# Patient Record
Sex: MALE
[De-identification: names, ages, dates, MRNs, and addresses within clinical notes are randomized per-mention and may not be internally consistent; named-entity substitution may affect disease eponyms.]

---

## 2018-06-24 ENCOUNTER — HOSPITAL ENCOUNTER (OUTPATIENT)
Dept: HOSPITAL 97 - ER | Age: 48
Setting detail: OBSERVATION
LOS: 1 days | Discharge: HOME | End: 2018-06-25
Attending: ORTHOPAEDIC SURGERY | Admitting: ORTHOPAEDIC SURGERY
Payer: SELF-PAY

## 2018-06-24 VITALS — BODY MASS INDEX: 32.3 KG/M2

## 2018-06-24 DIAGNOSIS — Z23: ICD-10-CM
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
## 2018-06-24 DIAGNOSIS — S52.202B: Primary | ICD-10-CM

## 2018-06-24 DIAGNOSIS — Z88.0: ICD-10-CM

## 2018-06-24 LAB
BUN BLD-MCNC: 9 MG/DL (ref 7–18)
GLUCOSE SERPLBLD-MCNC: 136 MG/DL (ref 74–106)
HCT VFR BLD CALC: 42.7 % (ref 39.6–49)
LYMPHOCYTES # SPEC AUTO: 1.1 K/UL (ref 0.7–4.9)
MCH RBC QN AUTO: 33.5 PG (ref 27–35)
MCV RBC: 99.2 FL (ref 80–100)
METHAMPHET UR QL SCN: NEGATIVE
PMV BLD: 9.8 FL (ref 7.6–11.3)
POTASSIUM SERPL-SCNC: 3.5 MMOL/L (ref 3.5–5.1)
RBC # BLD: 4.31 M/UL (ref 4.33–5.43)
THC SERPL-MCNC: NEGATIVE NG/ML

## 2018-06-24 PROCEDURE — 80307 DRUG TEST PRSMV CHEM ANLYZR: CPT

## 2018-06-24 PROCEDURE — 86901 BLOOD TYPING SEROLOGIC RH(D): CPT

## 2018-06-24 PROCEDURE — 72125 CT NECK SPINE W/O DYE: CPT

## 2018-06-24 PROCEDURE — 0PDL0ZZ EXTRACTION OF LEFT ULNA, OPEN APPROACH: ICD-10-PCS

## 2018-06-24 PROCEDURE — 86900 BLOOD TYPING SEROLOGIC ABO: CPT

## 2018-06-24 PROCEDURE — 86850 RBC ANTIBODY SCREEN: CPT

## 2018-06-24 PROCEDURE — 85025 COMPLETE CBC W/AUTO DIFF WBC: CPT

## 2018-06-24 PROCEDURE — 99285 EMERGENCY DEPT VISIT HI MDM: CPT

## 2018-06-24 PROCEDURE — 90714 TD VACC NO PRESV 7 YRS+ IM: CPT

## 2018-06-24 PROCEDURE — 0HQEXZZ REPAIR LEFT LOWER ARM SKIN, EXTERNAL APPROACH: ICD-10-PCS

## 2018-06-24 PROCEDURE — 74177 CT ABD & PELVIS W/CONTRAST: CPT

## 2018-06-24 PROCEDURE — 70450 CT HEAD/BRAIN W/O DYE: CPT

## 2018-06-24 PROCEDURE — 80048 BASIC METABOLIC PNL TOTAL CA: CPT

## 2018-06-24 PROCEDURE — 81003 URINALYSIS AUTO W/O SCOPE: CPT

## 2018-06-24 PROCEDURE — 71260 CT THORAX DX C+: CPT

## 2018-06-24 PROCEDURE — 36415 COLL VENOUS BLD VENIPUNCTURE: CPT

## 2018-06-24 PROCEDURE — 83605 ASSAY OF LACTIC ACID: CPT

## 2018-06-24 PROCEDURE — 0JQH0ZZ REPAIR LEFT LOWER ARM SUBCUTANEOUS TISSUE AND FASCIA, OPEN APPROACH: ICD-10-PCS

## 2018-06-24 PROCEDURE — 96365 THER/PROPH/DIAG IV INF INIT: CPT

## 2018-06-24 RX ADMIN — HYDROCODONE BITARTRATE AND ACETAMINOPHEN PRN TAB: 5; 325 TABLET ORAL at 11:45

## 2018-06-24 RX ADMIN — HYDROCODONE BITARTRATE AND ACETAMINOPHEN PRN TAB: 5; 325 TABLET ORAL at 19:24

## 2018-06-24 RX ADMIN — SODIUM CHLORIDE SCH MLS: 9 INJECTION, SOLUTION INTRAVENOUS at 14:58

## 2018-06-24 RX ADMIN — SODIUM CHLORIDE SCH MLS: 9 INJECTION, SOLUTION INTRAVENOUS at 23:49
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
## 2018-06-25 VITALS — TEMPERATURE: 97.6 F | DIASTOLIC BLOOD PRESSURE: 65 MMHG | SYSTOLIC BLOOD PRESSURE: 135 MMHG

## 2018-06-25 VITALS — OXYGEN SATURATION: 99 %

## 2018-06-25 RX ADMIN — HYDROCODONE BITARTRATE AND ACETAMINOPHEN PRN TAB: 5; 325 TABLET ORAL at 09:53

## 2018-06-25 RX ADMIN — HYDROCODONE BITARTRATE AND ACETAMINOPHEN PRN TAB: 5; 325 TABLET ORAL at 17:57

## 2019-01-07 ENCOUNTER — HOSPITAL ENCOUNTER (EMERGENCY)
Dept: HOSPITAL 97 - ER | Age: 49
Discharge: HOME | End: 2019-01-07
Payer: SELF-PAY

## 2019-01-07 VITALS — TEMPERATURE: 99 F | OXYGEN SATURATION: 97 % | DIASTOLIC BLOOD PRESSURE: 53 MMHG | SYSTOLIC BLOOD PRESSURE: 129 MMHG

## 2019-01-07 DIAGNOSIS — S51.812A: Primary | ICD-10-CM
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
## 2019-01-07 DIAGNOSIS — Z23: ICD-10-CM
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
## 2019-01-07 DIAGNOSIS — F17.210: ICD-10-CM
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
## 2019-01-07 PROCEDURE — 90714 TD VACC NO PRESV 7 YRS+ IM: CPT

## 2019-01-07 PROCEDURE — 99283 EMERGENCY DEPT VISIT LOW MDM: CPT

## 2019-01-07 PROCEDURE — 0JQH0ZZ REPAIR LEFT LOWER ARM SUBCUTANEOUS TISSUE AND FASCIA, OPEN APPROACH: ICD-10-PCS

## 2019-01-07 NOTE — EDPHYS
Physician Documentation                                                                           

 [facility]                                                                

Name: Galdino Tabares                                                                             

Age: 48 yrs                                                                                       

Sex: Male                                                                                         

: 1970                                                                                   

MRN: [id number]                                                                                   

Arrival Date: 2019                                                                          

Time: 11:48                                                                                       

Account#: [id number]                                                                            

Bed 12                                                                                            

Private MD: None, None                                                                            

ED Physician Clay Frost                                                                         

HPI:                                                                                              

                                                                                             

14:37 This 48 yrs old  Male presents to ER via Ambulatory with complaints of          snw 

      Laceration To Arm.                                                                          

14:37 The patient has a laceration related to: breaking up tile at work and a piece of        snw 

      ceramic tile flew up and struck pt in left forearm occurred at work, and there are no       

      complicating factors. The injury was accidental. The laceration(s) is(are) located on       

      the dorsal aspect of left forearm. Onset: The symptoms/episode began/occurred suddenly,     

      just prior to arrival. It is unknown whether or not the patient has had similar             

      symptoms in the past. The patient has not recently seen a physician.                        

                                                                                                  

Historical:                                                                                       

- Allergies:                                                                                      

12:27 PENICILLINS;                                                                            aa5 

- PMHx:                                                                                           

12:27 None;                                                                                   aa5 

- PSHx:                                                                                           

12:27 Left forearm;                                                                           aa5 

                                                                                                  

- Immunization history:: Last tetanus immunization: unknown.                                      

- Social history:: Smoking status: Patient uses tobacco products, 2-3 cigarettes a day .          

- Ebola Screening: : No symptoms or risks identified at this time.                                

                                                                                                  

                                                                                                  

ROS:                                                                                              

14:35 Constitutional: Negative for fever, chills, and weight loss, Eyes: Negative for injury, snw 

      pain, redness, and discharge, ENT: Negative for injury, pain, and discharge, Neck:          

      Negative for injury, pain, and swelling, Cardiovascular: Negative for chest pain,           

      palpitations, and edema, Respiratory: Negative for shortness of breath, cough,              

      wheezing, and pleuritic chest pain, Abdomen/GI: Negative for abdominal pain, nausea,        

      vomiting, diarrhea, and constipation, Back: Negative for injury and pain, : Negative      

      for injury, bleeding, discharge, and swelling, MS/Extremity: Negative for injury and        

      deformity, Neuro: Negative for headache, weakness, numbness, tingling, and seizure,         

      Psych: Negative for depression, anxiety, suicide ideation, homicidal ideation, and          

      hallucinations.                                                                             

14:35 Skin: Positive for laceration(s), of the dorsal aspect of left forearm.                     

                                                                                                  

Exam:                                                                                             

14:35 Constitutional:  This is a well developed, well nourished patient who is awake, alert,  snw 

      and in no acute distress. Head/Face:  Normocephalic, atraumatic. Eyes:  Pupils equal        

      round and reactive to light, extra-ocular motions intact.  Lids and lashes normal.          

      Conjunctiva and sclera are non-icteric and not injected.  Cornea within normal limits.      

      Periorbital areas with no swelling, redness, or edema. ENT:  Nares patent. No nasal         

      discharge, no septal abnormalities noted.  Tympanic membranes are normal and external       

      auditory canals are clear.  Oropharynx with no redness, swelling, or masses, exudates,      

      or evidence of obstruction, uvula midline.  Mucous membranes moist. Neck:  Trachea          

      midline, no thyromegaly or masses palpated, and no cervical lymphadenopathy.  Supple,       

      full range of motion without nuchal rigidity, or vertebral point tenderness.  No            

      Meningismus. Chest/axilla:  Normal chest wall appearance and motion.  Nontender with no     

      deformity.  No lesions are appreciated. Cardiovascular:  Regular rate and rhythm with a     

      normal S1 and S2.  No gallops, murmurs, or rubs.  Normal PMI, no JVD.  No pulse             

      deficits. Respiratory:  Lungs have equal breath sounds bilaterally, clear to                

      auscultation and percussion.  No rales, rhonchi or wheezes noted.  No increased work of     

      breathing, no retractions or nasal flaring. Abdomen/GI:  Soft, non-tender, with normal      

      bowel sounds.  No distension or tympany.  No guarding or rebound.  No evidence of           

      tenderness throughout. Back:  No spinal tenderness.  No costovertebral tenderness.          

      Full range of motion. MS/ Extremity:  Pulses equal, no cyanosis.  Neurovascular intact.     

       Full, normal range of motion. Neuro:  Awake and alert, GCS 15, oriented to person,         

      place, time, and situation.  Cranial nerves II-XII grossly intact.  Motor strength 5/5      

      in all extremities.  Sensory grossly intact.  Cerebellar exam normal.  Normal gait.         

      Psych:  Awake, alert, with orientation to person, place and time.  Behavior, mood, and      

      affect are within normal limits.                                                            

14:35 Skin: Appearance: normal except for affected area, injury, laceration(s), the wound is      

      approximately  5 cm(s), with a depth of  3 cm(s), of the dorsal aspect of left forearm.     

                                                                                                  

Vital Signs:                                                                                      

12:28  / 53; Pulse 84; Resp 16 S; Temp 99.0(TE); Pulse Ox 97% on R/A; Weight 90.72 kg   aa5 

      (R); Height 5 ft. 6 in. (167.64 cm) (R); Pain 4/10;                                         

12:28 Body Mass Index 32.28 (90.72 kg, 167.64 cm)                                             aa5 

                                                                                                  

Laceration:                                                                                       

14:45 Wound Repair of 5cm ( 2.0in ) subcutaneous laceration to dorsal aspect of left forearm. snw 

      Irregularly shaped.. Distal neuro/vascular/tendon intact. Anesthesia: Local anesthetic      

      administered with 6 mls of 1% lidocaine. Wound prep: Moderate cleansing with hibiclenz,     

      Wound explored moderately. Skin closed with 6 1-0 Staples using staple gun. Dressed         

      with pressure dressing, non-adherent dressing. Patient tolerated well.                      

                                                                                                  

MDM:                                                                                              

14:03 Patient medically screened.                                                             snw 

14:36 Data reviewed: vital signs, nurses notes. Data interpreted: Pulse oximetry: on room air snw 

      is 97 %. Interpretation: normal. Counseling: I had a detailed discussion with the           

      patient and/or guardian regarding: the historical points, exam findings, and any            

      diagnostic results supporting the discharge/admit diagnosis, the need for outpatient        

      follow up, for definitive care, to return to the emergency department if symptoms           

      worsen or persist or if there are any questions or concerns that arise at home. Special     

      discussion: Based on the history and exam findings, there is no indication for further      

      emergent testing or inpatient evaluation. I discussed with the patient/guardian the         

      need to see the primary care provider for further evaluation of the symptoms.               

                                                                                                  

                                                                                             

14:05 Order name: Suture Tray Setup; Complete Time: 14:10                                     snw 

                                                                                             

14:05 Order name: Dressing - Wound; Complete Time: 14:10                                      snw 

                                                                                             

14:05 Order name: Gloves, Sterile; Complete Time: 14:10                                       snw 

                                                                                             

14:05 Order name: Setup Suture Tray; Complete Time: 14:10                                     snw 

                                                                                                  

Administered Medications:                                                                         

14:16 Drug: Lidocaine (1 %) 1 vials Volume: 20 ml; Route: Infiltration;                       la1 

14:16 Drug: Hibiclens 4 % 1 application Route: Topical; Site: affected area;                  la1 

14:20 Drug: Tetanus-Diphtheria Toxoid Adult 0.5 ml {: [employer]. Exp:         la1 

      2021. Lot #: [id number]. } Route: IM; Site: right deltoid;                                 

14:37 Follow up: Response: No adverse reaction                                                la1 

                                                                                                  

                                                                                                  

Disposition:                                                                                      

17:55 Co-signature as Attending Physician, Clay Frost MD.                                    rn  

                                                                                                  

Disposition:                                                                                      

19 14:27 Discharged to Home. Impression: Laceration without foreign body of left forearm.   

- Condition is Stable.                                                                            

- Discharge Instructions: Laceration Care, Adult, Stitches, Staples, or Adhesive Wound            

  Closure, Sutured Wound Care, VIS, Tetanus, Diphtheria (Td) - CDC.                               

- Prescriptions for Bactroban 2 % Topical Ointment - Apply to affected area 1                     

  application by TOPICAL route every 12 hours; 15 gram.                                           

- Work release form, Medication Reconciliation Form, Thank You Letter, Antibiotic                 

  Education, Prescription Opioid Use form.                                                        

- Follow up: Emergency Department; When: 10 - 14 days; Reason: Staple/Suture removal.             

                                                                                                  

                                                                                                  

                                                                                                  

Signatures:                                                                                       

Daniela Amezcua, FNP-C                 FNP-Csnw                                                  

Clay Frost MD MD rn Calderon, Audri RN                     RN   aa5                                                  

Kermit Cordova RN                         RN   la1                                                  

                                                                                                  

Corrections: (The following items were deleted from the chart)                                    

14:37 14:27 2019 14:27 Discharged to Home. Impression: Laceration without foreign body  la1 

      of left forearm. Condition is Stable. Forms are Medication Reconciliation Form, Thank       

      You Letter, Antibiotic Education, Prescription Opioid Use. Follow up: Emergency             

      Department; When: 10 - 14 days; Reason: Staple/Suture removal. snw                          

                                                                                                  

**************************************************************************************************

## 2019-01-07 NOTE — ER
Nurse's Notes                                                                                     

 [facility]                                                                

Name: Galdino Tabares                                                                             

Age: 48 yrs                                                                                       

Sex: Male                                                                                         

: 1970                                                                                   

MRN: [id number]                                                                                   

Arrival Date: 2019                                                                          

Time: 11:48                                                                                       

Account#: [id number]                                                                            

Bed 12                                                                                            

Private MD: None, None                                                                            

Diagnosis: Laceration without foreign body of left forearm                                        

                                                                                                  

Presentation:                                                                                     

                                                                                             

12:26 Presenting complaint: Patient states: "I cut my arm with a piece of broken tile about   aa5 

      an hour ago". Laceration noted to left wrist, with pressure dressing applied, no active     

      bleeding noted, measures approximately 1 in long. Transition of care: patient was not       

      received from another setting of care. Complicating Factors: There are no complicating      

      factors for this patient. Onset of symptoms was 2019. Risk Assessment: Do       

      you want to hurt yourself or someone else? Patient reports no desire to harm self or        

      others. Initial Sepsis Screen: Does the patient meet any 2 criteria? No. Patient's          

      initial sepsis screen is negative. Does the patient have a suspected source of              

      infection? No. Patient's initial sepsis screen is negative. Care prior to arrival: None.    

12:26 Method Of Arrival: Ambulatory                                                           aa5 

12:26 Acuity: TERRY 4                                                                           aa5 

                                                                                                  

Historical:                                                                                       

- Allergies:                                                                                      

12:27 PENICILLINS;                                                                            aa5 

- PMHx:                                                                                           

12:27 None;                                                                                   aa5 

- PSHx:                                                                                           

12:27 Left forearm;                                                                           aa5 

                                                                                                  

- Immunization history:: Last tetanus immunization: unknown.                                      

- Social history:: Smoking status: Patient uses tobacco products, 2-3 cigarettes a day .          

- Ebola Screening: : No symptoms or risks identified at this time.                                

                                                                                                  

                                                                                                  

Screenin:16 Abuse screen: Denies threats or abuse. Nutritional screening: No deficits noted.        la1 

      Tuberculosis screening: No symptoms or risk factors identified. Fall Risk None              

      identified.                                                                                 

                                                                                                  

Assessment:                                                                                       

14:15 General: Appears in no apparent distress. Behavior is calm, cooperative. Pain:          la1 

      Complains of pain in dorsal aspect of left forearm. Neuro: Level of Consciousness is        

      awake, alert, obeys commands, Oriented to person, place, time, situation.                   

      Cardiovascular: Capillary refill < 3 seconds Patient's skin is warm and dry.                

      Respiratory: Airway is patent Respiratory effort is even, unlabored, Respiratory            

      pattern is regular, symmetrical. GI: No signs and/or symptoms were reported involving       

      the gastrointestinal system. : No signs and/or symptoms were reported regarding the       

      genitourinary system. Musculoskeletal: Circulation, motion, and sensation intact.           

      Capillary refill < 3 seconds, Range of motion: intact in all extremities. Injury            

      Description: Laceration is jagged, 2.6 to 7.5 cm long, not bleeding.                        

                                                                                                  

Vital Signs:                                                                                      

12:28  / 53; Pulse 84; Resp 16 S; Temp 99.0(TE); Pulse Ox 97% on R/A; Weight 90.72 kg   aa5 

      (R); Height 5 ft. 6 in. (167.64 cm) (R); Pain 4/10;                                         

12:28 Body Mass Index 32.28 (90.72 kg, 167.64 cm)                                             aa5 

                                                                                                  

ED Course:                                                                                        

11:48 Patient arrived in ED.                                                                  sb2 

11:49 None, None is Private Physician.                                                        sb2 

12:26 Arm band placed on.                                                                     aa5 

12:27 Triage completed.                                                                       aa5 

13:59 Kermit Cordova RN is Primary Nurse.                                                       la1 

14:03 [facility]                                                        snw 

14:03 Clay Frost MD is Attending Physician.                                                snw 

14:16 Call light in reach.                                                                    la1 

14:16 Patient did not have IV access during this emergency room visit.                        la1 

14:37 Assist provider with laceration repair on dorsal aspect of left forearm that was        la1 

      between 2.6 to 7.5 cm using staples. Set up tray. Performed by Daniela NOGUEIRA        

      Dressed with Kerlix, Patient tolerated well.                                                

                                                                                                  

Administered Medications:                                                                         

14:16 Drug: Lidocaine (1 %) 1 vials Volume: 20 ml; Route: Infiltration;                       la1 

14:16 Drug: Hibiclens 4 % 1 application Route: Topical; Site: affected area;                  la1 

14:20 Drug: Tetanus-Diphtheria Toxoid Adult 0.5 ml {: [employer]. Exp:         la1 

      2021. Lot #: [id number]. } Route: IM; Site: right deltoid;                                 

14:37 Follow up: Response: No adverse reaction                                                la1 

                                                                                                  

                                                                                                  

Outcome:                                                                                          

14:27 Discharge ordered by MD.                                                                snw 

14:37 Patient left the ED.                                                                    la1 

                                                                                                  

Signatures:                                                                                       

Daniela Amezcua FNP-C FNP-Emily Mckeon RN RN   aa5                                                  

Kermit Cordova RN                         RN   la1                                                  

Elicia Reddy                               sb2                                                  

                                                                                                  

Corrections: (The following items were deleted from the chart)                                    

12:30 12:28 Pulse 84bpm; Resp 16bpm; Spontaneous; Pulse Ox 97% RA; Temp 99.0F Temporal; 90.72 aa5 

      kg Reported; Height 5 ft. 6 in. Reported; BMI: 32.2; Pain 4/10; aa5                         

                                                                                                  

**************************************************************************************************

## 2019-01-21 ENCOUNTER — HOSPITAL ENCOUNTER (EMERGENCY)
Dept: HOSPITAL 97 - ER | Age: 49
Discharge: HOME | End: 2019-01-21
Payer: SELF-PAY

## 2019-01-21 VITALS — OXYGEN SATURATION: 100 % | SYSTOLIC BLOOD PRESSURE: 141 MMHG | DIASTOLIC BLOOD PRESSURE: 68 MMHG | TEMPERATURE: 98.9 F

## 2019-01-21 DIAGNOSIS — Z88.0: ICD-10-CM

## 2019-01-21 DIAGNOSIS — Z48.02: Primary | ICD-10-CM

## 2019-01-21 NOTE — EDPHYS
Physician Documentation                                                                           

 [facility]                                                                

Name: Galdino Tabares                                                                             

Age: 48 yrs                                                                                       

Sex: Male                                                                                         

: 1970                                                                                   

MRN: [id number]                                                                                   

Arrival Date: 2019                                                                          

Time: 13:23                                                                                       

Account#: [id number]                                                                            

Bed Waiting                                                                                       

Private MD: None, None                                                                            

ED Physician Oswaldo Fernandez                                                                      

HPI:                                                                                              

                                                                                             

14:01 This 48 yrs old  Male presents to ER via Ambulatory with complaints of Suture   snw 

      Removal.                                                                                    

14:01 The patient has staples on the dorsal aspect of left forearm. Previous treatment: the   snw 

      care was rendered at [facility], Treatment type: The patient's       

      original treatment included staples, Outpatient prescription(s): The patient was given      

      prescription(s) for Previous recheck:. Sutures/staples progress: The patient has no         

      c/o's. The wound is well-healing with no redness, swelling, discharge, or dehiscence        

      reported. The patient has not experienced similar symptoms in the past. It is unknown       

      whether or not the patient has recently seen a physician.                                   

                                                                                                  

Historical:                                                                                       

- Allergies:                                                                                      

13:53 PENICILLINS;                                                                            sv  

                                                                                                  

- Immunization history:: Adult Immunizations up to date.                                          

- Social history:: Smoking status: Patient/guardian denies using tobacco.                         

- Ebola Screening: : No symptoms or risks identified at this time.                                

                                                                                                  

                                                                                                  

ROS:                                                                                              

14:01 Constitutional: Negative for fever, chills, and weight loss, Eyes: Negative for injury, snw 

      pain, redness, and discharge, ENT: Negative for injury, pain, and discharge, Neck:          

      Negative for injury, pain, and swelling, Cardiovascular: Negative for chest pain,           

      palpitations, and edema, Respiratory: Negative for shortness of breath, cough,              

      wheezing, and pleuritic chest pain, Abdomen/GI: Negative for abdominal pain, nausea,        

      vomiting, diarrhea, and constipation, Back: Negative for injury and pain, : Negative      

      for injury, bleeding, discharge, and swelling, MS/Extremity: Negative for injury and        

      deformity, Skin: Negative for injury, rash, and discoloration, Neuro: Negative for          

      headache, weakness, numbness, tingling, and seizure.                                        

                                                                                                  

Exam:                                                                                             

13:59 Constitutional:  This is a well developed, well nourished patient who is awake, alert,  snw 

      and in no acute distress. Head/Face:  Normocephalic, atraumatic. Eyes:  Pupils equal        

      round and reactive to light, extra-ocular motions intact.  Lids and lashes normal.          

      Conjunctiva and sclera are non-icteric and not injected.  Cornea within normal limits.      

      Periorbital areas with no swelling, redness, or edema. ENT:  Nares patent. No nasal         

      discharge, no septal abnormalities noted.  Tympanic membranes are normal and external       

      auditory canals are clear.  Oropharynx with no redness, swelling, or masses, exudates,      

      or evidence of obstruction, uvula midline.  Mucous membranes moist. Neck:  Trachea          

      midline, no thyromegaly or masses palpated, and no cervical lymphadenopathy.  Supple,       

      full range of motion without nuchal rigidity, or vertebral point tenderness.  No            

      Meningismus. Chest/axilla:  Normal chest wall appearance and motion.  Nontender with no     

      deformity.  No lesions are appreciated. Cardiovascular:  Regular rate and rhythm with a     

      normal S1 and S2.  No gallops, murmurs, or rubs.  Normal PMI, no JVD.  No pulse             

      deficits. Respiratory:  Lungs have equal breath sounds bilaterally, clear to                

      auscultation and percussion.  No rales, rhonchi or wheezes noted.  No increased work of     

      breathing, no retractions or nasal flaring. Abdomen/GI:  Soft, non-tender, with normal      

      bowel sounds.  No distension or tympany.  No guarding or rebound.  No evidence of           

      tenderness throughout. Back:  No spinal tenderness.  No costovertebral tenderness.          

      Full range of motion. MS/ Extremity:  Pulses equal, no cyanosis.  Neurovascular intact.     

       Full, normal range of motion. Neuro:  Awake and alert, GCS 15, oriented to person,         

      place, time, and situation.  Cranial nerves II-XII grossly intact.  Motor strength 5/5      

      in all extremities.  Sensory grossly intact.  Cerebellar exam normal.  Normal gait.         

      Psych:  Awake, alert, with orientation to person, place and time.  Behavior, mood, and      

      affect are within normal limits.                                                            

13:59 Skin: Appearance: normal except for affected area, injury, laceration(s), that can be       

      described as clean, linear, without bleeding, repaired previously with 5 staples,           

      removed with staple remover in triage, wound without s/s infection.                         

                                                                                                  

Vital Signs:                                                                                      

13:54  / 68; Pulse 85; Resp 18; Temp 98.9; Pulse Ox 100% ;                              sv  

                                                                                                  

MDM:                                                                                              

14:04 Patient medically screened.                                                             snw 

14:05 Data reviewed: vital signs, nurses notes. Data interpreted: Pulse oximetry: on room air snw 

      is 100 %. Counseling: I had a detailed discussion with the patient and/or guardian          

      regarding: the historical points, exam findings, and any diagnostic results supporting      

      the discharge/admit diagnosis, the need for outpatient follow up, to return to the          

      emergency department if symptoms worsen or persist or if there are any questions or         

      concerns that arise at home. Response to treatment: the patient's symptoms have             

      markedly improved after treatment. Special discussion: I discussed in detail with the       

      patient the higher chance of wound infection based on his presenting history. Based on      

      the history and exam findings, there is no indication for further emergent testing or       

      inpatient evaluation. I discussed with the patient/guardian the need to see the primary     

      care provider for further evaluation of the symptoms.                                       

                                                                                                  

Administered Medications:                                                                         

No medications were administered                                                                  

                                                                                                  

                                                                                                  

Disposition:                                                                                      

15:50 Co-signature as Attending Physician, Oswaldo Fernandez MD I agree with the assessment and  wilbert 

      plan of care.                                                                               

                                                                                                  

Disposition:                                                                                      

19 14:04 Discharged to Home. Impression: Encounter for removal of sutures - /staples.       

- Condition is Stable.                                                                            

- Discharge Instructions: Suture Removal, Care After, Incision Care, Adult.                       

                                                                                                  

- Medication Reconciliation Form, Thank You Letter, Antibiotic Education, Prescription            

  Opioid Use form.                                                                                

- Follow up: Private Physician; When: 5 - 6 days; Reason: Recheck today's complaints,             

  Continuance of care, Re-evaluation by your physician. Follow up: Emergency                      

  Department; When: As needed; Reason: Worsening of condition.                                    

- Problem is an acute exacerbation.                                                               

- Symptoms have improved.                                                                         

                                                                                                  

                                                                                                  

                                                                                                  

Signatures:                                                                                       

Damaris Durán RN RN sv Anderson, Corey, MD MD cha Therrien, Shelly, FNP-C                 FNP-Csnw                                                  

                                                                                                  

Corrections: (The following items were deleted from the chart)                                    

14:14 14:04 2019 14:04 Discharged to Home. Impression: Encounter for removal of sutures sv  

      - /staples. Condition is Stable. Forms are Medication Reconciliation Form, Thank You        

      Letter, Antibiotic Education, Prescription Opioid Use. Follow up: Private Physician;        

      When: 5 - 6 days; Reason: Recheck today's complaints, Continuance of care,                  

      Re-evaluation by your physician. Follow up: Emergency Department; When: As needed;          

      Reason: Worsening of condition. Problem is an acute exacerbation. Symptoms have             

      improved. snw                                                                               

                                                                                                  

**************************************************************************************************

## 2019-01-21 NOTE — ER
Nurse's Notes                                                                                     

 [facility]                                                                

Name: Galdino Tabares                                                                             

Age: 48 yrs                                                                                       

Sex: Male                                                                                         

: 1970                                                                                   

MRN: [id number]                                                                                   

Arrival Date: 2019                                                                          

Time: 13:23                                                                                       

Account#: [id number]                                                                            

Bed Waiting                                                                                       

Private MD: None, None                                                                            

Diagnosis: Encounter for removal of sutures-/staples                                              

                                                                                                  

Presentation:                                                                                     

                                                                                             

13:53 Presenting complaint: Patient states: needs staples removed from left forearm.          sv  

      Transition of care: patient was not received from another setting of care. Onset of         

      symptoms. Care prior to arrival: None.                                                      

13:53 Method Of Arrival: Ambulatory                                                           sv  

13:53 Acuity: TERRY 5                                                                           sv  

13:56 Risk Assessment: Do you want to hurt yourself or someone else? Patient reports no       sv  

      desire to harm self or others. Initial Sepsis Screen: Does the patient meet any 2           

      criteria? No. Patient's initial sepsis screen is negative. Does the patient have a          

      suspected source of infection? No. Patient's initial sepsis screen is negative.             

                                                                                                  

Triage Assessment:                                                                                

13:54 General: Appears in no apparent distress. comfortable, Behavior is calm, cooperative,   sv  

      appropriate for age. Pain: Denies pain. Neuro: Level of Consciousness is awake, alert,      

      obeys commands, Oriented to person, place, time, situation, Moves all extremities. Full     

      function Gait is steady. Respiratory: Respiratory effort is even, unlabored,                

      Respiratory pattern is regular, symmetrical. Derm: pt has 6 sutures to the left forearm.    

                                                                                                  

Historical:                                                                                       

- Allergies:                                                                                      

13:53 PENICILLINS;                                                                            sv  

                                                                                                  

- Immunization history:: Adult Immunizations up to date.                                          

- Social history:: Smoking status: Patient/guardian denies using tobacco.                         

- Ebola Screening: : No symptoms or risks identified at this time.                                

                                                                                                  

                                                                                                  

Screenin:55 Abuse screen: Denies threats or abuse. Denies injuries from another. Nutritional        sv  

      screening: No deficits noted. Tuberculosis screening: No symptoms or risk factors           

      identified. Fall Risk None identified.                                                      

                                                                                                  

Assessment:                                                                                       

13:55 Reassessment: Patient appears in no apparent distress at this time. No changes from     sv  

      previously documented assessment.                                                           

13:55 Reassessment: Daniela NP removed sunday at this time.                                   sv  

                                                                                                  

Vital Signs:                                                                                      

13:54  / 68; Pulse 85; Resp 18; Temp 98.9; Pulse Ox 100% ;                              sv  

                                                                                                  

ED Course:                                                                                        

13:23 Patient arrived in ED.                                                                  dl4 

13:24 None, None is Private Physician.                                                        dl4 

13:53 Triage completed.                                                                       sv  

13:54 Arm band placed on.                                                                     sv  

13:55 Patient has correct armband on for positive identification.                             sv  

13:55 No provider procedures requiring assistance completed. Patient did not have IV access   sv  

      during this emergency room visit.                                                           

13:59 [facility]                                                        snw 

13:59 Oswaldo Fernandez MD is Attending Physician.                                             snw 

                                                                                                  

Administered Medications:                                                                         

No medications were administered                                                                  

                                                                                                  

                                                                                                  

Outcome:                                                                                          

13:56 Discharged to home ambulatory.                                                          sv  

13:56 Condition: stable                                                                           

13:56 Discharge instructions given to patient, Instructed on discharge instructions, follow       

      up and referral plans. Demonstrated understanding of instructions, follow-up care.          

13:56 No charge visit due to suture removal.                                                      

13:56 Patient left the ED.                                                                    sv  

14:04 Discharge ordered by MD.                                                                snw 

                                                                                                  

Signatures:                                                                                       

Damaris Durán RN                    RN   sv                                                   

Daniela Amezcua FNP-C                 FNP-Csnw                                                  

Roberto Villegas                                  dl4                                                  

                                                                                                  

Corrections: (The following items were deleted from the chart)                                    

14:14 14:14 Patient left the ED. sv                                                           sv  

                                                                                                  

**************************************************************************************************

## 2022-11-08 ENCOUNTER — HOSPITAL ENCOUNTER (EMERGENCY)
Dept: HOSPITAL 97 - ER | Age: 52
Discharge: HOME | End: 2022-11-08
Payer: SELF-PAY

## 2022-11-08 VITALS — DIASTOLIC BLOOD PRESSURE: 66 MMHG | OXYGEN SATURATION: 99 % | TEMPERATURE: 97.7 F | SYSTOLIC BLOOD PRESSURE: 130 MMHG

## 2022-11-08 DIAGNOSIS — I10: ICD-10-CM

## 2022-11-08 DIAGNOSIS — Z48.02: Primary | ICD-10-CM

## 2022-11-08 DIAGNOSIS — Z88.0: ICD-10-CM

## 2022-11-08 PROCEDURE — 99281 EMR DPT VST MAYX REQ PHY/QHP: CPT

## 2022-11-08 NOTE — XMS REPORT
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           



                                                  Routine           

 

     KCL       2021- No             40meq      40 mEq,           [facility]



     (KLOR-CON                                Oral,           ity of



     M20) tablet      21:15: 21:15                          ONCE, 1           [place]

[place]



     40 mEq      00   :00                           dose, Wed           Medical



                                                  21 at           [facility]



                                                  1615,           



                                                  Routine           

 

     furosemide            Yes       [id number] 40mg      Take 1         

  [facility]



     40 mg                      [id number]           tablet by           ity of



     tablet      00:00:                               mouth           [place]



               00                                 daily.           [facility]

 

     spironolact            Yes       [id number] 50mg      Take 2        

   [facility]



     one 25 mg                      [id number]           tablets by           ity

 of



     tablet      00:00:                               mouth           [place]



               00                                 daily.           [facility]

 

     furosemide            Yes       [id number] 40mg      Take 1         

  [facility]



     40 mg                      [id number]           tablet by           ity of



     tablet      00:00:                               mouth           [place]



               00                                 daily.           [facility]

 

     spironolact            Yes       [id number] 50mg      Take 2        

   [facility]



     one 25 mg                      [id number]           tablets by           ity

 of



     tablet      00:00:                               mouth           [place]



               00                                 daily.           [facility]

 

     furosemide            Yes       [id number] 40mg      Take 1         

  [facility]



     40 mg                      [id number]           tablet by           ity of



     tablet      00:00:                               mouth           [place]



               00                                 daily.           [facility]

 

     spironolact            Yes       [id number] 50mg      Take 2        

   [facility]



     one 25 mg                      [id number]           tablets by           ity

 of



     tablet      00:00:                               mouth           [place]



               00                                 daily.           [facility]

 

     furosemide      2021- No        [id number] 40mg      Take 1        

   [facility]



     40 mg                 [id number]           tablet by           ity of



     tablet      00:00: 00:00                          mouth           [place]



               00   :00                           daily.           [facility]

 

     spironolact      2021- No        [id number] 50mg      Take 2       

    [facility]



     one 25 mg                 [id number]           tablets by           it

y of



     tablet      00:00: 00:00                          mouth           [place]



               00   :00                           daily.           [facility]

 

     lidocaine-e      2021- No                       PRN,           Unive

rs



     pinephrine                                Starting           ity 

of



     (XYLOCAINE      17:56: 17:56                          Tue 21           

[place]



     W/EPINEPHRI      00   :00                           at 1256,           Medi

tyler



     NE) 2                                         Until [facility]



     %-1:200,000                                         21 at           



     injection                                         1256,           



                                                  Routine           

 

     furosemide      2021- No             20mg      20 mg,           Univ

ers



     (LASIX)                                Slow IV           ity of



     injection      11:00: 11:02                          Push,           [place]



     20 mg      00   :00                           ONCE, 1           Medical



                                                  dose, [facility]



                                                  21 at           



                                                  0600,           



                                                  Routine           

 

     magnesium      -2021- No             1g        1 g, IV           Univ

ers



     sulfate in                                Piggyback,           it

y of



     D5W 1      08:30: 09:42                          ONCE, 1           [place]



     gram/100 mL      00   :00                           dose, Tue           Med

ical



     RTU IV                                         21 at           [facility]



     Piggyback 1                                         0330, 100           



     g                                            mL             

 

     calcium      2021- No             1g        1 g, IV           Univer

s



     gluconate 1                                Infusion,           it

y of



     g in NaCl      07:15: 07:44                          ONCE, 1           [place]

[place]



     50 mL      00   :00                           dose, [facility]



     (ISO-OSM)                                         21 at           [facility]



     RTU IV                                         0230,           



     infusion 1                                         Routine           



     g                                                           

 

     lactulose            Yes            30mL      30 mL,           Univer

s



     (CEPHULAC)      08                               Oral, BID,           ity

 of



     solution 30      01:00:                               First dose           

[place]



     mL        00                                 on [facility]



                                                  21 at           [place]



                                                  ,           



                                                  Until           



                                                  Discontinu           



                                                  ed,            



                                                  Routine           

 

     thiamine            Yes            100mg      100 mg,           Unive

rs



     (VITAMIN                                     Oral, BID,           ity o

f



     B1) tablet      01:00:                               First dose           [place]

[place]



     100 mg      00                                 on [facility]



                                                  21 at           [place]



                                                  ,           



                                                  Until           



                                                  Discontinu           



                                                  ed,            



                                                  Routine           

 

     phytonadion      2021- No             10mg      10 mg,           Uni

vers



     e (VITAMIN      -09                          Subcutaneo           it

y of



     K)        00:15: 13:25                          us, DAILY,           [place]



     injection      00   :00                           3 doses,           Medica

l



     10 mg                                         First dose           [facility]



                                                  (after           



                                                  last           



                                                  modificati           



                                                  on) on 21 at           



                                                  1915, Last           



                                                  dose on           



                                                  21           



                                                  at 0900,           



                                                  Routine           

 

     spironolact            Yes            25mg      25 mg,           Univ

ers



     one       6-08                               Oral,           ity of



     (ALDACTONE)      00:00:                               DAILY,           [place]

[place]



     tablet 25      00                                 First dose           Medi

[place]



     mg                                           on Mon           [facility]



                                                  21 at           



                                                  1900,           



                                                  Until           



                                                  Discontinu           



                                                  ed,            



                                                  Routine           

 

     furosemide            Yes            20mg      20 mg,           Unive

rs



     (LASIX)                                     Slow IV           ity of



     injection      00:00:                               Push,           [place]



     20 mg      00                                 DAILY,           Medical



                                                  First dose           [facility]



                                                  on 21 at           



                                                  1900,           



                                                  Until           



                                                  Discontinu           



                                                  ed,            



                                                  Routine           

 

     ondansetron            Yes            4mg       4 mg, Slow           

[facility]



     (ZOFRAN                                     IV Push,           ity of



     (PF))      23:43:                               Q6HPRN,           [place]



     injection 4      59                                 Starting           Medi

[facility]



     mg                                           Mon 21           [facility]



                                                  at 1843,           



                                                  Until           



                                                  Discontinu           



                                                  ed,            



                                                  Routine,           



                                                  Nausea and           



                                                  Vomiting           



                                                  (N/V)           

 

     octreotide      2021- No             50ug/h      50 mcg/hr          

 [facility]



     (SANDOSTATI                                (10            ity of



     N) 1,250      20:00: 15:40                          mL/hr), IV           [place]

[place]



     mcg in NaCl      00   :20                           Infusion,           Med

ical



     0.9% (NS)                                         CONTINUOUS           [facility]

ch



     250 mL                                         , Starting           



     infusion                                         Mon 21           



                                                  at 1500           

 

     octreotide      2021- No             50ug      50 mcg,           Uni

vers



     (SANDOSTATI                                Slow IV           ity 

of



     N)        20:00: 19:11                          Push,           [place]



     injection      00   :00                           ONCE, 1           Medical



     50 mcg                                         dose, Mon           [facility]



                                                  21 at           



                                                  1500,           



                                                  STAT<br>In           



                                                  dication:           



                                                  Acute           



                                                  Variceal           



                                                  Hemorrhage           



                                                  in a           



                                                  Cirrhotic           



                                                  Patient           

 

     KCL       2021- No             40meq      40 mEq,           [facility]



     (KLOR-CON                                Oral,           ity of



     M20) tablet      18:00: 17:38                          ONCE, 1           [place]

[place]



     40 mEq      00   :00                           dose, Mon           Medical



                                                  21 at           [facility]



                                                  1300,           



                                                  Routine           

 

     iopamidol      2021- No        [id number] 120mL      120 mL,         

  [facility]



     (ISOVUE                                Intravenou           ity o

f



     370-500 mL)      17:15: 16:07                          s, ONCE, 1          

 [place]



     injection      00   :00                           dose, Mon           Medic

al



     120 mL                                         21 at           [facility]



                                                  1215,           



                                                  Routine           







Immunizations







           Ordered    Filled Immunization Date       Status     Comments   Sourc

e



           Immunization Name Name                                        

 

           Pneumococcal            2022 Completed             [facility]

h



           20-valent Vaccine            00:00:00                         

 

           Pneumococcal            2022 Completed             [facility]

h



           20-valent Vaccine            00:00:00                         

 

           SARS-COV-2 COVID-19            2021 Completed             Unive

rsity of



           PFIZER VACCINE            00:00:00                         [place] [facility]

[facility]



                                                                  [facility]







Vital Signs







             Vital Name   Observation Time Observation Value Comments     Source

 

             Systolic blood 2021 17:32:00 117 mm[Hg]                Univer

sity of



             pressure                                            [facility]



                                                                 [facility]

 

             Diastolic blood 2021 17:32:00 75 mm[Hg]                 Unive

rsity of



             pressure                                            [facility]

 

             Heart rate   2021 17:32:00 82 /min                   [facility]

ty of



                                                                 [facility]

 

             Body temperature 2021 17:32:00 36.5 Nickie                  Univ

ersity of



                                                                 [facility]

 

             Oxygen saturation in 2021 17:32:00 98 /min                   

[facility]



             Arterial blood by                                        [place] [facility]

[facility]



             Pulse oximetry                                        [facility]

 

             Respiratory rate 2021 13:18:00 20 /min                   Univ

ersity of



                                                                 [place] [facility]



                                                                 [facility]

 

             Body height  2021 01:49:00 167.6 cm                  [facility]

ty of



                                                                 [place] [facility]



                                                                 [place]

 

             Body weight  2021 01:49:00 77.111 kg                 Universi

ty of



                                                                 [facility]



                                                                 [facility]

 

             BMI          2021 01:49:00 27.44 kg/m2               Universi

ty [facility]

 

             Systolic blood 2021 20:28:00 118 mm[Hg]                Univer

sity of



             pressure                                            [facility]



                                                                 [facility]

 

             Diastolic blood 2021 20:28:00 69 mm[Hg]                 Unive

rsity of



             pressure                                            [facility]



                                                                 [facility]

 

             Heart rate   2021 20:28:00 79 /min                   Univers[facility]

ty of



                                                                 [place] [facility]



                                                                 [place]

 

             Body temperature 2021 20:28:00 36.83 Nickie                 Univ

ersity of



                                                                 [facility]



                                                                 [facility]

 

             Respiratory rate 2021 20:28:00 18 /min                   Univ

ersity of



                                                                 [facility]



                                                                 [facility]

 

             Oxygen saturation in 2021 20:28:00 95 /min                   

[facility]



             Arterial blood by                                        [place] [facility]

[facility]



             Pulse oximetry                                        [facility]

 

             Body height  2021 21:12:00 167.6 cm                  Universi

ty of



                                                                 [facility]

 

             Body weight  2021 14:47:00 90.719 kg                 [facility]

 

             BMI          2021 14:47:00 32.28 kg/m2               [facility]

 

             Systolic blood 2022 12:48:00 113 mm[Hg]                [facility]



             pressure                                            

 

             Diastolic blood 2022 12:48:00 64 mm[Hg]                 [facility]



             pressure                                            

 

             Heart rate   2022 12:48:00 64 /min                   [facility]

 

             Body temperature 2022 12:48:00 37 Nickie                    [facility]

 

             Respiratory rate 2022 12:48:00 18 /min                   [facility]

 

             Body height  2022 12:48:00 167.6 cm                  [facility]

 

             Body weight  2022 12:48:00 73.256 kg                 [facility]

 

             BMI          2022 12:48:00 26.07 kg/m2               [facility]

 

             Oxygen saturation in 2022 12:48:00 100 /min                  

[facility]



             Arterial blood by                                        



             Pulse oximetry                                        

 

             Systolic blood 2021 20:28:00 118 mm[Hg]                Univer

sity of



             [facility]

 

             Diastolic blood 2021 20:28:00 69 mm[Hg]                 Unive

rsity [facility]

 

             Heart rate   2021 20:28:00 79 /min                   [facility]

 

             Body temperature 2021 20:28:00 36.83 Nickie                 [facility]

 

             Respiratory rate 2021 20:28:00 18 /min                   [facility]

 

             Oxygen saturation in 2021 20:28:00 95 /min                   

[facility]



             Arterial blood by                                        [place] [facility]

[facility]



             Pulse oximetry                                        [facility]

 

             Body height  2021 21:12:00 167.6 cm                  [facility]

 

             Body weight  2021 14:47:00 90.719 kg                 [facility]

 

             BMI          2021 14:47:00 32.28 kg/m2               [facility]







Procedures







                Procedure       Date / Time     Performing Clinician Source



                                Performed                       

 

                COMPREHENSIVE METABOLIC 2022 10:33:00 Santiago Vick

[facility]



                [facility]                                           

 

                HEMOGLOBIN A1C  2022 10:33:00 VickSantiago



 

                LIPID PROFILE   2022 10:33:00 Vick, Santiago Polk

h

 

                CBC/DIFF        2022 10:33:00 Vick, Santiago Polkt

h

 

                [facility]             [facility]33[facility]

 

                DIFFERENTIAL, MANUAL-[facility] 2022 10:33:00 Santiago Vick

[facility]

 

                FECAL OCCULT BLOOD 2022 08:00:00 VickSantiago

alth

 

                HEMOCCULT KIT FOR 2022 13:11:09 Santiago Vick

[facility]



                SPECIMEN COLLECTION AT                                 



                HOME                                            

 

                MAGNESIUM       2022-07-15 05:50:00 Kevin Orona

h

 

                COMPREHENSIVE METABOLIC 2022-07-15 05:50:00 Kevin Orona

[facility]



                PANEL                                           

 

                PT/INR/PTT      2022-07-15 05:50:00 Kevin Orona



 

                CBC/DIFF        2022-07-15 05:50:00 Grecia Lagunas

lt

 

                CBC             2022-07-15 05:50:00 Grecia Lagunas amy

[facility]

 

                DIFFERENTIAL, MANUAL (NO 2022-07-15 05:50:00 Grecia Lagunas

[facility]



                MORPHOLOGY)-[facility] GI PROC EGD 2022 15:59:00 Derrek Phoenix 

[facility]



                DIAGNOSTIC BRUSH WASH                                 

 

                IP CONSULT TO PHYSICAL 2022 08:59:09 Grecia Lagunas

[facility]



                THERAPY                                         

 

                HGB/HCT         2022 06:06:00 Shawna Cervantes He

alth

 

                MAGNESIUM       2022 03:08:00 Kevin Orona

h

 

                COMPREHENSIVE METABOLIC 2022 03:08:00 Kevin Orona

[facility]



                PANEL                                           

 

                PT/INR/PTT      2022 03:08:00 Kevin Orona [facility]

 

                CBC/DIFF        2022 03:08:00 Grecia Lagunas

lt

 

                CBC             2022 03:08:00 Grecia Lagunas [facility]

lt

 

                DIFFERENTIAL, MANUAL-[facility]AM 2022 03:08:00 Grecia Lagunas [facility]

 

                PT/INR/PTT      2022 17:47:00 Marguerite Thapa [facility] [facility]

[facility]

 

                GAMMA GLUTAMYL  2022 17:47:00 Marguerite Thapa [facility]

lth



                TRANSFERASE (GGT)                                 

 

                CELIAC DISEASE  2022 17:47:00 Marguerite Thapa a

lth

 

                CERULOPLASMIN   2022 17:47:00 Marguerite Thapa a

lth

 

                ALPHA-1-ANTITRYPSIN 2022 17:47:00 Marguerite Thapa [facility]

 

                MAI             2022 17:47:00 Marguerite Thapa [facility]

lth

 

                MITOCHONDRIAL (M2) 2022 17:47:00 Marguerite Thapa [facility]



                ANTIBODY                                        

 

                LIVER KIDNEY MICR 2022 17:47:00 Marguerite Thapa 

ealth

 

                IGM             2022 17:47:00 Marguerite Thapa [facility]

lth

 

                ABD PARACENTESIS 2022 14:13:43 [facility]

th



                W/IMAGING                       Mohammad        

 

                ALBUMIN, [facility]    2022 13:47:00 Grecia Lagunas [facility]

 

                T PROTEIN, [facility]  2022 13:47:00 Grecia Lagunas [facility]

 

                CELL COUNT, FLUID 2022 13:47:00 Grecia Lagunas [facility]

ealth

 

                GLUCOSE, FLD    2022 13:47:00 Grecia Lagunas [facility]

lt

 

                CELL COUNT, BODY FLUID 2022 13:47:00 Grecia Lagunas [facility]

 

                DIFFERENTIAL, CELL COUNT 2022 13:47:00 Grecia Lagunas [facility]

 

                FLUID CULTURE AND GRAM 2022 13:47:00 Grecia Lagunas [facility]



                STAIN                                           

 

                TRANSFUSE PLATELETS 2022 10:20:00 Grecia Lagunas [facility]



                (NURSING)                                       

 

                SARS-COV-2, FLU A/B, RSV 2022 08:51:00 Derrek Phoenix

 [facility]

 

                CORONAVIRUS, COVID-19, 2022 08:51:00 Derrek Phoenix [facility]



                WILLIAM                                             

 

                MAGNESIUM       2022 04:30:00 AdwoaKevin   [facility]

 

                COMPREHENSIVE METABOLIC 2022 04:30:00 AdwoaKevin chang

is Health



                PANEL                                           

 

                PT/INR/P[facility]      [facility]:30[facility]

 

                CBC/DIFF        2022 04:30:00 Grecia Lagunas [facility]

lt

 

                TYPE AND SCREEN 2022 04:30:00 Vandana Phoenixel ADELA [facility]

ealth

 

                CBC             2022 04:30:00 Grecia Lagunas [facility]

 

                T&S - COLLECTION 2022 04:30:00 Derrek Phoenix [facility]

 

                PHOSPHORUS      2022 04:30:00 Grecia Lagunas [facility]

 

                RBC MORPHOLOGY-WAM 2022 04:30:00 Grecia Lagunas [facility]

 

                PREPARE PLATELETS (LAB) 2022 04:30:00 Grecia Lagunas [facility]

 

                PREPARE CROSSMATCH RBC 2022 04:30:00 Grecia Lagunas [facility]



                UNITS                                           

 

                HEPATITIS A VIRUS AB IGG 2022 13:40:00 Grecia Lagunas

[facility]

 

                MAGNESIUM       2022 03:44:00 AdwoaKevin   [facility]

h

 

                COMPREHENSIVE METABOLIC 2022 03:44:00 Kevin Orona

is Health



                PANEL                                           

 

                PT/INR/PTT      2022 03:44:00 AdwoaKevin   [facility]

 

                CBC/DIFF        2022 03:44:00 [facility]

[facility]

 

                CBC             2022 03:44:00 Grecia Lagunas [facility]

 

                DIFFERENTIAL, MANUAL-WAM 2022 03:44:00 Grecia Lagunas [facility]

 

                XRAY ABDOMEN 1 VIEW 2022 19:34:18 Shayna Callejas [facility]

 

                BLOOD CULTURE   2022 14:02:00 [facility]

th

 

                CT HEAD W/O CONTRAST 2022 11:29:00 nAna Vásquez [facility]

 

                XRAY CHEST 1 VIEW 2022 10:12:00 Grecia Lagunas [facility]

 

                URINALYSIS W/REFLEX TO 2022 10:05:00 Grecia Lagunas [facility]



                URINE CULTURE                                   

 

                URINALYSIS      2022 10:05:00 Grecia Lagunas [facility]

 

                URINE CULTURE COLLECTION 2022 10:05:00 Grecia Lagunas [facility]



                KIT                                             

 

                HGB/HCT         2022 09:39:00 Grecia Lagunas [facility]

 

                LACTIC ACID     2022 09:37:00 Grecia Lagunas [facility]

 

                12 LEAD EKG     2022 09:29:59 Grecia Lagunas [facility]

 

                BLOOD GAS, ARTERIAL 2022 09:29:00 Grecia Lagunas [facility]

 

                BLOOD GAS LACTIC ACID, 2022 09:29:00 Grecia Lagunas [facility]



                VENOUS                                          

 

                GLUCOSE POC     [facility]22 [facility]

 

                MAGNESIUM       2022 04:47:00 Kevin Orona   [facility]

 

                COMPREHENSIVE METABOLIC 2022 04:47:00 Kevin Orona   [facility]



                PANEL                                           

 

                PT/INR/PTT      2022 04:47:00 Kevin Orona   [facility]

 

                FIBRINOGEN      2022 04:47:00 Grecia Lagunas [facility]

 

                CBC/DIFF        2022 04:47:00 Grecia Lagunas [facility]

 

                CBC             2022 04:47:00 BeboGrecia parra

lt

 

                INFUSION PUMP   2022-07-10 20:48:21 May, Marcy Diaz [facility]

 

                CONSULT CLINICAL CASE 2022-07-10 18:36:47 [facility]



                MANAGEMENT (RN/SW)                                 

 

                SEQUENTIAL COMPRESSION 2022-07-10 18:22:34 May, Marcy Abel

is Health



                PUMP                                            

 

                SEQUENTIAL COMPRESSION 2022-07-10 18:22:34 May, Marcy Abel

is Health



                PUMP                                            

 

                HGB/HCT         2022-07-10 15:32:00 Grecia Lagunas

lt

 

                TRANSFUSE (RED CELL UNITS 2022-07-10 11:35:00 Grecia Lagunas [facility]



                - NURSING)                                      

 

                MAGNESIUM       2022-07-10 04:02:00 Kevin Oroan



 

                COMPREHENSIVE METABOLIC 2022-07-10 04:02:00 Kevin Orona

is [facility]



                PANEL                                           

 

                PT/INR/PTT      2022-07-10 04:02:00 Kevin Orona



 

                AFP, TUMOR MARKER 2022-07-10 04:02:00 Grecia Lagunas 

ealt

 

                COPPER, SERUM OR PLASMA 2022-07-10 04:02:00 Grecia Lagunas [facility]

[facility]

 

                RETIC COUNT     2022-07-10 04:02:00 Grecia Lagunas

lt

 

                HAPTOGLOBIN     2022-07-10 04:02:00 Grecia Lagunas

lt

 

                LACTATE DEHYDROGENASE 2022-07-10 04:02:00 Grecia Lagunas

is [facility]



                (LDH)                                           

 

                AMMONIA         2022-07-10 04:02:00 Grecia Lagunas

lt

 

                CEA             2022-07-10 04:02:00 Grecia Lagunas

lt

 

                HGB/HCT         2022 22:57:00 Sujata Mueller

 

                DUPLEX DOPPLER ABD/PEL 2022 21:00:00 Grecia Lagunas [facility]



                VASCULAR STUDY, COMPLETE                                 

 

                U/S ABDOMEN     2022 20:35:00 Grecia Lagunas

lt

 

                CT CHEST W CONTRAST 2022 20:05:18 Grecia Lagunas

 [facility]

 

                CT ABDOMEN AND PELVIS 2022 20:05:18 Grecia Lagunas

is Health



                CONTRAST                                        

 

                HGB/HCT         2022 15:26:00 Grecia Lagunas He[facility]

lth

 

                HGB/HCT         2022 08:39:00 AdwoaKevin chang

 

                CBC/DIFF        2022 05:37:00 AdwoaKevin

h

 

                COMPREHENSIVE METABOLIC 2022 05:37:00 AdwoaKevin

is Health



                PANEL                                           

 

                CBC             2022 05:37:00 [facility]

[facility]

 

                SAVE SMEAR/ NOT FOR PATH 2022 05:37:00 [facility]



                REVIEW                                          

 

                DIFFERENTIAL, MANUAL-WAM 2022 05:37:00 [facility]

 

                MAGNESIUM       2022 04:40:00 [facility]Kevin [facility]

h

 

                PT/INR/PTT      2022 04:40:00 AdwoaUlisessh   [facility]



 

                FOLIC ACID      2022 04:40:00 [facility]



 

                IRON PROFILE    2022 04:40:00 AdwoaUlisessh   [facility]



 

                HIV AG/AB COMBO ROUTINE 2022 04:40:00 AdwoaKevin

 Health



                SCREENING                                       

 

                HEPATITIS PANEL 2022 04:40:00 AdwoaUlisessh   [facility]



 

                TRANSFUSE (RED CELL UNITS 2022 02:45:00 João Bernal [facility]



                - NURSING)                                      

 

                URINALYSIS      2022 01:38:00 AdwoaUlisessh   [facility]

 

                SODIUM, URINE, RANDOM 2022 01:38:00 Adwoa [facility]

 

                URINALYSIS      2022 01:38:00 [facility]

h

 

                TRANSFUSE (RED CELL UNITS 2022 22:30:00 João Bernal [facility]



                - NURSING)                                      

 

                SEQUENTIAL COMPRESSION 2022 21:47:40 [facility]



                PUMP                                            

 

                SEQUENTIAL COMPRESSION 2022 21:47:40 [facility]



                PUMP                                            

 

                SARS-COV-2, FLU A/B, RSV 2022 20:45:00 Gabe João [facility]

 

                CORONAVIRUS, COVID-19, 2022 20:45:00 João Bernal [facility]



                WILLIAM                                             

 

                TRANSFUSE (RED CELL UNITS 2022 17:30:00 João Bernal [facility]



                - NURSING)                                      

 

                12 LEAD EKG     2022 17:14:43 João Bernal

 

                TROPONIN I      2022 17:08:00 João Bernal

 

                THYROID STIMULATING 2022 17:08:00 Kevin Orona

ealt



                HORMONE (TSH)                                   

 

                VITAMIN B12     2022 17:08:00 Kevin Orona

 

                FERRITIN        2022 17:08:00 Kevin Orona

 

                TYPE AND SCREEN 2022 15:47:00 Harry Jang



 

                T&S - COLLECTION 2022 15:47:00 Harry Jang

lth

 

                ABO/RH CONFIRMATION 2022 15:47:00 Harry Jang 

[facility]

 

                PREPARE CROSSMATCH RBC 2022 15:47:00 Grecia Lagunas [facility]



                UNITS                                           

 

                XRAY CHEST 2 VIEWS 2022 11:31:00 Victoria Capone

alth

 

                CBC/DIFF        2022 11:00:00 Victoria Capone

 

                BASIC METABOLIC PANEL 2022 11:00:00 Victoria Capone

 

                LIVER PROFILE   2022 11:00:00 Victoria Capone

 

                PT/INR/PTT      2022 11:00:00 Victoria Capone

 

                B-TYPE NATRIURETIC 2022 11:00:00 Victoria Capone

alth



                PEPTIDE (BNP)                                   

 

                CBC             2022 11:00:00 Victoria Capone  [facility]

h

 

                DIFFERENTIAL, MANUAL-WAM 2022 11:00:00 Victoria Capone  [facility]

 

                SYPHILIS SCREEN FOR 2022 11:00:00 Kevin Orona

ealth



                INFECTION                                       

 

                BASIC METABOLIC PANEL 2021 10:29:00 Lesley [facility]



                (NA, K, CL, CO2, GLUCOSE,                                 Medica

l Branch



                BUN, CREATININE, CA)                                 

 

                CBC WITH DIFF   2021 10:29:00 Lesley [facility]

 

                ALBUMIN BODY FLUID 2021 06:00:00 Malik [facility]

 

                T.PROTEIN BODY FLUID 2021 06:00:00 Malik [facility]

 

                BODY FLUID DIRECT COUNT 2021 06:00:00 Lesley [facility]

 

                BODY FLUID (BACTEC 2021 06:00:00 Robert Snell  [facility]



                BOTTLE)                                         Medical Branch

 

                COVID-19 (ID NOW RAPID 2021 20:46:00 Lesley [facility]



                TESTING)                                        Medical Branch

 

                LAB ONLY COVID  2021 20:46:00 Lesley [facility]



                INTERPRETATION                                  [facility]

 

                HEPATIC FUNCTION PANEL 2021 16:17:00 Jean Latham   [facility]



                ([id number]) (ALB,T.PRO,BILI                                 Medical 

Branch



                T,BU/BC,ALT,AST,ALK PHOS)                                 

 

                BASIC METABOLIC PANEL 2021 16:17:00 Jean Latham   [facility]



                (NA, K, CL, CO2, GLUCOSE,                                 Medica

l Branch



                BUN, CREATININE, CA)                                 

 

                CBC WITH DIFF   2021 16:17:00 Jean Lahtam   [facility]

 

                PROTHROMBIN TIME / INR 2021 16:17:00 Jean Latham   [facility]

 

                CT ABDOMEN PELVIS W 2021 16:09:21 Jean Latham   [facility]

[facility]

 

                CONSENT/REFUSAL FOR 2021 15:22:40 Doctor UnassNoris gunter

[facility]



                DIAGNOSIS AND TREATMENT                 [place]         Medical 

Branch

 

                PREPARE PACKED RBC 2021 23:59:02 Maddie Cardoza [facility]

 

                PREPARE PACKED RBC 2021 23:59:02 Maddie Cardoza [facility]

 

                COMP. METABOLIC PANEL 2021 14:45:00 Moe Levine, Susan. \[facility] Outlook.\""



                ([id number])                                         [facility]

 

                CBC WITH DIFF   2021 14:45:00 [facility]

 

                COMP. METABOLIC PANEL 2021 14:45:00 Moe Marcy [facility]



                ([id number])                                         [facility]

 

                CBC WITH DIFF   2021 14:45:00 [facility]

 

                IR              2021 18:05:00 Amy Chase [facility]



                PARACENTESIS/PERITONECENT                                 Medica

l Branch



                ESIS WITH IMAGING                                 

 

                IR              2021 18:05:00 Amy Chase [facility]



                PARACENTESIS/PERITONECENT                                 Medica

l Branch



                ESIS WITH IMAGING                                 

 

                T.PROTEIN BODY FLUID 2021 18:01:00 Daisy Friend [facility]

 

                T.PROTEIN BODY FLUID 2021 18:01:00 Daisy Friend [facility]

 

                ALBUMIN BODY FLUID 2021 18:00:00 Amy Chase [facility]

 

                BODY FLUID      2021 18:00:00 Amy Chase [facility]



                CULTURE(AEROBIC/ANAEROBIC                                 Medica

 Branch



                )                                               

 

                CYTO ABDOMINAL FLUID 2021 18:00:00 Amy Chase

[facility]

 

                BODY FLUID DIRECT COUNT 2021 18:00:00 Amy Chase

[facility]

 

                BODY FLUID MANUAL DIFF 2021 18:00:00 Amy Chase

[facility]

 

                BODY FLUID      2021 18:00:00 Amy Chase [facility]



                CULTURE(AEROBIC/ANAEROBIC                                 Medica

l Branch



                )                                               

 

                ALBUMIN BODY FLUID 2021 18:00:00 Amy Chase [facility]

 

                CYTO ABDOMINAL FLUID 2021 18:00:00 Amy Chase [facility]

 

                PHOSPHORUS      2021 06:16:00 [place] [facility]

 

                MAGNESIUM       2021 06:16:00 [place] [facility]

 

                IONIZED CALCIUM 2021 06:16:00 [place], [facility]

 

                CBC WITH DIFF   2021 06:16:00 Jean [facility]

 

                IONIZED CALCIUM 2021 06:16:00 [place], [facility]

 

                CBC WITH DIFF   2021 06:16:00 Amy Chase [facility]

 

                MAGNESIUM       2021 06:16:00 [place], [facility]

 

                PHOSPHORUS      2021 06:16:00 [place] [facility]

 

                PREPARE PACKED RBC 2021 02:14:47 Amy Chase [facility]

 

                PREPARE PACKED RBC 2021 02:14:47 Amy Chase [facility]

 

                US ABDOMEN LIMITED WITH 2021 00:42:57 Amy Chase U

[facility]

 

                US ABDOMEN LIMITED WITH 2021 00:42:57 Amy Chase U

[facility]

 

                CERULOPLASMIN   2021 23:24:00 Moe [facility]

 

                ALPHA 1 ANTITRYPSIN 2021 23:24:00 Marcy Rosa [facility]

 

                IMMUNOGLOBULIN G 2021 23:24:00 Marcy Rosa [facility]

 

                BASIC METABOLIC PANEL 2021 23:24:00 Abu Atherah, [facility]

[facility] [place]



                (NA, K, CL, CO2, GLUCOSE,                                 Medica

l Branch



                BUN, CREATININE, CA)                                 

 

                ALPHA FETOPROTEIN 2021 23:24:00 [facility]

 

                ANTI-NUCLEAR ANTIBODY 2021 23:24:00 Marcy Rosa [facility]



                SCREEN                                          [facility]

 

                HEPATITIS B SURFACE 2021 23:24:00 Marcy Rosa [facility]



                ANTIBODY                                        [facility]

 

                HEPATITIS B SURFACE 2021 23:24:00 Moe Marcy [facility]



                ANTIGEN                                         [facility]

 

                HCV ANTIBODY    2021 23:24:00 Marcy Rosa [facility]

 

                HBC ANTIBODY (IGM & IGG) 2021 23:24:00 Marcy Rosa [facility]

 

                HAV ANTIBODY (IGG AND 2021 23:24:00 Moe Levine, Susan. \[facility].\""



                IGM)                                            [facility]

 

                ALPHA 1 ANTITRYPSIN 2021 23:24:00 Marcy Rosa [facility]

 

                ALPHA FETOPROTEIN 2021 23:24:00 Moe [facility]

 

                CERULOPLASMIN   2021 23:24:00 Moe [facility]

 

                HCV ANTIBODY    2021 23:24:00 Marcy Rosa [facility]

 

                IMMUNOGLOBULIN G 2021 23:24:00 Moe [facility]

 

                SMOOTH MUSCLE AB,IGG 2021 23:24:00 Marcy Rosa [facility]



                W/REFLEX                                        [facility]

 

                ANTI-NUCLEAR ANTIBODY 2021 23:24:00 Marcy Rosa [facility]



                SCREEN                                          [facility]

 

                HAV ANTIBODY (IGG AND 2021 23:24:00 Moe Levine, Susan. \[facility].\""



                IGM)                                            [facility]

 

                HEPATITIS B SURFACE 2021 23:24:00 [facility]



                ANTIGEN                                         [facility]

 

                HEPATITIS B SURFACE 2021 23:24:00 Marcy Rosa [facility]



                ANTIBODY                                        [facility]

 

                HBC ANTIBODY (IGM & IGG) 2021 23:24:00 Marcy Rosa [facility]

 

                BASIC METABOLIC PANEL 2021 23:24:00 Jean Carlos TorresAmy pritchard Uni

[facility] [place]



                (NA, K, CL, CO2, GLUCOSE,                                 Medica

l Branch



                BUN, CREATININE, CA)                                 

 

                HB ABO GROUPING 2021 23:22:00 Abu Brian [facility]

 

                HB ABO GROUPING 2021 23:22:00 Abu [facility]

 

                CBC WITH DIFF   2021 [facility]:37[facility]

 

                PROTHROMBIN TIME / INR 2021 22:37:00 Marcy Rosa [facility]

 

                CBC WITH DIFF   2021 22:37:00 Abu [facility]

 

                PROTHROMBIN TIME / INR 2021 22:37:00 Marcy Rosa [facility]

 

                ABORH CONFIRMATION 2021 17:39:00 IbAmanda millso F Unive

[facility]

 

                ABORH CONFIRMATION 2021 17:39:00 IbAmanda millso F Unive

[facility]

 

                HB ABO GROUPING 2021 17:03:00 Maddie Cardoza [facility]

 

                HB ABO GROUPING 2021 17:03:00 IbMaddie mills [facility]

 

                URINALYSIS      2021 16:46:00 Maddie Cardoza F [facility]

 

                URINALYSIS      2021 16:46:00 Maddie Cardoza F [facility]

 

                CT ABDOMEN PELVIS W 2021 16:12:42 Maddie Cardoza F Univ

[facility]

 

                CT ABDOMEN PELVIS W 2021 16:12:42 IbAmanda millso [facility]

 

                XR CHEST 1 VW   2021 15:24:06 Maddie Cardoza [facility]

 

                XR CHEST 1 VW   2021 15:24:06 Maddie Cardoza [facility]

 

                HB ECG ROUTINE & RHYTHM 2021 15:15:37 Maddie Cardoza 

[facility]

 

                HB ECG ROUTINE & RHYTHM 2021 15:15:37 Maddie Cardoza 

[facility]

 

                LIPASE          2021 15:13:00 Maddie Cardoza [facility]

 

                FERRITIN SERUM  2021 15:13:00 Marcy Rosa [facility]

 

                TROPONIN I      2021 15:13:00 Maddie Cardoza [facility]

 

                COMP. METABOLIC PANEL 2021 15:13:00 Maddie Cardoza Un

[facility]



                ([id number])                                         Medical Branch

 

                CBC WITH DIFF   2021 15:13:00 Maddie Cardoza [facility]

 

                N-TERMINAL PRO-BNP 2021 15:13:00 Maddie Cardoza [facility]

 

                COVID-19 (ID NOW RAPID 2021 15:13:00 Maddie Cardoza U

[facility]



                TESTING)                                        Medical Branch

 

                COVID-19 (ID NOW RAPID 2021 15:13:00 Maddie Cardoza U

[facility]



                TESTING)                                        Medical Branch

 

                CBC WITH DIFF   2021 15:13:00 Maddie Cardoza [facility]

 

                COMP. METABOLIC PANEL 2021 15:13:00 Madd[facility]za Un

[facility]



                ([id number]                                         [facility]

 

                TROPONIN I      2021 15:13:00 Maddie Cardoza [facility]

 

                LIPASE          2021 15:13:00 Maddie [facility]oza [facility]

[facility] [place]



                                                                Medical [place]

 

                N-TERMINAL PRO-BNP 2021 15:13:00 Maddie Cardoza [facility]

 

                FERRITIN SERUM  2021 15:13:00 Marcy Rosa [place]

f [facility]

 

                CONSENT/REFUSAL FOR 2021 14:32:16 Doctor Richi [facility]



                DIAGNOSIS AND TREATMENT                 [place]         Medical 

Branch

 

                NOTICE OF PRIVACY 2021 14:31:32 Doctor Richi, [facility]



                PRACTICES                       [place]         Medical Branch

 

                EMERGENCY DEPARTMENT 2021 05:01:00 Doctor Richi, [facility]



                DOCUMENTS                       [place]         Medical Branch

 

                AGREEMENTS AUTHORIZATIONS 2021 05:01:00 Doctor Richi,

 [facility]



                AND IRREVOCABLE                 [place]         Medical Branch



                ASSIGNMENTS (FORM )                                 

 

                AGREEMENTS AUTHORIZATIONS 2021 05:01:00 Doctor Richi,

 [facility]



                AND IRREVOCABLE                 [place]         Medical Branch



                ASSIGNMENTS (FORM 2001)                                 

 

                DISCLOSURE AND CONSENT, 2021 05:01:00 Doctor Richi [facility]

[facility]



                [facility]ICAL                 No Name         Medical Bra

nc



                PROCEDURES                                      

 

                EMERGENCY DEPARTMENT 2021 05:01:00 Doctor Richi, [facility]



                DOCUMENTS                       [place]         Medical Branch

 

                HOSPITAL ADMISSION 2021 05:01:00 Doctor Richi [facility]



                                                [place]         Medical Branch







Plan of Care







             Planned Activity Planned Date Details      Comments     Source

 

             Future Scheduled Test 2022-10-01 00:00:00 IMM Influenza            

  [facility]



                                       Seasonal (>/= 19 yrs)              



                                       [code = IMM Influenza              



                                       Seasonal (>/= 19              



                                       yrs)]                     

 

             Future Scheduled Test 2022-10-01 00:00:00 IMM Influenza            

  [facility]



                                       Seasonal (>/= 19 yrs)              



                                       [code = IMM Influenza              



                                       Seasonal (>/= 19              



                                       yrs)]                     

 

             Future Scheduled Test 2021 00:00:00 COVID-19 Vaccine (2 -    

          [facility]



                                       Pfizer series) [code              



                                       = COVID-19 Vaccine (2              



                                       - Pfizer series)]              

 

             Future Scheduled Test 2021 00:00:00 COVID-19 Vaccine (2 -    

          [facility]



                                       Pfizer series) [code              



                                       = COVID-19 Vaccine (2              



                                       - Pfizer series)]              

 

             Future Scheduled Test 2018 00:00:00 Screening for            

  [facility]



                                       malignant neoplasm of              



                                       colon (procedure)              



                                       [code = 666340773]              

 

             Future Scheduled Test 2018 00:00:00 Screening for            

  [facility]



                                       malignant neoplasm of              



                                       colon (procedure)              



                                       [code = 830026993]              

 

             Future Scheduled Test 2018 00:00:00 Screening for            

  [facility]



                                       malignant neoplasm of              



                                       colon (procedure)              



                                       [code = 534207504]              

 

             Future Scheduled Test 2018 00:00:00 Screening for            

  [facility]



                                       malignant neoplasm of              



                                       colon (procedure)              



                                       [code = 412127315]              

 

             Future Scheduled Test 2018 00:00:00 Screening for            

  [facility]



                                       malignant neoplasm of              



                                       colon (procedure)              



                                       [code = 027913267]              

 

             Future Scheduled Test 2018 00:00:00 Screening for            

  [facility]



                                       malignant neoplasm of              



                                       colon (procedure)              



                                       [code = 652261976]              

 

             Future Scheduled Test 2018 00:00:00 Screening for            

  [facility]



                                       malignant neoplasm of              



                                       colon (procedure)              



                                       [code = 795085021]              

 

             Future Scheduled Test 2018 00:00:00 Screening for            

  [facility]



                                       malignant neoplasm of              



                                       colon (procedure)              



                                       [code = 310135798]              

 

             Future Scheduled Test 2018 00:00:00 Screening for            

  [facility]



                                       malignant neoplasm of              



                                       colon (procedure)              



                                       [code = 745260251]              

 

             Future Scheduled Test 2018 00:00:00 Screening for            

  [facility]



                                       malignant neoplasm of              



                                       colon (procedure)              



                                       [code = 486306283]              

 

             Future Scheduled Test 1968 00:00:00 Fluoride Varnish         

     [facility]



                                       [code = Fluoride              



                                       Varnish]                  







Encounters







        Start   End     Encounter Admission Attending Care    Care    Encounter 

Source



        Date/Time Date/Time Type    Type    Clinicians Facility Department ID   

   

 

        2022         Inpatient                 [facility]     [id number] H

[facility]



        15:57:19                                                         Health

 

        2022         Inpatient         WILLARD MAGALLON [facility]     [id number]

3 [place]



        00:00:00                                                         Health

 

        2022         Inpatient         MAY,    [facility]     [id number] H

arris



        13:01:40                         THADDAEUS                         Healt



 

        2022         Inpatient 1       MAY,    [facility]     [id number] H

arris



        15:43:00                         THADDAEUS                         Healt

h

 

        2022-10-13 2022[facility]10-13 [facility]     [id number] 

[place]



        00:00:00 00:00:00                 Grecia A                         Heal



 

        2022-10-04 2022-10-04 Outpatient         NEELAM, [facility]     [id number]

4441 [place]



        00:00:00 00:00:00                 Count includes the [facility]

 

        2022 Refill          Alanis,  [facility]     [id number] 

[place]



        00:00:00 00:00:00                 Marguerite Foster

[facility]

 

        2022 Refill          Bebo, [facility]     [id number] 

[place]



        00:00:00 00:00:00                 Grecia A                         Heal



 

        2022 Outpatient                 [facility]     [id number]

94 [place]



        10:28:40 10:33:15                                                 [facility]

 

        2022 [facility]         [facility]     [id number]

35 [place]



        00:00:00 00:00:00                 SANTIAGO [place]



 

        2022 Outpatient                 [facility]     [id number]

18 [place]



        00:00:00 00:00:00                                                 [facility]

 

        2022 Office          Marcy Smith [facility]     [id number]

[id number] [place]



        13:20:00 13:35:29 Visit           Santiago Vick                       

  [facility]

 

        2022 Outpatient                 [facility]     [id number]

18 [place]



        00:00:00 00:00:00                                                 [facility]

 

        2022 Outpatient                 [facility]     [facility]     [id number]

GDA [facility]



        00:00:00 00:00:00                                         [id number] 



                                                                8       

 

        2022 [facility]07-15 [facility]     [id number]

[id number] 

[place]



        15:43:00 14:31:00 Encounter         Radha Wright                      

   Health



                                        Lamont Childers Thaddaeus D                        

 

 

        2022 Anesthesia         Willard Magallon [facility]     [id number]

[id number] [place]



        16:07:00 16:26:00 Event                                           Health

 

        2022 Inpatient                 [facility]     [id number]

0 [place]



        18:31:54 19:34:23                                                 Health

 

        2022 Inpatient                 [facility]     [id number]

4 [place]



        13:44:32 13:44:38                                                 Health

 

        2022 Inpatient                 [facility]     [id number]

2 [place]



        10:59:56 11:29:56                                                 Health

 

        2022 Inpatient         MAY,    [facility]     [id number]

6 [place]



        10:08:10 10:38:10                 THADDAEUS                         Heal



 

        2022 Inpatient         MAY,    [facility]     [id number]

3 [place]



        20:00:54 21:22:51                 THADDAEUS                         Heal



 

        2022 Inpatient                 [facility]     [id number]

2 [place]



        20:00:50 21:22:24                                                 [facility]

 

        2022 Inpatient         MAY,    [facility]     [id number]

1 [place]



        18:54:35 20:07:31                 [facility]

th

 

        2022 Emergency         MALOOK, [facility]     [id number]

2 [place]



        11:20:42 11:32:12                 [facility]

 

        2021-08-10 2021-08-10 Outpatient         ALICAYDEN [facility]     [id number]

[id number] [place]



        00:00:00 00:00:00                                                 Health

 

        2021 Emergency         Jean Latham  1.2.840.11

[id number] 

Univers



        10:22:00 14:00:00                 Robert Snell   350.1.13.10    

     ity of



                                                [facility] 4.2.7.2.686         Dominik

as



                                                        [phone]         Medi

tyler



                                                        093             Branch

 

        2021 Emergency X               [facility]    ERT     [id number]

22 Univers



        10:20:00 10:20:00                                                 ity of



                                                                        [facility]

 

        2021-06-10 2021-06-10 Transition         Mak Vizcarra  1.2.840.114 [id number]

[id number] Univers



        00:00:00 00:00:00 of Care         Rubina Hoody   350.1.13.10         it

y of



                                                [place]   4.2.7.2.686         [place]

[place]



                                                        [phone]         Medi

[place]



                                                        403             Branch

 

        2021 [facility] 1.2.840.1 [id number]

[id number] 

[id number]                                Univers



        09:44:00 18:00:00 Encounter         Amy Chase 03940.1.1        

         ity of



                                                3.104.2.7                 [place]



                                                .[id number]                 Medica

l



                                                .8                      Branch

 

        2021 Emergency X               [facility]    ERT     [id number]

17 Univers



        09:39:00 09:39:00                                                 ity of



                                                                        [facility]

 

        2021 Travel                  1.2.840.1 1.2.315.552 [id number]

8206 Univers



        00:00:00 00:00:00                         78627.1.1 350.1.13.10         

ity of



                                                3.104.2.7 4.2.7.3.698         [place]

[place]



                                                .[id number] 084.8           Medica

l



                                                .8                      Branch

 

        2021 Orders          Doctor  1.2.840.8 [id number]

704 Univers



        00:00:00 00:00:00 Only            Unassigned, 40232.1.1                 

ity of



                                        No Name 3.104.2.7                 [place]



                                                .[id number]                 Medica

l



                                                .8                      [place]







Results







           Test Description Test Time  Test Comments Results    Result     [facility]

e



                                                       Comments   

 

           EGD        2022            TEXTPatient Name            [place]



                      15:59:00              [facility]            



                                            1970Record            



                                            Number                



                                            [id number]/Time of            



                                            Procedure 2022,            



                                            3:59:00 PMEndoscopist            



                                            Tressa Santoro MD./Fellow Jorge Moreno INDICATIONS            



                                            FOR EXAMINATION:            



                                            Anemia unspecified.            



                                            PROCEDURE PERFORMED:            



                                            EGD - EGD, diagnostic            



                                            INSTRUMENTS:            



                                            [id number]:            



                                            None TOLERANCE: Good            



                                            VISUALIZATION: Good            



                                            MEDICATIONS: MAC            



                                            AnesthesiaASA            



                                            CLASSIFICATION:            



                                            IIIANALGESIA: TIVA by            



                                            anesthesia PROCEDURE            



                                            TECHNIQUE:Prior to            



                                            the procedure, a            



                                            History and Physical            



                                            was performed, and            



                                            patient medications            



                                            and allergies were            



                                            reviewed. The risks            



                                            and benefits of the            



                                            procedure and the            



                                            sedation options and            



                                            risks were discussed            



                                            with the patient.            



                                            Consent was obtained.            



                                            Pulse, blood oxygen            



                                            saturation, and blood            



                                            pressure were            



                                            monitored throughout            



                                            the procedure. After            



                                            adequate sedation,            



                                            the endoscope was            



                                            introduced through            



                                            the mouth and under            



                                            direct visualization            



                                            advanced to the            



                                            Second Part of            



                                            Duodenum. Careful            



                                            examination of the            



                                            esophagus, stomach            



                                            and duodenum was            



                                            performed.            



                                            FINDINGS:The upper,            



                                            middle, and lower            



                                            esophagus appeared            



                                            normal. There were            



                                            small varices that            



                                            flattened with            



                                            insufflationThe GE            



                                            junction was            



                                            normal.The gastric            



                                            cardia, fundus, and            



                                            body were normal.The            



                                            were two 5 mm clean            



                                            based ulcers at the            



                                            distal antrum. No            



                                            biopsies were            



                                            taken.The pylorus,            



                                            duodenal bulb, and            



                                            descending duodenum            



                                            through the second            



                                            portion of the            



                                            duodenum appeared            



                                            normal. SPECIMEN            



                                            COLLECTED: No            



                                            ENDOSCOPIC            



                                            DIAGNOSIS:Small            



                                            gastric varices Two            



                                            distal antrum 5mm            



                                            clean based ulcers            



                                            RECOMMENDATIONS:Test            



                                            for H. Pylori with            



                                            stool antigen. Treat            



                                            if positive. Start            



                                            PPI BID x 6-8 weeks            



                                            Follow up outpatient            



                                            with gastroenterology            



                                            clinic                



                                            COMPLICATIONS:None.            



                                            Intra-procedure            



                                            complication: none;            



                                            ESTIMATED BLOOD LOSS:            



                                            None BLOOD PRODUCTS            



                                            ADMINISTERED:            



                                            NoneGRAFT/IMPLANT:            



                                            None TOTAL PROCEDURE            



                                            TIME: TOTAL SEDATION            



                                            TIME: COMMENTS: CPT            



                                            CODE:[id number]            



                                            Esophagogastroduodeno            



                                            scopy, flexible,            



                                            transoral;            



                                            diagnostic, including            



                                            collection of            



                                            specimen(s) by            



                                            brushing or washing,            



                                            when peICD CODE:D64.9            



                                            Anemia, unspecified I            



                                            was present during            



                                            the entire viewing            



                                            portion of the            



                                            procedure. I            



                                            personally reviewed            



                                            the images and report            



                                            prepared by the            



                                            resident or fellow            



                                            and agree with the            



                                            findings. After the            



                                            procedure was            



                                            completed, the            



                                            patient was taken to            



                                            the recovery in good            



                                            condition. This            



                                            Procedure was            



                                            electronically signed            



                                            of on :10/13/2022            



                                            1:52:32 PM By Tressa Broussard [facility]            









                    Crossmatch RBC Units, 1 Units 2022 11:42:00 









                      Test Item  Value      Reference Range Interpretation Comme

nts









             RBC UNITS (test code = 25320550) compatible                        

     

 

             Unit ABO Type (test code = 65650494) O                             

         

 

             Unit Rh Type (test code = 36176237) POS                            

        

 

             Product Code (test code = 96444984)                           

        

 

             Unit Number (test code = 62402344) [id number]                   

        

 

             Unit Status (test code = 29569267) transfused                      

       

 

             ISBT Product Code (test code = 48269) X8959S84                     

          

 

             Blood Type (test code = 43775) 5100                                

   

 

             Blood Expiration Date (test code = 64427) [id number]             

              



[facility] [id number] 11:42:00





             Test Item    Value        Reference Range Interpretation Comments

 

             RBC UNITS (test code = compatible                             



             29881478)                                           

 

             Unit ABO Type (test code = O                                      



             65347657)                                           

 

             Unit Rh Type (test code = POS                                    



             83001291)                                           

 

             Product Code (test code =                                   



             15064115)                                           

 

             Unit Number (test code = [id number]                           



             12041574)                                           

 

             Unit Status (test code = transfused                             



             78118067)                                           

 

             ISBT Product Code (test code = A7562Q18                            

   



             18245)                                              

 

             Blood Type (test code = 09302) 5100                                

   

 

             Blood Expiration Date (test                            



             code = 82510)                                        



[facility] [id number] 10:08:00





             Test Item    Value        Reference Range Interpretation Comments

 

             Apheresis Platelets, not required                           



             Leukoreduced (test code =                                        



             34922622)                                           

 

             Unit ABO Type (test code = A                                      



             70427850)                                           

 

             Unit Rh Type (test code = NEG                                    



             75431890)                                           

 

             Product Code (test code =                                   



             78969925)                                           

 

             Unit Number (test code = [id number]                           



             47681333)                                           

 

             Unit Status (test code = transfused                             



             89182603)                                           

 

             ISBT Product Code (test code = Y9009V21                            

   



             74320)                                              

 

             Blood Type (test code = 36040) 0600                                

   

 

             Blood Expiration Date (test                            



             code = 64975)                                        



[facility], 1 [id number] 10:08:00





             Test Item    Value        Reference Range Interpretation Comments

 

             Apheresis Platelets, not required                           



             Leukoreduced (test code =                                        



             03243307)                                           

 

             Unit ABO Type (test code = A                                      



             82525685)                                           

 

             Unit Rh Type (test code = NEG                                    



             13293629)                                           

 

             Product Code (test code =                                   



             35291198)                                           

 

             Unit Number (test code = [id number]                           



             49455533)                                           

 

             Unit Status (test code = transfused                             



             86524480)                                           

 

             ISBT Product Code (test code = Z9051S18                            

   



             17619)                                              

 

             Blood Type (test code = 86890) 0600                                

   

 

             Blood Expiration Date (test                            



             code = 42342)                                        



[facility], CoVID-19, [id number] 13:45:44





             Test Item    Value        Reference Range Interpretation Comments

 

             COVID-19 (SARS-COV-2) Detected     Not Detected A            INTERP

RETATION: This



             (test code = 88868-7)                                        patien

t's sample had



                                                                 detectable RNA 

present



                                                                 for the SARS-Co

V-2



                                                                 Coronavirus (CO

VID-19).



                                                                 A result with



                                                                 detectable RNA 

does not



                                                                 indicate the se

verity



                                                                 of infection. T

his



                                                                 result should b

e



                                                                 interpreted in



                                                                 conjunction wit

h



                                                                 clinical, radio

graphic,



                                                                 and other labor

atory



                                                                 findings and sh

ould not



                                                                 be used as the 

sole



                                                                 indicator of ac

tive



                                                                 infection with



                                                                 SARS-CoV-2 Citlalli

navirus



                                                                 (COVID-19). COM

MENT:



                                                                 This Hologic Ap

saulo



                                                                 SARS-CoV-2 mole

cular



                                                                 diagnostic assa

y



                                                                 utilizes Transc

ription



                                                                 Mediated Amplif

ication



                                                                 (TMA) technolog

y to



                                                                 rapidly detect 

the



                                                                 SARS-CoV-2 (COV

ID-19)



                                                                 virus from resp

iratory



                                                                 samples. In acc

ordance



                                                                 with the FDA's 

guidance



                                                                 document "Polic

y for



                                                                 Diagnostic Test

s for



                                                                 Coronavirus



                                                                 Disease-2019 du

ring the



                                                                 Public Health



                                                                 Emergency", thi

s test



                                                                 was developed, 

and its



                                                                 performance



                                                                 characteristics

 were



                                                                 verified [facility] the

 [facility]

[facility]



                                                                 [facility]

oratory



                                                                 and is authoriz

ed for



                                                                 clinical diagno

stic



                                                                 use. This labor

atory is



                                                                 certified under

 the



                                                                 Clinical Labora

tory



                                                                 Improvement [employer]

ndments



                                                                 (CLIA) as quali

fied to



                                                                 perform high co

mplexity



                                                                 clinical labora

tory



                                                                 testing.

 

             Lab Interpretation Abnormal                               



             (test code = 96662-8)                                        



[facility], CoVID-19, [id number] 13:45:44





             Test Item    Value        Reference Range Interpretation Comments

 

             COVID-19 (SARS-COV-2) Detected     Not Detected A            INTERP

RETATION: This



             (test code = 15275-2)                                        patien

t's sample had



                                                                 detectable RNA 

present



                                                                 for the SARS-Co

V-2



                                                                 Coronavirus (CO

VID-19).



                                                                 A result with



                                                                 detectable RNA 

does not



                                                                 indicate the se

verity



                                                                 of infection. T

his



                                                                 result should b

e



                                                                 interpreted in



                                                                 conjunction wit

h



                                                                 clinical, radio

graphic,



                                                                 and other labor

atory



                                                                 findings and sh

ould not



                                                                 be used as the 

sole



                                                                 indicator of ac

tive



                                                                 infection with



                                                                 SARS-CoV-2 Citlalli

navirus



                                                                 (COVID-19). COM

MENT:



                                                                 This Hologic Ap

saulo



                                                                 SARS-CoV-2 mole

cular



                                                                 diagnostic assa

y



                                                                 utilizes Transc

ription



                                                                 Mediated Amplif

ication



                                                                 (TMA) technolog

y to



                                                                 rapidly detect 

the



                                                                 SARS-CoV-2 (COV

ID-19)



                                                                 virus from resp

iratory



                                                                 samples. In acc

ordance



                                                                 with the FDA's 

guidance



                                                                 document "Polic

y for



                                                                 Diagnostic Test

s for



                                                                 Coronavirus



                                                                 Disease-2019 du

ring the



                                                                 Public Health



                                                                 Emergency", thi

s test



                                                                 was developed, 

and its



                                                                 performance



                                                                 characteristics

 were



                                                                 verified by the

 [facility]

[facility]



                                                                 [facility]ostics lab

oratory



                                                                 and is authoriz

ed for



                                                                 clinical diagno

stic



                                                                 use. This labor

atory is



                                                                 certified under

 the



                                                                 Clinical Labora

tory



                                                                 Improvement [employer]

ndments



                                                                 (CLIA) as quali

fied to



                                                                 perform high co

mplexity



                                                                 clinical labora

tory



                                                                 testing.

 

             Lab Interpretation Abnormal                               



             (test code = 39209-7)                                        



[facility] ORF1ab Resp Ql WILLIAM+[id number] 13:45:44





             Test Item    Value        Reference Range Interpretation Comments

 

             Hospitalized? (test Yes                                    



             code = 07783-0)                                        

 

             ICU? (test code = No                                     



             05942-3)                                            

 

             Symptomatic as defined No                                     



             by CDC? (test code =                                        



             54122-3)                                            

 

             Employed in  No                                     



             Healthcare? (test code                                        



             = 45114-8)                                          

 

             Resident in a No                                     



             congregate care                                        



             setting (including                                        



             nursing homes,                                        



             residential care for                                        



             people with                                         



             intellectual and                                        



             developmental                                        



             disabilities,                                        



             psychiatric treatment                                        



             facilities, group                                        



             homes, board and care                                        



             homes, homeless                                        



             shelter, foster care                                        



             or other): (test code                                        



             = 60471-0)                                          

 

             SARS-CoV-2 ORF1ab Resp DETECTED     Not Detected A            INTER

PRETATION: This



             Ql WILLIAM+probe (test                                        patient's

 sample had



             code = 43227-2)                                        detectable R

NA present



                                                                 for the SARS-Co

V-2



                                                                 Coronavirus (CO

VID-19).



                                                                 A result with



                                                                 detectable RNA 

does not



                                                                 indicate the se

verity



                                                                 of infection. T

his



                                                                 result should b

e



                                                                 interpreted in



                                                                 conjunction wit

h



                                                                 clinical, radio

graphic,



                                                                 and other labor

atory



                                                                 findings and sh

ould not



                                                                 be used as the 

sole



                                                                 indicator of ac

tive



                                                                 infection with



                                                                 SARS-CoV-2 Citlalli

navirus



                                                                 (COVID-19). COM

MENT:



                                                                 This Hologic Ap

saulo



                                                                 SARS-CoV-2 mole

cular



                                                                 diagnostic assa

y



                                                                 utilizes Transc

ription



                                                                 Mediated Amplif

ication



                                                                 (TMA) technolog

y to



                                                                 rapidly detect 

the



                                                                 SARS-CoV-2 (COV

ID-19)



                                                                 virus from resp

iratory



                                                                 samples. In acc

ordance



                                                                 with\XC2A0\the 

FDA's



                                                                 guidance docume

nt



                                                                 "Policy for Mikayla

gnostic



                                                                 Tests for Coron

avirus



                                                                 Disease-2019 du

ring the



                                                                 Public Health



                                                                 Emergency", thi

s test



                                                                 was developed, 

and its



                                                                 performance



                                                                 characteristics

 were



                                                                 verified by the

 [facility]

[facility]



                                                                 diagnostics lab

oratory



                                                                 and is authoriz

ed for



                                                                 clinical diagno

stic



                                                                 use. \XC2A0\Thi

s



                                                                 laboratory is c

ertified



                                                                 under the Clini

tyler



                                                                 Laboratory Impr

ovement



                                                                 Amendments (CLI

A) as



                                                                 qualified to pe

rform



                                                                 high complexity



                                                                 clinical labora

tory



                                                                 testing.



HHSHAV IgG Ser [id number] 15:17:48





             Test Item    Value        Reference Range Interpretation Comments

 

             HAV IgG Ser Ql (test code = 86898-1) POSITIVE     Negative     A   

         



HHS12 Lead [id number] 09:29:5912 LEAD EKG FOR [facility] 
Test Date: 9965-12-86Pnv Name: [facility] Department: 5BMSPatient ID: 
[id number] Room: Gender: M Technician: AnnieDOB: 1970  Requested By: 
MARCY Massey Number: [id number] Reading MD: Linda Obrien 
MeasurementsIntervals Axis Rate: 86 P: -2PR: 97 QRS: 50QRSD: 84 T: 24QT: 428  
QTc: 471 Interpretive StatementsSINUS RHYTHM WITH SHORT MI INTERVALPROLONGED QT 
INTERVALElectronically Signed On 2022 14:22:26 CDT by Linda [facility]Mir[facility]C[place] [address] Lead [id number] 09:29:5912 LEAD EKG FOR [facility] Test Date:  Name: [facility] Department: 5BMSPatient 
ID: [id number] Room: Gender: M Technician: AnnieDOB: 1970  Requested By: 
MARCY Massey Number: [id number] Reading MD: Linda Obrien 
MeasurementsIntervals Axis  Rate: 86 P: -2PR: 97 QRS: 50QRSD: 84 T: 24QT: 428  
QTc: 471 Interpretive StatementsSINUS RHYTHM WITH SHORT MI INTERVALPROLONGED QT 
INTERVALElectronically Signed On 2022 14:22:26 CDT by [employer]
[facility] GLUCOSE POC docked device2022-07-11 09:15:17





             Test Item    Value        Reference Range Interpretation Comments

 

             Glucose POC (test code = 34407987) 137 mg/dL           H     

       

 

             Lab Interpretation (test code = Abnormal                           

    



             35950-5)                                            



[facility] GLUCOSE POC docked device2022-07-11 09:15:17





             Test Item    Value        Reference Range Interpretation Comments

 

             Glucose POC (test code = 65604547) 137 mg/dL           H     

       

 

             Lab Interpretation (test code = Abnormal                           

    



             01159-6)                                            



[id number] 10:34:38





             Test Item    Value        Reference Range Interpretation Comments

 

             T pallidum Ab Ser Ql Aggl (test NEGATIVE     Negative, Equivocal   

           



             code = 10335-7)                                        

 

             Reagin+T pallidum IgG+IgM NEGATIVE     Negative                  



             SerPl-Imp (test code = 29213-5)                                    

    



HHSHIV 1+2 Ab+HIV1 p24 Ag SerPl Ql [id number] 05:50:04





             Test Item    Value        Reference Range Interpretation Comments

 

             HIV 1+2 Ab+HIV1 p24 Ag SerPl Ql IA NEGATIVE     Negative           

       



             (test code = 40906-0)                                        



PAHHMIN-OfK-4 ORF1ab Resp Ql WILLIAM+trmxj6762-17-35 04:05:45





             Test Item    Value        Reference Range Interpretation Comments

 

             Hospitalized? (test No                                     



             code = 17445-2)                                        

 

             ICU? (test code = No                                     



             21013-5)                                            

 

             Symptomatic as No                                     



             defined by CDC?                                        



             (test code =                                        



             33870-1)                                            

 

             Employed in  No                                     



             Healthcare? (test                                        



             code = 37563-1)                                        

 

             Resident in a No                                     



             congregate care                                        



             setting (including                                        



             nursing homes,                                        



             residential care for                                        



             people with                                         



             intellectual and                                        



             developmental                                        



             disabilities,                                        



             psychiatric                                         



             treatment                                           



             facilities, group                                        



             homes, board and                                        



             care homes, homeless                                        



             shelter, foster care                                        



             or other): (test                                        



             code = 07933-4)                                        

 

             SARS-CoV-2 ORF1ab NOT DETECTED Not Detected              INTERPRETA

TION: No



             Resp Ql WILLIAM+probe                                        detectable

 levels of



             (test code =                                        SARS-CoV-2



             29235-2)                                            Coronavirus



                                                                 (COVID-19) were



                                                                 present in this



                                                                 patient's sampl

e by



                                                                 this test. A no

t



                                                                 detected result

 does



                                                                 not exclude the



                                                                 possibility of 

active



                                                                 infection with 

this



                                                                 virus due to ot

her



                                                                 factors that ma

y



                                                                 affect the resu

lts



                                                                 such as a poorl

y



                                                                 collected sampl

e,



                                                                 viral titers be

low



                                                                 the limit of



                                                                 detection of th

e



                                                                 assay, and the



                                                                 infrequent



                                                                 possibility of



                                                                 inhibitors in t

he



                                                                 sample. This re

sult



                                                                 should be inter

preted



                                                                 in conjunction 

with



                                                                 clinical,



                                                                 radiographic, a

nd



                                                                 other laborator

y



                                                                 findings and sh

ould



                                                                 not be used as 

the



                                                                 sole indicator 

of



                                                                 active infectio

n with



                                                                 SARS-CoV-2



                                                                 Coronavirus



                                                                 (COVID-19).COMM

ENT:



                                                                 This Hologic Ap

saulo



                                                                 SARS-CoV-2 mole

cular



                                                                 diagnostic assa

y



                                                                 utilizes



                                                                 Transcription



                                                                 Mediated



                                                                 Amplification (

TMA)



                                                                 technology to r

apidly



                                                                 detect the SARS

-CoV-2



                                                                 (COVID-19) viru

s from



                                                                 respiratory shahriar

ples.



                                                                 In accordance



                                                                 with\XC2A0\the 

FDA's



                                                                 guidance docume

nt



                                                                 "Policy for



                                                                 Diagnostic Test

s for



                                                                 Coronavirus



                                                                 Disease- du

ring



                                                                 the [facility]

th



                                                                 Emergency", jenni

s test



                                                                 was developed, 

and



                                                                 its performance



                                                                 characteristics

 were



                                                                 verified by the



                                                                 [facility]



                                                                 molecular diagn

ostics



                                                                 laboratory and 

is



                                                                 authorized for



                                                                 clinical diagno

stic



                                                                 use. \XC2A0\Jenni

s



                                                                 laboratory is



                                                                 certified under

 the



                                                                 Clinical Labora

tory



                                                                 Improvement



                                                                 Amendments (CLI

A) as



                                                                 qualified to pe

rform



                                                                 high complexity



                                                                 clinical labora

tory



                                                                 testing.



[facility] Lead [id number] 17:14:4312 LEAD EKG FOR [facility]  
Test Date: 4832-14-18Sta Name: [facility] WATTSTIZ Department: 5520Patient ID: 
[id number] Room:  POD F Gender: M Technician: [id number]: 1970 Requested
By: RADHA Saravia Number: [id number] Reading MD: Linda Obrien  
MeasurementsIntervals Axis Rate: 91 P: 24PR: 124 QRS:  50QRSD: 89 T: 41QT: 375 
QTc: 423 Interpretive StatementsSINUS RHYTHMElectronically Signed On 2022 
21:25:07 CDT by Linda Obrien [place] [address] Lead [id number] 17:14:4312
LEAD EKG FOR [facility]  Test Date: 0345-64-47Eju Name: 
[facility] CARLSONZ Department: 5520Patient ID: [id number] Room: BT POD F 01Gender: M
 Technician: [id number]: 1970 Requested By: RADHA Saravia Number: 
[id number] Reading MD: Linda Obrien MeasurementsIntervals Axis Rate: 91 P: 
24PR: 124 QRS: 50QRSD: 89  T: 41QT: 375 QTc: 423 Interpretive StatementsSINUS 
RHYTHMElectronically Signed On 2022 21:25:07 CDT by Linda Obrien [facility] BODY [id number] 17:50:22





             Test Item    Value        Reference Range Interpretation Comments

 

             ALBUMIN BF (test code 279.0 mg/dL                            



             = [id number])                                        

 

             RENETTA (test code = RENETTA) Result interpreted                           



                          relative to the serum                           



                          concentration. ?                           



[facility] [id number] 
15:55:23COVID DMT InterpretationInterpretation/Recommendations:Molecular NAAT 
Tests for Active Infection with the SARS-CoV-2 Virus:The patient has currently 
tested negative for the SARS-CoV-2 virus that causesCOVID-19 illness. This most 
likely indicates that the patient does not have an active infection withthe 
SARS-CoV-2 virus. However, infection is not completely ruled out as the false 
negative rate for molecular NAAT testing using a nasopharyngeal sample can be up
to 30%, mostly dependent on the timingof sample collection in relation to 
illness onset and any deficiencies in sampling techniques. If the patient has 
symptoms concerning for COVID-19 illness, a repeat NAAT test (PCR, Rapid ID Now,
etc.) should be performed, at which time the SARS-CoV-2 virus - if present - may
have reached a detectable viral load (usually peaking by the end of the first 
week of symptoms). Tests for IgM and/or IgG Antibodies to the SARS-CoV-2 
Virus:If the patient develops COVID-19 illness in the future, testing for IgMand
IgG antibodies approximately 3 weeks after illness onset will likely indicate if
the patient hasproduced antibodies to the SARS-CoV-2 virus. However, some 
patients may take longer to develop detectable antibodies, while some patients 
who were infected with SARS-CoV-2 may never develop antibodies.While antibodies 
to SARS-CoV-2 may provide some degree of immunity, at this time the strength and
duration of the antibody response is unknown. 
------------------------------------------------------------------------ 
Interpretation Result Comments:These interpretation comments are based upon all 
COVID-19 testing the patient has had at [facility], including molecular NAAT testing 
(more commonly known as PCRtesting and Rapid ID Now testing) and antibody 
testing. It does not take into account any testing that a patient has had 
outside of the [facility] medical record. [facility] LABORATORY SERVICESCOVID 
TdvivtwMJKL-JwL-9 Rapid ID NOW (no units) ? ? Date ? ? ? ? ? ? ? ? ? ? Value ? ?
? ? ? ? ? 2021 ? ? ? ? ? ? ?Not Detected ? ? ? 2021 ? ? ? ? ? ? ? 
Not Detected ? ---------- [facility] LABORATORY SERVICESUnUnited Memorial Medical CenterCBC with Iqsxlifrgrrh3044-90-64 12:19:02





             Test Item    Value        Reference Range Interpretation Comments

 

             WBC (test code =              See_Comment                [Automated



             6690-2)                                             message] The sy

stem



                                                                 which generated



                                                                 this result



                                                                 transmitted



                                                                 reference range

:



                                                                 4.20 - 10.70



                                                                 10*3/?L. The



                                                                 reference range

 was



                                                                 not used to



                                                                 interpret this



                                                                 result as



                                                                 normal/abnormal

.

 

             RBC (test code =              See_Comment  L             [Automated



             789-8)                                              message] The sy

stem



                                                                 which generated



                                                                 this result



                                                                 transmitted



                                                                 reference range

:



                                                                 4.26 - 5.52



                                                                 10*6/?L. The



                                                                 reference range

 was



                                                                 not used to



                                                                 interpret this



                                                                 result as



                                                                 normal/abnormal

.

 

             HGB (test code = 7.9 g/dL     12.2-16.4    L            



             718-7)                                              

 

             HCT (test code = 24.8 %       38.4-49.3    L            



             4544-3)                                             

 

             MCV (test code = 80.0 fL      81.7-95.6    L            



             787-2)                                              

 

             MCH (test code = 25.5 pg      26.1-32.7    L            



             785-6)                                              

 

             MCHC (test code = 31.9 g/dL    31.2-35.0                 



             786-4)                                              

 

             RDW-SD (test code = 72.4 fL      38.5-51.6    H            



             16061-7)                                            

 

             RDW-CV (test code = 24.9 %       12.1-15.4    H            



             788-0)                                              

 

             PLT (test code =              See_Comment  LL            [Automated



             777-3)                                              message] The sy

stem



                                                                 which generated



                                                                 this result



                                                                 transmitted



                                                                 reference range

:



                                                                 150 - 328 10*3/

?L.



                                                                 The reference r

moose



                                                                 was not used to



                                                                 interpret this



                                                                 result as



                                                                 normal/abnormal

.

 

             MPV (test code =                                        Not Measure

d



             85153-2)                                            

 

             IPF % (test code = 6.3 %        1.2-10.7                  Platelet 

count



             [id number])                                         measured by



                                                                 fluorescence



                                                                 method.

 

             NRBC/100 WBC (test              See_Comment                [Automat

ed



             code = 8716052393)                                        message] 

The system



                                                                 which generated



                                                                 this result



                                                                 transmitted



                                                                 reference range

:



                                                                 0.0 - 10.0 /100



                                                                 WBCs. The refer

ence



                                                                 range was not u

sed



                                                                 to interpret th

is



                                                                 result as



                                                                 normal/abnormal

.

 

             NRBC x10^3 (test code <0.01        See_Comment                [Auto

mated



             = [id number])                                        message] The s

ystem



                                                                 which generated



                                                                 this result



                                                                 transmitted



                                                                 reference range

:



                                                                 10*3/?L. The



                                                                 reference range

 was



                                                                 not used to



                                                                 interpret this



                                                                 result as



                                                                 normal/abnormal

.

 

             GRAN MAT (NEUT) % 59.8 %                                 



             (test code = 770-8)                                        

 

             IMM GRAN % (test code 0.20 %                                 



             = [id number])                                        

 

             LYMPH % (test code = 21.3 %                                 



             736-9)                                              

 

             MONO % (test code = 11.2 %                                 



             5905-5)                                             

 

             EOS % (test code = 6.5 %                                  



             713-8)                                              

 

             BASO % (test code = 1.0 %                                  



             706-2)                                              

 

             GRAN MAT x10^3(ANC) 3.11 10*3/uL 1.99-6.95                 



             (test code =                                        



             8989887908)                                         

 

             IMM GRAN x10^3 (test <0.03        0.00-0.06                 



             code = [id number])                                        

 

             LYMPH x10^3 (test code 1.11 10*3/uL 1.09-3.23                 



             = 731-0)                                            

 

             MONO x10^3 (test code 0.58 10*3/uL 0.36-1.02                 



             = 742-7)                                            

 

             EOS x10^3 (test code = 0.34 10*3/uL 0.06-0.53                 



             711-2)                                              

 

             BASO x10^3 (test code 0.05 10*3/uL 0.01-0.09                 



             = 704-7)                                            

 

             Lab Interpretation Abnormal                               



             (test code = 96703-9)                                        



[facility] Metabolic Panel (NA, K, CL, CO2, 
GLUCOSE, BUN, CREATININE, CA)2021 11:47:29





             Test Item    Value        Reference Range Interpretation Comments

 

             NA (test code = 135 mmol/L   135-145                   



             1391213146)                                         

 

             K (test code = 3.8 mmol/L   3.5-5.0                   



             9879833099)                                         

 

             CL (test code = 106 mmol/L                       



             7926657002)                                         

 

             CO2 TOTAL (test code = 26 mmol/L    23-31                     



             1887491395)                                         

 

             AGAP (test code =              2-16                      



             6365982455)                                         

 

             BUN (test code = 9 mg/dL      7-23                      



             5152713638)                                         

 

             GLUCOSE (test code = 103 mg/dL                        



             2534772581)                                         

 

             CREATININE (test code = 0.50 mg/dL   0.60-1.25    L            



             9972283635)                                         

 

             CALCIUM (test code = 7.7 mg/dL    8.6-10.6     L            



             3341930263)                                         

 

             eGFR (test code =              mL/min/1.73m2              



             9965800058)                                         

 

             RENETTA (test code = RENETTA) Association of                           



                          Glomerular Filtration                           



                          Rate (GFR) and Staging                           



                          of Kidney Disease*                           



                          +---------------------                           



                          --+-------------------                           



                          --+-------------------                           



                          ------+| GFR                           



                          (mL/min/1.73 m2) ?|                           



                          With Kidney Damage ?|                           



                          ?Without Kidney                           



                          Damage+---------------                           



                          --------+-------------                           



                          --------+-------------                           



                          ------------+| ?>90 ?                           



                          ? ? ? ? ? ? ? ?|                           



                          ?Stage one ? ? ? ? ?|                           



                          ? Normal ? ? ? ? ? ? ?                           



                          ?+--------------------                           



                          ---+------------------                           



                          ---+------------------                           



                          -------+| ?60-89 ? ? ?                           



                          ? ? ? ? ?| ?Stage two                           



                          ? ? ? ? ?| ? Decreased                           



                          GFR ? ? ? ?                            



                          +---------------------                           



                          --+-------------------                           



                          --+-------------------                           



                          ------+| ?30-59 ? ? ?                           



                          ? ? ? ? ?| ?Stage                           



                          three ? ? ? ?| ? Stage                           



                          three ? ? ? ? ?                           



                          +---------------------                           



                          --+-------------------                           



                          --+-------------------                           



                          ------+| ?15-29 ? ? ?                           



                          ? ? ? ? ?| ?Stage four                           



                          ? ? ? ? | ? Stage four                           



                          ? ? ? ? ?                              



                          ?+--------------------                           



                          ---+------------------                           



                          ---+------------------                           



                          -------+| ?<15 (or                           



                          dialysis) ? ?| ?Stage                           



                          five ? ? ? ? | ? Stage                           



                          five ? ? ? ? ?                           



                          ?+--------------------                           



                          ---+------------------                           



                          ---+------------------                           



                          -------+ *Each stage                           



                          assumes the associated                           



                          GFR level has been in                           



                          effect for at least                           



                          three months. ?Stages                           



                          1 to 5, with or                           



                          without kidney                           



                          disease, indicate                           



                          chronic kidney                           



                          disease. Notes:                           



                          Determination of                           



                          stages one and two                           



                          (with eGFR                             



                          >59mL/min/1.73 m2)                           



                          requires estimation of                           



                          kidney damage for at                           



                          least three months as                           



                          defined by structural                           



                          or functional                           



                          abnormalities of the                           



                          kidney, manifested by                           



                          either:Pathological                           



                          abnormalities or                           



                          Markers of kidney                           



                          damage (including                           



                          abnormalities in the                           



                          composition of the                           



                          blood or urine or                           



                          abnormalities in                           



                          imaging tests).                           

 

             Lab Interpretation Abnormal                               



             (test code = 89019-1)                                        



[facility] [id number] 07:23:45





             Test Item    Value        Reference Range Interpretation Comments

 

             BF SEGS (test code = 8986724797) 4 %                               

     

 

             BF LYMPHS (test code = 1795413601) 17 %                            

       

 

             MACROPHAGE (test code = 8953845406) 69 %                           

        

 

             MESOS (test code = 3656138986) 10 %                                

   

 

             #CELS CNTD (test code = 3100812104)                                

        



[facility] [id number] 07:23:35





             Test Item    Value        Reference Range Interpretation Comments

 

             T.PROT BF (test 1109.0 mg/dL                           



             code =                                              



             6832198854)                                         

 

             UNSPUN BODY  Yellow                                 



             FLUID COLOR                                         



             (test code =                                        



             1539782713)                                         

 

             UNSPUN BODY  Slightly Cloudy                           



             FLUID CLARITY                                        



             (test code =                                        



             4479138096)                                         

 

             SPUN BODY FLUID Yellow                                 



             COLOR (test code                                        



             = 5319603060)                                        

 

             SPUN BODY FLUID Clear                                  



             CLARITY (test                                        



             code =                                              



             3809283779)                                         

 

             Sediment (test                                        The sediment



             code =                                              volume is <0.1



             4564636573)                                         mLs of the



                                                                 total fluid



                                                                 volume of 3mls



                                                                 and its color



                                                                 is red.

 

             RENETTA (test code = Test developed and                           



             RENETTA)         characteristics                           



                          determined by [facility]                           



                          Laboratory Services.                           



[facility] DIRECT [id number] 07:19:43





             Test Item    Value        Reference Range Interpretation Comments

 

             BF COLOR (test Light Yellow                           



             code =                                              



             1456842204)                                         

 

             TURBIDITY (test Slightly Turbid                           



             code =                                              



             6189013853)                                         

 

             BF WBC Count              See_Comment                [Automated



             (test code =                                        message] The



             0329859350)                                         system which



                                                                 generated this



                                                                 result



                                                                 transmitted



                                                                 reference range

:



                                                                 /?L. The



                                                                 reference range



                                                                 was not used to



                                                                 interpret this



                                                                 result as



                                                                 normal/abnormal

.

 

             BF RBC Count <3000        See_Comment                [Automated



             (test code =                                        message] The



             2364522841)                                         system which



                                                                 generated this



                                                                 result



                                                                 transmitted



                                                                 reference range

:



                                                                 /?L. The



                                                                 reference range



                                                                 was not used to



                                                                 interpret this



                                                                 result as



                                                                 normal/abnormal

.

 

             RENETTA (test code = The reference range                           



             RENETTA)         and other method                           



                          performance                            



                          specifications have                           



                          not been established                           



                          for this body fluid.                           



                          ?The test results                           



                          must be integrated                           



                          into the clinical                           



                          context for                            



                          interpretation.                           



[facility]ID NOW RAPID TESTING)2021 
21:16:02





             Test Item    Value        Reference Range Interpretation Comments

 

             SARS-CoV-2 Rapid ID NOW Not Detected Not Detected              



             (test code = 42897-2)                                        

 

             RENETTA (test code = RENETTA) ID NOW COVID-19 Assay                        

   



                          is an isothermal                           



                          nucleic acid                           



                          amplification test                           



                          intended for the                           



                          qualitative detection                           



                          of nucleic acid from                           



                          SARS-CoV-2 viral RNA                           



                          in nasopharyngeal (NP)                           



                          specimens. It is used                           



                          under Emergency Use                           



                          Authorization (EUA) by                           



                          FDA. The limit of                           



                          detection (LOD) of the                           



                          assay is 125 Genome                           



                          Equivalents/mL. A                           



                          positive result is                           



                          indicative of the                           



                          presence of SARS-CoV-2                           



                          RNA. ?Clinical                           



                          correlation with                           



                          patient history and                           



                          other diagnostic                           



                          information is                           



                          necessary to determine                           



                          patient infection                           



                          status. A negative                           



                          (Not Detected) result                           



                          does not preclude                           



                          SARS-CoV-2 infection.                           



                          In patients with                           



                          clinical symptoms and                           



                          other tests that are                           



                          consistent with                           



                          SARS-CoV-2 infection,                           



                          negative results                           



                          should be treated as                           



                          presumptive negative                           



                          and a new specimen                           



                          should be tested with                           



                          alternative PCR                           



                          molecular test.                           



                          Invalid: Please                           



                          collect a new specimen                           



                          for repeat patient                           



                          testing if clinically                           



                          indicated.                             

 

             Lab Interpretation Normal                                 



             (test code = 90906-1)                                        



[facility] W [id number] 
18:50:54 Cirrhotic liver morphology with evidence of portal hypertension 
withmoderate volume ascites, large esophageal varices and splenomegalyunchanged 
compared to prior. Since 2021, slight decreased now small left pleural 
effusion. Preliminary Report Dictated by Resident: Amy Bezold I, Maryamnaz ?Falamaki, MD., have reviewed this study and agree with theabove report.EXAM: CT
ABDOMEN AND PELVIS WITH CONTRAST HISTORY: 51 years-old Male presenting with 
history of cirrhosis andworsening abdominal distension COMPARISON: 2021 
TECHNIQUE AND FINDINGS: Contiguous axial imaging from the level of the lungbases
through the proximal thighs was performed after the administration ofintravenous
contrast. Coronal and sagittal reconstructions were obtained. Auto mA and/or 
iterative reconstruction were used to reduce radiation dose. FINDINGS: LOWER 
THORAX: Small sized left pleural effusion, decreased compared toprior. Minimal 
loculated fluid is also seen tracking in the right minorfissure. The lungs bases
are clear. Mild cardiomegalyThere is nopericardial effusion. LIVER: Nodular 
liver contour, this partial generated enlargement of lateralsegment of the left 
lobe and caudate lobe. No focal hepatic lesions seen onthis single, portal 
venous phase. The portal veins are patent. GALLBLADDER AND BILIARY TREE: No bi
liary ductal dilation. Mild gallbladderwall thickening appears stable and likely
secondary to chronic liverdisease. SPLEEN: The spleen is enlarged, measuring 
approximately 14.8 cm. PANCREAS: No ductal dilation or masses. ADRENAL GLANDS: 
No adrenal nodules. KIDNEYS: No hydronephrosis, stones, or masses. GI TRACT: 
Diffuse submucosal wall thickening seen throughout the small andlarge bowel 
likely secondary to chronic portal hypertension. No abnormallydilated bowel. 
Normal-appearing appendix. PERITONEUMAND RETROPERITONEUM: Moderate to large 
volume ascites. Noextraluminal free air. LYMPH NODES: No lymphadenopathy. 
PELVIS/BLADDER: Unremarkable urinary bladder. VESSELS: Similar appearance of 
enlarged esophageal varices seen extendingalong the lower esophagus. BONES AND 
SOFT TISSUES: Diffuse body wall edema. No suspicious lytic orsclerotic bony 
lesions. [facility], Radiant Results Inft User - 2021 1:52PM CDTFormatting of 
this note might be different from the original.EXAM: CT ABDOMEN AND PELVIS WITH 
CONTRASTHISTORY: 51 years-old Male presenting with history of cirrhosis 
andworsening abdominal distension COMPARISON: 2021TECHNIQUE AND FINDINGS: 
Contiguous axial imaging from the level of the lungbases through the proximal 
thighs was performed after the administration ofintravenous contrast. Coronal 
and sagittal reconstructions were obtained. Auto mA and/or iterative 
reconstruction were used to reduce radiation dose.FINDINGS:LOWER THORAX: Small 
sized left pleural effusion, decreased compared toprior. Minimal loculated fluid
is also seen tracking in the right minorfissure. The lungs bases are clear. Mild
cardiomegalyThere is nopericardial effusion.LIVER: Nodular liver contour, this 
partial generated enlargement of lateralsegment of the left lobe and caudate 
lobe. No focal hepatic lesions seen onthis single, portal venous phase. The 
portal veins are patent.GALLBLADDER AND BILIARY TREE: No biliary ductal 
dilation. Mild gallbladderwall thickening appears stable and likely secondary to
chronic liverdisease.SPLEEN: The spleen is enlarged, measuring approximately 
14.8 cm.PANCREAS: No ductal dilation or masses.ADRENAL GLANDS: No adrenal 
nodules.KIDNEYS: No hydronephrosis, stones, or masses.GI TRACT: Diffuse 
submucosal wall thickening seen throughout the small andlarge bowel likely 
secondary to chronic portal hypertension. No abnormallydilated bowel. Normal-
appearing appendix. PERITONEUM AND RETROPERITONEUM: Moderate to large volume 
ascites. Noextraluminal free air.LYMPH NODES: No lymphadenopathy.PELVIS/BLADDER:
Unremarkable urinary bladder.VESSELS: Similar appearance of enlarged esophageal 
varices seen extendingalong the lower esophagus.BONES AND SOFT TISSUES: Diffuse 
body wall edema. No suspicious lytic orsclerotic bony 
lesions.IMPRESSIONCirrhotic liver morphology with evidence of portal hy
pertension withmoderate volume ascites, large esophageal varices and 
splenomegalyunchanged compared to prior.Since 2021, slight decreased now 
small left pleural effusion.Preliminary Report Dictatedby Resident: Amy BezoldI,
Renee Tang MD., have reviewed this study and agree with laura medellin.[facility] with [id number] 16:58:45





             Test Item    Value        Reference Range Interpretation Comments

 

             WBC (test code =              See_Comment                [Automated



             6690-2)                                             message] The sy

stem



                                                                 which generated



                                                                 this result



                                                                 transmitted



                                                                 reference range

:



                                                                 4.20 - 10.70



                                                                 10*3/?L. The



                                                                 reference range

 was



                                                                 not used to



                                                                 interpret this



                                                                 result as



                                                                 normal/abnormal

.

 

             RBC (test code =              See_Comment  L             [Automated



             789-8)                                              message] The sy

stem



                                                                 which generated



                                                                 this result



                                                                 transmitted



                                                                 reference range

:



                                                                 4.26 - 5.52



                                                                 10*6/?L. The



                                                                 reference range

 was



                                                                 not used to



                                                                 interpret this



                                                                 result as



                                                                 normal/abnormal

.

 

             HGB (test code = 9.1 g/dL     12.2-16.4    L            



             718-7)                                              

 

             HCT (test code = 28.1 %       38.4-49.3    L            



             4544-3)                                             

 

             MCV (test code = 79.2 fL      81.7-95.6    L            



             787-2)                                              

 

             MCH (test code = 25.6 pg      26.1-32.7    L            



             785-6)                                              

 

             MCHC (test code = 32.4 g/dL    31.2-35.0                 



             786-4)                                              

 

             RDW-SD (test code = 70.4 fL      38.5-51.6    H            



             00991-0)                                            

 

             RDW-CV (test code = 24.6 %       12.1-15.4    H            



             788-0)                                              

 

             PLT (test code =              See_Comment  LL            [Automated



             777-3)                                              message] The sy

stem



                                                                 which generated



                                                                 this result



                                                                 transmitted



                                                                 reference range

:



                                                                 150 - 328 10*3/

?L.



                                                                 The reference r

moose



                                                                 was not used to



                                                                 interpret this



                                                                 result as



                                                                 normal/abnormal

.

 

             MPV (test code =                                        Not Measure

d



             66743-5)                                            

 

             IPF % (test code = 7.9 %        1.2-10.7                  Platelet 

count



             5731000611)                                         measured by



                                                                 fluorescence



                                                                 method.

 

             NRBC/100 WBC (test              See_Comment                [Automat

ed



             code = 4231313149)                                        message] 

The system



                                                                 which generated



                                                                 this result



                                                                 transmitted



                                                                 reference range

:



                                                                 0.0 - 10.0 /100



                                                                 WBCs. The refer

ence



                                                                 range was not u

sed



                                                                 to interpret th

is



                                                                 result as



                                                                 normal/abnormal

.

 

             NRBC x10^3 (test code <0.01        See_Comment                [Auto

mated



             = 0471806735)                                        message] The s

ystem



                                                                 which generated



                                                                 this result



                                                                 transmitted



                                                                 reference range

:



                                                                 10*3/?L. The



                                                                 reference range

 was



                                                                 not used to



                                                                 interpret this



                                                                 result as



                                                                 normal/abnormal

.

 

             GRAN MAT (NEUT) % 70.6 %                                 



             (test code = 770-8)                                        

 

             IMM GRAN % (test code 0.20 %                                 



             = 4165564925)                                        

 

             LYMPH % (test code = 15.4 %                                 



             736-9)                                              

 

             MONO % (test code = 8.6 %                                  



             5905-5)                                             

 

             EOS % (test code = 4.4 %                                  



             713-8)                                              

 

             BASO % (test code = 0.8 %                                  



             706-2)                                              

 

             GRAN MAT x10^3(ANC) 4.17 10*3/uL 1.99-6.95                 



             (test code =                                        



             5847019238)                                         

 

             IMM GRAN x10^3 (test <0.03        0.00-0.06                 



             code = 4279536588)                                        

 

             LYMPH x10^3 (test code 0.91 10*3/uL 1.09-3.23    L            



             = 731-0)                                            

 

             MONO x10^3 (test code 0.51 10*3/uL 0.36-1.02                 



             = 742-7)                                            

 

             EOS x10^3 (test code = 0.26 10*3/uL 0.06-0.53                 



             711-2)                                              

 

             BASO x10^3 (test code 0.05 10*3/uL 0.01-0.09                 



             = 704-7)                                            

 

             TARGET CELLS (test 2+           See_Comment  A             [Automat

ed



             code = 38698-6)                                        message] The

 system



                                                                 which generated



                                                                 this result



                                                                 transmitted



                                                                 reference range

:



                                                                 (none). The



                                                                 reference range

 was



                                                                 not used to



                                                                 interpret this



                                                                 result as



                                                                 normal/abnormal

.

 

             Lab Interpretation Abnormal                               



             (test code = 55561-0)                                        



[facility] [id number] 16:40:47





             Test Item    Value        Reference Range Interpretation Comments

 

             PROTIME PATIENT (test              See_Comment  H             [Auto

mated message]



             code = 5964-2)                                        The system [employer]



                                                                 generated this 

result



                                                                 transmitted ref

erence



                                                                 range: 10.1 - 1

2.6



                                                                 Seconds. The



                                                                 reference range

 was



                                                                 not used to int

erpret



                                                                 this result as



                                                                 normal/abnormal

.

 

             INR (test code = 6301-6)                                        Nor

mal INR <1.1;



                                                                 Warfarin Therap

eutic



                                                                 range 2.0 to 3.

0 or



                                                                 2.5 to 3.5, dep

ending



                                                                 upon the indica

tions.

 

             Lab Interpretation (test Abnormal                               



             code = 36115-4)                                        



[facility] (ALB, T.PRO, BILI T, 
BU/BC, ALT, AST, ALK PHOS)2021 16:36:51





             Test Item    Value        Reference Range Interpretation Comments

 

             TOTAL BILI (test code = 5847233095) 4.4 mg/dL    0.1-1.1      H    

        

 

             BILI UNCON (test code = 5645798280) 2.7 mg/dL    0.1-1.1      H    

        

 

             BILI CONJ (test code = 7850499083) 0.1 mg/dL    0.0-0.3            

       

 

             T PROTEIN (test code = 3863924596) 7.1 g/dL     6.3-8.2            

       

 

             ALBUMIN (test code = 9315685033) 2.9 g/dL     3.5-5.0      L       

     

 

             ALK PHOS (test code = 3572473884) 204 U/L             H      

      

 

             ALTv (test code = 1742-6) 23 U/L       5-50                      

 

             AST(SGOT) (test code = 8320913453) 51 U/L       13-40        H     

       

 

             Lab Interpretation (test code = Abnormal                           

    



             36007-9)                                            



[facility] (NA, K, CL, CO2, 
GLUCOSE, BUN, CREATININE, CA)2021 16:36:50





             Test Item    Value        Reference Range Interpretation Comments

 

             NA (test code = 136 mmol/L   135-145                   



             5850198130)                                         

 

             K (test code = 3.1 mmol/L   3.5-5.0      L            



             5911806410)                                         

 

             CL (test code = 101 mmol/L                       



             5898332231)                                         

 

             CO2 TOTAL (test code = 26 mmol/L    23-31                     



             5695926957)                                         

 

             AGAP (test code =              2-16                      



             0422210716)                                         

 

             BUN (test code = 9 mg/dL      7-23                      



             4759615967)                                         

 

             GLUCOSE (test code = 118 mg/dL           H            



             9830134666)                                         

 

             CREATININE (test code = 0.56 mg/dL   0.60-1.25    L            



             8012004256)                                         

 

             CALCIUM (test code = 8.2 mg/dL    8.6-10.6     L            



             1488450295)                                         

 

             eGFR (test code =              mL/min/1.73m2              



             8681575430)                                         

 

             RENETTA (test code = RENETTA) Association of                           



                          Glomerular Filtration                           



                          Rate (GFR) and Staging                           



                          of Kidney Disease*                           



                          +---------------------                           



                          --+-------------------                           



                          --+-------------------                           



                          ------+| GFR                           



                          (mL/min/1.73 m2) ?|                           



                          With Kidney Damage ?|                           



                          ?Without Kidney                           



                          Damage+---------------                           



                          --------+-------------                           



                          --------+-------------                           



                          ------------+| ?>90 ?                           



                          ? ? ? ? ? ? ? ?|                           



                          ?Stage one ? ? ? ? ?|                           



                          ? Normal ? ? ? ? ? ? ?                           



                          ?+--------------------                           



                          ---+------------------                           



                          ---+------------------                           



                          -------+| ?60-89 ? ? ?                           



                          ? ? ? ? ?| ?Stage two                           



                          ? ? ? ? ?| ? Decreased                           



                          GFR ? ? ? ?                            



                          +---------------------                           



                          --+-------------------                           



                          --+-------------------                           



                          ------+| ?30-59 ? ? ?                           



                          ? ? ? ? ?| ?Stage                           



                          three ? ? ? ?| ? Stage                           



                          three ? ? ? ? ?                           



                          +---------------------                           



                          --+-------------------                           



                          --+-------------------                           



                          ------+| ?15-29 ? ? ?                           



                          ? ? ? ? ?| ?Stage four                           



                          ? ? ? ? | ? Stage four                           



                          ? ? ? ? ?                              



                          ?+--------------------                           



                          ---+------------------                           



                          ---+------------------                           



                          -------+| ?<15 (or                           



                          dialysis) ? ?| ?Stage                           



                          five ? ? ? ? | ? Stage                           



                          five ? ? ? ? ?                           



                          ?+--------------------                           



                          ---+------------------                           



                          ---+------------------                           



                          -------+ *Each stage                           



                          assumes the associated                           



                          GFR level has been in                           



                          effect for at least                           



                          three months. ?Stages                           



                          1 to 5, with or                           



                          without kidney                           



                          disease, indicate                           



                          chronic kidney                           



                          disease. Notes:                           



                          Determination of                           



                          stages one and two                           



                          (with eGFR                             



                          >59mL/min/1.73 m2)                           



                          requires estimation of                           



                          kidney damage for at                           



                          least three months as                           



                          defined by structural                           



                          or functional                           



                          abnormalities of the                           



                          kidney, manifested by                           



                          either:Pathological                           



                          abnormalities or                           



                          Markers of kidney                           



                          damage (including                           



                          abnormalities in the                           



                          composition of the                           



                          blood or urine or                           



                          abnormalities in                           



                          imaging tests).                           

 

             Lab Interpretation Abnormal                               



             (test code = 32849-5)                                        



[facility] [id number] 00:44:28





             Test Item    Value        Reference Range Interpretation Comments

 

             BF SEGS (test code = 4 %                                    



             2451573048)                                         

 

             BF LYMPHS (test code 24 %                                   



             = 1336194639)                                        

 

             MACROPHAGE (test 61 %                                   



             code = 3147793958)                                        

 

             MESOS (test code = 11 %                                   



             3526940963)                                         

 

             #AUBRIE CNTD (test                                        



             code = 2260111096)                                        

 

             RENETTA (test code = Reviewed by HAILEE JACKSON)         DALE IBRAHIM, Director of                           



                          HEMATOPATHOLOGY                           



[facility] SMOOTH MUSCLE ANTIBODY2021-06-10 00:06:43





             Test Item    Value        Reference Range Interpretation Comments

 

             F-ACTIN (SMOOTH              See_Comment                If F-Actin 

(Smooth Muscle)



             MUSCLE) AB,                                         Antibody, IgG i

s negative,



             IGG(BEAKER) (test                                        the Smooth

 Muscle Antibody



             code = 90657-0)                                        titer by IFA

 is not



                                                                 performed.REFER

ENCE



                                                                 INTERVAL: F-Act

in (Smooth



                                                                 Muscle) Antibod

y, IgG by ?



                                                                 ? ? ? ? ? ? ? ?

 ?MARY LOU ?19



                                                                 Units or less .

......



                                                                 Negative ?20 - 

30 Units



                                                                 .......... Weak



                                                                 Positive-Sugges

t repeat ? ?



                                                                 ? ? ? ? ? ? ? ?

 ? ? ?



                                                                 testing in two 

to three



                                                                 weeks ? ? ? ? ?

 ? ? ? ? ? ?



                                                                 ? ? with fresh 

specimen.



                                                                 ?31 Units or gr

eater.....



                                                                 Positive-Sugges

tive of ? ?



                                                                 ? ? ? ? ? ? ? ?

 ? ? ?



                                                                 autoimmune hepa

titis type 1



                                                                 ? ? ? ? ? ? ? ?

 ? ? ? ? ?



                                                                 or chronic acti

ve



                                                                 hepatitis. F-ac

tin IgG



                                                                 antibodies have

 been shown



                                                                 to have increas

ed



                                                                 sensitivity for

 autoimmune



                                                                 hepatitis (AIH)

 but lower



                                                                 specificity suzette

n smooth



                                                                 muscle antibodi

es (SMA).



                                                                 F-actin IgG ant

ibodies can



                                                                 also be seen in



                                                                 SMA-negative di

sease



                                                                 controls (non-A

IH),



                                                                 especially in p

atients with



                                                                 primary biliary

 cirrhosis



                                                                 and chronic hep

atitis C



                                                                 infections. David

e patients



                                                                 with AIH may be



                                                                 SMA-positive bu

t negative



                                                                 for F-actin IgG

. Consider



                                                                 testing for SMA

 by IFA if



                                                                 suspicion for A

IH is



                                                                 strong.Performe

d By: [employer] [place] [address]



                                                                 [id number]

 Director:



                                                                 Margo Carlisle MD



                                                                 [Automated mess

age] The



                                                                 system which ge

nerated this



                                                                 result transmit

michelle



                                                                 reference range

: 0 - 19



                                                                 Units. The refe

rence range



                                                                 was not used to

 interpret



                                                                 this result as



                                                                 normal/abnormal

.



[facility] MITOCHONDRIAL ANTIBODY2021-06-10 00:06:42





             Test Item    Value        Reference Range Interpretation Comments

 

             AMA (test code =              See_Comment               REFERENCE I

NTERVAL:



             14251-3)                                            Mitochondrial (

M2) Antibody,



                                                                 IgG ? ?20.0 Uni

ts or less



                                                                 ......... Negat

david ?20.1 -



                                                                 24.9 Units.....

......



                                                                 Equivocal ?25.0

 Units or



                                                                 greater....... 

Positive



                                                                 Anti-mitochondr

ial



                                                                 antibodies (AMA

) are thought



                                                                 to be present i

n 90-95% of



                                                                 patients with p

rimary



                                                                 biliary cholang

itis (PBC).



                                                                 However, the fr

equency of



                                                                 detected antibo

dies may be



                                                                 cohort or assay

 dependent,



                                                                 as lower sensit

ivities have



                                                                 been reported. 

Not all PBC



                                                                 patients are po

sitive for



                                                                 AMA; some patie

nts may be



                                                                 positive for SP

100 and/or



                                                                  antibodie

s. A negative



                                                                 result does not

 rule out



                                                                 PBC.Performed B

y: [employer] [place] [address]



                                                                 [id number]

 Director:



                                                                 Margo Carlisle MD



                                                                 [Automated mess

age] The



                                                                 system which ge

nerated this



                                                                 result transmit

michelle reference



                                                                 range: 0.0 - 24

.9 Units. The



                                                                 reference range

 was not used



                                                                 to interpret th

is result as



                                                                 normal/abnormal

.



[id number]17[facility]32





             Test Item    Value        Reference Range Interpretation Comments

 

             Case Report (test code = Non-Gynecologic                           



             [id number])  Cytology ? ? ? ? ? ?                           



                          ? ? ? ? ? ? ?Case:                           



                          [id number] ? ? ? ? ?                           



                          ? ? ? ? ? ? ? ? ? ?                           



                          ?Authorizing                           



                          Provider: ?Amy Chase MD ?                           



                          ? Collected: ? ? ? ?                           



                          ? 2021 1300 ? ?                           



                          ? ? ? ?Ordering                           



                          Location: ? ? [facility] ? ? ? ? ? ? ?                           



                          ?Received: ? ? ? ? ?                           



                          ?2021 1321 ? ?                           



                          ? ? ? ? ? ? ? ? ? ? ?                           



                          ? ? ? ?                                



                          Medicine/Surgery [facility]                           



                          7B ? ? ? ? ? ? ? ? ?                           



                          ? ? ? ? ? ? ? ? ? ? ?                           



                          ? ? ? ? ? ?Specimen:                           



                          ? ?ASCITES ? ? ? ? ?                           



                          ? ? ? ? ? ? ? ? ? ? ?                           



                          ? ? ? ? ? ? ? ? ? ? ?                           



                          ? ? ? ? ? ? ? ? ? ? ?                           



                          ?                                      

 

             Final Diagnosis (test w4yspUEaUBJjl6tzFBXap                        

   



             code = [id number]) GFuZzEwMzNcZnRuYmpcdW                           



                          MxIHtccnRmMVxlcGljOTQ                           



                          mB2njhwAhYCIcyDXnM5Xj                           



                          lqxbNEdqAW4cQL6jgOsrc                           



                          DFxgEGeSCIzXfHux3tcc6                           



                          73jWHmn1iuJDGQeeusiYq                           



                          9mYezL63hu7D7CexnF46j                           



                          lRYvDHU4HIOlKRJynLZkA                           



                          SEnQJM0CRDmiLPqA0zkQW                           



                          RuLR0xpfygZPziACfyPCL                           



                          oxCW4LVEykMHxV4FxQKGp                           



                          LAucRVDygza8PjBrEo6ps                           



                          GVyeTcyMFxwYXJkXHBsYW                           



                          joRFTdCfXrZvGMHI5vPLM                           



                          FDF6TTJ81OVKPGnYKOI0E                           



                          RVNJUyBGTFVJRDpccGFyI                           



                          E7xSnRKQALBBvYvGx7TXF                           



                          1BTElHTkFOVCBDRUxMUyA                           



                          uJ8RSUVBZTS0WSwTlSIYc                           



                          xz00JMG2XoEik9J0HCGoN                           



                          xIcVCKhJJ5ceMrzTWFxBF                           



                          0vEJVnO3glqX1crwy8AtM                           



                          kRGFrQvE9HFKydgH5Fvm3                           



                          DJCnDClgu0qrg7SrT9Ady                           



                          EKynTy7a6vkLIYhEdF1mZ                           



                          TqAZrpT4zsarBdxOBrLOY                           



                          kJCq1tInfAkTqHIIqm4da                           



                          cyBcZmNoYXJzZXQwIENhb                           



                          Akrxuj1bM55TTIrhM9dpE                           



                          AjYYmdspBqUgO4FGfjHVJ                           



                          eHvZ1LFWhdCNvWTScA1ff                           



                          ZWQwXGdyZWVuMFxibHVlM                           



                          AZ9iDcnu3Y7yGUgjCAqkK                           



                          giMvJhKoTgKULEy0UcOMh                           



                          1eGsrU4GjYDYxFfG7sBEt                           



                          OPRbENyjSFCwOLOvoiV9o                           



                          F96BFsdtnF5uJRqg9Xhv8                           



                          9se187jG7etVHnZRU7ISK                           



                          jXQWovCKnERPiWPJ0VSGe                           



                          mXUiP9fqDRZcDS2hlfrsO                           



                          KlsEVscGKQjaUE4MFRkmQ                           



                          SfE2GpLKDgGXxuZODwgwj                           



                          1VaRkRv9iuKIswMrgPMyn                           



                          z6ilh1degRWpCoe5NPLzC                           



                          mSfUqcgOLpsr2Bsz7nzGP                           



                          Nrou1vDYZ0sTAjuRjtq0Z                           



                          0bGUxXGRudGJsbnNiZGJc                           



                          MiG9QFwbYZ1tof65UGWqD                           



                          UB9tl8dqCInxZopiaLauA                           



                          QqPJlqE7EuTSHvd179KRI                           



                          jG5LjMVPsk7Q8lpKkIwSd                           



                          EYMvtXB3swL0CPMjXIk2q                           



                          CIaweM5tjLpfCSvK8uqfD                           



                          6wSRNfYH9azbmdr0hiFAe                           



                          zQHrcCXQlaGT9bmG4XIDa                           



                          pQJwU0DhqD6qDNMzMNhsQ                           



                          SQfxun7EsGoQg3ydMCwiR                           



                          cyMFxzYmtwYWdlXHBnbmN                           



                          vbnRccGduZGVjXHBsYWlu                           



                          XHBsYWluXGYwXGZzMjRcc                           



                          HqriHzkeX0kTbSxGsNaAD                           



                          rbEQ0cRGJrH7udjVCmWHS                           



                          rHHBjY0rrDsIdoR2haHks                           



                          MVxjZjJcZnMyMFxwYXIgS                           



                          SBoYXZlIHBlcnNvbmFsbH                           



                          pckyT4sDE2ZMTgXQjiJAL                           



                          nWLVdaERltx6beSbiPDUv                           



                          UU9oSMVmdxAjADrdnBduG                           



                          RiyNSK4KHRynSJrxFRrjP                           



                          FkZSBieSByZXNpZGVudHM                           



                          gQZKzbWnxt0Lbh0VluAD7                           



                          mS7uq3xnz4VwNYUmvTR3A                           



                          Y46bbZ9wB7kYWXdRT7mIW                           



                          YgYP7djLEzyKNsHESux33                           



                          gdGhpcyByZXBvcnQuXHBs                           



                          YWluXGYyXGZzMjhcbGFuZ                           



                          zEwMzNcaGljaFxmMlxkYm                           



                          UtDJOwZCfqW0qpRyLnUlD                           



                          hFXghMYO9nQ==                           

 

             Final Diagnosis Comment r4trjUPoZVSzaQG8YCZaJ                      

     



             (test code = [id number]) OZsr3lmh2WbuWSqzFOxQQ                     

      



                          qtfMGexoHpwr49aOZ0nW1                           



                          3RY7dENElFuR0YQJohfR2                           



                          Ydd1RGBbRRXcoDFnF617c                           



                          0ggq5fnzcYrbBF6tLrrWC                           



                          SdogetNuF4KRfyLQPqdfi                           



                          bORh8MUpoMFStoVY8MSEe                           



                          yXPsW6DsUVNfEJ4mdzt9I                           



                          GS0FDftHGXwXtE0MZCzgB                           



                          DeOBTubXrmRUbzt276BSI                           



                          8GtYpBVOeczWbgGlkkG7v                           



                          DmNiOSWWfGKrdhNih4ijk                           



                          yFxD2E0aTDvDKAaiYJra9                           



                          GjFOyjIInuY9EsgPAlcT0                           



                          qRENpHKEcM6NhuV4wQT3k                           



                          DALkBVJacOwpTJ09iXifx                           



                          CitmIndbWnywAdkK9g7hY                           



                          HmuI3kmKMxrFN3aG7zKtF                           



                          WysNyJPxwN00cnbWoY1Ih                           



                          jIIsqOVkkcExWohoKE0tr                           



                          GFyfQ==                                

 

             Clinical Information large new onset                           



             (test code = [id number]) ascites                                

 

             Gross Description (test x1dzpQGvPPSswWE7HGIxA                      

     



             code = [id number]) UVrg3mal8SulETnvJNeZE                           



                          zdbGCsljDrtc85uBQ1nO5                           



                          2ON6nCWMtXpW4ZTGmaqZ3                           



                          Euv9EYTvADWsbVZoN961r                           



                          4zwz2mafnNdwKU4tWykEO                           



                          VonrpmYqN6IWbnGVUxhzc                           



                          hRNc1AJycDHBgwYF5DSGv                           



                          fPGfW8VfRUMkUW9mpao9L                           



                          CE2RCkmMKPzYeZ4ZSDulW                           



                          CpGWMxnNgySXzxk978KRV                           



                          6NfNoMFDxdkO1SXrfVIHz                           



                          T6WmW2VtNChjFNQ2ZMWcP                           



                          CBcXHQgMSBcXGZsIFxcbm                           



                          Q8g8kuJHNqaPDjISA1QLs                           



                          caWQgNTEwMDIgXFxkYiBP                           



                          SpRfQgTEZTZbIAw5NqE3P                           



                          La6BVNWPbHtJsGiDRM0YN                           



                          p1AyTtZUw2SGx3PSsMItQ                           



                          xZuU6MrMcUlS9FOC6MhF4                           



                          IFxcdCAyIFxcZmwgXFxmI                           



                          EFyaWFsIFxcZnMgMTAgXF                           



                          xgD26qtYbsnJ0tQlWkZQr                           



                          uTHFmFeSzBdLTRK9mVIYX                           



                          BZ9UHT70ATDKQvRCRN8NF                           



                          VNJUyBGTFVJRFxwYXIgUm                           



                          QqCMa3WSNlFvHsk9vpsFR                           



                          nRMYjUFVgL6Cyu2Ira5Tk                           



                          bmdlIGFtYmVyIGZsdWlkI                           



                          FxwYXIgUHJlcGFyZWQgMy                           



                          BzbGlkZXMgKDEgUGFwYW5                           



                          qV77cBJ71WNZ6yH0fuKwq                           



                          YHQfLOGjqAOss3gms9lhW                           



                          4x7i3KlfJ6oOX4xXTWgHA                           



                          hpbnByZXAgcHJlcGFyYXR                           



                          dq25eVIQiltlvKQNgM6Ea                           



                          C1FhtqH4u8zksCzuv7Hfa                           



                          LSkCD5icHMghF==                           

 

             Disclaimer (test code = r7ytaSFiKUFwb1iaDIOkm                      

     



             [id number])  GFuZzEwMzNcZnRuYmpcdW                           



                          WkIOamcjAgNWpsi4DgG0A                           



                          yMjAwMFxhbnNpXGRlZmxh                           



                          tizyGISiMEV0saZkURLkG                           



                          MwvVOSbURtuJy3vkTObcR                           



                          qiTsMgEZCkv9ydzgFLVRi                           



                          xGiIiW447YVZxSAhmb5cs                           



                          s4VuWSNwsFJkw2S4UVRWy                           



                          cpdcSo2aSbiS24hn7Z1Sm                           



                          dfI7tbFQRyTOGeZ8PyVZ3                           



                          iNSNmIce2KEQ9HMY1TZUj                           



                          PESmO9AvUM4kCBOvhIFgX                           



                          Ph2i9nneMhnSBTmSUT4n6                           



                          lqEBjsncZlUO3twp7ypRn                           



                          3e0jgkpFvAWFyPOJfnGBD                           



                          FGXeD0IyaHwrOj3ejZm2b                           



                          FtvKdzkJJZ3Gaf8KA8vxm                           



                          72dbs0wTmvGIJlmlgjJnR                           



                          8RAalYKPpchyaHJq9EDcx                           



                          VRDodNW3YSOifANtV4IgW                           



                          XHpHF6ccpg0UFK5CFvwGH                           



                          NoMuS8BMIttKVqWZAzoVf                           



                          eWRnxa669OUJ2EmDyCT5h                           



                          V2Zou0A6fK0krBNvYNTst                           



                          LOgMnCqETLkgj0xiGNeYJ                           



                          gxa2ClLCF5onP3oHBmhQD                           



                          zMIYjBI19Jacna3LpHsmn                           



                          i4DrW27tvLJ2KSuez4xvZ                           



                          R5aQqJ9rwQpMFgjw4fmgD                           



                          7yOfO7PAsjHX6nVG6lSVG                           



                          prT2svnpvKTRrSlSmodnk                           



                          OMOoiAtjysLfSt9amUkzM                           



                          XS5PLrdN5iiyS3cJuC5OI                           



                          kqX7dagW0eWTy3UWgcfRZ                           



                          1JPDsjZ4gPR1nvsemb0xe                           



                          MKcwPInyWXCrizS2jsR8L                           



                          MIyiCMhR7HkiF2hNKXjVV                           



                          7dmdcre2zuKGM0ONyqKWZ                           



                          yYMZ4WvRnELXkd5Hglvy0                           



                          MzXiv0ZmgULiPCcpA67sr                           



                          967ZRYjlxDbN4necCQhef                           



                          brtUTcrexbEIkaytW3MTK                           



                          fbaRmi5YbRCDePHS6JBcv                           



                          BOlolVLwXORjoMora1rqB                           



                          3RscGFyXHBsYWluXGYxXG                           



                          ZzMjBcbGFuZzEwMzNcaGl                           



                          jaFxmMVxkYmNoXGYxXGxv                           



                          T7ghImSvJ0FbSRJpHsWjn                           



                          MHsL9ggNSmmvfTrTPZgkc                           



                          VgtDL2GSiwS9p7PBSrzcN                           



                          wiHk3yvFxMkFtYUZlVYI7                           



                          JTzwqNBbVUEvu7Emznnpq                           



                          VFxDb1rbPYpHIBktD1oWC                           



                          XxJUItSVxcGH6bkBx1RPD                           



                          MnYCkfXWfPeDPSTDaMF03                           



                          qrYeXFTCajbho4L6MKkyS                           



                          AYrf1MusPWhV3yrl8XkWI                           



                          Upw38uOV1pw1T5j0prSHI                           



                          9DD2hw9PfOZMdqVUtqGSs                           



                          RBPox8Nyqqiwv0EzQMJra                           



                          cOhy8JbDIIeujGinLLiYP                           



                          PbycKwie6rcyJvRDHaHWH                           



                          fR4WsqjkvwIswawXwHUOx                           



                          ct8mosGmVEF4YBTLLISkT                           



                          QVkk6DgtM4vlRYYXER3yJ                           



                          Msno3zumHNzWLkRFAeub8                           



                          4NPIpYX6jN9exHKRgKGAr                           



                          pqWpbMYkj3NxFVPdfAJ0o                           



                          IXgLT7WJeXLw27kJCDhSI                           



                          IXrzKcCHDvdAfvmPH2kkF                           



                          7rC0uONwCUODkIzq+IFRo                           



                          OEOTPNYaTV7sdkErs7Dsk                           



                          hSyhNjeXSUxvIHnx3HfdZ                           



                          Mwj5EffGiiv9AvjLCteFN                           



                          hWQ7nPRTgyfbtPUDkOIJF                           



                          GsFLSQDfviI6q4MxXHLbK                           



                          HHsKAU7aCuwcax3AXLcxO                           



                          3tXKImL1eousaiNDkbHTY                           



                          pg8JvcY1jbIGSrSUqz8Yn                           



                          gGGoyCRDbUEbPJ9xohZaR                           



                          MfMJUlMKVM0mwBuQKQba0                           



                          WlIXyjT2trZ76flFwjzBn                           



                          9lZE5UUM5tX9aCth+IFxw                           



                          YXJccGFyIEFwcHJvcHJpY                           



                          WSknMzqixWsD6ScsuHxeS                           



                          1ytFTezuOaPV2aCL1bV4Z                           



                          5rDYmFVPalsHdz6geORxm                           



                          dmUgYmVlbiByZXZpZXdlZ                           



                          RXfv4RjPJdxQQK2PGrzht                           



                          BpbmNsdWRpbmcgSCZFLCB                           



                          IpYQioCXuEAN4OQobweNm                           



                          zaAsID5uzY1bjCpqnO1gy                           



                          VYhrQP0gvzeAQPhWFTdnS                           



                          edHBWpZI6ljNGwNGVtdaE                           



                          PxIsjdMXiqG6jH6UrWKBp                           



                          YPPjfr6oYLGjuG3sFFtpc                           



                          2VydmljZXMgYXJlIHBlcm                           



                          Heao4eYZEhdWDVNL8GWWa                           



                          uiLUru7ZxpuSlN4gQWHT2                           



                          NUQwNjYwMjgxKSBleGNlc                           



                          FZuVRUaky21OAJqiI9ttY                           



                          wjWJIqrH4sgU8dsWzboK1                           



                          jBaSfErIoPWzgNK6nSSEu                           



                          G6jebDVqOPAaIXRsG7zeN                           



                          uWgxC0zeKetBLztBaDuKj                           



                          KyPXcjTPK2lC==                           

 

             Embedded Images (test                                        



             code = [id number])                                        



[facility] ABDOMINAL [id number] 20:17:32





             Test Item    Value        Reference Range Interpretation Comments

 

             Case Report (test code = Non-Gynecologic                           



             [id number])  Cytology ? ? ? ? ? ?                           



                          ? ? ? ? ? ? ?Case:                           



                          [id number] ? ? ? ? ?                           



                          ? ? ? ? ? ? ? ? ? ?                           



                          ?Authorizing                           



                          Provider: ?Abu                           



                          Janet, Amy, MD ?                           



                          ? Collected: ? ? ? ?                           



                          ? 2021 1300 ? ?                           



                          ? ? ? ?Ordering                           



                          Location: ? ? [facility]                           



                          Health ? ? ? ? ? ? ?                           



                          ?Received: ? ? ? ? ?                           



                          ?2021 1321 ? ?                           



                          ? ? ? ? ? ? ? ? ? ? ?                           



                          ? ? ? ?                                



                          Medicine/Surgery [facility]                           



                          7B ? ? ? ? ? ? ? ? ?                           



                          ? ? ? ? ? ? ? ? ? ? ?                           



                          ? ? ? ? ? ?Specimen:                           



                          ? ?ASCITES ? ? ? ? ?                           



                          ? ? ? ? ? ? ? ? ? ? ?                           



                          ? ? ? ? ? ? ? ? ? ? ?                           



                          ? ? ? ? ? ? ? ? ? ? ?                           



                          ?                                      

 

             Final Diagnosis (test k6pmcMZtUHAmo7haFJPhz                        

   



             code = [id number]) GFuZzEwMzNcZnRuYmpcdW                           



                          MxIHtccnRmMVxlcGljOTQ                           



                          nY2vffeRfDLJtiYBcF4My                           



                          ofcfOZjoTC3aAE7xxAhhc                           



                          TShwQOiMJGuMbHfw5xcx0                           



                          95hXRpk1epCFKLqexboRs                           



                          3kAubY08xp1A2ZweiT91b                           



                          mMEjRTE6YUKmTUKmmVDgO                           



                          IOoUWV4CVXtcLNaB0wqXJ                           



                          BgAD1wtphxAHxjRGltQRL                           



                          myDO1JOGfnDUkZ3RsAHIs                           



                          MZziZBZikec9QySfBl8dd                           



                          GVyeTcyMFxwYXJkXHBsYW                           



                          iwQWDjJyVgHoIXQZ9bDVL                           



                          CCQ5MYC39ZDBKBrFMJF2P                           



                          RVNJUyBGTFVJRDpccGFyI                           



                          D4mZxWEBSWUGoRyLf8JZS                           



                          1BTElHTkFOVCBDRUxMUyA                           



                          oR8KLFSAHWA3YUuGdJBIm                           



                          mn24FHE8ZvYxs5K3SZUeC                           



                          jEcXGLtVB4yuFyqVBGkLC                           



                          1wWILoE7bpwO2jnqc7YkW                           



                          lKVZbDuE2MSFpqkO8Vsv1                           



                          LRDmROtip8izx6PuB9Ivi                           



                          DFrePb0n3idKTPwJzL6fH                           



                          XpCTgsO7ektpMmkRZrGZU                           



                          vPRz7eQllQxRkHJEus3dx                           



                          cyBcZmNoYXJzZXQwIENhb                           



                          Xqfbvu4qK68BLGyvS4zhC                           



                          RaSZlgwyZxQxS6HKooAEJ                           



                          oDmM8PVRsnTCaHLUqK8ml                           



                          ZWQwXGdyZWVuMFxibHVlM                           



                          FA4lVduj0V6rHNhvEYbzX                           



                          vyKuUkNiOfZHBVo4YwXUy                           



                          4lZblP0OyEBOxNbK2xKId                           



                          JMFeUZxzXNJcYRLgqpA7y                           



                          U77JCoiioK7mNQvx3Eyp8                           



                          3by586lV7aeHIiRCV3CNC                           



                          aHOUtwTRnYXEbUNO3DPXz                           



                          wOZkO8zjUXOaUK1qojtpY                           



                          SsuCVwtMCJvfAY8SGYqtC                           



                          JvG0YzBLTdZIblYJSdhfj                           



                          2PsMrVa5mhXEhmAxxKAso                           



                          x2avd3ikwVLcQct4YKItZ                           



                          bDdRejjVCbck5Vkf6qgKP                           



                          Evjb4rXYQ7vWYxqGcbd9N                           



                          0bGUxXGRudGJsbnNiZGJc                           



                          IcW7CKeuWF3lgv08ZSRrA                           



                          PK7fi2zbZPzyBanfkLbmY                           



                          ZrZTyqS5VkPLGzv831KBW                           



                          gI5WaBYPkp7Z2ieMfMzJo                           



                          ABNwmQR2ykL0KEZeKNw6b                           



                          GXzbaX9myCkrQRpX0ienF                           



                          0mYHMnNE4rnhsir3sjTFu                           



                          zATktDZIcxSH1hvG8AVZd                           



                          oLYrZ4JstQ7nPHNkHWptP                           



                          TJhsfx0UdVrFv5jwLYzxM                           



                          cyMFxzYmtwYWdlXHBnbmN                           



                          vbnRccGduZGVjXHBsYWlu                           



                          XHBsYWluXGYwXGZzMjRcc                           



                          XzpuBkdyZ6tLpLfExLsQC                           



                          cfLN1gVRZzG1qzbFKpNNU                           



                          eNZUsN9elXgTkiL2riSaf                           



                          MVxjZjJcZnMyMFxwYXIgS                           



                          SBoYXZlIHBlcnNvbmFsbH                           



                          ffgdH9sMV0KIVlPSaxGSS                           



                          rZGHktHLygk6ldOvtWDBv                           



                          RE1hOGIulwAcMBlxeBapL                           



                          OutMSI4SZPkrFQxxJDkiJ                           



                          FkZSBieSByZXNpZGVudHM                           



                          uAZWcqPour5Puq3FlfDH6                           



                          mZ2gf3puh9OmPBYeaOG4D                           



                          P22elS2gE0uFLVqPC1xHU                           



                          AwIP1ciWFpxAPhMFJqe28                           



                          gdGhpcyByZXBvcnQuXHBs                           



                          YWluXGYyXGZzMjhcbGFuZ                           



                          zEwMzNcaGljaFxmMlxkYm                           



                          KnCMZsHDgsC5paYgPbZwP                           



                          rEDliHMH3gK==                           

 

             Final Diagnosis Comment j9xqbXHlAJXoxDN7CESyQ                      

     



             (test code = [id number]) AGst1vqs4InkXBibDRiGE                     

      



                          xqeCJbohTlot27jDE6mP0                           



                          4UU5xCIJiRoP8XFSjgrQ9                           



                          Pny8PSTsOLLobNTwG940j                           



                          4ffz7vtvlJosAU6vMbrJM                           



                          ZntzjsSkB7RPluFSBfyzn                           



                          cGIq2BQbgQMJekMK0CMRi                           



                          xPDnV1ZuVABjYZ9kjiq3N                           



                          EV5XTovYDPpCiT1AWRjdU                           



                          SxCKMqmGiaBUqrw971TKJ                           



                          0OyFdRYZkdxRkrJrwbA3u                           



                          AfPzHUHPpNWwkxQtd3dlx                           



                          rXmI5C8gLDpPQZcpEPkn0                           



                          AyMWsgHZquO2DnkJNwpZ2                           



                          aSMVmELDrY9KlsX7cEZ4e                           



                          QVCaXBKhyJbnAN36pCcnq                           



                          BfqiFxggJxuiBqcK3b8dK                           



                          KiwR7ycXChyAS7qV6jRoW                           



                          QrqKfJIkzL65iyhToG5Fz                           



                          zSNuqOZjglHaZrsdWL6bz                           



                          GFyfQ==                                

 

             Clinical Information large new onset                           



             (test code = [id number]) ascites                                

 

             Gross Description (test s1yzxWInVINcqIH0KMBoJ                      

     



             code = [id number]) KGpv7huz3InhIJkoCLxXB                           



                          tdxHYrvlZsbw38vVJ6lL9                           



                          4EV7sLRKvEdO8DMPqlbX2                           



                          Xil3MUWfIGUkmVNjD622r                           



                          4ixv1kvhrZyuGL0kBcuNF                           



                          ArnlgbUmT3TXhiBTJkcpc                           



                          iOAd2VPivBLVqyXE2UIQm                           



                          fWTqS9IdECAlUO3bqma2Z                           



                          RY8TNspZYXhHaG8SDBvoB                           



                          RdYLMspBmcNBzjz027GXC                           



                          9FuUjLPAynrP0OGoxVELb                           



                          R4DkN7JiKBouWPE2DLCoK                           



                          CBcXHQgMSBcXGZsIFxcbm                           



                          X8q8pjCBWgtUZqESQ7EIr                           



                          caWQgNTEwMDIgXFxkYiBP                           



                          MfIfDjFNTCMiFSe6SuM6S                           



                          Ca7YSBYCtOpLkOvGZQ9IZ                           



                          n8EtDqKJm4ZEb9VJnGVtG                           



                          sVjA1HgTrIkA9TKZ1FrF7                           



                          IFxcdCAyIFxcZmwgXFxmI                           



                          EFyaWFsIFxcZnMgMTAgXF                           



                          iwY67xaFwiyI3kYtJzCQx                           



                          yQGMeMbMuFgNPMK2oLOQS                           



                          GU4LOZ24FUHPEsAGVR8OK                           



                          VNJUyBGTFVJRFxwYXIgUm                           



                          TyCEw3JSGzBoKou8zbeMP                           



                          dCCTkHNGcZ0Qmr3Ell3Jy                           



                          bmdlIGFtYmVyIGZsdWlkI                           



                          FxwYXIgUHJlcGFyZWQgMy                           



                          BzbGlkZXMgKDEgUGFwYW5                           



                          zF75hMK10CTD4oN7qxJtw                           



                          JMRtUMUwaEUua3myr9evY                           



                          2n1m7DleU1jXE1wMREyIG                           



                          hpbnByZXAgcHJlcGFyYXR                           



                          ej30rTPQgmganEPJcA7Nk                           



                          Z4YsjcT0g5dwmUsnv9Dri                           



                          VZzCD5jcPLgqG==                           

 

             Disclaimer (test code = r2uyqVYjOFJny6boYRBhy                      

     



             [id number])  GFuZzEwMzNcZnRuYmpcdW                           



                          QvSYagycSaMBpwi9WaP3N                           



                          yMjAwMFxhbnNpXGRlZmxh                           



                          svbuYXIvWAK0rdUoIEMmR                           



                          EjcRBKqLOegQo5rhHNxsW                           



                          mbJkTbTMNec0ymyqQHKTn                           



                          tEeJvD170QURbRUrjq3el                           



                          h3PeOJJvzTYni9M8GGTHh                           



                          ndbyRg3tQthV55tp7U2Pv                           



                          hnZ8ajNPPiWKJdQ5NsUF0                           



                          fYUHzIla0HTS2OAM5MXKa                           



                          ZSYpQ0UqJN3dSEBagQGoK                           



                          Zf6d7gkkRbzTWFwYBD3i7                           



                          eyORiikwOrJJ8ibp0toEz                           



                          6j9itrrCqZNStREXnjRAG                           



                          RJCvC7TsfGzzNz5dlJt5g                           



                          HzrFnmlDUN7Xtd3SG2lvl                           



                          65tpe0pKesNZVggpfrBuN                           



                          1HPykRMDjdasdSXd2HZjq                           



                          SDMwrNU5JLAizWEfY9YaO                           



                          FSdDI2pncd9XHK7DBzlJT                           



                          QrJdY0TBZyyKGzVAQksPb                           



                          oQRvhf826ZZO0KhJaLS7x                           



                          O2Qkx9B2zZ7enEJiXMNlm                           



                          LRwTbOzEFNrts7ooOVtOZ                           



                          mmh0XzZZG6whL9vWUquEI                           



                          yOVVbDP38Edbwp1NmNija                           



                          p8GnV85ijXF6FSfov2ivR                           



                          Q2vEdL5zgQmSSfao1dhuN                           



                          1dSwR1LOviGI2hOO2iJRW                           



                          ytU6nzgnmIRJkPzFegiff                           



                          SHRxaBvrmpIoFe5riQbwI                           



                          RR9WWihM8txkW4kCpP8TO                           



                          ndV2bzyB4rGWo1GPnviJW                           



                          6CEHmbV6hIU9uhjbdl0hy                           



                          TLyzHKnqVDWgcdL1jmJ2R                           



                          TWhpMHnF3KtqZ8qFGFeIF                           



                          5xtggva3duONZ6KWvwYJK                           



                          sQEO6TkHpAQFiw8Oqnjn8                           



                          OkLgt0KypNDnORttM79lv                           



                          800JUEnvrEcY1wrmPTmla                           



                          vwzVSrvgjoATsolkO5HVJ                           



                          gcdPdv6SoBPIgETP7BWtg                           



                          MAfvpWUqUGVxfLive4zvG                           



                          3RscGFyXHBsYWluXGYxXG                           



                          ZzMjBcbGFuZzEwMzNcaGl                           



                          jaFxmMVxkYmNoXGYxXGxv                           



                          C5fuBuKcC7UhEHBjLfLsm                           



                          WRdJ3sjJRcvrjAjULJujt                           



                          PrmTJ0ZGgtU0q7WSZrlmP                           



                          crNd4orHcIpInCECpYSY0                           



                          KJfayFIxMJHrj3Bippcnp                           



                          LNkKf1woOOzDXZoeW1qPU                           



                          BfGBSoXBgqPY7rtVz1TFP                           



                          HwCWfcQVlIkCSNUKfSF66                           



                          exPtUHZQpxwla5J9STptR                           



                          AHqb9KvqPDtH2nvx9NsEC                           



                          Yxn61aHB3pe4X0x9qmNDI                           



                          5XI3uu6UyAZTnjCXjtJHx                           



                          TCBvo0Tuhgqxd1VrWVPwg                           



                          rUhj4LcAUBbymLnwDGwCK                           



                          EsbvFktp7krbBwEQKwPAQ                           



                          tW1DmvpsqwKyxdwOgIILz                           



                          zh8dxrEiWZB5GHBPDAIwS                           



                          QNgv0RzvL5hxXXMERK7tA                           



                          Huyo9dvmVTzAErFNVrgy6                           



                          8JNNxAE5pX2lbCPSwBPWa                           



                          xyRmoDEce9OzPOTglRN2f                           



                          ZFqMI9LJxFAo31mGJZmKN                           



                          JCjpPbOQMdjKgxiMD2qlV                           



                          0bL2uDHhWHCFgJbn+IFRo                           



                          WVIMUBVbYP5nwmFae3Hil                           



                          kLpoQxwXYUdoSUch0IkxY                           



                          Eut1QbiOnxp8BfhQWkcWD                           



                          wUU8vAJReparqNEBsKDEO                           



                          IwNURIBrgvE1o6XrWYWoK                           



                          NThHDF3jFchkaq7ATXbaD                           



                          0aOPIdZ9ahgqvwRWgbDXJ                           



                          zo3PlqY8aqHBGcDXko4Pq                           



                          kUHnzURQcIOxLH6momDtV                           



                          MrJMHkMGQO9ebYzKBVjb7                           



                          NdWXmqY6dnZ19wfEdtrAb                           



                          6xUJ3OVA8vV3mJmy+IFxw                           



                          YXJccGFyIEFwcHJvcHJpY                           



                          QOpaNbbgvSnX5HszlLiaE                           



                          8jrLKvmjOaIQ5fFH1wZ8T                           



                          7vHQjZKYgydBmv5hwJWxt                           



                          dmUgYmVlbiByZXZpZXdlZ                           



                          YOvs0QqXCjiJQA3ZUlwka                           



                          BpbmNsdWRpbmcgSCZFLCB                           



                          LuKAteNEaQDJ2BTxabsWi                           



                          hhVvII2gcI6xzCduhT8fc                           



                          NRrkZT2lbntCQDkIQCuzN                           



                          esFIMyRQ6byHYhUQGxpnJ                           



                          PiPziaZOloX8bM3DiGYWp                           



                          ZTQnpi9kDGLmvE0vDGkhc                           



                          2VydmljZXMgYXJlIHBlcm                           



                          Nwag6kRXVjtMESQG0DAXz                           



                          ppORwk1PlmwChN8tHAUH1                           



                          NUQwNjYwMjgxKSBleGNlc                           



                          KDdZTEfsm90XLWaxR9ncZ                           



                          aqIZGzxK0fqP8fzOefvY2                           



                          oXxNaNtMtZLxiNJ0vMZWn                           



                          I3bztZFiWSUeWZMnA1isW                           



                          iWiiA7flEqxCXogPnZyPf                           



                          DsJVkgUTA5xS==                           

 

             Embedded Images (test                                        



             code = [id number])                                        



[id number]43[facility]43





             Test Item    Value        Reference Range Interpretation Comments

 

             ALBUMIN BF (test code 476.0 mg/dL                            



             = [id number])                                        

 

             RENETTA (test code = ERNETTA) Result interpreted                           



                          relative to the serum                           



                          concentration. ?                           



[facility] [id number] 17:43:43





             Test Item    Value        Reference Range Interpretation Comments

 

             ALBUMIN BF (test code 476.0 mg/dL                            



             = [id number])                                        

 

             RENETTA (test code = RENETTA) Result interpreted                           



                          relative to the serum                           



                          concentration. ?                           



[id number] 
15:36:48





             Test Item    Value        Reference Range Interpretation Comments

 

             NA (test code = 132 mmol/L   135-145      L            



             5949714355)                                         

 

             K (test code = 3.0 mmol/L   3.5-5.0      L            



             6261673105)                                         

 

             CL (test code = 98 mmol/L                        



             3431946658)                                         

 

             CO2 TOTAL (test code = 32 mmol/L    23-31        H            



             7755629425)                                         

 

             AGAP (test code =              2-16                      



             4674148227)                                         

 

             BUN (test code = 5 mg/dL      7-23         L            



             4769668221)                                         

 

             GLUCOSE (test code = 156 mg/dL           H            



             0370416507)                                         

 

             CREATININE (test code = 0.48 mg/dL   0.60-1.25    L            



             9403476606)                                         

 

             TOTAL BILI (test code = 4.7 mg/dL    0.1-1.1      H            



             6257720217)                                         

 

             CALCIUM (test code = 7.6 mg/dL    8.6-10.6     L            



             6376072346)                                         

 

             T PROTEIN (test code = 6.5 g/dL     6.3-8.2                   



             3270020433)                                         

 

             ALBUMIN (test code = 2.7 g/dL     3.5-5.0      L            



             9119968515)                                         

 

             ALK PHOS (test code = 109 U/L                          



             1366859837)                                         

 

             ALTv (test code = 22 U/L       5-50                      



             1742-6)                                             

 

             AST(SGOT) (test code = 70 U/L       13-40        H            



             0571320218)                                         

 

             eGFR (test code =              mL/min/1.73m2              



             0668148015)                                         

 

             RENETTA (test code = RENETTA) Association of                           



                          Glomerular Filtration                           



                          Rate (GFR) and Staging                           



                          of Kidney Disease*                           



                          +---------------------                           



                          --+-------------------                           



                          --+-------------------                           



                          ------+| GFR                           



                          (mL/min/1.73 m2) ?|                           



                          With Kidney Damage ?|                           



                          ?Without Kidney                           



                          Damage+---------------                           



                          --------+-------------                           



                          --------+-------------                           



                          ------------+| ?>90 ?                           



                          ? ? ? ? ? ? ? ?|                           



                          ?Stage one ? ? ? ? ?|                           



                          ? Normal ? ? ? ? ? ? ?                           



                          ?+--------------------                           



                          ---+------------------                           



                          ---+------------------                           



                          -------+| ?60-89 ? ? ?                           



                          ? ? ? ? ?| ?Stage two                           



                          ? ? ? ? ?| ? Decreased                           



                          GFR ? ? ? ?                            



                          +---------------------                           



                          --+-------------------                           



                          --+-------------------                           



                          ------+| ?30-59 ? ? ?                           



                          ? ? ? ? ?| ?Stage                           



                          three ? ? ? ?| ? Stage                           



                          three ? ? ? ? ?                           



                          +---------------------                           



                          --+-------------------                           



                          --+-------------------                           



                          ------+| ?15-29 ? ? ?                           



                          ? ? ? ? ?| ?Stage four                           



                          ? ? ? ? | ? Stage four                           



                          ? ? ? ? ?                              



                          ?+--------------------                           



                          ---+------------------                           



                          ---+------------------                           



                          -------+| ?<15 (or                           



                          dialysis) ? ?| ?Stage                           



                          five ? ? ? ? | ? Stage                           



                          five ? ? ? ? ?                           



                          ?+--------------------                           



                          ---+------------------                           



                          ---+------------------                           



                          -------+ *Each stage                           



                          assumes the associated                           



                          GFR level has been in                           



                          effect for at least                           



                          three months. ?Stages                           



                          1 to 5, with or                           



                          without kidney                           



                          disease, indicate                           



                          chronic kidney                           



                          disease. Notes:                           



                          Determination of                           



                          stages one and two                           



                          (with eGFR                             



                          >59mL/min/1.73 m2)                           



                          requires estimation of                           



                          kidney damage for at                           



                          least three months as                           



                          defined by structural                           



                          or functional                           



                          abnormalities of the                           



                          kidney, manifested by                           



                          either:Pathological                           



                          abnormalities or                           



                          Markers of kidney                           



                          damage (including                           



                          abnormalities in the                           



                          composition of the                           



                          blood or urine or                           



                          abnormalities in                           



                          imaging tests).                           

 

             Lab Interpretation Abnormal                               



             (test code = 31485-0)                                        



[facility]. METABOLIC PANEL ([id number])2021 
15:36:48





             Test Item    Value        Reference Range Interpretation Comments

 

             NA (test code = 132 mmol/L   135-145      L            



             7272077011)                                         

 

             K (test code = 3.0 mmol/L   3.5-5.0      L            



             4560933522)                                         

 

             CL (test code = 98 mmol/L                        



             7317721801)                                         

 

             CO2 TOTAL (test code = 32 mmol/L    23-31        H            



             7884387371)                                         

 

             AGAP (test code =              2-16                      



             2012734257)                                         

 

             BUN (test code = 5 mg/dL      7-23         L            



             1570892763)                                         

 

             GLUCOSE (test code = 156 mg/dL           H            



             2797697030)                                         

 

             CREATININE (test code = 0.48 mg/dL   0.60-1.25    L            



             0851742226)                                         

 

             TOTAL BILI (test code = 4.7 mg/dL    0.1-1.1      H            



             7395616208)                                         

 

             CALCIUM (test code = 7.6 mg/dL    8.6-10.6     L            



             4596667428)                                         

 

             T PROTEIN (test code = 6.5 g/dL     6.3-8.2                   



             0261329607)                                         

 

             ALBUMIN (test code = 2.7 g/dL     3.5-5.0      L            



             7809290639)                                         

 

             ALK PHOS (test code = 109 U/L                          



             2668392547)                                         

 

             ALTv (test code = 22 U/L       5-50                      



             1742-6)                                             

 

             AST(SGOT) (test code = 70 U/L       13-40        H            



             6741714779)                                         

 

             eGFR (test code =              mL/min/1.73m2              



             9127630340)                                         

 

             RENETTA (test code = RENETTA) Association of                           



                          Glomerular Filtration                           



                          Rate (GFR) and Staging                           



                          of Kidney Disease*                           



                          +---------------------                           



                          --+-------------------                           



                          --+-------------------                           



                          ------+| GFR                           



                          (mL/min/1.73 m2) ?|                           



                          With Kidney Damage ?|                           



                          ?Without Kidney                           



                          Damage+---------------                           



                          --------+-------------                           



                          --------+-------------                           



                          ------------+| ?>90 ?                           



                          ? ? ? ? ? ? ? ?|                           



                          ?Stage one ? ? ? ? ?|                           



                          ? Normal ? ? ? ? ? ? ?                           



                          ?+--------------------                           



                          ---+------------------                           



                          ---+------------------                           



                          -------+| ?60-89 ? ? ?                           



                          ? ? ? ? ?| ?Stage two                           



                          ? ? ? ? ?| ? Decreased                           



                          GFR ? ? ? ?                            



                          +---------------------                           



                          --+-------------------                           



                          --+-------------------                           



                          ------+| ?30-59 ? ? ?                           



                          ? ? ? ? ?| ?Stage                           



                          three ? ? ? ?| ? Stage                           



                          three ? ? ? ? ?                           



                          +---------------------                           



                          --+-------------------                           



                          --+-------------------                           



                          ------+| ?15-29 ? ? ?                           



                          ? ? ? ? ?| ?Stage four                           



                          ? ? ? ? | ? Stage four                           



                          ? ? ? ? ?                              



                          ?+--------------------                           



                          ---+------------------                           



                          ---+------------------                           



                          -------+| ?<15 (or                           



                          dialysis) ? ?| ?Stage                           



                          five ? ? ? ? | ? Stage                           



                          five ? ? ? ? ?                           



                          ?+--------------------                           



                          ---+------------------                           



                          ---+------------------                           



                          -------+ *Each stage                           



                          assumes the associated                           



                          GFR level has been in                           



                          effect for at least                           



                          three months. ?Stages                           



                          1 to 5, with or                           



                          without kidney                           



                          disease, indicate                           



                          chronic kidney                           



                          disease. Notes:                           



                          Determination of                           



                          stages one and two                           



                          (with eGFR                             



                          >59mL/min/1.73 m2)                           



                          requires estimation of                           



                          kidney damage for at                           



                          least three months as                           



                          defined by structural                           



                          or functional                           



                          abnormalities of the                           



                          kidney, manifested by                           



                          either:Pathological                           



                          abnormalities or                           



                          Markers of kidney                           



                          damage (including                           



                          abnormalities in the                           



                          composition of the                           



                          blood or urine or                           



                          abnormalities in                           



                          imaging tests).                           

 

             Lab Interpretation Abnormal                               



             (test code = 74171-7)                                        



[facility] WITH [id number] 15:18:36





             Test Item    Value        Reference Range Interpretation Comments

 

             WBC (test code =              See_Comment                [Automated



             6690-2)                                             message] The sy

stem



                                                                 which generated



                                                                 this result



                                                                 transmitted



                                                                 reference range

:



                                                                 4.20 - 10.70



                                                                 10*3/?L. The



                                                                 reference range

 was



                                                                 not used to



                                                                 interpret this



                                                                 result as



                                                                 normal/abnormal

.

 

             RBC (test code =              See_Comment  L             [Automated



             789-8)                                              message] The sy

stem



                                                                 which generated



                                                                 this result



                                                                 transmitted



                                                                 reference range

:



                                                                 4.26 - 5.52



                                                                 10*6/?L. The



                                                                 reference range

 was



                                                                 not used to



                                                                 interpret this



                                                                 result as



                                                                 normal/abnormal

.

 

             HGB (test code = 8.7 g/dL     12.2-16.4    L            



             718-7)                                              

 

             HCT (test code = 28.1 %       38.4-49.3    L            



             4544-3)                                             

 

             MCV (test code = 80.7 fL      81.7-95.6    L            



             787-2)                                              

 

             MCH (test code = 25.0 pg      26.1-32.7    L            



             785-6)                                              

 

             MCHC (test code = 31.0 g/dL    31.2-35.0    L            



             786-4)                                              

 

             RDW-SD (test code = 50.6 fL      38.5-51.6                 



             29517-6)                                            

 

             RDW-CV (test code = 17.6 %       12.1-15.4    H            



             788-0)                                              

 

             PLT (test code =              See_Comment  LL            [Automated



             777-3)                                              message] The sy

stem



                                                                 which generated



                                                                 this result



                                                                 transmitted



                                                                 reference range

:



                                                                 150 - 328 10*3/

?L.



                                                                 The reference r

moose



                                                                 was not used to



                                                                 interpret this



                                                                 result as



                                                                 normal/abnormal

.

 

             MPV (test code =                                        Not Measure

d



             67814-7)                                            

 

             IPF % (test code = 12.5 %       1.2-10.7     H            Platelet 

count



             [id number])                                         measured by



                                                                 fluorescence



                                                                 method.

 

             NRBC/100 WBC (test              See_Comment                [Automat

ed



             code = 3293188241)                                        message] 

The system



                                                                 which generated



                                                                 this result



                                                                 transmitted



                                                                 reference range

:



                                                                 0.0 - 10.0 /100



                                                                 WBCs. The refer

ence



                                                                 range was not u

sed



                                                                 to interpret th

is



                                                                 result as



                                                                 normal/abnormal

.

 

             NRBC x10^3 (test code <0.01        See_Comment                [Auto

mated



             = [id number])                                        message] The s

ystem



                                                                 which generated



                                                                 this result



                                                                 transmitted



                                                                 reference range

:



                                                                 10*3/?L. The



                                                                 reference range

 was



                                                                 not used to



                                                                 interpret this



                                                                 result as



                                                                 normal/abnormal

.

 

             GRAN MAT (NEUT) % 67.6 %                                 



             (test code = 770-8)                                        

 

             IMM GRAN % (test code 0.20 %                                 



             = 3888657656)                                        

 

             LYMPH % (test code = 16.1 %                                 



             736-9)                                              

 

             MONO % (test code = 13.3 %                                 



             5905-5)                                             

 

             EOS % (test code = 1.9 %                                  



             713-8)                                              

 

             BASO % (test code = 0.9 %                                  



             706-2)                                              

 

             GRAN MAT x10^3(ANC) 2.89 10*3/uL 1.99-6.95                 



             (test code =                                        



             7297413805)                                         

 

             IMM GRAN x10^3 (test <0.03        0.00-0.06                 



             code = 1174198284)                                        

 

             LYMPH x10^3 (test code 0.69 10*3/uL 1.09-3.23    L            



             = 731-0)                                            

 

             MONO x10^3 (test code 0.57 10*3/uL 0.36-1.02                 



             = 742-7)                                            

 

             EOS x10^3 (test code = 0.08 10*3/uL 0.06-0.53                 



             711-2)                                              

 

             BASO x10^3 (test code 0.04 10*3/uL 0.01-0.09                 



             = 704-7)                                            

 

             Lab Interpretation Abnormal                               



             (test code = 34117-4)                                        



[facility] WITH [id number] 15:18:36





             Test Item    Value        Reference Range Interpretation Comments

 

             WBC (test code =              See_Comment                [Automated



             9190-2)                                             message] The sy

stem



                                                                 which generated



                                                                 this result



                                                                 transmitted



                                                                 reference range

:



                                                                 4.20 - 10.70



                                                                 10*3/?L. The



                                                                 reference range

 was



                                                                 not used to



                                                                 interpret this



                                                                 result as



                                                                 normal/abnormal

.

 

             RBC (test code =              See_Comment  L             [Automated



             209-8)                                              message] The sy

stem



                                                                 which generated



                                                                 this result



                                                                 transmitted



                                                                 reference range

:



                                                                 4.26 - 5.52



                                                                 10*6/?L. The



                                                                 reference range

 was



                                                                 not used to



                                                                 interpret this



                                                                 result as



                                                                 normal/abnormal

.

 

             HGB (test code = 8.7 g/dL     12.2-16.4    L            



             718-7)                                              

 

             HCT (test code = 28.1 %       38.4-49.3    L            



             4544-3)                                             

 

             MCV (test code = 80.7 fL      81.7-95.6    L            



             787-2)                                              

 

             MCH (test code = 25.0 pg      26.1-32.7    L            



             785-6)                                              

 

             MCHC (test code = 31.0 g/dL    31.2-35.0    L            



             786-4)                                              

 

             RDW-SD (test code = 50.6 fL      38.5-51.6                 



             03708-6)                                            

 

             RDW-CV (test code = 17.6 %       12.1-15.4    H            



             788-0)                                              

 

             PLT (test code =              See_Comment  LL            [Automated



             777-3)                                              message] The sy

stem



                                                                 which generated



                                                                 this result



                                                                 transmitted



                                                                 reference range

:



                                                                 150 - 328 10*3/

?L.



                                                                 The reference r

moose



                                                                 was not used to



                                                                 interpret this



                                                                 result as



                                                                 normal/abnormal

.

 

             MPV (test code =                                        Not Measure

d



             31228-7)                                            

 

             IPF % (test code = 12.5 %       1.2-10.7     H            Platelet 

count



             5879312638)                                         measured by



                                                                 fluorescence



                                                                 method.

 

             NRBC/100 WBC (test              See_Comment                [Automat

ed



             code = 1905379741)                                        message] 

The system



                                                                 which generated



                                                                 this result



                                                                 transmitted



                                                                 reference range

:



                                                                 0.0 - 10.0 /100



                                                                 WBCs. The refer

ence



                                                                 range was not u

sed



                                                                 to interpret th

is



                                                                 result as



                                                                 normal/abnormal

.

 

             NRBC x10^3 (test code <0.01        See_Comment                [Auto

mated



             = 8737883054)                                        message] The s

ystem



                                                                 which generated



                                                                 this result



                                                                 transmitted



                                                                 reference range

:



                                                                 10*3/?L. The



                                                                 reference range

 was



                                                                 not used to



                                                                 interpret this



                                                                 result as



                                                                 normal/abnormal

.

 

             GRAN MAT (NEUT) % 67.6 %                                 



             (test code = 770-8)                                        

 

             IMM GRAN % (test code 0.20 %                                 



             = 6307464115)                                        

 

             LYMPH % (test code = 16.1 %                                 



             736-9)                                              

 

             MONO % (test code = 13.3 %                                 



             5905-5)                                             

 

             EOS % (test code = 1.9 %                                  



             713-8)                                              

 

             BASO % (test code = 0.9 %                                  



             706-2)                                              

 

             GRAN MAT x10^3(ANC) 2.89 10*3/uL 1.99-6.95                 



             (test code =                                        



             8231830843)                                         

 

             IMM GRAN x10^3 (test <0.03        0.00-0.06                 



             code = 2022427101)                                        

 

             LYMPH x10^3 (test code 0.69 10*3/uL 1.09-3.23    L            



             = 731-0)                                            

 

             MONO x10^3 (test code 0.57 10*3/uL 0.36-1.02                 



             = 742-7)                                            

 

             EOS x10^3 (test code = 0.08 10*3/uL 0.06-0.53                 



             711-2)                                              

 

             BASO x10^3 (test code 0.04 10*3/uL 0.01-0.09                 



             = 704-7)                                            

 

             Lab Interpretation Abnormal                               



             (test code = 72832-0)                                        



[facility].PROTEIN BODY [id number] 03:21:38





             Test Item    Value        Reference Range Interpretation Comments

 

             T.PROT BF (test 1477.0 mg/dL                           



             code = 2966138704)                                        

 

             UNSPUN BODY FLUID Yellow                                 



             COLOR (test code =                                        



             3292383612)                                         

 

             UNSPUN BODY FLUID Slightly Cloudy                           



             CLARITY (test code                                        



             = 6894054699)                                        

 

             SPUN BODY FLUID Yellow                                 



             COLOR (test code =                                        



             1312746093)                                         

 

             SPUN BODY FLUID Clear                                  



             CLARITY (test code                                        



             = 7171263409)                                        

 

             Sediment (test code The sediment volume is                         

  



             = 7377578859) <0.1 mLs of the total                           



                          fluid volume of 4mL and                           



                          its color is red.                           

 

             RENETTA (test code = Test developed and                           



             RENETTA)         characteristics determined                           



                          by [facility]                           



                          Services.                              



[facility].PROTEIN BODY [id number] 03:21:38





             Test Item    Value        Reference Range Interpretation Comments

 

             T.PROT BF (test 1477.0 mg/dL                           



             code = 5714411639)                                        

 

             UNSPUN BODY FLUID Yellow                                 



             COLOR (test code =                                        



             4161028810)                                         

 

             UNSPUN BODY FLUID Slightly Cloudy                           



             CLARITY (test code                                        



             = 0569757332)                                        

 

             SPUN BODY FLUID Yellow                                 



             COLOR (test code =                                        



             8296034095)                                         

 

             SPUN BODY FLUID Clear                                  



             CLARITY (test code                                        



             = 6378774368)                                        

 

             Sediment (test code The sediment volume is                         

  



             = 4507189181) <0.1 mLs of the total                           



                          fluid volume of 4mL and                           



                          its color is red.                           

 

             RENETTA (test code = Test developed and                           



             RENETTA)         characteristics determined                           



                          by [facility]                           



                          Services.                              



[facility] [id number] 18:50:33





             Test Item    Value        Reference Range Interpretation Comments

 

             BF COLOR     Light Yellow                           



             (test code =                                        



             0161243478)                                         

 

             BF WBC Count              See_Comment                [Automated



             (test code =                                        message] The sy

stem



             5334652252)                                         which generated



                                                                 this result



                                                                 transmitted



                                                                 reference range

:



                                                                 /?L. The refere

nce



                                                                 range was not u

sed



                                                                 to interpret th

is



                                                                 result as



                                                                 normal/abnormal

.

 

             BF RBC Count              See_Comment                [Automated



             (test code =                                        message] The sy

stem



             6566220568)                                         which generated



                                                                 this result



                                                                 transmitted



                                                                 reference range

:



                                                                 /?L. The refere

nce



                                                                 range was not u

sed



                                                                 to interpret th

is



                                                                 result as



                                                                 normal/abnormal

.

 

             RENETTA (test    The reference range                           



             code = RENETTA)  and other method                           



                          performance                            



                          specifications have                           



                          not been established                           



                          for this body fluid.                           



                          ?The test results must                           



                          be integrated into the                           



                          clinical context for                           



                          interpretation.                           



[facility] [id number] 18:50:33





             Test Item    Value        Reference Range Interpretation Comments

 

             BF COLOR     Light Yellow                           



             (test code =                                        



             4755468583)                                         

 

             BF WBC Count              See_Comment                [Automated



             (test code =                                        message] The sy

stem



             6609819998)                                         which generated



                                                                 this result



                                                                 transmitted



                                                                 reference range

:



                                                                 /?L. The refere

nce



                                                                 range was not u

sed



                                                                 to interpret th

is



                                                                 result as



                                                                 normal/abnormal

.

 

             BF RBC Count              See_Comment                [Automated



             (test code =                                        message] The sy

stem



             6499227100)                                         which generated



                                                                 this result



                                                                 transmitted



                                                                 reference range

:



                                                                 /?L. The refere

nce



                                                                 range was not u

sed



                                                                 to interpret th

is



                                                                 result as



                                                                 normal/abnormal

.

 

             RENETTA (test    The reference range                           



             code = RENETTA)  and other method                           



                          performance                            



                          specifications have                           



                          not been established                           



                          for this body fluid.                           



                          ?The test results must                           



                          be integrated into the                           



                          clinical context for                           



                          interpretation.                           



[facility]AGING
2021 18:23:38Successful US-guided paracentesis.PROCEDURE: US-guided 
paracentesis HISTORY: Ascites, paracentesis is requested. MEDICATIONS: Local 
lidocaine. I reviewed the patient?s current medicationlist as noted in the 
nursing assessment, and the following actions weretaken: None []. ATTENDING 
PRESENCE: ?As the attending radiologist, I was present in theroom during the 
entire procedure. TECHNIQUE: Informed consent was obtained from the patient 
after discussingthe risks and benefits of the procedure. Universal protocol 
timeout wasperformed verifying correct patient, correct site, and correct 
procedure.Images were archived to PACS. The patient was prepped and draped in 
sterilefashion. US-guided drainage of the ascites was performed with a 5 
Frenchsheathed needle, after infiltrating local anesthesia at the puncture 
site.A total of 3000cc clear yellowish fluid was drained. Fluid was sent for 
laboratory analysisas requested in Epic. COMPLICATIONS: None. ? Estimated Blood 
Loss : &lt;1 cc [facility], Radiant Results Inft User - 2021 1:24 PM 
CDTFormatting of this note might be different from the original.PROCEDURE: US-
guided paracentesisHISTORY: Ascites, paracentesis is requested.MEDICATIONS: 
Local lidocaine. Ireviewed the patient?s current medicationlist as noted in the 
nursing assessment, and the following actions weretaken: None []. ATTENDING 
PRESENCE: As the attending radiologist, I was present in theroom during the 
entire procedure.TECHNIQUE: Informed consent was obtained from the patient after
 discussingthe risks and benefits of the procedure. Universal protocol timeout 
wasperformed verifying correctpatient, correct site, and correct 
procedure.Images were archived to PACS. The patient was prepped and draped in 
sterilefashion. US-guided drainage of the ascites was performed with a 5 
Frenchsheathed needle, after infiltrating local anesthesia at the puncture 
site.A total of 3000cc clear yellowish fluid was drained. Fluid was sent for 
laboratory analysis as requested in Epic.COMPLICATIONS: None. Estimated Blood 
Loss : &lt;1 ccIMPRESSIONSuccessful US-guided paracentesis.[facility]PERITONECENTESIS WITH [id number] 18:23:38
Successful US-guided paracentesis.PROCEDURE: US-guided paracentesis HISTORY: 
Ascites, paracentesis is requested. MEDICATIONS: Local lidocaine. I reviewed the
 patient?s current medicationlist as noted in the nursing assessment, and the 
following actions weretaken: None []. ATTENDING PRESENCE: ?As the attending 
radiologist, I was present in theroom during the entire procedure. TECHNIQUE: 
Informed consent was obtained from the patient after discussingthe risks and 
benefits of the procedure. Universal protocol timeout wasperformed verifying 
correct patient, correct site, and correct procedure.Images were archived to 
PACS. The patient was prepped and draped in sterilefashion. US-guided drainage 
of the ascites was performed with a 5 Frenchsheathed needle, after infiltrating 
local anesthesia at the puncture site.A total of 3000cc clear yellowish fluid 
was drained. Fluid was sent for laboratory analysisas requested in Epic. 
COMPLICATIONS: None. ? Estimated Blood Loss : &lt;1 cc [facility], Radiant Results In
ft User - 2021 1:24 PM CDTFormatting of this note might be different from 
the original.PROCEDURE: US-guided paracentesisHISTORY: Ascites, paracentesis is 
requested.MEDICATIONS: Local lidocaine. Ireviewed the patient?s current 
medicationlist as noted in the nursing assessment, and the following actions 
weretaken: None []. ATTENDING PRESENCE: As the attending radiologist, I was 
present in theroom during the entire procedure.TECHNIQUE: Informed consent was 
obtained from the patient after discussingthe risks and benefits of the 
procedure. Universal protocol timeout wasperformed verifying correctpatient, 
correct site, and correct procedure.Images were archived to PACS. The patient 
was prepped and draped in sterilefashion. US-guided drainage of the ascites was 
performed with a 5 Frenchsheathed needle, after infiltrating local anesthesia at
 the puncture site.A total of 3000cc clear yellowish fluid was drained. Fluid 
was sent for laboratory analysis as requested in Epic.COMPLICATIONS: None. Fatmata
mated Blood Loss : &lt;1 ccIMPRESSIONSuccessful US-guided paracentesis.
[facility] [id number] 
18:15:47





             Test Item    Value        Reference Range Interpretation Comments

 

             MAI (test code = Negative     Negative                  



             [id number])                                         

 

             RENETTA (test code = RENETTA) Negative - No                           



                          Anti-Nuclear                           



                          Antibodies detected                           



                          by IFA.Positive -                           



                          MAI IFA screen                           



                          performed with a                           



                          1:80 dilution in                           



                          adults and a 1:40                           



                          dilution in                            



                          pediatrics. Any MAI                           



                          "Positive" will have                           



                          titer performed and                           



                          reported separately.                           

 

             Lab Interpretation (test Normal                                 



             code = 75663-5)                                        



[facility] [id number] 
18:15:47





             Test Item    Value        Reference Range Interpretation Comments

 

             MAI (test code = Negative     Negative                  



             8495549374)                                         

 

             RENETTA (test code = RENETTA) Negative - No                           



                          Anti-Nuclear                           



                          Antibodies detected                           



                          by IFA.Positive -                           



                          MAI IFA screen                           



                          performed with a                           



                          1:80 dilution in                           



                          adults and a 1:40                           



                          dilution in                            



                          pediatrics. Any MAI                           



                          "Positive" will have                           



                          titer performed and                           



                          reported separately.                           

 

             Lab Interpretation (test Normal                                 



             code = 12432-5)                                        



[place] [address] [id number] 17:44:30





             Test Item    Value        Reference Range Interpretation Comments

 

             Anti-Trypsin (test code = 141 mg/dL                        



             0829007574)                                         

 

             Lab Interpretation (test code = Normal                             

    



             84170-5)                                            



[place] [address] [id number] 17:44:30





             Test Item    Value        Reference Range Interpretation Comments

 

             Anti-Trypsin (test code = 141 mg/dL                        



             8572706674)                                         

 

             Lab Interpretation (test code = Normal                             

    



             26776-2)                                            



[facility] 17:43:58





             Test Item    Value        Reference Range Interpretation Comments

 

             CERULO (test code = 0787622040) 24 mg/dL     25-63        L        

    

 

             Lab Interpretation (test code = Abnormal                           

    



             05968-1)                                            



[facility] -84-40 17:43:58





             Test Item    Value        Reference Range Interpretation Comments

 

             IgG (test code = 8197805027) 1850 mg/dL   636-1600     H           

 

 

             Lab Interpretation (test code = Abnormal                           

    



             23598-5)                                            



[facility] 17:43:58





             Test Item    Value        Reference Range Interpretation Comments

 

             CERULO (test code = 1097063292) 24 mg/dL     25-63        L        

    

 

             Lab Interpretation (test code = Abnormal                           

    



             04614-8)                                            



[facility] -43-61 17:43:58





             Test Item    Value        Reference Range Interpretation Comments

 

             IgG (test code = 1349733179) 1850 mg/dL   636-1600     H           

 

 

             Lab Interpretation (test code = Abnormal                           

    



             72328-9)                                            



[facility] A AB, IGG AND [id number] 11:37:51





             Test Item    Value        Reference Range Interpretation Comments

 

             HAV Total (test code Positive                               



             = 8415644321)                                        

 

             HAVT                                                



             Semi-Quantitative                                        



             (test code =                                        



             9426949800)                                         

 

             RENETTA (test code = RENETTA) Indicates past or                           



                          present infection with                           



                          HAV or exposure to HAV                           



                          due to vaccination.                           



[facility] [id number] 11:37:51





             Test Item    Value        Reference Range Interpretation Comments

 

             HAV Total (test code Positive                               



             = 2194674073)                                        

 

             HAVT                                                



             Semi-Quantitative                                        



             (test code =                                        



             3685277311)                                         

 

             RENETTA (test code = RENETTA) Indicates past or                           



                          present infection with                           



                          HAV or exposure to HAV                           



                          due to vaccination.                           



[facility]ale[facility] with [id number] 07:43:16





             Test Item    Value        Reference Range Interpretation Comments

 

             WBC (test code =              See_Comment  L             [Automated



             6690-2)                                             message] The



                                                                 system which



                                                                 generated this



                                                                 result transmit

michelle



                                                                 reference range

:



                                                                 4.20 - 10.70



                                                                 10*3/?L. The



                                                                 reference range



                                                                 was not used to



                                                                 interpret this



                                                                 result as



                                                                 normal/abnormal

.

 

             RBC (test code =              See_Comment  L             [Automated



             789-8)                                              message] The



                                                                 system which



                                                                 generated this



                                                                 result transmit

michelle



                                                                 reference range

:



                                                                 4.26 - 5.52



                                                                 10*6/?L. The



                                                                 reference range



                                                                 was not used to



                                                                 interpret this



                                                                 result as



                                                                 normal/abnormal

.

 

             HGB (test code = 7.3 g/dL     12.2-16.4    L            



             718-7)                                              

 

             HCT (test code = 23.8 %       38.4-49.3    L            



             4544-3)                                             

 

             MCV (test code = 81.5 fL      81.7-95.6    L            



             787-2)                                              

 

             MCH (test code = 25.0 pg      26.1-32.7    L            



             785-6)                                              

 

             MCHC (test code = 30.7 g/dL    31.2-35.0    L            



             786-4)                                              

 

             RDW-SD (test code = 51.1 fL      38.5-51.6                 



             47492-6)                                            

 

             RDW-CV (test code = 17.4 %       12.1-15.4    H            



             788-0)                                              

 

             PLT (test code =              See_Comment  LL            [Automated



             777-3)                                              message] The



                                                                 system which



                                                                 generated this



                                                                 result transmit

michelle



                                                                 reference range

:



                                                                 150 - 328 10*3/

?L.



                                                                 The reference



                                                                 range was not u

sed



                                                                 to interpret th

is



                                                                 result as



                                                                 normal/abnormal

.

 

             MPV (test code =                                        Not Measure

d



             84811-5)                                            

 

             IPF % (test code = 11.9 %       1.2-10.7     H            Platelet 

count



             7751478610)                                         measured by



                                                                 fluorescence



                                                                 method.

 

             NRBC/100 WBC (test              See_Comment                [Automat

ed



             code = 0909180750)                                        message] 

The



                                                                 system which



                                                                 generated this



                                                                 result transmit

michelle



                                                                 reference range

:



                                                                 0.0 - 10.0 /100



                                                                 WBCs. The



                                                                 reference range



                                                                 was not used to



                                                                 interpret this



                                                                 result as



                                                                 normal/abnormal

.

 

             NRBC x10^3 (test code <0.01        See_Comment                [Auto

mated



             = 1370694903)                                        message] The



                                                                 system which



                                                                 generated this



                                                                 result transmit

michelle



                                                                 reference range

:



                                                                 10*3/?L. The



                                                                 reference range



                                                                 was not used to



                                                                 interpret this



                                                                 result as



                                                                 normal/abnormal

.

 

             SEG % (test code = 55 %         33-76                     



             99719-0)                                            

 

             LYMPH % (test code = 36 %         14-54                     



             01239-2)                                            

 

             MONO % (test code = 8 %          0-4          H            



             26485-3)                                            

 

             EOS % (test code = 1 %          0-3                       



             23081-8)                                            

 

             ANC (test code = 1.99 10*3/uL 1.99-6.95                 



             6868640900)                                         

 

             TARGET CELLS (test 2+           See_Comment  A             [Automat

ed



             code = 29397-8)                                        message] The



                                                                 system which



                                                                 generated this



                                                                 result transmit

michelle



                                                                 reference range

:



                                                                 (none). The



                                                                 reference range



                                                                 was not used to



                                                                 interpret this



                                                                 result as



                                                                 normal/abnormal

.

 

             PLT ESTIMATE (test Critically   Normal       AA           



             code = 9317-9) Decreased                              

 

             GIANT PLATELETS (test Present      See_Comment  A             [Auto

mated



             code = 5908-9)                                        message] The



                                                                 system which



                                                                 generated this



                                                                 result transmit

michelle



                                                                 reference range

:



                                                                 (none). The



                                                                 reference range



                                                                 was not used to



                                                                 interpret this



                                                                 result as



                                                                 normal/abnormal

.

 

             Lab Interpretation Abnormal                               



             (test code = 24235-5)                                        



[facility] [id number] 07:02:16





             Test Item    Value        Reference Range Interpretation Comments

 

             IONIZED CA (test code = 4.00 mg/dL   4.50-5.30    L            



             3978396513)                                         

 

             PH SERUM (test code = 3504914995)              7.35-7.45    H      

      QUES

 

             Lab Interpretation (test code = Abnormal                           

    



             56329-5)                                            



[facility] [id number] 07:02:16





             Test Item    Value        Reference Range Interpretation Comments

 

             IONIZED CA (test code = 4.00 mg/dL   4.50-5.30    L            



             5925379625)                                         

 

             PH SERUM (test code = 8429188976)              7.35-7.45    H      

      QUES

 

             Lab Interpretation (test code = Abnormal                           

    



             33249-7)                                            



[facility]





             Test Item    Value        Reference Range Interpretation Comments

 

             MAGNESIUM (test code = 4378255719) 1.5 mg/dL    1.7-2.4      L     

       

 

             Lab Interpretation (test code = Abnormal                           

    



             82973-4)                                            



[facility]14





             Test Item    Value        Reference Range Interpretation Comments

 

             PHOSPHORUS (test code = 6858390440) 3.5 mg/dL    2.5-5.0           

        

 

             Lab Interpretation (test code = Normal                             

    



             86471-8)                                            



[facility] 06:38:14





             Test Item    Value        Reference Range Interpretation Comments

 

             PHOSPHORUS (test code = 2890555943) 3.5 mg/dL    2.5-5.0           

        

 

             Lab Interpretation (test code = Normal                             

    



             15851-3)                                            



[facility]





             Test Item    Value        Reference Range Interpretation Comments

 

             MAGNESIUM (test code = 1157719039) 1.5 mg/dL    1.7-2.4      L     

       

 

             Lab Interpretation (test code = Abnormal                           

    



             63744-4)                                            



[facility] [id number] 
06:35:12





             Test Item    Value        Reference Range Interpretation Comments

 

             HBsAB (test code = Negative                               



             5406173437)                                         

 

             HBsAb                     mIU/mL                    



             Semi-Quantitative                                        



             (test code =                                        



             4658522589)                                         

 

             RENETTA (test code = Interpretation:                           



             RENETTA)         ?Hepatitis B Surface                           



                          Antibody ? ? ? ? Negative                           



                          - Patient is considered                           



                          to be not immune to                           



                          infection with HBV. ? ?                           



                          Positive - Anti-HBs                           



                          detected at greater than                           



                          or equal to 12 mIU/mL.                           



                          ?Patient is considered to                           



                          be immune to infection                           



                          with HBV. ?                            



[facility] &amp; IGG[facility] 
06:35:12





             Test Item    Value        Reference Range Interpretation Comments

 

             HBC (test code = 1652925773) Negative                              

 

 

             HBC Semi-Quantitative (test code =                                 

       



             9798896301)                                         



[facility] B SURFACE [id number] 
06:35:12





             Test Item    Value        Reference Range Interpretation Comments

 

             HBsAB (test code = Negative                               



             7043820884)                                         

 

             HBsAb                     mIU/mL                    



             Semi-Quantitative                                        



             (test code =                                        



             3258230911)                                         

 

             RENETTA (test code = Interpretation:                           



             RENETTA)         ?Hepatitis B Surface                           



                          Antibody ? ? ? ? Negative                           



                          - Patient is considered                           



                          to be not immune to                           



                          infection with HBV. ? ?                           



                          Positive - Anti-HBs                           



                          detected at greater than                           



                          or equal to 12 mIU/mL.                           



                          ?Patient is considered to                           



                          be immune to infection                           



                          with HBV. ?                            



[facility] C VIRUS [id number] 06:35:12





             Test Item    Value        Reference Range Interpretation Comments

 

             HCV Ab (test code = 84033-9) Negative                              

 

 

             HCV Semi-Quantitative (test code =                                 

       



             01385-8)                                            



[facility] &amp; IGG[facility]20[facility] 
06:35:12





             Test Item    Value        Reference Range Interpretation Comments

 

             HBC (test code = 1388858808) Negative                              

 

 

             HBC Semi-Quantitative (test code =                                 

       



             7451070698)                                         



[facility] C VIRUS [id number] 06:35:12





             Test Item    Value        Reference Range Interpretation Comments

 

             HCV Ab (test code = 17038-0) Negative                              

 

 

             HCV Semi-Quantitative (test code =                                 

       



             90109-1)                                            



[facility] [id number] 06:20:51





             Test Item    Value        Reference Range Interpretation Comments

 

             AFP (test code = 3.8 ng/mL    See_Comment                [Automated



             [id number])                                         message] The



                                                                 system which



                                                                 generated this



                                                                 result



                                                                 transmitted



                                                                 reference range

:



                                                                 <=7.5. The



                                                                 reference range



                                                                 was not used to



                                                                 interpret this



                                                                 result as



                                                                 normal/abnormal

.

 

             RENETTA (test code = RENETTA) Biotin has been                           



                          reported to                            



                          cause a negative                           



                          bias, interpret                           



                          results relative                           



                          to patient's use                           



                          of biotin.                             

 

             Lab Interpretation Normal                                 



             (test code = 65489-9)                                        



[facility] [id number] 06:20:51





             Test Item    Value        Reference Range Interpretation Comments

 

             AFP (test code = 3.8 ng/mL    See_Comment                [Automated



             [id number])                                         message] The



                                                                 system which



                                                                 generated this



                                                                 result



                                                                 transmitted



                                                                 reference range

:



                                                                 <=7.5. The



                                                                 reference range



                                                                 was not used to



                                                                 interpret this



                                                                 result as



                                                                 normal/abnormal

.

 

             RENETTA (test code = RENETTA) Biotin has been                           



                          reported to                            



                          cause a negative                           



                          bias, interpret                           



                          results relative                           



                          to patient's use                           



                          of biotin.                             

 

             Lab Interpretation Normal                                 



             (test code = 24935-7)                                        



[facility] P[facility] RBC (in units), 1 Units
2021 02:14:47





             Test Item    Value        Reference Range Interpretation Comments

 

             Cross Match Result Compatible                             



             (test code = 4409)                                        

 

             ISBT Blood Type Code                                        



             (test code = 334793)                                        

 

             Unit Blood Type (test O Pos                                  



             code = 4410)                                        

 

             Unit Number (test [id number]                           



             code = 4411)                                        

 

             Blood Expiration Date                                        



             & Time (test code =                                        



             472642)                                             

 

             Status Information Issued                                 



             (test code = 4412)                                        

 

             Product      Red Blood Cells                           



             Identification (test                                        



             code = 4413)                                        

 

             Product Code (test [id number]                               Performed

 at [facility]



             code = 4414)                                        Laboratory



                                                                 Services - [facility]



                                                                 Blood [id number] [place] [address]



                                                                 [id number]



                                                                 Free:



                                                                 [phone]

A



                                                                 No. [id number]



[facility] RBC (in units), 1 Units
2021 02:14:47





             Test Item    Value        Reference Range Interpretation Comments

 

             Cross Match Result Compatible                             



             (test code = 4409)                                        

 

             ISBT Blood Type Code                                        



             (test code = 265898)                                        

 

             Unit Blood Type (test O Pos                                  



             code = 4410)                                        

 

             Unit Number (test [id number]                           



             code = 4411)                                        

 

             Blood Expiration Date                                        



             & Time (test code =                                        



             126811)                                             

 

             Status Information Issued                                 



             (test code = 4412)                                        

 

             Product      Red Blood Cells                           



             Identification (test                                        



             code = 4413)                                        

 

             Product Code (test [id number]                               Performed

 at [facility]



             code = 4414)                                        Laboratory



                                                                 Services - [facility]



                                                                 Blood [id number] [place] [address]



                                                                 [id number]



                                                                 Free:



                                                                 [phone]

A



                                                                 No. [id number]



[facility]ale[facility] WITH [id number] 
01:26:53Impression: 1. ?Cirrhosis.2. ?Enlarged portal vein and mild 
splenomegaly, suggestive of portalhypertension. Hepatopedal flow is seen in the 
main portal vein.3. Mild gallbladder wall thickening, which is nonspecific and 
may be seenwith cholecystitis, hepatitis, liver failure, or hypervolemia.4. 
Moderate abdominal ascites. Left pleural effusion. RL: 2824 End of Report 
Electronically signed by Fernandez Baker MD at 2021 8:26 PMExam: Limited 
Abdominal Ultrasound, 2021 7:00 PM. Ordering Physician: AMY CHASE. 
History: Ascites. Comparison: None. Technique: Grayscale and color Doppler 
ultrasound images of the right upperquadrant abdomen were obtained. Technical 
Quality: Examination is slightlysuboptimal due to body habitusand overlying 
bowel gas. Findings: ?Liver is nodular in contour and heterogeneous in 
echotexture. Hepatopetalflow is noted in the main portal vein. Portal vein is 
enlarged. There is nointrahepatic biliary ductal dilatation. Common bile duct 
measures 2 mm incaliber. Gallbladder demonstrates no evidence of stones. ?There 
is nopericholecystic fluid. Gallbladder wall measures 3 mm in 
thickness.Sonographic Gupta sign is negative. Right kidney measures 13.9 cm in 
length. ?There is no right hydronephrosis.Right kidney shows normal cortical 
thickness, corticomedullarydifferentiation and echotexture. ? Visualized 
pancreas is unremarkable. Pancreas is partially obscured byoverlying bowel gas. 
Visualized inferior vena cava and abdominal aorta are unremarkable. Spleen 
measures up to 12.6 cm in length. There is moderate free fluid. Left pleural 
effusion is partiallyvisualized. [facility], [facility] Results Inft User - 2021 
8:28 PM CDTFormatting of this note might be different from the original.Exam: 
Limited Abdominal Ultrasound, 2021 7:00 PM.Ordering Physician: AMY IBRAHIM.History: Ascites.Comparison: None.Technique: Grayscale and color Doppler 
ultrasound images of the right upperquadrant abdomen were obtained.Technical 
Quality: Examination is slightly suboptimal due to body habitusand overlying 
bowel gas.Findings: Liver is nodular in contour and heterogeneous in ec
hotexture. Hepatopetalflow is noted in the main portal vein. Portal vein is 
enlarged. There is nointrahepatic biliary ductal dilatation. Common bile duct 
measures 2 mm incaliber.Gallbladder demonstrates no evidence of stones. There is
nopericholecystic fluid. Gallbladder wall measures 3 mm in thickness.Sonographic
Gupta sign is negative.Right kidney measures 13.9 cm in length. There is no 
right hydronephrosis.Right kidney shows normal cortical thickness, 
corticomedullarydifferentiation and echotexture. Visualized pancreas is 
unremarkable. Pancreas is partially obscured byoverlying bowel gas.Visualized 
inferior vena cava and abdominal aorta are unremarkable.Spleen measures up to 
12.6 cm in length.There is moderate free fluid. Left pleural effusion is 
partiallyvisualized.IMPRESSIONImpression: 1. Cirrhosis.2. Enlarged portal vein 
and mild splenomegaly, suggestive of portalhypertension. Hepatopedalflow is seen
in the main portal vein.3. Mild gallbladder wall thickening, which is 
nonspecific and may be seenwith cholecystitis, hepatitis, liver failure, or 
hypervolemia.4. Moderate abdominal ascites. Left pleural effusion. RL: 2824End 
of ReportElectronically signed by Fernandez Baker MD at 2021 8:26 PMB[facility]valeUS ABDOMEN LIMITED WITH [id number] 01:26:53
Impression: 1. ?Cirrhosis.2. ?Enlarged portal vein and mild splenomegaly, 
suggestive of portalhypertension. Hepatopedal flow is seen in the main portal 
vein.3. Mild gallbladder wall thickening, which is nonspecific and may be 
seenwith cholecystitis, hepatitis, liver failure, or hypervolemia.4. Moderate 
abdominal ascites. Left pleural effusion. RL: 2824 End of Report Electronically 
signed by Fernandez Baker MD at 2021 8:26 PMExam: Limited Abdominal Ultrasound, 
2021 7:00 PM. Ordering Physician: AMY CHASE. History: Ascites. 
Comparison: None. Technique: Grayscale and color Doppler ultrasound images of 
the right upperquadrant abdomen were obtained. Technical Quality: Examination is
slightlysuboptimal due to body habitusand overlying bowel gas. Findings: ?Liver 
is nodular in contour and heterogeneous in echotexture. Hepatopetalflow is noted
in the main portal vein. Portal vein is enlarged. There is nointrahepatic 
biliary ductal dilatation. Common bile duct measures 2 mm incaliber. Gallbladder
demonstrates no evidence of stones. ?There is nopericholecystic fluid. 
Gallbladder wall measures 3 mm in thickness.Sonographic Gupta sign is negative.
Right kidney measures 13.9 cm in length. ?There is no right hydronephrosis.Right
kidney shows normal cortical thickness, corticomedullarydifferentiation and 
echotexture. ? Visualized pancreas is unremarkable. Pancreas is partially 
obscured byoverlying bowel gas. Visualized inferior vena cava and abdominal 
aorta are unremarkable. Spleen measures up to 12.6 cm in length. There is 
moderate free fluid. Left pleural effusion is partiallyvisualized. [facility], [facility]
Results Inft User - 2021 8:28 PM CDTFormatting of this note might be 
different from the original.Exam: Limited Abdominal Ultrasound, 2021 7:00 
PM.Ordering Physician: AMY CHASE.History: Ascites.Comparison: 
None.Technique: Grayscale and color Doppler ultrasound images of the right 
upperquadrant abdomen were obtained.Technical Quality: Examination is slightly 
suboptimal due to body habitusand overlying bowel gas.Findings: Liver is nodular
in contour and heterogeneous in echotexture. Hepatopetalflow is noted in the 
main portal vein. Portal vein is enlarged. There is nointrahepatic biliary 
ductal dilatation. Common bile duct measures 2 mm incaliber.Gallbladder 
demonstrates no evidence of stones. There is nopericholecystic fluid. 
Gallbladder wall measures 3 mm in thickness.Sonographic Gupta sign is 
negative.Right kidney measures 13.9 cm in length. There is no right hydro
nephrosis.Right kidney shows normal cortical thickness, 
corticomedullarydifferentiation and echotexture. Visualized pancreas is 
unremarkable. Pancreas is partially obscured byoverlying bowel gas.Visualized 
inferior vena cava and abdominal aorta are unremarkable.Spleen measures up to 
12.6 cm in length.There is moderate free fluid. Left pleural effusion is 
partiallyvisualized.IMPRESSIONImpression: 1. Cirrhosis.2. Enlarged portal vein 
and mild splenomegaly, suggestive of portalhypertension. Hepatopedalflow is seen
in the main portal vein.3. Mild gallbladder wall thickening, which is 
nonspecific and may be seenwith cholecystitis, hepatitis, liver failure, or 
hypervolemia.4. Moderate abdominal ascites. Left pleural effusion. RL: 2824End 
of ReportElectronically signed by Fernandez Baker MD at 2021 8:26 PMUnBig [facility] SURFACE [id number] 00:23:10





             Test Item    Value        Reference Range Interpretation Comments

 

             HBsAg Semi-Quantitative (test code = Negative     Negative         

         



             5195-3)                                             



[facility] B SURFACE [id number] 00:23:10





             Test Item    Value        Reference Range Interpretation Comments

 

             HBsAg Semi-Quantitative (test code = Negative     Negative         

         



             5195-3)                                             



[facility] (NA, K, CL, CO2, 
GLUCOSE, BUN, CREATININE, CA)2021 00:19:53





             Test Item    Value        Reference Range Interpretation Comments

 

             NA (test code = 136 mmol/L   135-145                   



             2300190711)                                         

 

             K (test code = 3.7 mmol/L   3.5-5.0                   



             6771481699)                                         

 

             CL (test code = 104 mmol/L                       



             4864104743)                                         

 

             CO2 TOTAL (test code = 26 mmol/L    23-31                     



             3428118558)                                         

 

             AGAP (test code =              2-16                      



             5808025627)                                         

 

             BUN (test code = 4 mg/dL      7-23         L            



             2329017870)                                         

 

             GLUCOSE (test code = 75 mg/dL                         



             8602312008)                                         

 

             CREATININE (test code = 0.41 mg/dL   0.60-1.25    L            



             1281511546)                                         

 

             CALCIUM (test code = 7.3 mg/dL    8.6-10.6     L            



             0405492304)                                         

 

             eGFR (test code =              mL/min/1.73m2              



             3483234147)                                         

 

             RENETTA (test code = RENETTA) Association of                           



                          Glomerular Filtration                           



                          Rate (GFR) and Staging                           



                          of Kidney Disease*                           



                          +---------------------                           



                          --+-------------------                           



                          --+-------------------                           



                          ------+| GFR                           



                          (mL/min/1.73 m2) ?|                           



                          With Kidney Damage ?|                           



                          ?Without Kidney                           



                          Damage+---------------                           



                          --------+-------------                           



                          --------+-------------                           



                          ------------+| ?>90 ?                           



                          ? ? ? ? ? ? ? ?|                           



                          ?Stage one ? ? ? ? ?|                           



                          ? Normal ? ? ? ? ? ? ?                           



                          ?+--------------------                           



                          ---+------------------                           



                          ---+------------------                           



                          -------+| ?60-89 ? ? ?                           



                          ? ? ? ? ?| ?Stage two                           



                          ? ? ? ? ?| ? Decreased                           



                          GFR ? ? ? ?                            



                          +---------------------                           



                          --+-------------------                           



                          --+-------------------                           



                          ------+| ?30-59 ? ? ?                           



                          ? ? ? ? ?| ?Stage                           



                          three ? ? ? ?| ? Stage                           



                          three ? ? ? ? ?                           



                          +---------------------                           



                          --+-------------------                           



                          --+-------------------                           



                          ------+| ?15-29 ? ? ?                           



                          ? ? ? ? ?| ?Stage four                           



                          ? ? ? ? | ? Stage four                           



                          ? ? ? ? ?                              



                          ?+--------------------                           



                          ---+------------------                           



                          ---+------------------                           



                          -------+| ?<15 (or                           



                          dialysis) ? ?| ?Stage                           



                          five ? ? ? ? | ? Stage                           



                          five ? ? ? ? ?                           



                          ?+--------------------                           



                          ---+------------------                           



                          ---+------------------                           



                          -------+ *Each stage                           



                          assumes the associated                           



                          GFR level has been in                           



                          effect for at least                           



                          three months. ?Stages                           



                          1 to 5, with or                           



                          without kidney                           



                          disease, indicate                           



                          chronic kidney                           



                          disease. Notes:                           



                          Determination of                           



                          stages one and two                           



                          (with eGFR                             



                          >59mL/min/1.73 m2)                           



                          requires estimation of                           



                          kidney damage for at                           



                          least three months as                           



                          defined by structural                           



                          or functional                           



                          abnormalities of the                           



                          kidney, manifested by                           



                          either:Pathological                           



                          abnormalities or                           



                          Markers of kidney                           



                          damage (including                           



                          abnormalities in the                           



                          composition of the                           



                          blood or urine or                           



                          abnormalities in                           



                          imaging tests).                           

 

             Lab Interpretation Abnormal                               



             (test code = 93250-1)                                        



[facility] METABOLIC PANEL (NA, K, CL, CO2, 
GLUCOSE, BUN, CREATININE, CA)2021 00:19:53





             Test Item    Value        Reference Range Interpretation Comments

 

             NA (test code = 136 mmol/L   135-145                   



             [id number])                                         

 

             K (test code = 3.7 mmol/L   3.5-5.0                   



             4236617887)                                         

 

             CL (test code = 104 mmol/L                       



             1772606480)                                         

 

             CO2 TOTAL (test code = 26 mmol/L    23-31                     



             9470238877)                                         

 

             AGAP (test code =              2-16                      



             7916788689)                                         

 

             BUN (test code = 4 mg/dL      7-23         L            



             6746472293)                                         

 

             GLUCOSE (test code = 75 mg/dL                         



             0401181821)                                         

 

             CREATININE (test code = 0.41 mg/dL   0.60-1.25    L            



             4640768238)                                         

 

             CALCIUM (test code = 7.3 mg/dL    8.6-10.6     L            



             2033574941)                                         

 

             eGFR (test code =              mL/min/1.73m2              



             4517875956)                                         

 

             RENETTA (test code = RENETTA) Association of                           



                          Glomerular Filtration                           



                          Rate (GFR) and Staging                           



                          of Kidney Disease*                           



                          +---------------------                           



                          --+-------------------                           



                          --+-------------------                           



                          ------+| GFR                           



                          (mL/min/1.73 m2) ?|                           



                          With Kidney Damage ?|                           



                          ?Without Kidney                           



                          Damage+---------------                           



                          --------+-------------                           



                          --------+-------------                           



                          ------------+| ?>90 ?                           



                          ? ? ? ? ? ? ? ?|                           



                          ?Stage one ? ? ? ? ?|                           



                          ? Normal ? ? ? ? ? ? ?                           



                          ?+--------------------                           



                          ---+------------------                           



                          ---+------------------                           



                          -------+| ?60-89 ? ? ?                           



                          ? ? ? ? ?| ?Stage two                           



                          ? ? ? ? ?| ? Decreased                           



                          GFR ? ? ? ?                            



                          +---------------------                           



                          --+-------------------                           



                          --+-------------------                           



                          ------+| ?30-59 ? ? ?                           



                          ? ? ? ? ?| ?Stage                           



                          three ? ? ? ?| ? Stage                           



                          three ? ? ? ? ?                           



                          +---------------------                           



                          --+-------------------                           



                          --+-------------------                           



                          ------+| ?15-29 ? ? ?                           



                          ? ? ? ? ?| ?Stage four                           



                          ? ? ? ? | ? Stage four                           



                          ? ? ? ? ?                              



                          ?+--------------------                           



                          ---+------------------                           



                          ---+------------------                           



                          -------+| ?<15 (or                           



                          dialysis) ? ?| ?Stage                           



                          five ? ? ? ? | ? Stage                           



                          five ? ? ? ? ?                           



                          ?+--------------------                           



                          ---+------------------                           



                          ---+------------------                           



                          -------+ *Each stage                           



                          assumes the associated                           



                          GFR level has been in                           



                          effect for at least                           



                          three months. ?Stages                           



                          1 to 5, with or                           



                          without kidney                           



                          disease, indicate                           



                          chronic kidney                           



                          disease. Notes:                           



                          Determination of                           



                          stages one and two                           



                          (with eGFR                             



                          >59mL/min/1.73 m2)                           



                          requires estimation of                           



                          kidney damage for at                           



                          least three months as                           



                          defined by structural                           



                          or functional                           



                          abnormalities of the                           



                          kidney, manifested by                           



                          either:Pathological                           



                          abnormalities or                           



                          Markers of kidney                           



                          damage (including                           



                          abnormalities in the                           



                          composition of the                           



                          blood or urine or                           



                          abnormalities in                           



                          imaging tests).                           

 

             Lab Interpretation Abnormal                               



             (test code = 01990-3)                                        



[facility] and Screen - ONCE [id number] 00:12:19





             Test Item    Value        Reference Range Interpretation Comments

 

             ABO & RH (test code O Positive                             Performe

d at [facility]



             = 20)                                               Laboratory [facility] [place] [address]

4Toll



                                                                 Free: [phone]

266CLIA



                                                                 No. [id number]

 

             IAT (test code = Negative                               Performed a

t [facility]



             1185)                                               Laboratory [facility] [place], [place] [address]

4Toll



                                                                 Free: [phone]

266CLIA



                                                                 No. [id number]



[facility] Screen - ONCE [id number] 00:12:19





             Test Item    Value        Reference Range Interpretation Comments

 

             ABO & RH (test code O Positive                             Performe

d at [facility]



             = 20)                                               Laboratory [facility]

[address] [place], [place] [id number]

4Toll



                                                                 Free: [phone]

266CLIA



                                                                 No. [id number]

 

             IAT (test code = Negative                               Performed a

t [facility]



             1185)                                               Laboratory [facility] [place], [place] [id number]

4Toll



                                                                 Free: [phone]

266CLIA



                                                                 No. [id number]



[id number]09





             Test Item    Value        Reference Range Interpretation Comments

 

             FERRITIN (test code = 12.5 ng/mL   18.0-464.0   L            



             6417256402)                                         

 

             RENETTA (test code = RENETTA) Biotin has been                           



                          reported to cause a                           



                          negative bias,                           



                          interpret results                           



                          relative to                            



                          patient's use of                           



                          biotin.                                

 

             Lab Interpretation (test Abnormal                               



             code = 77631-6)                                        



[facility] [id number] 00:02:09





             Test Item    Value        Reference Range Interpretation Comments

 

             FERRITIN (test code = 12.5 ng/mL   18.0-464.0   L            



             1394630335)                                         

 

             RENETTA (test code = RENETTA) Biotin has been                           



                          reported to cause a                           



                          negative bias,                           



                          interpret results                           



                          relative to                            



                          patient's use of                           



                          biotin.                                

 

             Lab Interpretation (test Abnormal                               



             code = 00557-2)                                        



[facility] / [id number] 22:57:36





             Test Item    Value        Reference Range Interpretation Comments

 

             PROTIME PATIENT (test              See_Comment  H             [Auto

mated message]



             code = 5964-2)                                        The system 

[employer]



                                                                 generated this 

result



                                                                 transmitted ref

erence



                                                                 range: 10.1 - 1

2.6



                                                                 Seconds. The



                                                                 reference range

 was



                                                                 not used to int

erpret



                                                                 this result as



                                                                 normal/abnormal

.

 

             INR (test code = 6301-6)                                        Nor

mal INR <1.1;



                                                                 Warfarin Therap

eutic



                                                                 range 2.0 to 3.

0 or



                                                                 2.5 to 3.5, dep

ending



                                                                 upon the indica

tions.

 

             Lab Interpretation (test Abnormal                               



             code = 32641-3)                                        



[facility] / [id number] 22:57:36





             Test Item    Value        Reference Range Interpretation Comments

 

             PROTIME PATIENT (test              See_Comment  H             [Auto

mated message]



             code = 5964-2)                                        The system 

ich



                                                                 generated this 

result



                                                                 transmitted ref

erence



                                                                 range: 10.1 - 1

2.6



                                                                 Seconds. The



                                                                 reference range

 was



                                                                 not used to int

erpret



                                                                 this result as



                                                                 normal/abnormal

.

 

             INR (test code = 6301-6)                                        Nor

mal INR <1.1;



                                                                 Warfarin Therap

eutic



                                                                 range 2.0 to 3.

0 or



                                                                 2.5 to 3.5, dep

ending



                                                                 upon the indica

tions.

 

             Lab Interpretation (test Abnormal                               



             code = 12575-9)                                        



[facility] WITH [id number] 22:53:15





             Test Item    Value        Reference Range Interpretation Comments

 

             WBC (test code =              See_Comment  L             [Automated



             7690-2)                                             message] The sy

stem



                                                                 which generated



                                                                 this result



                                                                 transmitted



                                                                 reference range

:



                                                                 4.20 - 10.70



                                                                 10*3/?L. The



                                                                 reference range

 was



                                                                 not used to



                                                                 interpret this



                                                                 result as



                                                                 normal/abnormal

.

 

             RBC (test code =              See_Comment  L             [Automated



             469-8)                                              message] The sy

stem



                                                                 which generated



                                                                 this result



                                                                 transmitted



                                                                 reference range

:



                                                                 4.26 - 5.52



                                                                 10*6/?L. The



                                                                 reference range

 was



                                                                 not used to



                                                                 interpret this



                                                                 result as



                                                                 normal/abnormal

.

 

             HGB (test code = 6.9 g/dL     12.2-16.4    L            



             718-7)                                              

 

             HCT (test code = 22.3 %       38.4-49.3    L            



             4544-3)                                             

 

             MCV (test code = 80.8 fL      81.7-95.6    L            



             787-2)                                              

 

             MCH (test code = 25.0 pg      26.1-32.7    L            



             785-6)                                              

 

             MCHC (test code = 30.9 g/dL    31.2-35.0    L            



             786-4)                                              

 

             RDW-SD (test code = 50.6 fL      38.5-51.6                 



             25980-0)                                            

 

             RDW-CV (test code = 17.4 %       12.1-15.4    H            



             788-0)                                              

 

             PLT (test code =              See_Comment  LL            [Automated



             777-3)                                              message] The sy

stem



                                                                 which generated



                                                                 this result



                                                                 transmitted



                                                                 reference range

:



                                                                 150 - 328 10*3/

?L.



                                                                 The reference r

moose



                                                                 was not used to



                                                                 interpret this



                                                                 result as



                                                                 normal/abnormal

.

 

             MPV (test code =                                        Not Measure

d



             76403-8)                                            

 

             IPF % (test code = 11.6 %       1.2-10.7     H            Platelet 

count



             [id number])                                         measured by



                                                                 fluorescence



                                                                 method.

 

             NRBC/100 WBC (test              See_Comment                [Automat

ed



             code = 2593308837)                                        message] 

The system



                                                                 which generated



                                                                 this result



                                                                 transmitted



                                                                 reference range

:



                                                                 0.0 - 10.0 /100



                                                                 WBCs. The refer

ence



                                                                 range was not u

sed



                                                                 to interpret th

is



                                                                 result as



                                                                 normal/abnormal

.

 

             NRBC x10^3 (test code <0.01        See_Comment                [Auto

mated



             = [id number])                                        message] The s

ystem



                                                                 which generated



                                                                 this result



                                                                 transmitted



                                                                 reference range

:



                                                                 10*3/?L. The



                                                                 reference range

 was



                                                                 not used to



                                                                 interpret this



                                                                 result as



                                                                 normal/abnormal

.

 

             GRAN MAT (NEUT) % 61.0 %                                 



             (test code = 770-8)                                        

 

             IMM GRAN % (test code 0.30 %                                 



             = [id number])                                        

 

             LYMPH % (test code = 21.0 %                                 



             736-9)                                              

 

             MONO % (test code = 13.8 %                                 



             5905-5)                                             

 

             EOS % (test code = 2.7 %                                  



             713-8)                                              

 

             BASO % (test code = 1.2 %                                  



             706-2)                                              

 

             GRAN MAT x10^3(ANC) 2.04 10*3/uL 1.99-6.95                 



             (test code =                                        



             3127506369)                                         

 

             IMM GRAN x10^3 (test <0.03        0.00-0.06                 



             code = 4343615061)                                        

 

             LYMPH x10^3 (test code 0.70 10*3/uL 1.09-3.23    L            



             = 731-0)                                            

 

             MONO x10^3 (test code 0.46 10*3/uL 0.36-1.02                 



             = 742-7)                                            

 

             EOS x10^3 (test code = 0.09 10*3/uL 0.06-0.53                 



             711-2)                                              

 

             BASO x10^3 (test code 0.04 10*3/uL 0.01-0.09                 



             = 704-7)                                            

 

             Lab Interpretation Abnormal                               



             (test code = 63317-8)                                        



[facility] [id number] 17:59:41





             Test Item    Value        Reference Range Interpretation Comments

 

             ABO & RH (test code O Positive                             Performe

d at [facility]



             = 20)                                               Laboratory [facility] [place], [place] [address]Toll 

Free:



                                                                 [phone]

A No.



                                                                 [id number]



[facility] [id number] 17:59:41





             Test Item    Value        Reference Range Interpretation Comments

 

             ABO & RH (test code O Positive                             Performe

d at [facility]



             = 20)                                               Laboratory [facility] [place], [place] [address]Toll 

Free:



                                                                 [phone]

A No.



                                                                 [id number]



[facility] ONCE [id number] 17:58:57





             Test Item    Value        Reference Range Interpretation Comments

 

             ABO & RH (test code O Positive                             Performe

d at [facility]



             = 20)                                               Laboratory [facility] [place], [place] [address]Toll 

Free:



                                                                 [phone]

A No.



                                                                 [id number]

 

             IAT (test code = Negative                               Performed a

t [facility]



             1185)                                               Laboratory [facility] [place], [place] [address]Toll 

Free:



                                                                 [phone]

A No.



                                                                 [id number]



[facility] 17:44:03





             Test Item    Value        Reference Range Interpretation Comments

 

             APPEARANCE (test code = Clear        Clear                     



             4599646277)                                         

 

             COLOR (test code = Lorie        Yellow       A            



             8208003335)                                         

 

             PH (test code =              4.8-8.0                   



             3241149329)                                         

 

             SP GRAVITY (test code =              1.003-1.030  H            



             6130283832)                                         

 

             GLU U QUAL (test code = Normal       Normal                    



             5859454067)                                         

 

             BLOOD (test code = Negative     Negative                  



             6489311119)                                         

 

             KETONES (test code = 20 mg/dL     Negative     A            



             3356838362)                                         

 

             PROTEIN (test code = 30 mg/dL     Negative     A            



             2887-8)                                             

 

             UROBILIN (test code = 4.0 mg/dL    Normal       A            



             4678311083)                                         

 

             BILIRUBIN (test code = 2 mg/dL      Negative     A            



             0068777801)                                         

 

             NITRITE (test code = Negative     Negative                  



             5813875306)                                         

 

             LEUK NIKI (test code = Negative     Negative                  



             8511776818)                                         

 

             RBC/HPF (test code =              See_Comment                [Autom

ated message]



             9462157136)                                         The system [employer]



                                                                 generated this



                                                                 result transmit

michelle



                                                                 reference range

: 0 -



                                                                 3 HPF. The refe

rence



                                                                 range was not u

sed



                                                                 to interpret th

is



                                                                 result as



                                                                 normal/abnormal

.

 

             WBC/HPF (test code =              See_Comment                [Autom

ated message]



             8912707706)                                         The system [employer]



                                                                 generated this



                                                                 result transmit

michelle



                                                                 reference range

: 0 -



                                                                 5 HPF. The refe

rence



                                                                 range was not u

sed



                                                                 to interpret th

is



                                                                 result as



                                                                 normal/abnormal

.

 

             BACTERIA (test code = Few          Negative     A            



             2417163080)                                         

 

             MUCOUS (test code = Marked       Negative LPF A            



             4457504696)                                         

 

             SQ EPITH (test code =              HPF                       



             6530943248)                                         

 

             Ictotest (test code = Positive                               



             9699584888)                                         

 

             Lab Interpretation (test Abnormal                               



             code = 81747-1)                                        



[facility] 17:44:03





             Test Item    Value        Reference Range Interpretation Comments

 

             APPEARANCE (test code = Clear        Clear                     



             9839518296)                                         

 

             COLOR (test code = Lorie        Yellow       A            



             1181077400)                                         

 

             PH (test code =              4.8-8.0                   



             8794733207)                                         

 

             SP GRAVITY (test code =              1.003-1.030  H            



             9959800466)                                         

 

             GLU U QUAL (test code = Normal       Normal                    



             5388928889)                                         

 

             BLOOD (test code = Negative     Negative                  



             9905788581)                                         

 

             KETONES (test code = 20 mg/dL     Negative     A            



             1128700961)                                         

 

             PROTEIN (test code = 30 mg/dL     Negative     A            



             2887-8)                                             

 

             UROBILIN (test code = 4.0 mg/dL    Normal       A            



             5259537544)                                         

 

             BILIRUBIN (test code = 2 mg/dL      Negative     A            



             9436835803)                                         

 

             NITRITE (test code = Negative     Negative                  



             4690205332)                                         

 

             LEUK NIKI (test code = Negative     Negative                  



             7367353857)                                         

 

             RBC/HPF (test code =              See_Comment                [Autom

ated message]



             6976292686)                                         The system [employer]



                                                                 generated this



                                                                 result transmit

michelle



                                                                 reference range

: 0 -



                                                                 3 HPF. The refe

rence



                                                                 range was not u

sed



                                                                 to interpret th

is



                                                                 result as



                                                                 normal/abnormal

.

 

             WBC/HPF (test code =              See_Comment                [Autom

ated message]



             5092950378)                                         The system [employer]



                                                                 generated this



                                                                 result transmit

michelle



                                                                 reference range

: 0 -



                                                                 5 HPF. The refe

rence



                                                                 range was not u

sed



                                                                 to interpret th

is



                                                                 result as



                                                                 normal/abnormal

.

 

             BACTERIA (test code = Few          Negative     A            



             6793667709)                                         

 

             MUCOUS (test code = Marked       Negative LPF A            



             0016510543)                                         

 

             SQ EPITH (test code =              HPF                       



             3496315678)                                         

 

             Ictotest (test code = Positive                               



             1986495268)                                         

 

             Lab Interpretation (test Abnormal                               



             code = 04669-7)                                        



[facility]ale[facility] P[facility] W [id number] 
17:13:20 1. ?Cirrhosis, ascites, splenomegaly, and portal venous hypertension. 
Thereare large gastroesophageal varices. 2. Small bowel and colon wall 
thickening likely related to the portalvenous hypertension.Enterocolitis could 
have this appearance. There is nopneumatosis or pneumoperitoneum. RL: 1105 
Electronically signed by Tj Mesa MD at 2021 12:13 PMHISTORY: ?Abdominal 
distension Diverticulitis suspected COMPARISON:none TECHNIQUE:CT scan of the 
abdomen and pelvis performed. Contiguous axial CT imageswere obtained after 
administration of intravenous contrast. ?CT scan doneaccording to ALARA. Fletcher
hnical quality: Technical quality: adequate. FINDINGS: Moderate left pleural 
effusion seen. There isbibasilar atelectasis. Thereis no pericardial effusion. 
Liver is cirrhotic. Spleen is enlarged measuring 14.5 cm. There is moderateto 
large ascites. Gastroesophageal varices are large. Portal vein patent. There is 
no adrenal nodule. The kidneys demonstrate symmetric nephrograms.There is no 
hydronephrosis. Diffuse bowel wall thickening is present which may be due to the
portalvenous hypertension. Superimposed enterocolitis could have this 
appearance.There is no pneumatosis or pneumoperitoneum. Urinary bladder 
unremarkable. There is no destructive bony process. [facility], Radiant Results Inft 
User - 2021 12:14 PM CDTFormatting of this note might be different from 
the original.HISTORY: Abdominal distension Diverticulitis suspected 
COMPARISON:noneTECHNIQUE:CT scan of the abdomen and pelvis performed. Contiguous
axial CT imageswere obtained after administration of intravenous contrast. CT 
scan doneaccording to ALARA. Technical quality: Technical quality: 
adequate.FINDINGS:Moderate left pleural effusion seen. There is bibasilar 
atelectasis. Thereis no pericardial effusion.Liver is cirrhotic. Spleenis 
enlarged measuring 14.5 cm. There is moderateto large ascites. Gastroesophageal 
varices are large. Portal vein patent.There is no adrenal nodule. The kidneys 
demonstrate symmetric nephrograms.There is no hydronephrosis.Diffuse bowel wall 
thickening is present which may be due to the portalvenous hypertension. 
Superimposed enterocolitis could have this appearance.There is no pneumatosis or
pneumoperitoneum.Urinary bladder unremarkable.There is no destructive bony 
process.IMPRESSION1. Cirrhosis, ascites, splenomegaly, and portal venous 
hypertension. Thereare large gastroesophageal varices.2. Smallbowel and colon 
wall thickening likely related to the portalvenous hypertension. Enterocolitis 
couldhave this appearance. There is nopneumatosis or pneumoperitoneum.RL: 
1105Electronically signed by Tj Mesa MD at 2021 12:13 PMUnUnited Memorial Medical CenterCT ABDOMEN PELVIS W [id number] 17:13:20 1. 
?Cirrhosis, ascites, splenomegaly, and portal venous hypertension. Thereare 
large gastroesophageal varices. 2. Small bowel and colon wall thickening likely 
related to the portalvenous hypertension.Enterocolitis could have this 
appearance. There is nopneumatosis or pneumoperitoneum. RL: 1105 Electronically 
signed by Tj Mesa MD at 2021 12:13 PMHISTORY: ?Abdominal distension 
Diverticulitis suspected COMPARISON:none TECHNIQUE:CT scan of the abdomen and 
pelvis performed. Contiguous axial CT imageswere obtained after administration 
of intravenous contrast. ?CT scan doneaccording to ALARA. Technical quality: 
Technical quality: adequate. FINDINGS: Moderate left pleural effusion seen. 
There isbibasilar atelectasis. Thereis no pericardial effusion. Liver is 
cirrhotic. Spleen is enlarged measuring 14.5 cm. There is moderateto large 
ascites. Gastroesophageal varices are large. Portal vein patent. There is no 
adrenal nodule. The kidneys demonstrate symmetric nephrograms.There is no 
hydronephrosis. Diffuse bowel wall thickening is present which may be due to the
portalvenous hypertension. Superimposed enterocolitis could have this 
appearance.There is no pneumatosis or pneumoperitoneum. Urinary bladder 
unremarkable. There is no destructive bony process.  [facility], Radiant Results Inft 
User - 2021 12:14 PM CDTFormatting of this note might be different from 
the original.HISTORY: Abdominal distension Diverticulitis suspected 
COMPARISON:noneTECHNIQUE:CT scan of the abdomen and pelvis performed. Contiguous
axial CT imageswere obtained after administration of intravenous contrast. CT 
scan doneaccording to NURY. Technical quality: Technical quality: 
adequate.FINDINGS:Moderate left pleural effusion seen. There is bibasilar 
atelectasis. Thereis no pericardial effusion.Liver is cirrhotic. Spleen is 
enlarged measuring 14.5 cm. There is moderateto large ascites. Gastroesophageal 
varices are large. Portal vein patent.There is no adrenal nodule. The kidneys 
demonstrate symmetric nephrograms.Thereis no hydronephrosis.Diffuse bowel wall 
thickening is present which may be due to the portalvenous hypertension. 
Superimposed enterocolitis could have this appearance.There is no pneumatosis or
pneumoperitoneum.Urinary bladder unremarkable.There is no destructive bony 
process.IMPRESSION1. Cirrhosis, ascites, splenomegaly, and portal venous 
hypertension. Thereare large gastroesophageal varices.2. Small bowel and colon 
wall thickening likely related to the portalvenous hypertension. Enterocolitis 
could have this appearance. There is nopneumatosis or pneumoperitoneum.RL: 
1105Electronically signed by Tj Mesa MD at 2021 12:13 PMUnUnited Memorial Medical CenterCB with [id number] 16:30:57





             Test Item    Value        Reference Range Interpretation Comments

 

             WBC (test code =              See_Comment  L             [Automated



             9990-2)                                             message] The



                                                                 system which



                                                                 generated this



                                                                 result transmit

michelle



                                                                 reference range

:



                                                                 4.20 - 10.70



                                                                 10*3/?L. The



                                                                 reference range



                                                                 was not used to



                                                                 interpret this



                                                                 result as



                                                                 normal/abnormal

.

 

             RBC (test code =              See_Comment  L             [Automated



             749-8)                                              message] The



                                                                 system which



                                                                 generated this



                                                                 result transmit

michelle



                                                                 reference range

:



                                                                 4.26 - 5.52



                                                                 10*6/?L. The



                                                                 reference range



                                                                 was not used to



                                                                 interpret this



                                                                 result as



                                                                 normal/abnormal

.

 

             HGB (test code = 6.3 g/dL     12.2-16.4    L            



             718-7)                                              

 

             HCT (test code = 21.2 %       38.4-49.3    L            



             4544-3)                                             

 

             MCV (test code = 79.4 fL      81.7-95.6    L            



             787-2)                                              

 

             MCH (test code = 23.6 pg      26.1-32.7    L            



             785-6)                                              

 

             MCHC (test code = 29.7 g/dL    31.2-35.0    L            



             786-4)                                              

 

             RDW-SD (test code = 50.9 fL      38.5-51.6                 



             38825-6)                                            

 

             RDW-CV (test code = 17.6 %       12.1-15.4    H            



             788-0)                                              

 

             PLT (test code =              See_Comment  LL            [Automated



             777-3)                                              message] The



                                                                 system which



                                                                 generated this



                                                                 result transmit

michelle



                                                                 reference range

:



                                                                 150 - 328 10*3/

?L.



                                                                 The reference



                                                                 range was not u

sed



                                                                 to interpret th

is



                                                                 result as



                                                                 normal/abnormal

.

 

             MPV (test code =                                        Not Measure

d



             40059-2)                                            

 

             IPF % (test code = 8.5 %        1.2-10.7                  Platelet 

count



             [id number])                                         measured by



                                                                 fluorescence



                                                                 method.

 

             NRBC/100 WBC (test              See_Comment                [Automat

ed



             code = 3152454305)                                        message] 

The



                                                                 system which



                                                                 generated this



                                                                 result transmit

michelle



                                                                 reference range

:



                                                                 0.0 - 10.0 /100



                                                                 WBCs. The



                                                                 reference range



                                                                 was not used to



                                                                 interpret this



                                                                 result as



                                                                 normal/abnormal

.

 

             NRBC x10^3 (test code <0.01        See_Comment                [Auto

mated



             = [id number])                                        message] The



                                                                 system which



                                                                 generated this



                                                                 result transmit

michelle



                                                                 reference range

:



                                                                 10*3/?L. The



                                                                 reference range



                                                                 was not used to



                                                                 interpret this



                                                                 result as



                                                                 normal/abnormal

.

 

             GRAN MAT (NEUT) % 60.3 %                                 



             (test code = 770-8)                                        

 

             IMM GRAN % (test code 0.30 %                                 



             = 9221340178)                                        

 

             LYMPH % (test code = 20.2 %                                 



             736-9)                                              

 

             MONO % (test code = 17.0 %                                 



             5905-5)                                             

 

             EOS % (test code = 1.3 %                                  



             713-8)                                              

 

             BASO % (test code = 0.9 %                                  



             706-2)                                              

 

             GRAN MAT x10^3(ANC) 1.91 10*3/uL 1.99-6.95    L            



             (test code =                                        



             5686410564)                                         

 

             IMM GRAN x10^3 (test <0.03        0.00-0.06                 



             code = 3858507171)                                        

 

             LYMPH x10^3 (test 0.64 10*3/uL 1.09-3.23    L            



             code = 731-0)                                        

 

             MONO x10^3 (test code 0.54 10*3/uL 0.36-1.02                 



             = 742-7)                                            

 

             EOS x10^3 (test code 0.04 10*3/uL 0.06-0.53    L            



             = 711-2)                                            

 

             BASO x10^3 (test code 0.03 10*3/uL 0.01-0.09                 



             = 704-7)                                            

 

             POLYCHROMASIA (test 2+           See_Comment                [Automa

michelle



             code = 66841-6)                                        message] The



                                                                 system which



                                                                 generated this



                                                                 result transmit

michelle



                                                                 reference range

:



                                                                 2+. The referen

ce



                                                                 range was not u

sed



                                                                 to interpret th

is



                                                                 result as



                                                                 normal/abnormal

.

 

             ROULEAUX (test code = Present      See_Comment  A             [Auto

mated



             7797-4)                                             message] The



                                                                 system which



                                                                 generated this



                                                                 result transmit

michelle



                                                                 reference range

:



                                                                 (none). The



                                                                 reference range



                                                                 was not used to



                                                                 interpret this



                                                                 result as



                                                                 normal/abnormal

.

 

             TARGET CELLS (test 2+           See_Comment  A             [Automat

ed



             code = 90684-7)                                        message] The



                                                                 system which



                                                                 generated this



                                                                 result transmit

michelle



                                                                 reference range

:



                                                                 (none). The



                                                                 reference range



                                                                 was not used to



                                                                 interpret this



                                                                 result as



                                                                 normal/abnormal

.

 

             PLT ESTIMATE (test Critically   Normal       AA           



             code = 9317-9) Decreased                              

 

             Lab Interpretation Abnormal                               



             (test code = 22462-0)                                        



[facility] 
15:47:50





             Test Item    Value        Reference Range Interpretation Comments

 

             SARS-CoV-2 Rapid ID NOW Not Detected Not Detected              



             (test code = 82590-4)                                        

 

             RENETTA (test code = RENETTA) ID NOW COVID-19 Assay                        

   



                          is an isothermal                           



                          nucleic acid                           



                          amplification test                           



                          intended for the                           



                          qualitative detection                           



                          of nucleic acid from                           



                          SARS-CoV-2 viral RNA                           



                          in nasopharyngeal (NP)                           



                          specimens. It is used                           



                          under Emergency Use                           



                          Authorization (EUA) by                           



                          FDA. The limit of                           



                          detection (LOD) of the                           



                          assay is 125 Genome                           



                          Equivalents/mL. A                           



                          positive result is                           



                          indicative of the                           



                          presence of SARS-CoV-2                           



                          RNA. ?Clinical                           



                          correlation with                           



                          patient history and                           



                          other diagnostic                           



                          information is                           



                          necessary to determine                           



                          patient infection                           



                          status. A negative                           



                          (Not Detected) result                           



                          does not preclude                           



                          SARS-CoV-2 infection.                           



                          In patients with                           



                          clinical symptoms and                           



                          other tests that are                           



                          consistent with                           



                          SARS-CoV-2 infection,                           



                          negative results                           



                          should be treated as                           



                          presumptive negative                           



                          and a new specimen                           



                          should be tested with                           



                          alternative PCR                           



                          molecular test.                           



                          Invalid: Please                           



                          collect a new specimen                           



                          for repeat patient                           



                          testing if clinically                           



                          indicated.                             

 

             Lab Interpretation Normal                                 



             (test code = 33908-0)                                        



[facility] 
15:47:50





             Test Item    Value        Reference Range Interpretation Comments

 

             SARS-CoV-2 Rapid ID NOW Not Detected Not Detected              



             (test code = 24044-9)                                        

 

             RENETTA (test code = RENETTA) ID NOW COVID-19 Assay                        

   



                          is an isothermal                           



                          nucleic acid                           



                          amplification test                           



                          intended for the                           



                          qualitative detection                           



                          of nucleic acid from                           



                          SARS-CoV-2 viral RNA                           



                          in nasopharyngeal (NP)                           



                          specimens. It is used                           



                          under Emergency Use                           



                          Authorization (EUA) by                           



                          FDA. The limit of                           



                          detection (LOD) of the                           



                          assay is 125 Genome                           



                          Equivalents/mL. A                           



                          positive result is                           



                          indicative of the                           



                          presence of SARS-CoV-2                           



                          RNA. ?Clinical                           



                          correlation with                           



                          patient history and                           



                          other diagnostic                           



                          information is                           



                          necessary to determine                           



                          patient infection                           



                          status. A negative                           



                          (Not Detected) result                           



                          does not preclude                           



                          SARS-CoV-2 infection.                           



                          In patients with                           



                          clinical symptoms and                           



                          other tests that are                           



                          consistent with                           



                          SARS-CoV-2 infection,                           



                          negative results                           



                          should be treated as                           



                          presumptive negative                           



                          and a new specimen                           



                          should be tested with                           



                          alternative PCR                           



                          molecular test.                           



                          Invalid: Please                           



                          collect a new specimen                           



                          for repeat patient                           



                          testing if clinically                           



                          indicated.                             

 

             Lab Interpretation Normal                                 



             (test code = 16940-8)                                        



[id number]





             Test Item    Value        Reference Range Interpretation Comments

 

             TROPONIN I (test 0.002 ng/mL  See_Comment                [Automated



             code = 7713536296)                                        message] 

The



                                                                 system which



                                                                 generated this



                                                                 result



                                                                 transmitted



                                                                 reference range

:



                                                                 <=0.034. The



                                                                 reference range



                                                                 was not used to



                                                                 interpret this



                                                                 result as



                                                                 normal/abnormal

.

 

             RENETTA (test code = Equal or Less than                           



             RENETTA)         0.034                                  



                          ng/ml---Normal                           



                          ?Note: Cardiac                           



                          troponin begins to                           



                          rise 3-4 hours                           



                          after the onset of                           



                          ischemia. Repeat                           



                          in 4-6 hours if                           



                          the sample was                           



                          drawn within 3-4                           



                          hours of the onset                           



                          of the symptom and                           



                          found normal.                           



                          Between 0.035 and                           



                          0.120 ng/mL---                           



                          Borderline.                            



                          Questionable                           



                          myocardial injury                           



                          or necrosis ?                           



                          ?Note: Serial                           



                          measurement may be                           



                          necessary to                           



                          confirm or exclude                           



                          the diagnosis of                           



                          myocardial injury                           



                          or necrosis;                           



                          Clinical                               



                          correlation                            



                          (symptoms, EKGs,                           



                          imaging studies,                           



                          and others)                            



                          required; Repeat                           



                          in 4-6 hours if                           



                          clinically                             



                          indicated. ? ? ? ?                           



                          Equal or Higher                           



                          than 0.121                             



                          ng/mL---Abnormal.                           



                          Myocardial Injury                           



                          or Necrosis Likely                           



                          ? ? ? ? Biotin has                           



                          been reported to                           



                          cause a negative                           



                          bias, interpret                           



                          results relative                           



                          to patient's use                           



                          of biotin. ? ? ? ?                           



                          ? ? ? ? ? ? ? ? ?                           



                          ? ? ? ? ? ? ? ? ?                           



                          ? ? ? ? ? ? ? ? ?                           



                          ? ? ? ? ? ? ? ? ?                           



                          ? ? ? ? ? ? ? ? ?                           



                          ?                                      

 

             Lab Interpretation Normal                                 



             (test code =                                        



             75679-8)                                            



[facility]44[facility]12





             Test Item    Value        Reference Range Interpretation Comments

 

             TROPONIN I (test 0.002 ng/mL  See_Comment                [Automated



             code = 8616150799)                                        message] 

The



                                                                 system which



                                                                 generated this



                                                                 result



                                                                 transmitted



                                                                 reference range

:



                                                                 <=0.034. The



                                                                 reference range



                                                                 was not used to



                                                                 interpret this



                                                                 result as



                                                                 normal/abnormal

.

 

             RENETTA (test code = Equal or Less than                           



             RENETTA)         0.034                                  



                          ng/ml---Normal                           



                          ?Note: Cardiac                           



                          troponin begins to                           



                          rise 3-4 hours                           



                          after the onset of                           



                          ischemia. Repeat                           



                          in 4-6 hours if                           



                          the sample was                           



                          drawn within 3-4                           



                          hours of the onset                           



                          of the symptom and                           



                          found normal.                           



                          Between 0.035 and                           



                          0.120 ng/mL---                           



                          Borderline.                            



                          Questionable                           



                          myocardial injury                           



                          or necrosis ?                           



                          ?Note: Serial                           



                          measurement may be                           



                          necessary to                           



                          confirm or exclude                           



                          the diagnosis of                           



                          myocardial injury                           



                          or necrosis;                           



                          Clinical                               



                          correlation                            



                          (symptoms, EKGs,                           



                          imaging studies,                           



                          and others)                            



                          required; Repeat                           



                          in 4-6 hours if                           



                          clinically                             



                          indicated. ? ? ? ?                           



                          Equal or Higher                           



                          than 0.121                             



                          ng/mL---Abnormal.                           



                          Myocardial Injury                           



                          or Necrosis Likely                           



                          ? ? ? ? Biotin has                           



                          been reported to                           



                          cause a negative                           



                          bias, interpret                           



                          results relative                           



                          to patient's use                           



                          of biotin. ? ? ? ?                           



                          ? ? ? ? ? ? ? ? ?                           



                          ? ? ? ? ? ? ? ? ?                           



                          ? ? ? ? ? ? ? ? ?                           



                          ? ? ? ? ? ? ? ? ?                           



                          ? ? ? ? ? ? ? ? ?                           



                          ?                                      

 

             Lab Interpretation Normal                                 



             (test code =                                        



             22379-8)                                            



[id number] 15:41:11





             Test Item    Value        Reference Range Interpretation Comments

 

             NT-proBNP (test code 162 pg/mL    See_Comment  H             [Autom

ated



             = [id number])                                        message] The



                                                                 system which



                                                                 generated this



                                                                 result



                                                                 transmitted



                                                                 reference range

:



                                                                 <=125. The



                                                                 reference range



                                                                 was not used to



                                                                 interpret this



                                                                 result as



                                                                 normal/abnormal

.

 

             RENETTA (test code = RENETTA) Biotin has been                           



                          reported to                            



                          cause a negative                           



                          bias, interpret                           



                          results relative                           



                          to patient's use                           



                          of biotin.                             

 

             Lab Interpretation Abnormal                               



             (test code = 55025-3)                                        



[id number] 15:41:11





             Test Item    Value        Reference Range Interpretation Comments

 

             NT-proBNP (test code 162 pg/mL    See_Comment  H             [Autom

ated



             = [id number])                                        message] The



                                                                 system which



                                                                 generated this



                                                                 result



                                                                 transmitted



                                                                 reference range

:



                                                                 <=125. The



                                                                 reference range



                                                                 was not used to



                                                                 interpret this



                                                                 result as



                                                                 normal/abnormal

.

 

             RENETTA (test code = RENETTA) Biotin has been                           



                          reported to                            



                          cause a negative                           



                          bias, interpret                           



                          results relative                           



                          to patient's use                           



                          of biotin.                             

 

             Lab Interpretation Abnormal                               



             (test code = 04450-1)                                        



[facility] 1 [id number] 15:35:38EXAM: XR CHEST 
1 VW HISTORY: SOB COMPARISON: None. FINDINGS: The lungs are poorly expanded and 
show changes of basilar subsegmentalatelectasis with suspicion of a small 
pleural effusion on the left. The midand upper lungs are clear. The heart and 
great vessels are normal. ? [facility], Radiant Results Inft User - 2021 10:36 
AM CDTFormatting of this note might be different from the original.EXAM: XR CH
EST 1 VWHISTORY: SOB COMPARISON: None.FINDINGS:The lungs are poorly expanded and
show changes of basilar subsegmentalatelectasis with suspicion of a small 
pleural effusion on the left. The midand upperlungs are clear. The heart and 
great vessels are normal.[facility] 1 View
2021 15:35:38EXAM: XR CHEST 1 VW HISTORY: SOB COMPARISON: None. FINDINGS: 
The lungs are poorly expanded and show changes of basilar 
subsegmentalatelectasis with suspicion of a small pleural effusion on the left. 
The midand upper lungs are clear. The heart and great vessels are normal. ? 
[facility], Radiant Results Inft User - 2021 10:36 AM CDTFormatting of this note
might be different from the original.EXAM: XR CHEST 1 VWHISTORY: SOB COMPARISON:
None.FINDINGS:The lungs are poorly expanded and show changes of basilar 
subsegmentalatelectasis with suspicion of a small pleural effusion on the left. 
The midand upperlungs are clear. The heart and great vessels are normal.
[facility]. METABOLIC PANEL ([id number])2021 
15:32:50





             Test Item    Value        Reference Range Interpretation Comments

 

             NA (test code = 137 mmol/L   135-145                   



             [id number])                                         

 

             K (test code = 3.3 mmol/L   3.5-5.0      L            



             3479967249)                                         

 

             CL (test code = 103 mmol/L                       



             8625240009)                                         

 

             CO2 TOTAL (test code = 24 mmol/L    23-31                     



             2039983669)                                         

 

             AGAP (test code =              2-16                      



             8912338541)                                         

 

             BUN (test code = 6 mg/dL      7-23         L            



             8550693554)                                         

 

             GLUCOSE (test code = 155 mg/dL           H            



             2915085892)                                         

 

             CREATININE (test code = 0.43 mg/dL   0.60-1.25    L            



             0476286153)                                         

 

             TOTAL BILI (test code = 2.5 mg/dL    0.1-1.1      H            



             4932257355)                                         

 

             CALCIUM (test code = 7.6 mg/dL    8.6-10.6     L            



             6003868362)                                         

 

             T PROTEIN (test code = 6.7 g/dL     6.3-8.2                   



             7968852753)                                         

 

             ALBUMIN (test code = 2.8 g/dL     3.5-5.0      L            



             6883022866)                                         

 

             ALK PHOS (test code = 153 U/L             H            



             2469234705)                                         

 

             ALTv (test code = 22 U/L       5-50                      



             1742-6)                                             

 

             AST(SGOT) (test code = 73 U/L       13-40        H            



             9483034163)                                         

 

             eGFR (test code =              mL/min/1.73m2              



             9535671201)                                         

 

             RENETTA (test code = RENETTA) Association of                           



                          Glomerular Filtration                           



                          Rate (GFR) and Staging                           



                          of Kidney Disease*                           



                          +---------------------                           



                          --+-------------------                           



                          --+-------------------                           



                          ------+| GFR                           



                          (mL/min/1.73 m2) ?|                           



                          With Kidney Damage ?|                           



                          ?Without Kidney                           



                          Damage+---------------                           



                          --------+-------------                           



                          --------+-------------                           



                          ------------+| ?>90 ?                           



                          ? ? ? ? ? ? ? ?|                           



                          ?Stage one ? ? ? ? ?|                           



                          ? Normal ? ? ? ? ? ? ?                           



                          ?+--------------------                           



                          ---+------------------                           



                          ---+------------------                           



                          -------+| ?60-89 ? ? ?                           



                          ? ? ? ? ?| ?Stage two                           



                          ? ? ? ? ?| ? Decreased                           



                          GFR ? ? ? ?                            



                          +---------------------                           



                          --+-------------------                           



                          --+-------------------                           



                          ------+| ?30-59 ? ? ?                           



                          ? ? ? ? ?| ?Stage                           



                          three ? ? ? ?| ? Stage                           



                          three ? ? ? ? ?                           



                          +---------------------                           



                          --+-------------------                           



                          --+-------------------                           



                          ------+| ?15-29 ? ? ?                           



                          ? ? ? ? ?| ?Stage four                           



                          ? ? ? ? | ? Stage four                           



                          ? ? ? ? ?                              



                          ?+--------------------                           



                          ---+------------------                           



                          ---+------------------                           



                          -------+| ?<15 (or                           



                          dialysis) ? ?| ?Stage                           



                          five ? ? ? ? | ? Stage                           



                          five ? ? ? ? ?                           



                          ?+--------------------                           



                          ---+------------------                           



                          ---+------------------                           



                          -------+ *Each stage                           



                          assumes the associated                           



                          GFR level has been in                           



                          effect for at least                           



                          three months. ?Stages                           



                          1 to 5, with or                           



                          without kidney                           



                          disease, indicate                           



                          chronic kidney                           



                          disease. Notes:                           



                          Determination of                           



                          stages one and two                           



                          (with eGFR                             



                          >59mL/min/1.73 m2)                           



                          requires estimation of                           



                          kidney damage for at                           



                          least three months as                           



                          defined by structural                           



                          or functional                           



                          abnormalities of the                           



                          kidney, manifested by                           



                          either:Pathological                           



                          abnormalities or                           



                          Markers of kidney                           



                          damage (including                           



                          abnormalities in the                           



                          composition of the                           



                          blood or urine or                           



                          abnormalities in                           



                          imaging tests).                           

 

             Lab Interpretation Abnormal                               



             (test code = 09551-4)                                        



[facility] [id number] 15:32:10





             Test Item    Value        Reference Range Interpretation Comments

 

             LIPASE (test code = 9420127725) 350 U/L      0-220        H        

    

 

             Lab Interpretation (test code = Abnormal                           

    



             42769-8)                                            



[facility] [id number] 15:32:10





             Test Item    Value        Reference Range Interpretation Comments

 

             LIPASE (test code = 5000870437) 350 U/L      0-220        H        

    

 

             Lab Interpretation (test code = Abnormal                           

    



             10693-3)                                            



[facility]

## 2022-11-08 NOTE — ER
Nurse's Notes                                                                                     

 [facility]                                                                 

Name: Galdino Tabares                                                                             

Age: 52 yrs                                                                                       

Sex: Male                                                                                         

: 1970                                                                                   

MRN: [id number]                                                                                   

Arrival Date: 2022                                                                          

Time: 08:19                                                                                       

Account#: [id number]                                                                            

Bed 18                                                                                            

Private MD:                                                                                       

Diagnosis: Encounter for removal of sutures                                                       

                                                                                                  

Presentation:                                                                                     

                                                                                             

08:44 Chief complaint: Patient states: needs sutures in nose removed. Placed on 10/31/22.     mb8 

      Coronavirus screen: Vaccine status: Patient reports receiving the 2nd dose of the covid     

      vaccine. Ebola Screen: Patient negative for fever greater than or equal to 101.5            

      degrees Fahrenheit, and additional compatible Ebola Virus Disease symptoms Patient          

      denies exposure to infectious person. Patient denies travel to an Ebola-affected area       

      in the 21 days before illness onset. Initial Sepsis Screen: Does the patient meet any 2     

      criteria? No. Patient's initial sepsis screen is negative. Does the patient have a          

      suspected source of infection? No. Patient's initial sepsis screen is negative. Risk        

      Assessment: Do you want to hurt yourself or someone else? Patient reports no desire to      

      harm self or others. Onset of symptoms was 2022.                                

08:44 Method Of Arrival: Ambulatory                                                           mb8 

08:44 Acuity: TERRY 5                                                                           mb8 

                                                                                                  

Triage Assessment:                                                                                

08:46 General: Appears in no apparent distress. comfortable, Behavior is calm, cooperative,   mb8 

      appropriate for age. Pain: Denies pain.                                                     

                                                                                                  

Historical:                                                                                       

- Allergies:                                                                                      

08:46 PENICILLINS;                                                                            mb8 

- PMHx:                                                                                           

08:46 cirrhosis of liver; HTN;                                                                mb8 

                                                                                                  

- Social history:: Smoking status: Patient denies any tobacco usage or history of.                

                                                                                                  

                                                                                                  

Screenin:47 Abuse screen: Denies threats or abuse. Denies injuries from another. Nutritional        mb8 

      screening: No deficits noted. Tuberculosis screening: No symptoms or risk factors           

      identified. Fall Risk No fall in past 12 months (0 pts). Secondary diagnosis (15            

      points) No IV (0 pts). Ambulatory Aid- None/Bed Rest/Nurse Assist (0 pts). Gait-            

      Normal/Bed Rest/Wheelchair (0 pts) Mental Status- Oriented to own ability (0 pts).          

      Total Herrera Fall Scale indicates No Risk (0-24 pts).                                        

                                                                                                  

Assessment:                                                                                       

08:46 Derm: sutures in bridge of nose, no redness, drainage or swelling.                      mb8 

                                                                                                  

Vital Signs:                                                                                      

08:44  / 66; Pulse 78; Resp 16; Temp 97.7; Pulse Ox 99% on R/A; Pain 0/10;              mb8 

                                                                                                  

ED Course:                                                                                        

08:19 Patient arrived in ED.                                                                  as  

08:44 Prakash Tran, RN is Primary Nurse.                                                    mb8 

08:46 Triage completed.                                                                       mb8 

08:46 Arm band placed on.                                                                     mb8 

08:47 Patient has correct armband on for positive identification. Bed in low position. Call   mb8 

      light in reach. Side rails up X2.                                                           

08:47 No provider procedures requiring assistance completed. Patient did not have IV access   mb8 

      during this emergency room visit.                                                           

08:51 Daniela Wang FNP-C is PHCP.                                                          snw 

08:51 Clay Frost MD is Attending Physician.                                                snw 

                                                                                                  

Administered Medications:                                                                         

No medications were administered                                                                  

                                                                                                  

                                                                                                  

Medication:                                                                                       

08:46 VIS not applicable for this client.                                                     mb8 

                                                                                                  

Outcome:                                                                                          

09:00 Discharge ordered by MD.                                                                snw 

09:21 Discharged to home ambulatory.                                                          mb8 

09:21 Condition: stable                                                                           

09:21 Discharge instructions given to patient, Instructed on discharge instructions, follow       

      up and referral plans. Demonstrated understanding of instructions, follow-up care.          

09:22 Patient left the ED.                                                                    mb8 

                                                                                                  

Signatures:                                                                                       

Daniela Wang FNP-C                   FNP-Csnw                                                  

Lissett Jang                             as                                                   

Prakash Tran, RN                      RN   mb8                                                  

                                                                                                  

**************************************************************************************************

## 2022-11-08 NOTE — EDPHYS
Physician Documentation                                                                           

 [facility]                                                                 

Name: Galdino Tabares                                                                             

Age: 52 yrs                                                                                       

Sex: Male                                                                                         

: 1970                                                                                   

MRN: [id number]                                                                                   

Arrival Date: 2022                                                                          

Time: 08:19                                                                                       

Account#: [id number]                                                                            

Bed 18                                                                                            

Private MD:                                                                                       

ED Physician Clay Frost                                                                         

HPI:                                                                                              

                                                                                             

09:07 This 52 yrs old  Male presents to ER via Ambulatory with complaints of Suture   snw 

      Removal.                                                                                    

09:07 The patient has sutures on the nose and upper vermilion border.                         snw 

09:07 Previous treatment: the care was rendered at [facility]. The     snw 

      patient has experienced similar episodes in the past.                                       

                                                                                                  

Historical:                                                                                       

- Allergies:                                                                                      

08:46 PENICILLINS;                                                                            mb8 

- PMHx:                                                                                           

08:46 cirrhosis of liver; HTN;                                                                mb8 

                                                                                                  

- Social history:: Smoking status: Patient denies any tobacco usage or history of.                

                                                                                                  

                                                                                                  

ROS:                                                                                              

09:05 Constitutional: Negative for fever, chills, and weight loss, Eyes: Negative for injury, snw 

      pain, redness, and discharge, ENT: Negative for injury, pain, and discharge, Neck:          

      Negative for injury, pain, and swelling, Cardiovascular: Negative for chest pain,           

      palpitations, and edema, Respiratory: Negative for shortness of breath, cough,              

      wheezing, and pleuritic chest pain, Abdomen/GI: Negative for abdominal pain, nausea,        

      vomiting, diarrhea, and constipation, Back: Negative for injury and pain, : Negative      

      for injury, bleeding, discharge, and swelling, MS/Extremity: Negative for injury and        

      deformity, Neuro: Negative for headache, weakness, numbness, tingling, and seizure,         

      Psych: Negative for depression, anxiety, suicide ideation, homicidal ideation, and          

      hallucinations.                                                                             

09:05 Skin: Positive for laceration(s), bridge of nose and upper lip with sutured wounds,         

      healing well, no complaints, need sutures removed.                                          

                                                                                                  

Exam:                                                                                             

09:01 Constitutional:  This is a well developed, well nourished patient who is awake, alert,  snw 

      and in no acute distress. Eyes:  Pupils equal round and reactive to light, extra-ocular     

      motions intact.  Lids and lashes normal.  Conjunctiva and sclera are non-icteric and        

      not injected.  Cornea within normal limits.  Periorbital areas with no swelling,            

      redness, or edema. Neck:  Trachea midline, no thyromegaly or masses palpated, and no        

      cervical lymphadenopathy.  Supple, full range of motion without nuchal rigidity, or         

      vertebral point tenderness.  No Meningismus. Chest/axilla:  Normal chest wall               

      appearance and motion.  Nontender with no deformity.  No lesions are appreciated.           

      Cardiovascular:  Regular rate and rhythm with a normal S1 and S2.  No gallops, murmurs,     

      or rubs.  Normal PMI, no JVD.  No pulse deficits. Respiratory:  Lungs have equal breath     

      sounds bilaterally, clear to auscultation and percussion.  No rales, rhonchi or wheezes     

      noted.  No increased work of breathing, no retractions or nasal flaring. Abdomen/GI:        

      Soft, non-tender, with normal bowel sounds.  No distension or tympany.  No guarding or      

      rebound.  No evidence of tenderness throughout. Back:  No spinal tenderness.  No            

      costovertebral tenderness.  Full range of motion. Skin:  Warm, dry with normal turgor.      

      Normal color with no rashes, no lesions, and no evidence of cellulitis.                     

09:01 Head/face: Noted is sutures x 3 to bridge of nose, sutures x 4 to upper lip. Removed        

      all sutures without difficulty. Nasal bridge with small avulsion, mild bleeding with        

      scab removal..                                                                              

                                                                                                  

Vital Signs:                                                                                      

08:44  / 66; Pulse 78; Resp 16; Temp 97.7; Pulse Ox 99% on R/A; Pain 0/10;              mb8 

                                                                                                  

MDM:                                                                                              

08:52 Patient medically screened.                                                             snw 

09:06 Data reviewed: vital signs, nurses notes. Data interpreted: Pulse oximetry: on room air snw 

      is 99 %. Interpretation: normal. Counseling: I had a detailed discussion with the           

      patient and/or guardian regarding: the historical points, exam findings, and any            

      diagnostic results supporting the discharge/admit diagnosis, the need for outpatient        

      follow up, to return to the emergency department if symptoms worsen or persist or if        

      there are any questions or concerns that arise at home. Special discussion: Based on        

      the history and exam findings, there is no indication for further emergent testing or       

      inpatient evaluation. I discussed with the patient/guardian the need to see the primary     

      care provider for further evaluation of the symptoms.                                       

                                                                                                  

                                                                                             

09:00 Order name: Wound dressing; Complete Time: 09:21                                        snw 

                                                                                                  

Administered Medications:                                                                         

No medications were administered                                                                  

                                                                                                  

                                                                                                  

Disposition:                                                                                      

17:09 Co-signature as Attending Physician, Clay Frost MD.                                    rn  

                                                                                                  

Disposition Summary:                                                                              

22 09:00                                                                                    

Discharge Ordered                                                                                 

      Location: Home                                                                          snw 

      Condition: Stable                                                                       snw 

      Diagnosis                                                                                   

        - Encounter for removal of sutures                                                    snw 

      Followup:                                                                               snw 

        - With: Emergency Department                                                               

        - When: As needed                                                                          

        - Reason: Worsening of condition                                                           

      Followup:                                                                               snw 

        - With: Private Physician                                                                  

        - When: 2 - 3 days                                                                         

        - Reason: Recheck today's complaints, Continuance of care, Re-evaluation by your           

      physician                                                                                   

      Discharge Instructions:                                                                     

        - Discharge Summary Sheet                                                             snw 

        - Suture Removal, Care After                                                          snw 

      Forms:                                                                                      

        - Medication Reconciliation Form                                                      snw 

        - Thank You Letter                                                                    snw 

        - Antibiotic Education                                                                snw 

        - Prescription Opioid Use                                                             snw 

Signatures:                                                                                       

Daniela Wang, FNP-C                   FNP-Csnw                                                  

Clay Frost MD MD rn Bates, Michael, RN                      RN   mb8                                                  

                                                                                                  

**************************************************************************************************

## 2023-05-21 ENCOUNTER — HOSPITAL ENCOUNTER (EMERGENCY)
Dept: HOSPITAL 97 - ER | Age: 53
LOS: 1 days | Discharge: ANOTHER HOSPITAL | End: 2023-05-22
Payer: SELF-PAY

## 2023-05-21 DIAGNOSIS — R65.21: ICD-10-CM

## 2023-05-21 DIAGNOSIS — I10: ICD-10-CM

## 2023-05-21 DIAGNOSIS — A41.9: Primary | ICD-10-CM

## 2023-05-21 DIAGNOSIS — K70.31: ICD-10-CM

## 2023-05-21 DIAGNOSIS — Z88.0: ICD-10-CM

## 2023-05-21 LAB
ALBUMIN SERPL BCP-MCNC: 1.6 G/DL (ref 3.4–5)
ALP SERPL-CCNC: 110 U/L (ref 45–117)
ALT SERPL W P-5'-P-CCNC: 17 U/L (ref 16–61)
ANISOCYTOSIS BLD QL: (no result)
AST SERPL W P-5'-P-CCNC: 51 U/L (ref 15–37)
BLD SMEAR INTERP: (no result)
BUN BLD-MCNC: 10 MG/DL (ref 7–18)
GLUCOSE SERPLBLD-MCNC: 64 MG/DL (ref 74–106)
HCT VFR BLD CALC: 25.2 % (ref 39.6–49)
INR BLD: 2.55
LYMPHOCYTES # SPEC AUTO: 0.4 K/UL (ref 0.7–4.9)
MAGNESIUM SERPL-MCNC: 1.5 MG/DL (ref 1.6–2.4)
MCV RBC: 99.1 FL (ref 80–100)
MORPHOLOGY BLD-IMP: (no result)
PMV BLD: 8.3 FL (ref 7.6–11.3)
POTASSIUM SERPL-SCNC: 3.6 MEQ/L (ref 3.5–5.1)
RBC # BLD: 2.55 M/UL (ref 4.33–5.43)
TROPONIN I SERPL HS-MCNC: 40.9 PG/ML (ref ?–58.9)

## 2023-05-21 PROCEDURE — 87804 INFLUENZA ASSAY W/OPTIC: CPT

## 2023-05-21 PROCEDURE — 82947 ASSAY GLUCOSE BLOOD QUANT: CPT

## 2023-05-21 PROCEDURE — 87040 BLOOD CULTURE FOR BACTERIA: CPT

## 2023-05-21 PROCEDURE — 71045 X-RAY EXAM CHEST 1 VIEW: CPT

## 2023-05-21 PROCEDURE — 85730 THROMBOPLASTIN TIME PARTIAL: CPT

## 2023-05-21 PROCEDURE — 84484 ASSAY OF TROPONIN QUANT: CPT

## 2023-05-21 PROCEDURE — 70450 CT HEAD/BRAIN W/O DYE: CPT

## 2023-05-21 PROCEDURE — 72125 CT NECK SPINE W/O DYE: CPT

## 2023-05-21 PROCEDURE — 85610 PROTHROMBIN TIME: CPT

## 2023-05-21 PROCEDURE — 80307 DRUG TEST PRSMV CHEM ANLYZR: CPT

## 2023-05-21 PROCEDURE — 83735 ASSAY OF MAGNESIUM: CPT

## 2023-05-21 PROCEDURE — 36415 COLL VENOUS BLD VENIPUNCTURE: CPT

## 2023-05-21 PROCEDURE — 87811 SARS-COV-2 COVID19 W/OPTIC: CPT

## 2023-05-21 PROCEDURE — 85025 COMPLETE CBC W/AUTO DIFF WBC: CPT

## 2023-05-21 PROCEDURE — 84100 ASSAY OF PHOSPHORUS: CPT

## 2023-05-21 PROCEDURE — 82550 ASSAY OF CK (CPK): CPT

## 2023-05-21 PROCEDURE — 80053 COMPREHEN METABOLIC PANEL: CPT

## 2023-05-21 PROCEDURE — 93005 ELECTROCARDIOGRAM TRACING: CPT

## 2023-05-21 PROCEDURE — 81001 URINALYSIS AUTO W/SCOPE: CPT

## 2023-05-21 PROCEDURE — 83605 ASSAY OF LACTIC ACID: CPT

## 2023-05-21 PROCEDURE — 71250 CT THORAX DX C-: CPT

## 2023-05-21 PROCEDURE — 74177 CT ABD & PELVIS W/CONTRAST: CPT

## 2023-05-21 NOTE — RAD REPORT
EXAM DESCRIPTION:  CT - Thorax Wo Con

 

CLINICAL HISTORY:  Chest pain

eval infection

 

COMPARISON:  <Comparisons>

 

FINDINGS:  The lungs are clear. No pleural thickening or pleural effusion. No pneumothorax.

 

No axillary, mediastinal or hilar adenopathy.

 

No concerning bony finding. Upper abdominal ascites.

 

All CT scans are performed using dose optimization technique as appropriate and may include automated
 exposure control or mA/KV adjustment according to patient size.

 

IMPRESSION:  No acute intrathoracic process.

## 2023-05-21 NOTE — RAD REPORT
EXAM DESCRIPTION:  RAD - Chest Single View - 5/21/2023 8:06 pm

 

CLINICAL HISTORY:  FEVER

Chest pain.

 

COMPARISON:  <Comparisons>

 

FINDINGS:  Portable technique limits examination quality.

 

The lungs are grossly clear. The heart is normal in size. No displaced fractures.

 

IMPRESSION:  No acute intrathoracic process suspected.

## 2023-05-21 NOTE — XMS REPORT
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           



                                                  uration of           



                                                  therapy:           



                                                  72 hours           

 

     pantoprazol            Yes            40mg      40 mg,           Univ

ers



     e         3-22                               Oral, BID,           ity of



     (PROTONIX)      01:00:                               First dose           [facility]

[facility]



     EC tablet      00                                 (after           Medical



     40 mg                                         last           Branch



                                                  modificati           



                                                  on) on Tue           



                                                  3/21/23 at           



                                                  2000,           



                                                  Until           



                                                  Discontinu           



                                                  ed,            



                                                  Routine           

 

     ceFEPIme      2023- No             2000mg      2,000 mg,           U

nivers



     (MAXIPIME)      3-21 03-22                          IV             ity of



     2,000 mg in      23:30: 02:42                          Piggyback,          

 [place]



     NaCl 0.9%      00   :00                           ONCE, 1           Medical



     (NS) 100 mL                                         dose, On           [facility]

ch



     MINI-BAG                                         Tue            



                                                  3/21/23 at           



                                                  1830,           



                                                  Administer           



                                                  over 30           



                                                  Minutes,           



                                                  100            



                                                  mL<br>Reas           



                                                  on for           



                                                  Anti-Infec           



                                                  tive:           



                                                  [facility]           



                                                  Therapy           



                                                  for            



                                                  Suspected           



                                                  Infection<           



                                                  br>[facility]           



                                                  Therapy           



                                                  Site:           



                                                  Blood<br>D           



                                                  uration of           



                                                  therapy:           



                                                  72 hours           

 

     thiamine            Yes            100mg      100 mg,           Unive

rs



     (VITAMIN      3-21                               Oral,           ity of



     B1) tablet      14:00:                               DAILY,           [place]



     100 mg      00                                 First dose           Medical



                                                  on Tue           Branch



                                                  3/21/23 at           



                                                  0900,           



                                                  Until           



                                                  Discontinu           



                                                  ed,            



                                                  Routine           

 

     magnesium      2023- No             2g        2 g, IV           Univ

ers



     sulfate in      3-21 03-21                          Piggyback,           it

y of



     water 2      10:45: 11:26                          Administer           [place]

as



     gram/50 mL      00   :00                           over 60           Medica

l



     (4 %)                                         Minutes,           Branch



     infusion 2                                         ONCE, 1           



     g                                            dose, On           



                                                  Tue            



                                                  3/21/23 at           



                                                  0545,           



                                                  Routine           

 

     sulfur      2023- No        [id number] 5mL       5 mL,           Univ

ers



     hexafluorid      3-20 03-20                          Intravenou           i

ty of



     e microsphr      17:15: 17:15                          s, ONCE, 1          

 [place]



     (LUMASON)      00   :00                           dose, On           Medica

l



     injection 5                                         Mon            Branch



                                                3/20/23 at           



                                                  1215,           



                                                  Routine<br           



                                                  >Faculty           



                                                  member           



                                                  approving           



                                                  Restricted           



                                                  medication           



                                                  : BEAR DE ANDA           

 

     lactulose            Yes            30mL      30 mL,           Univer

s



     (CEPHULAC)      3                               Oral,           ity of



     solution 30      14:00:                               DAILY,           [place]

[place]



     mL        00                                 First dose           Medical



                                                  (after           Branch



                                                  last           



                                                  modificati           



                                                  on) on Mon           



                                                  3/20/23 at           



                                                  0900,           



                                                  Until           



                                                  Discontinu           



                                                  ed,            



                                                  Routine           

 

     pantoprazol       No             40mg      40 mg,           Uni

vers



     e         3-20 03-21                          Slow IV           ity of



     (PROTONIX)      14:00: 16:41                          Push,           [place]



     injection      00   :07                           DAILY,           Medical



     40 mg                                         First dose           Branch



                                                  (after           



                                                  last           



                                                  modificati           



                                                  on) on Mon           



                                                  3/20/23 at           



                                                  0900,           



                                                  Until           



                                                  Discontinu           



                                                  ed             

 

     acetaminoph            Yes            650mg      650 mg,           Un

[facility]



     en        3-20                               Oral,           ity of



     (TYLENOL)      05:25:                               Q6HPRN,           [place]



     tablet 650      45                                 Starting           Medic

al



     mg                                           on Mon           Branch



                                                  3/20/23 at           



                                                  0025,           



                                                  Until           



                                                  Discontinu           



                                                  ed,            



                                                  Routine,           



                                                  Temp > 38           



                                                  C, Pain           



                                                  (scale           



                                                  4-6), Pain           



                                                  (scale           



                                                  1-3)           

 

     pantoprazol      2023- No             40mg      40 mg,           Uni

vers



     e         3-20 03-20                          Slow IV           ity of



     (PROTONIX)      01:00: 05:30                          Push,           [place]



     injection      00   :37                           Q12H,           Medical



     40 mg                                         First dose           Branch



                                                  on Sun           



                                                  3/19/23 at           



                                                  2000,           



                                                  Until           



                                                  Discontinu           



                                                  ed             

 

     foLIC acid            Yes            1mg       1 mg,           Univer

s



     (FOLATE)      3-19                               Oral,           ity of



     tablet 1 mg      14:00:                               DAILY,           [place]

[place]



               00                                 First dose           Medical



                                                  on Sun           Branch



                                                  3/19/23 at           



                                                  0900,           



                                                  Until           



                                                  Discontinu           



                                                  ed,            



                                                  Routine           

 

     phytonadion      2023- No             10mg      IV             Unive

rs



     e (VITAMIN      3-19 03-20                          Piggyback,           it

y of



     K) 10 mg in      14:00: 14:54                          DAILY, 2           [facility]

[facility]



     NaCl 0.9%      00   :00                           doses,           Medical



     (NS)                                         First dose           Branch



     piggyback                                         (after           



                                                  last           



                                                  reorder)           



                                                  on Sun           



                                                  3/19/23 at           



                                                  0900, Last           



                                                  dose on           



                                                  Mon            



                                                  3/20/23 at           



                                                  0900, 50           



                                                  mL             

 

     lactated      2023- No             1000mL      at 100           Univ

ers



     ringers IV      3-19 03-21                          mL/hr,           ity of



     infusion      12:15: 01:32                          1,000 mL,           [place]

[facility]



     1,000 mL      00   :43                           IV             Medical



                                                  Infusion,           [facility]



                                                  CONTINUOUS           



                                                  , Starting           



                                                  on Sun           



                                                  3/19/23 at           



                                                  0715,           



                                                  Until Mon           



                                                  3/20/23 at           



                                                  203,           



                                                  Routine           

 

     meropenem       No             1000mg      1,000 mg,           

[facility]



     (MERREM)      3-19 03-21                          IV             ity of



     1,000 mg in      11:00: 13:11                          [place], [place]



     NaCl 0.9%      00   :57                           Q8H ABX,           Medica

l



     (NS) 100 mL                                         15 doses,           [facility]

[facility], 1928–1983



     MINI-BAG                                         First dose           



                                                  on Sun           



                                                  3/19/23 at           



                                                  0600, Last           



                                                  dose on           



                                                  Thu            



                                                  3/23/23 at           



                                                  2200,           



                                                  Administer           



                                                  over 3           



                                                  Hours, 100           



                                                  mL<br>Rest           



                                                  ricted use           



                                                  approved           



                                                  by: ELIAS           



                                                  8TH            



                                                  FLOOR<br&g           



                                                  t;Reason           



                                                  for            



                                                  Anti-Infec           



                                                  tive:           



                                                  [facility]           



                                                  Therapy           



                                                  for            



                                                  Suspected           



                                                  Infection<           



                                                  br>[facility]           



                                                  Therapy           



                                                  Site:           



                                                  Blood<br>D           



                                                  uration of           



                                                  therapy:           



                                                  72 hours           

 

     NORepinephr       No             .05ug/k      0.05-0.5         

  [facility]



     ine       3-19 03-20                g/min      mcg/kg/min           ity of



     (LEVOPHED)      03:29: 03:28                          ?77.1 kg           [facility]

[facility]



     4 mg in      15   :15                           (14.4563-1           Medica

l



     NaCl 0.9%                                         44.5625           Branch



     (NS) 250 mL                                         mL/hr,           



     infusion                                         rounded to           



                                                  14..           



                                                  56 mL/hr),           



                                                  IV             



                                                  Infusion,           



                                                  TITRATE,           



                                                  MAP Goal >           



                                                  or = 65           



                                                  mmHg,           



                                                  Starting           



                                                  on Sat           



                                                  3/18/23 at           



                                                  2229, For           



                                                  24             



                                                  hours<br>I           



                                                  nitiate           



                                                  titration           



                                                  at 0.05           



                                                  mcg/kg/min           



                                                  .&nbsp;&nb           



                                                  sp;Increas           



                                                  e by 0.01           



                                                  mcg/kg/min           



                                                  every 30           



                                                  seconds to           



                                                  5 minutes           



                                                  as needed           



                                                  to reach           



                                                  and            



                                                  maintain           



                                                  goal blood           



                                                  pressure.&           



                                                  nbsp;&nbsp           



                                                  ;Maximum           



                                                  dose = 0.5           



                                                  mcg/kg/min           



                                                  .&nbsp;&nb           



                                                  sp;If goal           



                                                  not            



                                                  maintained           



                                                  at maximum           



                                                  allowed           



                                                  dose,           



                                                  contact           



                                                  prescriber           



                                                  .&nbsp;&nb           



                                                  sp;Adminis           



                                                  ter only           



                                                  one            



                                                  peripheral           



                                                  intravenou           



                                                  s              



                                                  vasopresso           



                                                  r at a           



                                                  time.<br>           

 

     vancomycin      2023- No             15mg/kg      1,250 mg          

 [facility]



     1,250 mg in      3-19 03-20                          (rounded           ity

 of



     NaCl 0.9%      03:00: 05:12                          from           [place]



     (NS) 250 mL      00   :19                           1,156.5 mg           Me

dical



     VIAL-MATE                                         = 15 mg/kg           [facility]

ch



     IV                                           ?77.1 kg),           



     piggyback                                         IV             



                                                  Piggyback,           



                                                  Q12H ABX,           



                                                  10 doses,           



                                                  First dose           



                                                  on Sat           



                                                  3/18/23 at           



                                                  2200, Last           



                                                  dose on           



                                                  Thu            



                                                  3/23/23 at           



                                                  1000,           



                                                  Administer           



                                                  over 90           



                                                  Minutes,           



                                                  250            



                                                  mL<br&gt;R           



                                                  crystal for           



                                                  Anti-Infec           



                                                  tive:           



                                                  [facility]           



                                                  Therapy           



                                                  for            



                                                  Suspected           



                                                  Infection<           



                                                  br>[facility]           



                                                  Therapy           



                                                  Site:           



                                                  Blood<br>D           



                                                  uration of           



                                                  therapy:           



                                                  72 hours           

 

     meropenem      2023- No             1000mg      1,000 mg,           

[facility]



     (MERREM)      3-19 03-19                          IV             ity of



     1,000 mg in      03:00: 04:27                          [place], [place]



     NaCl 0.9%      00   :00                           ONCE, 1           Medical



     (NS) 100 mL                                         dose, On           [facility]

ch



     MINI-BAG                                         Sat            



                                                  3/18/23 at           



                                                  2200,           



                                                  Administer           



                                                  over 30           



                                                  Minutes,           



                                                  100            



                                                  mL<br>Rest           



                                                  ricted use           



                                                  approved           



                                                  by: [address]            



                                                  FLOOR<br>R           



                                                  crystal for           



                                                  Anti-Infec           



                                                  tive:           



                                                  Empiric           



                                                  Therapy           



                                                  for            



                                                  Suspected           



                                                  Infection<           



                                                  br>Empiric           



                                                  Therapy           



                                                  Site:           



                                                  Blood<br>D           



                                                  uration of           



                                                  therapy:           



                                                  72 hours           

 

     NaCl 0.9%      2023- No             1000mL      at 999           Uni

vers



     (NS) bolus      3-19 03-19                          mL/hr,           ity of



     infusion      02:52: 12:02                          1,000 mL,           [facility]

as



     1,000 mL      00   :28                           IV             Medical



                                                  Piggyback,           Branch



                                                  ONCE, 1           



                                                  dose, On           



                                                  Sat            



                                                  3/18/23 at           



                                                  2200, ASAP           

 

     lactulose      2023- No             30mL      30 mL,           Unive

rs



     (CEPHULAC)      3-19 03-20                          Oral, TID,           it

y of



     solution 30      02:15: 11:21                          First dose          

 [place]



     mL        00   :53                           (after           Medical



                                                  last           Branch



                                                  modificati           



                                                  on) on Sat           



                                                  3/18/23 at           



                                                  2115,           



                                                  Until           



                                                  Discontinu           



                                                  ed,            



                                                  Routine           

 

     cefTRIAXone      2023- No             1000mg      1,000 mg,         

  [facility]



     (ROCEPHIN)      3-19 03-19                          IV             ity of



     1,000 mg in      01:15: 02:19                          [place], [place]



     NaCl 0.9%      00   :20                           Q24H ABX,           Medic

al



     (NS) 100 mL                                         5 doses,           [facility]

ch



     MINI-BAG                                         First dose           



                                                  on Sat           



                                                  3/18/23 at           



                                                  , Last           



                                                  dose on           



                                                  Wed            



                                                  3/22/23 at           



                                                  ,           



                                                  Administer           



                                                  over 30           



                                                  Minutes,           



                                                  100            



                                                  mL<br>Reas           



                                                  on for           



                                                  Anti-Infec           



                                                  tive:           



                                                  Documented           



                                                  Infection<           



                                                  br>Documen           



                                                  michelle            



                                                  Infection           



                                                  Site:           



                                                  Abdominal<           



                                                  br>Duratio           



                                                  n of           



                                                  Therapy: 7           



                                                  days           

 

     lactulose       No             30mL      30 mL,           Unive

rs



     (CEPHULAC)      3-19 03-19                          Oral, BID,           it

y of



     solution 30      01:00: 02:10                          First dose          

 [place]



     mL        00   :44                           on [facility]



                                                  3/18/23 at           Branch



                                                  ,           



                                                  Until           



                                                  Discontinu           



                                                  ed,            



                                                  Routine           

 

     potassium       No             20meq      20 mEq, IV           

[facility]



     chloride in      3-19 03-19                          Piggyback,           i

ty of



     water (KCL)      01:00: 06:44                          Q2H, 2           [facility]

as



     20 mEq/100      00   :00                           doses,           Medical



     mL RTU IVPB                                         First dose           Br

anch



     20 mEq                                         on Sat           



                                                  3/18/23 at           



                                                  , Last           



                                                  dose on           



                                                  Sat            



                                                  3/18/23 at           



                                                  , 100           



                                                  mL             

 

     phytonadion      2023- No             10mg      IV             Unive

rs



     e (VITAMIN      3-19 03-19                          Piggyback,           it

y of



     K) 10 mg in      00:30: 03:50                          ONCE, 1           Te

x[place]



     NaCl 0.9%      00   :00                           dose, On           Medica

l



     (NS)                                         [facility]                                         3/18/23 at           



                                                  1930, 50           



                                                  mL             

 

     thiamine      2023- No             100mg      IV             [facility]



     (VITAMIN      3-18 03-20                          Piggyback,           ity 

of



     B1) 100 mg      23:30: 11:22                          DAILY,           [place]

s



     in NaCl      00   :30                           First dose           Medica

l



     0.9% (NS)                                         on [facility]



     piggyback                                         3/18/23 at           



                                                  1830,           



                                                  Until           



                                                  Discontinu           



                                                  ed, 50 mL           

 

     oxazepam            Yes            15mg      15 mg,           [facility]



     (SERAX)      3-18                               Oral,           ity of



     capsule 15      23:24:                               Q4HPRN,           [place]

s



     mg        04                                 Starting           Medical



                                                  on [facility]



                                                  3/18/23 at           



                                                  1824,           



                                                  Until           



                                                  Discontinu           



                                                  ed,            



                                                  Routine,           



                                                  Only while           



                                                  awake for           



                                                  DBP equal           



                                                  to or           



                                                  greater           



                                                  than 100,           



                                                  HR equal           



                                                  to or           



                                                  greater           



                                                  than 100.           

 

     iopamidol      2023- No        [id number] 100mL      100 mL,         

  [facility]



     (ISOVUE      3-18 03-18                          Intravenou           ity o

f



     370-500 mL)      22:05: 22:05                          s, ONCE, 1          

 [place]



     injection      00   :00                           dose, On           Medica

l



     100 mL                                         Sat            Branch



                                                  3/18/23 at           



                                                  1730,           



                                                  Routine           

 

     spironolact      0      Yes       Alcoholic 50mg QD   Take 1          

 [facility]



     one       7-15                cirrhosis           tablet by           Healt

h



     (ALDACTONE)      00:00:                of liver           mouth           



     50 mg      00                  with           daily           



     tablet                          ascites                          

 

     dexlansopra      0      Yes       Secondary 30mg Q.5D Take 1          

 [facility]



     zole      7-15                esophageal           capsule by           [facility]



     (DEXILANT)      00:00:                varices           mouth 2           



     30 mg      00                  without           times           



     delayed                          bleeding           daily           



     release                                                        



     capsule                                                        

 

     furosemide      -0      Yes       Leg  20mg QD   Take 1           [facility]

s



     (LASIX) 20      7-15                swelling           tablet by           

Health



     mg tablet      00:00:                               mouth           



               00                                 daily           

 

     rifAXIMin      -0      Yes                      Take 1           [facility]



     (XIFAXAN)      7-15                               tablet by           Healt

h



     550 mg      00:00:                               mouth           



     tablet      00                                 twice           



                                                  daily.           



                                                  Therapeuti           



                                                  c              



                                                  substituti           



                                                  on for           



                                                  xifaxan           



                                                  200mg per           



                                                  P&T            

 

     spironolact      0      Yes       Alcoholic 50mg QD   Take 1          

 [facility]



     one       7-15                cirrhosis           tablet by           Healt

h



     (ALDACTONE)      00:00:                of liver           mouth           



     50 mg      00                  with           daily           



     tablet                          ascites                          

 

     dexlansopra      -0      Yes       Secondary 30mg Q.5D Take 1          

 [facility]



     zole      7-15                esophageal           capsule by           [facility]



     (DEXILANT)      00:00:                varices           mouth 2           



     30 mg      00                  without           times           



     delayed                          bleeding           daily           



     release                                                        



     capsule                                                        

 

     furosemide      -0      Yes       Leg  20mg QD   Take 1           [facility]

s



     (LASIX) 20      7-15                swelling           tablet by           

Health



     mg tablet      00:00:                               mouth           



               00                                 daily           

 

     rifAXIMin      -0      Yes                      Take 1           [facility]



     (XIFAXAN)      7-15                               tablet by           Healt

h



     550 mg      00:00:                               mouth           



     tablet      00                                 twice           



                                                  daily.           



                                                  Therapeuti           



                                                  c              



                                                  substituti           



                                                  on for           



                                                  xifaxan           



                                                  200mg per           



                                                  P&T            

 

     spironolact      -0      Yes       Alcoholic 50mg QD   Take 1          

 [facility]



     one       7-15                cirrhosis           tablet by           Healt

h



     (ALDACTONE)      00:00:                of liver           mouth           



     50 mg      00                  with           daily           



     tablet                          ascites                          

 

     dexlansopra            Yes       Secondary 30mg Q.5D Take 1          

 [facility]



     zole      7-15                esophageal           capsule by           [facility]



     (DEXILANT)      00:00:                varices           mouth 2           



     30 mg      00                  without           times           



     delayed                          bleeding           daily           



     release                                                        



     capsule                                                        

 

     furosemide            Yes       Leg  20mg QD   Take 1           [facility]

s



     (LASIX) 20      7-15                swelling           tablet by           

Health



     mg tablet      00:00:                               mouth           



               00                                 daily           

 

     rifAXIMin            Yes                      Take 1           [facility]



     (XIFAXAN)      7-15                               tablet by           [facility]



     550 mg      00:00:                               mouth           



     tablet      00                                 twice           



                                                  daily.           



                                                  Therapeuti           



                                                  c              



                                                  substituti           



                                                  on for           



                                                  xifaxan           



                                                  200mg per           



                                                  P&T            

 

     Dexlansopra            Yes            30mg      Take 1           Univ

ers



     zole 30 mg      7-15                               capsule by           ity

 of



     capsule      00:00:                               mouth.           [address]



                                                                 Branch

 

     rifAXIMin            Yes                      Take by           Unive

rs



     (XIFAXAN)      7-15                               mouth.           ity of



     550 mg      00:00:                                              [place]



     tablet                                                      Medical



                                                                 Branch

 

     Dexlansopra            Yes            30mg      Take 1           Univ

ers



     zole 30 mg      7-15                               capsule by           ity

 of



     capsule      00:00:                               mouth.           [address]



                                                                 Branch

 

     rifAXIMin            Yes                      Take by           Unive

rs



     (XIFAXAN)      7-15                               mouth.           ity of



     550 mg      00:00:                                              [place]



     tablet                                                      Medical



                                                                 Branch

 

     rifAXIMin      0 - No        Ascites due 600mg Q.5D Take 3        

   [facility]



     (XIFAXAN)      7-15 09-21           to             tablets by           [facility]



     200 mg      00:00: 00:00           alcoholic           mouth 2           



     tablet      00   :00            cirrhosis           times           



                                                  daily           



                                                  (Therapeut           



                                                  ic             



                                                  substituti           



                                                  on from           



                                                  Xifaxan           



                                                  550mg per           



                                                  Pharmacy           



                                                  P&amp;T.)           

 

     rifAXIMin      2022- No        Ascites due 600mg Q.5D Take 3        

   [facility]



     (XIFAXAN)      7-15 09-21           to             tablets by           [facility]



     200 mg      00:00: 00:00           alcoholic           mouth 2           



     tablet      00   :00            cirrhosis           times           



                                                  daily           



                                                  (Therapeut           



                                                  ic             



                                                  substituti           



                                                  on from           



                                                  Xifaxan           



                                                  550mg per           



                                                  Pharmacy           



                                                  P&amp;T.)           

 

     rifAXIMin      2022- No        Ascites due 600mg Q.5D Take 3        

   [facility]



     (XIFAXAN)      7-15 09-21           to             tablets by           [facility]

lth



     200 mg      00:00: 00:00           alcoholic           mouth 2           



     tablet      00   :00            cirrhosis           times           



                                                  daily           



                                                  (Therapeut           



                                                  ic             



                                                  substituti           



                                                  on from           



                                                  Xifaxan           



                                                  550mg per           



                                                  Pharmacy           



                                                  P&amp;T.)           

 

     lactulose      2022- No        Alcoholic 10g       Take 15 mL       

    [facility]



     (CHRONULAC)      7-15 08-14           cirrhosis           by mouth 3       

    Health



     10 gram/15      00:00: 23:59           of liver           times           



     mL oral      00   :00            with           daily for           



     solution                          ascites           30 days           

 

     lactulose      2022- No        Alcoholic 10g       Take 15 mL       

    [facility]



     (CHRONULAC)      7-15 08-14           cirrhosis           by mouth 3       

    Health



     10 gram/15      00:00: 23:59           of liver           times           



     mL oral      00   :00            with           daily for           



     solution                          ascites           30 days           

 

     lactulose      2022- No        Alcoholic 10g       Take 15 mL       

    [facility]



     (CHRONULAC)      7-15 08-14           cirrhosis           by mouth 3       

    Health



     10 gram/15      00:00: 23:59           of liver           times           



     mL oral      00   :00            with           daily for           



     solution                          ascites           30 days           

 

     furosemide            Yes       [id number] 40mg      Take 1           U

nivers



     40 mg      7-06                               tablet by           ity of



     tablet      00:00:                               mouth           [place]



               00                                 daily.           Medical



                                                                 Branch

 

     spironolact            Yes       [id number] 100mg      Take 1          

 [facility]



     one 100 mg      7-06                               tablet by           ity 

of



     tablet      00:00:                               mouth           [place]



               00                                 daily.           Medical



                                                                 Branch

 

     furosemide            Yes       [id number] 40mg      Take 1           U

nivers



     40 mg      7-06                               tablet by           ity of



     tablet      00:00:                               mouth           [place]



               00                                 daily.           Medical



                                                                 Branch

 

     spironolact            Yes       [id number] 100mg      Take 1          

 [facility]



     one 100 mg      7-06                               tablet by           ity 

of



     tablet      00:00:                               mouth           [place]



               00                                 daily.           Medical



                                                                 Branch

 

     furosemide            Yes       [id number] 40mg      Take 1           U

nivers



     40 mg      7-06                               tablet by           ity of



     tablet      00:00:                               mouth           [place]



               00                                 daily.           Medical



                                                                 Branch

 

     spironolact            Yes       [id number] 100mg      Take 1          

 [facility]



     one 100 mg      7-06                               tablet by           ity 

of



     tablet      00:00:                               mouth           [place]



               00                                 daily.           Medical



                                                                 Branch

 

     spironolact            Yes            100mg      100 mg,           Un

[place]



     one       7-05                               Oral,           ity of



     (ALDACTONE)      14:00:                               DAILY,           [place]

[place]



     tablet 100      00                                 First dose           Med

ical



     mg                                           on [facility]



                                                  21 at           



                                                  0900,           



                                                  Until           



                                                  Discontinu           



                                                  ed,            



                                                  Routine           

 

     furosemide            Yes            40mg      40 mg,           Unive

rs



     (LASIX)      7-05                               Oral,           ity of



     tablet 40      14:00:                               DAILY,           [place]



     mg        00                                 First dose           Medical



                                                  on [facility]



                                                  21 at           



                                                  0900,           



                                                  Until           



                                                  Discontinu           



                                                  ed,            



                                                  Routine           

 

     albumin      2021- No             12.5g      12.5 g, IV           Un

[place]



     (ALBUMINAR       07-                          Infusion,           ity

 of



     25%) 25 %      08:00: 10:29                          ONCE, 1           [place]

[place]



     injection      00   :00                           dose, Mon           Medic

al



     12.5 g                                         21 at           Branch



                                                  0300, 100           



                                                  mL<br>Nilda           



                                                  cation:           



                                                  HEPATORENA           



                                                  L SYNDROME           



                                                  (DIAGNOSIS           



                                                  )<br>Comme           



                                                  nts:           



                                                  Dosin-8 g/L of           



                                                  ascitic           



                                                  fluid           



                                                  removed           

 

     KCL       2021- No             40meq      40 mEq,           [facility]



     (KLOR-CON       07-05                          Oral,           ity of



     M20) tablet      05:00: 05:30                          ONCE, 1           [place]

[place]



     40 mEq      00   :00                           dose, [facility]



                                                  21 at           Branch



                                                  0000,           



                                                  Routine           

 

     acamprosate      -      Yes       [id number] 666mg      Take 2          

 [facility]



     333 mg      7-05                               tablets by           ity of



     tablet      00:00:                               mouth 3           [place]



               00                                 (three)           Medical



                                                  times           [place]



                                                  daily.           

 

     acamprosate      -      Yes       [id number] 666mg      Take 2          

 [facility]



     333 mg      7-05                               tablets by           ity of



     tablet      00:00:                               mouth 3           [place]



               00                                 (three)           Medical



                                                  times           [place]



                                                  daily.           

 

     acamprosate      -0      Yes       [id number] 666mg      Take 2          

 [facility]



     333 mg      7-05                               tablets by           ity of



     tablet      00:00:                               mouth 3           [place]



               00                                 (three)           Medical



                                                  times           [place]



                                                  daily.           

 

     KCL       -2021- No             40meq      40 mEq,           [facility]



     (KLOR-CON      - 07-05                          Oral,           ity of



     M20) tablet      00:00: 02:51                          ONCE, 1           Te

[place]



     40 mEq      00   :00                           dose, [facility]



                                                  21 at           Branch



                                                  1900,           



                                                  Routine           

 

     iopamidol      2021- No        [id number] 100mL      100 mL,          

 [facility]



     (ISOVUE      -                          Intravenou           ity o

f



     370-500 mL)      17:15: 16:05                          s, ONCE, 1          

 [place]



     injection      00   :00                           dose, Sun           Medic

al



     100 mL                                         21 at           Branch



                                                  1215,           



                                                  Routine           

 

     KCL       2021- No             40meq      40 mEq,           [facility]



     (KLOR-CON                                Oral,           ity of



     M20) tablet      21:15: 21:15                          ONCE, 1           [place]

[place]



     40 mEq      00   :00                           dose, Wed           Medical



                                                  21 at           Branch



                                                  1615,           



                                                  Routine           

 

     furosemide            Yes       [id number] 40mg      Take 1         

  [facility]



     40 mg                      [id number]           tablet by           ity of



     tablet      00:00:                               mouth           [place]



               00                                 daily.           [facility]

 

     spironolact            Yes       [id number] 50mg      Take 2        

   [facility]



     one 25 mg                      [id number]           tablets by           ity

 of



     tablet      00:00:                               mouth           [place]



               00                                 daily.           [facility]

 

     furosemide            Yes       [id number] 40mg      Take 1         

  [facility]



     40 mg                      40043           tablet by           ity of



     tablet      00:00:                               mouth           [place]



               00                                 daily.           [facility]

 

     spironolact            Yes       [id number] 50mg      Take 2        

   [facility]



     one 25 mg                      27011           tablets by           ity

 of



     tablet      00:00:                               mouth           [place]



               00                                 daily.           [facility]

 

     furosemide            Yes       [id number] 40mg      Take 1         

  [facility]



     40 mg                      15794           tablet by           ity of



     tablet      00:00:                               mouth           [place]



               00                                 daily.           [facility]

 

     spironolact            Yes       [id number] 50mg      Take 2        

   [facility]



     one 25 mg                      85500           tablets by           ity

 of



     tablet      00:00:                               mouth           [place]



               00                                 daily.           [facility]

 

     furosemide      2021- No        [id number] 40mg      Take 1        

   [facility]



     40 mg                 [id number]           tablet by           ity of



     tablet      00:00: 00:00                          mouth           [place]



               00   :00                           daily.           [facility]

 

     spironolact      2021- No        [id number] 50mg      Take 2       

    [facility]



     one 25 mg                 [id number]           tablets by           it

y of



     tablet      00:00: 00:00                          mouth           [place]



               00   :00                           daily.           [facility]

 

     lidocaine-e      2021- No                       PRN,           Unive

rs



     pinephrine                                Starting           ity 

of



     (XYLOCAINE      17:56: 17:56                          Tue 21           

[place]



     W/EPINEPHRI      00   :00                           at 1256,           Medi

tyler



     NE) 2                                         Until [facility]



     %-1:200,000                                         21 at           



     injection                                         1256,           



                                                  Routine           

 

     furosemide      2021- No             20mg      20 mg,           Univ

ers



     (LASIX)                                Slow IV           ity of



     injection      11:00: 11:02                          Push,           [place]



     20 mg      00   :00                           ONCE, 1           Medical



                                                  dose, [facility]



                                                  21 at           



                                                  0600,           



                                                  Routine           

 

     magnesium      2021- No             1g        1 g, IV           Univ

ers



     sulfate in                                Piggyback,           it

y of



     D5W 1      08:30: 09:42                          ONCE, 1           [place]



     gram/100 mL      00   :00                           dose, Tue           Med

ical



     RTU IV                                         21 at           [place]



     Piggyback 1                                         0330, 100           



     g                                            mL             

 

     calcium      2021- No             1g        1 g, IV           Univer

s



     gluconate 1                                Infusion,           it

y of



     g in NaCl      07:15: 07:44                          ONCE, 1           [place]

[place]



     50 mL      00   :00                           dose, [facility]



     (ISO-OSM)                                         21 at           [facility]

h



     RTU IV                                         0230,           



     infusion 1                                         Routine           



     g                                                           

 

     lactulose            Yes            30mL      30 mL,           Univer

s



     (CEPHULAC)      08                               Oral, BID,           ity

 of



     solution 30      01:00:                               First dose           

[place]



     mL        00                                 on [facility]



                                                  21 at           [place]



                                                  ,           



                                                  Until           



                                                  Discontinu           



                                                  ed,            



                                                  Routine           

 

     thiamine            Yes            100mg      100 mg,           Unive

rs



     (VITAMIN      08                               Oral, BID,           ity o

f



     B1) tablet      01:00:                               First dose           T

[place]



     100 mg      00                                 on [facility]



                                                  21 at           [place]



                                                  ,           



                                                  Until           



                                                  Discontinu           



                                                  ed,            



                                                  Routine           

 

     phytonadion      2021- No             10mg      10 mg,           Uni

vers



     e (VITAMIN                                Subcutaneo           it

y of



     K)        00:15: 13:25                          us, DAILY,           [place]



     injection      00   :00                           3 doses,           Medica

l



     10 mg                                         First dose           [facility]



                                                  (after           



                                                  last           



                                                  modificati           



                                                  on) on 21 at           



                                                  1915, Last           



                                                  dose on           



                                                  21           



                                                  at 0900,           



                                                  Routine           

 

     spironolact            Yes            25mg      25 mg,           Univ

ers



     one       08                               Oral,           ity of



     (ALDACTONE)      00:00:                               DAILY,           [place]

[place]



     tablet 25      00                                 First dose           Medi

tyler



     mg                                           on Mon           Branch



                                                  21 at           



                                                  1900,           



                                                  Until           



                                                  Discontinu           



                                                  ed,            



                                                  Routine           

 

     furosemide            Yes            20mg      20 mg,           Unive

rs



     (LASIX)                                     Slow IV           ity of



     injection      00:00:                               Push,           [place]



     20 mg      00                                 DAILY,           Medical



                                                  First dose           Branch



                                                  on 21 at           



                                                  1900,           



                                                  Until           



                                                  Discontinu           



                                                  ed,            



                                                  Routine           

 

     ondansetron            Yes            4mg       4 mg, Slow           

[facility]



     (ZOFRAN                                     IV Push,           ity of



     (PF))      23:43:                               Q6HPRN,           [place]



     injection 4      59                                 Starting           Medi

tyler



     mg                                           21           Branch



                                                  at 1843,           



                                                  Until           



                                                  Discontinu           



                                                  ed,            



                                                  Routine,           



                                                  Nausea and           



                                                  Vomiting           



                                                  (N/V)           

 

     octreotide      2021- No             50ug/h      50 mcg/hr          

 [facility]



     (SANDOSTATI                                (10            ity of



     N) 1,250      20:00: 15:40                          mL/hr), IV           Te

x[place]



     mcg in NaCl      00   :20                           Infusion,           Med

ical



     0.9% (NS)                                         CONTINUOUS           [facility]

ch



     250 mL                                         , Starting           



     infusion                                         21           



                                                  at 1500           

 

     octreotide      2021- No             50ug      50 mcg,           Uni

vers



     (SANDOSTATI                                Slow IV           ity 

of



     N)        20:00: 19:11                          Push,           [place]



     injection      00   :00                           ONCE, 1           Medical



     50 mcg                                         dose, [facility]



                                                  21 at           



                                                  1500,           



                                                  STAT<br>In           



                                                  dication:           



                                                  Acute           



                                                  Variceal           



                                                  Hemorrhage           



                                                  in a           



                                                  Cirrhotic           



                                                  Patient           

 

     KCL       2021- No             40meq      40 mEq,           [facility]



     (KLOR-CON                                Oral,           ity of



     M20) tablet      18:00: 17:38                          ONCE, 1           Te

[place]



     40 mEq      00   :00                           dose, [facility]



                                                  21 at           Branch



                                                  1300,           



                                                  Routine           

 

     iopamidol      2021- No        [id number] 120mL      120 mL,         

  [facility]



     (ISOVUE                                Intravenou           ity o

f



     370-500 mL)      17:15: 16:07                          s, ONCE, 1          

 [place]



     injection      00   :00                           dose, Mon           Medic

al



     120 mL                                         21 at           [facility]



                                                  1215,           



                                                  Routine           







Immunizations







           Ordered    Filled Immunization Date       Status     Comments   [facility]

e



           Immunization Name Name                                        

 

           Pneumococcal            2022 Completed             [facility]



           20-valent Vaccine            00:00:00                         

 

           Pneumococcal            2022 Completed             [facility]



           20-valent Vaccine            00:00:00                         

 

           Pneumococcal            2022 Completed             [facility]



           20-valent Vaccine            00:00:00                         

 

           SARS-COV-2 COVID-19            2021 Completed             Unive

rsity of



           PFIZER VACCINE            00:00:00                         [facility]

 

           SARS-COV-2 COVID-19            2021 Completed             Unive

rsity of



           PFIZER VACCINE            00:00:00                         [facility]

 

           SARS-COV-2 COVID-19            2021 Completed             Unive

rsity of



           PFIZER VACCINE            00:00:00                         [place] [facility]

tyler



                                                                  Branch







Vital Signs







             Vital Name   Observation Time Observation Value Comments     Source

 

             Systolic blood 2023 20:35:00 119 mm[Hg]                Univer

sity of



             pressure                                            [facility] [place] [facility]

 

             Diastolic blood 2023 20:35:00 60 mm[Hg]                 Unive

rsity of



             pressure                                            [facility] [place] [facility]

 

             Heart rate   2023 20:35:00 86 /min                   [facility]

 

             Body temperature 2023 20:35:00 37.28 Nickie                 [facility]

 

             Respiratory rate 2023 20:35:00 16 /min                   [facility]

 

             Oxygen saturation in 2023 20:35:00 97 /min                   

[facility]



             Arterial blood by                                        [place] [facility]

[facility]



             Pulse oximetry                                        [place]

 

             Body weight  2023 22:59:00 84.687 kg                 [facility]

 

             BMI          2023 22:59:00 27.57 kg/m2               [facility]

 

             Body height  2023 21:39:38 175.3 cm                  [facility]

 

             Systolic blood 2021 17:32:00 117 mm[Hg]                Univer

sity of



             pressure                                            [facility] [place] [facility]

 

             Diastolic blood 2021 17:32:00 75 mm[Hg]                 Unive

rsity of



             [facility]

 

             Heart rate   2021 17:32:00 82 /min                   Universi

ty of



                                                                 [facility]



                                                                 Branch

 

             Body temperature 2021 17:32:00 36.5 Nickie                  Univ

ersity of



                                                                 [facility]



                                                                 Branch

 

             Oxygen saturation in 2021 17:32:00 98 /min                   

[facility]



             Arterial blood by                                        [place] [facility]

tyler



             Pulse oximetry                                        Branch

 

             Respiratory rate 2021 13:18:00 20 /min                   Univ

ersity of



                                                                 [facility]



                                                                 Branch

 

             Body height  2021 01:49:00 167.6 cm                  Universi

ty of



                                                                 [facility]



                                                                 Branch

 

             Body weight  2021 01:49:00 77.111 kg                 Universi

ty of



                                                                 [facility]



                                                                 Branch

 

             BMI          2021 01:49:00 27.44 kg/m2               Universi

ty of



                                                                 [facility]



                                                                 Branch

 

             Systolic blood 2021 20:28:00 118 mm[Hg]                Univer

sity of



             pressure                                            [facility]



                                                                 Branch

 

             Diastolic blood 2021 20:28:00 69 mm[Hg]                 Unive

rsity of



             pressure                                            [facility]



                                                                 Branch

 

             Heart rate   2021 20:28:00 79 /min                   Universi

ty of



                                                                 [facility]



                                                                 Branch

 

             Body temperature 2021 20:28:00 36.83 Nickie                 Univ

ersity of



                                                                 [facility]



                                                                 Branch

 

             Respiratory rate 2021 20:28:00 18 /min                   Univ

ersity of



                                                                 [facility]



                                                                 Branch

 

             Oxygen saturation in 2021 20:28:00 95 /min                   

[facility]



             Arterial blood by                                        [place] [facility]

[facility]



             Pulse oximetry                                        Branch

 

             Body height  2021 21:12:00 167.6 cm                  Universi

ty of



                                                                 [facility]



                                                                 Branch

 

             Body weight  2021 14:47:00 90.719 kg                 Universi

ty of



                                                                 [facility]



                                                                 Branch

 

             BMI          2021 14:47:00 32.28 kg/m2               Universi

ty of



                                                                 [facility]



                                                                 Branch

 

             Systolic blood 2022 12:48:00 113 mm[Hg]                [facility]



             pressure                                            

 

             Diastolic blood 2022 12:48:00 64 mm[Hg]                 [facility]

[facility]



             pressure                                            

 

             Heart rate   2022 12:48:00 64 /min                   [facility]

e[facility]

 

             Body temperature 2022 12:48:00 37 Nickie                    [facility]

[facility]

 

             Respiratory rate 2022 12:48:00 18 /min                   [facility]

 

             Body height  2022 12:48:00 167.6 cm                  [facility]

ealt

 

             Body weight  2022 12:48:00 73.256 kg                 [facility]

ealt

 

             BMI          2022 12:48:00 26.07 kg/m2               [facility]

 

             Oxygen saturation in 2022 12:48:00 100 /min                  

[facility]



             Arterial blood by                                        



             Pulse oximetry                                        

 

             Systolic blood 2021 20:28:00 118 mm[Hg]                Univer

sity of



             [facility]

 

             Diastolic blood 2021 20:28:00 69 mm[Hg]                 Unive

rsity [facility]

 

             Heart rate   2021 20:28:00 79 /min                   [facility]

 

             Body temperature 2021 20:28:00 36.83 Nickie                 [facility]

 

             Respiratory rate 2021 20:28:00 18 /min                   [facility]

 

             Oxygen saturation in 2021 20:28:00 95 /min                   

[facility]



             Arterial blood by                                        [place] [facility]

[facility]



             Pulse oximetry                                        Branch

 

             Body height  2021 21:12:00 167.6 cm                  [facility]

 

             Body weight  2021 14:47:00 90.719 kg                 [facility]

 

             BMI          2021 14:47:00 32.28 kg/m2               [facility]







Procedures







                Procedure       Date / Time     Performing Clinician Source



                                Performed                       

 

                MAGNESIUM       2023 10:30:00 Fidencio Bliss   [facility]

 

                BASIC METABOLIC PANEL (NA, 2023 10:30:00 Fidencio Bliss

[facility]



                K, CL, CO2, GLUCOSE, BUN,                                 Medica

l Branch



                CREATININE, [place])                                 

 

                CBC WITHOUT DIFF 2023 10:30:00 Fidencio Bliss   [facility]

 

                MAGNESIUM       2023 20:51:00 Izaiah [facility]

 

                BASIC METABOLIC PANEL (NA, 2023 20:51:00 Fidencio Bliss

[facility]



                K, CL, CO2, GLUCOSE, BUN,                                 Medica

l Branch



                CREATININE, CA)                                 

 

                MAGNESIUM       2023 09:59:00 Jin Gutiérrez [facility]

 

                BASIC METABOLIC PANEL (NA, 2023 09:59:00 Rashmi Francisco   [facility] [place]



                K, CL, CO2, GLUCOSE, BUN,                                 Medica

l [place]



                CREATININE, CA)                                 

 

                CBC WITHOUT DIFF 2023 09:59:00 [facility]

 

                PROTHROMBIN TIME / INR 2023 09:59:00 Nolan Rashmi   [facility]

 

                FIBRINOGEN      2023 09:59:00 Nolan [facility]

 

                PROTHROMBIN TIME / INR 2023 11:25:00 Rashmi Francisco   [facility]

 

                FIBRINOGEN      2023 11:25:00 Nolan [facility]

 

                RETICULOCYTES AUTOMATED 2023 11:25:00 Tracy White 

[facility]        [facility] [place]

 

                MAGNESIUM       2023 08:36:00 Tracy White [facility]



                                                [facility]

 

                BASIC METABOLIC PANEL (NA, 2023 08:36:00 Tracy White [facility]



                K, CL, CO2, GLUCOSE, BUN,                 [facility]ad S.        [facility]

[facility]



                CREATININE, CA)                                 

 

                CBC WITH DIFF   2023 08:36:00 Tracy White [facility]



                                                [facility]

 

                BLOOD CULTURE SCREEN 2023 03:07:00 Rashmi Francisco   [facility]

 

                FIBRINOGEN      2023 22:21:00 Nolan [facility]

 

                PREPARE CRYOPRECIPITATE 2023 18:41:31 Rashmi Francisco   [facility]

 

                TRANSTHORACIC ECHO (TTE) 2023 17:09:13 Tracy White

 [facility]



                COMPLETE W/ CONTRAST                 jad S        [facility]

nch

 

                MAGNESIUM       2023 08:53:00 Rashmi Francisco   [facility]

 

                BASIC METABOLIC PANEL (NA, 2023 08:53:00 Rashmi Francisco   [facility] [place]



                K, CL, CO2, GLUCOSE, BUN,                                 Medica

l Branch



                CREATININE, CA)                                 

 

                CBC WITH DIFF   2023 08:53:00 Curt Franciscooja   [facility]

 

                PROTHROMBIN TIME / INR 2023 08:53:00 Rashmi Francisco   [facility]

 

                FIBRINOGEN      2023 08:53:00 Nolan Rashmi   [facility]

 

                VANCOMYCIN TROUGH 2023 04:17:00 Sincere Griffith  [facility]

 

                LACTIC ACID WHOLE BLOOD 2023 21:48:00 Norman Logan  [facility]

 

                TRANSFUSE PACKED RBC 2023 20:00:00 Rashmi Francisco   [facility]

 

                PREPARE PACKED RBC 2023 19:27:30 Rashmi Francisco   [facility]

 

                LACTIC ACID WHOLE BLOOD 2023 17:09:00 Lucia Pike [facility]

 

                CBC WITHOUT DIFF 2023 17:08:00 [facility]

 

                TRANSFUSE PACKED RBC 2023 14:35:00 Curt Franciscooja   [facility]

 

                CBC WITHOUT DIFF 2023 13:11:00 Rashmi Francisco   [facility]

 

                US ABDOMEN LIMITED 2023 12:59:00 Tracy White [facility]

 

                LACTIC ACID WHOLE BLOOD 2023 11:46:00 Tracy White 

[facility]

 

                PROTHROMBIN TIME / INR 2023 11:45:00 [facility]   [facility]

[facility]

 

                TRANSFUSE PACKED RBC 2023 10:31:00 Tracy White [facility]

 

                TRANSFUSE CRYOPRECIPITATE 2023 10:28:00 Sincere Griffith  [facility]

 

                PREPARE CRYOPRECIPITATE 2023 10:16:18 Sincere Griffith  [facility]

 

                PREPARE PACKED RBC 2023 10:16:18 Tracy White [facility]

 

                CBC WITHOUT DIFF 2023 08:46:00 Tracy White [facility]

 

                LACTIC ACID WHOLE BLOOD 2023 08:46:00 Carlos Enrique Rendon     [facility]

 

                BASIC METABOLIC PANEL (NA, 2023 08:45:00 Carlos Enrique Rendon     [facility]



                K, CL, CO2, GLUCOSE, BUN,                                 Medica

l Branch



                CREATININE, CA)                                 

 

                HEPATITIS B SURFACE 2023 08:45:00 Tracy White [facility]



                ANTIGEN                         [facility]

 

                HEPATITIS B CORE ANTIBODY 2023 08:45:00 Tracy White [facility]



                IGM                             [facility]

 

                TRANSFUSE PACKED RBC 2023 06:30:00 Tracy White [facility]

 

                URINE DRUG (IMMUNOASSAY) - 2023 03:56:00 Rashmi Francisco   [facility] [place]



                COMPREHENSIVE DRUG SCREEN                                 Medica

l Branch

 

                URINALYSIS      2023 03:56:00 Rashmi Francisco   [facility]

f [facility]

 

                GALV ONLY - URINE DRUG 2023 03:56:00 Rashmi Francisco   [facility]



                (LCMSMS) - WILIAM PANEL                                 Medical Br

anch

 

                AC PANEL 20 + LACTIC ACID 2023 03:56:00 Tracy White [facility]

 

                IRON PANEL      2023 03:55:00 Tracy White [facility]

 

                CBC WITHOUT DIFF 2023 03:55:00 Francisco, [facility]

 

                HEPATITIS B SURFACE 2023 03:55:00 Tracy White [facility]



                ANTIBODY                        [facility]

 

                HCV ANTIBODY    2023 03:55:00 Tracy White [facility]

 

                SYPHILIS IGG/IGM 2023 03:55:00 [facility]

 

                TRANSFUSE PACKED RBC 2023 01:15:00 Nolan Rashmi   [facility]

 

                PREPARE PACKED RBC 2023 00:29:29 Nolan [facility]

 

                AC PANEL 20 + LACTIC ACID 2023 00:05:00 Clive Luther   Un

[facility]

 

                AC PANEL 21 + LACTIC ACID 2023 00:00:00 Rashmi Francisco   [facility]

 

                BLOOD CULTURE SCREEN 2023 23:57:00 Nolan Rashmi   [facility]

 

                PHOSPHORUS      2023 23:57:00 Nolan [facility]

 

                CREATINE KINASE 2023 23:57:00 Nolan [facility]

 

                MAGNESIUM       2023 23:57:00 Nolan [facility]

 

                OSMOLALITY, SERUM OR 2023 23:57:00 Tracy White [facility]



                PLASMA                          [facility]

 

                AMMONIA, PLASMA 2023 23:57:00 Nolan [facility]

 

                FREE T4         2023 23:57:00 Tracy White [facility]

 

                THYROID STIMULATING 2023 23:57:00 Tracy White [facility]



                HORMONE                         [facility]

 

                HEPATIC FUNCTION PANEL 2023 23:57:00 Rashmi Francisco   [facility]



                ([id number]) (ALB,T.PRO,BILI                                 Medical 

Branch



                T,BU/BC,ALT,AST,ALK PHOS)                                 

 

                BASIC METABOLIC PANEL (NA, 2023 23:57:00 Rashmi Francisco   [facility]

[facility] [place]



                K, CL, CO2, GLUCOSE, BUN,                                 Medica

l Branch



                CREATININE, CA)                                 

 

                ETHANOL         2023 23:57:00 [facility]

 

                CBC WITH DIFF   2023 23:57:00 Nolan [facility]

 

                FIBRINOGEN      2023 23:57:00 Nolan [facility]

 

                HCV ANTIBODY    2023 23:57:00 [facility]

 

                BLOOD CULTURE WORKUP 2023 23:57:00 Curt Franciscooja   [facility]

 

                HIV 1/2 AG-AB WITH REFLEX 2023 23:57:00 Roberto Merrill

[facility]

 

                GRAM NEGATIVE BLOOD 2023 23:57:00 Rashmi Francisco   [facility] [place]



                PATHOGENS DNA                                   [facility] Branch



                PROBE-AEROBIC                                   

 

                XR FEMUR 2 VW LEFT 2023 22:58:00 Vahibe, Clive   [facility]

 

                XR KNEE <3 VW LEFT 2023 22:58:00 Vahibe, Clive   [facility]

 

                XR TIBIA FIBULA 2 VW LEFT 2023 22:58:00 Vahibe, Clive   [facility]

 

                CT TRAUMA HEAD WO CONTRAST 2023 22:07:00 Vahibe, Clive   [facility]

 

                CT TRAUMA THORAX W 2023 22:07:00 Vahibe, Clive   [facility]



                CONTRAST                                        [facility]

 

                CT TRAUMA CERVICAL SPINE 2023 22:07:00 Vahibe, Clive   Uni

[facility]                                     [facility]

 

                CT TRAUMA THORACIC SPINE 2023 22:07:00 Vahibe, Clive   Uni

[facility] CONTRAST                                     [facility]

 

                CT TRAUMA ABDOMEN PELVIS W 2023 22:07:00 Vahibe, Clive   [facility]

[facility]



                CONTRAST                                        [facility]

 

                CT TRAUMA LUMBAR SPINE WO 2023 22:07:00 Vahibe, Clive   [facility]

[facility]

 

                BASIC METABOLIC PANEL (NA, 2023 21:42:00 Chantell Johnson

[facility]



                K, CL, CO2, GLUCOSE, BUN,                                 Medica

l Branch



                CREATININE, CA)                                 

 

                CBC WITHOUT DIFF 2023 21:42:00 Chantell Johnson     [facility]

 

                PROTHROMBIN TIME / INR 2023 21:42:00 Chantell Johnson     [facility]

 

                ACTIVATED PARTIAL THRMPLAS 2023 21:42:00 Chantell Johnson

[facility]

 

                HB ABO GROUPING 2023 21:42:00 Chantell Johnson     [facility]

f [facility]

 

                EXTRA TUBE DK. GREEN 2023 21:42:00 Chantell Johnson     [facility]

 

                COMPREHENSIVE METABOLIC 2022 10:33:00 [facility]



                PANEL                                           

 

                HEMOGLOBIN A1C  2022 10:33:00 Santiago [facility]ick

 

                LIPID PROFILE   2022 10:33:00 Santiago Vick

 

                CBC/DIFF        2022 10:33:00 Santiago [facility]

 

                CBC             2022 10:33:00 Santiago Vick

 

                DIFFERENTIAL, MANUAL-WAM 2022 10:33:00 Santiago Vick

[facility]

 

                FECAL OCCULT BLOOD 2022 08:00:00 Santiago Vick

 

                HEMOCCULT KIT FOR SPECIMEN 2022 13:11:09 Santiago Vick

[facility]



                COLLECTION AT HOME                                 

 

                MAGNESIUM       2022-07-15 05:50:00 Kevin Orona

 

                COMPREHENSIVE METABOLIC 2022-07-15 05:50:00 Kevin Orona

[facility] Health



                PANEL                                           

 

                PT/INR/PTT      2022-07-15 05:50:00 Kevin Orona

 

                CBC/DIFF        2022-07-15 05:50:00 Grecia Lagunas

lth

 

                CBC             2022-07-15 05:50:00 Grecia Lagunas [facility]

lt

 

                DIFFERENTIAL, MANUAL (NO 2022-07-15 05:50:00 Grecia Lagunas

[facility]



                MORPHOLOGY)-[facility]                                 

 

                ECH GI PROC EGD 2022 15:59:00 Derrek Phoenix [facility]



                DIAGNOSTIC BRUSH WASH                                 

 

                IP CONSULT TO PHYSICAL 2022 08:59:09 Grecia Lagunas [facility]



                THERAPY                                         

 

                HGB/HCT         2022 06:06:00 Shawna Cervantes He

alth

 

                MAGNESIUM       2022 03:08:00 [facility]

[facility]

 

                COMPREHENSIVE METABOLIC 2022 03:08:00 Kevin Orona   [facility]



                PANEL                                           

 

                PT/INR/PTT      2022 03:08:00 Kevin Orona [facility]

 

                CBC/DIFF        2022 03:08:00 Grecia Lagunas [facility]

lth

 

                CBC             2022 03:08:00 Grecia Lagunas [facility]

lt

 

                DIFFERENTIAL, MANUAL-[facility] 2022 03:08:00 Grecia Lagunas

[facility]

 

                PT/INR/PTT      2022 17:47:00 Marguerite Thapa [facility]

 

                GAMMA GLUTAMYL TRANSFERASE 2022 17:47:00 AlanisMarguerite

 [facility]



                (GGT)                                           

 

                CELIAC DISEASE  2022 17:47:00 Marguerite Thapa [facility]

 

                CERULOPLASMIN   2022 17:47:00 Marguerite Thapa [facility]

lt

 

                ALPHA-1-ANTITRYPSIN 2022 17:47:00 Marguerite Thapa [facility]

 

                MAI             2022 17:47:00 Marguerite Thapa [facility]

lt

 

                MITOCHONDRIAL (M2) 2022 17:47:00 AlanisMarguerite [facility]



                ANTIBODY                                        

 

                LIVER KIDNEY MICR 2022 17:47:00 Marguerite Thapa 

ealth

 

                IGM             2022 17:47:00 Marguerite Thapa [facility]

lt

 

                ABD PARACENTESIS W/IMAGING 2022 14:13:43 [facility]



                                                Mohammad        

 

                ALBUMIN, [facility]    2022 13:47:00 Grecia Lagunas [facility]

 

                T PROTEIN, [facility]  2022 13:47:00 Grecia Lagunas [facility]

 

                CELL COUNT, FLUID 2022 13:47:00 Grecia Lagunas 

ealt

 

                GLUCOSE, FLD    2022 13:47:00 Grecia Lagunas [facility]

 

                CELL COUNT, BODY FLUID 2022 13:47:00 Grecia Lagunas [facility]

 

                DIFFERENTIAL, CELL COUNT 2022 13:47:00 Grecia Lagunas [facility]

 

                FLUID CULTURE AND GRAM 2022 13:47:00 Bebo [facility]



                STAIN                                           

 

                TRANSFUSE PLATELETS 2022 10:20:00 Grecia Lagunas [facility]



                (NURSING)                                       

 

                SARS-COV-2, FLU A/B, RSV 2022 08:51:00 Derrek Phoenix [facility]

 

                CORONAVIRUS, COVID-19, WILLIAM 2022 08:51:00 Derrek Phoenix [facility]

 

                MAGNESIUM       2022 04:30:00 Kevin Orona [facility]

 

                COMPREHENSIVE METABOLIC 2022 04:30:00 Kevin Orona   [facility]



                PANEL                                           

 

                PT/INR/PTT      2022 04:30:00 Adwoa [facility]

 

                CBC/DIFF        2022 04:30:00 Grecia [facility] [facility]

 

                TYPE AND SCREEN 2022 04:30:00 Derrek Phoenix [facility]

ealth

 

                CBC             2022 04:30:00 Grecia Lagunas [facility]

 

                T&S - COLLECTION 2022 04:30:00 Derrek Phoenix [facility]

 

                PHOSPHORUS      2022 04:30:00 Grecia Lagunas [facility]

 

                RBC MORPHOLOGY-WAM 2022 04:30:00 Grecia Lagunas [facility]

 

                PREPARE PLATELETS (LAB) 2022 04:30:00 Grecia Lagunas [facility]

 

                PREPARE CROSSMATCH RBC 2022 04:30:00 Grecia Lagunas [facility]



                UNITS                                           

 

                HEPATITIS A VIRUS AB IGG 2022 13:40:00 Grecia Lagunas [facility]

 

                MAGNESIUM       2022 03:44:00 Adwoa [facility]

 

                COMPREHENSIVE METABOLIC 2022 03:44:00 Adwoa [facility]



                PANEL                                           

 

                PT/INR/PTT      2022 03:44:00 Adwoa [facility]

 

                CBC/DIFF        2022 03:44:00 Grecia Lagunas [facility]

 

                CBC             2022 03:44:00 Grecia Lagunas [facility]

 

                DIFFERENTIAL, MANUAL-WAM 2022 03:44:00 Grecia Lagunas LINDA [facility]

 

                XRAY ABDOMEN 1 VIEW 2022 19:34:18 Shayna [facility]

 

                BLOOD CULTURE   2022 14:02:00 [facility]

th

 

                CT HEAD W/O CONTRAST 2022 11:29:00 FahadAnna chapman [facility]

 

                XRAY CHEST 1 VIEW 2022 10:12:00 Grecia [facility]

 

                URINALYSIS W/REFLEX TO 2022 10:05:00 Grecia Lagunas [facility]



                URINE CULTURE                                   

 

                URINALYSIS      2022 10:05:00 Grecia Lagunas [facility]

 

                URINE CULTURE COLLECTION 2022 10:05:00 Nila Lagunasanna LINDA [facility]



                KIT                                             

 

                HGB/HCT         2022 09:39:00 Grecia Lagunas [facility]

 

                LACTIC ACID     2022 09:37:00 Grecia Lagunas [facility]

 

                12 LEAD EKG     2022 09:29:59 Grecia Lagunas [facility]

 

                BLOOD GAS, ARTERIAL 2022 09:29:00 Grecia Lagunas [facility]

 

                BLOOD GAS LACTIC ACID, 2022 09:29:00 Grecia Lagunas [facility]



                VENOUS                                          

 

                GLUCOSE POC     2022 09:14:00 May, Marcy Polk



 

                MAGNESIUM       2022 04:47:00 AdwoaKevin chang



 

                COMPREHENSIVE METABOLIC 2022 04:47:00 AdwoaKevin

[facility] Health



                PANEL                                           

 

                PT/INR/PTT      2022 04:47:00 AdwoaKevin

h

 

                FIBRINOGEN      2022 04:47:00 Grecia Lagunas [facility]

 

                CBC/DIFF        2022 04:47:00 Grecia Lagunas [facility]

lt

 

                CBC             2022 04:47:00 Grecia Lagunas [facility]

 

                INFUSION PUMP   2022-07-10 20:48:21 May, Marcy Diaz [facility]

 

                CONSULT CLINICAL CASE 2022-07-10 18:36:47 [facility]



                MANAGEMENT (RN/SW)                                 

 

                SEQUENTIAL COMPRESSION 2022-07-10 18:22:34 May, Marcy Abel

[facility]



                PUMP                                            

 

                SEQUENTIAL COMPRESSION 2022-07-10 18:22:34 May, Marcy Abel

[facility]



                PUMP                                            

 

                HGB/HCT         2022-07-10 15:32:00 Grecia Lagunas [facility]

 

                TRANSFUSE (RED CELL UNITS 2022-07-10 11:35:00 Grecia Lagunas 

[facility]



                - NURSING)                                      

 

                MAGNESIUM       2022-07-10 04:02:00 AdwoaKevin chang



 

                COMPREHENSIVE METABOLIC 2022-07-10 04:02:00 Kevin Orona

[facility]



                PANEL                                           

 

                PT/INR/PTT      2022-07-10 04:02:00 Kevin Orona



 

                AFP, TUMOR MARKER 2022-07-10 04:02:00 Grecia Lagunas 

ealth

 

                COPPER, SERUM OR PLASMA 2022-07-10 04:02:00 Grecia Lagunas [facility]

[facility]

 

                RETIC COUNT     2022-07-10 04:02:00 Grecia Lagunas [facility]

lt

 

                HAPTOGLOBIN     2022-07-10 04:02:00 Grecia Lagunas [facility]

lt

 

                LACTATE DEHYDROGENASE 2022-07-10 04:02:00 Grecia Lagunas

[facility]



                (LDH)                                           

 

                AMMONIA         2022-07-10 04:02:00 Grecia Lagunas [facility]

lth

 

                CEA             2022-07-10 04:02:00 Grecia Lagunas Diaz [facility]

lt

 

                HGB/HCT         2022 22:57:00 Sujata Mueller [facility]

 

                DUPLEX DOPPLER ABD/PEL 2022 21:00:00 Grecia Lagunas [facility]



                VASCULAR STUDY, COMPLETE                                 

 

                U/S ABDOMEN     2022 20:35:00 Grecia Lagunas Diaz [facility]

lt

 

                CT CHEST W CONTRAST 2022 20:05:18 Grecia Lagunas [facility]

 

                CT ABDOMEN AND PELVIS 2022 20:05:18 Grecia Lagunas [facility]



                CONTRAST                                        

 

                HGB/HCT         2022 15:26:00 Grecia Lagunas [facility]

lt

 

                HGB/HCT         2022 08:39:00 Kevin Orona   [facility]

 

                CBC/DIFF        2022 05:37:00 [facility]

 

                COMPREHENSIVE METABOLIC 2022 05:37:00 Kevin Orona

is Health



                PANEL                                           

 

                CBC             2022 05:37:00 [facility]

 

                SAVE SMEAR/ NOT FOR PATH 2022 05:37:00 [facility]



                REVIEW                                          

 

                DIFFERENTIAL, MANUAL-WAM 2022 05:37:00 [facility]

 

                MAGNESIUM       2022 04:40:00 [facility]

h

 

                PT/INR/PTT      2022 04:40:00 [facility]

 

                FOLIC ACID      2022 04:40:00 Kevin Orona

 

                IRON PROFILE    2022 04:40:00 Kevin Orona

 

                HIV AG/AB COMBO ROUTINE 2022 04:40:00 Adwoa [facility]



                SCREENING                                       

 

                HEPATITIS PANEL 2022 04:40:00 Kevin Orona



 

                TRANSFUSE (RED CELL UNITS 2022 02:45:00 João Bernal

[facility]



                - NURSING)                                      

 

                URINALYSIS      2022 01:38:00 [facility]

 

                SODIUM, URINE, RANDOM 2022 01:38:00 [facility]

 

                URINALYSIS      2022 01:38:00 Kevin Orona

 

                TRANSFUSE (RED CELL UNITS 2022 22:30:00 João Bernal

[facility]



                - NURSING)                                      

 

                SEQUENTIAL COMPRESSION 2022 21:47:40 Adwoa [facility]



                PUMP                                            

 

                SEQUENTIAL COMPRESSION 2022 21:47:40 Adwoa [facility]



                PUMP                                            

 

                SARS-COV-2, FLU A/B, RSV 2022 20:45:00 João Bernal [facility]

 

                CORONAVIRUS, COVID-19, WILLIAM 2022 20:45:00 João Bernal

[facility]

 

                TRANSFUSE (RED CELL UNITS 2022 17:30:00 João Bernal

[facility]



                - NURSING)                                      

 

                12 LEAD EKG     2022 17:14:43 João Bernal

 

                TROPONIN I      2022 17:08:00 João Bernal

 

                THYROID STIMULATING 2022 17:08:00 Kevin Orona

[facility]



                HORMONE (TSH)                                   

 

                VITAMIN B12     2022 17:08:00 Kevin Orona

 

                FERRITIN        2022 17:08:00 Kevin Orona

 

                TYPE AND SCREEN 2022 15:47:00 Harry Jang



 

                T&S - COLLECTION 2022 15:47:00 Harry Jang linda

lth

 

                ABO/RH CONFIRMATION 2022 15:47:00 Harry Jang 

[facility]

 

                PREPARE CROSSMATCH RBC 2022 15:47:00 Grecia Lagunas [facility]



                UNITS                                           

 

                XRAY CHEST 2 VIEWS 2022 11:31:00 Liborio Victoria  [facility]

alth

 

                CBC/DIFF        2022 11:00:00 Victoria Capone  [facility]

 

                BASIC METABOLIC PANEL 2022 11:00:00 Victoria Capone  [facility]

 

                LIVER PROFILE   2022 11:00:00 Victoria Capone  [facility]

 

                PT/INR/PTT      2022 11:00:00 Victoria Capone  [facility]

 

                B-TYPE NATRIURETIC PEPTIDE 2022 11:00:00 Liborio Victoria  [facility]



                (BNP)                                           

 

                CBC             2022 11:00:00 Liborio Victoria  [facility]

 

                DIFFERENTIAL, MANUAL-WAM 2022 11:00:00 Liborio Victoria  [facility]

 

                SYPHILIS SCREEN FOR 2022 11:00:00 Kevin Orona

ealt



                INFECTION                                       

 

                BASIC METABOLIC PANEL (NA, 2021 10:29:00 Jorge Luis Sutton

[facility]



                K, CL, CO2, GLUCOSE, BUN,                                 Medica

l Branch



                CREATININE, CA)                                 

 

                CBC WITH DIFF   2021 10:29:00 Jorge Luis Sutton [facility] o

[facility]

 

                ALBUMIN BODY FLUID 2021 06:00:00 Dwight Gan [facility]

 

                T.PROTEIN BODY FLUID 2021 06:00:00 Dwight Gan [facility]

 

                BODY FLUID DIRECT COUNT 2021 06:00:00 Jorge Luis Sutton [facility]

 

                BODY FLUID (BACTEC BOTTLE) 2021 06:00:00 Robert Snell

[facility]

 

                COVID-19 (ID NOW RAPID 2021 20:46:00 Jorge Luis Sutton [facility]



                TESTING)                                        Medical Branch

 

                LAB ONLY COVID  2021 20:46:00 Jorge Luis Sutton [facility]



                INTERPRETATION                                  [facility]

 

                HEPATIC FUNCTION PANEL 2021 16:17:00 Jean Latham   [facility]



                ([id number]) (ALB,T.PRO,BILI                                 Medical 

Branch



                T,BU/BC,ALT,AST,ALK PHOS)                                 

 

                BASIC METABOLIC PANEL (NA, 2021 16:17:00 Jean Latham   U

[facility]



                K, CL, CO2, GLUCOSE, BUN,                                 Medica

l Branch



                CREATININE, CA)                                 

 

                CBC WITH DIFF   2021 16:17:00 Jean Latham   [facility]

 

                PROTHROMBIN TIME / INR 2021 16:17:00 Jean Latham   [facility]

 

                CT ABDOMEN PELVIS W 2021 16:09:21 [facility]   [facility] [place]



                CONTRAST                                        [facility]

 

                CONSENT/REFUSAL FOR 2021 15:22:40 Doctor Unassigned, [facility]



                DIAGNOSIS AND TREATMENT                 [place]         [facility] 

[place]

 

                PREPARE PACKED RBC 2021 23:59:02 Maddie Cardoza [facility]

 

                PREPARE PACKED RBC 2021 23:59:02 [facility]

 

                COMP. METABOLIC PANEL 2021 14:45:00 Marcy Rosa [facility]



                ([id number])                                         [facility]

 

                CBC WITH DIFF   2021 14:45:00 Moe [facility]

 

                COMP. METABOLIC PANEL 2021 14:45:00 Moe [facility]



                ([id number])                                         [facility]

 

                CBC WITH DIFF   2021 14:45:00 Moe [facility]

 

                IR              2021 18:05:00 Jean Carlos Torres [facility]



                PARACENTESIS/PERITONECENTE                                 Medic

al Branch



                SIS WITH IMAGING                                 

 

                IR              2021 18:05:00 Amy Caldera [facility]



                PARACENTESIS/PERITONECENTE                                 Medic

al Branch



                SIS WITH IMAGING                                 

 

                T.PROTEIN BODY FLUID 2021 18:01:00 Daisy Friend [facility]

 

                T.PROTEIN BODY FLUID 2021 18:01:00 Daisy Friend [facility]

 

                ALBUMIN BODY FLUID 2021 18:00:00 Amy Caldera [facility]

 

                BODY FLUID      2021 18:00:00 Amy Caldera [facility]



                CULTURE(AEROBIC/ANAEROBIC)                                 Medic

al Branch

 

                CYTO ABDOMINAL FLUID 2021 18:00:00 Amy Caldera [facility]

 

                BODY FLUID DIRECT COUNT 2021 18:00:00 Amy Caldera

[facility]

 

                BODY FLUID MANUAL DIFF 2021 18:00:00 Amy Caldera

[facility]

 

                BODY FLUID      2021 18:00:00 [facility]



                CULTURE(AEROBIC/ANAEROBIC)                                 Medic

al Branch

 

                ALBUMIN BODY FLUID 2021 18:00:00 Amy Caldera [facility]

 

                CYTO ABDOMINAL FLUID 2021 18:00:00 Jean [facility]

 

                PHOSPHORUS      2021 06:16:00 Clinton [facility]

 

                MAGNESIUM       2021 06:16:00 Clinton [facility]

 

                IONIZED CALCIUM 2021 06:16:00 Clinton [facility]

 

                CBC WITH DIFF   2021 06:16:00 Amy Caldera [facility]

 

                IONIZED CALCIUM 2021 06:16:00 Clinton [facility]

 

                CBC WITH DIFF   2021 06:16:00 Jean [facility]

 

                MAGNESIUM       2021 06:16:00 Clinton [facility]

 

                PHOSPHORUS      2021 06:16:00 Virginie Alonzo o

[facility]

 

                PREPARE PACKED RBC 2021 02:14:47 Amy Caldera [facility]

 

                PREPARE PACKED RBC 2021 02:14:47 Amy Caldera [facility]

 

                US ABDOMEN LIMITED WITH 2021 00:42:57 Amy Caldera U

[facility]

 

                US ABDOMEN LIMITED WITH 2021 00:42:57 Amy Caldera U

[facility]

 

                CERULOPLASMIN   2021 23:24:00 [facility]

 

                ALPHA 1 ANTITRYPSIN 2021 23:24:00 Marcy Rosa [facility]

 

                IMMUN[facility]GLOBULIN G 2021 23:24:00 [facility]

 

                BASIC METABOLIC PANEL (NA, 2021 23:24:00 Jd Caldera [facility]



                K, CL, CO2, GLUCOSE, BUN,                                 Medica

l Branch



                CREATININE, CA)                                 

 

                ALPHA FETOPROTEIN 2021 23:24:00 [facility]

 

                ANTI-NUCLEAR ANTIBODY 2021 23:24:00 Marcy Rosa [facility]



                SCREEN                                          [facility]

 

                HEPATITIS B SURFACE 2021 23:24:00 Marcy Rosa [facility]



                ANTIBODY                                        [facility]

 

                HEPATITIS B SURFACE 2021 23:24:00 Marcy Rosa [facility]



                ANTIGEN                                         [facility]

 

                HCV ANTIBODY    2021 23:24:00 Marcy Rosa [facility]

 

                HBC ANTIBODY (IGM & IGG) 2021 23:24:00 Marcy Rosa [facility]

 

                HAV ANTIBODY (IGG AND IGM) 2021 23:24:00 Marcy Rosa [facility]

 

                ALPHA 1 ANTITRYPSIN 2021 23:24:00 Marcy Rosa [facility]

 

                ALPHA FETOPROTEIN 2021 23:24:00 Marcy Rosa [facility]

 

                CERULOPLASMIN   2021 23:24:00 Marcy Rosa [facility]

 

                HCV ANTIBODY    2021 23:24:00 Marcy Rosa [facility]

f [facility]

 

                IMMUNOGLOBULIN G 2021 23:24:00 Marcy Rosa [facility]

 

                SMOOTH MUSCLE AB,IGG 2021 23:24:00 Marcy Rosa [facility]



                W/REFLEX                                        [facility]

 

                ANTI-NUCLEAR ANTIBODY 2021 23:24:00 Marcy Rosa [facility]



                SCREEN                                          [facility]

 

                HAV ANTIBODY (IGG AND IGM) 2021 23:24:00 Marcy Rosa [facility]

 

                HEPATITIS B SURFACE 2021 23:24:00 Marcy Rosa [facility]



                ANTIGEN                                         [facility]

 

                HEPATITIS B SURFACE 2021 23:24:00 Marcy Rosa [facility]



                ANTIBODY                                        [facility]

 

                HBC ANTIBODY (IGM & IGG) 2021 23:24:00 Marcy Rosa

[facility]

 

                BASIC METABOLIC PANEL (NA, 2021 23:24:00 Abu Jd Gonzales [facility]



                K, CL, CO2, GLUCOSE, BUN,                                 Medica

l Branch



                CREATININE, CA)                                 

 

                HB ABO GROUPING 2021 23:22:00 AbAmy Burroughs [facility]

 

                HB ABO GROUPING 2021 23:22:00 Abu Amy Gonzales [facility]

 

                CBC WITH DIFF   2021 22:37:00 Abu Janet [facility]

 

                PROTHROMBIN TIME / INR 2021 22:37:00 [facility]

[facility]

 

                CBC WITH DIFF   2021 22:37:00 [facility]

 

                PROTHROMBIN TIME / INR 2021 22:37:00 Marcy Rosa

[facility]

 

                ABORH CONFIRMATION 2021 17:39:00 Maddie Cardoza

[facility]

 

                ABORH CONFIRMATION 2021 17:39:00 Madd[facility]

[facility]

 

                HB ABO GROUPING 2021 17:03:00 Maddie Cardoza [facility]

 

                HB ABO GROUPING 2021 17:03:00 Maddie Cardoza [facility]

 

                URINALYSIS      2021 16:46:00 Maddie Cardoza [facility]

 

                URINALYSIS      2021 16:46:00 Maddie Cardoza [facility]

 

                CT ABDOMEN PELVIS W 2021 16:12:42 Maddie Cardoza [facility]

 

                CT ABDOMEN PELVIS W 2021 16:12:42 Maddie Cardoza [facility]

 

                XR CHEST 1 VW   2021 15:24:06 Maddie Cardoza [facility]

 

                XR CHEST 1 VW   2021 15:24:06 Maddie Cardoza [facility]

 

                HB ECG ROUTINE & RHYTHM 2021 15:15:37 Maddie Cardoza 

[facility]

 

                HB ECG ROUTINE & RHYTHM 2021 15:15:37 Maddie Cardoza 

[facility]

 

                LIPASE          2021 15:13:00 Maddie Cardoza [facility]

 

                FERRITIN SERUM  2021 15:13:00 Marcy Rosa [facility]

 

                TROPONIN I      2021 15:13:00 Maddie Cardoza [facility]

 

                COMP. METABOLIC PANEL 2021 15:13:00 Maddie Cardoza [facility]



                ([id number])                                         [facility]

 

                CBC WITH DIFF   2021 15:13:00 Maddie Cardoza [facility]

 

                N-TERMINAL PRO-BNP 2021 15:13:00 Maddie Cardoza [facility]

 

                COVID-19 (ID NOW RAPID 2021 15:13:00 Maddie Cardoza [facility]



                TESTING)                                        Medical Branch

 

                COVID-19 (ID NOW RAPID 2021 15:13:00 Maddie Cardoza [facility]



                TESTING)                                        Medical Branch

 

                CBC WITH DIFF   2021 15:13:00 Maddie Cardoza [facility]

 

                COMP. METABOLIC PANEL 2021 15:13:00 Maddie Cardoza [facility]



                ([id number])                                         Medical Branch

 

                TROPONIN I      2021 15:13:00 Maddie Cardoza [facility]

 

                LIPASE          2021 15:13:00 Maddie Cardoza [facility]

 

                N-TERMINAL PRO-BNP 2021 15:13:00 Maddie Cardoza [facility]

 

                FERRITIN SERUM  2021 15:13:00 Marcy Rosa [facility]

 

                CONSENT/REFUSAL FOR 2021 14:32:16 Doctor Unassigned, [facility]



                DIAGNOSIS AND TREATMENT                 [place]         Medical 

Branch

 

                NOTICE OF PRIVACY 2021 14:31:32 Doctor Richi [facility]



                PRACTICES                       [place]         [facility]

 

                EMERGENCY DEPARTMENT 2021 05:01:00 Doctor Unassigned, [facility]



                DOCUMENTS                       [place]         Medical Branch

 

                AGREEMENTS AUTHORIZATIONS 2021 05:01:00 Doctor Unassigned,

 [facility]



                AND IRREVOCABLE                 [place]         [facility]



                ASSIGNMENTS (FORM 2001)                                 

 

                AGREEMENTS AUTHORIZATIONS 2021 05:01:00 Doctor Unassigned,

 [facility]



                AND IRREVOCABLE                 [place]         [facility]



                ASSIGNMENTS (FORM 2001)                                 

 

                DISCLOSURE AND CONSENT, 2021 05:01:00 Doctor Richi [facility]



                MEDICAL AND SURGICAL                 No Name         Medical Bra

Formerly [facility], 1928–1983



                PROCEDURES                                      

 

                EMERGENCY DEPARTMENT 2021 05:01:00 Doctor Unassigned, [facility]



                DOCUMENTS                       [place]         [facility]

 

                HOSPITAL ADMISSION 2021 05:01:00 Doctor Unassjani, [facility]



                                                [place]         Medical [place]







Plan of Care







             Planned Activity Planned Date Details      Comments     Source

 

             Future Scheduled Test 2022-10-01 00:00:00 IMM Influenza            

  [facility]



                                       Seasonal (>/= 19 yrs)              



                                       [code = IMM Influenza              



                                       Seasonal (>/= 19              



                                       yrs)]                     

 

             Future Scheduled Test 2022-10-01 00:00:00 IMM Influenza            

  [facility]



                                       Seasonal (>/= 19 yrs)              



                                       [code = IMM Influenza              



                                       Seasonal (>/= 19              



                                       yrs)]                     

 

             Future Scheduled Test 2022-10-01 00:00:00 IMM Influenza            

  [facility]



                                       Seasonal (>/= 19 yrs)              



                                       [code = IMM Influenza              



                                       Seasonal (>/= 19              



                                       yrs)]                     

 

             Future Scheduled Test 2021 00:00:00 COVID-19 Vaccine (2 -    

          [facility]



                                       Pfizer series) [code              



                                       = COVID-19 Vaccine (2              



                                       - Pfizer series)]              

 

             Future Scheduled Test 2021 00:00:00 COVID-19 Vaccine (2 -    

          [facility]



                                       Pfizer series) [code              



                                       = COVID-19 Vaccine (2              



                                       - Pfizer series)]              

 

             Future Scheduled Test 2021 00:00:00 COVID-19 Vaccine (2 -    

          [facility]



                                       Pfizer series) [code              



                                       = COVID-19 Vaccine (2              



                                       - Pfizer series)]              

 

             Future Scheduled Test 2018 00:00:00 Screening for            

  [facility]



                                       malignant neoplasm of              



                                       colon (procedure)              



                                       [code = 168197622]              

 

             Future Scheduled Test 2018 00:00:00 Screening for            

  [facility]



                                       malignant neoplasm of              



                                       colon (procedure)              



                                       [code = 416721605]              

 

             Future Scheduled Test 2018 00:00:00 Screening for            

  [facility]



                                       malignant neoplasm of              



                                       colon (procedure)              



                                       [code = 724999617]              

 

             Future Scheduled Test 2018 00:00:00 Screening for            

  [facility]



                                       malignant neoplasm of              



                                       colon (procedure)              



                                       [code = 120502184]              

 

             Future Scheduled Test 2018 00:00:00 Screening for            

  [facility]



                                       malignant neoplasm of              



                                       colon (procedure)              



                                       [code = 724660770]              

 

             Future Scheduled Test 2018 00:00:00 Screening for            

  [facility]



                                       malignant neoplasm of              



                                       colon (procedure)              



                                       [code = 763814720]              

 

             Future Scheduled Test 2018 00:00:00 Screening for            

  [facility]



                                       malignant neoplasm of              



                                       colon (procedure)              



                                       [code = 546813879]              

 

             Future Scheduled Test 2018 00:00:00 Screening for            

  [facility]



                                       malignant neoplasm of              



                                       colon (procedure)              



                                       [code = 338883842]              

 

             Future Scheduled Test 2018 00:00:00 Screening for            

  [facility]



                                       malignant neoplasm of              



                                       colon (procedure)              



                                       [code = 371602134]              

 

             Future Scheduled Test 2018 00:00:00 Screening for            

  [facility]



                                       malignant neoplasm of              



                                       colon (procedure)              



                                       [code = 498828034]              

 

             Future Scheduled Test 2018 00:00:00 Screening for            

  [facility]



                                       malignant neoplasm of              



                                       colon (procedure)              



                                       [code = 673669023]              

 

             Future Scheduled Test 2018 00:00:00 Screening for            

  [facility]



                                       malignant neoplasm of              



                                       colon (procedure)              



                                       [code = 467379024]              

 

             Future Scheduled Test 2018 00:00:00 Screening for            

  [facility]



                                       malignant neoplasm of              



                                       colon (procedure)              



                                       [code = 452803816]              

 

             Future Scheduled Test 2018 00:00:00 Screening for            

  [facility]



                                       malignant neoplasm of              



                                       colon (procedure)              



                                       [code = 173164882]              

 

             Future Scheduled Test 2018 00:00:00 Screening for            

  [facility]



                                       malignant neoplasm of              



                                       colon (procedure)              



                                       [code = 589298865]              

 

             Future Scheduled Test 1968 00:00:00 Fluoride Varnish         

     [facility]



                                       [code = Fluoride              



                                       Varnish]                  







Encounters







        Start   End     Encounter Admission Attending Care    Care    Encounter 

Source



        Date/Time Date/Time Type    Type    Clinicians Facility Department ID   

   

 

        2022         Inpatient                 [facility]     [id number] H

[facility]



        15:57:19                                                         Health

 

        2022         Inpatient         ARSH MAGALLON [facility]     [id number]

3 [place]



        00:00:00                                                         Health

 

        2022         Inpatient         MAY,    [facility]     [id number] H

[facility]



        13:01:40                         THADDAEUS                         Healt



 

        2022         Inpatient 1       MAY,    [facility]     [id number] H

arris



        15:43:00                         THADDAEUS                         Healt

h

 

        2023 Transition         MAK Joyce  1.2.840.114 [id number]

[id number] Univers



        00:00:00 00:00:00 of Care         Gogo CALZADA   350.1.13.10        

 ity of



                                                [place]   4.2.7.2.686         [facility]

[facility]



                                                        [phone]         Medi

[place]



                                                        403             Branch

 

        2023 Inpatient T       REYNA [facility]     [id number]

476 Univers



        16:36:00 18:36:00                 DELMIS                           ity [facility]

 

        2023 Hospital         Lucia Pike  1.

2.840.114 

[id number]                               [facility]



        16:36:00 18:36:00 Encounter         Israel Deluna JUNIOR   350.1.13.

10         ity [facility] 4.2.7.2.686     

    [place]



                                                        [phone]         Medi

tyler



                                                        096             Branch

 

        2022-10-13 2022[facility]10-13 [facility]     [id number] 

[place]



        00:00:00 00:00:00                 Grecia A                         Heal



 

        2022-10-04 2022-10-04 [facility]         [facility]     [id number]

4441 [place]



        00:00:00 00:00:00                 [facility]

 

        [facility]     [id number] 

[place]



        00:00:00 00:00:00                 Marguerite Foster

[facility]

 

        20[facility]     [id number] 

[place]



        00:00:00 00:00:00                 Grecia Polk



 

        2022 Outpatient                 [facility]     [id number]

94 [place]



        10:28:40 10:33:15                                                 [facility]

 

        2022 Outpatient         [facility]     [id number]

35 [place]



        00:00:00 00:00:00                 SANTIAGO Polk



 

        2022 Outpatient                 [facility]     [id number]

18 [place]



        00:00:00 00:00:00                                                 [facility]

 

        2022 Office          MAY,    [facility]     [id number] 

[place]



        12:48:07 13:35:29 Visit           MARCY                         Heal

th

 

        2022 Outpatient                 [facility]     [id number]

18 [place]



        00:00:00 00:00:00                                                 [facility]

 

        2022 Outpatient                 [facility]     [facility]     [id number]

GDA [facility]



        00:00:00 00:00:00                                         [id number] 



                                                                8       

 

        2022 2022-07-15 Hospital         [facility]     [id number]

 [place]



        15:43:00 14:31:00 Encounter         MARCY                         [facility]

 

        2022 Anesthesia         ARSH MAGALLON [facility]     [id number]

[id number] [place]



        00:00:00 16:26:00 Event                                           Health

 

        2022 Inpatient                 [facility]     [id number]

0 [place]



        18:31:54 19:34:23                                                 Health

 

        2022 Inpatient                 [facility]     [id number]

4 [place]



        13:44:32 13:44:38                                                 Health

 

        2022 Inpatient                 [facility]     [id number]

2 [place]



        10:59:56 11:29:56                                                 Health

 

        2022 Inpatient         [facility]     [id number]

6 [place]



        10:08:10 10:38:10                 THAMARYLINAE                         Heal

th

 

        2022 Inpatient         [facility]     [id number]

3 [place]



        20:00:54 21:22:51                 THADDAEUS                         Heal

th

 

        2022 Inpatient                 [facility]     [id number]

2 [place]



        20:00:50 21:22:24                                                 [facility]

 

        2022 Inpatient         MAY,    [facility]     [facility]     [id number]

1 [place]



        18:54:35 20:07:31                 THADDAEUS                         Heal

th

 

        2022 [facility]         [facility]     [id number]

2 [place]



        11:20:42 11:32:12                 [facility]

 

        2021-08-10 2021-08-10 Outpatient         ALI, AFROZE [facility]     [id number]

[id number] [place]



        00:00:00 00:00:00                                                 Health

 

        2021 Emergency         Jean Latham  1.2.840.11

4 [id number] 

[facility]



        10:22:00 14:00:00                 Robert Snell   350.1.13.10    

     [facility] 4.2.7.2.686         [place]ominik

as



                                                        [phone]         Medi

[place]



                                                        093             Branch

 

        2021 Emergency X               [facility]    ERT     [id number]

22 Univers



        10:20:00 10:20:00                                                 ity of



                                                                        [facility] [place] [facility]

 

        2021-06-10 2021-06-10 Transition         Mak Vizcarra  1.2.840.114 [id number]

[id number] [facility]



        00:00:00 00:00:00 of Care         uRbina [place]   350.1.13.10         it

y of



                                                [place]   4.2.7.2.686         [place]

[place]



                                                        [phone]         Medi

tyler



                                                        403             Branch

 

        2021 [facility]         Maddie Cardoza F 1.2.840.1 100

[id number] 

[id number]                                Univers



        09:44:00 18:00:00 Encounter         Jean Carlos GonzalesAmy 28919.1.1        

         ity of



                                                3.104.2.7                 [place]



                                                .[id number]                 Medica

l



                                                .8                      [place]

 

        2021 Emergency X               [facility]    ERT     [id number]

17 Univers



        09:39:00 09:39:00                                                 ity of



                                                                        [facility] [place] [facility]

 

        2021 Travel                  1.2.840.1 1.2.192.219 [id number]

[id number] Univers



        00:00:00 00:00:00                         59744.1.1 350.1.13.10         

ity of



                                                3.104.2.7 4.2.7.3.698         [place]

[place]



                                                .[id number] 084.8           Medica

l



                                                .8                      [place]

 

        2021 Orders          Doctor  1.2.840.1 [id number]

704 Univers



        00:00:00 00:00:00 Only            Unassigned, 89290.1.1                 

ity of



                                        No Name 3.104.2.7                 [place]



                                                .[id number]                 Medica

l



                                                .8                      [place]







Results







           Test Description Test Time  Test Comments Results    Result Comments 

Source









                    BLOOD CULTURE SCREEN 2023 14:27:19 









                      Test Item  Value      Reference Range Interpretation Comme

nts









             Blood Culture-Aerobic (test Culture positive. See No growth    AA  

         Previous 

preliminary



             code = 17928-3) Blood Culture Workup                           veri

fied result was



                          for additional                           Culture In Pr

ogress on



                          information.                           3/19/2023 at 02

01



                                                                 CDTPrevious



                                                                 preliminary monico

ified



                                                                 result was No g

rowth



                                                                 at 24 hours on



                                                                 3/19/2023 at 23

01 CDT

 

             Blood Culture-Anaerobic No organisms isolated No growth            

     Previous preliminary



             (test code = 08526-3)                                        verifi

ed result was



                                                                 Culture In Prog

ress on



                                                                 3/19/2023 at 02

01



                                                                 CDTPrevious



                                                                 preliminary monico

ified



                                                                 result was No g

rowth



                                                                 at 24 hours on



                                                                 3/20/2023 at 01

54 CDT

 

             Lab Interpretation (test Abnormal                               



             code = 43542-7)                                        



[facility]





             Test Item    Value        Reference Range Interpretation Comments

 

             MAGNESIUM (test code = 5850071648) 1.7 mg/dL    1.7-2.4            

       

 

             Lab Interpretation (test code = Normal                             

    



             34999-9)                                            



[facility] (NA, K, CL, CO2, 
GLUCOSE, BUN, CREATININE, CA)2023 10:47:23





             Test Item    Value        Reference Range Interpretation Comments

 

             NA (test code = 134 mmol/L   135-145      L            



             6603459224)                                         

 

             K (test code = 3.4 mmol/L   3.5-5.0      L            



             8383610377)                                         

 

             CL (test code = 107 mmol/L                       



             0292662437)                                         

 

             CO2 TOTAL (test code = 28 mmol/L    23-31                     



             4090173831)                                         

 

             AGAP (test code =              2-16         L            



             0253548149)                                         

 

             BUN (test code = 5 mg/dL      7-23         L            



             8636822363)                                         

 

             GLUCOSE (test code = 82 mg/dL                         



             7774967312)                                         

 

             CREATININE (test code = 0.49 mg/dL   0.60-1.25    L            



             6594447633)                                         

 

             CALCIUM (test code = 7.0 mg/dL    8.6-10.6     L            



             7185117856)                                         

 

             eGFR (test code = 176.7        mL/min/1.73m2              



             4048974743)                                         

 

             RENETTA (test code = RENETTA) Association of                           



                          Glomerular Filtration                           



                          Rate (GFR) and Staging                           



                          of Kidney Disease*                           



                          +---------------------                           



                          --+-------------------                           



                          --+-------------------                           



                          ------+| GFR                           



                          (mL/min/1.73 m2) ?|                           



                          With Kidney Damage ?|                           



                          ?Without Kidney                           



                          Damage+---------------                           



                          --------+-------------                           



                          --------+-------------                           



                          ------------+| ?>90 ?                           



                          ? ? ? ? ? ? ? ?|                           



                          ?Stage one ? ? ? ? ?|                           



                          ? Normal ? ? ? ? ? ? ?                           



                          ?+--------------------                           



                          ---+------------------                           



                          ---+------------------                           



                          -------+| ?60-89 ? ? ?                           



                          ? ? ? ? ?| ?Stage two                           



                          ? ? ? ? ?| ? Decreased                           



                          GFR ? ? ? ?                            



                          +---------------------                           



                          --+-------------------                           



                          --+-------------------                           



                          ------+| ?30-59 ? ? ?                           



                          ? ? ? ? ?| ?Stage                           



                          three ? ? ? ?| ? Stage                           



                          three ? ? ? ? ?                           



                          +---------------------                           



                          --+-------------------                           



                          --+-------------------                           



                          ------+| ?15-29 ? ? ?                           



                          ? ? ? ? ?| ?Stage four                           



                          ? ? ? ? | ? Stage four                           



                          ? ? ? ? ?                              



                          ?+--------------------                           



                          ---+------------------                           



                          ---+------------------                           



                          -------+| ?<15 (or                           



                          dialysis) ? ?| ?Stage                           



                          five ? ? ? ? | ? Stage                           



                          five ? ? ? ? ?                           



                          ?+--------------------                           



                          ---+------------------                           



                          ---+------------------                           



                          -------+ *Each stage                           



                          assumes the associated                           



                          GFR level has been in                           



                          effect for at least                           



                          three months. ?Stages                           



                          1 to 5, with or                           



                          without kidney                           



                          disease, indicate                           



                          chronic kidney                           



                          disease. Notes:                           



                          Determination of                           



                          stages one and two                           



                          (with eGFR                             



                          >59mL/min/1.73 m2)                           



                          requires estimation of                           



                          kidney damage for at                           



                          least three months as                           



                          defined by structural                           



                          or functional                           



                          abnormalities of the                           



                          kidney, manifested by                           



                          either:Pathological                           



                          abnormalities or                           



                          Markers of kidney                           



                          damage (including                           



                          abnormalities in the                           



                          composition of the                           



                          blood or urine or                           



                          abnormalities in                           



                          imaging tests).                           

 

             Lab Interpretation Abnormal                               



             (test code = 42299-3)                                        



[facility]





             Test Item    Value        Reference Range Interpretation Comments

 

             Fibrinogen (test code = 5123588687) 121 mg/dL    167-453      L    

        

 

             Lab Interpretation (test code = Abnormal                           

    



             27586-4)                                            



[facility] / [id number] 10:31:49





             Test Item    Value        Reference Range Interpretation Comments

 

             PROTIME PATIENT (test 26.0         See_Comment  H             [Auto

mated message]



             code = 5964-2)                                        The system [employer]



                                                                 generated this 

result



                                                                 transmitted ref

erence



                                                                 range: 10.1 - 1

2.6



                                                                 Seconds. The



                                                                 reference range

 was



                                                                 not used to int

erpret



                                                                 this result as



                                                                 normal/abnormal

.

 

             INR (test code = 6301-6) 2.3                                    Nor

mal INR <1.1;



                                                                 Warfarin Therap

eutic



                                                                 range 2.0 to 3.

0 or



                                                                 2.5 to 3.5, dep

ending



                                                                 upon the indica

tions.

 

             Lab Interpretation (test Abnormal                               



             code = 99939-1)                                        



[facility] [id number] 10:26:10





             Test Item    Value        Reference Range Interpretation Comments

 

             WBC (test code = 2.98         See_Comment  L             [Automated

 message]



             6690-2)                                             The system [employer]



                                                                 generated this 

result



                                                                 transmitted ref

erence



                                                                 range: 4.20 - 1

0.70



                                                                 10*3/?L. The



                                                                 reference range

 was



                                                                 not used to int

erpret



                                                                 this result as



                                                                 normal/abnormal

.

 

             RBC (test code = 789-8) 3.09         See_Comment  L             [Au

tomated message]



                                                                 The system [employer]



                                                                 generated this 

result



                                                                 transmitted ref

erence



                                                                 range: 4.26 - 5

.52



                                                                 10*6/?L. The



                                                                 reference range

 was



                                                                 not used to int

erpret



                                                                 this result as



                                                                 normal/abnormal

.

 

             HGB (test code = 718-7) 8.1 g/dL     12.2-16.4    L            

 

             HCT (test code = 24.6 %       38.4-49.3    L            



             4544-3)                                             

 

             MCH (test code = 785-6) 26.2 pg      26.1-32.7                 

 

             MCV (test code = 787-2) 79.6 fL      81.7-95.6    L            

 

             MCHC (test code = 32.9 g/dL    31.2-35.0                 



             786-4)                                              

 

             PLT (test code = 777-3) 21           See_Comment  LL            [Au

tomated message]



                                                                 The system [employer]



                                                                 generated this 

result



                                                                 transmitted ref

erence



                                                                 range: 150 - 32

8



                                                                 10*3/?L. The



                                                                 reference range

 was



                                                                 not used to int

erpret



                                                                 this result as



                                                                 normal/abnormal

.

 

             MPV (test code =                                        Not Measure

d



             14940-8)                                            

 

             RDW-CV (test code = 18.6 %       12.1-15.4    H            



             788-0)                                              

 

             RDW-SD (test code = 54.0 fL      38.5-51.6    H            



             31948-8)                                            

 

             NRBC x10^3 (test code =              See_Comment                [Au

tomated message]



             8476117144)                                         The system [employer]



                                                                 generated this 

result



                                                                 transmitted ref

erence



                                                                 range: 10*3/?L.

 The



                                                                 reference range

 was



                                                                 not used to int

erpret



                                                                 this result as



                                                                 normal/abnormal

.

 

             NRBC/100 WBC (test code 0.0          See_Comment                [Au

tomated message]



             = [id number])                                        The system [facility]



                                                                 generated this 

result



                                                                 transmitted ref

erence



                                                                 range: 0.0 - 10

.0



                                                                 /100 WBCs. The



                                                                 reference range

 was



                                                                 not used to int

erpret



                                                                 this result as



                                                                 normal/abnormal

.

 

             IPF % (test code = 6.3 %        1.2-10.7                  Platelet 

count



             [id number])                                         measured by



                                                                 fluorescence me

thod.

 

             Lab Interpretation Abnormal                               



             (test code = 05067-8)                                        



[facility] [id number] 13:08:31





             Test Item    Value        Reference Range Interpretation Comments

 

             Blood Culture-Aerobic Culture positive. No growth    AA           P

revious



             (test code = 17928-3) See Blood Culture                           p

reliminary



                          Workup for                             verified result



                          additional                             was Culture In



                          information.                           Progress on



                                                                 3/19/2023 at 02

01



                                                                 CDT

 

             Blood        No organisms No growth                 Previous



             Culture-Anaerobic isolated                               preliminar

y



             (test code = 19057-4)                                        verifi

ed result



                                                                 was Culture In



                                                                 Progress on



                                                                 3/19/2023 at 18

25



                                                                 CDT

 

             Lab Interpretation Abnormal                               



             (test code = 79092-6)                                        



[facility]landRETICULOC[facility] [id number] 11:56:04





             Test Item    Value        Reference Range Interpretation Comments

 

             RETIC Count Automated 1.68 %       0.59-2.24                 



             (test code = 0266263459)                                        

 

             RETIC Absolute Count 0.0509       See_Comment                [Autom

ated message]



             (test code = 5903014829)                                        The

 system which



                                                                 generated this 

result



                                                                 transmitted ref

erence



                                                                 range: 0.0260 -



                                                                 0.1170 10*6/?L.

 The



                                                                 reference range

 was



                                                                 not used to int

erpret



                                                                 this result as



                                                                 normal/abnormal

.

 

             IRF % (test code = 10.50 %      2.00-19.10                



             [id number])                                         

 

             RETIC-HE (test code = 26.8 pg      27.3-36.4    L            



             2179506899)                                         

 

             Lab Interpretation (test Abnormal                               



             code = 21153-6)                                        



[facility] 11[facility]49:31





             Test Item    Value        Reference Range Interpretation Comments

 

             Fibrinogen (test code = 1769907147) 127 mg/dL    167-453      L    

        

 

             Lab Interpretation (test code = Abnormal                           

    



             49654-2)                                            



[facility] / [id number] 11:49:06





             Test Item    Value        Reference Range Interpretation Comments

 

             PROTIME PATIENT (test 25.4         See_Comment  H             [Auto

mated message]



             code = 5964-2)                                        The system wh

ich



                                                                 generated this 

result



                                                                 transmitted ref

erence



                                                                 range: 10.1 - 1

2.6



                                                                 Seconds. The



                                                                 reference range

 was



                                                                 not used to int

erpret



                                                                 this result as



                                                                 normal/abnormal

.

 

             INR (test code = 6301-6) 2.3                                    Nor

mal INR <1.1;



                                                                 Warfarin Therap

eutic



                                                                 range 2.0 to 3.

0 or



                                                                 2.5 to 3.5, dep

ending



                                                                 upon the indica

tions.

 

             Lab Interpretation (test Abnormal                               



             code = 95410-0)                                        



[facility] METABOLIC PANEL (NA, K, CL, CO2, 
GLUCOSE, BUN, CREATININE, CA)2023 09:35:08





             Test Item    Value        Reference Range Interpretation Comments

 

             NA (test code = 133 mmol/L   135-145      L            



             5078457578)                                         

 

             K (test code = 3.7 mmol/L   3.5-5.0                   



             3084274214)                                         

 

             CL (test code = 106 mmol/L                       



             6755590389)                                         

 

             CO2 TOTAL (test code = 28 mmol/L    23-31                     



             7698011918)                                         

 

             AGAP (test code =              2-16         L            



             8072699672)                                         

 

             BUN (test code = 7 mg/dL      7-23                      



             2184403095)                                         

 

             GLUCOSE (test code = 100 mg/dL                        



             0029110652)                                         

 

             CREATININE (test code = 0.42 mg/dL   0.60-1.25    L            



             2999703863)                                         

 

             CALCIUM (test code = 6.5 mg/dL    8.6-10.6     L            



             2023466297)                                         

 

             eGFR (test code = 211.1        mL/min/1.73m2              



             0613734928)                                         

 

             RENETTA (test code = RENETTA) Association of                           



                          Glomerular Filtration                           



                          Rate (GFR) and Staging                           



                          of Kidney Disease*                           



                          +---------------------                           



                          --+-------------------                           



                          --+-------------------                           



                          ------+| GFR                           



                          (mL/min/1.73 m2) ?|                           



                          With Kidney Damage ?|                           



                          ?Without Kidney                           



                          Damage+---------------                           



                          --------+-------------                           



                          --------+-------------                           



                          ------------+| ?>90 ?                           



                          ? ? ? ? ? ? ? ?|                           



                          ?Stage one ? ? ? ? ?|                           



                          ? Normal ? ? ? ? ? ? ?                           



                          ?+--------------------                           



                          ---+------------------                           



                          ---+------------------                           



                          -------+| ?60-89 ? ? ?                           



                          ? ? ? ? ?| ?Stage two                           



                          ? ? ? ? ?| ? Decreased                           



                          GFR ? ? ? ?                            



                          +---------------------                           



                          --+-------------------                           



                          --+-------------------                           



                          ------+| ?30-59 ? ? ?                           



                          ? ? ? ? ?| ?Stage                           



                          three ? ? ? ?| ? Stage                           



                          three ? ? ? ? ?                           



                          +---------------------                           



                          --+-------------------                           



                          --+-------------------                           



                          ------+| ?15-29 ? ? ?                           



                          ? ? ? ? ?| ?Stage four                           



                          ? ? ? ? | ? Stage four                           



                          ? ? ? ? ?                              



                          ?+--------------------                           



                          ---+------------------                           



                          ---+------------------                           



                          -------+| ?<15 (or                           



                          dialysis) ? ?| ?Stage                           



                          five ? ? ? ? | ? Stage                           



                          five ? ? ? ? ?                           



                          ?+--------------------                           



                          ---+------------------                           



                          ---+------------------                           



                          -------+ *Each stage                           



                          assumes the associated                           



                          GFR level has been in                           



                          effect for at least                           



                          three months. ?Stages                           



                          1 to 5, with or                           



                          without kidney                           



                          disease, indicate                           



                          chronic kidney                           



                          disease. Notes:                           



                          Determination of                           



                          stages one and two                           



                          (with eGFR                             



                          >59mL/min/1.73 m2)                           



                          requires estimation of                           



                          kidney damage for at                           



                          least three months as                           



                          defined by structural                           



                          or functional                           



                          abnormalities of the                           



                          kidney, manifested by                           



                          either:Pathological                           



                          abnormalities or                           



                          Markers of kidney                           



                          damage (including                           



                          abnormalities in the                           



                          composition of the                           



                          blood or urine or                           



                          abnormalities in                           



                          imaging tests).                           

 

             Lab Interpretation Abnormal                               



             (test code = 66791-2)                                        



[facility]37





             Test Item    Value        Reference Range Interpretation Comments

 

             MAGNESIUM (test code = 3129568639) 1.5 mg/dL    1.7-2.4      L     

       

 

             Lab Interpretation (test code = Abnormal                           

    



             24751-5)                                            



[facility] WITH [id number] 09:27:43





             Test Item    Value        Reference Range Interpretation Comments

 

             WBC (test code = 2.93         See_Comment  L             [Automated



             6690-2)                                             message] The sy

stem



                                                                 which generated



                                                                 this result



                                                                 transmitted



                                                                 reference range

:



                                                                 4.20 - 10.70



                                                                 10*3/?L. The



                                                                 reference range

 was



                                                                 not used to



                                                                 interpret this



                                                                 result as



                                                                 normal/abnormal

.

 

             RBC (test code = 3.06         See_Comment  L             [Automated



             789-8)                                              message] The sy

stem



                                                                 which generated



                                                                 this result



                                                                 transmitted



                                                                 reference range

:



                                                                 4.26 - 5.52



                                                                 10*6/?L. The



                                                                 reference range

 was



                                                                 not used to



                                                                 interpret this



                                                                 result as



                                                                 normal/abnormal

.

 

             HGB (test code = 7.9 g/dL     12.2-16.4    L            



             718-7)                                              

 

             HCT (test code = 23.9 %       38.4-49.3    L            



             4544-3)                                             

 

             MCV (test code = 78.1 fL      81.7-95.6    L            



             787-2)                                              

 

             MCH (test code = 25.8 pg      26.1-32.7    L            



             785-6)                                              

 

             MCHC (test code = 33.1 g/dL    31.2-35.0                 



             786-4)                                              

 

             RDW-SD (test code = 51.2 fL      38.5-51.6                 



             36111-4)                                            

 

             RDW-CV (test code = 18.3 %       12.1-15.4    H            



             788-0)                                              

 

             PLT (test code = 18           See_Comment  LL            [Automated



             777-3)                                              message] The sy

stem



                                                                 which generated



                                                                 this result



                                                                 transmitted



                                                                 reference range

:



                                                                 150 - 328 10*3/

?L.



                                                                 The reference r

moose



                                                                 was not used to



                                                                 interpret this



                                                                 result as



                                                                 normal/abnormal

.

 

             MPV (test code =                                        Not Measure

d



             27176-4)                                            

 

             IPF % (test code = 8.0 %        1.2-10.7                  Platelet 

count



             [id number])                                         measured by



                                                                 fluorescence



                                                                 method.

 

             NRBC/100 WBC (test 0.0          See_Comment                [Automat

ed



             code = 8507254572)                                        message] 

The system



                                                                 which generated



                                                                 this result



                                                                 transmitted



                                                                 reference range

:



                                                                 0.0 - 10.0 /100



                                                                 WBCs. The refer

ence



                                                                 range was not u

sed



                                                                 to interpret th

is



                                                                 result as



                                                                 normal/abnormal

.

 

             NRBC x10^3 (test code              See_Comment                [Auto

mated



             = [id number])                                        message] The s

ystem



                                                                 which generated



                                                                 this result



                                                                 transmitted



                                                                 reference range

:



                                                                 10*3/?L. The



                                                                 reference range

 was



                                                                 not used to



                                                                 interpret this



                                                                 result as



                                                                 normal/abnormal

.

 

             GRAN MAT (NEUT) % 54.9 %                                 



             (test code = 770-8)                                        

 

             IMM GRAN % (test code 0.70 %                                 



             = 8902793556)                                        

 

             LYMPH % (test code = 22.9 %                                 



             736-9)                                              

 

             MONO % (test code = 16.0 %                                 



             5905-5)                                             

 

             EOS % (test code = 4.8 %                                  



             713-8)                                              

 

             BASO % (test code = 0.7 %                                  



             706-2)                                              

 

             GRAN MAT x10^3(ANC) 1.61 10*3/uL 1.99-6.95    L            



             (test code =                                        



             2669527249)                                         

 

             IMM GRAN x10^3 (test              0.00-0.06                 



             code = 1436737869)                                        

 

             LYMPH x10^3 (test code 0.67 10*3/uL 1.09-3.23    L            



             = 731-0)                                            

 

             MONO x10^3 (test code 0.47 10*3/uL 0.36-1.02                 



             = 742-7)                                            

 

             EOS x10^3 (test code = 0.14 10*3/uL 0.06-0.53                 



             711-2)                                              

 

             BASO x10^3 (test code              0.01-0.09                 



             = 704-7)                                            

 

             BENJI CELLS (test code 2+           See_Comment  A             [Auto

mated



             = 7790-9)                                           message] The sy

stem



                                                                 which generated



                                                                 this result



                                                                 transmitted



                                                                 reference range

:



                                                                 (none). The



                                                                 reference range

 was



                                                                 not used to



                                                                 interpret this



                                                                 result as



                                                                 normal/abnormal

.

 

             Lab Interpretation Abnormal                               



             (test code = 06006-0)                                        



[facility] 22[facility]48[facility]30





             Test Item    Value        Reference Range Interpretation Comments

 

             Fibrinogen (test code = 3234448085) 122 mg/dL    167-453      L    

        

 

             Lab Interpretation (test code = Abnormal                           

    



             77592-2)                                            



[facility] Cryoprecipitate (in units): 1 
Units~Indication: 1) Fibrinogen &lt; 100 mg/dL with bleeding or potential for 
bleeding associated with invasive svvlllcix0401-70-39 18:41:31





             Test Item    Value        Reference Range Interpretation Comments

 

             Unit Blood Type (test O Neg                                  



             code = 4410)                                        

 

             ISBT Blood Type Code 9500                                   



             (test code = 616937)                                        

 

             Unit Number (test [id number]                           



             code = 4411)                                        

 

             Blood Expiration Date                            



             & Time (test code =                                        



             344755)                                             

 

             Status Information Issued                                 



             (test code = 4412)                                        

 

             Product      Cryoprecipitate                           



             Identification (test                                        



             code = 4413)                                        

 

             Product Code (test U2877O29                               Performed

 at [facility]



             code = 4414)                                        Laboratory



                                                                 Services - GAL



                                                                 Blood [address] [place] [address]



                                                                 [address]Arkl Free:



                                                                 [phone]

A



                                                                 No. [id number]



[facility] PATHOGENS DNA 
YUSAG-OOETAHD5686-73-20 05:00:05





             Test Item    Value        Reference Range Interpretation Comments

 

             Acinetobacter species Positive     Negative, See A            



             (test code = 51201-9)              Comment/Narrative              

 

             RENETTA (test code = RENETTA) See blood culture                           



                          result for additional                           



                          information. ?Testing                           



                          included eight                           



                          identification and six                           



                          resistance marker                           



                          targets.                               

 

             Lab Interpretation Abnormal                               



             (test code = 51653-6)                                        



[facility]landL[facility] Acid Whole Sjlcz5825-32-53 21:53:15





             Test Item    Value        Reference Range Interpretation Comments

 

             LACTIC ACID (test code = 3.62 mmol/L  0.50-2.20    H            



             9263075903)                                         

 

             Lab Interpretation (test code = Abnormal                           

    



             90808-6)                                            



[facility]land[facility] Packed RBC (in units), 2 Units
2023 19:27:30





             Test Item    Value        Reference Range Interpretation Comments

 

             Cross Match Result Compatible                             



             (test code = 4409)                                        

 

             ISBT Blood Type Code 5100                                   



             (test code = 912209)                                        

 

             Unit Blood Type (test O Pos                                  



             code = 4410)                                        

 

             Unit Number (test [id number]                           



             code = 4411)                                        

 

             Blood Expiration Date 152414437708                           



             & Time (test code =                                        



             469128)                                             

 

             Status Information Issued                                 



             (test code = 4412)                                        

 

             Product      Red Blood Cells                           



             Identification (test                                        



             code = 4413)                                        

 

             Product Code (test M1478I34                               Performed

 at [facility]



             code = 4414)                                        Laboratory



                                                                 Services - GAL



                                                                 Blood [address] [place] [address]



                                                                 [address]Khhg Free:



                                                                 [phone]

A



                                                                 No. [id number]



[facility]landLactic Acid Whole Rcvwd6587-45-12 17:17:41





             Test Item    Value        Reference Range Interpretation Comments

 

             LACTIC ACID (test code = 4.31 mmol/L  0.50-2.20    H            



             4275990372)                                         

 

             Lab Interpretation (test code = Abnormal                           

    



             83745-9)                                            



[facility]landProthrombin Time / [id number] 12:01:29





             Test Item    Value        Reference Range Interpretation Comments

 

             PROTIME PATIENT (test 25.2         See_Comment  H             [Auto

mated message]



             code = 5964-2)                                        The system [employer]



                                                                 generated this 

result



                                                                 transmitted ref

erence



                                                                 range: 10.1 - 1

2.6



                                                                 Seconds. The



                                                                 reference range

 was



                                                                 not used to int

erpret



                                                                 this result as



                                                                 normal/abnormal

.

 

             INR (test code = 6301-6) 2.3                                    Nor

mal INR <1.1;



                                                                 Warfarin Therap

eutic



                                                                 range 2.0 to 3.

0 or



                                                                 2.5 to 3.5, dep

ending



                                                                 upon the indica

tions.

 

             Lab Interpretation (test Abnormal                               



             code = 35725-0)                                        



[facility]landLactic Acid Whole Smawi4754-59-94 11:52:59





             Test Item    Value        Reference Range Interpretation Comments

 

             LACTIC ACID (test code = 4.93 mmol/L  0.50-2.20    H            



             0390522389)                                         

 

             Lab Interpretation (test code = Abnormal                           

    



             92010-4)                                            



[facility] Cryoprecipitate (in units): 1 
Units~Indication: 1) Fibrinogen &lt; 100 mg/dL with bleeding or potential for 
bleeding associated with invasive dlikvhwuc9271-52-90 10:16:18





             Test Item    Value        Reference Range Interpretation Comments

 

             Unit Blood Type (test O                                      



             code = 4410)                                        

 

             ISBT Blood Type Code D000                                   



             (test code = 035143)                                        

 

             Unit Number (test [id number]                           



             code = 4411)                                        

 

             Blood Expiration Date                            



             & Time (test code =                                        



             156573)                                             

 

             Status Information Issued                                 



             (test code = 4412)                                        

 

             Product      Cryoprecipitate                           



             Identification (test                                        



             code = 4413)                                        

 

             Product Code (test I6452V20                               Performed

 at [facility]



             code = 4414)                                        Laboratory



                                                                 Services - GAL



                                                                 Blood [address]

s



                                                                 [address]Vwtn Free:



                                                                 [phone]

A



                                                                 No. [id number]



[facility]landPrep[facility] Packed RBC (in units), 2 Units
2023 10:16:18





             Test Item    Value        Reference Range Interpretation Comments

 

             Cross Match Result Compatible                             



             (test code = 4409)                                        

 

             ISBT Blood Type Code 5100                                   



             (test code = 416289)                                        

 

             Unit Blood Type (test O Pos                                  



             code = 4410)                                        

 

             Unit Number (test [id number]                           



             code = 4411)                                        

 

             Blood Expiration Date                            



             & Time (test code =                                        



             108960)                                             

 

             Status Information Issued                                 



             (test code = 4412)                                        

 

             Product      Red Blood Cells                           



             Identification (test                                        



             code = 4413)                                        

 

             Product Code (test L7745L28                               Performed

 at [facility]



             code = 4414)                                        Laboratory



                                                                 Services [address]

s



                                                                 [address]Fwdf Free:



                                                                 [phone]

A



                                                                 No. [id number]



[facility] / HEM[facility] - 30 minutes after 
transfusion of each BPT0241-02-61 09:00:47





             Test Item    Value        Reference Range Interpretation Comments

 

             WBC (test code = 6.20         See_Comment                [Automated

 message]



             6690-2)                                             The system [employer]



                                                                 generated this 

result



                                                                 transmitted ref

erence



                                                                 range: 4.20 - 1

0.70



                                                                 10*3/?L. The



                                                                 reference range

 was



                                                                 not used to int

erpret



                                                                 this result as



                                                                 normal/abnormal

.

 

             RBC (test code = 789-8) 2.41         See_Comment  L             [Au

tomated message]



                                                                 The system [employer]



                                                                 generated this 

result



                                                                 transmitted ref

erence



                                                                 range: 4.26 - 5

.52



                                                                 10*6/?L. The



                                                                 reference range

 was



                                                                 not used to int

erpret



                                                                 this result as



                                                                 normal/abnormal

.

 

             HGB (test code = 718-7) 6.0 g/dL     12.2-16.4    L            

 

             HCT (test code = 18.6 %       38.4-49.3    L            



             4544-3)                                             

 

             MCH (test code = 785-6) 24.9 pg      26.1-32.7    L            

 

             MCV (test code = 787-2) 77.2 fL      81.7-95.6    L            

 

             MCHC (test code = 32.3 g/dL    31.2-35.0                 



             786-4)                                              

 

             PLT (test code = 777-3) 44           See_Comment  LL            [Au

tomated message]



                                                                 The system [employer]





                                                                 generated this 

result



                                                                 transmitted ref

erence



                                                                 range: 150 - 32

8



                                                                 10*3/?L. The



                                                                 reference range

 was



                                                                 not used to int

erpret



                                                                 this result as



                                                                 normal/abnormal

.

 

             MPV (test code =                                        Not Measure

d



             41471-6)                                            

 

             RDW-CV (test code = 19.5 %       12.1-15.4    H            



             788-0)                                              

 

             RDW-SD (test code = 55.1 fL      38.5-51.6    H            



             10114-2)                                            

 

             NRBC x10^3 (test code =              See_Comment                [Au

tomated message]



             [id number])                                         The system [employer]



                                                                 generated this 

result



                                                                 transmitted ref

erence



                                                                 range: 10*3/?L.

 The



                                                                 reference range

 was



                                                                 not used to int

erpret



                                                                 this result as



                                                                 normal/abnormal

.

 

             NRBC/100 WBC (test code 0.0          See_Comment                [Au

tomated message]



             = [id number])                                        The system [facility]



                                                                 generated this 

result



                                                                 transmitted ref

erence



                                                                 range: 0.0 - 10

.0



                                                                 /100 WBCs. The



                                                                 reference range

 was



                                                                 not used to int

erpret



                                                                 this result as



                                                                 normal/abnormal

.

 

             IPF % (test code = 5.6 %        1.2-10.7                  Platelet 

count



             [id number])                                         measured by



                                                                 fluorescence me

thod.

 

             Lab Interpretation Abnormal                               



             (test code = 55414-7)                                        



[facility] [id number] 08:52:22





             Test Item    Value        Reference Range Interpretation Comments

 

             LACTIC ACID (test code = 5.63 mmol/L  0.50-2.20    H            



             9501883113)                                         

 

             Lab Interpretation (test code = Abnormal                           

    



             84500-2)                                            



[facility] [id number] 05:19:47





             Test Item    Value        Reference Range Interpretation Comments

 

             FREE T4 (test code = 1.82         See_Comment                [Autom

ated message]



             [id number])                                         The system [employer]



                                                                 generated this 

result



                                                                 transmitted ref

erence



                                                                 range: 0.78 - 2

.20



                                                                 ng/dL:. The ref

erence



                                                                 range was not u

sed to



                                                                 interpret this 

result



                                                                 as normal/abnor

mal.

 

             Lab Interpretation (test Normal                                 



             code = 60037-1)                                        



[facility]land[facility] [id number] 04:49:47





             Test Item    Value        Reference Range Interpretation Comments

 

             HCV Ab (test code = 98190-6) Negative                              

 

 

             HCV Semi-Quantitative (test code = 0.05                            

       



             77190-6)                                            



[facility]land[facility] STIM[facility] [id number] 04:32:04





             Test Item    Value        Reference Range Interpretation Comments

 

             TSH (test code = 1.95         See_Comment                [Automated

 message]



             [id number])                                         The system [employer]



                                                                 generated this 

result



                                                                 transmitted ref

erence



                                                                 range: 0.45 - 4

.70



                                                                 mIU/L. The refe

rence



                                                                 range was not u

sed to



                                                                 interpret this 

result



                                                                 as normal/abnor

mal.

 

             Lab Interpretation (test Normal                                 



             code = 28093-4)                                        



[facility] + Lactic [id number] 04:02:48





             Test Item    Value        Reference Range Interpretation Comments

 

             PH (test code = 2) 7.39         7.35-7.45                 

 

             PCO2 (test code = 27           See_Comment  L             [Automate

d



             6859136042)                                         message] The sy

stem



                                                                 which generated



                                                                 this result



                                                                 transmitted



                                                                 reference range

: 35



                                                                 - 45 mmHg. The



                                                                 reference range

 was



                                                                 not used to



                                                                 interpret this



                                                                 result as



                                                                 normal/abnormal

.

 

             PO2 (test code = 120          See_Comment  H             [Automated



             [id number])                                         message] The sy

stem



                                                                 which generated



                                                                 this result



                                                                 transmitted



                                                                 reference range

: 80



                                                                 - 100 mmHg. The



                                                                 reference range

 was



                                                                 not used to



                                                                 interpret this



                                                                 result as



                                                                 normal/abnormal

.

 

             HCO3 (test code = 17           See_Comment  L             [Automate

d



             9082543022)                                         message] The sy

stem



                                                                 which generated



                                                                 this result



                                                                 transmitted



                                                                 reference range

: 22



                                                                 - 26 mEq/L. The



                                                                 reference range

 was



                                                                 not used to



                                                                 interpret this



                                                                 result as



                                                                 normal/abnormal

.

 

             BE (test code = -8.5         See_Comment  L             [Automated



             [id number])                                         message] The sy

stem



                                                                 which generated



                                                                 this result



                                                                 transmitted



                                                                 reference range

:



                                                                 -3.0 - 3.0 mEq/

L.



                                                                 The reference r

moose



                                                                 was not used to



                                                                 interpret this



                                                                 result as



                                                                 normal/abnormal

.

 

             THB (test code = 6.2 g/dL     13.5-18.0    LL           



             7277290026)                                         

 

             %O2HB (test code = 97.0 %       94.0-99.0                 



             7674528857)                                         

 

             %COHB ART (test code = 2.0 %        0.0-1.5      H            



             5509605236)                                         

 

             %METHB ART (test code = 0.2 %        0.4-1.5      L            



             3723312674)                                         

 

             VOL%O2 ART (test code = 8.8 %        15.0-23.0    L            



             0794296941)                                         

 

             NA (test code = 129 mmol/L   135-145      L            



             1957563820)                                         

 

             K+ (test code = 4.0 mmol/L   3.5-5.0                   



             3745700492)                                         

 

             AC CA IONZ (test code = 4.10 mg/dL   4.50-5.30    L            



             5134014351)                                         

 

             GLUCOSE (test code = 111 mg/dL           H            



             9711611845)                                         

 

             LACTIC ACID (test code 7.31 mmol/L  0.50-2.20    H            



             = 6892432910)                                        

 

             Lab Interpretation Abnormal                               



             (test code = 85894-7)                                        



[facility] WITH [id number] 03:46:18





             Test Item    Value        Reference Range Interpretation Comments

 

             HIV          0.09         Negative                  



             Semi-quantitative                                        



             (test code =                                        



             98605-3)                                            

 

             RENETTA (test code = Non-reactive for HIV-1                           



             RENETTA)         antigen and HIV-1/HIV-2                           



                          antibodies. ?No                           



                          laboratory evidence of                           



                          HIV infection. ?Repeat in                           



                          2-4 weeks if acute HIV                           



                          infection is suspected.                           



[facility]landOSMOLALITY, SERUM OR [id number] 01:46:37





             Test Item    Value        Reference Range Interpretation Comments

 

             OSMOLALITY (test code = 373          See_Comment  HH            [Au

tomated message]



             2692-2)                                             The system [employer]



                                                                 generated this 

result



                                                                 transmitted ref

erence



                                                                 range: 278 - 30

5



                                                                 mOsm/kg. The



                                                                 reference range

 was



                                                                 not used to int

erpret



                                                                 this result as



                                                                 normal/abnormal

.

 

             Lab Interpretation (test Abnormal                               



             code = 15339-3)                                        



[id number] 01:09:07





             Test Item    Value        Reference Range Interpretation Comments

 

             Fibrinogen (test code = 8270549962) 96 mg/dL     167-453      LL   

        

 

             Lab Interpretation (test code = Abnormal                           

    



             00159-4)                                            



[facility] WITH [id number] 01:05:00





             Test Item    Value        Reference Range Interpretation Comments

 

             WBC (test code = 2.86         See_Comment  L             [Automated



             6690-2)                                             message] The sy

stem



                                                                 which generated



                                                                 this result



                                                                 transmitted



                                                                 reference range

:



                                                                 4.20 - 10.70



                                                                 10*3/?L. The



                                                                 reference range

 was



                                                                 not used to



                                                                 interpret this



                                                                 result as



                                                                 normal/abnormal

.

 

             RBC (test code = 2.34         See_Comment  L             [Automated



             789-8)                                              message] The sy

stem



                                                                 which generated



                                                                 this result



                                                                 transmitted



                                                                 reference range

:



                                                                 4.26 - 5.52



                                                                 10*6/?L. The



                                                                 reference range

 was



                                                                 not used to



                                                                 interpret this



                                                                 result as



                                                                 normal/abnormal

.

 

             HGB (test code = 5.1 g/dL     12.2-16.4    L            



             718-7)                                              

 

             HCT (test code = 17.9 %       38.4-49.3    L            



             4544-3)                                             

 

             MCV (test code = 76.5 fL      81.7-95.6    L            



             787-2)                                              

 

             MCH (test code = 21.8 pg      26.1-32.7    L            



             785-6)                                              

 

             MCHC (test code = 28.5 g/dL    31.2-35.0    L            



             786-4)                                              

 

             RDW-SD (test code = 59.5 fL      38.5-51.6    H            



             83141-2)                                            

 

             RDW-CV (test code = 21.6 %       12.1-15.4    H            



             788-0)                                              

 

             PLT (test code = 55           See_Comment  L             [Automated



             777-3)                                              message] The sy

stem



                                                                 which generated



                                                                 this result



                                                                 transmitted



                                                                 reference range

:



                                                                 150 - 328 10*3/

?L.



                                                                 The reference r

moose



                                                                 was not used to



                                                                 interpret this



                                                                 result as



                                                                 normal/abnormal

.

 

             MPV (test code =                                        Not Measure

d



             71076-6)                                            

 

             IPF % (test code = 6.8 %        1.2-10.7                  Platelet 

count



             [id number])                                         measured by



                                                                 fluorescence



                                                                 method.

 

             NRBC/100 WBC (test 0.0          See_Comment                [Automat

ed



             code = 6649723861)                                        message] 

The system



                                                                 which generated



                                                                 this result



                                                                 transmitted



                                                                 reference range

:



                                                                 0.0 - 10.0 /100



                                                                 WBCs. The refer

ence



                                                                 range was not u

sed



                                                                 to interpret th

is



                                                                 result as



                                                                 normal/abnormal

.

 

             NRBC x10^3 (test code              See_Comment                [Auto

mated



             = 5047776581)                                        message] The s

ystem



                                                                 which generated



                                                                 this result



                                                                 transmitted



                                                                 reference range

:



                                                                 10*3/?L. The



                                                                 reference range

 was



                                                                 not used to



                                                                 interpret this



                                                                 result as



                                                                 normal/abnormal

.

 

             GRAN MAT (NEUT) % 51.4 %                                 



             (test code = 770-8)                                        

 

             IMM GRAN % (test code 0.30 %                                 



             = 1624517598)                                        

 

             LYMPH % (test code = 31.5 %                                 



             736-9)                                              

 

             MONO % (test code = 13.3 %                                 



             5905-5)                                             

 

             EOS % (test code = 2.1 %                                  



             713-8)                                              

 

             BASO % (test code = 1.4 %                                  



             706-2)                                              

 

             GRAN MAT x10^3(ANC) 1.47 10*3/uL 1.99-6.95    L            



             (test code =                                        



             0306754371)                                         

 

             IMM GRAN x10^3 (test              0.00-0.06                 



             code = 5838851198)                                        

 

             LYMPH x10^3 (test code 0.90 10*3/uL 1.09-3.23    L            



             = 731-0)                                            

 

             MONO x10^3 (test code 0.38 10*3/uL 0.36-1.02                 



             = 742-7)                                            

 

             EOS x10^3 (test code = 0.06 10*3/uL 0.06-0.53                 



             711-2)                                              

 

             BASO x10^3 (test code 0.04 10*3/uL 0.01-0.09                 



             = 704-7)                                            

 

             BENJI CELLS (test code 2+           See_Comment  A             [Auto

mated



             = 7790-9)                                           message] The sy

stem



                                                                 which generated



                                                                 this result



                                                                 transmitted



                                                                 reference range

:



                                                                 (none). The



                                                                 reference range

 was



                                                                 not used to



                                                                 interpret this



                                                                 result as



                                                                 normal/abnormal

.

 

             SCHISTOCYTES (test 1+                        A            



             code = 800-3)                                        

 

             Lab Interpretation Abnormal                               



             (test code = 74112-4)                                        



[facility]





             Test Item    Value        Reference Range Interpretation Comments

 

             ALCOHOL (test code = 355 mg/dL                              



             7249528056)                                         

 

             RENETTA (test code = Toxic Greater than or                           



             RENETTA)         equal to 80 mg/dL. NOTE:                           



                          Whole blood values are                           



                          approximately 10% to 15%                           



                          lower than serum and                           



                          plasma.                                



[facility] 1 Units
2023 00:29:29





             Test Item    Value        Reference Range Interpretation Comments

 

             Cross Match Result Compatible                             



             (test code = 4409)                                        

 

             ISBT Blood Type Code 5100                                   



             (test code = 169949)                                        

 

             Unit Blood Type (test O Pos                                  



             code = 4410)                                        

 

             Unit Number (test [id number]                           



             code = 4411)                                        

 

             Blood Expiration Date                            



             & Time (test code =                                        



             083011)                                             

 

             Status Information Issued                                 



             (test code = 4412)                                        

 

             Product      Red Blood Cells                           



             Identification (test                                        



             code = 4413)                                        

 

             Product Code (test [id number]                               Performed

 at [facility]



             code = 4414)                                        Laboratory



                                                                 Services - GAL



                                                                 Blood [address] [place] [address]



                                                                 [address]Qadg Free:



                                                                 [phone]

A



                                                                 No. [id number]



[facility]NA, K, CL, CO2, 
GLUCOSE, BUN, CREATININE, CA)2023 00:27:54





             Test Item    Value        Reference Range Interpretation Comments

 

             NA (test code = 132 mmol/L   135-145      L            



             4795968756)                                         

 

             K (test code = 3.5 mmol/L   3.5-5.0                   



             0340093202)                                         

 

             CL (test code = 103 mmol/L                       



             9234442844)                                         

 

             CO2 TOTAL (test code = 17 mmol/L    23-31        L            



             0182393131)                                         

 

             AGAP (test code = 12           2-16                      



             0929787637)                                         

 

             BUN (test code = 7 mg/dL      7-23                      



             9338725801)                                         

 

             GLUCOSE (test code = 145 mg/dL           H            



             7080505220)                                         

 

             CREATININE (test code = 0.48 mg/dL   0.60-1.25    L            



             3732419754)                                         

 

             CALCIUM (test code = 6.9 mg/dL    8.6-10.6     L            



             1803928553)                                         

 

             eGFR (test code = 181.0        mL/min/1.73m2              



             4023654894)                                         

 

             RENETTA (test code = RENETTA) Association of                           



                          Glomerular Filtration                           



                          Rate (GFR) and Staging                           



                          of Kidney Disease*                           



                          +---------------------                           



                          --+-------------------                           



                          --+-------------------                           



                          ------+| GFR                           



                          (mL/min/1.73 m2) ?|                           



                          With Kidney Damage ?|                           



                          ?Without Kidney                           



                          Damage+---------------                           



                          --------+-------------                           



                          --------+-------------                           



                          ------------+| ?>90 ?                           



                          ? ? ? ? ? ? ? ?|                           



                          ?Stage one ? ? ? ? ?|                           



                          ? Normal ? ? ? ? ? ? ?                           



                          ?+--------------------                           



                          ---+------------------                           



                          ---+------------------                           



                          -------+| ?60-89 ? ? ?                           



                          ? ? ? ? ?| ?Stage two                           



                          ? ? ? ? ?| ? Decreased                           



                          GFR ? ? ? ?                            



                          +---------------------                           



                          --+-------------------                           



                          --+-------------------                           



                          ------+| ?30-59 ? ? ?                           



                          ? ? ? ? ?| ?Stage                           



                          three ? ? ? ?| ? Stage                           



                          three ? ? ? ? ?                           



                          +---------------------                           



                          --+-------------------                           



                          --+-------------------                           



                          ------+| ?15-29 ? ? ?                           



                          ? ? ? ? ?| ?Stage four                           



                          ? ? ? ? | ? Stage four                           



                          ? ? ? ? ?                              



                          ?+--------------------                           



                          ---+------------------                           



                          ---+------------------                           



                          -------+| ?<15 (or                           



                          dialysis) ? ?| ?Stage                           



                          five ? ? ? ? | ? Stage                           



                          five ? ? ? ? ?                           



                          ?+--------------------                           



                          ---+------------------                           



                          ---+------------------                           



                          -------+ *Each stage                           



                          assumes the associated                           



                          GFR level has been in                           



                          effect for at least                           



                          three months. ?Stages                           



                          1 to 5, with or                           



                          without kidney                           



                          disease, indicate                           



                          chronic kidney                           



                          disease. Notes:                           



                          Determination of                           



                          stages one and two                           



                          (with eGFR                             



                          >59mL/min/1.73 m2)                           



                          requires estimation of                           



                          kidney damage for at                           



                          least three months as                           



                          defined by structural                           



                          or functional                           



                          abnormalities of the                           



                          kidney, manifested by                           



                          either:Pathological                           



                          abnormalities or                           



                          Markers of kidney                           



                          damage (including                           



                          abnormalities in the                           



                          composition of the                           



                          blood or urine or                           



                          abnormalities in                           



                          imaging tests).                           

 

             Lab Interpretation Abnormal                               



             (test code = 89408-3)                                        



[facility]10[facility] (ALB,T.PRO,BILI
T,BU/BC,ALT,AST,ALK PHOS)2023 00:27:54





             Test Item    Value        Reference Range Interpretation Comments

 

             TOTAL BILI (test code = 8081354123) 3.0 mg/dL    0.1-1.1      H    

        

 

             BILI UNCON (test code = 5091526874) 1.5 mg/dL    0.1-1.1      H    

        

 

             BILI CONJ (test code = 8311447335) 0.3 mg/dL    0.0-0.3            

       

 

             T PROTEIN (test code = 4917763420) 5.6 g/dL     6.3-8.2      L     

       

 

             ALBUMIN (test code = 2649224525) 2.3 g/dL     3.5-5.0      L       

     

 

             ALK PHOS (test code = 9661751465) 182 U/L             H      

      

 

             ALTv (test code = 1742-6) 27 U/L       5-50                      

 

             AST(SGOT) (test code = 7091286730) 58 U/L       13-40        H     

       

 

             Lab Interpretation (test code = Abnormal                           

    



             48924-5)                                            



[facility] [id number] 00:27:54





             Test Item    Value        Reference Range Interpretation Comments

 

             CK (test code = 2787868983) 689 U/L             H            

 

             Lab Interpretation (test code = Abnormal                           

    



             54914-1)                                            



[facility] 00:27:54





             Test Item    Value        Reference Range Interpretation Comments

 

             MAGNESIUM (test code = 8221772696) 2.1 mg/dL    1.7-2.4            

       

 

             Lab Interpretation (test code = Normal                             

    



             11966-8)                                            



[facility]2023-[facility]-19 00:27:54





             Test Item    Value        Reference Range Interpretation Comments

 

             PHOSPHORUS (test code = 4694138167) 5.1 mg/dL    2.5-5.0      H    

        

 

             Lab Interpretation (test code = Abnormal                           

    



             62369-2)                                            



[facility]landAMMON[facility], [id number] 00:25:13





             Test Item    Value        Reference Range Interpretation Comments

 

             AMMONIA (test code = 9604421300) 71 umol/L    9-33         H       

     

 

             Lab Interpretation (test code = Abnormal                           

    



             82270-8)                                            



[facility] 20 + Lactic [id number] 00:14:10





             Test Item    Value        Reference Range Interpretation Comments

 

             PH (test code = 2) 7.35         7.35-7.45                 

 

             PCO2 (test code = 25           See_Comment  L             [Automate

d



             7557072329)                                         message] The sy

stem



                                                                 which generated



                                                                 this result



                                                                 transmitted



                                                                 reference range

: 35



                                                                 - 45 mmHg. The



                                                                 reference range

 was



                                                                 not used to



                                                                 interpret this



                                                                 result as



                                                                 normal/abnormal

.

 

             PO2 (test code = 158          See_Comment  H             [Automated



             4636709669)                                         message] The sy

stem



                                                                 which generated



                                                                 this result



                                                                 transmitted



                                                                 reference range

: 80



                                                                 - 100 mmHg. The



                                                                 reference range

 was



                                                                 not used to



                                                                 interpret this



                                                                 result as



                                                                 normal/abnormal

.

 

             HCO3 (test code = 14           See_Comment  L             [Automate

d



             7676800890)                                         message] The sy

stem



                                                                 which generated



                                                                 this result



                                                                 transmitted



                                                                 reference range

: 22



                                                                 - 26 mEq/L. The



                                                                 reference range

 was



                                                                 not used to



                                                                 interpret this



                                                                 result as



                                                                 normal/abnormal

.

 

             BE (test code = -11.2        See_Comment  L             [Automated



             9574862251)                                         message] The sy

stem



                                                                 which generated



                                                                 this result



                                                                 transmitted



                                                                 reference range

:



                                                                 -3.0 - 3.0 mEq/

L.



                                                                 The reference r

moose



                                                                 was not used to



                                                                 interpret this



                                                                 result as



                                                                 normal/abnormal

.

 

             THB (test code = 5.6 g/dL     13.5-18.0    LL           



             4004364673)                                         

 

             %O2HB (test code = 97.0 %       94.0-99.0                 



             5240180052)                                         

 

             %COHB ART (test code = 1.6 %        0.0-1.5      H            



             9709360084)                                         

 

             %METHB ART (test code = 0.6 %        0.4-1.5                   



             0764930042)                                         

 

             VOL%O2 ART (test code = 8.0 %        15.0-23.0    L            



             0183050471)                                         

 

             NA (test code = 128 mmol/L   135-145      L            



             1495293153)                                         

 

             K+ (test code = 3.2 mmol/L   3.5-5.0      L            



             8798506234)                                         

 

             AC CA IONZ (test code = 4.10 mg/dL   4.50-5.30    L            



             4443103210)                                         

 

             GLUCOSE (test code = 149 mg/dL           H            



             2484112138)                                         

 

             LACTIC ACID (test code 9.00 mmol/L  0.50-2.20    H            



             = 1975342283)                                        

 

             Lab Interpretation Abnormal                               



             (test code = 30740-0)                                        



[facility] 21 + LACTIC [id number] 00:10:44





             Test Item    Value        Reference Range Interpretation Comments

 

             PH (test code = 7.22         7.32-7.42    L            



             6658408043)                                         

 

             PCO2 YANELI (test code = 40           See_Comment  L             [Auto

mated



             4679115106)                                         message] The sy

stem



                                                                 which generated



                                                                 this result



                                                                 transmitted



                                                                 reference range

: 41



                                                                 - 51 mmHg. The



                                                                 reference range

 was



                                                                 not used to



                                                                 interpret this



                                                                 result as



                                                                 normal/abnormal

.

 

             PO2 YANELI (test code = 33           See_Comment                [Autom

ated



             7869265887)                                         message] The sy

stem



                                                                 which generated



                                                                 this result



                                                                 transmitted



                                                                 reference range

: 25



                                                                 - 40 mmHg. The



                                                                 reference range

 was



                                                                 not used to



                                                                 interpret this



                                                                 result as



                                                                 normal/abnormal

.

 

             HCO3 YANELI (test code = 16           See_Comment  L             [Auto

mated



             4412180841)                                         message] The sy

stem



                                                                 which generated



                                                                 this result



                                                                 transmitted



                                                                 reference range

: 24



                                                                 - 28 mEq/L. The



                                                                 reference range

 was



                                                                 not used to



                                                                 interpret this



                                                                 result as



                                                                 normal/abnormal

.

 

             AC VBE(BEAKER) (test -10.8        mEq/L                     



             code = 0315114189)                                        

 

             THB YANELI (test code = 6.1 g/dL     13.5-18.0    LL           



             [id number])                                         

 

             %O2HB YANELI (test code = 37.9 %       52.0-63.0    L            



             5164886225)                                         

 

             %COHB AYNELI (test code = 0.7 %        0.0-1.5                   



             9898559955)                                         

 

             %METHB YANELI (test code = 0.9 %        0.4-1.5                   



             9936077280)                                         

 

             VOL%O2 YANELI (test code = 3.3 %        6.0-12.0     L            



             5190617515)                                         

 

             NA (test code = 131 mmol/L   135-145      L            



             1586863404)                                         

 

             K+ (test code = 3.3 mmol/L   3.5-5.0      L            



             5009818769)                                         

 

             AC CA IONZ (test code = 4.20 mg/dL   4.50-5.30    L            



             6315597255)                                         

 

             GLUCOSE (test code = 149 mg/dL           H            



             5425384763)                                         

 

             LACTIC ACID (test code 8.68 mmol/L  0.50-2.20    H            



             = 5806351890)                                        

 

             Lab Interpretation Abnormal                               



             (test code = 89834-7)                                        



[facility] Metabolic Panel (NA, K, CL, CO2, 
GLUCOSE, BUN, CREATININE, CA)2023 22:07:47





             Test Item    Value        Reference Range Interpretation Comments

 

             NA (test code = 134 mmol/L   135-145      L            



             9256558262)                                         

 

             K (test code = 3.1 mmol/L   3.5-5.0      L            



             9340249817)                                         

 

             CL (test code = 103 mmol/L                       



             5895837558)                                         

 

             CO2 TOTAL (test code = 14 mmol/L    23-31        L            



             2479162544)                                         

 

             AGAP (test code = 17           2-16         H            



             1265808496)                                         

 

             BUN (test code = 6 mg/dL      7-23         L            



             9509578711)                                         

 

             GLUCOSE (test code = 153 mg/dL           H            



             6504233191)                                         

 

             CREATININE (test code = 0.54 mg/dL   0.60-1.25    L            



             4312149463)                                         

 

             CALCIUM (test code = 7.1 mg/dL    8.6-10.6     L            



             0712718203)                                         

 

             eGFR (test code = 158.0        mL/min/1.73m2              



             4805101312)                                         

 

             RENETTA (test code = RENETTA) Association of                           



                          Glomerular Filtration                           



                          Rate (GFR) and Staging                           



                          of Kidney Disease*                           



                          +---------------------                           



                          --+-------------------                           



                          --+-------------------                           



                          ------+| GFR                           



                          (mL/min/1.73 m2) ?|                           



                          With Kidney Damage ?|                           



                          ?Without Kidney                           



                          Damage+---------------                           



                          --------+-------------                           



                          --------+-------------                           



                          ------------+| ?>90 ?                           



                          ? ? ? ? ? ? ? ?|                           



                          ?Stage one ? ? ? ? ?|                           



                          ? Normal ? ? ? ? ? ? ?                           



                          ?+--------------------                           



                          ---+------------------                           



                          ---+------------------                           



                          -------+| ?60-89 ? ? ?                           



                          ? ? ? ? ?| ?Stage two                           



                          ? ? ? ? ?| ? Decreased                           



                          GFR ? ? ? ?                            



                          +---------------------                           



                          --+-------------------                           



                          --+-------------------                           



                          ------+| ?30-59 ? ? ?                           



                          ? ? ? ? ?| ?Stage                           



                          three ? ? ? ?| ? Stage                           



                          three ? ? ? ? ?                           



                          +---------------------                           



                          --+-------------------                           



                          --+-------------------                           



                          ------+| ?15-29 ? ? ?                           



                          ? ? ? ? ?| ?Stage four                           



                          ? ? ? ? | ? Stage four                           



                          ? ? ? ? ?                              



                          ?+--------------------                           



                          ---+------------------                           



                          ---+------------------                           



                          -------+| ?<15 (or                           



                          dialysis) ? ?| ?Stage                           



                          five ? ? ? ? | ? Stage                           



                          five ? ? ? ? ?                           



                          ?+--------------------                           



                          ---+------------------                           



                          ---+------------------                           



                          -------+ *Each stage                           



                          assumes the associated                           



                          GFR level has been in                           



                          effect for at least                           



                          three months. ?Stages                           



                          1 to 5, with or                           



                          without kidney                           



                          disease, indicate                           



                          chronic kidney                           



                          disease. Notes:                           



                          Determination of                           



                          stages one and two                           



                          (with eGFR                             



                          >59mL/min/1.73 m2)                           



                          requires estimation of                           



                          kidney damage for at                           



                          least three months as                           



                          defined by structural                           



                          or functional                           



                          abnormalities of the                           



                          kidney, manifested by                           



                          either:Pathological                           



                          abnormalities or                           



                          Markers of kidney                           



                          damage (including                           



                          abnormalities in the                           



                          composition of the                           



                          blood or urine or                           



                          abnormalities in                           



                          imaging tests).                           

 

             Lab Interpretation Abnormal                               



             (test code = 69606-1)                                        



[facility] [id number] 22:04:46





             Test Item    Value        Reference Range Interpretation Comments

 

             PROTIME PATIENT (test 27.5         See_Comment  H             [Auto

mated message]



             code = 5964-2)                                        The system [employer]



                                                                 generated this 

result



                                                                 transmitted ref

erence



                                                                 range: 10.1 - 1

2.6



                                                                 Seconds. The



                                                                 reference range

 was



                                                                 not used to int

erpret



                                                                 this result as



                                                                 normal/abnormal

.

 

             INR (test code = 6301-6) 2.5                                    Nor

mal INR <1.1;



                                                                 Warfarin Therap

eutic



                                                                 range 2.0 to 3.

0 or



                                                                 2.5 to 3.5, dep

ending



                                                                 upon the indica

tions.

 

             Lab Interpretation (test Abnormal                               



             code = 75646-6)                                        



[facility] 22:04:46





             Test Item    Value        Reference Range Interpretation Comments

 

             APTT Patient (test code 42           See_Comment  H             [Au

tomated message]



             = 3173-2)                                           The system [employer]



                                                                 generated this 

result



                                                                 transmitted ref

erence



                                                                 range: 26 - 36



                                                                 Seconds. The



                                                                 reference range

 was



                                                                 not used to int

erpret



                                                                 this result as



                                                                 normal/abnormal

.

 

             Lab Interpretation (test Abnormal                               



             code = 22897-9)                                        



[facility] / [id number] 22:02:45





             Test Item    Value        Reference Range Interpretation Comments

 

             WBC (test code = 3.90         See_Comment  L             [Automated

 message]



             3790-2)                                             The system [employer]



                                                                 generated this 

result



                                                                 transmitted ref

erence



                                                                 range: 4.20 - 1

0.70



                                                                 10*3/?L. The



                                                                 reference range

 was



                                                                 not used to int

erpret



                                                                 this result as



                                                                 normal/abnormal

.

 

             RBC (test code = 789-8) 2.63         See_Comment  L             [Au

tomated message]



                                                                 The system [employer]



                                                                 generated this 

result



                                                                 transmitted ref

erence



                                                                 range: 4.26 - 5

.52



                                                                 10*6/?L. The



                                                                 reference range

 was



                                                                 not used to int

erpret



                                                                 this result as



                                                                 normal/abnormal

.

 

             HGB (test code = 718-7) 5.8 g/dL     12.2-16.4    L            

 

             HCT (test code = 20.1 %       38.4-49.3    L            



             4544-3)                                             

 

             MCH (test code = 785-6) 22.1 pg      26.1-32.7    L            

 

             MCV (test code = 787-2) 76.4 fL      81.7-95.6    L            

 

             MCHC (test code = 28.9 g/dL    31.2-35.0    L            



             786-4)                                              

 

             PLT (test code = 777-3) 61           See_Comment  L             [Au

tomated message]



                                                                 The system [employer]



                                                                 generated this 

result



                                                                 transmitted ref

erence



                                                                 range: 150 - 32

8



                                                                 10*3/?L. The



                                                                 reference range

 was



                                                                 not used to int

erpret



                                                                 this result as



                                                                 normal/abnormal

.

 

             MPV (test code =                                        Not Measure

d



             31066-5)                                            

 

             RDW-CV (test code = 21.4 %       12.1-15.4    H            



             788-0)                                              

 

             RDW-SD (test code = 59.4 fL      38.5-51.6    H            



             81098-6)                                            

 

             NRBC x10^3 (test code =              See_Comment                [Au

tomated message]



             6478145803)                                         The system [employer]



                                                                 generated this 

result



                                                                 transmitted ref

erence



                                                                 range: 10*3/?L.

 The



                                                                 reference range

 was



                                                                 not used to int

erpret



                                                                 this result as



                                                                 normal/abnormal

.

 

             NRBC/100 WBC (test code 0.0          See_Comment                [Au

tomated message]



             = [id number])                                        The system [employer]



                                                                 generated this 

result



                                                                 transmitted ref

erence



                                                                 range: 0.0 - 10

.0



                                                                 /100 WBCs. The



                                                                 reference range

 was



                                                                 not used to int

erpret



                                                                 this result as



                                                                 normal/abnormal

.

 

             IPF % (test code = 4.9 %        1.2-10.7                  Platelet 

count



             [id number])                                         measured by



                                                                 fluorescence me

thod.

 

             Lab Interpretation Abnormal                               



             (test code = 64752-3)                                        



[facility] - The Type and Screen expires 
at midnight on the 3rd day after it was drawn. A current Type and Screen is 
required when RBCs are requested. For all other blood products, a Type and Scree
n performed during the current hospitalizati...2023 21:57:00





             Test Item    Value        Reference Range Interpretation Comments

 

             ABO & RH (test code = 20) O POSITIVE                             

 

             IAT (test code = 1185) Negative                               



[facility]GD[facility] 15:59:00TEXTPatient Name UMU TABARES of Birth 1970Record Number [id number]/Time of Procedure 
2022, 3:59:00 PMEndoscopist Tressa Santoro MD./Fellow 
Jorge Moreno INDICATIONS FOR EXAMINATION: Anemia unspecified. PROCEDURE 
PERFORMED:  EGD - EGD, diagnostic INSTRUMENTS: [id number]: None 
TOLERANCE: Good VISUALIZATION: Good MEDICATIONS: MAC AnesthesiaASA CLASSIFI
CATION: IIIANALGESIA: TIVA by anesthesia PROCEDURE TECHNIQUE:Prior to the 
procedure, a History and Physical was performed, and patient medications and 
allergies were reviewed. The risks and benefits ofthe procedure and the sedation
options and risks were discussed with the patient. Consent was obtained. Pulse, 
blood oxygen saturation, and blood pressure were monitored throughout the 
procedure. Afteradequate sedation, the endoscope was introduced through the 
mouth and under direct visualization advanced to the Second Part of Duodenum. 
Careful examination of the esophagus, stomach and duodenum was performed. 
FINDINGS:The upper, middle, and lower esophagus appeared normal. There were 
small varices that flattened with insufflationThe GE junction was normal.The 
gastric cardia, fundus, and body were normal.The were two 5 mm clean based 
ulcers at the distal antrum. No biopsies were taken.The pylorus,duodenal bulb, 
and descending duodenum through the second portion of the duodenum appeared 
normal. SPECIMEN COLLECTED: No ENDOSCOPIC DIAGNOSIS:Small gastric varices Two 
distal antrum 5mm clean based ulcers RECOMMENDATIONS:Test for H. Pylori with 
stool antigen. Treat if positive. Start PPI BID x 6-8 weeks Follow up outpatient
with gastroenterology clinic COMPLICATIONS:None. Intra-procedure complication: 
none; ESTIMATED BLOOD LOSS: None BLOOD PRODUCTS ADMINISTERED: NoneGRAFT/IMPLANT:
None TOTAL PROCEDURE TIME: TOTAL SEDATION TIME: COMMENTS: CPT CODE:67763-RR 
Esophagogastroduodenoscopy, flexible, transoral; diagnostic, including 
collection of specimen(s) by brushing or washing, when [facility] CODE:D64.9 Anemia, 
unspecified I was present during the entire viewing portion of the procedure. I 
personally reviewed the images and report prepared by the resident or fellow and
agree with the findings. After the procedure was completed, the patient was 
taken to the recovery in good condition. This Procedure was electronically 
signed of on :10/13/2022 1:52:32 PM By Tressa Broussard [facility]
2022 15:59:00TEXTPatient Name UMU TABARES of Birth 
1970Record Number [id number]/Time of Procedure 2022, 3:59:00 
PMEndoscopist Tressa Santoro MD./Fellow Jorge OLIVARES FOR EXAMINATION: Anemia unspecified. PROCEDURE PERFORMED: EGD - EGD, 
diagnostic INSTRUMENTS:[id number]: None TOLERANCE: Good VISUALIZATION: 
Good MEDICATIONS: MAC AnesthesiaASA CLASSIFICATION: IIIANALGESIA: TIVA by 
anesthesia PROCEDURE TECHNIQUE:Prior to the procedure, a History and Physical 
was performed, and patient medications and allergies were reviewed. The risks 
and benefits of the procedure and the sedation options and risks were discussed 
with the patient. Consent was obtained. Pulse, blood oxygen saturation, and 
blood pressure were monitored throughout the procedure. After adequate sedation,
the endoscope was introduced through the mouth and under direct visualization 
advanced to the Second Part of Duodenum. Careful examination of the esophagus, 
stomach and duodenum was performed. FINDINGS:The upper, middle, and lower 
esophagus appeared normal. There were small varices that flattened with 
insufflationThe GE junction was normal.The gastric cardia, fundus, and body were
normal.The were two 5 mm clean based ulcers at the distal antrum. No biopsies 
were taken.The pylorus, duodenal bulb, and descending duodenum through the 
second portion of the duodenum appeared normal. SPECIMEN COLLECTED: No 
ENDOSCOPIC DIAGNOSIS:Small gastric varices Two distal antrum 5mm clean based 
ulcers RECOMMENDATIONS:Test for H. Pylori with stool antigen. Treat if positive.
Start PPI BID x 6-8 weeks Follow up outpatient with gastroenterology clinic 
COMPLICATIONS:None. Intra-procedure complication: none; ESTIMATED BLOOD LOSS: 
None BLOOD PRODUCTS ADMINISTERED: NoneGRAFT/IMPLANT: None TOTAL PROCEDURE TIME: 
TOTAL SEDATION TIME: COMMENTS: CPT CODE:32336-NZ Esophagogastroduodenoscopy, 
flexible, transoral; diagnostic, including collection of specimen(s) by brushing
or washing, when peICD CODE:D64.9 Anemia, unspecified I was present during the 
entire viewing portion of the procedure. I personally reviewed the images and 
report prepared by the resident or fellow and agree with the findings. After the
procedure was completed, the patient was taken to the recovery in good 
condition. This Procedure waselectronically signed of on :10/13/2022 1:52:32 PM 
By Tressa Heller [facility] RBC Units, 1 [id number] 
11:42:00





             Test Item    Value        Reference Range Interpretation Comments

 

             RBC UNITS (test code = compatible                             



             49051893)                                           

 

             Unit ABO Type (test code = O                                      



             13242223)                                           

 

             Unit Rh Type (test code = POS                                    



             32339461)                                           

 

             Product Code (test code =                                   



             84114678)                                           

 

             Unit Number (test code = [id number]                           



             [id number])                                           

 

             Unit Status (test code = transfused                             



             03632851)                                           

 

             ISBT Product Code (test code = J8970U60                            

   



             70349)                                              

 

             Blood Type (test code = 84370) 510                                

   

 

             Blood Expiration Date (test [id number]                           



             code = 02023)                                        



[facility] [id number] 11:42:00





             Test Item    Value        Reference Range Interpretation Comments

 

             RBC UNITS (test code = compatible                             



             79055240)                                           

 

             Unit ABO Type (test code = O                                      



             34988415)                                           

 

             Unit Rh Type (test code = POS                                    



             16413157)                                           

 

             Product Code (test code =                                   



             98643034)                                           

 

             Unit Number (test code = [id number]                           



             90424446)                                           

 

             Unit Status (test code = transfused                             



             62562895)                                           

 

             ISBT Product Code (test code = F4836I38                            

   



             96621)                                              

 

             Blood Type (test code = 90194) 5100                                

   

 

             Blood Expiration Date (test                            



             code = 27052)                                        



[facility] RBC Units, 1 [id number] 11:42:00





             Test Item    Value        Reference Range Interpretation Comments

 

             RBC UNITS (test code = compatible                             



             85383197)                                           

 

             Unit ABO Type (test code = O                                      



             47073206)                                           

 

             Unit Rh Type (test code = POS                                    



             54398553)                                           

 

             Product Code (test code =                                   



             83638642)                                           

 

             Unit Number (test code = [id number]                           



             00199054)                                           

 

             Unit Status (test code = transfused                             



             36147972)                                           

 

             ISBT Product Code (test code = L2421G51                            

   



             30446)                                              

 

             Blood Type (test code = 96842) 5100                                

   

 

             Blood Expiration Date (test                            



             code = 50074)                                        



[facility] [id number] 10:08:00





             Test Item    Value        Reference Range Interpretation Comments

 

             Apheresis Platelets, not required                           



             Leukoreduced (test code =                                        



             18750640)                                           

 

             Unit ABO Type (test code = A                                      



             44388523)                                           

 

             Unit Rh Type (test code = NEG                                    



             40542504)                                           

 

             Product Code (test code =                                   



             08494038)                                           

 

             Unit Number (test code = [id number]                           



             45630227)                                           

 

             Unit Status (test code = transfused                             



             62511421)                                           

 

             ISBT Product Code (test code = Q1615H74                            

   



             92898)                                              

 

             Blood Type (test code = 58857) 0600                                

   

 

             Blood Expiration Date (test                            



             code = 38815)                                        



[facility] [id number] 10:08:00





             Test Item    Value        Reference Range Interpretation Comments

 

             Apheresis Platelets, not required                           



             Leukoreduced (test code =                                        



             20897189)                                           

 

             Unit ABO Type (test code = A                                      



             57812903)                                           

 

             Unit Rh Type (test code = NEG                                    



             46676882)                                           

 

             Product Code (test code =                                   



             26991797)                                           

 

             Unit Number (test code = [id number]                           



             40667267)                                           

 

             Unit Status (test code = transfused                             



             22214234)                                           

 

             ISBT Product Code (test code = O1463L63                            

   



             14070)                                              

 

             Blood Type (test code = 68672) 0600                                

   

 

             Blood Expiration Date (test                            



             code = 42609)                                        



[facility] [id number] 10:08:00





             Test Item    Value        Reference Range Interpretation Comments

 

             Apheresis Platelets, not required                           



             Leukoreduced (test code =                                        



             39051509)                                           

 

             Unit ABO Type (test code = A                                      



             70521576)                                           

 

             Unit Rh Type (test code = NEG                                    



             56369782)                                           

 

             Product Code (test code =                                   



             78372268)                                           

 

             Unit Number (test code = [id number]                           



             81505612)                                           

 

             Unit Status (test code = transfused                             



             1230)                                           

 

             ISBT Product Code (test code = W2130K66                            

   



             51672)                                              

 

             Blood Type (test code = 14106) 0600                                

   

 

             Blood Expiration Date (test [id number]                           



             code = 90558)                                        



[facility], [id number] 13:45:44





             Test Item    Value        Reference Range Interpretation Comments

 

             COVID-19 (SARS-COV-2) Detected     Not Detected A            INTERP

RETATION: This



             (test code = 45338-2)                                        patien

t's sample had



                                                                 detectable RNA 

present



                                                                 for the SARS-Co

V-2



                                                                 Coronavirus (CO

VID-19).



                                                                 A result with



                                                                 detectable RNA 

does not



                                                                 indicate the se

verity



                                                                 of infection. T

his



                                                                 result should b

e



                                                                 interpreted in



                                                                 conjunction wit

h



                                                                 clinical, radio

graphic,



                                                                 and other labor

atory



                                                                 findings and sh

ould not



                                                                 be used as the 

sole



                                                                 indicator of ac

tive



                                                                 infection with



                                                                 SARS-CoV-2 Citlalli

navirus



                                                                 (COVID-19). COM

MENT:



                                                                 This Hologic Ap

saulo



                                                                 SARS-CoV-2 mole

cular



                                                                 diagnostic assa

y



                                                                 utilizes Transc

ription



                                                                 Mediated Amplif

ication



                                                                 (TMA) technolog

y to



                                                                 rapidly detect 

the



                                                                 SARS-CoV-2 (COV

ID-19)



                                                                 virus from resp

iratory



                                                                 samples. In acc

ordance



                                                                 with the FDA's 

guidance



                                                                 document "Polic

y for



                                                                 Diagnostic Test

s for



                                                                 Coronavirus



                                                                 Disease-2019 du

ring the



                                                                 Public Health



                                                                 Emergency", thi

s test



                                                                 was developed, 

and its



                                                                 performance



                                                                 characteristics

 were



                                                                 verified by the

 [facility]

lar



                                                                 diagnostics lab

oratory



                                                                 and is authoriz

ed for



                                                                 clinical diagno

stic



                                                                 use. This labor

atory is



                                                                 certified under

 the



                                                                 Clinical Labora

tory



                                                                 [employer]ment [employer]

ndments



                                                                 (CLIA) as quali

fied to



                                                                 perform high co

mplexity



                                                                 clinical labora

tory



                                                                 testing.

 

             Lab Interpretation Abnormal                               



             (test code = 55834-9)                                        



Joe Clancyronavirus, CoVID-19, [id number] 13:45:44





             Test Item    Value        Reference Range Interpretation Comments

 

             COVID-19 (SARS-COV-2) Detected     Not Detected A            INTERP

RETATION: This



             (test code = 34178-7)                                        patidevaughn calderon's sample had



                                                                 detectable RNA 

present



                                                                 for the SARS-Co

V-2



                                                                 Coronavirus (CO

VID-19).



                                                                 A result with



                                                                 detectable RNA 

does not



                                                                 indicate the se

verity



                                                                 of infection. T

his



                                                                 result should b

e



                                                                 interpreted in



                                                                 conjunction wit

h



                                                                 clinical, radio

graphic,



                                                                 and other labor

atory



                                                                 findings and sh

ould not



                                                                 be used as the 

sole



                                                                 indicator of ac

tive



                                                                 infection with



                                                                 SARS-CoV-2 Citlalli

navirus



                                                                 (COVID-19). COM

MENT:



                                                                 This Hologic Ap

saulo



                                                                 SARS-CoV-2 mole

cular



                                                                 diagnostic assa

y



                                                                 utilizes Transc

ription



                                                                 Mediated Amplif

ication



                                                                 (TMA) technolog

y to



                                                                 rapidly detect 

the



                                                                 SARS-CoV-2 (COV

ID-19)



                                                                 virus from resp

iratory



                                                                 samples. In acc

ordance



                                                                 with the FDA's 

guidance



                                                                 document "Polic

y for



                                                                 Diagnostic Test

s for



                                                                 Coronavirus



                                                                 Disease-2019 du

ring the



                                                                 Public Health



                                                                 Emergency", thi

s test



                                                                 was developed, 

and its



                                                                 performance



                                                                 characteristics

 were



                                                                 verified by the

 [facility]

lar



                                                                 diagn[facility] lab

oratory



                                                                 and is authoriz

ed for



                                                                 clinical diagno

stic



                                                                 use. This labor

atory is



                                                                 certified under

 the



                                                                 Clinical Labora

tory



                                                                 Improvement Cir[employer]

ndments



                                                                 (CLIA) as quali

fied to



                                                                 perform high co

mplexity



                                                                 clinical labora

tory



                                                                 testing.

 

             Lab Interpretation Abnormal                               



             (test code = 50524-8)                                        



Joe Clancyronavirus, CoVID-19, [id number] 13:45:44





             Test Item    Value        Reference Range Interpretation Comments

 

             COVID-19 (SARS-COV-2) Detected     Not Detected A            INTERP

RETATION: This



             (test code = 97117-1)                                        patidevaughn calderon's sample had



                                                                 detectable RNA 

present



                                                                 for the SARS-Co

V-2



                                                                 Coronavirus (CO

VID-19).



                                                                 A result with



                                                                 detectable RNA 

does not



                                                                 indicate the se

verity



                                                                 of infection. T

his



                                                                 result should b

e



                                                                 interpreted in



                                                                 conjunction wit

h



                                                                 clinical, radio

graphic,



                                                                 and other labor

atory



                                                                 findings and sh

ould not



                                                                 be used as the 

sole



                                                                 indicator of ac

tive



                                                                 infection with



                                                                 SARS-CoV-2 Citlalli

navirus



                                                                 (COVID-19). COM

MENT:



                                                                 This Hologic Ap

saulo



                                                                 SARS-CoV-2 mole

cular



                                                                 diagnostic assa

y



                                                                 utilizes Transc

ription



                                                                 Mediated Amplif

ication



                                                                 (TMA) technolog

y to



                                                                 rapidly detect 

the



                                                                 SARS-CoV-2 (COV

ID-19)



                                                                 virus from resp

iratory



                                                                 samples. In acc

ordance



                                                                 with the FDA's 

guidance



                                                                 document "Polic

y for



                                                                 Diagnostic Test

s for



                                                                 Coronavirus



                                                                 Disease-2019 du

ring the



                                                                 Public Health



                                                                 Emergency", thi

s test



                                                                 was developed, 

and its



                                                                 performance



                                                                 characteristics

 were



                                                                 verified by the

 [facility]

lar



                                                                 diagnostics lab

oratory



                                                                 and is authoriz

ed for



                                                                 clinical diagno

stic



                                                                 use. This labor

atory is



                                                                 certified under

 the



                                                                 Clinical Labora

tory



                                                                 Improvement Cir[employer]

ndments



                                                                 (CLIA) as quali

fied to



                                                                 perform high co

mplexity



                                                                 clinical labora

tory



                                                                 testing.

 

             Lab Interpretation Abnormal                               



             (test code = 43773-0)                                        



[facility] Ql WILLIAM+[id number] 13:45:44





             Test Item    Value        Reference Range Interpretation Comments

 

             Hospitalized? (test Yes                                    



             code = 50825-2)                                        

 

             ICU? (test code = No                                     



             35324-7)                                            

 

             Symptomatic as defined No                                     



             by CDC? (test code =                                        



             07827-1)                                            

 

             Employed in  No                                     



             Healthcare? (test code                                        



             = 04037-0)                                          

 

             Resident in a No                                     



             congregate care                                        



             setting (including                                        



             nursing homes,                                        



             residential care for                                        



             people with                                         



             intellectual and                                        



             developmental                                        



             disabilities,                                        



             psychiatric treatment                                        



             facilities, group                                        



             homes, board and care                                        



             homes, homeless                                        



             shelter, foster care                                        



             or other): (test code                                        



             = 78285-1)                                          

 

             SARS-CoV-2 ORF1ab Resp DETECTED     Not Detected A            INTER

PRETATION: This



             Ql WILLIAM+probe (test                                        patient's

 sample had



             code = 08264-3)                                        detectable R

NA present



                                                                 for the SARS-Co

V-2



                                                                 Coronavirus (CO

VID-19).



                                                                 A result with



                                                                 detectable RNA 

does not



                                                                 indicate the se

verity



                                                                 of infection. T

his



                                                                 result should b

e



                                                                 interpreted in



                                                                 conjunction wit

h



                                                                 clinical, radio

graphic,



                                                                 and other labor

atory



                                                                 findings and sh

ould not



                                                                 be used as the 

sole



                                                                 indicator of ac

tive



                                                                 infection with



                                                                 SARS-CoV-2 Citlalli

navirus



                                                                 (COVID-19). COM

MENT:



                                                                 This Hologic Ap

saulo



                                                                 SARS-CoV-2 mole

cular



                                                                 diagnostic assa

y



                                                                 utilizes Transc

ription



                                                                 Mediated Amplif

ication



                                                                 (TMA) technolog

y to



                                                                 rapidly detect 

the



                                                                 SARS-CoV-2 (COV

ID-19)



                                                                 virus from resp

iratory



                                                                 samples. In acc

ordance



                                                                 with\XC2A0\the 

FDA's



                                                                 guidance docume

nt



                                                                 "Policy for Mikayla

gnostic



                                                                 Tests for Coron

avirus



                                                                 Disease-2019 du

ring the



                                                                 Public Health



                                                                 Emergency", bronson

s test



                                                                 was developed, 

and its



                                                                 performance



                                                                 characteristics

 were



                                                                 verified by the

 [facility] molecu

lar



                                                                 diagnostics lab

oratory



                                                                 and is authoriz

ed for



                                                                 clinical diagno

stic



                                                                 use. \XC2A0\Thi

s



                                                                 laboratory is c

ertified



                                                                 under the Clini

tyler



                                                                 Laboratory Impr

ovement



                                                                 Amendments (CLI

A) as



                                                                 qualified to pe

rform



                                                                 high complexity



                                                                 clinical labora

tory



                                                                 testing.



HHSHAV IgG Ser [id number] 15:17:48





             Test Item    Value        Reference Range Interpretation Comments

 

             HAV IgG Ser Ql (test code = 62380-8) POSITIVE     Negative     A   

         



[facility] Lead [id number] 09:29:5912 LEAD EKG FOR [facility] 
Test Date:  3187-77-03Ari Name: UMU WATTSTIZ Department: 5BMSPatient ID: 
[id number] Room: Gender: M Technician: ToshiaOB: 1970 Requested By: ULYSSES Massey Number: [id number] Reading MD: Linda Obrien 
MeasurementsIntervals  Axis Rate: 86 P: -2PR: 97 QRS: 50QRSD: 84 T: 24QT:  428 
QTc: 471 Interpretive StatementsSINUS RHYTHM WITH SHORT OK INTERVALPROLONGED QT 
INTERVALElectronically Signed On 2022 14:22:26 CDT by Linda Abrams
[place] [address] Lead [id number] 09:29:5912 LEAD EKG FOR [facility] Test Date: 2677-41-43Yqu Name: UMU WATTSTIZ Department: 5BMSPatient 
ID: [id number] Room: Gender: M Technician: AnnieDOB: 1970 Requested By: 
MARCY Massey Number: [id number] Reading MD: Linda Obrien 
MeasurementsIntervals  Axis Rate: 86 P: -2PR: 97 QRS: 50QRSD: 84 T: 24QT: 428 
QTc: 471 Interpretive StatementsSINUS RHYTHM WITH SHORT OK INTERVALPROLONGED QT 
INTERVALElectronically Signed On 2022 14:22:26 CDT by Linda [facility] CenterbarbraAndrew [place] [address] Lead [id number] 09:29:5912 LEAD EKG FOR [facility] Test Date: 5162-07-86Mvu Name: UMU TABARES Department: MSPatient 
ID: [id number] Room: Gender: M Technician: ToshiaOB: 1970  Requested By: 
MARCY Massey Number: [id number] Reading MD: Linda Obrien 
MeasurementsIntervals Axis Rate: 86 P: -2PR: 97 QRS: 50QRSD: 84 T: 24QT: 428  
QTc: 471 Interpretive StatementsSINUS RHYTHM WITH SHORT OK INTERVALPROLONGED QT 
INTERVALElectronically Signed On 2022 14:22:26 [facility]ed [facility]2022-07-11 09:15:17





             Test Item    Value        Reference Range Interpretation Comments

 

             Glucose POC (test code = 72871026) 137 mg/dL           H     

       

 

             Lab Interpretation (test code = Abnormal                           

    



             78824-8)                                            



[facility] GLUCOSE POC docked device2022-07-11 09:15:17





             Test Item    Value        Reference Range Interpretation Comments

 

             Glucose POC (test code = 83727159) 137 mg/dL           H     

       

 

             Lab Interpretation (test code = Abnormal                           

    



             41153-7)                                            



[facility] GLUCOSE POC docked device2022-07-11 09:15:17





             Test Item    Value        Reference Range Interpretation Comments

 

             Glucose POC (test code = 38129469) 137 mg/dL           H     

       

 

             Lab Interpretation (test code = Abnormal                           

    



             13268-0)                                            



[id number] 10[facility]34:38





             Test Item    Value        Reference Range Interpretation Comments

 

             T pallidum Ab Ser Ql Aggl (test NEGATIVE     Negative, Equivocal   

           



             code = 45341-8)                                        

 

             Reagin+T pallidum IgG+IgM NEGATIVE     Negative                  



             SerPl-Imp (test code = 72760-7)                                    

    



HHSHIV 1+2 Ab+HIV1 p24 Ag SerPl Ql [id number] 05:50:04





             Test Item    Value        Reference Range Interpretation Comments

 

             HIV 1+2 Ab+HIV1 p24 Ag SerPl Ql IA NEGATIVE     Negative           

       



             (test code = 85072-8)                                        



UZWTHCE-FmE-4 ORF1ab Resp Ql WILLIAM+zopge4727-23-29 04:05:45





             Test Item    Value        Reference Range Interpretation Comments

 

             Hospitalized? (test No                                     



             code = 60478-8)                                        

 

             ICU? (test code = No                                     



             34311-9)                                            

 

             Symptomatic as No                                     



             defined by CDC?                                        



             (test code =                                        



             44743-8)                                            

 

             Employed in  No                                     



             Healthcare? (test                                        



             code = 25236-4)                                        

 

             Resident in a No                                     



             congregate care                                        



             setting (including                                        



             nursing homes,                                        



             residential care for                                        



             people with                                         



             intellectual and                                        



             developmental                                        



             disabilities,                                        



             psychiatric                                         



             treatment                                           



             facilities, group                                        



             homes, board and                                        



             care homes, homeless                                        



             shelter, foster care                                        



             or other): (test                                        



             code = 39537-5)                                        

 

             SARS-CoV-2 ORF1ab NOT DETECTED Not Detected              INTERPRETA

TION: No



             Resp Ql WILLIAM+probe                                        detectable

 levels of



             (test code =                                        SARS-CoV-2



             35120-5)                                            Coronavirus



                                                                 (COVID-19) were



                                                                 present in this



                                                                 patient's sampl

e by



                                                                 this test. A no

t



                                                                 detected result

 does



                                                                 not exclude the



                                                                 possibility of 

active



                                                                 infection with 

this



                                                                 virus due to ot

her



                                                                 factors that ma

y



                                                                 affect the resu

lts



                                                                 such as a poorl

y



                                                                 collected sampl

e,



                                                                 viral titers be

low



                                                                 the limit of



                                                                 detection of th

e



                                                                 assay, and the



                                                                 infrequent



                                                                 possibility of



                                                                 inhibitors in t

he



                                                                 sample. This re

sult



                                                                 should be inter

preted



                                                                 in conjunction 

with



                                                                 clinical,



                                                                 radiographic, a

nd



                                                                 other laborator

y



                                                                 findings and sh

ould



                                                                 not be used as 

the



                                                                 sole indicator 

of



                                                                 active infectio

n with



                                                                 SARS-CoV-2



                                                                 Coronavirus



                                                                 (COVID-19).COMM

ENT:



                                                                 This Hologic Ap

saulo



                                                                 SARS-CoV-2 mole

cular



                                                                 diagnostic assa

y



                                                                 utilizes



                                                                 Transcription



                                                                 Mediated



                                                                 Amplification (

TMA)



                                                                 technology to r

apidly



                                                                 detect the SARS

-CoV-2



                                                                 (COVID-19) viru

s from



                                                                 respiratory shahriar

ples.



                                                                 In accordance



                                                                 with\XC2A0\the 

FDA's



                                                                 guidance docume

nt



                                                                 "Policy for



                                                                 Diagnostic Test

s for



                                                                 Coronavirus



                                                                 Disease-2019 du

ring



                                                                 the Public Heal





                                                                 Emergency", bronson

s test



                                                                 was developed, 

and



                                                                 its performance



                                                                 characteristics

 were



                                                                 verified by the



                                                                 [facility]



                                                                 molecular diagn

ostics



                                                                 laboratory and 

is



                                                                 authorized for



                                                                 clinical diagno

stic



                                                                 use. \XC2A0\Thi

s



                                                                 laboratory is



                                                                 certified under

 the



                                                                 Clinical Labora

tory



                                                                 Improvement



                                                                 Amendments (CLI

A) as



                                                                 qualified to pe

rform



                                                                 high complexity



                                                                 clinical labora

tory



                                                                 testing.



[facility]12 Lead [id number] 17:14:4312 LEAD EKG FOR [facility]  
Test Date:  Name: [facility] Department: 5520Patient ID: 
[id number] Room:  POD F Gender:  M Technician: [id number]: 1970 Requeste
d By: DENISE Saravia Number: [id number] Reading MD: Linda Obrien  
MeasurementsIntervals Axis Rate: 91 P: 24PR: 124 QRS: 50QRSD: 89 T: 41QT: 375 
QTc: 423 Interpretive StatementsSINUS RHYTHMElectronically Signed On 2022 
21:25:07 CDT by Linda Obrien [place] [address] Lead [id number] 17:14:4312
LEAD EKG FOR [facility]  Test Date: 9152-25-65Cch Name: 
[facility] Department: 5520Patient ID: [id number] Room:  POD F Gender: M
Technician: [id number]: 1970 Requested By: DENISE Saravia Number: 
[id number] Reading MD: Linda Obrien MeasurementsIntervals Axis Rate: 91 P: 
24PR: 124 QRS: 50QRSD: 89 T: 41QT: 375 QTc: 423 Interpretive StatementsSINUS 
RHYTHMElectronically Signed On 2022 21:25:07 CDT by Linda Obrien [facility]12 Lead [id number] 17:14:4312 LEAD EKG FOR [facility] Test Date:  Name: [facility] Department: 5520Patient 
ID: [id number] Room:  POD F Gender: M Technician: [id number]: 1970  
Requested By: DENISE Saravia Number: [id number] Reading MD: Linda Obrien 
MeasurementsIntervals Axis Rate: 91 P: 24PR: 124 QRS: 50QRSD: 89 T: 41QT: 375  
QTc: 423 Interpretive StatementsSINUS RHYTHMElectronically Signed On 2022 
21:25:07 CDT by Linda Obrien [facility] BODY [id number] 
17:50:22





             Test Item    Value        Reference Range Interpretation Comments

 

             ALBUMIN BF (test code 279.0 mg/dL                            



             = 3250901068)                                        

 

             RENETTA (test code = RENETTA) Result interpreted                           



                          relative to the serum                           



                          concentration. ?                           



[facility] [id number] 
15:55:23COVID DMT InterpretationInterpretation/Recommendations:Molecular NAAT 
Tests for Active Infection with the SARS-CoV-2 Virus:The patient has currently 
tested negative for the SARS-CoV-2 virus that causesCOVID-19 illness. This most 
likely indicates that the patient does not have an active infection withthe 
SARS-CoV-2 virus. However, infection is not completely ruled out as the false 
negative rate for molecular NAAT testing using a nasopharyngeal sample can be up
to 30%, mostly dependent on the timingof sample collection in relation to 
illness onset and any deficiencies in sampling techniques. If the patient has 
symptoms concerning for COVID-19 illness, a repeat NAAT test (PCR, Rapid ID Now,
etc.) should be performed, at which time the SARS-CoV-2 virus - if present - may
have reached a detectable viral load (usually peaking by the end of the first 
week of symptoms). Tests for IgM and/or IgG Antibodies to the SARS-CoV-2 
Virus:If the patient develops COVID-19 illness in the future, testing for IgMand
IgG antibodies approximately 3 weeks after illness onset will likely indicate if
the patient hasproduced antibodies to the SARS-CoV-2 virus. However, some 
patients may take longer to develop detectable antibodies, while some patients 
who were infected with SARS-CoV-2 may never develop antibodies.While antibodies 
to SARS-CoV-2 may provide some degree of immunity, at this time the strength and
duration of the antibody response is unknown. 
------------------------------------------------------------------------ 
Interpretation Result Comments:These interpretation comments are based upon all 
COVID-19 testing the patient has had at [facility], including molecular NAAT testing 
(more commonly known as PCRtesting and Rapid ID Now testing) and antibody 
testing. It does not take into account any testing that a patient has had 
outside of the [facility] medical record. [facility] LABORATORY SERVICESCOVID 
MdbbyfmBEZZ-VbJ-6 Rapid ID NOW (no units) ? ? Date ? ? ? ? ? ? ? ? ? ? Value ? ?
? ? ? ? ? 2021 ? ? ? ? ? ? ?Not Detected ? ? ? 2021 ? ? ? ? ? ? ? 
Not Detected ? ---------- [facility] LABORATORY SERVICESUnHouston Methodist West HospitalCBC with Kgtnnzvueksw8322-72-81 12:19:02





             Test Item    Value        Reference Range Interpretation Comments

 

             WBC (test code =              See_Comment                [Automated



             6690-2)                                             message] The sy

stem



                                                                 which generated



                                                                 this result



                                                                 transmitted



                                                                 reference range

:



                                                                 4.20 - 10.70



                                                                 10*3/?L. The



                                                                 reference range

 was



                                                                 not used to



                                                                 interpret this



                                                                 result as



                                                                 normal/abnormal

.

 

             RBC (test code =              See_Comment  L             [Automated



             789-8)                                              message] The sy

stem



                                                                 which generated



                                                                 this result



                                                                 transmitted



                                                                 reference range

:



                                                                 4.26 - 5.52



                                                                 10*6/?L. The



                                                                 reference range

 was



                                                                 not used to



                                                                 interpret this



                                                                 result as



                                                                 normal/abnormal

.

 

             HGB (test code = 7.9 g/dL     12.2-16.4    L            



             718-7)                                              

 

             HCT (test code = 24.8 %       38.4-49.3    L            



             4544-3)                                             

 

             MCV (test code = 80.0 fL      81.7-95.6    L            



             787-2)                                              

 

             MCH (test code = 25.5 pg      26.1-32.7    L            



             785-6)                                              

 

             MCHC (test code = 31.9 g/dL    31.2-35.0                 



             786-4)                                              

 

             RDW-SD (test code = 72.4 fL      38.5-51.6    H            



             51112-8)                                            

 

             RDW-CV (test code = 24.9 %       12.1-15.4    H            



             788-0)                                              

 

             PLT (test code =              See_Comment  LL            [Automated



             777-3)                                              message] The sy

stem



                                                                 which generated



                                                                 this result



                                                                 transmitted



                                                                 reference range

:



                                                                 150 - 328 10*3/

?L.



                                                                 The reference r

moose



                                                                 was not used to



                                                                 interpret this



                                                                 result as



                                                                 normal/abnormal

.

 

             MPV (test code =                                        Not Measure

d



             99074-5)                                            

 

             IPF % (test code = 6.3 %        1.2-10.7                  Platelet 

count



             0978112214)                                         measured by



                                                                 fluorescence



                                                                 method.

 

             NRBC/100 WBC (test              See_Comment                [Automat

ed



             code = 0818262346)                                        message] 

The system



                                                                 which generated



                                                                 this result



                                                                 transmitted



                                                                 reference range

:



                                                                 0.0 - 10.0 /100



                                                                 WBCs. The refer

ence



                                                                 range was not u

sed



                                                                 to interpret th

is



                                                                 result as



                                                                 normal/abnormal

.

 

             NRBC x10^3 (test code <0.01        See_Comment                [Auto

mated



             = 1747305860)                                        message] The s

ystem



                                                                 which generated



                                                                 this result



                                                                 transmitted



                                                                 reference range

:



                                                                 10*3/?L. The



                                                                 reference range

 was



                                                                 not used to



                                                                 interpret this



                                                                 result as



                                                                 normal/abnormal

.

 

             GRAN MAT (NEUT) % 59.8 %                                 



             (test code = 770-8)                                        

 

             IMM GRAN % (test code 0.20 %                                 



             = 2044506766)                                        

 

             LYMPH % (test code = 21.3 %                                 



             736-9)                                              

 

             MONO % (test code = 11.2 %                                 



             5905-5)                                             

 

             EOS % (test code = 6.5 %                                  



             713-8)                                              

 

             BASO % (test code = 1.0 %                                  



             706-2)                                              

 

             GRAN MAT x10^3(ANC) 3.11 10*3/uL 1.99-6.95                 



             (test code =                                        



             4043863605)                                         

 

             IMM GRAN x10^3 (test <0.03        0.00-0.06                 



             code = 4020437829)                                        

 

             LYMPH x10^3 (test code 1.11 10*3/uL 1.09-3.23                 



             = 731-0)                                            

 

             MONO x10^3 (test code 0.58 10*3/uL 0.36-1.02                 



             = 742-7)                                            

 

             EOS x10^3 (test code = 0.34 10*3/uL 0.06-0.53                 



             711-2)                                              

 

             BASO x10^3 (test code 0.05 10*3/uL 0.01-0.09                 



             = 704-7)                                            

 

             Lab Interpretation Abnormal                               



             (test code = 90659-3)                                        



[facility] Metabolic Panel (NA, K, CL, CO2, 
GLUCOSE, BUN, CREATININE, CA)2021 11:47:29





             Test Item    Value        Reference Range Interpretation Comments

 

             NA (test code = 135 mmol/L   135-145                   



             4484737109)                                         

 

             K (test code = 3.8 mmol/L   3.5-5.0                   



             2857292174)                                         

 

             CL (test code = 106 mmol/L                       



             6528144267)                                         

 

             CO2 TOTAL (test code = 26 mmol/L    23-31                     



             9419966466)                                         

 

             AGAP (test code =              2-16                      



             1024715730)                                         

 

             BUN (test code = 9 mg/dL      7-23                      



             7631903047)                                         

 

             GLUCOSE (test code = 103 mg/dL                        



             4942739459)                                         

 

             CREATININE (test code = 0.50 mg/dL   0.60-1.25    L            



             1957751472)                                         

 

             CALCIUM (test code = 7.7 mg/dL    8.6-10.6     L            



             1115038113)                                         

 

             eGFR (test code =              mL/min/1.73m2              



             0402371887)                                         

 

             RENETTA (test code = RENETTA) Association of                           



                          Glomerular Filtration                           



                          Rate (GFR) and Staging                           



                          of Kidney Disease*                           



                          +---------------------                           



                          --+-------------------                           



                          --+-------------------                           



                          ------+| GFR                           



                          (mL/min/1.73 m2) ?|                           



                          With Kidney Damage ?|                           



                          ?Without Kidney                           



                          Damage+---------------                           



                          --------+-------------                           



                          --------+-------------                           



                          ------------+| ?>90 ?                           



                          ? ? ? ? ? ? ? ?|                           



                          ?Stage one ? ? ? ? ?|                           



                          ? Normal ? ? ? ? ? ? ?                           



                          ?+--------------------                           



                          ---+------------------                           



                          ---+------------------                           



                          -------+| ?60-89 ? ? ?                           



                          ? ? ? ? ?| ?Stage two                           



                          ? ? ? ? ?| ? Decreased                           



                          GFR ? ? ? ?                            



                          +---------------------                           



                          --+-------------------                           



                          --+-------------------                           



                          ------+| ?30-59 ? ? ?                           



                          ? ? ? ? ?| ?Stage                           



                          three ? ? ? ?| ? Stage                           



                          three ? ? ? ? ?                           



                          +---------------------                           



                          --+-------------------                           



                          --+-------------------                           



                          ------+| ?15-29 ? ? ?                           



                          ? ? ? ? ?| ?Stage four                           



                          ? ? ? ? | ? Stage four                           



                          ? ? ? ? ?                              



                          ?+--------------------                           



                          ---+------------------                           



                          ---+------------------                           



                          -------+| ?<15 (or                           



                          dialysis) ? ?| ?Stage                           



                          five ? ? ? ? | ? Stage                           



                          five ? ? ? ? ?                           



                          ?+--------------------                           



                          ---+------------------                           



                          ---+------------------                           



                          -------+ *Each stage                           



                          assumes the associated                           



                          GFR level has been in                           



                          effect for at least                           



                          three months. ?Stages                           



                          1 to 5, with or                           



                          without kidney                           



                          disease, indicate                           



                          chronic kidney                           



                          disease. Notes:                           



                          Determination of                           



                          stages one and two                           



                          (with eGFR                             



                          >59mL/min/1.73 m2)                           



                          requires estimation of                           



                          kidney damage for at                           



                          least three months as                           



                          defined by structural                           



                          or functional                           



                          abnormalities of the                           



                          kidney, manifested by                           



                          either:Pathological                           



                          abnormalities or                           



                          Markers of kidney                           



                          damage (including                           



                          abnormalities in the                           



                          composition of the                           



                          blood or urine or                           



                          abnormalities in                           



                          imaging tests).                           

 

             Lab Interpretation Abnormal                               



             (test code = 78368-8)                                        



[id number]23:45





             Test Item    Value        Reference Range Interpretation Comments

 

             BF SEGS (test code = 6381687685) 4 %                               

     

 

             BF LYMPHS (test code = 2030542184) 17 %                            

       

 

             MACROPHAGE (test code = 7671672726) 69 %                           

        

 

             MESOS (test code = 4673988757) 10 %                                

   

 

             #CELS CNTD (test code = 3414555998)                                

        



[facility] [id number] 07:23:35





             Test Item    Value        Reference Range Interpretation Comments

 

             T.PROT BF (test 1109.0 mg/dL                           



             code =                                              



             1115675777)                                         

 

             UNSPUN BODY  Yellow                                 



             FLUID COLOR                                         



             (test code =                                        



             2756979092)                                         

 

             UNSPUN BODY  Slightly Cloudy                           



             FLUID CLARITY                                        



             (test code =                                        



             6780136925)                                         

 

             SPUN BODY FLUID Yellow                                 



             COLOR (test code                                        



             = 7969495536)                                        

 

             SPUN BODY FLUID Clear                                  



             CLARITY (test                                        



             code =                                              



             2659204740)                                         

 

             Sediment (test                                        The sediment



             code =                                              volume is <0.1



             1121385804)                                         mLs of the



                                                                 total fluid



                                                                 volume of 3mls



                                                                 and its color



                                                                 is red.

 

             RENETTA (test code = Test developed and                           



             RENETTA)         characteristics                           



                          determined by [facility]                           



                          Laboratory Services.                           



[facility] [id number] 07:19:43





             Test Item    Value        Reference Range Interpretation Comments

 

             BF COLOR (test Light Yellow                           



             code =                                              



             9273396248)                                         

 

             TURBIDITY (test Slightly Turbid                           



             code =                                              



             0115923568)                                         

 

             BF WBC Count              See_Comment                [Automated



             (test code =                                        message] The



             0874943142)                                         system which



                                                                 generated this



                                                                 result



                                                                 transmitted



                                                                 reference range

:



                                                                 /?L. The



                                                                 reference range



                                                                 was not used to



                                                                 interpret this



                                                                 result as



                                                                 normal/abnormal

.

 

             BF RBC Count <3000        See_Comment                [Automated



             (test code =                                        message] The



             7345021248)                                         system which



                                                                 generated this



                                                                 result



                                                                 transmitted



                                                                 reference range

:



                                                                 /?L. The



                                                                 reference range



                                                                 was not used to



                                                                 interpret this



                                                                 result as



                                                                 normal/abnormal

.

 

             RENETTA (test code = The reference range                           



             RENETTA)         and other method                           



                          performance                            



                          specifications have                           



                          not been established                           



                          for this body fluid.                           



                          ?The test results                           



                          must be integrated                           



                          into the clinical                           



                          context for                            



                          interpretation.                           



[facility] 
21:16:02





             Test Item    Value        Reference Range Interpretation Comments

 

             SARS-CoV-2 Rapid ID NOW Not Detected Not Detected              



             (test code = 61221-3)                                        

 

             RENETTA (test code = RENETTA) ID NOW COVID-19 Assay                        

   



                          is an isothermal                           



                          nucleic acid                           



                          amplification test                           



                          intended for the                           



                          qualitative detection                           



                          of nucleic acid from                           



                          SARS-CoV-2 viral RNA                           



                          in nasopharyngeal (NP)                           



                          specimens. It is used                           



                          under Emergency Use                           



                          Authorization (EUA) by                           



                          FDA. The limit of                           



                          detection (LOD) of the                           



                          assay is 125 Genome                           



                          Equivalents/mL. A                           



                          positive result is                           



                          indicative of the                           



                          presence of SARS-CoV-2                           



                          RNA. ?Clinical                           



                          correlation with                           



                          patient history and                           



                          other diagnostic                           



                          information is                           



                          necessary to determine                           



                          patient infection                           



                          status. A negative                           



                          (Not Detected) result                           



                          does not preclude                           



                          SARS-CoV-2 infection.                           



                          In patients with                           



                          clinical symptoms and                           



                          other tests that are                           



                          consistent with                           



                          SARS-CoV-2 infection,                           



                          negative results                           



                          should be treated as                           



                          presumptive negative                           



                          and a new specimen                           



                          should be tested with                           



                          alternative PCR                           



                          molecular test.                           



                          Invalid: Please                           



                          collect a new specimen                           



                          for repeat patient                           



                          testing if clinically                           



                          indicated.                             

 

             Lab Interpretation Normal                                 



             (test code = 07985-8)                                        



[facility] [id number] 
18:50:54 Cirrhotic liver morphology with evidence of portal hypertension 
withmoderate volume ascites, large esophageal varices and splenomegalyunchanged 
compared to prior. Since 2021, slight decreased now small left pleural 
effusion. Preliminary Report Dictated by Resident: Amy Bezold I, Maryamnaz ?Falamaki, MD., have reviewed this study and agree with theabove report.EXAM: CT
ABDOMEN AND PELVIS WITH CONTRAST HISTORY: 51 years-old Male presenting with 
history of cirrhosis andworsening abdominal distension COMPARISON: 2021 
TECHNIQUE AND FINDINGS: Contiguous axial imaging from the level of the lungbases
through the proximal thighs was performed after the administration ofintravenous
contrast. Coronal and sagittal reconstructions were obtained. Auto mA and/or 
iterative reconstruction were used to reduce radiation dose. FINDINGS: LOWER 
THORAX: Small sized left pleural effusion, decreased compared toprior. Minimal 
loculated fluid is also seen tracking in the right minorfissure. The lungs bases
are clear. Mild cardiomegalyThere is nopericardial effusion. LIVER: Nodular 
liver contour, this partial generated enlargement of lateralsegment of the left 
lobe and caudate lobe. No focal hepatic lesions seen onthis single, portal 
venous phase. The portal veins are patent. GALLBLADDER AND BILIARY TREE: No bi
liary ductal dilation. Mild gallbladderwall thickening appears stable and likely
secondary to chronic liverdisease. SPLEEN: The spleen is enlarged, measuring 
approximately 14.8 cm. PANCREAS: No ductal dilation or masses. ADRENAL GLANDS: 
No adrenal nodules. KIDNEYS: No hydronephrosis, stones, or masses. GI TRACT: 
Diffuse submucosal wall thickening seen throughout the small andlarge bowel 
likely secondary to chronic portal hypertension. No abnormallydilated bowel. 
Normal-appearing appendix. PERITONEUMAND RETROPERITONEUM: Moderate to large 
volume ascites. Noextraluminal free air. LYMPH NODES: No lymphadenopathy. 
PELVIS/BLADDER: Unremarkable urinary bladder. VESSELS: Similar appearance of 
enlarged esophageal varices seen extendingalong the lower esophagus. BONES AND 
SOFT TISSUES: Diffuse body wall edema. No suspicious lytic orsclerotic bony 
lesions. [facility], Radiant Results Inft User - 2021  1:52 PM CDTFormatting of 
this note might be different from the original.EXAM: CT ABDOMEN AND PELVIS WITH
CONTRASTHISTORY: 51 years-old Male presenting with history of cirrhosis 
andworsening abdominal distension COMPARISON: 2021TECHNIQUE AND FINDINGS: 
Contiguous axial imaging from the level of the lungbases through the proximal 
thighs was performed after the administration ofintravenous contrast. Coronal 
and sagittal reconstructions were obtained. Auto mA and/or iterative 
reconstruction were used to reduce radiation dose.FINDINGS:LOWER THORAX: Small 
sized left pleural effusion, decreased compared toprior. Minimal loculated fluid
is also seen tracking in the right minorfissure. The lungs bases are clear. Mild
cardiomegalyThere is nopericardial effusion.LIVER: Nodular liver contour, this 
partial generated enlargement of lateralsegment of the left lobe and caudate 
lobe. No focal hepatic lesions seenonthis single, portal venous phase. The 
portal veins are patent.GALLBLADDER AND BILIARY TREE: No biliary ductal 
dilation. Mild gallbladderwall thickening appears stable and likely secondary to
chronic liverdisease.SPLEEN: The spleen is enlarged, measuring approximately 
14.8 cm.PANCREAS: No ductal dilation or masses.ADRENAL GLANDS: No adrenal 
nodules.KIDNEYS: No hydronephrosis, stones, or masses.GI TRACT: Diffuse 
submucosal wall thickening seen throughout the small andlarge bowel likely 
secondary to chronic portal hypertension. No abnormallydilated bowel. Normal-
appearing appendix. PERITONEUM AND RETROPERITONEUM: Moderate to large volume 
ascites. Noextraluminal free air.LYMPH NODES: No lymphadenopathy.PELVIS/BLADDER:
Unremarkable urinary bladder.VESSELS: Similar appearance of enlarged esophageal 
varices seen extendingalong the lower esophagus.BONES AND SOFT TISSUES: Diffuse 
body wall edema. No suspicious lytic orsclerotic bony 
lesions.IMPRESSIONCirrhotic liver morphology with evidence of portal h
ypertension withmoderate volume ascites, large esophageal varices and 
splenomegalyunchanged comparedto prior.Since 2021, slight decreased now 
small left pleural effusion.Preliminary Report Dictated by Resident: Amy BezoldI, Renee Tang MD., have reviewed this study and agree with 
theabovereport.[facility] with [id number]
16:58:45





             Test Item    Value        Reference Range Interpretation Comments

 

             WBC (test code =              See_Comment                [Automated



             6690-2)                                             message] The sy

stem



                                                                 which generated



                                                                 this result



                                                                 transmitted



                                                                 reference range

:



                                                                 4.20 - 10.70



                                                                 10*3/?L. The



                                                                 reference range

 was



                                                                 not used to



                                                                 interpret this



                                                                 result as



                                                                 normal/abnormal

.

 

             RBC (test code =              See_Comment  L             [Automated



             789-8)                                              message] The sy

stem



                                                                 which generated



                                                                 this result



                                                                 transmitted



                                                                 reference range

:



                                                                 4.26 - 5.52



                                                                 10*6/?L. The



                                                                 reference range

 was



                                                                 not used to



                                                                 interpret this



                                                                 result as



                                                                 normal/abnormal

.

 

             HGB (test code = 9.1 g/dL     12.2-16.4    L            



             718-7)                                              

 

             HCT (test code = 28.1 %       38.4-49.3    L            



             4544-3)                                             

 

             MCV (test code = 79.2 fL      81.7-95.6    L            



             787-2)                                              

 

             MCH (test code = 25.6 pg      26.1-32.7    L            



             785-6)                                              

 

             MCHC (test code = 32.4 g/dL    31.2-35.0                 



             786-4)                                              

 

             RDW-SD (test code = 70.4 fL      38.5-51.6    H            



             54650-5)                                            

 

             RDW-CV (test code = 24.6 %       12.1-15.4    H            



             788-0)                                              

 

             PLT (test code =              See_Comment  LL            [Automated



             777-3)                                              message] The sy

stem



                                                                 which generated



                                                                 this result



                                                                 transmitted



                                                                 reference range

:



                                                                 150 - 328 10*3/

?L.



                                                                 The reference r

moose



                                                                 was not used to



                                                                 interpret this



                                                                 result as



                                                                 normal/abnormal

.

 

             MPV (test code =                                        Not Measure

d



             74121-1)                                            

 

             IPF % (test code = 7.9 %        1.2-10.7                  Platelet 

count



             [id number])                                         measured by



                                                                 fluorescence



                                                                 method.

 

             NRBC/100 WBC (test              See_Comment                [Automat

ed



             code = 8831913676)                                        message] 

The system



                                                                 which generated



                                                                 this result



                                                                 transmitted



                                                                 reference range

:



                                                                 0.0 - 10.0 /100



                                                                 WBCs. The refer

ence



                                                                 range was not u

sed



                                                                 to interpret th

is



                                                                 result as



                                                                 normal/abnormal

.

 

             NRBC x10^3 (test code <0.01        See_Comment                [Auto

mated



             = 4260256906)                                        message] The s

ystem



                                                                 which generated



                                                                 this result



                                                                 transmitted



                                                                 reference range

:



                                                                 10*3/?L. The



                                                                 reference range

 was



                                                                 not used to



                                                                 interpret this



                                                                 result as



                                                                 normal/abnormal

.

 

             GRAN MAT (NEUT) % 70.6 %                                 



             (test code = 770-8)                                        

 

             IMM GRAN % (test code 0.20 %                                 



             = 1188528825)                                        

 

             LYMPH % (test code = 15.4 %                                 



             736-9)                                              

 

             MONO % (test code = 8.6 %                                  



             5905-5)                                             

 

             EOS % (test code = 4.4 %                                  



             713-8)                                              

 

             BASO % (test code = 0.8 %                                  



             706-2)                                              

 

             GRAN MAT x10^3(ANC) 4.17 10*3/uL 1.99-6.95                 



             (test code =                                        



             8571576368)                                         

 

             IMM GRAN x10^3 (test <0.03        0.00-0.06                 



             code = 9847759683)                                        

 

             LYMPH x10^3 (test code 0.91 10*3/uL 1.09-3.23    L            



             = 731-0)                                            

 

             MONO x10^3 (test code 0.51 10*3/uL 0.36-1.02                 



             = 742-7)                                            

 

             EOS x10^3 (test code = 0.26 10*3/uL 0.06-0.53                 



             711-2)                                              

 

             BASO x10^3 (test code 0.05 10*3/uL 0.01-0.09                 



             = 704-7)                                            

 

             TARGET CELLS (test 2+           See_Comment  A             [Automat

ed



             code = 83957-0)                                        message] The

 system



                                                                 which generated



                                                                 this result



                                                                 transmitted



                                                                 reference range

:



                                                                 (none). The



                                                                 reference range

 was



                                                                 not used to



                                                                 interpret this



                                                                 result as



                                                                 normal/abnormal

.

 

             Lab Interpretation Abnormal                               



             (test code = 77910-0)                                        



[facility] [id number] 16:40:47





             Test Item    Value        Reference Range Interpretation Comments

 

             PROTIME PATIENT (test              See_Comment  H             [Auto

mated message]



             code = 5964-2)                                        The system [employer]



                                                                 generated this 

result



                                                                 transmitted ref

erence



                                                                 range: 10.1 - 1

2.6



                                                                 Seconds. The



                                                                 reference range

 was



                                                                 not used to int

erpret



                                                                 this result as



                                                                 normal/abnormal

.

 

             INR (test code = 6301-6)                                        Nor

mal INR <1.1;



                                                                 Warfarin Therap

eutic



                                                                 range 2.0 to 3.

0 or



                                                                 2.5 to 3.5, dep

ending



                                                                 upon the indica

tions.

 

             Lab Interpretation (test Abnormal                               



             code = 13555-9)                                        



[facility] (ALB, T.PRO, BILI T, 
BU/BC, ALT, AST, ALK PHOS)2021 16:36:51





             Test Item    Value        Reference Range Interpretation Comments

 

             TOTAL BILI (test code = 8849747191) 4.4 mg/dL    0.1-1.1      H    

        

 

             BILI UNCON (test code = 3713959393) 2.7 mg/dL    0.1-1.1      H    

        

 

             BILI CONJ (test code = 4433911385) 0.1 mg/dL    0.0-0.3            

       

 

             T PROTEIN (test code = 2613681487) 7.1 g/dL     6.3-8.2            

       

 

             ALBUMIN (test code = 0137026772) 2.9 g/dL     3.5-5.0      L       

     

 

             ALK PHOS (test code = 3510040123) 204 U/L             H      

      

 

             ALTv (test code = 1742-6) 23 U/L       5-50                      

 

             AST(SGOT) (test code = 2544642875) 51 U/L       13-40        H     

       

 

             Lab Interpretation (test code = Abnormal                           

    



             95488-3)                                            



[facility] (NA, K, CL, CO2, 
GLUCOSE, BUN, CREATININE, CA)2021 16:36:50





             Test Item    Value        Reference Range Interpretation Comments

 

             NA (test code = 136 mmol/L   135-145                   



             0994400098)                                         

 

             K (test code = 3.1 mmol/L   3.5-5.0      L            



             8656667761)                                         

 

             CL (test code = 101 mmol/L                       



             6885597749)                                         

 

             CO2 TOTAL (test code = 26 mmol/L    23-31                     



             5004554290)                                         

 

             AGAP (test code =              2-16                      



             4544067399)                                         

 

             BUN (test code = 9 mg/dL      7-479)                                         

 

             GLUCOSE (test code = 118 mg/dL           H            



             8943882170)                                         

 

             CREATININE (test code = 0.56 mg/dL   0.60-1.25    L            



             4633880024)                                         

 

             CALCIUM (test code = 8.2 mg/dL    8.6-10.6     L            



             5404902022)                                         

 

             eGFR (test code =              mL/min/1.73m2              



             6599908028)                                         

 

             RENETTA (test code = RENETTA) Association of                           



                          Glomerular Filtration                           



                          Rate (GFR) and Staging                           



                          of Kidney Disease*                           



                          +---------------------                           



                          --+-------------------                           



                          --+-------------------                           



                          ------+| GFR                           



                          (mL/min/1.73 m2) ?|                           



                          With Kidney Damage ?|                           



                          ?Without Kidney                           



                          Damage+---------------                           



                          --------+-------------                           



                          --------+-------------                           



                          ------------+| ?>90 ?                           



                          ? ? ? ? ? ? ? ?|                           



                          ?Stage one ? ? ? ? ?|                           



                          ? Normal ? ? ? ? ? ? ?                           



                          ?+--------------------                           



                          ---+------------------                           



                          ---+------------------                           



                          -------+| ?60-89 ? ? ?                           



                          ? ? ? ? ?| ?Stage two                           



                          ? ? ? ? ?| ? Decreased                           



                          GFR ? ? ? ?                            



                          +---------------------                           



                          --+-------------------                           



                          --+-------------------                           



                          ------+| ?30-59 ? ? ?                           



                          ? ? ? ? ?| ?Stage                           



                          three ? ? ? ?| ? Stage                           



                          three ? ? ? ? ?                           



                          +---------------------                           



                          --+-------------------                           



                          --+-------------------                           



                          ------+| ?15-29 ? ? ?                           



                          ? ? ? ? ?| ?Stage four                           



                          ? ? ? ? | ? Stage four                           



                          ? ? ? ? ?                              



                          ?+--------------------                           



                          ---+------------------                           



                          ---+------------------                           



                          -------+| ?<15 (or                           



                          dialysis) ? ?| ?Stage                           



                          five ? ? ? ? | ? Stage                           



                          five ? ? ? ? ?                           



                          ?+--------------------                           



                          ---+------------------                           



                          ---+------------------                           



                          -------+ *Each stage                           



                          assumes the associated                           



                          GFR level has been in                           



                          effect for at least                           



                          three months. ?Stages                           



                          1 to 5, with or                           



                          without kidney                           



                          disease, indicate                           



                          chronic kidney                           



                          disease. Notes:                           



                          Determination of                           



                          stages one and two                           



                          (with eGFR                             



                          >59mL/min/1.73 m2)                           



                          requires estimation of                           



                          kidney damage for at                           



                          least three months as                           



                          defined by structural                           



                          or functional                           



                          abnormalities of the                           



                          kidney, manifested by                           



                          either:Pathological                           



                          abnormalities or                           



                          Markers of kidney                           



                          damage (including                           



                          abnormalities in the                           



                          composition of the                           



                          blood or urine or                           



                          abnormalities in                           



                          imaging tests).                           

 

             Lab Interpretation Abnormal                               



             (test code = 26813-8)                                        



[id number]28





             Test Item    Value        Reference Range Interpretation Comments

 

             BF SEGS (test code = 4 %                                    



             4820347420)                                         

 

             BF LYMPHS (test code 24 %                                   



             = 1929771092)                                        

 

             MACROPHAGE (test 61 %                                   



             code = 4725058788)                                        

 

             MESOS (test code = 11 %                                   



             2568901381)                                         

 

             #CELS CNTD (test                                        



             code = 7129941278)                                        

 

             RENETTA (test code = Reviewed by HAILEE JACKSON)         DALE IBRAHIM, Director of                           



                          HEMATOPATHOLOGY                           



[facility] SMOOTH MUSCLE ANTIBODY2021-06-10 00:06:43





             Test Item    Value        Reference Range Interpretation Comments

 

             F-ACTIN (SMOOTH              See_Comment                If F-Actin 

(Smooth Muscle)



             MUSCLE) AB,                                         Antibody, IgG i

s negative,



             IGG(BEAKER) (test                                        the Smooth

 Muscle Antibody



             code = 67149-2)                                        titer by IFA

 is not



                                                                 performed.REFER

ENCE



                                                                 INTERVAL: F-Act

in (Smooth



                                                                 Muscle) Antibod

y, IgG by ?



                                                                 ? ? ? ? ? ? ? ?

 ?MARY LOU ?19



                                                                 Units or less .

......



                                                                 Negative ?20 - 

30 Units



                                                                 .......... Weak



                                                                 Positive-Sugges

t repeat ? ?



                                                                 ? ? ? ? ? ? ? ?

 ? ? ?



                                                                 testing in two 

to three



                                                                 weeks ? ? ? ? ?

 ? ? ? ? ? ?



                                                                 ? ? with fresh 

specimen.



                                                                 ?31 Units or gr

eater.....



                                                                 Positive-Sugges

tive of ? ?



                                                                 ? ? ? ? ? ? ? ?

 ? ? ?



                                                                 autoimmune hepa

titis type 1



                                                                 ? ? ? ? ? ? ? ?

 ? ? ? ? ?



                                                                 or chronic acti

ve



                                                                 hepatitis. F-ac

tin IgG



                                                                 antibodies have

 been shown



                                                                 to have increas

ed



                                                                 sensitivity for

 autoimmune



                                                                 hepatitis (AIH)

 but lower



                                                                 specificity suzette

n smooth



                                                                 muscle antibodi

es (SMA).



                                                                 F-actin IgG ant

ibodies can



                                                                 also be seen in



                                                                 SMA-negative di

sease



                                                                 controls (non-A

IH),



                                                                 especially in p

atients with



                                                                 primary biliary

 cirrhosis



                                                                 and chronic hep

atitis C



                                                                 infections. David

e patients



                                                                 with AIH may be



                                                                 SMA-positive bu

t negative



                                                                 for F-actin IgG

. Consider



                                                                 testing for SMA

 by IFA if



                                                                 suspicion for A

IH is



                                                                 strong.Performe

d By: [employer] [place] [address]



                                                                 [id number]

 Director:



                                                                 Margo Carlisle MD



                                                                 [Automated mess

age] The



                                                                 system which ge

nerated this



                                                                 result transmit

michelle



                                                                 reference range

: 0 - 19



                                                                 Units. The refe

rence range



                                                                 was not used to

 interpret



                                                                 this result as



                                                                 normal/abnormal

.



[facility] MITOCHONDRIAL ANTIBODY2021-06-10 00:06:42





             Test Item    Value        Reference Range Interpretation Comments

 

             AMA (test code =              See_Comment               REFERENCE I

NTERVAL:



             14251-3)                                            Mitochondrial (

M2) Antibody,



                                                                 IgG ? ?20.0 Uni

ts or less



                                                                 ......... Negat

david ?20.1 -



                                                                 24.9 Units.....

......



                                                                 Equivocal ?25.0

 Units or



                                                                 greater....... 

Positive



                                                                 Anti-mitochondr

ial



                                                                 antibodies (AMA

) are thought



                                                                 to be present i

n 90-95% of



                                                                 patients with p

rimary



                                                                 biliary cholang

itis (PBC).



                                                                 However, the fr

equency of



                                                                 detected antibo

dies may be



                                                                 cohort or assay

 dependent,



                                                                 as lower sensit

ivities have



                                                                 been reported. 

Not all PBC



                                                                 patients are po

sitive for



                                                                 AMA; some patie

nts may be



                                                                 positive for SP

100 and/or



                                                                  antibodie

s. A negative



                                                                 result does not

 rule out



                                                                 PBC.Performed B

y: [employer] [place] [address]



                                                                 [id number]

 Director:



                                                                 Margo Carlisle MD



                                                                 [Automated mess

age] The



                                                                 system which ge

nerated this



                                                                 result transmit

michelle reference



                                                                 range: 0.0 - 24

.9 Units. The



                                                                 reference range

 was not used



                                                                 to interpret th

is result as



                                                                 normal/abnormal

.



[facility] [id number] 20:17:32





             Test Item    Value        Reference Range Interpretation Comments

 

             Case Report (test code = Non-Gynecologic                           



             [id number])  Cytology ? ? ? ? ? ?                           



                          ? ? ? ? ? ? ?Case:                           



                          [id number] ? ? ? ? ?                           



                          ? ? ? ? ? ? ? ? ? ?                           



                          ?Authorizing                           



                          Provider: ?Amy Caldera MD ?                           



                          ? Collected: ? ? ? ?                           



                          ? 2021 1300 ? ?                           



                          ? ? ? ?Ordering                           



                          Location: ? ? [facility] ? ? ? ? ? ? ?                           



                          ?Received: ? ? ? ? ?                           



                          ?2021 1321 ? ?                           



                          ? ? ? ? ? ? ? ? ? ? ?                           



                          ? ? ? ?                                



                          Medicine/Surgery [facility]                           



                          7B ? ? ? ? ? ? ? ? ?                           



                          ? ? ? ? ? ? ? ? ? ? ?                           



                          ? ? ? ? ? ?Specimen:                           



                          ? ?ASCITES ? ? ? ? ?                           



                          ? ? ? ? ? ? ? ? ? ? ?                           



                          ? ? ? ? ? ? ? ? ? ? ?                           



                          ? ? ? ? ? ? ? ? ? ? ?                           



                          ?                                      

 

             Final Diagnosis (test a8mblUTrBOHmz6jxJEMrk                        

   



             code = [id number]) GFuZzEwMzNcZnRuYmpcdW                           



                          MxIHtccnRmMVxlcGljOTQ                           



                          jQ2khcwQiCHOulDEqR8Br                           



                          izidXVfqYJ1xIK0duMxle                           



                          ECynMCvDAYcPpTca4sxp5                           



                          33yGKbd0jrLVFOqixdfJs                           



                          4cYxoA59pz4W0YfuwB44c                           



                          pZRsMLV6PDFxNPVxqUYaY                           



                          ZTfNDD9WXIcaGUhB4toFY                           



                          PhCH7cbsryKSumMQnbOZY                           



                          qqPN9UEPouETcU2XwDCWg                           



                          ATcjZEXmdkk0MvIbKf4rk                           



                          GVyeTcyMFxwYXJkXHBsYW                           



                          faGIClIdKpAwQQWU2iLBT                           



                          RNW4QQA32SSNIQsERSY9K                           



                          RVNJUyBGTFVJRDpccGFyI                           



                          G3tBuLGDKJDYyKsOa9OKY                           



                          1BTElHTkFOVCBDRUxMUyA                           



                          vB0RYGTBODR9ZNbNjIRTe                           



                          od20JFR7WpXuo5U0PPPyF                           



                          sOjVOFlWN9hpVfiHRWbCU                           



                          5yKZEsQ6ctwH7xcgh1RmL                           



                          hGRJsRaL5XYLoeqR8Uqt1                           



                          BUGpEVwzi9ouc3CtW7Wmm                           



                          TDzlZw4o9ymUAIhGkN9wH                           



                          AjLWscW1ijomDukDWqGBG                           



                          aFQf1sTtqPhVaNSEsg4tp                           



                          cyBcZmNoYXJzZXQwIENhb                           



                          Egvcsb7lL16BOLncA8zkU                           



                          CdQDsiggIcFqH8OYlfKTD                           



                          iRhN5FVNmnGFaBNAbR5hw                           



                          ZWQwXGdyZWVuMFxibHVlM                           



                          UK9pFueg7L0yWOifVKhrC                           



                          ayHeScKzGeDDZXz1BtKTi                           



                          3zWmyD4OyHWQqLzV5bPWj                           



                          PIJkMBwtBUUvVLZoahR6i                           



                          G22XYgpvlP2hUDzo3Toa5                           



                          6ah522pV5xyGRlHJZ2IMA                           



                          zMMLkzXKkJGEbLBS5CBBc                           



                          sCFgM1xtKJXnTX4qiuvgW                           



                          KwxAAvbNTRacWG1AWCjpZ                           



                          HzS1TtCQAuIHdjURQinfm                           



                          0NwNcCf6skDOsxMalPMvp                           



                          r0urv7oorNGuXxx6QKOyN                           



                          wEqTfhbZGykg6Yzh4efVV                           



                          Gbgu9fTGZ5ySZybNzij3S                           



                          0bGUxXGRudGJsbnNiZGJc                           



                          VpA3BNohYZ8zuk02GVWzY                           



                          AX7yk8pzIHagGxgccIrwX                           



                          MoISfoC6TkIMLjr133ANT                           



                          zC1NyVPDag3L2auAkUqLy                           



                          DKFsnAC3rbI5KHXwPKp8m                           



                          XShlwT7ggInfHBsO2hbdB                           



                          4uALBbIE2qdmblk6axPSo                           



                          iUGjgNBEubEV1hkE3TUJd                           



                          yYEoF2ZotC4wPIHrCLetL                           



                          IOjypb0BoXeRp4wgOFpkZ                           



                          cyMFxzYmtwYWdlXHBnbmN                           



                          vbnRccGduZGVjXHBsYWlu                           



                          XHBsYWluXGYwXGZzMjRcc                           



                          JvfvVuktB6qBzJsVsIgOD                           



                          usNI1jGUVsG8srqHBoCPU                           



                          eYBBpV9lsAxGwiQ0nyHoq                           



                          MVxjZjJcZnMyMFxwYXIgS                           



                          SBoYXZlIHBlcnNvbmFsbH                           



                          zvwaN4hEZ7GALjLRycXOX                           



                          bFIGsdFSsdh6lnVdeGKSt                           



                          TL5yXPZqlpNjZAqldCrsW                           



                          PzkRMM5RJKeqEKonLOpqW                           



                          FkZSBieSByZXNpZGVudHM                           



                          lVKGcwLpuk3Zxt7JakHC6                           



                          cD7bk5mrm0AyPGMdjKR7G                           



                          U31vwF0lM0pDZZpRF6cUW                           



                          NxBJ1reGOptYPgDSZyi36                           



                          gdGhpcyByZXBvcnQuXHBs                           



                          YWluXGYyXGZzMjhcbGFuZ                           



                          zEwMzNcaGljaFxmMlxkYm                           



                          TgPIHoTCkcE0qjQbEkNnP                           



                          pNVhrXAJ7rD==                           

 

             Final Diagnosis Comment c9tqmIQzEFLqmOX9SBAbG                      

     



             (test code = [id number]) XTxe6qum1NwnVFszURdUA                     

      



                          etzKHkybYagh15lLP2dB5                           



                          8TL5dBQQxPyX4LWIbqvO4                           



                          Djk8ZCCuCCBbaJMaC157a                           



                          9egl8tyslTnhZA8nTeaIR                           



                          IcqcokNhV0DZytKDFvfck                           



                          xZCo1ZUwtGYBsvFQ9GDCi                           



                          zTJvT9RaRKHiQS0nkey3O                           



                          EX7SPlgHBLfJmT1XVUjdI                           



                          PrDEEabZvvLKjsn323ZPG                           



                          4LyYzRQTmhnMxuFnwnN9q                           



                          AdUkFDOIjLRqrpPml8umc                           



                          vXoW2W6cSUaBAGjgXNzy9                           



                          OhRQqeHQzrI9GpbBHayK7                           



                          vTRUaDPJwK8UzwC8kGY5t                           



                          XIHpBDLpgWegOY16fRvjl                           



                          PgjbYqntWrznLskJ6z0xG                           



                          ZqlJ4zbEGzdCR9tU0wQgF                           



                          QnwOkXBmdH58jxsXuC2Ol                           



                          yVOhqILlljBaBasgIZ2zf                           



                          GFyfQ==                                

 

             Clinical Information large new onset                           



             (test code = 1328713710) ascites                                

 

             Gross Description (test p2rkzPNzQRSfzAQ4JLRsK                      

     



             code = 1460092029) RXih7iwt5DstNCknOUlED                           



                          vnhTNqgcHjis13bGP8yE4                           



                          7PQ8cLEXrKaH5NZHohsE4                           



                          Dkh3BVQoJASneLRcX272t                           



                          5wpe9zjfjXhwNA7pFeaFO                           



                          KneperApU0UCruIUFbvxi                           



                          yPXy5XUrpMOFbzPM0VPZk                           



                          bMIeS6TcHEUaGE7oupv9O                           



                          EG0SHuhBBMhAyB9VDQibT                           



                          IxPWIbkCgcNAtrc852SFN                           



                          0JwHsKPZgkzW2TLvlEBIv                           



                          A4DtQ2XnYVtmJKN5SHXnA                           



                          CBcXHQgMSBcXGZsIFxcbm                           



                          R8y9odBEMvpDOwOBK9YBf                           



                          caWQgNTEwMDIgXFxkYiBP                           



                          BwYoXyKBNYCmYCq9NyZ0M                           



                          Ha6OEHUKmJoRcEwFND2KD                           



                          r0MmImQRl7EYd6ZJnBMwP                           



                          gYnS4DrFcXmX3KWZ5ZvG9                           



                          IFxcdCAyIFxcZmwgXFxmI                           



                          EFyaWFsIFxcZnMgMTAgXF                           



                          tfD52byQhvgS9hCrPdVLs                           



                          sRSXdWnSeDvGHYA8yORID                           



                          VT1VGM51NKXXYrVMZD4SS                           



                          VNJUyBGTFVJRFxwYXIgUm                           



                          FhOQg3KKTcEsIcg4baeRK                           



                          cZVZbBKIpA2Khv3Jwp4Ig                           



                          bmdlIGFtYmVyIGZsdWlkI                           



                          FxwYXIgUHJlcGFyZWQgMy                           



                          BzbGlkZXMgKDEgUGFwYW5                           



                          nW77tAO39XUX1sJ5hlCbe                           



                          SBEdGBHbcXLwt3zag6eoN                           



                          5h0j4GmaH7hPC8cKMGhKU                           



                          hpbnByZXAgcHJlcGFyYXR                           



                          zc13lCPAwxgtbMJWjN9Da                           



                          O6KoyiI5r8acxMtdm8Urc                           



                          AEhRG5voCNrnR==                           

 

             Disclaimer (test code = i2mkoVUgWXDgo8ihZWYor                      

     



             [id number])  GFuZzEwMzNcZnRuYmpcdW                           



                          FkUPihfcDmBFklg3XeO6X                           



                          yMjAwMFxhbnNpXGRlZmxh                           



                          dudzEKJdMKO8elXmLXJbX                           



                          RnvEDFjLOcaOn2wfSZsnZ                           



                          jwZsQxXEYpz7afwxGENUn                           



                          hCuCvR771KCLgUVlqe1sb                           



                          m9NlUGYgfUNph1B8KPSDp                           



                          djcwJm6mIgrH45dv6K8Bz                           



                          kuS2pbWGTcHGBvG2VmQF0                           



                          iWQWdPxy4JXM7TYR1PQXf                           



                          DPKxO1KdLN7jZMYdsTQhH                           



                          Cv3o4uyxCtjOSCePHZ0p5                           



                          ucXXeeueFwXB1hft6kmSh                           



                          6q9jrtcYsGSUfVIEfnXGN                           



                          DHDkG8NouYexAx7epRd0q                           



                          IjoFozcVML6Ata0HA0yuw                           



                          20mmw9bCliHNGhpudzNjE                           



                          7QOkjTNNtwlptZFj4YDve                           



                          KOVpzIB0GQVzoTPkZ5ZuG                           



                          PAxIV5ibgn9XUL7TGbdYW                           



                          ZfHxD1CSUbzTWnWHRjuVc                           



                          iXRjic263FRQ8VtXiMA7f                           



                          F0Wre5Y9wZ0wrPSnEPQkl                           



                          PWaFjKkLAUvmy9ysPIpMH                           



                          dlx1WwJNE8orQ7eGDjmBE                           



                          oJUZqPS00Xtjiq4MjXjqj                           



                          r9NgJ82uzBR2FJcvi6xdX                           



                          G0jZlP1rwSlIGixt7gghZ                           



                          9mLhY6XXhdXU2vSS3uVHW                           



                          duS3psbliLXVpDgWdljud                           



                          RLTsaOdqooTbUk4fyLbkB                           



                          TI4KMgbD7ahkM7kJoX0PX                           



                          vlP2yvzN8vWEm2LEwveSU                           



                          6STEclB0rHN0tycdcp3si                           



                          ZUuuYJcuLWSuhmC5zpE9D                           



                          HTaaZZdC5BpvO3oOZGiTD                           



                          6bmdazp7xiCFJ2PLtaAWU                           



                          dUYD7ZvQbVOLhl6Xpbmd5                           



                          GpNjh2HtdNNnEAwgP17if                           



                          970RRRviuAgT0rvuJAhof                           



                          yfoZUavuemDGguevT1AUL                           



                          zptNvj0UkUXVxTHN2YQkg                           



                          YTzryKNjXZLscCeao8owZ                           



                          3RscGFyXHBsYWluXGYxXG                           



                          ZzMjBcbGFuZzEwMzNcaGl                           



                          jaFxmMVxkYmNoXGYxXGxv                           



                          H5ykAkIdK7RcFRSmXtNqm                           



                          LPcS5kvXYeigmDtZYHnso                           



                          PamVK8ZIghL0h4BPOsksD                           



                          qfAa8hiEdVnGiVVCcVXN2                           



                          KCsklRZkNSJeu7Whoauyr                           



                          XGsSd2yjJAfDSFlpC9qKC                           



                          QmAAPvTKwfQV9akTq1JPA                           



                          LsPFoqNOeErBLRBEfLF53                           



                          goLpSCOMgtvlx1G9PPtrZ                           



                          SEqd8QakGMfC8dxe8QiOZ                           



                          Hnw91aZV8ry3U8j7qqDXH                           



                          8VD2be1PuNRLelFCooWXb                           



                          NSBaj6Mvyakts3FwCUWcy                           



                          bAse6PdTXYavvRbcKDnVP                           



                          TuqaFyfs7gfgGfVCJjXYD                           



                          mX1ApfsyypAjmzvNsIJCr                           



                          wd1pdpLqYFU5VBPRSBLxS                           



                          NHfh1FccW5rpVJVAZC8qU                           



                          Axuu9bskWPcGHcYPSmol7                           



                          8OWBrYY5aI6irBIJbXKSj                           



                          rfSsnCGmr5LbCTGawZF2h                           



                          LOcCS0OFuYWi37hBIVwSQ                           



                          GQopKmFYJjcKbtsEA5hhI                           



                          0eE7cRJcLBODtLbs+IFRo                           



                          MTMVZCBdQC3qnrEii8Ahy                           



                          bJjqNavXONrgGCjp4GqhP                           



                          Rfh4AufOlda6OtuEGctVH                           



                          xZQ8ySQPauhlcYWYoXPID                           



                          SeBPQYFyzzP1p2KgLPCqN                           



                          UInLMQ6iNqvgui8OYFcpE                           



                          1gKVGwX4vevxsaQNkzDEJ                           



                          ra4JtzX5teZQRhVSmn3Bh                           



                          jUXjlIOGbCFcMQ7rkdMlG                           



                          OvAYCxFENY7ilKkQEEbj1                           



                          KvVEhpC4adU98vfUqzfSl                           



                          1jDL6VTX9jY1pGgq+IFxw                           



                          YXJccGFyIEFwcHJvcHJpY                           



                          SKxmImzisRsR4SwdqVsyB                           



                          6nlVPyhxRbUG3vJE4bH1U                           



                          0eYOjPHGiecHgb1ijJGoy                           



                          dmUgYmVlbiByZXZpZXdlZ                           



                          BOzn2MrMSroSNK3INmoox                           



                          BpbmNsdWRpbmcgSCZFLCB                           



                          CbXBhwEWnLAA2FZrjydYd                           



                          qjTmFD0deW6rwQdzfI4to                           



                          IBifFH5hqcvQXMsFNDncK                           



                          zwMMEpGM3qsWLhNHIhpsG                           



                          JqDvqiYJosY2cF1ZoDBWt                           



                          GAAojq3kBYNceF0tWUxyg                           



                          2VydmljZXMgYXJlIHBlcm                           



                          Fnqf6tZYDhlKPTHJ3HTGe                           



                          vlZCqa9OgqaReA7sKAGK4                           



                          NUQwNjYwMjgxKSBleGNlc                           



                          FJwXDAjaa31XXRylN9prP                           



                          xjNIOijS1chS4sfLszzH5                           



                          wIwEdRqKyAJrwVS7pJOSc                           



                          H8pllLMdKFLlVHRsY8ebD                           



                          lMyvQ2hyCxsHKdqAxBtMw                           



                          YhSSobXGW4hY==                           

 

             Embedded Images (test                                        



             code = [id number])                                        



[facility] ABDOMINAL [id number] 20:17:32





             Test Item    Value        Reference Range Interpretation Comments

 

             Case Report (test code = Non-Gynecologic                           



             [id number])  Cytology ? ? ? ? ? ?                           



                          ? ? ? ? ? ? ?Case:                           



                          [id number] ? ? ? ? ?                           



                          ? ? ? ? ? ? ? ? ? ?                           



                          ?Authorizing                           



                          Provider: ?Amy Caldera MD ?                           



                          ? Collected: ? ? ? ?                           



                          ? 2021 1300 ? ?                           



                          ? ? ? ?Ordering                           



                          Location: ? ? [facility]                           



                          Health ? ? ? ? ? ? ?                           



                          ?Received: ? ? ? ? ?                           



                          ?2021 1321 ? ?                           



                          ? ? ? ? ? ? ? ? ? ? ?                           



                          ? ? ? ?                                



                          Medicine/Surgery [facility]                           



                          7B ? ? ? ? ? ? ? ? ?                           



                          ? ? ? ? ? ? ? ? ? ? ?                           



                          ? ? ? ? ? ?Specimen:                           



                          ? ?ASCITES ? ? ? ? ?                           



                          ? ? ? ? ? ? ? ? ? ? ?                           



                          ? ? ? ? ? ? ? ? ? ? ?                           



                          ? ? ? ? ? ? ? ? ? ? ?                           



                          ?                                      

 

             Final Diagnosis (test o5frhOCkWUDgx8zlRYByk                        

   



             code = [id number]) GFuZzEwMzNcZnRuYmpcdW                           



                          MxIHtccnRmMVxlcGljOTQ                           



                          cD3kvuaLqMYTdgILiH0Du                           



                          nmvvUGxaTH0bLF3omKdth                           



                          AVcsAImSSNpKvMlg2wos6                           



                          53rENpd8jzODUOhcjkfOt                           



                          6yZjaN22ug7Y8OoohT07e                           



                          zTXmCTX9KWXlCLKscIMgA                           



                          SBeLLW9PHLjjWIpS9exKY                           



                          FaZB4mhbjkKHouEZpkMHC                           



                          cfBL5NYPmcKEzK2XxVGEb                           



                          DRutDOWbfhh2CnUdAv0pj                           



                          GVyeTcyMFxwYXJkXHBsYW                           



                          xmKHAsInUwOnSPRE7eNSP                           



                          CWF0BEM15HHISQfZCWY9I                           



                          RVNJUyBGTFVJRDpccGFyI                           



                          Y9dZhSPLQXHTlRrLj0LKK                           



                          1BTElHTkFOVCBDRUxMUyA                           



                          hO4MTLSFAJR2JYgAeZGEt                           



                          qr12KEK6MhPjg0Z4QQGqE                           



                          yXiCJZqIC5xgCvwYBFlTJ                           



                          9iOSIkN5kpcE8ycxu1HpS                           



                          oRAUmSqX8CFXpilC2Iew7                           



                          FNQnKEkci4tan3JwA1Uqx                           



                          KSuzMz0c4cvSPEgFmR2eX                           



                          LbGFsnJ2qtcjUfsJIyPHF                           



                          bKQp9cUdmRbDyNFArz5ut                           



                          cyBcZmNoYXJzZXQwIENhb                           



                          Uxhfsv7gF30NZIdeT4dcY                           



                          HuAYsussScSzR8WPjeTQQ                           



                          mIvY2CBIqbTGcZTSfE8nj                           



                          ZWQwXGdyZWVuMFxibHVlM                           



                          MF0hSssg2T0jMNqfDJcgA                           



                          moEvPhAsLuNUGOk5XgTYz                           



                          1rLhiA6PyIABkEiM4dUSq                           



                          NDEoNGkdLUHePZMopjL0s                           



                          O93WQyzleI4zZMxw7Pne7                           



                          0xw683zJ3gkUKzNQN4VSD                           



                          cQQDfcJMkGEClPQY6XHIb                           



                          rOFyD5ddYKYfSM6qlplfJ                           



                          QqzELsgCTWemER9KFYekA                           



                          SeU1GfBKLyQOfmXAZrhhu                           



                          9QoObHl5kqNSsdEbzSDzb                           



                          h1cdt7wlcLGaWxe3CKPbE                           



                          cNnCrrwTSuik2Bch0esKE                           



                          Qqpm1pSDF9hVBvwCzpc9R                           



                          0bGUxXGRudGJsbnNiZGJc                           



                          YcJ6YDdiNS9zrs94BMKiU                           



                          YO9vj1itOYmgXfbqnOcrP                           



                          ErEWqtB2EnZIGum486IDQ                           



                          bY6ZxGRFtj8Q4nsSaIxKf                           



                          WBVkdUC2xbY3NMIgXMw7a                           



                          GPjqbS5ykGyrHQqS8dbeH                           



                          8pTTUtNU6lcxxgg5ckTUv                           



                          zDZzuZFYmkFN0beS4DVOz                           



                          kISyI4KtqM4nVPHoHOiwM                           



                          NAungx0PbHxVk0krCKzyE                           



                          cyMFxzYmtwYWdlXHBnbmN                           



                          vbnRccGduZGVjXHBsYWlu                           



                          XHBsYWluXGYwXGZzMjRcc                           



                          JnimWtjdQ1pIiZxQmBuBS                           



                          tiGX6uQAKxL1ptpMRnVJQ                           



                          ePTLpB7yiQuRfrA7gkCnf                           



                          MVxjZjJcZnMyMFxwYXIgS                           



                          SBoYXZlIHBlcnNvbmFsbH                           



                          qytnI1yFT1HYDeDAtjEWO                           



                          bUOJfnSCmqv9ibSyhTENh                           



                          BR5qMPFbxzIiOTybhLciQ                           



                          FwpEAQ0AZSpeRYdlOMblS                           



                          FkZSBieSByZXNpZGVudHM                           



                          jHQLxzZhwd2Rrr8EifLP7                           



                          iB8zm0jiv1WkHGYxjSR3Q                           



                          H72zcT6nC3aSDJoNX0lVA                           



                          OpGF5klYLpdJPuENWjk64                           



                          gdGhpcyByZXBvcnQuXHBs                           



                          YWluXGYyXGZzMjhcbGFuZ                           



                          zEwMzNcaGljaFxmMlxkYm                           



                          KkWHMkNAxrD5zeKhKgAgD                           



                          mEIxaQPX1qC==                           

 

             Final Diagnosis Comment p1htlCCkOEFutRF9OCJlP                      

     



             (test code = [id number]) MOzv9kwk8JpzOZvpDPiLU                     

      



                          hgwTUgumNuis91eCC1bG9                           



                          7KV7cIDZpSiJ6DAYkvcC3                           



                          Heo6VEMnASWsnBKpT590a                           



                          8vvs1dlkiBqoZL8cMzsJS                           



                          VxnzxmOfF1EInjPBPqyxg                           



                          bMOt7SKkkLWWbjNV3LHYe                           



                          dKJpY9ZjYRUxIG6cxkw2K                           



                          RT6GSlpLLYoBxH3NCTfeD                           



                          GuKXHvvCpzCMvxp866UJU                           



                          3LsWrYOAbvoFrxHkxqO5x                           



                          AiWpTIWLhZQqicCuk0yxc                           



                          jKcQ5V5uRRaHKHpqPTcv6                           



                          CiVZdkZAwuI8VxxKLdmP8                           



                          kUPTcBIWfI8RwmB4bLQ7w                           



                          SNJjOPIiuTvfUX26pVlha                           



                          NdcmRxxhMxbwVegN3a2cH                           



                          PmvD8snDJtyNS5lP5gFsN                           



                          AqhSmKEqaM45sgmGuC2Sg                           



                          oIZetOXwrrAoLtdtUH5nj                           



                          GFyfQ==                                

 

             Clinical Information large new onset                           



             (test code = [id number]) ascites                                

 

             Gross Description (test s2ysmLPeBYCwxIG4RSUuK                      

     



             code = [id number]) KJcb2tsu9LyeQLltYMtDD                           



                          zfkUVgyzZiah01jOH3uL1                           



                          9FO1zWDMuTsG8KSIbwhO4                           



                          Rns2LITnXAKztHTmC405p                           



                          2bsn9kfokUheTR3iZzqEG                           



                          TqyzxaHkE0IKmrZFAcccf                           



                          sQHz0XBffWNPorZI3GXFb                           



                          wDMdJ5VvEYEsXG6cphr7D                           



                          DM7BQtmNWGhAmB1BTUqtK                           



                          PeQDGudHnhDGzht902DMW                           



                          4AmTnEQRricV5ELkvBFAw                           



                          X2YzA4VkXRrvJJY6TXHkX                           



                          CBcXHQgMSBcXGZsIFxcbm                           



                          Y4o4mgGNMkuQYcEUT9TAj                           



                          caWQgNTEwMDIgXFxkYiBP                           



                          ZlGqDwUFBGDtZRt7BaS2G                           



                          Kj6SXVNKqQrMyBcUHY0JU                           



                          t6BuFxZCl7PBr3VSoSUzX                           



                          hAcC3LkVvXuH4XNC7AnC8                           



                          IFxcdCAyIFxcZmwgXFxmI                           



                          EFyaWFsIFxcZnMgMTAgXF                           



                          rdF85crAwpzI0aAuBeROm                           



                          aBCLkPoYsFkSJOR1eTPHN                           



                          TU6SLW26NRHTKnBROV6IH                           



                          VNJUyBGTFVJRFxwYXIgUm                           



                          MlDWq7BFHrToGez1clqTA                           



                          yIJFgNWSmF6Zfo7Vnt9Ci                           



                          bmdlIGFtYmVyIGZsdWlkI                           



                          FxwYXIgUHJlcGFyZWQgMy                           



                          BzbGlkZXMgKDEgUGFwYW5                           



                          yL69qTV05VQA6yK3tsYbp                           



                          QBBtACGvxNYxl9eem2goD                           



                          6d1u4GxsC3eWB8jPXOzTS                           



                          hpbnByZXAgcHJlcGFyYXR                           



                          ta69zGJYbqbtfJTDnB5Ry                           



                          N9MlmsV5r6drgRfqy5Lvl                           



                          IIsPM1fbQNhuS==                           

 

             Disclaimer (test code = t1zluOCmSQMdo1ksGMLoj                      

     



             [id number])  GFuZzEwMzNcZnRuYmpcdW                           



                          TcVFdchdTgCEqax1CaR6M                           



                          yMjAwMFxhbnNpXGRlZmxh                           



                          cslwSQLyIBC2niWrODFqF                           



                          QgaWEJvIFuxCb7nfQKdrM                           



                          zuXsVxQCMfo6yyopVWBRd                           



                          cDtEjL090QKLsURrhx0ty                           



                          u9UnTADuhNHmh7K9DNXJa                           



                          nixbHz5jRojT30ar3H2Fo                           



                          pfI5rrJORbSVMyP6AcFP7                           



                          gOJOqAfq6CNR4WPQ4PHFb                           



                          ZWDyE9JkNN5pAEQoqXHvN                           



                          Ui2h5udwHzuZBMqCUI3r3                           



                          syIOqolxXwDF8tzc9lqLd                           



                          3u1bnvdRjHUGlSNKauGRS                           



                          KCKhH1KfuZqpCw3ppRr0q                           



                          EwpRaxmXSF4Abl0VG0kvx                           



                          03shi8oWgrZZQakeztPvC                           



                          5EFxaUKAhsefeLKj5MYyq                           



                          LVJohIE4BQCshQCaK9RaZ                           



                          BQeOL7gffi6GQY4YHqaGO                           



                          FqKqG9JXFgyYGxKIFyxJq                           



                          eMGluq401CLG2CnIwFY8x                           



                          R6Nsl8Y5cK4seSRqTLDna                           



                          KFtDaKcVCAdhs7weEVmPS                           



                          aew7IkCKQ9wuD5eCIoeXT                           



                          iUQPgPH34Ptrqm1YrBmpt                           



                          j1PgU80vwMV8XVwco3sgV                           



                          S2cEzW6qzQpTQxpc1tyjF                           



                          0tBvA2TLfxXR9qIY3iDZN                           



                          ixH6kagxrTKEzOvVrytst                           



                          LLHldGjjioGyKy0keUjoI                           



                          CQ9AAknO2ozkW3mFuV1EM                           



                          xxQ1hswJ6aLHy4EYbepBF                           



                          1HWPxaJ8cWS8bxfkwm4yw                           



                          YSdsVEmbOGRtsxU4soG2W                           



                          IXnsRJnU2HztP1lIEWaUS                           



                          8zfsbjx1koJVJ0BLfzENL                           



                          gJOB7SqLnWQPfu3Bbvtp9                           



                          VhNal9LrpBTeZFisU93df                           



                          470ZWMqvsMiI7ixrLWldr                           



                          oxlQXntugaEKjuimG6OPL                           



                          eknKrf8CcZMJlUNV4SVtd                           



                          DTekqETfAYHaoEdqj4hgF                           



                          3RscGFyXHBsYWluXGYxXG                           



                          ZzMjBcbGFuZzEwMzNcaGl                           



                          jaFxmMVxkYmNoXGYxXGxv                           



                          M4ztTaRsY5OvKYWwSjKfq                           



                          RVqF9yoOAfgvnNaEWNrac                           



                          GqcMB7AZecP8b3XOKzzsV                           



                          jqIh7frWqPiTvCWYxHUS0                           



                          KKblmVRwBZCka3Fuxbiwf                           



                          AIzEk7njVUvOGKafB0cPF                           



                          QbCGMcBVagXQ1imOx7ZGU                           



                          JjEXxaEFeRsCXLXQcJS83                           



                          viAxHFXCjstpz7T2HAtoF                           



                          ZPjx8KjoGEoI4jli7PkOS                           



                          Qwp82xDQ1rv4L7y5ldNGQ                           



                          1CB8mr2UiHVTqwHToqNEd                           



                          XMQbv3Yokmbpy0QxOSIzs                           



                          yLyc0PkCPHgzhTdrVUjQA                           



                          ElizMfia0cfbDeWJZrBJS                           



                          vF6DjqkaunJgmmhQkXYNb                           



                          jl3jinQjPOX9KTBXUYThR                           



                          HXpl3AbiZ8qaCSPESK9zO                           



                          Bpwq8tliKWnSZtRYSxke4                           



                          3QNLbHI2jP5qiHGCzHSEu                           



                          mqJhkPAnj4UyTVStcJN5j                           



                          OUuMU3TPlZCv26sDYJnIE                           



                          EDmqVtBGOuoEhxcHL4weH                           



                          9yZ4xXSxGOJThZiz+IFRo                           



                          THCNVVBwWM6iblOvz0Rei                           



                          lMelSooSULobGGme2LxjQ                           



                          Ubr5UplLzst3SboKUjgYA                           



                          dBY4eSXFiolehQGCvLOFR                           



                          RrKOFJVnfiE8f0XvBMUpW                           



                          JHyJQU1tDyykrv0ZBTfjL                           



                          3zRUXkE2hioomyYQrzHUL                           



                          hf6FphQ6tvTNTuFSiy3Vt                           



                          dFSpiRVUfEWgWA2mftWfK                           



                          OzEUJzNWUT6liIuXZSci9                           



                          FgGNiyL8nwO67jtDfndTu                           



                          4eSR0DDI1oG8kOsv+IFxw                           



                          YXJccGFyIEFwcHJvcHJpY                           



                          ZKccGhrklPpM4CdunJjtG                           



                          1xvTTfwgCvUI3mOA2pR4H                           



                          2kJQcADFtdfIqd0gfXIrr                           



                          dmUgYmVlbiByZXZpZXdlZ                           



                          GEuo8HrYDgiTGL7SCmdqs                           



                          BpbmNsdWRpbmcgSCZFLCB                           



                          YxHEfnXXjXZR0ASwvfoIo                           



                          mwOjYU3kvL7acTpywF9sy                           



                          SXoxBE2gqzjDZBvAJUoeK                           



                          zxZLFlQX1opAClJATiibQ                           



                          FvIghuRNqeX5pX7VkBWQa                           



                          YCVguq5wXZEvuA6qCVctk                           



                          2VydmljZXMgYXJlIHBlcm                           



                          Uwty7rDURwrXAQWF2WLEb                           



                          ojRJnn5DnhbKoL2lLCBG4                           



                          NUQwNjYwMjgxKSBleGNlc                           



                          RYoNBBiel88NBFrwR2vpB                           



                          zaCTYbkR9pbB1frOpcxD2                           



                          lFpTsUaCrGDdpOZ4kDSWn                           



                          Q3eziVDkAHRmFKXpV9eoI                           



                          zMypW4scSwrYRqfQwRoMd                           



                          NtKIizKIQ6iD==                           

 

             Embedded Images (test                                        



             code = 4467580319)                                        



[id number]:43





             Test Item    Value        Reference Range Interpretation Comments

 

             ALBUMIN BF (test code 476.0 mg/dL                            



             = [id number])                                        

 

             RENETTA (test code = RENETTA) Result interpreted                           



                          relative to the serum                           



                          concentration. ?                           



[facility] [id number] 17:43:43





             Test Item    Value        Reference Range Interpretation Comments

 

             ALBUMIN BF (test code 476.0 mg/dL                            



             = [id number])                                        

 

             RENETTA (test code = RENETTA) Result interpreted                           



                          relative to the serum                           



                          concentration. ?                           



[facility] ([id number][facility] 
15:36:48





             Test Item    Value        Reference Range Interpretation Comments

 

             NA (test code = 132 mmol/L   135-145      L            



             7291707994)                                         

 

             K (test code = 3.0 mmol/L   3.5-5.0      L            



             1259006345)                                         

 

             CL (test code = 98 mmol/L                        



             6338394062)                                         

 

             CO2 TOTAL (test code = 32 mmol/L    23-31        H            



             7119518083)                                         

 

             AGAP (test code =              2-16                      



             9202793811)                                         

 

             BUN (test code = 5 mg/dL      7-23         L            



             9185160771)                                         

 

             GLUCOSE (test code = 156 mg/dL           H            



             4818758726)                                         

 

             CREATININE (test code = 0.48 mg/dL   0.60-1.25    L            



             9366578087)                                         

 

             TOTAL BILI (test code = 4.7 mg/dL    0.1-1.1      H            



             4132409421)                                         

 

             CALCIUM (test code = 7.6 mg/dL    8.6-10.6     L            



             5658728353)                                         

 

             T PROTEIN (test code = 6.5 g/dL     6.3-8.2                   



             5924397967)                                         

 

             ALBUMIN (test code = 2.7 g/dL     3.5-5.0      L            



             4685358513)                                         

 

             ALK PHOS (test code = 109 U/L                          



             8994578488)                                         

 

             ALTv (test code = 22 U/L       5-50                      



             1742-6)                                             

 

             AST(SGOT) (test code = 70 U/L       13-40        H            



             7656706130)                                         

 

             eGFR (test code =              mL/min/1.73m2              



             0947054424)                                         

 

             RENETTA (test code = RENETTA) Association of                           



                          Glomerular Filtration                           



                          Rate (GFR) and Staging                           



                          of Kidney Disease*                           



                          +---------------------                           



                          --+-------------------                           



                          --+-------------------                           



                          ------+| GFR                           



                          (mL/min/1.73 m2) ?|                           



                          With Kidney Damage ?|                           



                          ?Without Kidney                           



                          Damage+---------------                           



                          --------+-------------                           



                          --------+-------------                           



                          ------------+| ?>90 ?                           



                          ? ? ? ? ? ? ? ?|                           



                          ?Stage one ? ? ? ? ?|                           



                          ? Normal ? ? ? ? ? ? ?                           



                          ?+--------------------                           



                          ---+------------------                           



                          ---+------------------                           



                          -------+| ?60-89 ? ? ?                           



                          ? ? ? ? ?| ?Stage two                           



                          ? ? ? ? ?| ? Decreased                           



                          GFR ? ? ? ?                            



                          +---------------------                           



                          --+-------------------                           



                          --+-------------------                           



                          ------+| ?30-59 ? ? ?                           



                          ? ? ? ? ?| ?Stage                           



                          three ? ? ? ?| ? Stage                           



                          three ? ? ? ? ?                           



                          +---------------------                           



                          --+-------------------                           



                          --+-------------------                           



                          ------+| ?15-29 ? ? ?                           



                          ? ? ? ? ?| ?Stage four                           



                          ? ? ? ? | ? Stage four                           



                          ? ? ? ? ?                              



                          ?+--------------------                           



                          ---+------------------                           



                          ---+------------------                           



                          -------+| ?<15 (or                           



                          dialysis) ? ?| ?Stage                           



                          five ? ? ? ? | ? Stage                           



                          five ? ? ? ? ?                           



                          ?+--------------------                           



                          ---+------------------                           



                          ---+------------------                           



                          -------+ *Each stage                           



                          assumes the associated                           



                          GFR level has been in                           



                          effect for at least                           



                          three months. ?Stages                           



                          1 to 5, with or                           



                          without kidney                           



                          disease, indicate                           



                          chronic kidney                           



                          disease. Notes:                           



                          Determination of                           



                          stages one and two                           



                          (with eGFR                             



                          >59mL/min/1.73 m2)                           



                          requires estimation of                           



                          kidney damage for at                           



                          least three months as                           



                          defined by structural                           



                          or functional                           



                          abnormalities of the                           



                          kidney, manifested by                           



                          either:Pathological                           



                          abnormalities or                           



                          Markers of kidney                           



                          damage (including                           



                          abnormalities in the                           



                          composition of the                           



                          blood or urine or                           



                          abnormalities in                           



                          imaging tests).                           

 

             Lab Interpretation Abnormal                               



             (test code = 68192-4)                                        



[facility]. METABOLIC PANEL ([id number])2021 
15:36:48





             Test Item    Value        Reference Range Interpretation Comments

 

             NA (test code = 132 mmol/L   135-145      L            



             8776121116)                                         

 

             K (test code = 3.0 mmol/L   3.5-5.0      L            



             1687252754)                                         

 

             CL (test code = 98 mmol/L                        



             1609888397)                                         

 

             CO2 TOTAL (test code = 32 mmol/L    23-31        H            



             7823227257)                                         

 

             AGAP (test code =              2-16                      



             4488204965)                                         

 

             BUN (test code = 5 mg/dL      7-23         L            



             9219479185)                                         

 

             GLUCOSE (test code = 156 mg/dL           H            



             5153576103)                                         

 

             CREATININE (test code = 0.48 mg/dL   0.60-1.25    L            



             1856055297)                                         

 

             TOTAL BILI (test code = 4.7 mg/dL    0.1-1.1      H            



             1628949770)                                         

 

             CALCIUM (test code = 7.6 mg/dL    8.6-10.6     L            



             4200824407)                                         

 

             T PROTEIN (test code = 6.5 g/dL     6.3-8.2                   



             6266673379)                                         

 

             ALBUMIN (test code = 2.7 g/dL     3.5-5.0      L            



             6196411175)                                         

 

             ALK PHOS (test code = 109 U/L                          



             4918029515)                                         

 

             ALTv (test code = 22 U/L       5-50                      



             1742-6)                                             

 

             AST(SGOT) (test code = 70 U/L       13-40        H            



             8048118807)                                         

 

             eGFR (test code =              mL/min/1.73m2              



             3401995425)                                         

 

             RENETTA (test code = RENETTA) Association of                           



                          Glomerular Filtration                           



                          Rate (GFR) and Staging                           



                          of Kidney Disease*                           



                          +---------------------                           



                          --+-------------------                           



                          --+-------------------                           



                          ------+| GFR                           



                          (mL/min/1.73 m2) ?|                           



                          With Kidney Damage ?|                           



                          ?Without Kidney                           



                          Damage+---------------                           



                          --------+-------------                           



                          --------+-------------                           



                          ------------+| ?>90 ?                           



                          ? ? ? ? ? ? ? ?|                           



                          ?Stage one ? ? ? ? ?|                           



                          ? Normal ? ? ? ? ? ? ?                           



                          ?+--------------------                           



                          ---+------------------                           



                          ---+------------------                           



                          -------+| ?60-89 ? ? ?                           



                          ? ? ? ? ?| ?Stage two                           



                          ? ? ? ? ?| ? Decreased                           



                          GFR ? ? ? ?                            



                          +---------------------                           



                          --+-------------------                           



                          --+-------------------                           



                          ------+| ?30-59 ? ? ?                           



                          ? ? ? ? ?| ?Stage                           



                          three ? ? ? ?| ? Stage                           



                          three ? ? ? ? ?                           



                          +---------------------                           



                          --+-------------------                           



                          --+-------------------                           



                          ------+| ?15-29 ? ? ?                           



                          ? ? ? ? ?| ?Stage four                           



                          ? ? ? ? | ? Stage four                           



                          ? ? ? ? ?                              



                          ?+--------------------                           



                          ---+------------------                           



                          ---+------------------                           



                          -------+| ?<15 (or                           



                          dialysis) ? ?| ?Stage                           



                          five ? ? ? ? | ? Stage                           



                          five ? ? ? ? ?                           



                          ?+--------------------                           



                          ---+------------------                           



                          ---+------------------                           



                          -------+ *Each stage                           



                          assumes the associated                           



                          GFR level has been in                           



                          effect for at least                           



                          three months. ?Stages                           



                          1 to 5, with or                           



                          without kidney                           



                          disease, indicate                           



                          chronic kidney                           



                          disease. Notes:                           



                          Determination of                           



                          stages one and two                           



                          (with eGFR                             



                          >59mL/min/1.73 m2)                           



                          requires estimation of                           



                          kidney damage for at                           



                          least three months as                           



                          defined by structural                           



                          or functional                           



                          abnormalities of the                           



                          kidney, manifested by                           



                          either:Pathological                           



                          abnormalities or                           



                          Markers of kidney                           



                          damage (including                           



                          abnormalities in the                           



                          composition of the                           



                          blood or urine or                           



                          abnormalities in                           



                          imaging tests).                           

 

             Lab Interpretation Abnormal                               



             (test code = 27811-5)                                        



[facility] WITH [id number] 15:18:36





             Test Item    Value        Reference Range Interpretation Comments

 

             WBC (test code =              See_Comment                [Automated



             6690-2)                                             message] The sy

stem



                                                                 which generated



                                                                 this result



                                                                 transmitted



                                                                 reference range

:



                                                                 4.20 - 10.70



                                                                 10*3/?L. The



                                                                 reference range

 was



                                                                 not used to



                                                                 interpret this



                                                                 result as



                                                                 normal/abnormal

.

 

             RBC (test code =              See_Comment  L             [Automated



             789-8)                                              message] The sy

stem



                                                                 which generated



                                                                 this result



                                                                 transmitted



                                                                 reference range

:



                                                                 4.26 - 5.52



                                                                 10*6/?L. The



                                                                 reference range

 was



                                                                 not used to



                                                                 interpret this



                                                                 result as



                                                                 normal/abnormal

.

 

             HGB (test code = 8.7 g/dL     12.2-16.4    L            



             718-7)                                              

 

             HCT (test code = 28.1 %       38.4-49.3    L            



             4544-3)                                             

 

             MCV (test code = 80.7 fL      81.7-95.6    L            



             787-2)                                              

 

             MCH (test code = 25.0 pg      26.1-32.7    L            



             785-6)                                              

 

             MCHC (test code = 31.0 g/dL    31.2-35.0    L            



             786-4)                                              

 

             RDW-SD (test code = 50.6 fL      38.5-51.6                 



             15086-2)                                            

 

             RDW-CV (test code = 17.6 %       12.1-15.4    H            



             788-0)                                              

 

             PLT (test code =              See_Comment  LL            [Automated



             777-3)                                              message] The sy

stem



                                                                 which generated



                                                                 this result



                                                                 transmitted



                                                                 reference range

:



                                                                 150 - 328 10*3/

?L.



                                                                 The reference r

moose



                                                                 was not used to



                                                                 interpret this



                                                                 result as



                                                                 normal/abnormal

.

 

             MPV (test code =                                        Not Measure

d



             21539-7)                                            

 

             IPF % (test code = 12.5 %       1.2-10.7     H            Platelet 

count



             [id number])                                         measured by



                                                                 fluorescence



                                                                 method.

 

             NRBC/100 WBC (test              See_Comment                [Automat

ed



             code = 2222682765)                                        message] 

The system



                                                                 which generated



                                                                 this result



                                                                 transmitted



                                                                 reference range

:



                                                                 0.0 - 10.0 /100



                                                                 WBCs. The refer

ence



                                                                 range was not u

sed



                                                                 to interpret th

is



                                                                 result as



                                                                 normal/abnormal

.

 

             NRBC x10^3 (test code <0.01        See_Comment                [Auto

mated



             = 9928810345)                                        message] The s

ystem



                                                                 which generated



                                                                 this result



                                                                 transmitted



                                                                 reference range

:



                                                                 10*3/?L. The



                                                                 reference range

 was



                                                                 not used to



                                                                 interpret this



                                                                 result as



                                                                 normal/abnormal

.

 

             GRAN MAT (NEUT) % 67.6 %                                 



             (test code = 770-8)                                        

 

             IMM GRAN % (test code 0.20 %                                 



             = 9169667772)                                        

 

             LYMPH % (test code = 16.1 %                                 



             736-9)                                              

 

             MONO % (test code = 13.3 %                                 



             5905-5)                                             

 

             EOS % (test code = 1.9 %                                  



             713-8)                                              

 

             BASO % (test code = 0.9 %                                  



             706-2)                                              

 

             GRAN MAT x10^3(ANC) 2.89 10*3/uL 1.99-6.95                 



             (test code =                                        



             5945574597)                                         

 

             IMM GRAN x10^3 (test <0.03        0.00-0.06                 



             code = 2277840739)                                        

 

             LYMPH x10^3 (test code 0.69 10*3/uL 1.09-3.23    L            



             = 731-0)                                            

 

             MONO x10^3 (test code 0.57 10*3/uL 0.36-1.02                 



             = 742-7)                                            

 

             EOS x10^3 (test code = 0.08 10*3/uL 0.06-0.53                 



             711-2)                                              

 

             BASO x10^3 (test code 0.04 10*3/uL 0.01-0.09                 



             = 704-7)                                            

 

             Lab Interpretation Abnormal                               



             (test code = 05840-0)                                        



[facility] WITH [id number] 15:18:36





             Test Item    Value        Reference Range Interpretation Comments

 

             WBC (test code =              See_Comment                [Automated



             6690-2)                                             message] The sy

stem



                                                                 which generated



                                                                 this result



                                                                 transmitted



                                                                 reference range

:



                                                                 4.20 - 10.70



                                                                 10*3/?L. The



                                                                 reference range

 was



                                                                 not used to



                                                                 interpret this



                                                                 result as



                                                                 normal/abnormal

.

 

             RBC (test code =              See_Comment  L             [Automated



             789-8)                                              message] The sy

stem



                                                                 which generated



                                                                 this result



                                                                 transmitted



                                                                 reference range

:



                                                                 4.26 - 5.52



                                                                 10*6/?L. The



                                                                 reference range

 was



                                                                 not used to



                                                                 interpret this



                                                                 result as



                                                                 normal/abnormal

.

 

             HGB (test code = 8.7 g/dL     12.2-16.4    L            



             718-7)                                              

 

             HCT (test code = 28.1 %       38.4-49.3    L            



             4544-3)                                             

 

             MCV (test code = 80.7 fL      81.7-95.6    L            



             787-2)                                              

 

             MCH (test code = 25.0 pg      26.1-32.7    L            



             785-6)                                              

 

             MCHC (test code = 31.0 g/dL    31.2-35.0    L            



             786-4)                                              

 

             RDW-SD (test code = 50.6 fL      38.5-51.6                 



             83722-3)                                            

 

             RDW-CV (test code = 17.6 %       12.1-15.4    H            



             788-0)                                              

 

             PLT (test code =              See_Comment  LL            [Automated



             777-3)                                              message] The sy

stem



                                                                 which generated



                                                                 this result



                                                                 transmitted



                                                                 reference range

:



                                                                 150 - 328 10*3/

?L.



                                                                 The reference r

moose



                                                                 was not used to



                                                                 interpret this



                                                                 result as



                                                                 normal/abnormal

.

 

             MPV (test code =                                        Not Measure

d



             35364-7)                                            

 

             IPF % (test code = 12.5 %       1.2-10.7     H            Platelet 

count



             7622111234)                                         measured by



                                                                 fluorescence



                                                                 method.

 

             NRBC/100 WBC (test              See_Comment                [Automat

ed



             code = 2800169466)                                        message] 

The system



                                                                 which generated



                                                                 this result



                                                                 transmitted



                                                                 reference range

:



                                                                 0.0 - 10.0 /100



                                                                 WBCs. The refer

ence



                                                                 range was not u

sed



                                                                 to interpret th

is



                                                                 result as



                                                                 normal/abnormal

.

 

             NRBC x10^3 (test code <0.01        See_Comment                [Auto

mated



             = 1203823895)                                        message] The s

ystem



                                                                 which generated



                                                                 this result



                                                                 transmitted



                                                                 reference range

:



                                                                 10*3/?L. The



                                                                 reference range

 was



                                                                 not used to



                                                                 interpret this



                                                                 result as



                                                                 normal/abnormal

.

 

             GRAN MAT (NEUT) % 67.6 %                                 



             (test code = 770-8)                                        

 

             IMM GRAN % (test code 0.20 %                                 



             = 6859556807)                                        

 

             LYMPH % (test code = 16.1 %                                 



             736-9)                                              

 

             MONO % (test code = 13.3 %                                 



             5905-5)                                             

 

             EOS % (test code = 1.9 %                                  



             713-8)                                              

 

             BASO % (test code = 0.9 %                                  



             706-2)                                              

 

             GRAN MAT x10^3(ANC) 2.89 10*3/uL 1.99-6.95                 



             (test code =                                        



             4028695588)                                         

 

             IMM GRAN x10^3 (test <0.03        0.00-0.06                 



             code = 0981743629)                                        

 

             LYMPH x10^3 (test code 0.69 10*3/uL 1.09-3.23    L            



             = 731-0)                                            

 

             MONO x10^3 (test code 0.57 10*3/uL 0.36-1.02                 



             = 742-7)                                            

 

             EOS x10^3 (test code = 0.08 10*3/uL 0.06-0.53                 



             711-2)                                              

 

             BASO x10^3 (test code 0.04 10*3/uL 0.01-0.09                 



             = 704-7)                                            

 

             Lab Interpretation Abnormal                               



             (test code = 68760-1)                                        



[facility].PROTEIN BODY [id number] 03:21:38





             Test Item    Value        Reference Range Interpretation Comments

 

             T.PROT BF (test 1477.0 mg/dL                           



             code = 5189187730)                                        

 

             UNSPUN BODY FLUID Yellow                                 



             COLOR (test code =                                        



             3085618691)                                         

 

             UNSPUN BODY FLUID Slightly Cloudy                           



             CLARITY (test code                                        



             = 3578024230)                                        

 

             SPUN BODY FLUID Yellow                                 



             COLOR (test code =                                        



             4471274305)                                         

 

             SPUN BODY FLUID Clear                                  



             CLARITY (test code                                        



             = 5014756024)                                        

 

             Sediment (test code The sediment volume is                         

  



             = 2430140479) <0.1 mLs of the total                           



                          fluid volume of 4mL and                           



                          its color is red.                           

 

             RENETTA (test code = Test developed and                           



             RENETTA)         characteristics determined                           



                          by [facility]                           



                          Services.                              



[facility].PROTEIN BODY [id number] 03:21:38





             Test Item    Value        Reference Range Interpretation Comments

 

             T.PROT BF (test 1477.0 mg/dL                           



             code = 6955559345)                                        

 

             UNSPUN BODY FLUID Yellow                                 



             COLOR (test code =                                        



             7553769904)                                         

 

             UNSPUN BODY FLUID Slightly Cloudy                           



             CLARITY (test code                                        



             = 8130133386)                                        

 

             SPUN BODY FLUID Yellow                                 



             COLOR (test code =                                        



             6712980784)                                         

 

             SPUN BODY FLUID Clear                                  



             CLARITY (test code                                        



             = 5039832021)                                        

 

             Sediment (test code The sediment volume is                         

  



             = 0650295174) <0.1 mLs of the total                           



                          fluid volume of 4mL and                           



                          its color is red.                           

 

             RENETTA (test code = Test developed and                           



             RENETTA)         characteristics determined                           



                          by [facility]                           



                          Services.                              



[facility] [id number]50:33





             Test Item    Value        Reference Range Interpretation Comments

 

             BF COLOR     Light Yellow                           



             (test code =                                        



             7623289577)                                         

 

             BF WBC Count              See_Comment                [Automated



             (test code =                                        message] The sy

stem



             3351697091)                                         which generated



                                                                 this result



                                                                 transmitted



                                                                 reference range

:



                                                                 /?L. The refere

nce



                                                                 range was not u

sed



                                                                 to interpret th

is



                                                                 result as



                                                                 normal/abnormal

.

 

             BF RBC Count              See_Comment                [Automated



             (test code =                                        message] The sy

stem



             1118829055)                                         which generated



                                                                 this result



                                                                 transmitted



                                                                 reference range

:



                                                                 /?L. The refere

nce



                                                                 range was not u

sed



                                                                 to interpret th

is



                                                                 result as



                                                                 normal/abnormal

.

 

             RENETTA (test    The reference range                           



             code = RENETTA)  and other method                           



                          performance                            



                          specifications have                           



                          not been established                           



                          for this body fluid.                           



                          ?The test results must                           



                          be integrated into the                           



                          clinical context for                           



                          interpretation.                           



[facility] [id number] 18:50:33





             Test Item    Value        Reference Range Interpretation Comments

 

             BF COLOR     Light Yellow                           



             (test code =                                        



             5402712502)                                         

 

             BF WBC Count              See_Comment                [Automated



             (test code =                                        message] The sy

stem



             [id number])                                         which generated



                                                                 this result



                                                                 transmitted



                                                                 reference range

:



                                                                 /?L. The refere

nce



                                                                 range was not u

sed



                                                                 to interpret th

is



                                                                 result as



                                                                 normal/abnormal

.

 

             BF RBC Count              See_Comment                [Automated



             (test code =                                        message] The sy

stem



             [id number])                                         which generated



                                                                 this result



                                                                 transmitted



                                                                 reference range

:



                                                                 /?L. The refere

nce



                                                                 range was not u

sed



                                                                 to interpret th

is



                                                                 result as



                                                                 normal/abnormal

.

 

             RENETTA (test    The reference range                           



             code = RENETTA)  and other method                           



                          performance                            



                          specifications have                           



                          not been established                           



                          for this body fluid.                           



                          ?The test results must                           



                          be integrated into the                           



                          clinical context for                           



                          interpretation.                           



[facility]
2021 18:23:38Successful US-guided paracentesis.PROCEDURE: US-guided 
paracentesis HISTORY: Ascites, paracentesis is requested. MEDICATIONS: Local 
lidocaine. I reviewed the patient?s current medicationlist as noted in the 
nursing assessment, and the following actions weretaken: None []. ATTENDING 
PRESENCE: ?As the attending radiologist, I was present in theroom during the 
entire procedure. TECHNIQUE: Informed consent was obtained from the patient 
after discussingthe risks and benefits of the procedure. Universal protocol 
timeout wasperformed verifying correct patient, correct site, and correct 
procedure.Images were archived to PACS. The patient was prepped and draped in 
sterilefashion. US-guided drainage of the ascites was performed with a 5 
Frenchsheathed needle, after infiltrating local anesthesia at the puncture 
site.A total of 3000cc clear yellowish fluid was drained. Fluid was sent for 
laboratory analysisas requested in Epic. COMPLICATIONS: None. ? Estimated Blood 
Loss : &lt;1 cc [facility], Radiant Results Inft User - 2021 1:24 PM 
CDTFormatting of this note might be different from the original.PROCEDURE: US-
guided paracentesisHISTORY: Ascites, paracentesis is requested.MEDICATIONS: 
Local lidocaine. Ireviewed the patient?s current medicationlist as noted in the 
nursing assessment, and the following actions weretaken: None []. ATTENDING 
PRESENCE: As the attending radiologist, I was present in theroom during the 
entire procedure.TECHNIQUE: Informed consent was obtained from the patient after
 discussingthe risks and benefits of the procedure. Universal protocol timeout 
wasperformed verifying correctpatient, correct site, and correct 
procedure.Images were archived to PACS. The patient was prepped and draped in 
sterilefashion. US-guided drainage of the ascites was performed with a 5 
Frenchsheathed needle, after infiltrating local anesthesia at the puncture 
site.A total of 3000cc clear yellowish fluid was drained. Fluid was sent for 
laboratory analysis as requested in Epic.COMPLICATIONS: None. Estimated Blood 
Loss : &lt;1 ccIMPRESSIONSuccessful US-guided paracentesis.[facility]/PERITONECENTESIS WITH [id number] 18:23:38
Successful US-guided paracentesis.PROCEDURE: US-guided paracentesis HISTORY: 
Ascites, paracentesis is requested. MEDICATIONS: Local lidocaine. I reviewed the
 patient?s current medicationlist as noted in the nursing assessment, and the 
following actions weretaken: None []. ATTENDING PRESENCE: ?As the attending 
radiologist, I was present in theroom during the entire procedure. TECHNIQUE: 
Informed consent was obtained from the patient after discussingthe risks and 
benefits of the procedure. Universal protocol timeout wasperformed verifying 
correct patient, correct site, and correct procedure.Images were archived to 
PACS. The patient was prepped and draped in sterilefashion. US-guided drainage 
of the ascites was performed with a 5 Frenchsheathed needle, after infiltrating 
local anesthesia at the puncture site.A total of 3000cc clear yellowish fluid 
was drained. Fluid was sent for laboratory analysisas requested in Epic. 
COMPLICATIONS: None. ? Estimated Blood Loss : &lt;1 cc [facility], Radiant Results In
ft User - 2021 1:24 PM CDTFormatting of this note might be different from 
the original.PROCEDURE: US-guided paracentesisHISTORY: Ascites, paracentesis is 
requested.MEDICATIONS: Local lidocaine. Ireviewed the patient?s current 
medicationlist as noted in the nursing assessment, and the following actions 
weretaken: None []. ATTENDING PRESENCE: As the attending radiologist, I was 
present in theroo during the entire procedure.TECHNIQUE: Informed consent was 
obtained from the patient after discussingthe risks and benefits of the 
procedure. Universal protocol timeout wasperformed verifying correctpatient, 
correct site, and correct procedure.Images were archived to PACS. The patient 
was prepped and draped in sterilefashion. US-guided drainage of the ascites was 
performed with a 5 Frenchsheathed needle, after infiltrating local anesthesia at
 the puncture site.A total of 3000cc clear yellowish fluid was drained. Fluid 
was sent for laboratory analysis as requested in Epic.COMPLICATIONS: None. Fatmata
mated Blood Loss : &lt;1 ccIMPRESSIONSuccessful US-guided paracentesis.
[facility] [id number] 
18:15:47





             Test Item    Value        Reference Range Interpretation Comments

 

             MAI (test code = Negative     Negative                  



             [id number])                                         

 

             RENETTA (test code = RENETTA) Negative - No                           



                          Anti-Nuclear                           



                          Antibodies detected                           



                          by IFA.Positive -                           



                          MAI IFA screen                           



                          performed with a                           



                          1:80 dilution in                           



                          adults and a 1:40                           



                          dilution in                            



                          pediatrics. Any MAI                           



                          "Positive" will have                           



                          titer performed and                           



                          reported separately.                           

 

             Lab Interpretation (test Normal                                 



             code = 94166-4)                                        



[facility] [id number] 
18:15:47





             Test Item    Value        Reference Range Interpretation Comments

 

             MAI (test code = Negative     Negative                  



             [id number])                                         

 

             RENETTA (test code = RENETTA) Negative - No                           



                          Anti-Nuclear                           



                          Antibodies detected                           



                          by IFA.Positive -                           



                          MAI IFA screen                           



                          performed with a                           



                          1:80 dilution in                           



                          adults and a 1:40                           



                          dilution in                            



                          pediatrics. Any MAI                           



                          "Positive" will have                           



                          titer performed and                           



                          reported separately.                           

 

             Lab Interpretation (test Normal                                 



             code = 40903-2)                                        



[place] [address] [id number] 17:44:30





             Test Item    Value        Reference Range Interpretation Comments

 

             Anti-Trypsin (test code = 141 mg/dL                        



             [id number])                                         

 

             Lab Interpretation (test code = Normal                             

    



             71442-7)                                            



[place] [address] [id number] 17:44:30





             Test Item    Value        Reference Range Interpretation Comments

 

             Anti-Trypsin (test code = 141 mg/dL                        



             0738379316)                                         

 

             Lab Interpretation (test code = Normal                             

    



             03559-5)                                            



[facility] 17:43:58





             Test Item    Value        Reference Range Interpretation Comments

 

             CERULO (test code = 3095967680) 24 mg/dL     25-63        L        

    

 

             Lab Interpretation (test code = Abnormal                           

    



             42468-7)                                            



[facility] -04-07 17:43:58





             Test Item    Value        Reference Range Interpretation Comments

 

             IgG (test code = 5638844595) 1850 mg/dL   636-1600     H           

 

 

             Lab Interpretation (test code = Abnormal                           

    



             26648-7)                                            



[facility]2021-[facility] 17:43:58





             Test Item    Value        Reference Range Interpretation Comments

 

             CERULO (test code = 1584148170) 24 mg/dL     25-63        L        

    

 

             Lab Interpretation (test code = Abnormal                           

    



             67651-8)                                            



[facility] -71-49 17:43:58





             Test Item    Value        Reference Range Interpretation Comments

 

             IgG (test code = 1685278867) 1850 mg/dL   636-1600     H           

 

 

             Lab Interpretation (test code = Abnormal                           

    



             85309-4)                                            



[facility]AT[facility] A AB, IGG AND [id number] 11:37:51





             Test Item    Value        Reference Range Interpretation Comments

 

             HAV Total (test code Positive                               



             = 3407884163)                                        

 

             HAVT                                                



             Semi-Quantitative                                        



             (test code =                                        



             5377550110)                                         

 

             RENETTA (test code = RENETTA) Indicates past or                           



                          present infection with                           



                          HAV or exposure to HAV                           



                          due to vaccination.                           



[facility] AB, IGG AND [id number] 11:37:51





             Test Item    Value        Reference Range Interpretation Comments

 

             HAV Total (test code Positive                               



             = 8127423600)                                        

 

             HAVT                                                



             Semi-Quantitative                                        



             (test code =                                        



             6975412629)                                         

 

             RENETTA (test code = RENETTA) Indicates past or                           



                          present infection with                           



                          HAV or exposure to HAV                           



                          due to vaccination.                           



[facility]landCB with [id number] 07:43:16





             Test Item    Value        Reference Range Interpretation Comments

 

             WBC (test code =              See_Comment  L             [Automated



             6690-2)                                             message] The



                                                                 system which



                                                                 generated this



                                                                 result transmit

michelle



                                                                 reference range

:



                                                                 4.20 - 10.70



                                                                 10*3/?L. The



                                                                 reference range



                                                                 was not used to



                                                                 interpret this



                                                                 result as



                                                                 normal/abnormal

.

 

             RBC (test code =              See_Comment  L             [Automated



             789-8)                                              message] The



                                                                 system which



                                                                 generated this



                                                                 result transmit

michelle



                                                                 reference range

:



                                                                 4.26 - 5.52



                                                                 10*6/?L. The



                                                                 reference range



                                                                 was not used to



                                                                 interpret this



                                                                 result as



                                                                 normal/abnormal

.

 

             HGB (test code = 7.3 g/dL     12.2-16.4    L            



             718-7)                                              

 

             HCT (test code = 23.8 %       38.4-49.3    L            



             4544-3)                                             

 

             MCV (test code = 81.5 fL      81.7-95.6    L            



             787-2)                                              

 

             MCH (test code = 25.0 pg      26.1-32.7    L            



             785-6)                                              

 

             MCHC (test code = 30.7 g/dL    31.2-35.0    L            



             786-4)                                              

 

             RDW-SD (test code = 51.1 fL      38.5-51.6                 



             26477-8)                                            

 

             RDW-CV (test code = 17.4 %       12.1-15.4    H            



             788-0)                                              

 

             PLT (test code =              See_Comment  LL            [Automated



             777-3)                                              message] The



                                                                 system which



                                                                 generated this



                                                                 result transmit

michelle



                                                                 reference range

:



                                                                 150 - 328 10*3/

?L.



                                                                 The reference



                                                                 range was not u

sed



                                                                 to interpret th

is



                                                                 result as



                                                                 normal/abnormal

.

 

             MPV (test code =                                        Not Measure

d



             68830-0)                                            

 

             IPF % (test code = 11.9 %       1.2-10.7     H            Platelet 

count



             [id number])                                         measured by



                                                                 fluorescence



                                                                 method.

 

             NRBC/100 WBC (test              See_Comment                [Automat

ed



             code = 8361674678)                                        message] 

The



                                                                 system which



                                                                 generated this



                                                                 result transmit

michelle



                                                                 reference range

:



                                                                 0.0 - 10.0 /100



                                                                 WBCs. The



                                                                 reference range



                                                                 was not used to



                                                                 interpret this



                                                                 result as



                                                                 normal/abnormal

.

 

             NRBC x10^3 (test code <0.01        See_Comment                [Auto

mated



             = 7594516105)                                        message] The



                                                                 system which



                                                                 generated this



                                                                 result transmit

michelle



                                                                 reference range

:



                                                                 10*3/?L. The



                                                                 reference range



                                                                 was not used to



                                                                 interpret this



                                                                 result as



                                                                 normal/abnormal

.

 

             SEG % (test code = 55 %         33-76                     



             18298-1)                                            

 

             LYMPH % (test code = 36 %         14-54                     



             88863-2)                                            

 

             MONO % (test code = 8 %          0-4          H            



             26485-3)                                            

 

             EOS % (test code = 1 %          0-3                       



             90287-7)                                            

 

             ANC (test code = 1.99 10*3/uL 1.99-6.95                 



             6654393280)                                         

 

             TARGET CELLS (test 2+           See_Comment  A             [Automat

ed



             code = 04092-7)                                        message] The



                                                                 system which



                                                                 generated this



                                                                 result transmit

michelle



                                                                 reference range

:



                                                                 (none). The



                                                                 reference range



                                                                 was not used to



                                                                 interpret this



                                                                 result as



                                                                 normal/abnormal

.

 

             PLT ESTIMATE (test Critically   Normal       AA           



             code = 9317-9) Decreased                              

 

             GIANT PLATELETS (test Present      See_Comment  A             [Auto

mated



             code = 5908-9)                                        message] The



                                                                 system which



                                                                 generated this



                                                                 result transmit

michelle



                                                                 reference range

:



                                                                 (none). The



                                                                 reference range



                                                                 was not used to



                                                                 interpret this



                                                                 result as



                                                                 normal/abnormal

.

 

             Lab Interpretation Abnormal                               



             (test code = 44374-7)                                        



[facility] [id number] 07:02:16





             Test Item    Value        Reference Range Interpretation Comments

 

             IONIZED CA (test code = 4.00 mg/dL   4.50-5.30    L            



             [id number])                                         

 

             PH SERUM (test code = 4243832407)              7.35-7.45    H      

      QUES

 

             Lab Interpretation (test code = Abnormal                           

    



             61693-5)                                            



[facility] [id number] 07:02:16





             Test Item    Value        Reference Range Interpretation Comments

 

             IONIZED CA (test code = 4.00 mg/dL   4.50-5.30    L            



             8789279570)                                         

 

             PH SERUM (test code = 0898951419)              7.35-7.45    H      

      QUES

 

             Lab Interpretation (test code = Abnormal                           

    



             55552-6)                                            



[facility]





             Test Item    Value        Reference Range Interpretation Comments

 

             MAGNESIUM (test code = 7308258318) 1.5 mg/dL    1.7-2.4      L     

       

 

             Lab Interpretation (test code = Abnormal                           

    



             52975-7)                                            



[facility]14





             Test Item    Value        Reference Range Interpretation Comments

 

             PHOSPHORUS (test code = 5957639518) 3.5 mg/dL    2.5-5.0           

        

 

             Lab Interpretation (test code = Normal                             

    



             54176-3)                                            



[facility]14





             Test Item    Value        Reference Range Interpretation Comments

 

             PHOSPHORUS (test code = 8637368109) 3.5 mg/dL    2.5-5.0           

        

 

             Lab Interpretation (test code = Normal                             

    



             77223-0)                                            



[facility]





             Test Item    Value        Reference Range Interpretation Comments

 

             MAGNESIUM (test code = 2555398355) 1.5 mg/dL    1.7-2.4      L     

       

 

             Lab Interpretation (test code = Abnormal                           

    



             86480-8)                                            



[facility] [id number] 
06:35:12





             Test Item    Value        Reference Range Interpretation Comments

 

             HBsAB (test code = Negative                               



             1961165606)                                         

 

             HBsAb                     mIU/mL                    



             Semi-Quantitative                                        



             (test code =                                        



             7028023265)                                         

 

             RENETTA (test code = Interpretation:                           



             RENETTA)         ?Hepatitis B Surface                           



                          Antibody ? ? ? ? Negative                           



                          - Patient is considered                           



                          to be not immune to                           



                          infection with HBV. ? ?                           



                          Positive - Anti-HBs                           



                          detected at greater than                           



                          or equal to 12 mIU/mL.                           



                          ?Patient is considered to                           



                          be immune to infection                           



                          with HBV. ?                            



[facility] IGG[facility] 
06:35:12





             Test Item    Value        Reference Range Interpretation Comments

 

             HBC (test code = 3350771700) Negative                              

 

 

             HBC Semi-Quantitative (test code =                                 

       



             5569500016)                                         



[facility] B SURFACE [id number] 
06:35:12





             Test Item    Value        Reference Range Interpretation Comments

 

             HBsAB (test code = Negative                               



             2409531340)                                         

 

             HBsAb                     mIU/mL                    



             Semi-Quantitative                                        



             (test code =                                        



             6626330425)                                         

 

             RENETTA (test code = Interpretation:                           



             RENETTA)         ?Hepatitis B Surface                           



                          Antibody ? ? ? ? Negative                           



                          - Patient is considered                           



                          to be not immune to                           



                          infection with HBV. ? ?                           



                          Positive - Anti-HBs                           



                          detected at greater than                           



                          or equal to 12 mIU/mL.                           



                          ?Patient is considered to                           



                          be immune to infection                           



                          with HBV. ?                            



[facility] C VIRUS [id number] 06:35:12





             Test Item    Value        Reference Range Interpretation Comments

 

             HCV Ab (test code = 93583-7) Negative                              

 

 

             HCV Semi-Quantitative (test code =                                 

       



             33146-4)                                            



[facility] IGG[facility]2021 
06:35:12





             Test Item    Value        Reference Range Interpretation Comments

 

             HBC (test code = 2865356022) Negative                              

 

 

             HBC Semi-Quantitative (test code =                                 

       



             2670969941)                                         



[facility] C VIRUS [id number] 06:35:12





             Test Item    Value        Reference Range Interpretation Comments

 

             HCV Ab (test code = 50748-2) Negative                              

 

 

             HCV Semi-Quantitative (test code =                                 

       



             78075-8)                                            



[facility] [id number] 06:20:51





             Test Item    Value        Reference Range Interpretation Comments

 

             AFP (test code = 3.8 ng/mL    See_Comment                [Automated



             4509384494)                                         message] The



                                                                 system which



                                                                 generated this



                                                                 result



                                                                 transmitted



                                                                 reference range

:



                                                                 <=7.5. The



                                                                 reference range



                                                                 was not used to



                                                                 interpret this



                                                                 result as



                                                                 normal/abnormal

.

 

             RENETTA (test code = RENETTA) Biotin has been                           



                          reported to                            



                          cause a negative                           



                          bias, interpret                           



                          results relative                           



                          to patient's use                           



                          of biotin.                             

 

             Lab Interpretation Normal                                 



             (test code = 51735-6)                                        



[facility] [id number] 06:20:51





             Test Item    Value        Reference Range Interpretation Comments

 

             AFP (test code = 3.8 ng/mL    See_Comment                [Automated



             [id number])                                         message] The



                                                                 system which



                                                                 generated this



                                                                 result



                                                                 transmitted



                                                                 reference range

:



                                                                 <=7.5. The



                                                                 reference range



                                                                 was not used to



                                                                 interpret this



                                                                 result as



                                                                 normal/abnormal

.

 

             RENETTA (test code = RENETTA) Biotin has been                           



                          reported to                            



                          cause a negative                           



                          bias, interpret                           



                          results relative                           



                          to patient's use                           



                          of biotin.                             

 

             Lab Interpretation Normal                                 



             (test code = 60462-9)                                        



[facility] P[facility] RBC (in units), 1 Units
2021 02:14:47





             Test Item    Value        Reference Range Interpretation Comments

 

             Cross Match Result Compatible                             



             (test code = 4409)                                        

 

             ISBT Blood Type Code                                        



             (test code = 865502)                                        

 

             Unit Blood Type (test O Pos                                  



             code = 4410)                                        

 

             Unit Number (test [id number]                           



             code = 4411)                                        

 

             Blood Expiration Date                                        



             & Time (test code =                                        



             902199)                                             

 

             Status Information Issued                                 



             (test code = 4412)                                        

 

             Product      Red Blood Cells                           



             Identification (test                                        



             code = 4413)                                        

 

             Product Code (test B4053S05                               Performed

 at [facility]



             code = 4414)                                        Laboratory



                                                                 Services [facility]



                                                                 Blood [address]



                                                                 [address]



                                                                 Free:



                                                                 [phone]

A



                                                                 No. [id number]



[facility] RBC (in units), 1 Units
2021 02:14:47





             Test Item    Value        Reference Range Interpretation Comments

 

             Cross Match Result Compatible                             



             (test code = 4409)                                        

 

             ISBT Blood Type Code                                        



             (test code = 097449)                                        

 

             Unit Blood Type (test O Pos                                  



             code = 4410)                                        

 

             Unit Number (test [id number]                           



             code = 4411)                                        

 

             Blood Expiration Date                                        



             & Time (test code =                                        



             976698)                                             

 

             Status Information Issued                                 



             (test code = 4412)                                        

 

             Product      Red Blood Cells                           



             Identification (test                                        



             code = 4413)                                        

 

             Product Code (test M5258M73                               Performed

 at [facility]



             code = 4414)                                        Laboratory



                                                                 Services [facility]



                                                                 Blood [address]



                                                                 [address]



                                                                 Free:



                                                                 [phone]

A



                                                                 No. [id number]



[facility] WITH [id number] 
01:26:53Impression: 1. ?Cirrhosis.2. ?Enlarged portal vein and mild 
splenomegaly, suggestive of portalhypertension. Hepatopedal flow is seen in the 
main portal vein.3. Mild gallbladder wall thickening, which is nonspecific and 
may be seenwith cholecystitis, hepatitis, liver failure, or hypervolemia.4. 
Moderate abdominal ascites. Left pleural effusion. RL: 2824 End of Report 
Electronically signed by Fernandez Baker MD at 2021 8:26 PMExam: Limited 
Abdominal Ultrasound, 2021 7:00 PM. Ordering Physician: AMY CALDERA. 
History: Ascites. Comparison: None. Technique: Grayscale and color Doppler 
ultrasound images of the right upperquadrant abdomen were obtained. Technical 
Quality: Examination is slightlysuboptimal due to body habitusand overlying 
bowel gas. Findings: ?Liver is nodular in contour and heterogeneous in 
echotexture. Hepatopetalflow is noted in the main portal vein. Portal vein is 
enlarged. There is nointrahepatic biliary ductal dilatation. Common bile duct 
measures 2 mm incaliber. Gallbladder demonstrates no evidence of stones. ?There 
is nopericholecystic fluid. Gallbladder wall measures 3 mm in 
thickness.Sonographic Gupta sign is negative. Right kidney measures 13.9 cm in 
length. ?There is no right hydronephrosis.Right kidney shows normal cortical 
thickness, corticomedullarydifferentiation and echotexture. ? Visualized 
pancreas is unremarkable. Pancreas is partially obscured byoverlying bowel gas. 
Visualized inferior vena cava and abdominal aorta are unremarkable. Spleen 
measures up to 12.6 cm in length. There is moderate free fluid. Left pleural 
effusion is partiallyvisualized. [facility], Radiant Results Inft User - 2021 
8:28 PM CDTFormatting of this note might be different from the original.Exam: 
Limited Abdominal Ultrasound, 2021 7:00 PM.Ordering Physician: AMY IBRAHIM.History: Ascites.Comparison: None.Technique: Grayscale and color Doppler 
ultrasound images of the right upperquadrant abdomen were obtained.Technical 
Quality: Examination is slightly suboptimal due to body habitusand overlying 
bowel gas.Findings: Liver is nodular in contour and heterogeneous in ec
hotexture. Hepatopetalflow is noted in the main portal vein. Portal vein is 
enlarged. There is nointrahepatic biliary ductal dilatation. Common bile duct 
measures 2 mm incaliber.Gallbladder demonstrates no evidence of stones. There is
nopericholecystic fluid. Gallbladder wall measures 3 mm in thickness.Sonographic
Gupta sign is negative.Right kidney measures 13.9 cm in length. There is no 
right hydronephrosis.Right kidney shows normal cortical thickness, 
corticomedullarydifferentiation and echotexture. Visualized pancreas is 
unremarkable. Pancreas is partially obscured byoverlying bowel gas.Visualized 
inferior vena cava and abdominal aorta are unremarkable.Spleen measures up to 
12.6 cm in length.There is moderate free fluid. Left pleural effusion is 
partiallyvisualized.IMPRESSIONImpression: 1. Cirrhosis.2. Enlarged portal vein 
and mild splenomegaly, suggestive of portalhypertension. Hepatopedalflow is seen
in the main portal vein.3. Mild gallbladder wall thickening, which is 
nonspecific and may be seenwith cholecystitis, hepatitis, liver failure, or 
hypervolemia.4. Moderate abdominal ascites. Left pleural effusion. RL: 2824End 
of ReportElectronically signed by Fernandez Baker MD at 2021 8[facility] [id number] 01:26:53
Impression: 1. ?Cirrhosis.2. ?Enlarged portal vein and mild splenomegaly, 
suggestive of portalhypertension. Hepatopedal flow is seen in the main portal 
vein.3. Mild gallbladder wall thickening, which is nonspecific and may be 
seenwith cholecystitis, hepatitis, liver failure, or hypervolemia.4. Moderate 
abdominal ascites. Left pleural effusion. RL: 2824 End of Report Electronically 
signed by Fernandez Baker MD at 2021 8:26 PMExam: Limited Abdominal Ultrasound, 
2021 7:00 PM. Ordering Physician: AMY CALDERA. History: Ascites. 
Comparison: None. Technique: Grayscale and color Doppler ultrasound images of 
the right upperquadrant abdomen were obtained. Technical Quality: Examination is
slightlysuboptimal due to body habitusand overlying bowel gas. Findings: ?Liver 
is nodular in contour and heterogeneous in echotexture. Hepatopetalflow is noted
in the main portal vein. Portal vein is enlarged. There is nointrahepatic 
biliary ductal dilatation. Common bile duct measures 2 mm incaliber. Gallbladder
demonstrates no evidence of stones. ?There is nopericholecystic fluid. 
Gallbladder wall measures 3 mm in thickness.Sonographic Gupta sign is negative.
Right kidney measures 13.9 cm in length. ?There is no right hydronephrosis.Right
kidney shows normal cortical thickness, corticomedullarydifferentiation and 
echotexture. ? Visualized pancreas is unremarkable. Pancreas is partially 
obscured byoverlying bowel gas. Visualized inferior vena cava and abdominal 
aorta are unremarkable. Spleen measures up to 12.6 cm in length. There is 
moderate free fluid. Left pleural effusion is partiallyvisualized. [facility], Radiant
Results Inft User - 2021 8:28 PM CDTFormatting of this note might be 
different from the original.Exam: Limited Abdominal Ultrasound, 2021 7:00 
PM.Ordering Physician: AMY CALDERA.History: Ascites.Comparison: 
None.Technique: Grayscale and color Doppler ultrasound images of the right 
upperquadrant abdomen were obtained.Technical Quality: Examination is slightly 
suboptimal due to body habitusand overlying bowel gas.Findings: Liver is nodular
in contour and heterogeneous in echotexture. Hepatopetalflow is noted in the 
main portal vein. Portal vein is enlarged. There is nointrahepatic biliary 
ductal dilatation. Common bile duct measures 2 mm incaliber.Gallbladder 
demonstrates no evidence of stones. There is nopericholecystic fluid. 
Gallbladder wall measures 3 mm in thickness.Sonographic Gupta sign is 
negative.Right kidney measures 13.9 cm in length. There is no right hydro
nephrosis.Right kidney shows normal cortical thickness, 
corticomedullarydifferentiation and echotexture. Visualized pancreas is 
unremarkable. Pancreas is partially obscured byoverlying bowel gas.Visualized 
inferior vena cava and abdominal aorta are unremarkable.Spleen measures up to 
12.6 cm in length.There is moderate free fluid. Left pleural effusion is 
partiallyvisualized.IMPRESSIONImpression: 1. Cirrhosis.2. Enlarged portal vein 
and mild splenomegaly, suggestive of portalhypertension. Hepatopedalflow is seen
in the main portal vein.3. Mild gallbladder wall thickening, which is 
nonspecific and may be seenwith cholecystitis, hepatitis, liver failure, or 
hypervolemia.4. Moderate abdominal ascites. Left pleural effusion. RL: 2824End 
of ReportElectronically signed by Fernandez Baker MD at 2021 8:26 PMUnMethod[facility] B SURFACE [id number] 00:23:10





             Test Item    Value        Reference Range Interpretation Comments

 

             HBsAg Semi-Quantitative (test code = Negative     Negative         

         



             5195-3)                                             



[facility] B SURFACE [id number] 00:23:10





             Test Item    Value        Reference Range Interpretation Comments

 

             HBsAg Semi-Quantitative (test code = Negative     Negative         

         



             5195-3)                                             



[facility] METABOLIC PANEL (NA, K, CL, CO2, 
GLUCOSE, BUN, CREATININE, CA)2021 00:19:53





             Test Item    Value        Reference Range Interpretation Comments

 

             NA (test code = 136 mmol/L   135-145                   



             7534550107)                                         

 

             K (test code = 3.7 mmol/L   3.5-5.0                   



             7303131486)                                         

 

             CL (test code = 104 mmol/L                       



             4415723123)                                         

 

             CO2 TOTAL (test code = 26 mmol/L    23-31                     



             3127399169)                                         

 

             AGAP (test code =              2-16                      



             0756645203)                                         

 

             BUN (test code = 4 mg/dL      7-23         L            



             0864483105)                                         

 

             GLUCOSE (test code = 75 mg/dL                         



             6463473452)                                         

 

             CREATININE (test code = 0.41 mg/dL   0.60-1.25    L            



             1549735761)                                         

 

             CALCIUM (test code = 7.3 mg/dL    8.6-10.6     L            



             1834463636)                                         

 

             eGFR (test code =              mL/min/1.73m2              



             7984680005)                                         

 

             RENETTA (test code = RENETTA) Association of                           



                          Glomerular Filtration                           



                          Rate (GFR) and Staging                           



                          of Kidney Disease*                           



                          +---------------------                           



                          --+-------------------                           



                          --+-------------------                           



                          ------+| GFR                           



                          (mL/min/1.73 m2) ?|                           



                          With Kidney Damage ?|                           



                          ?Without Kidney                           



                          Damage+---------------                           



                          --------+-------------                           



                          --------+-------------                           



                          ------------+| ?>90 ?                           



                          ? ? ? ? ? ? ? ?|                           



                          ?Stage one ? ? ? ? ?|                           



                          ? Normal ? ? ? ? ? ? ?                           



                          ?+--------------------                           



                          ---+------------------                           



                          ---+------------------                           



                          -------+| ?60-89 ? ? ?                           



                          ? ? ? ? ?| ?Stage two                           



                          ? ? ? ? ?| ? Decreased                           



                          GFR ? ? ? ?                            



                          +---------------------                           



                          --+-------------------                           



                          --+-------------------                           



                          ------+| ?30-59 ? ? ?                           



                          ? ? ? ? ?| ?Stage                           



                          three ? ? ? ?| ? Stage                           



                          three ? ? ? ? ?                           



                          +---------------------                           



                          --+-------------------                           



                          --+-------------------                           



                          ------+| ?15-29 ? ? ?                           



                          ? ? ? ? ?| ?Stage four                           



                          ? ? ? ? | ? Stage four                           



                          ? ? ? ? ?                              



                          ?+--------------------                           



                          ---+------------------                           



                          ---+------------------                           



                          -------+| ?<15 (or                           



                          dialysis) ? ?| ?Stage                           



                          five ? ? ? ? | ? Stage                           



                          five ? ? ? ? ?                           



                          ?+--------------------                           



                          ---+------------------                           



                          ---+------------------                           



                          -------+ *Each stage                           



                          assumes the associated                           



                          GFR level has been in                           



                          effect for at least                           



                          three months. ?Stages                           



                          1 to 5, with or                           



                          without kidney                           



                          disease, indicate                           



                          chronic kidney                           



                          disease. Notes:                           



                          Determination of                           



                          stages one and two                           



                          (with eGFR                             



                          >59mL/min/1.73 m2)                           



                          requires estimation of                           



                          kidney damage for at                           



                          least three months as                           



                          defined by structural                           



                          or functional                           



                          abnormalities of the                           



                          kidney, manifested by                           



                          either:Pathological                           



                          abnormalities or                           



                          Markers of kidney                           



                          damage (including                           



                          abnormalities in the                           



                          composition of the                           



                          blood or urine or                           



                          abnormalities in                           



                          imaging tests).                           

 

             Lab Interpretation Abnormal                               



             (test code = 03307-3)                                        



[facility] METABOLIC PANEL (NA, K, CL, CO2, 
GLUCOSE, BUN, CREATININE, CA)2021 00:19:53





             Test Item    Value        Reference Range Interpretation Comments

 

             NA (test code = 136 mmol/L   135-145                   



             [id number])                                         

 

             K (test code = 3.7 mmol/L   3.5-5.0                   



             9333890806)                                         

 

             CL (test code = 104 mmol/L                       



             3915796274)                                         

 

             CO2 TOTAL (test code = 26 mmol/L    23-31                     



             3840181151)                                         

 

             AGAP (test code =              2-16                      



             4287369358)                                         

 

             BUN (test code = 4 mg/dL      7-23         L            



             9651002930)                                         

 

             GLUCOSE (test code = 75 mg/dL                         



             2348964590)                                         

 

             CREATININE (test code = 0.41 mg/dL   0.60-1.25    L            



             1092203496)                                         

 

             CALCIUM (test code = 7.3 mg/dL    8.6-10.6     L            



             9394125460)                                         

 

             eGFR (test code =              mL/min/1.73m2              



             2957447547)                                         

 

             RENETTA (test code = RENETTA) Association of                           



                          Glomerular Filtration                           



                          Rate (GFR) and Staging                           



                          of Kidney Disease*                           



                          +---------------------                           



                          --+-------------------                           



                          --+-------------------                           



                          ------+| GFR                           



                          (mL/min/1.73 m2) ?|                           



                          With Kidney Damage ?|                           



                          ?Without Kidney                           



                          Damage+---------------                           



                          --------+-------------                           



                          --------+-------------                           



                          ------------+| ?>90 ?                           



                          ? ? ? ? ? ? ? ?|                           



                          ?Stage one ? ? ? ? ?|                           



                          ? Normal ? ? ? ? ? ? ?                           



                          ?+--------------------                           



                          ---+------------------                           



                          ---+------------------                           



                          -------+| ?60-89 ? ? ?                           



                          ? ? ? ? ?| ?Stage two                           



                          ? ? ? ? ?| ? Decreased                           



                          GFR ? ? ? ?                            



                          +---------------------                           



                          --+-------------------                           



                          --+-------------------                           



                          ------+| ?30-59 ? ? ?                           



                          ? ? ? ? ?| ?Stage                           



                          three ? ? ? ?| ? Stage                           



                          three ? ? ? ? ?                           



                          +---------------------                           



                          --+-------------------                           



                          --+-------------------                           



                          ------+| ?15-29 ? ? ?                           



                          ? ? ? ? ?| ?Stage four                           



                          ? ? ? ? | ? Stage four                           



                          ? ? ? ? ?                              



                          ?+--------------------                           



                          ---+------------------                           



                          ---+------------------                           



                          -------+| ?<15 (or                           



                          dialysis) ? ?| ?Stage                           



                          five ? ? ? ? | ? Stage                           



                          five ? ? ? ? ?                           



                          ?+--------------------                           



                          ---+------------------                           



                          ---+------------------                           



                          -------+ *Each stage                           



                          assumes the associated                           



                          GFR level has been in                           



                          effect for at least                           



                          three months. ?Stages                           



                          1 to 5, with or                           



                          without kidney                           



                          disease, indicate                           



                          chronic kidney                           



                          disease. Notes:                           



                          Determination of                           



                          stages one and two                           



                          (with eGFR                             



                          >59mL/min/1.73 m2)                           



                          requires estimation of                           



                          kidney damage for at                           



                          least three months as                           



                          defined by structural                           



                          or functional                           



                          abnormalities of the                           



                          kidney, manifested by                           



                          either:Pathological                           



                          abnormalities or                           



                          Markers of kidney                           



                          damage (including                           



                          abnormalities in the                           



                          composition of the                           



                          blood or urine or                           



                          abnormalities in                           



                          imaging tests).                           

 

             Lab Interpretation Abnormal                               



             (test code = 25548-4)                                        



[facility] and Screen - ONCE [id number] 00:12:19





             Test Item    Value        Reference Range Interpretation Comments

 

             ABO & RH (test code O Positive                             Performe

d at [facility]



             = 20)                                               Laboratory Serv

ices -



                                                                 [facility]

[address] [place] [address]

4Toll



                                                                 Free: [phone]

266CLIA



                                                                 No. [id number]

 

             IAT (test code = Negative                               Performed a

t [facility]



             1185)                                               Laboratory [facility]

[address] [place] [address]

4Toll



                                                                 Free: [phone]

266CLIA



                                                                 No. [id number]



[facility] and Screen - ONCE [id number] 00:12:19





             Test Item    Value        Reference Range Interpretation Comments

 

             ABO & RH (test code O Positive                             Performe

d at [facility]



             = 20)                                               Laboratory Serv

[facility]



                                                                 [facility]

[address] [place], [place] [address]

4Toll



                                                                 Free: [phone]

266CLIA



                                                                 No. [id number]

 

             IAT (test code = Negative                               Performed a

t [facility]



             1185)                                               Laboratory [facility]



                                                                 [facility]

[address] [place] [address]

4Toll



                                                                 Free: [phone]

266CLIA



                                                                 No. [id number]



[id number]





             Test Item    Value        Reference Range Interpretation Comments

 

             FERRITIN (test code = 12.5 ng/mL   18.0-464.0   L            



             5270937233)                                         

 

             RENETTA (test code = RENETTA) Biotin has been                           



                          reported to cause a                           



                          negative bias,                           



                          interpret results                           



                          relative to                            



                          patient's use of                           



                          biotin.                                

 

             Lab Interpretation (test Abnormal                               



             code = 26921-7)                                        



[facility] [id number] 00:02:09





             Test Item    Value        Reference Range Interpretation Comments

 

             FERRITIN (test code = 12.5 ng/mL   18.0-464.0   L            



             5481529796)                                         

 

             RENETTA (test code = RENETTA) Biotin has been                           



                          reported to cause a                           



                          negative bias,                           



                          interpret results                           



                          relative to                            



                          patient's use of                           



                          biotin.                                

 

             Lab Interpretation (test Abnormal                               



             code = 52936-0)                                        



[facility] [id number] 22:57:36





             Test Item    Value        Reference Range Interpretation Comments

 

             PROTIME PATIENT (test              See_Comment  H             [Auto

mated message]



             code = 5964-2)                                        The system 

[employer]



                                                                 generated this 

result



                                                                 transmitted ref

erence



                                                                 range: 10.1 - 1

2.6



                                                                 Seconds. The



                                                                 reference range

 was



                                                                 not used to int

erpret



                                                                 this result as



                                                                 normal/abnormal

.

 

             INR (test code = 6301-6)                                        Nor

mal INR <1.1;



                                                                 Warfarin Therap

eutic



                                                                 range 2.0 to 3.

0 or



                                                                 2.5 to 3.5, dep

ending



                                                                 upon the indica

tions.

 

             Lab Interpretation (test Abnormal                               



             code = 01638-0)                                        



[facility] [id number] 22:57:36





             Test Item    Value        Reference Range Interpretation Comments

 

             PROTIME PATIENT (test              See_Comment  H             [Auto

mated message]



             code = 5964-2)                                        The system 

ich



                                                                 generated this 

result



                                                                 transmitted ref

erence



                                                                 range: 10.1 - 1

2.6



                                                                 Seconds. The



                                                                 reference range

 was



                                                                 not used to int

erpret



                                                                 this result as



                                                                 normal/abnormal

.

 

             INR (test code = 6301-6)                                        Nor

mal INR <1.1;



                                                                 Warfarin Therap

eutic



                                                                 range 2.0 to 3.

0 or



                                                                 2.5 to 3.5, dep

ending



                                                                 upon the indica

tions.

 

             Lab Interpretation (test Abnormal                               



             code = 70191-4)                                        



[facility] WITH [id number] 22:53:15





             Test Item    Value        Reference Range Interpretation Comments

 

             WBC (test code =              See_Comment  L             [Automated



             0890-2)                                             message] The sy

stem



                                                                 which generated



                                                                 this result



                                                                 transmitted



                                                                 reference range

:



                                                                 4.20 - 10.70



                                                                 10*3/?L. The



                                                                 reference range

 was



                                                                 not used to



                                                                 interpret this



                                                                 result as



                                                                 normal/abnormal

.

 

             RBC (test code =              See_Comment  L             [Automated



             069-8)                                              message] The sy

stem



                                                                 which generated



                                                                 this result



                                                                 transmitted



                                                                 reference range

:



                                                                 4.26 - 5.52



                                                                 10*6/?L. The



                                                                 reference range

 was



                                                                 not used to



                                                                 interpret this



                                                                 result as



                                                                 normal/abnormal

.

 

             HGB (test code = 6.9 g/dL     12.2-16.4    L            



             718-7)                                              

 

             HCT (test code = 22.3 %       38.4-49.3    L            



             4544-3)                                             

 

             MCV (test code = 80.8 fL      81.7-95.6    L            



             787-2)                                              

 

             MCH (test code = 25.0 pg      26.1-32.7    L            



             785-6)                                              

 

             MCHC (test code = 30.9 g/dL    31.2-35.0    L            



             786-4)                                              

 

             RDW-SD (test code = 50.6 fL      38.5-51.6                 



             86361-7)                                            

 

             RDW-CV (test code = 17.4 %       12.1-15.4    H            



             788-0)                                              

 

             PLT (test code =              See_Comment  LL            [Automated



             777-3)                                              message] The sy

stem



                                                                 which generated



                                                                 this result



                                                                 transmitted



                                                                 reference range

:



                                                                 150 - 328 10*3/

?L.



                                                                 The reference r

moose



                                                                 was not used to



                                                                 interpret this



                                                                 result as



                                                                 normal/abnormal

.

 

             MPV (test code =                                        Not Measure

d



             89573-2)                                            

 

             IPF % (test code = 11.6 %       1.2-10.7     H            Platelet 

count



             6134448368)                                         measured by



                                                                 fluorescence



                                                                 method.

 

             NRBC/100 WBC (test              See_Comment                [Automat

ed



             code = 4169315416)                                        message] 

The system



                                                                 which generated



                                                                 this result



                                                                 transmitted



                                                                 reference range

:



                                                                 0.0 - 10.0 /100



                                                                 WBCs. The refer

ence



                                                                 range was not u

sed



                                                                 to interpret th

is



                                                                 result as



                                                                 normal/abnormal

.

 

             NRBC x10^3 (test code <0.01        See_Comment                [Auto

mated



             = [id number])                                        message] The s

ystem



                                                                 which generated



                                                                 this result



                                                                 transmitted



                                                                 reference range

:



                                                                 10*3/?L. The



                                                                 reference range

 was



                                                                 not used to



                                                                 interpret this



                                                                 result as



                                                                 normal/abnormal

.

 

             GRAN MAT (NEUT) % 61.0 %                                 



             (test code = 770-8)                                        

 

             IMM GRAN % (test code 0.30 %                                 



             = 3325974926)                                        

 

             LYMPH % (test code = 21.0 %                                 



             736-9)                                              

 

             MONO % (test code = 13.8 %                                 



             5905-5)                                             

 

             EOS % (test code = 2.7 %                                  



             713-8)                                              

 

             BASO % (test code = 1.2 %                                  



             706-2)                                              

 

             GRAN MAT x10^3(ANC) 2.04 10*3/uL 1.99-6.95                 



             (test code =                                        



             4773339797)                                         

 

             IMM GRAN x10^3 (test <0.03        0.00-0.06                 



             code = 6917119143)                                        

 

             LYMPH x10^3 (test code 0.70 10*3/uL 1.09-3.23    L            



             = 731-0)                                            

 

             MONO x10^3 (test code 0.46 10*3/uL 0.36-1.02                 



             = 742-7)                                            

 

             EOS x10^3 (test code = 0.09 10*3/uL 0.06-0.53                 



             711-2)                                              

 

             BASO x10^3 (test code 0.04 10*3/uL 0.01-0.09                 



             = 704-7)                                            

 

             Lab Interpretation Abnormal                               



             (test code = 42550-8)                                        



[facility] [id number] 17:59:41





             Test Item    Value        Reference Range Interpretation Comments

 

             ABO & RH (test code O Positive                             Performe

d at [facility]



             = 20)                                               Laboratory [facility] Blood [place] [address]Toll 

Free:



                                                                 [phone]

A No.



                                                                 [id number]



[facility] [id number] 17:59:41





             Test Item    Value        Reference Range Interpretation Comments

 

             ABO & RH (test code O Positive                             Performe

d at [facility]



             = 20)                                               Laboratory [facility] Blood [place] [address]Toll 

Free:



                                                                 [phone]

A No.



                                                                 [id number]



[facility] [id number] 17:58:57





             Test Item    Value        Reference Range Interpretation Comments

 

             ABO & RH (test code O Positive                             Performe

d at [facility]



             = 20)                                               Laboratory [facility] [address] [place], [place] [address]Toll 

Free:



                                                                 [phone]

A No.



                                                                 [id number]

 

             IAT (test code = Negative                               Performed a

t [facility]



             1185)                                               Laboratory [facility] Blood [address]Toll 

Free:



                                                                 [phone]

A No.



                                                                 [id number]



[facility] 17:44:03





             Test Item    Value        Reference Range Interpretation Comments

 

             APPEARANCE (test code = Clear        Clear                     



             8031068209)                                         

 

             COLOR (test code = Lorie        Yellow       A            



             5683551412)                                         

 

             PH (test code =              4.8-8.0                   



             1890005877)                                         

 

             SP GRAVITY (test code =              1.003-1.030  H            



             6544948095)                                         

 

             GLU U QUAL (test code = Normal       Normal                    



             4122976693)                                         

 

             BLOOD (test code = Negative     Negative                  



             9769603627)                                         

 

             KETONES (test code = 20 mg/dL     Negative     A            



             6539658217)                                         

 

             PROTEIN (test code = 30 mg/dL     Negative     A            



             2887-8)                                             

 

             UROBILIN (test code = 4.0 mg/dL    Normal       A            



             5792613140)                                         

 

             BILIRUBIN (test code = 2 mg/dL      Negative     A            



             8503609039)                                         

 

             NITRITE (test code = Negative     Negative                  



             7150135466)                                         

 

             LEUK NIKI (test code = Negative     Negative                  



             0246195958)                                         

 

             RBC/HPF (test code =              See_Comment                [Autom

ated message]



             5908056434)                                         The system [employer]



                                                                 generated this



                                                                 result transmit

michelle



                                                                 reference range

: 0 -



                                                                 3 HPF. The refe

rence



                                                                 range was not u

sed



                                                                 to interpret th

is



                                                                 result as



                                                                 normal/abnormal

.

 

             WBC/HPF (test code =              See_Comment                [Autom

ated message]



             7930624195)                                         The system [employer]



                                                                 generated this



                                                                 result transmit

michelle



                                                                 reference range

: 0 -



                                                                 5 HPF. The refe

rence



                                                                 range was not u

sed



                                                                 to interpret th

is



                                                                 result as



                                                                 normal/abnormal

.

 

             BACTERIA (test code = Few          Negative     A            



             8027436917)                                         

 

             MUCOUS (test code = Marked       Negative LPF A            



             7262842824)                                         

 

             SQ EPITH (test code =              HPF                       



             5926691307)                                         

 

             Ictotest (test code = Positive                               



             8533871939)                                         

 

             Lab Interpretation (test Abnormal                               



             code = 37131-1)                                        



[facility] [id number] 17:44:03





             Test Item    Value        Reference Range Interpretation Comments

 

             APPEARANCE (test code = Clear        Clear                     



             0570380116)                                         

 

             COLOR (test code = Lorie        Yellow       A            



             1078357489)                                         

 

             PH (test code =              4.8-8.0                   



             9700086645)                                         

 

             SP GRAVITY (test code =              1.003-1.030  H            



             7735142633)                                         

 

             GLU U QUAL (test code = Normal       Normal                    



             3887583057)                                         

 

             BLOOD (test code = Negative     Negative                  



             0863937873)                                         

 

             KETONES (test code = 20 mg/dL     Negative     A            



             9359868385)                                         

 

             PROTEIN (test code = 30 mg/dL     Negative     A            



             2887-8)                                             

 

             UROBILIN (test code = 4.0 mg/dL    Normal       A            



             9275541552)                                         

 

             BILIRUBIN (test code = 2 mg/dL      Negative     A            



             1235997402)                                         

 

             NITRITE (test code = Negative     Negative                  



             9728125721)                                         

 

             LEUK NIKI (test code = Negative     Negative                  



             3908000764)                                         

 

             RBC/HPF (test code =              See_Comment                [Autom

ated message]



             3764901472)                                         The system [employer]



                                                                 generated this



                                                                 result transmit

michelle



                                                                 reference range

: 0 -



                                                                 3 HPF. The refe

rence



                                                                 range was not u

sed



                                                                 to interpret th

is



                                                                 result as



                                                                 normal/abnormal

.

 

             WBC/HPF (test code =              See_Comment                [Autom

ated message]



             4384395659)                                         The system [employer]



                                                                 generated this



                                                                 result transmit

michelle



                                                                 reference range

: 0 -



                                                                 5 HPF. The refe

rence



                                                                 range was not u

sed



                                                                 to interpret th

is



                                                                 result as



                                                                 normal/abnormal

.

 

             BACTERIA (test code = Few          Negative     A            



             9333569400)                                         

 

             MUCOUS (test code = Marked       Negative LPF A            



             4957232200)                                         

 

             SQ EPITH (test code =              HPF                       



             2646002980)                                         

 

             Ictotest (test code = Positive                               



             5956031742)                                         

 

             Lab Interpretation (test Abnormal                               



             code = 78438-6)                                        



[facility] P[facility] W [id number] 
17:13:20 1. ?Cirrhosis, ascites, splenomegaly, and portal venous hypertension. 
Thereare large gastroesophageal varices. 2. Small bowel and colon wall 
thickening likely related to the portalvenous hypertension.Enterocolitis could 
have this appearance. There is nopneumatosis or pneumoperitoneum. RL: 1105 
Electronically signed by Tj Mesa MD at 2021 12:13 PMHISTORY: ?Abdominal 
distension Diverticulitis suspected COMPARISON:none TECHNIQUE:CT scan of the 
abdomen and pelvis performed. Contiguous axial CT imageswere obtained after 
administration of intravenous contrast. ?CT scan doneaccording to ALARA. [place]
hnical quality: Technical quality: adequate. FINDINGS: Moderate left pleural 
effusion seen. There isbibasilar atelectasis. Thereis no pericardial effusion. 
Liver is cirrhotic. Spleen is enlarged measuring 14.5 cm. There is moderateto 
large ascites. Gastroesophageal varices are large. Portal vein patent. There is 
no adrenal nodule. The kidneys demonstrate symmetric nephrograms.There is no 
hydronephrosis. Diffuse bowel wall thickening is present which may be due to the
portalvenous hypertension. Superimposed enterocolitis could have this 
appearance.There is no pneumatosis or pneumoperitoneum. Urinary bladder 
unremarkable. There is no destructive bony process. [facility], Radiant Results Inft 
User - 2021 12:14 PM CDTFormatting of this note might be different from 
the original.HISTORY: Abdominal distension Diverticulitis suspected 
COMPARISON:noneTECHNIQUE:CT scan of the abdomen and pelvis performed. Contiguous
axial CT imageswere obtained after administration of intravenous contrast. CT 
scan doneaccording to [facility]ARA. Technical quality: Technical quality: 
adequate.FINDINGS:Moderate left pleural effusion seen. There is bibasilar 
atelectasis. Thereis no pericardial effusion.Liver is cirrhotic. Spleenis 
enlarged measuring 14.5 cm. There is moderateto large ascites. Gastroesophageal 
varices are large. Portal vein patent.There is no adrenal nodule. The kidneys 
demonstrate symmetric nephrograms.There is no hydronephrosis.Diffuse bowel wall 
thickening is present which may be due to the portalvenous hypertension. 
Superimposed enterocolitis could have this appearance.There is no pneumatosis or
pneumoperitoneum.Urinary bladder unremarkable.There is no destructive bony 
process.IMPRESSION1. Cirrhosis, ascites, splenomegaly, and portal venous 
hypertension. Thereare large gastroesophageal varices.2. Smallbowel and colon 
wall thickening likely related to the portalvenous hypertension. Enterocolitis 
couldhave this appearance. There is nopneumatosis or pneumoperitoneum.RL: 
1105Electronically signed by Tj Mesa MD at 2021 12:13 PMUnHouston [facility] HospitalCT [facility] W [id number] 17:13:20 1. 
?Cirrhosis, ascites, splenomegaly, and portal venous hypertension. Thereare 
large gastroesophageal varices. 2. Small bowel and colon wall thickening likely 
related to the portalvenous hypertension.Enterocolitis could have this 
appearance. There is nopneumatosis or pneumoperitoneum. RL: 1105 Electronically 
signed by Tj Mesa MD at 2021 12:13 PMHISTORY: ?Abdominal distension 
Diverticulitis suspected COMPARISON:none TECHNIQUE:CT scan of the abdomen and 
pelvis performed. Contiguous axial CT imageswere obtained after administration 
of intravenous contrast. ?CT scan doneaccording to [facility]ARA. Technical quality: 
Technical quality: adequate. FINDINGS: Moderate left pleural effusion seen. 
There isbibasilar atelectasis. Thereis no pericardial effusion. Liver is 
cirrhotic. Spleen is enlarged measuring 14.5 cm. There is moderateto large 
ascites. Gastroesophageal varices are large. Portal vein patent. There is no 
adrenal nodule. The kidneys demonstrate symmetric nephrograms.There is no 
hydronephrosis. Diffuse bowel wall thickening is present which may be due to the
portalvenous hypertension. Superimposed enterocolitis could have this 
appearance.There is no pneumatosis or pneumoperitoneum. Urinary bladder 
unremarkable. There is no destructive bony process. [facility], Radiant Results Inft 
User - 2021 12:14 PM CDTFormatting of this note might be different from 
the original.HISTORY: Abdominal distension Diverticulitis suspected 
COMPARISON:noneTECHNIQUE:CT scan of the abdomen and pelvis performed. Contiguous
axial CT imageswere obtained after administration of intravenous contrast. CT 
scan doneaccording to [facility]. Technical quality: Technical quality: 
adequate.FINDINGS:Moderate left pleural effusion seen. There is bibasilar 
atelectasis. Thereis no pericardial effusion.Liver is cirrhotic. Spleenis 
enlarged measuring 14.5 cm. There is moderateto large ascites. Gastroesophageal 
varices are large. Portal vein patent.There is no adrenal nodule. The kidneys 
demonstrate symmetric nephrograms.There is no hydronephrosis.Diffuse bowel wall 
thickening is present which may be due to the portalvenous hypertension. 
Superimposed enterocolitis could have this appearance.There is no pneumatosis or
pneumoperitoneum.Urinary bladder unremarkable.There is no destructive bony 
process.IMPRESSION1. Cirrhosis, ascites, splenomegaly, and portal venous 
hypertension. Thereare large gastroesophageal varices.2. Smallbowel and colon 
wall thickening likely related to the portalvenous hypertension. Enterocolitis 
couldhave this appearance. There is nopneumatosis or pneumoperitoneum.RL: 
1105Electronically signed by Tj Mesa MD at 2021 12:13 PMUnMethodist Frem[facility] Health with [id number] 16:30:57





             Test Item    Value        Reference Range Interpretation Comments

 

             WBC (test code =              See_Comment  L             [Automated



             6690-2)                                             message] The



                                                                 system which



                                                                 generated this



                                                                 result transmit

michelle



                                                                 reference range

:



                                                                 4.20 - 10.70



                                                                 10*3/?L. The



                                                                 reference range



                                                                 was not used to



                                                                 interpret this



                                                                 result as



                                                                 normal/abnormal

.

 

             RBC (test code =              See_Comment  L             [Automated



             789-8)                                              message] The



                                                                 system which



                                                                 generated this



                                                                 result transmit

michelle



                                                                 reference range

:



                                                                 4.26 - 5.52



                                                                 10*6/?L. The



                                                                 reference range



                                                                 was not used to



                                                                 interpret this



                                                                 result as



                                                                 normal/abnormal

.

 

             HGB (test code = 6.3 g/dL     12.2-16.4    L            



             718-7)                                              

 

             HCT (test code = 21.2 %       38.4-49.3    L            



             4544-3)                                             

 

             MCV (test code = 79.4 fL      81.7-95.6    L            



             787-2)                                              

 

             MCH (test code = 23.6 pg      26.1-32.7    L            



             785-6)                                              

 

             MCHC (test code = 29.7 g/dL    31.2-35.0    L            



             786-4)                                              

 

             RDW-SD (test code = 50.9 fL      38.5-51.6                 



             63261-7)                                            

 

             RDW-CV (test code = 17.6 %       12.1-15.4    H            



             788-0)                                              

 

             PLT (test code =              See_Comment  LL            [Automated



             777-3)                                              message] The



                                                                 system which



                                                                 generated this



                                                                 result transmit

michelle



                                                                 reference range

:



                                                                 150 - 328 10*3/

?L.



                                                                 The reference



                                                                 range was not u

sed



                                                                 to interpret th

is



                                                                 result as



                                                                 normal/abnormal

.

 

             MPV (test code =                                        Not Measure

d



             11003-1)                                            

 

             IPF % (test code = 8.5 %        1.2-10.7                  Platelet 

count



             [id number])                                         measured by



                                                                 fluorescence



                                                                 method.

 

             NRBC/100 WBC (test              See_Comment                [Automat

ed



             code = 0166168747)                                        message] 

The



                                                                 system which



                                                                 generated this



                                                                 result transmit

michelle



                                                                 reference range

:



                                                                 0.0 - 10.0 /100



                                                                 WBCs. The



                                                                 reference range



                                                                 was not used to



                                                                 interpret this



                                                                 result as



                                                                 normal/abnormal

.

 

             NRBC x10^3 (test code <0.01        See_Comment                [Auto

mated



             = [id number])                                        message] The



                                                                 system which



                                                                 generated this



                                                                 result transmit

michelle



                                                                 reference range

:



                                                                 10*3/?L. The



                                                                 reference range



                                                                 was not used to



                                                                 interpret this



                                                                 result as



                                                                 normal/abnormal

.

 

             GRAN MAT (NEUT) % 60.3 %                                 



             (test code = 770-8)                                        

 

             IMM GRAN % (test code 0.30 %                                 



             = [id number])                                        

 

             LYMPH % (test code = 20.2 %                                 



             736-9)                                              

 

             MONO % (test code = 17.0 %                                 



             5905-5)                                             

 

             EOS % (test code = 1.3 %                                  



             713-8)                                              

 

             BASO % (test code = 0.9 %                                  



             706-2)                                              

 

             GRAN MAT x10^3(ANC) 1.91 10*3/uL 1.99-6.95    L            



             (test code =                                        



             2833623963)                                         

 

             IMM GRAN x10^3 (test <0.03        0.00-0.06                 



             code = 0689046995)                                        

 

             LYMPH x10^3 (test 0.64 10*3/uL 1.09-3.23    L            



             code = 731-0)                                        

 

             MONO x10^3 (test code 0.54 10*3/uL 0.36-1.02                 



             = 742-7)                                            

 

             EOS x10^3 (test code 0.04 10*3/uL 0.06-0.53    L            



             = 711-2)                                            

 

             BASO x10^3 (test code 0.03 10*3/uL 0.01-0.09                 



             = 704-7)                                            

 

             POLYCHROMASIA (test 2+           See_Comment                [Automa

michelle



             code = 98676-5)                                        message] The



                                                                 system which



                                                                 generated this



                                                                 result transmit

michelle



                                                                 reference range

:



                                                                 2+. The referen

ce



                                                                 range was not u

sed



                                                                 to interpret th

is



                                                                 result as



                                                                 normal/abnormal

.

 

             ROULEAUX (test code = Present      See_Comment  A             [Auto

mated



             7797-4)                                             message] The



                                                                 system which



                                                                 generated this



                                                                 result transmit

michelle



                                                                 reference range

:



                                                                 (none). The



                                                                 reference range



                                                                 was not used to



                                                                 interpret this



                                                                 result as



                                                                 normal/abnormal

.

 

             TARGET CELLS (test 2+           See_Comment  A             [Automat

ed



             code = 17864-5)                                        message] The



                                                                 system which



                                                                 generated this



                                                                 result transmit

michelle



                                                                 reference range

:



                                                                 (none). The



                                                                 reference range



                                                                 was not used to



                                                                 interpret this



                                                                 result as



                                                                 normal/abnormal

.

 

             PLT ESTIMATE (test Critically   Normal       AA           



             code = 9317-9) Decreased                              

 

             Lab Interpretation Abnormal                               



             (test code = 79746-0)                                        



[facility] 
[facility]47[facility]50





             Test Item    Value        Reference Range Interpretation Comments

 

             SARS-CoV-2 Rapid ID NOW Not Detected Not Detected              



             (test code = 53897-4)                                        

 

             RENETTA (test code = RENETTA) ID NOW COVID-19 Assay                        

   



                          is an isothermal                           



                          nucleic acid                           



                          amplification test                           



                          intended for the                           



                          qualitative detection                           



                          of nucleic acid from                           



                          SARS-CoV-2 viral RNA                           



                          in nasopharyngeal (NP)                           



                          specimens. It is used                           



                          under Emergency Use                           



                          Authorization (EUA) by                           



                          FDA. The limit of                           



                          detection (LOD) of the                           



                          assay is 125 Genome                           



                          Equivalents/mL. A                           



                          positive result is                           



                          indicative of the                           



                          presence of SARS-CoV-2                           



                          RNA. ?Clinical                           



                          correlation with                           



                          patient history and                           



                          other diagnostic                           



                          information is                           



                          necessary to determine                           



                          patient infection                           



                          status. A negative                           



                          (Not Detected) result                           



                          does not preclude                           



                          SARS-CoV-2 infection.                           



                          In patients with                           



                          clinical symptoms and                           



                          other tests that are                           



                          consistent with                           



                          SARS-CoV-2 infection,                           



                          negative results                           



                          should be treated as                           



                          presumptive negative                           



                          and a new specimen                           



                          should be tested with                           



                          alternative PCR                           



                          molecular test.                           



                          Invalid: Please                           



                          collect a new specimen                           



                          for repeat patient                           



                          testing if clinically                           



                          indicated.                             

 

             Lab Interpretation Normal                                 



             (test code = 31385-8)                                        



[facility] 
15:47:50





             Test Item    Value        Reference Range Interpretation Comments

 

             SARS-CoV-2 Rapid ID NOW Not Detected Not Detected              



             (test code = 57315-2)                                        

 

             RENETTA (test code = RENETTA) ID NOW COVID-19 Assay                        

   



                          is an isothermal                           



                          nucleic acid                           



                          amplification test                           



                          intended for the                           



                          qualitative detection                           



                          of nucleic acid from                           



                          SARS-CoV-2 viral RNA                           



                          in nasopharyngeal (NP)                           



                          specimens. It is used                           



                          under Emergency Use                           



                          Authorization (EUA) by                           



                          FDA. The limit of                           



                          detection (LOD) of the                           



                          assay is 125 Genome                           



                          Equivalents/mL. A                           



                          positive result is                           



                          indicative of the                           



                          presence of SARS-CoV-2                           



                          RNA. ?Clinical                           



                          correlation with                           



                          patient history and                           



                          other diagnostic                           



                          information is                           



                          necessary to determine                           



                          patient infection                           



                          status. A negative                           



                          (Not Detected) result                           



                          does not preclude                           



                          SARS-CoV-2 infection.                           



                          In patients with                           



                          clinical symptoms and                           



                          other tests that are                           



                          consistent with                           



                          SARS-CoV-2 infection,                           



                          negative results                           



                          should be treated as                           



                          presumptive negative                           



                          and a new specimen                           



                          should be tested with                           



                          alternative PCR                           



                          molecular test.                           



                          Invalid: Please                           



                          collect a new specimen                           



                          for repeat patient                           



                          testing if clinically                           



                          indicated.                             

 

             Lab Interpretation Normal                                 



             (test code = 76425-6)                                        



[facility]





             Test Item    Value        Reference Range Interpretation Comments

 

             TROPONIN I (test 0.002 ng/mL  See_Comment                [Automated



             code = 6767127035)                                        message] 

The



                                                                 system which



                                                                 generated this



                                                                 result



                                                                 transmitted



                                                                 reference range

:



                                                                 <=0.034. The



                                                                 reference range



                                                                 was not used to



                                                                 interpret this



                                                                 result as



                                                                 normal/abnormal

.

 

             RENETTA (test code = Equal or Less than                           



             RENETTA)         0.034                                  



                          ng/ml---Normal                           



                          ?Note: Cardiac                           



                          troponin begins to                           



                          rise 3-4 hours                           



                          after the onset of                           



                          ischemia. Repeat                           



                          in 4-6 hours if                           



                          the sample was                           



                          drawn within 3-4                           



                          hours of the onset                           



                          of the symptom and                           



                          found normal.                           



                          Between 0.035 and                           



                          0.120 ng/mL---                           



                          Borderline.                            



                          Questionable                           



                          myocardial injury                           



                          or necrosis ?                           



                          ?Note: Serial                           



                          measurement may be                           



                          necessary to                           



                          confirm or exclude                           



                          the diagnosis of                           



                          myocardial injury                           



                          or necrosis;                           



                          Clinical                               



                          correlation                            



                          (symptoms, EKGs,                           



                          imaging studies,                           



                          and others)                            



                          required; Repeat                           



                          in 4-6 hours if                           



                          clinically                             



                          indicated. ? ? ? ?                           



                          Equal or Higher                           



                          than 0.121                             



                          ng/mL---Abnormal.                           



                          Myocardial Injury                           



                          or Necrosis Likely                           



                          ? ? ? ? Biotin has                           



                          been reported to                           



                          cause a negative                           



                          bias, interpret                           



                          results relative                           



                          to patient's use                           



                          of biotin. ? ? ? ?                           



                          ? ? ? ? ? ? ? ? ?                           



                          ? ? ? ? ? ? ? ? ?                           



                          ? ? ? ? ? ? ? ? ?                           



                          ? ? ? ? ? ? ? ? ?                           



                          ? ? ? ? ? ? ? ? ?                           



                          ?                                      

 

             Lab Interpretation Normal                                 



             (test code =                                        



             99664-1)                                            



[facility]





             Test Item    Value        Reference Range Interpretation Comments

 

             TROPONIN I (test 0.002 ng/mL  See_Comment                [Automated



             code = 3984954599)                                        message] 

The



                                                                 system which



                                                                 generated this



                                                                 result



                                                                 transmitted



                                                                 reference range

:



                                                                 <=0.034. The



                                                                 reference range



                                                                 was not used to



                                                                 interpret this



                                                                 result as



                                                                 normal/abnormal

.

 

             RENETTA (test code = Equal or Less than                           



             RENETTA)         0.034                                  



                          ng/ml---Normal                           



                          ?Note: Cardiac                           



                          troponin begins to                           



                          rise 3-4 hours                           



                          after the onset of                           



                          ischemia. Repeat                           



                          in 4-6 hours if                           



                          the sample was                           



                          drawn within 3-4                           



                          hours of the onset                           



                          of the symptom and                           



                          found normal.                           



                          Between 0.035 and                           



                          0.120 ng/mL---                           



                          Borderline.                            



                          Questionable                           



                          myocardial injury                           



                          or necrosis ?                           



                          ?Note: Serial                           



                          measurement may be                           



                          necessary to                           



                          confirm or exclude                           



                          the diagnosis of                           



                          myocardial injury                           



                          or necrosis;                           



                          Clinical                               



                          correlation                            



                          (symptoms, EKGs,                           



                          imaging studies,                           



                          and others)                            



                          required; Repeat                           



                          in 4-6 hours if                           



                          clinically                             



                          indicated. ? ? ? ?                           



                          Equal or Higher                           



                          than 0.121                             



                          ng/mL---Abnormal.                           



                          Myocardial Injury                           



                          or Necrosis Likely                           



                          ? ? ? ? Biotin has                           



                          been reported to                           



                          cause a negative                           



                          bias, interpret                           



                          results relative                           



                          to patient's use                           



                          of biotin. ? ? ? ?                           



                          ? ? ? ? ? ? ? ? ?                           



                          ? ? ? ? ? ? ? ? ?                           



                          ? ? ? ? ? ? ? ? ?                           



                          ? ? ? ? ? ? ? ? ?                           



                          ? ? ? ? ? ? ? ? ?                           



                          ?                                      

 

             Lab Interpretation Normal                                 



             (test code =                                        



             11254-8)                                            



[id number]11





             Test Item    Value        Reference Range Interpretation Comments

 

             NT-proBNP (test code 162 pg/mL    See_Comment  H             [Autom

ated



             = [id number])                                        message] The



                                                                 system which



                                                                 generated this



                                                                 result



                                                                 transmitted



                                                                 reference range

:



                                                                 <=125. The



                                                                 reference range



                                                                 was not used to



                                                                 interpret this



                                                                 result as



                                                                 normal/abnormal

.

 

             RENETTA (test code = RENETTA) Biotin has been                           



                          reported to                            



                          cause a negative                           



                          bias, interpret                           



                          results relative                           



                          to patient's use                           



                          of biotin.                             

 

             Lab Interpretation Abnormal                               



             (test code = 52141-1)                                        



[id number] 15:41:11





             Test Item    Value        Reference Range Interpretation Comments

 

             NT-proBNP (test code 162 pg/mL    See_Comment  H             [Autom

ated



             = [id number])                                        message] The



                                                                 system which



                                                                 generated this



                                                                 result



                                                                 transmitted



                                                                 reference range

:



                                                                 <=125. The



                                                                 reference range



                                                                 was not used to



                                                                 interpret this



                                                                 result as



                                                                 normal/abnormal

.

 

             RENETTA (test code = RENETTA) Biotin has been                           



                          reported to                            



                          cause a negative                           



                          bias, interpret                           



                          results relative                           



                          to patient's use                           



                          of biotin.                             

 

             Lab Interpretation Abnormal                               



             (test code = 30816-9)                                        



[facility] 1 [id number] 15:35:38EXAM: XR CHEST 
1 VW HISTORY: SOB COMPARISON: None. FINDINGS: The lungs are poorly expanded and 
show changes of basilar subsegmentalatelectasis with suspicion of a small 
pleural effusion on the left. The midand upper lungs are clear. The heart and 
great vessels are normal. ? [facility], Radiant Results Inft User - 2021 10:36 
AM CDTFormatting of this note might be different from the original.EXAM: XR CH
EST 1 VWHISTORY: SOB COMPARISON: None.FINDINGS:The lungs are poorly expanded and
show changes of basilar subsegmentalatelectasis with suspicion of a small 
pleural effusion on the left. The midand upperlungs are clear. The heart and 
great vessels are normal.[facility] 1 View
2021 15:35:38EXAM: XR CHEST 1 VW HISTORY: SOB COMPARISON: None. FINDINGS: 
The lungs are poorly expanded and show changes of basilar 
subsegmentalatelectasis with suspicion of a small pleural effusion on the left. 
The midand upper lungs are clear. The heart and great vessels are normal. ? 
[facility], Radiant Results Inft User - 2021 10:36 AM CDTFormatting of this note
might be different from the original.EXAM: XR CHEST 1 VWHISTORY: SOB COMPARISON:
None.FINDINGS:The lungs are poorly expanded and show changes of basilar 
subsegmentalatelectasis with suspicion of a small pleural effusion on the left. 
The midand upperlungs are clear. The heart and great vessels are normal.
[facility] METABOLIC PANEL ([id number])2021 
15:32:50





             Test Item    Value        Reference Range Interpretation Comments

 

             NA (test code = 137 mmol/L   135-145                   



             [id number])                                         

 

             K (test code = 3.3 mmol/L   3.5-5.0      L            



             3658100250)                                         

 

             CL (test code = 103 mmol/L                       



             6294098579)                                         

 

             CO2 TOTAL (test code = 24 mmol/L    23-31                     



             2905697054)                                         

 

             AGAP (test code =              2-16                      



             9818581739)                                         

 

             BUN (test code = 6 mg/dL      7-23         L            



             6150751765)                                         

 

             GLUCOSE (test code = 155 mg/dL           H            



             8100245114)                                         

 

             CREATININE (test code = 0.43 mg/dL   0.60-1.25    L            



             8281475880)                                         

 

             TOTAL BILI (test code = 2.5 mg/dL    0.1-1.1      H            



             8257933192)                                         

 

             CALCIUM (test code = 7.6 mg/dL    8.6-10.6     L            



             1173305546)                                         

 

             T PROTEIN (test code = 6.7 g/dL     6.3-8.2                   



             5319542790)                                         

 

             ALBUMIN (test code = 2.8 g/dL     3.5-5.0      L            



             3983087113)                                         

 

             ALK PHOS (test code = 153 U/L             H            



             2859145176)                                         

 

             ALTv (test code = 22 U/L       5-50                      



             1742-6)                                             

 

             AST(SGOT) (test code = 73 U/L       13-40        H            



             1476465697)                                         

 

             eGFR (test code =              mL/min/1.73m2              



             3527108633)                                         

 

             RENETTA (test code = RENETTA) Association of                           



                          Glomerular Filtration                           



                          Rate (GFR) and Staging                           



                          of Kidney Disease*                           



                          +---------------------                           



                          --+-------------------                           



                          --+-------------------                           



                          ------+| GFR                           



                          (mL/min/1.73 m2) ?|                           



                          With Kidney Damage ?|                           



                          ?Without Kidney                           



                          Damage+---------------                           



                          --------+-------------                           



                          --------+-------------                           



                          ------------+| ?>90 ?                           



                          ? ? ? ? ? ? ? ?|                           



                          ?Stage one ? ? ? ? ?|                           



                          ? Normal ? ? ? ? ? ? ?                           



                          ?+--------------------                           



                          ---+------------------                           



                          ---+------------------                           



                          -------+| ?60-89 ? ? ?                           



                          ? ? ? ? ?| ?Stage two                           



                          ? ? ? ? ?| ? Decreased                           



                          GFR ? ? ? ?                            



                          +---------------------                           



                          --+-------------------                           



                          --+-------------------                           



                          ------+| ?30-59 ? ? ?                           



                          ? ? ? ? ?| ?Stage                           



                          three ? ? ? ?| ? Stage                           



                          three ? ? ? ? ?                           



                          +---------------------                           



                          --+-------------------                           



                          --+-------------------                           



                          ------+| ?15-29 ? ? ?                           



                          ? ? ? ? ?| ?Stage four                           



                          ? ? ? ? | ? Stage four                           



                          ? ? ? ? ?                              



                          ?+--------------------                           



                          ---+------------------                           



                          ---+------------------                           



                          -------+| ?<15 (or                           



                          dialysis) ? ?| ?Stage                           



                          five ? ? ? ? | ? Stage                           



                          five ? ? ? ? ?                           



                          ?+--------------------                           



                          ---+------------------                           



                          ---+------------------                           



                          -------+ *Each stage                           



                          assumes the associated                           



                          GFR level has been in                           



                          effect for at least                           



                          three months. ?Stages                           



                          1 to 5, with or                           



                          without kidney                           



                          disease, indicate                           



                          chronic kidney                           



                          disease. Notes:                           



                          Determination of                           



                          stages one and two                           



                          (with eGFR                             



                          >59mL/min/1.73 m2)                           



                          requires estimation of                           



                          kidney damage for at                           



                          least three months as                           



                          defined by structural                           



                          or functional                           



                          abnormalities of the                           



                          kidney, manifested by                           



                          either:Pathological                           



                          abnormalities or                           



                          Markers of kidney                           



                          damage (including                           



                          abnormalities in the                           



                          composition of the                           



                          blood or urine or                           



                          abnormalities in                           



                          imaging tests).                           

 

             Lab Interpretation Abnormal                               



             (test code = 12492-0)                                        



[id number]





             Test Item    Value        Reference Range Interpretation Comments

 

             LIPASE (test code = 4494857136) 350 U/L      0-220        H        

    

 

             Lab Interpretation (test code = Abnormal                           

    



             86951-1)                                            



[id number] 15[facility]32:10





             Test Item    Value        Reference Range Interpretation Comments

 

             LIPASE (test code = 7272912602) 350 U/L      0-220        H        

    

 

             Lab Interpretation (test code = Abnormal                           

    



             16273-8)                                            



[facility]
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
## 2023-05-22 VITALS — SYSTOLIC BLOOD PRESSURE: 133 MMHG | DIASTOLIC BLOOD PRESSURE: 75 MMHG

## 2023-05-22 VITALS — TEMPERATURE: 98.5 F

## 2023-05-22 VITALS — OXYGEN SATURATION: 100 %

## 2023-05-22 LAB
GRAN CASTS #/AREA URNS LPF: (no result) /LPF
METHAMPHET UR QL SCN: NEGATIVE
THC SERPL-MCNC: NEGATIVE NG/ML

## 2023-05-22 NOTE — EKG
Test Date:    2023-05-21               Test Time:    19:38:34

Technician:   HUGO                                    

                                                     

MEASUREMENT RESULTS:                                       

Intervals:                                           

Rate:         107                                    

SC:           94                                     

QRSD:         76                                     

QT:           382                                    

QTc:          509                                    

Axis:                                                

P:            9                                      

SC:           94                                     

QRS:          53                                     

T:            39                                     

                                                     

INTERPRETIVE STATEMENTS:                                       

                                                     

Sinus tachycardia with short SC

Otherwise normal ECG

Compared to ECG 11/01/2022 17:38:05

Short SC interval now present



Electronically Signed On 05-22-23 15:21:46 CDT by Tim Cooley

## 2023-05-22 NOTE — EDPHYS
Physician Documentation                                                                           

 [facility]                                                                 

Name: Galdino Tabares                                                                             

Age: 53 yrs                                                                                       

Sex: Male                                                                                         

: 1970                                                                                   

MRN: [id number]                                                                                   

Arrival Date: 2023                                                                          

Time: 18:54                                                                                       

Account#: [id number]                                                                            

Bed 18                                                                                            

Private MD:                                                                                       

ED Physician Lev Quigley                                                                       

HPI:                                                                                              

                                                                                             

00:55 This 53 yrs old  Male presents to ER via EMS with complaints of found down      bs3 

      outside. .                                                                                  

04:25 This 53 yrs old  Male presents to ER via EMS with complaints of being foud down.bs3 

00:55 54yo m hx of alcohol abuse foud down outside his house. Hx limited but pt endorses      bs3 

      drinking a bit today and fell .                                                             

01:01 Pt denies cough, sob, abdominal pain, urinary symptoms. .                               bs3 

04:25 Patient denies chest pain shortness of breath nausea vomiting abdominal pain he does    bs3 

      have slight knee pain on the left side did not think he had a fever.                        

                                                                                                  

Historical:                                                                                       

- Allergies:                                                                                      

                                                                                             

19:22 PENICILLINS;                                                                            ha1 

- PMHx:                                                                                           

19:22 cirrhosis of liver; HTN;                                                                ha1 

                                                                                                  

- Immunization history:: Adult Immunizations unknown.                                             

- Social history:: Smoking status: unknown.                                                       

                                                                                                  

                                                                                                  

ROS:                                                                                              

                                                                                             

04:25 Constitutional: Negative for fever, chills                                              bs3 

      All other systems are negative.                                                             

      Unable to obtain ROS due to altered mental status.                                          

                                                                                                  

Exam:                                                                                             

04:25 Constitutional:  This is a well developed, well nourished patient who is awake, alert,  bs3 

      and in no acute distress. Head/Face:  Normocephalic, atraumatic. Eyes:  Pupils equal        

      round and reactive to light, extra-ocular motions intact.  Lids and lashes normal.          

      ENT:  mmm, no posterior phyarngeal erythema Neck:  Trachea midline, no thyromegaly, no      

      neck stiffness Chest/axilla:  Normal chest wall appearance and motion.  Nontender with      

      no deformity.  No lesions are appreciated. Cardiovascular:  tachcyardic, no murmur          

      Respiratory:  Lungs have equal breath sounds bilaterally, clear to auscultation, no         

      respiratory distress Abdomen/GI:  protuberent, no focal tenderness, no stigmata of          

      cirrhosis Skin:  Warm, dry with normal turgor.  Normal color with no rashes, no             

      lesions, and no evidence of cellulitis. MS/ Extremity:  Pulses equal, no cyanosis.          

      Neurovascular intact.  Full, normal range of motion. Neuro:  slight slurred speech no       

      focal deficits.  Psych:  Awake, alert, with orientation to person, place and time.          

      Behavior, mood, and affect are within normal limits.                                        

                                                                                                  

Vital Signs:                                                                                      

                                                                                             

19:15  / 49; Pulse 103; Resp 19 S; Temp 103.3; Pulse Ox 100% ; Weight 74.84 kg; Height  ha1 

      5 ft. 5 in. ;                                                                               

20:47  / 48; Pulse 104; Resp 19; Temp 99.3; Pulse Ox 98% on R/A;                        ha1 

21:35  / 50; Pulse 102; Resp 16 S; Pulse Ox 99% on R/A;                                 ha1 

22:45  / 52; Pulse 110; Resp 20 S; Temp 101(O); Pulse Ox 98% on R/A;                    ha1 

                                                                                             

00:00  / 42; Pulse 99; Resp 18 S; Temp 99.1(O); Pulse Ox 100% on R/A;                   ha1 

01:00  / 52; Pulse 100; Resp 18 S; Pulse Ox 100% on R/A;                                ha1 

02:00  / 73; Pulse 106; Resp 20 S; Temp 99(O); Pulse Ox 100% on R/A;                    ha1 

03:00  / 66; Pulse 108; Resp 20 S; Pulse Ox 100% on R/A;                                ha1 

03:15  / 65; Pulse 101; Resp 20 S; Pulse Ox 100% on R/A;                                ha1 

04:00  / 64; Pulse 92; Resp 19 S; Temp 98.3; Pulse Ox 100% ;                            ha1 

04:15  / 79; Pulse 97; Resp 18 S; Temp 98.5; Pulse Ox 100% on R/A;                      ha1 

05:00  / 79; Pulse 101; Resp 20 S; Pulse Ox 100% ;                                      ha1 

06:00  / 75; Pulse 103; Resp 20; Pulse Ox 100% on R/A;                                  ha1 

                                                                                             

19:15 Body Mass Index 27.46 (74.84 kg, 165.1 cm)                                              ha1 

                                                                                                  

MDM:                                                                                              

                                                                                             

19:07 Patient medically screened.                                                             bs3 

                                                                                             

04:16 ED course: lactate persistently elevatd but unclear etiology, likely from inability of  bs3 

      liver to clear, I do not suspect toxic alcohol cyanide, his vitals do not suggest           

      septic shock, I requested [facility] hospitalist who reviewed the case in detail and            

      requested thiamine, flagyl and repeat lactate. I did a repeat lactate and it was 7.6, I     

      discussed with Dr. Givens who requested ICU eval at 417.                                 

04:25 ED course: d/w Dr. Dillon who accepted patient .                                        bs3 

04:25 Data reviewed: vital signs, nurses notes. Independent interpretation of the following   bs3 

      test(s) in the Emergency Department CT Scan: My interpretation is no ich. ED course:        

      Patient found to be in septic shock given lactate greater than 4 he received 30 cc/kg       

      received broad-spectrum antibiotics.                                                        

04:32 ED course: repeat glucose lower, will give po and d10, likely from alcohol abuse. pt    bs3 

      still aox3.                                                                                 

                                                                                                  

                                                                                             

19:24 Order name: Blood Culture Adult (2)                                                     bs3 

                                                                                             

19:24 Order name: CBC with Diff; Complete Time: 21:36                                         bs3 

                                                                                             

19:24 Order name: CMP; Complete Time: 21:36                                                   bs3 

                                                                                             

19:24 Order name: Lactate w/ 2H reflex if indic.; Complete Time: 21:36                        bs3 

                                                                                             

19:24 Order name: Protime (+inr); Complete Time: 21:36                                        bs3 

                                                                                             

19:24 Order name: Ptt, Activated; Complete Time: 21:36                                        bs3 

                                                                                             

19:24 Order name: Urinalysis w/ reflexes; Complete Time: 02:29                                bs3 

                                                                                             

19:25 Order name: Troponin High Sensitivity; Complete Time: 21:36                             bs3 

                                                                                             

19:25 Order name: CK; Complete Time: 21:36                                                    bs3 

                                                                                             

19:25 Order name: UDS; Complete Time: 02:29                                                   bs3 

                                                                                             

19:25 Order name: Phosphorus; Complete Time: 21:36                                            bs3 

                                                                                             

19:25 Order name: Magnesium; Complete Time: 21:36                                             bs3 

                                                                                             

19:27 Order name: ETOH Level; Complete Time: 21:36                                            kd3 

                                                                                             

19:33 Order name: SARS RAPID; Complete Time: 21:36                                            ha1 

                                                                                             

19:33 Order name: Flu; Complete Time: 20:59                                                   ha1 

                                                                                             

20:59 Order name: CBC Smear Scan; Complete Time: 21:36                                        EDMS

                                                                                             

01:08 Order name: Lactate Sepsis 2 HR Follow-up; Complete Time: 01:14                         EDMS

                                                                                             

03:04 Order name: Lactate w/ 2H reflex if indic.; Complete Time: 03:59                        3 

                                                                                             

04:43 Order name: Glucose, Ancillary Testing                                                  EDMS

                                                                                             

05:21 Order name: Glucose, Ancillary Testing                                                  EDMS

                                                                                             

19:24 Order name: CT Head C Spine; Complete Time: 20:59                                       bs3 

                                                                                             

19:24 Order name: Chest Single View XRAY; Complete Time: 20:59                                bs3 

                                                                                             

21:38 Order name: CT Chest Wo Con; Complete Time: 22:45                                       bs3 

                                                                                             

21:38 Order name: CT Abd/Pelvis - IV Contrast Only; Complete Time: 22:45                      bs3 

                                                                                             

19:24 Order name: EKG; Complete Time: 19:25                                                   bs3 

                                                                                             

19:24 Order name: Accucheck; Complete Time: 20:46                                             3 

                                                                                             

19:24 Order name: Cardiac monitoring; Complete Time: 19:28                                    bs3 

                                                                                             

19:24 Order name: EKG - Nurse/Tech; Complete Time: 20:46                                      3 

                                                                                             

19:24 Order name: IV Saline Lock - Large Bore; Complete Time: 20:46                           bs3 

                                                                                             

19:24 Order name: Labs collected and sent; Complete Time: 20:46                               bs3 

                                                                                             

19:24 Order name: O2 Per Protocol; Complete Time: 19:57                                       bs3 

                                                                                             

19:24 Order name: O2 Sat Monitoring; Complete Time: 19:57                                     bs3 

                                                                                             

19:24 Order name: Vital Signs; Complete Time: 19:57                                           bs3 

                                                                                             

04:22 Order name: Glucose Level; Complete Time: 04:37                                         bs3 

                                                                                                  

Administered Medications:                                                                         

                                                                                             

19:37 CANCELLED (Physician Discretion): NS 0.9% IV (30 ml/kg) 30 ml/kg IV at bolus once;      3 

      Sepsis Protocol                                                                             

19:57 Drug: NS 0.9%  ml Route: IV; Rate: bolus; Site: left antecubital;                 ha1 

19:58 Drug: Acetaminophen PO 1000 mg Route: PO;                                               ha1 

21:00 Drug: Rocephin IV 1 grams Route: IV; Rate: bolus; Site: right antecubital;              ha1 

                                                                                             

06:39 Follow up: Response: No adverse reaction; IV Status: Completed infusion; IV Intake: [id number] 

                                                                                             

21:55 Drug: NS 0.9% IV (30 ml/kg) 30 ml/kg Route: IV; Rate: bolus; Site: right antecubital;   ha1 

21:55 Drug: vancoMYCIN IVPB 1 grams Route: IVPB; Infused Over: 2 hrs; Site: right antecubital;ha                                                                                             

02:35 Drug: Thiamine  mg Route: IV; Rate: bolus; Site: right antecubital;               ha1 

03:00 Follow up: Response: No adverse reaction                                                ha1 

02:45 Drug: NS 0.9%  ml Route: IV; Rate: bolus; Site: left antecubital;                 ha1 

06:38 Follow up: Response: No adverse reaction; IV Status: Completed infusion; IV Intake:     ha1 

      500ml                                                                                       

02:45 Drug: Albumin IVPB 25 grams Volume: 100 ml; Route: IVPB; Site: left antecubital;        ha1 

03:15 Follow up: Response: No adverse reaction; IV Status: Completed infusion                 ha1 

02:50 Drug: Calcium Gluconate IVPB 1 grams Route: IVPB; Infused Over: 60 mins; Site: right    ha1 

      antecubital;                                                                                

06:38 Follow up: Response: No adverse reaction; IV Status: Completed infusion                 ha1 

03:00 Drug: metroNIDAZOLE  mg Route: PO;                                                ha1 

03:30 Follow up: Response: No adverse reaction                                                ha1 

04:32 Drug: D10 in Water  ml Route: IVP; Site: right antecubital;                      ha1 

06:37 Follow up: Response: No adverse reaction                                                ha1 

                                                                                                  

                                                                                                  

Disposition:                                                                                      

04:34 Critical Care:.                                                                         bs3 

                                                                                                  

Disposition Summary:                                                                              

23 01:20                                                                                    

Transfer Ordered                                                                                  

      Transfer Location: [facility]                                sb4 

      Reason: Higher level of care                                                            sb4 

      Condition: Fair                                                                         sb4 

      Problem: new                                                                            sb4 

      Symptoms: are unchanged                                                                 sb4 

      Accepting Physician: Dr. Dillon(23 06:40)                                         ha1 

      Diagnosis                                                                                   

        - Severe sepsis with septic shock                                                     sb4 

        - Alcoholic cirrhosis of liver with ascites                                           sb4 

      Forms:                                                                                      

        - Medication Reconciliation Form                                                      sb4 

        - SBAR form                                                                           sb4 

Critical care time excluding procedures:                                                          

04:34 Critical care time: Bedside Care: 36 minutes, Consultation: 20 minutes. Total time: 56  bs3 

      minutes                                                                                     

                                                                                                  

Signatures:                                                                                       

Dispatcher [employer]                           Elsa Lebron RN                      RN   kd3                                                  

Ladonna Marcos RN                        RN   ha1                                                  

Lev Quigley MD MD   bs3                                                  

Yanira Andrews PA-C                     [facility] sb4                                                  

                                                                                                  

Corrections: (The following items were deleted from the chart)                                    

                                                                                             

19:37 19:24 NS 0.9% IV (30 ml/kg) 30 ml/kg IV at bolus once; Sepsis Protocol ordered. bs3     kd3 

                                                                                             

04:33 01:20 [facility] Hospitalist sb4                                                              sb4 

04:35 00:42 LACTATE+C.LAB.BRZ ordered. [facility]                                                   EDMS

06:40 04:33 Dr. Dillon sb4                                                                    ha1 

                                                                                                  

**************************************************************************************************

## 2023-05-22 NOTE — ER
Nurse's Notes                                                                                     

 [facility]                                                                 

Name: Galdino Tabares                                                                             

Age: 53 yrs                                                                                       

Sex: Male                                                                                         

: 1970                                                                                   

MRN: [id number]                                                                                   

Arrival Date: 2023                                                                          

Time: 18:54                                                                                       

Account#: [id number]                                                                            

Bed 18                                                                                            

Private MD:                                                                                       

Diagnosis: Severe sepsis with septic shock;Alcoholic cirrhosis of liver with ascites              

                                                                                                  

Presentation:                                                                                     

                                                                                             

19:15 Chief complaint: EMS states: 53 year old male with altere mental status was found       ha1 

      laying on the back of the house by family members. appears to have a large hematoma on      

      the back of his head. Family members reports that he was laying on the back of the          

      house since this morning. Coronavirus screen: Vaccine status:. Ebola Screen: No             

      symptoms or risks identified at this time. Initial Sepsis Screen: Does the patient meet     

      any 2 criteria? No. Patient's initial sepsis screen is negative. Does the patient have      

      a suspected source of infection? No. Patient's initial sepsis screen is negative. Risk      

      Assessment: Do you want to hurt yourself or someone else? Patient reports no desire to      

      harm self or others. Onset of symptoms was May 21, 2023.                                    

19:15 Method Of Arrival: EMS: [facility]                                                    ha1 

19:15 Acuity: TERRY 3                                                                           ha1 

                                                                                                  

Triage Assessment:                                                                                

19:22 General: Appears comfortable, Behavior is calm, cooperative. Pain: Denies pain. Neuro:  ha1 

      Level of Consciousness is awake, alert, obeys commands, Oriented to person.                 

      Cardiovascular: Patient's skin is warm and dry. Respiratory: Airway is patent               

      Respiratory effort is even, unlabored, Respiratory pattern is regular, symmetrical. GI:     

      Abdomen is round distended, Bowel sounds present X 4 quads. : No signs and/or             

      symptoms were reported regarding the genitourinary system. Derm: Skin is normal.            

      Musculoskeletal: Circulation, motion, and sensation intact. Range of motion: intact in      

      all extremities. Injury Description: Laceration sustained to left parietal area and         

      right parietal area.                                                                        

                                                                                                  

Historical:                                                                                       

- Allergies:                                                                                      

19:22 PENICILLINS;                                                                            ha1 

- PMHx:                                                                                           

19:22 cirrhosis of liver; HTN;                                                                ha1 

                                                                                                  

- Immunization history:: Adult Immunizations unknown.                                             

- Social history:: Smoking status: unknown.                                                       

                                                                                                  

                                                                                                  

Screenin:00 [facility] ED Fall Risk Assessment (Adult) History of falling in the last 3 months,       ha1 

      including since admission Yes- single mechanical fall (1 pt) Confusion or                   

      Disorientation Yes (5 pts) Intoxicated or Sedated Yes (3 pts) Impaired Gait No (0 pts)      

      Mobility Assist Device Used No (0 pt) Altered Elimination Yes (1 pt) Score/Fall Risk        

      Level 3 or more points = High Risk Oriented to surroundings, Maintained a safe              

      environment, Educated pt \T\ family on fall prevention, incl call for assistance when       

      getting out of bed, Assessed \T\ reinforced patient's understanding of fall precautions,    

      Hourly rounding (assess needs \T\ fall precautionary measures) done, Implemented a Fall     

      Risk Plan of Care.                                                                          

19:25 Abuse screen: Denies threats or abuse. Denies injuries from another. Nutritional        ha1 

      screening: No deficits noted. Tuberculosis screening: No symptoms or risk factors           

      identified.                                                                                 

                                                                                                  

Assessment:                                                                                       

19:35 Reassessment: going to CT.                                                              ha1 

20:25 Reassessment: asking question about his status. AOx4.                                   ha1 

20:49 Reassessment: Patient and/or family updated on plan of care and expected duration. Pain ha1 

      level reassessed. Patient is alert, oriented x 3, equal unlabored respirations, skin        

      warm/dry/pink. Patient denies pain at this time.                                            

21:35 Reassessment: Patient and/or family updated on plan of care and expected duration. Pain ha1 

      level reassessed. Patient is alert, oriented x 3, equal unlabored respirations, skin        

      warm/dry/pink. Patient denies pain at this time.                                            

21:55 Reassessment: Patient and/or family updated on plan of care and expected duration. Pain ha1 

      level reassessed. Patient is alert, oriented x 3, equal unlabored respirations, skin        

      warm/dry/pink. going to CT.                                                                 

22:56 Reassessment: Patient and/or family updated on plan of care and expected duration. Pain ha1 

      level reassessed. Patient is alert, oriented x 3, equal unlabored respirations, skin        

      warm/dry/pink.                                                                              

23:50 Reassessment: Patient and/or family updated on plan of care and expected duration. Pain ha1 

      level reassessed. Patient is alert, oriented x 3, equal unlabored respirations, skin        

      warm/dry/pink. Patient denies pain at this time.                                            

                                                                                             

00:50 Reassessment: Patient and/or family updated on plan of care and expected duration. Pain ha1 

      level reassessed. Patient is alert, oriented x 3, equal unlabored respirations, skin        

      warm/dry/pink. Patient denies pain at this time.                                            

01:50 Reassessment: Patient and/or family updated on plan of care and expected duration. Pain ha1 

      level reassessed. Patient is alert, oriented x 3, equal unlabored respirations, skin        

      warm/dry/pink.                                                                              

02:50 Reassessment: Patient and/or family updated on plan of care and expected duration. Pain ha1 

      level reassessed. Patient is alert, oriented x 3, equal unlabored respirations, skin        

      warm/dry/pink. Patient denies pain at this time.                                            

03:55 Reassessment: Patient and/or family updated on plan of care and expected duration. Pain ha1 

      level reassessed. Patient is alert, oriented x 3, equal unlabored respirations, skin        

      warm/dry/pink. Patient denies pain at this time. Patient states feeling better. Patient     

      states symptoms have improved.                                                              

04:30 Reassessment: Patient and/or family updated on plan of care and expected duration. Pain ha1 

      level reassessed. Patient is alert, oriented x 3, equal unlabored respirations, skin        

      warm/dry/pink. low glucose level. Notified Dr. Quigley. Provided apple juice, and peanut      

      butter sandwich. asymptomatic.                                                              

04:55 Reassessment: report given to receiving nurse IVON Ram.                              [facility] 

05:17 Reassessment: Patient and/or family updated on plan of care and expected duration. Pain ha1 

      level reassessed. Patient is alert, oriented x 3, equal unlabored respirations, skin        

      warm/dry/pink. awaiting on transportation. Patient denies pain at this time.                

06:24 Reassessment: Patient and/or family updated on plan of care and expected duration. Pain ha1 

      level reassessed. Patient is alert, oriented x 3, equal unlabored respirations, skin        

      warm/dry/pink. awaiting on EMS.                                                             

                                                                                                  

Vital Signs:                                                                                      

                                                                                             

19:15  / 49; Pulse 103; Resp 19 S; Temp 103.3; Pulse Ox 100% ; Weight 74.84 kg; Height  ha1 

      5 ft. 5 in. ;                                                                               

20:47  / 48; Pulse 104; Resp 19; Temp 99.3; Pulse Ox 98% on R/A;                        ha1 

21:35  / 50; Pulse 102; Resp 16 S; Pulse Ox 99% on R/A;                                 ha1 

22:45  / 52; Pulse 110; Resp 20 S; Temp 101(O); Pulse Ox 98% on R/A;                    ha1 

                                                                                             

00:00  / 42; Pulse 99; Resp 18 S; Temp 99.1(O); Pulse Ox 100% on R/A;                   ha1 

01:00  / 52; Pulse 100; Resp 18 S; Pulse Ox 100% on R/A;                                ha1 

02:00  / 73; Pulse 106; Resp 20 S; Temp 99(O); Pulse Ox 100% on R/A;                    ha1 

03:00  / 66; Pulse 108; Resp 20 S; Pulse Ox 100% on R/A;                                ha1 

03:15  / 65; Pulse 101; Resp 20 S; Pulse Ox 100% on R/A;                                ha1 

04:00  / 64; Pulse 92; Resp 19 S; Temp 98.3; Pulse Ox 100% ;                            ha1 

04:15  / 79; Pulse 97; Resp 18 S; Temp 98.5; Pulse Ox 100% on R/A;                      ha1 

05:00  / 79; Pulse 101; Resp 20 S; Pulse Ox 100% ;                                      ha1 

06:00  / 75; Pulse 103; Resp 20; Pulse Ox 100% on R/A;                                  ha1 

                                                                                             

19:15 Body Mass Index 27.46 (74.84 kg, 165.1 cm)                                              [facility] 

                                                                                                  

ED Course:                                                                                        

                                                                                             

18:57 Patient arrived in ED.                                                                  eb  

19:00 Patient has correct armband on for positive identification. Bed in low position. Call   [facility] 

      light in reach. Side rails up X2.                                                           

19:07 Lev Quigley MD is Attending Physician.                                              bs3 

19:15 Ladonna Marcos RN is Primary Nurse.                                                      ha1 

19:22 Triage completed.                                                                       ha1 

19:25 Maintain EMS IV. Dressing intact. Gauge \T\ site: 18 left AC.                             ha1

19:26 Arm band placed on right wrist.                                                         ha1 

19:50 Missed attempt(s): 20 gauge in right forearm.                                           ha1 

20:08 Chest Single View XRAY In Process Unspecified.                                          EDMS

20:10 CT Head C Spine In Process Unspecified.                                                 EDMS

20:30 Inserted saline lock: 20 gauge in right antecubital area, using aseptic technique.      ha1 

      Blood collected.                                                                            

20:46 Blood Culture Adult (2) Sent.                                                           ha1 

20:46 CBC with Diff Sent.                                                                     ha1 

20:46 CMP Sent.                                                                               ha1 

20:46 Lactate w/ 2H reflex if indic. Sent.                                                    ha1 

20:46 Protime (+inr) Sent.                                                                    ha1 

20:46 Ptt, Activated Sent.                                                                    ha1 

20:46 SARS RAPID Sent.                                                                        ha1 

22:01 CT Chest Wo Con In Process Unspecified.                                                 EDMS

22:01 CT Abd/Pelvis - IV Contrast Only In Process Unspecified.                                EDMS

                                                                                             

01:03 Contacted [facility] for Initial contact for transfer. Pt information was given to   [facility] 

      Lizzette (last name and title not given).                                                    

01:47 Ryder from [facility] called back to speak with accepting physician Dr. Quigley.     [facility] 

02:30 Accepting physician wanted to wait and monitor pt lactate levels before approving       [facility] 

      transfer.                                                                                   

04:24 Lizzette Joyner RN called and gave Coordinator and physician approval at [address].                                                                             

04:57 Contacted [place] for transfer. Stated they would be here with in an hour after a  [facility] 

      new crew comes.                                                                             

06:34 No provider procedures requiring assistance completed. Patient transferred, IV remains  [facility] 

      in place.                                                                                   

                                                                                                  

Administered Medications:                                                                         

                                                                                             

19:37 CANCELLED (Physician Discretion): NS 0.9% IV (30 ml/kg) 30 ml/kg IV at bolus once;      kd3 

      Sepsis Protocol                                                                             

19:57 Drug: NS 0.9%  ml Route: IV; Rate: bolus; Site: left antecubital;                 [facility] 

19:58 Drug: Acetaminophen PO 1000 mg Route: PO;                                               [facility] 

21:00 Drug: Rocephin IV 1 grams Route: IV; Rate: bolus; Site: right antecubital;              [facility] 

                                                                                             

06:39 Follow up: Response: No adverse reaction; IV Status: Completed infusion; IV Intake: [id number] 

                                                                                             

21:55 Drug: NS 0.9% IV (30 ml/kg) 30 ml/kg Route: IV; Rate: bolus; Site: right antecubital;   [facility] 

21:55 Drug: vancoMYCIN IVPB 1 grams Route: IVPB; Infused Over: 2 hrs; Site: right antecubital;ha1 

                                                                                             

02:35 Drug: Thiamine  mg Route: IV; Rate: bolus; Site: right antecubital;               ha1 

03:00 Follow up: Response: No adverse reaction                                                ha1 

02:45 Drug: NS 0.9%  ml Route: IV; Rate: bolus; Site: left antecubital;                 ha1 

06:38 Follow up: Response: No adverse reaction; IV Status: Completed infusion; IV Intake:     ha1 

      500ml                                                                                       

02:45 Drug: Albumin IVPB 25 grams Volume: 100 ml; Route: IVPB; Site: left antecubital;        ha1 

03:15 Follow up: Response: No adverse reaction; IV Status: Completed infusion                 ha1 

02:50 Drug: Calcium Gluconate IVPB 1 grams Route: IVPB; Infused Over: 60 mins; Site: right    ha1 

      antecubital;                                                                                

06:38 Follow up: Response: No adverse reaction; IV Status: Completed infusion                 ha1 

03:00 Drug: metroNIDAZOLE  mg Route: PO;                                                ha1 

03:30 Follow up: Response: No adverse reaction                                                ha1 

04:32 Drug: D10 in Water  ml Route: IVP; Site: right antecubital;                      ha1 

06:37 Follow up: Response: No adverse reaction                                                ha1 

                                                                                                  

                                                                                                  

Medication:                                                                                       

06:36 VIS not applicable for this client.                                                     ha1 

                                                                                                  

Intake:                                                                                           

06:38 IV: 500ml; Total: 500ml.                                                                ha1 

06:39 IV: 50ml; Total: 550ml.                                                                 ha1 

                                                                                                  

Outcome:                                                                                          

01:20 ER care complete, transfer ordered by MD.                                               sb4 

06:35 Transferred by [facility]. to [facility].      ha1 

06:35 Condition: stable                                                                           

06:36 Discharge instructions given to patient, Instructed on the need for transfer,           ha1 

      Demonstrated understanding of instructions.                                                 

06:40 Patient left the ED.                                                                    ha1 

                                                                                                  

Signatures:                                                                                       

Dispatcher [employer]                           EDMS                                                 

Malena Palma Heidy, RN                        RN   ha1                                                  

Lev Quigley MD MD   bs3                                                  

Yanira Andrews, PA-C                     PA-C sb4                                                  

Kennedy Franz                            1                                                  

Elsa Corona RN   kd3                                                  

                                                                                                  

Corrections: (The following items were deleted from the chart)                                    

                                                                                             

22:00 20:00 Reassessment: going to CT ha1                                                     ha1 

22:57 22:45  / 52; Pulse 110bpm; Resp 20bpm; Spontaneous; Pulse Ox 98% RA; ha1          ha1 

                                                                                             

02:01 01:03 Initial contact for transfer. Pt information was given to Ryder (last name and  [facility] 

      title not given) [facility]                                                                        

04:57 01:03 Contacted [facility] for Initial contact for transfer. Pt information was      [facility] 

      given to Ryder (last name and title not given) [facility]                                        

                                                                                                  

**************************************************************************************************

## 2023-06-17 ENCOUNTER — HOSPITAL ENCOUNTER (INPATIENT)
Dept: HOSPITAL 97 - ER | Age: 53
LOS: 2 days | Discharge: HOME | DRG: 442 | End: 2023-06-19
Attending: HOSPITALIST | Admitting: HOSPITALIST
Payer: SELF-PAY

## 2023-06-17 VITALS — BODY MASS INDEX: 23.9 KG/M2

## 2023-06-17 DIAGNOSIS — E80.6: ICD-10-CM

## 2023-06-17 DIAGNOSIS — K70.30: ICD-10-CM

## 2023-06-17 DIAGNOSIS — D63.8: ICD-10-CM

## 2023-06-17 DIAGNOSIS — D68.9: ICD-10-CM

## 2023-06-17 DIAGNOSIS — K72.90: ICD-10-CM

## 2023-06-17 DIAGNOSIS — Z59.00: ICD-10-CM

## 2023-06-17 DIAGNOSIS — Z20.822: ICD-10-CM

## 2023-06-17 DIAGNOSIS — D61.818: ICD-10-CM

## 2023-06-17 DIAGNOSIS — E87.20: ICD-10-CM

## 2023-06-17 DIAGNOSIS — E86.9: ICD-10-CM

## 2023-06-17 DIAGNOSIS — K76.82: Primary | ICD-10-CM

## 2023-06-17 LAB
ALBUMIN SERPL BCP-MCNC: 2.1 G/DL (ref 3.4–5)
ALP SERPL-CCNC: 123 U/L (ref 45–117)
ALT SERPL W P-5'-P-CCNC: 24 U/L (ref 16–61)
AST SERPL W P-5'-P-CCNC: 58 U/L (ref 15–37)
BUN BLD-MCNC: 13 MG/DL (ref 7–18)
GLUCOSE SERPLBLD-MCNC: 93 MG/DL (ref 74–106)
HCT VFR BLD CALC: 27.2 % (ref 39.6–49)
INR BLD: 3.26
LYMPHOCYTES # SPEC AUTO: 0.4 K/UL (ref 0.7–4.9)
MCV RBC: 101.5 FL (ref 80–100)
METHAMPHET UR QL SCN: NEGATIVE
PMV BLD: 7.8 FL (ref 7.6–11.3)
POTASSIUM SERPL-SCNC: 2.8 MEQ/L (ref 3.5–5.1)
RBC # BLD: 2.68 M/UL (ref 4.33–5.43)
THC SERPL-MCNC: NEGATIVE NG/ML

## 2023-06-17 PROCEDURE — 96365 THER/PROPH/DIAG IV INF INIT: CPT

## 2023-06-17 PROCEDURE — 82728 ASSAY OF FERRITIN: CPT

## 2023-06-17 PROCEDURE — 80053 COMPREHEN METABOLIC PANEL: CPT

## 2023-06-17 PROCEDURE — 80048 BASIC METABOLIC PNL TOTAL CA: CPT

## 2023-06-17 PROCEDURE — 82607 VITAMIN B-12: CPT

## 2023-06-17 PROCEDURE — 82077 ASSAY SPEC XCP UR&BREATH IA: CPT

## 2023-06-17 PROCEDURE — 83605 ASSAY OF LACTIC ACID: CPT

## 2023-06-17 PROCEDURE — 96366 THER/PROPH/DIAG IV INF ADDON: CPT

## 2023-06-17 PROCEDURE — 85610 PROTHROMBIN TIME: CPT

## 2023-06-17 PROCEDURE — 82550 ASSAY OF CK (CPK): CPT

## 2023-06-17 PROCEDURE — 87040 BLOOD CULTURE FOR BACTERIA: CPT

## 2023-06-17 PROCEDURE — 83540 ASSAY OF IRON: CPT

## 2023-06-17 PROCEDURE — 93005 ELECTROCARDIOGRAM TRACING: CPT

## 2023-06-17 PROCEDURE — 81001 URINALYSIS AUTO W/SCOPE: CPT

## 2023-06-17 PROCEDURE — 83735 ASSAY OF MAGNESIUM: CPT

## 2023-06-17 PROCEDURE — 84145 PROCALCITONIN (PCT): CPT

## 2023-06-17 PROCEDURE — 87635 SARS-COV-2 COVID-19 AMP PRB: CPT

## 2023-06-17 PROCEDURE — 70450 CT HEAD/BRAIN W/O DYE: CPT

## 2023-06-17 PROCEDURE — 84443 ASSAY THYROID STIM HORMONE: CPT

## 2023-06-17 PROCEDURE — 72125 CT NECK SPINE W/O DYE: CPT

## 2023-06-17 PROCEDURE — 84466 ASSAY OF TRANSFERRIN: CPT

## 2023-06-17 PROCEDURE — 80061 LIPID PANEL: CPT

## 2023-06-17 PROCEDURE — 36415 COLL VENOUS BLD VENIPUNCTURE: CPT

## 2023-06-17 PROCEDURE — 82947 ASSAY GLUCOSE BLOOD QUANT: CPT

## 2023-06-17 PROCEDURE — 84132 ASSAY OF SERUM POTASSIUM: CPT

## 2023-06-17 PROCEDURE — 82140 ASSAY OF AMMONIA: CPT

## 2023-06-17 PROCEDURE — 85730 THROMBOPLASTIN TIME PARTIAL: CPT

## 2023-06-17 PROCEDURE — 84100 ASSAY OF PHOSPHORUS: CPT

## 2023-06-17 PROCEDURE — 99285 EMERGENCY DEPT VISIT HI MDM: CPT

## 2023-06-17 PROCEDURE — 80307 DRUG TEST PRSMV CHEM ANLYZR: CPT

## 2023-06-17 PROCEDURE — 71045 X-RAY EXAM CHEST 1 VIEW: CPT

## 2023-06-17 PROCEDURE — 85025 COMPLETE CBC W/AUTO DIFF WBC: CPT

## 2023-06-17 PROCEDURE — 80074 ACUTE HEPATITIS PANEL: CPT

## 2023-06-17 RX ADMIN — SODIUM CHLORIDE SCH MLS: 0.9 INJECTION, SOLUTION INTRAVENOUS at 21:38

## 2023-06-17 RX ADMIN — Medication SCH ML: at 21:39

## 2023-06-17 SDOH — ECONOMIC STABILITY - HOUSING INSECURITY: HOMELESSNESS UNSPECIFIED: Z59.00

## 2023-06-17 NOTE — ER
Nurse's Notes                                                                                     

 [facility]\""                                                                 

Name: Syd Chatterjee                                                                             

Age: 53 yrs                                                                                       

Sex: Male                                                                                         

: 1970                                                                                   

MRN: [id number]                                                                                   

Arrival Date: 2023                                                                          

Time: 16:29                                                                                       

Account#: [id number]                                                                            

Bed 4                                                                                             

Private MD:                                                                                       

Diagnosis: Encephalopathy, unspecified;Hepatic failure, unspecified without coma;Altered mental   

  status, unspecified                                                                             

                                                                                                  

Presentation:                                                                                     

                                                                                             

16:31 Chief complaint: Patient states: Pt was reportedly walking around outside all day       ss  

      refusing to drink water. Pt is reportedly homeless and was found on the ground by           

      bystander and assisted to a chair. EMS reports /75 BGL 92 and temp 100.2.             

      Coronavirus screen: Client denies travel out of the [place] in the last 14 days. Ebola         

      Screen: Patient denies exposure to infectious person. Patient denies travel to an           

      Ebola-affected area in the 21 days before illness onset. Initial Sepsis Screen: Does        

      the patient meet any 2 criteria? No. Patient's initial sepsis screen is negative. Does      

      the patient have a suspected source of infection? No. Patient's initial sepsis screen       

      is negative. Risk Assessment: Do you want to hurt yourself or someone else? Patient         

      reports no desire to harm self or others. Onset of symptoms is unknown.                     

16:31 Method Of Arrival: EMS: [facility]                                                      

16:31 Acuity: BENITA 2                                                                           ss  

                                                                                                  

Historical:                                                                                       

- Allergies:                                                                                      

16:34 Unable to obtain;                                                                       ss  

- PMHx:                                                                                           

16:34 Cirrhosis of liver;                                                                     ss  

                                                                                                  

- Immunization history:: Adult Immunizations up to date.                                          

- Social history:: Smoking status: unknown.                                                       

                                                                                                  

                                                                                                  

Screenin:01 [facility] ED Fall Risk Assessment (Adult). Abuse screen: Denies threats or abuse. Denies vg1 

      injuries from another. Nutritional screening: No deficits noted. Tuberculosis               

      screening: No symptoms or risk factors identified.                                          

                                                                                                  

Assessment:                                                                                       

17:01 General: Appears uncomfortable, ill, slender, unkempt, Behavior is. Neuro: Level of     vg1 

      Consciousness is awake, alert, confused, Oriented to person. Cardiovascular: Capillary      

      refill is > 3 seconds in bilateral fingers Patient's skin is warm and dry. Respiratory:     

      Airway is patent Respiratory effort is even, unlabored. GI: pt defecated on self PTA;       

      abbie care provided and in a brief. : No signs and/or symptoms were reported regarding     

      the genitourinary system. EENT: Eyes with exudate noted from outer aspect of conjuctiva     

      of right eye, iris of right eye, inner aspect of conjuctiva of right eye, outer aspect      

      of conjuctiva of left eye, iris of left eye and inner aspect of conjunctiva of left eye     

      Sclera/Cornea are cloudy in CONNIE eyes jaundice . Derm: Skin is jaundiced.                    

      Musculoskeletal: Circulation, motion, and sensation intact.                                 

17:38 Reassessment: Patient appears in no apparent distress at this time. No changes from     ml4 

      previously documented assessment. General: Behavior is calm, quiet. Pain: Unable to use     

      pain scale. Patient is disoriented. Neuro:.                                                 

                                                                                                  

Vital Signs:                                                                                      

16:31 Pulse 84; Resp 18;                                                                      ss  

16:57  / 75; Pulse 67; Resp 12; Temp 98.7(O); Pulse Ox 99% ;                            vg1 

17:42  / 70; Pulse 64; Resp 14; Pulse Ox 100% ;                                         vg1 

19:30  / 60; Pulse 71; Resp 15; Pulse Ox 100% on R/A;                                   ll3 

                                                                                                  

Jack Coma Score:                                                                               

17:38 Eye Response: spontaneous(4). Motor Response: localizes pain(5). Verbal Response:       ml4 

      confused(4). Total: 13.                                                                     

                                                                                                  

ED Course:                                                                                        

16:30 Patient arrived in ED.                                                                  ss  

16:34 Jm Santos PA is PHCP.                                                                cp  

16:34 Teto Isaacs MD is Attending Physician.                                                cp  

16:34 Triage completed.                                                                       ss  

16:34 Arm band placed on right wrist.                                                         ss  

16:58 BRANDYN WalkerIII, Buck, RN is Primary Nurse.                                              ml4 

17:01 Patient has correct armband on for positive identification. Placed in gown. Bed in low  vg1 

      position. Call light in reach. Side rails up X2. Client placed on continuous cardiac        

      and pulse oximetry monitoring. NIBP monitoring applied.                                     

17:01 Maintain EMS IV. Dressing intact. Good blood return noted. Site clean \T\ dry. Gauge \T\    vg
1

      site: 18 L AC. Flushed left antecubital with 5 ml normal saline.                            

17:03 Resting quietly. Patient moved to CT via stretcher.                                     ml4 

17:15 CT Head C Spine In Process Unspecified.                                                 EDMS

17:18 Awaiting lab results, Awaiting radiology results. Patient moved back from CT.           ml4 

17:19 Patient has correct armband on for positive identification. Placed in gown. Bed in low  ml4 

      position. Side rails up X2. Client placed on continuous cardiac and pulse oximetry          

      monitoring. NIBP monitoring applied. Lights dimmed. Warm blanket given. Head of bed         

      elevated.                                                                                   

17:20 Chest Single View XRAY In Process Unspecified.                                          EDMS

18:14 No provider procedures requiring assistance completed. IV is patent, is intact.         ml4 

18:20 Brandi Raphael PA-C is Hospitalizing Provider.                                          cp  

19:33 Primary Nurse role handed off by BRANDYN WalkerIII, Buck, BRANDYN                                 

20:54 Patient admitted, IV remains in place.                                                  kd3 

                                                                                                  

Administered Medications:                                                                         

16:37 CANCELLED (Physician Discretion): NS 0.9% IV 1000 ml IV at 1 bolus Per protocol; 1000   cp  

      mL bolus                                                                                    

17:37 Drug: Potassium Chloride IV 20 mEq Route: IV; Rate: calculated rate; Infused Over: 2    ml4 

      hrs; Site: left antecubital; Delivery: Primary tubing;                                      

20:55 Follow up: IV Status: Completed infusion                                                kd3 

17:38 Drug: NS 0.9% IV 1000 ml Route: IV; Rate: 1 bolus; Site: left antecubital;              ml4 

20:55 Follow up: IV Status: Completed infusion                                                kd3 

19:59 Drug: Lactulose PO 30 grams Volume: 45 ml; Route: PO;                                   ll3 

20:55 Follow up: Response: No adverse reaction                                                kd3 

19:59 Drug: Rocephin IV 1 grams Route: IV; Rate: calculated rate; Site: left antecubital;     ll3 

20:55 Follow up: IV Status: Completed infusion                                                kd3 

                                                                                                  

                                                                                                  

Medication:                                                                                       

17:01 VIS not applicable for this client.                                                     vg1 

                                                                                                  

Outcome:                                                                                          

18:21 Decision to Hospitalize by Provider.                                                    cp  

20:54 Admitted to Med/surg                                                                    kd3 

20:54 Condition: stable                                                                           

20:54 Discharge instructions given to patient, Instructed on the need for admit, Demonstrated     

      understanding of instructions, Prescriptions given X                                        

20:55 Patient left the ED.                                                                    kd3 

                                                                                                  

Signatures:                                                                                       

Dispatcher [employer]                           EDMS                                                 

Ofelia Benitez RN                   RN   Jm Tamayo PA PA cp Garcia, Victoria RN                    RN   vg1                                                  

Judith Melendez                                                                                    

Kandis Kinney RN                      RN   3                                                  

Malaika Santamaria RN                      RN   kd3                                                  

BRANDYN WalkerIII, Buck, RN                RN   ml4                                                  

                                                                                                  

**************************************************************************************************

## 2023-06-17 NOTE — RAD REPORT
EXAM DESCRIPTION:  RAD - Chest Single View - 6/17/2023 5:18 pm

 

CLINICAL HISTORY:  altered mental status

Chest pain.

 

COMPARISON:  No comparisonsNo comparisons

 

FINDINGS:  Portable technique limits examination quality.

 

The lungs are grossly clear. The heart is mildly enlarged in size. No displaced fractures.

 

IMPRESSION:  No acute intrathoracic process suspected.

## 2023-06-17 NOTE — EDPHYS
Physician Documentation                                                                           

 [facility]                                                                 

Name: Syd Chatterjee                                                                             

Age: 53 yrs                                                                                       

Sex: Male                                                                                         

: 1970                                                                                   

MRN: [id number]                                                                                   

Arrival Date: 2023                                                                          

Time: 16:29                                                                                       

Account#: [id number]                                                                            

Bed 4                                                                                             

Private MD:                                                                                       

ED Physician Teto Isaacs                                                                         

HPI:                                                                                              

                                                                                             

16:38 This 48 yrs old Male presents to ER via EMS with complaints of Altered Mental Status.   cp  

16:38 The patient presents with confusion. Onset: The symptoms/episode began/occurred at an   cp  

      unknown time. Possible causes: history of liver cirrhosis.                                  

16:38 Associated signs and symptoms: Pertinent positives: weakness, incontinence, Pertinent   cp  

      negatives: abdominal pain, chest pain.                                                      

16:38 Patient's baseline: Neuro: alert and fully oriented, Motor: no deficits, Ambulation:    cp  

      walks without assistance, Speech: normal.                                                   

                                                                                                  

Historical:                                                                                       

- Allergies:                                                                                      

16:34 Unable to obtain;                                                                       ss  

- PMHx:                                                                                           

16:34 Cirrhosis of liver;                                                                     ss  

                                                                                                  

- Immunization history:: Adult Immunizations up to date.                                          

- Social history:: Smoking status: unknown.                                                       

                                                                                                  

                                                                                                  

ROS:                                                                                              

16:40 Constitutional: Positive for poor PO intake, Negative for fever.                        cp  

16:40 Cardiovascular: Negative for chest pain.                                                cp  

16:40 Abdomen/GI: Negative for abdominal pain.                                                    

16:40 Neuro: Positive for altered mental status.                                                  

16:40 Unable to obtain ROS due to altered mental status.                                          

                                                                                                  

Exam:                                                                                             

16:43 ECG was reviewed by the Attending Physician.                                            cp  

16:45 Constitutional: The patient appears in no acute distress.                               cp  

16:45 Head/Face:  Normocephalic, atraumatic.                                                  cp  

16:45 Eyes: Periorbital structures: appear normal, Pupils: equal, round, and reactive to      cp  

      light and accomodation, Extraocular movements: intact throughout, Sclera: icterus, is       

      present, Lids and lashes: appear normal, bilaterally.                                       

16:45 ENT: External ear(s): are unremarkable, Ear canal(s): are normal, clear, TM's:              

      dullness, bilaterally, Nose: is normal, Mouth: Lips: moist, Oral mucosa: pink and           

      intact, moist, Posterior pharynx: Airway: no evidence of obstruction, patent.               

16:45 Neck: C-spine: vertebral tenderness, is not appreciated, crepitus, is not appreciated,      

      ROM/movement: pain, is not appreciated, limited range of motion, is not appreciated,        

      Meningeal signs: are not present, nuchal rigidity, is not appreciated.                      

16:45 Chest/axilla: Inspection: normal, Palpation: is normal, no crepitus, no tenderness.         

16:45 Cardiovascular: Rate: normal, Rhythm: regular, Edema: is not appreciated, JVD: is not       

      appreciated.                                                                                

16:45 Respiratory: the patient does not display signs of respiratory distress,  Respirations:     

      normal, no use of accessory muscles, no retractions, labored breathing, is not present,     

      Breath sounds: are clear throughout, no decreased breath sounds, no stridor, no             

      wheezing.                                                                                   

16:45 Abdomen/GI: Inspection: abdomen appears normal, Bowel sounds: active, all quadrants,        

      Palpation: abdomen is soft and non-tender, in all quadrants.                                

16:45 Back: pain, is absent.                                                                      

16:45 Skin: Appearance: Color: jaundiced.                                                         

16:45 Neuro: Orientation: to person, Mentation: confused, Motor: moves all fours.                 

                                                                                                  

Vital Signs:                                                                                      

16:31 Pulse 84; Resp 18;                                                                      ss  

16:57  / 75; Pulse 67; Resp 12; Temp 98.7(O); Pulse Ox 99% ;                            vg1 

17:42  / 70; Pulse 64; Resp 14; Pulse Ox 100% ;                                         vg1 

19:30  / 60; Pulse 71; Resp 15; Pulse Ox 100% on R/A;                                   ll3 

                                                                                                  

Jack Coma Score:                                                                               

17:38 Eye Response: spontaneous(4). Motor Response: localizes pain(5). Verbal Response:       ml4 

      confused(4). Total: 13.                                                                     

                                                                                                  

MDM:                                                                                              

16:34 Patient medically screened.                                                             cp  

18:25 Data reviewed: vital signs, nurses notes, lab test result(s), EKG, radiologic studies,  cp  

      CT scan, plain films.                                                                       

18:25 Differential Diagnosis: electrolyte abnormality, pneumonia, seizure, sepsis, volume     cp  

      depletion. Consideration of Admission/Observation Patient was admitted/placed on            

      observation. Management of patient was discussed with the following: Hospitalist:           

      Sandra Raphael, NP will admit after discussion. Care significantly affected by the            

      following chronic conditions: Liver Disease. Response to treatment: the patient's           

      symptoms have mildly improved after treatment.                                              

                                                                                                  

                                                                                             

16:37 Order name: Blood Culture Adult (2)                                                     cp  

                                                                                             

16:37 Order name: CBC with Diff; Complete Time: 17:19                                         cp  

                                                                                             

17:19 Interpretation: Normal except: WBC 3.80; RBC 2.68; HGB 8.7; HCT 27.2; .5; PLT    cp  

      49; RDW 19.7; EMIILANO% 77.4; LYM% 11.2; LYMA 0.4.                                               

                                                                                             

16:37 Order name: CMP; Complete Time: 17:19                                                     

                                                                                             

17:19 Interpretation: Normal except: CO2 20; ; K 2.8; AST 58; ; BILIT 6.8; CA    cp  

      7.5; ALB 2.1; GLOB 4.5; A/G 0.5.                                                            

                                                                                             

16:37 Order name: Lactate w/ 2H reflex if indic.; Complete Time: 17:19                          

                                                                                             

17:21 Interpretation: Abnormal: LAC 2.6.                                                        

                                                                                             

16:37 Order name: Protime (+inr); Complete Time: 17:08                                          

                                                                                             

17:32 Interpretation: Reviewed.                                                                 

                                                                                             

16:37 Order name: Ptt, Activated; Complete Time: 17:08                                          

                                                                                             

16:37 Order name: Urinalysis w/ reflexes; Complete Time: 17:31                                  

                                                                                             

17:31 Interpretation: Normal except: UCLA Extremely Turbid; UBILI 1+; UKET 2+; UBLD 3+        cp  

      (OVER); UPROT 2+; UUROB 3+; URBC >50; MUCUS 4+; BYST Trace.                                 

                                                                                             

16:37 Order name: AMMONIA; Complete Time: 17:19                                                 

                                                                                             

17:21 Interpretation: Reviewed.                                                                 

                                                                                             

16:37 Order name: CK; Complete Time: 17:19                                                      

                                                                                             

16:37 Order name: Procalcitonin; Complete Time: 17:31                                           

                                                                                             

17:32 Interpretation: Reviewed.                                                                 

                                                                                             

16:37 Order name: UDS; Complete Time: 17:19                                                     

                                                                                             

16:37 Order name: ETOH Level; Complete Time: 17:19                                            cp  

                                                                                             

16:39 Order name: COVID-19 SARS RT PCR; Complete Time: 20:45                                    

                                                                                             

16:52 Order name: Glucose, Ancillary Testing; Complete Time: 17:08                            EDMS

                                                                                             

20:00 Order name: Lactate Sepsis 2 HR Follow-up; Complete Time: 20:45                         EDMS

                                                                                             

16:37 Order name: Chest Single View XRAY; Complete Time: 17:31                                cp  

                                                                                             

17:32 Interpretation: Report review.                                                            

                                                                                             

16:37 Order name: CT Head C Spine; Complete Time: 17:31                                       cp  

                                                                                             

17:32 Interpretation: Reviewed report.                                                          

                                                                                             

16:37 Order name: EKG; Complete Time: 16:38                                                   cp  

                                                                                             

16:37 Order name: Accucheck; Complete Time: 16:55                                             cp  

                                                                                             

16:37 Order name: Cardiac monitoring; Complete Time: 16:55                                    cp  

                                                                                             

16:37 Order name: EKG - Nurse/Tech; Complete Time: 16:55                                      cp  

                                                                                             

16:37 Order name: IV Saline Lock - Large Bore; Complete Time: 16:55                           cp  

                                                                                             

16:37 Order name: Labs collected and sent; Complete Time: 16:55                               cp  

                                                                                             

16:37 Order name: O2 Per Protocol; Complete Time: 16:55                                       cp  

                                                                                             

16:37 Order name: O2 Sat Monitoring; Complete Time: 16:55                                     cp  

                                                                                             

16:37 Order name: Vital Signs; Complete Time: 17:00                                           cp  

                                                                                                  

EC:43 Rate is 73 beats/min. Rhythm is regular. OH interval is normal. QRS interval is normal. cp  

      QT interval is prolonged at 464 msec. T waves are Inverted in lead aVR. Interpreted by      

      me. Reviewed by me.                                                                         

                                                                                                  

Administered Medications:                                                                         

16:37 CANCELLED (Physician Discretion): NS 0.9% IV 1000 ml IV at 1 bolus Per protocol; 1000   cp  

      mL bolus                                                                                    

17:37 Drug: Potassium Chloride IV 20 mEq Route: IV; Rate: calculated rate; Infused Over: 2    ml4 

      hrs; Site: left antecubital; Delivery: Primary tubing;                                      

20:55 Follow up: IV Status: Completed infusion                                                kd3 

17:38 Drug: NS 0.9% IV 1000 ml Route: IV; Rate: 1 bolus; Site: left antecubital;              ml4 

20:55 Follow up: IV Status: Completed infusion                                                kd3 

19:59 Drug: Lactulose PO 30 grams Volume: 45 ml; Route: PO;                                   ll3 

20:55 Follow up: Response: No adverse reaction                                                kd3 

19:59 Drug: Rocephin IV 1 grams Route: IV; Rate: calculated rate; Site: left antecubital;     3 

20:55 Follow up: IV Status: Completed infusion                                                kd3 

                                                                                                  

                                                                                                  

Disposition Summary:                                                                              

23 18:21                                                                                    

Hospitalization Ordered                                                                           

      Hospitalization Status: Inpatient Admission                                             cp  

      Provider: Brandi Raphael cp  

      [facility]                                                 cp  

      Condition: Stable                                                                       cp  

      Problem: new                                                                            cp  

      Symptoms: have improved                                                                 cp  

      Bed/Room Type: Standard                                                                 cp  

      Room Assignment: 203(23 19:28)                                                      

      Diagnosis                                                                                   

        - Encephalopathy, unspecified                                                         cp  

        - Hepatic failure, unspecified without coma                                           cp  

        - Altered mental status, unspecified                                                  cp  

      Forms:                                                                                      

        - Medication Reconciliation Form                                                      cp  

        - SBAR form                                                                           cp  

Addendum:                                                                                         

2023                                                                                        

     20:03 Co-signature as Attending Physician, Teto Isaacs MD I reviewed the patient's care       r
n

           provided by the Advanced Practice Provider and agree with the diagnosis and treatment  

           plan.                                                                                  

                                                                                                  

Signatures:                                                                                       

Dispatcher [employer]                           EDMS                                                 

Izabel Mortensen RN RN mw Nieto, Roman, MD MD rn Blanchard, Shelby RN                   BRANDYN   ss                                                   

Jm Santos, PA                         PA   cp                                                   

Kandis Kinney RN RN   ll3                                                  

Malaika Santamaria RN RN   kd3                                                  

Brandi Raphael, PARONNY                     PA-C robert Walker RNIII, Buck, RN                RN   ml4                                                  

                                                                                                  

Corrections: (The following items were deleted from the chart)                                    

                                                                                             

16:37 16:37 NS 0.9% IV 1000 ml IV at 1 bolus Per protocol; 1000 mL bolus ordered. cp          cp  

19:28 18:21 cp                                                                                mw  

                                                                                                  

**************************************************************************************************

## 2023-06-17 NOTE — RAD REPORT
EXAM DESCRIPTION:  CT - [id number] - 6/17/2023 5:13 pm

 

CLINICAL HISTORY:  Trauma, head and neck injury.

MENTAL STATUS CHANGE

 

COMPARISON:  <Comparisons>

 

TECHNIQUE:  Axial 5 mm thick images of the head were obtained.

 

Axial 2 mm thick images of the cervical spine were obtained with sagittal and coronal reconstruction 
images generated and reviewed.

 

All CT scans are performed using dose optimization technique as appropriate and may include automated
 exposure control or mA/KV adjustment according to patient size.

 

FINDINGS:  CT HEAD WITHOUT CONTRAST:

 

No acute hemorrhage, hydrocephalus or extra-axial collection is identified.Moderate diffuse brain atr
ophy.No areas of brain edema or midline shift.

 

The paranasal sinuses and mastoids are clear.The calvarium is intact.

 

CT CERVICAL SPINE WITHOUT CONTRAST:

 

No fracture or subluxation.Mild cervical degenerative changes.No prevertebral soft tissues swelling i
s identified.

 

IMPRESSION:  No acute intracranial or cervical spine findings.

## 2023-06-17 NOTE — P.HP
Certification for Inpatient


Patient admitted to: Observation


With expected LOS: <2 Midnights


Patient will require the following post-hospital care: None


Practitioner: I am a practitioner with admitting privileges, knowledge of 

patient current condition, hospital course, and medical plan of care.


Services: Services provided to patient in accordance with Admission requirements

found in Title 42 Section 412.3 of the Code of Federal Regulations





Patient History


Date of Service: 06/17/23


Reason for admission: Hepatic Encephalopathy


History of Present Illness: 


Mr. Chatterjee is a 53 year old male with past medical history of alcoholic cirrhosis

who presented to the emergency department via EMS with altered mental status. 

Per report, patient is homeless and has been walking outside in the heat and 

refusing to drink water. He was found on the ground by a bystander who assisted 

him. He is only oriented x 1 currently. His labs were significant for hemoglobin

8.7, hematocrit 27.2, platelets 49, potassium 2.8, chloride 117, CO2 20, AST 58,

alk phos 123,tbili 6.8, lactate 2.6, INR 3.26, ammonia 104 . Imaging negative 

for acute findings. He was given lactulose, IV fluids, and rocephin in the ED. 

Will admit for further evaluation and management. 





Allergies





No Known Allergies Allergy (Verified 06/17/23 21:09)


   





Home medications list reviewed: Yes





- Past Medical/Surgical History


-: Cirrhosis


Psychosocial/ Personal History: Patient is homeless.





- Family History


  ** Father


History Unknown: Yes





- Social History


Smoking Status: Unknown if ever smoked


Alcohol use: Yes


Place of Residence: [facility]





Review of Systems


is unable to be obtained





Physical Examination





- Vital Signs


Temperature: 98.7 F


Blood Pressure: 128/70


Pulse: 64


Respirations: 14


Pulse Ox (%): 100





- Physical Exam


General: Oriented x1, Confused


HEENT: Atraumatic, Other (dry mucuous membranes)


Neck: 2+ carotid pulse no bruit, JVD not distended


Respiratory: Clear to auscultation bilaterally, Normal air movement


Cardiovascular: No edema, Regular rate/rhythm


Gastrointestinal: Soft and benign, Non-distended


Musculoskeletal: No clubbing


Integumentary: No rashes, No breakdown


Neurological: Normal speech, Sensation intact





- Studies


Laboratory Data (last 24 hrs)





06/17/23 16:30: PT 35.9 H, INR 3.26, APTT 38.4 H


06/17/23 16:30: Sodium 145, Potassium 2.8 L, BUN 13, Creatinine 0.78, Glucose 

93, Total Bilirubin 6.8 H, AST 58 H, ALT 24, Alkaline Phosphatase 123 H


06/17/23 16:30: WBC 3.80 L, Hgb 8.7 L, Hct 27.2 L, Plt Count 49 L








Assessment and Plan





- Problems (Diagnosis)


(1) Hepatic encephalopathy


Current Visit: Yes   Status: Acute   





(2) Cirrhosis


Current Visit: Yes   Status: Chronic   


Qualifiers: 


   Hepatic cirrhosis type: alcoholic cirrhosis   Ascites presence: without 

ascites   Qualified Code(s): K70.30 - Alcoholic cirrhosis of liver without 

ascites   





(3) Anemia


Current Visit: Yes   Status: Chronic   


Qualifiers: 


   Anemia type: other cause   Other causes of anemia: chronic disease, other   

Qualified Code(s): D63.8 - Anemia in other chronic diseases classified elsewhere

  





(4) Pancytopenia


Current Visit: Yes   Status: Acute   





- Plan


Patient is admitted for further management of hepatic encephalopathy.


Currently oriented x 1. Received 30 grams lactulose in ED. Mentation slowly 

improving.


Elevated lactate likely secondary to volume depletion. Continue IV hydration.


Blood cultures obtained in ED & 1 gram rocephin given. No source of infection 

identified thus far.


Pancytopenia and anemia likely secondary to liver failure.


Check ammonia, LFTs daily. Will also check hepatitis panel.


Lactulose daily.


Monitor and replete electrolytes per protocol.





Discharge Plan: Home


Plan to discharge in: 24 Hours





- Advance Directives


Does patient have a Living Will: No


Does patient have a Durable POA for Healthcare: No





- Code Status/Comfort Care


Code Status Assessed: Yes


Code Status: Full Code


Physician Review: Patient Assessed, Agree with Above Assessment and Plan


Critical Care: No


Time Spent Managing Pts Care (In Minutes): 50

## 2023-06-18 VITALS — OXYGEN SATURATION: 99 %

## 2023-06-18 LAB
ALBUMIN SERPL BCP-MCNC: 1.9 G/DL (ref 3.4–5)
ALP SERPL-CCNC: 117 U/L (ref 45–117)
ALT SERPL W P-5'-P-CCNC: 28 U/L (ref 16–61)
AST SERPL W P-5'-P-CCNC: 65 U/L (ref 15–37)
BUN BLD-MCNC: 12 MG/DL (ref 7–18)
FERRITIN SERPL-MCNC: 52.3 NG/ML (ref 26–388)
GLUCOSE SERPLBLD-MCNC: 132 MG/DL (ref 74–106)
HCT VFR BLD CALC: 25.2 % (ref 39.6–49)
HDLC SERPL-MCNC: 35 MG/DL (ref 40–60)
IRON SERPL-MCNC: 24 UG/DL (ref 65–175)
LDLC SERPL CALC-MCNC: 21 MG/DL (ref ?–130)
LYMPHOCYTES # SPEC AUTO: 0.4 K/UL (ref 0.7–4.9)
MAGNESIUM SERPL-MCNC: 1.7 MG/DL (ref 1.6–2.4)
MCV RBC: 101.7 FL (ref 80–100)
PMV BLD: 7.9 FL (ref 7.6–11.3)
POTASSIUM SERPL-SCNC: 2.8 MEQ/L (ref 3.5–5.1)
RBC # BLD: 2.48 M/UL (ref 4.33–5.43)
TRANSFERRIN SERPL-MCNC: 162 MG/DL (ref 200–360)
TSH SERPL DL<=0.05 MIU/L-ACNC: 2.17 UIU/ML (ref 0.36–3.74)

## 2023-06-18 RX ADMIN — SODIUM CHLORIDE SCH MLS: 0.9 INJECTION, SOLUTION INTRAVENOUS at 10:35

## 2023-06-18 RX ADMIN — Medication SCH ML: at 19:37

## 2023-06-18 RX ADMIN — POTASSIUM CHLORIDE SCH MLS: 200 INJECTION, SOLUTION INTRAVENOUS at 09:13

## 2023-06-18 RX ADMIN — POTASSIUM CHLORIDE SCH MLS: 200 INJECTION, SOLUTION INTRAVENOUS at 11:12

## 2023-06-18 RX ADMIN — SPIRONOLACTONE SCH MG: 25 TABLET, FILM COATED ORAL at 11:26

## 2023-06-18 RX ADMIN — POTASSIUM CHLORIDE SCH MLS: 200 INJECTION, SOLUTION INTRAVENOUS at 05:53

## 2023-06-18 RX ADMIN — SODIUM CHLORIDE SCH MLS: 0.9 INJECTION, SOLUTION INTRAVENOUS at 21:12

## 2023-06-18 RX ADMIN — SODIUM CHLORIDE SCH: 0.9 INJECTION, SOLUTION INTRAVENOUS at 06:00

## 2023-06-18 RX ADMIN — LACTULOSE SCH GM: 20 SOLUTION ORAL at 19:37

## 2023-06-18 RX ADMIN — Medication SCH: at 09:00

## 2023-06-18 RX ADMIN — LACTULOSE SCH GM: 20 SOLUTION ORAL at 11:11

## 2023-06-18 NOTE — P.PN
Subjective


Date of Service: 06/18/23


Chief Complaint: Hepatic Encephalopathy


Patient is more awake and interactive.  He denies any complaint.


Nursing staff report he had a large bowel movement overnight.


Ammonia level has decreased significantly.








Physical Examination





- Vital Signs


Temperature: 99.0 F


Blood Pressure: 111/57


Pulse: 72


Respirations: 14


Pulse Ox (%): 100





- Studies


Laboratory Data (last 24 hrs)





06/17/23 16:30: PT 35.9 H, INR 3.26, APTT 38.4 H


06/17/23 16:30: Sodium 145, Potassium 2.8 L, BUN 13, Creatinine 0.78, Glucose 

93, Total Bilirubin 6.8 H, AST 58 H, ALT 24, Alkaline Phosphatase 123 H


06/17/23 16:30: WBC 3.80 L, Hgb 8.7 L, Hct 27.2 L, Plt Count 49 L








Assessment And Plan





- Current Problems (Diagnosis)


(1) Hepatic encephalopathy


Current Visit: Yes   Status: Acute   





(2) Pancytopenia


Current Visit: Yes   Status: Acute   





(3) Cirrhosis


Current Visit: Yes   Status: Chronic   


Qualifiers: 


   Hepatic cirrhosis type: alcoholic cirrhosis   Ascites presence: without 

ascites   Qualified Code(s): K70.30 - Alcoholic cirrhosis of liver without 

ascites   





- Plan


Ammonia level and patient mental status significantly improved.


Titrate lactulose for target 2 soft bowel movements per day.


Replete potassium.


Hepatitis profile has been.


Continue prophylactic IV Rocephin.


Diet as tolerated.


Increase activity as tolerated.


Patient with coagulopathy and hyperbilirubinemia portending poor prognosis.


No ascites.


Lactic acidosis likely related to impaired liver metabolism of lactic acid.


Social service consult for disposition.

## 2023-06-19 VITALS — TEMPERATURE: 98.6 F

## 2023-06-19 VITALS — SYSTOLIC BLOOD PRESSURE: 114 MMHG | DIASTOLIC BLOOD PRESSURE: 70 MMHG

## 2023-06-19 LAB
ANISOCYTOSIS BLD QL: (no result)
BLD SMEAR INTERP: (no result)
BUN BLD-MCNC: 7 MG/DL (ref 7–18)
DACRYOCYTES BLD QL SMEAR: (no result)
GLUCOSE SERPLBLD-MCNC: 88 MG/DL (ref 74–106)
HCT VFR BLD CALC: 24.1 % (ref 39.6–49)
LYMPHOCYTES # SPEC AUTO: 0.8 K/UL (ref 0.7–4.9)
MACROCYTES BLD QL: (no result)
MAGNESIUM SERPL-MCNC: 1.7 MG/DL (ref 1.6–2.4)
MCV RBC: 100.2 FL (ref 80–100)
MORPHOLOGY BLD-IMP: (no result)
OVALOCYTES BLD QL SMEAR: SLIGHT
PMV BLD: 8 FL (ref 7.6–11.3)
POIKILOCYTOSIS BLD QL SMEAR: SLIGHT
POTASSIUM SERPL-SCNC: 3.1 MEQ/L (ref 3.5–5.1)
RBC # BLD: 2.41 M/UL (ref 4.33–5.43)

## 2023-06-19 RX ADMIN — Medication SCH ML: at 08:21

## 2023-06-19 RX ADMIN — SODIUM CHLORIDE SCH MLS: 0.9 INJECTION, SOLUTION INTRAVENOUS at 05:54

## 2023-06-19 RX ADMIN — SODIUM CHLORIDE SCH: 0.9 INJECTION, SOLUTION INTRAVENOUS at 00:12

## 2023-06-19 RX ADMIN — LACTULOSE SCH GM: 20 SOLUTION ORAL at 08:20

## 2023-06-19 RX ADMIN — SPIRONOLACTONE SCH MG: 25 TABLET, FILM COATED ORAL at 08:20

## 2023-06-19 NOTE — P.DS
Admission Date: 06/19/23


Discharge Date: 06/19/23


Disposition: ROUTINE DISCHARGE


Discharge Condition: FAIR


Reason for Admission: Hepatic Encephalopathy





- Problems


(1) Hepatic encephalopathy


Current Visit: Yes   Status: Acute   





(2) Pancytopenia


Current Visit: Yes   Status: Acute   





(3) Cirrhosis


Current Visit: Yes   Status: Chronic   


Qualifiers: 


   Hepatic cirrhosis type: alcoholic cirrhosis   Ascites presence: without 

ascites   Qualified Code(s): K70.30 - Alcoholic cirrhosis of liver without 

ascites   


Brief History of Present Illness: 


Mr. Chatterjee is a 53 year old male with past medical history of alcoholic cirrhosis

who presented to the emergency department via EMS with altered mental status. 

Per report, patient is homeless and had been walking outside in the heat and 

refusing to drink water. He was found on the ground by a bystander who assisted 

him. He was only oriented x 1 currently. His labs were significant for 

hemoglobin 8.7, hematocrit 27.2, platelets 49, potassium 2.8, chloride 117, CO2 

20, AST 58, alk phos 123,tbili 6.8, lactate 2.6, INR 3.26, ammonia 104 . Imaging

negative for acute findings. He was given lactulose, IV fluids, and rocephin in 

the ED. Patient admitted for further management. 





Hospital Course: 


Patient admitted to the medical floor and treated for hepatic encephalopathy 

with lactulose and Aldactone.


Patient had large bowel movement.


Ammonia level and patient mental status significantly improved.


Hepatitis profile checked was negative


He was placed on prophylactic IV Rocephin.


Patient tolerated diet and has been ambulatory


Patient with coagulopathy and hyperbilirubinemia portending poor prognosis.


History of alcoholic liver cirrhosis.


No ascites.


Lactic acidosis likely related to impaired liver metabolism of lactic acid.


Patient complain he lives with his brother.


His symptoms have significantly improved.  Patient discharged with lactulose as 

prophylaxis for hepatic encephalopathy, and Aldactone.








Vital Signs/Physical Exam: 














Temp Pulse Resp BP Pulse Ox


 


 97.8 F   86   18   114/70   100 


 


 06/19/23 04:00  06/19/23 08:20  06/19/23 04:00  06/19/23 08:20  06/19/23 04:00








General: Alert, In no apparent distress


HEENT: Mucous membr. moist/pink


Neck: JVD not distended


Respiratory: Clear to auscultation bilaterally, Normal air movement


Cardiovascular: Regular rate/rhythm, Normal S1 S2


Gastrointestinal: Soft and benign, Non-distended


Musculoskeletal: No swelling


Integumentary: No rashes, No cyanosis


Neurological: Normal strength at 5/5 x4 extr


Laboratory Data at Discharge: 














WBC  3.20 thou/uL (4.3-10.9)  L  06/19/23  05:46    


 


Hgb  7.9 g/dL (13.6-17.9)  L  06/19/23  05:46    


 


Hct  24.1 % (39.6-49.0)  L  06/19/23  05:46    


 


Plt Count  38 thou/uL (152-406)  L  06/19/23  05:46    


 


PT  35.9 SECONDS (9.5-12.5)  H  06/17/23  16:30    


 


INR  3.26   06/17/23  16:30    


 


APTT  38.4 SECONDS (24.3-36.9)  H  06/17/23  16:30    


 


Sodium  134 mEq/L (136-145)  L D 06/19/23  05:46    


 


Potassium  3.1 mEq/L (3.5-5.1)  L D 06/19/23  05:46    


 


BUN  7 mg/dL (7-18)   06/19/23  05:46    


 


Creatinine  0.53 mg/dL (0.70-1.30)  L  06/19/23  05:46    


 


Glucose  88 mg/dL ()   06/19/23  05:46    


 


Phosphorus  3.0 mg/dL (2.5-4.9)   06/18/23  02:20    


 


Magnesium  1.7 mg/dL (1.6-2.4)   06/19/23  05:46    


 


Total Bilirubin  5.8 mg/dL (0.2-1.0)  H  06/18/23  02:20    


 


AST  65 U/L (15-37)  H  06/18/23  02:20    


 


ALT  28 U/L (16-61)   06/18/23  02:20    


 


Alkaline Phosphatase  117 U/L ()   06/18/23  02:20    


 


Triglycerides  42 mg/dL (<150)   06/18/23  02:20    


 


Cholesterol  64 mg/dL (<200)   06/18/23  02:20    


 


HDL Cholesterol  35 mg/dL (40-60)  L  06/18/23  02:20    


 


Cholesterol/HDL Ratio  1.83   06/18/23  02:20    








Home Medications: 








Lactulose [Cephulac*] 10 ml PO BID #473 ml 06/19/23 


Spironolactone [Aldactone*] 50 mg PO DAILY #30 tab 06/19/23 





New Medications: 


Spironolactone [Aldactone*] 50 mg PO DAILY #30 tab


Lactulose [Cephulac*] 10 ml PO BID #473 ml


Diet: Regular


Activity: Ad jayme


Time spent managing pt's care (in minutes): 34

## 2023-06-21 ENCOUNTER — HOSPITAL ENCOUNTER (EMERGENCY)
Dept: HOSPITAL 97 - ER | Age: 53
Discharge: ANOTHER HOSPITAL | End: 2023-06-21
Payer: SELF-PAY

## 2023-06-21 VITALS — OXYGEN SATURATION: 100 %

## 2023-06-21 VITALS — DIASTOLIC BLOOD PRESSURE: 67 MMHG | TEMPERATURE: 98.9 F | SYSTOLIC BLOOD PRESSURE: 131 MMHG

## 2023-06-21 DIAGNOSIS — I62.9: Primary | ICD-10-CM

## 2023-06-21 DIAGNOSIS — D69.6: ICD-10-CM

## 2023-06-21 DIAGNOSIS — D68.9: ICD-10-CM

## 2023-06-21 DIAGNOSIS — S02.651A: ICD-10-CM

## 2023-06-21 DIAGNOSIS — F10.10: ICD-10-CM

## 2023-06-21 LAB
ALBUMIN SERPL BCP-MCNC: 1.9 G/DL (ref 3.4–5)
ALP SERPL-CCNC: 172 U/L (ref 45–117)
ALT SERPL W P-5'-P-CCNC: 35 U/L (ref 16–61)
AST SERPL W P-5'-P-CCNC: 52 U/L (ref 15–37)
BILIRUB INDIRECT SERPL-MCNC: 2.7 MG/DL (ref 0.2–0.8)
BLD SMEAR INTERP: (no result)
BUN BLD-MCNC: 6 MG/DL (ref 7–18)
GLUCOSE SERPLBLD-MCNC: 120 MG/DL (ref 74–106)
HCT VFR BLD CALC: 24 % (ref 39.6–49)
INR BLD: 2.72
LYMPHOCYTES # SPEC AUTO: 0.6 K/UL (ref 0.7–4.9)
MCV RBC: 99.3 FL (ref 80–100)
MORPHOLOGY BLD-IMP: (no result)
PMV BLD: 7.8 FL (ref 7.6–11.3)
POTASSIUM SERPL-SCNC: 3.1 MEQ/L (ref 3.5–5.1)
RBC # BLD: 2.42 M/UL (ref 4.33–5.43)

## 2023-06-21 PROCEDURE — 90714 TD VACC NO PRESV 7 YRS+ IM: CPT

## 2023-06-21 PROCEDURE — 80048 BASIC METABOLIC PNL TOTAL CA: CPT

## 2023-06-21 PROCEDURE — 70450 CT HEAD/BRAIN W/O DYE: CPT

## 2023-06-21 PROCEDURE — 86900 BLOOD TYPING SEROLOGIC ABO: CPT

## 2023-06-21 PROCEDURE — 74177 CT ABD & PELVIS W/CONTRAST: CPT

## 2023-06-21 PROCEDURE — 80143 DRUG ASSAY ACETAMINOPHEN: CPT

## 2023-06-21 PROCEDURE — 85730 THROMBOPLASTIN TIME PARTIAL: CPT

## 2023-06-21 PROCEDURE — 85610 PROTHROMBIN TIME: CPT

## 2023-06-21 PROCEDURE — 93005 ELECTROCARDIOGRAM TRACING: CPT

## 2023-06-21 PROCEDURE — 36415 COLL VENOUS BLD VENIPUNCTURE: CPT

## 2023-06-21 PROCEDURE — 86901 BLOOD TYPING SEROLOGIC RH(D): CPT

## 2023-06-21 PROCEDURE — 85025 COMPLETE CBC W/AUTO DIFF WBC: CPT

## 2023-06-21 PROCEDURE — 72125 CT NECK SPINE W/O DYE: CPT

## 2023-06-21 PROCEDURE — 82077 ASSAY SPEC XCP UR&BREATH IA: CPT

## 2023-06-21 PROCEDURE — 70486 CT MAXILLOFACIAL W/O DYE: CPT

## 2023-06-21 PROCEDURE — 76377 3D RENDER W/INTRP POSTPROCES: CPT

## 2023-06-21 PROCEDURE — 71260 CT THORAX DX C+: CPT

## 2023-06-21 PROCEDURE — 86850 RBC ANTIBODY SCREEN: CPT

## 2023-06-21 PROCEDURE — 80179 DRUG ASSAY SALICYLATE: CPT

## 2023-06-21 PROCEDURE — 80076 HEPATIC FUNCTION PANEL: CPT
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
## 2023-06-21 NOTE — RAD REPORT
EXAM DESCRIPTION:  CT - Head C  Spine Cap W Con - 6/21/2023 5:02 pm

 

CLINICAL HISTORY:  TRAUMA

 

COMPARISON:  Head C  Spine Cap W Con dated 11/1/2022; Head C Spine Cap Wo Con dated 10/12/2022; Facia
l Bones W/ Mpr dated 6/21/2023; Abdomen   Pelvis W Contrast dated 5/21/2023; Head C Spine Mpr Wo Con 
dated 5/21/2023

 

TECHNIQUE:  Head and cervical spine CT images were obtained without IV contrast. Chest, abdomen, and 
pelvis CT images were obtained following intravenous administration of 90 mL Isovue-300. Multiplanar 
reformats were generated and reviewed.

 

All CT scans are performed using dose optimization technique as appropriate and may include automated
 exposure control or mA/KV adjustment according to patient size.

 

FINDINGS:  CT HEAD:

Small hyperdense hemorrhages along the interventricular septum, and right aspect of the corpus callos
um body posteriorly. Mild adjacent edema. Trace layering hemorrhages along the ventricular trigone, a
s well as punctate focus of hemorrhage along the interpeduncular cistern. No evidence of acute territ
orial infarct. No midline shift or abnormal fluid collection. The ventricles are essentially stable i
n caliber. Basal cisterns are patent. Mastoid aircells and paranasal sinuses are clear. Comminuted fr
acture along the right angle of mandible is better evaluated on dedicated maxillofacial CT of the shahriar
e day.  Pronounced soft tissue swelling about the left parietotemporal scalp and the right jaw.

 

CT CERVICAL SPINE:

No acute cervical spine fracture or subluxation. Vertebral body heights are well maintained. Facet bronwyn
ints are normal in alignment. No hyperattenuating canal hematoma. Prevertebral and paraspinous soft t
issues are unremarkable.

 

CT CHEST:

No pneumothorax, pulmonary contusion or pleural fluid collection. No mediastinal hematoma and the aor
ta and pulmonary arteries are unremarkable. No chest will mass or abnormal axillary finding. No displ
aced rib fracture or other significant bony finding.

 

CT ABDOMEN/ PELVIS:

No evidence of traumatic injury to solid abdominal viscera. Improvement of ascites seen on the prior 
exam. Mild mesenteric edema remains, with stable wall thickening along the colon, and short segments 
of the small bowel. Findings may relate to enteropathy, related to portal hypertension, although infe
ctious or inflammatory enterocolitis cannot be entirely excluded. . Stable mild prominence of the spl
een. Gallbladder and biliary tree are unremarkable. No evidence of bowel or solid organ injury. No fr
ee air, free fluid or abnormal fat stranding. No urinary bladder abnormality.

 

Stable healing/healed fractures along multiple ribs and multiple lumbar spine transverse processes.

 

 

IMPRESSION:  Acute foci of hemorrhage along the right aspect of the corpus callosum and interventricu
lar septum. Trace hemorrhage layering dependently in the ventricular trigones, and within the interpe
duncular cistern.

 

Soft tissue swelling about the left parietotemporal scalp. .

 

Comminuted fracture along the right angle of mandible, better evaluated on dedicated maxillofacial CT
. Adjacent soft tissue swelling and hematoma about the right jaw.

 

No other acute traumatic findings of the cervical spine, chest, abdomen, or pelvis. Other stable find
ings as above.

 

 

 

 The findings were communicated to Juan Ng on 6/21/2023 at 17:51 hours.
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
## 2023-06-21 NOTE — EKG
Test Date:    2023-06-17               Test Time:    16:37:51

Technician:   CARY                                     

                                                     

MEASUREMENT RESULTS:                                       

Intervals:                                           

Rate:         73                                     

UT:           124                                    

QRSD:         84                                     

QT:           464                                    

QTc:          511                                    

Axis:                                                

P:            6                                      

UT:           124                                    

QRS:          -16                                    

T:            51                                     

                                                     

INTERPRETIVE STATEMENTS:                                       

                                                     

Normal sinus rhythm

Moderate voltage criteria for LVH, may be normal variant

Prolonged QT

Abnormal ECG

No previous ECG available for comparison



Electronically Signed On 06-21-23 19:12:37 CDT by Galdino Paris

## 2023-06-21 NOTE — XMS REPORT
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           



                                                  br>[facility]           



                                                  Therapy           



                                                  Site:           



                                                  Blood<br>D           



                                                  uration of           



                                                  therapy:           



                                                  72 hours           

 

     pantoprazol            Yes            40mg      40 mg,           Univ

ers



     e         3-22                               Oral, BID,           ity of



     (PROTONIX)      01:00:                               First dose           [facility]

[facility]



     EC tablet      00                                 (after           Medical



     40 mg                                         last           Branch



                                                  modificati           



                                                  on) on Tue           



                                                  3/21/23 at           



                                                  2000,           



                                                  Until           



                                                  Discontinu           



                                                  ed,            



                                                  Routine           

 

     ceFEPIme      2023- No             2000mg      2,000 mg,           U

nivers



     (MAXIPIME)      3-21 03-22                          IV             ity of



     2,000 mg in      23:30: 02:42                          Piggyback,          

 [place]



     NaCl 0.9%      00   :00                           ONCE, 1           Medical



     (NS) 100 mL                                         dose, On           B[facility]

ch



     MINI-BAG                                         Tue            



                                                  3/21/23 at           



                                                  1830,           



                                                  Administer           



                                                  over 30           



                                                  Minutes,           



                                                  100            



                                                  mL<br>Reas           



                                                  on for           



                                                  Anti-Infec           



                                                  tive:           



                                                  [facility]           



                                                  Therapy           



                                                  for            



                                                  Suspected           



                                                  Infection<           



                                                  br>[facility]           



                                                  Therapy           



                                                  Site:           



                                                  Blood<br>D           



                                                  uration of           



                                                  therapy:           



                                                  72 hours           

 

     thiamine            Yes            100mg      100 mg,           Unive

rs



     (VITAMIN      3-21                               Oral,           ity of



     B1) tablet      14:00:                               DAILY,           [place]



     100 mg      00                                 First dose           Medical



                                                  on Tue           Branch



                                                  3/21/23 at           



                                                  0900,           



                                                  Until           



                                                  Discontinu           



                                                  ed,            



                                                  Routine           

 

     magnesium       No             2g        2 g, IV           Univ

ers



     sulfate in      3-21 03-21                          Piggyback,           it

y of



     water 2      10:45: 11:26                          Administer           [place]

as



     gram/50 mL      00   :00                           over 60           Medica

l



     (4 %)                                         Minutes,           Branch



     infusion 2                                         ONCE, 1           



     g                                            dose, On           



                                                  Tue            



                                                  3/21/23 at           



                                                  0545,           



                                                  Routine           

 

     sulfur      2023- No        [id number] 5mL       5 mL,           Univ

ers



     hexafluorid      3-20 03-20                          Intravenou           i

ty of



     e microsphr      17:15: 17:15                          s, ONCE, 1          

 [place]



     (LUMASON)      00   :00                           dose, On           Medica

l



     injection 5                                         Mon            Branch



     mL                                           3/20/23 at           



                                                  1215,           



                                                  Routine<br           



                                                  >Faculty           



                                                  member           



                                                  approving           



                                                  Restricted           



                                                  medication           



                                                  : BEAR DE ANDA           

 

     lactulose            Yes            30mL      30 mL,           Univer

s



     (CEPHULAC)      3-20                               Oral,           ity of



     solution 30      14:00:                               DAILY,           [place]

[place]



     mL        00                                 First dose           Medical



                                                  (after           Branch



                                                  last           



                                                  modificati           



                                                  on) on Mon           



                                                  3/20/23 at           



                                                  0900,           



                                                  Until           



                                                  Discontinu           



                                                  ed,            



                                                  Routine           

 

     pantoprazol      2023- No             40mg      40 mg,           Uni

vers



     e         3-20 03-21                          Slow IV           ity of



     (PROTONIX)      14:00: 16:41                          Push,           [place]



     injection      00   :07                           DAILY,           Medical



     40 mg                                         First dose           Branch



                                                  (after           



                                                  last           



                                                  modificati           



                                                  on) on Mon           



                                                  3/20/23 at           



                                                  0900,           



                                                  Until           



                                                  Discontinu           



                                                  ed             

 

     acetaminoph            Yes            650mg      650 mg,           Un

sangeetha



     en        3-20                               Oral,           ity of



     (TYLENOL)      05:25:                               Q6HPRN,           [place]



     tablet 650      45                                 Starting           Medic

al



     mg                                           on Mon           Branch



                                                  3/20/23 at           



                                                  0025,           



                                                  Until           



                                                  Discontinu           



                                                  ed,            



                                                  Routine,           



                                                  Temp > 38           



                                                  C, Pain           



                                                  (scale           



                                                  4-6), Pain           



                                                  (scale           



                                                  1-3)           

 

     pantoprazol      2023- No             40mg      40 mg,           Uni

vers



     e         3-20 03-20                          Slow IV           ity of



     (PROTONIX)      01:00: 05:30                          Push,           [place]



     injection      00   :37                           Q12H,           Medical



     40 mg                                         First dose           Branch



                                                  on Sun           



                                                  3/19/23 at           



                                                  2000,           



                                                  Until           



                                                  Discontinu           



                                                  ed             

 

     foLIC acid            Yes            1mg       1 mg,           Univer

s



     (FOLATE)      3-19                               Oral,           ity of



     tablet 1 mg      14:00:                               DAILY,           [place]

[place]



               00                                 First dose           Medical



                                                  on Sun           Branch



                                                  3/19/23 at           



                                                  0900,           



                                                  Until           



                                                  Discontinu           



                                                  ed,            



                                                  Routine           

 

     phytonadion      2023- No             10mg      IV             Unive

rs



     e (VITAMIN      3-19 03-20                          Piggyback,           it

y of



     K) 10 mg in      14:00: 14:54                          DAILY, 2           T

[facility]



     NaCl 0.9%      00   :00                           doses,           Medical



     (NS)                                         First dose           Branch



     piggyback                                         (after           



                                                  last           



                                                  reorder)           



                                                  on Sun           



                                                  3/19/23 at           



                                                  0900, Last           



                                                  dose on           



                                                  Mon            



                                                  3/20/23 at           



                                                  0900, 50           



                                                  mL             

 

     lactated      2023- No             1000mL      at 100           Univ

ers



     ringers IV      3-19 03-21                          mL/hr,           ity of



     infusion      12:15: 01:32                          1,000 mL,           [place]

as



     1,000 mL      00   :43                           IV             Medical



                                                  Infusion,           Branch



                                                  CONTINUOUS           



                                                  , Starting           



                                                  on Sun           



                                                  3/19/23 at           



                                                  0715,           



                                                  Until Mon           



                                                  3/20/23 at           



                                                  2032,           



                                                  Routine           

 

     meropenem      2023- No             1000mg      1,000 mg,           

[facility]



     (MERREM)      3-19 03-21                          IV             ity of



     1,000 mg in      11:00: 13:11                          [place]



     NaCl 0.9%      00   :57                           Q8H ABX,           Medica

l



     (NS) 100 mL                                         15 doses,           Bra

[facility]



     MINI-BAG                                         First dose           



                                                  on Sun           



                                                  3/19/23 at           



                                                  0600, Last           



                                                  dose on           



                                                  Thu            



                                                  3/23/23 at           



                                                  2200,           



                                                  Administer           



                                                  over 3           



                                                  Hours, 100           



                                                  mL<br>Rest           



                                                  ricted use           



                                                  approved           



                                                  by: [address]            



                                                  FLOOR<br&g           



                                                  t;Reason           



                                                  for            



                                                  Anti-Infec           



                                                  tive:           



                                                  [facility]           



                                                  Therapy           



                                                  for            



                                                  Suspected           



                                                  Infection<           



                                                  br>[facility]           



                                                  Therapy           



                                                  Site:           



                                                  Blood<br>D           



                                                  uration of           



                                                  therapy:           



                                                  72 hours           

 

     NORepinephr      2023- No             .05ug/k      0.05-0.5         

  [facility]



     ine       3-19 03-20                g/min      mcg/kg/min           ity of



     (LEVOPHED)      03:29: 03:28                          ?77.1 kg           [facility]

[facility]



     4 mg in      15   :15                           (14.4563-1           Medica

l



     NaCl 0.9%                                         44.5625           Branch



     (NS) 250 mL                                         mL/hr,           



     infusion                                         rounded to           



                                                  14..           



                                                  56 mL/hr),           



                                                  IV             



                                                  Infusion,           



                                                  TITRATE,           



                                                  MAP Goal >           



                                                  or = 65           



                                                  mmHg,           



                                                  Starting           



                                                  on Sat           



                                                  3/18/23 at           



                                                  2229, For           



                                                  24             



                                                  hours<br>I           



                                                  nitiate           



                                                  titration           



                                                  at 0.05           



                                                  mcg/kg/min           



                                                  .&nbsp;&nb           



                                                  sp;Increas           



                                                  e by 0.01           



                                                  mcg/kg/min           



                                                  every 30           



                                                  seconds to           



                                                  5 minutes           



                                                  as needed           



                                                  to reach           



                                                  and            



                                                  maintain           



                                                  goal blood           



                                                  pressure.&           



                                                  nbsp;&nbsp           



                                                  ;Maximum           



                                                  dose = 0.5           



                                                  mcg/kg/min           



                                                  .&nbsp;&nb           



                                                  sp;If goal           



                                                  not            



                                                  maintained           



                                                  at maximum           



                                                  allowed           



                                                  dose,           



                                                  contact           



                                                  prescriber           



                                                  .&nbsp;&nb           



                                                  sp;Adminis           



                                                  ter only           



                                                  one            



                                                  peripheral           



                                                  intravenou           



                                                  s              



                                                  vasopresso           



                                                  r at a           



                                                  time.<br>           

 

     vancomycin      2023- No             15mg/kg      1,250 mg          

 [facility]



     1,250 mg in      3-19 03-20                          (rounded           ity

 of



     NaCl 0.9%      03:00: 05:12                          from           [place]



     (NS) 250 mL      00   :19                           1,156.5 mg           Me

dical



     VIAL-MATE                                         = 15 mg/kg           Bran

ch



     IV                                           ?77.1 kg),           



     piggyback                                         IV             



                                                  Piggyback,           



                                                  Q12H ABX,           



                                                  10 doses,           



                                                  First dose           



                                                  on Sat           



                                                  3/18/23 at           



                                                  2200, Last           



                                                  dose on           



                                                  Thu            



                                                  3/23/23 at           



                                                  1000,           



                                                  Administer           



                                                  over 90           



                                                  Minutes,           



                                                  250            



                                                  mL<br&gt;R           



                                                  crystal for           



                                                  Anti-Infec           



                                                  tive:           



                                                  [facility]           



                                                  Therapy           



                                                  for            



                                                  Suspected           



                                                  Infection<           



                                                  br>[facility]           



                                                  Therapy           



                                                  Site:           



                                                  Blood<br>D           



                                                  uration of           



                                                  therapy:           



                                                  72 hours           

 

     meropenem      2023- No             1000mg      1,000 mg,           

[facility]



     (MERREM)      3-19 03-19                          IV             ity of



     1,000 mg in      03:00: 04:27                          [place], [place]



     NaCl 0.9%      00   :00                           ONCE, 1           Medical



     (NS) 100 mL                                         dose, On           Bran

ch



     MINI-BAG                                         Sat            



                                                  3/18/23 at           



                                                  2200,           



                                                  Administer           



                                                  over 30           



                                                  Minutes,           



                                                  100            



                                                  mL<br>Rest           



                                                  ricted use           



                                                  approved           



                                                  by: ELIAS           



                                                  8TH            



                                                  FLOOR<br>R           



                                                  crystal for           



                                                  Anti-Infec           



                                                  tive:           



                                                  Empiric           



                                                  Therapy           



                                                  for            



                                                  Suspected           



                                                  Infection<           



                                                  br>Empiric           



                                                  Therapy           



                                                  Site:           



                                                  Blood<br>D           



                                                  uration of           



                                                  therapy:           



                                                  72 hours           

 

     NaCl 0.9%      2023- No             1000mL      at 999           Uni

vers



     (NS) bolus      3-19 03-19                          mL/hr,           ity of



     infusion      02:52: 12:02                          1,000 mL,           [place]

as



     1,000 mL      00   :28                           IV             Medical



                                                  Piggyback,           Branch



                                                  ONCE, 1           



                                                  dose, On           



                                                  Sat            



                                                  3/18/23 at           



                                                  2200, ASAP           

 

     lactulose      2023- No             30mL      30 mL,           Unive

rs



     (CEPHULAC)      3-19 03-20                          Oral, TID,           it

y of



     solution 30      02:15: 11:21                          First dose          

 [place]



     mL        00   :53                           (after           Medical



                                                  last           Branch



                                                  modificati           



                                                  on) on Sat           



                                                  3/18/23 at           



                                                  2115,           



                                                  Until           



                                                  Discontinu           



                                                  ed,            



                                                  Routine           

 

     cefTRIAXone       No             1000mg      1,000 mg,         

  [facility]



     (ROCEPHIN)      3-19 03-19                          IV             ity of



     1,000 mg in      01:15: 02:19                          Piggyback,          

 [place]



     NaCl 0.9%      00   :20                           Q24H ABX,           Medic

al



     (NS) 100 mL                                         5 doses,           [facility]

ch



     MINI-BAG                                         First dose           



                                                  on Sat           



                                                  3/18/23 at           



                                                  , Last           



                                                  dose on           



                                                  Wed            



                                                  3/22/23 at           



                                                  ,           



                                                  Administer           



                                                  over 30           



                                                  Minutes,           



                                                  100            



                                                  mL<br>Reas           



                                                  on for           



                                                  Anti-Infec           



                                                  tive:           



                                                  Documented           



                                                  Infection<           



                                                  br>Documen           



                                                  michelle            



                                                  Infection           



                                                  Site:           



                                                  Abdominal<           



                                                  br>Duratio           



                                                  n of           



                                                  Therapy: 7           



                                                  days           

 

     lactulose       No             30mL      30 mL,           Unive

rs



     (CEPHULAC)      3-19 03-19                          Oral, BID,           it

y of



     solution 30      01:00: 02:10                          First dose          

 [place]



     mL        00   :44                           on Sat           Medical



                                                  3/18/23 at           Branch



                                                  2000,           



                                                  Until           



                                                  Discontinu           



                                                  ed,            



                                                  Routine           

 

     potassium       No             20meq      20 mEq, IV           

[facility]



     chloride in      3-19 03-19                          Piggyback,           i

ty of



     water (KCL)      01:00: 06:44                          Q2H, 2           [place]

as



     20 mEq/100      00   :00                           doses,           Medical



     mL RTU IVPB                                         First dose           Br

anch



     20 mEq                                         on Sat           



                                                  3/18/23 at           



                                                  2000, Last           



                                                  dose on           



                                                  Sat            



                                                  3/18/23 at           



                                                  2200, 100           



                                                  mL             

 

     phytonadion      2023- No             10mg      IV             Unive

rs



     e (VITAMIN      3-19 03-19                          Piggyback,           it

y of



     K) 10 mg in      00:30: 03:50                          ONCE, 1           [facility]

[place]



     NaCl 0.9%      00   :00                           dose, On           Medica

l



     (NS)                                         [facility]



     piggyback                                         3/18/23 at           



                                                  1930, 50           



                                                  mL             

 

     thiamine      2023- No             100mg      IV             [facility]



     (VITAMIN      3-18 03-20                          Piggyback,           ity 

of



     B1) 100 mg      23:30: 11:22                          DAILY,           [place]

s



     in NaCl      00   :30                           First dose           Medica

l



     0.9% (NS)                                         on [facility]



     piggyback                                         3/18/23 at           



                                                  1830,           



                                                  Until           



                                                  Discontinu           



                                                  ed, 50 mL           

 

     oxazepam            Yes            15mg      15 mg,           [facility]



     (SERAX)      3-18                               Oral,           ity of



     capsule 15      23:24:                               Q4HPRN,           [place]

s



     mg        04                                 Starting           Medical



                                                  on Sat           Branch



                                                  3/18/23 at           



                                                  1824,           



                                                  Until           



                                                  Discontinu           



                                                  ed,            



                                                  Routine,           



                                                  Only while           



                                                  awake for           



                                                  DBP equal           



                                                  to or           



                                                  greater           



                                                  than 100,           



                                                  HR equal           



                                                  to or           



                                                  greater           



                                                  than 100.           

 

     iopamidol      2023- No        [id number] 100mL      100 mL,         

  [facility]



     (ISOVUE      318 03-18                          Intravenou           ity o

f



     370-500 mL)      22:05: 22:05                          s, ONCE, 1          

 [place]



     injection      00   :00                           dose, On           Medica

l



     100 mL                                         Sat            Branch



                                                  3/18/23 at           



                                                  1730,           



                                                  Routine           

 

     spironolact            Yes       Alcoholic 50mg QD   Take 1          

 [facility]



     one       7-15                cirrhosis           [facility]           [facility]

[facility]



     (ALDACTONE)      00:00:                of liver           mouth           



     50 mg      00                  with           daily           



     tablet                          ascites                          

 

     dexlansopra      0      Yes       Secondary 30mg Q.5D Take 1          

 [facility]



     zole      7-15                esophageal           capsule by           [facility]



     (DEXILANT)      00:00:                varices           mouth 2           



     30 mg      00                  without           times           



     delayed                          bleeding           daily           



     release                                                        



     capsule                                                        

 

     furosemide      0      Yes       Leg  20mg QD   Take 1           [facility]

s



     (LASIX) 20      7-15                swelling           tablet by           

Health



     mg tablet      00:00:                               mouth           



               00                                 daily           

 

     rifAXIMin      0      Yes                      Take 1           [facility]



     (XIFAXAN)      7-15                               tablet by           Healt

h



     550 mg      00:00:                               mouth           



     tablet      00                                 twice           



                                                  daily.           



                                                  Therapeuti           



                                                  c              



                                                  substituti           



                                                  on for           



                                                  xifaxan           



                                                  200mg per           



                                                  P&T            

 

     spironolact            Yes       Alcoholic 50mg QD   Take 1          

 [facility]



     one       7-15                cirrhosis           tablet by           Healt

h



     (ALDACTONE)      00:00:                of liver           mouth           



     50 mg      00                  with           daily           



     tablet                          ascites                          

 

     dexlansopra      -0      Yes       Secondary 30mg Q.5D Take 1          

 [facility]



     zole      7-15                esophageal           capsule by           [facility]



     (DEXILANT)      00:00:                varices           mouth 2           



     30 mg      00                  without           times           



     delayed                          bleeding           daily           



     release                                                        



     capsule                                                        

 

     furosemide      -0      Yes       Leg  20mg QD   Take 1           [facility]

s



     (LASIX) 20      7-15                swelling           tablet by           

Health



     mg tablet      00:00:                               mouth           



               00                                 daily           

 

     rifAXIMin      -0      Yes                      Take 1           [facility]



     (XIFAXAN)      7-15                               tablet by           Healt

h



     550 mg      00:00:                               mouth           



     tablet      00                                 twice           



                                                  daily.           



                                                  Therapeuti           



                                                  c              



                                                  substituti           



                                                  on for           



                                                  xifaxan           



                                                  200mg per           



                                                  P&T            

 

     spironolact            Yes       Alcoholic 50mg QD   Take 1          

 [facility]



     one       7-15                cirrhosis           tablet [facility]           [facility]





     (ALDACTONE)      00:00:                of liver           mouth           



     50 mg      00                  with           daily           



     tablet                          ascites                          

 

     dexlansopra            Yes       Secondary 30mg Q.5D Take 1          

 [facility]



     zole      7-15                esophageal           capsule by           [facility]

[facility]



     (DEXILANT)      00:00:                varices           mouth 2           



     30 mg      00                  without           times           



     delayed                          bleeding           daily           



     release                                                        



     capsule                                                        

 

     furosemide            Yes       Leg  20mg QD   Take 1           [facility]

s



     (LASIX) 20      7-15                swelling           tablet by           

Health



     mg tablet      00:00:                               mouth           



               00                                 daily           

 

     rifAXIMin            Yes                      Take 1           [facility]



     (XIFAXAN)      7-15                               tablet by           [facility]



     550 mg      00:00:                               mouth           



     tablet      00                                 twice           



                                                  daily.           



                                                  Therapeuti           



                                                  c              



                                                  substituti           



                                                  on for           



                                                  xifaxan           



                                                  200mg per           



                                                  P&T            

 

     Dexlansopra            Yes            30mg      Take 1           Univ

ers



     zole 30 mg      7-15                               capsule by           ity

 of



     capsule      00:00:                               mouth.           [address]



                                                                 Branch

 

     rifAXIMin            Yes                      Take by           Unive

rs



     (XIFAXAN)      7-15                               mouth.           ity of



     550 mg      00:00:                                              [place]



     tablet      00                                                Medical



                                                                 Branch

 

     Dexlansopra      0      Yes            30mg      Take 1           Univ

ers



     zole 30 mg      7-15                               capsule by           ity

 of



     capsule      00:00:                               mouth.           [place]



                                                               Medical



                                                                 Branch

 

     rifAXIMin            Yes                      Take by           Unive

rs



     (XIFAXAN)      7-15                               mouth.           ity of



     550 mg      00:00:                                              [place]



     tablet      00                                                Medical



                                                                 Branch

 

     rifAXIMin      -0 - No        Ascites due 600mg Q.5D Take 3        

   [facility]



     (XIFAXAN)      -15 09-21           to             tablets by           linda

[facility]



     200 mg      00:00: 00:00           alcoholic           mouth 2           



     tablet      00   :00            cirrhosis           times           



                                                  daily           



                                                  (Therapeut           



                                                  ic             



                                                  substituti           



                                                  on from           



                                                  Xifaxan           



                                                  550mg per           



                                                  Pharmacy           



                                                  P&amp;T.)           

 

     rifAXIMin      0 - No        Ascites due 600mg Q.5D Take 3        

   [facility]



     (XIFAXAN)      7-15 09-21           to             tablets by           [facility]

lth



     200 mg      00:00: 00:00           alcoholic           mouth 2           



     tablet      00   :00            cirrhosis           times           



                                                  daily           



                                                  (Therapeut           



                                                  ic             



                                                  substituti           



                                                  on from           



                                                  Xifaxan           



                                                  550mg per           



                                                  Pharmacy           



                                                  P&amp;T.)           

 

     rifAXIMin      2022- No        Ascites due 600mg Q.5D Take 3        

   [facility]



     (XIFAXAN)      7-15 09-21           to             tablets by           [facility]

lth



     200 mg      00:00: 00:00           alcoholic           mouth 2           



     tablet      00   :00            cirrhosis           times           



                                                  daily           



                                                  (Therapeut           



                                                  ic             



                                                  substituti           



                                                  on from           



                                                  Xifaxan           



                                                  550mg per           



                                                  Pharmacy           



                                                  P&amp;T.)           

 

     lactulose      2022- No        Alcoholic 10g       Take 15 mL       

    [facility]



     (CHRONULAC)      7-15 08-14           cirrhosis           by mouth 3       

    Health



     10 gram/15      00:00: 23:59           of liver           times           



     mL oral      00   :00            with           daily for           



     solution                          ascites           30 days           

 

     lactulose      2022- No        Alcoholic 10g       Take 15 mL       

    [facility]



     (CHRONULAC)      7-15 08-14           cirrhosis           by mouth 3       

    Health



     10 gram/15      00:00: 23:59           of liver           times           



     mL oral      00   :00            with           daily for           



     solution                          ascites           30 days           

 

     lactulose      2022- No        Alcoholic 10g       Take 15 mL       

    [facility]



     (CHRONULAC)      7-15 08-14           cirrhosis           by mouth 3       

    Health



     10 gram/15      00:00: 23:59           of liver           times           



     mL oral      00   :00            with           daily for           



     solution                          ascites           30 days           

 

     furosemide            Yes       [id number] 40mg      Take 1           U

nivers



     40 mg      7-06                               tablet by           ity of



     tablet      00:00:                               mouth           [place]



               00                                 daily.           Medical



                                                                 Branch

 

     spironolact      0      Yes       [id number] 100mg      Take 1          

 [facility]



     one 100 mg      7-06                               tablet by           ity 

of



     tablet      00:00:                               mouth           [place]



               00                                 daily.           Medical



                                                                 Branch

 

     furosemide      0      Yes       [id number] 40mg      Take 1           U

nivers



     40 mg      7-06                               tablet by           ity of



     tablet      00:00:                               mouth           [place]



               00                                 daily.           Medical



                                                                 Branch

 

     spironolact            Yes       [id number] 100mg      Take 1          

 [facility]



     one 100 mg      7-06                               tablet by           ity 

of



     tablet      00:00:                               mouth           [place]



               00                                 daily.           Medical



                                                                 Branch

 

     furosemide            Yes       [id number] 40mg      Take 1           U

nivers



     40 mg      7-06                               tablet by           ity of



     tablet      00:00:                               mouth           [place]



               00                                 daily.           Medical



                                                                 Branch

 

     spironolact            Yes       [id number] 100mg      Take 1          

 [facility]



     one 100 mg      -06                               tablet by           ity 

of



     tablet      00:00:                               mouth           [place]



               00                                 daily.           [facility]

 

     spironolact            Yes            100mg      100 mg,           Un

[place]



     one       -05                               Oral,           ity of



     (ALDACTONE)      14:00:                               DAILY,           [place]

[place]



     tablet 100      00                                 First dose           Med

ical



     mg                                           on [facility]



                                                  21 at           



                                                  0900,           



                                                  Until           



                                                  Discontinu           



                                                  ed,            



                                                  Routine           

 

     furosemide            Yes            40mg      40 mg,           Unive

rs



     (LASIX)      05                               Oral,           ity of



     tablet 40      14:00:                               DAILY,           [place]



     mg        00                                 First dose           Medical



                                                  on [facility]



                                                  21 at           



                                                  0900,           



                                                  Until           



                                                  Discontinu           



                                                  ed,            



                                                  Routine           

 

     albumin      2021- No             12.5g      12.5 g, IV           Un

s[place]



     (ALBUMINAR                                Infusion,           ity

 of



     25%) 25 %      08:00: 10:29                          ONCE, 1           [place]

[place]



     injection      00   :00                           dose, Mon           Medic

al



     12.5 g                                         21 at           Branch



                                                  0300, 100           



                                                  mL<br>Nilda           



                                                  cation:           



                                                  HEPATORENA           



                                                  L SYNDROME           



                                                  (DIAGNOSIS           



                                                  )<br>Comme           



                                                  nts:           



                                                  Dosin-8 g/L of           



                                                  ascitic           



                                                  fluid           



                                                  removed           

 

     KCL       2021- No             40meq      40 mEq,           [facility]



     (KLOR-CON      -05                          Oral,           ity of



     M20) tablet      05:00: 05:30                          ONCE, 1           [place]

[place]



     40 mEq      00   :00                           dose, [facility]



                                                  21 at           Branch



                                                  0000,           



                                                  Routine           

 

     acamprosate            Yes       [id number] 666mg      Take 2          

 [facility]



     333 mg      7-05                               tablets by           ity of



     tablet      00:00:                               mouth 3           [place]



               00                                 (three)           Medical



                                                  times           [place]



                                                  daily.           

 

     acamprosate            Yes       [id number] 666mg      Take 2          

 [facility]



     333 mg      7-05                               tablets by           ity of



     tablet      00:00:                               mouth 3           [place]



               00                                 (three)           Medical



                                                  times           [place]



                                                  daily.           

 

     acamprosate            Yes       [id number] 666mg      Take 2          

 [facility]



     333 mg      7-05                               tablets by           ity of



     tablet      00:00:                               mouth 3           [place]



               00                                 (three)           Medical



                                                  times           [place]



                                                  daily.           

 

     KCL       2021-0 2021- No             40meq      40 mEq,           [facility]



     (KLOR-CON      -                          Oral,           ity of



     M20) tablet      00:00: 02:51                          ONCE, 1           [place]

[place]



     40 mEq      00   :00                           dose, [facility]



                                                  21 at           Branch



                                                  1900,           



                                                  Routine           

 

     iopamidol      2021- No        [id number] 100mL      100 mL,          

 [facility]



     (ISOVUE                                Intravenou           ity o

f



     370-500 mL)      17:15: 16:05                          s, ONCE, 1          

 [place]



     injection      00   :00                           dose, Sun           Medic

al



     100 mL                                         21 at           Branch



                                                  1215,           



                                                  Routine           

 

     KCL       2021- No             40meq      40 mEq,           [facility]



     (KLOR-CON                                Oral,           ity of



     M20) tablet      21:15: 21:15                          ONCE, 1           [place]

[place]



     40 mEq      00   :00                           dose, [facility]



                                                  21 at           Branch



                                                  1615,           



                                                  Routine           

 

     furosemide      -0      Yes       [id number] 40mg      Take 1         

  [facility]



     40 mg      6-                [id number]           tablet by           ity of



     tablet      00:00:                               mouth           [place]



               00                                 daily.           [facility]

 

     spironolact      -0      Yes       [id number] 50mg      Take 2        

   [facility]



     one 25 mg      6-                [id number]           tablets by           ity

 of



     tablet      00:00:                               mouth           [place]



               00                                 daily.           [facility]



                                                                 Branch

 

     furosemide      -0      Yes       [id number] 40mg      Take 1         

  [facility]



     40 mg      6-                [id number]           tablet by           ity of



     tablet      00:00:                               mouth           [place]



               00                                 daily.           [facility]

 

     spironolact      -0      Yes       [id number] 50mg      Take 2        

   [facility]



     one 25 mg      6-                00146           tablets by           ity

 of



     tablet      00:00:                               mouth           [place]



               00                                 daily.           [facility]



                                                                 Branch

 

     furosemide      -0      Yes       [id number] 40mg      Take 1         

  [facility]



     40 mg      6-                20169           tablet by           ity of



     tablet      00:00:                               mouth           [place]



               00                                 daily.           [facility]

 

     spironolact      -0      Yes       [id number] 50mg      Take 2        

   [facility]



     one 25 mg      6-                08001           tablets by           ity

 of



     tablet      00:00:                               mouth           [place]



               00                                 daily.           [facility]

 

     furosemide      -0 - No        [id number] 40mg      Take 1        

   [facility]



     40 mg      --           [id number]           tablet by           ity of



     tablet      00:00: 00:00                          mouth           [place]



               00   :00                           daily.           [facility]

 

     spironolact      2021- No        [id number] 50mg      Take 2       

    [facility]



     one 25 mg                 [id number]           tablets by           it

y of



     tablet      00:00: 00:00                          mouth           [place]



               00   :00                           daily.           [facility]

 

     lidocaine-e      2021- No                       PRN,           Unive

rs



     pinephrine                                Starting           ity 

of



     (XYLOCAINE      17:56: 17:56                          Tue 21           

[place]



     W/EPINEPHRI      00   :00                           at 1256,           Medi

tyler



     NE) 2                                         Until [facility]



     %-1:200,000                                         21 at           



     injection                                         1256,           



                                                  Routine           

 

     furosemide      2021- No             20mg      20 mg,           Univ

ers



     (LASIX)                                Slow IV           ity of



     injection      11:00: 11:02                          Push,           [place]



     20 mg      00   :00                           ONCE, 1           Medical



                                                  dose, [facility]



                                                  21 at           



                                                  0600,           



                                                  Routine           

 

     magnesium      2021- No             1g        1 g, IV           Univ

ers



     sulfate in                                Piggyback,           it

y of



     D5W 1      08:30: 09:42                          ONCE, 1           [place]



     gram/100 mL      00   :00                           dose, Tue           Med

ical



     RTU IV                                         21 at           [place]



     Piggyback 1                                         0330, 100           



     g                                            mL             

 

     calcium      2021- No             1g        1 g, IV           Univer

s



     gluconate 1                                Infusion,           it

y of



     g in NaCl      07:15: 07:44                          ONCE, 1           [place]

[place]



     50 mL      00   :00                           dose, [facility]



     (ISO-OSM)                                         21 at           [facility]

h



     RTU IV                                         0230,           



     infusion 1                                         Routine           



     g                                                           

 

     lactulose            Yes            30mL      30 mL,           Univer

s



     (CEPHULAC)      08                               Oral, BID,           ity

 of



     solution 30      01:00:                               First dose           

[place]



     mL        00                                 on [facility]



                                                  21 at           [place]



                                                  ,           



                                                  Until           



                                                  Discontinu           



                                                  ed,            



                                                  Routine           

 

     thiamine            Yes            100mg      100 mg,           Unive

rs



     (VITAMIN      08                               Oral, BID,           ity o

f



     B1) tablet      01:00:                               First dose           T

[place]



     100 mg      00                                 on [facility]



                                                  21 at           [place]



                                                  ,           



                                                  Until           



                                                  Discontinu           



                                                  ed,            



                                                  Routine           

 

     phytonadion      2021- No             10mg      10 mg,           Uni

vers



     e (VITAMIN                                Subcutaneo           it

y of



     K)        00:15: 13:25                          us, DAILY,           [place]



     injection      00   :00                           3 doses,           Medica

l



     10 mg                                         First dose           [facility]anch



                                                  (after           



                                                  last           



                                                  modificati           



                                                  on) on 21 at           



                                                  1915, Last           



                                                  dose on           



                                                  21           



                                                  at 0900,           



                                                  Routine           

 

     spironolact            Yes            25mg      25 mg,           Univ

ers



     one                                      Oral,           ity of



     (ALDACTONE)      00:00:                               DAILY,           [place]

s



     tablet 25      00                                 First dose           Medi

tyler



     mg                                           on Mon           Branch



                                                  21 at           



                                                  1900,           



                                                  Until           



                                                  Discontinu           



                                                  ed,            



                                                  Routine           

 

     furosemide            Yes            20mg      20 mg,           Unive

rs



     (LASIX)                                     Slow IV           ity of



     injection      00:00:                               Push,           [place]



     20 mg      00                                 DAILY,           Medical



                                                  First dose           Branch



                                                  on 21 at           



                                                  1900,           



                                                  Until           



                                                  Discontinu           



                                                  ed,            



                                                  Routine           

 

     ondansetron            Yes            4mg       4 mg, Slow           

[facility]



     (ZOFRAN                                     IV Push,           ity of



     (PF))      23:43:                               [id number],           [place]



     injection 4      59                                 Starting           Medi

tyler



     mg                                           21           Branch



                                                  at 1843,           



                                                  Until           



                                                  Discontinu           



                                                  ed,            



                                                  Routine,           



                                                  Nausea and           



                                                  Vomiting           



                                                  (N/V)           

 

     octreotide      2021- No             50ug/h      50 mcg/hr          

 [facility]



     (SANDOSTATI                                (10            ity of



     N) 1,250      20:00: 15:40                          mL/hr), IV           Te

[place]



     mcg in NaCl      00   :20                           Infusion,           Med

ical



     0.9% (NS)                                         CONTINUOUS           [facility]

ch



     250 mL                                         , Starting           



     infusion                                         21           



                                                  at 1500           

 

     octreotide      2021- No             50ug      50 mcg,           Uni

vers



     (SANDOSTATI                                Slow IV           ity 

of



     N)        20:00: 19:11                          Push,           [place]



     injection      00   :00                           ONCE, 1           Medical



     50 mcg                                         dose, Mon           [facility]



                                                  21 at           



                                                  1500,           



                                                  STAT<br>In           



                                                  dication:           



                                                  Acute           



                                                  Variceal           



                                                  Hemorrhage           



                                                  in a           



                                                  Cirrhotic           



                                                  Patient           

 

     KCL       - 202- No             40meq      40 mEq,           [facility]



     (KLOR-CON                                Oral,           ity of



     M20) tablet      18:00: 17:38                          ONCE, 1           Te

[place]



     40 mEq      00   :00                           dose, Mon           Medical



                                                  21 at           [facility]



                                                  1300,           



                                                  Routine           

 

     iopamidol      2021- No        [id number] 120mL      120 mL,         

  [facility]



     (ISOVUE       06                          Intravenou           ity o

f



     370-500 mL)      17:15: 16:07                          s, ONCE, 1          

 [place]



     injection      00   :00                           dose, Mon           Medic

al



     120 mL                                         21 at           [facility]



                                                  1215,           



                                                  Routine           







Immunizations







           Ordered    Filled Immunization Date       Status     Comments   [facility]

e



           Immunization Name Name                                        

 

           Pneumococcal            2022 Completed             [facility]



           20-valent Vaccine            00:00:00                         

 

           Pneumococcal            2022 Completed             [facility]



           20-valent Vaccine            00:00:00                         

 

           Pneumococcal            2022 Completed             [facility]



           20-valent Vaccine            00:00:00                         

 

           SARS-COV-2 COVID-19            2021 Completed             Unive

rsity of



           PFIZER VACCINE            00:00:00                         [facility]

 

           SARS-COV-2 COVID-19            2021 Completed             Unive

rsity of



           PFIZER VACCINE            00:00:00                         [facility]

 

           SARS-COV-2 COVID-19            2021 Completed             Unive

rsity of



           PFIZER VACCINE            00:00:00                         [place] [facility]

[place]



                                                                  Branch







Vital Signs







             Vital Name   Observation Time Observation Value Comments     Source

 

             HEIGHT       2023 20:00:00 165.1 cm                  

 

             WEIGHT       2023 20:00:00 81.3 kg                   

 

             HEIGHT       2023 08:12:00 165.1 cm                  

 

             WEIGHT       2023 08:12:00 75 kg                     

 

             HEIGHT       2023 20:00:00 165.1 cm                  

 

             WEIGHT       2023 20:00:00 81.3 kg                   

 

             HEIGHT       2023 08:12:00 165.1 cm                  

 

             WEIGHT       2023 08:12:00 75 kg                     

 

             Systolic blood 2023 20:35:00 119 mm[Hg]                Univer

sity of



             pressure                                            [facility]

 

             Diastolic blood 2023 20:35:00 60 mm[Hg]                 Unive

rsity of



             pressure                                            [facility]

 

             Heart rate   2023 20:35:00 86 /min                   [facility]

 

             Body temperature 2023 20:35:00 37.28 Nickie                 [facility]iv

ersity of



                                                                 [facility]



                                                                 Branch

 

             Respiratory rate 2023 20:35:00 16 /min                   Univ

ersity of



                                                                 [facility]



                                                                 Branch

 

             Oxygen saturation in 2023 20:35:00 97 /min                   

[facility]



             Arterial blood by                                        [facility]

[facility]



             Pulse oximetry                                        Branch

 

             Body weight  2023 22:59:00 84.687 kg                 Univers[facility]

ty of



                                                                 [facility]



                                                                 Branch

 

             BMI          2023 22:59:00 27.57 kg/m2               Universi

ty of



                                                                 [facility]



                                                                 Branch

 

             Body height  2023 21:39:38 175.3 cm                  Universi

ty of



                                                                 [facility]



                                                                 Branch

 

             Systolic blood 2021 17:32:00 117 mm[Hg]                Univer

sity of



             pressure                                            [facility]



                                                                 Branch

 

             Diastolic blood 2021 17:32:00 75 mm[Hg]                 Unive

rsity of



             pressure                                            [facility]



                                                                 Branch

 

             Heart rate   2021 17:32:00 82 /min                   Universi

ty of



                                                                 [facility]



                                                                 Branch

 

             Body temperature 2021 17:32:00 36.5 Nickie                  Univ

ersity of



                                                                 [facility]



                                                                 Branch

 

             Oxygen saturation in 2021 17:32:00 98 /min                   

[facility]



             Arterial blood by                                        [facility]

[facility]



             Pulse oximetry                                        Branch

 

             Respiratory rate 2021 13:18:00 20 /min                   Univ

ersity of



                                                                 [facility]



                                                                 Branch

 

             Body height  2021 01:49:00 167.6 cm                  Univers[facility]

ty of



                                                                 [facility]



                                                                 Branch

 

             Body weight  2021 01:49:00 77.111 kg                 Univers[facility]

ty of



                                                                 [facility]



                                                                 Branch

 

             BMI          2021 01:49:00 27.44 kg/m2               Univers[facility]

ty of



                                                                 [facility]



                                                                 Branch

 

             Systolic blood 2021 20:28:00 118 mm[Hg]                Univer

sity of



             pressure                                            [facility]



                                                                 Branch

 

             Diastolic blood 2021 20:28:00 69 mm[Hg]                 Unive

rsity of



             pressure                                            [facility]



                                                                 Branch

 

             Heart rate   2021 20:28:00 79 /min                   Univers[facility]

ty of



                                                                 [facility]



                                                                 Branch

 

             Body temperature 2021 20:28:00 36.83 Ncikie                 Univ

ersity of



                                                                 [facility]



                                                                 Branch

 

             Respiratory rate 2021 20:28:00 18 /min                   Univ

ersity of



                                                                 [facility]



                                                                 Branch

 

             Oxygen saturation in 2021 20:28:00 95 /min                   

[facility]



             Arterial blood by                                        [facility]

[facility]



             Pulse oximetry                                        Branch

 

             Body height  2021 21:12:00 167.6 cm                  Universi

ty of



                                                                 [facility]



                                                                 [place]

 

             Body weight  2021 14:47:00 90.719 kg                 Universi

ty of



                                                                 [facility]



                                                                 [place]

 

             BMI          2021 14:47:00 32.28 kg/m2               Universi

ty [facility]

 

             Systolic blood 2022 12:48:00 113 mm[Hg]                [facility]



             pressure                                            

 

             Diastolic blood 2022 12:48:00 64 mm[Hg]                 [facility]



             pressure                                            

 

             Heart rate   2022 12:48:00 64 /min                   [facility]

[facility]

 

             Body temperature 2022 12:48:00 37 Nickie                    [facility]

 

             Respiratory rate 2022 12:48:00 18 /min                   [facility]

 

             Body height  2022 12:48:00 167.6 cm                  [facility] 

[facility]

 

             Body weight  2022 12:48:00 73.256 kg                 [facility]

ealt

 

             BMI          2022 12:48:00 26.07 kg/m2               [facility]

 

             Oxygen saturation in 2022 12:48:00 100 /min                  

[facility]



             Arterial blood by                                        



             Pulse oximetry                                        

 

             Systolic blood 2021 20:28:00 118 mm[Hg]                Univer

sity of



             [facility]

 

             Diastolic blood 2021 20:28:00 69 mm[Hg]                 Unive

rsity of



             [facility]

 

             Heart rate   2021 20:28:00 79 /min                   Universi

ty [facility]

 

             Body temperature 2021 20:28:00 36.83 Nickie                 Univ

ersity of



                                                                 [facility]

 

             Respiratory rate 2021 20:28:00 18 /min                   Univ

ersity of



                                                                 [facility]

 

             Oxygen saturation in 2021 20:28:00 95 /min                   

[facility]



             Arterial blood by                                        [facility]

[facility]



             Pulse oximetry                                        Branch

 

             Body height  2021 21:12:00 167.6 cm                  Universi

ty of



                                                                 [facility]



                                                                 [place]

 

             Body weight  2021 14:47:00 90.719 kg                 Universi

ty of



                                                                 [facility]

 

             BMI          2021 14:47:00 32.28 kg/m2               [facility]







Procedures







                Procedure       Date / Time     Performing Clinician Source



                                Performed                       

 

                MAGNESIUM       2023 10:30:00 Izaiah [facility]

 

                BASIC METABOLIC PANEL (NA, 2023 10:30:00 Fidencio Bliss

[facility]



                K, CL, CO2, GLUCOSE, BUN,                                 Medica

l Branch



                CREATININE, [place])                                 

 

                CBC WITHOUT DIFF 2023 10:30:00 Izaiah Fidencio   [facility]

 

                MAGNESIUM       2023 20:51:00 Izaiah [facility]

 

                BASIC METABOLIC PANEL (NA, 2023 20:51:00 Fidencio Bliss   U

niversity [facility]



                K, CL, CO2, GLUCOSE, BUN,                                 Medica

l Branch



                CREATININE, CA)                                 

 

                MAGNESIUM       2023 09:59:00 Jin Gutiérrez [facility]

 

                BASIC METABOLIC PANEL (NA, 2023 09:59:00 FranciscoRashmi   [facility] [place]



                K, CL, CO2, GLUCOSE, BUN,                                 Medica

l Branch



                CREATININE, CA)                                 

 

                CBC [facility] DIFF [facility]23 [facility]59[facility]00 [facility]

 

                PROT[facility]OMBIN TIME / INR 2023 09:59:00 [facility]

[facility]

 

                FIBRINOGEN      2023 09:59:00 Nolan [facility]

 

                PROTHROMBIN TIME / INR 2023 11:25:00 Rashmi [facility]

[facility]

 

                FIBRINOGEN      2023 11:25:00 Curt Franciscooja   [facility]

 

                RETICULOCYTES AUTOMATED 2023 11:25:00 [facility]

 

                MAGNESIUM       2023 08:36:00 Tracy White [facility]

 

                BASIC METABOLIC PANEL (NA, 2023 08:36:00 Tracy White [facility]



                K, CL, CO2, GLUCOSE, BUN,                 Amjad S.        Medica

l Branch



                CREATININE, CA)                                 

 

                CBC WITH DIFF   2023 08:36:00 Tracy White [facility]



                                                Amjad S.        Medical [place]

 

                BLOOD CULTURE SCREEN 2023 03:07:00 Rashmi Francisco   [facility]

 

                FIBRINOGEN      2023 22:21:00 Nolan [facility]

 

                PREPARE CRYOPRECIPITATE 2023 18:41:31 [facility]

 

                TRANSTHORACIC ECHO (TTE) 2023 17:09:13 [facility] White [facility]

 [facility]



                COMPLETE W/ CONTRAST                 [facility]

n[facility]

 

                MAGNESIUM       2023 08:53:00 Nolan [facility]

 

                BASIC METABOLIC PANEL (NA, 2023 08:53:00 Rashmi Francisco   [facility] [place]



                K, CL, CO2, GLUCOSE, BUN,                                 Medica

l Branch



                CREATININE, CA)                                 

 

                CBC WITH DIFF   2023 08:53:00 Nolan [facility]

 

                PROTHROMBIN TIME / INR 2023 08:53:00 Nolan Rashmi   [facility]

 

                FIBRINOGEN      2023 08:53:00 Nolan [facility]

 

                VANCOMYCIN TROUGH 2023 04:17:00 Sincere Griffith  [facility]

 

                LACTIC ACID WHOLE BLOOD 2023 21:48:00 Norman Logan  [facility]

 

                TRANSFUSE PACKED RBC 2023 20:00:00 Rashmi Francisco   [facility]

 

                PREPARE PACKED RBC 2023 19:27:30 Nolan Rashmi   [facility]

 

                LACTIC ACID WHOLE BLOOD 2023 17:09:00 Lucia Pike [facility]

 

                CBC WITHOUT DIFF 2023 17:08:00 Nolan [facility]

 

                TRANSFUSE PACKED RBC 2023 14:35:00 Rashmi Francisco   [facility]

 

                CBC WITHOUT DIFF 2023 13:11:00 Rashmi Francisco   [facility]

 

                US ABDOMEN LIMITED 2023 12:59:00 [facility]

[facility]

 

                LACTIC ACID WHOLE BLOOD 2023 11:46:00 [facility]

 

                PROTHROMBIN TIME / INR 2023 11:45:00 Rashmi Francisco   [facility]

 

                TRANSFUSE PACKED RBC 2023 10:31:00 Tracy White [facility]

 

                TRANSFUSE CRYOPRECIPITATE 2023 10:28:00 Sincere Griffith  [facility]

 

                PREPARE CRYOPRECIPITATE 2023 10:16:18 Sincere Griffith  [facility]

 

                PREPARE PACKED RBC 2023 10:16:18 Tracy White [facility]

 

                CBC WITHOUT DIFF 2023 08:46:00 Tracy White [facility]

 

                LACTIC ACID WHOLE BLOOD 2023 08:46:00 Carlos Enrique Rendon     [facility]

 

                BASIC METABOLIC PANEL (NA, 2023 08:45:00 Carlos Enrique Rendon     [facility]



                K, CL, CO2, GLUCOSE, BUN,                                 Medica

l Branch



                CREATININE, CA)                                 

 

                HEPATITIS B SURFACE 2023 08:45:00 Tracy White [facility]



                ANTIGEN                         [facility]

 

                HEPATITIS B CORE ANTIBODY 2023 08:45:00 Tracy White [facility]



                IGM                             [facility]

 

                TRANSFUSE PACKED RBC 2023 06:30:00 Tracy White [facility]

 

                URINE DRUG (IMMUNOASSAY) - 2023 03:56:00 Rashmi Francisco   [facility] [place]



                COMPREHENSIVE DRUG SCREEN                                 Medica

l Branch

 

                URINALYSIS      2023 03:56:00 Curt Franciscooja   [facility]

 

                GALV ONLY - URINE DRUG 2023 03:56:00 Rashmi Francisco   [facility]



                (LCMSMS) - WILIAM PANEL                                 Medical Br

anch

 

                AC PANEL 20 + LACTIC ACID 2023 03:56:00 [facility]

 

                IRON PANEL      2023 03:55:00 Tracy White [facility]

 

                CBC WITHOUT DIFF 2023 03:55:00 Curt Franciscooja   [facility]

 

                HEPATITIS B SURFACE 2023 03:55:00 Tracy White [facility]



                ANTIBODY                        [facility]

 

                HCV ANTIBODY    2023 03:55:00 Tracy White [facility]

 

                SYPHILIS IGG/IGM 2023 03:55:00 Roberto Merrill [facility]

 

                TRANSFUSE PACKED RBC 2023 01:15:00 Rashmi Francisco   [facility]

 

                PREPARE PACKED RBC 2023 00:29:29 [facility]

 

                AC PANEL 20 + LACTIC ACID 2023 00:05:00 Clive Luther   [facility]

 

                AC PANEL 21 + LACTIC ACID 2023 00:00:00 Rashmi Francisco   [facility]

 

                BLOOD CULTURE SCREEN 2023 23:57:00 Rashmi Francisco   [facility]

 

                PHOSPHORUS      2023 23:57:00 Rashmi Francisco   [facility]

 

                CREATINE KINASE 2023 23:57:00 Curt Franciscooja   [facility]

 

                MAGNESIUM       2023 23:57:00 Francisco, [facility]

 

                OSMOLALITY, SERUM OR 2023 23:57:00 Tracy White [facility]



                PLASMA                          [facility]

 

                AMMONIA, PLASMA 2023 23:57:00 Nolan [facility]

 

                FREE T4         2023 23:57:00 Tracy White [facility]



                                                [facility]

 

                THYROID STIMULATING 2023 23:57:00 Tracy White [facility]



                HORMONE                         [facility]

 

                HEPATIC FUNCTION PANEL 2023 23:57:00 Rashmi Francisco   [facility]



                ([id number]) (ALB,T.PRO,BILI                                 [facility]



                T,BU/BC,ALT,AST,ALK PHOS)                                 

 

                BASIC METABOLIC PANEL (NA, 2023 23:57:00 Rashmi Francisco   [facility] [place]



                K, CL, CO2, GLUCOSE, BUN,                                 Medica

l Branch



                CREATININE, CA)                                 

 

                ETHANOL         2023 23:57:00 Nolan [facility]

 

                CBC WITH DIFF   2023 23:57:00 Nolan [facility]

 

                FIBRINOGEN      2023 23:57:00 Nolan [facility]

 

                HCV ANTIBODY    2023 23:57:00 Griffith, [facility]

 

                BLOOD CULTURE WORKUP 2023 23:57:00 Curt Franciscooja   [facility]

 

                HIV 1/2 AG-AB WITH REFLEX 2023 23:57:00 Roberto Merrill [facility] 

[place]



                                                [facility]

 

                GRAM NEGATIVE BLOOD 2023 23:57:00 Rashmi Francisco   [facility] [place]



                PATHOGENS DNA                                   [facility]



                PROBE-AEROBIC                                   

 

                XR FEMUR 2 VW LEFT 2023 22:58:00 Vahib, [facility]

 

                XR KNEE <3 VW LEFT 2023 22:58:00 Vahibe, [facility]

 

                XR TIBIA FIBULA 2 VW LEFT 2023 22:58:00 Vahibe, Clive   [facility]

 

                CT TRAUMA HEAD WO CONTRAST 2023 22:07:00 Vahibe, Clive   [facility]

 

                CT TRAUMA THORAX W 2023 22:07:00 Vahibe, Clive   Universit

[facility]



                CONTRAST                                        [facility]

 

                CT TRAUMA CERVICAL SPINE 2023 22:07:00 Vahibe, Clive   [facility]                                     [facility]

 

                CT TRAUMA THORACIC SPINE 2023 22:07:00 Vahibe, Clive   [facility]                                     [facility]

 

                CT TRAUMA ABDOMEN PELVIS W 2023 22:07:00 [facility]

 

                CT TRAUMA LUMBAR SPINE WO 2023 22:07:00 Vahibe, Clive   [facility]

 

                BASIC METABOLIC PANEL (NA, 2023 21:42:00 Chantell Johnson

[facility]



                K, CL, CO2, GLUCOSE, BUN,                                 Medica

l Branch



                CREATININE, [place])                                 

 

                CBC WITHOUT DIFF 2023 21:42:00 Chantell Johnson     [facility]

 

                PROTHROMBIN TIME / INR 2023 21:42:00 Chantell Johnson     [facility]

 

                ACTIVATED PARTIAL THRMPLAS 2023 21:42:00 Chantell Johnson

[facility]

 

                HB ABO GROUPING 2023 21:42:00 Chantell Johnson o

f [facility]

 

                EXTRA TUBE DK. GREEN 2023 21:42:00 Chantell Johnson     [facility]

 

                COMPREHENSIVE METABOLIC 2022 10:33:00 Santiago Vick [facility]



                PANEL                                           

 

                HEMOGLOBIN A1C  2022 10:33:00 Santiago Vick



 

                LIPID PROFILE   2022 10:33:00 Santiago Vick



 

                CBC/DIFF        2022 10:33:00 Santiago Vick



 

                CBC             2022 10:33:00 Santiago Vick

 

                DIFFERENTIAL, MANUAL-[facility] 2022 10:33:00 Santiago Vick [facility]

 

                FECAL OCCULT BLOOD 2022 08:00:00 Santiago Vick

 

                HEMOCCULT KIT FOR SPECIMEN 2022 13:11:09 [facility]ick

[facility]



                COLLECTION AT HOME                                 

 

                MAGNESIUM       2022-07-15 05:50:00 Kevin Orona

h

 

                COMPREHENSIVE METABOLIC 2022-07-15 05:50:00 Kevin Orona

is Health



                PANEL                                           

 

                PT/INR/PTT      2022-07-15 05:50:00 Kevin Orona



 

                CBC/DIFF        2022-07-15 05:50:00 Grecia Lagunas

lt

 

                CBC             2022-07-15 05:50:00 Grecia Lagunas

lt

 

                DIFFERENTIAL, MANUAL (NO 2022-07-15 05:50:00 Grecia Lagunas

[facility]



                MORPHOLOGY)-[facility]                                 

 

                ECHG GI PROC EGD 2022 15:59:00 Derrek Phoenix [facility]



                DIAGNOSTIC BRUSH WASH                                 

 

                IP CONSULT TO PHYSICAL 2022 08:59:09 Grecia Lagunas [facility]



                THERAPY                                         

 

                HGB/HCT         2022 06:06:00 Shawna Cervantes 

alth

 

                MAGNESIUM       2022 03:08:00 Kevin Orona



 

                COMPREHENSIVE METABOLIC 2022 03:08:00 Kevin Orona

is Health



                PANEL                                           

 

                PT/INR/PTT      2022 03:08:00 Kevin Orona



 

                CBC/DIFF        2022 03:08:00 Grecia Lagunas

lt

 

                CBC             2022 03:08:00 Grecia Lagunas linda

lt

 

                DIFFERENTIAL, MANUAL-[facility] 2022 03:08:00 Grecia Lagunas

[facility]

 

                PT/INR/PTT      2022 17:47:00 Marguerite Thapa

[facility]

 

                GAMMA GLUTAMYL TRANSFERASE 2022 17:47:00 Marguerite Thapa

 [facility]



                (GGT)                                           

 

                CELIAC DISEASE  2022 17:47:00 Marguerite Thapa [facility]

lt

 

                CERULOPLASMIN   2022 17:47:00 Marguerite Thapa [facility]

lt

 

                ALPHA-1-ANTITRYPSIN 2022 17:47:00 Marguerite Thapa [facility]

 

                MAI             2022 17:47:00 Marguerite Thapa [facility]

lth

 

                MITOCHONDRIAL (M2) 2022 17:47:00 Marguerite Thapa [facility]



                ANTIBODY                                        

 

                LIVER KIDNEY MICR 2022 17:47:00 Marguerite Thapa [facility]

ealth

 

                IGM             2022 17:47:00 Marguerite Thapa [facility]

lt

 

                ABD PARACENTESIS W/IMAGING 2022 14:13:43 [facility]



                                                Mohammad        

 

                ALBUMIN, [facility]    2022 13:47:00 Grecia Lagunas [facility]

 

                T PROTEIN, [facility]  2022 13:47:00 Grecia Lagunas [facility]

 

                CELL COUNT, FLUID 2022 13:47:00 Grecia Lagunas [facility]

 

                GLUCOSE, [facility]    2022 13:47:00 Grecia Lagunas [facility]

 

                CELL COUNT, BODY FLUID 2022 13:47:00 Grecia Lagunas [facility]

 

                DIFFERENTIAL, CELL COUNT 2022 13:47:00 Grecia Lagunas [facility]

 

                FLUID CULTURE AND GRAM 2022 13:47:00 Grecia Lagunas [facility]



                STAIN                                           

 

                TRANSFUSE PLATELETS 2022 10:20:00 Grecia Lagunas [facility]



                (NURSING)                                       

 

                SARS-COV-2, FLU A/B, RSV 2022 08:51:00 Der[address] [facility]

 

                CORONAVIRUS, COVID-19, WILLIAM 2022 08:51:00 Derrek Phoenix [facility]

 

                MAGNESIUM       2022 04:30:00 Kevin Orona [facility]

h

 

                COMPREHENSIVE METABOLIC 2022 04:30:00 Kevin Orona

is Health



                PANEL                                           

 

                PT/INR/PTT      2022 04:30:00 Adwoa Kevinclarita Diaz [facility]

 

                CBC/DIFF        2022 04:30:00 Grecia Lagunas [facility]

lt

 

                TYPE AND SCREEN 2022 04:30:00 Derrek Phoenix 

ealth

 

                CBC             2022 04:30:00 Grecia Lagunas [facility]

lt

 

                T&S - COLLECTION 2022 04:30:00 Derrek Phoenix 

[facility]

 

                PHOSPHORUS      2022 04:30:00 Grecia Lagunas [facility]

 

                RBC MORPHOLOGY-WAM 2022 04:30:00 Nila Lagunasanna LINDA [facility]

 

                PREPARE PLATELETS (LAB) 2022 04:30:00 Grecia Lagunas [facility]

 

                PREPARE CROSSMATCH RBC 2022 04:30:00 Grecia Lagunas [facility]



                UNITS                                           

 

                HEPATITIS A VIRUS AB IGG 2022 13:40:00 [facility]

 

                MAGNESIUM       2022 03:44:00 AdwoaKevin chang [facility]

 

                COMPREHENSIVE METABOLIC 2022 03:44:00 Kevin Orona

is Health



                PANEL                                           

 

                PT/INR/PTT      2022 03:44:00 AdwoaKevin chagn [facility]

 

                CBC/DIFF        2022 03:44:00 Grecia Lagunas [facility]

lt

 

                CBC             2022 03:44:00 Grecia Lagunas [facility]

 

                DIFFERENTIAL, MANUAL-WAM 2022 03:44:00 Grecia Lag[facility]as

[facility]

 

                XRAY ABDOMEN 1 VIEW 2022 19:34:18 Shayna Tabares 

ealt

 

                BLOOD CULTURE   2022 14:02:00 Anna V[facility]quez [facility]

th

 

                CT HEAD W/O CONTRAST 2022 11:29:00 Anna Vásquez [facility]

 

                XRAY CHEST 1 VIEW 2022 10:12:00 Grecia Lagunas Diaz 

ealth

 

                URINALYSIS W/REFLEX TO 2022 10:05:00 Grecia Lagunas LINDA [facility]



                URINE CULTURE                                   

 

                URINALYSIS      2022 10:05:00 Grecia Lagunas Diaz [facility]

 

                URINE CULTURE COLLECTION 2022 10:05:00 [facility]



                KIT                                             

 

                HGB/HCT         2022 09:39:00 Grecia Lagunas [facility]

 

                LACTIC ACID     2022 09:37:00 Grecia Lagunas [facility]

 

                12 LEAD EKG     2022 09:29:59 Grecia Lagunas [facility]

 

                BLOOD GAS, ARTERIAL 2022 09:29:00 Bebo Grecia [facility]

 

                BLOOD GAS LACTIC ACID, 2022 09:29:00 Grecia Lagunas [facility]



                VENOUS                                          

 

                GLUCOSE POC     2022 09:14:00 MayMarcy



 

                MAGNESIUM       2022 04:47:00 Kevin Orona [facility]

 

                COMPREHENSIVE METABOLIC 2022 04:47:00 Kevin Orona

 Health



                PANEL                                           

 

                PT/INR/P[facility]      [facility]:47[facility]

 

                FIBRINOGEN      2022 04:47:00 Grecia Lagunas Diaz [facility]

 

                CBC/DIFF        2022 04:47:00 Grecia Lagunas [facility]

 

                CBC             2022 04:47:00 Grecia Lagunas [facility]

 

                INFUSION PUMP   2022-07-10 20:48:21 May, Marcy Diaz [facility]

 

                CONSULT CLINICAL CASE 2022-07-10 18:36:47 Anna Vásquez

s Health



                MANAGEMENT (RN/SW)                                 

 

                SEQUENTIAL COMPRESSION 2022-07-10 18:22:34 MayMarcy

is Health



                PUMP                                            

 

                SEQUENTIAL COMPRESSION 2022-07-10 18:22:34 May, Marcy Abel

is Health



                PUMP                                            

 

                HGB/HCT         2022-07-10 15:32:00 Grecia Lagunas

lt

 

                TRANSFUSE (RED CELL UNITS 2022-07-10 11:35:00 Grecia Lagunas [facility]



                - NURSING)                                      

 

                MAGNESIUM       2022-07-10 04:02:00 Kevin Orona



 

                COMPREHENSIVE METABOLIC 2022-07-10 04:02:00 Kevin Orona

is Health



                PANEL                                           

 

                PT/INR/PTT      2022-07-10 04:02:00 Kevin Orona



 

                AFP, TUMOR MARKER 2022-07-10 04:02:00 Grecia Lagunas

ealt

 

                COPPER, SERUM OR PLASMA 2022-07-10 04:02:00 Grecia Lagunas

[facility]

 

                RETIC COUNT     2022-07-10 04:02:00 Grecia Lagunas [facility]

lt

 

                HAPTOGLOBIN     2022-07-10 04:02:00 Grecia Lagunas [facility]

lt

 

                LACTATE DEHYDROGENASE 2022-07-10 04:02:00 Grecia Lagunas

Located within [facility]



                (LDH)                                           

 

                AMMONIA         2022-07-10 04:02:00 Grecia Lagunas

lth

 

                CEA             2022-07-10 04:02:00 Grecia Lagunas

lt

 

                HGB/HCT         2022 22:57:00 Sujata Mueller 

alth

 

                DUPLEX DOPPLER ABD/PEL 2022 21:00:00 Grecia Lagunas [facility]



                VASCULAR STUDY, COMPLETE                                 

 

                U/S ABDOMEN     2022 20:35:00 Grecia Lagunas

lt

 

                CT CHEST W CONTRAST 2022 20:05:18 Grecia Lagunas

 [facility]

 

                CT ABDOMEN AND PELVIS 2022 20:05:18 Grecia Lagunas

is [facility]



                CONTRAST                                        

 

                HGB/HCT         2022 15:26:00 Grecia Lagunas [facility]

lth

 

                HGB/HCT         2022 08:39:00 [facility]aUlisessh   [facility]



 

                CBC/DIFF        2022 05:37:00 AdwoaUlisessh   [facility]

h

 

                COMPREHENSIVE METABOLIC 2022 05:37:00 AdwoaKevin

is Health



                PANEL                                           

 

                CBC             2022 05:37:00 AdwoaUlisessh   [facility]

h

 

                SAVE SMEAR/ NOT FOR PATH 2022 05:37:00 Adwoa [facility]



                REVIEW                                          

 

                DIFFERENTIAL, MANUAL-WAM 2022 05:37:00 Adwoa [facility]

 

                MAGNESIUM       2022 04:40:00 AdwoaUlisessh   [facility]



 

                PT/INR/PTT      2022 04:40:00 [facility]



 

                FOLIC ACID      2022 [facility]:40:00 [facility]

 

                IRON PROFILE    2022 04:40:00 [facility]

 

                HIV AG/AB COMBO ROUTINE 2022 04:40:00 AdwoaKevin   [facility]



                SCREENING                                       

 

                HEPATITIS PANEL 2022 04:40:00 [facility]



 

                TRANSFUSE (RED CELL UNITS 2022 02:45:00 João Bernal

[facility]



                - NURSING)                                      

 

                URINALYSIS      2022 01:38:00 [facility]

 

                SODIUM, URINE, RANDOM 2022 01:38:00 Adwoa [facility]

 

                URINALYSIS      2022 01:38:00 Adwoa [facility]

 

                TRANSFUSE (RED CELL UNITS 2022 22:30:00 João Bernal [facility]



                - NURSING)                                      

 

                SEQUENTIAL COMPRESSION 2022 21:47:40 Adwoa, [facility]



                PUMP                                            

 

                SEQUENTIAL COMPRESSION 2022 21:47:40 Adwoa, [facility]



                PUMP                                            

 

                SARS-COV-2, FLU A/B, RSV 2022 20:45:00 João Bernal [facility]

 

                CORONAVIRUS, COVID-19, WILLIAM 2022 20:45:00 João Bernal

[facility]

 

                TRANSFUSE (RED CELL UNITS 2022 17:30:00 João Bernal

rr Health



                - NURSING)                                      

 

                12 LEAD EKG     2022 17:14:43 João Bernal

 

                TROPONIN I      2022 17:08:00 João Bernal

 

                THYROID STIMULATING 2022 17:08:00 Kevin Orona



                HORMONE (TSH)                                   

 

                VITAMIN B12     2022 17:08:00 Kevin Orona

 

                FERRITIN        2022 17:08:00 Kevin Orona

 

                TYPE AND SCREEN 2022 15:47:00 Harry Jang

 

                T&S - COLLECTION 2022 15:47:00 Harry Jang

lth

 

                ABO/RH CONFIRMATION 2022 15:47:00 Harry Jang

 

                PREPARE CROSSMATCH RBC 2022 15:47:00 Grecia Lagunas [facility]



                UNITS                                           

 

                XRAY CHEST 2 VIEWS 2022 11:31:00 Victoria Capone

alth

 

                CBC/DIFF        2022 11:00:00 Victoria Capone

 

                BASIC METABOLIC PANEL 2022 11:00:00 Victoria Capone

 

                LIVER PROFILE   2022 11:00:00 Victoria Capone

 

                PT/INR/PTT      2022 11:00:00 Victoria Capone

 

                B-TYPE NATRIURETIC PEPTIDE 2022 11:00:00 Victoria Capone

[facility]



                (BNP)                                           

 

                CBC             2022 11:00:00 Victoria Capone

 

                DIFFERENTIAL, MANUAL-WAM 2022 11:00:00 Victoria Capone

[facility]

 

                SYPHILIS SCREEN FOR 2022 11:00:00 Kevin Orona



                INFECTION                                       

 

                BASIC METABOLIC PANEL (NA, 2021 10:29:00 Jorge Luis Sutton

[facility]



                K, CL, CO2, GLUCOSE, BUN,                                 Medica

l Branch



                CREATININE, CA)                                 

 

                CBC WITH DIFF   2021 10:29:00 Lesley [facility]

 

                ALBUMIN BODY FLUID 2021 06:00:00 Dwight Gan [facility]

 

                T.PROTEIN BODY FLUID 2021 06:00:00 Dwight Gan [facility]

 

                BODY FLUID DIRECT COUNT 2021 06:00:00 Lesley [facility]

 

                BODY FLUID (BACTEC BOTTLE) 2021 06:00:00 Robert Snell

[facility]

 

                COVID-19 (ID NOW RAPID 2021 20:46:00 Lesley [facility]



                TESTING)                                        Medical Branch

 

                LAB ONLY COVID  2021 20:46:00 Lesley [facility]



                INTERPRETATION                                  [facility]

 

                HEPATIC FUNCTION PANEL 2021 16:17:00 Jean Latham   [facility]



                ([id number]) (ALB,T.PRO,BILI                                 [facility]



                T,BU/BC,ALT,AST,ALK PHOS)                                 

 

                BASIC METABOLIC PANEL (NA, 2021 16:17:00 Jean Latham

[facility]



                K, CL, CO2, GLUCOSE, BUN,                                 Medica

l Branch



                CREATININE, CA)                                 

 

                CBC WITH DIFF   2021 16:17:00 Jean Latham   [facility]

 

                PROTHROMBIN TIME / INR 2021 16:17:00 Jean Latham   [facility]

 

                CT ABDOMEN PELVIS W 2021 16:09:21 Jean Latham   [facility] [place]



                CONTRAST                                        [facility]

 

                CONSENT/REFUSAL FOR 2021 15:22:40 Doctor Unassigned, [facility]



                DIAGNOSIS AND TREATMENT                 [place]         Medical 

Branch

 

                PREPARE PACKED RBC 2021 23:59:02 Maddie Cardoza [facility]

 

                PREPARE PACKED RBC 2021 23:59:02 Maddie Cardoza [facility]

 

                COMP. METABOLIC PANEL 2021 14:45:[facility]



                ([id number])                                         [facility]

 

                CBC WITH DIFF   2021 14:45:00 [facility]

 

                COMP. METABOLIC PANEL 2021 14:45:00 oMe [facility]



                ([id number])                                         [facility]

 

                CBC WITH DIFF   2021 14:45:00 Moe [facility]

 

                IR              2021 18:05:00 Amy Caldera [facility]



                PARACENTESIS/PERITONECENTE                                 Medic

[facility]



                SIS WITH IMAGING                                 

 

                IR              2021 18:05:00 Amy Caldera [facility]



                PARACENTESIS/PERITONECENTE                                 [facility]



                SIS WITH IMAGING                                 

 

                T.PROTEIN BODY FLUID 2021 18:01:00 Daisy Friend [facility]

 

                T.PROTEIN BODY FLUID 2021 18:01:00 Daisy Friend [facility]

 

                ALBUMIN BODY FLUID 2021 18:00:00 Amy Caldera [facility]

 

                BODY FLUID      2021 18:00:00 Amy Caldera [facility]



                CULTURE(AEROBIC/ANAEROBIC)                                 [facility]

 

                CYTO ABDOMINAL FLUID 2021 18:00:00 Amy Caldera

[facility]

 

                BODY FLUID DIRECT COUNT 2021 18:00:00 Amy Caldera

[facility]

 

                BODY FLUID MANUAL DIFF 2021 18:00:00 Amy Caldera

[facility]

 

                BODY FLUID      2021 18:00:00 Amy Caldera [facility]



                CULTURE(AEROBIC/ANAEROBIC)                                 [facility]

 

                ALBUMIN BODY FLUID 2021 18:00:00 Amy Caldera [facility]

 

                CYTO ABDOMINAL FLUID 2021 18:00:00 Amy Caldera

[facility]

 

                PHOSPHORUS      2021 06:16:00 [place] [facility]

 

                MAGNESIUM       2021 06:16:00 [place] [facility]

 

                IONIZED CALCIUM 2021 06:16:00 [place] [facility]

 

                CBC WITH DIFF   2021 06:16:00 Abu Amy Gonzales [facility]

 

                IONIZED CALCIUM 2021 06:16:00 [place] [facility]

 

                CBC WITH DIFF   2021 06:16:00 [facility]

 

                MAGNESIUM       2021 06:16:00 [place] [facility]

 

                PHOSPHORUS      2021 06:16:00 [place] [facility]

 

                PREPARE PACKED RBC 2021 02:14:47 Jean Carlos [facility]

 

                PREPARE PACKED RBC 2021 02:14:47 Amy Caldera [facility]

 

                US ABDOMEN LIMITED WITH 2021 00:42:57 Amy Caldera [facility]

 

                US ABDOMEN LIMITED WITH 2021 00:42:57 Amy Caldera [facility]

 

                HCV ANTIBODY    2021 23:24:00 Marcy Rosa [facility]

 

                IMMUNOGLOBULIN G 2021 23:24:00 Marcy Rosa [facility]

 

                SMOOTH MUSCLE AB,IGG 2021 23:24:00 Marcy Rosa [facility]



                W/REFLEX                                        [facility]

 

                ANTI-NUCLEAR ANTIBODY 2021 23:24:00 Marcy Rosa [facility]

 

                HAV ANTIBODY (IGG AND IGM) 2021 23:24:00 Marcy Rosa [facility]

 

                HEPATITIS B SURFACE 2021 23:24:00 Marcy Rosa [facility]

 

                HEPATITIS B SURFACE 2021 23:24:00 Marcy Rosa [facility]



                ANTIBODY                                        [facility]

 

                HBC ANTIBODY (IGM & IGG) 2021 23:24:00 Marcy Rosa [facility]

 

                BASIC METABOLIC PANEL (NA, 2021 23:24:00 Abu Ather, [facility]



                K, CL, CO2, GLUCOSE, BUN,                                 [facility]

l [place]



                CREATININE, CA)                                 

 

                CERULOPLASMIN   2021 23:24:00 Marcy Rosa [facility]

 

                ALPHA 1 ANTITRYPSIN 2021 23:24:00 Moe [facility]

 

                IMMUNOGLOBULIN G 2021 23:24:00 Moe [facility]

 

                BASIC METABOLIC PANEL (NA, 2021 23:24:00 Abu Ather, [facility]



                K, CL, CO2, GLUCOSE, BUN,                                 [facility]

[facility]



                CREATININE, CA)                                 

 

                ALPHA FETOPROTEIN 2021 23:24:00 Marcy Rosa [facility]

 

                ANTI-NUCLEAR ANTIBODY 2021 23:24:00 Marcy Rosa [facility]



                SCREEN                                          [facility]

 

                HEPATITIS B SURFACE 2021 23:24:00 Marcy Rosa [facility]

 

                HEPATITIS B SURFACE 2021 23:24:00 Marcy Rosa [facility]



                ANTIGEN                                         [facility]

 

                HCV ANTIBODY    2021 23:24:00 Marcy Rosa [facility]

 

                HBC ANTIBODY (IGM & IGG) 2021 23:24:00 Marcy Rosa [facility]

 

                HAV ANTIBODY (IGG AND IGM) 2021 23:24:00 Marcy Rosa [facility]

 

                ALPHA 1 ANTITRYPSIN 2021 23:24:00 Marcy Rosa [facility]

 

                ALPHA FETOPROTEIN 2021 23:24:00 [facility]

 

                CERULOPLASMIN   2021 23:24:00 Marcy Rosa [facility]

 

                HB ABO GROUPING 2021 23:22:00 Abu AtherAmy pritchard [facility]

 

                HB ABO GROUPING 2021 23:22:00 Abu [facility]

 

                CBC WITH DIFF   2021 22:37:00 Abu Ather [facility]

 

                PROTHROMBIN TIME / INR 2021 22:37:00 [facility]

[facility]

 

                CBC WITH DIFF   2021 22:37:00 Abu Atherfrancheska [facility]

 

                PROTHROMBIN TIME / INR 2021 22:37:00 Marcy [facility]

[facility]

 

                ABORH CONFIRMATION 2021 17:39:00 Maddie Cardoza F Unive

[facility]

 

                ABORH CONFIRMATION 2021 17:39:00 Maddie [facility]

[facility]

 

                HB ABO GROUPING 2021 17:03:00 Maddie Cardoza [facility]

 

                HB ABO GROUPING 2021 17:03:00 Maddie Cardoza [facility]

 

                URINALYSIS      2021 16:46:00 Maddie Cardoza [facility]

 

                URINALYSIS      2021 16:46:00 Maddie Cardoza [facility]

 

                CT ABDOMEN PELVIS W 2021 16:12:42 Maddie Cardoza [facility] [place]



                CONTRAST                                        [facility]

 

                CT ABDOMEN PELVIS W 2021 16:12:42 Maddie Cardoza [facility]

[facility] [place]



                CONTRAST                                        [facility]

 

                XR CHEST 1 VW   2021 15:24:06 [facility]

[facility]

 

                XR CHEST 1 VW   2021 15:24:06 Maddie Cardoza [facility]

 

                HB ECG ROUTINE & RHYTHM 2021 15:15:37 Maddie Cardoza 

[facility]

 

                HB ECG ROUTINE & RHYTHM 2021 15:15:37 Maddie Cardoza 

[facility]

 

                LIPASE          2021 15:13:00 Maddie Cardoza [facility]

 

                FERRITIN SERUM  2021 15:13:00 Marcy Rosa [place]

[facility]

 

                TROPONIN I      2021 15:13:00 Maddie Cardoza [facility]

 

                COMP. METABOLIC PANEL 2021 15:13:00 Madd[facility] 

[place]



                ([id number])                                         [facility]

 

                CBC WITH DIFF   2021 15:13:00 Maddie Cardoza [facility]

 

                N-TERMINAL PRO-BNP 2021 15:13:00 Maddie Cardoza Unive

[facility]

 

                COVID-19 (ID NOW RAPID 2021 15:13:00 IbijeomaunMaddie jacobo F U

[facility]



                TESTING)                                        [facility] Branch

 

                COVID-19 (ID NOW RAPID 2021 15:13:00 IbAmanda millso F U

[facility]



                TESTING)                                        [facility]

 

                CBC WITH DIFF   2021 15:13:00 Maddie Cardoza [facility]

 

                COMP. METABOLIC PANEL 2021 15:13:00 Maddie [facility]

[facility] 

[place]



                ([id number])                                         [facility]

 

                TROPONIN I      2021 15:13:00 Maddie Cardoza [facility]

 

                LIPASE          2021 15:13:00 Maddie Cardoza [facility]

 

                N-TERMINAL PRO-BNP 2021 15:13:00 [facility]

[facility]

 

                FERRITIN SERUM  2021 15:13:00 Marcy Rosa [place]

[facility]

 

                CONSENT/REFUSAL FOR 2021 14:32:16 Doctor Richi, [facility]



                DIAGNOSIS AND TREATMENT                 [place]         [facility] 

[facility]

 

                NOTICE OF PRIVACY 2021 14:31:32 Doctor Unassigned, [facility]



                PRACTICES                       [place]         [facility]

 

                EMERGENCY DEPARTMENT 2021 05:01:00 Doctor Richi [facility]



                DOCUMENTS                       [place]         [facility]

 

                AGREEMENTS AUTHORIZATIONS 2021 05:01:00 Doctor Richi

 [facility]



                AND IRREVOCABLE                 [place]         [facility]



                ASSIGNMENTS (FORM 2001)                                 

 

                AGREEMENTS AUTHORIZATIONS 2021 05:01:00 Doctor Richi

 [facility]



                AND IRREVOCABLE                 [place]         [facility]



                ASSIGNMENTS (FORM )                                 

 

                DISCLOSURE AND CONSENT, 2021 05:01:00 Doctor Richi [facility]



                MEDICAL AND SURGICAL                 No Name         Medical Bra

nch



                PROCEDURES                                      

 

                EMERGENCY DEPARTMENT 2021 05:01:00 Doctor Unassigned, [facility]



                DOCUMENTS                       [place]         [facility]

 

                HOSPITAL ADMISSION 2021 05:01:00 Doctor Richi [facility]



                                                [place]         [facility]







Plan of Care







             Planned Activity Planned Date Details      Comments     Source

 

             Future Scheduled Test 2022-10-01 00:00:00 IMM Influenza            

  [facility]



                                       Seasonal (>/= 19 yrs)              



                                       [code = IMM Influenza              



                                       Seasonal (>/= 19              



                                       yrs)]                     

 

             Future Scheduled Test 2022-10-01 00:00:00 IMM Influenza            

  [facility]



                                       Seasonal (>/= 19 yrs)              



                                       [code = IMM Influenza              



                                       Seasonal (>/= 19              



                                       yrs)]                     

 

             Future Scheduled Test 2022-10-01 00:00:00 IMM Influenza            

  [facility]



                                       Seasonal (>/= 19 yrs)              



                                       [code = IMM Influenza              



                                       Seasonal (>/= 19              



                                       yrs)]                     

 

             Future Scheduled Test 2021 00:00:00 COVID-19 Vaccine (2 -    

          [facility]



                                       Pfizer series) [code              



                                       = COVID-19 Vaccine (2              



                                       - Pfizer series)]              

 

             Future Scheduled Test 2021 00:00:00 COVID-19 Vaccine (2 -    

          [facility]



                                       Pfizer series) [code              



                                       = COVID-19 Vaccine (2              



                                       - Pfizer series)]              

 

             Future Scheduled Test 2021 00:00:00 COVID-19 Vaccine (2 -    

          [facility]



                                       Pfizer series) [code              



                                       = COVID-19 Vaccine (2              



                                       - Pfizer series)]              

 

             Future Scheduled Test 2018 00:00:00 Screening for            

  [facility]



                                       malignant neoplasm of              



                                       colon (procedure)              



                                       [code = 535982813]              

 

             Future Scheduled Test 2018 00:00:00 Screening for            

  [facility]



                                       malignant neoplasm of              



                                       colon (procedure)              



                                       [code = 314362447]              

 

             Future Scheduled Test 2018 00:00:00 Screening for            

  [facility]



                                       malignant neoplasm of              



                                       colon (procedure)              



                                       [code = 288537999]              

 

             Future Scheduled Test 2018 00:00:00 Screening for            

  [facility]



                                       malignant neoplasm of              



                                       colon (procedure)              



                                       [code = 154885024]              

 

             Future Scheduled Test 2018 00:00:00 Screening for            

  [facility]



                                       malignant neoplasm of              



                                       colon (procedure)              



                                       [code = 649447949]              

 

             Future Scheduled Test 2018 00:00:00 Screening for            

  [facility]



                                       malignant neoplasm of              



                                       colon (procedure)              



                                       [code = 615798493]              

 

             Future Scheduled Test 2018 00:00:00 Screening for            

  [facility]



                                       malignant neoplasm of              



                                       colon (procedure)              



                                       [code = 956644937]              

 

             Future Scheduled Test 2018 00:00:00 Screening for            

  [facility]



                                       malignant neoplasm of              



                                       colon (procedure)              



                                       [code = 166645436]              

 

             Future Scheduled Test 2018 00:00:00 Screening for            

  [facility]



                                       malignant neoplasm of              



                                       colon (procedure)              



                                       [code = 894488274]              

 

             Future Scheduled Test 2018 00:00:00 Screening for            

  [facility]



                                       malignant neoplasm of              



                                       colon (procedure)              



                                       [code = 131557719]              

 

             Future Scheduled Test 2018 00:00:00 Screening for            

  [facility]



                                       malignant neoplasm of              



                                       colon (procedure)              



                                       [code = 373135727]              

 

             Future Scheduled Test 2018 00:00:00 Screening for            

  [facility]



                                       malignant neoplasm of              



                                       colon (procedure)              



                                       [code = 237871681]              

 

             Future Scheduled Test 2018 00:00:00 Screening for            

  [facility]



                                       malignant neoplasm of              



                                       colon (procedure)              



                                       [code = 544551452]              

 

             Future Scheduled Test 2018 00:00:00 Screening for            

  [facility]



                                       malignant neoplasm of              



                                       colon (procedure)              



                                       [code = 453761818]              

 

             Future Scheduled Test 2018 00:00:00 Screening for            

  [facility]



                                       malignant neoplasm of              



                                       colon (procedure)              



                                       [code = 757520300]              

 

             Future Scheduled Test 1968 00:00:00 Fluoride Varnish         

     [facility]



                                       [code = Fluoride              



                                       Varnish]                  







Encounters







        Start   End     Encounter Admission Attending Care    Care    Encounter 

Source



        Date/Time Date/Time Type    Type    Clinicians Facility Department ID   

   

 

        2022         Inpatient                 [facility]     [id number] H

arris



        15:57:19                                                         Health

 

        2022         Inpatient         ARSH MAGALLON [facility]     [id number]

3 [place]



        00:00:00                                                         Health

 

        2022         Inpatient         MAY,    [facility]     [id number] H

arris



        13:01:40                         THADDAEUS                         Healt



 

        2022         Inpatient 1       MAY,    [facility]     [id number] H

arris



        15:43:00                         THADDAEUS                         Healt



 

        2023 Inpatient ER      [facility] 8

[id number] [facility]



        07:50:00 15:35:00                 [facility]                            

 

        2023 Transition         MAK Joyce  1.2.840.114 [id number]

[id number] [facility] [place]



        00:00:00 00:00:00 of Care         Gogo CALZADA   350.1.13.10        

 [facility]   4.2.7.2.686         [place]

[place]



                                                        [phone]         Medi

tyler



                                                        403             Branch

 

        [facility]     [id number]

[id number] [facility] [place]



        16:36:00 18:36:00                 [facility]

 

        2023 [facility]         Lucia Pike  1.

2.840.114 

[id number]                               [facility] [place]



        16:36:00 18:36:00 Encounter         Israel Deluna   350.1.13.

10         [facility] 4.2.7.2.686     

    [place]



                                                        [phone]         Medi

tyler



                                                        096             Branch

 

        2022-10-13 2022-10-13 Aleksandr Lagunas, [facility]     [id number] 

[place]



        00:00:00 00:00:00                 Grecia Polk



 

        2022-10-04 2022-10-04 [facility]         [facility]     [id number]

[id number] [place]



        00:00:00 00:00:00                 formerly [facility]

 

        [facility]     [id number] 

[place]



        00:00:00 00:00:00                 Marguerite ANDERSON                         Kristin

[facility]

 

        2022 [facility]     [id number] 

[place]



        00:00:00 00:00:00                 Grecia MCKEON                         Heal

th

 

        2022 Outpatient                 [facility]     [id number]

94 [place]



        10:28:40 10:33:15                                                 [facility]

 

        2022 Outpatient         [facility]     [id number]

35 [place]



        00:00:00 00:00:00                 SANTIAGO                         Heal

th

 

        2022 Outpatient                 [facility]     [id number]

18 [place]



        00:00:00 00:00:00                                                 [facility]

 

        2022 Office          MAY,    [facility]     [id number] 

[place]



        12:48:07 13:35:29 Visit           MARCY                         Heal

th

 

        2022 Outpatient                 [facility]     [id number]

18 [place]



        00:00:00 00:00:00                                                 [facility]

 

        2022 Outpatient                 [facility]     [facility]     [id number]

GDA [facility]



        00:00:00 00:00:00                                         [id number] 



                                                                8       

 

        2022 2022-07-15 [facility]     [id number]

 [place]



        15:43:00 14:31:00 Encounter         MARCY                         [facility]

 

        2022 Anesthesia         ARSH MAGALLON [facility]     [id number]

[id number] [place]



        00:00:00 16:26:00 Event                                           Health

 

        2022 Inpatient                 [facility]     [id number]

0 [place]



        18:31:54 19:34:23                                                 Health

 

        2022 Inpatient                 [facility]     [id number]

4 [place]



        13:44:32 13:44:38                                                 Health

 

        2022 Inpatient                 [facility]     [id number]

2 [place]



        10:59:56 11:29:56                                                 Health

 

        2022 Inpatient         [facility]     [id number]

6 [place]



        10:08:10 10:38:10                 MARCY                         Heal

th

 

        2022 Inpatient         [facility]     [id number]

3 [facility]



        20:00:54 21:22:51                 THADDAEUS                         Heal



 

        2022 Inpatient                 [facility]     [id number]

2 [place]



        20:00:50 21:22:24                                                 [facility]

 

        2022 Inpatient         MAY,    [facility]     [id number]

1 [place]



        18:54:35 20:07:31                 [facility]

[facility]

 

        2022 Emergency         MALOOK, [facility]     [id number]

2 [place]



        11:20:42 11:32:12                 [facility]

 

        2021-08-10 2021-08-10 Outpatient         ALI, AFROZE [facility]     [id number]

[id number] [place]



        00:00:00 00:00:00                                                 Health

 

        2021 Emergency         Jean Latham  1.2.840.11

4 [id number] 

[facility]



        10:22:00 14:00:00                 Robert Snell   350.1.13.10    

     ity of



                                                [facility] 4.2.7.2.686         Dominik

as



                                                        [phone]         Medi

tyler



                                                        093             [place]

 

        2021 Emergency X               [facility]    ERT     [id number]

22 Univers



        10:20:00 10:20:00                                                 ity of



                                                                        [facility]

 

        2021-06-10 2021-06-10 Transition         Mak Vizcarra  1.2.840.114 [id number]

[id number] Univers



        00:00:00 00:00:00 of Care         Rubina Hoody   350.1.13.10         it

y of



                                                [place]   4.2.7.2.686         [place]

[place]



                                                        [phone]         Medi

tyler



                                                        403             [place]

 

        2021 [facility]         Maddie Cardoza JOSE 1.2.840.1 [id number]

[id number] 

[id number]                                [facility]



        09:44:00 18:00:00 Encounter         Aym Caldera 79712.1.1        

         ity 



                                                3.104.2.7                 [place]



                                                .[id number]                 Medica

l



                                                .8                      [place]

 

        2021 Emergency X               [facility]    ERT     [id number]

17 Univers



        09:39:00 09:39:00                                                 ity [facility]

 

        2021 Travel                  1.2.840.1 1.2.793.557 [id number]

[id number] Univers



        00:00:00 00:00:00                         29111.1.1 350.1.13.10         

ity of



                                                3.104.2.7 4.2.7.3.698         [place]

[place]



                                                .[id number] 084.8           Medica

l



                                                .8                      Branch

 

        2021 Orders          Doctor  1.2.840.5 [id number]

[id number] Univers



        00:00:00 00:00:00 Only            Unassigned, 36597.1.1                 

ity of



                                        No Name 3.104.2.7                 [place]



                                                .[id number]                 Medica

l



                                                .8                      [place]







Results







           Test Description Test Time  Test Comments Results    Result Comments 

Source









                    MISCELLANEOUS LAB ORDER 2023 14:47:09 









                      Test Item  Value      Reference Range Interpretation Comme

nts









             SCAN RESULT (test code = 6139413)                                  

      See attachment



ANAEROBIC [id number] 00:41:05





             Test Item    Value        Reference Range Interpretation Comments

 

             CULTURE (BEAKER) (test No anaerobes isolated                       

    



             code = 1095)                                        



BASIC METABOLIC [id number] 06:39:01





             Test Item    Value        Reference Range Interpretation Comments

 

             SODIUM (BEAKER) 134 meq/L    136-145      L            



             (test code = 381)                                        

 

             POTASSIUM    4.1 meq/L    3.5-5.1                   



             (BEAKER) (test                                        



             code = 379)                                         

 

             CHLORIDE (BEAKER) 103 meq/L                        



             (test code = 382)                                        

 

             CO2 (BEAKER) 26 meq/L     22-29                     



             (test code = 355)                                        

 

             BLOOD UREA   8 mg/dL      7-21                      



             NITROGEN (BEAKER)                                        



             (test code = 354)                                        

 

             CREATININE   0.62 mg/dL   0.57-1.25                 



             (BEAKER) (test                                        



             code = 358)                                         

 

             GLUCOSE RANDOM 111 mg/dL           H            



             (BEAKER) (test                                        



             code = 652)                                         

 

             CALCIUM (BEAKER) 7.5 mg/dL    8.4-10.2     L            



             (test code = 697)                                        

 

             EGFR (BEAKER) 112                                     Interpretatio

n of eGFR



             (test code = mL/min/1.73                            values Stage De

scription



             1092)        sq m                                   Result G1 Shaunna

l or high



                                                                 >=90 G2 Mildly 

decreased



                                                                 60-89 G3a Mildl

y to



                                                                 moderately 45-5

9 G3b



                                                                 Moderately to s

everely



                                                                 30-44 G4 Severl

y decreased



                                                                 15-29 G5 Kidney

 failure



                                                                 <15Reported eGF

R is based



                                                                 on the CKD-EPI 





                                                                 equation that d

oes not use



                                                                 a race



                                                                 coefficientEsti

mated GFR



                                                                 is not as accur

ate as



                                                                 Creatinine Tabitha marie in



                                                                 predicting glom

erular



                                                                 filtration rate

. Estimated



                                                                 GFR is not appl

icable for



                                                                 dialysis patien

ts



 ID - WILLIAM WSpecimen slightly [id number] 06:37:31





             Test Item    Value        Reference Range Interpretation Comments

 

             MAGNESIUM (BEAKER) (test code = 1.6 mg/dL    1.6-2.6               

    



             627)                                                



 ID - WILLIAM WCBC W/PLT COUNT &amp; AUTO [id number] 06:06:27





             Test Item    Value        Reference Range Interpretation Comments

 

             WHITE BLOOD CELL COUNT (BEAKER) 3.8 K/ L     3.5-10.5              

    



             (test code = 775)                                        

 

             RED BLOOD CELL COUNT (BEAKER) 2.41 M/ L    4.63-6.08    L          

  



             (test code = 761)                                        

 

             HEMOGLOBIN (BEAKER) (test code = 7.7 GM/DL    13.7-17.5    L       

     



             410)                                                

 

             HEMATOCRIT (BEAKER) (test code = 23.8 %       40.1-51.0    L       

     



             411)                                                

 

             MEAN CORPUSCULAR VOLUME (BEAKER) 99 fL        79-92        H       

     



             (test code = 753)                                        

 

             MEAN CORPUSCULAR HEMOGLOBIN 32.0 pg      25.7-32.2                 



             (BEAKER) (test code = 751)                                        

 

             MEAN CORPUSCULAR HEMOGLOBIN CONC 32.4 GM/DL   32.3-36.5            

     



             (BEAKER) (test code = 752)                                        

 

             RED CELL DISTRIBUTION WIDTH 18.6 %       11.6-14.4    H            



             (BEAKER) (test code = 412)                                        

 

             PLATELET COUNT (BEAKER) (test code 42 K/CU MM   150-450      L     

       



             = 756)                                              

 

             MEAN PLATELET VOLUME (BEAKER) 11.6 fL      9.4-12.4                

  



             (test code = 754)                                        

 

             NUCLEATED RED BLOOD CELLS (BEAKER) 0 /100 WBC   0-0                

       



             (test code = 413)                                        

 

             NEUTROPHILS RELATIVE PERCENT 58 %                                  

 



             (BEAKER) (test code = 429)                                        

 

             LYMPHOCYTES RELATIVE PERCENT 20 %                                  

 



             (BEAKER) (test code = 430)                                        

 

             MONOCYTES RELATIVE PERCENT 17 %                                   



             (BEAKER) (test code = 431)                                        

 

             EOSINOPHILS RELATIVE PERCENT 5 %                                   

 



             (BEAKER) (test code = 432)                                        

 

             BASOPHILS RELATIVE PERCENT 0 %                                    



             (BEAKER) (test code = 437)                                        

 

             NEUTROPHILS ABSOLUTE COUNT 2.18 K/ L    1.78-5.38                 



             (BEAKER) (test code = 670)                                        

 

             LYMPHOCYTES ABSOLUTE COUNT 0.76 K/ L    1.32-3.57    L            



             (BEAKER) (test code = 414)                                        

 

             MONOCYTES ABSOLUTE COUNT (BEAKER) 0.63 K/ L    0.30-0.82           

      



             (test code = 415)                                        

 

             EOSINOPHILS ABSOLUTE COUNT 0.18 K/ L    0.04-0.54                 



             (BEAKER) (test code = 416)                                        

 

             BASOPHILS ABSOLUTE COUNT (BEAKER) 0.00 K/ L    0.01-0.08    L      

      



             (test code = 417)                                        

 

             IMMATURE GRANULOCYTES-RELATIVE 1.10 %       0.00-1.00    H         

   



             PERCENT (BEAKER) (test code =                                      

  



             2801)                                               



BASIC METABOLIC [id number] 04:14:02





             Test Item    Value        Reference Range Interpretation Comments

 

             SODIUM (BEAKER) 134 meq/L    136-145      L            



             (test code = 381)                                        

 

             POTASSIUM    4.1 meq/L    3.5-5.1                   



             (BEAKER) (test                                        



             code = 379)                                         

 

             CHLORIDE (BEAKER) 103 meq/L                        



             (test code = 382)                                        

 

             CO2 (BEAKER) 26 meq/L     22-29                     



             (test code = 355)                                        

 

             BLOOD UREA   9 mg/dL      7-21                      



             NITROGEN (BEAKER)                                        



             (test code = 354)                                        

 

             CREATININE   0.65 mg/dL   0.57-1.25                 



             (BEAKER) (test                                        



             code = 358)                                         

 

             GLUCOSE RANDOM 126 mg/dL           H            



             (BEAKER) (test                                        



             code = 652)                                         

 

             CALCIUM (BEAKER) 7.6 mg/dL    8.4-10.2     L            



             (test code = 697)                                        

 

             EGFR (BEAKER) 110                                      Interpretati

on of eGFR



             (test code = mL/min/1.73                            values Stage De

scription



             1092)        sq m                                   Result G1 Shaunna

l or high



                                                                 >=90 G2 Mildly 

decreased



                                                                 60-89 G3a Mildl

y to



                                                                 moderately 45-5

9 G3b



                                                                 Moderately to s

everely



                                                                 30-44 G4 Severl

y decreased



                                                                 15-29 G5 Kidney

 failure



                                                                 <15Reported eGF

R is based



                                                                 on the CKD-EPI 





                                                                 equation that d

oes not use



                                                                 a race



                                                                 coefficientEsti

mated GFR



                                                                 is not as accur

ate as



                                                                 Creatinine Tabitha marie in



                                                                 predicting glom

erular



                                                                 filtration rate

. Estimated



                                                                 GFR is not appl

icable for



                                                                 dialysis patien

ts



 ID - BVSpecimen slightly [id number] 04:11:10





             Test Item    Value        Reference Range Interpretation Comments

 

             MAGNESIUM (BEAKER) (test code = 1.5 mg/dL    1.6-2.6      L        

    



             627)                                                



 ID - BVCBC W/PLT COUNT &amp; AUTO [id number] 04:00:10





             Test Item    Value        Reference Range Interpretation Comments

 

             WHITE BLOOD CELL COUNT (BEAKER) 3.6 K/ L     3.5-10.5              

    



             (test code = 775)                                        

 

             RED BLOOD CELL COUNT (BEAKER) 2.42 M/ L    4.63-6.08    L          

  



             (test code = 761)                                        

 

             HEMOGLOBIN (BEAKER) (test code = 7.6 GM/DL    13.7-17.5    L       

     



             410)                                                

 

             HEMATOCRIT (BEAKER) (test code = 24.0 %       40.1-51.0    L       

     



             411)                                                

 

             MEAN CORPUSCULAR VOLUME (BEAKER) 99 fL        79-92        H       

     



             (test code = 753)                                        

 

             MEAN CORPUSCULAR HEMOGLOBIN 31.4 pg      25.7-32.2                 



             (BEAKER) (test code = 751)                                        

 

             MEAN CORPUSCULAR HEMOGLOBIN CONC 31.7 GM/DL   32.3-36.5    L       

     



             (BEAKER) (test code = 752)                                        

 

             RED CELL DISTRIBUTION WIDTH 18.7 %       11.6-14.4    H            



             (BEAKER) (test code = 412)                                        

 

             PLATELET COUNT (BEAKER) (test code 44 K/CU MM   150-450      L     

       



             = 756)                                              

 

             MEAN PLATELET VOLUME (BEAKER) 12.5 fL      9.4-12.4     H          

  



             (test code = 754)                                        

 

             NUCLEATED RED BLOOD CELLS (BEAKER) 0 /100 WBC   0-0                

       



             (test code = 413)                                        

 

             NEUTROPHILS RELATIVE PERCENT 57 %                                  

 



             (BEAKER) (test code = 429)                                        

 

             LYMPHOCYTES RELATIVE PERCENT 19 %                                  

 



             (BEAKER) (test code = 430)                                        

 

             MONOCYTES RELATIVE PERCENT 18 %                                   



             (BEAKER) (test code = 431)                                        

 

             EOSINOPHILS RELATIVE PERCENT 5 %                                   

 



             (BEAKER) (test code = 432)                                        

 

             BASOPHILS RELATIVE PERCENT 0 %                                    



             (BEAKER) (test code = 437)                                        

 

             NEUTROPHILS ABSOLUTE COUNT 2.02 K/ L    1.78-5.38                 



             (BEAKER) (test code = 670)                                        

 

             LYMPHOCYTES ABSOLUTE COUNT 0.68 K/ L    1.32-3.57    L            



             (BEAKER) (test code = 414)                                        

 

             MONOCYTES ABSOLUTE COUNT (BEAKER) 0.63 K/ L    0.30-0.82           

      



             (test code = 415)                                        

 

             EOSINOPHILS ABSOLUTE COUNT 0.17 K/ L    0.04-0.54                 



             (BEAKER) (test code = 416)                                        

 

             BASOPHILS ABSOLUTE COUNT (BEAKER) 0.00 K/ L    0.01-0.08    L      

      



             (test code = 417)                                        

 

             IMMATURE GRANULOCYTES-RELATIVE 1.70 %       0.00-1.00    H         

   



             PERCENT (BEAKER) (test code =                                      

  



             2801)                                               



[id number] 07:20:31





             Test Item    Value        Reference Range Interpretation Comments

 

             MAGNESIUM (BEAKER) 1.8 mg/dL    1.6-2.6                   Specimen 

slightly



             (test code = 627)                                        hemolyzed



 ID - MARCOCBC W/PLT COUNT &amp; AUTO [id number] 06:57:51





             Test Item    Value        Reference Range Interpretation Comments

 

             WHITE BLOOD CELL COUNT 2.9 K/ L     3.5-10.5     L            



             (BEAKER) (test code =                                        



             775)                                                

 

             RED BLOOD CELL COUNT 2.40 M/ L    4.63-6.08    L            



             (BEAKER) (test code =                                        



             761)                                                

 

             HEMOGLOBIN (BEAKER) 7.7 GM/DL    13.7-17.5    L            



             (test code = 410)                                        

 

             HEMATOCRIT (BEAKER) 24.0 %       40.1-51.0    L            



             (test code = 411)                                        

 

             MEAN CORPUSCULAR VOLUME 100 fL       79-92        H            



             (BEAKER) (test code =                                        



             753)                                                

 

             MEAN CORPUSCULAR 32.1 pg      25.7-32.2                 



             HEMOGLOBIN (BEAKER)                                        



             (test code = 751)                                        

 

             MEAN CORPUSCULAR 32.1 GM/DL   32.3-36.5    L            



             HEMOGLOBIN CONC                                        



             (BEAKER) (test code =                                        



             752)                                                

 

             RED CELL DISTRIBUTION 18.6 %       11.6-14.4    H            



             WIDTH (BEAKER) (test                                        



             code = 412)                                         

 

             PLATELET COUNT (BEAKER) 43 K/CU MM   150-450      L            



             (test code = 756)                                        

 

             MEAN PLATELET VOLUME                                        Unable 

to report due



             (BEAKER) (test code =                                        to abn

ormal Platelet



             754)                                                population



                                                                 distribution.

 

             NUCLEATED RED BLOOD 0 /100 WBC   0-0                       



             CELLS (BEAKER) (test                                        



             code = 413)                                         

 

             NEUTROPHILS RELATIVE 51 %                                   



             PERCENT (BEAKER) (test                                        



             code = 429)                                         

 

             LYMPHOCYTES RELATIVE 23 %                                   



             PERCENT (BEAKER) (test                                        



             code = 430)                                         

 

             MONOCYTES RELATIVE 24 %                                   



             PERCENT (BEAKER) (test                                        



             code = 431)                                         

 

             EOSINOPHILS RELATIVE 1 %                                    



             PERCENT (BEAKER) (test                                        



             code = 432)                                         

 

             BASOPHILS RELATIVE 0 %                                    



             PERCENT (BEAKER) (test                                        



             code = 437)                                         

 

             NEUTROPHILS ABSOLUTE 1.47 K/ L    1.78-5.38    L            



             COUNT (BEAKER) (test                                        



             code = 670)                                         

 

             LYMPHOCYTES ABSOLUTE 0.67 K/ L    1.32-3.57    L            



             COUNT (BEAKER) (test                                        



             code = 414)                                         

 

             MONOCYTES ABSOLUTE 0.71 K/ L    0.30-0.82                 



             COUNT (BEAKER) (test                                        



             code = 415)                                         

 

             EOSINOPHILS ABSOLUTE 0.03 K/ L    0.04-0.54    L            



             COUNT (BEAKER) (test                                        



             code = 416)                                         

 

             BASOPHILS ABSOLUTE 0.00 K/ L    0.01-0.08    L            



             COUNT (BEAKER) (test                                        



             code = 417)                                         

 

             IMMATURE     1.00 %       0.00-1.00                 



             GRANULOCYTES-RELATIVE                                        



             PERCENT (BEAKER) (test                                        



             code = 2801)                                        



SURGICALLY OBTAINED CULTURE + GRAM [id number] 13:25:13





             Test Item    Value        Reference Range Interpretation Comments

 

             CULTURE (BEAKER) (test code No growth                              



             = 1095)                                             

 

             GRAM STAIN RESULT (BEAKER) 4+ WBCs                                



             (test code = 1123)                                        

 

             GRAM STAIN RESULT (BEAKER) No organisms seen                       

    



             (test code = 82308)                                        



HEPATIC FUNCTION [id number] 13:15:37





             Test Item    Value        Reference Range Interpretation Comments

 

             TOTAL PROTEIN (BEAKER) (test code = 5.5 gm/dL    6.0-8.3      L    

        



             770)                                                

 

             ALBUMIN (BEAKER) (test code = 1145) 1.8 g/dL     3.5-5.0      L    

        

 

             BILIRUBIN TOTAL (BEAKER) (test code 3.2 mg/dL    0.2-1.2      H    

        



             = 377)                                              

 

             BILIRUBIN DIRECT (BEAKER) (test 1.9 mg/dL    0.1-0.5      H        

    



             code = 706)                                         

 

             ALKALINE PHOSPHATASE (BEAKER) (test 142 U/L                  

        



             code = 346)                                         

 

             AST (SGOT) (BEAKER) (test code = 66 U/L       5-34         H       

     



             353)                                                

 

             ALT (SGPT) (BEAKER) (test code = 39 U/L       6-55                 

     



             347)                                                



 ID - MARCOSpecimen slightly ictericBLOOD [id number] 11:00:32





             Test Item    Value        Reference Range Interpretation Comments

 

             CULTURE (BEAKER) (test No growth in 5 days                         

  



             code = 1095)                                        



[id number] 07:16:25





             Test Item    Value        Reference Range Interpretation Comments

 

             MAGNESIUM (BEAKER) (test code = 2.1 mg/dL    1.6-2.6               

    



             627)                                                



 ID - BSCBC W/PLT COUNT &amp; AUTO [id number] 06:59:48





             Test Item    Value        Reference Range Interpretation Comments

 

             WHITE BLOOD CELL COUNT 2.8 K/ L     3.5-10.5     L            



             (BEAKER) (test code =                                        



             775)                                                

 

             RED BLOOD CELL COUNT 2.62 M/ L    4.63-6.08    L            



             (BEAKER) (test code =                                        



             761)                                                

 

             HEMOGLOBIN (BEAKER) 8.3 GM/DL    13.7-17.5    L            



             (test code = 410)                                        

 

             HEMATOCRIT (BEAKER) 26.1 %       40.1-51.0    L            



             (test code = 411)                                        

 

             MEAN CORPUSCULAR VOLUME 100 fL       79-92        H            



             (BEAKER) (test code =                                        



             753)                                                

 

             MEAN CORPUSCULAR 31.7 pg      25.7-32.2                 



             HEMOGLOBIN (BEAKER)                                        



             (test code = 751)                                        

 

             MEAN CORPUSCULAR 31.8 GM/DL   32.3-36.5    L            



             HEMOGLOBIN CONC                                        



             (BEAKER) (test code =                                        



             752)                                                

 

             RED CELL DISTRIBUTION 18.6 %       11.6-14.4    H            



             WIDTH (BEAKER) (test                                        



             code = 412)                                         

 

             PLATELET COUNT (BEAKER) 41 K/CU MM   150-450      L            



             (test code = 756)                                        

 

             MEAN PLATELET VOLUME                                        Unable 

to report due



             (BEAKER) (test code =                                        to abn

ormal Platelet



             754)                                                population



                                                                 distribution.

 

             NUCLEATED RED BLOOD 0 /100 WBC   0-0                       



             CELLS (BEAKER) (test                                        



             code = 413)                                         

 

             NEUTROPHILS RELATIVE 58 %                                   



             PERCENT (BEAKER) (test                                        



             code = 429)                                         

 

             LYMPHOCYTES RELATIVE 15 %                                   



             PERCENT (BEAKER) (test                                        



             code = 430)                                         

 

             MONOCYTES RELATIVE 25 %                                   



             PERCENT (BEAKER) (test                                        



             code = 431)                                         

 

             EOSINOPHILS RELATIVE 0 %                                    



             PERCENT (BEAKER) (test                                        



             code = 432)                                         

 

             BASOPHILS RELATIVE 0 %                                    



             PERCENT (BEAKER) (test                                        



             code = 437)                                         

 

             NEUTROPHILS ABSOLUTE 1.65 K/ L    1.78-5.38    L            



             COUNT (BEAKER) (test                                        



             code = 670)                                         

 

             LYMPHOCYTES ABSOLUTE 0.43 K/ L    1.32-3.57    L            



             COUNT (BEAKER) (test                                        



             code = 414)                                         

 

             MONOCYTES ABSOLUTE 0.72 K/ L    0.30-0.82                 



             COUNT (BEAKER) (test                                        



             code = 415)                                         

 

             EOSINOPHILS ABSOLUTE 0.00 K/ L    0.04-0.54    L            



             COUNT (BEAKER) (test                                        



             code = 416)                                         

 

             BASOPHILS ABSOLUTE 0.00 K/ L    0.01-0.08    L            



             COUNT (BEAKER) (test                                        



             code = 417)                                         

 

             IMMATURE     1.40 %       0.00-1.00    H            



             GRANULOCYTES-RELATIVE                                        



             PERCENT (BEAKER) (test                                        



             code = 2801)                                        



ANAEROBIC [id number] 02:13:49





             Test Item    Value        Reference Range Interpretation Comments

 

             CULTURE (BEAKER) (test No anaerobes isolated                       

    



             code = 1095)                                        



U/S, ABDOMINAL, [id number] 18:25:00Reason for exam:-&gt;cirrhosis
********************************************************[facility] : 1970 Sex: 
M************************************************************FINAL REPORT 
PATIENT ID: [id number] Abdominal ultrasound dated 2023 Clinical information: 
cirrhosis, eval vessels Comment: Real-time transabdominal ultrasound was 
performed. Liver is atrophic and measures 8.7 cm in length. The echogenicity of 
the liver is increased with irregular margin consistent with cirrhosis. No focal
lesion is noted in the liver. Spleen is enlarged measuring 12.8 cm in length.
Gallbladder is contracted. No gallstone is present. No biliary dilatation is 
seen. Common bile duct measures 5 mm in diameter. Main portal vein measures 11 
mm in diameter. Pancreas is suboptimal visualized. Right kidney measures 12.0 x 
5.5 x 6.1 cm. Left kidney measures 11.8 x 5.7 x 6.1 cm. Echogenicity of both 
kidney is normal. No hydronephrosis or solid mass seen in either kidney. No cyst
is seen in either kidney. Small ascites present in the abdomen. There is small 
left pleural effusion. Abdominal aorta is normal in caliber. The IVC is patent. 
Real-time Color and Spectral doppler ultrasound of the abdomen was performed. 
Main portal vein measures 1.1 cm in diameter. There is hepatopedal flow in the 
main portal vein with velocity 45.8 cm/sec. Right and left portal veins are 
patent. Proper hepatic artery, right hepatic artery, and left hepatic artery are
patent with resistive indices 0.8, 0.7, and 0.75 respectively. IVC, Hepatic 
venous confluence, right HV, middle HV and left HV are patent. Impression: 1. 
Cirrhosis with splenomegaly2. No suspicious hepatic mass.3. Small ascites and 
left pleural effusion.4. Patent hepatic arteries and portal veins. Signed: Bobbi العلي [facility] Verified Date/Time: 2023 18:25:01 Electronically signed by:
BOBBI العلي M.D. on 2023 06:25 PMU/S, DUPLEX, [id number] 18:25:00
Reason for exam:-&gt;cirrhosis, eval vessels
********************************************************[facility] : 1970 Sex: 
M************************************************************FINAL REPORT 
PATIENT ID: [id number] Abdominal ultrasound dated 2023 Clinical information: 
cirrhosis, eval vessels Comment: Real-time transabdominal ultrasound was 
performed. Liver is atrophic and measures 8.7 cm in length. The echogenicity of 
the liver is increased with irregular margin consistent with cirrhosis. No focal
lesion is noted in the liver. Spleen is enlarged measuring 12.8 cm in length.
Gallbladder is contracted. No gallstone is present. No biliary dilatation is 
seen. Common bile duct measures 5 mm in diameter. Main portal vein measures 11 
mm in diameter. Pancreas is suboptimal visualized. Right kidney measures 12.0 x 
5.5 x 6.1 cm. Left kidney measures 11.8 x 5.7 x 6.1 cm. Echogenicity of both 
kidney is normal. No hydronephrosis or solid mass seen in either kidney. No cyst
is seen in either kidney. Small ascites present in the abdomen. There is small 
left pleural effusion. Abdominal aorta is normal in caliber. The IVC is patent. 
Real-time Color and Spectral doppler ultrasound of the abdomen was performed. 
Main portal vein measures 1.1 cm in diameter. There is hepatopedal flow in the 
main portal vein with velocity 45.8 cm/sec. Right and left portal veins are 
patent. Proper hepatic artery, right hepatic artery, and left hepatic artery are
patent with resistive indices 0.8, 0.7, and 0.75 respectively. IVC, Hepatic 
venous confluence, right HV, middle HV and left HV are patent. Impression: 1. 
Cirrhosis with splenomegaly2. No suspicious hepatic mass.3. Small ascites and 
left pleural effusion.4. Patent hepatic arteries and portal veins. Signed: Bobbi العلي [facility] Verified Date/Time: 2023 18:25:01 Electronically signed by:
BOBBI العلي M.D. on 2023 06:25 PMALPHA FETOPROTEIN (AFP), TUMOR MARKER
2023 07:42:03





             Test Item    Value        Reference Range Interpretation Comments

 

             ALPHA-FETOPROTEIN (BEAKER) (test code < ng/mL      <10.0           

          



             = 1094)                                             



 ID - ADMINHEPATITIS B SURFACE [id number] 07:41:36





             Test Item    Value        Reference Range Interpretation Comments

 

             HEPATITIS B SURFACE ANTIBODY < mIU/mL     <8.0                     

 



             (BEAKER) (test code = 647)                                        



 ID - ADMINHEPATITIS A ANTIBODY, [id number] 07:41:31





             Test Item    Value        Reference Range Interpretation Comments

 

             HEPATITIS A IGG ANTIBODY (BEAKER) Reactive     Nonreactive  A      

      



             (test code = 2797)                                        



 ID - ADMINHEPATITIS B CORE ANTIBODY, [id number] 07:40:24





             Test Item    Value        Reference Range Interpretation Comments

 

             HEPATITIS B CORE TOTAL ANTIBODY Nonreactive  Nonreactive           

    



             (BEAKER) (test code = 497)                                        



 ID - ADMINHEPATITIS B SURFACE [id number] 07:40:23





             Test Item    Value        Reference Range Interpretation Comments

 

             HEPATITIS B SURFACE ANTIGEN (2) Nonreactive  Nonreactive           

    



             (BEAKER) (test code = 2585)                                        



Specimen is considered negative for HBsAg.HEPATITIS C [id number] 
07:40:23





             Test Item    Value        Reference Range Interpretation Comments

 

             HEPATITIS C ANTIBODY (BEAKER) Nonreactive  Nonreactive             

  



             (test code = 367)                                        



 ID - [id number] 05:45:39





             Test Item    Value        Reference Range Interpretation Comments

 

             MAGNESIUM (BEAKER) (test code = 1.9 mg/dL    1.6-2.6               

    



             627)                                                



 ID - ADMINCBC W/PLT COUNT &amp; AUTO [id number] 05:19:17





             Test Item    Value        Reference Range Interpretation Comments

 

             WHITE BLOOD CELL COUNT (BEAKER) 4.6 K/ L     3.5-10.5              

    



             (test code = 775)                                        

 

             RED BLOOD CELL COUNT (BEAKER) 2.22 M/ L    4.63-6.08    L          

  



             (test code = 761)                                        

 

             HEMOGLOBIN (BEAKER) (test code = 6.9 GM/DL    13.7-17.5    L       

     



             410)                                                

 

             HEMATOCRIT (BEAKER) (test code = 21.7 %       40.1-51.0    L       

     



             411)                                                

 

             MEAN CORPUSCULAR VOLUME (BEAKER) 98 fL        79-92        H       

     



             (test code = 753)                                        

 

             MEAN CORPUSCULAR HEMOGLOBIN 31.1 pg      25.7-32.2                 



             (BEAKER) (test code = 751)                                        

 

             MEAN CORPUSCULAR HEMOGLOBIN CONC 31.8 GM/DL   32.3-36.5    L       

     



             (BEAKER) (test code = 752)                                        

 

             RED CELL DISTRIBUTION WIDTH 18.4 %       11.6-14.4    H            



             (BEAKER) (test code = 412)                                        

 

             PLATELET COUNT (BEAKER) (test code 44 K/CU MM   150-450      L     

       



             = 756)                                              

 

             MEAN PLATELET VOLUME (BEAKER) 12.4 fL      9.4-12.4                

  



             (test code = 754)                                        

 

             NUCLEATED RED BLOOD CELLS (BEAKER) 0 /100 WBC   0-0                

       



             (test code = 413)                                        

 

             NEUTROPHILS RELATIVE PERCENT 70 %                                  

 



             (BEAKER) (test code = 429)                                        

 

             LYMPHOCYTES RELATIVE PERCENT 10 %                                  

 



             (BEAKER) (test code = 430)                                        

 

             MONOCYTES RELATIVE PERCENT 19 %                                   



             (BEAKER) (test code = 431)                                        

 

             EOSINOPHILS RELATIVE PERCENT 0 %                                   

 



             (BEAKER) (test code = 432)                                        

 

             BASOPHILS RELATIVE PERCENT 0 %                                    



             (BEAKER) (test code = 437)                                        

 

             NEUTROPHILS ABSOLUTE COUNT 3.17 K/ L    1.78-5.38                 



             (BEAKER) (test code = 670)                                        

 

             LYMPHOCYTES ABSOLUTE COUNT 0.45 K/ L    1.32-3.57    L            



             (BEAKER) (test code = 414)                                        

 

             MONOCYTES ABSOLUTE COUNT (BEAKER) 0.84 K/ L    0.30-0.82    H      

      



             (test code = 415)                                        

 

             EOSINOPHILS ABSOLUTE COUNT 0.00 K/ L    0.04-0.54    L            



             (BEAKER) (test code = 416)                                        

 

             BASOPHILS ABSOLUTE COUNT (BEAKER) 0.01 K/ L    0.01-0.08           

      



             (test code = 417)                                        

 

             IMMATURE GRANULOCYTES-RELATIVE 1.80 %       0.00-1.00    H         

   



             PERCENT (BEAKER) (test code =                                      

  



             2801)                                               



VANCOMYCIN LEVEL, [id number] 14:12:35





             Test Item    Value        Reference Range Interpretation Comments

 

             VANCOMYCIN TROUGH (BEAKER) (test 1.4 ug/mL    10.0-20.0    L       

     



             code = 522)                                         



 ID - ADMINBODY FLUID CULTURE + GRAM [id number] 13:02:52





             Test Item    Value        Reference    Interpretation Comments



                                       Range                     

 

             CULTURE (BEAKER) BETA HEMOLYTIC              A            1+ Beta-h

emolytic



             (test code = STREPTOCOCCUS GROUP                           streptoc

occus group



             1095)        B                                      B, by serologic

al



                                                                 grouping

 

             Ceftriaxone (test              See_Comment  S             [Automate

d message]



             code = 52)                                          The system [facility]

h



                                                                 generated this



                                                                 result transmit

michelle



                                                                 reference range

:



                                                                 Susceptible 0-0

.5 ,



                                                                 No Interpretati

ons



                                                                 Established <0 

or



                                                                 >.5. The refere

nce



                                                                 range was not u

sed



                                                                 to interpret th

is



                                                                 result as



                                                                 normal/abnormal

.

 

             Penicillin G              See_Comment  S             [Automated mes

elif]



             (test code = 3)                                        The system w

[facility]



                                                                 generated this



                                                                 result transmit

michelle



                                                                 reference range

:



                                                                 Susceptible 0-0

.12 ,



                                                                 No Interpretati

ons



                                                                 Established <0 

or



                                                                 >.. The referen

ce



                                                                 range was not u

sed



                                                                 to interpret th

is



                                                                 result as



                                                                 normal/abnormal

.

 

             Vancomycin (test              See_Comment  S             [Automated

 message]



             code = 13)                                          The system [employer]



                                                                 generated this



                                                                 result transmit

michelle



                                                                 reference range

:



                                                                 Susceptible 0-1

 , No



                                                                 Interpretations



                                                                 Established <0 

or >1



                                                                 . The reference



                                                                 range was not u

sed



                                                                 to interpret th

is



                                                                 result as



                                                                 normal/abnormal

.

 

             GRAM STAIN RESULT 4+ WBCs                                



             (BEAKER) (test                                        



             code = 1123)                                        

 

             GRAM STAIN RESULT <1+ gram positive                           



             (BEAKER) (test cocci in pairs                           



             code = 293982)                                        



BASIC METABOLIC [id number] 05:21:50





             Test Item    Value        Reference Range Interpretation Comments

 

             SODIUM (BEAKER) 133 meq/L    136-145      L            



             (test code = 381)                                        

 

             POTASSIUM    4.9 meq/L    3.5-5.1                   Specimen slight

ly



             (BEAKER) (test                                        hemolyzed



             code = 379)                                         

 

             CHLORIDE (BEAKER) 106 meq/L                        



             (test code = 382)                                        

 

             CO2 (BEAKER) 18 meq/L     22-29        L            



             (test code = 355)                                        

 

             BLOOD UREA   13 mg/dL     7-21                      



             NITROGEN (BEAKER)                                        



             (test code = 354)                                        

 

             CREATININE   0.55 mg/dL   0.57-1.25    L            Specimen slight

ly



             (BEAKER) (test                                        hemolyzed



             code = 358)                                         

 

             GLUCOSE RANDOM 138 mg/dL           H            



             (BEAKER) (test                                        



             code = 652)                                         

 

             CALCIUM (BEAKER) 7.0 mg/dL    8.4-10.2     L            



             (test code = 697)                                        

 

             EGFR (BEAKER) 115                                     Interpretatio

n of eGFR



             (test code = mL/min/1.73                            values Stage De

scription



             1092)        sq m                                   Result G1 Shaunna

l or high



                                                                 >=90 G2 Mildly 

decreased



                                                                 60-89 G3a Mildl

y to



                                                                 moderately 45-5

9 G3b



                                                                 Moderately to s

everely



                                                                 30-44 G4 Severl

y decreased



                                                                 15-29 G5 Kidney

 failure



                                                                 <15Reported eGF

R is based



                                                                 on the CKD-EPI 

202



                                                                 equation that d

oes not use



                                                                 a race



                                                                 coefficientEsti

mated GFR



                                                                 is not as accur

ate as



                                                                 Creatinine Tabitha marie in



                                                                 predicting glom

erular



                                                                 filtration rate

. Estimated



                                                                 GFR is not appl

icable for



                                                                 dialysis patien

ts



 ID - MMSpecimen moderately [id number] 05:19:56





             Test Item    Value        Reference Range Interpretation Comments

 

             MAGNESIUM (BEAKER) 1.8 mg/dL    1.6-2.6                   Specimen 

slightly



             (test code = 627)                                        hemolyzed



 ID - [id number] 05:19:56





             Test Item    Value        Reference Range Interpretation Comments

 

             PHOSPHORUS (BEAKER) 4.6 mg/dL    2.3-4.7                   Specimen

 slightly



             (test code = 604)                                        hemolyzed



 ID - MMCBC W/PLT COUNT &amp; AUTO [id number] 05:04:59





             Test Item    Value        Reference Range Interpretation Comments

 

             WHITE BLOOD CELL COUNT (BEAKER) 3.7 K/ L     3.5-10.5              

    



             (test code = 775)                                        

 

             RED BLOOD CELL COUNT (BEAKER) 2.66 M/ L    4.63-6.08    L          

  



             (test code = 761)                                        

 

             HEMOGLOBIN (BEAKER) (test code = 8.4 GM/DL    13.7-17.5    L       

     



             410)                                                

 

             HEMATOCRIT (BEAKER) (test code = 26.1 %       40.1-51.0    L       

     



             411)                                                

 

             MEAN CORPUSCULAR VOLUME (BEAKER) 98 fL        79-92        H       

     



             (test code = 753)                                        

 

             MEAN CORPUSCULAR HEMOGLOBIN 31.6 pg      25.7-32.2                 



             (BEAKER) (test code = 751)                                        

 

             MEAN CORPUSCULAR HEMOGLOBIN CONC 32.2 GM/DL   32.3-36.5    L       

     



             (BEAKER) (test code = 752)                                        

 

             RED CELL DISTRIBUTION WIDTH 18.3 %       11.6-14.4    H            



             (BEAKER) (test code = 412)                                        

 

             PLATELET COUNT (BEAKER) (test code 56 K/CU MM   150-450      L     

       



             = 756)                                              

 

             MEAN PLATELET VOLUME (BEAKER) 11.2 fL      9.4-12.4                

  



             (test code = 754)                                        

 

             NUCLEATED RED BLOOD CELLS (BEAKER) 0 /100 WBC   0-0                

       



             (test code = 413)                                        

 

             NEUTROPHILS RELATIVE PERCENT 79 %                                  

 



             (BEAKER) (test code = 429)                                        

 

             LYMPHOCYTES RELATIVE PERCENT 14 %                                  

 



             (BEAKER) (test code = 430)                                        

 

             MONOCYTES RELATIVE PERCENT 6 %                                    



             (BEAKER) (test code = 431)                                        

 

             EOSINOPHILS RELATIVE PERCENT 0 %                                   

 



             (BEAKER) (test code = 432)                                        

 

             BASOPHILS RELATIVE PERCENT 0 %                                    



             (BEAKER) (test code = 437)                                        

 

             NEUTROPHILS ABSOLUTE COUNT 2.89 K/ L    1.78-5.38                 



             (BEAKER) (test code = 670)                                        

 

             LYMPHOCYTES ABSOLUTE COUNT 0.52 K/ L    1.32-3.57    L            



             (BEAKER) (test code = 414)                                        

 

             MONOCYTES ABSOLUTE COUNT (BEAKER) 0.22 K/ L    0.30-0.82    L      

      



             (test code = 415)                                        

 

             EOSINOPHILS ABSOLUTE COUNT 0.00 K/ L    0.04-0.54    L            



             (BEAKER) (test code = 416)                                        

 

             BASOPHILS ABSOLUTE COUNT (BEAKER) 0.00 K/ L    0.01-0.08    L      

      



             (test code = 417)                                        

 

             IMMATURE GRANULOCYTES-RELATIVE 1.40 %       0.00-1.00    H         

   



             PERCENT (BEAKER) (test code =                                      

  



             2801)                                               



MRSA [id number] 12:59:38





             Test Item    Value        Reference Range Interpretation Comments

 

             CULTURE (BEAKER) (test code No MRSA isolated                       

    



             = 1095)                                             



(CELLAVISION MANUAL DIFF)2023 09:01:51





             Test Item    Value        Reference Range Interpretation Comments

 

             NEUTROPHILS - REL 76 %                                   



             (CELLAVISION)(BEAKER) (test code                                   

     



             = 2816)                                             

 

             LYMPHOCYTES - REL 6 %                                    



             (CELLAVISION)(BEAKER) (test code                                   

     



             = 2817)                                             

 

             MONOCYTES - REL 6 %                                    



             (CELLAVISION)(BEAKER) (test code                                   

     



             = 2818)                                             

 

             EOSINOPHILS - REL 3 %                                    



             (CELLAVISION)(BEAKER) (test code                                   

     



             = 2819)                                             

 

             BASOPHILS - REL 1 %                                    



             (CELLAVISION)(BEAKER) (test code                                   

     



             = 2820)                                             

 

             METAMYELOCYTES - REL 1 %          0-0          H            



             (CELLAVISION)(BEAKER) (test code                                   

     



             = 2821)                                             

 

             MYELOCYTES - REL 1 %          0-0          H            



             (CELLAVISION)(BEAKER) (test code                                   

     



             = 2822)                                             

 

             BANDS - REL (CELLAVISION)(BEAKER) 4 %          0-10                

      



             (test code = 2826)                                        

 

             ATYPICAL LYMPHOCYTES - REL 1 %          0-0          H            



             (CELLAVISION)(BEAKER) (test code                                   

     



             = 2829)                                             

 

             NEUTROPHILS - ABS 4.33 K/ul    1.78-5.38                 



             (CELLAVISION)(BEAKER) (test code                                   

     



             = 2830)                                             

 

             LYMPHOCYTES - ABS 0.34 K/ul    1.32-3.57    L            



             (CELLAVISION)(BEAKER) (test code                                   

     



             = 2831)                                             

 

             MONOCYTES - ABS 0.34 K/uL    0.30-0.82                 



             (CELLAVISION)(BEAKER) (test code                                   

     



             = 2832)                                             

 

             EOSINOPHILS - ABS 0.17 K/uL    0.04-0.54                 



             (CELLAVISION)(BEAKER) (test code                                   

     



             = 2834)                                             

 

             BASOPHILS - ABS 0.06 K/uL    0.01-0.08                 



             (CELLAVISION)(BEAKER) (test code                                   

     



             = 2835)                                             

 

             METAMYELOCYTES - ABS 0.06 K/uL    0.00-0.00    H            



             (CELLAVISION)(BEAKER) (test code                                   

     



             = 2836)                                             

 

             MYELOCYTES-ABS 0.06 K/uL    0.00-0.00    H            



             (CELLAVISION)(BEAKER) (test code                                   

     



             = 2837)                                             

 

             BANDS - ABS (CELLAVISION)(BEAKER) 0.23 K/uL    0.00-0.80           

      



             (test code = 2840)                                        

 

             ATYPICAL LYMPHOCYTES - ABS 0.06 K/uL    0.00-0.00    H            



             (CELLAVISION)(BEAKER) (test code                                   

     



             = 2858)                                             

 

             TOTAL COUNTED (BEAKER) (test code 100                              

      



             = 1351)                                             

 

             WBC MORPHOLOGY (BEAKER) (test Normal                               

  



             code = 487)                                         

 

             PLT MORPHOLOGY (BEAKER) (test Normal                               

  



             code = 486)                                         

 

             POLYCHROMATOPHILLIC RBCS(BEAKER) 1+ few                            

     



             (test code = 478)                                        

 

             ANISOCYTOSIS (BEAKER) (test code 2+ moderate                       

     



             = 961)                                              

 

             MACROCYTES (BEAKER) (test code = 1+ few                            

     



             964)                                                

 

             POIKILOCYTES (BEAKER) (test code 2+ moderate                       

     



             = 966)                                              

 

             SCHISTOCYTES (BEAKER) (test code 1+ few                            

     



             = 765)                                              

 

             OVALOCYTES (BEAKER) (test code = 1+ few                            

     



             477)                                                

 

             ARTIFACT (CELLAVISION)(BEAKER) Present                             

   



             (test code = 3432)                                        

 

             PLATELET CONCENTRATION Decreased                              



             (CELLAVISION)(BEAKER) (test code                                   

     



             = 3438)                                             



 ID - [id number] ID - Katie OverholtUser comments: Slide comments:CBC 
W/PLT COUNT &amp; AUTO [id number] 09:01:48





             Test Item    Value        Reference Range Interpretation Comments

 

             WHITE BLOOD CELL COUNT (BEAKER) 5.7 K/ L     3.5-10.5              

    



             (test code = 775)                                        

 

             RED BLOOD CELL COUNT (BEAKER) 2.53 M/ L    4.63-6.08    L          

  



             (test code = 761)                                        

 

             HEMOGLOBIN (BEAKER) (test code = 7.9 GM/DL    13.7-17.5    L       

     



             410)                                                

 

             HEMATOCRIT (BEAKER) (test code = 24.5 %       40.1-51.0    L       

     



             411)                                                

 

             MEAN CORPUSCULAR VOLUME (BEAKER) 97 fL        79-92        H       

     



             (test code = 753)                                        

 

             MEAN CORPUSCULAR HEMOGLOBIN 31.2 pg      25.7-32.2                 



             (BEAKER) (test code = 751)                                        

 

             MEAN CORPUSCULAR HEMOGLOBIN CONC 32.2 GM/DL   32.3-36.5    L       

     



             (BEAKER) (test code = 752)                                        

 

             RED CELL DISTRIBUTION WIDTH 19.5 %       11.6-14.4    H            



             (BEAKER) (test code = 412)                                        

 

             PLATELET COUNT (BEAKER) (test code 52 K/CU MM   150-450      L     

       



             = 756)                                              

 

             MEAN PLATELET VOLUME (BEAKER) 10.5 fL      9.4-12.4                

  



             (test code = 754)                                        

 

             NUCLEATED RED BLOOD CELLS (BEAKER) 0 /100 WBC   0-0                

       



             (test code = 413)                                        



BASIC METABOLIC [id number] 05:30:22





             Test Item    Value        Reference Range Interpretation Comments

 

             SODIUM (BEAKER) 133 meq/L    136-145      L            



             (test code = 381)                                        

 

             POTASSIUM    3.9 meq/L    3.5-5.1                   



             (BEAKER) (test                                        



             code = 379)                                         

 

             CHLORIDE (BEAKER) 107 meq/L                        



             (test code = 382)                                        

 

             CO2 (BEAKER) 22 meq/L     22-29                     



             (test code = 355)                                        

 

             BLOOD UREA   10 mg/dL     7-21                      



             NITROGEN (BEAKER)                                        



             (test code = 354)                                        

 

             CREATININE   0.59 mg/dL   0.57-1.25                 



             (BEAKER) (test                                        



             code = 358)                                         

 

             GLUCOSE RANDOM 85 mg/dL                         



             (BEAKER) (test                                        



             code = 652)                                         

 

             CALCIUM (BEAKER) 7.1 mg/dL    8.4-10.2     L            



             (test code = 697)                                        

 

             EGFR (BEAKER) 113                                     Interpretatio

n of eGFR



             (test code = mL/min/1.73                            values Stage De

scription



             1092)        sq m                                   Result G1 Shaunna

l or high



                                                                 >=90 G2 Mildly 

decreased



                                                                 60-89 G3a Mildl

y to



                                                                 moderately 45-5

9 G3b



                                                                 Moderately to s

everely



                                                                 30-44 G4 Severl

y decreased



                                                                 15-29 G5 Kidney

 failure



                                                                 <15Reported eGF

R is based



                                                                 on the CKD-EPI 





                                                                 equation that d

oes not use



                                                                 a race



                                                                 coefficientEsti

mated GFR



                                                                 is not as accur

ate as



                                                                 Creatinine Tabitha

cynthia in



                                                                 predicting glom

erular



                                                                 filtration rate

. Estimated



                                                                 GFR is not appl

icable for



                                                                 dialysis patien

ts



 JANIE Matias moderately ictericHEPATIC FUNCTION [id number]
05:30:15





             Test Item    Value        Reference Range Interpretation Comments

 

             TOTAL PROTEIN (BEAKER) (test code = 5.0 gm/dL    6.0-8.3      L    

        



             770)                                                

 

             ALBUMIN (BEAKER) (test code = 1145) 1.7 g/dL     3.5-5.0      L    

        

 

             BILIRUBIN TOTAL (BEAKER) (test code 4.9 mg/dL    0.2-1.2      H    

        



             = 377)                                              

 

             BILIRUBIN DIRECT (BEAKER) (test 2.1 mg/dL    0.1-0.5      H        

    



             code = 706)                                         

 

             ALKALINE PHOSPHATASE (BEAKER) (test 91 U/L                   

        



             code = 346)                                         

 

             AST (SGOT) (BEAKER) (test code = 48 U/L       5-34         H       

     



             353)                                                

 

             ALT (SGPT) (BEAKER) (test code = 20 U/L       6-55                 

     



             347)                                                



 JANIE Matias moderately [id number] 05:29:05





             Test Item    Value        Reference Range Interpretation Comments

 

             PHOSPHORUS (BEAKER) (test code = 2.2 mg/dL    2.3-4.7      L       

     



             604)                                                



 JANIE THOMAS [id number] 05:29:04





             Test Item    Value        Reference Range Interpretation Comments

 

             MAGNESIUM (BEAKER) (test code = 1.8 mg/dL    1.6-2.6               

    



             627)                                                



 JANIE THOMAS WPROTHROMBIN [id number] 05:05:58





             Test Item    Value        Reference Range Interpretation Comments

 

             PROTIME (BEAKER) (test code = 27.5 seconds 11.9-14.2    H          

  



             759)                                                

 

             INR (BEAKER) (test code = 370) 2.75         <=5.90                 

   



RECOMMENDED COUMADIN/WARFARIN INR THERAPY RANGESSTANDARD DOSE: 2.0 - 3.0 
Includes: PROPHYLAXIS for venous thrombosis, systemic embolization; TREATMENT 
for venous thrombosis and/or pulmonary embolus.HIGH RISK: Target INR is 2.5-3.5 
for patients with mechanical heart valves.POCT-GLUCOSE [id number] 18:49:26





             Test Item    Value        Reference Range Interpretation Comments

 

             POC-GLUCOSE METER 158 mg/dL           H            : TESTED A

T [facility] [address]



             (BEAKER) (test code =                                        [place],



             1538)                                               [id number]:



                                                                 /Techni

melanie ID



                                                                 = [id number] for Ch

ryanAdeel



CALCIUM, [id number] 16:00:32





             Test Item    Value        Reference Range Interpretation Comments

 

             CALCIUM IONIZED (BEAKER) (test 1.07 mmol/L  1.12-1.27    L         

   



             code = 698)                                         

 

             PH, BLOOD (BEAKER) (test code = 7.47                               

    



             1810)                                               



BASIC METABOLIC [id number] 14:08:29





             Test Item    Value        Reference Range Interpretation Comments

 

             SODIUM (BEAKER) 134 meq/L    136-145      L            



             (test code = 381)                                        

 

             POTASSIUM    3.4 meq/L    3.5-5.1      L            



             (BEAKER) (test                                        



             code = 379)                                         

 

             CHLORIDE (BEAKER) 113 meq/L           H            



             (test code = 382)                                        

 

             CO2 (BEAKER) 18 meq/L     22-29        L            



             (test code = 355)                                        

 

             BLOOD UREA   8 mg/dL      7-21                      



             NITROGEN (BEAKER)                                        



             (test code = 354)                                        

 

             CREATININE   0.47 mg/dL   0.57-1.25    L            



             (BEAKER) (test                                        



             code = 358)                                         

 

             GLUCOSE RANDOM 95 mg/dL                         



             (BEAKER) (test                                        



             code = 652)                                         

 

             CALCIUM (BEAKER) 6.0 mg/dL    8.4-10.2     LL           



             (test code = 697)                                        

 

             EGFR (BEAKER) 119                                     Interpretatio

n of eGFR



             (test code = mL/min/1.73                            values  Stage D

escription



             1092)        sq m                                   Result G1 Shaunna

l or high



                                                                 >=90 G2 Mildly 

decreased



                                                                 60-89 G3a Mildl

y to



                                                                 moderately 45-5

9 G3b



                                                                 Moderately to s

everely



                                                                 30-44 G4 Severl

y decreased



                                                                 15-29 G5 Kidney

 failure



                                                                 <15Reported eGF

R is based



                                                                 on the CKD-EPI 





                                                                 equation that d

oes not use



                                                                 a race



                                                                 coefficientEsti

mated GFR



                                                                 is not as accur

ate as



                                                                 Creatinine Tabitha

cynthia in



                                                                 predicting glom

erular



                                                                 filtration rate

. Estimated



                                                                 GFR is not appl

icable for



                                                                 dialysis patien

ts



 ID - MMSpecimen moderately icteric(CELLAVISION MANUAL DIFF)[id number] 
14:04:53





             Test Item    Value        Reference Range Interpretation Comments

 

             NEUTROPHILS - REL 89 %                                   



             (CELLAVISION)(BEAKER) (test code                                   

     



             = 2816)                                             

 

             LYMPHOCYTES - REL 2 %                                    



             (CELLAVISION)(BEAKER) (test code                                   

     



             = 2817)                                             

 

             MONOCYTES - REL 3 %                                    



             (CELLAVISION)(BEAKER) (test code                                   

     



             = 2818)                                             

 

             BASOPHILS - REL 1 %                                    



             (CELLAVISION)(BEAKER) (test code                                   

     



             = 2820)                                             

 

             METAMYELOCYTES - REL 1 %          0-0          H            



             (CELLAVISION)(BEAKER) (test code                                   

     



             = 2821)                                             

 

             BANDS - REL (CELLAVISION)(BEAKER) 4 %          0-10                

      



             (test code = 2826)                                        

 

             NEUTROPHILS - ABS 4.63 K/ul    1.78-5.38                 



             (CELLAVISION)(BEAKER) (test code                                   

     



             = 2830)                                             

 

             LYMPHOCYTES - ABS 0.10 K/ul    1.32-3.57    L            



             (CELLAVISION)(BEAKER) (test code                                   

     



             = 2831)                                             

 

             MONOCYTES - ABS 0.16 K/uL    0.30-0.82    L            



             (CELLAVISION)(BEAKER) (test code                                   

     



             = 2832)                                             

 

             BASOPHILS - ABS 0.05 K/uL    0.01-0.08                 



             (CELLAVISION)(BEAKER) (test code                                   

     



             = 2835)                                             

 

             METAMYELOCYTES - ABS 0.05 K/uL    0.00-0.00    H            



             (CELLAVISION)(BEAKER) (test code                                   

     



             = 2836)                                             

 

             BANDS - ABS (CELLAVISION)(BEAKER) 0.21 K/uL    0.00-0.80           

      



             (test code = 2840)                                        

 

             TOTAL COUNTED (BEAKER) (test code 100                              

      



             = 1351)                                             

 

             WBC MORPHOLOGY (BEAKER) (test Normal                               

  



             code = 487)                                         

 

             PLT MORPHOLOGY (BEAKER) (test Normal                               

  



             code = 486)                                         

 

             POLYCHROMATOPHILLIC RBCS(BEAKER) 1+ few                            

     



             (test code = 478)                                        

 

             ANISOCYTOSIS (BEAKER) (test code 2+ moderate                       

     



             = 961)                                              

 

             MACROCYTES (BEAKER) (test code = 2+ moderate                       

     



             964)                                                

 

             POIKILOCYTES (BEAKER) (test code 2+ moderate                       

     



             = 966)                                              

 

             ELLIPTOCYTES (BEAKER) (test code 1+ few                            

     



             = 962)                                              

 

             OVALOCYTES (BEAKER) (test code = 2+ moderate                       

     



             477)                                                

 

             ARTIFACT (CELLAVISION)(BEAKER) Present                             

   



             (test code = 3431)                                        

 

             HELMET CELLS 1+ few                                 



             (CELLAVISION)(BEAKER) (test code                                   

     



             = 3954)                                             

 

             PLATELET CONCENTRATION Decreased                              



             (CELLAVISION)(BEAKER) (test code                                   

     



             = 3598)                                             



 ID - [id number] ID - Joellen Barragan comments: Slide comments:
CBC W/PLT COUNT &amp; AUTO [id number] 14:04:52





             Test Item    Value        Reference Range Interpretation Comments

 

             WHITE BLOOD CELL COUNT 5.2 K/ L     3.5-10.5                  



             (BEAKER) (test code =                                        



             775)                                                

 

             RED BLOOD CELL COUNT 2.47 M/ L    4.63-6.08    L            



             (BEAKER) (test code =                                        



             761)                                                

 

             HEMOGLOBIN (BEAKER) 7.6 GM/DL    13.7-17.5    L            



             (test code = 410)                                        

 

             HEMATOCRIT (BEAKER) 23.9 %       40.1-51.0    L            



             (test code = 411)                                        

 

             MEAN CORPUSCULAR 97 fL        79-92        H            



             VOLUME (BEAKER) (test                                        



             code = 753)                                         

 

             MEAN CORPUSCULAR 30.8 pg      25.7-32.2                 



             HEMOGLOBIN (BEAKER)                                        



             (test code = 751)                                        

 

             MEAN CORPUSCULAR 31.8 GM/DL   32.3-36.5    L            



             HEMOGLOBIN CONC                                        



             (BEAKER) (test code =                                        



             752)                                                

 

             RED CELL DISTRIBUTION 20.2 %       11.6-14.4    H            



             WIDTH (BEAKER) (test                                        



             code = 412)                                         

 

             PLATELET COUNT 56 K/CU MM   150-450      L            Discordant fr

om



             (BEAKER) (test code =                                        previo

us results.



             756)                                                Clinical correl

ation



                                                                 suggested.

 

             MEAN PLATELET VOLUME 11.5 fL      9.4-12.4                  



             (BEAKER) (test code =                                        



             754)                                                

 

             NUCLEATED RED BLOOD 0 /100 WBC   0-0                       



             CELLS (BEAKER) (test                                        



             code = 413)                                         



[id number] 14:04:39





             Test Item    Value        Reference Range Interpretation Comments

 

             PHOSPHORUS (BEAKER) (test code = 2.3 mg/dL    2.3-4.7              

     



             604)                                                



 ID - [id number] 14:04:38





             Test Item    Value        Reference Range Interpretation Comments

 

             MAGNESIUM (BEAKER) (test code = 1.7 mg/dL    1.6-2.6               

    



             627)                                                



 ID - [id number] 13:42:33





             Test Item    Value        Reference Range Interpretation Comments

 

             FIBRINOGEN LEVEL (BEAKER) (test 180 mg/dl    225-434      L        

    



             code = 658)                                         



VANCOMYCIN LEVEL, [id number] 12:41:03





             Test Item    Value        Reference Range Interpretation Comments

 

             VANCOMYCIN TROUGH (BEAKER) (test 7.3 ug/mL    10.0-20.0    L       

     



             code = 522)                                         



 ID - NOLVIA BPOCT-GLUCOSE [id number] 12:22:01





             Test Item    Value        Reference Range Interpretation Comments

 

             POC-GLUCOSE METER 130 mg/dL           H            : TESTED A

[facility]



             ([facility] (test code                                        [facility] 

[facility],



             = 1538)                                             [id number]:



                                                                 /Techni

melanie ID =



                                                                 [id number] for Lorelei miranda



                                                                 (contract), Nickie

tamara



CREATINE KINASE (CK)2023 10:09:48





             Test Item    Value        Reference Range Interpretation Comments

 

             CREATINE KINASE TOTAL (BEAKER) (test 260 U/L             H   

         



             code = 380)                                         



 ID - MMLACTIC ACID, [id number] 10:04:39





             Test Item    Value        Reference Range Interpretation Comments

 

             LACTATE BLOOD VENOUS (2) (BEAKER) 1.84 mmol/L  0.50-2.00           

      



             (test code = 2872)                                        



 ID - MMSpecimen slightly ictericHEPATIC FUNCTION [id number] 
09:54:16





             Test Item    Value        Reference Range Interpretation Comments

 

             TOTAL PROTEIN (BEAKER) (test code = 4.9 gm/dL    6.0-8.3      L    

        



             770)                                                

 

             ALBUMIN (BEAKER) (test code = 1145) 1.6 g/dL     3.5-5.0      L    

        

 

             BILIRUBIN TOTAL (BEAKER) (test code 4.1 mg/dL    0.2-1.2      H    

        



             = 377)                                              

 

             BILIRUBIN DIRECT (BEAKER) (test 1.8 mg/dL    0.1-0.5      H        

    



             code = 706)                                         

 

             ALKALINE PHOSPHATASE (BEAKER) (test 67 U/L                   

        



             code = 346)                                         

 

             AST (SGOT) (BEAKER) (test code = 59 U/L       5-34         H       

     



             353)                                                

 

             ALT (SGPT) (BEAKER) (test code = 19 U/L       6-55                 

     



             347)                                                



 ID - MMSpecimen moderately ictericPOCT-GLUCOSE [id number] 04:18:34





             Test Item    Value        Reference Range Interpretation Comments

 

             POC-GLUCOSE METER 97 mg/dL                         : TESTED A

T [facility] [address]



             (BEAKER) (test code =                                        [place],



             1538)                                               [id number]:



                                                                 /Techni

melanie ID =



                                                                 [id number] for Julienne Tobin



THROMBOELASTOGRAPH (TEG)2023 04:15:53





             Test Item    Value        Reference Range Interpretation Comments

 

             TEG ACTIVATED CLOTTING TIME 3.6 minutes  4.0-7.0      L            



             (BEAKER) (test code = 1407)                                        

 

             TEG FIBRINOGEN ACTIVITY (BEAKER) 60.7 degrees 61.0-73.0    L       

     



             (test code = 1408)                                        

 

             TEG PLT. AGGREGATION (BEAKER) 47.7 MM      55.0-65.0    L          

  



             (test code = 1409)                                        

 

             TEG FIBRINOLYSIS (BEAKER) (test 0.0 %        0.0-5.0               

    



             code = 1410)                                        

 

             TGH ACTIVATED CLOTTING TIME 3.7 minutes  4.0-7.0      L            



             (BEAKER) (test code = 1411)                                        

 

             TGH FIBRINOGEN ACTIVITY (BEAKER) 62.1 degrees 61.0-73.0            

     



             (test code = 1412)                                        

 

             TGH PLT. AGGREGATION (BEAKER) 43.9 MM      55.0-65.0    L          

  



             (test code = 1413)                                        

 

             TGH FIBRINOLYSIS (BEAKER) (test 0.0 %        0.0-5.0               

    



             code = 1414)                                        



[id number] 04:08:22





             Test Item    Value        Reference Range Interpretation Comments

 

             PHOSPHORUS (BEAKER) (test code = 1.5 mg/dL    2.3-4.7      LL      

     



             604)                                                



 ID - ADMINBASIC METABOLIC [id number] 04:07:31





             Test Item    Value        Reference Range Interpretation Comments

 

             SODIUM (BEAKER) 132 meq/L    136-145      L            



             (test code = 381)                                        

 

             POTASSIUM    3.3 meq/L    3.5-5.1      L            



             (BEAKER) (test                                        



             code = 379)                                         

 

             CHLORIDE (BEAKER) 107 meq/L                        



             (test code = 382)                                        

 

             CO2 (BEAKER) 20 meq/L     22-29        L            



             (test code = 355)                                        

 

             BLOOD UREA   10 mg/dL     7-21                      



             NITROGEN (BEAKER)                                        



             (test code = 354)                                        

 

             CREATININE   0.56 mg/dL   0.57-1.25    L            



             (BEAKER) (test                                        



             code = 358)                                         

 

             GLUCOSE RANDOM 102 mg/dL                        



             (BEAKER) (test                                        



             code = 652)                                         

 

             CALCIUM (BEAKER) 7.1 mg/dL    8.4-10.2     L            



             (test code = 697)                                        

 

             EGFR (BEAKER) 114                                     Interpretatio

n of eGFR



             (test code = mL/min/1.73                            values Stage De

scription



             1092)        sq m                                   Result G1 Shaunna

l or high



                                                                 >=90 G2 Mildly 

decreased



                                                                 60-89 G3a Mildl

y to



                                                                 moderately 45-5

9 G3b



                                                                 Moderately to s

everely



                                                                 30-44 G4 Severl

y decreased



                                                                 15-29 G5 Kidney

 failure



                                                                 <15Reported eGF

R is based



                                                                 on the CKD-EPI 





                                                                 equation that d

oes not use



                                                                 a race



                                                                 coefficientEsti

mated GFR



                                                                 is not as accur

ate as



                                                                 Creatinine Tabitha

cynthia in



                                                                 predicting glom

erular



                                                                 filtration rate

. Estimated



                                                                 GFR is not appl

icable for



                                                                 dialysis patien

ts



 ID - ADMINSpecimen moderately [id number] 04:01:34





             Test Item    Value        Reference Range Interpretation Comments

 

             MAGNESIUM (BEAKER) (test code = 1.7 mg/dL    1.6-2.6               

    



             627)                                                



 ID - ADMINPT/[id number] 03:01:52





             Test Item    Value        Reference Range Interpretation Comments

 

             PROTIME (BEAKER) (test code = 32.4 seconds 11.9-14.2    H          

  



             759)                                                

 

             INR (BEAKER) (test code = 370) 3.41         <=5.90                 

   

 

             PARTIAL THROMBOPLASTIN TIME 47.6 seconds 22.5-36.0    H            



             (BEAKER) (test code = 760)                                        



RECOMMENDED COUMADIN/WARFARIN INR THERAPY RANGESSTANDARD DOSE: 2.0 - 3.0 
Includes: PROPHYLAXIS for venous thrombosis, systemic embolization; TREATMENT 
for venous thrombosis and/or pulmonary embolus.HIGH RISK: Target INR is 2.5-3.5 
for patients with mechanical heart valves.[id number] 03:01:30





             Test Item    Value        Reference Range Interpretation Comments

 

             FIBRINOGEN LEVEL (BEAKER) (test 184 mg/dl    225-434      L        

    



             code = 658)                                         



CBC W/PLT COUNT &amp; AUTO [id number] 02:50:11





             Test Item    Value        Reference Range Interpretation Comments

 

             WHITE BLOOD CELL COUNT 6.4 K/ L     3.5-10.5                  



             (BEAKER) (test code =                                        



             775)                                                

 

             RED BLOOD CELL COUNT 2.09 M/ L    4.63-6.08    L            



             (BEAKER) (test code =                                        



             761)                                                

 

             HEMOGLOBIN (BEAKER) 6.6 GM/DL    13.7-17.5    L            



             (test code = 410)                                        

 

             HEMATOCRIT (BEAKER) 20.9 %       40.1-51.0    L            



             (test code = 411)                                        

 

             MEAN CORPUSCULAR VOLUME 100 fL       79-92        H            



             (BEAKER) (test code =                                        



             753)                                                

 

             MEAN CORPUSCULAR 31.6 pg      25.7-32.2                 



             HEMOGLOBIN (BEAKER)                                        



             (test code = 751)                                        

 

             MEAN CORPUSCULAR 31.6 GM/DL   32.3-36.5    L            



             HEMOGLOBIN CONC                                        



             (BEAKER) (test code =                                        



             752)                                                

 

             RED CELL DISTRIBUTION 19.6 %       11.6-14.4    H            



             WIDTH (BEAKER) (test                                        



             code = 412)                                         

 

             PLATELET COUNT (BEAKER) 35 K/CU MM   150-450      L            No c

lot. Previous



             (test code = 756)                                        low

 

             MEAN PLATELET VOLUME                                        Unable 

to report due



             (BEAKER) (test code =                                        to abn

ormal Platelet



             754)                                                population



                                                                 distribution.

 

             NUCLEATED RED BLOOD 0 /100 WBC   0-0                       



             CELLS (BEAKER) (test                                        



             code = 413)                                         

 

             NEUTROPHILS RELATIVE 73 %                                   



             PERCENT (BEAKER) (test                                        



             code = 429)                                         

 

             LYMPHOCYTES RELATIVE 12 %                                   



             PERCENT (BEAKER) (test                                        



             code = 430)                                         

 

             MONOCYTES RELATIVE 14 %                                   



             PERCENT (BEAKER) (test                                        



             code = 431)                                         

 

             EOSINOPHILS RELATIVE 0 %                                    



             PERCENT (BEAKER) (test                                        



             code = 432)                                         

 

             BASOPHILS RELATIVE 0 %                                    



             PERCENT (BEAKER) (test                                        



             code = 437)                                         

 

             NEUTROPHILS ABSOLUTE 4.65 K/ L    1.78-5.38                 



             COUNT (BEAKER) (test                                        



             code = 670)                                         

 

             LYMPHOCYTES ABSOLUTE 0.76 K/ L    1.32-3.57    L            



             COUNT (BEAKER) (test                                        



             code = 414)                                         

 

             MONOCYTES ABSOLUTE 0.90 K/ L    0.30-0.82    H            



             COUNT (BEAKER) (test                                        



             code = 415)                                         

 

             EOSINOPHILS ABSOLUTE 0.00 K/ L    0.04-0.54    L            



             COUNT (BEAKER) (test                                        



             code = 416)                                         

 

             BASOPHILS ABSOLUTE 0.02 K/ L    0.01-0.08                 



             COUNT (BEAKER) (test                                        



             code = 417)                                         

 

             IMMATURE     0.90 %       0.00-1.00                 



             GRANULOCYTES-RELATIVE                                        



             PERCENT (BEAKER) (test                                        



             code = 2801)                                        



CALCIUM, [id number] 02:48:49





             Test Item    Value        Reference Range Interpretation Comments

 

             CALCIUM IONIZED (BEAKER) (test 1.04 mmol/L  1.12-1.27    L         

   



             code = 698)                                         

 

             PH, BLOOD (BEAKER) (test code = 7.52                               

    



             1810)                                               



CT, EXTREMITY, LOWER, WITH CONTRAST, [id number] 00:12:00Unlisted Reason for 
Exam - Click Yes and Enter Reason Below-&gt;NoPlease specify:-&gt;KneeSeptic 
arthritis. R/o concominant myositisPlease specify:-&gt;FemurPlease 
specify:-&gt;Tibia/Fibula
************************************************************[facility] : 1970 Sex: 
M************************************************************FINAL REPORT 
PATIENT ID: [id number] CT, EXTREMITY, LOWER, WITH CONTRAST, LEFT INDICATION: Soft 
tissue infection suspected, thigh, xray done COMPARISON: None TECHNIQUE: Axial 
CT of the left lower extremitywith sagittal and coronal reformations. 
Intravascular contrast was administered. DOSE REDUCTION: Dose modulation, 
iterative reconstruction, and/or weight-based adjustment of the mA/kV was 
utilized to reduce the radiation dose to as low as reasonably achievable. 
FINDINGS:There is diffuse circumferential subcutaneous soft tissue swelling of 
the left lower extremity greatest distally. No soft tissue gasor fluid 
collections. The deep compartments are relatively preserved. Moderate left knee 
joint effusion. Included vasculature is unremarkable. No acute fracture or 
dislocation. No focal osseous erosionor aggressive lesion. Escobar catheter 
present within the bladder. IMPRESSION:Diffuse soft tissue swelling of the left 
lower extremity may be related to edema or cellulitis. No soft tissue gas and no
drainable collection. No acute osseous abnormality. There is a left knee joint 
effusion. Signed: Bobbi Real MDReport Verified Date/Time: 2023 00:12:35 
Electronically signed by: BOBBI REAL MD on 2023 12:12 AMPOCT-GLUCOSE 
[id number] 00:07:49





             Test Item    Value        Reference Range Interpretation Comments

 

             POC-GLUCOSE METER 118 mg/dL           H            : TESTED A

T [facility] [address]



             (BEAKER) (test code =                                        [place],



             [address]38)                                               [address]:



                                                                 /Techni

melanie ID



                                                                 = [id number] for Roxana Okeefe



RAPID DRUG SCREEN, [id number] 20:38:14





             Test Item    Value        Reference Range Interpretation Comments

 

             BARBITURATE URINE (BEAKER) (test Negative     Negative             

     



             code = 725)                                         

 

             BENZODIAZEPINE SCREEN URINE (BEAKER) Negative     Negative         

         



             (test code = 726)                                        

 

             COCAINE (METAB.) SCREEN (BEAKER) Negative     Negative             

     



             (test code = 1164)                                        

 

             METHADONE SCREEN (BEAKER) (test code Negative     Negative         

         



             = 1436)                                             

 

             OPIATE SCREEN URINE (BEAKER) (test Negative     Negative           

       



             code = 734)                                         

 

             CANNABINOID SCREEN URINE (BEAKER) Negative     Negative            

      



             (test code = 727)                                        

 

             AMPH/METHAMPH SCREEN (BEAKER) (test Negative     Negative          

        



             code = 1438)                                        

 

             PHENCYCLIDINE SCREEN URINE (BEAKER) Negative     Negative          

        



             (test code = 608)                                        

 

             PH UA (BEAKER) (test code = 467) 6.0          5.0-8.0              

     



DRUG CUTOFF CONC.Cocaine 300 ng/mL Cannabinoid 50 ng/mLBenzodiazepine 200 
ng/mLBarbiturate 200 ng/mLPhencyclidine 25 ng/mLOpiate 300 ng/mLMethadone 300 
ng/mLAmphetamine/ 1000 ng/mL MethamphetamineThis assay provides an unconfirmed 
qualitative test result for the clinical management of patients in emergency 
situations. Chain of custody not maintained. Some over-the-counter medications, 
as well as adulterants, may cause inaccurate results. Clinical correlation 
should be applied. A more comprehensive drug screen or confirmation of a 
detected drug may be performed upon request. ID - ADMINURINALYSIS W/ 
REFLEX URINE [id number] 20:27:37





             Test Item    Value        Reference Range Interpretation Comments

 

             COLOR (BEAKER) (test code = 470) Yellow                            

     

 

             CLARITY (BEAKER) (test code = 469) Hazy                            

       

 

             SPECIFIC GRAVITY UA (BEAKER) (test 1.027        1.001-1.035        

       



             code = 468)                                         

 

             PH UA (BEAKER) (test code = 467) 6.0          5.0-8.0              

     

 

             PROTEIN UA (BEAKER) (test code = 20 mg/dL     Negative     A       

     



             464)                                                

 

             GLUCOSE UA (BEAKER) (test code = Negative     Negative             

     



             365)                                                

 

             KETONES UA (BEAKER) (test code = Negative     Negative             

     



             371)                                                

 

             BILIRUBIN UA (BEAKER) (test code = Positive     Negative     A     

       



             462)                                                

 

             BLOOD UA (BEAKER) (test code = 461) Large        Negative     A    

        

 

             NITRITE UA (BEAKER) (test code = Negative     Negative             

     



             465)                                                

 

             LEUKOCYTE ESTERASE UA (BEAKER) (test Negative     Negative         

         



             code = 466)                                         

 

             UROBILINOGEN UA (BEAKER) (test code 3            0.2-1.0      H    

        



             = 463)                                              

 

             RBC UA (BEAKER) (test code = 519) 27 /HPF                          

      

 

             WBC UA (BEAKER) (test code = 520) 3 /HPF                           

      

 

             BACTERIA (BEAKER) (test code = 517) Rare                           

        

 

             SQUAMOUS EPITHELIAL (BEAKER) (test < /HPF                          

       



             code = 516)                                         

 

             SOURCE(BEAKER) (test code = 2795)                                  

      



 ID - [auto] ID - techPOCT-GLUCOSE [id number] 20:05:54





             Test Item    Value        Reference Range Interpretation Comments

 

             POC-GLUCOSE METER 165 mg/dL           H            : TESTED A

T [facility] [address]



             (BEAKER) (test code =                                        [place],



             [address]38)                                               [id number]:



                                                                 /Techni

melanie ID



                                                                 = [id number] for La

ra,



                                                                 Julienne



BODY FLUID [id number] 18:23:10





             Test Item    Value        Reference Range Interpretation Comments

 

             CRYSTALS, BODY FLUID Calcium pyrophosphate                         

  



             (BEAKER) (test code crystals for pseudogout                        

   



             = 2165)                                             

 

             QUANTITY SEEN Few                                    



             (BEAKER) (test code                                        



             = 2166)                                             

 

             [id number] Dillon Dominguez MD (electronic                     

      



             (BEAKER) (test code signature)                             



             = 6536)                                             



THROMBOELASTOGRAPH (TEG)2023 18:12:17





             Test Item    Value        Reference Range Interpretation Comments

 

             TEG ACTIVATED CLOTTING TIME 8.9 minutes  4.0-7.0      H            



             (BEAKER) (test code = 1407)                                        

 

             TEG FIBRINOGEN ACTIVITY (BEAKER) 50.7 degrees 61.0-73.0    L       

     



             (test code = 1408)                                        

 

             TEG PLT. AGGREGATION (BEAKER) 39.9 MM      55.0-65.0    L          

  



             (test code = 1409)                                        

 

             TEG FIBRINOLYSIS (BEAKER) (test 0.0 %        0.0-5.0               

    



             code = 1410)                                        

 

             TGH ACTIVATED CLOTTING TIME 9.2 minutes  4.0-7.0      H            



             (BEAKER) (test code = 1411)                                        

 

             TGH FIBRINOGEN ACTIVITY (BEAKER) 54.6 degrees 61.0-73.0    L       

     



             (test code = 1412)                                        

 

             TGH PLT. AGGREGATION (BEAKER) 39.5 MM      55.0-65.0    L          

  



             (test code = 1413)                                        

 

             TGH FIBRINOLYSIS (BEAKER) (test 0.0 %        0.0-5.0               

    



             code = 1414)                                        



[id number] 18:02:42





             Test Item    Value        Reference Range Interpretation Comments

 

             PROCALCITONIN (BEAKER) (test code 7.00 ng/mL   <0.05        H      

      



             = 3036)                                             



SEPSIS RISK (ng/mL)Low: 0.05-0.50Intermediate: 0.51-2.00High: &gt;=2.01CREATINE 
KINASE (CK)2023 17:50:27





             Test Item    Value        Reference Range Interpretation Comments

 

             CREATINE KINASE TOTAL (BEAKER) (test 977 U/L             H   

         



             code = 380)                                         



 ID - ADMINCBC (HEMOGRAM ONLY)2023 17:27:14





             Test Item    Value        Reference Range Interpretation Comments

 

             WHITE BLOOD CELL COUNT (BEAKER) 6.7 K/ L     3.5-10.5              

    



             (test code = 775)                                        

 

             RED BLOOD CELL COUNT (BEAKER) 2.26 M/ L    4.63-6.08    L          

  



             (test code = 761)                                        

 

             HEMOGLOBIN (BEAKER) (test code = 7.2 GM/DL    13.7-17.5    L       

     



             410)                                                

 

             HEMATOCRIT (BEAKER) (test code = 23.2 %       40.1-51.0    L       

     



             411)                                                

 

             MEAN CORPUSCULAR VOLUME (BEAKER) 103 fL       79-92        H       

     



             (test code = 753)                                        

 

             MEAN CORPUSCULAR HEMOGLOBIN 31.9 pg      25.7-32.2                 



             (BEAKER) (test code = 751)                                        

 

             MEAN CORPUSCULAR HEMOGLOBIN CONC 31.0 GM/DL   32.3-36.5    L       

     



             (BEAKER) (test code = 752)                                        

 

             RED CELL DISTRIBUTION WIDTH 20.1 %       11.6-14.4    H            



             (BEAKER) (test code = 412)                                        

 

             PLATELET COUNT (BEAKER) (test code 22 K/CU MM   150-450      L     

       



             = 756)                                              

 

             NUCLEATED RED BLOOD CELLS (BEAKER) 0 /100 WBC   0-0                

       



             (test code = 413)                                        



[id number] 16:34:04





             Test Item    Value        Reference Range Interpretation Comments

 

             AMMONIA (BEAKER) (test code = 348) 103 mol/L    18-72        H     

       



 ID - ADMINLACTIC ACID, [id number] 15:30:00





             Test Item    Value        Reference Range Interpretation Comments

 

             LACTATE BLOOD VENOUS (2) (BEAKER) 2.66 mmol/L  0.50-2.00    H      

      



             (test code = 2872)                                        



 ID - MMSpecimen slightly ictericPOCT-GLUCOSE [id number] 15:05:53





             Test Item    Value        Reference Range Interpretation Comments

 

             POC-GLUCOSE METER 108 mg/dL                        : TESTED A

T [facility] [address]



             (BEAKER) (test code =                                        [place],



             1538)                                               [id number]:



                                                                 /Techni

melanie ID



                                                                 = [id number] for FO

LUIS ARMANDO BAILEY



CALCIUM, [id number] 15:05:35





             Test Item    Value        Reference Range Interpretation Comments

 

             CALCIUM IONIZED (BEAKER) (test 0.85 mmol/L  1.12-1.27    L         

   



             code = 698)                                         

 

             PH, BLOOD (BEAKER) (test code = 7.43                               

    



             1810)                                               



HEPATITIS PANEL, [id number] 13:08:20





             Test Item    Value        Reference Range Interpretation Comments

 

             HEPATITIS A IGM ANTIBODY (BEAKER) Nonreactive  Nonreactive         

      



             (test code = 498)                                        

 

             HEPATITIS B CORE IGM ANTIBODY Nonreactive  Nonreactive             

  



             (BEAKER) (test code = 645)                                        

 

             HEPATITIS C ANTIBODY (BEAKER) Nonreactive  Nonreactive             

  



             (test code = 367)                                        

 

             HEPATITIS B SURFACE ANTIGEN (2) Nonreactive  Nonreactive           

    



             (BEAKER) (test code = 2585)                                        



 ID - ADMINBODY FLUID CELL COUNT WITH [id number] 13:07:16





             Test Item    Value        Reference Range Interpretation Comments

 

             APPEARANCE FLUID (BEAKER) Turbid       Clear        A            



             (test code = 510)                                        

 

             COLOR FLUID (BEAKER) (test Lorie        Colorless, Straw A         

   



             code = 511)                                         

 

             RBC FLUID (BEAKER) (test code 93766 /cu mm <=1          H          

  



             = 513)                                              

 

             TOTAL NUCLEATED CELL COUNT 554447 /cu mm <=5          H            



             (BEAKER) (test code = 1442)                                        

 

             LINING CELLS/OTHERS DIFF'D 0                                      



             (BEAKER) (test code = 1589)                                        

 

             ADJUSTED WBC FLUID (BEAKER) 932248 /cu mm <=5          H           

 



             (test code = 1691)                                        

 

             LINING CELLS/OTHERS, 0 /cu mm     <=1                       



             CALCULATED (BEAKER) (test code                                     

   



             = 1590)                                             

 

             NEUTROPHILS FLUID (BEAKER) 100 %                                  



             (test code = 1656)                                        

 

             LYMPHS FLUID (BEAKER) (test 0 %                                    



             code = 488)                                         

 

             MONO/MACROPHAGE FLUID (BEAKER) 0 %                                 

   



             (test code = 489)                                        

 

             EOSINOPHILS FLUID (BEAKER) 0 %                                    



             (test code = 491)                                        

 

             BASO FLUID (BEAKER) (test code 0 %                                 

   



             = 492)                                              

 

             CONTAINER BODY FLUID (BEAKER) EDTA Tube                            

  



             (test code = 2873)                                        



HEMOGLOBIN [id number] 12:24:08





             Test Item    Value        Reference Range Interpretation Comments

 

             HEMOGLOBIN A1C < %          See_Comment                [Automated m

essage]



             ELECTROPHORESIS (BEAKER)                                        The

 system which



             (test code = 3811)                                        generated

 this result



                                                                 transmitted ref

erence



                                                                 range: <=5.6%. 

The



                                                                 reference range

 was



                                                                 not used to int

erpret



                                                                 this result as



                                                                 normal/abnormal

.



"The A1c is measured using a NGSP-certified method. HbA1c value equal to or 
greater than 6.5% as thediagnosis cutoff for diabetes. An HbA1c value of 5.7-
6.4% indicates increased risk for diabetes (prediabetes)." ID - ADMURIC 
[id number] 11:33:12





             Test Item    Value        Reference Range Interpretation Comments

 

             URIC ACID (BEAKER) (test code = 3.2 mg/dL    2.6-7.2               

    



             773)                                                



 ID - ADMINSpecimen slightly ictericC-REACTIVE [id number] 
11:33:12





             Test Item    Value        Reference Range Interpretation Comments

 

             C-REACTIVE PROTEIN (BEAKER) (test 4.52 mg/dL   0.00-0.50    H      

      



             code = 676)                                         



 ID - [id number] 11:31:51





             Test Item    Value        Reference Range Interpretation Comments

 

             HAPTOGLOBIN (BEAKER) (test code = < mg/dL             L      

      



             366)                                                



 ID - ADMINRAD, FEMUR, MIN. 2 VIEWS, [id number] 11:30:00Reason for 
exam:-&gt;fall r/o acute fractureShould this be performed at the 
bedside?-&gt;Yes************************************************************[facility] 1970 Sex: 
M************************************************************FINAL REPORT 
PATIENT ID: [id number] Exam: RAD, FEMUR, MIN. 2 VIEWS, LEFT Side: Left INDICATION:
fall r/o acute fracture COMPARISON: None FINDINGS:Bones: No acute displaced 
fracture. Osseous alignment is within normal limits. Joints:The joint spaces are
well-maintained. Soft tissues:The soft tissues appear unremarkable. IMPRESSION: 
No acute radiographic abnormality. If symptoms persist or progress please
consider further evaluation with the MRI without IV contrast, if clinically 
appropriate. Signed: Melida Sanchez MDReport Verified Date/Time: 2023 
11:30:35 Reading Location: [facility] Radiology Reading Room Electronically signed 
by: MELIDA SANCHEZ MD on 2023 11:30 AMRAD, KNEE, 3 VIEWS, [id number] 
11:02:00Is this procedure to be performed with weight bearing?-&gt;Non-Weight 
BearingReason for exam:-&gt;fall r/o acute fractureShould this be performed at 
the bedside?-&gt;Yes
************************************************************[facility]  : 1970 Sex: 
M************************************************************FINAL REPORT 
PATIENT ID: [id number] Exam: RAD, KNEE, 3 VIEWS, LEFT Side: INDICATION: fall r/o 
acute fracture COMPARISON: None FINDINGS:Bones: No acute displaced fracture. 
Questionable lucency in the mid patella could be artifactual.Osseous alignment 
is within normal limits. Joints:The joint spaces are well-maintained. Soft 
tissues:The soft tissues appear unremarkable. IMPRESSION: No definitive acute 
radiographic abnormality. Questionable lucency in the mid patella could be 
artifactual. However please consider correlation with point tenderness if 
indicated please consider repeat radiograph of patella.If symptoms persist or 
progress please consider further evaluation with the MRI without IV contrast,if 
clinically appropriate. Signed: Melida Sanchez MDReport Verified Date/Time: 
2023 11:02:35 Reading Location: [facility] Radiology Reading Room  
Electronically signed by: MELIDA SANCHEZ MD on 2023 11:02 AMTSH/FREE T4 IF 
[id number] 10:14:33





             Test Item    Value        Reference Range Interpretation Comments

 

             THYROID STIMULATING HORMONE 2.549 uIU/mL 0.350-4.940               



             (BEAKER) (test code = 772)                                        



 ID - MMCOMPREHENSIVE METABOLIC [id number] 10:11:12





             Test Item    Value        Reference Range Interpretation Comments

 

             TOTAL PROTEIN 5.6 gm/dL    6.0-8.3      L            



             (BEAKER) (test                                        



             code = 770)                                         

 

             ALBUMIN (BEAKER) 1.9 g/dL     3.5-5.0      L            



             (test code = 1145)                                        

 

             ALKALINE     84 U/L                           



             PHOSPHATASE                                         



             (BEAKER) (test                                        



             code = 346)                                         

 

             BILIRUBIN TOTAL 4.6 mg/dL    0.2-1.2      H            



             (BEAKER) (test                                        



             code = 377)                                         

 

             SODIUM (BEAKER) 133 meq/L    136-145      L            



             (test code = 381)                                        

 

             POTASSIUM (BEAKER) 3.3 meq/L    3.5-5.1      L            



             (test code = 379)                                        

 

             CHLORIDE (BEAKER) 108 meq/L           H            



             (test code = 382)                                        

 

             CO2 (BEAKER) (test 17 meq/L     22-29        L            



             code = 355)                                         

 

             BLOOD UREA   10 mg/dL     7-21                      



             NITROGEN (BEAKER)                                        



             (test code = 354)                                        

 

             CREATININE   0.70 mg/dL   0.57-1.25                 



             (BEAKER) (test                                        



             code = 358)                                         

 

             GLUCOSE RANDOM 146 mg/dL           H            



             (BEAKER) (test                                        



             code = 652)                                         

 

             CALCIUM (BEAKER) 6.9 mg/dL    8.4-10.2     L            



             (test code = 697)                                        

 

             AST (SGOT)   72 U/L       5-34         H            



             (BEAKER) (test                                        



             code = 353)                                         

 

             ALT (SGPT)   22 U/L       6-55                      



             (BEAKER) (test                                        



             code = 347)                                         

 

             EGFR (BEAKER) 108                                     Interpretatio

n of eGFR



             (test code = 1092) mL/min/1.73                            values St

age Description



                          sq m                                   Result G1 Shaunna

l or high



                                                                 >=90 G2 Mildly 

decreased



                                                                 60-89 G3a Mildl

y to



                                                                 moderately 45-5

9 G3b



                                                                 Moderately to s

everely



                                                                 30-44 G4 Severl

y decreased



                                                                 15-29 G5 Kidney

 failure



                                                                 <15Reported eGF

R is based



                                                                 on the CKD-EPI 





                                                                 equation that d

oes not use



                                                                 a race



                                                                 coefficientEsti

mated GFR



                                                                 is not as accur

ate as



                                                                 Creatinine Tabitha

cynthia in



                                                                 predicting glom

erular



                                                                 filtration rate

. Estimated



                                                                 GFR is not appl

icable for



                                                                 dialysis patien

ts



 ID - MMSpecimen moderately [id number] 10:06:29





             Test Item    Value        Reference Range Interpretation Comments

 

             ETHANOL (BEAKER) (test code = 400) < mg/dL      <=10               

       



 ID - MM(CELLAVISION MANUAL DIFF)2023 10:04:25





             Test Item    Value        Reference Range Interpretation Comments

 

             NEUTROPHILS - REL 76 %                                   



             (CELLAVISION)(BEAKER) (test code                                   

     



             = 2816)                                             

 

             LYMPHOCYTES - REL 4 %                                    



             (CELLAVISION)(BEAKER) (test code                                   

     



             = 2817)                                             

 

             MONOCYTES - REL 5 %                                    



             (CELLAVISION)(BEAKER) (test code                                   

     



             = 2818)                                             

 

             METAMYELOCYTES - REL 6 %          0-0          H            



             (CELLAVISION)(BEAKER) (test code                                   

     



             = 2821)                                             

 

             BANDS - REL (CELLAVISION)(BEAKER) 9 %          0-10                

      



             (test code = 2826)                                        

 

             NEUTROPHILS - ABS 3.65 K/ul    1.78-5.38                 



             (CELLAVISION)(BEAKER) (test code                                   

     



             = 2830)                                             

 

             LYMPHOCYTES - ABS 0.19 K/ul    1.32-3.57    L            



             (CELLAVISION)(BEAKER) (test code                                   

     



             = 2831)                                             

 

             MONOCYTES - ABS 0.24 K/uL    0.30-0.82    L            



             (CELLAVISION)(BEAKER) (test code                                   

     



             = 2832)                                             

 

             METAMYELOCYTES - ABS 0.29 K/uL    0.00-0.00    H            



             (CELLAVISION)(BEAKER) (test code                                   

     



             = 2836)                                             

 

             BANDS - ABS (CELLAVISION)(BEAKER) 0.43 K/uL    0.00-0.80           

      



             (test code = 2840)                                        

 

             TOTAL COUNTED (BEAKER) (test code 100                              

      



             = 1351)                                             

 

             MANUAL NRBC  CELLS 1 /100 WBC   0-0          H            



             (BEAKER) (test code = 1353)                                        

 

             WBC MORPHOLOGY (BEAKER) (test Normal                               

  



             code = 487)                                         

 

             GIANT PLATELETS (BEAKER) (test Present                             

   



             code = 313)                                         

 

             POLYCHROMATOPHILLIC RBCS(BEAKER) 2+ moderate                       

     



             (test code = 478)                                        

 

             ANISOCYTOSIS (BEAKER) (test code 2+ moderate                       

     



             = 961)                                              

 

             MACROCYTES (BEAKER) (test code = 2+ moderate                       

     



             964)                                                

 

             POIKILOCYTES (BEAKER) (test code 3+ many                           

     



             = 966)                                              

 

             ELLIPTOCYTES (BEAKER) (test code 1+ few                            

     



             = 962)                                              

 

             OVALOCYTES (BEAKER) (test code = 1+ few                            

     



             477)                                                

 

             BENJI CELLS (BEAKER) (test code = 2+ moderate                       

     



             474)                                                

 

             ARTIFACT (CELLAVISION)(BEAKER) Present                             

   



             (test code = 3432)                                        

 

             PLATELET CONCENTRATION Decreased                              



             (CELLAVISION)(BEAKER) (test code                                   

     



             = 3438)                                             



 ID - [id number] ID - nolvia Trejo comments: Slide comments:CBC 
W/PLT COUNT &amp; AUTO [id number] 10:04:24





             Test Item    Value        Reference Range Interpretation Comments

 

             WHITE BLOOD CELL COUNT 4.8 K/ L     3.5-10.5                  



             (BEAKER) (test code =                                        



             775)                                                

 

             RED BLOOD CELL COUNT 2.31 M/ L    4.63-6.08    L            



             (BEAKER) (test code =                                        



             761)                                                

 

             HEMOGLOBIN (BEAKER) 7.3 GM/DL    13.7-17.5    L            



             (test code = 410)                                        

 

             HEMATOCRIT (BEAKER) 23.6 %       40.1-51.0    L            



             (test code = 411)                                        

 

             MEAN CORPUSCULAR VOLUME 102 fL       79-92        H            



             (BEAKER) (test code =                                        



             753)                                                

 

             MEAN CORPUSCULAR 31.6 pg      25.7-32.2                 



             HEMOGLOBIN (BEAKER)                                        



             (test code = 751)                                        

 

             MEAN CORPUSCULAR 30.9 GM/DL   32.3-36.5    L            



             HEMOGLOBIN CONC                                        



             (BEAKER) (test code =                                        



             752)                                                

 

             RED CELL DISTRIBUTION 20.3 %       11.6-14.4    H            



             WIDTH (BEAKER) (test                                        



             code = 412)                                         

 

             PLATELET COUNT (BEAKER) 22 K/CU MM   150-450      L            



             (test code = 756)                                        

 

             MEAN PLATELET VOLUME                                        Unable 

to report due



             (BEAKER) (test code =                                        to abn

ormal Platelet



             754)                                                population



                                                                 distribution.

 

             NUCLEATED RED BLOOD 0 /100 WBC   0-0                       



             CELLS (BEAKER) (test                                        



             code = 413)                                         



IRON, TIBC, % SAT. (WITHOUT FERRITIN)2023 10:02:23





             Test Item    Value        Reference Range Interpretation Comments

 

             IRON (BEAKER) (test code = 547) 25.0 ug/dL   40.0-160.0   L        

    

 

             TOTAL IRON BINDING CAPACITY 176 ug/dL    250-450      L            



             (BEAKER) (test code = 769)                                        

 

             IRON % SATURATION (2) (BEAKER) 14 %         20-55        L         

   



             (test code = 2590)                                        



 ID - [id number] 10:02:05





             Test Item    Value        Reference Range Interpretation Comments

 

             PHOSPHORUS (BEAKER) (test code = 2.0 mg/dL    2.3-4.7      L       

     



             604)                                                



 ID - MMCREATINE KINASE (CK)2023 10:02:05





             Test Item    Value        Reference Range Interpretation Comments

 

             CREATINE KINASE TOTAL (BEAKER) (test 1598 U/L            H   

         



             code = 380)                                         



 ID - MMLACTATE DEHYDROGENASE (LDH)2023 10:02:05





             Test Item    Value        Reference Range Interpretation Comments

 

             LACTATE DEHYDROGENASE (BEAKER) (test 376 U/L      125-220      H   

         



             code = 635)                                         



 ID - [id number] 10:02:04





             Test Item    Value        Reference Range Interpretation Comments

 

             MAGNESIUM (BEAKER) (test code = 1.4 mg/dL    1.6-2.6      L        

    



             627)                                                



 ID - MMLACTIC ACID, [id number] 10:00:29





             Test Item    Value        Reference Range Interpretation Comments

 

             LACTATE BLOOD VENOUS (2) (KHLOE) 5.35 mmol/L  0.50-2.00    HH     

      



             (test code = 2872)                                        



 ID - MMSpecimen slightly [id number] 09:53:29





             Test Item    Value        Reference Range Interpretation Comments

 

             FERRITIN (BEAKER) (test code = 63.50 ng/mL  5..00            

   



             361)                                                



 ID - MMSARS-COV2/RT-PCR ([facility])2023 09:49:37





             Test Item    Value        Reference Range Interpretation Comments

 

             SARS-COV2/RT-PCR Negative     Negative                  The SARS-Co

V-2 target



             (test code =                                        nucleic acids a

re not



             [id number])                                            detected in thi

s specimen.



                                                                 Negative result

s do not



                                                                 preclude SARS-C

oV-2



                                                                 infection and s

hould not be



                                                                 used as the sol

e basis for



                                                                 patient managem

ent



                                                                 decisions. Nega

tive results



                                                                 must be combine

d with



                                                                 clinical observ

ations,



                                                                 patient history

, and



                                                                 epidemiological

 information.



                                                                 A false negativ

e result may



                                                                 occur if a spec

imen is



                                                                 improperly josias

ected,



                                                                 transported or 

handled. This



                                                                 SARS CoV-2 test

 is a rapid,



                                                                 real-time RT-PC

R test



                                                                 intended for th

e qualitative



                                                                 detection of nu

cleic acid



                                                                 from SARS-CoV-2

 in a



                                                                 nasopharyngeal 

swab specimen



                                                                 collected from 

individuals



                                                                 suspected of CO

VID-19 by



                                                                 their healthcar

e provider.



This test has been authorized by FDA under an EUA for use by authorized 
laboratories. This test is only authorized for the duration of the declaration 
that circumstances exist justifying the authorization of emergency use of in 
vitro diagnostic tests for detection and/or diagnosis of COVID-19 under Section 
564(b)(1) of the Federal Food, Drug and Cosmetic Act, 21 U.S.C. 360bbb-3(b)(1), 
unless the authorization is terminated or revoked sooner. Fact Sheet for 
Healthcare Providers: https://www.cepheid.co
m/Documents/Xpert%20Xpress%20SARS%20CoV-2/Fact%20Sheets/302-0106%62LWEC-ORD-2%20

HEALTHCARE%20PROVIDERS%20FACT%20SHEET.pdf Fact Sheet for Healthcare Patients: 
https://www.Chicfy.Game9z/Documents/Xpert%20Xp
ress%20SARS%20CoV-2/Fact%20Sheets/3023802%24ACLW-NVE-1%20PATIENT%20FACT%20SHEET

.[id number] 09:33:46





             Test Item    Value        Reference Range Interpretation Comments

 

             FIBRINOGEN LEVEL (BEAKER) (test 146 mg/dl    225-434      L        

    



             code = 658)                                         



PT/[id number] 09:29:18





             Test Item    Value        Reference Range Interpretation Comments

 

             PROTIME (BEAKER) (test code = 33.8 seconds 11.9-14.2    H          

  



             759)                                                

 

             INR (MotorExchangeAKER) (test code = 370) 3.46         <=5.90                 

   

 

             PARTIAL THROMBOPLASTIN TIME 50.6 seconds 22.5-36.0    H            



             (BEAKER) (test code = 760)                                        



RECOMMENDED COUMADIN/WARFARIN INR THERAPY RANGESSTANDARD DOSE: 2.0 - 3.0 
Includes: PROPHYLAXIS for venous thrombosis, systemic embolization; TREATMENT 
for venous thrombosis and/or pulmonary embolus.HIGH RISK: Target INR is 2.5-3.5 
for patients with mechanical heart valves.RETICULOCYTE [id number] 09:25:55





             Test Item    Value        Reference Range Interpretation Comments

 

             RETICULOCYTE COUNT PCT (ANDREWAKER) (test 3.5 %        0.5-1.8      H  

          



             code = 575)                                         



 ID - [id number] ID - [id number]-GLUCOSE [id number] 08:11:30





             Test Item    Value        Reference Range Interpretation Comments

 

             POC-GLUCOSE METER 150 mg/dL           H            : TESTED A

[facility]



             (BEAKER) (test code =                                        LEWISVLAD WILSON [facility],



             1538)                                               [address]:



                                                                 /Techni

melanie ID



                                                                 = [id number] for IB

PHILLIP SUNG



BLOOD CULTURE [id number] 14:27:19





             Test Item    Value        Reference Range Interpretation Comments

 

             Blood Culture-Aerobic Culture positive. No growth    AA           P

revious



             (test code = 17928-3) See Blood Culture                           p

reliminary



                          Workup for                             verified result



                          additional                             was Culture In



                          information.                           Progress on



                                                                 3/19/2023 at 02

01



                                                                 CDTPrevious



                                                                 preliminary



                                                                 verified result



                                                                 was No growth a

t



                                                                 24 hours on



                                                                 3/19/2023 at 23

01



                                                                 CDT

 

             Blood        No organisms No growth                 Previous



             Culture-Anaerobic isolated                               preliminar

y



             (test code = 16178-4)                                        verifi

ed result



                                                                 was Culture In



                                                                 Progress on



                                                                 3/19/2023 at 02

01



                                                                 CDTPrevious



                                                                 preliminary



                                                                 verified result



                                                                 was No growth a

t



                                                                 24 hours on



                                                                 3/20/2023 at 01

54



                                                                 CDT

 

             Lab Interpretation Abnormal                               



             (test code = 42991-4)                                        



[facility]





             Test Item    Value        Reference Range Interpretation Comments

 

             MAGNESIUM (test code = 6913201383) 1.7 mg/dL    1.7-2.4            

       

 

             Lab Interpretation (test code = Normal                             

    



             68321-3)                                            



[facility] (NA, K, CL, CO2, 
GLUCOSE, BUN, CREATININE, CA)2023 10:47:23





             Test Item    Value        Reference Range Interpretation Comments

 

             NA (test code = 134 mmol/L   135-145      L            



             [id number])                                         

 

             K (test code = 3.4 mmol/L   3.5-5.0      L            



             2934186042)                                         

 

             CL (test code = 107 mmol/L                       



             2828051993)                                         

 

             CO2 TOTAL (test code = 28 mmol/L    23-31                     



             5172971600)                                         

 

             AGAP (test code =              2-16         L            



             1985336491)                                         

 

             BUN (test code = 5 mg/dL      7-23         L            



             5651958513)                                         

 

             GLUCOSE (test code = 82 mg/dL                         



             1851480535)                                         

 

             CREATININE (test code = 0.49 mg/dL   0.60-1.25    L            



             9128122563)                                         

 

             CALCIUM (test code = 7.0 mg/dL    8.6-10.6     L            



             2588787312)                                         

 

             eGFR (test code = 176.7        mL/min/1.73m2              



             5254659163)                                         

 

             RENETTA (test code = RENETTA) Association of                           



                          Glomerular Filtration                           



                          Rate (GFR) and Staging                           



                          of Kidney Disease*                           



                          +---------------------                           



                          --+-------------------                           



                          --+-------------------                           



                          ------+| GFR                           



                          (mL/min/1.73 m2) ?|                           



                          With Kidney Damage ?|                           



                          ?Without Kidney                           



                          Damage+---------------                           



                          --------+-------------                           



                          --------+-------------                           



                          ------------+| ?>90 ?                           



                          ? ? ? ? ? ? ? ?|                           



                          ?Stage one ? ? ? ? ?|                           



                          ? Normal ? ? ? ? ? ? ?                           



                          ?+--------------------                           



                          ---+------------------                           



                          ---+------------------                           



                          -------+| ?60-89 ? ? ?                           



                          ? ? ? ? ?| ?Stage two                           



                          ? ? ? ? ?| ? Decreased                           



                          GFR ? ? ? ?                            



                          +---------------------                           



                          --+-------------------                           



                          --+-------------------                           



                          ------+| ?30-59 ? ? ?                           



                          ? ? ? ? ?| ?Stage                           



                          three ? ? ? ?| ? Stage                           



                          three ? ? ? ? ?                           



                          +---------------------                           



                          --+-------------------                           



                          --+-------------------                           



                          ------+| ?15-29 ? ? ?                           



                          ? ? ? ? ?| ?Stage four                           



                          ? ? ? ? | ? Stage four                           



                          ? ? ? ? ?                              



                          ?+--------------------                           



                          ---+------------------                           



                          ---+------------------                           



                          -------+| ?<15 (or                           



                          dialysis) ? ?| ?Stage                           



                          five ? ? ? ? | ? Stage                           



                          five ? ? ? ? ?                           



                          ?+--------------------                           



                          ---+------------------                           



                          ---+------------------                           



                          -------+ *Each stage                           



                          assumes the associated                           



                          GFR level has been in                           



                          effect for at least                           



                          three months. ?Stages                           



                          1 to 5, with or                           



                          without kidney                           



                          disease, indicate                           



                          chronic kidney                           



                          disease. Notes:                           



                          Determination of                           



                          stages one and two                           



                          (with eGFR                             



                          >59mL/min/1.73 m2)                           



                          requires estimation of                           



                          kidney damage for at                           



                          least three months as                           



                          defined by structural                           



                          or functional                           



                          abnormalities of the                           



                          kidney, manifested by                           



                          either:Pathological                           



                          abnormalities or                           



                          Markers of kidney                           



                          damage (including                           



                          abnormalities in the                           



                          composition of the                           



                          blood or urine or                           



                          abnormalities in                           



                          imaging tests).                           

 

             Lab Interpretation Abnormal                               



             (test code = 18214-2)                                        



[facility]05





             Test Item    Value        Reference Range Interpretation Comments

 

             Fibrinogen (test code = 7222411816) 121 mg/dL    167-453      L    

        

 

             Lab Interpretation (test code = Abnormal                           

    



             98169-3)                                            



[facility]th[facility] / [id number] 10:31:49





             Test Item    Value        Reference Range Interpretation Comments

 

             PROTIME PATIENT (test 26.0         See_Comment  H             [Auto

mated message]



             code = 5964-2)                                        The system [employer]



                                                                 generated this 

result



                                                                 transmitted ref

erence



                                                                 range: 10.1 - 1

2.6



                                                                 Seconds. The



                                                                 reference range

 was



                                                                 not used to int

erpret



                                                                 this result as



                                                                 normal/abnormal

.

 

             INR (test code = 6301-6) 2.3                                    Nor

mal INR <1.1;



                                                                 Warfarin Therap

eutic



                                                                 range 2.0 to 3.

0 or



                                                                 2.5 to 3.5, dep

ending



                                                                 upon the indica

tions.

 

             Lab Interpretation (test Abnormal                               



             code = 93425-3)                                        



[facility] [id number] 10:26:10





             Test Item    Value        Reference Range Interpretation Comments

 

             WBC (test code = 2.98         See_Comment  L             [Automated

 message]



             6690-2)                                             The system [employer]



                                                                 generated this 

result



                                                                 transmitted ref

erence



                                                                 range: 4.20 - 1

0.70



                                                                 10*3/?L. The



                                                                 reference range

 was



                                                                 not used to int

erpret



                                                                 this result as



                                                                 normal/abnormal

.

 

             RBC (test code = 789-8) 3.09         See_Comment  L             [Au

tomated message]



                                                                 The system [employer]



                                                                 generated this 

result



                                                                 transmitted ref

erence



                                                                 range: 4.26 - 5

.52



                                                                 10*6/?L. The



                                                                 reference range

 was



                                                                 not used to int

erpret



                                                                 this result as



                                                                 normal/abnormal

.

 

             HGB (test code = 718-7) 8.1 g/dL     12.2-16.4    L            

 

             HCT (test code = 24.6 %       38.4-49.3    L            



             4544-3)                                             

 

             MCH (test code = 785-6) 26.2 pg      26.1-32.7                 

 

             MCV (test code = 787-2) 79.6 fL      81.7-95.6    L            

 

             MCHC (test code = 32.9 g/dL    31.2-35.0                 



             786-4)                                              

 

             PLT (test code = 777-3) 21           See_Comment  LL            [Au

tomated message]



                                                                 The system [employer]



                                                                 generated this 

result



                                                                 transmitted ref

erence



                                                                 range: 150 - 32

8



                                                                 10*3/?L. The



                                                                 reference range

 was



                                                                 not used to int

erpret



                                                                 this result as



                                                                 normal/abnormal

.

 

             MPV (test code =                                        Not Measure

d



             52044-4)                                            

 

             RDW-CV (test code = 18.6 %       12.1-15.4    H            



             788-0)                                              

 

             RDW-SD (test code = 54.0 fL      38.5-51.6    H            



             06564-4)                                            

 

             NRBC x10^3 (test code =              See_Comment                [Au

tomated message]



             5924918759)                                         The system [employer]



                                                                 generated this 

result



                                                                 transmitted ref

erence



                                                                 range: 10*3/?L.

 The



                                                                 reference range

 was



                                                                 not used to int

erpret



                                                                 this result as



                                                                 normal/abnormal

.

 

             NRBC/100 WBC (test code 0.0          See_Comment                [Au

tomated message]



             = [id number])                                        The system [employer]





                                                                 generated this 

result



                                                                 transmitted ref

erence



                                                                 range: 0.0 - 10

.0



                                                                 /100 WBCs. The



                                                                 reference range

 was



                                                                 not used to int

erpret



                                                                 this result as



                                                                 normal/abnormal

.

 

             IPF % (test code = 6.3 %        1.2-10.7                  Platelet 

count



             [id number])                                         measured by



                                                                 fluorescence me

thod.

 

             Lab Interpretation Abnormal                               



             (test code = 93877-3)                                        



[id number]





             Test Item    Value        Reference Range Interpretation Comments

 

             Blood Culture-Aerobic Culture positive. No growth    AA           P

revious



             (test code = 17928-3) See Blood Culture                           p

reliminary



                          Workup for                             verified result



                          additional                             was Culture In



                          information.                           Progress on



                                                                 3/19/2023 at 02

01



                                                                 CDT

 

             Blood        No organisms No growth                 Previous



             Culture-Anaerobic isolated                               preliminar

y



             (test code = 83994-3)                                        verifi

ed result



                                                                 was Culture In



                                                                 Progress on



                                                                 3/19/2023 at 18

25



                                                                 CDT

 

             Lab Interpretation Abnormal                               



             (test code = 62609-4)                                        



[facility] [id number] 11:56:04





             Test Item    Value        Reference Range Interpretation Comments

 

             RETIC Count Automated 1.68 %       0.59-2.24                 



             (test code = 3198194120)                                        

 

             RETIC Absolute Count 0.0509       See_Comment                [Autom

ated message]



             (test code = 7631342352)                                        The

 system which



                                                                 generated this 

result



                                                                 transmitted ref

erence



                                                                 range: 0.0260 -



                                                                 0.1170 10*6/?L.

 The



                                                                 reference range

 was



                                                                 not used to int

erpret



                                                                 this result as



                                                                 normal/abnormal

.

 

             IRF % (test code = 10.50 %      2.00-19.10                



             5842286543)                                         

 

             RETIC-HE (test code = 26.8 pg      27.3-36.4    L            



             5105233309)                                         

 

             Lab Interpretation (test Abnormal                               



             code = 18270-8)                                        



[facility]31





             Test Item    Value        Reference Range Interpretation Comments

 

             Fibrinogen (test code = 0872197912) 127 mg/dL    167-453      L    

        

 

             Lab Interpretation (test code = Abnormal                           

    



             97414-9)                                            



[id number] 11:49:06





             Test Item    Value        Reference Range Interpretation Comments

 

             PROTIME PATIENT (test 25.4         See_Comment  H             [Auto

mated message]



             code = 5964-2)                                        The system wh

ich



                                                                 generated this 

result



                                                                 transmitted ref

erence



                                                                 range: 10.1 - 1

2.6



                                                                 Seconds. The



                                                                 reference range

 was



                                                                 not used to int

erpret



                                                                 this result as



                                                                 normal/abnormal

.

 

             INR (test code = 6301-6) 2.3                                    Nor

mal INR <1.1;



                                                                 Warfarin Therap

eutic



                                                                 range 2.0 to 3.

0 or



                                                                 2.5 to 3.5, dep

ending



                                                                 upon the indica

tions.

 

             Lab Interpretation (test Abnormal                               



             code = 61020-3)                                        



[facility] METABOLIC PANEL (NA, K, CL, CO2, 
GLUCOSE, BUN, CREATININE, CA)2023 09:35:08





             Test Item    Value        Reference Range Interpretation Comments

 

             NA (test code = 133 mmol/L   135-145      L            



             5833918131)                                         

 

             K (test code = 3.7 mmol/L   3.5-5.0                   



             1964835307)                                         

 

             CL (test code = 106 mmol/L                       



             6195075213)                                         

 

             CO2 TOTAL (test code = 28 mmol/L    23-31                     



             1105898229)                                         

 

             AGAP (test code =              2-16         L            



             2823621177)                                         

 

             BUN (test code = 7 mg/dL      7-23                      



             1079205885)                                         

 

             GLUCOSE (test code = 100 mg/dL                        



             8603271561)                                         

 

             CREATININE (test code = 0.42 mg/dL   0.60-1.25    L            



             4327353973)                                         

 

             CALCIUM (test code = 6.5 mg/dL    8.6-10.6     L            



             3743907629)                                         

 

             eGFR (test code = 211.1        mL/min/1.73m2              



             1013003575)                                         

 

             RENETTA (test code = RENETTA) Association of                           



                          Glomerular Filtration                           



                          Rate (GFR) and Staging                           



                          of Kidney Disease*                           



                          +---------------------                           



                          --+-------------------                           



                          --+-------------------                           



                          ------+| GFR                           



                          (mL/min/1.73 m2) ?|                           



                          With Kidney Damage ?|                           



                          ?Without Kidney                           



                          Damage+---------------                           



                          --------+-------------                           



                          --------+-------------                           



                          ------------+| ?>90 ?                           



                          ? ? ? ? ? ? ? ?|                           



                          ?Stage one ? ? ? ? ?|                           



                          ? Normal ? ? ? ? ? ? ?                           



                          ?+--------------------                           



                          ---+------------------                           



                          ---+------------------                           



                          -------+| ?60-89 ? ? ?                           



                          ? ? ? ? ?| ?Stage two                           



                          ? ? ? ? ?| ? Decreased                           



                          GFR ? ? ? ?                            



                          +---------------------                           



                          --+-------------------                           



                          --+-------------------                           



                          ------+| ?30-59 ? ? ?                           



                          ? ? ? ? ?| ?Stage                           



                          three ? ? ? ?| ? Stage                           



                          three ? ? ? ? ?                           



                          +---------------------                           



                          --+-------------------                           



                          --+-------------------                           



                          ------+| ?15-29 ? ? ?                           



                          ? ? ? ? ?| ?Stage four                           



                          ? ? ? ? | ? Stage four                           



                          ? ? ? ? ?                              



                          ?+--------------------                           



                          ---+------------------                           



                          ---+------------------                           



                          -------+| ?<15 (or                           



                          dialysis) ? ?| ?Stage                           



                          five ? ? ? ? | ? Stage                           



                          five ? ? ? ? ?                           



                          ?+--------------------                           



                          ---+------------------                           



                          ---+------------------                           



                          -------+ *Each stage                           



                          assumes the associated                           



                          GFR level has been in                           



                          effect for at least                           



                          three months. ?Stages                           



                          1 to 5, with or                           



                          without kidney                           



                          disease, indicate                           



                          chronic kidney                           



                          disease. Notes:                           



                          Determination of                           



                          stages one and two                           



                          (with eGFR                             



                          >59mL/min/1.73 m2)                           



                          requires estimation of                           



                          kidney damage for at                           



                          least three months as                           



                          defined by structural                           



                          or functional                           



                          abnormalities of the                           



                          kidney, manifested by                           



                          either:Pathological                           



                          abnormalities or                           



                          Markers of kidney                           



                          damage (including                           



                          abnormalities in the                           



                          composition of the                           



                          blood or urine or                           



                          abnormalities in                           



                          imaging tests).                           

 

             Lab Interpretation Abnormal                               



             (test code = 11812-1)                                        



[facility]





             Test Item    Value        Reference Range Interpretation Comments

 

             MAGNESIUM (test code = 2133632810) 1.5 mg/dL    1.7-2.4      L     

       

 

             Lab Interpretation (test code = Abnormal                           

    



             60629-3)                                            



[facility] WITH [id number] 09:27:43





             Test Item    Value        Reference Range Interpretation Comments

 

             WBC (test code = 2.93         See_Comment  L             [Automated



             6690-2)                                             message] The sy

stem



                                                                 which generated



                                                                 this result



                                                                 transmitted



                                                                 reference range

:



                                                                 4.20 - 10.70



                                                                 10*3/?L. The



                                                                 reference range

 was



                                                                 not used to



                                                                 interpret this



                                                                 result as



                                                                 normal/abnormal

.

 

             RBC (test code = 3.06         See_Comment  L             [Automated



             789-8)                                              message] The sy

stem



                                                                 which generated



                                                                 this result



                                                                 transmitted



                                                                 reference range

:



                                                                 4.26 - 5.52



                                                                 10*6/?L. The



                                                                 reference range

 was



                                                                 not used to



                                                                 interpret this



                                                                 result as



                                                                 normal/abnormal

.

 

             HGB (test code = 7.9 g/dL     12.2-16.4    L            



             718-7)                                              

 

             HCT (test code = 23.9 %       38.4-49.3    L            



             4544-3)                                             

 

             MCV (test code = 78.1 fL      81.7-95.6    L            



             787-2)                                              

 

             MCH (test code = 25.8 pg      26.1-32.7    L            



             785-6)                                              

 

             MCHC (test code = 33.1 g/dL    31.2-35.0                 



             786-4)                                              

 

             RDW-SD (test code = 51.2 fL      38.5-51.6                 



             07601-4)                                            

 

             RDW-CV (test code = 18.3 %       12.1-15.4    H            



             788-0)                                              

 

             PLT (test code = 18           See_Comment  LL            [Automated



             777-3)                                              message] The sy

stem



                                                                 which generated



                                                                 this result



                                                                 transmitted



                                                                 reference range

:



                                                                 150 - 328 10*3/

?L.



                                                                 The reference r

moose



                                                                 was not used to



                                                                 interpret this



                                                                 result as



                                                                 normal/abnormal

.

 

             MPV (test code =                                        Not Measure

d



             90017-1)                                            

 

             IPF % (test code = 8.0 %        1.2-10.7                  Platelet 

count



             [id number])                                         measured by



                                                                 fluorescence



                                                                 method.

 

             NRBC/100 WBC (test 0.0          See_Comment                [Automat

ed



             code = 9017257206)                                        message] 

The system



                                                                 which generated



                                                                 this result



                                                                 transmitted



                                                                 reference range

:



                                                                 0.0 - 10.0 /100



                                                                 WBCs. The refer

ence



                                                                 range was not u

sed



                                                                 to interpret th

is



                                                                 result as



                                                                 normal/abnormal

.

 

             NRBC x10^3 (test code              See_Comment                [Auto

mated



             = 2295750856)                                        message] The s

ystem



                                                                 which generated



                                                                 this result



                                                                 transmitted



                                                                 reference range

:



                                                                 10*3/?L. The



                                                                 reference range

 was



                                                                 not used to



                                                                 interpret this



                                                                 result as



                                                                 normal/abnormal

.

 

             GRAN MAT (NEUT) % 54.9 %                                 



             (test code = 770-8)                                        

 

             IMM GRAN % (test code 0.70 %                                 



             = 7790164201)                                        

 

             LYMPH % (test code = 22.9 %                                 



             736-9)                                              

 

             MONO % (test code = 16.0 %                                 



             5905-5)                                             

 

             EOS % (test code = 4.8 %                                  



             713-8)                                              

 

             BASO % (test code = 0.7 %                                  



             706-2)                                              

 

             GRAN MAT x10^3(ANC) 1.61 10*3/uL 1.99-6.95    L            



             (test code =                                        



             7489014233)                                         

 

             IMM GRAN x10^3 (test              0.00-0.06                 



             code = 6300245699)                                        

 

             LYMPH x10^3 (test code 0.67 10*3/uL 1.09-3.23    L            



             = 731-0)                                            

 

             MONO x10^3 (test code 0.47 10*3/uL 0.36-1.02                 



             = 742-7)                                            

 

             EOS x10^3 (test code = 0.14 10*3/uL 0.06-0.53                 



             711-2)                                              

 

             BASO x10^3 (test code              0.01-0.09                 



             = 704-7)                                            

 

             BENJI CELLS (test code 2+           See_Comment  A             [Auto

mated



             = 2895-9)                                           message] The sy

stem



                                                                 which generated



                                                                 this result



                                                                 transmitted



                                                                 reference range

:



                                                                 (none). The



                                                                 reference range

 was



                                                                 not used to



                                                                 interpret this



                                                                 result as



                                                                 normal/abnormal

.

 

             Lab Interpretation Abnormal                               



             (test code = 07479-8)                                        



[facility]





             Test Item    Value        Reference Range Interpretation Comments

 

             Fibrinogen (test code = 6579143082) 122 mg/dL    167-453      L    

        

 

             Lab Interpretation (test code = Abnormal                           

    



             33849-8)                                            



[facility] Cry[facility] ([facility] units): 1 
Units~Indication: 1) Fibrinogen &lt; 100 mg/dL with bleeding or potential for 
bleeding associated with invasive nisugiysx2293-24-44 18:41:31





             Test Item    Value        Reference Range Interpretation Comments

 

             Unit Blood Type (test O Neg                                  



             code = 4410)                                        

 

             ISBT Blood Type Code 9500                                   



             (test code = 385007)                                        

 

             Unit Number (test [id number]                           



             code = 4411)                                        

 

             Blood Expiration Date                            



             & Time (test code =                                        



             615216)                                             

 

             Status Information Issued                                 



             (test code = 4412)                                        

 

             Product      Cryoprecipitate                           



             Identification (test                                        



             code = 4413)                                        

 

             Product Code (test D8220T33                               Performed

 at [facility]



             code = 4414)                                        Laboratory



                                                                 Services - GAL



                                                                 Blood [address] [place] [address]



                                                                 [id number] Free:



                                                                 [phone]

A



                                                                 No. [id number]



[facility] NE[facility] PATHOGENS DNA 
[id number] 05:00:05





             Test Item    Value        Reference Range Interpretation Comments

 

             Acinetobacter species Positive     Negative, See A            



             (test code = 46029-6)              Comment/Narrative              

 

             RENETTA (test code = RENETTA) See blood culture                           



                          result for additional                           



                          information. ?Testing                           



                          included eight                           



                          identification and six                           



                          resistance marker                           



                          targets.                               

 

             Lab Interpretation Abnormal                               



             (test code = 75237-6)                                        



[facility] Iayqi9726-08-80 21:53:15





             Test Item    Value        Reference Range Interpretation Comments

 

             LACTIC ACID (test code = 3.62 mmol/L  0.50-2.20    H            



             [id number])                                         

 

             Lab Interpretation (test code = Abnormal                           

    



             54829-4)                                            



[facility] Packed RBC (in units), 2 Units
2023 19:27:30





             Test Item    Value        Reference Range Interpretation Comments

 

             Cross Match Result Compatible                             



             (test code = 4409)                                        

 

             ISBT Blood Type Code 5100                                   



             (test code = 765686)                                        

 

             Unit Blood Type (test O Pos                                  



             code = 4410)                                        

 

             Unit Number (test [id number]                           



             code = 4411)                                        

 

             Blood Expiration Date [id number]                           



             & Time (test code =                                        



             170800)                                             

 

             Status Information Issued                                 



             (test code = 4412)                                        

 

             Product      Red Blood Cells                           



             Identification (test                                        



             code = 4413)                                        

 

             Product Code (test E7364B80                               Performed

 at [facility]



             code = 4414)                                        Laboratory



                                                                 Services - GAL



                                                                 Blood [address]

s



                                                                 [address]Djjp Free:



                                                                 [phone]

A



                                                                 No. [id number]



[facility] Whole Jiqnl6734-05-52 17:17:41





             Test Item    Value        Reference Range Interpretation Comments

 

             LACTIC ACID (test code = 4.31 mmol/L  0.50-2.20    H            



             [id number])                                         

 

             Lab Interpretation (test code = Abnormal                           

    



             32955-0)                                            



[facility] WestProthrombin Time / [id number] 12:01:29





             Test Item    Value        Reference Range Interpretation Comments

 

             PROTIME PATIENT (test 25.2         See_Comment  H             [Auto

mated message]



             code = 5964-2)                                        The system [employer]



                                                                 generated this 

result



                                                                 transmitted ref

erence



                                                                 range: 10.1 - 1

2.6



                                                                 Seconds. The



                                                                 reference range

 was



                                                                 not used to int

erpret



                                                                 this result as



                                                                 normal/abnormal

.

 

             INR (test code = 6301-6) 2.3                                    Nor

mal INR <1.1;



                                                                 Warfarin Therap

eutic



                                                                 range 2.0 to 3.

0 or



                                                                 2.5 to 3.5, dep

ending



                                                                 upon the indica

tions.

 

             Lab Interpretation (test Abnormal                               



             code = 07812-2)                                        



[facility] WestL[facility] Whole Ohsdi7361-71-45 11:52:59





             Test Item    Value        Reference Range Interpretation Comments

 

             LACTIC ACID (test code = 4.93 mmol/L  0.50-2.20    H            



             [id number])                                         

 

             Lab Interpretation (test code = Abnormal                           

    



             06138-3)                                            



[facility] Cryoprecipitate (in units): 1 
Units~Indication: 1) Fibrinogen &lt; 100 mg/dL with bleeding or potential for 
bleeding associated with invasive rlkudmibj5301-77-64 10:16:18





             Test Item    Value        Reference Range Interpretation Comments

 

             Unit Blood Type (test O                                      



             code = 4410)                                        

 

             ISBT Blood Type Code D000                                   



             (test code = 542903)                                        

 

             Unit Number (test [id number]                           



             code = 4411)                                        

 

             Blood Expiration Date                            



             & Time (test code =                                        



             661936)                                             

 

             Status Information Issued                                 



             (test code = 4412)                                        

 

             Product      Cryoprecipitate                           



             Identification (test                                        



             code = 4413)                                        

 

             Product Code (test M3748Y59                               Performed

 at [facility]



             code = 4414)                                        Laboratory



                                                                 Services - GAL



                                                                 Blood [address]



                                                                 [address]Kphm Free:



                                                                 [phone]

A



                                                                 No. [id number]



[facility] Packed RBC (in units), 2 Units
2023 10:16:18





             Test Item    Value        Reference Range Interpretation Comments

 

             Cross Match Result Compatible                             



             (test code = 4409)                                        

 

             ISBT Blood Type Code 5100                                   



             (test code = 253312)                                        

 

             Unit Blood Type (test O Pos                                  



             code = 4410)                                        

 

             Unit Number (test [id number]                           



             code = 4411)                                        

 

             Blood Expiration Date [id number]                           



             & Time (test code =                                        



             655443)                                             

 

             Status Information Issued                                 



             (test code = 4412)                                        

 

             Product      Red Blood Cells                           



             Identification (test                                        



             code = 4413)                                        

 

             Product Code (test F4490J64                               Performed

 at [facility]



             code = 4414)                                        Laboratory



                                                                 Services - GAL



                                                                 Blood [address]



                                                                 [address]Msal Free:



                                                                 [phone]

A



                                                                 No. [id number]



[facility] / HEM[facility] - 30 minutes after 
transfusion of each JCB6915-02-21 09:00:47





             Test Item    Value        Reference Range Interpretation Comments

 

             WBC (test code = 6.20         See_Comment                [Automated

 message]



             6690-2)                                             The system [employer]



                                                                 generated this 

result



                                                                 transmitted ref

erence



                                                                 range: 4.20 - 1

0.70



                                                                 10*3/?L. The



                                                                 reference range

 was



                                                                 not used to int

erpret



                                                                 this result as



                                                                 normal/abnormal

.

 

             RBC (test code = 789-8) 2.41         See_Comment  L             [Au

tomated message]



                                                                 The system [employer]



                                                                 generated this 

result



                                                                 transmitted ref

erence



                                                                 range: 4.26 - 5

.52



                                                                 10*6/?L. The



                                                                 reference range

 was



                                                                 not used to int

erpret



                                                                 this result as



                                                                 normal/abnormal

.

 

             HGB (test code = 718-7) 6.0 g/dL     12.2-16.4    L            

 

             HCT (test code = 18.6 %       38.4-49.3    L            



             4544-3)                                             

 

             MCH (test code = 785-6) 24.9 pg      26.1-32.7    L            

 

             MCV (test code = 787-2) 77.2 fL      81.7-95.6    L            

 

             MCHC (test code = 32.3 g/dL    31.2-35.0                 



             786-4)                                              

 

             PLT (test code = 777-3) 44           See_Comment  LL            [Au

tomated message]



                                                                 The system [employer]



                                                                 generated this 

result



                                                                 transmitted ref

erence



                                                                 range: 150 - 32

8



                                                                 10*3/?L. The



                                                                 reference range

 was



                                                                 not used to int

erpret



                                                                 this result as



                                                                 normal/abnormal

.

 

             MPV (test code =                                        Not Measure

d



             86429-0)                                            

 

             RDW-CV (test code = 19.5 %       12.1-15.4    H            



             788-0)                                              

 

             RDW-SD (test code = 55.1 fL      38.5-51.6    H            



             64283-8)                                            

 

             NRBC x10^3 (test code =              See_Comment                [Au

tomated message]



             3678749900)                                         The system [employer]



                                                                 generated this 

result



                                                                 transmitted ref

erence



                                                                 range: 10*3/?L.

 The



                                                                 reference range

 was



                                                                 not used to int

erpret



                                                                 this result as



                                                                 normal/abnormal

.

 

             NRBC/100 WBC (test code 0.0          See_Comment                [Au

tomated message]



             = [id number])                                        The [facility]



                                                                 generated this 

result



                                                                 transmitted ref

erence



                                                                 range: 0.0 - 10

.0



                                                                 /100 WBCs. The



                                                                 reference range

 was



                                                                 not used to int

erpret



                                                                 this result as



                                                                 normal/abnormal

.

 

             IPF % (test code = 5.6 %        1.2-10.7                  Platelet 

count



             [id number])                                         measured by



                                                                 fluorescence me

thod.

 

             Lab Interpretation Abnormal                               



             (test code = 62577-5)                                        



[facility] WestLactic Acid Whole Gxotn9289-37-72 08:52:22





             Test Item    Value        Reference Range Interpretation Comments

 

             LACTIC ACID (test code = 5.63 mmol/L  0.50-2.20    H            



             7609578598)                                         

 

             Lab Interpretation (test code = Abnormal                           

    



             17189-8)                                            



[facility] [id number] 05:19:47





             Test Item    Value        Reference Range Interpretation Comments

 

             FREE T4 (test code = 1.82         See_Comment                [Autom

ated message]



             1093124292)                                         The system [employer]



                                                                 generated this 

result



                                                                 transmitted ref

erence



                                                                 range: 0.78 - 2

.20



                                                                 ng/dL:. The ref

erence



                                                                 range was not u

sed to



                                                                 interpret this 

result



                                                                 as normal/abnor

mal.

 

             Lab Interpretation (test Normal                                 



             code = 79236-2)                                        



[facility] WestHCV VIWKBNZV4840-96-11 04:49:47





             Test Item    Value        Reference Range Interpretation Comments

 

             HCV Ab (test code = 58430-4) Negative                              

 

 

             HCV Semi-Quantitative (test code = 0.05                            

       



             23171-4)                                            



[facility] WestTHYROID STIMULATING UIQRLIF6686-27-54 04:32:04





             Test Item    Value        Reference Range Interpretation Comments

 

             TSH (test code = 1.95         See_Comment                [Automated

 message]



             [id number])                                         The system [employer]



                                                                 generated this 

result



                                                                 transmitted ref

erence



                                                                 range: 0.45 - 4

.70



                                                                 mIU/L. The refe

rence



                                                                 range was not u

sed to



                                                                 interpret this 

result



                                                                 as normal/abnor

mal.

 

             Lab Interpretation (test Normal                                 



             code = 06678-9)                                        



[facility] WestAC Panel 20 + Lactic Hhgz3979-77-99 04:02:48





             Test Item    Value        Reference Range Interpretation Comments

 

             PH (test code = 2) 7.39         7.35-7.45                 

 

             PCO2 (test code = 27           See_Comment  L             [Automate

d



             [id number])                                         message] The sy

stem



                                                                 which generated



                                                                 this result



                                                                 transmitted



                                                                 reference range

: 35



                                                                 - 45 mmHg. The



                                                                 reference range

 was



                                                                 not used to



                                                                 interpret this



                                                                 result as



                                                                 normal/abnormal

.

 

             PO2 (test code = 120          See_Comment  H             [Automated



             [id number])                                         message] The sy

stem



                                                                 which generated



                                                                 this result



                                                                 transmitted



                                                                 reference range

: 80



                                                                 - 100 mmHg. The



                                                                 reference range

 was



                                                                 not used to



                                                                 interpret this



                                                                 result as



                                                                 normal/abnormal

.

 

             HCO3 (test code = 17           See_Comment  L             [Automate

d



             9711922451)                                         message] The sy

stem



                                                                 which generated



                                                                 this result



                                                                 transmitted



                                                                 reference range

: 22



                                                                 - 26 mEq/L. The



                                                                 reference range

 was



                                                                 not used to



                                                                 interpret this



                                                                 result as



                                                                 normal/abnormal

.

 

             BE (test code = -8.5         See_Comment  L             [Automated



             9660120645)                                         message] The sy

stem



                                                                 which generated



                                                                 this result



                                                                 transmitted



                                                                 reference range

:



                                                                 -3.0 - 3.0 mEq/

L.



                                                                 The reference r

moose



                                                                 was not used to



                                                                 interpret this



                                                                 result as



                                                                 normal/abnormal

.

 

             THB (test code = 6.2 g/dL     13.5-18.0    LL           



             7558441734)                                         

 

             %O2HB (test code = 97.0 %       94.0-99.0                 



             7373548250)                                         

 

             %COHB ART (test code = 2.0 %        0.0-1.5      H            



             4710669666)                                         

 

             %METHB ART (test code = 0.2 %        0.4-1.5      L            



             1997876951)                                         

 

             VOL%O2 ART (test code = 8.8 %        15.0-23.0    L            



             5190844756)                                         

 

             NA (test code = 129 mmol/L   135-145      L            



             9165848823)                                         

 

             K+ (test code = 4.0 mmol/L   3.5-5.0                   



             6745050283)                                         

 

             AC CA IONZ (test code = 4.10 mg/dL   4.50-5.30    L            



             5913590414)                                         

 

             GLUCOSE (test code = 111 mg/dL           H            



             9034359915)                                         

 

             LACTIC ACID (test code 7.31 mmol/L  0.50-2.20    H            



             = 0127854251)                                        

 

             Lab Interpretation Abnormal                               



             (test code = 69536-3)                                        



[facility] [id number] 03:46:18





             Test Item    Value        Reference Range Interpretation Comments

 

             HIV          0.09         Negative                  



             Semi-quantitative                                        



             (test code =                                        



             92241-3)                                            

 

             RENETTA (test code = Non-reactive for HIV-1                           



             RENETTA)         antigen and HIV-1/HIV-2                           



                          antibodies. ?No                           



                          laboratory evidence of                           



                          HIV infection. ?Repeat in                           



                          2-4 weeks if acute HIV                           



                          infection is suspected.                           



[id number] 01:46:37





             Test Item    Value        Reference Range Interpretation Comments

 

             OSMOLALITY (test code = 373          See_Comment  HH            [Au

tomated message]



             1942-2)                                             The system [employer]



                                                                 generated this 

result



                                                                 transmitted ref

erence



                                                                 range: 278 - 30

5



                                                                 mOsm/kg. The



                                                                 reference range

 was



                                                                 not used to int

erpret



                                                                 this result as



                                                                 normal/abnormal

.

 

             Lab Interpretation (test Abnormal                               



             code = 44607-1)                                        



[facility]





             Test Item    Value        Reference Range Interpretation Comments

 

             Fibrinogen (test code = 2963759772) 96 mg/dL     167-453      LL   

        

 

             Lab Interpretation (test code = Abnormal                           

    



             10369-4)                                            



[facility] WITH [id number] 01:05:00





             Test Item    Value        Reference Range Interpretation Comments

 

             WBC (test code = 2.86         See_Comment  L             [Automated



             6690-2)                                             message] The sy

stem



                                                                 which generated



                                                                 this result



                                                                 transmitted



                                                                 reference range

:



                                                                 4.20 - 10.70



                                                                 10*3/?L. The



                                                                 reference range

 was



                                                                 not used to



                                                                 interpret this



                                                                 result as



                                                                 normal/abnormal

.

 

             RBC (test code = 2.34         See_Comment  L             [Automated



             789-8)                                              message] The sy

stem



                                                                 which generated



                                                                 this result



                                                                 transmitted



                                                                 reference range

:



                                                                 4.26 - 5.52



                                                                 10*6/?L. The



                                                                 reference range

 was



                                                                 not used to



                                                                 interpret this



                                                                 result as



                                                                 normal/abnormal

.

 

             HGB (test code = 5.1 g/dL     12.2-16.4    L            



             718-7)                                              

 

             HCT (test code = 17.9 %       38.4-49.3    L            



             4544-3)                                             

 

             MCV (test code = 76.5 fL      81.7-95.6    L            



             787-2)                                              

 

             MCH (test code = 21.8 pg      26.1-32.7    L            



             785-6)                                              

 

             MCHC (test code = 28.5 g/dL    31.2-35.0    L            



             786-4)                                              

 

             RDW-SD (test code = 59.5 fL      38.5-51.6    H            



             37721-2)                                            

 

             RDW-CV (test code = 21.6 %       12.1-15.4    H            



             788-0)                                              

 

             PLT (test code = 55           See_Comment  L             [Automated



             777-3)                                              message] The sy

stem



                                                                 which generated



                                                                 this result



                                                                 transmitted



                                                                 reference range

:



                                                                 150 - 328 10*3/

?L.



                                                                 The reference r

moose



                                                                 was not used to



                                                                 interpret this



                                                                 result as



                                                                 normal/abnormal

.

 

             MPV (test code =                                        Not Measure

d



             86058-2)                                            

 

             IPF % (test code = 6.8 %        1.2-10.7                  Platelet 

count



             1632164188)                                         measured by



                                                                 fluorescence



                                                                 method.

 

             NRBC/100 WBC (test 0.0          See_Comment                [Automat

ed



             code = 9480828338)                                        message] 

The system



                                                                 which generated



                                                                 this result



                                                                 transmitted



                                                                 reference range

:



                                                                 0.0 - 10.0 /100



                                                                 WBCs. The refer

ence



                                                                 range was not u

sed



                                                                 to interpret th

is



                                                                 result as



                                                                 normal/abnormal

.

 

             NRBC x10^3 (test code              See_Comment                [Auto

mated



             = 8885280710)                                        message] The s

ystem



                                                                 which generated



                                                                 this result



                                                                 transmitted



                                                                 reference range

:



                                                                 10*3/?L. The



                                                                 reference range

 was



                                                                 not used to



                                                                 interpret this



                                                                 result as



                                                                 normal/abnormal

.

 

             GRAN MAT (NEUT) % 51.4 %                                 



             (test code = 770-8)                                        

 

             IMM GRAN % (test code 0.30 %                                 



             = 0348776027)                                        

 

             LYMPH % (test code = 31.5 %                                 



             736-9)                                              

 

             MONO % (test code = 13.3 %                                 



             5905-5)                                             

 

             EOS % (test code = 2.1 %                                  



             713-8)                                              

 

             BASO % (test code = 1.4 %                                  



             706-2)                                              

 

             GRAN MAT x10^3(ANC) 1.47 10*3/uL 1.99-6.95    L            



             (test code =                                        



             9188082392)                                         

 

             IMM GRAN x10^3 (test              0.00-0.06                 



             code = 3715432286)                                        

 

             LYMPH x10^3 (test code 0.90 10*3/uL 1.09-3.23    L            



             = 731-0)                                            

 

             MONO x10^3 (test code 0.38 10*3/uL 0.36-1.02                 



             = 742-7)                                            

 

             EOS x10^3 (test code = 0.06 10*3/uL 0.06-0.53                 



             711-2)                                              

 

             BASO x10^3 (test code 0.04 10*3/uL 0.01-0.09                 



             = 704-7)                                            

 

             BENJI CELLS (test code 2+           See_Comment  A             [Auto

mated



             = 7790-9)                                           message] The sy

stem



                                                                 which generated



                                                                 this result



                                                                 transmitted



                                                                 reference range

:



                                                                 (none). The



                                                                 reference range

 was



                                                                 not used to



                                                                 interpret this



                                                                 result as



                                                                 normal/abnormal

.

 

             SCHISTOCYTES (test 1+                        A            



             code = 800-3)                                        

 

             Lab Interpretation Abnormal                               



             (test code = 79456-2)                                        



[facility]





             Test Item    Value        Reference Range Interpretation Comments

 

             ALCOHOL (test code = 355 mg/dL                              



             8315673533)                                         

 

             RENETTA (test code = Toxic Greater than or                           



             RENETTA)         equal to 80 mg/dL. NOTE:                           



                          Whole blood values are                           



                          approximately 10% to 15%                           



                          lower than serum and                           



                          plasma.                                



[facility] RBC ([facility] units[facility] 1 Units
2023 00:29:29





             Test Item    Value        Reference Range Interpretation Comments

 

             Cross Match Result Compatible                             



             (test code = 4409)                                        

 

             ISBT Blood Type Code 5100                                   



             (test code = 268490)                                        

 

             Unit Blood Type (test O Pos                                  



             code = 4410)                                        

 

             Unit Number (test [id number]                           



             code = 4411)                                        

 

             Blood Expiration Date                            



             & Time (test code =                                        



             143842)                                             

 

             Status Information Issued                                 



             (test code = 4412)                                        

 

             Product      Red Blood Cells                           



             Identification (test                                        



             code = 4413)                                        

 

             Product Code (test G9737M17                               Performed

 at [facility]



             code = 4414)                                        Laboratory



                                                                 Services - GAL



                                                                 Blood [address]



                                                                 [address]Isw[place] Free:



                                                                 [phone]

A



                                                                 No. [id number]



[facility] METABOLIC PANEL (NA, K, CL, CO2, 
GLUCOSE, BUN, CREATININE, CA)2023 00:27:54





             Test Item    Value        Reference Range Interpretation Comments

 

             NA (test code = 132 mmol/L   135-145      L            



             4914406542)                                         

 

             K (test code = 3.5 mmol/L   3.5-5.0                   



             5748238863)                                         

 

             CL (test code = 103 mmol/L                       



             2309183968)                                         

 

             CO2 TOTAL (test code = 17 mmol/L    23-31        L            



             9348472474)                                         

 

             AGAP (test code = 12           2-16                      



             1190901552)                                         

 

             BUN (test code = 7 mg/dL      7-23                      



             6554838962)                                         

 

             GLUCOSE (test code = 145 mg/dL           H            



             5523640488)                                         

 

             CREATININE (test code = 0.48 mg/dL   0.60-1.25    L            



             4060757927)                                         

 

             CALCIUM (test code = 6.9 mg/dL    8.6-10.6     L            



             0596339127)                                         

 

             eGFR (test code = 181.0        mL/min/1.73m2              



             4155955037)                                         

 

             RENETTA (test code = RENETTA) Association of                           



                          Glomerular Filtration                           



                          Rate (GFR) and Staging                           



                          of Kidney Disease*                           



                          +---------------------                           



                          --+-------------------                           



                          --+-------------------                           



                          ------+| GFR                           



                          (mL/min/1.73 m2) ?|                           



                          With Kidney Damage ?|                           



                          ?Without Kidney                           



                          Damage+---------------                           



                          --------+-------------                           



                          --------+-------------                           



                          ------------+| ?>90 ?                           



                          ? ? ? ? ? ? ? ?|                           



                          ?Stage one ? ? ? ? ?|                           



                          ? Normal ? ? ? ? ? ? ?                           



                          ?+--------------------                           



                          ---+------------------                           



                          ---+------------------                           



                          -------+| ?60-89 ? ? ?                           



                          ? ? ? ? ?| ?Stage two                           



                          ? ? ? ? ?| ? Decreased                           



                          GFR ? ? ? ?                            



                          +---------------------                           



                          --+-------------------                           



                          --+-------------------                           



                          ------+| ?30-59 ? ? ?                           



                          ? ? ? ? ?| ?Stage                           



                          three ? ? ? ?| ? Stage                           



                          three ? ? ? ? ?                           



                          +---------------------                           



                          --+-------------------                           



                          --+-------------------                           



                          ------+| ?15-29 ? ? ?                           



                          ? ? ? ? ?| ?Stage four                           



                          ? ? ? ? | ? Stage four                           



                          ? ? ? ? ?                              



                          ?+--------------------                           



                          ---+------------------                           



                          ---+------------------                           



                          -------+| ?<15 (or                           



                          dialysis) ? ?| ?Stage                           



                          five ? ? ? ? | ? Stage                           



                          five ? ? ? ? ?                           



                          ?+--------------------                           



                          ---+------------------                           



                          ---+------------------                           



                          -------+ *Each stage                           



                          assumes the associated                           



                          GFR level has been in                           



                          effect for at least                           



                          three months. ?Stages                           



                          1 to 5, with or                           



                          without kidney                           



                          disease, indicate                           



                          chronic kidney                           



                          disease. Notes:                           



                          Determination of                           



                          stages one and two                           



                          (with eGFR                             



                          >59mL/min/1.73 m2)                           



                          requires estimation of                           



                          kidney damage for at                           



                          least three months as                           



                          defined by structural                           



                          or functional                           



                          abnormalities of the                           



                          kidney, manifested by                           



                          either:Pathological                           



                          abnormalities or                           



                          Markers of kidney                           



                          damage (including                           



                          abnormalities in the                           



                          composition of the                           



                          blood or urine or                           



                          abnormalities in                           



                          imaging tests).                           

 

             Lab Interpretation Abnormal                               



             (test code = 77175-1)                                        



[facility]ALB,T.PRO,BILI
T,BU/BC,ALT,AST,ALK PHOS)2023 00:27:54





             Test Item    Value        Reference Range Interpretation Comments

 

             TOTAL BILI (test code = 6744837620) 3.0 mg/dL    0.1-1.1      H    

        

 

             BILI UNCON (test code = 2054167658) 1.5 mg/dL    0.1-1.1      H    

        

 

             BILI CONJ (test code = 8114049609) 0.3 mg/dL    0.0-0.3            

       

 

             T PROTEIN (test code = 5210418290) 5.6 g/dL     6.3-8.2      L     

       

 

             ALBUMIN (test code = 6515039660) 2.3 g/dL     3.5-5.0      L       

     

 

             ALK PHOS (test code = 2571634557) 182 U/L             H      

      

 

             ALTv (test code = 1742-6) 27 U/L       5-50                      

 

             AST(SGOT) (test code = 4987336968) 58 U/L       13-40        H     

       

 

             Lab Interpretation (test code = Abnormal                           

    



             10591-3)                                            



[id number]54





             Test Item    Value        Reference Range Interpretation Comments

 

             CK (test code = 5196982894) 689 U/L             H            

 

             Lab Interpretation (test code = Abnormal                           

    



             96521-2)                                            



[facility]54





             Test Item    Value        Reference Range Interpretation Comments

 

             MAGNESIUM (test code = 8252804939) 2.1 mg/dL    1.7-2.4            

       

 

             Lab Interpretation (test code = Normal                             

    



             49279-7)                                            



[facility]:54





             Test Item    Value        Reference Range Interpretation Comments

 

             PHOSPHORUS (test code = 0426632092) 5.1 mg/dL    2.5-5.0      H    

        

 

             Lab Interpretation (test code = Abnormal                           

    



             10975-6)                                            



[facility] [id number] 00:25:13





             Test Item    Value        Reference Range Interpretation Comments

 

             AMMONIA (test code = 7052385091) 71 umol/L    9-33         H       

     

 

             Lab Interpretation (test code = Abnormal                           

    



             73636-7)                                            



[facility] + Lactic [id number] 00:14:10





             Test Item    Value        Reference Range Interpretation Comments

 

             PH (test code = 2) 7.35         7.35-7.45                 

 

             PCO2 (test code = 25           See_Comment  L             [Automate

d



             [id number])                                         message] The sy

stem



                                                                 which generated



                                                                 this result



                                                                 transmitted



                                                                 reference range

: 35



                                                                 - 45 mmHg. The



                                                                 reference range

 was



                                                                 not used to



                                                                 interpret this



                                                                 result as



                                                                 normal/abnormal

.

 

             PO2 (test code = 158          See_Comment  H             [Automated



             [id number])                                         message] The sy

stem



                                                                 which generated



                                                                 this result



                                                                 transmitted



                                                                 reference range

: 80



                                                                 - 100 mmHg. The



                                                                 reference range

 was



                                                                 not used to



                                                                 interpret this



                                                                 result as



                                                                 normal/abnormal

.

 

             HCO3 (test code = 14           See_Comment  L             [Automate

d



             [id number])                                         message] The sy

stem



                                                                 which generated



                                                                 this result



                                                                 transmitted



                                                                 reference range

: 22



                                                                 - 26 mEq/L. The



                                                                 reference range

 was



                                                                 not used to



                                                                 interpret this



                                                                 result as



                                                                 normal/abnormal

.

 

             BE (test code = -11.2        See_Comment  L             [Automated



             [id number])                                         message] The sy

stem



                                                                 which generated



                                                                 this result



                                                                 transmitted



                                                                 reference range

:



                                                                 -3.0 - 3.0 mEq/

L.



                                                                 The reference r

moose



                                                                 was not used to



                                                                 interpret this



                                                                 result as



                                                                 normal/abnormal

.

 

             THB (test code = 5.6 g/dL     13.5-18.0    LL           



             2705761575)                                         

 

             %O2HB (test code = 97.0 %       94.0-99.0                 



             5889477232)                                         

 

             %COHB ART (test code = 1.6 %        0.0-1.5      H            



             1259284065)                                         

 

             %METHB ART (test code = 0.6 %        0.4-1.5                   



             6620931089)                                         

 

             VOL%O2 ART (test code = 8.0 %        15.0-23.0    L            



             8075232508)                                         

 

             NA (test code = 128 mmol/L   135-145      L            



             2313373236)                                         

 

             K+ (test code = 3.2 mmol/L   3.5-5.0      L            



             8049308663)                                         

 

             AC CA IONZ (test code = 4.10 mg/dL   4.50-5.30    L            



             8984480129)                                         

 

             GLUCOSE (test code = 149 mg/dL           H            



             1238053759)                                         

 

             LACTIC ACID (test code 9.00 mmol/L  0.50-2.20    H            



             = 3531433271)                                        

 

             Lab Interpretation Abnormal                               



             (test code = 28489-4)                                        



[facility] LACTIC [id number] 00:10:44





             Test Item    Value        Reference Range Interpretation Comments

 

             PH (test code = 7.22         7.32-7.42    L            



             3063838358)                                         

 

             PCO2 YANELI (test code = 40           See_Comment  L             [Auto

mated



             9587256996)                                         message] The sy

stem



                                                                 which generated



                                                                 this result



                                                                 transmitted



                                                                 reference range

: 41



                                                                 - 51 mmHg. The



                                                                 reference range

 was



                                                                 not used to



                                                                 interpret this



                                                                 result as



                                                                 normal/abnormal

.

 

             PO2 YANELI (test code = 33           See_Comment                [Autom

ated



             0024925622)                                         message] The sy

stem



                                                                 which generated



                                                                 this result



                                                                 transmitted



                                                                 reference range

: 25



                                                                 - 40 mmHg. The



                                                                 reference range

 was



                                                                 not used to



                                                                 interpret this



                                                                 result as



                                                                 normal/abnormal

.

 

             HCO3 YANELI (test code = 16           See_Comment  L             [Auto

mated



             [id number])                                         message] The sy

stem



                                                                 which generated



                                                                 this result



                                                                 transmitted



                                                                 reference range

: 24



                                                                 - 28 mEq/L. The



                                                                 reference range

 was



                                                                 not used to



                                                                 interpret this



                                                                 result as



                                                                 normal/abnormal

.

 

             AC VBE(BEAKER) (test -10.8        mEq/L                     



             code = 4181523276)                                        

 

             THB YANELI (test code = 6.1 g/dL     13.5-18.0    LL           



             8235767289)                                         

 

             %O2HB YANELI (test code = 37.9 %       52.0-63.0    L            



             8122361564)                                         

 

             %COHB YANELI (test code = 0.7 %        0.0-1.5                   



             3890349730)                                         

 

             %METHB YANELI (test code = 0.9 %        0.4-1.5                   



             5633386026)                                         

 

             VOL%O2 YANELI (test code = 3.3 %        6.0-12.0     L            



             6296746879)                                         

 

             NA (test code = 131 mmol/L   135-145      L            



             1642624391)                                         

 

             K+ (test code = 3.3 mmol/L   3.5-5.0      L            



             7301636381)                                         

 

             AC CA IONZ (test code = 4.20 mg/dL   4.50-5.30    L            



             7629024249)                                         

 

             GLUCOSE (test code = 149 mg/dL           H            



             7442275897)                                         

 

             LACTIC ACID (test code 8.68 mmol/L  0.50-2.20    H            



             = 0171837158)                                        

 

             Lab Interpretation Abnormal                               



             (test code = 28124-8)                                        



[facility] Metabolic Panel (NA, K, CL, CO2, 
GLUCOSE, BUN, CREATININE, CA)2023 22:07:47





             Test Item    Value        Reference Range Interpretation Comments

 

             NA (test code = 134 mmol/L   135-145      L            



             3284283644)                                         

 

             K (test code = 3.1 mmol/L   3.5-5.0      L            



             7654748123)                                         

 

             CL (test code = 103 mmol/L                       



             1693461578)                                         

 

             CO2 TOTAL (test code = 14 mmol/L    23-31        L            



             9720692192)                                         

 

             AGAP (test code = 17           2-16         H            



             6322270448)                                         

 

             BUN (test code = 6 mg/dL      7-23         L            



             6449089008)                                         

 

             GLUCOSE (test code = 153 mg/dL           H            



             1770110233)                                         

 

             CREATININE (test code = 0.54 mg/dL   0.60-1.25    L            



             3737185390)                                         

 

             CALCIUM (test code = 7.1 mg/dL    8.6-10.6     L            



             4599037988)                                         

 

             eGFR (test code = 158.0        mL/min/1.73m2              



             4702063962)                                         

 

             RENETTA (test code = RENETTA) Association of                           



                          Glomerular Filtration                           



                          Rate (GFR) and Staging                           



                          of Kidney Disease*                           



                          +---------------------                           



                          --+-------------------                           



                          --+-------------------                           



                          ------+| GFR                           



                          (mL/min/1.73 m2) ?|                           



                          With Kidney Damage ?|                           



                          ?Without Kidney                           



                          Damage+---------------                           



                          --------+-------------                           



                          --------+-------------                           



                          ------------+| ?>90 ?                           



                          ? ? ? ? ? ? ? ?|                           



                          ?Stage one ? ? ? ? ?|                           



                          ? Normal ? ? ? ? ? ? ?                           



                          ?+--------------------                           



                          ---+------------------                           



                          ---+------------------                           



                          -------+| ?60-89 ? ? ?                           



                          ? ? ? ? ?| ?Stage two                           



                          ? ? ? ? ?| ? Decreased                           



                          GFR ? ? ? ?                            



                          +---------------------                           



                          --+-------------------                           



                          --+-------------------                           



                          ------+| ?30-59 ? ? ?                           



                          ? ? ? ? ?| ?Stage                           



                          three ? ? ? ?| ? Stage                           



                          three ? ? ? ? ?                           



                          +---------------------                           



                          --+-------------------                           



                          --+-------------------                           



                          ------+| ?15-29 ? ? ?                           



                          ? ? ? ? ?| ?Stage four                           



                          ? ? ? ? | ? Stage four                           



                          ? ? ? ? ?                              



                          ?+--------------------                           



                          ---+------------------                           



                          ---+------------------                           



                          -------+| ?<15 (or                           



                          dialysis) ? ?| ?Stage                           



                          five ? ? ? ? | ? Stage                           



                          five ? ? ? ? ?                           



                          ?+--------------------                           



                          ---+------------------                           



                          ---+------------------                           



                          -------+ *Each stage                           



                          assumes the associated                           



                          GFR level has been in                           



                          effect for at least                           



                          three months. ?Stages                           



                          1 to 5, with or                           



                          without kidney                           



                          disease, indicate                           



                          chronic kidney                           



                          disease. Notes:                           



                          Determination of                           



                          stages one and two                           



                          (with eGFR                             



                          >59mL/min/1.73 m2)                           



                          requires estimation of                           



                          kidney damage for at                           



                          least three months as                           



                          defined by structural                           



                          or functional                           



                          abnormalities of the                           



                          kidney, manifested by                           



                          either:Pathological                           



                          abnormalities or                           



                          Markers of kidney                           



                          damage (including                           



                          abnormalities in the                           



                          composition of the                           



                          blood or urine or                           



                          abnormalities in                           



                          imaging tests).                           

 

             Lab Interpretation Abnormal                               



             (test code = 31367-6)                                        



[facility] / [id number] 22:04:46





             Test Item    Value        Reference Range Interpretation Comments

 

             PROTIME PATIENT (test 27.5         See_Comment  H             [Auto

mated message]



             code = 5964-2)                                        The system [employer]



                                                                 generated this 

result



                                                                 transmitted ref

erence



                                                                 range: 10.1 - 1

2.6



                                                                 Seconds. The



                                                                 reference range

 was



                                                                 not used to int

erpret



                                                                 this result as



                                                                 normal/abnormal

.

 

             INR (test code = 6301-6) 2.5                                    Nor

mal INR <1.1;



                                                                 Warfarin Therap

eutic



                                                                 range 2.0 to 3.

0 or



                                                                 2.5 to 3.5, dep

ending



                                                                 upon the indica

tions.

 

             Lab Interpretation (test Abnormal                               



             code = 32930-5)                                        



[facility]aPTT2023-[facility] 22:04:46





             Test Item    Value        Reference Range Interpretation Comments

 

             APTT Patient (test code 42           See_Comment  H             [Au

tomated message]



             = 3173-2)                                           The system [employer]



                                                                 generated this 

result



                                                                 transmitted ref

erence



                                                                 range: 26 - 36



                                                                 Seconds. The



                                                                 reference range

 was



                                                                 not used to int

erpret



                                                                 this result as



                                                                 normal/abnormal

.

 

             Lab Interpretation (test Abnormal                               



             code = 58842-9)                                        



[facility]Profile / [id number] 22:02:45





             Test Item    Value        Reference Range Interpretation Comments

 

             WBC (test code = 3.90         See_Comment  L             [Automated

 message]



             6690-2)                                             The system [employer]



                                                                 generated this 

result



                                                                 transmitted ref

erence



                                                                 range: 4.20 - 1

0.70



                                                                 10*3/?L. The



                                                                 reference range

 was



                                                                 not used to int

erpret



                                                                 this result as



                                                                 normal/abnormal

.

 

             RBC (test code = 789-8) 2.63         See_Comment  L             [Au

tomated message]



                                                                 The system [employer]



                                                                 generated this 

result



                                                                 transmitted ref

erence



                                                                 range: 4.26 - 5

.52



                                                                 10*6/?L. The



                                                                 reference range

 was



                                                                 not used to int

erpret



                                                                 this result as



                                                                 normal/abnormal

.

 

             HGB (test code = 718-7) 5.8 g/dL     12.2-16.4    L            

 

             HCT (test code = 20.1 %       38.4-49.3    L            



             4544-3)                                             

 

             MCH (test code = 785-6) 22.1 pg      26.1-32.7    L            

 

             MCV (test code = 787-2) 76.4 fL      81.7-95.6    L            

 

             MCHC (test code = 28.9 g/dL    31.2-35.0    L            



             786-4)                                              

 

             PLT (test code = 777-3) 61           See_Comment  L             [Au

tomated message]



                                                                 The system [employer]



                                                                 generated this 

result



                                                                 transmitted ref

erence



                                                                 range: 150 - 32

8



                                                                 10*3/?L. The



                                                                 reference range

 was



                                                                 not used to int

erpret



                                                                 this result as



                                                                 normal/abnormal

.

 

             MPV (test code =                                        Not Measure

d



             36835-0)                                            

 

             RDW-CV (test code = 21.4 %       12.1-15.4    H            



             788-0)                                              

 

             RDW-SD (test code = 59.4 fL      38.5-51.6    H            



             45441-2)                                            

 

             NRBC x10^3 (test code =              See_Comment                [Au

tomated message]



             8919786625)                                         The system [employer]



                                                                 generated this 

result



                                                                 transmitted ref

erence



                                                                 range: 10*3/?L.

 The



                                                                 reference range

 was



                                                                 not used to int

erpret



                                                                 this result as



                                                                 normal/abnormal

.

 

             NRBC/100 WBC (test code 0.0          See_Comment                [Au

tomated message]



             = [id number])                                        The system [employer]



                                                                 generated this 

result



                                                                 transmitted ref

erence



                                                                 range: 0.0 - 10

.0



                                                                 /100 WBCs. The



                                                                 reference range

 was



                                                                 not used to int

erpret



                                                                 this result as



                                                                 normal/abnormal

.

 

             IPF % (test code = 4.9 %        1.2-10.7                  Platelet 

count



             [id number])                                         measured by



                                                                 fluorescence me

thod.

 

             Lab Interpretation Abnormal                               



             (test code = 09474-6)                                        



[facility] WestType [facility] Screen - The Type and Screen expires 
at midnight on the 3rd day after it was drawn. A current Type and Screen is 
required when RBCs are requested. For all other blood products, a Type and Scree
n performed during the current hospitalizati...2023 21:57:00





             Test Item    Value        Reference Range Interpretation Comments

 

             ABO & RH (test code = 20) O POSITIVE                             

 

             IAT (test code = 1185) Negative                               



[facility] WestEGD2022-07-14 15:59:00TEXTPatient Name UMU TABARESte of Birth 1970Record Number [id number]/Time of Procedure 
2022, 3:59:00 PMEndoscopist Tressa Santoro MD./Fellow 
Jorge Moreno INDICATIONS FOR EXAMINATION: Anemia unspecified. PROCEDURE 
PERFORMED:  EGD - EGD, diagnostic INSTRUMENTS: [id number]: None 
TOLERANCE: Good VISUALIZATION: Good MEDICATIONS: MAC AnesthesiaASA CLASSIFI
CATION: IIIANALGESIA: TIVA by anesthesia PROCEDURE TECHNIQUE:Prior to the 
procedure, a History and Physical was performed, and patient medications and 
allergies were reviewed. The risks and benefits ofthe procedure and the sedation
options and risks were discussed with the patient. Consent was obtained. Pulse, 
blood oxygen saturation, and blood pressure were monitored throughout the 
procedure. Afteradequate sedation, the endoscope was introduced through the 
mouth and under direct visualization advanced to the Second Part of Duodenum. 
Careful examination of the esophagus, stomach and duodenum was performed. 
FINDINGS:The upper, middle, and lower esophagus appeared normal. There were 
small varices that flattened with insufflationThe GE junction was normal.The 
gastric cardia, fundus, and body were normal.The were two 5 mm clean based 
ulcers at the distal antrum. No biopsies were taken.The pylorus,duodenal bulb, 
and descending duodenum through the second portion of the duodenum appeared 
normal. SPECIMEN COLLECTED: No ENDOSCOPIC DIAGNOSIS:Small gastric varices Two 
distal antrum 5mm clean based ulcers RECOMMENDATIONS:Test for H. Pylori with 
stool antigen. Treat if positive. Start PPI BID x 6-8 weeks Follow up outpatient
with gastroenterology clinic COMPLICATIONS:None. Intra-procedure complication: 
none; ESTIMATED BLOOD LOSS: None BLOOD PRODUCTS ADMINISTERED: NoneGRAFT/IMPLANT:
None TOTAL PROCEDURE TIME: TOTAL SEDATION TIME: COMMENTS: CPT CODE:46876-IA 
Esophagogastroduodenoscopy, flexible, transoral; diagnostic, including 
collection of specimen(s) by brushing or washing, when peICD CODE:D64.9 Anemia, 
unspecified I was present during the entire viewing portion of the procedure. I 
personally reviewed the images and report prepared by the resident or fellow and
agree with the findings. After the procedure was completed, the patient was 
taken to the recovery in good condition. This Procedure was electronically 
signed of on :10/13/2022 1:52:32 PM By Tressa Broussard [facility]
2022 15:59:00TEXTPatient Name UMU TABARES of Birth 
1970Record Number [id number]/Time of Procedure 2022, 3:59:00 
PMEndoscopist Tressa Santoro MD./Fellow Jorge OLIVARES FOR EXAMINATION: Anemia unspecified. PROCEDURE PERFORMED: EGD - EGD, 
diagnostic INSTRUMENTS:[id number]: None TOLERANCE: Good VISUALIZATION: 
Good MEDICATIONS: MAC AnesthesiaASA CLASSIFICATION: IIIANALGESIA: TIVA by 
anesthesia PROCEDURE TECHNIQUE:Prior to the procedure, a History and Physical 
was performed, and patient medications and allergies were reviewed. The risks 
and benefits of the procedure and the sedation options and risks were discussed 
with the patient. Consent was obtained. Pulse, blood oxygen saturation, and 
blood pressure were monitored throughout the procedure. After adequate sedation,
the endoscope was introduced through the mouth and under direct visualization 
advanced to the Second Part of Duodenum. Careful examination of the esophagus, 
stomach and duodenum was performed. FINDINGS:The upper, middle, and lower 
esophagus appeared normal. There were small varices that flattened with 
insufflationThe GE junction was normal.The gastric cardia, fundus, and body were
normal.The were two 5 mm clean based ulcers at the distal antrum. No biopsies 
were taken.The pylorus, duodenal bulb, and descending duodenum through the 
second portion of the duodenum appeared normal. SPECIMEN COLLECTED: No 
ENDOSCOPIC DIAGNOSIS:Small gastric varices Two distal antrum 5mm clean based 
ulcers RECOMMENDATIONS:Test for H. Pylori with stool antigen. Treat if positive.
Start PPI BID x 6-8 weeks Follow up outpatient with gastroenterology clinic 
COMPLICATIONS:None. Intra-procedure complication: none; ESTIMATED BLOOD LOSS: 
None BLOOD PRODUCTS ADMINISTERED: NoneGRAFT/IMPLANT: None TOTAL PROCEDURE TIME: 
TOTAL SEDATION TIME: COMMENTS: CPT CODE:71010-WV Esophagogastroduodenoscopy, 
flexible, transoral; diagnostic, including collection of specimen(s) by brushing
or washing, when peICD CODE:D64.9 Anemia, unspecified I was present during the 
entire viewing portion of the procedure. I personally reviewed the images and 
report prepared by the resident or fellow and agree with the findings. After the
procedure was completed, the patient was taken to the recovery in good 
condition. This Procedure waselectronically signed of on :10/13/2022 1:52:32 PM 
By Tressa Heller [facility] HospitalDriftToIt RBC Units, 1 [id number] 
11:42:00





             Test Item    Value        Reference Range Interpretation Comments

 

             RBC UNITS (test code = compatible                             



             05597367)                                           

 

             Unit ABO Type (test code = O                                      



             78750386)                                           

 

             Unit Rh Type (test code = POS                                    



             38217738)                                           

 

             Product Code (test code =                                   



             34670417)                                           

 

             Unit Number (test code = [id number]                           



             08689591)                                           

 

             Unit Status (test code = transfused                             



             37849032)                                           

 

             ISBT Product Code (test code = Y1997O02                            

   



             03580)                                              

 

             Blood Type (test code = 27494) 5100                                

   

 

             Blood Expiration Date (test                            



             code = 29458)                                        



[place] [facility] RBC Units, 1 [id number] 11:42:00





             Test Item    Value        Reference Range Interpretation Comments

 

             RBC UNITS (test code = compatible                             



             83249849)                                           

 

             Unit ABO Type (test code = O                                      



             52051227)                                           

 

             Unit Rh Type (test code = POS                                    



             75058903)                                           

 

             Product Code (test code =                                   



             70350131)                                           

 

             Unit Number (test code = [id number]                           



             33010784)                                           

 

             Unit Status (test code = transfused                             



             73983763)                                           

 

             ISBT Product Code (test code = V9191U47                            

   



             57819)                                              

 

             Blood Type (test code = 18951) 5100                                

   

 

             Blood Expiration Date (test                            



             code = 29101)                                        



[facility] RBC Units, 1 [id number] 11:42:00





             Test Item    Value        Reference Range Interpretation Comments

 

             RBC UNITS (test code = compatible                             



             33222245)                                           

 

             Unit ABO Type (test code = O                                      



             68156424)                                           

 

             Unit Rh Type (test code = POS                                    



             70727763)                                           

 

             Product Code (test code =                                   



             70854896)                                           

 

             Unit Number (test code = [id number]                           



             83147396)                                           

 

             Unit Status (test code = transfused                             



             89656545)                                           

 

             ISBT Product Code (test code = W3456O17                            

   



             34641)                                              

 

             Blood Type (test code = 22478) 5100                                

   

 

             Blood Expiration Date (test                            



             code = 77854)                                        



[facility] [id number] 10:08:00





             Test Item    Value        Reference Range Interpretation Comments

 

             Apheresis Platelets, not required                           



             Leukoreduced (test code =                                        



             38277505)                                           

 

             Unit ABO Type (test code = A                                      



             88161852)                                           

 

             Unit Rh Type (test code = NEG                                    



             23261990)                                           

 

             Product Code (test code =                                   



             46415733)                                           

 

             Unit Number (test code = [id number]                           



             14072530)                                           

 

             Unit Status (test code = transfused                             



             36085853)                                           

 

             ISBT Product Code (test code = L1235H83                            

   



             32029)                                              

 

             Blood Type (test code = 03107) 0600                                

   

 

             Blood Expiration Date (test                            



             code = 45457)                                        



[facility] [id number] 10:08:00





             Test Item    Value        Reference Range Interpretation Comments

 

             Apheresis Platelets, not required                           



             Leukoreduced (test code =                                        



             81509091)                                           

 

             Unit ABO Type (test code = A                                      



             46981329)                                           

 

             Unit Rh Type (test code = NEG                                    



             25724823)                                           

 

             Product Code (test code =                                   



             09245553)                                           

 

             Unit Number (test code = [id number]                           



             77992392)                                           

 

             Unit Status (test code = transfused                             



             59083938)                                           

 

             ISBT Product Code (test code = W5395B33                            

   



             48355)                                              

 

             Blood Type (test code = 58978) 0600                                

   

 

             Blood Expiration Date (test                            



             code = 64016)                                        



[facility] [id number] 10:08:00





             Test Item    Value        Reference Range Interpretation Comments

 

             Apheresis Platelets, not required                           



             Leukoreduced (test code =                                        



             77392062)                                           

 

             Unit ABO Type (test code = A                                      



             40813204)                                           

 

             Unit Rh Type (test code = NEG                                    



             76527785)                                           

 

             Product Code (test code =                                   



             54473250)                                           

 

             Unit Number (test code = [id number]                           



             46565327)                                           

 

             Unit Status (test code = transfused                             



             54269870)                                           

 

             ISBT Product Code (test code = S9329C64                            

   



             38629)                                              

 

             Blood Type (test code = 82684) 0600                                

   

 

             Blood Expiration Date (test [id number]                           



             code = 16644)                                        



[facility] [id number] 13:45:44





             Test Item    Value        Reference Range Interpretation Comments

 

             COVID-19 (SARS-COV-2) Detected     Not Detected A            INTERP

RETATION: This



             (test code = 81779-7)                                        patien

t's sample had



                                                                 detectable RNA 

present



                                                                 for the SARS-Co

V-2



                                                                 Coronavirus (CO

VID-19).



                                                                 A result with



                                                                 detectable RNA 

does not



                                                                 indicate the se

verity



                                                                 of infection. T

his



                                                                 result should b

e



                                                                 interpreted in



                                                                 conjunction wit

h



                                                                 clinical, radio

graphic,



                                                                 and other labor

atory



                                                                 findings and sh

ould not



                                                                 be used as the 

sole



                                                                 indicator of ac

tive



                                                                 infection with



                                                                 SARS-CoV-2 Citlalli

navirus



                                                                 (COVID-19). COM

MENT:



                                                                 This Hologic Ap

saulo



                                                                 SARS-CoV-2 mole

cular



                                                                 diagnostic assa

y



                                                                 utilizes Transc

ription



                                                                 Mediated Amplif

ication



                                                                 (TMA) technolog

y to



                                                                 rapidly detect 

the



                                                                 SARS-CoV-2 (COV

ID-19)



                                                                 virus from resp

iratory



                                                                 samples. In acc

ordance



                                                                 with the FDA's 

guidance



                                                                 document "Polic

y for



                                                                 Diagnostic Test

s for



                                                                 Coronavirus



                                                                 Disease-2019 du

ring the



                                                                 Public Health



                                                                 Emergency", thi

s test



                                                                 was developed, 

and its



                                                                 performance



                                                                 characteristics

 were



                                                                 verified by the

 [facility]

lar



                                                                 diagnostics lab

oratory



                                                                 and is authoriz

ed for



                                                                 clinical diagno

stic



                                                                 use. This labor

atory is



                                                                 certified under

 the



                                                                 Clinical Labora

tory



                                                                 Improvement Cira

ndments



                                                                 (CLIA) as quali

fied to



                                                                 perform high co

mplexity



                                                                 clinical labora

tory



                                                                 testing.

 

             Lab Interpretation Abnormal                               



             (test code = 69312-7)                                        



[id number]





             Test Item    Value        Reference Range Interpretation Comments

 

             COVID-19 (SARS-COV-2) Detected     Not Detected A            INTERP

RETATION: This



             (test code = 93417-1)                                        patien

t's sample had



                                                                 detectable RNA 

present



                                                                 for the SARS-Co

V-2



                                                                 Coronavirus (CO

VID-19).



                                                                 A result with



                                                                 detectable RNA 

does not



                                                                 indicate the se

verity



                                                                 of infection. T

his



                                                                 result should b

e



                                                                 interpreted in



                                                                 conjunction wit

h



                                                                 clinical, radio

graphic,



                                                                 and other labor

atory



                                                                 findings and sh

ould not



                                                                 be used as the 

sole



                                                                 indicator of ac

tive



                                                                 infection with



                                                                 SARS-CoV-2 Citlalli

navirus



                                                                 (COVID-19). COM

MENT:



                                                                 This Hologic Ap

saulo



                                                                 SARS-CoV-2 mole

cular



                                                                 diagnostic assa

y



                                                                 utilizes Transc

ription



                                                                 Mediated Amplif

ication



                                                                 (TMA) technolog

y to



                                                                 rapidly detect 

the



                                                                 SARS-CoV-2 (COV

ID-19)



                                                                 virus from resp

iratory



                                                                 samples. In acc

ordance



                                                                 with the FDA's 

guidance



                                                                 document "Polic

y for



                                                                 Diagnostic Test

s for



                                                                 Coronavirus



                                                                 Disease-2019 du

ring the



                                                                 Public Health



                                                                 Emergency", thi

s test



                                                                 was developed, 

and its



                                                                 performance



                                                                 characteristics

 were



                                                                 [facility] the

 [facility]

[facility]



                                                                 [facility]

oratory



                                                                 and is authoriz

ed for



                                                                 clinical diagno

stic



                                                                 use. This labor

atory is



                                                                 certified under

 the



                                                                 Clinical Labora

tory



                                                                 Improvement Cira

ndments



                                                                 (CLIA) as quali

fied to



                                                                 perform high co

mplexity



                                                                 clinical labora

tory



                                                                 testing.

 

             Lab Interpretation Abnormal                               



             (test code = 56981-0)                                        



[facility] [id number] 13:45:44





             Test Item    Value        Reference Range Interpretation Comments

 

             COVID-19 (SARS-COV-2) Detected     Not Detected A            INTERP

RETATION: This



             (test code = 07136-6)                                        patien

t's sample had



                                                                 detectable RNA 

present



                                                                 for the SARS-Co

V-2



                                                                 Coronavirus (CO

VID-19).



                                                                 A result with



                                                                 detectable RNA 

does not



                                                                 indicate the se

verity



                                                                 of infection. T

his



                                                                 result should b

e



                                                                 interpreted in



                                                                 conjunction wit

h



                                                                 clinical, radio

graphic,



                                                                 and other labor

atory



                                                                 findings and sh

ould not



                                                                 be used as the 

sole



                                                                 indicator of ac

tive



                                                                 infection with



                                                                 SARS-CoV-2 Citlalli

navirus



                                                                 (COVID-19). COM

MENT:



                                                                 This Hologic Ap

saulo



                                                                 SARS-CoV-2 mole

cular



                                                                 diagnostic assa

y



                                                                 utilizes Transc

ription



                                                                 Mediated Amplif

ication



                                                                 (TMA) technolog

y to



                                                                 rapidly detect 

the



                                                                 SARS-CoV-2 (COV

ID-19)



                                                                 virus from resp

iratory



                                                                 samples. In acc

ordance



                                                                 with the FDA's 

guidance



                                                                 document "Polic

y for



                                                                 Diagnostic Test

s for



                                                                 Coronavirus



                                                                 Disease-2019 du

ring the



                                                                 Public Health



                                                                 Emergency", thi

s test



                                                                 was developed, 

and its



                                                                 performance



                                                                 characteristics

 were



                                                                 verified by the

 [facility]

[facility]



                                                                 [facility]

oratory



                                                                 and is authoriz

ed for



                                                                 clinical diagno

stic



                                                                 use. This labor

atory is



                                                                 certified under

 the



                                                                 Clinical Labora

tory



                                                                 Improvement Cira

ndments



                                                                 (CLIA) as quali

fied to



                                                                 perform high co

mplexity



                                                                 clinical labora

tory



                                                                 testing.

 

             Lab Interpretation Abnormal                               



             (test code = 96608-8)                                        



[facility]-CoV-2 ORF1ab Resp Ql WILLIAM+[id number] 13:45:44





             Test Item    Value        Reference Range Interpretation Comments

 

             Hospitalized? (test Yes                                    



             code = 26733-6)                                        

 

             ICU? (test code = No                                     



             49905-7)                                            

 

             Symptomatic as defined No                                     



             by CDC? (test code =                                        



             90024-4)                                            

 

             Employed in  No                                     



             Healthcare? (test code                                        



             = 26531-3)                                          

 

             Resident in a No                                     



             congregate care                                        



             setting (including                                        



             nursing homes,                                        



             residential care for                                        



             people with                                         



             intellectual and                                        



             developmental                                        



             disabilities,                                        



             psychiatric treatment                                        



             facilities, group                                        



             homes, board and care                                        



             homes, homeless                                        



             shelter, foster care                                        



             or other): (test code                                        



             = 25205-6)                                          

 

             SARS-CoV-2 ORF1ab Resp DETECTED     Not Detected A            INTER

PRETATION: This



             Ql WILLIAM+probe (test                                        patient's

 sample had



             code = 05531-5)                                        detectable R

NA present



                                                                 for the SARS-Co

V-2



                                                                 Coronavirus (CO

VID-19).



                                                                 A result with



                                                                 detectable RNA 

does not



                                                                 indicate the se

verity



                                                                 of infection. T

his



                                                                 result should b

e



                                                                 interpreted in



                                                                 conjunction wit

h



                                                                 clinical, radio

graphic,



                                                                 and other labor

atory



                                                                 findings and sh

ould not



                                                                 be used as the 

sole



                                                                 indicator of ac

tive



                                                                 infection with



                                                                 SARS-CoV-2 Citlalli

navirus



                                                                 (COVID-19). COM

MENT:



                                                                 This Hologic Ap

saulo



                                                                 SARS-CoV-2 mole

cular



                                                                 diagnostic assa

y



                                                                 utilizes Transc

ription



                                                                 Mediated Amplif

ication



                                                                 (TMA) technolog

y to



                                                                 rapidly detect 

the



                                                                 SARS-CoV-2 (COV

ID-19)



                                                                 virus from resp

iratory



                                                                 samples. In acc

ordance



                                                                 with\XC2A0\the 

FDA's



                                                                 guidance docume

nt



                                                                 "Policy for Mikayla

gnostic



                                                                 Tests for Coron

avirus



                                                                 Disease-2019 du

ring the



                                                                 Public Health



                                                                 Emergency", jenni salcido test



                                                                 was developed, 

and its



                                                                 performance



                                                                 characteristics

 were



                                                                 verified by the

 [facility] molecu

lar



                                                                 diagnostics lab

oratory



                                                                 and is authoriz

ed for



                                                                 clinical diagno

stic



                                                                 use. \XC2A0\Jenni salcido



                                                                 laboratory is c

ertified



                                                                 under the [facility]

tyler



                                                                 Laboratory Impr

ovement



                                                                 Amendments (CLI

A) as



                                                                 qualified to pe

rform



                                                                 high complexity



                                                                 clinical labora

tory



                                                                 testing.



HHSHAV IgG Ser [id number] 15:17:48





             Test Item    Value        Reference Range Interpretation Comments

 

             HAV IgG Ser Ql (test code = 90509-5) POSITIVE     Negative     A   

         



[facility] Lead [id number] 09:29:5912 LEAD EKG FOR [facility] 
Test Date: 7926-99-42Uel Name: [facility] Department: 5BMSPatient ID: 
[id number]  Room: Gender: M Technician: SusieieDOB: 1970 Requested By: 
MARCY Massey Number: [id number] Reading MD: Linda Obrien 
MeasurementsIntervals Axis Rate: 86 P: -2PR: 97  QRS: 50QRSD: 84 T: 24QT: 428 
QTc: 471  Interpretive StatementsSINUS RHYTHM WITH SHORT [place] INTERVALPROLONGED QT
INTERVALElectronically Signed On 2022 14:22:26 CDT by Linda Abrams
[place] [address] Lead [id number] 09:29:5912 LEAD EKG FOR [facility] Test Date: 8194-08-08Oxl Name: [facility] Department: 5BMSPatient 
ID: [id number]  Room: Gender: M Technician: AnnieDOB: 1970 Requested By: 
MARCY Massey Number: [id number] Reading MD: Linda Obrien 
MeasurementsIntervals Axis Rate: 86 P: -2PR: 97  QRS: 50QRSD: 84 T: 24QT: 428 
QTc: 471  Interpretive StatementsSINUS RHYTHM WITH SHORT W[place] INTERVALPROLONGED QT
INTERVALElectronically Signed On 2022 14:22:26 CDT by Linda [facility] [address] Lead [id number] 09:29:5912 LEAD EKG FOR P [facility] Test Date: 2227-53-76Zqt Name: UMU TABARES Department: 5BMSPatient 
ID: [id number]  Room: Gender: M Technician: ToshiaOB: 1970 Requested By: 
MARCY Massey Number: [id number] Reading MD: Linda Obrien 
MeasurementsIntervals Axis Rate: 86 P: -2PR: 97  QRS: 50QRSD: 84 T: 24QT: 428 
QTc: 471  Interpretive StatementsSINUS RHYTHM WITH SHORT [place] INTERVALPROLONGED QT
INTERVALElectronically Signed On 2022 14:22:26 CDT by Linda [facility] GLUCOSE POC docked device2022-07-11 09:15:17





             Test Item    Value        Reference Range Interpretation Comments

 

             Glucose POC (test code = 58190687) 137 mg/dL           H     

       

 

             Lab Interpretation (test code = Abnormal                           

    



             01218-4)                                            



[facility] GLUCOSE POC docked device2022-07-11 09:15:17





             Test Item    Value        Reference Range Interpretation Comments

 

             Glucose POC (test code = 41646716) 137 mg/dL           H     

       

 

             Lab Interpretation (test code = Abnormal                           

    



             80462-5)                                            



[facility] GLUCOSE POC docked device2022-07-11 09:15:17





             Test Item    Value        Reference Range Interpretation Comments

 

             Glucose POC (test code = [id number]) 137 mg/dL           H     

       

 

             Lab Interpretation (test code = Abnormal                           

    



             44544-2)                                            



[id number]





             Test Item    Value        Reference Range Interpretation Comments

 

             T pallidum Ab Ser Ql Aggl (test NEGATIVE     Negative, Equivocal   

           



             code = 78470-3)                                        

 

             Reagin+T pallidum IgG+IgM NEGATIVE     Negative                  



             SerPl-Imp (test code = 80773-7)                                    

    



HHSHIV 1+2 Ab+HIV1 p24 Ag SerPl Ql [id number] 05:50:04





             Test Item    Value        Reference Range Interpretation Comments

 

             HIV 1+2 Ab+HIV1 p24 Ag SerPl Ql IA NEGATIVE     Negative           

       



             (test code = 29252-8)                                        



[id number]-XfB-4 ORF1ab Resp Ql WILLIAM+[id number] 04:05:45





             Test Item    Value        Reference Range Interpretation Comments

 

             Hospitalized? (test No                                     



             code = 21162-4)                                        

 

             ICU? (test code = No                                     



             53433-6)                                            

 

             Symptomatic as No                                     



             defined by CDC?                                        



             (test code =                                        



             17486-8)                                            

 

             Employed in  No                                     



             Healthcare? (test                                        



             code = 74993-9)                                        

 

             Resident in a No                                     



             congregate care                                        



             setting (including                                        



             nursing homes,                                        



             residential care for                                        



             people with                                         



             intellectual and                                        



             developmental                                        



             disabilities,                                        



             psychiatric                                         



             treatment                                           



             facilities, group                                        



             homes, board and                                        



             care homes, homeless                                        



             shelter, foster care                                        



             or other): (test                                        



             code = 90462-5)                                        

 

             SARS-CoV-2 ORF1ab NOT DETECTED Not Detected              INTERPRETA

TION: No



             Resp Ql WILLIAM+probe                                        detectable

 levels of



             (test code =                                        SARS-CoV-2



             08215-6)                                            Coronavirus



                                                                 (COVID-19) were



                                                                 present in this



                                                                 patient's sampl

e by



                                                                 this test. A no

t



                                                                 detected result

 does



                                                                 not exclude the



                                                                 possibility of 

active



                                                                 infection with 

this



                                                                 virus due to ot

her



                                                                 factors that ma

y



                                                                 affect the resu

lts



                                                                 such as a poorl

y



                                                                 collected sampl

e,



                                                                 viral titers be

low



                                                                 the limit of



                                                                 detection of th

e



                                                                 assay, and the



                                                                 infrequent



                                                                 possibility of



                                                                 inhibitors in t

he



                                                                 sample. This re

sult



                                                                 should be inter

preted



                                                                 in conjunction 

with



                                                                 clinical,



                                                                 radiographic, a

nd



                                                                 other laborator

y



                                                                 findings and sh

ould



                                                                 not be used as 

the



                                                                 sole indicator 

of



                                                                 active infectio

n with



                                                                 SARS-CoV-2



                                                                 Coronavirus



                                                                 (COVID-19).COMM

ENT:



                                                                 This Hologic Ap

saulo



                                                                 SARS-CoV-2 mole

cular



                                                                 diagnostic assa

y



                                                                 utilizes



                                                                 Transcription



                                                                 Mediated



                                                                 Amplification (

TMA)



                                                                 technology to r

apidly



                                                                 detect the SARS

-CoV-2



                                                                 (COVID-19) viru

s from



                                                                 respiratory shahriar

ples.



                                                                 In accordance



                                                                 with\XC2A0\the 

FDA's



                                                                 guidance docume

nt



                                                                 "Policy for



                                                                 Diagnostic Test

s for



                                                                 Coronavirus



                                                                 Disease-2019 du

ring



                                                                 the Public Heal

th



                                                                 Emergency", jenni

s test



                                                                 was developed, 

and



                                                                 its performance



                                                                 characteristics

 were



                                                                 verified by the



                                                                 [facility]



                                                                 molecular diagn

ostics



                                                                 laboratory and 

is



                                                                 authorized for



                                                                 clinical diagno

stic



                                                                 use. \XC2A0\Jenni

s



                                                                 laboratory is



                                                                 certified under

 the



                                                                 Clinical Labora

tory



                                                                 Improvement



                                                                 Amendments (CLI

A) as



                                                                 qualified to pe

rform



                                                                 high complexity



                                                                 clinical labora

tory



                                                                 testing.



HHS12 Lead [id number] 17:14:4312 LEAD EKG FOR [facility] 
Test Date:  Name: [facility] Department: 55[address]Patient ID: 
[id number] Room:  POD F 01Gender: M Technician: [id number]: 1970 Requested
By: DENISE Saravia Number: [id number] Reading MD: Linda Obrien 
MeasurementsIntervals  Axis Rate: 91 P: 24PR: 124 QRS: 50QRSD: 89 T: 41QT: 375 
QTc: 423 Interpretive StatementsSINUS RHYTHMElectronically Signed On 2022 
21:25:07 CDT by Linda ObrienModuleQ12 Lead [id number] 17:14:4312
LEAD EKG FOR [facility] Test Date:  Name: 
[facility]  Department: 5520Patient ID: [id number] Room:  POD F Gender: 
M Technician: [id number]: 1970 Requested By: DENISE Saravia Number: 
[id number] Reading MD: Linda Obrien MeasurementsIntervals Axis Rate: 91  P: 
24PR: 124 QRS: 50QRSD: 89 T: 41QT: 375  QTc: 423 Interpretive StatementsSINUS 
RHYTHMElectronically Signed On 2022 21:25:07 CDT by Linda Obrien ReviewZAP12 Lead [id number] 17:14:4312 LEAD EKG FOR [facility] Test Date:  Name: [facility] Department: 55[address]Patient 
ID:  [id number] Room:  POD F 01Gender: M Technician: [id number]: 1970 
Requested By: DENISE Saravia Number: [id number] Reading MD: Linda Obrien 
MeasurementsIntervals Axis Rate: 91 P:  24PR: 124 QRS: 50QRSD: 89 T: 41QT: 375 
QTc: 423  Interpretive StatementsSINUS RHYTHMElectronically Signed On 2022 
21:25:07 CDT by Linda Obrien The Invisible ArmorALBUMIN BODY [id number] 
17:50:22





             Test Item    Value        Reference Range Interpretation Comments

 

             ALBUMIN BF (test code 279.0 mg/dL                            



             = [id number])                                        

 

             RENETTA (test code = RENETTA) Result interpreted                           



                          relative to the serum                           



                          concentration. ?                           



[facility] [id number] 
15:55:23COVID DMT InterpretationInterpretation/Recommendations:Molecular NAAT 
Tests for Active Infection with the SARS-CoV-2 Virus:The patient has currently 
tested negative for the SARS-CoV-2 virus that causesCOVID-19 illness. This most 
likely indicates that the patient does not have an active infection withthe 
SARS-CoV-2 virus. However, infection is not completely ruled out as the false 
negative rate for molecular NAAT testing using a nasopharyngeal sample can be up
to 30%, mostly dependent on the timingof sample collection in relation to 
illness onset and any deficiencies in sampling techniques. If the patient has 
symptoms concerning for COVID-19 illness, a repeat NAAT test (PCR, Rapid ID Now,
etc.) should be performed, at which time the SARS-CoV-2 virus - if present - may
have reached a detectable viral load (usually peaking by the end of the first 
week of symptoms). Tests for IgM and/or IgG Antibodies to the SARS-CoV-2 
Virus:If the patient develops COVID-19 illness in the future, testing for IgMand
IgG antibodies approximately 3 weeks after illness onset will likely indicate if
the patient hasproduced antibodies to the SARS-CoV-2 virus. However, some 
patients may take longer to develop detectable antibodies, while some patients 
who were infected with SARS-CoV-2 may never develop antibodies.While antibodies 
to SARS-CoV-2 may provide some degree of immunity, at this time the strength and
duration of the antibody response is unknown. 
------------------------------------------------------------------------ 
Interpretation Result Comments:These interpretation comments are based upon all 
COVID-19 testing the patient has had at [facility], including molecular NAAT testing 
(more commonly known as PCRtesting and Rapid ID Now testing) and antibody 
testing. It does not take into account any testing that a patient has had 
outside of the [facility] medical record. [facility] LABORATORY SERVICESCOVID 
MasgntvTYEJ-QgN-3 Rapid ID NOW (no units) ? ? Date ? ? ? ? ? ? ? ? ? ? Value ? ?
? ? ? ? ? 2021 ? ? ? ? ? ? ?Not Detected ? ? ? 2021 ? ? ? ? ? ? ? 
Not Detected ? ---------- [facility] LABORATORY SERVICESUnSeymour HospitalCBC with [id number] 12:19:02





             Test Item    Value        Reference Range Interpretation Comments

 

             WBC (test code =              See_Comment                [Automated



             6690-2)                                             message] The sy

stem



                                                                 which generated



                                                                 this result



                                                                 transmitted



                                                                 reference range

:



                                                                 4.20 - 10.70



                                                                 10*3/?L. The



                                                                 reference range

 was



                                                                 not used to



                                                                 interpret this



                                                                 result as



                                                                 normal/abnormal

.

 

             RBC (test code =              See_Comment  L             [Automated



             789-8)                                              message] The sy

stem



                                                                 which generated



                                                                 this result



                                                                 transmitted



                                                                 reference range

:



                                                                 4.26 - 5.52



                                                                 10*6/?L. The



                                                                 reference range

 was



                                                                 not used to



                                                                 interpret this



                                                                 result as



                                                                 normal/abnormal

.

 

             HGB (test code = 7.9 g/dL     12.2-16.4    L            



             718-7)                                              

 

             HCT (test code = 24.8 %       38.4-49.3    L            



             4544-3)                                             

 

             MCV (test code = 80.0 fL      81.7-95.6    L            



             787-2)                                              

 

             MCH (test code = 25.5 pg      26.1-32.7    L            



             785-6)                                              

 

             MCHC (test code = 31.9 g/dL    31.2-35.0                 



             786-4)                                              

 

             RDW-SD (test code = 72.4 fL      38.5-51.6    H            



             50560-7)                                            

 

             RDW-CV (test code = 24.9 %       12.1-15.4    H            



             788-0)                                              

 

             PLT (test code =              See_Comment  LL            [Automated



             777-3)                                              message] The sy

stem



                                                                 which generated



                                                                 this result



                                                                 transmitted



                                                                 reference range

:



                                                                 150 - 328 10*3/

?L.



                                                                 The reference r

moose



                                                                 was not used to



                                                                 interpret this



                                                                 result as



                                                                 normal/abnormal

.

 

             MPV (test code =                                        Not Measure

d



             73924-3)                                            

 

             IPF % (test code = 6.3 %        1.2-10.7                  Platelet 

count



             [id number])                                         measured by



                                                                 fluorescence



                                                                 method.

 

             NRBC/100 WBC (test              See_Comment                [Automat

ed



             code = 6575423347)                                        message] 

The system



                                                                 which generated



                                                                 this result



                                                                 transmitted



                                                                 reference range

:



                                                                 0.0 - 10.0 /100



                                                                 WBCs. The refer

ence



                                                                 range was not u

sed



                                                                 to interpret th

is



                                                                 result as



                                                                 normal/abnormal

.

 

             NRBC x10^3 (test code <0.01        See_Comment                [Auto

mated



             = [id number])                                        message] The s

ystem



                                                                 which generated



                                                                 this result



                                                                 transmitted



                                                                 reference range

:



                                                                 10*3/?L. The



                                                                 reference range

 was



                                                                 not used to



                                                                 interpret this



                                                                 result as



                                                                 normal/abnormal

.

 

             GRAN MAT (NEUT) % 59.8 %                                 



             (test code = 770-8)                                        

 

             IMM GRAN % (test code 0.20 %                                 



             = 3811863687)                                        

 

             LYMPH % (test code = 21.3 %                                 



             736-9)                                              

 

             MONO % (test code = 11.2 %                                 



             5905-5)                                             

 

             EOS % (test code = 6.5 %                                  



             713-8)                                              

 

             BASO % (test code = 1.0 %                                  



             706-2)                                              

 

             GRAN MAT x10^3(ANC) 3.11 10*3/uL 1.99-6.95                 



             (test code =                                        



             1555022028)                                         

 

             IMM GRAN x10^3 (test <0.03        0.00-0.06                 



             code = 5171290137)                                        

 

             LYMPH x10^3 (test code 1.11 10*3/uL 1.09-3.23                 



             = 731-0)                                            

 

             MONO x10^3 (test code 0.58 10*3/uL 0.36-1.02                 



             = 742-7)                                            

 

             EOS x10^3 (test code = 0.34 10*3/uL 0.06-0.53                 



             711-2)                                              

 

             BASO x10^3 (test code 0.05 10*3/uL 0.01-0.09                 



             = 704-7)                                            

 

             Lab Interpretation Abnormal                               



             (test code = 17993-9)                                        



[facility] Metabolic Panel (NA, K, CL, CO2, 
GLUCOSE, BUN, CREATININE, CA)2021 11:47:29





             Test Item    Value        Reference Range Interpretation Comments

 

             NA (test code = 135 mmol/L   135-145                   



             3105901801)                                         

 

             K (test code = 3.8 mmol/L   3.5-5.0                   



             7646802584)                                         

 

             CL (test code = 106 mmol/L                       



             4467532744)                                         

 

             CO2 TOTAL (test code = 26 mmol/L    23-31                     



             6737257452)                                         

 

             AGAP (test code =              2-16                      



             0712583023)                                         

 

             BUN (test code = 9 mg/dL      7-23                      



             3573491089)                                         

 

             GLUCOSE (test code = 103 mg/dL                        



             8638127157)                                         

 

             CREATININE (test code = 0.50 mg/dL   0.60-1.25    L            



             9280098777)                                         

 

             CALCIUM (test code = 7.7 mg/dL    8.6-10.6     L            



             2568101678)                                         

 

             eGFR (test code =              mL/min/1.73m2              



             3747651640)                                         

 

             RENETTA (test code = RENETTA) Association of                           



                          Glomerular Filtration                           



                          Rate (GFR) and Staging                           



                          of Kidney Disease*                           



                          +---------------------                           



                          --+-------------------                           



                          --+-------------------                           



                          ------+| GFR                           



                          (mL/min/1.73 m2) ?|                           



                          With Kidney Damage ?|                           



                          ?Without Kidney                           



                          Damage+---------------                           



                          --------+-------------                           



                          --------+-------------                           



                          ------------+| ?>90 ?                           



                          ? ? ? ? ? ? ? ?|                           



                          ?Stage one ? ? ? ? ?|                           



                          ? Normal ? ? ? ? ? ? ?                           



                          ?+--------------------                           



                          ---+------------------                           



                          ---+------------------                           



                          -------+| ?60-89 ? ? ?                           



                          ? ? ? ? ?| ?Stage two                           



                          ? ? ? ? ?| ? Decreased                           



                          GFR ? ? ? ?                            



                          +---------------------                           



                          --+-------------------                           



                          --+-------------------                           



                          ------+| ?30-59 ? ? ?                           



                          ? ? ? ? ?| ?Stage                           



                          three ? ? ? ?| ? Stage                           



                          three ? ? ? ? ?                           



                          +---------------------                           



                          --+-------------------                           



                          --+-------------------                           



                          ------+| ?15-29 ? ? ?                           



                          ? ? ? ? ?| ?Stage four                           



                          ? ? ? ? | ? Stage four                           



                          ? ? ? ? ?                              



                          ?+--------------------                           



                          ---+------------------                           



                          ---+------------------                           



                          -------+| ?<15 (or                           



                          dialysis) ? ?| ?Stage                           



                          five ? ? ? ? | ? Stage                           



                          five ? ? ? ? ?                           



                          ?+--------------------                           



                          ---+------------------                           



                          ---+------------------                           



                          -------+ *Each stage                           



                          assumes the associated                           



                          GFR level has been in                           



                          effect for at least                           



                          three months. ?Stages                           



                          1 to 5, with or                           



                          without kidney                           



                          disease, indicate                           



                          chronic kidney                           



                          disease. Notes:                           



                          Determination of                           



                          stages one and two                           



                          (with eGFR                             



                          >59mL/min/1.73 m2)                           



                          requires estimation of                           



                          kidney damage for at                           



                          least three months as                           



                          defined by structural                           



                          or functional                           



                          abnormalities of the                           



                          kidney, manifested by                           



                          either:Pathological                           



                          abnormalities or                           



                          Markers of kidney                           



                          damage (including                           



                          abnormalities in the                           



                          composition of the                           



                          blood or urine or                           



                          abnormalities in                           



                          imaging tests).                           

 

             Lab Interpretation Abnormal                               



             (test code = 88221-6)                                        



[facility] [id number] 07[facility]23:45





             Test Item    Value        Reference Range Interpretation Comments

 

             BF SEGS (test code = 2483011541) 4 %                               

     

 

             BF LYMPHS (test code = 7776329307) 17 %                            

       

 

             MACROPHAGE (test code = 7076591462) 69 %                           

        

 

             MESOS (test code = 4118551490) 10 %                                

   

 

             #CELS CNTD (test code = 6452864636)                                

        



[facility] [id number] 07:23:35





             Test Item    Value        Reference Range Interpretation Comments

 

             T.PROT BF (test 1109.0 mg/dL                           



             code =                                              



             0096311136)                                         

 

             UNSPUN BODY  Yellow                                 



             FLUID COLOR                                         



             (test code =                                        



             8497729381)                                         

 

             UNSPUN BODY  Slightly Cloudy                           



             FLUID CLARITY                                        



             (test code =                                        



             8049369741)                                         

 

             SPUN BODY FLUID Yellow                                 



             COLOR (test code                                        



             = 2325869836)                                        

 

             SPUN BODY FLUID Clear                                  



             CLARITY (test                                        



             code =                                              



             1713429397)                                         

 

             Sediment (test                                        The sediment



             code =                                              volume is <0.1



             8921247637)                                         mLs of the



                                                                 total fluid



                                                                 volume of 3mls



                                                                 and its color



                                                                 is red.

 

             RENETTA (test code = Test developed and                           



             RENETTA)         characteristics                           



                          determined by [facility]                           



                          Laboratory Services.                           



[facility] [id number] 07:19:43





             Test Item    Value        Reference Range Interpretation Comments

 

             BF COLOR (test Light Yellow                           



             code =                                              



             6352224833)                                         

 

             TURBIDITY (test Slightly Turbid                           



             code =                                              



             7687338916)                                         

 

             BF WBC Count              See_Comment                [Automated



             (test code =                                        message] The



             0513953152)                                         system which



                                                                 generated this



                                                                 result



                                                                 transmitted



                                                                 reference range

:



                                                                 /?L. The



                                                                 reference range



                                                                 was not used to



                                                                 interpret this



                                                                 result as



                                                                 normal/abnormal

.

 

             BF RBC Count <3000        See_Comment                [Automated



             (test code =                                        message] The



             0792912967)                                         system which



                                                                 generated this



                                                                 result



                                                                 transmitted



                                                                 reference range

:



                                                                 /?L. The



                                                                 reference range



                                                                 was not used to



                                                                 interpret this



                                                                 result as



                                                                 normal/abnormal

.

 

             RENETTA (test code = The reference range                           



             RENETTA)         and other method                           



                          performance                            



                          specifications have                           



                          not been established                           



                          for this body fluid.                           



                          ?The test results                           



                          must be integrated                           



                          into the clinical                           



                          context for                            



                          interpretation.                           



[facility] 
21:16:02





             Test Item    Value        Reference Range Interpretation Comments

 

             SARS-CoV-2 Rapid ID NOW Not Detected Not Detected              



             (test code = 48522-2)                                        

 

             RENETTA (test code = RENETTA) ID NOW COVID-19 Assay                        

   



                          is an isothermal                           



                          nucleic acid                           



                          amplification test                           



                          intended for the                           



                          qualitative detection                           



                          of nucleic acid from                           



                          SARS-CoV-2 viral RNA                           



                          in nasopharyngeal (NP)                           



                          specimens. It is used                           



                          under Emergency Use                           



                          Authorization (EUA) by                           



                          FDA. The limit of                           



                          detection (LOD) of the                           



                          assay is 125 Genome                           



                          Equivalents/mL. A                           



                          positive result is                           



                          indicative of the                           



                          presence of SARS-CoV-2                           



                          RNA. ?Clinical                           



                          correlation with                           



                          patient history and                           



                          other diagnostic                           



                          information is                           



                          necessary to determine                           



                          patient infection                           



                          status. A negative                           



                          (Not Detected) result                           



                          does not preclude                           



                          SARS-CoV-2 infection.                           



                          In patients with                           



                          clinical symptoms and                           



                          other tests that are                           



                          consistent with                           



                          SARS-CoV-2 infection,                           



                          negative results                           



                          should be treated as                           



                          presumptive negative                           



                          and a new specimen                           



                          should be tested with                           



                          alternative PCR                           



                          molecular test.                           



                          Invalid: Please                           



                          collect a new specimen                           



                          for repeat patient                           



                          testing if clinically                           



                          indicated.                             

 

             Lab Interpretation Normal                                 



             (test code = 21678-6)                                        



[facility] [id number] 
18:50:54 Cirrhotic liver morphology with evidence of portal hypertension 
withmoderate volume ascites, large esophageal varices and splenomegalyunchanged 
compared to prior. Since 2021, slight decreased now small left pleural 
effusion. Preliminary Report Dictated by Resident: Amy Bezold I, Renee Agrawal MD., have reviewed this study and agree with theabove report.EXAM: CT
ABDOMEN AND PELVIS WITH CONTRAST HISTORY: 51 years-old Male presenting with 
history of cirrhosis andworsening abdominal distension COMPARISON: 2021 
TECHNIQUE AND FINDINGS: Contiguous axial imaging from the level of the lungbases
through the proximal thighs was performed after the administration ofintravenous
contrast. Coronal and sagittal reconstructions were obtained. Auto mA and/or 
iterative reconstruction were used to reduce radiation dose. FINDINGS: LOWER 
THORAX: Small sized left pleural effusion, decreased compared toprior. Minimal 
loculated fluid is also seen tracking in the right minorfissure. The lungs bases
are clear. Mild cardiomegalyThere is nopericardial effusion. LIVER: Nodular 
liver contour, this partial generated enlargement of lateralsegment of the left 
lobe and caudate lobe. No focal hepatic lesions seen onthis single, portal 
venous phase. The portal veins are patent. GALLBLADDER AND BILIARY TREE: No bi
liary ductal dilation. Mild gallbladderwall thickening appears stable and likely
secondary to chronic liverdisease. SPLEEN: The spleen is enlarged, measuring 
approximately 14.8 cm. PANCREAS: No ductal dilation or masses. ADRENAL GLANDS: 
No adrenal nodules. KIDNEYS: No hydronephrosis, stones, or masses. GI TRACT: 
Diffuse submucosal wall thickening seen throughout the small andlarge bowel 
likely secondary to chronic portal hypertension. No abnormallydilated bowel. 
Normal-appearing appendix. PERITONEUMAND RETROPERITONEUM: Moderate to large 
volume ascites. Noextraluminal free air. LYMPH NODES: No lymphadenopathy. 
PELVIS/BLADDER: Unremarkable urinary bladder. VESSELS: Similar appearance of 
enlarged esophageal varices seen extendingalong the lower esophagus. BONES AND 
SOFT TISSUES: Diffuse body wall edema. No suspicious lytic orsclerotic bony 
lesions. [facility], Radiant Results Inft User - 2021 1:52PM CDTFormatting of 
this note might be different from the original.EXAM: CT ABDOMEN AND PELVIS WITH 
CONTRASTHISTORY: 51 years-old Male presenting with history of cirrhosis 
andworsening abdominal distension COMPARISON: 2021TECHNIQUE AND FINDINGS: 
Contiguous axial imaging from the level of the lungbases through the proximal 
thighs was performed after the administration ofintravenous contrast. Coronal 
and sagittal reconstructions were obtained. Auto mA and/or iterative 
reconstruction were used to reduce radiation dose.FINDINGS:LOWER THORAX: Small 
sized left pleural effusion, decreased compared toprior. Minimal loculated fluid
is also seen tracking in the right minorfissure. The lungs bases are clear. Mild
cardiomegalyThere is nopericardial effusion.LIVER: Nodular liver contour, this 
partial generated enlargement of lateralsegment of the left lobe and caudate 
lobe. No focal hepatic lesions seen onthis single, portal venous phase. The 
portal veins are patent.GALLBLADDER AND BILIARY TREE: No biliary ductal 
dilation. Mild gallbladderwall thickening appears stable and likely secondary to
chronic liverdisease.SPLEEN: The spleen is enlarged, measuring approximately 
14.8 cm.PANCREAS: No ductal dilation or masses.ADRENAL GLANDS: No adrenal 
nodules.KIDNEYS: No hydronephrosis, stones, or masses.GI TRACT: Diffuse 
submucosal wall thickening seen throughout the small andlarge bowel likely 
secondary to chronic portal hypertension. No abnormallydilated bowel. Normal-
appearing appendix. PERITONEUM AND RETROPERITONEUM: Moderate to large volume 
ascites. Noextraluminal free air.LYMPH NODES: No lymphadenopathy.PELVIS/BLADDER:
Unremarkable urinary bladder.VESSELS: Similar appearance of enlarged esophageal 
varices seen extendingalong the lower esophagus.BONES AND SOFT TISSUES: Diffuse 
body wall edema. No suspicious lytic orsclerotic bony 
lesions.IMPRESSIONCirrhotic liver morphology with evidence of portal hy
pertension withmoderate volume ascites, large esophageal varices and 
splenomegalyunchanged compared to prior.Since 2021, slight decreased now 
small left pleural effusion.Preliminary Report Dictatedby Resident: Amy BezoldI,
Renee Tang MD., have reviewed this study and agree with laura medellin.[facility] with [id number] 16:58:45





             Test Item    Value        Reference Range Interpretation Comments

 

             WBC (test code =              See_Comment                [Automated



             6690-2)                                             message] The sy

stem



                                                                 which generated



                                                                 this result



                                                                 transmitted



                                                                 reference range

:



                                                                 4.20 - 10.70



                                                                 10*3/?L. The



                                                                 reference range

 was



                                                                 not used to



                                                                 interpret this



                                                                 result as



                                                                 normal/abnormal

.

 

             RBC (test code =              See_Comment  L             [Automated



             789-8)                                              message] The sy

stem



                                                                 which generated



                                                                 this result



                                                                 transmitted



                                                                 reference range

:



                                                                 4.26 - 5.52



                                                                 10*6/?L. The



                                                                 reference range

 was



                                                                 not used to



                                                                 interpret this



                                                                 result as



                                                                 normal/abnormal

.

 

             HGB (test code = 9.1 g/dL     12.2-16.4    L            



             718-7)                                              

 

             HCT (test code = 28.1 %       38.4-49.3    L            



             4544-3)                                             

 

             MCV (test code = 79.2 fL      81.7-95.6    L            



             787-2)                                              

 

             MCH (test code = 25.6 pg      26.1-32.7    L            



             785-6)                                              

 

             MCHC (test code = 32.4 g/dL    31.2-35.0                 



             786-4)                                              

 

             RDW-SD (test code = 70.4 fL      38.5-51.6    H            



             66827-9)                                            

 

             RDW-CV (test code = 24.6 %       12.1-15.4    H            



             788-0)                                              

 

             PLT (test code =              See_Comment  LL            [Automated



             777-3)                                              message] The sy

stem



                                                                 which generated



                                                                 this result



                                                                 transmitted



                                                                 reference range

:



                                                                 150 - 328 10*3/

?L.



                                                                 The reference r

moose



                                                                 was not used to



                                                                 interpret this



                                                                 result as



                                                                 normal/abnormal

.

 

             MPV (test code =                                        Not Measure

d



             93054-2)                                            

 

             IPF % (test code = 7.9 %        1.2-10.7                  Platelet 

count



             0850217226)                                         measured by



                                                                 fluorescence



                                                                 method.

 

             NRBC/100 WBC (test              See_Comment                [Automat

ed



             code = 4953228607)                                        message] 

The system



                                                                 which generated



                                                                 this result



                                                                 transmitted



                                                                 reference range

:



                                                                 0.0 - 10.0 /100



                                                                 WBCs. The refer

ence



                                                                 range was not u

sed



                                                                 to interpret th

is



                                                                 result as



                                                                 normal/abnormal

.

 

             NRBC x10^3 (test code <0.01        See_Comment                [Auto

mated



             = 9774448167)                                        message] The s

ystem



                                                                 which generated



                                                                 this result



                                                                 transmitted



                                                                 reference range

:



                                                                 10*3/?L. The



                                                                 reference range

 was



                                                                 not used to



                                                                 interpret this



                                                                 result as



                                                                 normal/abnormal

.

 

             GRAN MAT (NEUT) % 70.6 %                                 



             (test code = 770-8)                                        

 

             IMM GRAN % (test code 0.20 %                                 



             = 4701057276)                                        

 

             LYMPH % (test code = 15.4 %                                 



             736-9)                                              

 

             MONO % (test code = 8.6 %                                  



             5905-5)                                             

 

             EOS % (test code = 4.4 %                                  



             713-8)                                              

 

             BASO % (test code = 0.8 %                                  



             706-2)                                              

 

             GRAN MAT x10^3(ANC) 4.17 10*3/uL 1.99-6.95                 



             (test code =                                        



             8944357130)                                         

 

             IMM GRAN x10^3 (test <0.03        0.00-0.06                 



             code = 5218593762)                                        

 

             LYMPH x10^3 (test code 0.91 10*3/uL 1.09-3.23    L            



             = 731-0)                                            

 

             MONO x10^3 (test code 0.51 10*3/uL 0.36-1.02                 



             = 742-7)                                            

 

             EOS x10^3 (test code = 0.26 10*3/uL 0.06-0.53                 



             711-2)                                              

 

             BASO x10^3 (test code 0.05 10*3/uL 0.01-0.09                 



             = 704-7)                                            

 

             TARGET CELLS (test 2+           See_Comment  A             [Automat

ed



             code = 32840-7)                                        message] The

 system



                                                                 which generated



                                                                 this result



                                                                 transmitted



                                                                 reference range

:



                                                                 (none). The



                                                                 reference range

 was



                                                                 not used to



                                                                 interpret this



                                                                 result as



                                                                 normal/abnormal

.

 

             Lab Interpretation Abnormal                               



             (test code = 32906-8)                                        



[id number]47





             Test Item    Value        Reference Range Interpretation Comments

 

             PROTIME PATIENT (test              See_Comment  H             [Auto

mated message]



             code = 5964-2)                                        The system wh

ich



                                                                 generated this 

result



                                                                 transmitted ref

erence



                                                                 range: 10.1 - 1

2.6



                                                                 Seconds. The



                                                                 reference range

 was



                                                                 not used to int

erpret



                                                                 this result as



                                                                 normal/abnormal

.

 

             INR (test code = 6301-6)                                        Nor

mal INR <1.1;



                                                                 Warfarin Therap

eutic



                                                                 range 2.0 to 3.

0 or



                                                                 2.5 to 3.5, dep

ending



                                                                 upon the indica

tions.

 

             Lab Interpretation (test Abnormal                               



             code = 46899-4)                                        



[facility] (ALB, T.PRO, BILI T, 
BU/BC, ALT, AST, ALK PHOS)2021 16:36:51





             Test Item    Value        Reference Range Interpretation Comments

 

             TOTAL BILI (test code = 5029274733) 4.4 mg/dL    0.1-1.1      H    

        

 

             BILI UNCON (test code = 7739238720) 2.7 mg/dL    0.1-1.1      H    

        

 

             BILI CONJ (test code = 1472777114) 0.1 mg/dL    0.0-0.3            

       

 

             T PROTEIN (test code = 4029289659) 7.1 g/dL     6.3-8.2            

       

 

             ALBUMIN (test code = 9455303819) 2.9 g/dL     3.5-5.0      L       

     

 

             ALK PHOS (test code = 4163079453) 204 U/L             H      

      

 

             ALTv (test code = 1742-6) 23 U/L       5-50                      

 

             AST(SGOT) (test code = 3304970383) 51 U/L       13-40        H     

       

 

             Lab Interpretation (test code = Abnormal                           

    



             50994-1)                                            



[facility] (NA, K, CL, CO2, 
GLUCOSE, BUN, CREATININE, CA)2021 16:36:50





             Test Item    Value        Reference Range Interpretation Comments

 

             NA (test code = 136 mmol/L   135-145                   



             0853896444)                                         

 

             K (test code = 3.1 mmol/L   3.5-5.0      L            



             7988079436)                                         

 

             CL (test code = 101 mmol/L                       



             2646161229)                                         

 

             CO2 TOTAL (test code = 26 mmol/L    23-31                     



             3678505430)                                         

 

             AGAP (test code =              2-16                      



             4223961770)                                         

 

             BUN (test code = 9 mg/dL      7-23                      



             4707111732)                                         

 

             GLUCOSE (test code = 118 mg/dL           H            



             3230935230)                                         

 

             CREATININE (test code = 0.56 mg/dL   0.60-1.25    L            



             3024205896)                                         

 

             CALCIUM (test code = 8.2 mg/dL    8.6-10.6     L            



             4310158990)                                         

 

             eGFR (test code =              mL/min/1.73m2              



             6674275577)                                         

 

             RENETTA (test code = RENETTA) Association of                           



                          Glomerular Filtration                           



                          Rate (GFR) and Staging                           



                          of Kidney Disease*                           



                          +---------------------                           



                          --+-------------------                           



                          --+-------------------                           



                          ------+| GFR                           



                          (mL/min/1.73 m2) ?|                           



                          With Kidney Damage ?|                           



                          ?Without Kidney                           



                          Damage+---------------                           



                          --------+-------------                           



                          --------+-------------                           



                          ------------+| ?>90 ?                           



                          ? ? ? ? ? ? ? ?|                           



                          ?Stage one ? ? ? ? ?|                           



                          ? Normal ? ? ? ? ? ? ?                           



                          ?+--------------------                           



                          ---+------------------                           



                          ---+------------------                           



                          -------+| ?60-89 ? ? ?                           



                          ? ? ? ? ?| ?Stage two                           



                          ? ? ? ? ?| ? Decreased                           



                          GFR ? ? ? ?                            



                          +---------------------                           



                          --+-------------------                           



                          --+-------------------                           



                          ------+| ?30-59 ? ? ?                           



                          ? ? ? ? ?| ?Stage                           



                          three ? ? ? ?| ? Stage                           



                          three ? ? ? ? ?                           



                          +---------------------                           



                          --+-------------------                           



                          --+-------------------                           



                          ------+| ?15-29 ? ? ?                           



                          ? ? ? ? ?| ?Stage four                           



                          ? ? ? ? | ? Stage four                           



                          ? ? ? ? ?                              



                          ?+--------------------                           



                          ---+------------------                           



                          ---+------------------                           



                          -------+| ?<15 (or                           



                          dialysis) ? ?| ?Stage                           



                          five ? ? ? ? | ? Stage                           



                          five ? ? ? ? ?                           



                          ?+--------------------                           



                          ---+------------------                           



                          ---+------------------                           



                          -------+ *Each stage                           



                          assumes the associated                           



                          GFR level has been in                           



                          effect for at least                           



                          three months. ?Stages                           



                          1 to 5, with or                           



                          without kidney                           



                          disease, indicate                           



                          chronic kidney                           



                          disease. Notes:                           



                          Determination of                           



                          stages one and two                           



                          (with eGFR                             



                          >59mL/min/1.73 m2)                           



                          requires estimation of                           



                          kidney damage for at                           



                          least three months as                           



                          defined by structural                           



                          or functional                           



                          abnormalities of the                           



                          kidney, manifested by                           



                          either:Pathological                           



                          abnormalities or                           



                          Markers of kidney                           



                          damage (including                           



                          abnormalities in the                           



                          composition of the                           



                          blood or urine or                           



                          abnormalities in                           



                          imaging tests).                           

 

             Lab Interpretation Abnormal                               



             (test code = 10786-5)                                        



[id number] 00:44:28





             Test Item    Value        Reference Range Interpretation Comments

 

             BF SEGS (test code = 4 %                                    



             4000669884)                                         

 

             BF LYMPHS (test code 24 %                                   



             = 9106599757)                                        

 

             MACROPHAGE (test 61 %                                   



             code = 5066259220)                                        

 

             MESOS (test code = 11 %                                   



             9477565647)                                         

 

             #CELS CNTD (test                                        



             code = 7362760822)                                        

 

             RENETTA (test code = Reviewed by HAILEE HUNTER MD, Director of                           



                          HEMATOPATHOLOGY                           



[facility]2021-[facility]-10 00:06:43





             Test Item    Value        Reference Range Interpretation Comments

 

             F-ACTIN (SMOOTH              See_Comment                If F-Actin 

(Smooth Muscle)



             MUSCLE) AB,                                         Antibody, IgG i

s negative,



             IGG(BEAKER) (test                                        the Smooth

 Muscle Antibody



             code = 61366-7)                                        titer by IFA

 is not



                                                                 performed.REFER

ENCE



                                                                 INTERVAL: F-Act

in (Smooth



                                                                 Muscle) Antibod

y, IgG by ?



                                                                 ? ? ? ? ? ? ? ?

 ?MARY LOU ?19



                                                                 Units or less .

......



                                                                 Negative ?20 - 

30 Units



                                                                 .......... Weak



                                                                 Positive-Sugges

t repeat ? ?



                                                                 ? ? ? ? ? ? ? ?

 ? ? ?



                                                                 testing in two 

to three



                                                                 weeks ? ? ? ? ?

 ? ? ? ? ? ?



                                                                 ? ? with fresh 

specimen.



                                                                 ?31 Units or gr

eater.....



                                                                 Positive-Sugges

tive of ? ?



                                                                 ? ? ? ? ? ? ? ?

 ? ? ?



                                                                 autoimmune hepa

titis type 1



                                                                 ? ? ? ? ? ? ? ?

 ? ? ? ? ?



                                                                 or chronic acti

ve



                                                                 hepatitis. F-ac

tin IgG



                                                                 antibodies have

 been shown



                                                                 to have increas

ed



                                                                 sensitivity for

 autoimmune



                                                                 hepatitis (AIH)

 but lower



                                                                 specificity suzette

n smooth



                                                                 muscle antibodi

es (SMA).



                                                                 F-actin IgG ant

ibodies can



                                                                 also be seen in



                                                                 SMA-negative di

sease



                                                                 controls (non-A

IH),



                                                                 especially in p

atients with



                                                                 primary biliary

 cirrhosis



                                                                 and chronic hep

atitis C



                                                                 infections. David

e patients



                                                                 with AIH may be



                                                                 SMA-positive bu

t negative



                                                                 for F-actin IgG

. Consider



                                                                 testing for SMA

 by IFA if



                                                                 suspicion for A

IH is



                                                                 strong.Performe

d By: [id number] [place] [address]



                                                                 [id number]

 Director:



                                                                 Margo Carlisle MD



                                                                 [Automated mess

age] The



                                                                 system which ge

nerated this



                                                                 result transmit

michelle



                                                                 reference range

: 0 - 19



                                                                 Units. The refe

rence range



                                                                 was not used to

 interpret



                                                                 this result as



                                                                 normal/abnormal

.



[facility] 00:06:42





             Test Item    Value        Reference Range Interpretation Comments

 

             AMA (test code =              See_Comment               REFERENCE I

NTERVAL:



             14251-3)                                            Mitochondrial (

M2) Antibody,



                                                                 IgG ? ?20.0 Uni

ts or less



                                                                 ......... Negat

david ?20.1 -



                                                                 24.9 Units.....

......



                                                                 Equivocal ?25.0

 Units or



                                                                 greater....... 

Positive



                                                                 Anti-mitochondr

ial



                                                                 antibodies (AMA

) are thought



                                                                 to be present i

n 90-95% of



                                                                 patients with p

rimary



                                                                 biliary cholang

itis (PBC).



                                                                 However, the fr

equency of



                                                                 detected antibo

dies may be



                                                                 cohort or assay

 dependent,



                                                                 as lower sensit

ivities have



                                                                 been reported. 

Not all PBC



                                                                 patients are po

sitive for



                                                                 AMA; some patie

nts may be



                                                                 positive for SP

100 and/or



                                                                  antibodie

s. A negative



                                                                 result does not

 rule out



                                                                 PBC.Performed B

y: [facility] [place] [address]



                                                                 [id number]

 Director:



                                                                 Margo Carlisle MD



                                                                 [Automated mess

age] The



                                                                 system which ge

nerated this



                                                                 result transmit

michelle reference



                                                                 range: 0.0 - 24

.9 Units. The



                                                                 reference range

 was not used



                                                                 to interpret th

is result as



                                                                 normal/abnormal

.



[facility] [id number] 20[facility]17:32





             Test Item    Value        Reference Range Interpretation Comments

 

             Case Report (test code = Non-Gynecologic                           



             [id number])  Cytology ? ? ? ? ? ?                           



                          ? ? ? ? ? ? ?Case:                           



                          [id number] ? ? ? ? ?                           



                          ? ? ? ? ? ? ? ? ? ?                           



                          ?Authorizing                           



                          Provider: ?Amy Caldera MD ?                           



                          ? Collected: ? ? ? ?                           



                          ? 2021 1300 ? ?                           



                          ? ? ? ?Ordering                           



                          Location: ? ? [facility] ? ? ? ? ? ? ?                           



                          ?Received: ? ? ? ? ?                           



                          ?2021 1321 ? ?                           



                          ? ? ? ? ? ? ? ? ? ? ?                           



                          ? ? ? ?                                



                          Medicine/Surgery [facility]                           



                          7B ? ? ? ? ? ? ? ? ?                           



                          ? ? ? ? ? ? ? ? ? ? ?                           



                          ? ? ? ? ? ?Specimen:                           



                          ? ?ASCITES ? ? ? ? ?                           



                          ? ? ? ? ? ? ? ? ? ? ?                           



                          ? ? ? ? ? ? ? ? ? ? ?                           



                          ? ? ? ? ? ? ? ? ? ? ?                           



                          ?                                      

 

             Final Diagnosis (test s4ftjKVsGMHaf0kgWSDgc                        

   



             code = [id number]) GFuZzEwMzNcZnRuYmpcdW                           



                          MxIHtccnRmMVxlcGljOTQ                           



                          xP2nwjwTuOGEuxIOoB8Kl                           



                          soaiAIasEU6cDF8hjNtaj                           



                          MZtgNUxCWAqFgPqj2vbi0                           



                          91qVWms0gkBVOSpcqciOu                           



                          2tWqlR75jn3U7UfxeN34l                           



                          kDUbWCT2BLPaOIHfbTLnV                           



                          PGvNGW1DTIxxWYnC9asWF                           



                          QkUX4scicfLWjlMChrYNC                           



                          xnJH5COZumDZsM7KiRIRa                           



                          VVwoPMZyrwl7JrWbIa0qh                           



                          GVyeTcyMFxwYXJkXHBsYW                           



                          mxBAIfOaQfWgBJQN7dHUZ                           



                          EHP6MMP11TABJLjVPKM9W                           



                          RVNJUyBGTFVJRDpccGFyI                           



                          N5wDvHECPHTSaDdZm8KDY                           



                          1BTElHTkFOVCBDRUxMUyA                           



                          pU3MEIAKHDC3ZErRzPQBc                           



                          pr63HEN3AhRgi8G2FSUcR                           



                          zNwTYKfSV3twQwqUKLiHI                           



                          5nRGPyO7yrzU5ymhz6DzQ                           



                          nWNTdNjB7AHZyzhH0Ksr7                           



                          OBFuWYhfv0zax7XuP4Sks                           



                          DQzeEm2q7lhJWVfZdE8fD                           



                          UfXKxiZ9axkpCoiLIyTFL                           



                          bCFs7qZhpNiXdQVVih7cq                           



                          cyBcZmNoYXJzZXQwIENhb                           



                          Zxqkbf4jT01URRljO9rfK                           



                          RyOFimxxGkNtN2IGyiKMO                           



                          sGgG7INLbqYYcEXYqK8wq                           



                          ZWQwXGdyZWVuMFxibHVlM                           



                          JG1hNwqg4V8zTEyeLVtwU                           



                          xyXsJoKePpLOVUz0BkBMm                           



                          9ePqcY8VzGVYwEyJ7qXIx                           



                          ZHYgLUeoCBKxJNMfzfH9i                           



                          N47DFdacwM2qRNhq4Xjm0                           



                          2uj052pL9qgQCoIOZ3DVB                           



                          xNJXhiAPmSLTnHNU9RHTo                           



                          nXUzX5soRHCqGR2ndauwV                           



                          FsiCQlyRTTayHZ1DCVbfT                           



                          OzA3HtLADkDAupYFQplpq                           



                          8JmNmNu8jbAGujGbzQFap                           



                          k5nro9zcgFMkOmz6UHJuO                           



                          pHtFyitTDrdq3Fch6pjTG                           



                          Urwj3wBZF6oGDegEixd1G                           



                          0bGUxXGRudGJsbnNiZGJc                           



                          EnQ7PMcyAN3isd16UXFyG                           



                          CX6jg6uqJRrdSpyfxPjmY                           



                          HfLRyyF5HyUTKzy619XKM                           



                          eL7QqUSIwu8M1saOhZpUt                           



                          WUNptKS1alI8DBKoFUw9q                           



                          UIhriY0trQlcMLpV3zpbF                           



                          3zMUJgBV0bztuih3ruQYe                           



                          hSAnuYXJsaNQ9oyS1JSSm                           



                          aHDtH4PluQ4sOTCxETvkB                           



                          SWqgyd3OzFmZg3ooSLvhY                           



                          cyMFxzYmtwYWdlXHBnbmN                           



                          vbnRccGduZGVjXHBsYWlu                           



                          XHBsYWluXGYwXGZzMjRcc                           



                          GlgcToziO2kNzEhDoXpMC                           



                          lxEX0dMFKeF2jwhOIsDOT                           



                          oSSWtZ6lkEaEmeN3ocGyt                           



                          MVxjZjJcZnMyMFxwYXIgS                           



                          SBoYXZlIHBlcnNvbmFsbH                           



                          dprsE5tOM7CONnVFguJUU                           



                          oTHTrzMKsig2mjSueYGWr                           



                          FM2hMRZtcmPxSAxjjBqfV                           



                          YrhNNJ2PRRdcRTfgHOinE                           



                          FkZSBieSByZXNpZGVudHM                           



                          qMCOewVlsa9Ruy5HohJS2                           



                          vY4jy5uht0ZlGPNawWO4W                           



                          M21lcR7eK1aXNGkXM7xLZ                           



                          YtNP6jfOKexNQtJPXdk99                           



                          gdGhpcyByZXBvcnQuXHBs                           



                          YWluXGYyXGZzMjhcbGFuZ                           



                          zEwMzNcaGljaFxmMlxkYm                           



                          AhWJRuUDwgN0spVuXmGlQ                           



                          wSNnuRVI3zN==                           

 

             Final Diagnosis Comment w0uzmEGeHUHwpGH8EKKlC                      

     



             (test code = [id number]) STrl2wmz1KpuKTauBLzKI                     

      



                          mgkWLunyNffi57bUN0bE1                           



                          9JB8wVGSePfQ3ZOYxfkF6                           



                          Sph0AGQxVCIuzAVbY036z                           



                          7pof5sizsLdeHT1cLrmSO                           



                          AbfcqaSiE9RHwiMUDlmnd                           



                          aTNa0SQyqOYNvgIQ0TBHs                           



                          iJOpT2EeOBNkMT4iydj0T                           



                          OI2GGbdTAXpGvM2YUVfrW                           



                          WkXLUzpJoxSMxbd640MHI                           



                          7OzEiPRPxpiCdcVaqiT2m                           



                          JhVjRMOIeWXausMmh7qqg                           



                          pKhN8D0kGUgKIAzwIOmr7                           



                          WiXCvzIFiuW7OhlFNkxN3                           



                          pVOLzMEWxB4QlkZ8wGU6c                           



                          NEDiJYDqnLyqNR50sYgfs                           



                          AscjJnbdWojoXeoS3i6iZ                           



                          NjjG3gdKYnpKA3iS2pPuM                           



                          KwfOfOMybR15zccYpY6Pl                           



                          cYHouHOmrjWcRqbmQM4nn                           



                          GFyfQ==                                

 

             Clinical Information large new onset                           



             (test code = [id number]) ascites                                

 

             Gross Description (test v9pueRMlYANvbZP5QWQtM                      

     



             code = [id number]) GRvt1pck4DdgYAxnTDiPN                           



                          xnnKGljhAmfz84bEU2oN6                           



                          1GJ9dFYEtRjI3CUQiniB8                           



                          Phr3ORJnZUYwqYObE850p                           



                          1aby5evxmNfgVC5zEklMX                           



                          LrxwplSvC7WJgkCYZbntd                           



                          mUBy1BHxdFJJjdPG5YSIo                           



                          gHAvP2OjTAZbYF1trtr7G                           



                          EZ2BTejRUNtIsN1ZBHfeV                           



                          FrIMCacKuhXEsjo693SFW                           



                          0WgHrCDXlxtI8DRliQHAd                           



                          H2OgW8NiXUtyGIF8EBRpH                           



                          CBcXHQgMSBcXGZsIFxcbm                           



                          U4f7glEBBdbYNdBPN4LKk                           



                          caWQgNTEwMDIgXFxkYiBP                           



                          NaMnFxAJPWQtQEe3QaS6S                           



                          Sw6VIRALiIuQwMjFCG3XX                           



                          x1NwYpMXu4ESh8GPoLFfP                           



                          oIgE6DgPqTgF7QZW3McU1                           



                          IFxcdCAyIFxcZmwgXFxmI                           



                          EFyaWFsIFxcZnMgMTAgXF                           



                          fiP10hoGtvoS1uJtJwBQd                           



                          aCZKoGyIhPiWBJU4zRCFT                           



                          UR5APV52PBNPZyGKFH7ZR                           



                          VNJUyBGTFVJRFxwYXIgUm                           



                          PjAUz0OVIiHrPyq6vkhTJ                           



                          xFHAaFPCuA2Hgw1Qpl9Dp                           



                          bmdlIGFtYmVyIGZsdWlkI                           



                          FxwYXIgUHJlcGFyZWQgMy                           



                          BzbGlkZXMgKDEgUGFwYW5                           



                          oB01dPO50MOQ6bW1suTcq                           



                          FCOnICYpgCQoq7myv4kzO                           



                          3s8d5BmvC5gPG3hNODeXX                           



                          hpbnByZXAgcHJlcGFyYXR                           



                          tl97nUHBzknawOHDpK1Jr                           



                          W0KqhzK4c2rgkItny9Skz                           



                          PTvFH1hzITwgM==                           

 

             Disclaimer (test code = v8tpaVPoLNJmg9hyDHYaa                      

     



             [id number])  GFuZzEwMzNcZnRuYmpcdW                           



                          UxMGhxkwGmZWpbu9BsC3Z                           



                          yMjAwMFxhbnNpXGRlZmxh                           



                          rqaiJXNvIQB8jbDzDGMbQ                           



                          BaqKIYqVOuuWz2paAYnuT                           



                          awRvSvJXDif2dndxJHJHn                           



                          oEuJqM310PYHuZQucp4ky                           



                          n9UcHXQhvDLoj6O2QGMIn                           



                          cnesAq9cXpdT56oc9Y4Ou                           



                          dzF2pnICWbHREaH7TlQK3                           



                          tCQWfIji1AGB9CIX2RWRf                           



                          IKRpW5HzNR1nGDNcwCUuM                           



                          Iq1u5ahaWohHPIqIBY3h9                           



                          orHSauqeSbTK9hap7aaZa                           



                          0s3hutzVlQPKiFHPjnWSN                           



                          PGRxC9OipGyvVf8qyOv3p                           



                          GftPsihOWN1Gwo6LC1vqa                           



                          27fym7yGdmLOWkhjtuMtY                           



                          7IMupEEUkieofGCy4JPji                           



                          IWOiuPT6IZCyvRWgX0RcM                           



                          SZeEJ0ayas6GCJ3XHwmOC                           



                          XyRbD9ORBiyKVjXDOvcQl                           



                          lYWlmr995KGY3ArGvRS4o                           



                          L4Jcx9G5lI2ybQLzZBKkl                           



                          VNwIbJeXALshx4kfPFhIU                           



                          qjg4PbWJQ2oaL6eZSbuYZ                           



                          xGJFrIJ01Ejjpd0IdJibj                           



                          u9IxG86ifJU0BSvvj8wuM                           



                          B9rEzF7ifYpCYpuk5lbiI                           



                          8tNlM2ZSyqLX3gNN0bGXZ                           



                          qyG9vpczoUEQiNnVuyhiq                           



                          IAQjsPificLkYl9daBmnZ                           



                          KO5JQhaS8terC4aYjC6CY                           



                          leD6zxrY6qHRs8SVdexAU                           



                          1COYuiN3sJD9ynrlet3rm                           



                          LHssYCbnLAIbldW9rzU3U                           



                          PPitAHgX2LjnF6aEGDyYB                           



                          3esesnt9wjGOS6XZdmPQM                           



                          pQSP4JpSwVBFnj7Bzpxy5                           



                          EfRlo7UhrCOzQGzdG99io                           



                          790FEOzxbZfH9habDCcsc                           



                          oqaYHtxdctUCwpzpC4VAE                           



                          crnUmb0IxLCGmEDE6LKae                           



                          QWjzuIZtUDOjsTydm0vuY                           



                          3RscGFyXHBsYWluXGYxXG                           



                          ZzMjBcbGFuZzEwMzNcaGl                           



                          jaFxmMVxkYmNoXGYxXGxv                           



                          E6gvVvIcI3ZaECKdKfFoj                           



                          KUhU9gvZRxrglUxNQYcxn                           



                          TqnTX6IHnyJ2q7CNQvilR                           



                          xzXd1hmVlHkUsFCVjTZB5                           



                          TLoaqIGbJXEot1Qtjupmk                           



                          NGhYg3wyZQgGKDmrV7bXX                           



                          CwMQAcYOxnOZ4bhBl7EAW                           



                          TlUThwMWqFcXXJXFpEN76                           



                          jqSlRMVXlrwnr6P0WCseB                           



                          HQti8OiiRWvS1wbx8NqQP                           



                          Ume77gZW8xv0G8j6fkJLM                           



                          2MY0va2LoZECgmIBppXBe                           



                          KTHkz7Ruqcsnz9XwBRCfv                           



                          xNtl1CiJLEhkcCceDDiFG                           



                          ObqoMpfe3rojVeFCAcMHL                           



                          mC9SnhqrfcHjlkwIyHMHh                           



                          dc7ukqZfKZW4TFRGKOUkH                           



                          HFaq5DqsA8bqAUYZAR3jC                           



                          Dxui6vseSSwNRkJWBowh7                           



                          1WHPiBO5jL9ztYYOyVJGw                           



                          nmWfwPYzu6HbGXNkhBC8w                           



                          DRdPX8TClMZj56rLOFfVW                           



                          GXtdOfCZTxcAdexRC6fdL                           



                          8uN3vXCtUDHWpKom+IFRo                           



                          FFFNMFJuEE9okxPjz5Mkz                           



                          oEntTquSYPnlSKai9TsoX                           



                          Tma7ReyTisp3OgcZBdoXT                           



                          xFD0cNBXrwrriBIPhUJDK                           



                          WzEMWMQsmvP7r3NxSVUfN                           



                          REtVSK6mAxityc1DIEnbV                           



                          7mMCXzO5jmszytHKpzMAM                           



                          jc3CxvV1giGPHpTEfw9Zs                           



                          iLEdgFJTfDKbFC4pslTbW                           



                          VdFHRsQJCK0dmCjUOUeg1                           



                          SvMMdkS1nxN02diZnbpJi                           



                          0eSO0XHF0sE3tMuf+IFxw                           



                          YXJccGFyIEFwcHJvcHJpY                           



                          HNvoElajoGdT4CuerHsqP                           



                          6waZUjbzXaRZ1jLM6iG8C                           



                          8xFKvVDFnrwBeu8swIOrn                           



                          dmUgYmVlbiByZXZpZXdlZ                           



                          QPrc1BmTPpoQLY3PXwscn                           



                          BpbmNsdWRpbmcgSCZFLCB                           



                          CuDDwlOGuJSQ0BDmzfqWt                           



                          rpRzRQ4ieG3maAejxF9hs                           



                          MOkcAE0kqopRVCeDSKacL                           



                          faGKSeNN3lbDIvXAUmgkA                           



                          VlBwupOHwoX6xQ6OhWOZo                           



                          HGYnkw1fEJHvsT4sHFesn                           



                          2VydmljZXMgYXJlIHBlcm                           



                          Fpsi1jVQLqwTOLJD7KHFh                           



                          yxCVtp5PgbeTpB1jXQZP7                           



                          NUQwNjYwMjgxKSBleGNlc                           



                          NZqUDGnfq28GRFffD3lrM                           



                          khFCNkmA5raY1ccMhrrV5                           



                          mBnHuSgVlRLjhPR5fPYBt                           



                          A3wzbMDzMZHmCBJcH5xlD                           



                          gFzvZ3nlFivNWwhNoHdAp                           



                          OxMJjdZJC8iQ==                           

 

             Embedded Images (test                                        



             code = [id number])                                        



[facility] ABDOMINAL [id number] 20:17:32





             Test Item    Value        Reference Range Interpretation Comments

 

             Case Report (test code = Non-Gynecologic                           



             [id number])  Cytology ? ? ? ? ? ?                           



                          ? ? ? ? ? ? ?Case:                           



                          [id number] ? ? ? ? ?                           



                          ? ? ? ? ? ? ? ? ? ?                           



                          ?Authorizing                           



                          Provider: ?Amy Caldera MD ?                           



                          ? Collected: ? ? ? ?                           



                          ? 2021 1300 ? ?                           



                          ? ? ? ?Ordering                           



                          Location: ? ? [facility]                           



                          Health ? ? ? ? ? ? ?                           



                          ?Received: ? ? ? ? ?                           



                          ?2021 1321 ? ?                           



                          ? ? ? ? ? ? ? ? ? ? ?                           



                          ? ? ? ?                                



                          Medicine/Surgery [facility]                           



                          7B ? ? ? ? ? ? ? ? ?                           



                          ? ? ? ? ? ? ? ? ? ? ?                           



                          ? ? ? ? ? ?Specimen:                           



                          ? ?ASCITES ? ? ? ? ?                           



                          ? ? ? ? ? ? ? ? ? ? ?                           



                          ? ? ? ? ? ? ? ? ? ? ?                           



                          ? ? ? ? ? ? ? ? ? ? ?                           



                          ?                                      

 

             Final Diagnosis (test d1huwUFeJURvc3nvGLFwa                        

   



             code = [id number]) GFuZzEwMzNcZnRuYmpcdW                           



                          MxIHtccnRmMVxlcGljOTQ                           



                          bR2ktfcMlRYLeiQAiE4Bb                           



                          yjswAYrdKB5cXM9xbTivt                           



                          MDrwRFoTLJgKfXzg3tdw6                           



                          36oSVil4jkBHUYawvcnUs                           



                          7mLcgI69zd7H6UuzhB84i                           



                          fICkTTS7EXUaIHGulCUvS                           



                          HZmRIZ7MYQamQTnG1lkII                           



                          AeTF5bvtmuOVtoHVmsBHM                           



                          nrYF5TCBchTNvH2TwGCKg                           



                          OGqgLGBlzqo6DtJdNd2me                           



                          GVyeTcyMFxwYXJkXHBsYW                           



                          zkQJXeZoTrTzBQOM4nXWG                           



                          JHI5VIB94GNFJTbZLPV1A                           



                          RVNJUyBGTFVJRDpccGFyI                           



                          E4nUsTVVKTJNnMoWd5QOO                           



                          1BTElHTkFOVCBDRUxMUyA                           



                          sF8SPRPYXDG6VTgFgNXTe                           



                          qr58GVU0DvVfm3V2BKMwX                           



                          cOhPANzZE3jnIgmFPEeRT                           



                          8kPNClT5bgoF6xxpp2UsD                           



                          wPFZbIwZ1ILCxmfQ3Ugg2                           



                          YLDhNJmxx3mtn9MjC5Qnk                           



                          MAexXp7r8puEHJqJdX9fM                           



                          DuODsiO7tmodNjmEXmLYU                           



                          bVLm2lVjoGuWyVHQqh3bw                           



                          cyBcZmNoYXJzZXQwIENhb                           



                          Snrdor2kZ91ERGlzW5kiB                           



                          HvYGhbqsAuEoT6NUfdIKU                           



                          vRfA9HYKdqSFbOEXpV4oj                           



                          ZWQwXGdyZWVuMFxibHVlM                           



                          YZ3mTech7K9lGJggIAqvO                           



                          slCsPqGfUaWUSWx5TlZYv                           



                          7jFxaM9SvTPRdKsS3aEQj                           



                          AONbHWkgQKPmWUHjxxQ8b                           



                          T46FXxhmlQ0wRTad9Inx3                           



                          1if946xK5veSNkLZK6GJD                           



                          oWNXrjODeQHPnELQ7FORj                           



                          qAXtI3quOOImEF1wtkfsD                           



                          RmgOTthUSLpsWX0SUDgxJ                           



                          HjW8LnFVKeEPkmCQPtvxo                           



                          9XuJxHc0kwTKurOckGEyg                           



                          n9zxb4ujhMUhBnd2JMBuP                           



                          bJkRsgvDBmug7Lfq6qjQO                           



                          Clau9jWMU7xQUyxGjvq6T                           



                          0bGUxXGRudGJsbnNiZGJc                           



                          TzM2NKulAL9zqa35DJFrZ                           



                          BN3fg2miJEupBntieFgfH                           



                          ZpNJclA8UjDGWen599ROH                           



                          nC2OkPFIgs7D7qxSfXtJq                           



                          LCOhdCN6htJ7WKOpBUu7m                           



                          DWyjeD3trIapKGaP4rbkR                           



                          8cDRKvYB2hempkl3owEOp                           



                          mJSpzJCSdjOT8ycX5VQDj                           



                          mKToW6VdpF1tBDHuMVcxZ                           



                          LQtwxj9DaYuFs0gdNXeqW                           



                          cyMFxzYmtwYWdlXHBnbmN                           



                          vbnRccGduZGVjXHBsYWlu                           



                          XHBsYWluXGYwXGZzMjRcc                           



                          SdcgJumyF0iFjGuHlLiHK                           



                          isLI1fRSQuX8cnpXDaJSB                           



                          wIQIvH2haSbKesP3zmUtp                           



                          MVxjZjJcZnMyMFxwYXIgS                           



                          SBoYXZlIHBlcnNvbmFsbH                           



                          cgwbL0tRW8PBXcGJmqZPW                           



                          aKMSqkPSgqk4nbAtjFSPd                           



                          VQ9aUFQgroGvSCxskOfeG                           



                          JlaAIT2YROynFUjaGCzoC                           



                          FkZSBieSByZXNpZGVudHM                           



                          tAMFpeXshj1Men3HdyAM5                           



                          aQ6eq4gas2EnCKXetCT5K                           



                          S25qdL4iI1aVIFtAO4nVY                           



                          QmDP8ezUOxkDXgUEHbn95                           



                          gdGhpcyByZXBvcnQuXHBs                           



                          YWluXGYyXGZzMjhcbGFuZ                           



                          zEwMzNcaGljaFxmMlxkYm                           



                          GxHKMwPSgaW4kfXpEwKnK                           



                          wDWpoVWM1pQ==                           

 

             Final Diagnosis Comment b8nmgSStCPAlrHG2DEGdN                      

     



             (test code = [id number]) QTql8hpj8GmxOFjpNUxFP                     

      



                          squACjzwYyju80gMS3aK8                           



                          4GD4kIYGgDtP2UIJbojR8                           



                          Zvt3XDHrBOBcrOGfD354i                           



                          6wib0fnqpGdiWO6rHaaTH                           



                          IufsdmTtB7GSmuDTAsqfj                           



                          wLYu7EJgtVUAouBK8LMOs                           



                          wYRjT4QrRQXwKV8uefh9P                           



                          ZQ4GRneYIDgFrW7GTHxtN                           



                          UdVRGwxQbaFQwxb152NBE                           



                          8XfOyRBEvahLijPxqmD7a                           



                          NuMaHLIHtVMgspQwm9erv                           



                          kVoX7E4gSIiHPJclKGag9                           



                          FsBNiqNQewD9KykNBteK3                           



                          yBQRnGANxJ9GagR3oOM9z                           



                          FRKwODWcuRftCT14nOopw                           



                          SgegUwtlIzxrEvaD5o2sQ                           



                          NrpD6upVFzmNR3cZ5eIgF                           



                          KqvGwBTzbM34uarGpT7Ns                           



                          xUGewDImiiWsZroiEY3cu                           



                          GFyfQ==                                

 

             Clinical Information large new onset                           



             (test code = [id number]) ascites                                

 

             Gross Description (test k5rceMKrKROpcZD6IVHtF                      

     



             code = [id number]) ZCge0jbb0IoaMTkjKWbOY                           



                          blhMTrkyIuud33zET9sY5                           



                          4SX7fCWRxCrT5UBGzmvH0                           



                          Gbf3ABQpSYNhjIUqH713l                           



                          2cyn1hooaEkjIB6hIpfBP                           



                          KxntzxNjV2PRcwKTHayvk                           



                          yOPe0ZWekOXGytEZ8RVNb                           



                          qEYmF8KmLAQtIO7ilfp4P                           



                          TK9TIsxCEAqXbV3UYQjdV                           



                          QcQJNmeThfBMiir855OXK                           



                          5XaWsLUFvyhR7IXcvWCJv                           



                          O2SfL1RiHIvvWEW1PCLiA                           



                          CBcXHQgMSBcXGZsIFxcbm                           



                          I4l3wpXMRoyHWcMTR1HVf                           



                          caWQgNTEwMDIgXFxkYiBP                           



                          PxRxWpHDLVNpWVx9YhJ5R                           



                          Rb2AVXDMrOsNpWxXBX4XT                           



                          s7DhOgUMe6FEi8DJpJNfY                           



                          aCyO2HnWmDhH8MGY0NaI3                           



                          IFxcdCAyIFxcZmwgXFxmI                           



                          EFyaWFsIFxcZnMgMTAgXF                           



                          abD30htMrryH3xViLcRIs                           



                          fEIEeOzFjYsYGUP9oJAOT                           



                          JI8ELH19EZEOCtUDXQ8LK                           



                          VNJUyBGTFVJRFxwYXIgUm                           



                          CqNSy7PDRaElGlh7rviBO                           



                          mAQBjJXLjJ3Aso0Yis7Bx                           



                          bmdlIGFtYmVyIGZsdWlkI                           



                          FxwYXIgUHJlcGFyZWQgMy                           



                          BzbGlkZXMgKDEgUGFwYW5                           



                          lS04lMA75JRY9uS5qyCvk                           



                          WLYaQIDngZZfd3gpz6wcC                           



                          0h6h8WuiW6tDW5qNSXqRQ                           



                          hpbnByZXAgcHJlcGFyYXR                           



                          im60lIRMllfwxKODyR2Dd                           



                          E3PrbuN6e2ievPmdg2Iok                           



                          SRfXH3haNAilR==                           

 

             Disclaimer (test code = w3amsPJeTBIxt3eeLERpv                      

     



             [id number])  GFuZzEwMzNcZnRuYmpcdW                           



                          QwFBcaxqMaNSatu8BxC6D                           



                          yMjAwMFxhbnNpXGRlZmxh                           



                          csxyOBGfOWJ9oaMfFQKgX                           



                          YwhEJQtYTyiMv0jpQAgeJ                           



                          ztWgKkOFVcf1hyygJSIXe                           



                          fGrVqN814LVQjGHrqb4ok                           



                          d6TiLGVwyLZcx6Y1MARZz                           



                          mkcpQx6zFepG51sz0H7Vt                           



                          gaZ1jhKHUfERQkK3EeUL3                           



                          wBVFcYoe5EJU1DZN1JUIa                           



                          JVTeX9JyBY2vSWHafBTyA                           



                          Pu7z2toqGyxOEOwDYR3q5                           



                          keJGjsmjSnCF1lyi3spVp                           



                          8m1ajkkHvOLTdCTTirFKJ                           



                          RBVeH7HecUstCs0jlUu4z                           



                          PxqVvjxOJQ8Paa0RX3dbl                           



                          74hvr4iEyiGJQyuctfRjY                           



                          1PIsbQQIrwuwuJEr9JIfd                           



                          JKMoiSS5DFWnvHUuE3ZxV                           



                          JUyKA6jjzc8HVB2FEqnTE                           



                          XgOwX2AKCcpCQqUMHylTx                           



                          cVLxue997BYB7MxJoXB8k                           



                          L1Mns2O8iS1wzHQyJDMkg                           



                          UVoUqVvLDNawe6jvPDjKN                           



                          sxq2AeRHF0ysQ0qKQegRX                           



                          sITGfQY36Yllwz6PqKven                           



                          x4HtV80ymXP9RNdee3doD                           



                          N8gBbT8djXaMGcse1djrG                           



                          6eLyJ6ZVzcRQ9gOV7bVKF                           



                          umK7eofdkTVPqUgTusagl                           



                          XTCzsEsmneVzZl5ifKfwD                           



                          LP7MAicO0aquI6jEhF0IJ                           



                          rgI8zrhZ9eERl0UItgbXP                           



                          0SKFqzX5iST7mvykmu9jg                           



                          XTpnAZjwIKBbwsZ0kyD4J                           



                          KUfuXCfP9KtsW3tCQLoVR                           



                          0cjwpfl4utWDV4TBofEXT                           



                          yWMA3LcZzMTEao1Mplvi8                           



                          NaJhy3OqmIArDIrmD25re                           



                          636NFWqvqMzQ2fwjHKmkx                           



                          pxxSJpisouWJcmzzD2WES                           



                          rztSja5AmEVMdBXV7OYsr                           



                          GQiviOPeWDHpuUqyg8acU                           



                          3RscGFyXHBsYWluXGYxXG                           



                          ZzMjBcbGFuZzEwMzNcaGl                           



                          jaFxmMVxkYmNoXGYxXGxv                           



                          P8rxJtFkO1KwSDWxCmRqg                           



                          DEeO8ovGKucotBhBGTqdr                           



                          HuqPR3YQvaD9y0EJTgzhK                           



                          dlUi2gbFkQtUmJFAcMDB1                           



                          IWsgdKOsHGJoe6Latliti                           



                          ANqZo2lxKTbMNXqoP0eAE                           



                          GbKXSlUXvbYW6fdEp6KFG                           



                          ExHHnmLWeHyROXGZiDM03                           



                          nkXsQNCZydrqr5R2OXvwK                           



                          FXfg6TnfWNcX5ska5ZtJV                           



                          Bbs00bOE4gy1X4c5apRRA                           



                          4WZ0ug6NpNPPbmGEruPPz                           



                          WJBny3Vacbdea8OtIFVgo                           



                          uEjr1WfGHSxxiVjyTAhGN                           



                          IwtlRcko3elqUqMLSsFRS                           



                          hH5ThqdwewYoirxHeHCXz                           



                          tx5xjuJaGJL9DFZKKUEjX                           



                          TKjp6QryS3hkVENFIG1zS                           



                          Avzm6eydEKgOGnRWVtap2                           



                          8WDBdHR1zC0roEGDuAXWf                           



                          gpWrwEDfv1YrWQVihXY9s                           



                          KXeKB9GKuWYt84bHILbUE                           



                          LQpsXhIROsiPjgrLX4ahS                           



                          0xX1pWQjVVSFjOdu+IFRo                           



                          QTDASNMrSA8dpbTaa7Xci                           



                          sWhgEmsFXHagIVaf6DrgE                           



                          Rsy7PdpXnud5BrvWSugVI                           



                          fDH0jXXSchhiyLCBuWHJI                           



                          SxFPEIAwqzL6r6UmPIIzC                           



                          ANaVUL6hXoudkj6HYZtpF                           



                          8rYAFbG5rcgjdqVYmaPFE                           



                          sq6XmuF2goFEBnSVra1Al                           



                          tEQkfNVWpVTwLF1kmxTfW                           



                          VlSTLoLXPE5gbZuIWCxk7                           



                          QpWSjeJ4ahW61uxUueuPb                           



                          7fAV4IJZ2vN6pAof+IFxw                           



                          YXJccGFyIEFwcHJvcHJpY                           



                          HHmeBlstvEtT6JkciGwkK                           



                          4mfJGrlcVnNS8rZW8yC7D                           



                          3gVCcAEKrqyEpu6qjTRtg                           



                          dmUgYmVlbiByZXZpZXdlZ                           



                          RKod5NoANjwTOQ0LLohvn                           



                          BpbmNsdWRpbmcgSCZFLCB                           



                          MiJSjsDMgMNN3WGgicxNt                           



                          qpGiLG6omH8erIlrsZ5vc                           



                          MWdvAB3iopoCAJmKSDprJ                           



                          uhHWLbUW8ymVEfAPGqfmG                           



                          IxHbizAYodS6xX4ViYYHv                           



                          WTHsnx3lYUUahS8aPKwow                           



                          2VydmljZXMgYXJlIHBlcm                           



                          Jorc5gEAVzlONIZY6UXFa                           



                          rmQHub3JykpGgC8wJENF3                           



                          NUQwNjYwMjgxKSBleGNlc                           



                          STuRYXknc37ZPWjsI0kvJ                           



                          gvPQYduP6meF6hvGlgaW2                           



                          bIxCgNfHhHUeqPY2cZYDb                           



                          P4shfCStXVYdKTAxV5reJ                           



                          aWnqR3qdUntMCcrUaCaFg                           



                          EoRJyyRVV4xN==                           

 

             Embedded Images (test                                        



             code = [id number])                                        



[id number]43:43





             Test Item    Value        Reference Range Interpretation Comments

 

             ALBUMIN BF (test code 476.0 mg/dL                            



             = [id number])                                        

 

             RENETTA (test code = RENETTA) Result interpreted                           



                          relative to the serum                           



                          concentration. ?                           



[facility] [id number] 17:43:43





             Test Item    Value        Reference Range Interpretation Comments

 

             ALBUMIN BF (test code 476.0 mg/dL                            



             = [id number])                                        

 

             RENETTA (test code = RENETTA) Result interpreted                           



                          relative to the serum                           



                          concentration. ?                           



[facility][id number] 
15:36:48





             Test Item    Value        Reference Range Interpretation Comments

 

             NA (test code = 132 mmol/L   135-145      L            



             5531753305)                                         

 

             K (test code = 3.0 mmol/L   3.5-5.0      L            



             3099219909)                                         

 

             CL (test code = 98 mmol/L                        



             6864051177)                                         

 

             CO2 TOTAL (test code = 32 mmol/L    23-31        H            



             1075536809)                                         

 

             AGAP (test code =              2-16                      



             9712488279)                                         

 

             BUN (test code = 5 mg/dL      7-23         L            



             9224460856)                                         

 

             GLUCOSE (test code = 156 mg/dL           H            



             6433635767)                                         

 

             CREATININE (test code = 0.48 mg/dL   0.60-1.25    L            



             5797156314)                                         

 

             TOTAL BILI (test code = 4.7 mg/dL    0.1-1.1      H            



             1124816097)                                         

 

             CALCIUM (test code = 7.6 mg/dL    8.6-10.6     L            



             5356513424)                                         

 

             T PROTEIN (test code = 6.5 g/dL     6.3-8.2                   



             5922145448)                                         

 

             ALBUMIN (test code = 2.7 g/dL     3.5-5.0      L            



             1983288081)                                         

 

             ALK PHOS (test code = 109 U/L                          



             6438723648)                                         

 

             ALTv (test code = 22 U/L       5-50                      



             1742-6)                                             

 

             AST(SGOT) (test code = 70 U/L       13-40        H            



             4709803875)                                         

 

             eGFR (test code =              mL/min/1.73m2              



             2609512297)                                         

 

             RENETTA (test code = RENETTA) Association of                           



                          Glomerular Filtration                           



                          Rate (GFR) and Staging                           



                          of Kidney Disease*                           



                          +---------------------                           



                          --+-------------------                           



                          --+-------------------                           



                          ------+| GFR                           



                          (mL/min/1.73 m2) ?|                           



                          With Kidney Damage ?|                           



                          ?Without Kidney                           



                          Damage+---------------                           



                          --------+-------------                           



                          --------+-------------                           



                          ------------+| ?>90 ?                           



                          ? ? ? ? ? ? ? ?|                           



                          ?Stage one ? ? ? ? ?|                           



                          ? Normal ? ? ? ? ? ? ?                           



                          ?+--------------------                           



                          ---+------------------                           



                          ---+------------------                           



                          -------+| ?60-89 ? ? ?                           



                          ? ? ? ? ?| ?Stage two                           



                          ? ? ? ? ?| ? Decreased                           



                          GFR ? ? ? ?                            



                          +---------------------                           



                          --+-------------------                           



                          --+-------------------                           



                          ------+| ?30-59 ? ? ?                           



                          ? ? ? ? ?| ?Stage                           



                          three ? ? ? ?| ? Stage                           



                          three ? ? ? ? ?                           



                          +---------------------                           



                          --+-------------------                           



                          --+-------------------                           



                          ------+| ?15-29 ? ? ?                           



                          ? ? ? ? ?| ?Stage four                           



                          ? ? ? ? | ? Stage four                           



                          ? ? ? ? ?                              



                          ?+--------------------                           



                          ---+------------------                           



                          ---+------------------                           



                          -------+| ?<15 (or                           



                          dialysis) ? ?| ?Stage                           



                          five ? ? ? ? | ? Stage                           



                          five ? ? ? ? ?                           



                          ?+--------------------                           



                          ---+------------------                           



                          ---+------------------                           



                          -------+ *Each stage                           



                          assumes the associated                           



                          GFR level has been in                           



                          effect for at least                           



                          three months. ?Stages                           



                          1 to 5, with or                           



                          without kidney                           



                          disease, indicate                           



                          chronic kidney                           



                          disease. Notes:                           



                          Determination of                           



                          stages one and two                           



                          (with eGFR                             



                          >59mL/min/1.73 m2)                           



                          requires estimation of                           



                          kidney damage for at                           



                          least three months as                           



                          defined by structural                           



                          or functional                           



                          abnormalities of the                           



                          kidney, manifested by                           



                          either:Pathological                           



                          abnormalities or                           



                          Markers of kidney                           



                          damage (including                           



                          abnormalities in the                           



                          composition of the                           



                          blood or urine or                           



                          abnormalities in                           



                          imaging tests).                           

 

             Lab Interpretation Abnormal                               



             (test code = 06420-0)                                        



[facility]. METABOLIC PANEL [id number] 
15:36:48





             Test Item    Value        Reference Range Interpretation Comments

 

             NA (test code = 132 mmol/L   135-145      L            



             5074835235)                                         

 

             K (test code = 3.0 mmol/L   3.5-5.0      L            



             7500708563)                                         

 

             CL (test code = 98 mmol/L                        



             6370488805)                                         

 

             CO2 TOTAL (test code = 32 mmol/L    23-31        H            



             0896831203)                                         

 

             AGAP (test code =              2-16                      



             8970779647)                                         

 

             BUN (test code = 5 mg/dL      7-23         L            



             7410380235)                                         

 

             GLUCOSE (test code = 156 mg/dL           H            



             7331578683)                                         

 

             CREATININE (test code = 0.48 mg/dL   0.60-1.25    L            



             6783415119)                                         

 

             TOTAL BILI (test code = 4.7 mg/dL    0.1-1.1      H            



             8008456838)                                         

 

             CALCIUM (test code = 7.6 mg/dL    8.6-10.6     L            



             7256699072)                                         

 

             T PROTEIN (test code = 6.5 g/dL     6.3-8.2                   



             4349462130)                                         

 

             ALBUMIN (test code = 2.7 g/dL     3.5-5.0      L            



             7624757424)                                         

 

             ALK PHOS (test code = 109 U/L                          



             2226298657)                                         

 

             ALTv (test code = 22 U/L       5-50                      



             1742-6)                                             

 

             AST(SGOT) (test code = 70 U/L       13-40        H            



             1218679418)                                         

 

             eGFR (test code =              mL/min/1.73m2              



             2859336884)                                         

 

             RENETTA (test code = RENETTA) Association of                           



                          Glomerular Filtration                           



                          Rate (GFR) and Staging                           



                          of Kidney Disease*                           



                          +---------------------                           



                          --+-------------------                           



                          --+-------------------                           



                          ------+| GFR                           



                          (mL/min/1.73 m2) ?|                           



                          With Kidney Damage ?|                           



                          ?Without Kidney                           



                          Damage+---------------                           



                          --------+-------------                           



                          --------+-------------                           



                          ------------+| ?>90 ?                           



                          ? ? ? ? ? ? ? ?|                           



                          ?Stage one ? ? ? ? ?|                           



                          ? Normal ? ? ? ? ? ? ?                           



                          ?+--------------------                           



                          ---+------------------                           



                          ---+------------------                           



                          -------+| ?60-89 ? ? ?                           



                          ? ? ? ? ?| ?Stage two                           



                          ? ? ? ? ?| ? Decreased                           



                          GFR ? ? ? ?                            



                          +---------------------                           



                          --+-------------------                           



                          --+-------------------                           



                          ------+| ?30-59 ? ? ?                           



                          ? ? ? ? ?| ?Stage                           



                          three ? ? ? ?| ? Stage                           



                          three ? ? ? ? ?                           



                          +---------------------                           



                          --+-------------------                           



                          --+-------------------                           



                          ------+| ?15-29 ? ? ?                           



                          ? ? ? ? ?| ?Stage four                           



                          ? ? ? ? | ? Stage four                           



                          ? ? ? ? ?                              



                          ?+--------------------                           



                          ---+------------------                           



                          ---+------------------                           



                          -------+| ?<15 (or                           



                          dialysis) ? ?| ?Stage                           



                          five ? ? ? ? | ? Stage                           



                          five ? ? ? ? ?                           



                          ?+--------------------                           



                          ---+------------------                           



                          ---+------------------                           



                          -------+ *Each stage                           



                          assumes the associated                           



                          GFR level has been in                           



                          effect for at least                           



                          three months. ?Stages                           



                          1 to 5, with or                           



                          without kidney                           



                          disease, indicate                           



                          chronic kidney                           



                          disease. Notes:                           



                          Determination of                           



                          stages one and two                           



                          (with eGFR                             



                          >59mL/min/1.73 m2)                           



                          requires estimation of                           



                          kidney damage for at                           



                          least three months as                           



                          defined by structural                           



                          or functional                           



                          abnormalities of the                           



                          kidney, manifested by                           



                          either:Pathological                           



                          abnormalities or                           



                          Markers of kidney                           



                          damage (including                           



                          abnormalities in the                           



                          composition of the                           



                          blood or urine or                           



                          abnormalities in                           



                          imaging tests).                           

 

             Lab Interpretation Abnormal                               



             (test code = 20135-3)                                        



[facility] WITH [id number] 15:18:36





             Test Item    Value        Reference Range Interpretation Comments

 

             WBC (test code =              See_Comment                [Automated



             6690-2)                                             message] The sy

stem



                                                                 which generated



                                                                 this result



                                                                 transmitted



                                                                 reference range

:



                                                                 4.20 - 10.70



                                                                 10*3/?L. The



                                                                 reference range

 was



                                                                 not used to



                                                                 interpret this



                                                                 result as



                                                                 normal/abnormal

.

 

             RBC (test code =              See_Comment  L             [Automated



             789-8)                                              message] The sy

stem



                                                                 which generated



                                                                 this result



                                                                 transmitted



                                                                 reference range

:



                                                                 4.26 - 5.52



                                                                 10*6/?L. The



                                                                 reference range

 was



                                                                 not used to



                                                                 interpret this



                                                                 result as



                                                                 normal/abnormal

.

 

             HGB (test code = 8.7 g/dL     12.2-16.4    L            



             718-7)                                              

 

             HCT (test code = 28.1 %       38.4-49.3    L            



             4544-3)                                             

 

             MCV (test code = 80.7 fL      81.7-95.6    L            



             787-2)                                              

 

             MCH (test code = 25.0 pg      26.1-32.7    L            



             785-6)                                              

 

             MCHC (test code = 31.0 g/dL    31.2-35.0    L            



             786-4)                                              

 

             RDW-SD (test code = 50.6 fL      38.5-51.6                 



             53642-6)                                            

 

             RDW-CV (test code = 17.6 %       12.1-15.4    H            



             788-0)                                              

 

             PLT (test code =              See_Comment  LL            [Automated



             777-3)                                              message] The sy

stem



                                                                 which generated



                                                                 this result



                                                                 transmitted



                                                                 reference range

:



                                                                 150 - 328 10*3/

?L.



                                                                 The reference r

moose



                                                                 was not used to



                                                                 interpret this



                                                                 result as



                                                                 normal/abnormal

.

 

             MPV (test code =                                        Not Measure

d



             06446-9)                                            

 

             IPF % (test code = 12.5 %       1.2-10.7     H            Platelet 

count



             [id number])                                         measured by



                                                                 fluorescence



                                                                 method.

 

             NRBC/100 WBC (test              See_Comment                [Automat

ed



             code = 5350481381)                                        message] 

The system



                                                                 which generated



                                                                 this result



                                                                 transmitted



                                                                 reference range

:



                                                                 0.0 - 10.0 /100



                                                                 WBCs. The refer

ence



                                                                 range was not u

sed



                                                                 to interpret th

is



                                                                 result as



                                                                 normal/abnormal

.

 

             NRBC x10^3 (test code <0.01        See_Comment                [Auto

mated



             = [id number])                                        message] The s

ystem



                                                                 which generated



                                                                 this result



                                                                 transmitted



                                                                 reference range

:



                                                                 10*3/?L. The



                                                                 reference range

 was



                                                                 not used to



                                                                 interpret this



                                                                 result as



                                                                 normal/abnormal

.

 

             GRAN MAT (NEUT) % 67.6 %                                 



             (test code = 770-8)                                        

 

             IMM GRAN % (test code 0.20 %                                 



             = 4727384775)                                        

 

             LYMPH % (test code = 16.1 %                                 



             736-9)                                              

 

             MONO % (test code = 13.3 %                                 



             5905-5)                                             

 

             EOS % (test code = 1.9 %                                  



             713-8)                                              

 

             BASO % (test code = 0.9 %                                  



             706-2)                                              

 

             GRAN MAT x10^3(ANC) 2.89 10*3/uL 1.99-6.95                 



             (test code =                                        



             5409768455)                                         

 

             IMM GRAN x10^3 (test <0.03        0.00-0.06                 



             code = 3804991829)                                        

 

             LYMPH x10^3 (test code 0.69 10*3/uL 1.09-3.23    L            



             = 731-0)                                            

 

             MONO x10^3 (test code 0.57 10*3/uL 0.36-1.02                 



             = 742-7)                                            

 

             EOS x10^3 (test code = 0.08 10*3/uL 0.06-0.53                 



             711-2)                                              

 

             BASO x10^3 (test code 0.04 10*3/uL 0.01-0.09                 



             = 704-7)                                            

 

             Lab Interpretation Abnormal                               



             (test code = 82932-3)                                        



[facility] WITH [id number] 15:18:36





             Test Item    Value        Reference Range Interpretation Comments

 

             WBC (test code =              See_Comment                [Automated



             6690-2)                                             message] The sy

stem



                                                                 which generated



                                                                 this result



                                                                 transmitted



                                                                 reference range

:



                                                                 4.20 - 10.70



                                                                 10*3/?L. The



                                                                 reference range

 was



                                                                 not used to



                                                                 interpret this



                                                                 result as



                                                                 normal/abnormal

.

 

             RBC (test code =              See_Comment  L             [Automated



             789-8)                                              message] The sy

stem



                                                                 which generated



                                                                 this result



                                                                 transmitted



                                                                 reference range

:



                                                                 4.26 - 5.52



                                                                 10*6/?L. The



                                                                 reference range

 was



                                                                 not used to



                                                                 interpret this



                                                                 result as



                                                                 normal/abnormal

.

 

             HGB (test code = 8.7 g/dL     12.2-16.4    L            



             718-7)                                              

 

             HCT (test code = 28.1 %       38.4-49.3    L            



             4544-3)                                             

 

             MCV (test code = 80.7 fL      81.7-95.6    L            



             787-2)                                              

 

             MCH (test code = 25.0 pg      26.1-32.7    L            



             785-6)                                              

 

             MCHC (test code = 31.0 g/dL    31.2-35.0    L            



             786-4)                                              

 

             RDW-SD (test code = 50.6 fL      38.5-51.6                 



             57047-1)                                            

 

             RDW-CV (test code = 17.6 %       12.1-15.4    H            



             788-0)                                              

 

             PLT (test code =              See_Comment  LL            [Automated



             777-3)                                              message] The sy

stem



                                                                 which generated



                                                                 this result



                                                                 transmitted



                                                                 reference range

:



                                                                 150 - 328 10*3/

?L.



                                                                 The reference r

moose



                                                                 was not used to



                                                                 interpret this



                                                                 result as



                                                                 normal/abnormal

.

 

             MPV (test code =                                        Not Measure

d



             44979-8)                                            

 

             IPF % (test code = 12.5 %       1.2-10.7     H            Platelet 

count



             0405956246)                                         measured by



                                                                 fluorescence



                                                                 method.

 

             NRBC/100 WBC (test              See_Comment                [Automat

ed



             code = 0848245675)                                        message] 

The system



                                                                 which generated



                                                                 this result



                                                                 transmitted



                                                                 reference range

:



                                                                 0.0 - 10.0 /100



                                                                 WBCs. The refer

ence



                                                                 range was not u

sed



                                                                 to interpret th

is



                                                                 result as



                                                                 normal/abnormal

.

 

             NRBC x10^3 (test code <0.01        See_Comment                [Auto

mated



             = 1563450555)                                        message] The s

ystem



                                                                 which generated



                                                                 this result



                                                                 transmitted



                                                                 reference range

:



                                                                 10*3/?L. The



                                                                 reference range

 was



                                                                 not used to



                                                                 interpret this



                                                                 result as



                                                                 normal/abnormal

.

 

             GRAN MAT (NEUT) % 67.6 %                                 



             (test code = 770-8)                                        

 

             IMM GRAN % (test code 0.20 %                                 



             = 7547912406)                                        

 

             LYMPH % (test code = 16.1 %                                 



             736-9)                                              

 

             MONO % (test code = 13.3 %                                 



             5905-5)                                             

 

             EOS % (test code = 1.9 %                                  



             713-8)                                              

 

             BASO % (test code = 0.9 %                                  



             706-2)                                              

 

             GRAN MAT x10^3(ANC) 2.89 10*3/uL 1.99-6.95                 



             (test code =                                        



             5884436327)                                         

 

             IMM GRAN x10^3 (test <0.03        0.00-0.06                 



             code = 5503051626)                                        

 

             LYMPH x10^3 (test code 0.69 10*3/uL 1.09-3.23    L            



             = 731-0)                                            

 

             MONO x10^3 (test code 0.57 10*3/uL 0.36-1.02                 



             = 742-7)                                            

 

             EOS x10^3 (test code = 0.08 10*3/uL 0.06-0.53                 



             711-2)                                              

 

             BASO x10^3 (test code 0.04 10*3/uL 0.01-0.09                 



             = 704-7)                                            

 

             Lab Interpretation Abnormal                               



             (test code = 84403-4)                                        



[facility].PROTEIN BODY [id number] 03:21:38





             Test Item    Value        Reference Range Interpretation Comments

 

             T.PROT BF (test 1477.0 mg/dL                           



             code = 6982610350)                                        

 

             UNSPUN BODY FLUID Yellow                                 



             COLOR (test code =                                        



             8337632138)                                         

 

             UNSPUN BODY FLUID Slightly Cloudy                           



             CLARITY (test code                                        



             = 1159584118)                                        

 

             SPUN BODY FLUID Yellow                                 



             COLOR (test code =                                        



             0999641375)                                         

 

             SPUN BODY FLUID Clear                                  



             CLARITY (test code                                        



             = 3206370083)                                        

 

             Sediment (test code The sediment volume is                         

  



             = 7202142060) <0.1 mLs of the total                           



                          fluid volume of 4mL and                           



                          its color is red.                           

 

             RENETTA (test code = Test developed and                           



             RENETTA)         characteristics determined                           



                          by [facility]                           



                          Services.                              



[facility].PROTEIN BODY [id number] 03:21:38





             Test Item    Value        Reference Range Interpretation Comments

 

             T.PROT BF (test 1477.0 mg/dL                           



             code = 4744164133)                                        

 

             UNSPUN BODY FLUID Yellow                                 



             COLOR (test code =                                        



             6769701577)                                         

 

             UNSPUN BODY FLUID Slightly Cloudy                           



             CLARITY (test code                                        



             = 1722158975)                                        

 

             SPUN BODY FLUID Yellow                                 



             COLOR (test code =                                        



             2294461500)                                         

 

             SPUN BODY FLUID Clear                                  



             CLARITY (test code                                        



             = 9254893440)                                        

 

             Sediment (test code The sediment volume is                         

  



             = 8624157236) <0.1 mLs of the total                           



                          fluid volume of 4mL and                           



                          its color is red.                           

 

             RENETTA (test code = Test developed and                           



             RENETTA)         characteristics determined                           



                          by [facility]                           



                          Services.                              



[facility] [id number]:50:33





             Test Item    Value        Reference Range Interpretation Comments

 

             BF COLOR     Light Yellow                           



             (test code =                                        



             0725031149)                                         

 

             BF WBC Count              See_Comment                [Automated



             (test code =                                        message] The sy

stem



             9871912736)                                         which generated



                                                                 this result



                                                                 transmitted



                                                                 reference range

:



                                                                 /?L. The refere

nce



                                                                 range was not u

sed



                                                                 to interpret th

is



                                                                 result as



                                                                 normal/abnormal

.

 

             BF RBC Count              See_Comment                [Automated



             (test code =                                        message] The sy

stem



             1927671084)                                         which generated



                                                                 this result



                                                                 transmitted



                                                                 reference range

:



                                                                 /?L. The refere

nce



                                                                 range was not u

sed



                                                                 to interpret th

is



                                                                 result as



                                                                 normal/abnormal

.

 

             RENETTA (test    The reference range                           



             code = RENETTA)  and other method                           



                          performance                            



                          specifications have                           



                          not been established                           



                          for this body fluid.                           



                          ?The test results must                           



                          be integrated into the                           



                          clinical context for                           



                          interpretation.                           



[facility] [id number] 18:50:33





             Test Item    Value        Reference Range Interpretation Comments

 

             BF COLOR     Light Yellow                           



             (test code =                                        



             1492118801)                                         

 

             BF WBC Count              See_Comment                [Automated



             (test code =                                        message] The sy

stem



             8008863941)                                         which generated



                                                                 this result



                                                                 transmitted



                                                                 reference range

:



                                                                 /?L. The refere

nce



                                                                 range was not u

sed



                                                                 to interpret th

is



                                                                 result as



                                                                 normal/abnormal

.

 

             BF RBC Count              See_Comment                [Automated



             (test code =                                        message] The sy

stem



             8815402276)                                         which generated



                                                                 this result



                                                                 transmitted



                                                                 reference range

:



                                                                 /?L. The refere

nce



                                                                 range was not u

sed



                                                                 to interpret th

is



                                                                 result as



                                                                 normal/abnormal

.

 

             RENETTA (test    The reference range                           



             code = RENETTA)  and other method                           



                          performance                            



                          specifications have                           



                          not been established                           



                          for this body fluid.                           



                          ?The test results must                           



                          be integrated into the                           



                          clinical context for                           



                          interpretation.                           



[facility]
2021 18:23:38Successful US-guided paracentesis.PROCEDURE: US-guided 
paracentesis HISTORY: Ascites, paracentesis is requested. MEDICATIONS: Local 
lidocaine. I reviewed the patient?s current medicationlist as noted in the 
nursing assessment, and the following actions weretaken: None []. ATTENDING 
PRESENCE: ?As the attending radiologist, I was present in theroom during the 
entire procedure. TECHNIQUE: Informed consent was obtained from the patient 
after discussingthe risks and benefits of the procedure. Universal protocol 
timeout wasperformed verifying correct patient, correct site, and correct 
procedure.Images were archived to PACS. The patient was prepped and draped in 
sterilefashion. US-guided drainage of the ascites was performed with a 5 
Frenchsheathed needle, after infiltrating local anesthesia at the puncture 
site.A total of 3000cc clear yellowish fluid was drained. Fluid was sent for 
laboratory analysisas requested in Epic. COMPLICATIONS: None. ? Estimated Blood 
Loss : &lt;1 cc [facility], Radiant Results Inft User - 2021 1:24 PM 
CDTFormatting of this note might be different from the original.PROCEDURE: US-
guided paracentesisHISTORY: Ascites, paracentesis is requested.MEDICATIONS: 
Local lidocaine. Ireviewed the patient?s current medicationlist as noted in the 
nursing assessment, and the following actions weretaken: None []. ATTENDING 
PRESENCE: As the attending radiologist, I was present in theroom during the 
entire procedure.TECHNIQUE: Informed consent was obtained from the patient after
 discussingthe risks and benefits of the procedure. Universal protocol timeout 
wasperformed verifying correctpatient, correct site, and correct 
procedure.Images were archived to PACS. The patient was prepped and draped in 
sterilefashion. US-guided drainage of the ascites was performed with a 5 
Frenchsheathed needle, after infiltrating local anesthesia at the puncture 
site.A total of 3000cc clear yellowish fluid was drained. Fluid was sent for 
laboratory analysis as requested in Epic.COMPLICATIONS: None. Estimated Blood 
Loss : &lt;1 ccIMPRESSIONSuccessful US-guided paracentesis.[facility]PERIT[facility]ECENTES[facility] WITH [id number] 18:23:38
Successful US-guided paracentesis.PROCEDURE: US-guided paracentesis HISTORY: 
Ascites, paracentesis is requested. MEDICATIONS: Local lidocaine. I reviewed the
 patient?s current medicationlist as noted in the nursing assessment, and the 
following actions weretaken: None []. ATTENDING PRESENCE: ?As the attending 
radiologist, I was present in theroom during the entire procedure. TECHNIQUE: 
Informed consent was obtained from the patient after discussingthe risks and 
benefits of the procedure. Universal protocol timeout wasperformed verifying 
correct patient, correct site, and correct procedure.Images were archived to 
PACS. The patient was prepped and draped in sterilefashion. US-guided drainage 
of the ascites was performed with a 5 Frenchsheathed needle, after infiltrating 
local anesthesia at the puncture site.A total of 3000cc clear yellowish fluid 
was drained. Fluid was sent for laboratory analysisas requested in Epic. 
COMPLICATIONS: None. ? Estimated Blood Loss : &lt;1 cc [facility], Radiant Results In
ft User - 2021 1:24 PM CDTFormatting of this note might be different from 
the original.PROCEDURE: US-guided paracentesisHISTORY: Ascites, paracentesis is 
requested.MEDICATIONS: Local lidocaine. Ireviewed the patient?s current 
medicationlist as noted in the nursing assessment, and the following actions 
weretaken: None []. ATTENDING PRESENCE: As the attending radiologist, I was 
present in theroom during the entire procedure.TECHNIQUE: Informed consent was 
obtained from the patient after discussingthe risks and benefits of the 
procedure. Universal protocol timeout wasperformed verifying correctpatient, 
correct site, and correct procedure.Images were archived to PACS. The patient 
was prepped and draped in sterilefashion. US-guided drainage of the ascites was 
performed with a 5 Frenchsheathed needle, after infiltrating local anesthesia at
 the puncture site.A total of 3000cc clear yellowish fluid was drained. Fluid 
was sent for laboratory analysis as requested in Epic.COMPLICATIONS: None. Fatmata
mated Blood Loss : &lt;1 ccIMPRESSIONSuccessful US-guided paracentesis.
[facility] [id number] 
18:15:47





             Test Item    Value        Reference Range Interpretation Comments

 

             MAI (test code = Negative     Negative                  



             [id number])                                         

 

             RENETTA (test code = RENETTA) Negative - No                           



                          Anti-Nuclear                           



                          Antibodies detected                           



                          by IFA.Positive -                           



                          MAI IFA screen                           



                          performed with a                           



                          1:80 dilution in                           



                          adults and a 1:40                           



                          dilution in                            



                          pediatrics. Any MAI                           



                          "Positive" will have                           



                          titer performed and                           



                          reported separately.                           

 

             Lab Interpretation (test Normal                                 



             code = 65798-5)                                        



[facility] [id number] 
18:15:47





             Test Item    Value        Reference Range Interpretation Comments

 

             MAI (test code = Negative     Negative                  



             [id number])                                         

 

             RENETTA (test code = RENETTA) Negative - No                           



                          Anti-Nuclear                           



                          Antibodies detected                           



                          by IFA.Positive -                           



                          MAI IFA screen                           



                          performed with a                           



                          1:80 dilution in                           



                          adults and a 1:40                           



                          dilution in                            



                          pediatrics. Any MAI                           



                          "Positive" will have                           



                          titer performed and                           



                          reported separately.                           

 

             Lab Interpretation (test Normal                                 



             code = 05406-5)                                        



[facility] 1 [id number] 17:44:30





             Test Item    Value        Reference Range Interpretation Comments

 

             Anti-Trypsin (test code = 141 mg/dL                        



             6145510320)                                         

 

             Lab Interpretation (test code = Normal                             

    



             74741-6)                                            



[facility] 1 [id number] 17:44:30





             Test Item    Value        Reference Range Interpretation Comments

 

             Anti-Trypsin (test code = 141 mg/dL                        



             6336190812)                                         

 

             Lab Interpretation (test code = Normal                             

    



             25442-6)                                            



[facility]





             Test Item    Value        Reference Range Interpretation Comments

 

             CERULO (test code = 3603828783) 24 mg/dL     25-63        L        

    

 

             Lab Interpretation (test code = Abnormal                           

    



             33681-8)                                            



[facility]58





             Test Item    Value        Reference Range Interpretation Comments

 

             IgG (test code = 7639083932) 1850 mg/dL   636-1600     H           

 

 

             Lab Interpretation (test code = Abnormal                           

    



             61248-5)                                            



[facility]58





             Test Item    Value        Reference Range Interpretation Comments

 

             CERULO (test code = 7775331792) 24 mg/dL     25-63        L        

    

 

             Lab Interpretation (test code = Abnormal                           

    



             33400-7)                                            



[facility] 17:43:58





             Test Item    Value        Reference Range Interpretation Comments

 

             IgG (test code = 0675733645) 1850 mg/dL   636-1600     H           

 

 

             Lab Interpretation (test code = Abnormal                           

    



             69083-4)                                            



[facility] A AB, IGG AND [id number] 11:37:51





             Test Item    Value        Reference Range Interpretation Comments

 

             HAV Total (test code Positive                               



             = 4873593645)                                        

 

             HAVT                                                



             Semi-Quantitative                                        



             (test code =                                        



             6937657332)                                         

 

             RENETTA (test code = RENETTA) Indicates past or                           



                          present infection with                           



                          HAV or exposure to HAV                           



                          due to vaccination.                           



[facility] A AB, IGG AND [id number] 11:37:51





             Test Item    Value        Reference Range Interpretation Comments

 

             HAV Total (test code Positive                               



             = 0321553713)                                        

 

             HAVT                                                



             Semi-Quantitative                                        



             (test code =                                        



             4602220466)                                         

 

             RENETTA (test code = RENETTA) Indicates past or                           



                          present infection with                           



                          HAV or exposure to HAV                           



                          due to vaccination.                           



[facility] with [id number] 07:43:16





             Test Item    Value        Reference Range Interpretation Comments

 

             WBC (test code =              See_Comment  L             [Automated



             6690-2)                                             message] The



                                                                 system which



                                                                 generated this



                                                                 result transmit

michelle



                                                                 reference range

:



                                                                 4.20 - 10.70



                                                                 10*3/?L. The



                                                                 reference range



                                                                 was not used to



                                                                 interpret this



                                                                 result as



                                                                 normal/abnormal

.

 

             RBC (test code =              See_Comment  L             [Automated



             789-8)                                              message] The



                                                                 system which



                                                                 generated this



                                                                 result transmit

michelle



                                                                 reference range

:



                                                                 4.26 - 5.52



                                                                 10*6/?L. The



                                                                 reference range



                                                                 was not used to



                                                                 interpret this



                                                                 result as



                                                                 normal/abnormal

.

 

             HGB (test code = 7.3 g/dL     12.2-16.4    L            



             718-7)                                              

 

             HCT (test code = 23.8 %       38.4-49.3    L            



             4544-3)                                             

 

             MCV (test code = 81.5 fL      81.7-95.6    L            



             787-2)                                              

 

             MCH (test code = 25.0 pg      26.1-32.7    L            



             785-6)                                              

 

             MCHC (test code = 30.7 g/dL    31.2-35.0    L            



             786-4)                                              

 

             RDW-SD (test code = 51.1 fL      38.5-51.6                 



             90690-3)                                            

 

             RDW-CV (test code = 17.4 %       12.1-15.4    H            



             788-0)                                              

 

             PLT (test code =              See_Comment  LL            [Automated



             777-3)                                              message] The



                                                                 system which



                                                                 generated this



                                                                 result transmit

michelle



                                                                 reference range

:



                                                                 150 - 328 10*3/

?L.



                                                                 The reference



                                                                 range was not u

sed



                                                                 to interpret th

is



                                                                 result as



                                                                 normal/abnormal

.

 

             MPV (test code =                                        Not Measure

d



             13572-1)                                            

 

             IPF % (test code = 11.9 %       1.2-10.7     H            Platelet 

count



             [id number])                                         measured by



                                                                 fluorescence



                                                                 method.

 

             NRBC/100 WBC (test              See_Comment                [Automat

ed



             code = 2331253265)                                        message] 

The



                                                                 system which



                                                                 generated this



                                                                 result transmit

michelle



                                                                 reference range

:



                                                                 0.0 - 10.0 /100



                                                                 WBCs. The



                                                                 reference range



                                                                 was not used to



                                                                 interpret this



                                                                 result as



                                                                 normal/abnormal

.

 

             NRBC x10^3 (test code <0.01        See_Comment                [Auto

mated



             = 7014176555)                                        message] The



                                                                 system which



                                                                 generated this



                                                                 result transmit

michelle



                                                                 reference range

:



                                                                 10*3/?L. The



                                                                 reference range



                                                                 was not used to



                                                                 interpret this



                                                                 result as



                                                                 normal/abnormal

.

 

             SEG % (test code = 55 %         33-76                     



             86926-5)                                            

 

             LYMPH % (test code = 36 %         14-54                     



             88804-9)                                            

 

             MONO % (test code = 8 %          0-4          H            



             26485-3)                                            

 

             EOS % (test code = 1 %          0-3                       



             22278-3)                                            

 

             ANC (test code = 1.99 10*3/uL 1.99-6.95                 



             5129922138)                                         

 

             TARGET CELLS (test 2+           See_Comment  A             [Automat

ed



             code = 90579-8)                                        message] The



                                                                 system which



                                                                 generated this



                                                                 result transmit

michelle



                                                                 reference range

:



                                                                 (none). The



                                                                 reference range



                                                                 was not used to



                                                                 interpret this



                                                                 result as



                                                                 normal/abnormal

.

 

             PLT ESTIMATE (test Critically   Normal       AA           



             code = 9317-9) Decreased                              

 

             GIANT PLATELETS (test Present      See_Comment  A             [Auto

mated



             code = 5908-9)                                        message] The



                                                                 system which



                                                                 generated this



                                                                 result transmit

michelle



                                                                 reference range

:



                                                                 (none). The



                                                                 reference range



                                                                 was not used to



                                                                 interpret this



                                                                 result as



                                                                 normal/abnormal

.

 

             Lab Interpretation Abnormal                               



             (test code = 35851-1)                                        



[facility] [id number] 07:02:16





             Test Item    Value        Reference Range Interpretation Comments

 

             IONIZED CA (test code = 4.00 mg/dL   4.50-5.30    L            



             8786435941)                                         

 

             PH SERUM (test code = 5854359434)              7.35-7.45    H      

      QUES

 

             Lab Interpretation (test code = Abnormal                           

    



             98054-4)                                            



[facility] [id number] 07:02:16





             Test Item    Value        Reference Range Interpretation Comments

 

             IONIZED CA (test code = 4.00 mg/dL   4.50-5.30    L            



             7385589802)                                         

 

             PH SERUM (test code = 5701021495)              7.35-7.45    H      

      QUES

 

             Lab Interpretation (test code = Abnormal                           

    



             52919-4)                                            



[facility]





             Test Item    Value        Reference Range Interpretation Comments

 

             MAGNESIUM (test code = 9049592516) 1.5 mg/dL    1.7-2.4      L     

       

 

             Lab Interpretation (test code = Abnormal                           

    



             85937-5)                                            



[facility]





             Test Item    Value        Reference Range Interpretation Comments

 

             PHOSPHORUS (test code = 7435267406) 3.5 mg/dL    2.5-5.0           

        

 

             Lab Interpretation (test code = Normal                             

    



             58859-1)                                            



[facility]14





             Test Item    Value        Reference Range Interpretation Comments

 

             PHOSPHORUS (test code = 2214055637) 3.5 mg/dL    2.5-5.0           

        

 

             Lab Interpretation (test code = Normal                             

    



             24546-4)                                            



[facility]





             Test Item    Value        Reference Range Interpretation Comments

 

             MAGNESIUM (test code = 9345644270) 1.5 mg/dL    1.7-2.4      L     

       

 

             Lab Interpretation (test code = Abnormal                           

    



             94254-2)                                            



[facility] [id number] 
06:35:12





             Test Item    Value        Reference Range Interpretation Comments

 

             HBsAB (test code = Negative                               



             5360049147)                                         

 

             HBsAb                     mIU/mL                    



             Semi-Quantitative                                        



             (test code =                                        



             4944990867)                                         

 

             RENETTA (test code = Interpretation:                           



             RENETTA)         ?Hepatitis B Surface                           



                          Antibody ? ? ? ? Negative                           



                          - Patient is considered                           



                          to be not immune to                           



                          infection with HBV. ? ?                           



                          Positive - Anti-HBs                           



                          detected at greater than                           



                          or equal to 12 mIU/mL.                           



                          ?Patient is considered to                           



                          be immune to infection                           



                          with HBV. ?                            



[facility] 
06:35:12





             Test Item    Value        Reference Range Interpretation Comments

 

             HBC (test code = 0326630384) Negative                              

 

 

             HBC Semi-Quantitative (test code =                                 

       



             3115809297)                                         



[facility] [id number] 
06:35:12





             Test Item    Value        Reference Range Interpretation Comments

 

             HBsAB (test code = Negative                               



             6347250693)                                         

 

             HBsAb                     mIU/mL                    



             Semi-Quantitative                                        



             (test code =                                        



             1606687691)                                         

 

             RENETTA (test code = Interpretation:                           



             RENETTA)         ?Hepatitis B Surface                           



                          Antibody ? ? ? ? Negative                           



                          - Patient is considered                           



                          to be not immune to                           



                          infection with HBV. ? ?                           



                          Positive - Anti-HBs                           



                          detected at greater than                           



                          or equal to 12 mIU/mL.                           



                          ?Patient is considered to                           



                          be immune to infection                           



                          with HBV. ?                            



[facility] C VIRUS [id number] 06:35:12





             Test Item    Value        Reference Range Interpretation Comments

 

             HCV Ab (test code = 46786-8) Negative                              

 

 

             HCV Semi-Quantitative (test code =                                 

       



             39628-4)                                            



[facility] 
06:35:12





             Test Item    Value        Reference Range Interpretation Comments

 

             HBC (test code = 5397529859) Negative                              

 

 

             HBC Semi-Quantitative (test code =                                 

       



             7981655247)                                         



[facility] C VIRUS [id number] 06:35:12





             Test Item    Value        Reference Range Interpretation Comments

 

             HCV Ab (test code = 65752-4) Negative                              

 

 

             HCV Semi-Quantitative (test code =                                 

       



             34629-3)                                            



[facility] [id number] 06:20:51





             Test Item    Value        Reference Range Interpretation Comments

 

             AFP (test code = 3.8 ng/mL    See_Comment                [Automated



             5856433918)                                         message] The



                                                                 system which



                                                                 generated this



                                                                 result



                                                                 transmitted



                                                                 reference range

:



                                                                 <=7.5. The



                                                                 reference range



                                                                 was not used to



                                                                 interpret this



                                                                 result as



                                                                 normal/abnormal

.

 

             RENETTA (test code = RENETTA) Biotin has been                           



                          reported to                            



                          cause a negative                           



                          bias, interpret                           



                          results relative                           



                          to patient's use                           



                          of biotin.                             

 

             Lab Interpretation Normal                                 



             (test code = 76986-8)                                        



[facility] [id number] 06:20:51





             Test Item    Value        Reference Range Interpretation Comments

 

             AFP (test code = 3.8 ng/mL    See_Comment                [Automated



             [id number])                                         message] The



                                                                 system which



                                                                 generated this



                                                                 result



                                                                 transmitted



                                                                 reference range

:



                                                                 <=7.5. The



                                                                 reference range



                                                                 was not used to



                                                                 interpret this



                                                                 result as



                                                                 normal/abnormal

.

 

             RENETTA (test code = RENETTA) Biotin has been                           



                          reported to                            



                          cause a negative                           



                          bias, interpret                           



                          results relative                           



                          to patient's use                           



                          of biotin.                             

 

             Lab Interpretation Normal                                 



             (test code = 71414-3)                                        



[facility]
2021 02:14:47





             Test Item    Value        Reference Range Interpretation Comments

 

             Cross Match Result Compatible                             



             (test code = 4409)                                        

 

             ISBT Blood Type Code                                        



             (test code = 791833)                                        

 

             Unit Blood Type (test O Pos                                  



             code = 4410)                                        

 

             Unit Number (test [id number]                           



             code = 4411)                                        

 

             Blood Expiration Date                                        



             & Time (test code =                                        



             059893)                                             

 

             Status Information Issued                                 



             (test code = 4412)                                        

 

             Product      Red Blood Cells                           



             Identification (test                                        



             code = 4413)                                        

 

             Product Code (test [id number]                               Performed

 at [facility]



             code = 4414)                                        Laboratory



                                                                 Services [facility]



                                                                 Blood [address]



                                                                 [id number]



                                                                 Free:



                                                                 [phone]

A



                                                                 No. [id number]



[facility] Units
2021 02:14:47





             Test Item    Value        Reference Range Interpretation Comments

 

             Cross Match Result Compatible                             



             (test code = 4409)                                        

 

             ISBT Blood Type Code                                        



             (test code = 204509)                                        

 

             Unit Blood Type (test O Pos                                  



             code = 4410)                                        

 

             Unit Number (test [id number]                           



             code = 4411)                                        

 

             Blood Expiration Date                                        



             & Time (test code =                                        



             421081)                                             

 

             Status Information Issued                                 



             (test code = 4412)                                        

 

             Product      Red Blood Cells                           



             Identification (test                                        



             code = 4413)                                        

 

             Product Code (test [id number]                               Performed

 at [facility]



             code = 4414)                                        Laboratory



                                                                 Services [facility]



                                                                 Blood [address]



                                                                 [id number]



                                                                 Free:



                                                                 [phone]

A



                                                                 No. [id number]



[facility] [id number] 
01:26:53Impression: 1. ?Cirrhosis.2. ?Enlarged portal vein and mild 
splenomegaly, suggestive of portalhypertension. Hepatopedal flow is seen in the 
main portal vein.3. Mild gallbladder wall thickening, which is nonspecific and 
may be seenwith cholecystitis, hepatitis, liver failure, or hypervolemia.4. 
Moderate abdominal ascites. Left pleural effusion. RL: 2824 End of Report 
Electronically signed by Fernandez Baker MD at 2021 8:26 PMExam: Limited 
Abdominal Ultrasound, 2021 7:00 PM. Ordering Physician: AMY CALDERA. 
History: Ascites. Comparison: None. Technique: Grayscale and color Doppler 
ultrasound images of the right upperquadrant abdomen were obtained. Technical 
Quality: Examination is slightlysuboptimal due to body habitusand overlying 
bowel gas. Findings: ?Liver is nodular in contour and heterogeneous in 
echotexture. Hepatopetalflow is noted in the main portal vein. Portal vein is 
enlarged. There is nointrahepatic biliary ductal dilatation. Common bile duct 
measures 2 mm incaliber. Gallbladder demonstrates no evidence of stones. ?There 
is nopericholecystic fluid. Gallbladder wall measures 3 mm in 
thickness.Sonographic Gupta sign is negative. Right kidney measures 13.9 cm in 
length. ?There is no right hydronephrosis.Right kidney shows normal cortical 
thickness, corticomedullarydifferentiation and echotexture. ? Visualized 
pancreas is unremarkable. Pancreas is partially obscured byoverlying bowel gas. 
Visualized inferior vena cava and abdominal aorta are unremarkable. Spleen 
measures up to 12.6 cm in length. There is moderate free fluid. Left pleural 
effusion is partiallyvisualized. [facility], Radiant Results Inft User - 2021 
8:28 PM CDTFormatting of this note might be different from the original.Exam: 
Limited Abdominal Ultrasound, 2021 7:00 PM.Ordering Physician: AMY IBRAHIM.History: Ascites.Comparison: None.Technique: Grayscale and color Doppler 
ultrasound images of the right upperquadrant abdomen were obtained.Technical 
Quality: Examination is slightly suboptimal due to body habitusand overlying 
bowel gas.Findings: Liver is nodular in contour and heterogeneous in ec
hotexture. Hepatopetalflow is noted in the main portal vein. Portal vein is 
enlarged. There is nointrahepatic biliary ductal dilatation. Common bile duct 
measures 2 mm incaliber.Gallbladder demonstrates no evidence of stones. There is
nopericholecystic fluid. Gallbladder wall measures 3 mm in thickness.Sonographic
Gupta sign is negative.Right kidney measures 13.9 cm in length. There is no 
right hydronephrosis.Right kidney shows normal cortical thickness, 
corticomedullarydifferentiation and echotexture. Visualized pancreas is 
unremarkable. Pancreas is partially obscured byoverlying bowel gas.Visualized 
inferior vena cava and abdominal aorta are unremarkable.Spleen measures up to 
12.6 cm in length.There is moderate free fluid. Left pleural effusion is 
partiallyvisualized.IMPRESSIONImpression: 1. Cirrhosis.2. Enlarged portal vein 
and mild splenomegaly, suggestive of portalhypertension. Hepatopedalflow is seen
in the main portal vein.3. Mild gallbladder wall thickening, which is 
nonspecific and may be seenwith cholecystitis, hepatitis, liver failure, or 
hypervolemia.4. Moderate abdominal ascites. Left pleural effusion. RL: 2824End 
of ReportElectronically signed by Fernandez Baker MD at 2021 8:26 PMH[facility]US ABDOMEN LIMITED WITH [id number] 01:26:53
Impression: 1. ?Cirrhosis.2. ?Enlarged portal vein and mild splenomegaly, 
suggestive of portalhypertension. Hepatopedal flow is seen in the main portal 
vein.3. Mild gallbladder wall thickening, which is nonspecific and may be 
seenwith cholecystitis, hepatitis, liver failure, or hypervolemia.4. Moderate 
abdominal ascites. Left pleural effusion. RL: 2824 End of Report Electronically 
signed by Fernandez Baker MD at 2021 8:26 PMExam: Limited Abdominal Ultrasound, 
2021 7:00 PM. Ordering Physician: AMY CALDERA. History: Ascites. 
Comparison: None. Technique: Grayscale and color Doppler ultrasound images of 
the right upperquadrant abdomen were obtained. Technical Quality: Examination is
slightlysuboptimal due to body habitusand overlying bowel gas. Findings: ?Liver 
is nodular in contour and heterogeneous in echotexture. Hepatopetalflow is noted
in the main portal vein. Portal vein is enlarged. There is nointrahepatic 
biliary ductal dilatation. Common bile duct measures 2 mm incaliber. Gallbladder
demonstrates no evidence of stones. ?There is nopericholecystic fluid. 
Gallbladder wall measures 3 mm in thickness.Sonographic Gupta sign is negative.
Right kidney measures 13.9 cm in length. ?There is no right hydronephrosis.Right
kidney shows normal cortical thickness, corticomedullarydifferentiation and 
echotexture. ? Visualized pancreas is unremarkable. Pancreas is partially 
obscured byoverlying bowel gas. Visualized inferior vena cava and abdominal 
aorta are unremarkable. Spleen measures up to 12.6 cm in length. There is 
moderate free fluid. Left pleural effusion is partiallyvisualized. [facility], Radiant
Results Inft User - 2021 8:28 PM CDTFormatting of this note might be 
different from the original.Exam: Limited Abdominal Ultrasound, 2021 7:00 
PM.Ordering Physician: AMY CALDERA.History: Ascites.Comparison: 
None.Technique: Grayscale and color Doppler ultrasound images of the right 
upperquadrant abdomen were obtained.Technical Quality: Examination is slightly 
suboptimal due to body habitusand overlying bowel gas.Findings: Liver is nodular
in contour and heterogeneous in echotexture. Hepatopetalflow is noted in the 
main portal vein. Portal vein is enlarged. There is nointrahepatic biliary 
ductal dilatation. Common bile duct measures 2 mm incaliber.Gallbladder 
demonstrates no evidence of stones. There is nopericholecystic fluid. 
Gallbladder wall measures 3 mm in thickness.Sonographic Gupta sign is 
negative.Right kidney measures 13.9 cm in length. There is no right hydro
nephrosis.Right kidney shows normal cortical thickness, 
corticomedullarydifferentiation and echotexture. Visualized pancreas is 
unremarkable. Pancreas is partially obscured byoverlying bowel gas.Visualized 
inferior vena cava and abdominal aorta are unremarkable.Spleen measures up to 
12.6 cm in length.There is moderate free fluid. Left pleural effusion is 
partiallyvisualized.IMPRESSIONImpression: 1. Cirrhosis.2. Enlarged portal vein 
and mild splenomegaly, suggestive of portalhypertension. Hepatopedalflow is seen
in the main portal vein.3. Mild gallbladder wall thickening, which is 
nonspecific and may be seenwith cholecystitis, hepatitis, liver failure, or 
hypervolemia.4. Moderate abdominal ascites. Left pleural effusion. RL: 2824End 
of ReportElectronically signed by Fernandez Baker MD at 2021 8:26 PMUn[facility] [id number] 00:23:10





             Test Item    Value        Reference Range Interpretation Comments

 

             HBsAg Semi-Quantitative (test code = Negative     Negative         

         



             5195-3)                                             



[facility] B SURFACE [id number] 00:23:10





             Test Item    Value        Reference Range Interpretation Comments

 

             HBsAg Semi-Quantitative (test code = Negative     Negative         

         



             5195-3)                                             



[facility]NA, K, CL, CO2, 
GLUCOSE, BUN, CREATININE, CA)2021 00:19:53





             Test Item    Value        Reference Range Interpretation Comments

 

             NA (test code = 136 mmol/L   135-145                   



             4961939023)                                         

 

             K (test code = 3.7 mmol/L   3.5-5.0                   



             8033527061)                                         

 

             CL (test code = 104 mmol/L                       



             8800750661)                                         

 

             CO2 TOTAL (test code = 26 mmol/L    23-31                     



             9356819265)                                         

 

             AGAP (test code =              2-16                      



             2758615403)                                         

 

             BUN (test code = 4 mg/dL      7-23         L            



             4628327679)                                         

 

             GLUCOSE (test code = 75 mg/dL                         



             9823684587)                                         

 

             CREATININE (test code = 0.41 mg/dL   0.60-1.25    L            



             9164939176)                                         

 

             CALCIUM (test code = 7.3 mg/dL    8.6-10.6     L            



             3352816195)                                         

 

             eGFR (test code =              mL/min/1.73m2              



             8745415883)                                         

 

             RENETTA (test code = RENETTA) Association of                           



                          Glomerular Filtration                           



                          Rate (GFR) and Staging                           



                          of Kidney Disease*                           



                          +---------------------                           



                          --+-------------------                           



                          --+-------------------                           



                          ------+| GFR                           



                          (mL/min/1.73 m2) ?|                           



                          With Kidney Damage ?|                           



                          ?Without Kidney                           



                          Damage+---------------                           



                          --------+-------------                           



                          --------+-------------                           



                          ------------+| ?>90 ?                           



                          ? ? ? ? ? ? ? ?|                           



                          ?Stage one ? ? ? ? ?|                           



                          ? Normal ? ? ? ? ? ? ?                           



                          ?+--------------------                           



                          ---+------------------                           



                          ---+------------------                           



                          -------+| ?60-89 ? ? ?                           



                          ? ? ? ? ?| ?Stage two                           



                          ? ? ? ? ?| ? Decreased                           



                          GFR ? ? ? ?                            



                          +---------------------                           



                          --+-------------------                           



                          --+-------------------                           



                          ------+| ?30-59 ? ? ?                           



                          ? ? ? ? ?| ?Stage                           



                          three ? ? ? ?| ? Stage                           



                          three ? ? ? ? ?                           



                          +---------------------                           



                          --+-------------------                           



                          --+-------------------                           



                          ------+| ?15-29 ? ? ?                           



                          ? ? ? ? ?| ?Stage four                           



                          ? ? ? ? | ? Stage four                           



                          ? ? ? ? ?                              



                          ?+--------------------                           



                          ---+------------------                           



                          ---+------------------                           



                          -------+| ?<15 (or                           



                          dialysis) ? ?| ?Stage                           



                          five ? ? ? ? | ? Stage                           



                          five ? ? ? ? ?                           



                          ?+--------------------                           



                          ---+------------------                           



                          ---+------------------                           



                          -------+ *Each stage                           



                          assumes the associated                           



                          GFR level has been in                           



                          effect for at least                           



                          three months. ?Stages                           



                          1 to 5, with or                           



                          without kidney                           



                          disease, indicate                           



                          chronic kidney                           



                          disease. Notes:                           



                          Determination of                           



                          stages one and two                           



                          (with eGFR                             



                          >59mL/min/1.73 m2)                           



                          requires estimation of                           



                          kidney damage for at                           



                          least three months as                           



                          defined by structural                           



                          or functional                           



                          abnormalities of the                           



                          kidney, manifested by                           



                          either:Pathological                           



                          abnormalities or                           



                          Markers of kidney                           



                          damage (including                           



                          abnormalities in the                           



                          composition of the                           



                          blood or urine or                           



                          abnormalities in                           



                          imaging tests).                           

 

             Lab Interpretation Abnormal                               



             (test code = 07484-8)                                        



[facility] METABOLIC PANEL (NA, K, CL, CO2, 
GLUCOSE, BUN, CREATININE, CA)2021 00:19:53





             Test Item    Value        Reference Range Interpretation Comments

 

             NA (test code = 136 mmol/L   135-145                   



             3888557475)                                         

 

             K (test code = 3.7 mmol/L   3.5-5.0                   



             5284927505)                                         

 

             CL (test code = 104 mmol/L                       



             4686636183)                                         

 

             CO2 TOTAL (test code = 26 mmol/L    23-31                     



             6647585455)                                         

 

             AGAP (test code =              2-16                      



             6774902970)                                         

 

             BUN (test code = 4 mg/dL      7-23         L            



             3323620995)                                         

 

             GLUCOSE (test code = 75 mg/dL                         



             7080958631)                                         

 

             CREATININE (test code = 0.41 mg/dL   0.60-1.25    L            



             3707776764)                                         

 

             CALCIUM (test code = 7.3 mg/dL    8.6-10.6     L            



             6179290732)                                         

 

             eGFR (test code =              mL/min/1.73m2              



             3374198384)                                         

 

             RENETTA (test code = RENETTA) Association of                           



                          Glomerular Filtration                           



                          Rate (GFR) and Staging                           



                          of Kidney Disease*                           



                          +---------------------                           



                          --+-------------------                           



                          --+-------------------                           



                          ------+| GFR                           



                          (mL/min/1.73 m2) ?|                           



                          With Kidney Damage ?|                           



                          ?Without Kidney                           



                          Damage+---------------                           



                          --------+-------------                           



                          --------+-------------                           



                          ------------+| ?>90 ?                           



                          ? ? ? ? ? ? ? ?|                           



                          ?Stage one ? ? ? ? ?|                           



                          ? Normal ? ? ? ? ? ? ?                           



                          ?+--------------------                           



                          ---+------------------                           



                          ---+------------------                           



                          -------+| ?60-89 ? ? ?                           



                          ? ? ? ? ?| ?Stage two                           



                          ? ? ? ? ?| ? Decreased                           



                          GFR ? ? ? ?                            



                          +---------------------                           



                          --+-------------------                           



                          --+-------------------                           



                          ------+| ?30-59 ? ? ?                           



                          ? ? ? ? ?| ?Stage                           



                          three ? ? ? ?| ? Stage                           



                          three ? ? ? ? ?                           



                          +---------------------                           



                          --+-------------------                           



                          --+-------------------                           



                          ------+| ?15-29 ? ? ?                           



                          ? ? ? ? ?| ?Stage four                           



                          ? ? ? ? | ? Stage four                           



                          ? ? ? ? ?                              



                          ?+--------------------                           



                          ---+------------------                           



                          ---+------------------                           



                          -------+| ?<15 (or                           



                          dialysis) ? ?| ?Stage                           



                          five ? ? ? ? | ? Stage                           



                          five ? ? ? ? ?                           



                          ?+--------------------                           



                          ---+------------------                           



                          ---+------------------                           



                          -------+ *Each stage                           



                          assumes the associated                           



                          GFR level has been in                           



                          effect for at least                           



                          three months. ?Stages                           



                          1 to 5, with or                           



                          without kidney                           



                          disease, indicate                           



                          chronic kidney                           



                          disease. Notes:                           



                          Determination of                           



                          stages one and two                           



                          (with eGFR                             



                          >59mL/min/1.73 m2)                           



                          requires estimation of                           



                          kidney damage for at                           



                          least three months as                           



                          defined by structural                           



                          or functional                           



                          abnormalities of the                           



                          kidney, manifested by                           



                          either:Pathological                           



                          abnormalities or                           



                          Markers of kidney                           



                          damage (including                           



                          abnormalities in the                           



                          composition of the                           



                          blood or urine or                           



                          abnormalities in                           



                          imaging tests).                           

 

             Lab Interpretation Abnormal                               



             (test code = 55838-0)                                        



General acute hospital and Screen - ONCE [id number] 00:12:19





             Test Item    Value        Reference Range Interpretation Comments

 

             ABO & RH (test code O Positive                             Performe

d at [facility]



             = 20)                                               Laboratory [facility]

[address]



                                                                 [place] [id number]

4Toll



                                                                 Free: [phone]

266CLIA



                                                                 No. [id number]

 

             IAT (test code = Negative                               Performed a

t [facility]



             1185)                                               Laboratory [facility]

[address] [place], [place] [id number]

4Toll



                                                                 Free: [phone]

266CLIA



                                                                 No. [id number]



General acute hospital and Screen - ONCE [id number] 00:12:19





             Test Item    Value        Reference Range Interpretation Comments

 

             ABO & RH (test code O Positive                             Performe

d at [facility]



             = 20)                                               Laboratory [facility]

[address] [place] [phone]

4Toll



                                                                 Free: [phone]

266CLIA



                                                                 No. [id number]

 

             IAT (test code = Negative                               Performed a

t [facility]



             1185)                                               Laboratory [facility]

[address] [place] [phone]

4Toll



                                                                 Free: [phone]

266CLIA



                                                                 No. [id number]



[id number]





             Test Item    Value        Reference Range Interpretation Comments

 

             FERRITIN (test code = 12.5 ng/mL   18.0-464.0   L            



             [id number])                                         

 

             RENETTA (test code = RENETTA) Biotin has been                           



                          reported to cause a                           



                          negative bias,                           



                          interpret results                           



                          relative to                            



                          patient's use of                           



                          biotin.                                

 

             Lab Interpretation (test Abnormal                               



             code = 33396-4)                                        



[facility] [id number] 00:02:09





             Test Item    Value        Reference Range Interpretation Comments

 

             FERRITIN (test code = 12.5 ng/mL   18.0-464.0   L            



             [id number])                                         

 

             RENETTA (test code = RENETTA) Biotin has been                           



                          reported to cause a                           



                          negative bias,                           



                          interpret results                           



                          relative to                            



                          patient's use of                           



                          biotin.                                

 

             Lab Interpretation (test Abnormal                               



             code = 70913-9)                                        



[facility] / [id number] 22:57:36





             Test Item    Value        Reference Range Interpretation Comments

 

             PROTIME PATIENT (test              See_Comment  H             [Auto

mated message]



             code = 5964-2)                                        The system 

ich



                                                                 generated this 

result



                                                                 transmitted ref

erence



                                                                 range: 10.1 - 1

2.6



                                                                 Seconds. The



                                                                 reference range

 was



                                                                 not used to int

erpret



                                                                 this result as



                                                                 normal/abnormal

.

 

             INR (test code = 6301-6)                                        Nor

mal INR <1.1;



                                                                 Warfarin Therap

eutic



                                                                 range 2.0 to 3.

0 or



                                                                 2.5 to 3.5, dep

ending



                                                                 upon the indica

tions.

 

             Lab Interpretation (test Abnormal                               



             code = 59077-0)                                        



[facility] / [id number] 22:57:36





             Test Item    Value        Reference Range Interpretation Comments

 

             PROTIME PATIENT (test              See_Comment  H             [Auto

mated message]



             code = 5964-2)                                        The system 

[facility]



                                                                 generated this 

result



                                                                 transmitted ref

erence



                                                                 range: 10.1 - 1

2.6



                                                                 Seconds. The



                                                                 reference range

 was



                                                                 not used to int

erpret



                                                                 this result as



                                                                 normal/abnormal

.

 

             INR (test code = 6301-6)                                        Nor

mal INR <1.1;



                                                                 Warfarin Therap

eutic



                                                                 range 2.0 to 3.

0 or



                                                                 2.5 to 3.5, dep

ending



                                                                 upon the indica

tions.

 

             Lab Interpretation (test Abnormal                               



             code = 64663-7)                                        



[facility] WITH [id number] 22:53:15





             Test Item    Value        Reference Range Interpretation Comments

 

             WBC (test code =              See_Comment  L             [Automated



             6690-2)                                             message] The sy

stem



                                                                 which generated



                                                                 this result



                                                                 transmitted



                                                                 reference range

:



                                                                 4.20 - 10.70



                                                                 10*3/?L. The



                                                                 reference range

 was



                                                                 not used to



                                                                 interpret this



                                                                 result as



                                                                 normal/abnormal

.

 

             RBC (test code =              See_Comment  L             [Automated



             789-8)                                              message] The sy

stem



                                                                 which generated



                                                                 this result



                                                                 transmitted



                                                                 reference range

:



                                                                 4.26 - 5.52



                                                                 10*6/?L. The



                                                                 reference range

 was



                                                                 not used to



                                                                 interpret this



                                                                 result as



                                                                 normal/abnormal

.

 

             HGB (test code = 6.9 g/dL     12.2-16.4    L            



             718-7)                                              

 

             HCT (test code = 22.3 %       38.4-49.3    L            



             4544-3)                                             

 

             MCV (test code = 80.8 fL      81.7-95.6    L            



             787-2)                                              

 

             MCH (test code = 25.0 pg      26.1-32.7    L            



             785-6)                                              

 

             MCHC (test code = 30.9 g/dL    31.2-35.0    L            



             786-4)                                              

 

             RDW-SD (test code = 50.6 fL      38.5-51.6                 



             03241-1)                                            

 

             RDW-CV (test code = 17.4 %       12.1-15.4    H            



             788-0)                                              

 

             PLT (test code =              See_Comment  LL            [Automated



             777-3)                                              message] The sy

stem



                                                                 which generated



                                                                 this result



                                                                 transmitted



                                                                 reference range

:



                                                                 150 - 328 10*3/

?L.



                                                                 The reference r

moose



                                                                 was not used to



                                                                 interpret this



                                                                 result as



                                                                 normal/abnormal

.

 

             MPV (test code =                                        Not Measure

d



             48272-1)                                            

 

             IPF % (test code = 11.6 %       1.2-10.7     H            Platelet 

count



             [id number])                                         measured by



                                                                 fluorescence



                                                                 method.

 

             NRBC/100 WBC (test              See_Comment                [Automat

ed



             code = 2991267884)                                        message] 

The system



                                                                 which generated



                                                                 this result



                                                                 transmitted



                                                                 reference range

:



                                                                 0.0 - 10.0 /100



                                                                 WBCs. The refer

ence



                                                                 range was not u

sed



                                                                 to interpret th

is



                                                                 result as



                                                                 normal/abnormal

.

 

             NRBC x10^3 (test code <0.01        See_Comment                [Auto

mated



             = [id number])                                        message] The s

ystem



                                                                 which generated



                                                                 this result



                                                                 transmitted



                                                                 reference range

:



                                                                 10*3/?L. The



                                                                 reference range

 was



                                                                 not used to



                                                                 interpret this



                                                                 result as



                                                                 normal/abnormal

.

 

             GRAN MAT (NEUT) % 61.0 %                                 



             (test code = 770-8)                                        

 

             IMM GRAN % (test code 0.30 %                                 



             = 1614458491)                                        

 

             LYMPH % (test code = 21.0 %                                 



             736-9)                                              

 

             MONO % (test code = 13.8 %                                 



             5905-5)                                             

 

             EOS % (test code = 2.7 %                                  



             713-8)                                              

 

             BASO % (test code = 1.2 %                                  



             706-2)                                              

 

             GRAN MAT x10^3(ANC) 2.04 10*3/uL 1.99-6.95                 



             (test code =                                        



             1402182915)                                         

 

             IMM GRAN x10^3 (test <0.03        0.00-0.06                 



             code = 7090718551)                                        

 

             LYMPH x10^3 (test code 0.70 10*3/uL 1.09-3.23    L            



             = 731-0)                                            

 

             MONO x10^3 (test code 0.46 10*3/uL 0.36-1.02                 



             = 742-7)                                            

 

             EOS x10^3 (test code = 0.09 10*3/uL 0.06-0.53                 



             711-2)                                              

 

             BASO x10^3 (test code 0.04 10*3/uL 0.01-0.09                 



             = 704-7)                                            

 

             Lab Interpretation Abnormal                               



             (test code = 57012-3)                                        



[facility] [id number] 17:59:41





             Test Item    Value        Reference Range Interpretation Comments

 

             ABO & RH (test code O Positive                             Performe

d at [facility]



             = 20)                                               Laboratory [facility] [place], [place] [address]Toll 

Free:



                                                                 [phone]

A No.



                                                                 [id number]



[facility] [id number] 17:59:41





             Test Item    Value        Reference Range Interpretation Comments

 

             ABO & RH (test code O Positive                             Performe

d at [facility]



             = 20)                                               Laboratory [facility] [place], [place] [address]Toll 

Free:



                                                                 [phone]

A No.



                                                                 [id number]



[facility] [id number] 17:58:57





             Test Item    Value        Reference Range Interpretation Comments

 

             ABO & RH (test code O Positive                             Performe

d at [facility]



             = 20)                                               Laboratory [facility] [place], [place] [address]Toll 

Free:



                                                                 [phone]

A No.



                                                                 [id number]

 

             IAT (test code = Negative                               Performed a

t [facility]



             1185)                                               Laboratory [facility] [place] [address]



                                                                 [id number] 

Free:



                                                                 [phone]

A No.



                                                                 [id number]



[facility]ysis[facility] 17:44:03





             Test Item    Value        Reference Range Interpretation Comments

 

             APPEARANCE (test code = Clear        Clear                     



             6480387013)                                         

 

             COLOR (test code = Lorie        Yellow       A            



             2634008440)                                         

 

             PH (test code =              4.8-8.0                   



             0710976823)                                         

 

             SP GRAVITY (test code =              1.003-1.030  H            



             9566127309)                                         

 

             GLU U QUAL (test code = Normal       Normal                    



             8338075115)                                         

 

             BLOOD (test code = Negative     Negative                  



             7886035495)                                         

 

             KETONES (test code = 20 mg/dL     Negative     A            



             0806247155)                                         

 

             PROTEIN (test code = 30 mg/dL     Negative     A            



             2887-8)                                             

 

             UROBILIN (test code = 4.0 mg/dL    Normal       A            



             4466599815)                                         

 

             BILIRUBIN (test code = 2 mg/dL      Negative     A            



             2182000258)                                         

 

             NITRITE (test code = Negative     Negative                  



             8398741463)                                         

 

             LEUK NIKI (test code = Negative     Negative                  



             7990514015)                                         

 

             RBC/HPF (test code =              See_Comment                [Autom

ated message]



             6884566928)                                         The system [employer]



                                                                 generated this



                                                                 result transmit

michelle



                                                                 reference range

: 0 -



                                                                 3 HPF. The refe

rence



                                                                 range was not u

sed



                                                                 to interpret th

is



                                                                 result as



                                                                 normal/abnormal

.

 

             WBC/HPF (test code =              See_Comment                [Autom

ated message]



             1672977504)                                         The system [employer]



                                                                 generated this



                                                                 result transmit

michelle



                                                                 reference range

: 0 -



                                                                 5 HPF. The refe

rence



                                                                 range was not u

sed



                                                                 to interpret th

is



                                                                 result as



                                                                 normal/abnormal

.

 

             BACTERIA (test code = Few          Negative     A            



             8377852439)                                         

 

             MUCOUS (test code = Marked       Negative LPF A            



             1288676637)                                         

 

             SQ EPITH (test code =              HPF                       



             5225131925)                                         

 

             Ictotest (test code = Positive                               



             8517309603)                                         

 

             Lab Interpretation (test Abnormal                               



             code = 89642-3)                                        



[facility]





             Test Item    Value        Reference Range Interpretation Comments

 

             APPEARANCE (test code = Clear        Clear                     



             7379433065)                                         

 

             COLOR (test code = Lorie        Yellow       A            



             9343872999)                                         

 

             PH (test code =              4.8-8.0                   



             5116026722)                                         

 

             SP GRAVITY (test code =              1.003-1.030  H            



             7681888337)                                         

 

             GLU U QUAL (test code = Normal       Normal                    



             2915417019)                                         

 

             BLOOD (test code = Negative     Negative                  



             3932330130)                                         

 

             KETONES (test code = 20 mg/dL     Negative     A            



             9035787487)                                         

 

             PROTEIN (test code = 30 mg/dL     Negative     A            



             2887-8)                                             

 

             UROBILIN (test code = 4.0 mg/dL    Normal       A            



             0112325168)                                         

 

             BILIRUBIN (test code = 2 mg/dL      Negative     A            



             9643151193)                                         

 

             NITRITE (test code = Negative     Negative                  



             4381752867)                                         

 

             LEUK NIKI (test code = Negative     Negative                  



             2992051623)                                         

 

             RBC/HPF (test code =              See_Comment                [Autom

ated message]



             7263928535)                                         The system [employer]



                                                                 generated this



                                                                 result transmit

michelle



                                                                 reference range

: 0 -



                                                                 3 HPF. The refe

rence



                                                                 range was not u

sed



                                                                 to interpret th

is



                                                                 result as



                                                                 normal/abnormal

.

 

             WBC/HPF (test code =              See_Comment                [Autom

ated message]



             6300441950)                                         The system [employer]



                                                                 generated this



                                                                 result transmit

michelle



                                                                 reference range

: 0 -



                                                                 5 HPF. The refe

rence



                                                                 range was not u

sed



                                                                 to interpret th

is



                                                                 result as



                                                                 normal/abnormal

.

 

             BACTERIA (test code = Few          Negative     A            



             3191692376)                                         

 

             MUCOUS (test code = Marked       Negative LPF A            



             7825018301)                                         

 

             SQ EPITH (test code =              HPF                       



             0799996834)                                         

 

             Ictotest (test code = Positive                               



             6602606537)                                         

 

             Lab Interpretation (test Abnormal                               



             code = 01726-4)                                        



[facility] [id number] 
17:13:20 1. ?Cirrhosis, ascites, splenomegaly, and portal venous hypertension. 
Thereare large gastroesophageal varices. 2. Small bowel and colon wall 
thickening likely related to the portalvenous hypertension.Enterocolitis could 
have this appearance. There is nopneumatosis or pneumoperitoneum. RL: 1105 
Electronically signed by jT Mesa MD at 2021 12:13 PMHISTORY: ?Abdominal 
distension Diverticulitis suspected COMPARISON:none TECHNIQUE:CT scan of the 
abdomen and pelvis performed. Contiguous axial CT imageswere obtained after 
administration of intravenous contrast. ?[facility]. [place]
hnical quality: Technical quality: adequate. FINDINGS: Moderate left pleural 
effusion seen. There isbibasilar atelectasis. Thereis no pericardial effusion. 
Liver is cirrhotic. Spleen is enlarged measuring 14.5 cm. There is moderateto 
large ascites. Gastroesophageal varices are large. Portal vein patent. There is 
no adrenal nodule. The kidneys demonstrate symmetric nephrograms.There is no 
hydronephrosis. Diffuse bowel wall thickening is present which may be due to the
portalvenous hypertension. Superimposed enterocolitis could have this 
appearance.There is no pneumatosis or pneumoperitoneum. Urinary bladder 
unremarkable. There is no destructive bony process. [facility], Radiant Results In[facility] 
User - 2021 12:14 PM CDTFormatting of this note might be different from 
the original.HISTORY: Abdominal distension Diverticulitis suspected 
COMPARISON:noneTECHNIQUE:CT scan of the abdomen and pelvis performed. Contiguous
axial CT imageswere obtained after administration of intravenous contrast. CT 
scan doneaccording to [facility]. Technical quality: Technical quality: 
adequate.FINDINGS:Moderate left pleural effusion seen. There is bibasilar 
atelectasis. Thereis no pericardial effusion.Liver is cirrhotic. Spleenis 
enlarged measuring 14.5 cm. There is moderateto large ascites. Gastroesophageal 
varices are large. Portal vein patent.There is no adrenal nodule. The kidneys 
demonstrate symmetric nephrograms.There is no hydronephrosis.Diffuse bowel wall 
thickening is present which may be due to the portalvenous hypertension. 
Superimposed enterocolitis could have this appearance.There is no pneumatosis or
pneumoperitoneum.Urinary bladder unremarkable.There is no destructive bony 
process.IMPRESSION1. Cirrhosis, ascites, splenomegaly, and portal venous 
hypertension. Thereare large gastroesophageal varices.2. Smallbowel and colon 
wall thickening likely related to the portalvenous hypertension. Enterocolitis 
couldhave this appearance. There is nopneumatosis or pneumoperitoneum.RL: 
1105Electronically signed by Tj Mesa MD at 2021 12:13 PMUnSe[facility]CT [facility] [id number] 17:13:20 1. 
?Cirrhosis, ascites, splenomegaly, and portal venous hypertension. Thereare 
large gastroesophageal varices. 2. Small bowel and colon wall thickening likely 
related to the portalvenous hypertension.Enterocolitis could have this 
appearance. There is nopneumatosis or pneumoperitoneum. RL: 1105 Electronically 
signed by Tj Mesa MD at 2021 12:13 PMHISTORY: ?Abdominal distension 
Diverticulitis suspected COMPARISON:none TECHNIQUE:CT scan of the abdomen and 
pelvis performed. Contiguous axial CT imageswere obtained after administration 
of intravenous contrast. ?CT scan doneaccording to [facility]. Technical quality: 
Technical quality: adequate. FINDINGS: Moderate left pleural effusion seen. 
There isbibasilar atelectasis. Thereis no pericardial effusion. Liver is 
cirrhotic. Spleen is enlarged measuring 14.5 cm. There is moderateto large 
ascites. Gastroesophageal varices are large. Portal vein patent. There is no 
adrenal nodule. The kidneys demonstrate symmetric nephrograms.There is no 
hydronephrosis. Diffuse bowel wall thickening is present which may be due to the
portalvenous hypertension. Superimposed enterocolitis could have this 
appearance.There is no pneumatosis or pneumoperitoneum. Urinary bladder 
unremarkable. There is no destructive bony process. [facility], Radiant Results [facility] 
User - 2021 12:14 PM CDTFormatting of this note might be different from 
the original.HISTORY: Abdominal distension Diverticulitis suspected 
COMPARISON:noneTECHNIQUE:CT scan of the abdomen and pelvis performed. Contiguous
axial CT imageswere obtained after administration of intravenous contrast. CT 
scan doneaccording to [facility]. Technical quality: Technical quality: 
adequate.FINDINGS:Moderate left pleural effusion seen. There is bibasilar 
atelectasis. Thereis no pericardial effusion.Liver is cirrhotic. Spleenis 
enlarged measuring 14.5 cm. There is moderateto large ascites. Gastroesophageal 
varices are large. Portal vein patent.There is no adrenal nodule. The kidneys 
demonstrate symmetric nephrograms.There is no hydronephrosis.Diffuse bowel wall 
thickening is present which may be due to the portalvenous hypertension. 
Superimposed enterocolitis could have this appearance.There is no pneumatosis or
pneumoperitoneum.Urinary bladder unremarkable.There is no destructive bony 
process.IMPRESSION1. Cirrhosis, ascites, splenomegaly, and portal venous 
hypertension. Thereare large gastroesophageal varices.2. Smallbowel and colon 
wall thickening likely related to the portalvenous hypertension. Enterocolitis 
couldhave this appearance. There is nopneumatosis or pneumoperitoneum.RL: 
1105Electronically signed by Tj Mesa MD at 2021 12:13 PMUn[facility] with Kbflewnirqhl4853-51-22 16:30:57





             Test Item    Value        Reference Range Interpretation Comments

 

             WBC (test code =              See_Comment  L             [Automated



             0651-2)                                             message] The



                                                                 system which



                                                                 generated this



                                                                 result transmit

michelle



                                                                 reference range

:



                                                                 4.20 - 10.70



                                                                 10*3/?L. The



                                                                 reference range



                                                                 was not used to



                                                                 interpret this



                                                                 result as



                                                                 normal/abnormal

.

 

             RBC (test code =              See_Comment  L             [Automated



             369-8)                                              message] The



                                                                 system which



                                                                 generated this



                                                                 result transmit

michelle



                                                                 reference range

:



                                                                 4.26 - 5.52



                                                                 10*6/?L. The



                                                                 reference range



                                                                 was not used to



                                                                 interpret this



                                                                 result as



                                                                 normal/abnormal

.

 

             HGB (test code = 6.3 g/dL     12.2-16.4    L            



             718-7)                                              

 

             HCT (test code = 21.2 %       38.4-49.3    L            



             4544-3)                                             

 

             MCV (test code = 79.4 fL      81.7-95.6    L            



             787-2)                                              

 

             MCH (test code = 23.6 pg      26.1-32.7    L            



             785-6)                                              

 

             MCHC (test code = 29.7 g/dL    31.2-35.0    L            



             786-4)                                              

 

             RDW-SD (test code = 50.9 fL      38.5-51.6                 



             11958-7)                                            

 

             RDW-CV (test code = 17.6 %       12.1-15.4    H            



             788-0)                                              

 

             PLT (test code =              See_Comment  LL            [Automated



             777-3)                                              message] The



                                                                 system which



                                                                 generated this



                                                                 result transmit

michelle



                                                                 reference range

:



                                                                 150 - 328 10*3/

?L.



                                                                 The reference



                                                                 range was not u

sed



                                                                 to interpret th

is



                                                                 result as



                                                                 normal/abnormal

.

 

             MPV (test code =                                        Not Measure

d



             09976-9)                                            

 

             IPF % (test code = 8.5 %        1.2-10.7                  Platelet 

count



             [id number])                                         measured by



                                                                 fluorescence



                                                                 method.

 

             NRBC/100 WBC (test              See_Comment                [Automat

ed



             code = 2389316952)                                        message] 

The



                                                                 system which



                                                                 generated this



                                                                 result transmit

michelle



                                                                 reference range

:



                                                                 0.0 - 10.0 /100



                                                                 WBCs. The



                                                                 reference range



                                                                 was not used to



                                                                 interpret this



                                                                 result as



                                                                 normal/abnormal

.

 

             NRBC x10^3 (test code <0.01        See_Comment                [Auto

mated



             = [id number])                                        message] The



                                                                 system which



                                                                 generated this



                                                                 result transmit

michelle



                                                                 reference range

:



                                                                 10*3/?L. The



                                                                 reference range



                                                                 was not used to



                                                                 interpret this



                                                                 result as



                                                                 normal/abnormal

.

 

             GRAN MAT (NEUT) % 60.3 %                                 



             (test code = 770-8)                                        

 

             IMM GRAN % (test code 0.30 %                                 



             = 0125607585)                                        

 

             LYMPH % (test code = 20.2 %                                 



             736-9)                                              

 

             MONO % (test code = 17.0 %                                 



             5905-5)                                             

 

             EOS % (test code = 1.3 %                                  



             713-8)                                              

 

             BASO % (test code = 0.9 %                                  



             706-2)                                              

 

             GRAN MAT x10^3(ANC) 1.91 10*3/uL 1.99-6.95    L            



             (test code =                                        



             3412493630)                                         

 

             IMM GRAN x10^3 (test <0.03        0.00-0.06                 



             code = 4371340069)                                        

 

             LYMPH x10^3 (test 0.64 10*3/uL 1.09-3.23    L            



             code = 731-0)                                        

 

             MONO x10^3 (test code 0.54 10*3/uL 0.36-1.02                 



             = 742-7)                                            

 

             EOS x10^3 (test code 0.04 10*3/uL 0.06-0.53    L            



             = 711-2)                                            

 

             BASO x10^3 (test code 0.03 10*3/uL 0.01-0.09                 



             = 704-7)                                            

 

             POLYCHROMASIA (test 2+           See_Comment                [Automa

michelle



             code = 26917-6)                                        message] The



                                                                 system which



                                                                 generated this



                                                                 result transmit

michelle



                                                                 reference range

:



                                                                 2+. The referen

ce



                                                                 range was not u

sed



                                                                 to interpret th

is



                                                                 result as



                                                                 normal/abnormal

.

 

             ROULEAUX (test code = Present      See_Comment  A             [Auto

mated



             7797-4)                                             message] The



                                                                 system which



                                                                 generated this



                                                                 result transmit

michelle



                                                                 reference range

:



                                                                 (none). The



                                                                 reference range



                                                                 was not used to



                                                                 interpret this



                                                                 result as



                                                                 normal/abnormal

.

 

             TARGET CELLS (test 2+           See_Comment  A             [Automat

ed



             code = 38595-6)                                        message] The



                                                                 system which



                                                                 generated this



                                                                 result transmit

michelle



                                                                 reference range

:



                                                                 (none). The



                                                                 reference range



                                                                 was not used to



                                                                 interpret this



                                                                 result as



                                                                 normal/abnormal

.

 

             PLT ESTIMATE (test Critically   Normal       AA           



             code = 9317-9) Decreased                              

 

             Lab Interpretation Abnormal                               



             (test code = 98376-7)                                        



[facility] 
[facility]50





             Test Item    Value        Reference Range Interpretation Comments

 

             SARS-CoV-2 Rapid ID NOW Not Detected Not Detected              



             (test code = 77818-5)                                        

 

             RENETTA (test code = RENETTA) ID NOW COVID-19 Assay                        

   



                          is an isothermal                           



                          nucleic acid                           



                          amplification test                           



                          intended for the                           



                          qualitative detection                           



                          of nucleic acid from                           



                          SARS-CoV-2 viral RNA                           



                          in nasopharyngeal (NP)                           



                          specimens. It is used                           



                          under Emergency Use                           



                          Authorization (EUA) by                           



                          FDA. The limit of                           



                          detection (LOD) of the                           



                          assay is 125 Genome                           



                          Equivalents/mL. A                           



                          positive result is                           



                          indicative of the                           



                          presence of SARS-CoV-2                           



                          RNA. ?Clinical                           



                          correlation with                           



                          patient history and                           



                          other diagnostic                           



                          information is                           



                          necessary to determine                           



                          patient infection                           



                          status. A negative                           



                          (Not Detected) result                           



                          does not preclude                           



                          SARS-CoV-2 infection.                           



                          In patients with                           



                          clinical symptoms and                           



                          other tests that are                           



                          consistent with                           



                          SARS-CoV-2 infection,                           



                          negative results                           



                          should be treated as                           



                          presumptive negative                           



                          and a new specimen                           



                          should be tested with                           



                          alternative PCR                           



                          molecular test.                           



                          Invalid: Please                           



                          collect a new specimen                           



                          for repeat patient                           



                          testing if clinically                           



                          indicated.                             

 

             Lab Interpretation Normal                                 



             (test code = 90634-9)                                        



[facility] 
15:47:50





             Test Item    Value        Reference Range Interpretation Comments

 

             SARS-CoV-2 Rapid ID NOW Not Detected Not Detected              



             (test code = 35087-1)                                        

 

             RENETTA (test code = RENETTA) ID NOW COVID-19 Assay                        

   



                          is an isothermal                           



                          nucleic acid                           



                          amplification test                           



                          intended for the                           



                          qualitative detection                           



                          of nucleic acid from                           



                          SARS-CoV-2 viral RNA                           



                          in nasopharyngeal (NP)                           



                          specimens. It is used                           



                          under Emergency Use                           



                          Authorization (EUA) by                           



                          FDA. The limit of                           



                          detection (LOD) of the                           



                          assay is 125 Genome                           



                          Equivalents/mL. A                           



                          positive result is                           



                          indicative of the                           



                          presence of SARS-CoV-2                           



                          RNA. ?Clinical                           



                          correlation with                           



                          patient history and                           



                          other diagnostic                           



                          information is                           



                          necessary to determine                           



                          patient infection                           



                          status. A negative                           



                          (Not Detected) result                           



                          does not preclude                           



                          SARS-CoV-2 infection.                           



                          In patients with                           



                          clinical symptoms and                           



                          other tests that are                           



                          consistent with                           



                          SARS-CoV-2 infection,                           



                          negative results                           



                          should be treated as                           



                          presumptive negative                           



                          and a new specimen                           



                          should be tested with                           



                          alternative PCR                           



                          molecular test.                           



                          Invalid: Please                           



                          collect a new specimen                           



                          for repeat patient                           



                          testing if clinically                           



                          indicated.                             

 

             Lab Interpretation Normal                                 



             (test code = 70211-6)                                        



[id number]





             Test Item    Value        Reference Range Interpretation Comments

 

             TROPONIN I (test 0.002 ng/mL  See_Comment                [Automated



             code = 6567302607)                                        message] 

The



                                                                 system which



                                                                 generated this



                                                                 result



                                                                 transmitted



                                                                 reference range

:



                                                                 <=0.034. The



                                                                 reference range



                                                                 was not used to



                                                                 interpret this



                                                                 result as



                                                                 normal/abnormal

.

 

             RENETTA (test code = Equal or Less than                           



             RENETTA)         0.034                                  



                          ng/ml---Normal                           



                          ?Note: Cardiac                           



                          troponin begins to                           



                          rise 3-4 hours                           



                          after the onset of                           



                          ischemia. Repeat                           



                          in 4-6 hours if                           



                          the sample was                           



                          drawn within 3-4                           



                          hours of the onset                           



                          of the symptom and                           



                          found normal.                           



                          Between 0.035 and                           



                          0.120 ng/mL---                           



                          Borderline.                            



                          Questionable                           



                          myocardial injury                           



                          or necrosis ?                           



                          ?Note: Serial                           



                          measurement may be                           



                          necessary to                           



                          confirm or exclude                           



                          the diagnosis of                           



                          myocardial injury                           



                          or necrosis;                           



                          Clinical                               



                          correlation                            



                          (symptoms, EKGs,                           



                          imaging studies,                           



                          and others)                            



                          required; Repeat                           



                          in 4-6 hours if                           



                          clinically                             



                          indicated. ? ? ? ?                           



                          Equal or Higher                           



                          than 0.121                             



                          ng/mL---Abnormal.                           



                          Myocardial Injury                           



                          or Necrosis Likely                           



                          ? ? ? ? Biotin has                           



                          been reported to                           



                          cause a negative                           



                          bias, interpret                           



                          results relative                           



                          to patient's use                           



                          of biotin. ? ? ? ?                           



                          ? ? ? ? ? ? ? ? ?                           



                          ? ? ? ? ? ? ? ? ?                           



                          ? ? ? ? ? ? ? ? ?                           



                          ? ? ? ? ? ? ? ? ?                           



                          ? ? ? ? ? ? ? ? ?                           



                          ?                                      

 

             Lab Interpretation Normal                                 



             (test code =                                        



             96306-1)                                            



[id number]





             Test Item    Value        Reference Range Interpretation Comments

 

             TROPONIN I (test 0.002 ng/mL  See_Comment                [Automated



             code = 4137828844)                                        message] 

The



                                                                 system which



                                                                 generated this



                                                                 result



                                                                 transmitted



                                                                 reference range

:



                                                                 <=0.034. The



                                                                 reference range



                                                                 was not used to



                                                                 interpret this



                                                                 result as



                                                                 normal/abnormal

.

 

             RENETTA (test code = Equal or Less than                           



             RENETTA)         0.034                                  



                          ng/ml---Normal                           



                          ?Note: Cardiac                           



                          troponin begins to                           



                          rise 3-4 hours                           



                          after the onset of                           



                          ischemia. Repeat                           



                          in 4-6 hours if                           



                          the sample was                           



                          drawn within 3-4                           



                          hours of the onset                           



                          of the symptom and                           



                          found normal.                           



                          Between 0.035 and                           



                          0.120 ng/mL---                           



                          Borderline.                            



                          Questionable                           



                          myocardial injury                           



                          or necrosis ?                           



                          ?Note: Serial                           



                          measurement may be                           



                          necessary to                           



                          confirm or exclude                           



                          the diagnosis of                           



                          myocardial injury                           



                          or necrosis;                           



                          Clinical                               



                          correlation                            



                          (symptoms, EKGs,                           



                          imaging studies,                           



                          and others)                            



                          required; Repeat                           



                          in 4-6 hours if                           



                          clinically                             



                          indicated. ? ? ? ?                           



                          Equal or Higher                           



                          than 0.121                             



                          ng/mL---Abnormal.                           



                          Myocardial Injury                           



                          or Necrosis Likely                           



                          ? ? ? ? Biotin has                           



                          been reported to                           



                          cause a negative                           



                          bias, interpret                           



                          results relative                           



                          to patient's use                           



                          of biotin. ? ? ? ?                           



                          ? ? ? ? ? ? ? ? ?                           



                          ? ? ? ? ? ? ? ? ?                           



                          ? ? ? ? ? ? ? ? ?                           



                          ? ? ? ? ? ? ? ? ?                           



                          ? ? ? ? ? ? ? ? ?                           



                          ?                                      

 

             Lab Interpretation Normal                                 



             (test code =                                        



             71731-3)                                            



[id number] 15:41:11





             Test Item    Value        Reference Range Interpretation Comments

 

             NT-proBNP (test code 162 pg/mL    See_Comment  H             [Autom

ated



             = [id number])                                        message] The



                                                                 system which



                                                                 generated this



                                                                 result



                                                                 transmitted



                                                                 reference range

:



                                                                 <=125. The



                                                                 reference range



                                                                 was not used to



                                                                 interpret this



                                                                 result as



                                                                 normal/abnormal

.

 

             RENETTA (test code = RENETTA) Biotin has been                           



                          reported to                            



                          cause a negative                           



                          bias, interpret                           



                          results relative                           



                          to patient's use                           



                          of biotin.                             

 

             Lab Interpretation Abnormal                               



             (test code = 06954-6)                                        



[id number] 15:41:11





             Test Item    Value        Reference Range Interpretation Comments

 

             NT-proBNP (test code 162 pg/mL    See_Comment  H             [Autom

ated



             = [id number])                                        message] The



                                                                 system which



                                                                 generated this



                                                                 result



                                                                 transmitted



                                                                 reference range

:



                                                                 <=125. The



                                                                 reference range



                                                                 was not used to



                                                                 interpret this



                                                                 result as



                                                                 normal/abnormal

.

 

             RENETTA (test code = RENETTA) Biotin has been                           



                          reported to                            



                          cause a negative                           



                          bias, interpret                           



                          results relative                           



                          to patient's use                           



                          of biotin.                             

 

             Lab Interpretation Abnormal                               



             (test code = 68306-5)                                        



[facility] 1 [id number] 15:35:38EXAM: XR CHEST 
1 VW HISTORY: SOB COMPARISON: None. FINDINGS: The lungs are poorly expanded and 
show changes of basilar subsegmentalatelectasis with suspicion of a small 
pleural effusion on the left. The midand upper lungs are clear. The heart and 
great vessels are normal. ?  [facility], Radiant Results In[employer]User - 2021 10:36 
AM CDTFormatting of this note might be different from the original.EXAM: XR C
HEST 1 VWHISTORY: SOB COMPARISON: None.FINDINGS:The lungs are poorly expanded 
and show changes of basilar subsegmentalatelectasis with suspicion of a small 
pleural effusion on the left. The midand upper lungs are clear. The heart and 
great vessels are normal.[facility] 1 View
2021 15:35:38EXAM: XR CHEST 1 VW HISTORY: SOB COMPARISON: None. FINDINGS: 
The lungs are poorly expanded and show changes of basilar 
subsegmentalatelectasis with suspicion of a small pleural effusion on the left. 
The midand upper lungs are clear. The heart and great vessels are normal. ? 
[facility], Radiant Results Inft User - 2021 10:36 AM CDTFormatting of this note
might be different from the original.EXAM: XR CHEST 1 VWHISTORY: SOB COMPARISON:
None.FINDINGS:The lungs are poorly expanded and show changes of basilar 
subsegmentalatelectasis with suspicion of a small pleural effusion on the left. 
The midand upperlungs are clear. The heart and great vessels are normal.
[facility] METABOLIC PANEL ([id number] 
15:32:50





             Test Item    Value        Reference Range Interpretation Comments

 

             NA (test code = 137 mmol/L   135-145                   



             [id number])                                         

 

             K (test code = 3.3 mmol/L   3.5-5.0      L            



             2732950363)                                         

 

             CL (test code = 103 mmol/L                       



             5857145768)                                         

 

             CO2 TOTAL (test code = 24 mmol/L    23-31                     



             3106664959)                                         

 

             AGAP (test code =              2-16                      



             9711651475)                                         

 

             BUN (test code = 6 mg/dL      7-23         L            



             2826847960)                                         

 

             GLUCOSE (test code = 155 mg/dL           H            



             3526922701)                                         

 

             CREATININE (test code = 0.43 mg/dL   0.60-1.25    L            



             2843539744)                                         

 

             TOTAL BILI (test code = 2.5 mg/dL    0.1-1.1      H            



             5898798720)                                         

 

             CALCIUM (test code = 7.6 mg/dL    8.6-10.6     L            



             9547748952)                                         

 

             T PROTEIN (test code = 6.7 g/dL     6.3-8.2                   



             5451922072)                                         

 

             ALBUMIN (test code = 2.8 g/dL     3.5-5.0      L            



             8283160064)                                         

 

             ALK PHOS (test code = 153 U/L             H            



             5202380733)                                         

 

             ALTv (test code = 22 U/L       5-50                      



             1742-6)                                             

 

             AST(SGOT) (test code = 73 U/L       13-40        H            



             9639858551)                                         

 

             eGFR (test code =              mL/min/1.73m2              



             6934405279)                                         

 

             RENETTA (test code = RENETTA) Association of                           



                          Glomerular Filtration                           



                          Rate (GFR) and Staging                           



                          of Kidney Disease*                           



                          +---------------------                           



                          --+-------------------                           



                          --+-------------------                           



                          ------+| GFR                           



                          (mL/min/1.73 m2) ?|                           



                          With Kidney Damage ?|                           



                          ?Without Kidney                           



                          Damage+---------------                           



                          --------+-------------                           



                          --------+-------------                           



                          ------------+| ?>90 ?                           



                          ? ? ? ? ? ? ? ?|                           



                          ?Stage one ? ? ? ? ?|                           



                          ? Normal ? ? ? ? ? ? ?                           



                          ?+--------------------                           



                          ---+------------------                           



                          ---+------------------                           



                          -------+| ?60-89 ? ? ?                           



                          ? ? ? ? ?| ?Stage two                           



                          ? ? ? ? ?| ? Decreased                           



                          GFR ? ? ? ?                            



                          +---------------------                           



                          --+-------------------                           



                          --+-------------------                           



                          ------+| ?30-59 ? ? ?                           



                          ? ? ? ? ?| ?Stage                           



                          three ? ? ? ?| ? Stage                           



                          three ? ? ? ? ?                           



                          +---------------------                           



                          --+-------------------                           



                          --+-------------------                           



                          ------+| ?15-29 ? ? ?                           



                          ? ? ? ? ?| ?Stage four                           



                          ? ? ? ? | ? Stage four                           



                          ? ? ? ? ?                              



                          ?+--------------------                           



                          ---+------------------                           



                          ---+------------------                           



                          -------+| ?<15 (or                           



                          dialysis) ? ?| ?Stage                           



                          five ? ? ? ? | ? Stage                           



                          five ? ? ? ? ?                           



                          ?+--------------------                           



                          ---+------------------                           



                          ---+------------------                           



                          -------+ *Each stage                           



                          assumes the associated                           



                          GFR level has been in                           



                          effect for at least                           



                          three months. ?Stages                           



                          1 to 5, with or                           



                          without kidney                           



                          disease, indicate                           



                          chronic kidney                           



                          disease. Notes:                           



                          Determination of                           



                          stages one and two                           



                          (with eGFR                             



                          >59mL/min/1.73 m2)                           



                          requires estimation of                           



                          kidney damage for at                           



                          least three months as                           



                          defined by structural                           



                          or functional                           



                          abnormalities of the                           



                          kidney, manifested by                           



                          either:Pathological                           



                          abnormalities or                           



                          Markers of kidney                           



                          damage (including                           



                          abnormalities in the                           



                          composition of the                           



                          blood or urine or                           



                          abnormalities in                           



                          imaging tests).                           

 

             Lab Interpretation Abnormal                               



             (test code = 23437-5)                                        



[facility] [id number] 15:32:10





             Test Item    Value        Reference Range Interpretation Comments

 

             LIPASE (test code = 4183717599) 350 U/L      0-220        H        

    

 

             Lab Interpretation (test code = Abnormal                           

    



             51822-6)                                            



[facility] [id number] 15:32:10





             Test Item    Value        Reference Range Interpretation Comments

 

             LIPASE (test code = 0430272735) 350 U/L      0-220        H        

    

 

             Lab Interpretation (test code = Abnormal                           

    



             56512-7)                                            



[facility]

## 2023-06-21 NOTE — RAD REPORT
EXAM DESCRIPTION:  CT - CTFB

 

CLINICAL HISTORY:  TRAUMA

 

COMPARISON:  Facial Bones W/ Mpr dated 11/1/2022

 

TECHNIQUE:  Axial thin noncontrast CT images of the face were obtained with sagittal and coronal quique
nstruction images.

 

All CT scans are performed using dose optimization technique as appropriate and may include automated
 exposure control or mA/KV adjustment according to patient size.

 

FINDINGS:  Mildly displaced fracture along the right angle of mandible, with its dominant component r
eaching the socket of the right mandibular posterior-most smaller. Widening of the socket with likely
 tearing of the periapical ligaments. Pronounced adjacent soft tissue swelling and hematoma tracking 
along the muscles of mastication.

 

No other acute facial fractures.

The globes and orbital contents are grossly unremarkable.The paranasal sinuses and mastoids are clear
.

 

 

IMPRESSION:  Mildly displaced fracture along the right angle of mandible, reaching the socket of the 
right mandibular posterior-most molar.

 

Adjacent pronounced soft tissue swelling with likely hematomas along the muscles of mastication.

## 2023-06-22 NOTE — EKG
Test Date:    2023-06-21               Test Time:    15:50:00

Technician:   NEVAEH CELAYA                                   

                                                     

MEASUREMENT RESULTS:                                       

Intervals:                                           

Rate:         92                                     

MS:           114                                    

QRSD:         96                                     

QT:           342                                    

QTc:          422                                    

Axis:                                                

P:            5                                      

MS:           114                                    

QRS:          92                                     

T:            -20                                    

                                                     

INTERPRETIVE STATEMENTS:                                       

                                                     

Normal sinus rhythm

Rightward axis

ST & T wave abnormality, consider inferior ischemia

Abnormal ECG

Compared to ECG 05/21/2023 19:38:34

Right-axis deviation now present

ST (T wave) deviation now present

Possible ischemia now present

Sinus tachycardia no longer present

Short MS interval no longer present



Electronically Signed On 06-22-23 17:39:38 CDT by Olvin Mathur

## 2023-07-01 ENCOUNTER — HOSPITAL ENCOUNTER (INPATIENT)
Dept: HOSPITAL 97 - ER | Age: 53
LOS: 3 days | Discharge: HOME | DRG: 100 | End: 2023-07-04
Attending: STUDENT IN AN ORGANIZED HEALTH CARE EDUCATION/TRAINING PROGRAM | Admitting: HOSPITALIST
Payer: SELF-PAY

## 2023-07-01 VITALS — BODY MASS INDEX: 29.3 KG/M2

## 2023-07-01 DIAGNOSIS — D64.9: ICD-10-CM

## 2023-07-01 DIAGNOSIS — Z20.822: ICD-10-CM

## 2023-07-01 DIAGNOSIS — F10.129: ICD-10-CM

## 2023-07-01 DIAGNOSIS — G93.41: ICD-10-CM
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
## 2023-07-01 DIAGNOSIS — I10: ICD-10-CM

## 2023-07-01 DIAGNOSIS — Z28.310: ICD-10-CM

## 2023-07-01 DIAGNOSIS — D68.4: ICD-10-CM

## 2023-07-01 DIAGNOSIS — S02.651D: ICD-10-CM

## 2023-07-01 DIAGNOSIS — Z88.0: ICD-10-CM

## 2023-07-01 DIAGNOSIS — F17.210: ICD-10-CM

## 2023-07-01 DIAGNOSIS — K70.30: ICD-10-CM

## 2023-07-01 DIAGNOSIS — D69.6: ICD-10-CM
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
## 2023-07-01 DIAGNOSIS — Z60.2: ICD-10-CM

## 2023-07-01 DIAGNOSIS — R56.9: Primary | ICD-10-CM

## 2023-07-01 LAB
ALBUMIN SERPL BCP-MCNC: 1.9 G/DL (ref 3.4–5)
ALP SERPL-CCNC: 153 U/L (ref 45–117)
ALT SERPL W P-5'-P-CCNC: 16 U/L (ref 16–61)
ANISOCYTOSIS BLD QL: (no result)
AST SERPL W P-5'-P-CCNC: 27 U/L (ref 15–37)
BILIRUB INDIRECT SERPL-MCNC: 1.6 MG/DL (ref 0.2–0.8)
BLD SMEAR INTERP: (no result)
BUN BLD-MCNC: 10 MG/DL (ref 7–18)
GLUCOSE SERPLBLD-MCNC: 157 MG/DL (ref 74–106)
HCT VFR BLD CALC: 23 % (ref 39.6–49)
INR BLD: 2.3
LYMPHOCYTES # SPEC AUTO: 0.4 K/UL (ref 0.7–4.9)
MCV RBC: 98.6 FL (ref 80–100)
METHAMPHET UR QL SCN: NEGATIVE
MORPHOLOGY BLD-IMP: (no result)
NT-PROBNP SERPL-MCNC: 243 PG/ML (ref ?–125)
PMV BLD: 8.5 FL (ref 7.6–11.3)
POLYCHROMASIA BLD QL SMEAR: SLIGHT
POTASSIUM SERPL-SCNC: 3.9 MEQ/L (ref 3.5–5.1)
RBC # BLD: 2.33 M/UL (ref 4.33–5.43)
THC SERPL-MCNC: NEGATIVE NG/ML
TROPONIN I SERPL HS-MCNC: 11.3 PG/ML (ref ?–58.9)

## 2023-07-01 PROCEDURE — 87088 URINE BACTERIA CULTURE: CPT

## 2023-07-01 PROCEDURE — 90714 TD VACC NO PRESV 7 YRS+ IM: CPT

## 2023-07-01 PROCEDURE — 80076 HEPATIC FUNCTION PANEL: CPT

## 2023-07-01 PROCEDURE — 80143 DRUG ASSAY ACETAMINOPHEN: CPT

## 2023-07-01 PROCEDURE — 85014 HEMATOCRIT: CPT

## 2023-07-01 PROCEDURE — 80048 BASIC METABOLIC PNL TOTAL CA: CPT

## 2023-07-01 PROCEDURE — 97530 THERAPEUTIC ACTIVITIES: CPT

## 2023-07-01 PROCEDURE — 81001 URINALYSIS AUTO W/SCOPE: CPT

## 2023-07-01 PROCEDURE — 83605 ASSAY OF LACTIC ACID: CPT

## 2023-07-01 PROCEDURE — 80307 DRUG TEST PRSMV CHEM ANLYZR: CPT

## 2023-07-01 PROCEDURE — 72170 X-RAY EXAM OF PELVIS: CPT

## 2023-07-01 PROCEDURE — 87086 URINE CULTURE/COLONY COUNT: CPT

## 2023-07-01 PROCEDURE — 90471 IMMUNIZATION ADMIN: CPT

## 2023-07-01 PROCEDURE — 85025 COMPLETE CBC W/AUTO DIFF WBC: CPT

## 2023-07-01 PROCEDURE — 92523 SPEECH SOUND LANG COMPREHEN: CPT

## 2023-07-01 PROCEDURE — 71045 X-RAY EXAM CHEST 1 VIEW: CPT

## 2023-07-01 PROCEDURE — 80053 COMPREHEN METABOLIC PANEL: CPT

## 2023-07-01 PROCEDURE — 85018 HEMOGLOBIN: CPT

## 2023-07-01 PROCEDURE — 51702 INSERT TEMP BLADDER CATH: CPT

## 2023-07-01 PROCEDURE — 83880 ASSAY OF NATRIURETIC PEPTIDE: CPT

## 2023-07-01 PROCEDURE — 86920 COMPATIBILITY TEST SPIN: CPT

## 2023-07-01 PROCEDURE — 87804 INFLUENZA ASSAY W/OPTIC: CPT

## 2023-07-01 PROCEDURE — 83735 ASSAY OF MAGNESIUM: CPT

## 2023-07-01 PROCEDURE — 82077 ASSAY SPEC XCP UR&BREATH IA: CPT

## 2023-07-01 PROCEDURE — 36430 TRANSFUSION BLD/BLD COMPNT: CPT

## 2023-07-01 PROCEDURE — 87040 BLOOD CULTURE FOR BACTERIA: CPT

## 2023-07-01 PROCEDURE — 84132 ASSAY OF SERUM POTASSIUM: CPT

## 2023-07-01 PROCEDURE — 84484 ASSAY OF TROPONIN QUANT: CPT

## 2023-07-01 PROCEDURE — 86900 BLOOD TYPING SEROLOGIC ABO: CPT

## 2023-07-01 PROCEDURE — 97116 GAIT TRAINING THERAPY: CPT

## 2023-07-01 PROCEDURE — 97161 PT EVAL LOW COMPLEX 20 MIN: CPT

## 2023-07-01 PROCEDURE — 85730 THROMBOPLASTIN TIME PARTIAL: CPT

## 2023-07-01 PROCEDURE — 86850 RBC ANTIBODY SCREEN: CPT

## 2023-07-01 PROCEDURE — 87811 SARS-COV-2 COVID19 W/OPTIC: CPT

## 2023-07-01 PROCEDURE — 82140 ASSAY OF AMMONIA: CPT

## 2023-07-01 PROCEDURE — 93005 ELECTROCARDIOGRAM TRACING: CPT

## 2023-07-01 PROCEDURE — 85610 PROTHROMBIN TIME: CPT

## 2023-07-01 PROCEDURE — 99285 EMERGENCY DEPT VISIT HI MDM: CPT

## 2023-07-01 PROCEDURE — 36415 COLL VENOUS BLD VENIPUNCTURE: CPT

## 2023-07-01 PROCEDURE — 70450 CT HEAD/BRAIN W/O DYE: CPT

## 2023-07-01 PROCEDURE — 86901 BLOOD TYPING SEROLOGIC RH(D): CPT

## 2023-07-01 PROCEDURE — 80179 DRUG ASSAY SALICYLATE: CPT

## 2023-07-01 SDOH — SOCIAL STABILITY - SOCIAL INSECURITY: PROBLEMS RELATED TO LIVING ALONE: Z60.2

## 2023-07-01 NOTE — RAD REPORT
EXAM DESCRIPTION:  RADChest Single View7/1/2023 8:40 pm

 

CLINICAL HISTORY:  fall , edema, signs of heart failure

 

COMPARISON:  Chest Single View dated 5/21/2023

 

TECHNIQUE:   Portable AP view of the chest.

 

FINDINGS:  Decreased inspiratory effort limits evaluation. Perihilar streaky opacities and central in
terstitial prominence.  No pneumothorax or effusion. The cardiomediastinal contours are unremarkable.


 

IMPRESSION:  Central interstitial prominence and perihilar streaky opacities, could relate to early c
entral edema or congestion.
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
## 2023-07-01 NOTE — XMS REPORT
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           



                                                  [facility]           



                                                  Therapy           



                                                  for            



                                                  Suspected           



                                                  Infection<           



                                                  br>[facility]           



                                                  Therapy           



                                                  Site:           



                                                  Blood<br>D           



                                                  uration of           



                                                  therapy:           



                                                  72 hours           

 

     pantoprazol            Yes            40mg      40 mg,           Univ

ers



     e         3-22                               Oral, BID,           ity of



     (PROTONIX)      01:00:                               First dose           [facility]

[facility]



     EC tablet      00                                 (after           Medical



     40 mg                                         last           Branch



                                                  modificati           



                                                  on) on Tue           



                                                  3/21/23 at           



                                                  2000,           



                                                  Until           



                                                  Discontinu           



                                                  ed,            



                                                  Routine           

 

     ceFEPIme      2023- No             2000mg      2,000 mg,           U

nivers



     (MAXIPIME)      3-21 03-22                          IV             ity of



     2,000 mg in      23:30: 02:42                          [place], [place]



     NaCl 0.9%      00   :00                           ONCE, 1           Medical



     (NS) 100 mL                                         dose, On           [facility]





     MINI-BAG                                         Tue            



                                                  3/21/23 at           



                                                  1830,           



                                                  Administer           



                                                  over 30           



                                                  Minutes,           



                                                  100            



                                                  mL<br>Reas           



                                                  on for           



                                                  Anti-Infec           



                                                  tive:           



                                                  [facility]           



                                                  Therapy           



                                                  for            



                                                  Suspected           



                                                  Infection<           



                                                  br>[facility]           



                                                  Therapy           



                                                  Site:           



                                                  Blood<br>D           



                                                  uration of           



                                                  therapy:           



                                                  72 hours           

 

     thiamine      0      Yes            100mg      100 mg,           Unive

rs



     (VITAMIN      3-21                               Oral,           ity of



     B1) tablet      14:00:                               DAILY,           [place]



     100 mg      00                                 First dose           Medical



                                                  on Tue           Branch



                                                  3/21/23 at           



                                                  0900,           



                                                  Until           



                                                  Discontinu           



                                                  ed,            



                                                  Routine           

 

     magnesium      2023- No             2g        2 g, IV           Univ

ers



     sulfate in      3-21 03-21                          Piggyback,           it

y of



     water 2      10:45: 11:26                          Administer           [facility]

as



     gram/50 mL      00   :00                           over 60           Medica

l



     (4 %)                                         Minutes,           Branch



     infusion 2                                         ONCE, 1           



     g                                            dose, On           



                                                  Tue            



                                                  3/21/23 at           



                                                  0545,           



                                                  Routine           

 

     sulfur      2023- No        [id number] 5mL       5 mL,           Univ

ers



     hexafluorid      3-20 03-20                          Intravenou           i

ty of



     e microsphr      17:15: 17:15                          s, ONCE, 1          

 [place]



     (LUMASON)      00   :00                           dose, On           Medica

l



     injection 5                                         Mon            Branch



                                                3/20/23 at           



                                                  1215,           



                                                  Routine<br           



                                                  >Faculty           



                                                  member           



                                                  approving           



                                                  Restricted           



                                                  medication           



                                                  : BEAR DE ANDA           

 

     lactulose      0      Yes            30mL      30 mL,           Univer

s



     (CEPHULAC)      3-20                               Oral,           ity of



     solution 30      14:00:                               DAILY,           [place]

[place]



     mL        00                                 First dose           Medical



                                                  (after           Branch



                                                  last           



                                                  modificati           



                                                  on) on Mon           



                                                  3/20/23 at           



                                                  0900,           



                                                  Until           



                                                  Discontinu           



                                                  ed,            



                                                  Routine           

 

     pantoprazol      2023- No             40mg      40 mg,           Uni

vers



     e         3-20 03-21                          Slow IV           ity of



     (PROTONIX)      14:00: 16:41                          Push,           [place]



     injection      00   :07                           DAILY,           Medical



     40 mg                                         First dose           Branch



                                                  (after           



                                                  last           



                                                  modificati           



                                                  on) on Mon           



                                                  3/20/23 at           



                                                  0900,           



                                                  Until           



                                                  Discontinu           



                                                  ed             

 

     acetaminoph            Yes            650mg      650 mg,           Un

sangeetha



     en        3-20                               Oral,           ity of



     (TYLENOL)      05:25:                               Q6HPRN,           [place]



     tablet 650      45                                 Starting           Medic

al



     mg                                           on Mon           Branch



                                                  3/20/23 at           



                                                  0025,           



                                                  Until           



                                                  Discontinu           



                                                  ed,            



                                                  Routine,           



                                                  Temp > 38           



                                                  C, Pain           



                                                  (scale           



                                                  4-6), Pain           



                                                  (scale           



                                                  1-3)           

 

     pantoprazol      2023- No             40mg      40 mg,           Uni

vers



     e         3-20 03-20                          Slow IV           ity of



     (PROTONIX)      01:00: 05:30                          Push,           [place]



     injection      00   :37                           Q12H,           Medical



     40 mg                                         First dose           Branch



                                                  on Sun           



                                                  3/19/23 at           



                                                  2000,           



                                                  Until           



                                                  Discontinu           



                                                  ed             

 

     foLIC acid            Yes            1mg       1 mg,           Univer

s



     (FOLATE)      3-19                               Oral,           ity of



     tablet 1 mg      14:00:                               DAILY,           [place]

[place]



               00                                 First dose           Medical



                                                  on Sun           Branch



                                                  3/19/23 at           



                                                  0900,           



                                                  Until           



                                                  Discontinu           



                                                  ed,            



                                                  Routine           

 

     phytonadion      0 - No             10mg      IV             Unive

rs



     e (VITAMIN      3-19 03-20                          Piggyback,           it

y of



     K) 10 mg in      14:00: 14:54                          DAILY, 2           T

[facility]



     NaCl 0.9%      00   :00                           doses,           Medical



     (NS)                                         First dose           Branch



     piggyback                                         (after           



                                                  last           



                                                  reorder)           



                                                  on Sun           



                                                  3/19/23 at           



                                                  0900, Last           



                                                  dose on           



                                                  Mon            



                                                  3/20/23 at           



                                                  0900, 50           



                                                  mL             

 

     lactated      2023- No             1000mL      at 100           Univ

ers



     ringers IV      3-19 03-21                          mL/hr,           ity of



     infusion      12:15: 01:32                          1,000 mL,           [facility]

as



     1,000 mL      00   :43                           IV             Medical



                                                  Infusion,           [facility]



                                                  CONTINUOUS           



                                                  , Starting           



                                                  on Sun           



                                                  3/19/23 at           



                                                  0715,           



                                                  Until Mon           



                                                  3/20/23 at           



                                                  203,           



                                                  Routine           

 

     meropenem      2023- No             1000mg      1,000 mg,           

[facility]



     (MERREM)      3-19 03-21                          IV             ity of



     1,000 mg in      11:00: 13:11                          Piggyback,          

 [place]



     NaCl 0.9%      00   :57                           Q8H ABX,           Medica

l



     (NS) 100 mL                                         15 doses,           [facility]



     MINI-BAG                                         First dose           



                                                  on Sun           



                                                  3/19/23 at           



                                                  0600, Last           



                                                  dose on           



                                                  Thu            



                                                  3/23/23 at           



                                                  2200,           



                                                  Administer           



                                                  over 3           



                                                  Hours, 100           



                                                  mL<br>Rest           



                                                  ricted use           



                                                  approved           



                                                  by: ELIAS           



                                                  8TH            



                                                  FLOOR<br&g           



                                                  t;Reason           



                                                  for            



                                                  Anti-Infec           



                                                  tive:           



                                                  [facility]           



                                                  Therapy           



                                                  for            



                                                  Suspected           



                                                  Infection<           



                                                  br>[facility]           



                                                  Therapy           



                                                  Site:           



                                                  Blood<br>D           



                                                  uration of           



                                                  therapy:           



                                                  72 hours           

 

     NORepinephr      2023- No             .05ug/k      0.05-0.5         

  [facility]



     ine       3-19 03-20                g/min      mcg/kg/min           ity of



     (LEVOPHED)      03:29: 03:28                          ?77.1 kg           [facility]

[facility]



     4 mg in      15   :15                           (14.4563-1           Medica

l



     NaCl 0.9%                                         44.5625           Branch



     (NS) 250 mL                                         mL/hr,           



     infusion                                         rounded to           



                                                  14..           



                                                  56 mL/hr),           



                                                  IV             



                                                  Infusion,           



                                                  TITRATE,           



                                                  MAP Goal >           



                                                  or = 65           



                                                  mmHg,           



                                                  Starting           



                                                  on Sat           



                                                  3/18/23 at           



                                                  2229, For           



                                                  24             



                                                  hours<br>I           



                                                  nitiate           



                                                  titration           



                                                  at 0.05           



                                                  mcg/kg/min           



                                                  .&nbsp;&nb           



                                                  sp;Increas           



                                                  e by 0.01           



                                                  mcg/kg/min           



                                                  every 30           



                                                  seconds to           



                                                  5 minutes           



                                                  as needed           



                                                  to reach           



                                                  and            



                                                  maintain           



                                                  goal blood           



                                                  pressure.&           



                                                  nbsp;&nbsp           



                                                  ;Maximum           



                                                  dose = 0.5           



                                                  mcg/kg/min           



                                                  .&nbsp;&nb           



                                                  sp;If goal           



                                                  not            



                                                  maintained           



                                                  at maximum           



                                                  allowed           



                                                  dose,           



                                                  contact           



                                                  prescriber           



                                                  .&nbsp;&nb           



                                                  sp;Adminis           



                                                  ter only           



                                                  one            



                                                  peripheral           



                                                  intravenou           



                                                  s              



                                                  vasopresso           



                                                  r at a           



                                                  time.<br>           

 

     vancomycin      2023- No             15mg/kg      1,250 mg          

 [facility]



     1,250 mg in      3-19 03-20                          (rounded           ity

 of



     NaCl 0.9%      03:00: 05:12                          from           [place]



     (NS) 250 mL      00   :19                           1,156.5 mg           Me

dical



     VIAL-MATE                                         = 15 mg/kg           [facility]

ch



     IV                                           ?77.1 kg),           



     piggyback                                         IV             



                                                  Piggyback,           



                                                  Q12H ABX,           



                                                  10 doses,           



                                                  First dose           



                                                  on Sat           



                                                  3/18/23 at           



                                                  2200, Last           



                                                  dose on           



                                                  Thu            



                                                  3/23/23 at           



                                                  1000,           



                                                  Administer           



                                                  over 90           



                                                  Minutes,           



                                                  250            



                                                  mL<br&gt;R           



                                                  crystal for           



                                                  Anti-Infec           



                                                  tive:           



                                                  [facility]           



                                                  Therapy           



                                                  for            



                                                  Suspected           



                                                  Infection<           



                                                  br>[facility]           



                                                  Therapy           



                                                  Site:           



                                                  Blood<br>D           



                                                  uration of           



                                                  therapy:           



                                                  72 hours           

 

     meropenem      2023- No             1000mg      1,000 mg,           

[facility]



     (MERREM)      3-19 03-19                          IV             ity of



     1,000 mg in      03:00: 04:27                          [place], [place]



     NaCl 0.9%      00   :00                           ONCE, 1           Medical



     (NS) 100 mL                                         dose, On           [facility]

ch



     MINI-BAG                                         Sat            



                                                  3/18/23 at           



                                                  2200,           



                                                  Administer           



                                                  over 30           



                                                  Minutes,           



                                                  100            



                                                  mL<br>Rest           



                                                  ricted use           



                                                  approved           



                                                  by: ELIAS           



                                                  8TH            



                                                  FLOOR<br>R           



                                                  crystal for           



                                                  Anti-Infec           



                                                  tive:           



                                                  Empiric           



                                                  Therapy           



                                                  for            



                                                  Suspected           



                                                  Infection<           



                                                  br>Empiric           



                                                  Therapy           



                                                  Site:           



                                                  Blood<br>D           



                                                  uration of           



                                                  therapy:           



                                                  72 hours           

 

     NaCl 0.9%      2023- No             1000mL      at 999           Uni

vers



     (NS) bolus      3-19 03-19                          mL/hr,           ity of



     infusion      02:52: 12:02                          1,000 mL,           [facility]

as



     1,000 mL      00   :28                           IV             Medical



                                                  Piggyback,           Branch



                                                  ONCE, 1           



                                                  dose, On           



                                                  Sat            



                                                  3/18/23 at           



                                                  2200, ASAP           

 

     lactulose      2023- No             30mL      30 mL,           Unive

rs



     (CEPHULAC)      3-19 03-20                          Oral, TID,           it

y of



     solution 30      02:15: 11:21                          First dose          

 [place]



     mL        00   :53                           (after           Medical



                                                  last           Branch



                                                  modificati           



                                                  on) on Sat           



                                                  3/18/23 at           



                                                  ,           



                                                  Until           



                                                  Discontinu           



                                                  ed,            



                                                  Routine           

 

     cefTRIAXone      2023- No             1000mg      1,000 mg,         

  [facility]



     (ROCEPHIN)      3-19 03-19                          IV             ity of



     1,000 mg in      01:15: 02:19                          Piggyback,          

 [place]



     NaCl 0.9%      00   :20                           Q24H ABX,           Medic

al



     (NS) 100 mL                                         5 doses,           [facility]

ch



     MINI-BAG                                         First dose           



                                                  on Sat           



                                                  3/18/23 at           



                                                  , Last           



                                                  dose on           



                                                  Wed            



                                                  3/22/23 at           



                                                  ,           



                                                  Administer           



                                                  over 30           



                                                  Minutes,           



                                                  100            



                                                  mL<br>Reas           



                                                  on for           



                                                  Anti-Infec           



                                                  tive:           



                                                  Documented           



                                                  Infection<           



                                                  br>Documen           



                                                  michelle            



                                                  Infection           



                                                  Site:           



                                                  Abdominal<           



                                                  br>Duratio           



                                                  n of           



                                                  Therapy: 7           



                                                  days           

 

     lactulose      2023- No             30mL      30 mL,           Unive

rs



     (CEPHULAC)      3-19 03-19                          Oral, BID,           it

y of



     solution 30      01:00: 02:10                          First dose          

 [place]



     mL        00   :44                           on Sat           Medical



                                                  3/18/23 at           Branch



                                                  ,           



                                                  Until           



                                                  Discontinu           



                                                  ed,            



                                                  Routine           

 

     potassium      2023- No             20meq      20 mEq, IV           

[facility]



     chloride in      3-19 03-19                          Piggyback,           i

ty of



     water (KCL)      01:00: 06:44                          Q2H, 2           [facility]

as



     20 mEq/100      00   :00                           doses,           Medical



     mL RTU IVPB                                         First dose           Br

anch



     20 mEq                                         on Sat           



                                                  3/18/23 at           



                                                  , Last           



                                                  dose on           



                                                  Sat            



                                                  3/18/23 at           



                                                  , 100           



                                                  mL             

 

     phytonadion      2023- No             10mg      IV             Unive

rs



     e (VITAMIN      3-19 03-19                          Piggyback,           it

y of



     K) 10 mg in      00:30: 03:50                          ONCE, 1           [facility]

[place]



     NaCl 0.9%      00   :00                           dose, On           Medica

l



     (NS)                                         Sat            Branch



     piggyback                                         3/18/23 at           



                                                  1930, 50           



                                                  mL             

 

     thiamine      2023- No             100mg      IV             [facility]



     (VITAMIN      3-18 03-20                          Piggyback,           ity 

of



     B1) 100 mg      23:30: 11:22                          DAILY,           [place]

[facility]



     in NaCl      00   :30                           First dose           Medica

l



     0.9% (NS)                                         on Sat           Branch



     piggyback                                         3/18/23 at           



                                                  1830,           



                                                  Until           



                                                  Discontinu           



                                                  ed, 50 mL           

 

     oxazepam      0      Yes            15mg      15 mg,           [facility]



     (SERAX)      3-18                               Oral,           ity of



     capsule 15      23:24:                               Q4HPRN,           [place]

s



     mg        04                                 Starting           Medical



                                                  on Sat           Branch



                                                  3/18/23 at           



                                                  1824,           



                                                  Until           



                                                  Discontinu           



                                                  ed,            



                                                  Routine,           



                                                  Only while           



                                                  awake for           



                                                  DBP equal           



                                                  to or           



                                                  greater           



                                                  than 100,           



                                                  HR equal           



                                                  to or           



                                                  greater           



                                                  than 100.           

 

     iopamidol       2023- No        [id number] 100mL      100 mL,         

  [facility]



     (ISOVUE      3-18 03-18                          Intravenou           ity o

f



     370-500 mL)      22:05: 22:05                          s, ONCE, 1          

 [place]



     injection      00   :00                           dose, On           Medica

l



     100 mL                                         Sat            Branch



                                                  3/18/23 at           



                                                  1730,           



                                                  Routine           

 

     spironolact            Yes       Alcoholic 50mg QD   Take 1          

 [facility]



     one       7-15                cirrhosis           [facility]           [facility]

h



     (ALDACTONE)      00:00:                of liver           mouth           



     50 mg      00                  with           daily           



     tablet                          ascites                          

 

     dexlansopra      0      Yes       Secondary 30mg Q.5D Take 1          

 [facility]



     zole      7-15                esophageal           capsule by           [facility]



     (DEXILANT)      00:00:                varices           mouth 2           



     30 mg      00                  without           times           



     delayed                          bleeding           daily           



     release                                                        



     capsule                                                        

 

     furosemide      0      Yes       Leg  20mg QD   Take 1           [facility]

s



     (LASIX) 20      7-15                swelling           tablet by           

Health



     mg tablet      00:00:                               mouth           



               00                                 daily           

 

     rifAXIMin      0      Yes                      Take 1           [facility]



     (XIFAXAN)      7-15                               tablet by           Healt

h



     550 mg      00:00:                               mouth           



     tablet      00                                 twice           



                                                  daily.           



                                                  Therapeuti           



                                                  c              



                                                  substituti           



                                                  on for           



                                                  xifaxan           



                                                  200mg per           



                                                  P&T            

 

     spironolact      0      Yes       Alcoholic 50mg QD   Take 1          

 [facility]



     one       7-15                cirrhosis           tablet by           Healt

h



     (ALDACTONE)      00:00:                of liver           mouth           



     50 mg      00                  with           daily           



     tablet                          ascites                          

 

     dexlansopra      -0      Yes       Secondary 30mg Q.5D Take 1          

 [facility]



     zole      7-15                esophageal           capsule by           [facility]



     (DEXILANT)      00:00:                varices           mouth 2           



     30 mg      00                  without           times           



     delayed                          bleeding           daily           



     release                                                        



     capsule                                                        

 

     furosemide      0      Yes       Leg  20mg QD   Take 1           [facility]

s



     (LASIX) 20      7-15                swelling           tablet by           

Health



     mg tablet      00:00:                               mouth           



               00                                 daily           

 

     rifAXIMin      -0      Yes                      Take 1           [facility]



     (XIFAXAN)      7-15                               tablet by           Healt





     550 mg      00:00:                               mouth           



     tablet      00                                 twice           



                                                  daily.           



                                                  Therapeuti           



                                                  c              



                                                  substituti           



                                                  on for           



                                                  xifaxan           



                                                  200mg per           



                                                  P&T            

 

     spironolact      -0      Yes       Alcoholic 50mg QD   Take 1          

 [facility]



     one       7-15                cirrhosis           tablet [facility]           [facility]





     (ALDACTONE)      00:00:                of liver           mouth           



     50 mg      00                  with           daily           



     tablet                          ascites                          

 

     dexlansopra      -0      Yes       Secondary 30mg Q.5D Take 1          

 [facility]



     zole      7-15                esophageal           capsule by           [facility]



     (DEXILANT)      00:00:                varices           mouth 2           



     30 mg      00                  without           times           



     delayed                          bleeding           daily           



     release                                                        



     capsule                                                        

 

     furosemide            Yes       Leg  20mg QD   Take 1           [facility]

s



     (LASIX) 20      7-15                swelling           tablet by           

Health



     mg tablet      00:00:                               mouth           



               00                                 daily           

 

     rifAXIMin      -0      Yes                      Take 1           [facility]



     (XIFAXAN)      7-15                               tablet by           Healt





     550 mg      00:00:                               mouth           



     tablet      00                                 twice           



                                                  daily.           



                                                  Therapeuti           



                                                  c              



                                                  substituti           



                                                  on for           



                                                  xifaxan           



                                                  200mg per           



                                                  P&T            

 

     Dexlansopra      0      Yes            30mg      Take 1           Univ

ers



     zole 30 mg      7-15                               capsule by           ity

 of



     capsule      00:00:                               mouth.           [address]



                                                                 Branch

 

     rifAXIMin      -0      Yes                      Take by           Unive

rs



     (XIFAXAN)      7-15                               mouth.           ity of



     550 mg      00:00:                                              [place]



     tablet                                                      Medical



                                                                 Branch

 

     Dexlansopra      -0      Yes            30mg      Take 1           Univ

ers



     zole 30 mg      7-15                               capsule by           ity

 of



     capsule      00:00:                               mouth.           [address]



                                                                 Branch

 

     rifAXIMin      -0      Yes                      Take by           Unive

rs



     (XIFAXAN)      7-15                               mouth.           ity of



     550 mg      00:00:                                              [place]



     tablet                                                      Medical



                                                                 Branch

 

     rifAXIMin      -0 - No        Ascites due 600mg Q.5D Take 3        

   [facility]



     (XIFAXAN)      7-15 09-21           to             tablets by           [facility]

lt



     200 mg      00:00: 00:00           alcoholic           mouth 2           



     tablet      00   :00            cirrhosis           times           



                                                  daily           



                                                  (Therapeut           



                                                  ic             



                                                  substituti           



                                                  on from           



                                                  Xifaxan           



                                                  550mg per           



                                                  Pharmacy           



                                                  P&amp;T.)           

 

     rifAXIMin      2022- No        Ascites due 600mg Q.5D Take 3        

   [facility]



     (XIFAXAN)      7-15 09-21           to             tablets by           [facility]

lt



     200 mg      00:00: 00:00           alcoholic           mouth 2           



     tablet      00   :00            cirrhosis           times           



                                                  daily           



                                                  (Therapeut           



                                                  ic             



                                                  substituti           



                                                  on from           



                                                  Xifaxan           



                                                  550mg per           



                                                  Pharmacy           



                                                  P&amp;T.)           

 

     rifAXIMin      2022- No        Ascites due 600mg Q.5D Take 3        

   [facility]



     (XIFAXAN)      7-15 09-21           to             tablets by           [facility]

lt



     200 mg      00:00: 00:00           alcoholic           mouth 2           



     tablet      00   :00            cirrhosis           times           



                                                  daily           



                                                  (Therapeut           



                                                  ic             



                                                  substituti           



                                                  on from           



                                                  Xifaxan           



                                                  550mg per           



                                                  Pharmacy           



                                                  P&amp;T.)           

 

     lactulose      2022- No        Alcoholic 10g       Take 15 mL       

    [facility]



     (CHRONULAC)      7-15 08-14           cirrhosis           by mouth 3       

    Health



     10 gram/15      00:00: 23:59           of liver           times           



     mL oral      00   :00            with           daily for           



     solution                          ascites           30 days           

 

     lactulose      2022- No        Alcoholic 10g       Take 15 mL       

    [facility]



     (CHRONULAC)      7-15 08-14           cirrhosis           by mouth 3       

    Health



     10 gram/15      00:00: 23:59           of liver           times           



     mL oral      00   :00            with           daily for           



     solution                          ascites           30 days           

 

     lactulose      2022- No        Alcoholic 10g       Take 15 mL       

    [facility]



     (CHRONULAC)      7-15 08-14           cirrhosis           by mouth 3       

    Health



     10 gram/15      00:00: 23:59           of liver           times           



     mL oral      00   :00            with           daily for           



     solution                          ascites           30 days           

 

     furosemide            Yes       [id number] 40mg      Take 1           U

nivers



     40 mg      7-06                               tablet by           ity of



     tablet      00:00:                               mouth           [place]



               00                                 daily.           Medical



                                                                 Branch

 

     spironolact            Yes       [id number] 100mg      Take 1          

 [facility]



     one 100 mg      7-06                               tablet by           ity 

of



     tablet      00:00:                               mouth           [place]



               00                                 daily.           Medical



                                                                 Branch

 

     furosemide            Yes       [id number] 40mg      Take 1           U

nivers



     40 mg      7-06                               tablet by           ity of



     tablet      00:00:                               mouth           [place]



               00                                 daily.           Medical



                                                                 Branch

 

     spironolact            Yes       [id number] 100mg      Take 1          

 [facility]



     one 100 mg      7-06                               tablet by           ity 

of



     tablet      00:00:                               mouth           [place]



               00                                 daily.           Medical



                                                                 Branch

 

     furosemide            Yes       [id number] 40mg      Take 1           U

nivers



     40 mg      7-06                               tablet by           ity of



     tablet      00:00:                               mouth           [place]



               00                                 daily.           [facility]



                                                                 Branch

 

     spironolact            Yes       [id number] 100mg      Take 1          

 [facility]



     one 100 mg      7-06                               tablet by           ity 

of



     tablet      00:00:                               mouth           [place]



               00                                 daily.           [facility]



                                                                 Branch

 

     spironolact            Yes            100mg      100 mg,           Un

[place]



     one       -05                               Oral,           ity of



     (ALDACTONE)      14:00:                               DAILY,           [place]

[place]



     tablet 100      00                                 First dose           Med

ical



     mg                                           on [facility]



                                                  21 at           



                                                  0900,           



                                                  Until           



                                                  Discontinu           



                                                  ed,            



                                                  Routine           

 

     furosemide            Yes            40mg      40 mg,           Unive

rs



     (LASIX)      05                               Oral,           ity of



     tablet 40      14:00:                               DAILY,           [place]



     mg        00                                 First dose           Medical



                                                  on [facility]



                                                  21 at           



                                                  0900,           



                                                  Until           



                                                  Discontinu           



                                                  ed,            



                                                  Routine           

 

     albumin      2021- No             12.5g      12.5 g, IV           Un

[place]



     (ALBUMINAR      -                          Infusion,           ity

 of



     25%) 25 %      08:00: 10:29                          ONCE, 1           [place]

[place]



     injection      00   :00                           dose, Mon           Medic

al



     12.5 g                                         21 at           Branch



                                                  0300, 100           



                                                  mL<br>Nilda           



                                                  cation:           



                                                  HEPATORENA           



                                                  L SYNDROME           



                                                  (DIAGNOSIS           



                                                  )<br>Comme           



                                                  nts:           



                                                  Dosin-8 g/L of           



                                                  ascitic           



                                                  fluid           



                                                  removed           

 

     KCL       2021- No             40meq      40 mEq,           [facility]



     (KLOR-CON       07-                          Oral,           ity of



     M20) tablet      05:00: 05:30                          ONCE, 1           [place]

[place]



     40 mEq      00   :00                           dose, [facility]



                                                  21 at           Branch



                                                  0000,           



                                                  Routine           

 

     acamprosate            Yes       [id number] 666mg      Take 2          

 [facility]



     333 mg      7-05                               tablets by           ity of



     tablet      00:00:                               mouth 3           [place]



               00                                 (three)           Medical



                                                  times           [place]



                                                  daily.           

 

     acamprosate      0      Yes       [id number] 666mg      Take 2          

 [facility]



     333 mg      7-05                               tablets by           ity of



     tablet      00:00:                               mouth 3           [place]



               00                                 (three)           Medical



                                                  times           Branch



                                                  daily.           

 

     acamprosate      -0      Yes       [id number] 666mg      Take 2          

 [facility]



     333 mg      7-05                               tablets by           ity of



     tablet      00:00:                               mouth 3           [place]



               00                                 (three)           Medical



                                                  times           Branch



                                                  daily.           

 

     KCL       2021- No             40meq      40 mEq,           [facility]



     (KLOR-CON      -                          Oral,           ity of



     M20) tablet      00:00: 02:51                          ONCE, 1           [place]

[place]



     40 mEq      00   :00                           dose, [facility]



                                                  21 at           Branch



                                                  1900,           



                                                  Routine           

 

     iopamidol      2021- No        [id number] 100mL      100 mL,          

 [facility]



     (ISOVUE      -                          Intravenou           ity o

f



     370-500 mL)      17:15: 16:05                          s, ONCE, 1          

 [place]



     injection      00   :00                           dose, Sun           Medic

al



     100 mL                                         21 at           Branch



                                                  1215,           



                                                  Routine           

 

     KCL        No             40meq      40 mEq,           [facility]



     (KLOR-CON       06-                          Oral,           ity of



     M20) tablet      21:15: 21:15                          ONCE, 1           [place]

[place]



     40 mEq      00   :00                           dose, [facility]



                                                  21 at           Branch



                                                  1615,           



                                                  Routine           

 

     furosemide            Yes       [id number] 40mg      Take 1         

  [facility]



     40 mg      6-                10637           tablet by           ity of



     tablet      00:00:                               mouth           [place]



               00                                 daily.           Medical



                                                                 Branch

 

     spironolact            Yes       [id number] 50mg      Take 2        

   [facility]



     one 25 mg      6-                19127           tablets by           ity

 of



     tablet      00:00:                               mouth           [place]



               00                                 daily.           Medical



                                                                 Branch

 

     furosemide            Yes       [id number] 40mg      Take 1         

  [facility]



     40 mg      6-                78841           tablet by           ity of



     tablet      00:00:                               mouth           [place]



               00                                 daily.           [facility]



                                                                 Branch

 

     spironolact            Yes       [id number] 50mg      Take 2        

   [facility]



     one 25 mg      6-                24905           tablets by           ity

 of



     tablet      00:00:                               mouth           [place]



               00                                 daily.           Medical



                                                                 Branch

 

     furosemide            Yes       [id number] 40mg      Take 1         

  [facility]



     40 mg      6-                20213           tablet by           ity of



     tablet      00:00:                               mouth           [place]



               00                                 daily.           [facility]



                                                                 Branch

 

     spironolact            Yes       [id number] 50mg      Take 2        

   [facility]



     one 25 mg      6-                84170           tablets by           ity

 of



     tablet      00:00:                               mouth           [place]



               00                                 daily.           Medical



                                                                 Branch

 

     furosemide      2021- No        [id number] 40mg      Take 1        

   [facility]



     40 mg                 tablet by           ity of



     tablet      00:00: 00:00                          mouth           [place]



               00   :00                           daily.           [facility]

 

     spironolact      2021- No        [id number] 50mg      Take 2       

    [facility]



     one 25 mg                 [id number]           tablets by           it

y of



     tablet      00:00: 00:00                          mouth           [place]



               00   :00                           daily.           [facility]

 

     lidocaine-e      2021- No                       PRN,           Unive

rs



     pinephrine                                Starting           ity 

of



     (XYLOCAINE      17:56: 17:56                          Tue 21           

[place]



     W/EPINEPHRI      00   :00                           at 1256,           Medi

[place]



     NE) 2                                         Until [facility]



     %-1:200,000                                         21 at           



     injection                                         1256,           



                                                  Routine           

 

     furosemide      2021- No             20mg      20 mg,           Univ

ers



     (LASIX)                                Slow IV           ity of



     injection      11:00: 11:02                          Push,           [place]



     20 mg      00   :00                           ONCE, 1           Medical



                                                  dose, [facility]



                                                  21 at           



                                                  0600,           



                                                  Routine           

 

     magnesium      2021- No             1g        1 g, IV           Univ

ers



     sulfate in                                Piggyback,           it

y of



     D5W 1      08:30: 09:42                          ONCE, 1           [place]



     gram/100 mL      00   :00                           dose, Tue           Med

ical



     RTU IV                                         21 at           [place]



     Piggyback 1                                         0330, 100           



     g                                            mL             

 

     calcium      2021- No             1g        1 g, IV           Univer

s



     gluconate 1                                Infusion,           it

y of



     g in NaCl      07:15: 07:44                          ONCE, 1           [place]

[place]



     50 mL      00   :00                           dose, Tue           Medical



     (ISO-OSM)                                         21 at           [facility]

h



     RTU IV                                         0230,           



     infusion 1                                         Routine           



     g                                                           

 

     lactulose            Yes            30mL      30 mL,           Univer

s



     (CEPHULAC)                                     Oral, BID,           ity

 of



     solution 30      01:00:                               First dose           

[place]



     mL        00                                 on [facility]



                                                  21 at           [place]



                                                  2000,           



                                                  Until           



                                                  Discontinu           



                                                  ed,            



                                                  Routine           

 

     thiamine            Yes            100mg      100 mg,           Unive

rs



     (VITAMIN      08                               Oral, BID,           ity o

f



     B1) tablet      01:00:                               First dose           [place]

[place]



     100 mg      00                                 on Mon           Medical



                                                  21 at           [facility]



                                                  2000,           



                                                  Until           



                                                  Discontinu           



                                                  ed,            



                                                  Routine           

 

     phytonadion      2021- No             10mg      10 mg,           Uni

vers



     e (VITAMIN                                Subcutaneo           it

y of



     K)        00:15: 13:25                          us, DAILY,           [place]



     injection      00   :00                           3 doses,           Medica

l



     10 mg                                         First dose           [facility]



                                                  (after           



                                                  last           



                                                  modificati           



                                                  on) on 21 at           



                                                  1915, Last           



                                                  dose on           



                                                  21           



                                                  at 0900,           



                                                  Routine           

 

     spironolact            Yes            25mg      25 mg,           Univ

ers



     one                                      Oral,           ity of



     (ALDACTONE)      00:00:                               DAILY,           [place]

[place]



     tablet 25      00                                 First dose           Medi

[place]



     mg                                           on Mon           Branch



                                                  21 at           



                                                  1900,           



                                                  Until           



                                                  Discontinu           



                                                  ed,            



                                                  Routine           

 

     furosemide            Yes            20mg      20 mg,           Unive

rs



     (LASIX)                                     Slow IV           ity of



     injection      00:00:                               Push,           [place]



     20 mg      00                                 DAILY,           Medical



                                                  First dose           [facility]



                                                  on 21 at           



                                                  1900,           



                                                  Until           



                                                  Discontinu           



                                                  ed,            



                                                  Routine           

 

     ondansetron            Yes            4mg       4 mg, Slow           

[facility]



     (ZOFRAN                                     IV Push,           ity of



     (PF))      23:43:                               Q6HPRN,           [place]



     injection 4      59                                 Starting           Medi

[place]



     mg                                           Mon 21           Branch



                                                  at 1843,           



                                                  Until           



                                                  Discontinu           



                                                  ed,            



                                                  Routine,           



                                                  Nausea and           



                                                  Vomiting           



                                                  (N/V)           

 

     octreotide      2021- No             50ug/h      50 mcg/hr          

 [facility]



     (SANDOSTATI                                (10            ity of



     N) 1,250      20:00: 15:40                          mL/hr), IV           Te

[place]



     mcg in NaCl      00   :20                           Infusion,           Med

ical



     0.9% (NS)                                         CONTINUOUS           [facility]

ch



     250 mL                                         , Starting           



     infusion                                         21           



                                                  at 1500           

 

     octreotide      2021- No             50ug      50 mcg,           Uni

vers



     (SANDOSTATI                                Slow IV           ity 

of



     N)        20:00: 19:11                          Push,           [place]



     injection      00   :00                           ONCE, 1           Medical



     50 mcg                                         dose, Mon           Branch



                                                  21 at           



                                                  1500,           



                                                  STAT<br>In           



                                                  dication:           



                                                  Acute           



                                                  Variceal           



                                                  Hemorrhage           



                                                  in a           



                                                  Cirrhotic           



                                                  Patient           

 

     KCL       2021- No             40meq      40 mEq,           [facility]



     (KLOR-CON                                Oral,           ity of



     M20) tablet      18:00: 17:38                          ONCE, 1           [place]

[place]



     40 mEq      00   :00                           dose, Mon           Medical



                                                  21 at           [facility]



                                                  1300,           



                                                  Routine           

 

     iopamidol      2021- No        [id number] 120mL      120 mL,         

  [facility]



     (ISOVUE                                Intravenou           ity o

f



     370-500 mL)      17:15: 16:07                          s, ONCE, 1          

 [place]



     injection      00   :00                           dose, Mon           Medic

al



     120 mL                                         21 at           [facility]



                                                  1215,           



                                                  Routine           







Immunizations







           Ordered    Filled Immunization Date       Status     Comments   [facility]

e



           Immunization Name Name                                        

 

           Pneumococcal            2022 Completed             [facility]



           20-valent Vaccine            00:00:00                         

 

           Pneumococcal            2022 Completed             [facility]



           20-valent Vaccine            00:00:00                         

 

           Pneumococcal            2022 Completed             [facility]



           20-valent Vaccine            00:00:00                         

 

           SARS-COV-2 COVID-19            2021 Completed             Unive

rsity of



           PFIZER VACCINE            00:00:00                         [facility]

 

           SARS-COV-2 COVID-19            2021 Completed             Unive

rsity of



           PFIZER VACCINE            00:00:00                         [facility]

 

           SARS-COV-2 COVID-19            2021 Completed             Unive

rsity of



           PFIZER VACCINE            00:00:00                         [place] [facility]

[place]



                                                                  Branch







Vital Signs







             Vital Name   Observation Time Observation Value Comments     Source

 

             HEIGHT       2023 20:00:00 165.1 cm                  

 

             WEIGHT       2023 20:00:00 81.3 kg                   

 

             HEIGHT       2023 08:12:00 165.1 cm                  

 

             WEIGHT       2023 08:12:00 75 kg                     

 

             HEIGHT       2023 20:00:00 165.1 cm                  

 

             WEIGHT       2023 20:00:00 81.3 kg                   

 

             HEIGHT       2023 08:12:00 165.1 cm                  

 

             WEIGHT       2023 08:12:00 75 kg                     

 

             Systolic blood 2023 20:35:00 119 mm[Hg]                Univer

sity of



             pressure                                            [facility]

 

             Diastolic blood 2023 20:35:00 60 mm[Hg]                 Unive

rsity of



             pressure                                            [facility]



                                                                 Branch

 

             Heart rate   2023 20:35:00 86 /min                   Universi

ty of



                                                                 [facility]



                                                                 Branch

 

             Body temperature 2023 20:35:00 37.28 Nickie                 Univ

ersity of



                                                                 [facility]



                                                                 Branch

 

             Respiratory rate 2023 20:35:00 16 /min                   Univ

ersity of



                                                                 [facility]



                                                                 Branch

 

             Oxygen saturation in 2023 20:35:00 97 /min                   

[facility]



             Arterial blood by                                        [facility]

[facility]



             Pulse oximetry                                        Branch

 

             Body weight  2023 22:59:00 84.687 kg                 Universi

ty of



                                                                 [facility]



                                                                 Branch

 

             BMI          2023 22:59:00 27.57 kg/m2               Universi

ty of



                                                                 [facility]



                                                                 Branch

 

             Body height  2023 21:39:38 175.3 cm                  Universi

ty of



                                                                 [facility]



                                                                 Branch

 

             Systolic blood 2021 17:32:00 117 mm[Hg]                Univer

sity of



             pressure                                            [facility]



                                                                 Branch

 

             Diastolic blood 2021 17:32:00 75 mm[Hg]                 Unive

rsity of



             pressure                                            [facility]



                                                                 Branch

 

             Heart rate   2021 17:32:00 82 /min                   Universi

ty of



                                                                 [facility]



                                                                 Branch

 

             Body temperature 2021 17:32:00 36.5 Nickie                  Univ

ersity of



                                                                 [facility]



                                                                 Branch

 

             Oxygen saturation in 2021 17:32:00 98 /min                   

[facility]



             Arterial blood by                                        [facility]

[facility]



             Pulse oximetry                                        Branch

 

             Respiratory rate 2021 13:18:00 20 /min                   Univ

ersity of



                                                                 [facility]



                                                                 Branch

 

             Body height  2021 01:49:00 167.6 cm                  Universi

ty of



                                                                 [facility]



                                                                 Branch

 

             Body weight  2021 01:49:00 77.111 kg                 Universi

ty of



                                                                 [facility]



                                                                 Branch

 

             BMI          2021 01:49:00 27.44 kg/m2               Universi

ty of



                                                                 [facility]



                                                                 Branch

 

             Systolic blood 2021 20:28:00 118 mm[Hg]                Univer

sity of



             pressure                                            [facility]



                                                                 Branch

 

             Diastolic blood 2021 20:28:00 69 mm[Hg]                 Unive

rsity of



             pressure                                            [facility]



                                                                 Branch

 

             Heart rate   2021 20:28:00 79 /min                   Universi

ty of



                                                                 [facility]



                                                                 Branch

 

             Body temperature 2021 20:28:00 36.83 Nickie                 Univ

ersity of



                                                                 [facility]



                                                                 Branch

 

             Respiratory rate 2021 20:28:00 18 /min                   Univ

ersity of



                                                                 [facility]



                                                                 Branch

 

             Oxygen saturation in 2021 20:28:00 95 /min                   

[facility]



             Arterial blood by                                        [facility]

tylre



             Pulse oximetry                                        Branch

 

             Body height  2021 21:12:00 167.6 cm                  Universi

ty [facility]

 

             Body weight  2021 14:47:00 90.719 kg                 Universi

ty [facility]

 

             BMI          2021 14:47:00 32.28 kg/m2               Universi

ty [facility]

 

             Systolic blood 2022 12:48:00 113 mm[Hg]                [facility]



             pressure                                            

 

             Diastolic blood 2022 12:48:00 64 mm[Hg]                 [facility]

s [facility]



             pressure                                            

 

             Heart rate   2022 12:48:00 64 /min                   [facility]

 

             Body temperature 2022 12:48:00 37 Nickie                    [facility]

[facility]

 

             Respiratory rate 2022 12:48:00 18 /min                   [facility]

is [facility]

 

             Body height  2022 12:48:00 167.6 cm                  [facility]

[facility]

 

             Body weight  2022 12:48:00 73.256 kg                 [facility]

 

             BMI          2022 12:48:00 26.07 kg/m2               [facility]

 

             Oxygen saturation in 2022 12:48:00 100 /min                  

[facility]



             Arterial blood by                                        



             Pulse oximetry                                        

 

             Systolic blood 2021 20:28:00 118 mm[Hg]                Univer

sity of



             pressure                                            [facility]

 

             Diastolic blood 2021 20:28:00 69 mm[Hg]                 Unive

rsity of



             pressure                                            [facility]

 

             Heart rate   2021 20:28:00 79 /min                   Universi

ty of



                                                                 [facility]

 

             Body temperature 2021 20:28:00 36.83 Nickie                 Univ

ersity of



                                                                 [facility]



                                                                 Branch

 

             Respiratory rate 2021 20:28:00 18 /min                   Univ

ersity of



                                                                 [facility]

 

             Oxygen saturation in 2021 20:28:00 95 /min                   

[facility]



             Arterial blood by                                        [facility]

[facility]



             Pulse oximetry                                        Branch

 

             Body height  2021 21:12:00 167.6 cm                  [facility]

 

             Body weight  2021 14:47:00 90.719 kg                 [facility]

 

             BMI          2021 14:47:00 32.28 kg/m2               [facility]







Procedures







                Procedure       Date / Time     Performing Clinician Source



                                Performed                       

 

                MAGNESIUM       2023 10:30:00 Izaiah [facility]

 

                BASIC METABOLIC PANEL (NA, 2023 10:30:00 Fidencio Bliss   [facility] [place]



                K, CL, CO2, GLUCOSE, BUN,                                 Medica

l Branch



                CREATININE, [place])                                 

 

                CBC WITHOUT DIFF 2023 10:30:00 Izaiah Fidencio   [facility]

 

                MAGNESIUM       2023 20:51:00 Izaiah [facility]

 

                BASIC METABOLIC PANEL (NA, 2023 20:51:00 Bliss, Fidencio   [facility] [place]



                K, CL, CO2, GLUCOSE, BUN,                                 Medica

l Branch



                CREATININE, CA)                                 

 

                MAGNESIUM       2023 09:59:00 Jin Gutiérrez [facility]

 

                BASIC METABOLIC PANEL (NA, 2023 09:59:00 FranciscoCurtRashmi   [facility]

[facility] [place]



                K, CL, CO2, GLUCOSE, BUN,                                 Medica

l Branch



                CREATININE, CA)                                 

 

                CBC WITHOUT DIFF 2023 09:59:00 Nolan [facility]

 

                PROTHROMBIN TIME / INR 2023 09:59:00 Rashmi Francisco

[facility]

 

                FIBRINOGEN      2023 09:59:00 [facility]

 

                PROTHROMBIN TIME / INR 2023 11:25:00 Rashmi Francisco

[facility]

 

                FIBRINOGEN      2023 11:25:00 Nolan [facility]

 

                RETICULOCYTES AUTOMATED 2023 11:25:00 Tracy White 

[facility]

 

                MAGNESIUM       2023 08:36:00 Tracy White [facility]

 

                BASIC METABOLIC PANEL (NA, 2023 08:36:00 Tracy White [facility]



                K, CL, CO2, GLUCOSE, BUN,                 Amjad S.        [facility]

l [place]



                CREATININE, CA)                                 

 

                CBC WITH DIFF   2023 08:36:00 Tracy White [facility]

 

                BLOOD CULTURE SCREEN 2023 03:07:00 Rashmi Francisco   [facility]

 

                FIBRINOGEN      2023 22:21:00 Nolan [facility]

 

                PREPARE CRYOPRECIPITATE 2023 18:41:31 Nolan [facility]

 

                TRANSTHORACIC ECHO (TTE) 2023 17:09:13 Tracy White

 [facility]



                COMPLETE W/ CONTRAST                 [facility]        Medical Bra

nch

 

                MAGNESIUM       2023 08:53:00 Nolan [facility]

 

                BASIC METABOLIC PANEL (NA, 2023 08:53:00 Rashmi Francisco   [facility]



                K, CL, CO2, GLUCOSE, BUN,                                 [facility]

[facility]



                CREATININE, CA)                                 

 

                CBC WITH DIFF   2023 08:53:00 Nolan [facility]

 

                PROTHROMBIN TIME / INR 2023 08:53:00 Rashmi Francisco   [facility]

 

                FIBRINOGEN      2023 08:53:00 Nolan [facility]

 

                VANCOMYCIN TROUGH 2023 04:17:00 Sincere Griffith  [facility]

 

                LACTIC ACID WHOLE BLOOD 2023 21:48:00 Norman Logan  [facility]

 

                TRANSFUSE PACKED RBC 2023 20:00:00 Rashmi Francisco   [facility]

 

                PREPARE PACKED RBC 2023 19:27:30 Nolan Rashmi   [facility]

 

                LACTIC ACID WHOLE BLOOD 2023 17:09:00 Lucia Pike [facility]

 

                CBC WITHOUT DIFF 2023 17:08:00 Rashmi Francisco   [facility]

 

                TRANSFUSE PACKED RBC 2023 14:35:00 Rashmi Francisco   [facility]

 

                CBC WITHOUT DIFF 2023 13:11:00 Rashmi Francisco   [facility]

 

                US ABDOMEN LIMITED 2023 12:59:00 Tracy White [facility]

 

                LACTIC ACID WHOLE BLOOD 2023 11:46:00 Tracy White 

[facility]

 

                PROTHROMBIN TIME / INR 2023 11:45:00 Rashmi Francisco   [facility]

 

                TRANSFUSE PACKED RBC 2023 10:31:00 Tracy White [facility]

 

                TRANSFUSE CRYOPRECIPITATE 2023 10:28:00 Sincere Griffith  [facility]

 

                PREPARE CRYOPRECIPITATE 2023 10:16:18 Sincere Griffith  [facility]

 

                PREPARE PACKED RBC 2023 10:16:18 Tracy White [facility]

 

                CBC WITHOUT DIFF 2023 08:46:00 Tracy White [facility]

 

                LACTIC ACID WHOLE BLOOD 2023 08:46:00 Carlos Enrique Rendon

[facility]

 

                BASIC METABOLIC PANEL (NA, 2023 08:45:00 Carlos Enrique Rendon

[facility]



                K, CL, CO2, GLUCOSE, BUN,                                 Medica

l Branch



                CREATININE, CA)                                 

 

                HEPATITIS B SURFACE 2023 08:45:00 Tracy White [facility]



                ANTIGEN                         [facility]

 

                HEPATITIS B CORE ANTIBODY 2023 08:45:00 Tracy White [facility]



                IGM                             [facility]

 

                TRANSFUSE PACKED RBC 2023 06:30:00 Tracy White [facility]

 

                URINE DRUG (IMMUNOASSAY) - 2023 03:56:00 Rashmi Francisco   [facility]

[facility] [place]



                COMPREHENSIVE DRUG SCREEN                                 Medica

 Branch

 

                URINALYSIS      2023 03:56:00 Curt Franciscooja   [facility]

[facility]

 

                GALV ONLY - URINE DRUG 2023 03:56:00 Rashmi Francisco   [facility]



                (LCMSMS) - WILIAM PANEL                                 Medical Br

anch

 

                AC PANEL 20 + LACTIC ACID 2023 03:56:00 Tracy White [facility]

 

                IRON PANEL      2023 03:55:00 Tracy White [facility]

 

                CBC WITHOUT DIFF 2023 03:55:00 Rashmi Francisco   [facility]

 

                HEPATITIS B SURFACE 2023 03:55:00 Tracy White [facility]



                ANTIBODY                        [facility]

 

                HCV ANTIBODY    2023 03:55:00 Tracy White [facility]

 

                SYPHILIS IGG/IGM 2023 03:55:00 Roberto Merrill [facility]

 

                TRANSFUSE PACKED RBC 2023 01:15:00 Rashmi Francisco   [facility]

 

                PREPARE PACKED RBC 2023 00:29:29 Rashmi Francisco   [facility]

 

                AC PANEL 20 + LACTIC ACID 2023 00:05:00 Clive Luther   Un

[facility]

 

                AC PANEL 21 + LACTIC ACID 2023 00:00:00 Rashmi Francisco   [facility]

 

                BLOOD CULTURE SCREEN 2023 23:57:00 Rashmi Francisco   [facility]

 

                PHOSPHORUS      2023 23:57:00 Francisco, [facility]

 

                CREATINE KINASE 2023 23:57:00 Nolan [facility]

 

                MAGNESIUM       2023 23:57:00 Nolan [facility]

 

                OSMOLALITY, SERUM OR 2023 23:57:00 Tracy White [facility]



                PLASMA                          [facility]

 

                AMMONIA, PLASMA 2023 23:57:00 Nolan [facility]

 

                FREE T4         2023 23:57:00 Tracy White [facility]



                                                [facility]

 

                THYROID STIMULATING 2023 23:57:00 Christopher [facility]



                HORMONE                         [facility]

 

                HEPATIC FUNCTION PANEL 2023 23:57:00 Nolan [facility]



                ([id number]) (ALB,T.PRO,BILI                                 [facility]



                T,BU/BC,ALT,AST,ALK PHOS)                                 

 

                BASIC METABOLIC PANEL (NA, 2023 23:57:00 Rashmi Francisco   [facility] [place]



                K, CL, CO2, GLUCOSE, BUN,                                 Medica

l Branch



                CREATININE, CA)                                 

 

                ETHANOL         2023 23:57:00 Nolan [facility]

 

                CBC WITH DIFF   2023 23:57:00 Nolan [facility]

 

                FIBRINOGEN      2023 23:57:00 Nolan [facility]

 

                HCV ANTIBODY    2023 23:57:00 Nacho [facility]

 

                BLOOD CULTURE WORKUP 2023 23:57:00 Nolan Rashmi   [facility]

 

                HIV 1/2 AG-AB WITH REFLEX 2023 23:57:00 Roberto Merrill

[facility]

 

                GRAM NEGATIVE BLOOD 2023 23:57:00 Rashmi Francisco   [facility] [place]



                PATHOGENS DNA                                   [facility]



                PROBE-AEROBIC                                   

 

                XR FEMUR 2 VW LEFT 2023 22:58:00 [facility]ive   [facility]

 

                XR KNEE <3 VW LEFT 2023 22:58:00 [facility], Clive   [facility]

 

                XR TIBIA FIBULA 2 VW LEFT 2023 22:58:00 Vahibe, Clive   [facility]

 

                CT TRAUMA HEAD WO CONTRAST 2023 22:07:00 Vahibe, Clive   [facility]

 

                CT TRAUMA THORAX W 2023 22:07:00 Vahibe, Clive   [facility]



                CONTRAST                                        [facility]

 

                CT TRAUMA CERVICAL SPINE 2023 22:07:00 Vahibe, Clive   Uni

[facility]                                     [facility]

 

                CT TRAUMA THORACIC SPINE 2023 22:07:00 Vahibe, Clive   Uni

[facility] CONTRAST                                     [facility]

 

                CT TRAUMA ABDOMEN PELVIS W 2023 22:07:00 Vahibe, Clive   U

[facility]

 

                CT TRAUMA LUMBAR SPINE WO 2023 22:07:00 V[facility]ibe, Clive   [facility]

 

                BASIC METABOLIC PANEL (NA, 2023 21:42:00 Chantell Johnson

[facility]



                K, CL, CO2, GLUCOSE, BUN,                                 Medica

l Branch



                CREATININE, CA)                                 

 

                CBC WITHOUT DIFF 2023 21:42:00 Chantell Johnson     [facility]

 

                PROTHROMBIN TIME / INR 2023 21:42:00 Chantell Johnson     [facility]

 

                ACTIVATED PARTIAL THRMPLAS 2023 21:42:00 Chantell Johnson

[facility]

 

                HB ABO GROUPING 2023 21:42:00 Chantell Johnson     [facility]

f [facility]

 

                EXTRA TUBE DK. GREEN 2023 21:42:00 Chantell Johnson     [facility]

 

                COMPREHENSIVE METABOLIC 2022 10:33:00 Santiago Vick

 Health



                PANEL                                           

 

                HEMOGLOBIN A1C  2022 10:33:00 Santiago Vick Healt

h

 

                LIPID PROFILE   2022 10:33:00 Santiago Vick

h

 

                CBC/DIFF        2022 10:33:00 Santiago Vick

h

 

                CBC             2022 10:33:00 Santiago Vick

h

 

                DIFFERENTIAL, MANUAL-[facility] 2022 10:33:00 Santiago Vick [facility]

 

                FECAL OCCULT BLOOD 2022 08:00:00 Santiago Vick

 

                HEMOCCULT KIT FOR SPECIMEN 2022 13:11:09 Santiago Vick

[facility]



                COLLECTION AT HOME                                 

 

                MAGNESIUM       2022-07-15 05:50:00 AdwoaKevin chang

h

 

                COMPREHENSIVE METABOLIC 2022-07-15 05:50:00 Kevin Orona

is Health



                PANEL                                           

 

                PT/INR/PTT      2022-07-15 05:50:00 Kevin Orona



 

                CBC/DIFF        2022-07-15 05:50:00 Grecia Lagunas

lt

 

                CBC             2022-07-15 05:50:00 Grecia Lagunas amy

lt

 

                DIFFERENTIAL, MANUAL (NO 2022-07-15 05:50:00 Grecia Lagunas

[facility]



                MORPHOLOGY)-[facility] GI PROC EGD 2022 15:59:00 Derrek Phoenix [facility]



                DIAGNOSTIC BRUSH WASH                                 

 

                IP CONSULT TO PHYSICAL 2022 08:59:09 Grecia Lagunas [facility]



                THERAPY                                         

 

                HGB/HCT         2022 06:06:00 Shawna Cervantes He

alth

 

                MAGNESIUM       2022 03:08:00 Kevin Orona

h

 

                COMPREHENSIVE METABOLIC 2022 03:08:00 Kevin Orona

is Health



                PANEL                                           

 

                PT/INR/PTT      2022 03:08:00 AdwoaKevin changt

h

 

                CBC/DIFF        2022 03:08:00 Grecia Lagunasa

lth

 

                CBC             2022 03:08:00 Grecia Lagunas amy

lt

 

                DIFFERENTIAL, MANUAL-[facility] 2022 03:08:00 Grecia Lagunas [facility]

 

                PT/INR/PTT      2022 17:47:00 Marguerite Thapa [facility]

 

                GAMMA GLUTAMYL TRANSFERASE 2022 17:47:00 Marguerite Thapa

 [facility]



                (GGT)                                           

 

                CELIAC DISEASE  2022 17:47:00 Marguerite Thapa [facility]

 

                CERULOPLASMIN   2022 17:47:00 Marguerite Thapa [facility]

 

                ALPHA-1-ANTITRYPSIN 2022 17:47:00 Marguerite Thapa [facility]

 

                MAI             2022 17:47:00 Marg[facility]ite [facility]apa [facility]

 

                MITOCHONDRIAL (M2) 2022 17:47:00 Alanis [facility]



                ANTIBODY                                        

 

                LIVER KIDNEY MICR 2022 17:47:00 Marguerite Thapa [facility]

ealth

 

                IGM             2022 17:47:00 Marguerite Thapa [facility]

 

                ABD PARACENTESIS W/IMAGING 2022 14:13:43 Jose Albertomi [facility]



                                                Mohammad        

 

                ALBUMIN, formerly [facility]    2022 13:47:00 Grecia Lagunas [facility]

 

                T PROTEIN, FLD  2022 13:47:00 Grecia Lagunas [facility]

 

                CELL COUNT, FLUID 2022 13:47:00 Grecia Lagunas [facility]

[facility]

 

                GLUCOSE, FLD    2022 13:47:00 Grecia Lagunas [facility]

 

                CELL COUNT, BODY FLUID 2022 13:47:00 Grecia Lagunas [facility]

 

                DIFFERENTIAL, CELL COUNT 2022 13:47:00 [facility]

 

                FLUID CULTURE AND GRAM 2022 13:47:00 Grecia Lagunas [facility]



                STAIN                                           

 

                TRANSFUSE PLATELETS 2022 10:20:00 [facility]



                (NURSING)                                       

 

                SARS-COV-2, FLU A/B, RSV 2022 08:51:00 Derrek Phoenix

 [facility]

 

                CORONAVIRUS, COVID-19, WILLIAM 2022 08:51:00 Derrek Phoenix [facility]

 

                MAGNESIUM       2022 04:30:00 AdwoaKevin chang [facility]

h

 

                COMPREHENSIVE METABOLIC 2022 04:30:00 Kevin Orona

[facility]



                PANEL                                           

 

                PT/INR/PTT      2022 04:30:00 AdwoaKevin chang   [facility]

 

                CBC/DIFF        2022 04:30:00 Grecia Lagunas [facility]

lt

 

                TYPE AND SCREEN 2022 04:30:00 Derrek Phoenix [facility]

ealth

 

                CBC             2022 04:30:00 Grecia Lagunas [facility]

lt

 

                T&S - COLLECTION 2022 04:30:00 Derrek Phoenix [facility]

 

                PHOSPHORUS      2022 04:30:00 Grecia Lagunas [facility]

 

                RBC MORPHOLOGY-WAM 2022 04:30:00 Grecia Lagunas [facility]

 

                PREPARE PLATELETS (LAB) 2022 04:30:00 Grecia Lagunas [facility]

 

                PREPARE CROSSMATCH RBC 2022 04:30:00 Grecia Lagunas [facility]



                UNITS                                           

 

                HEPATITIS A VIRUS AB IGG 2022 13:40:00 Grecia Lagunas

[facility]

 

                MAGNESIUM       2022 03:44:00 AdwoaKevin chang [facility]

 

                COMPREHENSIVE METABOLIC 2022 03:44:00 Kevin Orona

is Health



                PANEL                                           

 

                PT/INR/P[facility]      [facility]22 [facility]44[facility]00 [facility]

 

                CBC/DIFF        2022 03:44:00 Grecia Lagunas [facility]

lt

 

                CBC             2022 03:44:00 Grecia Lagunas [facility]

 

                DIFFERENTIAL, MANUAL-WAM 2022 03:44:00 Grecia Lagunas

[facility]

 

                XRAY ABDOMEN 1 VIEW 2022 19:34:18 Shayna Tabares [facility]

 

                BLOOD CULTURE   2022 14:02:00 [facility]

th

 

                CT HEAD W/O CONTRAST 2022 11:29:00 Anna Vásquez [facility]

 

                XRAY CHEST 1 VIEW 2022 10:12:00 Grecia Lagunas [facility]

[facility]

 

                URINALYSIS W/REFLEX TO 2022 10:05:00 Grecia Lagunas [facility]



                URINE CULTURE                                   

 

                URINALYSIS      2022 10:05:00 Grecia Lagunas [facility]

 

                URINE CULTURE COLLECTION 2022 10:05:00 Grecia Lagunas [facility]



                KIT                                             

 

                HGB/HCT         2022 09:39:00 Grecia Lagunas [facility]

 

                LACTIC ACID     2022 09:37:00 Grecia Lagunas [facility]

 

                12 LEAD EKG     2022 09:29:59 Grecia Lagunas [facility]

 

                BLOOD GAS, ARTERIAL 2022 09:29:00 [facility]

 

                BLOOD GAS LACTIC ACID, 2022 09:29:00 Grecia Lagunas [facility]



                VENOUS                                          

 

                GLUCOSE POC     2022 09:14:00 May, Marcy Diaz [facility]

 

                MAGNESIUM       2022 04:47:00 Kevin Orona [facility]

 

                COMPREHENSIVE METABOLIC 2022 04:47:00 Kevin Orona   [facility]



                PANEL                                           

 

                PT/INR/PTT      2022 04:47:00 Kevin Orona [facility]

 

                FIBRINOGEN      2022 04:47:00 Grecia Lagunas [facility]

 

                CBC/DIFF        2022 04:47:00 Grecia Lagunas [facility]

 

                CBC             2022 04:47:00 Grecia Lagunas [facility]

 

                INFUSION PUMP   2022-07-10 20:48:21 May, Marcy ANDERSON [facility]

 

                CONSULT CLINICAL CASE 2022-07-10 18:36:47 Anna Vásquez

s Health



                MANAGEMENT (RN/SW)                                 

 

                SEQUENTIAL COMPRESSION 2022-07-10 18:22:34 May, Marcy Abel

is Health



                PUMP                                            

 

                SEQUENTIAL COMPRESSION 2022-07-10 18:22:34 May, Marcy Abel

is Health



                PUMP                                            

 

                HGB/HCT         2022-07-10 15:32:00 Grecia Lagunas [facility]

lt

 

                TRANSFUSE (RED CELL UNITS 2022-07-10 11:35:00 Grecia Lagunas [facility]



                - NURSING)                                      

 

                MAGNESIUM       2022-07-10 04:02:00 Kevin Orona [facility]

 

                COMPREHENSIVE METABOLIC 2022-07-10 04:02:00 Kevin Orona

 Health



                PANEL                                           

 

                PT/INR/PTT      2022-07-10 04:02:00 Kevin Orona



 

                AFP, TUMOR MARKER 2022-07-10 04:02:00 Grecia Lagunas 

ealt

 

                COPPER, SERUM OR PLASMA 2022-07-10 04:02:00 Grecia Lagunas

[facility]

 

                RETIC COUNT     2022-07-10 04:02:00 Grecia Lagunas [facility]

 

                HAPTOGLOBIN     2022-07-10 04:02:00 Grecia Lagunas [facility]

 

                LACTATE DEHYDROGENASE 2022-07-10 04:02:00 Grecia Lagunas

[facility]



                (LDH)                                           

 

                AMMONIA         2022-07-10 04:02:00 Grecia Lagunas amy

lt

 

                CEA             2022-07-10 04:02:00 Grecia Lagunas [facility]

 

                HGB/HCT         2022 22:57:00 Sujtaa Mueller [facility]

 

                DUPLEX DOPPLER ABD/PEL 2022 21:00:00 Grecia Lagunas [facility]



                VASCULAR STUDY, COMPLETE                                 

 

                U/S [facility]BDOMEN     2022 [facility]35[facility]00 [facility]

 

                CT CHEST W CONTRAST 2022 20:05:18 Grecia Lagunas [facility]

 

                CT ABDOMEN AND PELVIS 2022 20:05:18 Grecia Lagunas

is Health



                CONTRAST                                        

 

                HGB/HCT         2022 15:26:00 Grecia Lagunas He[facility]

lth

 

                HGB/HCT         2022 08:39:00 AdwoaKevin

h

 

                CBC/DIFF        2022 05:37:00 AdwoaKevin

h

 

                COMPREHENSIVE METABOLIC 2022 05:37:00 AdwoaKevin

is Health



                PANEL                                           

 

                CBC             2022 05:37:00 AdwoaKevin

h

 

                SAVE SMEAR/ NOT FOR PATH 2022 05:37:00 [facility]



                REVIEW                                          

 

                DIFFERENTIAL, MANUAL-WAM 2022 05:37:00 AdwoaKevin   [facility]

 

                MAGNESIUM       2022 04:40:00 AdwoaKevin

h

 

                PT/INR/PTT      2022 04:40:00 AdwoaKevin

h

 

                FOLIC ACID      2022 04:40:[facility]

[facility]

 

                IRON PROFILE    2022 04:40:00 AdwoaUlisessh   [facility]



 

                HIV AG/AB COMBO ROUTINE 2022 04:40:00 AdwoaKevin

 Health



                SCREENING                                       

 

                HEPATITIS PANEL 2022 04:40:00 AdwoaKevin

h

 

                TRANSFUSE (RED CELL UNITS 2022 02:45:00 João Bernal [facility]



                - NURSING)                                      

 

                URINALYSIS      2022 01:38:00 [facility]

 

                SODIUM, URINE, RANDOM 2022 01:38:00 Adwoa [facility]

 

                URINALYSIS      2022 01:38:00 AdwoaUlisessh   [facility]

h

 

                TRANSFUSE (RED CELL UNITS 2022 22:30:00 João Bernal [facility]



                - NURSING)                                      

 

                SEQUENTIAL COMPRESSION 2022 21:47:40 AdwoaKevin

s Health



                PUMP                                            

 

                SEQUENTIAL COMPRESSION 2022 21:47:40 AdwoaKevin

s Health



                PUMP                                            

 

                SARS-COV-2, FLU A/B, RSV 2022 20:45:00 João Bernal [facility]

 

                CORONAVIRUS, COVID-19, WILLIAM 2022 20:45:00 João Bernal

[facility]

 

                TRANSFUSE (RED CELL UNITS 2022 17:30:00 João Bernal

[facility]



                - NURSING)                                      

 

                12 LEAD EKG     2022 17:14:43 João Bernal

 

                TROPONIN I      2022 17:08:00 João Bernal

 

                THYROID STIMULATING 2022 17:08:00 Kevin Orona

ealt



                HORMONE (TSH)                                   

 

                VITAMIN B12     2022 17:08:00 Kevin Orona

 

                FERRITIN        2022 17:08:00 Kevin Orona

 

                TYPE AND SCREEN 2022 15:47:00 Harry Jang



 

                T&S - COLLECTION 2022 15:47:00 Harry Jang

lth

 

                ABO/RH CONFIRMATION 2022 15:47:00 Harry Jang

 

                PREPARE CROSSMATCH RBC 2022 15:47:00 Grecia Lagunas [facility]



                UNITS                                           

 

                XRAY CHEST 2 VIEWS 2022 11:31:00 Victoria Capone

 

                CBC/DIFF        2022 11:00:00 Victoria Capone

 

                BASIC METABOLIC PANEL 2022 11:00:00 Victoria Capone

 [facility]

 

                LIVER PROFILE   2022 11:00:00 Victoria Capone

 

                PT/INR/PTT      2022 11:00:00 Victoria Capone

 

                B-TYPE NATRIURETIC PEPTIDE 2022 11:00:00 Victoria Capone

[facility]



                (BNP)                                           

 

                CBC             2022 11:00:00 Victoria Capone

 

                DIFFERENTIAL, MANUAL-WAM 2022 11:00:00 Victoria Capone  [facility]

 

                SYPHILIS SCREEN FOR 2022 11:00:00 Kevin Orona



                INFECTION                                       

 

                BASIC METABOLIC PANEL (NA, 2021 10:29:00 Jorge Luis [facility]



                K, CL, CO2, GLUCOSE, BUN,                                 Medica

l Branch



                CREATININE, CA)                                 

 

                CBC WITH DIFF   2021 10:29:00 Lesley [facility]

 

                ALBUMIN BODY FLUID 2021 06:00:00 Dwight [facility]

 

                T.PROTEIN BODY FLUID 2021 06:00:00 [facility]

 

                BODY FLUID DIRECT COUNT 2021 06:00:00 Lesley [facility]

 

                BODY FLUID (BACTEC BOTTLE) 2021 06:00:00 Robert Snell

[facility]

 

                COVID-19 (ID NOW RAPID 2021 20:46:00 Lesley [facility]



                TESTING)                                        Medical Branch

 

                LAB ONLY COVID  2021 20:46:00 Lesley [facility]



                INTERPRETATION                                  [facility]

 

                HEPATIC FUNCTION PANEL 2021 16:17:00 Jean Latham   [facility]



                ([id number]) (ALB,T.PRO,BILI                                 [facility]



                T,BU/BC,ALT,AST,ALK PHOS)                                 

 

                BASIC METABOLIC PANEL (NA, 2021 16:17:00 Jean Latham

[facility]



                K, CL, CO2, GLUCOSE, BUN,                                 Medica

l Branch



                CREATININE, CA)                                 

 

                CBC WITH DIFF   2021 16:17:00 Jean Latham   [facility]

 

                PROTHROMBIN TIME / INR 2021 16:17:00 Jean Latham   [facility]

 

                CT ABDOMEN PELVIS W 2021 16:09:21 Jean Latham   [facility] [place]



                CONTRAST                                        [facility]

 

                CONSENT/REFUSAL FOR 2021 15:22:40 Doctor Unassigned, [facility]



                DIAGNOSIS AND TREATMENT                 [place]         Medical 

Branch

 

                PREPARE PACKED RBC 2021 23:59:02 [facility]

[facility]

 

                PREPARE PACKED RBC 2021 23:59:02 [facility]

[facility]

 

                COMP. METABOLIC PANEL 2021 14:45:00 [facility]



                ([id number])                                         [facility]

 

                CBC WITH DIFF   2021 14:45:00 [facility]

 

                COMP. METABOLIC PANEL 2021 14:45:00 [facility]



                ([id number])                                         [facility]

 

                CBC WITH DIFF   2021 14:45:00 Moe [facility]

 

                IR              2021 18:05:00 Amy Caldera [facility]



                PARACENTESIS/PERITONECENTE                                 Medic

[facility]



                SIS WITH IMAGING                                 

 

                IR              2021 18:05:00 Amy Caldera [facility]



                PARACENTESIS/PERITONECENTE                                 [facility]



                SIS WITH IMAGING                                 

 

                T.PROTEIN BODY FLUID 2021 18:01:00 Daisy Friend [facility]

 

                T.PROTEIN BODY FLUID 2021 18:01:00 Daisy Friend [facility]

 

                ALBUMIN BODY FLUID 2021 18:00:00 [facility]

 

                BODY FLUID      2021 18:00:00 Amy Caldera [facility]



                CULTURE(AEROBIC/ANAEROBIC)                                 [facility]

 

                CYTO ABDOMINAL FLUID 2021 18:00:00 Amy Caldera

[facility]

 

                BODY FLUID DIRECT COUNT 2021 18:00:00 Amy Caldera

[facility]

 

                BODY FLUID MANUAL DIFF 2021 18:00:00 Amy Caldera

[facility]

 

                BODY FLUID      2021 18:00:00 Amy Caldera [facility]



                CULTURE(AEROBIC/ANAEROBIC)                                 Medic

al Branch

 

                ALBUMIN BODY FLUID 2021 18:00:00 [facility]

 

                CYTO ABDOMINAL FLUID 2021 18:00:00 Abkatey Gonzales [facility]

 

                PHOSPHORUS      2021 06:16:00 [facility]

 

                MAGNESIUM       2021 06:16:00 [facility]

 

                IONIZED CALCIUM 2021 06:16:00 [facility]

 

                CBC WITH DIFF   2021 06:16:00 Abu Janet [facility]

 

                IONIZED CALCIUM 2021 06:16:00 [facility]

 

                CBC WITH DIFF   2021 06:16:00 Jean Carlos Gonzales [facility]

 

                MAGNESIUM       2021 06:16:00 [facility]

 

                PHOSPHORUS      2021 06:16:00 [facility]

 

                PREPARE PACKED RBC 2021 02:14:47 Jean Carlos Gonzales [facility]

 

                PREPARE PACKED RBC 2021 02:14:47 Jean Carlos [facility]

 

                US ABDOMEN LIMITED WITH 2021 00:42:57 Amy Caldera [facility] ABDOMEN LIMITED WITH 2021 00:42:57 Amy Caldera U

[facility]

 

                ALPHA 1 ANTITRYPSIN 2021 23:24:00 Marcy [facility]

 

                ALPHA FETOPROTEIN 2021 23:24:00 Moe [facility]

 

                CERULOPLASMIN   2021 23:24:00 Moe [facility]

 

                HCV ANTIBODY    2021 23:24:00 Moe [facility]

 

                IMMUNOGLOBULIN G 2021 23:24:00 Moe [facility]

 

                SMOOTH MUSCLE AB,IGG 2021 23:24:00 Marcy Rosa [facility]



                W/REFLEX                                        [facility]

 

                ANTI-NUCLEAR ANTIBODY 2021 23:24:00 Marcy Rosa [facility]

 

                HAV ANTIBODY (IGG AND IGM) 2021 23:24:00 Marcy Rosa [facility]

 

                HEPATITIS B SURFACE 2021 23:24:00 Marcy Rosa [facility]



                ANTIGEN                                         [facility]

 

                HEPATITIS B SURFACE 2021 23:24:00 Marcy Rosa [facility]



                ANTIBODY                                        [facility]

 

                HBC ANTIBODY (IGM & IGG) 2021 23:24:00 Marcy Rosa [facility]

 

                BASIC METABOLIC PANEL (NA, 2021 23:24:00 Abu Ather, [facility]



                K, CL, CO2, GLUCOSE, BUN,                                 [facility]

 Branch



                CREATININE, CA)                                 

 

                CERULOPLASMIN   2021 23:24:00 Marcy Rosa [facility]

 

                ALPHA 1 ANTITRYPSIN 2021 23:24:00 Marcy [facility]

 

                IMMUNOGLOBULIN G 2021 23:24:00 [facility]

 

                BASIC METABOLIC PANEL (NA, 2021 23:24:00 Abu Ather, [facility]



                K, CL, CO2, GLUCOSE, BUN,                                 [facility]

[facility]



                CREATININE, CA)                                 

 

                ALPHA FETOPROTEIN 2021 23:24:00 Marcy Rosa [facility]

 

                ANTI-NUCLEAR ANTIBODY 2021 23:24:00 Marcy Rosa [facility]



                SCREEN                                          [facility]

 

                HEPATITIS B SURFACE 2021 23:24:00 Marcy Rosa [facility]



                ANTIBODY                                        [facility]

 

                HEPATITIS B SURFACE 2021 23:24:00 Marcy Rosa [facility]

 

                HCV ANTIBODY    2021 23:24:00 Marcy Rosa [facility]

 

                HBC ANTIBODY (IGM & IGG) 2021 23:24:00 Marcy Rosa

[facility]

 

                HAV ANTIBODY (IGG AND IGM) 2021 23:24:00 Marcy Rosa [facility]

 

                HB ABO GROUPING 2021 23:22:00 Abu [facility]

 

                HB ABO GROUPING 2021 23:22:00 Abu Ather, [facility]

 

                CBC WITH DIFF   2021 22:37:00 Abu Ather, [facility]

 

                PROTHROMBIN TIME / INR 2021 22:37:00 Marcy Rosa [facility]

 

                CBC WITH DIFF   2021 22:37:00 Abu Ather, [facility]

 

                PROTHROMBIN TIME / INR 2021 22:37:00 Marcy Rosa [facility]

 

                ABORH CONFIRMATION 2021 17:39:00 IbAmanda [facility]so [facility]

[facility]

 

                ABORH CONFIRMATION 2021 17:39:00 IbAmanda millso [facility]

[facility]

 

                HB ABO GROUPING 2021 17:03:00 Amanda Cardozao [facility]

 

                HB ABO GROUPING 2021 17:03:00 Maddie Cardoza [facility]

 

                URINALYSIS      2021 16:46:00 IbAmanda millso F [facility]

 

                URINALYSIS      2021 16:46:00 Maddie Cardoza [facility]

 

                CT ABDOMEN PELVIS W 2021 16:12:42 Maddie Cardoza [facility]

 

                CT ABDOMEN PELVIS W 2021 16:12:42 IbAmanda millso [facility]

 

                XR CHEST 1 VW   2021 15:24:06 IbMaddie mills F [facility]

 

                XR CHEST 1 VW   2021 15:24:06 Maddie Cardoza [facility]

 

                HB ECG ROUTINE & RHYTHM 2021 15:15:37 Maddie Cardoza 

[facility]

 

                HB ECG ROUTINE & RHYTHM 2021 15:15:37 Maddie Cardoza 

[facility]

 

                LIPASE          2021 15:13:00 Maddie Cardoza [facility]

 

                FERRITIN SERUM  2021 15:13:00 Marcy Rosa

[facility]

 

                TROPONIN I      2021 15:13:00 Maddie Cardoza [facility]

 

                COMP. METABOLIC PANEL 2021 15:13:00 Maddie Cardoza [facility]

[facility]



                ([id number])                                         [facility]

 

                CBC WITH DIFF   2021 15:13:00 Maddie Cardoza [facility]

 

                N-TERMINAL PRO-BNP 2021 15:13:00 Maddie Cardoza [facility]

 

                COVID-19 (ID NOW RAPID 2021 15:13:00 Maddie Cardoza U

[facility]



                TESTING)                                        Medical Branch

 

                COVID-19 (ID NOW RAPID 2021 15:13:00 Maddie Cardoza U

[facility]



                TESTING)                                        Medical Branch

 

                CBC WITH DIFF   2021 15:13:00 Maddie Cardoza [facility]

 

                COMP. METABOLIC PANEL 2021 15:13:00 Madd[facility]za [facility]

[facility]



                ([id number]                                         [facility]

 

                TROPONIN I      2021 15:13:00 Maddie Cardoza [facility] acute hospital

 

                LIPASE          2021 15:13:00 Maddie Cardoza [facility] acute hospital

 

                N-TERMINAL PRO-BNP 2021 15:13:00 Maddie Cardoza [facility]



                                                                Medical Branch

 

                FERRITIN SERUM  2021 15:13:00 Marcy Rosa

f [place]



                                                                [facility] [place]

 

                CONSENT/REFUSAL FOR 2021 14:32:16 Doctor Richi [facility]



                DIAGNOSIS AND TREATMENT                 [place]         Medical 

Branch

 

                NOTICE OF PRIVACY 2021 14:31:32 Doctor Richi, [facility]



                PRACTICES                       [place]         Medical Branch

 

                EMERGENCY DEPARTMENT 2021 05:01:00 Doctor Richi [facility]



                DOCUMENTS                       [place]         Medical Branch

 

                AGREEMENTS AUTHORIZATIONS 2021 05:01:00 Doctor Richi

 [facility]



                AND IRREVOCABLE                 [place]         Medical Branch



                ASSIGNMENTS (FORM 2001)                                 

 

                AGREEMENTS AUTHORIZATIONS 2021 05:01:00 Doctor Richi

 [facility]



                AND IRREVOCABLE                 [place]         Medical Branch



                ASSIGNMENTS (FORM 2001)                                 

 

                DISCLOSURE AND CONSENT, 2021 05:01:00 Doctor Unassigned, U

[facility]



                MEDICAL AND SURGICAL                 No Name         Medical Bra

[facility]



                PROCEDURES                                      

 

                EMERGENCY DEPARTMENT 2021 05:01:00 Doctor Unassigned, [facility]



                DOCUMENTS                       [place]         Medical Branch

 

                HOSPITAL ADMISSION 2021 05:01:00 Doctor Unassigned, [facility]



                                                [place]         Medical Branch







Plan of Care







             Planned Activity Planned Date Details      Comments     Source

 

             Future Scheduled Test 2022-10-01 00:00:00 IMM Influenza            

  [facility]



                                       Seasonal (>/= 19 yrs)              



                                       [code = IMM Influenza              



                                       Seasonal (>/= 19              



                                       yrs)]                     

 

             Future Scheduled Test 2022-10-01 00:00:00 IMM Influenza            

  [facility]



                                       Seasonal (>/= 19 yrs)              



                                       [code = IMM Influenza              



                                       Seasonal (>/= 19              



                                       yrs)]                     

 

             Future Scheduled Test 2022-10-01 00:00:00 IMM Influenza            

  [facility]



                                       Seasonal (>/= 19 yrs)              



                                       [code = IMM Influenza              



                                       Seasonal (>/= 19              



                                       yrs)]                     

 

             Future Scheduled Test 2021 00:00:00 COVID-19 Vaccine (2 -    

          [facility]



                                       Pfizer series) [code              



                                       = COVID-19 Vaccine (2              



                                       - Pfizer series)]              

 

             Future Scheduled Test 2021 00:00:00 COVID-19 Vaccine (2 -    

          [facility]



                                       Pfizer series) [code              



                                       = COVID-19 Vaccine (2              



                                       - Pfizer series)]              

 

             Future Scheduled Test 2021 00:00:00 COVID-19 Vaccine (2 -    

          [facility]



                                       Pfizer series) [code              



                                       = COVID-19 Vaccine (2              



                                       - Pfizer series)]              

 

             Future Scheduled Test 2018 00:00:00 Screening for            

  [facility]



                                       malignant neoplasm of              



                                       colon (procedure)              



                                       [code = 120034176]              

 

             Future Scheduled Test 2018 00:00:00 Screening for            

  [facility]



                                       malignant neoplasm of              



                                       colon (procedure)              



                                       [code = 730496898]              

 

             Future Scheduled Test 2018 00:00:00 Screening for            

  [facility]



                                       malignant neoplasm of              



                                       colon (procedure)              



                                       [code = 185438339]              

 

             Future Scheduled Test 2018 00:00:00 Screening for            

  [facility]



                                       malignant neoplasm of              



                                       colon (procedure)              



                                       [code = 720250943]              

 

             Future Scheduled Test 2018 00:00:00 Screening for            

  [facility]



                                       malignant neoplasm of              



                                       colon (procedure)              



                                       [code = 951605235]              

 

             Future Scheduled Test 2018 00:00:00 Screening for            

  [facility]



                                       malignant neoplasm of              



                                       colon (procedure)              



                                       [code = 191267912]              

 

             Future Scheduled Test 2018 00:00:00 Screening for            

  [facility]



                                       malignant neoplasm of              



                                       colon (procedure)              



                                       [code = 776632328]              

 

             Future Scheduled Test 2018 00:00:00 Screening for            

  [facility]



                                       malignant neoplasm of              



                                       colon (procedure)              



                                       [code = 452584704]              

 

             Future Scheduled Test 2018 00:00:00 Screening for            

  [facility]



                                       malignant neoplasm of              



                                       colon (procedure)              



                                       [code = 819802678]              

 

             Future Scheduled Test 2018 00:00:00 Screening for            

  [facility]



                                       malignant neoplasm of              



                                       colon (procedure)              



                                       [code = 382898361]              

 

             Future Scheduled Test 2018 00:00:00 Screening for            

  [facility]



                                       malignant neoplasm of              



                                       colon (procedure)              



                                       [code = 067149693]              

 

             Future Scheduled Test 2018 00:00:00 Screening for            

  [facility]



                                       malignant neoplasm of              



                                       colon (procedure)              



                                       [code = 367663225]              

 

             Future Scheduled Test 2018 00:00:00 Screening for            

  [facility]



                                       malignant neoplasm of              



                                       colon (procedure)              



                                       [code = 005436707]              

 

             Future Scheduled Test 2018 00:00:00 Screening for            

  [facility]



                                       malignant neoplasm of              



                                       colon (procedure)              



                                       [code = 901457066]              

 

             Future Scheduled Test 2018 00:00:00 Screening for            

  [facility]



                                       malignant neoplasm of              



                                       colon (procedure)              



                                       [code = 498703264]              

 

             Future Scheduled Test 1968 00:00:00 Fluoride Varnish         

     [facility]



                                       [code = Fluoride              



                                       Varnish]                  







Encounters







        Start   End     Encounter Admission Attending Care    Care    Encounter 

Source



        Date/Time Date/Time Type    Type    Clinicians Facility Department ID   

   

 

        2023         Outpatient                 [facility]     [id number]

 [place]



        09:11:43                                                 [id number] [facility]

 

        2023         Outpatient                 [facility]     [id number]

 [place]



        14:16:55                                                 [id number] [facility]

 

        2022         Inpatient                 [facility]     [id number] H

arris



        15:57:19                                                         Health

 

        2022         Inpatient         MAGALLON, ARSH [facility]     [id number]

3 [place]



        00:00:00                                                         Health

 

        2022         Inpatient         MAY,    [facility]     [id number] H

arris



        13:01:40                         [facility]EUS                         Healt



 

        2022         Inpatient 1       MAY,    [facility]     [id number] H

arris



        15:43:00                         [facility]



 

        2023 Inpatient ER      RUT العراقي    [facility] [id number]

[id number] [facility]



        07:50:00 15:35:00                 [facility]                            

 

        2023 Transition         MAK Joyce  1.2.840.114 [id number]

[id number] [facility]



        00:00:00 00:00:00 of Care         Gogo CALZADA   350.1.13.10        

 ity [facility]   4.2.7.2.686         [place]

[place]



                                                        [phone]         Medi

[place]



                                                        403             Branch

 

        2023 Inpatient        REYNA [facility]     [id number]

[id number] [facility]



        16:36:00 18:36:00                 [facility]                           ity [facility]

 

        2023 [facility]         Lucia Pike  1.

2.840.114 

[id number]                               [facility]



        16:36:00 18:36:00 Encounter         Israel Deluna   350.1.13.

10         ity [facility] 4.2.7.2.686     

    [place]



                                                        [phone]         Medi

tyler



                                                        096             Branch

 

        2022-10-13 2022-10-13 Refpaty Lagunas, [facility]     [id number] 

[place]



        00:00:00 00:00:00                 Grecia Polk



 

        2022-10-04 2022-10-04 Outpatient         NEELAM, [facility]     [id number]

4441 [place]



        00:00:00 00:00:00                 Count includes the [facility]

 

        2022 Refill          Alanis,  [facility]     [id number] 

[place]



        00:00:00 00:00:00                 Marguerite Foster

[facility]

 

        2022 Refpaty Lagunas, [facility]     [id number] 

[place]



        00:00:00 00:00:00                 Grecia Polk



 

        2022 Outpatient                 [facility]     [id number]

94 [place]



        10:28:40 10:33:15                                                 [facility]

 

        [facility]22 [facility]         [facility]     [id number]

35 [place]



        00:00:00 00:00:00                 SANTIAGO Polk



 

        2022 Outpatient                 [facility]     [id number]

18 [place]



        00:00:00 00:00:00                                                 [facility]

 

        2022 Office          MAY,    [facility]     [id number] 

[place]



        12:48:07 13:35:29 Visit           MARCY Polk



 

        2022 Outpatient                 [facility]     [id number]

18 [place]



        00:00:00 00:00:00                                                 [facility]

 

        2022 Outpatient                 [facility]     [facility]     [id number]

[facility]DA [facility]



        00:00:00 00:00:00                                         [id number] 



                                                                8       

 

        2022 2022-07-15 [facility]     [id number]

 [place]



        15:43:00 14:31:00 Encounter         MARCY Sykes

[facility]

 

        2022 Anesthesia         ARSH MAGALLON [facility]     [id number]

[id number] [place]



        00:00:00 16:26:00 Event                                           Health

 

        2022 Inpatient                 [facility]     [id number]

0 [place]



        18:31:54 19:34:23                                                 Health

 

        2022 Inpatient                 [facility]     [id number]

4 [place]



        13:44:32 13:44:38                                                 Health

 

        2022 Inpatient                 [facility]     [id number]

2 [place]



        10:59:56 11:29:56                                                 Health

 

        2022 Inpatient         MAY,    [facility]     [id number]

6 [place]



        10:08:10 10:38:10                 [facility]

th

 

        2022 Inpatient         MAY,    [facility]     [id number]

3 [place]



        20:00:54 21:22:51                 THADDAEUS                         Heal

th

 

        2022 Inpatient                 [facility]     [id number]

2 [place]



        20:00:50 21:22:24                                                 [facility]

 

        2022 Inpatient         MAY,    [facility]     [id number]

1 [place]



        18:54:35 20:07:31                 [facility]

 

        2022 Emergency         MALOOK, [facility]     [id number]

2 [place]



        11:20:42 11:32:12                 [facility]

 

        2021-08-10 2021-08-10 Outpatient         ALI, ANATOLIYLUIS ANTONIO [facility]     [id number]

[id number] [place]



        00:00:00 00:00:00                                                 Health

 

        2021 Emergency         Jean Latham  1.2.840.11

4 [id number] 

Univers



        10:22:00 14:00:00                 Robert Snell   350.1.13.10    

     ity of



                                                [facility] 4.2.7.2.686         [place]ik

as



                                                        [phone]         Medi

tyler



                                                        093             Branch

 

        2021 Emergency X               [facility]    ERT     [id number]

22 Univers



        10:20:00 10:20:00                                                 ity of



                                                                        [facility]

 

        2021-06-10 2021-06-10 Transition         Mak Vizcarra  1.2.840.114 [id number]

[id number] Univers



        00:00:00 00:00:00 of Care         Rubina   Al   350.1.13.10         it

y of



                                                [place]   4.2.7.2.686         [place]

[place]



                                                        [phone]         Medi

tyler



                                                        403             Branch

 

        2021 [facility]         Maddie Cardoza 1.2.840.1 [id number]

[id number] 

[id number]                                Univers



        09:44:00 18:00:00 Encounter         Amy Caldera 54588.1.1        

         ity of



                                                3.104.2.7                 [place]



                                                .[id number]                 Medica

l



                                                .8                      Branch

 

        2021 Emergency X               [facility]    ERT     [id number]

17 Univers



        09:39:00 09:39:00                                                 ity of



                                                                        [facility]

 

        2021 Travel                  1.2.840.1 1.2.693.523 [id number]

8206 Univers



        00:00:00 00:00:00                         29278.1.1 350.1.13.10         

ity of



                                                3.104.2.7 4.2.7.3.698         [place]

[place]



                                                .[id number] 084.8           Medica

l



                                                .8                      [place]

 

        2021 Orders          Doctor  1.2.840.3 [id number]

[id number] [facility]



        00:00:00 00:00:00 Only            Unassigned, 05495.1.1                 

ity of



                                        No Name 3.104.2.7                 [place]



                                                .[id number]                 Medica

l



                                                .8                      [place]







Results







           Test Description Test Time  Test Comments Results    Result Comments 

Source









                    FUNGUS CULTURE + SMEAR 2023 08:30:43 









                      Test Item  Value      Reference Range Interpretation Comme

nts









             CULTURE (BEAKER) (test code = 1095) No fungus isolated in 28 days  

                         

 

             FUNGUS SMEAR (BEAKER) (test code = 1406) No fungal elements seen   

                        



MISCELLANEOUS LAB [id number] 14:47:09





             Test Item    Value        Reference Range Interpretation Comments

 

             SCAN RESULT (test code = 0455779)                                  

      See attachment



ANAEROBIC [id number] 00:41:05





             Test Item    Value        Reference Range Interpretation Comments

 

             CULTURE (BEAKER) (test No anaerobes isolated                       

    



             code = 1095)                                        



BASIC METABOLIC [id number] 06:39:01





             Test Item    Value        Reference Range Interpretation Comments

 

             SODIUM (BEAKER) 134 meq/L    136-145      L            



             (test code = 381)                                        

 

             POTASSIUM    4.1 meq/L    3.5-5.1                   



             (BEAKER) (test                                        



             code = 379)                                         

 

             CHLORIDE (BEAKER) 103 meq/L                        



             (test code = 382)                                        

 

             CO2 (BEAKER) 26 meq/L     22-29                     



             (test code = 355)                                        

 

             BLOOD UREA   8 mg/dL      7-21                      



             NITROGEN (BEAKER)                                        



             (test code = 354)                                        

 

             CREATININE   0.62 mg/dL   0.57-1.25                 



             (BEAKER) (test                                        



             code = 358)                                         

 

             GLUCOSE RANDOM 111 mg/dL           H            



             (BEAKER) (test                                        



             code = 652)                                         

 

             CALCIUM (BEAKER) 7.5 mg/dL    8.4-10.2     L            



             (test code = 697)                                        

 

             EGFR (BEAKER) 112                                     Interpretatio

n of eGFR



             (test code = mL/min/1.73                            values  Stage D

escription



             1092)        sq m                                   Result G1 Shaunna

l or high



                                                                 >=90 G2 Mildly 

decreased



                                                                 60-89 G3a Mildl

y to



                                                                 moderately 45-5

9 G3b



                                                                 Moderately to s

everely



                                                                 30-44 G4 Severl

y decreased



                                                                 15-29 G5 Kidney

 failure



                                                                 <15Reported eGF

R is based



                                                                 on the CKD-EPI 





                                                                 equation that d

oes not use



                                                                 a race



                                                                 coefficientEsti

mated GFR



                                                                 is not as accur

ate as



                                                                 Creatinine Tabitha

cynthia in



                                                                 predicting glom

erular



                                                                 filtration rate

. Estimated



                                                                 GFR is not appl

icable for



                                                                 dialysis patien

ts



 ID Remington THOMAS WSpecimen slightly [id number] 06:37:31





             Test Item    Value        Reference Range Interpretation Comments

 

             MAGNESIUM (BEAKER) (test code = 1.6 mg/dL    1.6-2.6               

    



             627)                                                



 JANIE THOMAS WCBC W/PLT COUNT &amp; AUTO [id number] 06:06:27





             Test Item    Value        Reference Range Interpretation Comments

 

             WHITE BLOOD CELL COUNT (BEAKER) 3.8 K/ L     3.5-10.5              

    



             (test code = 775)                                        

 

             RED BLOOD CELL COUNT (BEAKER) 2.41 M/ L    4.63-6.08    L          

  



             (test code = 761)                                        

 

             HEMOGLOBIN (BEAKER) (test code = 7.7 GM/DL    13.7-17.5    L       

     



             410)                                                

 

             HEMATOCRIT (BEAKER) (test code = 23.8 %       40.1-51.0    L       

     



             411)                                                

 

             MEAN CORPUSCULAR VOLUME (BEAKER) 99 fL        79-92        H       

     



             (test code = 753)                                        

 

             MEAN CORPUSCULAR HEMOGLOBIN 32.0 pg      25.7-32.2                 



             (BEAKER) (test code = 751)                                        

 

             MEAN CORPUSCULAR HEMOGLOBIN CONC 32.4 GM/DL   32.3-36.5            

     



             (BEAKER) (test code = 752)                                        

 

             RED CELL DISTRIBUTION WIDTH 18.6 %       11.6-14.4    H            



             (BEAKER) (test code = 412)                                        

 

             PLATELET COUNT (BEAKER) (test code 42 K/CU MM   150-450      L     

       



             = 756)                                              

 

             MEAN PLATELET VOLUME (BEAKER) 11.6 fL      9.4-12.4                

  



             (test code = 754)                                        

 

             NUCLEATED RED BLOOD CELLS (BEAKER) 0 /100 WBC   0-0                

       



             (test code = 413)                                        

 

             NEUTROPHILS RELATIVE PERCENT 58 %                                  

 



             (BEAKER) (test code = 429)                                        

 

             LYMPHOCYTES RELATIVE PERCENT 20 %                                  

 



             (BEAKER) (test code = 430)                                        

 

             MONOCYTES RELATIVE PERCENT 17 %                                   



             (BEAKER) (test code = 431)                                        

 

             EOSINOPHILS RELATIVE PERCENT 5 %                                   

 



             (BEAKER) (test code = 432)                                        

 

             BASOPHILS RELATIVE PERCENT 0 %                                    



             (BEAKER) (test code = 437)                                        

 

             NEUTROPHILS ABSOLUTE COUNT 2.18 K/ L    1.78-5.38                 



             (BEAKER) (test code = 670)                                        

 

             LYMPHOCYTES ABSOLUTE COUNT 0.76 K/ L    1.32-3.57    L            



             (BEAKER) (test code = 414)                                        

 

             MONOCYTES ABSOLUTE COUNT (BEAKER) 0.63 K/ L    0.30-0.82           

      



             (test code = 415)                                        

 

             EOSINOPHILS ABSOLUTE COUNT 0.18 K/ L    0.04-0.54                 



             (BEAKER) (test code = 416)                                        

 

             BASOPHILS ABSOLUTE COUNT (BEAKER) 0.00 K/ L    0.01-0.08    L      

      



             (test code = 417)                                        

 

             IMMATURE GRANULOCYTES-RELATIVE 1.10 %       0.00-1.00    H         

   



             PERCENT (BEAKER) (test code =                                      

  



             2801)                                               



BASIC METABOLIC [id number] 04:14:02





             Test Item    Value        Reference Range Interpretation Comments

 

             SODIUM (BEAKER) 134 meq/L    136-145      L            



             (test code = 381)                                        

 

             POTASSIUM    4.1 meq/L    3.5-5.1                   



             (BEAKER) (test                                        



             code = 379)                                         

 

             CHLORIDE (BEAKER) 103 meq/L                        



             (test code = 382)                                        

 

             CO2 (BEAKER) 26 meq/L     22-29                     



             (test code = 355)                                        

 

             BLOOD UREA   9 mg/dL      7-21                      



             NITROGEN (BEAKER)                                        



             (test code = 354)                                        

 

             CREATININE   0.65 mg/dL   0.57-1.25                 



             (BEAKER) (test                                        



             code = 358)                                         

 

             GLUCOSE RANDOM 126 mg/dL           H            



             (BEAKER) (test                                        



             code = 652)                                         

 

             CALCIUM (BEAKER) 7.6 mg/dL    8.4-10.2     L            



             (test code = 697)                                        

 

             EGFR (BEAKER) 110                                     Interpretatio

n of eGFR



             (test code = mL/min/1.73                            values Stage De

scription



             1092)        sq m                                   Result G1 Shaunna

l or high



                                                                 >=90 G2 Mildly 

decreased



                                                                 60-89 G3a Mildl

y to



                                                                 moderately 45-5

9 G3b



                                                                 Moderately to s

everely



                                                                 30-44 G4 Severl

y decreased



                                                                  15-29 G5 Kidne

y failure



                                                                 <15Reported eGF

R is based



                                                                 on the CKD-EPI 





                                                                 equation that d

oes not use



                                                                 a race



                                                                 coefficientEsti

mated GFR



                                                                 is not as accur

ate as



                                                                 Creatinine Tabitha

cynthia in



                                                                 predicting glom

erular



                                                                 filtration rate

. Estimated



                                                                 GFR is not appl

icable for



                                                                 dialysis patien

ts



 ID - BVSpecimen slightly [id number] 04:11:10





             Test Item    Value        Reference Range Interpretation Comments

 

             MAGNESIUM (BEAKER) (test code = 1.5 mg/dL    1.6-2.6      L        

    



             627)                                                



 ID - BVCBC W/PLT COUNT &amp; AUTO [id number] 04:00:10





             Test Item    Value        Reference Range Interpretation Comments

 

             WHITE BLOOD CELL COUNT (BEAKER) 3.6 K/ L     3.5-10.5              

    



             (test code = 775)                                        

 

             RED BLOOD CELL COUNT (BEAKER) 2.42 M/ L    4.63-6.08    L          

  



             (test code = 761)                                        

 

             HEMOGLOBIN (BEAKER) (test code = 7.6 GM/DL    13.7-17.5    L       

     



             410)                                                

 

             HEMATOCRIT (BEAKER) (test code = 24.0 %       40.1-51.0    L       

     



             411)                                                

 

             MEAN CORPUSCULAR VOLUME (BEAKER) 99 fL        79-92        H       

     



             (test code = 753)                                        

 

             MEAN CORPUSCULAR HEMOGLOBIN 31.4 pg      25.7-32.2                 



             (BEAKER) (test code = 751)                                        

 

             MEAN CORPUSCULAR HEMOGLOBIN CONC 31.7 GM/DL   32.3-36.5    L       

     



             (BEAKER) (test code = 752)                                        

 

             RED CELL DISTRIBUTION WIDTH 18.7 %       11.6-14.4    H            



             (BEAKER) (test code = 412)                                        

 

             PLATELET COUNT (BEAKER) (test code 44 K/CU MM   150-450      L     

       



             = 756)                                              

 

             MEAN PLATELET VOLUME (BEAKER) 12.5 fL      9.4-12.4     H          

  



             (test code = 754)                                        

 

             NUCLEATED RED BLOOD CELLS (BEAKER) 0 /100 WBC   0-0                

       



             (test code = 413)                                        

 

             NEUTROPHILS RELATIVE PERCENT 57 %                                  

 



             (BEAKER) (test code = 429)                                        

 

             LYMPHOCYTES RELATIVE PERCENT 19 %                                  

 



             (BEAKER) (test code = 430)                                        

 

             MONOCYTES RELATIVE PERCENT 18 %                                   



             (BEAKER) (test code = 431)                                        

 

             EOSINOPHILS RELATIVE PERCENT 5 %                                   

 



             (BEAKER) (test code = 432)                                        

 

             BASOPHILS RELATIVE PERCENT 0 %                                    



             (BEAKER) (test code = 437)                                        

 

             NEUTROPHILS ABSOLUTE COUNT 2.02 K/ L    1.78-5.38                 



             (BEAKER) (test code = 670)                                        

 

             LYMPHOCYTES ABSOLUTE COUNT 0.68 K/ L    1.32-3.57    L            



             (BEAKER) (test code = 414)                                        

 

             MONOCYTES ABSOLUTE COUNT (BEAKER) 0.63 K/ L    0.30-0.82           

      



             (test code = 415)                                        

 

             EOSINOPHILS ABSOLUTE COUNT 0.17 K/ L    0.04-0.54                 



             (BEAKER) (test code = 416)                                        

 

             BASOPHILS ABSOLUTE COUNT (BEAKER) 0.00 K/ L    0.01-0.08    L      

      



             (test code = 417)                                        

 

             IMMATURE GRANULOCYTES-RELATIVE 1.70 %       0.00-1.00    H         

   



             PERCENT (BEAKER) (test code =                                      

  



             2801)                                               



[id number] 07:20:31





             Test Item    Value        Reference Range Interpretation Comments

 

             MAGNESIUM (BEAKER) 1.8 mg/dL    1.6-2.6                   Specimen 

slightly



             (test code = 627)                                        hemolyzed



 ID - MARCOCBC W/PLT COUNT &amp; AUTO [id number] 06:57:51





             Test Item    Value        Reference Range Interpretation Comments

 

             WHITE BLOOD CELL COUNT 2.9 K/ L     3.5-10.5     L            



             (BEAKER) (test code =                                        



             775)                                                

 

             RED BLOOD CELL COUNT 2.40 M/ L    4.63-6.08    L            



             (BEAKER) (test code =                                        



             761)                                                

 

             HEMOGLOBIN (BEAKER) 7.7 GM/DL    13.7-17.5    L            



             (test code = 410)                                        

 

             HEMATOCRIT (BEAKER) 24.0 %       40.1-51.0    L            



             (test code = 411)                                        

 

             MEAN CORPUSCULAR VOLUME 100 fL       79-92        H            



             (BEAKER) (test code =                                        



             753)                                                

 

             MEAN CORPUSCULAR 32.1 pg      25.7-32.2                 



             HEMOGLOBIN (BEAKER)                                        



             (test code = 751)                                        

 

             MEAN CORPUSCULAR 32.1 GM/DL   32.3-36.5    L            



             HEMOGLOBIN CONC                                        



             (BEAKER) (test code =                                        



             752)                                                

 

             RED CELL DISTRIBUTION 18.6 %       11.6-14.4    H            



             WIDTH (BEAKER) (test                                        



             code = 412)                                         

 

             PLATELET COUNT (BEAKER) 43 K/CU MM   150-450      L            



             (test code = 756)                                        

 

             MEAN PLATELET VOLUME                                        Unable 

to report due



             (BEAKER) (test code =                                        to abn

ormal Platelet



             754)                                                population



                                                                 distribution.

 

             NUCLEATED RED BLOOD 0 /100 WBC   0-0                       



             CELLS (BEAKER) (test                                        



             code = 413)                                         

 

             NEUTROPHILS RELATIVE 51 %                                   



             PERCENT (BEAKER) (test                                        



             code = 429)                                         

 

             LYMPHOCYTES RELATIVE 23 %                                   



             PERCENT (BEAKER) (test                                        



             code = 430)                                         

 

             MONOCYTES RELATIVE 24 %                                   



             PERCENT (BEAKER) (test                                        



             code = 431)                                         

 

             EOSINOPHILS RELATIVE 1 %                                    



             PERCENT (BEAKER) (test                                        



             code = 432)                                         

 

             BASOPHILS RELATIVE 0 %                                    



             PERCENT (BEAKER) (test                                        



             code = 437)                                         

 

             NEUTROPHILS ABSOLUTE 1.47 K/ L    1.78-5.38    L            



             COUNT (BEAKER) (test                                        



             code = 670)                                         

 

             LYMPHOCYTES ABSOLUTE 0.67 K/ L    1.32-3.57    L            



             COUNT (BEAKER) (test                                        



             code = 414)                                         

 

             MONOCYTES ABSOLUTE 0.71 K/ L    0.30-0.82                 



             COUNT (BEAKER) (test                                        



             code = 415)                                         

 

             EOSINOPHILS ABSOLUTE 0.03 K/ L    0.04-0.54    L            



             COUNT (BEAKER) (test                                        



             code = 416)                                         

 

             BASOPHILS ABSOLUTE 0.00 K/ L    0.01-0.08    L            



             COUNT (BEAKER) (test                                        



             code = 417)                                         

 

             IMMATURE     1.00 %       0.00-1.00                 



             GRANULOCYTES-RELATIVE                                        



             PERCENT (BEAKER) (test                                        



             code = 2801)                                        



SURGICALLY OBTAINED CULTURE + GRAM [id number] 13:25:13





             Test Item    Value        Reference Range Interpretation Comments

 

             CULTURE (BEAKER) (test code No growth                              



             = 1095)                                             

 

             GRAM STAIN RESULT (BEAKER) 4+ WBCs                                



             (test code = 1123)                                        

 

             GRAM STAIN RESULT (BEAKER) No organisms seen                       

    



             (test code = 98948)                                        



HEPATIC FUNCTION [id number] 13:15:37





             Test Item    Value        Reference Range Interpretation Comments

 

             TOTAL PROTEIN (BEAKER) (test code = 5.5 gm/dL    6.0-8.3      L    

        



             770)                                                

 

             ALBUMIN (BEAKER) (test code = 1145) 1.8 g/dL     3.5-5.0      L    

        

 

             BILIRUBIN TOTAL (BEAKER) (test code 3.2 mg/dL    0.2-1.2      H    

        



             = 377)                                              

 

             BILIRUBIN DIRECT (BEAKER) (test 1.9 mg/dL    0.1-0.5      H        

    



             code = 706)                                         

 

             ALKALINE PHOSPHATASE (BEAKER) (test 142 U/L                  

        



             code = 346)                                         

 

             AST (SGOT) (BEAKER) (test code = 66 U/L       5-34         H       

     



             353)                                                

 

             ALT (SGPT) (BEAKER) (test code = 39 U/L       6-55                 

     



             347)                                                



 ID - MARCOSpecimen slightly ictericBLOOD [id number] 11:00:32





             Test Item    Value        Reference Range Interpretation Comments

 

             CULTURE (BEAKER) (test No growth in 5 days                         

  



             code = 1095)                                        



[id number] 07:16:25





             Test Item    Value        Reference Range Interpretation Comments

 

             MAGNESIUM (BEAKER) (test code = 2.1 mg/dL    1.6-2.6               

    



             627)                                                



 ID - BSCBC W/PLT COUNT &amp; AUTO [id number] 06:59:48





             Test Item    Value        Reference Range Interpretation Comments

 

             WHITE BLOOD CELL COUNT 2.8 K/ L     3.5-10.5     L            



             (BEAKER) (test code =                                        



             775)                                                

 

             RED BLOOD CELL COUNT 2.62 M/ L    4.63-6.08    L            



             (BEAKER) (test code =                                        



             761)                                                

 

             HEMOGLOBIN (BEAKER) 8.3 GM/DL    13.7-17.5    L            



             (test code = 410)                                        

 

             HEMATOCRIT (BEAKER) 26.1 %       40.1-51.0    L            



             (test code = 411)                                        

 

             MEAN CORPUSCULAR VOLUME 100 fL       79-92        H            



             (BEAKER) (test code =                                        



             753)                                                

 

             MEAN CORPUSCULAR 31.7 pg      25.7-32.2                 



             HEMOGLOBIN (BEAKER)                                        



             (test code = 751)                                        

 

             MEAN CORPUSCULAR 31.8 GM/DL   32.3-36.5    L            



             HEMOGLOBIN CONC                                        



             (BEAKER) (test code =                                        



             752)                                                

 

             RED CELL DISTRIBUTION 18.6 %       11.6-14.4    H            



             WIDTH (BEAKER) (test                                        



             code = 412)                                         

 

             PLATELET COUNT (BEAKER) 41 K/CU MM   150-450      L            



             (test code = 756)                                        

 

             MEAN PLATELET VOLUME                                        Unable 

to report due



             (BEAKER) (test code =                                        to abn

ormal Platelet



             754)                                                population



                                                                 distribution.

 

             NUCLEATED RED BLOOD 0 /100 WBC   0-0                       



             CELLS (BEAKER) (test                                        



             code = 413)                                         

 

             NEUTROPHILS RELATIVE 58 %                                   



             PERCENT (BEAKER) (test                                        



             code = 429)                                         

 

             LYMPHOCYTES RELATIVE 15 %                                   



             PERCENT (BEAKER) (test                                        



             code = 430)                                         

 

             MONOCYTES RELATIVE 25 %                                   



             PERCENT (BEAKER) (test                                        



             code = 431)                                         

 

             EOSINOPHILS RELATIVE 0 %                                    



             PERCENT (BEAKER) (test                                        



             code = 432)                                         

 

             BASOPHILS RELATIVE 0 %                                    



             PERCENT (BEAKER) (test                                        



             code = 437)                                         

 

             NEUTROPHILS ABSOLUTE 1.65 K/ L    1.78-5.38    L            



             COUNT (BEAKER) (test                                        



             code = 670)                                         

 

             LYMPHOCYTES ABSOLUTE 0.43 K/ L    1.32-3.57    L            



             COUNT (BEAKER) (test                                        



             code = 414)                                         

 

             MONOCYTES ABSOLUTE 0.72 K/ L    0.30-0.82                 



             COUNT (BEAKER) (test                                        



             code = 415)                                         

 

             EOSINOPHILS ABSOLUTE 0.00 K/ L    0.04-0.54    L            



             COUNT (BEAKER) (test                                        



             code = 416)                                         

 

             BASOPHILS ABSOLUTE 0.00 K/ L    0.01-0.08    L            



             COUNT (BEAKER) (test                                        



             code = 417)                                         

 

             IMMATURE     1.40 %       0.00-1.00    H            



             GRANULOCYTES-RELATIVE                                        



             PERCENT (BEAKER) (test                                        



             code = 2801)                                        



ANAEROBIC [id number] 02:13:49





             Test Item    Value        Reference Range Interpretation Comments

 

             CULTURE (BEAKER) (test No anaerobes isolated                       

    



             code = 1095)                                        



U/S, ABDOMINAL, [id number] 18:25:00Reason for exam:-&gt;cirrhosis
************************************************************[facility] 1970 Sex: 
M************************************************************FINAL REPORT 
PATIENT ID: [id number] Abdominal ultrasound dated 2023 Clinical information: 
cirrhosis, eval vessels Comment: Real-time transabdominal ultrasound was 
performed. Liver is atrophic and measures 8.7 cm in length. The echogenicity of 
the liver is increased with irregular margin consistent with cirrhosis. No focal
lesion is noted in the liver. Spleen is enlarged measuring 12.8 cm in length.
Gallbladder is contracted. No gallstone is present. No biliary dilatation is 
seen. Common bile duct measures 5 mm in diameter. Main portal vein measures 11 
mm in diameter. Pancreas is suboptimal visualized. Right kidney measures 12.0 x 
5.5 x 6.1 cm. Left kidney measures 11.8 x 5.7 x 6.1 cm. Echogenicity of both 
kidney is normal. No hydronephrosis or solid mass seen in either kidney. No cyst
is seen in either kidney. Small ascites present in the abdomen. There is small 
left pleural effusion. Abdominal aorta is normal in caliber. The IVC is patent. 
Real-time Color and Spectral doppler ultrasound of the abdomen was performed. 
Main portal vein measures 1.1 cm in diameter. There is hepatopedal flow in the 
main portal vein with velocity 45.8 cm/sec. Right and left portal veins are 
patent. Proper hepatic artery, right hepatic artery, and left hepatic artery are
patent with resistive indices 0.8, 0.7, and 0.75 respectively. IVC, Hepatic 
venous confluence, right HV, middle HV and left HV are patent. Impression: 1. 
Cirrhosis with splenomegaly2. No suspicious hepatic mass.3. Small ascites and 
left pleural effusion.4. Patent hepatic arteries and portal veins. Signed: Bobbi العلي [facility] Verified Date/Time: 2023 18:25:01 Electronically signed by:
BOBBI العلي M.D. on 2023 06:25 PMU/S, DUPLEX, [id number] 18:25:00
Reason for exam:-&gt;cirrhosis, eval vessels
************************************************************[facility] 1970 Sex: 
M************************************************************FINAL REPORT 
PATIENT ID: [id number] Abdominal ultrasound dated 2023 Clinical information: 
cirrhosis, eval vessels Comment: Real-time transabdominal ultrasound was 
performed. Liver is atrophic and measures 8.7 cm in length. The echogenicity of 
the liver is increased with irregular margin consistent with cirrhosis. No focal
lesion is noted in the liver. Spleen is enlarged measuring 12.8 cm in length.
Gallbladder is contracted. No gallstone is present. No biliary dilatation is 
seen. Common bile duct measures 5 mm in diameter. Main portal vein measures 11 
mm in diameter. Pancreas is suboptimal visualized. Right kidney measures 12.0 x 
5.5 x 6.1 cm. Left kidney measures 11.8 x 5.7 x 6.1 cm. Echogenicity of both 
kidney is normal. No hydronephrosis or solid mass seen in either kidney. No cyst
is seen in either kidney. Small ascites present in the abdomen. There is small 
left pleural effusion. Abdominal aorta is normal in caliber. The IVC is patent. 
Real-time Color and Spectral doppler ultrasound of the abdomen was performed. 
Main portal vein measures 1.1 cm in diameter. There is hepatopedal flow in the 
main portal vein with velocity 45.8 cm/sec. Right and left portal veins are 
patent. Proper hepatic artery, right hepatic artery, and left hepatic artery are
patent with resistive indices 0.8, 0.7, and 0.75 respectively. IVC, Hepatic 
venous confluence, right HV, middle HV and left HV are patent. Impression: 1. 
Cirrhosis with splenomegaly2. No suspicious hepatic mass.3. Small ascites and 
left pleural effusion.4. Patent hepatic arteries and portal veins. S[facility]: [facility] Verified Date/Time: 2023 18:25:01 Electronically signed by:
BOBBI العلي M.D. on 2023 06:25 PMALPHA FETOPROTEIN (AFP), TUMOR MARKER
2023 07:42:03





             Test Item    Value        Reference Range Interpretation Comments

 

             ALPHA-FETOPROTEIN (BEAKER) (test code < ng/mL      <10.0           

          



             = 1094)                                             



 ID - ADMINHEPATITIS B SURFACE [id number] 07:41:36





             Test Item    Value        Reference Range Interpretation Comments

 

             HEPATITIS B SURFACE ANTIBODY < mIU/mL     <8.0                     

 



             (BEAKER) (test code = 647)                                        



 ID - ADMINHEPATITIS A ANTIBODY, [id number] 07:41:31





             Test Item    Value        Reference Range Interpretation Comments

 

             HEPATITIS A IGG ANTIBODY (BEAKER) Reactive     Nonreactive  A      

      



             (test code = 2797)                                        



 ID - ADMINHEPATITIS B CORE ANTIBODY, [id number] 07:40:24





             Test Item    Value        Reference Range Interpretation Comments

 

             HEPATITIS B CORE TOTAL ANTIBODY Nonreactive  Nonreactive           

    



             (BEAKER) (test code = 497)                                        



 ID - ADMINHEPATITIS B SURFACE [id number] 07:40:23





             Test Item    Value        Reference Range Interpretation Comments

 

             HEPATITIS B SURFACE ANTIGEN (2) Nonreactive  Nonreactive           

    



             (BEAKER) (test code = 2585)                                        



Specimen is considered negative for HBsAg.HEPATITIS C [id number] 
07:40:23





             Test Item    Value        Reference Range Interpretation Comments

 

             HEPATITIS C ANTIBODY (BEAKER) Nonreactive  Nonreactive             

  



             (test code = 367)                                        



 ID - [id number] 05:45:39





             Test Item    Value        Reference Range Interpretation Comments

 

             MAGNESIUM (BEAKER) (test code = 1.9 mg/dL    1.6-2.6               

    



             627)                                                



 ID - ADMINCBC W/PLT COUNT &amp; AUTO [id number] 05:19:17





             Test Item    Value        Reference Range Interpretation Comments

 

             WHITE BLOOD CELL COUNT (BEAKER) 4.6 K/ L     3.5-10.5              

    



             (test code = 775)                                        

 

             RED BLOOD CELL COUNT (BEAKER) 2.22 M/ L    4.63-6.08    L          

  



             (test code = 761)                                        

 

             HEMOGLOBIN (BEAKER) (test code = 6.9 GM/DL    13.7-17.5    L       

     



             410)                                                

 

             HEMATOCRIT (BEAKER) (test code = 21.7 %       40.1-51.0    L       

     



             411)                                                

 

             MEAN CORPUSCULAR VOLUME (BEAKER) 98 fL        79-92        H       

     



             (test code = 753)                                        

 

             MEAN CORPUSCULAR HEMOGLOBIN 31.1 pg      25.7-32.2                 



             (BEAKER) (test code = 751)                                        

 

             MEAN CORPUSCULAR HEMOGLOBIN CONC 31.8 GM/DL   32.3-36.5    L       

     



             (BEAKER) (test code = 752)                                        

 

             RED CELL DISTRIBUTION WIDTH 18.4 %       11.6-14.4    H            



             (BEAKER) (test code = 412)                                        

 

             PLATELET COUNT (BEAKER) (test code 44 K/CU MM   150-450      L     

       



             = 756)                                              

 

             MEAN PLATELET VOLUME (BEAKER) 12.4 fL      9.4-12.4                

  



             (test code = 754)                                        

 

             NUCLEATED RED BLOOD CELLS (BEAKER) 0 /100 WBC   0-0                

       



             (test code = 413)                                        

 

             NEUTROPHILS RELATIVE PERCENT 70 %                                  

 



             (BEAKER) (test code = 429)                                        

 

             LYMPHOCYTES RELATIVE PERCENT 10 %                                  

 



             (BEAKER) (test code = 430)                                        

 

             MONOCYTES RELATIVE PERCENT 19 %                                   



             (BEAKER) (test code = 431)                                        

 

             EOSINOPHILS RELATIVE PERCENT 0 %                                   

 



             (BEAKER) (test code = 432)                                        

 

             BASOPHILS RELATIVE PERCENT 0 %                                    



             (BEAKER) (test code = 437)                                        

 

             NEUTROPHILS ABSOLUTE COUNT 3.17 K/ L    1.78-5.38                 



             (BEAKER) (test code = 670)                                        

 

             LYMPHOCYTES ABSOLUTE COUNT 0.45 K/ L    1.32-3.57    L            



             (BEAKER) (test code = 414)                                        

 

             MONOCYTES ABSOLUTE COUNT (BEAKER) 0.84 K/ L    0.30-0.82    H      

      



             (test code = 415)                                        

 

             EOSINOPHILS ABSOLUTE COUNT 0.00 K/ L    0.04-0.54    L            



             (BEAKER) (test code = 416)                                        

 

             BASOPHILS ABSOLUTE COUNT (BEAKER) 0.01 K/ L    0.01-0.08           

      



             (test code = 417)                                        

 

             IMMATURE GRANULOCYTES-RELATIVE 1.80 %       0.00-1.00    H         

   



             PERCENT (BEAKER) (test code =                                      

  



             2801)                                               



VANCOMYCIN LEVEL, [id number] 14:12:35





             Test Item    Value        Reference Range Interpretation Comments

 

             VANCOMYCIN TROUGH (BEAKER) (test 1.4 ug/mL    10.0-20.0    L       

     



             code = 522)                                         



 ID - ADMINBODY FLUID CULTURE + GRAM [id number] 13:02:52





             Test Item    Value        Reference    Interpretation Comments



                                       Range                     

 

             CULTURE (BEAKER) BETA HEMOLYTIC              A            1+ Beta-h

emolytic



             (test code = STREPTOCOCCUS GROUP                           streptoc

occus group



             1095)        B                                      B, by serologic

al



                                                                 grouping

 

             Ceftriaxone (test              See_Comment  S             [Automate

d message]



             code = 52)                                          The system [employer]



                                                                 generated this



                                                                 result transmit

michelle



                                                                 reference range

:



                                                                 Susceptible 0-0

.5 ,



                                                                 No Interpretati

ons



                                                                 Established <0 

or



                                                                 >.5. The refere

nce



                                                                 range was not u

sed



                                                                 to interpret th

is



                                                                 result as



                                                                 normal/abnormal

.

 

             Penicillin G              See_Comment  S             [Automated mes

elif]



             (test code = 3)                                        The system [employer]

[facility]



                                                                 generated this



                                                                 result transmit

michelle



                                                                 reference range

:



                                                                 Susceptible 0-0

.12 ,



                                                                 No Interpretati

ons



                                                                 Established <0 

or



                                                                 >.. The referen

ce



                                                                 range was not u

sed



                                                                 to interpret th

is



                                                                 result as



                                                                 normal/abnormal

.

 

             Vancomycin (test              See_Comment  S             [Automated

 message]



             code = 13)                                          The system [employer]



                                                                 generated this



                                                                 result transmit

michelle



                                                                 reference range

:



                                                                 Susceptible 0-1

 , No



                                                                 Interpretations



                                                                 Established <0 

or >1



                                                                 . The reference



                                                                 range was not u

sed



                                                                 to interpret th

is



                                                                 result as



                                                                 normal/abnormal

.

 

             GRAM STAIN RESULT 4+ WBCs                                



             (BEAKER) (test                                        



             code = 1123)                                        

 

             GRAM STAIN RESULT <1+ gram positive                           



             (BEAKER) (test cocci in pairs                           



             code = 940471)                                        



BASIC METABOLIC [id number] 05:21:50





             Test Item    Value        Reference Range Interpretation Comments

 

             SODIUM (BEAKER) 133 meq/L    136-145      L            



             (test code = 381)                                        

 

             POTASSIUM    4.9 meq/L    3.5-5.1                   Specimen slight

ly



             (BEAKER) (test                                        hemolyzed



             code = 379)                                         

 

             CHLORIDE (BEAKER) 106 meq/L                        



             (test code = 382)                                        

 

             CO2 (BEAKER) 18 meq/L     22-29        L            



             (test code = 355)                                        

 

             BLOOD UREA   13 mg/dL     7-21                      



             NITROGEN (BEAKER)                                        



             (test code = 354)                                        

 

             CREATININE   0.55 mg/dL   0.57-1.25    L            Specimen slight

ly



             (BEAKER) (test                                        hemolyzed



             code = 358)                                         

 

             GLUCOSE RANDOM 138 mg/dL           H            



             (BEAKER) (test                                        



             code = 652)                                         

 

             CALCIUM (BEAKER) 7.0 mg/dL    8.4-10.2     L            



             (test code = 697)                                        

 

             EGFR (BEAKER) 115                                     Interpretatio

n of eGFR



             (test code = mL/min/1.73                            values Stage De

scription



             1092)        sq m                                   Result G1 Shaunna

l or high



                                                                 >=90 G2 Mildly 

decreased



                                                                 60-89 G3a  Mild

ly to



                                                                 moderately 45-5

9 G3b



                                                                 Moderately to s

everely



                                                                 30-44 G4 Severl

y decreased



                                                                 15-29 G5 Kidney

 failure



                                                                 <15Reported eGF

R is based



                                                                 on the CKD-EPI 





                                                                 equation that d

oes not use



                                                                 a race



                                                                 coefficientEsti

mated GFR



                                                                 is not as accur

ate as



                                                                 Creatinine Tabitha marie in



                                                                 predicting glom

erular



                                                                 filtration rate

. Estimated



                                                                 GFR is not appl

icable for



                                                                 dialysis patien

ts



 ID - MMSpecimen moderately [id number] 05:19:56





             Test Item    Value        Reference Range Interpretation Comments

 

             MAGNESIUM (BEAKER) 1.8 mg/dL    1.6-2.6                   Specimen 

slightly



             (test code = 627)                                        hemolyzed



 ID - [id number] 05:19:56





             Test Item    Value        Reference Range Interpretation Comments

 

             PHOSPHORUS (BEAKER) 4.6 mg/dL    2.3-4.7                   Specimen

 slightly



             (test code = 604)                                        hemolyzed



 ID - MMCBC W/PLT COUNT &amp; AUTO [id number] 05:04:59





             Test Item    Value        Reference Range Interpretation Comments

 

             WHITE BLOOD CELL COUNT (BEAKER) 3.7 K/ L     3.5-10.5              

    



             (test code = 775)                                        

 

             RED BLOOD CELL COUNT (BEAKER) 2.66 M/ L    4.63-6.08    L          

  



             (test code = 761)                                        

 

             HEMOGLOBIN (BEAKER) (test code = 8.4 GM/DL    13.7-17.5    L       

     



             410)                                                

 

             HEMATOCRIT (BEAKER) (test code = 26.1 %       40.1-51.0    L       

     



             411)                                                

 

             MEAN CORPUSCULAR VOLUME (BEAKER) 98 fL        79-92        H       

     



             (test code = 753)                                        

 

             MEAN CORPUSCULAR HEMOGLOBIN 31.6 pg      25.7-32.2                 



             (BEAKER) (test code = 751)                                        

 

             MEAN CORPUSCULAR HEMOGLOBIN CONC 32.2 GM/DL   32.3-36.5    L       

     



             (BEAKER) (test code = 752)                                        

 

             RED CELL DISTRIBUTION WIDTH 18.3 %       11.6-14.4    H            



             (BEAKER) (test code = 412)                                        

 

             PLATELET COUNT (BEAKER) (test code 56 K/CU MM   150-450      L     

       



             = 756)                                              

 

             MEAN PLATELET VOLUME (BEAKER) 11.2 fL      9.4-12.4                

  



             (test code = 754)                                        

 

             NUCLEATED RED BLOOD CELLS (BEAKER) 0 /100 WBC   0-0                

       



             (test code = 413)                                        

 

             NEUTROPHILS RELATIVE PERCENT 79 %                                  

 



             (BEAKER) (test code = 429)                                        

 

             LYMPHOCYTES RELATIVE PERCENT 14 %                                  

 



             (BEAKER) (test code = 430)                                        

 

             MONOCYTES RELATIVE PERCENT 6 %                                    



             (BEAKER) (test code = 431)                                        

 

             EOSINOPHILS RELATIVE PERCENT 0 %                                   

 



             (BEAKER) (test code = 432)                                        

 

             BASOPHILS RELATIVE PERCENT 0 %                                    



             (BEAKER) (test code = 437)                                        

 

             NEUTROPHILS ABSOLUTE COUNT 2.89 K/ L    1.78-5.38                 



             (BEAKER) (test code = 670)                                        

 

             LYMPHOCYTES ABSOLUTE COUNT 0.52 K/ L    1.32-3.57    L            



             (BEAKER) (test code = 414)                                        

 

             MONOCYTES ABSOLUTE COUNT (BEAKER) 0.22 K/ L    0.30-0.82    L      

      



             (test code = 415)                                        

 

             EOSINOPHILS ABSOLUTE COUNT 0.00 K/ L    0.04-0.54    L            



             (BEAKER) (test code = 416)                                        

 

             BASOPHILS ABSOLUTE COUNT (BEAKER) 0.00 K/ L    0.01-0.08    L      

      



             (test code = 417)                                        

 

             IMMATURE GRANULOCYTES-RELATIVE 1.40 %       0.00-1.00    H         

   



             PERCENT (BEAKER) (test code =                                      

  



             2801)                                               



MRSA [id number] 12:59:38





             Test Item    Value        Reference Range Interpretation Comments

 

             CULTURE (BEAKER) (test code No MRSA isolated                       

    



             = 1095)                                             



(CELLAVISION MANUAL DIFF)2023 09:01:51





             Test Item    Value        Reference Range Interpretation Comments

 

             NEUTROPHILS - REL 76 %                                   



             (CELLAVISION)(BEAKER) (test code                                   

     



             = 2816)                                             

 

             LYMPHOCYTES - REL 6 %                                    



             (CELLAVISION)(BEAKER) (test code                                   

     



             = 2817)                                             

 

             MONOCYTES - REL 6 %                                    



             (CELLAVISION)(BEAKER) (test code                                   

     



             = 2818)                                             

 

             EOSINOPHILS - REL 3 %                                    



             (CELLAVISION)(BEAKER) (test code                                   

     



             = 2819)                                             

 

             BASOPHILS - REL 1 %                                    



             (CELLAVISION)(BEAKER) (test code                                   

     



             = 2820)                                             

 

             METAMYELOCYTES - REL 1 %          0-0          H            



             (CELLAVISION)(BEAKER) (test code                                   

     



             = 2821)                                             

 

             MYELOCYTES - REL 1 %          0-0          H            



             (CELLAVISION)(BEAKER) (test code                                   

     



             = 2822)                                             

 

             BANDS - REL (CELLAVISION)(BEAKER) 4 %          0-10                

      



             (test code = 2826)                                        

 

             ATYPICAL LYMPHOCYTES - REL 1 %          0-0          H            



             (CELLAVISION)(BEAKER) (test code                                   

     



             = 2829)                                             

 

             NEUTROPHILS - ABS 4.33 K/ul    1.78-5.38                 



             (CELLAVISION)(BEAKER) (test code                                   

     



             = 2830)                                             

 

             LYMPHOCYTES - ABS 0.34 K/ul    1.32-3.57    L            



             (CELLAVISION)(BEAKER) (test code                                   

     



             = 2831)                                             

 

             MONOCYTES - ABS 0.34 K/uL    0.30-0.82                 



             (CELLAVISION)(BEAKER) (test code                                   

     



             = 2832)                                             

 

             EOSINOPHILS - ABS 0.17 K/uL    0.04-0.54                 



             (CELLAVISION)(BEAKER) (test code                                   

     



             = 2834)                                             

 

             BASOPHILS - ABS 0.06 K/uL    0.01-0.08                 



             (CELLAVISION)(BEAKER) (test code                                   

     



             = 2835)                                             

 

             METAMYELOCYTES - ABS 0.06 K/uL    0.00-0.00    H            



             (CELLAVISION)(BEAKER) (test code                                   

     



             = 2836)                                             

 

             MYELOCYTES-ABS 0.06 K/uL    0.00-0.00    H            



             (CELLAVISION)(BEAKER) (test code                                   

     



             = 2837)                                             

 

             BANDS - ABS (CELLAVISION)(BEAKER) 0.23 K/uL    0.00-0.80           

      



             (test code = 2840)                                        

 

             ATYPICAL LYMPHOCYTES - ABS 0.06 K/uL    0.00-0.00    H            



             (CELLAVISION)(BEAKER) (test code                                   

     



             = 9628)                                             

 

             TOTAL COUNTED (BEAKER) (test code 100                              

      



             = 1351)                                             

 

             WBC MORPHOLOGY (BEAKER) (test Normal                               

  



             code = 487)                                         

 

             PLT MORPHOLOGY (BEAKER) (test Normal                               

  



             code = 486)                                         

 

             POLYCHROMATOPHILLIC RBCS(BEAKER) 1+ few                            

     



             (test code = 478)                                        

 

             ANISOCYTOSIS (BEAKER) (test code 2+ moderate                       

     



             = 961)                                              

 

             MACROCYTES (BEAKER) (test code = 1+ few                            

     



             964)                                                

 

             POIKILOCYTES (BEAKER) (test code 2+ moderate                       

     



             = 966)                                              

 

             SCHISTOCYTES (BEAKER) (test code 1+ few                            

     



             = 765)                                              

 

             OVALOCYTES (BEAKER) (test code = 1+ few                            

     



             477)                                                

 

             ARTIFACT (CELLAVISION)(BEAKER) Present                             

   



             (test code = 0902)                                        

 

             PLATELET CONCENTRATION Decreased                              



             (CELLAVISION)(BEAKER) (test code                                   

     



             = 4508)                                             



 ID - [id number] ID - Katie OverholtUser comments: Slide comments:CBC 
W/PLT COUNT &amp; AUTO [id number] 09:01:48





             Test Item    Value        Reference Range Interpretation Comments

 

             WHITE BLOOD CELL COUNT (BEAKER) 5.7 K/ L     3.5-10.5              

    



             (test code = 775)                                        

 

             RED BLOOD CELL COUNT (BEAKER) 2.53 M/ L    4.63-6.08    L          

  



             (test code = 761)                                        

 

             HEMOGLOBIN (BEAKER) (test code = 7.9 GM/DL    13.7-17.5    L       

     



             410)                                                

 

             HEMATOCRIT (BEAKER) (test code = 24.5 %       40.1-51.0    L       

     



             411)                                                

 

             MEAN CORPUSCULAR VOLUME (BEAKER) 97 fL        79-92        H       

     



             (test code = 753)                                        

 

             MEAN CORPUSCULAR HEMOGLOBIN 31.2 pg      25.7-32.2                 



             (BEAKER) (test code = 751)                                        

 

             MEAN CORPUSCULAR HEMOGLOBIN CONC 32.2 GM/DL   32.3-36.5    L       

     



             (BEAKER) (test code = 752)                                        

 

             RED CELL DISTRIBUTION WIDTH 19.5 %       11.6-14.4    H            



             (BEAKER) (test code = 412)                                        

 

             PLATELET COUNT (BEAKER) (test code 52 K/CU MM   150-450      L     

       



             = 756)                                              

 

             MEAN PLATELET VOLUME (BEAKER) 10.5 fL      9.4-12.4                

  



             (test code = 754)                                        

 

             NUCLEATED RED BLOOD CELLS (BEAKER) 0 /100 WBC   0-0                

       



             (test code = 413)                                        



BASIC METABOLIC [id number] 05:30:22





             Test Item    Value        Reference Range Interpretation Comments

 

             SODIUM (BEAKER) 133 meq/L    136-145      L            



             (test code = 381)                                        

 

             POTASSIUM    3.9 meq/L    3.5-5.1                   



             (BEAKER) (test                                        



             code = 379)                                         

 

             CHLORIDE (BEAKER) 107 meq/L                        



             (test code = 382)                                        

 

             CO2 (BEAKER) 22 meq/L     22-29                     



             (test code = 355)                                        

 

             BLOOD UREA   10 mg/dL     7-21                      



             NITROGEN (BEAKER)                                        



             (test code = 354)                                        

 

             CREATININE   0.59 mg/dL   0.57-1.25                 



             (BEAKER) (test                                        



             code = 358)                                         

 

             GLUCOSE RANDOM 85 mg/dL                         



             (BEAKER) (test                                        



             code = 652)                                         

 

             CALCIUM (BEAKER) 7.1 mg/dL    8.4-10.2     L            



             (test code = 697)                                        

 

             EGFR (BEAKER) 113                                     Interpretatio

n of eGFR



             (test code = mL/min/1.73                            values Stage De

scription



             1092)        sq m                                   Result  G1 Norm

al or high



                                                                 >=90 G2 Mildly 

decreased



                                                                 60-89 G3a Mildl

y to



                                                                 moderately 45-5

9 G3b



                                                                 Moderately to s

everely



                                                                 30-44 G4 Severl

y decreased



                                                                 15-29 G5 Kidney

 failure



                                                                 <15Reported eGF

R is based



                                                                 on the CKD-EPI 





                                                                 equation that d

oes not use



                                                                 a race



                                                                 coefficientEsti

mated GFR



                                                                 is not as accur

ate as



                                                                 Creatinine Tabitha

cynthia in



                                                                 predicting glom

erular



                                                                 filtration rate

. Estimated



                                                                 GFR is not appl

icable for



                                                                 dialysis patien

ts



 ID - WILLIAM JACOBpecimen moderately ictericHEPATIC FUNCTION [id number]
05:30:15





             Test Item    Value        Reference Range Interpretation Comments

 

             TOTAL PROTEIN (BEAKER) (test code = 5.0 gm/dL    6.0-8.3      L    

        



             770)                                                

 

             ALBUMIN (BEAKER) (test code = 1145) 1.7 g/dL     3.5-5.0      L    

        

 

             BILIRUBIN TOTAL (BEAKER) (test code 4.9 mg/dL    0.2-1.2      H    

        



             = 377)                                              

 

             BILIRUBIN DIRECT (BEAKER) (test 2.1 mg/dL    0.1-0.5      H        

    



             code = 706)                                         

 

             ALKALINE PHOSPHATASE (BEAKER) (test 91 U/L                   

        



             code = 346)                                         

 

             AST (SGOT) (BEAKER) (test code = 48 U/L       5-34         H       

     



             353)                                                

 

             ALT (SGPT) (BEAKER) (test code = 20 U/L       6-55                 

     



             347)                                                



 ID - WILLIAM WSpecimen moderately [id number] 05:29:05





             Test Item    Value        Reference Range Interpretation Comments

 

             PHOSPHORUS (BEAKER) (test code = 2.2 mg/dL    2.3-4.7      L       

     



             604)                                                



 ID - WILLIAM [id number] 05:29:04





             Test Item    Value        Reference Range Interpretation Comments

 

             MAGNESIUM (BEAKER) (test code = 1.8 mg/dL    1.6-2.6               

    



             627)                                                



 ID - WILLIAM WPROTHROMBIN [id number] 05:05:58





             Test Item    Value        Reference Range Interpretation Comments

 

             PROTIME (BEAKER) (test code = 27.5 seconds 11.9-14.2    H          

  



             759)                                                

 

             INR (BEAKER) (test code = 370) 2.75         <=5.90                 

   



RECOMMENDED COUMADIN/WARFARIN INR THERAPY RANGESSTANDARD DOSE: 2.0 - 3.0 
Includes: PROPHYLAXIS for venous thrombosis, systemic embolization; TREATMENT 
for venous thrombosis and/or pulmonary embolus.HIGH RISK: Target INR is 2.5-3.5 
for patients with mechanical heart valves.POCT-GLUCOSE [id number] 18:49:26





             Test Item    Value        Reference Range Interpretation Comments

 

             POC-GLUCOSE METER 158 mg/dL           H            : TESTED A

T [facility] [address]



             (BEAKER) (test code =                                        [place],



             [address]38)                                               [id number]:



                                                                 /Techni

melanie ID



                                                                 = [id number] for Ch

ryan,



                                                                 Cunthia



CALCIUM, [id number] 16:00:32





             Test Item    Value        Reference Range Interpretation Comments

 

             CALCIUM IONIZED (BEAKER) (test 1.07 mmol/L  1.12-1.27    L         

   



             code = 698)                                         

 

             PH, BLOOD (BEAKER) (test code = 7.47                               

    



             1810)                                               



BASIC METABOLIC [id number] 14:08:29





             Test Item    Value        Reference Range Interpretation Comments

 

             SODIUM (BEAKER) 134 meq/L    136-145      L            



             (test code = 381)                                        

 

             POTASSIUM    3.4 meq/L    3.5-5.1      L            



             (BEAKER) (test                                        



             code = 379)                                         

 

             CHLORIDE (BEAKER) 113 meq/L           H            



             (test code = 382)                                        

 

             CO2 (BEAKER) 18 meq/L     22-29        L            



             (test code = 355)                                        

 

             BLOOD UREA   8 mg/dL      7-21                      



             NITROGEN (BEAKER)                                        



             (test code = 354)                                        

 

             CREATININE   0.47 mg/dL   0.57-1.25    L            



             (BEAKER) (test                                        



             code = 358)                                         

 

             GLUCOSE RANDOM 95 mg/dL                         



             (BEAKER) (test                                        



             code = 652)                                         

 

             CALCIUM (BEAKER) 6.0 mg/dL    8.4-10.2     LL           



             (test code = 697)                                        

 

             EGFR (BEAKER) 119                                     Interpretatio

n of eGFR



             (test code = mL/min/1.73                            values Stage De

scription



             1092)        sq m                                   Result G1 Shaunna

l or high



                                                                 >=90  G2 Mildly

 decreased



                                                                 60-89 G3a Mildl

y to



                                                                 moderately 45-5

9 G3b



                                                                 Moderately to s

everely



                                                                 30-44 G4 Severl

y decreased



                                                                 15-29 G5 Kidney

 failure



                                                                 <15Reported eGF

R is based



                                                                 on the CKD-EPI 





                                                                 equation that d

oes not use



                                                                 a race



                                                                 coefficientEsti

mated GFR



                                                                 is not as accur

ate as



                                                                 Creatinine Tabitha

cynthia in



                                                                 predicting glom

erular



                                                                 filtration rate

. Estimated



                                                                 GFR is not appl

icable for



                                                                 dialysis patien

ts



 ID - MMSpecimen moderately icteric(CELLAVISION MANUAL DIFF)[id number] 
14:04:53





             Test Item    Value        Reference Range Interpretation Comments

 

             NEUTROPHILS - REL 89 %                                   



             (CELLAVISION)(BEAKER) (test code                                   

     



             = 2816)                                             

 

             LYMPHOCYTES - REL 2 %                                    



             (CELLAVISION)(BEAKER) (test code                                   

     



             = 2817)                                             

 

             MONOCYTES - REL 3 %                                    



             (CELLAVISION)(BEAKER) (test code                                   

     



             = 2818)                                             

 

             BASOPHILS - REL 1 %                                    



             (CELLAVISION)(BEAKER) (test code                                   

     



             = 2820)                                             

 

             METAMYELOCYTES - REL 1 %          0-0          H            



             (CELLAVISION)(BEAKER) (test code                                   

     



             = 2821)                                             

 

             BANDS - REL (CELLAVISION)(BEAKER) 4 %          0-10                

      



             (test code = 2826)                                        

 

             NEUTROPHILS - ABS 4.63 K/ul    1.78-5.38                 



             (CELLAVISION)(BEAKER) (test code                                   

     



             = 2830)                                             

 

             LYMPHOCYTES - ABS 0.10 K/ul    1.32-3.57    L            



             (CELLAVISION)(BEAKER) (test code                                   

     



             = 2831)                                             

 

             MONOCYTES - ABS 0.16 K/uL    0.30-0.82    L            



             (CELLAVISION)(BEAKER) (test code                                   

     



             = 2832)                                             

 

             BASOPHILS - ABS 0.05 K/uL    0.01-0.08                 



             (CELLAVISION)(BEAKER) (test code                                   

     



             = 2835)                                             

 

             METAMYELOCYTES - ABS 0.05 K/uL    0.00-0.00    H            



             (CELLAVISION)(BEAKER) (test code                                   

     



             = 2836)                                             

 

             BANDS - ABS (CELLAVISION)(BEAKER) 0.21 K/uL    0.00-0.80           

      



             (test code = 2840)                                        

 

             TOTAL COUNTED (BEAKER) (test code 100                              

      



             = 1351)                                             

 

             WBC MORPHOLOGY (BEAKER) (test Normal                               

  



             code = 487)                                         

 

             PLT MORPHOLOGY (BEAKER) (test Normal                               

  



             code = 486)                                         

 

             POLYCHROMATOPHILLIC RBCS(BEAKER) 1+ few                            

     



             (test code = 478)                                        

 

             ANISOCYTOSIS (BEAKER) (test code 2+ moderate                       

     



             = 961)                                              

 

             MACROCYTES (BEAKER) (test code = 2+ moderate                       

     



             964)                                                

 

             POIKILOCYTES (BEAKER) (test code 2+ moderate                       

     



             = 966)                                              

 

             ELLIPTOCYTES (BEAKER) (test code 1+ few                            

     



             = 962)                                              

 

             OVALOCYTES (BEAKER) (test code = 2+ moderate                       

     



             477)                                                

 

             ARTIFACT (CELLAVISION)(BEAKER) Present                             

   



             (test code = 3432)                                        

 

             HELMET CELLS 1+ few                                 



             (CELLAVISION)(BEAKER) (test code                                   

     



             = 3434)                                             

 

             PLATELET CONCENTRATION Decreased                              



             (CELLAVISION)(BEAKER) (test code                                   

     



             = 3438)                                             



 ID - [id number] ID - Joellen Barragan comments: Slide comments:
CBC W/PLT COUNT &amp; AUTO [id number] 14:04:52





             Test Item    Value        Reference Range Interpretation Comments

 

             WHITE BLOOD CELL COUNT 5.2 K/ L     3.5-10.5                  



             (BEAKER) (test code =                                        



             775)                                                

 

             RED BLOOD CELL COUNT 2.47 M/ L    4.63-6.08    L            



             (BEAKER) (test code =                                        



             761)                                                

 

             HEMOGLOBIN (BEAKER) 7.6 GM/DL    13.7-17.5    L            



             (test code = 410)                                        

 

             HEMATOCRIT (BEAKER) 23.9 %       40.1-51.0    L            



             (test code = 411)                                        

 

             MEAN CORPUSCULAR 97 fL        79-92        H            



             VOLUME (BEAKER) (test                                        



             code = 753)                                         

 

             MEAN CORPUSCULAR 30.8 pg      25.7-32.2                 



             HEMOGLOBIN (BEAKER)                                        



             (test code = 751)                                        

 

             MEAN CORPUSCULAR 31.8 GM/DL   32.3-36.5    L            



             HEMOGLOBIN CONC                                        



             (BEAKER) (test code =                                        



             752)                                                

 

             RED CELL DISTRIBUTION 20.2 %       11.6-14.4    H            



             WIDTH (BEAKER) (test                                        



             code = 412)                                         

 

             PLATELET COUNT 56 K/CU MM   150-450      L            Discordant fr

om



             (BEAKER) (test code =                                        previo

us results.



             756)                                                Clinical correl

ation



                                                                 suggested.

 

             MEAN PLATELET VOLUME 11.5 fL      9.4-12.4                  



             (BEAKER) (test code =                                        



             754)                                                

 

             NUCLEATED RED BLOOD 0 /100 WBC   0-0                       



             CELLS (BEAKER) (test                                        



             code = 413)                                         



[id number] 14:04:39





             Test Item    Value        Reference Range Interpretation Comments

 

             PHOSPHORUS (BEAKER) (test code = 2.3 mg/dL    2.3-4.7              

     



             604)                                                



 ID - [id number] 14:04:38





             Test Item    Value        Reference Range Interpretation Comments

 

             MAGNESIUM (BEAKER) (test code = 1.7 mg/dL    1.6-2.6               

    



             627)                                                



 ID - [id number] 13:42:33





             Test Item    Value        Reference Range Interpretation Comments

 

             FIBRINOGEN LEVEL (BEAKER) (test 180 mg/dl    225-434      L        

    



             code = 658)                                         



VANCOMYCIN LEVEL, [id number] 12:41:03





             Test Item    Value        Reference Range Interpretation Comments

 

             VANCOMYCIN TROUGH (BEAKER) (test 7.3 ug/mL    10.0-20.0    L       

     



             code = 522)                                         



 ID - NOLVIA BPOCT-GLUCOSE [id number] 12:22:01





             Test Item    Value        Reference Range Interpretation Comments

 

             POC-GLUCOSE METER 130 mg/dL           H            : TESTED A

T [facility] [address]



             (BEAKER) (test code                                        [facility],



             = 1538)                                             [id number]:



                                                                 /Techni

melanie ID =



                                                                 [id number] for Lorelei miranda



                                                                 (contract), Nickie

tamara



CREATINE KINASE (CK)2023 10:09:48





             Test Item    Value        Reference Range Interpretation Comments

 

             CREATINE KINASE TOTAL (BEAKER) (test 260 U/L             H   

         



             code = 380)                                         



 ID - MMLACTIC ACID, [id number] 10:04:39





             Test Item    Value        Reference Range Interpretation Comments

 

             LACTATE BLOOD VENOUS (2) (BEAKER) 1.84 mmol/L  0.50-2.00           

      



             (test code = 2872)                                        



 ID - MMSpecimen slightly ictericHEPATIC FUNCTION [id number] 
09:54:16





             Test Item    Value        Reference Range Interpretation Comments

 

             TOTAL PROTEIN (BEAKER) (test code = 4.9 gm/dL    6.0-8.3      L    

        



             770)                                                

 

             ALBUMIN (BEAKER) (test code = 1145) 1.6 g/dL     3.5-5.0      L    

        

 

             BILIRUBIN TOTAL (BEAKER) (test code 4.1 mg/dL    0.2-1.2      H    

        



             = 377)                                              

 

             BILIRUBIN DIRECT (BEAKER) (test 1.8 mg/dL    0.1-0.5      H        

    



             code = 706)                                         

 

             ALKALINE PHOSPHATASE (BEAKER) (test 67 U/L                   

        



             code = 346)                                         

 

             AST (SGOT) (BEAKER) (test code = 59 U/L       5-34         H       

     



             353)                                                

 

             ALT (SGPT) (BEAKER) (test code = 19 U/L       6-55                 

     



             347)                                                



 ID - MMSpecimen moderately ictericPOCT-GLUCOSE [id number] 04:18:34





             Test Item    Value        Reference Range Interpretation Comments

 

             POC-GLUCOSE METER 97 mg/dL                         : TESTED A

T [facility] [address]



             (BEAKER) (test code =                                        [place],



             1538)                                               [id number]:



                                                                 /Techni

melanie ID =



                                                                 [id number] for Crista

, Julienne



THROMBOELASTOGRAPH (TEG)2023 04:15:53





             Test Item    Value        Reference Range Interpretation Comments

 

             TEG ACTIVATED CLOTTING TIME 3.6 minutes  4.0-7.0      L            



             (BEAKER) (test code = 1407)                                        

 

             TEG FIBRINOGEN ACTIVITY (BEAKER) 60.7 degrees 61.0-73.0    L       

     



             (test code = 1408)                                        

 

             TEG PLT. AGGREGATION (BEAKER) 47.7 MM      55.0-65.0    L          

  



             (test code = 1409)                                        

 

             TEG FIBRINOLYSIS (BEAKER) (test 0.0 %        0.0-5.0               

    



             code = 1410)                                        

 

             TGH ACTIVATED CLOTTING TIME 3.7 minutes  4.0-7.0      L            



             (BEAKER) (test code = 1411)                                        

 

             TGH FIBRINOGEN ACTIVITY (BEAKER) 62.1 degrees 61.0-73.0            

     



             (test code = 1412)                                        

 

             TGH PLT. AGGREGATION (BEAKER) 43.9 MM      55.0-65.0    L          

  



             (test code = 1413)                                        

 

             TGH FIBRINOLYSIS (BEAKER) (test 0.0 %        0.0-5.0               

    



             code = 1414)                                        



[id number] 04:08:22





             Test Item    Value        Reference Range Interpretation Comments

 

             PHOSPHORUS (BEAKER) (test code = 1.5 mg/dL    2.3-4.7      LL      

     



             604)                                                



 ID - ADMINBASIC METABOLIC [id number] 04:07:31





             Test Item    Value        Reference Range Interpretation Comments

 

             SODIUM (BEAKER) 132 meq/L    136-145      L            



             (test code = 381)                                        

 

             POTASSIUM    3.3 meq/L    3.5-5.1      L            



             (BEAKER) (test                                        



             code = 379)                                         

 

             CHLORIDE (BEAKER) 107 meq/L                        



             (test code = 382)                                        

 

             CO2 (BEAKER) 20 meq/L     22-29        L            



             (test code = 355)                                        

 

             BLOOD UREA   10 mg/dL     7-21                      



             NITROGEN (BEAKER)                                        



             (test code = 354)                                        

 

             CREATININE   0.56 mg/dL   0.57-1.25    L            



             (BEAKER) (test                                        



             code = 358)                                         

 

             GLUCOSE RANDOM 102 mg/dL                        



             (BEAKER) (test                                        



             code = 652)                                         

 

             CALCIUM (BEAKER) 7.1 mg/dL    8.4-10.2     L            



             (test code = 697)                                        

 

             EGFR (BEAKER) 114                                     Interpretatio

n of eGFR



             (test code = mL/min/1.73                            values Stage De

scription



             1092)        sq m                                   Result G1 Shaunna

l or high



                                                                 >=90 G2 Mildly 

decreased



                                                                 60-89 G3a Mildl

y to



                                                                 moderately 45-5

9 G3b



                                                                 Moderately to s

everely



                                                                 30-44  G4 Sever

ly



                                                                 decreased 15-29

 G5 Kidney



                                                                 failure <15Repo

rted eGFR



                                                                 is based on the

 CKD-EPI



                                                                  equation t

hat does



                                                                 not use a race



                                                                 coefficientEsti

mated GFR



                                                                 is not as accur

ate as



                                                                 Creatinine Tabitha

cynthia in



                                                                 predicting glom

erular



                                                                 filtration rate

. Estimated



                                                                 GFR is not appl

icable for



                                                                 dialysis patien

ts



 ID - ADMINSpecimen moderately [id number] 04:01:34





             Test Item    Value        Reference Range Interpretation Comments

 

             MAGNESIUM (BEAKER) (test code = 1.7 mg/dL    1.6-2.6               

    



             627)                                                



 ID - [id number] 03:01:52





             Test Item    Value        Reference Range Interpretation Comments

 

             PROTIME (BEAKER) (test code = 32.4 seconds 11.9-14.2    H          

  



             759)                                                

 

             INR (BEAKER) (test code = 370) 3.41         <=5.90                 

   

 

             PARTIAL THROMBOPLASTIN TIME 47.6 seconds 22.5-36.0    H            



             (BEAKER) (test code = 760)                                        



RECOMMENDED COUMADIN/WARFARIN INR THERAPY RANGESSTANDARD DOSE: 2.0 - 3.0 
Includes: PROPHYLAXIS for venous thrombosis, systemic embolization; TREATMENT 
for venous thrombosis and/or pulmonary embolus.HIGH RISK: Target INR is 2.5-3.5 
for patients with mechanical heart valves.[id number] 03:01:30





             Test Item    Value        Reference Range Interpretation Comments

 

             FIBRINOGEN LEVEL (BEAKER) (test 184 mg/dl    225-434      L        

    



             code = 658)                                         



CBC W/PLT COUNT &amp; AUTO [id number] 02:50:11





             Test Item    Value        Reference Range Interpretation Comments

 

             WHITE BLOOD CELL COUNT 6.4 K/ L     3.5-10.5                  



             (BEAKER) (test code =                                        



             775)                                                

 

             RED BLOOD CELL COUNT 2.09 M/ L    4.63-6.08    L            



             (BEAKER) (test code =                                        



             761)                                                

 

             HEMOGLOBIN (BEAKER) 6.6 GM/DL    13.7-17.5    L            



             (test code = 410)                                        

 

             HEMATOCRIT (BEAKER) 20.9 %       40.1-51.0    L            



             (test code = 411)                                        

 

             MEAN CORPUSCULAR VOLUME 100 fL       79-92        H            



             (BEAKER) (test code =                                        



             753)                                                

 

             MEAN CORPUSCULAR 31.6 pg      25.7-32.2                 



             HEMOGLOBIN (BEAKER)                                        



             (test code = 751)                                        

 

             MEAN CORPUSCULAR 31.6 GM/DL   32.3-36.5    L            



             HEMOGLOBIN CONC                                        



             (BEAKER) (test code =                                        



             752)                                                

 

             RED CELL DISTRIBUTION 19.6 %       11.6-14.4    H            



             WIDTH (BEAKER) (test                                        



             code = 412)                                         

 

             PLATELET COUNT (BEAKER) 35 K/CU MM   150-450      L            No c

lot. Previous



             (test code = 756)                                        low

 

             MEAN PLATELET VOLUME                                        Unable 

to report due



             (BEAKER) (test code =                                        to abn

ormal Platelet



             754)                                                population



                                                                 distribution.

 

             NUCLEATED RED BLOOD 0 /100 WBC   0-0                       



             CELLS (BEAKER) (test                                        



             code = 413)                                         

 

             NEUTROPHILS RELATIVE 73 %                                   



             PERCENT (BEAKER) (test                                        



             code = 429)                                         

 

             LYMPHOCYTES RELATIVE 12 %                                   



             PERCENT (BEAKER) (test                                        



             code = 430)                                         

 

             MONOCYTES RELATIVE 14 %                                   



             PERCENT (BEAKER) (test                                        



             code = 431)                                         

 

             EOSINOPHILS RELATIVE 0 %                                    



             PERCENT (BEAKER) (test                                        



             code = 432)                                         

 

             BASOPHILS RELATIVE 0 %                                    



             PERCENT (BEAKER) (test                                        



             code = 437)                                         

 

             NEUTROPHILS ABSOLUTE 4.65 K/ L    1.78-5.38                 



             COUNT (BEAKER) (test                                        



             code = 670)                                         

 

             LYMPHOCYTES ABSOLUTE 0.76 K/ L    1.32-3.57    L            



             COUNT (BEAKER) (test                                        



             code = 414)                                         

 

             MONOCYTES ABSOLUTE 0.90 K/ L    0.30-0.82    H            



             COUNT (BEAKER) (test                                        



             code = 415)                                         

 

             EOSINOPHILS ABSOLUTE 0.00 K/ L    0.04-0.54    L            



             COUNT (BEAKER) (test                                        



             code = 416)                                         

 

             BASOPHILS ABSOLUTE 0.02 K/ L    0.01-0.08                 



             COUNT (BEAKER) (test                                        



             code = 417)                                         

 

             IMMATURE     0.90 %       0.00-1.00                 



             GRANULOCYTES-RELATIVE                                        



             PERCENT (BEAKER) (test                                        



             code = 2801)                                        



CALCIUM, [id number] 02:48:49





             Test Item    Value        Reference Range Interpretation Comments

 

             CALCIUM IONIZED (BEAKER) (test 1.04 mmol/L  1.12-1.27    L         

   



             code = 698)                                         

 

             PH, BLOOD (BEAKER) (test code = 7.52                               

    



             1810)                                               



CT, EXTREMITY, LOWER, WITH CONTRAST, [id number] 00:12:00Unlisted Reason for 
Exam - Click Yes and Enter Reason Below-&gt;NoPlease specify:-&gt;KneeSeptic 
arthritis. R/o concominant myositisPlease specify:-&gt;FemurPlease 
specify:-&gt;Tibia/Fibula
************************************************************[facility]  : 1970 Sex: 
M************************************************************FINAL REPORT 
PATIENT ID: [id number] CT, EXTREMITY, LOWER, WITH CONTRAST, LEFT INDICATION: Soft 
tissue infection suspected, thigh, xray done COMPARISON: None TECHNIQUE: Axial 
CT of the left lower extremity with sagittal and coronal reformations. 
Intravascular contrast was administered. DOSE REDUCTION: Dose modulation, 
iterative reconstruction, and/or weight-based adjustment of the mA/kV was 
utilized to reduce the radiation dose to as low as reasonably achievable. 
FINDINGS:There is diffuse circumferential subcutaneous soft tissue swelling of 
the left lower extremity greatest distally. No soft tissue gas or fluid 
collections. The deep compartments are relatively preserved. Moderate left knee 
joint effusion. Included vasculature is unremarkable. No acute fracture or 
dislocation. No focal osseous erosion or aggressive lesion. Escobar catheter 
present within the bladder. IMPRESSION:Diffuse soft tissue swelling of the left 
lower extremity may be related to edema or cellulitis. No soft tissue gas and no
drainable collection. No acute osseous abnormality. There is a left knee joint 
effusion. Signed: Bobbi Real MDReport Verified Date/Time: 2023 00:12:35 
Electronically signed by: BOBBI REAL MD on 2023 12:12 AMPOCT-GLUCOSE 
[id number] 00:07:49





             Test Item    Value        Reference Range Interpretation Comments

 

             POC-GLUCOSE METER 118 mg/dL           H            : TESTED A

T [facility] [address]



             (BEAKER) (test code =                                        [facility],



             1538)                                               [id number]:



                                                                 /Techni

melanie ID



                                                                 = [id number] for Roxana Okeefe



RAPID DRUG SCREEN, [id number] 20:38:14





             Test Item    Value        Reference Range Interpretation Comments

 

             BARBITURATE URINE (BEAKER) (test Negative     Negative             

     



             code = 725)                                         

 

             BENZODIAZEPINE SCREEN URINE (BEAKER) Negative     Negative         

         



             (test code = 726)                                        

 

             COCAINE (METAB.) SCREEN (BEAKER) Negative     Negative             

     



             (test code = 1164)                                        

 

             METHADONE SCREEN (BEAKER) (test code Negative     Negative         

         



             = 1436)                                             

 

             OPIATE SCREEN URINE (BEAKER) (test Negative     Negative           

       



             code = 734)                                         

 

             CANNABINOID SCREEN URINE (BEAKER) Negative     Negative            

      



             (test code = 727)                                        

 

             AMPH/METHAMPH SCREEN (BEAKER) (test Negative     Negative          

        



             code = 1438)                                        

 

             PHENCYCLIDINE SCREEN URINE (BEAKER) Negative     Negative          

        



             (test code = 608)                                        

 

             PH UA (BEAKER) (test code = 467) 6.0          5.0-8.0              

     



DRUG CUTOFF CONC.Cocaine 300 ng/mL Cannabinoid 50 ng/mLBenzodiazepine 200 
ng/mLBarbiturate 200 ng/mLPhencyclidine 25 ng/mLOpiate 300 ng/mLMethadone 300 
ng/mLAmphetamine/ 1000 ng/mL MethamphetamineThis assay provides an unconfirmed 
qualitative test result for the clinical management of patients in emergency 
situations. Chain of custody not maintained. Some over-the-counter medications, 
as well as adulterants, may cause inaccurate results. Clinical correlation 
should be applied. A more comprehensive drug screen or confirmation of a 
detected drug may be performed upon request. ID - ADMINURINALYSIS W/ 
REFLEX URINE [id number] 20:27:37





             Test Item    Value        Reference Range Interpretation Comments

 

             COLOR (BEAKER) (test code = 470) Yellow                            

     

 

             CLARITY (BEAKER) (test code = 469) Hazy                            

       

 

             SPECIFIC GRAVITY UA (BEAKER) (test 1.027        1.001-1.035        

       



             code = 468)                                         

 

             PH UA (BEAKER) (test code = 467) 6.0          5.0-8.0              

     

 

             PROTEIN UA (BEAKER) (test code = 20 mg/dL     Negative     A       

     



             464)                                                

 

             GLUCOSE UA (BEAKER) (test code = Negative     Negative             

     



             365)                                                

 

             KETONES UA (BEAKER) (test code = Negative     Negative             

     



             371)                                                

 

             BILIRUBIN UA (BEAKER) (test code = Positive     Negative     A     

       



             462)                                                

 

             BLOOD UA (BEAKER) (test code = 461) Large        Negative     A    

        

 

             NITRITE UA (BEAKER) (test code = Negative     Negative             

     



             465)                                                

 

             LEUKOCYTE ESTERASE UA (BEAKER) (test Negative     Negative         

         



             code = 466)                                         

 

             UROBILINOGEN UA (BEAKER) (test code 3            0.2-1.0      H    

        



             = 463)                                              

 

             RBC UA (BEAKER) (test code = 519) 27 /HPF                          

      

 

             WBC UA (BEAKER) (test code = 520) 3 /HPF                           

      

 

             BACTERIA (BEAKER) (test code = 517) Rare                           

        

 

             SQUAMOUS EPITHELIAL (BEAKER) (test < /HPF                          

       



             code = 516)                                         

 

             SOURCE(BEAKER) (test code = 2795)                                  

      



 ID - [auto] ID - techPOCT-GLUCOSE [id number] 20:05:54





             Test Item    Value        Reference Range Interpretation Comments

 

             POC-GLUCOSE METER 165 mg/dL           H            : TESTED A

T [facility] [address]



             (BEAKER) (test code =                                        [place],



             1538)                                               [address]:



                                                                 /Techni

melanie ID



                                                                 = [id number] for La

ra,



                                                                 Julienne



BODY FLUID [id number] 18:23:10





             Test Item    Value        Reference Range Interpretation Comments

 

             CRYSTALS, BODY FLUID Calcium pyrophosphate                         

  



             (BEAKER) (test code crystals for pseudogout                        

   



             = 2165)                                             

 

             QUANTITY SEEN Few                                    



             (BEAKER) (test code                                        



             = 2166)                                             

 

             [id number] Dillon Dominguez MD (electronic                     

      



             (BEAKER) (test code signature)                             



             = 6417)                                             



THROMBOELASTOGRAPH (TEG)2023 18:12:17





             Test Item    Value        Reference Range Interpretation Comments

 

             TEG ACTIVATED CLOTTING TIME 8.9 minutes  4.0-7.0      H            



             (BEAKER) (test code = 1407)                                        

 

             TEG FIBRINOGEN ACTIVITY (BEAKER) 50.7 degrees 61.0-73.0    L       

     



             (test code = 1408)                                        

 

             TEG PLT. AGGREGATION (BEAKER) 39.9 MM      55.0-65.0    L          

  



             (test code = 1409)                                        

 

             TEG FIBRINOLYSIS (BEAKER) (test 0.0 %        0.0-5.0               

    



             code = 1410)                                        

 

             TGH ACTIVATED CLOTTING TIME 9.2 minutes  4.0-7.0      H            



             (BEAKER) (test code = 1411)                                        

 

             TGH FIBRINOGEN ACTIVITY (BEAKER) 54.6 degrees 61.0-73.0    L       

     



             (test code = 1412)                                        

 

             TGH PLT. AGGREGATION (BEAKER) 39.5 MM      55.0-65.0    L          

  



             (test code = 1413)                                        

 

             TGH FIBRINOLYSIS (BEAKER) (test 0.0 %        0.0-5.0               

    



             code = 1414)                                        



[id number] 18:02:42





             Test Item    Value        Reference Range Interpretation Comments

 

             PROCALCITONIN (BEAKER) (test code 7.00 ng/mL   <0.05        H      

      



             = 3036)                                             



SEPSIS RISK (ng/mL)Low: 0.05-0.50Intermediate: 0.51-2.00High: &gt;=2.01CREATINE 
KINASE (CK)2023 17:50:27





             Test Item    Value        Reference Range Interpretation Comments

 

             CREATINE KINASE TOTAL (BEAKER) (test 977 U/L             H   

         



             code = 380)                                         



 ID - ADMINCBC (HEMOGRAM ONLY)2023 17:27:14





             Test Item    Value        Reference Range Interpretation Comments

 

             WHITE BLOOD CELL COUNT (BEAKER) 6.7 K/ L     3.5-10.5              

    



             (test code = 775)                                        

 

             RED BLOOD CELL COUNT (BEAKER) 2.26 M/ L    4.63-6.08    L          

  



             (test code = 761)                                        

 

             HEMOGLOBIN (BEAKER) (test code = 7.2 GM/DL    13.7-17.5    L       

     



             410)                                                

 

             HEMATOCRIT (BEAKER) (test code = 23.2 %       40.1-51.0    L       

     



             411)                                                

 

             MEAN CORPUSCULAR VOLUME (BEAKER) 103 fL       79-92        H       

     



             (test code = 753)                                        

 

             MEAN CORPUSCULAR HEMOGLOBIN 31.9 pg      25.7-32.2                 



             (BEAKER) (test code = 751)                                        

 

             MEAN CORPUSCULAR HEMOGLOBIN CONC 31.0 GM/DL   32.3-36.5    L       

     



             (BEAKER) (test code = 752)                                        

 

             RED CELL DISTRIBUTION WIDTH 20.1 %       11.6-14.4    H            



             (BEAKER) (test code = 412)                                        

 

             PLATELET COUNT (BEAKER) (test code 22 K/CU MM   150-450      L     

       



             = 756)                                              

 

             NUCLEATED RED BLOOD CELLS (BEAKER) 0 /100 WBC   0-0                

       



             (test code = 413)                                        



[id number] 16:34:04





             Test Item    Value        Reference Range Interpretation Comments

 

             AMMONIA (BEAKER) (test code = 348) 103 mol/L    18-72        H     

       



 ID - ADMINLACTIC ACID, [id number] 15:30:00





             Test Item    Value        Reference Range Interpretation Comments

 

             LACTATE BLOOD VENOUS (2) (BEAKER) 2.66 mmol/L  0.50-2.00    H      

      



             (test code = 2872)                                        



 ID - MMSpecimen slightly ictericPOCT-GLUCOSE [id number] 15:05:53





             Test Item    Value        Reference Range Interpretation Comments

 

             POC-GLUCOSE METER 108 mg/dL                        : TESTED A

T [facility] [address]



             (BEAKER) (test code =                                        [place],



             1538)                                               [address]:



                                                                 /Techni

melanie ID



                                                                 = [id number] for FO

LUIS ARMANDO BAILEY



CALCIUM, [id number] 15:05:35





             Test Item    Value        Reference Range Interpretation Comments

 

             CALCIUM IONIZED (BEAKER) (test 0.85 mmol/L  1.12-1.27    L         

   



             code = 698)                                         

 

             PH, BLOOD (BEAKER) (test code = 7.43                               

    



             1810)                                               



HEPATITIS PANEL, [id number] 13:08:20





             Test Item    Value        Reference Range Interpretation Comments

 

             HEPATITIS A IGM ANTIBODY (BEAKER) Nonreactive  Nonreactive         

      



             (test code = 498)                                        

 

             HEPATITIS B CORE IGM ANTIBODY Nonreactive  Nonreactive             

  



             (BEAKER) (test code = 645)                                        

 

             HEPATITIS C ANTIBODY (BEAKER) Nonreactive  Nonreactive             

  



             (test code = 367)                                        

 

             HEPATITIS B SURFACE ANTIGEN (2) Nonreactive  Nonreactive           

    



             (BEAKER) (test code = 2585)                                        



 ID - ADMINBODY FLUID CELL COUNT WITH [id number] 13:07:16





             Test Item    Value        Reference Range Interpretation Comments

 

             APPEARANCE FLUID (BEAKER) Turbid       Clear        A            



             (test code = 510)                                        

 

             COLOR FLUID (BEAKER) (test Lorie        Colorless, Straw A         

   



             code = 511)                                         

 

             RBC FLUID (BEAKER) (test code 69462 /cu mm <=1          H          

  



             = 513)                                              

 

             TOTAL NUCLEATED CELL COUNT 162576 /cu mm <=5          H            



             (BEAKER) (test code = 1442)                                        

 

             LINING CELLS/OTHERS DIFF'D 0                                      



             (BEAKER) (test code = 1589)                                        

 

             ADJUSTED WBC FLUID (BEAKER) 093916 /cu mm <=5          H           

 



             (test code = 1691)                                        

 

             LINING CELLS/OTHERS, 0 /cu mm     <=1                       



             CALCULATED (BEAKER) (test code                                     

   



             = 1590)                                             

 

             NEUTROPHILS FLUID (BEAKER) 100 %                                  



             (test code = 1656)                                        

 

             LYMPHS FLUID (BEAKER) (test 0 %                                    



             code = 488)                                         

 

             MONO/MACROPHAGE FLUID (BEAKER) 0 %                                 

   



             (test code = 489)                                        

 

             EOSINOPHILS FLUID (BEAKER) 0 %                                    



             (test code = 491)                                        

 

             BASO FLUID (BEAKER) (test code 0 %                                 

   



             = 492)                                              

 

             CONTAINER BODY FLUID (BEAKER) EDTA Tube                            

  



             (test code = 2873)                                        



HEMOGLOBIN [id number] 12:24:08





             Test Item    Value        Reference Range Interpretation Comments

 

             HEMOGLOBIN A1C < %          See_Comment                [Automated m

essage]



             ELECTROPHORESIS (BEAKER)                                        The

 system which



             (test code = 3811)                                        generated

 this result



                                                                 transmitted ref

erence



                                                                 range: <=5.6%. 

The



                                                                 reference range

 was



                                                                 not used to int

erpret



                                                                 this result as



                                                                 normal/abnormal

.



"The A1c is measured using a NGSP-certified method. HbA1c value equal to or 
greater than 6.5% as thediagnosis cutoff for diabetes. An HbA1c value of 5.7-
6.4% indicates increased risk for diabetes (prediabetes)." ID - ADMURIC 
[id number] 11:33:12





             Test Item    Value        Reference Range Interpretation Comments

 

             URIC ACID (BEAKER) (test code = 3.2 mg/dL    2.6-7.2               

    



             773)                                                



 ID - ADMINSpecimen slightly ictericC-REACTIVE [id number] 
11:33:12





             Test Item    Value        Reference Range Interpretation Comments

 

             C-REACTIVE PROTEIN (BEAKER) (test 4.52 mg/dL   0.00-0.50    H      

      



             code = 676)                                         



 ID - [id number] 11:31:51





             Test Item    Value        Reference Range Interpretation Comments

 

             HAPTOGLOBIN (BEAKER) (test code = < mg/dL             L      

      



             366)                                                



 ID - ADMINRAD, FEMUR, MIN. 2 VIEWS, [id number] 11:30:00Reason for 
exam:-&gt;fall r/o acute fractureShould this be performed at the 
bedside?-&gt;Yes************************************************************[facility]  : 1970 Sex: 
M************************************************************FINAL REPORT 
PATIENT ID: [id number] Exam: RAD, FEMUR, MIN. 2 VIEWS, LEFT Side: Left INDICATION:
fall r/o acute fracture COMPARISON: None FINDINGS:Bones: No acute displaced 
fracture. Osseous alignment is within normal limits. Joints:The joint spaces are
well-maintained. Soft tissues:The soft tissues appear unremarkable. IMPRESSION: 
No acute radiographic abnormality. If symptoms persist or progress please 
consider further evaluation with the MRI without IV contrast, if clinically 
appropriate. Signed: Melida Sanchez MDReport Verified Date/Time: 2023 
11:30:35 Reading Location: [facility] Radiology ReadingRoom Electronically signed by:
MELIDA SANCHEZ MD on 2023 11:30 AMRAD, KNEE, 3 VIEWS, [id number] 
11:02:00Is this procedure to be performed with weight bearing?-&gt;Non-Weight 
BearingReason for exam:-&gt;fall r/o acute fractureShould this be performed at 
the bedside?-&gt;Yes
************************************************************[facility] : 1970 Sex: 
M************************************************************FINAL REPORT 
PATIENT ID: [id number] Exam: RAD, KNEE, 3 VIEWS, LEFT Side: INDICATION: fall r/o 
acute fracture COMPARISON: None FINDINGS:Bones: No acute displaced fracture. 
Questionable lucency in the mid patella could be artifactual.Osseous alignment 
is within normal limits. Joints:The joint spaces are well-maintained. Soft 
tissues:The soft tissues appear unremarkable. IMPRESSION: No definitive acute 
radiographic abnormality. Questionable lucency in the mid patella could be 
artifactual. However please consider correlation with point tenderness if 
indicated please consider repeat radiograph of patella. If symptoms persist or 
progress please consider further evaluation with the MRI without IV contrast, if
clinically appropriate. Signed: Melida Sanchez MDReport Verified Date/Time: 
2023 11:02:35 Reading Location: [facility] Radiology Reading Room 
Electronically signed by: MELIDA SANCHEZ MD on 2023 11:02 AMTSH/FREE T4 IF 
[id number] 10:14:33





             Test Item    Value        Reference Range Interpretation Comments

 

             THYROID STIMULATING HORMONE 2.549 uIU/mL 0.350-4.940               



             (BEAKER) (test code = 772)                                        



 ID - MMCOMPREHENSIVE METABOLIC [id number] 10:11:12





             Test Item    Value        Reference Range Interpretation Comments

 

             TOTAL PROTEIN 5.6 gm/dL    6.0-8.3      L            



             (BEAKER) (test                                        



             code = 770)                                         

 

             ALBUMIN (BEAKER) 1.9 g/dL     3.5-5.0      L            



             (test code = 1145)                                        

 

             ALKALINE     84 U/L                           



             PHOSPHATASE                                         



             (BEAKER) (test                                        



             code = 346)                                         

 

             BILIRUBIN TOTAL 4.6 mg/dL    0.2-1.2      H            



             (BEAKER) (test                                        



             code = 377)                                         

 

             SODIUM (BEAKER) 133 meq/L    136-145      L            



             (test code = 381)                                        

 

             POTASSIUM (BEAKER) 3.3 meq/L    3.5-5.1      L            



             (test code = 379)                                        

 

             CHLORIDE (BEAKER) 108 meq/L           H            



             (test code = 382)                                        

 

             CO2 (BEAKER) (test 17 meq/L     22-29        L            



             code = 355)                                         

 

             BLOOD UREA   10 mg/dL     7-21                      



             NITROGEN (BEAKER)                                        



             (test code = 354)                                        

 

             CREATININE   0.70 mg/dL   0.57-1.25                 



             (BEAKER) (test                                        



             code = 358)                                         

 

             GLUCOSE RANDOM 146 mg/dL           H            



             (BEAKER) (test                                        



             code = 652)                                         

 

             CALCIUM (BEAKER) 6.9 mg/dL    8.4-10.2     L            



             (test code = 697)                                        

 

             AST (SGOT)   72 U/L       5-34         H            



             (BEAKER) (test                                        



             code = 353)                                         

 

             ALT (SGPT)   22 U/L       6-55                      



             (BEAKER) (test                                        



             code = 347)                                         

 

             EGFR (BEAKER) 108                                     Interpretatio

n of eGFR



             (test code = 1092) mL/min/1.73                            values St

age Description



                          sq m                                   Result G1 Shaunna

l or high



                                                                 >=90 G2 Mildly 

decreased



                                                                 60-89  G3a Mild

ly to



                                                                 moderately 45-5

9 G3b



                                                                 Moderately to s

everely



                                                                 30-44 G4 Severl

y decreased



                                                                 15-29 G5 Kidney

 failure



                                                                 <15Reported eGF

R is based



                                                                 on the CKD-EPI 





                                                                 equation that d

oes not use



                                                                 a race



                                                                 coefficientEsti

mated GFR



                                                                 is not as accur

ate as



                                                                 Creatinine Tabitha marie in



                                                                 predicting glom

erular



                                                                 filtration rate

. Estimated



                                                                 GFR is not appl

icable for



                                                                 dialysis patien

ts



 ID - MMSpecimen moderately [id number] 10:06:29





             Test Item    Value        Reference Range Interpretation Comments

 

             ETHANOL (BEAKER) (test code = 400) < mg/dL      <=10               

       



 ID - MM(CELLAVISION MANUAL DIFF)2023 10:04:25





             Test Item    Value        Reference Range Interpretation Comments

 

             NEUTROPHILS - REL 76 %                                   



             (CELLAVISION)(BEAKER) (test code                                   

     



             = 2816)                                             

 

             LYMPHOCYTES - REL 4 %                                    



             (CELLAVISION)(BEAKER) (test code                                   

     



             = 2817)                                             

 

             MONOCYTES - REL 5 %                                    



             (CELLAVISION)(BEAKER) (test code                                   

     



             = 2818)                                             

 

             METAMYELOCYTES - REL 6 %          0-0          H            



             (CELLAVISION)(BEAKER) (test code                                   

     



             = 2821)                                             

 

             BANDS - REL (CELLAVISION)(BEAKER) 9 %          0-10                

      



             (test code = 2826)                                        

 

             NEUTROPHILS - ABS 3.65 K/ul    1.78-5.38                 



             (CELLAVISION)(BEAKER) (test code                                   

     



             = 2830)                                             

 

             LYMPHOCYTES - ABS 0.19 K/ul    1.32-3.57    L            



             (CELLAVISION)(BEAKER) (test code                                   

     



             = 2831)                                             

 

             MONOCYTES - ABS 0.24 K/uL    0.30-0.82    L            



             (CELLAVISION)(BEAKER) (test code                                   

     



             = 2832)                                             

 

             METAMYELOCYTES - ABS 0.29 K/uL    0.00-0.00    H            



             (CELLAVISION)(BEAKER) (test code                                   

     



             = 2836)                                             

 

             BANDS - ABS (CELLAVISION)(BEAKER) 0.43 K/uL    0.00-0.80           

      



             (test code = 2840)                                        

 

             TOTAL COUNTED (BEAKER) (test code 100                              

      



             = 1351)                                             

 

             MANUAL NRBC  CELLS 1 /100 WBC   0-0          H            



             (BEAKER) (test code = 1353)                                        

 

             WBC MORPHOLOGY (BEAKER) (test Normal                               

  



             code = 487)                                         

 

             GIANT PLATELETS (BEAKER) (test Present                             

   



             code = 313)                                         

 

             POLYCHROMATOPHILLIC RBCS(BEAKER) 2+ moderate                       

     



             (test code = 478)                                        

 

             ANISOCYTOSIS (BEAKER) (test code 2+ moderate                       

     



             = 961)                                              

 

             MACROCYTES (BEAKER) (test code = 2+ moderate                       

     



             964)                                                

 

             POIKILOCYTES (BEAKER) (test code 3+ many                           

     



             = 966)                                              

 

             ELLIPTOCYTES (BEAKER) (test code 1+ few                            

     



             = 962)                                              

 

             OVALOCYTES (BEAKER) (test code = 1+ few                            

     



             477)                                                

 

             BENJI CELLS (BEAKER) (test code = 2+ moderate                       

     



             474)                                                

 

             ARTIFACT (CELLAVISION)(BEAKER) Present                             

   



             (test code = 3432)                                        

 

             PLATELET CONCENTRATION Decreased                              



             (CELLAVISION)(BEAKER) (test code                                   

     



             = 3438)                                             



 ID - [id number] ID - nolvia Trejo comments: Slide comments:CBC 
W/PLT COUNT &amp; AUTO [id number] 10:04:24





             Test Item    Value        Reference Range Interpretation Comments

 

             WHITE BLOOD CELL COUNT 4.8 K/ L     3.5-10.5                  



             (BEAKER) (test code =                                        



             775)                                                

 

             RED BLOOD CELL COUNT 2.31 M/ L    4.63-6.08    L            



             (BEAKER) (test code =                                        



             761)                                                

 

             HEMOGLOBIN (BEAKER) 7.3 GM/DL    13.7-17.5    L            



             (test code = 410)                                        

 

             HEMATOCRIT (BEAKER) 23.6 %       40.1-51.0    L            



             (test code = 411)                                        

 

             MEAN CORPUSCULAR VOLUME 102 fL       79-92        H            



             (BEAKER) (test code =                                        



             753)                                                

 

             MEAN CORPUSCULAR 31.6 pg      25.7-32.2                 



             HEMOGLOBIN (BEAKER)                                        



             (test code = 751)                                        

 

             MEAN CORPUSCULAR 30.9 GM/DL   32.3-36.5    L            



             HEMOGLOBIN CONC                                        



             (BEAKER) (test code =                                        



             752)                                                

 

             RED CELL DISTRIBUTION 20.3 %       11.6-14.4    H            



             WIDTH (BEAKER) (test                                        



             code = 412)                                         

 

             PLATELET COUNT (BEAKER) 22 K/CU MM   150-450      L            



             (test code = 756)                                        

 

             MEAN PLATELET VOLUME                                        Unable 

to report due



             (BEAKER) (test code =                                        to abn

ormal Platelet



             754)                                                population



                                                                 distribution.

 

             NUCLEATED RED BLOOD 0 /100 WBC   0-0                       



             CELLS (BEAKER) (test                                        



             code = 413)                                         



IRON, TIBC, % SAT. (WITHOUT FERRITIN)2023 10:02:23





             Test Item    Value        Reference Range Interpretation Comments

 

             IRON (BEAKER) (test code = 547) 25.0 ug/dL   40.0-160.0   L        

    

 

             TOTAL IRON BINDING CAPACITY 176 ug/dL    250-450      L            



             (BEAKER) (test code = 769)                                        

 

             IRON % SATURATION (2) (BEAKER) 14 %         20-55        L         

   



             (test code = 2599)                                        



 ID - [id number] 10:02:05





             Test Item    Value        Reference Range Interpretation Comments

 

             PHOSPHORUS (BEAKER) (test code = 2.0 mg/dL    2.3-4.7      L       

     



             604)                                                



 ID - MMCREATINE KINASE (CK)2023 10:02:05





             Test Item    Value        Reference Range Interpretation Comments

 

             CREATINE KINASE TOTAL (BEAKER) (test 1598 U/L            H   

         



             code = 380)                                         



 ID - MMLACTATE DEHYDROGENASE (LDH)2023 10:02:05





             Test Item    Value        Reference Range Interpretation Comments

 

             LACTATE DEHYDROGENASE (BEAKER) (test 376 U/L      125-220      H   

         



             code = 635)                                         



 ID - [id number] 10:02:04





             Test Item    Value        Reference Range Interpretation Comments

 

             MAGNESIUM (BEAKER) (test code = 1.4 mg/dL    1.6-2.6      L        

    



             627)                                                



 ID - MMLACTIC ACID, [id number] 10:00:29





             Test Item    Value        Reference Range Interpretation Comments

 

             LACTATE BLOOD VENOUS (2) (BEAKER) 5.35 mmol/L  0.50-2.00    HH     

      



             (test code = 2872)                                        



 ID - MMSpecimen slightly [id number] 09:53:29





             Test Item    Value        Reference Range Interpretation Comments

 

             FERRITIN (BEAKER) (test code = 63.50 ng/mL  5..00            

   



             361)                                                



 ID - MMSARS-COV2/RT-PCR ([facility])2023 09:49:37





             Test Item    Value        Reference Range Interpretation Comments

 

             SARS-COV2/RT-PCR Negative     Negative                  The SARS-Co

V-2 target



             (test code =                                        nucleic acids a

re not



             9102825)                                            detected in thi

s specimen.



                                                                 Negative result

s do not



                                                                 preclude SARS-C

oV-2



                                                                 infection and s

hould not be



                                                                 used as the sol

e basis for



                                                                 patient managem

ent



                                                                 decisions. Nega

tive results



                                                                 must be combine

d with



                                                                 clinical observ

ations,



                                                                 patient history

, and



                                                                 epidemiological

 information.



                                                                 A false negativ

e result may



                                                                 occur if a spec

imen is



                                                                 improperly josias

ected,



                                                                 transported or 

handled. This



                                                                 SARS CoV-2 test

 is a rapid,



                                                                 real-time RT-PC

R test



                                                                 intended for th

e qualitative



                                                                 detection of nu

cleic acid



                                                                 from SARS-CoV-2

 in a



                                                                 nasopharyngeal 

swab specimen



                                                                 collected from 

individuals



                                                                 suspected of CO

VID-19 by



                                                                 their healthcar

e provider.



This test has been authorized by FDA under an EUA for use by authorized 
laboratories. This test is only authorized for the duration of the declaration 
that circumstances exist justifying the authorization of emergency use of in 
vitro diagnostic tests for detection and/or diagnosis of COVID-19 under Section 
564(b)(1) of the Federal Food, Drug and Cosmetic Act, 21 U.S.C. 360bbb-3(b)(1), 
unless the authorization is terminated or revoked sooner. Fact Sheet for 
Healthcare Providers: https://www.[employer]/Documents/Xpert%20Xpress%20SARS%20CoV-2/Fact%20Sheets/3023802%64IHQW-EQF-8%20

HEALTHCARE%20PROVIDERS%20FACT%20SHEET.pdf Fact Sheet for Healthcare Patients: 
https://www.[employer]/Documents/Xpert%20Xp
ress%20SARS%20CoV-2/Fact%20Sheets/3023801%85YDAN-DEI-4%20PATIENT%20FACT%20SHEET

.[id number] 09:33:46





             Test Item    Value        Reference Range Interpretation Comments

 

             FIBRINOGEN LEVEL (BEAKER) (test 146 mg/dl    225-434      L        

    



             code = 658)                                         



[id number] 09:29:18





             Test Item    Value        Reference Range Interpretation Comments

 

             PROTIME (BEAKER) (test code = 33.8 seconds 11.9-14.2    H          

  



             759)                                                

 

             INR (BEAKER) (test code = 370) 3.46         <=5.90                 

   

 

             PARTIAL THROMBOPLASTIN TIME 50.6 seconds 22.5-36.0    H            



             (BEAKER) (test code = 760)                                        



RECOMMENDED COUMADIN/WARFARIN INR THERAPY RANGESSTANDARD DOSE: 2.0 - 3.0 
Includes: PROPHYLAXIS for venous thrombosis, systemic embolization; TREATMENT 
for venous thrombosis and/or pulmonary embolus.HIGH RISK: Target INR is 2.5-3.5 
for patients with mechanical heart valves.RETICULOCYTE [id number] 09:25:55





             Test Item    Value        Reference Range Interpretation Comments

 

             RETICULOCYTE COUNT PCT (BEAKER) (test 3.5 %        0.5-1.8      H  

          



             code = 575)                                         



 ID - [id number] ID - [id number]-GLUCOSE [id number] 08:11:30





             Test Item    Value        Reference Range Interpretation Comments

 

             POC-GLUCOSE METER 150 mg/dL           H            : TESTED A

T [facility] [address]



             (BEAKER) (test code =                                        [place],



             [address]38)                                               [address]:



                                                                 /Techni

melanie ID



                                                                 = [id number] for PHILLIP JACOBO



BLOOD CULTURE [id number] 14:27:19





             Test Item    Value        Reference Range Interpretation Comments

 

             Blood Culture-Aerobic Culture positive. No growth    AA           P

revious



             (test code = 17928-3) See Blood Culture                           p

reliminary



                          Workup for                             verified result



                          additional                             was Culture In



                          information.                           Progress on



                                                                 3/19/2023 at 02

01



                                                                 CDTPrevious



                                                                 preliminary



                                                                 verified result



                                                                 was No growth a

t



                                                                 24 hours on



                                                                 3/19/2023 at 23

01



                                                                 CDT

 

             Blood        No organisms No growth                 Previous



             Culture-Anaerobic isolated                               preliminar

y



             (test code = 56539-4)                                        verifi

ed result



                                                                 was Culture In



                                                                 Progress on



                                                                 3/19/2023 at 02

01



                                                                 CDTPrevious



                                                                 preliminary



                                                                 verified result



                                                                 was No growth a

t



                                                                 24 hours on



                                                                 3/20/2023 at 01

54



                                                                 CDT

 

             Lab Interpretation Abnormal                               



             (test code = 58688-4)                                        



[facility]





             Test Item    Value        Reference Range Interpretation Comments

 

             MAGNESIUM (test code = 8709037544) 1.7 mg/dL    1.7-2.4            

       

 

             Lab Interpretation (test code = Normal                             

    



             24270-6)                                            



[facility] METABOLIC PANEL (NA, K, CL, CO2, 
GLUCOSE, BUN, CREATININE, CA)2023 10:47:23





             Test Item    Value        Reference Range Interpretation Comments

 

             NA (test code = 134 mmol/L   135-145      L            



             4816978278)                                         

 

             K (test code = 3.4 mmol/L   3.5-5.0      L            



             5607221586)                                         

 

             CL (test code = 107 mmol/L                       



             0550098262)                                         

 

             CO2 TOTAL (test code = 28 mmol/L    23-31                     



             1568924382)                                         

 

             AGAP (test code =              2-16         L            



             4195801805)                                         

 

             BUN (test code = 5 mg/dL      7-23         L            



             9642217512)                                         

 

             GLUCOSE (test code = 82 mg/dL                         



             9489409232)                                         

 

             CREATININE (test code = 0.49 mg/dL   0.60-1.25    L            



             0374100244)                                         

 

             CALCIUM (test code = 7.0 mg/dL    8.6-10.6     L            



             0541611874)                                         

 

             eGFR (test code = 176.7        mL/min/1.73m2              



             3463533268)                                         

 

             RENETTA (test code = RENETTA) Association of                           



                          Glomerular Filtration                           



                          Rate (GFR) and Staging                           



                          of Kidney Disease*                           



                          +---------------------                           



                          --+-------------------                           



                          --+-------------------                           



                          ------+| GFR                           



                          (mL/min/1.73 m2) ?|                           



                          With Kidney Damage ?|                           



                          ?Without Kidney                           



                          Damage+---------------                           



                          --------+-------------                           



                          --------+-------------                           



                          ------------+| ?>90 ?                           



                          ? ? ? ? ? ? ? ?|                           



                          ?Stage one ? ? ? ? ?|                           



                          ? Normal ? ? ? ? ? ? ?                           



                          ?+--------------------                           



                          ---+------------------                           



                          ---+------------------                           



                          -------+| ?60-89 ? ? ?                           



                          ? ? ? ? ?| ?Stage two                           



                          ? ? ? ? ?| ? Decreased                           



                          GFR ? ? ? ?                            



                          +---------------------                           



                          --+-------------------                           



                          --+-------------------                           



                          ------+| ?30-59 ? ? ?                           



                          ? ? ? ? ?| ?Stage                           



                          three ? ? ? ?| ? Stage                           



                          three ? ? ? ? ?                           



                          +---------------------                           



                          --+-------------------                           



                          --+-------------------                           



                          ------+| ?15-29 ? ? ?                           



                          ? ? ? ? ?| ?Stage four                           



                          ? ? ? ? | ? Stage four                           



                          ? ? ? ? ?                              



                          ?+--------------------                           



                          ---+------------------                           



                          ---+------------------                           



                          -------+| ?<15 (or                           



                          dialysis) ? ?| ?Stage                           



                          five ? ? ? ? | ? Stage                           



                          five ? ? ? ? ?                           



                          ?+--------------------                           



                          ---+------------------                           



                          ---+------------------                           



                          -------+ *Each stage                           



                          assumes the associated                           



                          GFR level has been in                           



                          effect for at least                           



                          three months. ?Stages                           



                          1 to 5, with or                           



                          without kidney                           



                          disease, indicate                           



                          chronic kidney                           



                          disease. Notes:                           



                          Determination of                           



                          stages one and two                           



                          (with eGFR                             



                          >59mL/min/1.73 m2)                           



                          requires estimation of                           



                          kidney damage for at                           



                          least three months as                           



                          defined by structural                           



                          or functional                           



                          abnormalities of the                           



                          kidney, manifested by                           



                          either:Pathological                           



                          abnormalities or                           



                          Markers of kidney                           



                          damage (including                           



                          abnormalities in the                           



                          composition of the                           



                          blood or urine or                           



                          abnormalities in                           



                          imaging tests).                           

 

             Lab Interpretation Abnormal                               



             (test code = 43383-2)                                        



[facility]





             Test Item    Value        Reference Range Interpretation Comments

 

             Fibrinogen (test code = 1044735650) 121 mg/dL    167-453      L    

        

 

             Lab Interpretation (test code = Abnormal                           

    



             42742-5)                                            



[facility] / [id number] 10:31:49





             Test Item    Value        Reference Range Interpretation Comments

 

             PROTIME PATIENT (test 26.0         See_Comment  H             [Auto

mated message]



             code = 5964-2)                                        The system [employer]



                                                                 generated this 

result



                                                                 transmitted ref

erence



                                                                 range: 10.1 - 1

2.6



                                                                 Seconds. The



                                                                 reference range

 was



                                                                 not used to int

erpret



                                                                 this result as



                                                                 normal/abnormal

.

 

             INR (test code = 6301-6) 2.3                                    Nor

mal INR <1.1;



                                                                 Warfarin Therap

eutic



                                                                 range 2.0 to 3.

0 or



                                                                 2.5 to 3.5, dep

ending



                                                                 upon the indica

tions.

 

             Lab Interpretation (test Abnormal                               



             code = 31393-4)                                        



[facility] WITHOUT [id number] 10:26:10





             Test Item    Value        Reference Range Interpretation Comments

 

             WBC (test code = 2.98         See_Comment  L             [Automated

 message]



             6190-2)                                             The system [employer]



                                                                 generated this 

result



                                                                 transmitted ref

erence



                                                                 range: 4.20 - 1

0.70



                                                                 10*3/?L. The



                                                                 reference range

 was



                                                                 not used to int

erpret



                                                                 this result as



                                                                 normal/abnormal

.

 

             RBC (test code = 789-8) 3.09         See_Comment  L             [Au

tomated message]



                                                                 The system [employer]



                                                                 generated this 

result



                                                                 transmitted ref

erence



                                                                 range: 4.26 - 5

.52



                                                                 10*6/?L. The



                                                                 reference range

 was



                                                                 not used to int

erpret



                                                                 this result as



                                                                 normal/abnormal

.

 

             HGB (test code = 718-7) 8.1 g/dL     12.2-16.4    L            

 

             HCT (test code = 24.6 %       38.4-49.3    L            



             4544-3)                                             

 

             MCH (test code = 785-6) 26.2 pg      26.1-32.7                 

 

             MCV (test code = 787-2) 79.6 fL      81.7-95.6    L            

 

             MCHC (test code = 32.9 g/dL    31.2-35.0                 



             786-4)                                              

 

             PLT (test code = 777-3) 21           See_Comment  LL            [Au

tomated message]



                                                                 The system [employer]



                                                                 generated this 

result



                                                                 transmitted ref

erence



                                                                 range: 150 - 32

8



                                                                 10*3/?L. The



                                                                 reference range

 was



                                                                 not used to int

erpret



                                                                 this result as



                                                                 normal/abnormal

.

 

             MPV (test code =                                        Not Measure

d



             45144-7)                                            

 

             RDW-CV (test code = 18.6 %       12.1-15.4    H            



             788-0)                                              

 

             RDW-SD (test code = 54.0 fL      38.5-51.6    H            



             00702-3)                                            

 

             NRBC x10^3 (test code =              See_Comment                [Au

tomated message]



             2238846735)                                         The system [facility]



                                                                 generated this 

result



                                                                 transmitted ref

erence



                                                                 range: 10*3/?L.

 The



                                                                 reference range

 was



                                                                 not used to int

erpret



                                                                 this result as



                                                                 normal/abnormal

.

 

             NRBC/100 WBC (test code 0.0          See_Comment                [Au

tomated message]



             = [id number])                                        The system [facility]



                                                                 generated this 

result



                                                                 transmitted ref

erence



                                                                 range: 0.0 - 10

.0



                                                                 /100 WBCs. The



                                                                 reference range

 was



                                                                 not used to int

erpret



                                                                 this result as



                                                                 normal/abnormal

.

 

             IPF % (test code = 6.3 %        1.2-10.7                  Platelet 

count



             [id number])                                         measured by



                                                                 fluorescence me

thod.

 

             Lab Interpretation Abnormal                               



             (test code = 60555-1)                                        



[id number]





             Test Item    Value        Reference Range Interpretation Comments

 

             Blood Culture-Aerobic Culture positive. No growth    AA           P

revious



             (test code = 17928-3) See Blood Culture                           p

reliminary



                          Workup for                             verified result



                          additional                             was Culture In



                          information.                           Progress on



                                                                 3/19/2023 at 02

01



                                                                 CDT

 

             Blood        No organisms No growth                 Previous



             Culture-Anaerobic isolated                               preliminar

y



             (test code = 27232-8)                                        verifi

ed result



                                                                 was Culture In



                                                                 Progress on



                                                                 3/19/2023 at 18

25



                                                                 CDT

 

             Lab Interpretation Abnormal                               



             (test code = 16503-1)                                        



[id number] 11:56:04





             Test Item    Value        Reference Range Interpretation Comments

 

             RETIC Count Automated 1.68 %       0.59-2.24                 



             (test code = 7963138865)                                        

 

             RETIC Absolute Count 0.0509       See_Comment                [Autom

ated message]



             (test code = 3117869946)                                        The

 system which



                                                                 generated this 

result



                                                                 transmitted ref

erence



                                                                 range: 0.0260 -



                                                                 0.1170 10*6/?L.

 The



                                                                 reference range

 was



                                                                 not used to int

erpret



                                                                 this result as



                                                                 normal/abnormal

.

 

             IRF % (test code = 10.50 %      2.00-19.10                



             2218538077)                                         

 

             RETIC-HE (test code = 26.8 pg      27.3-36.4    L            



             1613517389)                                         

 

             Lab Interpretation (test Abnormal                               



             code = 18298-6)                                        



[facility]





             Test Item    Value        Reference Range Interpretation Comments

 

             Fibrinogen (test code = 4151748121) 127 mg/dL    167-453      L    

        

 

             Lab Interpretation (test code = Abnormal                           

    



             59496-7)                                            



[id number] 11:49:06





             Test Item    Value        Reference Range Interpretation Comments

 

             PROTIME PATIENT (test 25.4         See_Comment  H             [Auto

mated message]



             code = 5964-2)                                        The system [employer]



                                                                 generated this 

result



                                                                 transmitted ref

erence



                                                                 range: 10.1 - 1

2.6



                                                                 Seconds. The



                                                                 reference range

 was



                                                                 not used to int

erpret



                                                                 this result as



                                                                 normal/abnormal

.

 

             INR (test code = 6301-6) 2.3                                    Nor

mal INR <1.1;



                                                                 Warfarin Therap

eutic



                                                                 range 2.0 to 3.

0 or



                                                                 2.5 to 3.5, dep

ending



                                                                 upon the indica

tions.

 

             Lab Interpretation (test Abnormal                               



             code = 98631-1)                                        



[facility] (NA, K, CL, CO2, 
GLUCOSE, BUN, CREATININE, CA)2023 09:35:08





             Test Item    Value        Reference Range Interpretation Comments

 

             NA (test code = 133 mmol/L   135-145      L            



             9018550245)                                         

 

             K (test code = 3.7 mmol/L   3.5-5.0                   



             4025112675)                                         

 

             CL (test code = 106 mmol/L                       



             4544261733)                                         

 

             CO2 TOTAL (test code = 28 mmol/L    23-31                     



             0278737716)                                         

 

             AGAP (test code =              2-16         L            



             3539153253)                                         

 

             BUN (test code = 7 mg/dL      7-23                      



             5713153671)                                         

 

             GLUCOSE (test code = 100 mg/dL                        



             5142130491)                                         

 

             CREATININE (test code = 0.42 mg/dL   0.60-1.25    L            



             3580642183)                                         

 

             CALCIUM (test code = 6.5 mg/dL    8.6-10.6     L            



             6559515641)                                         

 

             eGFR (test code = 211.1        mL/min/1.73m2              



             6913538781)                                         

 

             RENETTA (test code = RENETTA) Association of                           



                          Glomerular Filtration                           



                          Rate (GFR) and Staging                           



                          of Kidney Disease*                           



                          +---------------------                           



                          --+-------------------                           



                          --+-------------------                           



                          ------+| GFR                           



                          (mL/min/1.73 m2) ?|                           



                          With Kidney Damage ?|                           



                          ?Without Kidney                           



                          Damage+---------------                           



                          --------+-------------                           



                          --------+-------------                           



                          ------------+| ?>90 ?                           



                          ? ? ? ? ? ? ? ?|                           



                          ?Stage one ? ? ? ? ?|                           



                          ? Normal ? ? ? ? ? ? ?                           



                          ?+--------------------                           



                          ---+------------------                           



                          ---+------------------                           



                          -------+| ?60-89 ? ? ?                           



                          ? ? ? ? ?| ?Stage two                           



                          ? ? ? ? ?| ? Decreased                           



                          GFR ? ? ? ?                            



                          +---------------------                           



                          --+-------------------                           



                          --+-------------------                           



                          ------+| ?30-59 ? ? ?                           



                          ? ? ? ? ?| ?Stage                           



                          three ? ? ? ?| ? Stage                           



                          three ? ? ? ? ?                           



                          +---------------------                           



                          --+-------------------                           



                          --+-------------------                           



                          ------+| ?15-29 ? ? ?                           



                          ? ? ? ? ?| ?Stage four                           



                          ? ? ? ? | ? Stage four                           



                          ? ? ? ? ?                              



                          ?+--------------------                           



                          ---+------------------                           



                          ---+------------------                           



                          -------+| ?<15 (or                           



                          dialysis) ? ?| ?Stage                           



                          five ? ? ? ? | ? Stage                           



                          five ? ? ? ? ?                           



                          ?+--------------------                           



                          ---+------------------                           



                          ---+------------------                           



                          -------+ *Each stage                           



                          assumes the associated                           



                          GFR level has been in                           



                          effect for at least                           



                          three months. ?Stages                           



                          1 to 5, with or                           



                          without kidney                           



                          disease, indicate                           



                          chronic kidney                           



                          disease. Notes:                           



                          Determination of                           



                          stages one and two                           



                          (with eGFR                             



                          >59mL/min/1.73 m2)                           



                          requires estimation of                           



                          kidney damage for at                           



                          least three months as                           



                          defined by structural                           



                          or functional                           



                          abnormalities of the                           



                          kidney, manifested by                           



                          either:Pathological                           



                          abnormalities or                           



                          Markers of kidney                           



                          damage (including                           



                          abnormalities in the                           



                          composition of the                           



                          blood or urine or                           



                          abnormalities in                           



                          imaging tests).                           

 

             Lab Interpretation Abnormal                               



             (test code = 64927-7)                                        



[facility]37





             Test Item    Value        Reference Range Interpretation Comments

 

             MAGNESIUM (test code = 6103331918) 1.5 mg/dL    1.7-2.4      L     

       

 

             Lab Interpretation (test code = Abnormal                           

    



             00096-1)                                            



[facility] WITH [id number] 09:27:43





             Test Item    Value        Reference Range Interpretation Comments

 

             WBC (test code = 2.93         See_Comment  L             [Automated



             1714-2)                                             message] The sy

stem



                                                                 which generated



                                                                 this result



                                                                 transmitted



                                                                 reference range

:



                                                                 4.20 - 10.70



                                                                 10*3/?L. The



                                                                 reference range

 was



                                                                 not used to



                                                                 interpret this



                                                                 result as



                                                                 normal/abnormal

.

 

             RBC (test code = 3.06         See_Comment  L             [Automated



             208-8)                                              message] The sy

stem



                                                                 which generated



                                                                 this result



                                                                 transmitted



                                                                 reference range

:



                                                                 4.26 - 5.52



                                                                 10*6/?L. The



                                                                 reference range

 was



                                                                 not used to



                                                                 interpret this



                                                                 result as



                                                                 normal/abnormal

.

 

             HGB (test code = 7.9 g/dL     12.2-16.4    L            



             718-7)                                              

 

             HCT (test code = 23.9 %       38.4-49.3    L            



             4544-3)                                             

 

             MCV (test code = 78.1 fL      81.7-95.6    L            



             787-2)                                              

 

             MCH (test code = 25.8 pg      26.1-32.7    L            



             785-6)                                              

 

             MCHC (test code = 33.1 g/dL    31.2-35.0                 



             786-4)                                              

 

             RDW-SD (test code = 51.2 fL      38.5-51.6                 



             24710-4)                                            

 

             RDW-CV (test code = 18.3 %       12.1-15.4    H            



             788-0)                                              

 

             PLT (test code = 18           See_Comment  LL            [Automated



             777-3)                                              message] The sy

stem



                                                                 which generated



                                                                 this result



                                                                 transmitted



                                                                 reference range

:



                                                                 150 - 328 10*3/

?L.



                                                                 The reference r

moose



                                                                 was not used to



                                                                 interpret this



                                                                 result as



                                                                 normal/abnormal

.

 

             MPV (test code =                                        Not Measure

d



             24839-6)                                            

 

             IPF % (test code = 8.0 %        1.2-10.7                  Platelet 

count



             [id number])                                         measured by



                                                                 fluorescence



                                                                 method.

 

             NRBC/100 WBC (test 0.0          See_Comment                [Automat

ed



             code = 8429227448)                                        message] 

The system



                                                                 which generated



                                                                 this result



                                                                 transmitted



                                                                 reference range

:



                                                                 0.0 - 10.0 /100



                                                                 WBCs. The refer

ence



                                                                 range was not u

sed



                                                                 to interpret th

is



                                                                 result as



                                                                 normal/abnormal

.

 

             NRBC x10^3 (test code              See_Comment                [Auto

mated



             = 8141597619)                                        message] The s

ystem



                                                                 which generated



                                                                 this result



                                                                 transmitted



                                                                 reference range

:



                                                                 10*3/?L. The



                                                                 reference range

 was



                                                                 not used to



                                                                 interpret this



                                                                 result as



                                                                 normal/abnormal

.

 

             GRAN MAT (NEUT) % 54.9 %                                 



             (test code = 770-8)                                        

 

             IMM GRAN % (test code 0.70 %                                 



             = [id number])                                        

 

             LYMPH % (test code = 22.9 %                                 



             736-9)                                              

 

             MONO % (test code = 16.0 %                                 



             5905-5)                                             

 

             EOS % (test code = 4.8 %                                  



             713-8)                                              

 

             BASO % (test code = 0.7 %                                  



             706-2)                                              

 

             GRAN MAT x10^3(ANC) 1.61 10*3/uL 1.99-6.95    L            



             (test code =                                        



             1261147944)                                         

 

             IMM GRAN x10^3 (test              0.00-0.06                 



             code = 2533867070)                                        

 

             LYMPH x10^3 (test code 0.67 10*3/uL 1.09-3.23    L            



             = 731-0)                                            

 

             MONO x10^3 (test code 0.47 10*3/uL 0.36-1.02                 



             = 742-7)                                            

 

             EOS x10^3 (test code = 0.14 10*3/uL 0.06-0.53                 



             711-2)                                              

 

             BASO x10^3 (test code              0.01-0.09                 



             = 704-7)                                            

 

             BENJI CELLS (test code 2+           See_Comment  A             [Auto

mated



             = 1390-9)                                           message] The sy

stem



                                                                 which generated



                                                                 this result



                                                                 transmitted



                                                                 reference range

:



                                                                 (none). The



                                                                 reference range

 was



                                                                 not used to



                                                                 interpret this



                                                                 result as



                                                                 normal/abnormal

.

 

             Lab Interpretation Abnormal                               



             (test code = 02465-8)                                        



[facility]





             Test Item    Value        Reference Range Interpretation Comments

 

             Fibrinogen (test code = 7849959807) 122 mg/dL    167-453      L    

        

 

             Lab Interpretation (test code = Abnormal                           

    



             03118-5)                                            



[facility] (in [facility]): 1 
Units~Indication: 1) Fibrinogen &lt; 100 mg/dL with bleeding or potential for 
bleeding associated with invasive gwbpzfuyw1138-32-82 18:41:31





             Test Item    Value        Reference Range Interpretation Comments

 

             Unit Blood Type (test O Neg                                  



             code = 4410)                                        

 

             ISBT Blood Type Code 9500                                   



             (test code = 480148)                                        

 

             Unit Number (test [id number]                           



             code = 4411)                                        

 

             Blood Expiration Date                            



             & Time (test code =                                        



             309424)                                             

 

             Status Information Issued                                 



             (test code = 4412)                                        

 

             Product      Cryoprecipitate                           



             Identification (test                                        



             code = 4413)                                        

 

             Product Code (test P1215I68                               Performed

 at [facility]



             code = 4414)                                        Laboratory



                                                                 Services - GAL



                                                                 Blood [address] [place] [address]



                                                                 [id number] Free:



                                                                 [phone]

A



                                                                 No. [id number]



[facility] PATH[facility]ENS DNA 
HEMOZ-FGEASUG4249-64-20 05:00:05





             Test Item    Value        Reference Range Interpretation Comments

 

             Acinetobacter species Positive     Negative, See A            



             (test code = 09397-7)              Comment/Narrative              

 

             RENETTA (test code = RENETTA) See blood culture                           



                          result for additional                           



                          information. ?Testing                           



                          included eight                           



                          identification and six                           



                          resistance marker                           



                          targets.                               

 

             Lab Interpretation Abnormal                               



             (test code = 93109-1)                                        



[facility] Acid Whole Sxaoi3283-40-33 21:53:15





             Test Item    Value        Reference Range Interpretation Comments

 

             LACTIC ACID (test code = 3.62 mmol/L  0.50-2.20    H            



             7520603828)                                         

 

             Lab Interpretation (test code = Abnormal                           

    



             79307-4)                                            



[facility] Packed RBC (in units), 2 Units
2023 19:27:30





             Test Item    Value        Reference Range Interpretation Comments

 

             Cross Match Result Compatible                             



             (test code = 4409)                                        

 

             ISBT Blood Type Code 5100                                   



             (test code = 697545)                                        

 

             Unit Blood Type (test O Pos                                  



             code = 4410)                                        

 

             Unit Number (test [id number]                           



             code = 4411)                                        

 

             Blood Expiration Date 041862710897                           



             & Time (test code =                                        



             750509)                                             

 

             Status Information Issued                                 



             (test code = 4412)                                        

 

             Product      Red Blood Cells                           



             Identification (test                                        



             code = 4413)                                        

 

             Product Code (test K8987H57                               Performed

 at [facility]



             code = 4414)                                        Laboratory



                                                                 Services - GAL



                                                                 Blood [address]



                                                                 [address] Free:



                                                                 [phone]

A



                                                                 No. [id number]



[facility] Acid Whole Wacfd6152-84-22 17:17:41





             Test Item    Value        Reference Range Interpretation Comments

 

             LACTIC ACID (test code = 4.31 mmol/L  0.50-2.20    H            



             2489271322)                                         

 

             Lab Interpretation (test code = Abnormal                           

    



             55980-1)                                            



[facility] / [id number] 12:01:29





             Test Item    Value        Reference Range Interpretation Comments

 

             PROTIME PATIENT (test 25.2         See_Comment  H             [Auto

mated message]



             code = 5964-2)                                        The system [employer]



                                                                 generated this 

result



                                                                 transmitted ref

erence



                                                                 range: 10.1 - 1

2.6



                                                                 Seconds. The



                                                                 reference range

 was



                                                                 not used to int

erpret



                                                                 this result as



                                                                 normal/abnormal

.

 

             INR (test code = 6301-6) 2.3                                    Nor

mal INR <1.1;



                                                                 Warfarin Therap

eutic



                                                                 range 2.0 to 3.

0 or



                                                                 2.5 to 3.5, dep

ending



                                                                 upon the indica

tions.

 

             Lab Interpretation (test Abnormal                               



             code = 10049-2)                                        



[facility] Whole Tfxml9828-31-46 11:52:59





             Test Item    Value        Reference Range Interpretation Comments

 

             LACTIC ACID (test code = 4.93 mmol/L  0.50-2.20    H            



             1929413251)                                         

 

             Lab Interpretation (test code = Abnormal                           

    



             70608-9)                                            



[facility] Cryoprecipitate (in units): 1 
Units~Indication: 1) Fibrinogen &lt; 100 mg/dL with bleeding or potential for 
bleeding associated with invasive qfnwhabha4639-85-49 10:16:18





             Test Item    Value        Reference Range Interpretation Comments

 

             Unit Blood Type (test O                                      



             code = 4410)                                        

 

             ISBT Blood Type Code D000                                   



             (test code = 153964)                                        

 

             Unit Number (test [id number]                           



             code = 4411)                                        

 

             Blood Expiration Date                            



             & Time (test code =                                        



             160234)                                             

 

             Status Information Issued                                 



             (test code = 4412)                                        

 

             Product      Cryoprecipitate                           



             Identification (test                                        



             code = 4413)                                        

 

             Product Code (test Y2108S22                               Performed

 at [facility]



             code = 4414)                                        Laboratory



                                                                 Services - GAL



                                                                 Blood [address]

s



                                                                 [address] Free:



                                                                 [phone]

A



                                                                 No. [id number]



[facility] Packed RBC (in units), 2 Units
2023 10:16:18





             Test Item    Value        Reference Range Interpretation Comments

 

             Cross Match Result Compatible                             



             (test code = 4409)                                        

 

             ISBT Blood Type Code 5100                                   



             (test code = 259452)                                        

 

             Unit Blood Type (test O Pos                                  



             code = 4410)                                        

 

             Unit Number (test [id number]                           



             code = 4411)                                        

 

             Blood Expiration Date                            



             & Time (test code =                                        



             049472)                                             

 

             Status Information Issued                                 



             (test code = 4412)                                        

 

             Product      Red Blood Cells                           



             Identification (test                                        



             code = 4413)                                        

 

             Product Code (test P8206G05                               Performed

 at [facility]



             code = 4414)                                        Laboratory



                                                                 Services - GAL



                                                                 Blood [address]

s



                                                                 [address] Free:



                                                                 [phone]

A



                                                                 No. [id number]



[facility] - 30 minutes after 
transfusion of each [id number] 09:00:47





             Test Item    Value        Reference Range Interpretation Comments

 

             WBC (test code = 6.20         See_Comment                [Automated

 message]



             6690-2)                                             The system [employer]



                                                                 generated this 

result



                                                                 transmitted ref

erence



                                                                 range: 4.20 - 1

0.70



                                                                 10*3/?L. The



                                                                 reference range

 was



                                                                 not used to int

erpret



                                                                 this result as



                                                                 normal/abnormal

.

 

             RBC (test code = 789-8) 2.41         See_Comment  L             [Au

tomated message]



                                                                 The system [employer]





                                                                 generated this 

result



                                                                 transmitted ref

erence



                                                                 range: 4.26 - 5

.52



                                                                 10*6/?L. The



                                                                 reference range

 was



                                                                 not used to int

erpret



                                                                 this result as



                                                                 normal/abnormal

.

 

             HGB (test code = 718-7) 6.0 g/dL     12.2-16.4    L            

 

             HCT (test code = 18.6 %       38.4-49.3    L            



             4544-3)                                             

 

             MCH (test code = 785-6) 24.9 pg      26.1-32.7    L            

 

             MCV (test code = 787-2) 77.2 fL      81.7-95.6    L            

 

             MCHC (test code = 32.3 g/dL    31.2-35.0                 



             786-4)                                              

 

             PLT (test code = 777-3) 44           See_Comment  LL            [Au

tomated message]



                                                                 The system [employer]





                                                                 generated this 

result



                                                                 transmitted ref

erence



                                                                 range: 150 - 32

8



                                                                 10*3/?L. The



                                                                 reference range

 was



                                                                 not used to int

erpret



                                                                 this result as



                                                                 normal/abnormal

.

 

             MPV (test code =                                        Not Measure

d



             19479-6)                                            

 

             RDW-CV (test code = 19.5 %       12.1-15.4    H            



             788-0)                                              

 

             RDW-SD (test code = 55.1 fL      38.5-51.6    H            



             39352-4)                                            

 

             NRBC x10^3 (test code =              See_Comment                [Au

tomated message]



             8167900228)                                         The system [employer]



                                                                 generated this 

result



                                                                 transmitted ref

erence



                                                                 range: 10*3/?L.

 The



                                                                 reference range

 was



                                                                 not used to int

erpret



                                                                 this result as



                                                                 normal/abnormal

.

 

             NRBC/100 WBC (test code 0.0          See_Comment                [Au

tomated message]



             = [id number])                                        The system [facility]



                                                                 generated this 

result



                                                                 transmitted ref

erence



                                                                 range: 0.0 - 10

.0



                                                                 /100 WBCs. The



                                                                 reference range

 was



                                                                 not used to int

erpret



                                                                 this result as



                                                                 normal/abnormal

.

 

             IPF % (test code = 5.6 %        1.2-10.7                  Platelet 

count



             [id number])                                         measured by



                                                                 fluorescence me

thod.

 

             Lab Interpretation Abnormal                               



             (test code = 27359-5)                                        



[facility] Cuacs7593-07-82 08:52:22





             Test Item    Value        Reference Range Interpretation Comments

 

             LACTIC ACID (test code = 5.63 mmol/L  0.50-2.20    H            



             5248225112)                                         

 

             Lab Interpretation (test code = Abnormal                           

    



             38882-5)                                            



[facility]FR [id number] 05:19:47





             Test Item    Value        Reference Range Interpretation Comments

 

             FREE T4 (test code = 1.82         See_Comment                [Autom

ated message]



             [id number])                                         The system [employer]



                                                                 generated this 

result



                                                                 transmitted ref

erence



                                                                 range: 0.78 - 2

.20



                                                                 ng/dL:. The ref

erence



                                                                 range was not u

sed to



                                                                 interpret this 

result



                                                                 as normal/abnor

mal.

 

             Lab Interpretation (test Normal                                 



             code = 43053-3)                                        



[facility] UBOQQQPY3489-04-16 04:49:47





             Test Item    Value        Reference Range Interpretation Comments

 

             HCV Ab (test code = 31741-3) Negative                              

 

 

             HCV Semi-Quantitative (test code = 0.05                            

       



             67896-1)                                            



[facility]ID STIM[facility]ATING [id number] 04:32:04





             Test Item    Value        Reference Range Interpretation Comments

 

             TSH (test code = 1.95         See_Comment                [Automated

 message]



             [id number])                                         The system [employer]



                                                                 generated this 

result



                                                                 transmitted ref

erence



                                                                 range: 0.45 - 4

.70



                                                                 mIU/L. The refe

rence



                                                                 range was not u

sed to



                                                                 interpret this 

result



                                                                 as normal/abnor

mal.

 

             Lab Interpretation (test Normal                                 



             code = 76478-0)                                        



[facility] [id number] 04:02:48





             Test Item    Value        Reference Range Interpretation Comments

 

             PH (test code = 2) 7.39         7.35-7.45                 

 

             PCO2 (test code = 27           See_Comment  L             [Automate

d



             [id number])                                         message] The sy

stem



                                                                 which generated



                                                                 this result



                                                                 transmitted



                                                                 reference range

: 35



                                                                 - 45 mmHg. The



                                                                 reference range

 was



                                                                 not used to



                                                                 interpret this



                                                                 result as



                                                                 normal/abnormal

.

 

             PO2 (test code = 120          See_Comment  H             [Automated



             [id number])                                         message] The sy

stem



                                                                 which generated



                                                                 this result



                                                                 transmitted



                                                                 reference range

: 80



                                                                 - 100 mmHg. The



                                                                 reference range

 was



                                                                 not used to



                                                                 interpret this



                                                                 result as



                                                                 normal/abnormal

.

 

             HCO3 (test code = 17           See_Comment  L             [Automate

d



             [id number])                                         message] The sy

stem



                                                                 which generated



                                                                 this result



                                                                 transmitted



                                                                 reference range

: 22



                                                                 - 26 mEq/L. The



                                                                 reference range

 was



                                                                 not used to



                                                                 interpret this



                                                                 result as



                                                                 normal/abnormal

.

 

             BE (test code = -8.5         See_Comment  L             [Automated



             [id number])                                         message] The sy

stem



                                                                 which generated



                                                                 this result



                                                                 transmitted



                                                                 reference range

:



                                                                 -3.0 - 3.0 mEq/

L.



                                                                 The reference r

moose



                                                                 was not used to



                                                                 interpret this



                                                                 result as



                                                                 normal/abnormal

.

 

             THB (test code = 6.2 g/dL     13.5-18.0    LL           



             2098181986)                                         

 

             %O2HB (test code = 97.0 %       94.0-99.0                 



             4464669518)                                         

 

             %COHB ART (test code = 2.0 %        0.0-1.5      H            



             3628833906)                                         

 

             %METHB ART (test code = 0.2 %        0.4-1.5      L            



             3294923416)                                         

 

             VOL%O2 ART (test code = 8.8 %        15.0-23.0    L            



             6180089830)                                         

 

             NA (test code = 129 mmol/L   135-145      L            



             0162453295)                                         

 

             K+ (test code = 4.0 mmol/L   3.5-5.0                   



             3442089146)                                         

 

             AC CA IONZ (test code = 4.10 mg/dL   4.50-5.30    L            



             7931512551)                                         

 

             GLUCOSE (test code = 111 mg/dL           H            



             6402004265)                                         

 

             LACTIC ACID (test code 7.31 mmol/L  0.50-2.20    H            



             = 0422623320)                                        

 

             Lab Interpretation Abnormal                               



             (test code = 99061-9)                                        



[facility] [id number] 03:46:18





             Test Item    Value        Reference Range Interpretation Comments

 

             HIV          0.09         Negative                  



             Semi-quantitative                                        



             (test code =                                        



             55356-7)                                            

 

             RENETTA (test code = Non-reactive for HIV-1                           



             RENETTA)         antigen and HIV-1/HIV-2                           



                          antibodies. ?No                           



                          laboratory evidence of                           



                          HIV infection. ?Repeat in                           



                          2-4 weeks if acute HIV                           



                          infection is suspected.                           



[facility] [id number] 01:46:37





             Test Item    Value        Reference Range Interpretation Comments

 

             OSMOLALITY (test code = 373          See_Comment  HH            [Au

tomated message]



             5880-2)                                             The system [employer]

h



                                                                 generated this 

result



                                                                 transmitted ref

erence



                                                                 range: 278 - 30

5



                                                                 mOsm/kg. The



                                                                 reference range

 was



                                                                 not used to int

erpret



                                                                 this result as



                                                                 normal/abnormal

.

 

             Lab Interpretation (test Abnormal                               



             code = 76585-9)                                        



[facility]07





             Test Item    Value        Reference Range Interpretation Comments

 

             Fibrinogen (test code = 7132608470) 96 mg/dL     167-453      LL   

        

 

             Lab Interpretation (test code = Abnormal                           

    



             16214-3)                                            



[facility] WITH [id number] 01:05:00





             Test Item    Value        Reference Range Interpretation Comments

 

             WBC (test code = 2.86         See_Comment  L             [Automated



             8190-2)                                             message] The sy

stem



                                                                 which generated



                                                                 this result



                                                                 transmitted



                                                                 reference range

:



                                                                 4.20 - 10.70



                                                                 10*3/?L. The



                                                                 reference range

 was



                                                                 not used to



                                                                 interpret this



                                                                 result as



                                                                 normal/abnormal

.

 

             RBC (test code = 2.34         See_Comment  L             [Automated



             789-8)                                              message] The sy

stem



                                                                 which generated



                                                                 this result



                                                                 transmitted



                                                                 reference range

:



                                                                 4.26 - 5.52



                                                                 10*6/?L. The



                                                                 reference range

 was



                                                                 not used to



                                                                 interpret this



                                                                 result as



                                                                 normal/abnormal

.

 

             HGB (test code = 5.1 g/dL     12.2-16.4    L            



             718-7)                                              

 

             HCT (test code = 17.9 %       38.4-49.3    L            



             4544-3)                                             

 

             MCV (test code = 76.5 fL      81.7-95.6    L            



             787-2)                                              

 

             MCH (test code = 21.8 pg      26.1-32.7    L            



             785-6)                                              

 

             MCHC (test code = 28.5 g/dL    31.2-35.0    L            



             786-4)                                              

 

             RDW-SD (test code = 59.5 fL      38.5-51.6    H            



             27099-2)                                            

 

             RDW-CV (test code = 21.6 %       12.1-15.4    H            



             788-0)                                              

 

             PLT (test code = 55           See_Comment  L             [Automated



             777-3)                                              message] The sy

stem



                                                                 which generated



                                                                 this result



                                                                 transmitted



                                                                 reference range

:



                                                                 150 - 328 10*3/

?L.



                                                                 The reference r

moose



                                                                 was not used to



                                                                 interpret this



                                                                 result as



                                                                 normal/abnormal

.

 

             MPV (test code =                                        Not Measure

d



             42703-4)                                            

 

             IPF % (test code = 6.8 %        1.2-10.7                  Platelet 

count



             7690823910)                                         measured by



                                                                 fluorescence



                                                                 method.

 

             NRBC/100 WBC (test 0.0          See_Comment                [Automat

ed



             code = 5103429075)                                        message] 

The system



                                                                 which generated



                                                                 this result



                                                                 transmitted



                                                                 reference range

:



                                                                 0.0 - 10.0 /100



                                                                 WBCs. The refer

ence



                                                                 range was not u

sed



                                                                 to interpret th

is



                                                                 result as



                                                                 normal/abnormal

.

 

             NRBC x10^3 (test code              See_Comment                [Auto

mated



             = 7157802906)                                        message] The s

ystem



                                                                 which generated



                                                                 this result



                                                                 transmitted



                                                                 reference range

:



                                                                 10*3/?L. The



                                                                 reference range

 was



                                                                 not used to



                                                                 interpret this



                                                                 result as



                                                                 normal/abnormal

.

 

             GRAN MAT (NEUT) % 51.4 %                                 



             (test code = 770-8)                                        

 

             IMM GRAN % (test code 0.30 %                                 



             = 4779425103)                                        

 

             LYMPH % (test code = 31.5 %                                 



             736-9)                                              

 

             MONO % (test code = 13.3 %                                 



             5905-5)                                             

 

             EOS % (test code = 2.1 %                                  



             713-8)                                              

 

             BASO % (test code = 1.4 %                                  



             706-2)                                              

 

             GRAN MAT x10^3(ANC) 1.47 10*3/uL 1.99-6.95    L            



             (test code =                                        



             3359557034)                                         

 

             IMM GRAN x10^3 (test              0.00-0.06                 



             code = 7998284633)                                        

 

             LYMPH x10^3 (test code 0.90 10*3/uL 1.09-3.23    L            



             = 731-0)                                            

 

             MONO x10^3 (test code 0.38 10*3/uL 0.36-1.02                 



             = 742-7)                                            

 

             EOS x10^3 (test code = 0.06 10*3/uL 0.06-0.53                 



             711-2)                                              

 

             BASO x10^3 (test code 0.04 10*3/uL 0.01-0.09                 



             = 704-7)                                            

 

             BENJI CELLS (test code 2+           See_Comment  A             [Auto

mated



             = 2178-9)                                           message] The sy

stem



                                                                 which generated



                                                                 this result



                                                                 transmitted



                                                                 reference range

:



                                                                 (none). The



                                                                 reference range

 was



                                                                 not used to



                                                                 interpret this



                                                                 result as



                                                                 normal/abnormal

.

 

             SCHISTOCYTES (test 1+                        A            



             code = 800-3)                                        

 

             Lab Interpretation Abnormal                               



             (test code = 74807-3)                                        



[facility]58





             Test Item    Value        Reference Range Interpretation Comments

 

             ALCOHOL (test code = 355 mg/dL                              



             2115812290)                                         

 

             RENETTA (test code = Toxic Greater than or                           



             RENETTA)         equal to 80 mg/dL. NOTE:                           



                          Whole blood values are                           



                          approximately 10% to 15%                           



                          lower than serum and                           



                          plasma.                                



[facility] RBC (in units), 1 Units
2023 00:29:29





             Test Item    Value        Reference Range Interpretation Comments

 

             Cross Match Result Compatible                             



             (test code = 4409)                                        

 

             ISBT Blood Type Code 5100                                   



             (test code = 344633)                                        

 

             Unit Blood Type (test O Pos                                  



             code = 4410)                                        

 

             Unit Number (test [id number]                           



             code = 4411)                                        

 

             Blood Expiration Date                            



             & Time (test code =                                        



             611562)                                             

 

             Status Information Issued                                 



             (test code = 4412)                                        

 

             Product      Red Blood Cells                           



             Identification (test                                        



             code = 4413)                                        

 

             Product Code (test [id number]                               Performed

 at [facility]



             code = 4414)                                        Laboratory



                                                                 Services - GAL



                                                                 Blood [address] [place] [address]



                                                                 [id number] Free:



                                                                 [phone]

A



                                                                 No. [id number]



[facility] (NA, K, CL, CO2, 
GLUCOSE, BUN, CREATININE, CA)2023 00:27:54





             Test Item    Value        Reference Range Interpretation Comments

 

             NA (test code = 132 mmol/L   135-145      L            



             5167747121)                                         

 

             K (test code = 3.5 mmol/L   3.5-5.0                   



             8435369685)                                         

 

             CL (test code = 103 mmol/L                       



             5519227841)                                         

 

             CO2 TOTAL (test code = 17 mmol/L    23-31        L            



             4381579517)                                         

 

             AGAP (test code = 12           2-16                      



             9888798376)                                         

 

             BUN (test code = 7 mg/dL      7-23                      



             2599665485)                                         

 

             GLUCOSE (test code = 145 mg/dL           H            



             7684828517)                                         

 

             CREATININE (test code = 0.48 mg/dL   0.60-1.25    L            



             0381058833)                                         

 

             CALCIUM (test code = 6.9 mg/dL    8.6-10.6     L            



             9271948501)                                         

 

             eGFR (test code = 181.0        mL/min/1.73m2              



             2126705492)                                         

 

             RENETTA (test code = RENETTA) Association of                           



                          Glomerular Filtration                           



                          Rate (GFR) and Staging                           



                          of Kidney Disease*                           



                          +---------------------                           



                          --+-------------------                           



                          --+-------------------                           



                          ------+| GFR                           



                          (mL/min/1.73 m2) ?|                           



                          With Kidney Damage ?|                           



                          ?Without Kidney                           



                          Damage+---------------                           



                          --------+-------------                           



                          --------+-------------                           



                          ------------+| ?>90 ?                           



                          ? ? ? ? ? ? ? ?|                           



                          ?Stage one ? ? ? ? ?|                           



                          ? Normal ? ? ? ? ? ? ?                           



                          ?+--------------------                           



                          ---+------------------                           



                          ---+------------------                           



                          -------+| ?60-89 ? ? ?                           



                          ? ? ? ? ?| ?Stage two                           



                          ? ? ? ? ?| ? Decreased                           



                          GFR ? ? ? ?                            



                          +---------------------                           



                          --+-------------------                           



                          --+-------------------                           



                          ------+| ?30-59 ? ? ?                           



                          ? ? ? ? ?| ?Stage                           



                          three ? ? ? ?| ? Stage                           



                          three ? ? ? ? ?                           



                          +---------------------                           



                          --+-------------------                           



                          --+-------------------                           



                          ------+| ?15-29 ? ? ?                           



                          ? ? ? ? ?| ?Stage four                           



                          ? ? ? ? | ? Stage four                           



                          ? ? ? ? ?                              



                          ?+--------------------                           



                          ---+------------------                           



                          ---+------------------                           



                          -------+| ?<15 (or                           



                          dialysis) ? ?| ?Stage                           



                          five ? ? ? ? | ? Stage                           



                          five ? ? ? ? ?                           



                          ?+--------------------                           



                          ---+------------------                           



                          ---+------------------                           



                          -------+ *Each stage                           



                          assumes the associated                           



                          GFR level has been in                           



                          effect for at least                           



                          three months. ?Stages                           



                          1 to 5, with or                           



                          without kidney                           



                          disease, indicate                           



                          chronic kidney                           



                          disease. Notes:                           



                          Determination of                           



                          stages one and two                           



                          (with eGFR                             



                          >59mL/min/1.73 m2)                           



                          requires estimation of                           



                          kidney damage for at                           



                          least three months as                           



                          defined by structural                           



                          or functional                           



                          abnormalities of the                           



                          kidney, manifested by                           



                          either:Pathological                           



                          abnormalities or                           



                          Markers of kidney                           



                          damage (including                           



                          abnormalities in the                           



                          composition of the                           



                          blood or urine or                           



                          abnormalities in                           



                          imaging tests).                           

 

             Lab Interpretation Abnormal                               



             (test code = 06079-5)                                        



[facility] (ALB,T.PRO,BILI
T,BU/BC,ALT,AST,ALK PHOS)2023 00:27:54





             Test Item    Value        Reference Range Interpretation Comments

 

             TOTAL BILI (test code = 0459803515) 3.0 mg/dL    0.1-1.1      H    

        

 

             BILI UNCON (test code = 7054767498) 1.5 mg/dL    0.1-1.1      H    

        

 

             BILI CONJ (test code = 9421533355) 0.3 mg/dL    0.0-0.3            

       

 

             T PROTEIN (test code = 6898155023) 5.6 g/dL     6.3-8.2      L     

       

 

             ALBUMIN (test code = 2938743229) 2.3 g/dL     3.5-5.0      L       

     

 

             ALK PHOS (test code = 0015129937) 182 U/L             H      

      

 

             ALTv (test code = 1742-6) 27 U/L       5-50                      

 

             AST(SGOT) (test code = 6371802431) 58 U/L       13-40        H     

       

 

             Lab Interpretation (test code = Abnormal                           

    



             53864-2)                                            



[id number]27:54





             Test Item    Value        Reference Range Interpretation Comments

 

             CK (test code = 8318251580) 689 U/L             H            

 

             Lab Interpretation (test code = Abnormal                           

    



             90404-5)                                            



[facility]54





             Test Item    Value        Reference Range Interpretation Comments

 

             MAGNESIUM (test code = 3058285734) 2.1 mg/dL    1.7-2.4            

       

 

             Lab Interpretation (test code = Normal                             

    



             84981-2)                                            



[facility]54





             Test Item    Value        Reference Range Interpretation Comments

 

             PHOSPHORUS (test code = 6769513933) 5.1 mg/dL    2.5-5.0      H    

        

 

             Lab Interpretation (test code = Abnormal                           

    



             70291-1)                                            



[facility] [id number] [facility]:25:13





             Test Item    Value        Reference Range Interpretation Comments

 

             AMMONIA (test code = 9971873515) 71 umol/L    9-33         H       

     

 

             Lab Interpretation (test code = Abnormal                           

    



             56688-0)                                            



[facility] Lactic [id number] 00:14:10





             Test Item    Value        Reference Range Interpretation Comments

 

             PH (test code = 2) 7.35         7.35-7.45                 

 

             PCO2 (test code = 25           See_Comment  L             [Automate

d



             [id number])                                         message] The sy

stem



                                                                 which generated



                                                                 this result



                                                                 transmitted



                                                                 reference range

: 35



                                                                 - 45 mmHg. The



                                                                 reference range

 was



                                                                 not used to



                                                                 interpret this



                                                                 result as



                                                                 normal/abnormal

.

 

             PO2 (test code = 158          See_Comment  H             [Automated



             [id number])                                         message] The sy

stem



                                                                 which generated



                                                                 this result



                                                                 transmitted



                                                                 reference range

: 80



                                                                 - 100 mmHg. The



                                                                 reference range

 was



                                                                 not used to



                                                                 interpret this



                                                                 result as



                                                                 normal/abnormal

.

 

             HCO3 (test code = 14           See_Comment  L             [Automate

d



             6850345036)                                         message] The sy

stem



                                                                 which generated



                                                                 this result



                                                                 transmitted



                                                                 reference range

: 22



                                                                 - 26 mEq/L. The



                                                                 reference range

 was



                                                                 not used to



                                                                 interpret this



                                                                 result as



                                                                 normal/abnormal

.

 

             BE (test code = -11.2        See_Comment  L             [Automated



             [id number])                                         message] The sy

stem



                                                                 which generated



                                                                 this result



                                                                 transmitted



                                                                 reference range

:



                                                                 -3.0 - 3.0 mEq/

L.



                                                                 The reference r

moose



                                                                 was not used to



                                                                 interpret this



                                                                 result as



                                                                 normal/abnormal

.

 

             THB (test code = 5.6 g/dL     13.5-18.0    LL           



             8449548702)                                         

 

             %O2HB (test code = 97.0 %       94.0-99.0                 



             9102832736)                                         

 

             %COHB ART (test code = 1.6 %        0.0-1.5      H            



             6594657473)                                         

 

             %METHB ART (test code = 0.6 %        0.4-1.5                   



             5362923647)                                         

 

             VOL%O2 ART (test code = 8.0 %        15.0-23.0    L            



             9260296459)                                         

 

             NA (test code = 128 mmol/L   135-145      L            



             1531150961)                                         

 

             K+ (test code = 3.2 mmol/L   3.5-5.0      L            



             5882586228)                                         

 

             AC CA IONZ (test code = 4.10 mg/dL   4.50-5.30    L            



             5894990088)                                         

 

             GLUCOSE (test code = 149 mg/dL           H            



             0829810947)                                         

 

             LACTIC ACID (test code 9.00 mmol/L  0.50-2.20    H            



             = 5256939822)                                        

 

             Lab Interpretation Abnormal                               



             (test code = 01459-4)                                        



[facility] LACTIC [id number] 00:10:44





             Test Item    Value        Reference Range Interpretation Comments

 

             PH (test code = 7.22         7.32-7.42    L            



             6614032924)                                         

 

             PCO2 YANELI (test code = 40           See_Comment  L             [Auto

mated



             6310526580)                                         message] The sy

stem



                                                                 which generated



                                                                 this result



                                                                 transmitted



                                                                 reference range

: 41



                                                                 - 51 mmHg. The



                                                                 reference range

 was



                                                                 not used to



                                                                 interpret this



                                                                 result as



                                                                 normal/abnormal

.

 

             PO2 YANELI (test code = 33           See_Comment                [Autom

ated



             8851896410)                                         message] The sy

stem



                                                                 which generated



                                                                 this result



                                                                 transmitted



                                                                 reference range

: 25



                                                                 - 40 mmHg. The



                                                                 reference range

 was



                                                                 not used to



                                                                 interpret this



                                                                 result as



                                                                 normal/abnormal

.

 

             HCO3 YANELI (test code = 16           See_Comment  L             [Auto

mated



             4293054578)                                         message] The sy

stem



                                                                 which generated



                                                                 this result



                                                                 transmitted



                                                                 reference range

: 24



                                                                 - 28 mEq/L. The



                                                                 reference range

 was



                                                                 not used to



                                                                 interpret this



                                                                 result as



                                                                 normal/abnormal

.

 

             AC VBE(BEAKER) (test -10.8        mEq/L                     



             code = 8697878127)                                        

 

             THB YANELI (test code = 6.1 g/dL     13.5-18.0    LL           



             8129739059)                                         

 

             %O2HB YANELI (test code = 37.9 %       52.0-63.0    L            



             8027271415)                                         

 

             %COHB YANELI (test code = 0.7 %        0.0-1.5                   



             4226177055)                                         

 

             %METHB YANELI (test code = 0.9 %        0.4-1.5                   



             5270138387)                                         

 

             VOL%O2 YANELI (test code = 3.3 %        6.0-12.0     L            



             9835607593)                                         

 

             NA (test code = 131 mmol/L   135-145      L            



             6204196323)                                         

 

             K+ (test code = 3.3 mmol/L   3.5-5.0      L            



             2077103205)                                         

 

             AC CA IONZ (test code = 4.20 mg/dL   4.50-5.30    L            



             0565283430)                                         

 

             GLUCOSE (test code = 149 mg/dL           H            



             3716716635)                                         

 

             LACTIC ACID (test code 8.68 mmol/L  0.50-2.20    H            



             = 1851006794)                                        

 

             Lab Interpretation Abnormal                               



             (test code = 63597-4)                                        



[facility] Metabolic Panel (NA, K, CL, CO2, 
GLUCOSE, BUN, CREATININE, CA)2023 22:07:47





             Test Item    Value        Reference Range Interpretation Comments

 

             NA (test code = 134 mmol/L   135-145      L            



             7577605680)                                         

 

             K (test code = 3.1 mmol/L   3.5-5.0      L            



             3829462151)                                         

 

             CL (test code = 103 mmol/L                       



             9163656860)                                         

 

             CO2 TOTAL (test code = 14 mmol/L    23-31        L            



             8142842887)                                         

 

             AGAP (test code = 17           2-16         H            



             8990147589)                                         

 

             BUN (test code = 6 mg/dL      7-23         L            



             2463844245)                                         

 

             GLUCOSE (test code = 153 mg/dL           H            



             5607762588)                                         

 

             CREATININE (test code = 0.54 mg/dL   0.60-1.25    L            



             6254803451)                                         

 

             CALCIUM (test code = 7.1 mg/dL    8.6-10.6     L            



             4839348201)                                         

 

             eGFR (test code = 158.0        mL/min/1.73m2              



             5166082735)                                         

 

             RENETTA (test code = RENETTA) Association of                           



                          Glomerular Filtration                           



                          Rate (GFR) and Staging                           



                          of Kidney Disease*                           



                          +---------------------                           



                          --+-------------------                           



                          --+-------------------                           



                          ------+| GFR                           



                          (mL/min/1.73 m2) ?|                           



                          With Kidney Damage ?|                           



                          ?Without Kidney                           



                          Damage+---------------                           



                          --------+-------------                           



                          --------+-------------                           



                          ------------+| ?>90 ?                           



                          ? ? ? ? ? ? ? ?|                           



                          ?Stage one ? ? ? ? ?|                           



                          ? Normal ? ? ? ? ? ? ?                           



                          ?+--------------------                           



                          ---+------------------                           



                          ---+------------------                           



                          -------+| ?60-89 ? ? ?                           



                          ? ? ? ? ?| ?Stage two                           



                          ? ? ? ? ?| ? Decreased                           



                          GFR ? ? ? ?                            



                          +---------------------                           



                          --+-------------------                           



                          --+-------------------                           



                          ------+| ?30-59 ? ? ?                           



                          ? ? ? ? ?| ?Stage                           



                          three ? ? ? ?| ? Stage                           



                          three ? ? ? ? ?                           



                          +---------------------                           



                          --+-------------------                           



                          --+-------------------                           



                          ------+| ?15-29 ? ? ?                           



                          ? ? ? ? ?| ?Stage four                           



                          ? ? ? ? | ? Stage four                           



                          ? ? ? ? ?                              



                          ?+--------------------                           



                          ---+------------------                           



                          ---+------------------                           



                          -------+| ?<15 (or                           



                          dialysis) ? ?| ?Stage                           



                          five ? ? ? ? | ? Stage                           



                          five ? ? ? ? ?                           



                          ?+--------------------                           



                          ---+------------------                           



                          ---+------------------                           



                          -------+ *Each stage                           



                          assumes the associated                           



                          GFR level has been in                           



                          effect for at least                           



                          three months. ?Stages                           



                          1 to 5, with or                           



                          without kidney                           



                          disease, indicate                           



                          chronic kidney                           



                          disease. Notes:                           



                          Determination of                           



                          stages one and two                           



                          (with eGFR                             



                          >59mL/min/1.73 m2)                           



                          requires estimation of                           



                          kidney damage for at                           



                          least three months as                           



                          defined by structural                           



                          or functional                           



                          abnormalities of the                           



                          kidney, manifested by                           



                          either:Pathological                           



                          abnormalities or                           



                          Markers of kidney                           



                          damage (including                           



                          abnormalities in the                           



                          composition of the                           



                          blood or urine or                           



                          abnormalities in                           



                          imaging tests).                           

 

             Lab Interpretation Abnormal                               



             (test code = 55006-8)                                        



[facility] / [id number] 22:04:46





             Test Item    Value        Reference Range Interpretation Comments

 

             PROTIME PATIENT (test 27.5         See_Comment  H             [Auto

mated message]



             code = 5964-2)                                        The system [employer]



                                                                 generated this 

result



                                                                 transmitted ref

erence



                                                                 range: 10.1 - 1

2.6



                                                                 Seconds. The



                                                                 reference range

 was



                                                                 not used to int

erpret



                                                                 this result as



                                                                 normal/abnormal

.

 

             INR (test code = 6301-6) 2.5                                    Nor

mal INR <1.1;



                                                                 Warfarin Therap

eutic



                                                                 range 2.0 to 3.

0 or



                                                                 2.5 to 3.5, dep

ending



                                                                 upon the indica

tions.

 

             Lab Interpretation (test Abnormal                               



             code = 75180-0)                                        



[facility]46





             Test Item    Value        Reference Range Interpretation Comments

 

             APTT Patient (test code 42           See_Comment  H             [Au

tomated message]



             = 3393-2)                                           The system [employer]



                                                                 generated this 

result



                                                                 transmitted ref

erence



                                                                 range: 26 - 36



                                                                 Seconds. The



                                                                 reference range

 was



                                                                 not used to int

erpret



                                                                 this result as



                                                                 normal/abnormal

.

 

             Lab Interpretation (test Abnormal                               



             code = 43277-6)                                        



[facility] / [id number] 22:02:45





             Test Item    Value        Reference Range Interpretation Comments

 

             WBC (test code = 3.90         See_Comment  L             [Automated

 message]



             2890-2)                                             The system [employer]



                                                                 generated this 

result



                                                                 transmitted ref

erence



                                                                 range: 4.20 - 1

0.70



                                                                 10*3/?L. The



                                                                 reference range

 was



                                                                 not used to int

erpret



                                                                 this result as



                                                                 normal/abnormal

.

 

             RBC (test code = 789-8) 2.63         See_Comment  L             [Au

tomated message]



                                                                 The system [employer]



                                                                 generated this 

result



                                                                 transmitted ref

erence



                                                                 range: 4.26 - 5

.52



                                                                 10*6/?L. The



                                                                 reference range

 was



                                                                 not used to int

erpret



                                                                 this result as



                                                                 normal/abnormal

.

 

             HGB (test code = 718-7) 5.8 g/dL     12.2-16.4    L            

 

             HCT (test code = 20.1 %       38.4-49.3    L            



             4544-3)                                             

 

             MCH (test code = 785-6) 22.1 pg      26.1-32.7    L            

 

             MCV (test code = 787-2) 76.4 fL      81.7-95.6    L            

 

             MCHC (test code = 28.9 g/dL    31.2-35.0    L            



             786-4)                                              

 

             PLT (test code = 777-3) 61           See_Comment  L             [Au

tomated message]



                                                                 The system [employer]



                                                                 generated this 

result



                                                                 transmitted ref

erence



                                                                 range: 150 - 32

8



                                                                 10*3/?L. The



                                                                 reference range

 was



                                                                 not used to int

erpret



                                                                 this result as



                                                                 normal/abnormal

.

 

             MPV (test code =                                        Not Measure

d



             28537-6)                                            

 

             RDW-CV (test code = 21.4 %       12.1-15.4    H            



             788-0)                                              

 

             RDW-SD (test code = 59.4 fL      38.5-51.6    H            



             48510-8)                                            

 

             NRBC x10^3 (test code =              See_Comment                [Au

tomated message]



             1988682084)                                         The system [employer]



                                                                 generated this 

result



                                                                 transmitted ref

erence



                                                                 range: 10*3/?L.

 The



                                                                 reference range

 was



                                                                 not used to int

erpret



                                                                 this result as



                                                                 normal/abnormal

.

 

             NRBC/100 WBC (test code 0.0          See_Comment                [Au

tomated message]



             = [id number])                                        The system [employer]





                                                                 generated this 

result



                                                                 transmitted ref

erence



                                                                 range: 0.0 - 10

.0



                                                                 /100 WBCs. The



                                                                 reference range

 was



                                                                 not used to int

erpret



                                                                 this result as



                                                                 normal/abnormal

.

 

             IPF % (test code = 4.9 %        1.2-10.7                  Platelet 

count



             [id number])                                         measured by



                                                                 fluorescence me

thod.

 

             Lab Interpretation Abnormal                               



             (test code = 89294-4)                                        



[facility] - The Type and Screen expires 
at midnight on the 3rd day after it was drawn. A current Type and Screen is 
required when RBCs are requested. For all other blood products, a Type and Scree
n performed during the current hospitalizati...2023 21:57:00





             Test Item    Value        Reference Range Interpretation Comments

 

             ABO & RH (test code = 20) O POSITIVE                             

 

             IAT (test code = 1185) Negative                               



[facility] 15:59:00TEXTPatient Name UMU TABARES of Birth 1970Record Number [id number]/Time of Procedure 
2022, 3:59:00 PMEndoscopist Tressa Santoro MD./Fellow 
Jorge Moreno INDICATIONS FOR EXAMINATION: Anemia unspecified. PROCEDURE 
PERFORMED: EGD - EGD, diagnostic INSTRUMENTS:[id number]: None TOLERANCE:
Good VISUALIZATION: Good MEDICATIONS: MAC AnesthesiaASA CLASSIFICATION: 
IIIANALGESIA: TIVA by anesthesia PROCEDURE TECHNIQUE:Prior to the procedure, a 
History and Physical was performed, and patient medications and allergies were 
reviewed. The risks and benefits of the procedure and the sedation options and 
risks were discussed with the patient. Consent was obtained. Pulse, blood oxygen
saturation, and blood pressure were monitored throughout the procedure. After a
dequate sedation, the endoscope was introduced through the mouth and under 
direct visualization advanced to the Second Part of Duodenum. Careful 
examination of the esophagus, stomach and duodenum was performed. FINDINGS:The 
upper, middle, and lower esophagus appeared normal. There were small varices th
at flattened with insufflationThe GE junction was normal.The gastric cardia, 
fundus, and body were normal.The were two 5 mm clean based ulcers at the distal 
antrum. No biopsies were taken.The pylorus, duodenal bulb, and descending 
duodenum through the second portion of the duodenum appeared normal. SPECIMEN 
COLLECTED: No ENDOSCOPIC DIAGNOSIS:Small gastric varices Two distal antrum 5mm 
clean based ulcers RECOMMENDATIONS:Test for H. Pylori with stool antigen. Treat 
if positive. Start PPI BID x 6-8 weeks Follow up outpatient with 
gastroenterology clinic COMPLICATIONS:None.  Intra-procedure complication: none;
ESTIMATED BLOOD LOSS: None BLOOD PRODUCTS ADMINISTERED: NoneGRAFT/IMPLANT: None 
TOTAL PROCEDURE TIME: TOTAL SEDATION TIME: COMMENTS: CPT CODE:60744-HU 
Esophagogastroduodenoscopy, flexible, transoral; diagnostic, including 
collection of specimen(s) by brushing or washing, when peICD CODE:D64.9 Anemia, 
unspecified I was present during the entire viewing portion of the procedure. I 
personally reviewed the images and report prepared by the resident or fellow and
agree with the findings. After the procedure was completed, the patient was 
taken to the recovery in good condition. This Procedure was electronically 
signed of on :10/13/2022 1:52:32 PM By Tressa Broussard [facility]
2022 15:59:00TEXTPatient Name UMU TABARES of Birth 
1970Record Number [id number]/Time of Procedure 2022, 3:59:00 
PMEndoscopist Tressa Santoro MD./Fellow Jorge BISHOP
CATIONS FOR EXAMINATION: Anemia unspecified. PROCEDURE PERFORMED:  EGD - EGD, 
diagnostic INSTRUMENTS: [id number]: None TOLERANCE: Good VISUALIZATION: 
Good MEDICATIONS: MAC AnesthesiaASA CLASSIFICATION: IIIANALGESIA: TIVA by 
anesthesia PROCEDURE TECHNIQUE:Prior to the procedure, a History and Physical 
was performed, and patient medications and allergies were reviewed. The risks 
and benefits ofthe procedure and the sedation options and risks were discussed 
with the patient. Consent was obtained. Pulse, blood oxygen saturation, and 
blood pressure were monitored throughout the procedure. Afteradequate sedation, 
the endoscope was introduced through the mouth and under direct visualization 
advanced to the Second Part of Duodenum. Careful examination of the esophagus, 
stomach and duodenum was performed. FINDINGS:The upper, middle, and lower 
esophagus appeared normal. There were small varices that flattened with 
insufflationThe GE junction was normal.The gastric cardia, fundus, and body were
normal.The were two 5 mm clean based ulcers at the distal antrum. No biopsies 
were taken.The pylorus,duodenal bulb, and descending duodenum through the second
portion of the duodenum appeared normal. SPECIMEN COLLECTED: No ENDOSCOPIC 
DIAGNOSIS:Small gastric varices Two distal antrum 5mm clean based ulcers 
RECOMMENDATIONS:Test for H. Pylori with stool antigen. Treat if positive. Start 
PPI BID x 6-8 weeks Follow up outpatient with gastroenterology clinic 
COMPLICATIONS:None. Intra-procedure complication: none; ESTIMATED BLOOD LOSS: 
None BLOOD PRODUCTS ADMINISTERED: NoneGRAFT/IMPLANT: None TOTAL PROCEDURE TIME: 
TOTAL SEDATION TIME: COMMENTS: CPT CODE:57545-TX Esophagogastroduodenoscopy, 
flexible, transoral; diagnostic, including collection of specimen(s) by brushing
or washing, when peICD CODE:D64.9 Anemia, unspecified I was present during the 
entire viewing portion of the procedure. I personally reviewed the images and 
report prepared by the resident or fellow and agree with the findings. After the
procedure was completed, the patient was taken to the recovery in good 
condition. This Procedure was electronically signed of on :10/13/2022 1:52:32 PM
By Tressa Broussard [facility] RBC Units, 1 [id number] 
11:42:00





             Test Item    Value        Reference Range Interpretation Comments

 

             RBC UNITS (test code = compatible                             



             66569910)                                           

 

             Unit ABO Type (test code = O                                      



             89789579)                                           

 

             Unit Rh Type (test code = POS                                    



             74829440)                                           

 

             Product Code (test code =                                   



             18425169)                                           

 

             Unit Number (test code = [id number]                           



             13488301)                                           

 

             Unit Status (test code = transfused                             



             41846648)                                           

 

             ISBT Product Code (test code = Y8661M88                            

   



             83763)                                              

 

             Blood Type (test code = 10084) 5100                                

   

 

             Blood Expiration Date (test                            



             code = 48017)                                        



[facility] [id number] 11:42:00





             Test Item    Value        Reference Range Interpretation Comments

 

             RBC UNITS (test code = compatible                             



             41464159)                                           

 

             Unit ABO Type (test code = O                                      



             28152578)                                           

 

             Unit Rh Type (test code = POS                                    



             15636337)                                           

 

             Product Code (test code =                                   



             40152697)                                           

 

             Unit Number (test code = [id number]                           



             31828466)                                           

 

             Unit Status (test code = transfused                             



             59619970)                                           

 

             ISBT Product Code (test code = O1823W96                            

   



             93872)                                              

 

             Blood Type (test code = 49991) 5100                                

   

 

             Blood Expiration Date (test                            



             code = 60728)                                        



[facility] [id number] 11:42:00





             Test Item    Value        Reference Range Interpretation Comments

 

             RBC UNITS (test code = compatible                             



             48414104)                                           

 

             Unit ABO Type (test code = O                                      



             70784614)                                           

 

             Unit Rh Type (test code = POS                                    



             05534716)                                           

 

             Product Code (test code =                                   



             86962442)                                           

 

             Unit Number (test code = [id number]                           



             95083468)                                           

 

             Unit Status (test code = transfused                             



             75263009)                                           

 

             ISBT Product Code (test code = U9148N73                            

   



             14644)                                              

 

             Blood Type (test code = 12172) 5100                                

   

 

             Blood Expiration Date (test                            



             code = 30075)                                        



[facility] [id number] 10:08:00





             Test Item    Value        Reference Range Interpretation Comments

 

             Apheresis Platelets, not required                           



             Leukoreduced (test code =                                        



             52570832)                                           

 

             Unit ABO Type (test code = A                                      



             59343876)                                           

 

             Unit Rh Type (test code = NEG                                    



             43278133)                                           

 

             Product Code (test code =                                   



             29973525)                                           

 

             Unit Number (test code = [id number]                           



             70055067)                                           

 

             Unit Status (test code = transfused                             



             44604715)                                           

 

             ISBT Product Code (test code = I2477N19                            

   



             07775)                                              

 

             Blood Type (test code = 92075) 0600                                

   

 

             Blood Expiration Date (test                            



             code = 20251)                                        



[facility], [facility] [id number] 10:08:00





             Test Item    Value        Reference Range Interpretation Comments

 

             Apheresis Platelets, not required                           



             Leukoreduced (test code =                                        



             46172020)                                           

 

             Unit ABO Type (test code = A                                      



             11624504)                                           

 

             Unit Rh Type (test code = NEG                                    



             01769023)                                           

 

             Product Code (test code =                                   



             34977311)                                           

 

             Unit Number (test code = [id number]                           



             12398873)                                           

 

             Unit Status (test code = transfused                             



             08717745)                                           

 

             ISBT Product Code (test code = K1274N73                            

   



             97580)                                              

 

             Blood Type (test code = 64458) 0600                                

   

 

             Blood Expiration Date (test [id number]                           



             code = 32697)                                        



[facility], 1 [id number] 10:08:00





             Test Item    Value        Reference Range Interpretation Comments

 

             Apheresis Platelets, not required                           



             Leukoreduced (test code =                                        



             18025311)                                           

 

             Unit ABO Type (test code = A                                      



             [id number])                                           

 

             Unit Rh Type (test code = NEG                                    



             [id number])                                           

 

             Product Code (test code =                                   



             21604230)                                           

 

             Unit Number (test code = [id number]                           



             [id number])                                           

 

             Unit Status (test code = transfused                             



             [id number])                                           

 

             ISBT Product Code (test code = E3117Z49                            

   



             46508)                                              

 

             Blood Type (test code = 70570) 0600                                

   

 

             Blood Expiration Date (test [id number]                           



             code = 03323)                                        



[facility], [id number] 13:45:44





             Test Item    Value        Reference Range Interpretation Comments

 

             COVID-19 (SARS-COV-2) Detected     Not Detected A            INTERP

RETATION: This



             (test code = 53101-3)                                        patien

t's sample had



                                                                 detectable RNA 

present



                                                                 for the SARS-Co

V-2



                                                                 Coronavirus (CO

VID-19).



                                                                 A result with



                                                                 detectable RNA 

does not



                                                                 indicate the se

verity



                                                                 of infection. T

his



                                                                 result should b

e



                                                                 interpreted in



                                                                 conjunction wit

h



                                                                 clinical, radio

graphic,



                                                                 and other labor

atory



                                                                 findings and sh

ould not



                                                                 be used as the 

sole



                                                                 indicator of ac

tive



                                                                 infection with



                                                                 SARS-CoV-2 Citlalli

navirus



                                                                 (COVID-19). COM

MENT:



                                                                 This Hologic Ap

saulo



                                                                 SARS-CoV-2 mole

cular



                                                                 diagnostic assa

y



                                                                 utilizes Transc

ription



                                                                 Mediated Amplif

ication



                                                                 (TMA) technolog

y to



                                                                 rapidly detect 

the



                                                                 SARS-CoV-2 (COV

ID-19)



                                                                 virus from resp

iratory



                                                                 samples. In acc

ordance



                                                                 with the FDA's 

guidance



                                                                 document "Polic

y for



                                                                 Diagnostic Test

s for



                                                                 Coronavirus



                                                                 Disease-2019 du

ring the



                                                                 Public Health



                                                                 Emergency", thi

s test



                                                                 was developed, 

and its



                                                                 performance



                                                                 characteristics

 were



                                                                 verified by the

 [facility]

lar



                                                                 diagnostics lab

oratory



                                                                 and is authoriz

ed for



                                                                 clinical diagno

stic



                                                                 use. This labor

atory is



                                                                 certified under

 the



                                                                 Clinical Labora

tory



                                                                 Improvement Cira

ndments



                                                                 (CLIA) as quali

fied to



                                                                 perform high co

mplexity



                                                                 clinical labora

tory



                                                                 testing.

 

             Lab Interpretation Abnormal                               



             (test code = 38237-0)                                        



[facility], CoVID-[facility], [id number] 13:45:44





             Test Item    Value        Reference Range Interpretation Comments

 

             COVID-19 (SARS-COV-2) Detected     Not Detected A            INTERP

RETATION: This



             (test code = 56889-0)                                        patien

t's sample had



                                                                 detectable RNA 

present



                                                                 for the SARS-Co

V-2



                                                                 Coronavirus (CO

VID-19).



                                                                 A result with



                                                                 detectable RNA 

does not



                                                                 indicate the se

verity



                                                                 of infection. T

his



                                                                 result should b

e



                                                                 interpreted in



                                                                 conjunction wit

h



                                                                 clinical, radio

graphic,



                                                                 and other labor

atory



                                                                 findings and sh

ould not



                                                                 be used as the 

sole



                                                                 indicator of ac

tive



                                                                 infection with



                                                                 SARS-CoV-2 Citlalli

navirus



                                                                 (COVID-19). COM

MENT:



                                                                 This Hologic Ap

saulo



                                                                 SARS-CoV-2 mole

cular



                                                                 diagnostic assa

y



                                                                 utilizes Transc

ription



                                                                 Mediated Amplif

ication



                                                                 (TMA) technolog

y to



                                                                 rapidly detect 

the



                                                                 SARS-CoV-2 (COV

ID-19)



                                                                 virus from resp

iratory



                                                                 samples. In acc

ordance



                                                                 with the FDA's 

guidance



                                                                 document "Polic

y for



                                                                 Diagnostic Test

s for



                                                                 Coronavirus



                                                                 Disease-2019 du

ring the



                                                                 Public Health



                                                                 Emergency", thi

s test



                                                                 was developed, 

and its



                                                                 performance



                                                                 characteristics

 were



                                                                 verified by the

 [facility]

lar



                                                                 diagnostics lab

oratory



                                                                 and is authoriz

ed for



                                                                 clinical diagno

stic



                                                                 use. This labor

atory is



                                                                 certified under

 the



                                                                 Clinical Labora

tory



                                                                 Improvement Cira

ndments



                                                                 (CLIA) as quali

fied to



                                                                 perform high co

mplexity



                                                                 clinical labora

tory



                                                                 testing.

 

             Lab Interpretation Abnormal                               



             (test code = 09963-8)                                        



[facility], [id number] 13:45:44





             Test Item    Value        Reference Range Interpretation Comments

 

             COVID-19 (SARS-COV-2) Detected     Not Detected A            INTERP

RETATION: This



             (test code = 21243-1)                                        patien

t's sample had



                                                                 detectable RNA 

present



                                                                 for the SARS-Co

V-2



                                                                 Coronavirus (CO

VID-19).



                                                                 A result with



                                                                 detectable RNA 

does not



                                                                 indicate the se

verity



                                                                 of infection. T

his



                                                                 result should b

e



                                                                 interpreted in



                                                                 conjunction wit

h



                                                                 clinical, radio

graphic,



                                                                 and other labor

atory



                                                                 findings and sh

ould not



                                                                 be used as the 

sole



                                                                 indicator of ac

tive



                                                                 infection with



                                                                 SARS-CoV-2 Citlalli

navirus



                                                                 (COVID-19). COM

MENT:



                                                                 This Hologic Ap

saulo



                                                                 SARS-CoV-2 mole

cular



                                                                 diagnostic assa

y



                                                                 utilizes Transc

ription



                                                                 Mediated Amplif

ication



                                                                 (TMA) technolog

y to



                                                                 rapidly detect 

the



                                                                 SARS-CoV-2 (COV

ID-19)



                                                                 virus from resp

iratory



                                                                 samples. In acc

ordance



                                                                 with the FDA's 

guidance



                                                                 document "Polic

y for



                                                                 Diagnostic Test

s for



                                                                 Coronavirus



                                                                 Disease-2019 du

ring the



                                                                 Public Health



                                                                 Emergency", thi

s test



                                                                 was developed, 

and its



                                                                 performance



                                                                 characteristics

 were



                                                                 verified by the

 [facility]

[facility]



                                                                 [facility]

oratory



                                                                 and is authoriz

ed for



                                                                 clinical diagno

stic



                                                                 use. This labor

atory is



                                                                 certified under

 the



                                                                 Clinical Labora

tory



                                                                 Improvement Cira

ndments



                                                                 (CLIA) as quali

fied to



                                                                 perform high co

mplexity



                                                                 clinical labora

tory



                                                                 testing.

 

             Lab Interpretation Abnormal                               



             (test code = 49025-4)                                        



[facility]-CoV-2 ORF1ab Resp Ql WILLIAM+[id number] 13:45:44





             Test Item    Value        Reference Range Interpretation Comments

 

             Hospitalized? (test Yes                                    



             code = 19039-5)                                        

 

             ICU? (test code = No                                     



             52431-1)                                            

 

             Symptomatic as defined No                                     



             by CDC? (test code =                                        



             17155-9)                                            

 

             Employed in  No                                     



             Healthcare? (test code                                        



             = 21959-4)                                          

 

             Resident in a No                                     



             congregate care                                        



             setting (including                                        



             nursing homes,                                        



             residential care for                                        



             people with                                         



             intellectual and                                        



             developmental                                        



             disabilities,                                        



             psychiatric treatment                                        



             facilities, group                                        



             homes, board and care                                        



             homes, homeless                                        



             shelter, foster care                                        



             or other): (test code                                        



             = 91487-9)                                          

 

             SARS-CoV-2 ORF1ab Resp DETECTED     Not Detected A            INTER

PRETATION: This



             Ql WILLIAM+probe (test                                        patient's

 sample had



             code = 91730-3)                                        detectable R

NA present



                                                                 for the SARS-Co

V-2



                                                                 Coronavirus (CO

VID-19).



                                                                 A result with



                                                                 detectable RNA 

does not



                                                                 indicate the se

verity



                                                                 of infection. T

his



                                                                 result should b

e



                                                                 interpreted in



                                                                 conjunction wit

h



                                                                 clinical, radio

graphic,



                                                                 and other labor

atory



                                                                 findings and sh

ould not



                                                                 be used as the 

sole



                                                                 indicator of ac

tive



                                                                 infection with



                                                                 SARS-CoV-2 Citlalli

navirus



                                                                 (COVID-19). COM

MENT:



                                                                 This Hologic Ap

saulo



                                                                 SARS-CoV-2 mole

cular



                                                                 diagnostic assa

y



                                                                 utilizes Transc

ription



                                                                 Mediated Amplif

ication



                                                                 (TMA) technolog

y to



                                                                 rapidly detect 

the



                                                                 SARS-CoV-2 (COV

ID-19)



                                                                 virus from resp

iratory



                                                                 samples. In acc

ordance



                                                                 with\XC2A0\the 

FDA's



                                                                 guidance docume

nt



                                                                 "Policy for Mikayla

gnostic



                                                                 Tests for Coron

avirus



                                                                 Disease-2019 du

ring the



                                                                 Public Health



                                                                 Emergency", jenni

s test



                                                                 was developed, 

and its



                                                                 performance



                                                                 characteristics

 were



                                                                 verified by the

 [facility]

[facility]



                                                                 diagnostics lab

oratory



                                                                 and is authoriz

ed for



                                                                 clinical diagno

stic



                                                                 use. \XC2A0\Thi

s



                                                                 laboratory is c

ertified



                                                                 under the [facility]

[facility]



                                                                 Laboratory Impr

ovement



                                                                 Amendments (CLI

A) as



                                                                 qualified to pe

rform



                                                                 high complexity



                                                                 clinical labora

tory



                                                                 testing.



[facility] IgG Ser [id number] 15:17:48





             Test Item    Value        Reference Range Interpretation Comments

 

             HAV IgG Ser Ql (test code = 74230-8) POSITIVE     Negative     A   

         



[facility] Lead [id number] 09:29:5912 LEAD EKG FOR [facility] 
Test Date: 5208-63-08Njd Name: UMU TABARES Department: 5BMSPatient ID: 
[id number] Room: Gender: M Technician: ToshiaOB:  1970 Requested By: 
MARCY Massey Number: [id number] Anya MD: Linda Obrien 
MeasurementsIntervals  Axis Rate: 86 P: -2PR: 97 QRS: 50QRSD: 84 T: 24QT:  428 
QTc: 471 Interpretive StatementsSINUS RHYTHM WITH SHORT OK INTERVALPROLONGED QT 
INTERVALElectronically Signed On 2022 14:22:26 CDT by Linda [facility]12 Lead [id number] 09:29:5912 LEAD EKG FOR [facility] Test Date: 4534-20-96Jzd Name: UMU TABARES Department: 5BMSPatient 
ID: [id number] Room: Gender: M Technician: AnnieDOB:  1970 Requested By: 
MARCY Massey Number: [id number] Reading MD: Linda Obrien 
MeasurementsIntervals  Axis Rate: 86 P: -2PR: 97 QRS: 50QRSD: 84 T: 24QT:  428 
QTc: 471 Interpretive StatementsSINUS RHYTHM WITH SHORT OK INTERVALPROLONGED QT 
INTERVALElectronically Signed On 2022 14:22:26 CDT by Linda [place] [address] Lead [id number] 09:29:5912 LEAD EKG FOR [facility] Test Date: 4455-65-09Dcg Name: UMU CARLSONZ Department: 5BMSPatient 
ID: [id number] Room: Gender: M Technician: AnnieDOB:  1970 Requested By: 
MARCY Massey Number: [id number] Reading MD: Linda Obrien 
MeasurementsIntervals  Axis Rate: 86 P: -2PR: 97 QRS: 50QRSD: 84 T: 24QT:  428 
QTc: 471 Interpretive StatementsSINUS RHYTHM WITH SHORT OK INTERVALPROLONGED QT 
INTERVALElectronically Signed On 2022 14:22:26 CDT by Linda Abrams
[facility] 09:15:17





             Test Item    Value        Reference Range Interpretation Comments

 

             Glucose POC (test code = 26214930) 137 mg/dL           H     

       

 

             Lab Interpretation (test code = Abnormal                           

    



             13641-5)                                            



[facility]2022-07-11 09:15:17





             Test Item    Value        Reference Range Interpretation Comments

 

             Glucose POC (test code = 55325872) 137 mg/dL           H     

       

 

             Lab Interpretation (test code = Abnormal                           

    



             09448-5)                                            



[facility]2022-07-11 09:15:17





             Test Item    Value        Reference Range Interpretation Comments

 

             Glucose POC (test code = 10172955) 137 mg/dL           H     

       

 

             Lab Interpretation (test code = Abnormal                           

    



             39789-8)                                            



[facility][id number] 10:34:38





             Test Item    Value        Reference Range Interpretation Comments

 

             T pallidum Ab Ser Ql Aggl (test NEGATIVE     Negative, Equivocal   

           



             code = 48707-8)                                        

 

             Reagin+T pallidum IgG+IgM NEGATIVE     Negative                  



             SerPl-Imp (test code = 52836-0)                                    

    



HHSHIV 1+2 Ab+HIV1 p24 Ag SerPl Ql [id number] 05:50:04





             Test Item    Value        Reference Range Interpretation Comments

 

             HIV 1+2 Ab+HIV1 p24 Ag SerPl Ql IA NEGATIVE     Negative           

       



             (test code = 06148-3)                                        



SGMABYR-PrB-6 ORF1ab Resp Ql WILLIAM+nnpbe7776-31-12 04:05:45





             Test Item    Value        Reference Range Interpretation Comments

 

             Hospitalized? (test No                                     



             code = 32200-1)                                        

 

             ICU? (test code = No                                     



             67032-5)                                            

 

             Symptomatic as No                                     



             defined by CDC?                                        



             (test code =                                        



             89594-8)                                            

 

             Employed in  No                                     



             Healthcare? (test                                        



             code = 73726-8)                                        

 

             Resident in a No                                     



             congregate care                                        



             setting (including                                        



             nursing homes,                                        



             residential care for                                        



             people with                                         



             intellectual and                                        



             developmental                                        



             disabilities,                                        



             psychiatric                                         



             treatment                                           



             facilities, group                                        



             homes, board and                                        



             care homes, homeless                                        



             shelter, foster care                                        



             or other): (test                                        



             code = 06895-3)                                        

 

             SARS-CoV-2 ORF1ab NOT DETECTED Not Detected              INTERPRETA

TION: No



             Resp Ql WILLIAM+probe                                        detectable

 levels of



             (test code =                                        SARS-CoV-2



             11703-5)                                            Coronavirus



                                                                 (COVID-19) were



                                                                 present in this



                                                                 patient's sampl

e by



                                                                 this test. A no

t



                                                                 detected result

 does



                                                                 not exclude the



                                                                 possibility of 

active



                                                                 infection with 

this



                                                                 virus due to ot

her



                                                                 factors that ma

y



                                                                 affect the resu

lts



                                                                 such as a poorl

y



                                                                 collected sampl

e,



                                                                 viral titers be

low



                                                                 the limit of



                                                                 detection of th

e



                                                                 assay, and the



                                                                 infrequent



                                                                 possibility of



                                                                 inhibitors in t

he



                                                                 sample. This re

sult



                                                                 should be inter

preted



                                                                 in conjunction 

with



                                                                 clinical,



                                                                 radiographic, a

nd



                                                                 other laborator

y



                                                                 findings and sh

ould



                                                                 not be used as 

the



                                                                 sole indicator 

of



                                                                 active infectio

n with



                                                                 SARS-CoV-2



                                                                 Coronavirus



                                                                 (COVID-19).COMM

ENT:



                                                                 This Hologic Ap

saulo



                                                                 SARS-CoV-2 mole

cular



                                                                 diagnostic assa

y



                                                                 utilizes



                                                                 Transcription



                                                                 Mediated



                                                                 Amplification (

TMA)



                                                                 technology to r

apidly



                                                                 detect the SARS

-CoV-2



                                                                 (COVID-19) viru

s from



                                                                 respiratory shahriar

ples.



                                                                 In accordance



                                                                 with\XC2A0\the 

FDA's



                                                                 guidance docume

nt



                                                                 "Policy for



                                                                 Diagnostic Test

s for



                                                                 Coronavirus



                                                                 Disease-2019 du

ring



                                                                 the Public Heal

th



                                                                 Emergency", jenni

s test



                                                                 was developed, 

and



                                                                 its performance



                                                                 characteristics

 were



                                                                 verified by the



                                                                 [facility]



                                                                 molecular diagn

ostics



                                                                 laboratory and 

is



                                                                 authorized for



                                                                 clinical diagno

stic



                                                                 use. \XC2A0\Jenni

s



                                                                 laboratory is



                                                                 certified under

 the



                                                                 Clinical Labora

tory



                                                                 Improvement



                                                                 Amendments (CLI

A) as



                                                                 qualified to pe

rform



                                                                 high complexity



                                                                 clinical labora

tory



                                                                 testing.



[facility]12 Lead [id number] 17:14:4312 LEAD EKG FOR [facility] 
Test Date: 3374-42-83Yie Name: [facility] Department: 5520Patient ID: 
[id number]  Room: [facility] Gender: M Technician: [id number]: 1970 
Requested By: DENISE Saravia Number: [id number] Reading MD: Linda Obrien 
MeasurementsIntervals Axis Rate: 91 P: 24PR: 124 QRS: 50QRSD: 89 T: 41QT: 375 
QTc: 423  Interpretive StatementsSINUS RHYTHMElectronically Signed On 2022 
21:25:07 CDT by Linda Obrien [place] [address] Lead [id number] 17:14:4312
LEAD EKG FOR [facility]  Test Date: 1002-39-71Syv Name: 
[facility] Department: 5520Patient ID: [id number] Room:  POD F 01Gender: M
Technician: [id number]: 1970 Requested By: DENISE Saravia Number: 
[id number] Reading MD: Linda Obrien  MeasurementsIntervals Axis Rate: 91 P: 
24PR: 124 QRS:  50QRSD: 89 T: 41QT: 375 QTc: 423 Interpretive StatementsSINUS 
RHYTHMElectronically Signed On 2022 21:25:07 CDT by Linda Obrien [place] [address] Lead [id number] 17:14:4312 LEAD EKG FOR [facility]  Test Date: 9251-38-68Hzb Name: [facility] TABARES Department: 5520Patient
ID: [id number] Room: [facility] 01Gender: M  Technician: [id number]: 1970 
Requested By: DENISE Saravia Number: [id number] Reading MD: Linda Obrien 
MeasurementsIntervals Axis Rate: 91 P: 24PR: 124 QRS: 50QRSD: 89  T: 41QT: 375 
QTc: 423 Interpretive StatementsSINUS RHYTHMElectronically Signed On 2022 
21:25:07 CDT by Linda Obrien [facility] BODY [id number] 
17:50:22





             Test Item    Value        Reference Range Interpretation Comments

 

             ALBUMIN BF (test code 279.0 mg/dL                            



             = 3004510120)                                        

 

             RENETTA (test code = RENETTA) Result interpreted                           



                          relative to the serum                           



                          concentration. ?                           



[facility] [id number] 
15:55:23COVID DMT InterpretationInterpretation/Recommendations:Molecular NAAT 
Tests for Active Infection with the SARS-CoV-2 Virus:The patient has currently 
tested negative for the SARS-CoV-2 virus that causesCOVID-19 illness. This most 
likely indicates that the patient does not have an active infection withthe 
SARS-CoV-2 virus. However, infection is not completely ruled out as the false 
negative rate for molecular NAAT testing using a nasopharyngeal sample can be up
to 30%, mostly dependent on the timingof sample collection in relation to 
illness onset and any deficiencies in sampling techniques. If the patient has 
symptoms concerning for COVID-19 illness, a repeat NAAT test (PCR, Rapid ID Now,
etc.) should be performed, at which time the SARS-CoV-2 virus - if present - may
have reached a detectable viral load (usually peaking by the end of the first 
week of symptoms). Tests for IgM and/or IgG Antibodies to the SARS-CoV-2 
Virus:If the patient develops COVID-19 illness in the future, testing for IgMand
IgG antibodies approximately 3 weeks after illness onset will likely indicate if
the patient hasproduced antibodies to the SARS-CoV-2 virus. However, some 
patients may take longer to develop detectable antibodies, while some patients 
who were infected with SARS-CoV-2 may never develop antibodies.While antibodies 
to SARS-CoV-2 may provide some degree of immunity, at this time the strength and
duration of the antibody response is unknown. 
------------------------------------------------------------------------ 
Interpretation Result Comments:These interpretation comments are based upon all 
COVID-19 testing the patient has had at [facility], including molecular NAAT testing 
(more commonly known as PCRtesting and Rapid ID Now testing) and antibody 
testing. It does not take into account any testing that a patient has had 
outside of the [facility] medical record. [facility] LABORATORY SERVICESCOVID 
OkczmxxCZRN-UaV-2 Rapid ID NOW (no units) ? ? Date ? ? ? ? ? ? ? ? ? ? Value ? ?
? ? ? ? ? 2021 ? ? ? ? ? ? ?Not Detected ? ? ? 2021 ? ? ? ? ? ? ? 
Not Detected ? ---------- [facility] LABORATORY SERVICESUnDallas [facility] Medical CenterCBC with Iunrquwpcygv0508-29-00 12:19:02





             Test Item    Value        Reference Range Interpretation Comments

 

             WBC (test code =              See_Comment                [Automated



             6690-2)                                             message] The sy

stem



                                                                 which generated



                                                                 this result



                                                                 transmitted



                                                                 reference range

:



                                                                 4.20 - 10.70



                                                                 10*3/?L. The



                                                                 reference range

 was



                                                                 not used to



                                                                 interpret this



                                                                 result as



                                                                 normal/abnormal

.

 

             RBC (test code =              See_Comment  L             [Automated



             139-8)                                              message] The sy

stem



                                                                 which generated



                                                                 this result



                                                                 transmitted



                                                                 reference range

:



                                                                 4.26 - 5.52



                                                                 10*6/?L. The



                                                                 reference range

 was



                                                                 not used to



                                                                 interpret this



                                                                 result as



                                                                 normal/abnormal

.

 

             HGB (test code = 7.9 g/dL     12.2-16.4    L            



             718-7)                                              

 

             HCT (test code = 24.8 %       38.4-49.3    L            



             4544-3)                                             

 

             MCV (test code = 80.0 fL      81.7-95.6    L            



             787-2)                                              

 

             MCH (test code = 25.5 pg      26.1-32.7    L            



             785-6)                                              

 

             MCHC (test code = 31.9 g/dL    31.2-35.0                 



             786-4)                                              

 

             RDW-SD (test code = 72.4 fL      38.5-51.6    H            



             89997-1)                                            

 

             RDW-CV (test code = 24.9 %       12.1-15.4    H            



             788-0)                                              

 

             PLT (test code =              See_Comment  LL            [Automated



             777-3)                                              message] The sy

stem



                                                                 which generated



                                                                 this result



                                                                 transmitted



                                                                 reference range

:



                                                                 150 - 328 10*3/

?L.



                                                                 The reference r

moose



                                                                 was not used to



                                                                 interpret this



                                                                 result as



                                                                 normal/abnormal

.

 

             MPV (test code =                                        Not Measure

d



             97297-9)                                            

 

             IPF % (test code = 6.3 %        1.2-10.7                  Platelet 

count



             [id number])                                         measured by



                                                                 fluorescence



                                                                 method.

 

             NRBC/100 WBC (test              See_Comment                [Automat

ed



             code = 1980637388)                                        message] 

The system



                                                                 which generated



                                                                 this result



                                                                 transmitted



                                                                 reference range

:



                                                                 0.0 - 10.0 /100



                                                                 WBCs. The refer

ence



                                                                 range was not u

sed



                                                                 to interpret th

is



                                                                 result as



                                                                 normal/abnormal

.

 

             NRBC x10^3 (test code <0.01        See_Comment                [Auto

mated



             = [id number])                                        message] The s

ystem



                                                                 which generated



                                                                 this result



                                                                 transmitted



                                                                 reference range

:



                                                                 10*3/?L. The



                                                                 reference range

 was



                                                                 not used to



                                                                 interpret this



                                                                 result as



                                                                 normal/abnormal

.

 

             GRAN MAT (NEUT) % 59.8 %                                 



             (test code = 770-8)                                        

 

             IMM GRAN % (test code 0.20 %                                 



             = [id number])                                        

 

             LYMPH % (test code = 21.3 %                                 



             736-9)                                              

 

             MONO % (test code = 11.2 %                                 



             5905-5)                                             

 

             EOS % (test code = 6.5 %                                  



             713-8)                                              

 

             BASO % (test code = 1.0 %                                  



             706-2)                                              

 

             GRAN MAT x10^3(ANC) 3.11 10*3/uL 1.99-6.95                 



             (test code =                                        



             2491846638)                                         

 

             IMM GRAN x10^3 (test <0.03        0.00-0.06                 



             code = 5770059467)                                        

 

             LYMPH x10^3 (test code 1.11 10*3/uL 1.09-3.23                 



             = 731-0)                                            

 

             MONO x10^3 (test code 0.58 10*3/uL 0.36-1.02                 



             = 742-7)                                            

 

             EOS x10^3 (test code = 0.34 10*3/uL 0.06-0.53                 



             711-2)                                              

 

             BASO x10^3 (test code 0.05 10*3/uL 0.01-0.09                 



             = 704-7)                                            

 

             Lab Interpretation Abnormal                               



             (test code = 80261-3)                                        



[facility] Metabolic Panel (NA, K, CL, CO2, 
GLUCOSE, BUN, CREATININE, CA)2021 11:47:29





             Test Item    Value        Reference Range Interpretation Comments

 

             NA (test code = 135 mmol/L   135-145                   



             2189726402)                                         

 

             K (test code = 3.8 mmol/L   3.5-5.0                   



             7962162922)                                         

 

             CL (test code = 106 mmol/L                       



             2079904376)                                         

 

             CO2 TOTAL (test code = 26 mmol/L    23-31                     



             5822504892)                                         

 

             AGAP (test code =              2-16                      



             9764061854)                                         

 

             BUN (test code = 9 mg/dL      7-23                      



             9559174327)                                         

 

             GLUCOSE (test code = 103 mg/dL                        



             8295259596)                                         

 

             CREATININE (test code = 0.50 mg/dL   0.60-1.25    L            



             5773951032)                                         

 

             CALCIUM (test code = 7.7 mg/dL    8.6-10.6     L            



             3287109439)                                         

 

             eGFR (test code =              mL/min/1.73m2              



             7159055726)                                         

 

             RENETTA (test code = RENETTA) Association of                           



                          Glomerular Filtration                           



                          Rate (GFR) and Staging                           



                          of Kidney Disease*                           



                          +---------------------                           



                          --+-------------------                           



                          --+-------------------                           



                          ------+| GFR                           



                          (mL/min/1.73 m2) ?|                           



                          With Kidney Damage ?|                           



                          ?Without Kidney                           



                          Damage+---------------                           



                          --------+-------------                           



                          --------+-------------                           



                          ------------+| ?>90 ?                           



                          ? ? ? ? ? ? ? ?|                           



                          ?Stage one ? ? ? ? ?|                           



                          ? Normal ? ? ? ? ? ? ?                           



                          ?+--------------------                           



                          ---+------------------                           



                          ---+------------------                           



                          -------+| ?60-89 ? ? ?                           



                          ? ? ? ? ?| ?Stage two                           



                          ? ? ? ? ?| ? Decreased                           



                          GFR ? ? ? ?                            



                          +---------------------                           



                          --+-------------------                           



                          --+-------------------                           



                          ------+| ?30-59 ? ? ?                           



                          ? ? ? ? ?| ?Stage                           



                          three ? ? ? ?| ? Stage                           



                          three ? ? ? ? ?                           



                          +---------------------                           



                          --+-------------------                           



                          --+-------------------                           



                          ------+| ?15-29 ? ? ?                           



                          ? ? ? ? ?| ?Stage four                           



                          ? ? ? ? | ? Stage four                           



                          ? ? ? ? ?                              



                          ?+--------------------                           



                          ---+------------------                           



                          ---+------------------                           



                          -------+| ?<15 (or                           



                          dialysis) ? ?| ?Stage                           



                          five ? ? ? ? | ? Stage                           



                          five ? ? ? ? ?                           



                          ?+--------------------                           



                          ---+------------------                           



                          ---+------------------                           



                          -------+ *Each stage                           



                          assumes the associated                           



                          GFR level has been in                           



                          effect for at least                           



                          three months. ?Stages                           



                          1 to 5, with or                           



                          without kidney                           



                          disease, indicate                           



                          chronic kidney                           



                          disease. Notes:                           



                          Determination of                           



                          stages one and two                           



                          (with eGFR                             



                          >59mL/min/1.73 m2)                           



                          requires estimation of                           



                          kidney damage for at                           



                          least three months as                           



                          defined by structural                           



                          or functional                           



                          abnormalities of the                           



                          kidney, manifested by                           



                          either:Pathological                           



                          abnormalities or                           



                          Markers of kidney                           



                          damage (including                           



                          abnormalities in the                           



                          composition of the                           



                          blood or urine or                           



                          abnormalities in                           



                          imaging tests).                           

 

             Lab Interpretation Abnormal                               



             (test code = 12016-2)                                        



[facility] [id number] 07:23:45





             Test Item    Value        Reference Range Interpretation Comments

 

             BF SEGS (test code = 3567264108) 4 %                               

     

 

             BF LYMPHS (test code = 8370778062) 17 %                            

       

 

             MACROPHAGE (test code = 8829190561) 69 %                           

        

 

             MESOS (test code = 3741299878) 10 %                                

   

 

             #CELS CNTD (test code = 7266916771)                                

        



[facility] [id number] 07:23:35





             Test Item    Value        Reference Range Interpretation Comments

 

             T.PROT BF (test 1109.0 mg/dL                           



             code =                                              



             1561598892)                                         

 

             UNSPUN BODY  Yellow                                 



             FLUID COLOR                                         



             (test code =                                        



             0517852889)                                         

 

             UNSPUN BODY  Slightly Cloudy                           



             FLUID CLARITY                                        



             (test code =                                        



             4409121976)                                         

 

             SPUN BODY FLUID Yellow                                 



             COLOR (test code                                        



             = 9412303972)                                        

 

             SPUN BODY FLUID Clear                                  



             CLARITY (test                                        



             code =                                              



             9745539224)                                         

 

             Sediment (test                                        The sediment



             code =                                              volume is <0.1



             1337577245)                                         mLs of the



                                                                 total fluid



                                                                 volume of 3mls



                                                                 and its color



                                                                 is red.

 

             RENETTA (test code = Test developed and                           



             RENETTA)         characteristics                           



                          determined by [facility]                           



                          Laboratory Services.                           



[facility] [id number] 07:19:43





             Test Item    Value        Reference Range Interpretation Comments

 

             BF COLOR (test Light Yellow                           



             code =                                              



             2867096527)                                         

 

             TURBIDITY (test Slightly Turbid                           



             code =                                              



             7511240165)                                         

 

             BF WBC Count              See_Comment                [Automated



             (test code =                                        message] The



             9953487370)                                         system which



                                                                 generated this



                                                                 result



                                                                 transmitted



                                                                 reference range

:



                                                                 /?L. The



                                                                 reference range



                                                                 was not used to



                                                                 interpret this



                                                                 result as



                                                                 normal/abnormal

.

 

             BF RBC Count <3000        See_Comment                [Automated



             (test code =                                        message] The



             3680886171)                                         system which



                                                                 generated this



                                                                 result



                                                                 transmitted



                                                                 reference range

:



                                                                 /?L. The



                                                                 reference range



                                                                 was not used to



                                                                 interpret this



                                                                 result as



                                                                 normal/abnormal

.

 

             RENETTA (test code = The reference range                           



             RENETTA)         and other method                           



                          performance                            



                          specifications have                           



                          not been established                           



                          for this body fluid.                           



                          ?The test results                           



                          must be integrated                           



                          into the clinical                           



                          context for                            



                          interpretation.                           



[facility]20[facility] 
21:16:02





             Test Item    Value        Reference Range Interpretation Comments

 

             SARS-CoV-2 Rapid ID NOW Not Detected Not Detected              



             (test code = 60648-3)                                        

 

             RENETTA (test code = RENETTA) ID NOW COVID-19 Assay                        

   



                          is an isothermal                           



                          nucleic acid                           



                          amplification test                           



                          intended for the                           



                          qualitative detection                           



                          of nucleic acid from                           



                          SARS-CoV-2 viral RNA                           



                          in nasopharyngeal (NP)                           



                          specimens. It is used                           



                          under Emergency Use                           



                          Authorization (EUA) by                           



                          FDA. The limit of                           



                          detection (LOD) of the                           



                          assay is 125 Genome                           



                          Equivalents/mL. A                           



                          positive result is                           



                          indicative of the                           



                          presence of SARS-CoV-2                           



                          RNA. ?Clinical                           



                          correlation with                           



                          patient history and                           



                          other diagnostic                           



                          information is                           



                          necessary to determine                           



                          patient infection                           



                          status. A negative                           



                          (Not Detected) result                           



                          does not preclude                           



                          SARS-CoV-2 infection.                           



                          In patients with                           



                          clinical symptoms and                           



                          other tests that are                           



                          consistent with                           



                          SARS-CoV-2 infection,                           



                          negative results                           



                          should be treated as                           



                          presumptive negative                           



                          and a new specimen                           



                          should be tested with                           



                          alternative PCR                           



                          molecular test.                           



                          Invalid: Please                           



                          collect a new specimen                           



                          for repeat patient                           



                          testing if clinically                           



                          indicated.                             

 

             Lab Interpretation Normal                                 



             (test code = 81352-0)                                        



[facility] [id number] 
18:50:54 Cirrhotic liver morphology with evidence of portal hypertension 
withmoderate volume ascites, large esophageal varices and splenomegalyunchanged 
compared to prior. Since 2021, slight decreased now small left pleural 
effusion. Preliminary Report Dictated by Resident: Amy Bezold I, Maryamnaz ?Falamaki, MD., have reviewed this study and agree with theabove report.EXAM: CT
ABDOMEN AND PELVIS WITH CONTRAST HISTORY: 51 years-old Male presenting with 
history of cirrhosis andworsening abdominal distension COMPARISON: 2021 
TECHNIQUE AND FINDINGS: Contiguous axial imaging from the level of the lungbases
through the proximal thighs was performed after the administration ofintravenous
contrast. Coronal and sagittal reconstructions were obtained. Auto mA and/or 
iterative reconstruction were used to reduce radiation dose. FINDINGS: LOWER 
THORAX: Small sized left pleural effusion, decreased compared toprior. Minimal 
loculated fluid is also seen tracking in the right minorfissure. The lungs bases
are clear. Mild cardiomegalyThere is nopericardial effusion. LIVER: Nodular 
liver contour, this partial generated enlargement of lateralsegment of the left 
lobe and caudate lobe. No focal hepatic lesions seen onthis single, portal 
venous phase. The portal veins are patent. GALLBLADDER AND BILIARY TREE: No bi
liary ductal dilation. Mild gallbladderwall thickening appears stable and likely
secondary to chronic liverdisease. SPLEEN: The spleen is enlarged, measuring 
approximately 14.8 cm. PANCREAS: No ductal dilation or masses. ADRENAL GLANDS: 
No adrenal nodules. KIDNEYS: No hydronephrosis, stones, or masses. GI TRACT: 
Diffuse submucosal wall thickening seen throughout the small andlarge bowel 
likely secondary to chronic portal hypertension. No abnormallydilated bowel. 
Normal-appearing appendix. PERITONEUMAND RETROPERITONEUM: Moderate to large 
volume ascites. Noextraluminal free air. LYMPH NODES: No lymphadenopathy. 
PELVIS/BLADDER: Unremarkable urinary bladder. VESSELS: Similar appearance of 
enlarged esophageal varices seen extendingalong the lower esophagus. BONES AND 
SOFT TISSUES: Diffuse body wall edema. No suspicious lytic orsclerotic bony 
lesions. [facility], Radiant Results Inft User - 2021 1:52PM CDTFormatting of 
this note might be different from the original.EXAM: CT ABDOMEN AND PELVIS WITH 
CONTRASTHISTORY: 51 years-old Male presenting with history of cirrhosis 
andworsening abdominal distension COMPARISON: 2021TECHNIQUE AND FINDINGS: 
Contiguous axial imaging from the level of the lungbases through the proximal 
thighs was performed after the administration ofintravenous contrast. Coronal 
and sagittal reconstructions were obtained. Auto mA and/or iterative 
reconstruction were used to reduce radiation dose.FINDINGS:LOWER THORAX: Small 
sized left pleural effusion, decreased compared toprior. Minimal loculated fluid
is also seen tracking in the right minorfissure. The lungs bases are clear. Mild
cardiomegalyThere is nopericardial effusion.LIVER: Nodular liver contour, this 
partial generated enlargement of lateralsegment of the left lobe and caudate 
lobe. No focal hepatic lesions seen onthis single, portal venous phase. The 
portal veins are patent.GALLBLADDER AND BILIARY TREE: No biliary ductal 
dilation. Mild gallbladderwall thickening appears stable and likely secondary to
chronic liverdisease.SPLEEN: The spleen is enlarged, measuring approximately 
14.8 cm.PANCREAS: No ductal dilation or masses.ADRENAL GLANDS: No adrenal 
nodules.KIDNEYS: No hydronephrosis, stones, or masses.GI TRACT: Diffuse 
submucosal wall thickening seen throughout the small andlarge bowel likely 
secondary to chronic portal hypertension. No abnormallydilated bowel. Normal-
appearing appendix. PERITONEUM AND RETROPERITONEUM: Moderate to large volume 
ascites. Noextraluminal free air.LYMPH NODES: No lymphadenopathy.PELVIS/BLADDER:
Unremarkable urinary bladder.VESSELS: Similar appearance of enlarged esophageal 
varices seen extendingalong the lower esophagus.BONES AND SOFT TISSUES: Diffuse 
body wall edema. No suspicious lytic orsclerotic bony 
lesions.IMPRESSIONCirrhotic liver morphology with evidence of portal hy
pertension withmoderate volume ascites, large esophageal varices and 
splenomegalyunchanged compared to prior.Since 2021, slight decreased now 
small left pleural effusion.Preliminary Report Dictatedby Resident: Amy BezoldI,
Renee Tang MD., have reviewed this study and agree with laura medellin.[facility] with [id number] 16:58:45





             Test Item    Value        Reference Range Interpretation Comments

 

             WBC (test code =              See_Comment                [Automated



             0090-2)                                             message] The sy

stem



                                                                 which generated



                                                                 this result



                                                                 transmitted



                                                                 reference range

:



                                                                 4.20 - 10.70



                                                                 10*3/?L. The



                                                                 reference range

 was



                                                                 not used to



                                                                 interpret this



                                                                 result as



                                                                 normal/abnormal

.

 

             RBC (test code =              See_Comment  L             [Automated



             699-8)                                              message] The sy

stem



                                                                 which generated



                                                                 this result



                                                                 transmitted



                                                                 reference range

:



                                                                 4.26 - 5.52



                                                                 10*6/?L. The



                                                                 reference range

 was



                                                                 not used to



                                                                 interpret this



                                                                 result as



                                                                 normal/abnormal

.

 

             HGB (test code = 9.1 g/dL     12.2-16.4    L            



             718-7)                                              

 

             HCT (test code = 28.1 %       38.4-49.3    L            



             4544-3)                                             

 

             MCV (test code = 79.2 fL      81.7-95.6    L            



             787-2)                                              

 

             MCH (test code = 25.6 pg      26.1-32.7    L            



             785-6)                                              

 

             MCHC (test code = 32.4 g/dL    31.2-35.0                 



             786-4)                                              

 

             RDW-SD (test code = 70.4 fL      38.5-51.6    H            



             43675-1)                                            

 

             RDW-CV (test code = 24.6 %       12.1-15.4    H            



             788-0)                                              

 

             PLT (test code =              See_Comment  LL            [Automated



             777-3)                                              message] The sy

stem



                                                                 which generated



                                                                 this result



                                                                 transmitted



                                                                 reference range

:



                                                                 150 - 328 10*3/

?L.



                                                                 The reference r

moose



                                                                 was not used to



                                                                 interpret this



                                                                 result as



                                                                 normal/abnormal

.

 

             MPV (test code =                                        Not Measure

d



             86965-2)                                            

 

             IPF % (test code = 7.9 %        1.2-10.7                  Platelet 

count



             7864303796)                                         measured by



                                                                 fluorescence



                                                                 method.

 

             NRBC/100 WBC (test              See_Comment                [Automat

ed



             code = 6789741379)                                        message] 

The system



                                                                 which generated



                                                                 this result



                                                                 transmitted



                                                                 reference range

:



                                                                 0.0 - 10.0 /100



                                                                 WBCs. The refer

ence



                                                                 range was not u

sed



                                                                 to interpret th

is



                                                                 result as



                                                                 normal/abnormal

.

 

             NRBC x10^3 (test code <0.01        See_Comment                [Auto

mated



             = 6459446113)                                        message] The s

ystem



                                                                 which generated



                                                                 this result



                                                                 transmitted



                                                                 reference range

:



                                                                 10*3/?L. The



                                                                 reference range

 was



                                                                 not used to



                                                                 interpret this



                                                                 result as



                                                                 normal/abnormal

.

 

             GRAN MAT (NEUT) % 70.6 %                                 



             (test code = 770-8)                                        

 

             IMM GRAN % (test code 0.20 %                                 



             = 3398577505)                                        

 

             LYMPH % (test code = 15.4 %                                 



             736-9)                                              

 

             MONO % (test code = 8.6 %                                  



             5905-5)                                             

 

             EOS % (test code = 4.4 %                                  



             713-8)                                              

 

             BASO % (test code = 0.8 %                                  



             706-2)                                              

 

             GRAN MAT x10^3(ANC) 4.17 10*3/uL 1.99-6.95                 



             (test code =                                        



             0859046551)                                         

 

             IMM GRAN x10^3 (test <0.03        0.00-0.06                 



             code = 2062522638)                                        

 

             LYMPH x10^3 (test code 0.91 10*3/uL 1.09-3.23    L            



             = 731-0)                                            

 

             MONO x10^3 (test code 0.51 10*3/uL 0.36-1.02                 



             = 742-7)                                            

 

             EOS x10^3 (test code = 0.26 10*3/uL 0.06-0.53                 



             711-2)                                              

 

             BASO x10^3 (test code 0.05 10*3/uL 0.01-0.09                 



             = 704-7)                                            

 

             TARGET CELLS (test 2+           See_Comment  A             [Automat

ed



             code = 23551-1)                                        message] The

 system



                                                                 which generated



                                                                 this result



                                                                 transmitted



                                                                 reference range

:



                                                                 (none). The



                                                                 reference range

 was



                                                                 not used to



                                                                 interpret this



                                                                 result as



                                                                 normal/abnormal

.

 

             Lab Interpretation Abnormal                               



             (test code = 12401-8)                                        



[id number]40:47





             Test Item    Value        Reference Range Interpretation Comments

 

             PROTIME PATIENT (test              See_Comment  H             [Auto

mated message]



             code = 5964-2)                                        The system wh

ich



                                                                 generated this 

result



                                                                 transmitted ref

erence



                                                                 range: 10.1 - 1

2.6



                                                                 Seconds. The



                                                                 reference range

 was



                                                                 not used to int

erpret



                                                                 this result as



                                                                 normal/abnormal

.

 

             INR (test code = 6301-6)                                        Nor

mal INR <1.1;



                                                                 Warfarin Therap

eutic



                                                                 range 2.0 to 3.

0 or



                                                                 2.5 to 3.5, dep

ending



                                                                 upon the indica

tions.

 

             Lab Interpretation (test Abnormal                               



             code = 70319-0)                                        



[facility] (ALB, T.PRO, BILI T, 
BU/BC, ALT, AST, ALK PHOS)2021 16:36:51





             Test Item    Value        Reference Range Interpretation Comments

 

             TOTAL BILI (test code = 7094227217) 4.4 mg/dL    0.1-1.1      H    

        

 

             BILI UNCON (test code = 4966952507) 2.7 mg/dL    0.1-1.1      H    

        

 

             BILI CONJ (test code = 2013425209) 0.1 mg/dL    0.0-0.3            

       

 

             T PROTEIN (test code = 9779720086) 7.1 g/dL     6.3-8.2            

       

 

             ALBUMIN (test code = 6421981239) 2.9 g/dL     3.5-5.0      L       

     

 

             ALK PHOS (test code = 0285309577) 204 U/L             H      

      

 

             ALTv (test code = 1742-6) 23 U/L       5-50                      

 

             AST(SGOT) (test code = 1084720641) 51 U/L       13-40        H     

       

 

             Lab Interpretation (test code = Abnormal                           

    



             06203-1)                                            



[facility] Metabolic Panel (NA, K, CL, CO2, 
GLUCOSE, BUN, CREATININE, CA)2021 16:36:50





             Test Item    Value        Reference Range Interpretation Comments

 

             NA (test code = 136 mmol/L   135-145                   



             2165597053)                                         

 

             K (test code = 3.1 mmol/L   3.5-5.0      L            



             8416113799)                                         

 

             CL (test code = 101 mmol/L                       



             8808168964)                                         

 

             CO2 TOTAL (test code = 26 mmol/L    23-31                     



             1058952419)                                         

 

             AGAP (test code =              2-16                      



             8992040738)                                         

 

             BUN (test code = 9 mg/dL      7-23                      



             5618730191)                                         

 

             GLUCOSE (test code = 118 mg/dL           H            



             7195234545)                                         

 

             CREATININE (test code = 0.56 mg/dL   0.60-1.25    L            



             5610064571)                                         

 

             CALCIUM (test code = 8.2 mg/dL    8.6-10.6     L            



             1367836378)                                         

 

             eGFR (test code =              mL/min/1.73m2              



             3995296546)                                         

 

             RENETTA (test code = RENETTA) Association of                           



                          Glomerular Filtration                           



                          Rate (GFR) and Staging                           



                          of Kidney Disease*                           



                          +---------------------                           



                          --+-------------------                           



                          --+-------------------                           



                          ------+| GFR                           



                          (mL/min/1.73 m2) ?|                           



                          With Kidney Damage ?|                           



                          ?Without Kidney                           



                          Damage+---------------                           



                          --------+-------------                           



                          --------+-------------                           



                          ------------+| ?>90 ?                           



                          ? ? ? ? ? ? ? ?|                           



                          ?Stage one ? ? ? ? ?|                           



                          ? Normal ? ? ? ? ? ? ?                           



                          ?+--------------------                           



                          ---+------------------                           



                          ---+------------------                           



                          -------+| ?60-89 ? ? ?                           



                          ? ? ? ? ?| ?Stage two                           



                          ? ? ? ? ?| ? Decreased                           



                          GFR ? ? ? ?                            



                          +---------------------                           



                          --+-------------------                           



                          --+-------------------                           



                          ------+| ?30-59 ? ? ?                           



                          ? ? ? ? ?| ?Stage                           



                          three ? ? ? ?| ? Stage                           



                          three ? ? ? ? ?                           



                          +---------------------                           



                          --+-------------------                           



                          --+-------------------                           



                          ------+| ?15-29 ? ? ?                           



                          ? ? ? ? ?| ?Stage four                           



                          ? ? ? ? | ? Stage four                           



                          ? ? ? ? ?                              



                          ?+--------------------                           



                          ---+------------------                           



                          ---+------------------                           



                          -------+| ?<15 (or                           



                          dialysis) ? ?| ?Stage                           



                          five ? ? ? ? | ? Stage                           



                          five ? ? ? ? ?                           



                          ?+--------------------                           



                          ---+------------------                           



                          ---+------------------                           



                          -------+ *Each stage                           



                          assumes the associated                           



                          GFR level has been in                           



                          effect for at least                           



                          three months. ?Stages                           



                          1 to 5, with or                           



                          without kidney                           



                          disease, indicate                           



                          chronic kidney                           



                          disease. Notes:                           



                          Determination of                           



                          stages one and two                           



                          (with eGFR                             



                          >59mL/min/1.73 m2)                           



                          requires estimation of                           



                          kidney damage for at                           



                          least three months as                           



                          defined by structural                           



                          or functional                           



                          abnormalities of the                           



                          kidney, manifested by                           



                          either:Pathological                           



                          abnormalities or                           



                          Markers of kidney                           



                          damage (including                           



                          abnormalities in the                           



                          composition of the                           



                          blood or urine or                           



                          abnormalities in                           



                          imaging tests).                           

 

             Lab Interpretation Abnormal                               



             (test code = 00410-0)                                        



[facility] [id number] 00:44:28





             Test Item    Value        Reference Range Interpretation Comments

 

             BF SEGS (test code = 4 %                                    



             5647851071)                                         

 

             BF LYMPHS (test code 24 %                                   



             = 0919355026)                                        

 

             MACROPHAGE (test 61 %                                   



             code = 7549685869)                                        

 

             MESOS (test code = 11 %                                   



             4942517131)                                         

 

             #CELS CNTD (test                                        



             code = 0534424801)                                        

 

             RENETTA (test code = Reviewed by HAILEE JACKSON)         DALE IBRAHIM, Director of                           



                          HEMATOPATHOLOGY                           



[facility]21-[facility]-10 00:06:43





             Test Item    Value        Reference Range Interpretation Comments

 

             F-ACTIN (SMOOTH              See_Comment                If F-Actin 

(Smooth Muscle)



             MUSCLE) AB,                                         Antibody, IgG i

s negative,



             IGG(BEAKER) (test                                        the Smooth

 Muscle Antibody



             code = 37875-6)                                        titer by IFA

 is not



                                                                 performed.REFER

ENCE



                                                                 INTERVAL: F-Act

in (Smooth



                                                                 Muscle) Antibod

y, IgG by ?



                                                                 ? ? ? ? ? ? ? ?

 ?MARY LOU ?19



                                                                 Units or less .

......



                                                                 Negative ?20 - 

30 Units



                                                                 .......... Weak



                                                                 Positive-Sugges

t repeat ? ?



                                                                 ? ? ? ? ? ? ? ?

 ? ? ?



                                                                 testing in two 

to three



                                                                 weeks ? ? ? ? ?

 ? ? ? ? ? ?



                                                                 ? ? with fresh 

specimen.



                                                                 ?31 Units or gr

eater.....



                                                                 Positive-Sugges

tive of ? ?



                                                                 ? ? ? ? ? ? ? ?

 ? ? ?



                                                                 autoimmune hepa

titis type 1



                                                                 ? ? ? ? ? ? ? ?

 ? ? ? ? ?



                                                                 or chronic acti

ve



                                                                 hepatitis. F-ac

tin IgG



                                                                 antibodies have

 been shown



                                                                 to have increas

ed



                                                                 sensitivity for

 autoimmune



                                                                 hepatitis (AIH)

 but lower



                                                                 specificity suzette

n smooth



                                                                 muscle antibodi

es (SMA).



                                                                 F-actin IgG ant

ibodies can



                                                                 also be seen in



                                                                 SMA-negative di

sease



                                                                 controls (non-A

IH),



                                                                 especially in p

atients with



                                                                 primary biliary

 cirrhosis



                                                                 and chronic hep

atitis C



                                                                 infections. David

e patients



                                                                 with AIH may be



                                                                 SMA-positive bu

t negative



                                                                 for F-actin IgG

. Consider



                                                                 testing for SMA

 by IFA if



                                                                 suspicion for A

IH is



                                                                 strong.Performe

d By: [employer]

 [facility], [place]



                                                                 [id number]

 Director:



                                                                 Margo Carlisle MD



                                                                 [Automated mess

age] The



                                                                 system which ge

nerated this



                                                                 result transmit

michelle



                                                                 reference range

: 0 - 19



                                                                 Units. The refe

rence range



                                                                 was not used to

 interpret



                                                                 this result as



                                                                 normal/abnormal

.



[facility]42





             Test Item    Value        Reference Range Interpretation Comments

 

             AMA (test code =              See_Comment               REFERENCE I

NTERVAL:



             14251-3)                                            Mitochondrial (

M2) Antibody,



                                                                 IgG ? ?20.0 Uni

ts or less



                                                                 ......... Negat

david ?20.1 -



                                                                 24.9 Units.....

......



                                                                 Equivocal ?25.0

 Units or



                                                                 greater....... 

Positive



                                                                 Anti-mitochondr

ial



                                                                 antibodies (AMA

) are thought



                                                                 to be present i

n 90-95% of



                                                                 patients with p

rimary



                                                                 biliary cholang

itis (PBC).



                                                                 However, the fr

equency of



                                                                 detected antibo

dies may be



                                                                 cohort or assay

 dependent,



                                                                 as lower sensit

ivities have



                                                                 been reported. 

Not all PBC



                                                                 patients are po

sitive for



                                                                 AMA; some patie

nts may be



                                                                 positive for SP

100 and/or



                                                                  antibodie

s. A negative



                                                                 result does not

 rule out



                                                                 PBC.Performed B

y: [employer]

 [facility], [place]



                                                                 [id number]

 Director:



                                                                 Margo Carlisle MD



                                                                 [Automated mess

age] The



                                                                 system which ge

nerated this



                                                                 result transmit

michelle reference



                                                                 range: 0.0 - 24

.9 Units. The



                                                                 reference range

 was not used



                                                                 to interpret th

is result as



                                                                 normal/abnormal

.



[id number] 20[facility]17:32





             Test Item    Value        Reference Range Interpretation Comments

 

             Case Report (test code = Non-Gynecologic                           



             [id number])  Cytology ? ? ? ? ? ?                           



                          ? ? ? ? ? ? ?Case:                           



                          [id number] ? ? ? ? ?                           



                          ? ? ? ? ? ? ? ? ? ?                           



                          ?Authorizing                           



                          Provider: ?Amy Caldera MD ?                           



                          ? Collected: ? ? ? ?                           



                          ? 2021 1300 ? ?                           



                          ? ? ? ?Ordering                           



                          Location: ? ? [facility]                           



                          [facility] ? ? ? ? ? ? ?                           



                          ?Received: ? ? ? ? ?                           



                          ?2021 1321 ? ?                           



                          ? ? ? ? ? ? ? ? ? ? ?                           



                          ? ? ? ?                                



                          Medicine/Surgery [facility]                           



                          7B ? ? ? ? ? ? ? ? ?                           



                          ? ? ? ? ? ? ? ? ? ? ?                           



                          ? ? ? ? ? ?Specimen:                           



                          ? ?ASCITES ? ? ? ? ?                           



                          ? ? ? ? ? ? ? ? ? ? ?                           



                          ? ? ? ? ? ? ? ? ? ? ?                           



                          ? ? ? ? ? ? ? ? ? ? ?                           



                          ?                                      

 

             Final Diagnosis (test [id number]                        

   



             code = [id number]) GFuZzEwMzNcZnRuYmpcdW                           



                          MxIHtccnRmMVxlcGljOTQ                           



                          pX9culyOtHCPpcAXkR7Os                           



                          tmweXRwqZM9nID0hcJpiu                           



                          IDgyLMxBIHmJxMxm7bac1                           



                          34fZKog1cdJKQVurxjsWo                           



                          9tWcqS83lz9S6PxxqT89h                           



                          rAYzVLU3IUKpXVPfnIPwH                           



                          QOfFHN3MHFfuQThY1feON                           



                          DfTN8yynnuVDupLRgbZHQ                           



                          ucKU4SCJeyLMlV1RgBJWh                           



                          IWxdCOUpqvx9VyYlQg6yl                           



                          GVyeTcyMFxwYXJkXHBsYW                           



                          ziTHOsXkQmTePHLS9xRCS                           



                          WJV9KED36IXPRQjAOSF9L                           



                          RVNJUyBGTFVJRDpccGFyI                           



                          X0sWbDMGQIQQsExTo5AMZ                           



                          1BTElHTkFOVCBDRUxMUyA                           



                          yO3QQBIVDEW7AYbUgFNBp                           



                          vs91QWG6EvYaf2H6OAWcK                           



                          sAhDZRhDS0ujXsgAKZgOM                           



                          9fAZUgL9zlyR8uqag6PnY                           



                          fZUXkCaS8HLYzsmD7Tbi9                           



                          OQTgSOhxl9lxt5UwD1Gaf                           



                          WTkqIl9y0duLVDeBiQ7yF                           



                          ImANcjP5jsxhYauHLxHTY                           



                          dAEc3nJmuQxDzXTVes0zy                           



                          cyBcZmNoYXJzZXQwIENhb                           



                          Walvsc0pT28XNXhhK2nvC                           



                          FfYKosyyXkPaG4FLckHMG                           



                          rQkR7RCClqTXeAYNdI7tf                           



                          ZWQwXGdyZWVuMFxibHVlM                           



                          NI2aPkod3P3kZLspMIibZ                           



                          cqXeFcAxFbGEPUh3BwBPa                           



                          8cYurQ5MhCUTvMkI8oUSf                           



                          OSOaNFyeETMoSQJpatU4s                           



                          D90FBbktxV8aVQsb7Ute4                           



                          7og978rO8cnJUkFMG3QKL                           



                          iEZQfnWVeZMMkEKH1NSUc                           



                          yXKwH3gwPSPlJB1etxxnV                           



                          DqeXQnlKFAxvIC1SRGgpZ                           



                          LxR2YqLPVtVOtgTRBunfu                           



                          2ZhXoYk8zyJJjdXxlOYee                           



                          f0jvz0msaIFfFoh0NESuM                           



                          cZjUktxJSllu2Ajr6fiJX                           



                          Htiv1iQXU8aATffBmbq9M                           



                          0bGUxXGRudGJsbnNiZGJc                           



                          YrK7QWwjQH5cxf10FZUpC                           



                          XM3jn2wjPDmmNhwqqZkvO                           



                          NxQRyhJ9PhLEYlf349ISQ                           



                          rK0TzLPSbr1D5quFmXuWv                           



                          MPCbmRE0liR7KTIdULc9u                           



                          PKyhtO9vkZmlIVgI5peiX                           



                          7dOPJvGZ1vzirzi7pmCAd                           



                          vIEmgRVRexBG5cpJ3OOGq                           



                          pGYgW5XbxI6lQTYlFTbzH                           



                          FCahrz7LuGsRd5ixBSpoR                           



                          cyMFxzYmtwYWdlXHBnbmN                           



                          vbnRccGduZGVjXHBsYWlu                           



                          XHBsYWluXGYwXGZzMjRcc                           



                          BxfgZmniY9xKhXzQhSvRC                           



                          rdRM7kMKCcU1dgnWQjSDE                           



                          mUNYcY3xoNwGlgA7yuQhv                           



                          MVxjZjJcZnMyMFxwYXIgS                           



                          SBoYXZlIHBlcnNvbmFsbH                           



                          mhruJ9oQX7RWWtROuiNNL                           



                          oHFSlqOHmza5jaZltZEAk                           



                          MM7kBWPfwuUbFBzucDcbM                           



                          BowHBN2ZAGtwGTygYQzaG                           



                          FkZSBieSByZXNpZGVudHM                           



                          sEBBmxZfek8Gfc8OwrWG2                           



                          hA0wc5tqq9BeNKXecIY0X                           



                          B95anO1oH9kHOFoOL4uDD                           



                          IyXO1seFOzkUZgWGCjd37                           



                          gdGhpcyByZXBvcnQuXHBs                           



                          YWluXGYyXGZzMjhcbGFuZ                           



                          zEwMzNcaGljaFxmMlxkYm                           



                          NiHIFrOHfnG5lpPqFiPhI                           



                          jNQcvGQL7pC==                           

 

             Final Diagnosis Comment n8ifcLAnTMXnoLI2VVIpS                      

     



             (test code = [id number]) VHrq2hun9GakJBnpVRxXX                     

      



                          uweKSejlDkpk49kXW4lR5                           



                          8YC0cOQAuOuI8KYHubfP7                           



                          Qcn0PQIfJVAanDKfM629f                           



                          3csr7mzglKayOP1rJkxSH                           



                          HucgylGfP4UNweRSSdkhl                           



                          zYLz1JFcjPQAnhYU3YAKb                           



                          yTBhW2JeHQJrDC6zbun1S                           



                          TB2DGlwKGOmYkX0MGKmuM                           



                          VlBYFtpJsbWIkwx399DSJ                           



                          4GzWqJGKogqQwoOawvN0e                           



                          NoBxKXKEmVJrleJcv1gri                           



                          eBuY1A4hNUbKIApfOFrm9                           



                          JcAWsbTMqmM4SasEOoyR2                           



                          qFGOoFSQpQ7HmeI3iGM6p                           



                          GOEsBEEntPigSF22zPpqd                           



                          ZyolYhgvWyonSusG7c6yI                           



                          EfjU3lsQGzsOE3sX3uRzN                           



                          VmfGfIOooZ39ijhScL9Ju                           



                          xGFgdPJydhIjPhaaEQ5tr                           



                          GFyfQ==                                

 

             Clinical Information large new onset                           



             (test code = [id number]) ascites                                

 

             Gross Description (test [id number]                      

     



             code = [id number]) GZlv2udn4NpzLCevKRnTB                           



                          gqxIIqiaDudf86hCV9lP4                           



                          0GW5dKNOlSaU8KNQmtrK6                           



                          Lbu2UDHjQDYmwSNxU334g                           



                          1uwp4osufQpqDI1sNawRD                           



                          KhbpfyRtQ7DPjzTPDvqij                           



                          pVTg1EIspUNNkvEF5DVLl                           



                          wGWiL0LzRPXpKN6ccfi5P                           



                          AK7NCobYNQfEsD8YKMtnL                           



                          HcZARmlQvzTRbnc304NNL                           



                          8TgFlHCIuwtG4FUhwNTJa                           



                          D8TzM5SeRWuaPOL7RYXlN                           



                          CBcXHQgMSBcXGZsIFxcbm                           



                          G5x9qzECZdlQMrPVT7EDf                           



                          caWQgNTEwMDIgXFxkYiBP                           



                          SaKbNsDUFYWlCVh2AbE0D                           



                          Fv1GLNBZyXfLjYyPTE3DB                           



                          d8UcLlMUi4AWp2LApLFgT                           



                          fExM8HeOaZyE4ZTJ4IaC7                           



                          IFxcdCAyIFxcZmwgXFxmI                           



                          EFyaWFsIFxcZnMgMTAgXF                           



                          zbK44rcOicpF2qVvOlBYf                           



                          zWIHvTyKcJfGZZF1wVCUK                           



                          XH7WNB96YNNNDgUZHO8JI                           



                          VNJUyBGTFVJRFxwYXIgUm                           



                          IuHHm3ROWfVsPff4jtyLX                           



                          mKUQqHGOtR1Kgm4Sqz2Ea                           



                          bmdlIGFtYmVyIGZsdWlkI                           



                          FxwYXIgUHJlcGFyZWQgMy                           



                          BzbGlkZXMgKDEgUGFwYW5                           



                          qZ07xBS21CGT6eV8biFfh                           



                          ULIjSCTupPBzw9hfv2sgX                           



                          9u7f3AfuE8wNB6qGVXeMQ                           



                          hpbnByZXAgcHJlcGFyYXR                           



                          rf54lKOPlozbjPNOgD9Fs                           



                          A8GxhhW2v1oedWjdx6Gmh                           



                          SExAJ4hjZRdmT==                           

 

             Disclaimer (test code = t1chwQJdVTBxx1ubKYZtx                      

     



             [id number])  GFuZzEwMzNcZnRuYmpcdW                           



                          DuNJrdmfGiQBaqg8YqM3G                           



                          yMjAwMFxhbnNpXGRlZmxh                           



                          uyngLIMdRDF8vxNoJHOiQ                           



                          JlbIXZtFWkeMx9bvABqjV                           



                          qwKgByFBAba9eewmOHITm                           



                          yAlRpQ745HFNlWKwjs4kp                           



                          s1TfSWEjcCEnb8M3IRHTa                           



                          bipqOo8aTybN81sg8Z8Jn                           



                          zoN6ebKVOhHOUyU2XqUW7                           



                          qPUFuAvi2IMR2KGX0KEIi                           



                          TFMqJ0LbVI9hIKNqbJRsW                           



                          Xu5y0lyoWfaLGLpNPH0u2                           



                          mdEJxdqaJtEB0ele0pnOg                           



                          1i9mljuYsSCZaMAJayFRX                           



                          VDTuI1UryWomQn4psIx8s                           



                          BfiUuyoGUQ5Pco8EM5jsm                           



                          63ixg0wLwvFIWgaykqVuC                           



                          0VUhtUOFckwxkCTe6ZYvw                           



                          EAPfjUI0MDJfoJCfU5GzQ                           



                          BGfFZ8hese0RDM2XSbuLH                           



                          AfYhF1MTBnvGXaNNNenXb                           



                          xZQlmu834FFU2UvLwQC6r                           



                          X7Tbm6P5nI5ztBTtVZJpv                           



                          BLdMyGaWLNzws5waVCqVN                           



                          igg6QmPCM1fqX8iBZhoOE                           



                          eNNTcEQ60Phptd1MdZnvc                           



                          d7RwD11duKZ1KVhep5lvU                           



                          U3yZkZ3uaVoYMatk1cuqS                           



                          9iXeF5IQjnWU0dBJ9tTUQ                           



                          lqZ1usicpCXPcUuHfntpw                           



                          GVEcbKxgskSfFn9vyBcfF                           



                          TK3BQugD2rtwI2uLkF3SE                           



                          hsS7rvsG8wKSo7EVrnpMQ                           



                          4USKaaB1oRN1zzlguc9kn                           



                          QRwnGXjaYOWjmhA2otB0P                           



                          JGdfQFgU3MkxW4oCEOtLG                           



                          2bytjor2ygFJW7XTfgTYY                           



                          aLAN1NrDuRDSvp4Vtscs8                           



                          VsGju1FbhXWqKNhhA90wj                           



                          288KBNhpuFcC8mneXIamm                           



                          mvuCZnllgcSSfhzxL6SKF                           



                          bneHlf1OjSFRtERW8OPhh                           



                          ULtiiNBySCTkfHdpr3wdA                           



                          3RscGFyXHBsYWluXGYxXG                           



                          ZzMjBcbGFuZzEwMzNcaGl                           



                          jaFxmMVxkYmNoXGYxXGxv                           



                          Q0qtMkAjJ3XcXZUqCxTal                           



                          AYsF0xtUZcajkNlNTGtnj                           



                          EouLT5XYqxM4g0OJCbxdY                           



                          lvQs4bmCoQiFnDJNdCLT7                           



                          OHbauGYzJIIyq7Caeydtx                           



                          XSwQg0svXJiSLPcyO6kOJ                           



                          YiZKZbDVzmRV2qtSn5GFO                           



                          GmZKvdTCzQyKFEBJtNW74                           



                          krCfCVCKhajed3G4HByhL                           



                          ORwj3BklKCdH8dck6WbAF                           



                          Zsc65gNV4rc3A0y7gjNJX                           



                          1EM6wz1VxLOVwnPKskQGa                           



                          HJMlj3Pbqdlpd5HbSWOcg                           



                          oZva7SfSGLphnHgvTTmCF                           



                          EputHwua7trmQhBHFvPZX                           



                          gM0EkvomalBygjyQsDQOc                           



                          mn2gqlMxTSA4PEBCDTInL                           



                          WPyk1YpbE4ywXNNSCC0cQ                           



                          Cfrh5qxlLKfMWlTSPopx7                           



                          6GWNkBN4vK7poOMStVHRz                           



                          wtFxaUHjx7ChELDysRE3y                           



                          JXdFY8RPeCBz81cYCNgWC                           



                          HYqeMdLKUyjTeydOS8wcN                           



                          6iD5jKXkGAIFnHxm+IFRo                           



                          WMKGIGHhKT5ojpGbf0Htt                           



                          lKuyHpcGSUgnPPxf3AbrB                           



                          Hzi1XfiQbpq4MppSIjeIR                           



                          nSW8yVUQhvhgmTWOuSIAV                           



                          HtYCPIYweaF4n4SuSABwQ                           



                          TXlUEH3eLlglxz1AHOwiL                           



                          3yWAXkC2clulcuTZopQNT                           



                          pi2QvfW3pxMVQtTSxi5Pq                           



                          wPHagUEEoDGuIB4bmmFuG                           



                          IrRWSeOOZZ4aqQwSWDbh3                           



                          WdNRgyT5sxV46ieCfobFt                           



                          7cFX4SVK1qQ9aTsa+IFxw                           



                          YXJccGFyIEFwcHJvcHJpY                           



                          EVirUiedcJkM2NnexUbiG                           



                          4ygGFrgfWsLV5hBI5gS4H                           



                          1pCIfNHXcjkEwf6vmZDgr                           



                          dmUgYmVlbiByZXZpZXdlZ                           



                          RJjv4FaGWdmSMO1MAhffg                           



                          BpbmNsdWRpbmcgSCZFLCB                           



                          BiWAucNRdYSV8TZhychLm                           



                          cqOqOG3pxC5emTdviW3cw                           



                          CQysHC7rzdhLDCdTKCsbD                           



                          ngTPEfOQ4raIXgCIQamfV                           



                          WeEhtiKZzvC5vM8DlQPKh                           



                          LLSxfb8kOKMhiW8kJXzkc                           



                          2VydmljZXMgYXJlIHBlcm                           



                          Hdwk5pWQKvsPQLWQ0EJMe                           



                          ekNYrb0WbbpMmI4zMUHK3                           



                          NUQwNjYwMjgxKSBleGNlc                           



                          OIkFNUbzz22WVAflM4blI                           



                          dbEUHujX0huV2yhYichX8                           



                          iUpZiIrIyLAagHY5vIUQb                           



                          R5ihwVXdUSMuJWTqR0wbX                           



                          pCrqU9lrTlnOTprHaIvLi                           



                          IvEXsqQDG6sM==                           

 

             Embedded Images (test                                        



             code = [id number])                                        



[id number] 20[facility]17[facility]32





             Test Item    Value        Reference Range Interpretation Comments

 

             Case Report (test code = Non-Gynecologic                           



             [id number])  Cytology ? ? ? ? ? ?                           



                          ? ? ? ? ? ? ?Case:                           



                          [id number] ? ? ? ? ?                           



                          ? ? ? ? ? ? ? ? ? ?                           



                          ?Authorizing                           



                          Provider: ?Amy Caldera MD ?                           



                          ? Collected: ? ? ? ?                           



                          ? 2021 1300 ? ?                           



                          ? ? ? ?Ordering                           



                          Location: ? ? [facility] ? ? ? ? ? ? ?                           



                          ?Received: ? ? ? ? ?                           



                          ?2021 1321 ? ?                           



                          ? ? ? ? ? ? ? ? ? ? ?                           



                          ? ? ? ?                                



                          Medicine/Surgery [facility]                           



                          7B ? ? ? ? ? ? ? ? ?                           



                          ? ? ? ? ? ? ? ? ? ? ?                           



                          ? ? ? ? ? ?Specimen:                           



                          ? ?ASCITES ? ? ? ? ?                           



                          ? ? ? ? ? ? ? ? ? ? ?                           



                          ? ? ? ? ? ? ? ? ? ? ?                           



                          ? ? ? ? ? ? ? ? ? ? ?                           



                          ?                                      

 

             Final Diagnosis (test l7bsfXUmADUsp1mmCIYtp                        

   



             code = [id number]) GFuZzEwMzNcZnRuYmpcdW                           



                          MxIHtccnRmMVxlcGljOTQ                           



                          fL1rivaFgNAHrgQQcA6Ry                           



                          kzlfZMmkVX6sHO8jyRbta                           



                          ODyzRIuQBUyCwSvi7ogp5                           



                          14cXBev9axWOXGiftntUo                           



                          0cHqcX79yk4U5OzyfB08b                           



                          sXLrVSM5NTXiOCKqiGVzE                           



                          CVfOXP8XWNuyLGvB7khYI                           



                          VfII3yhcohPFkyXQxxFKU                           



                          haFY6WEOzuJRlD4LyYHZp                           



                          MEgcQWOwild8VnBlIl7gj                           



                          GVyeTcyMFxwYXJkXHBsYW                           



                          bgJWTrNzGnQlUWXZ6fSDQ                           



                          HRB8QEK72UWQOOrKLQA2Q                           



                          RVNJUyBGTFVJRDpccGFyI                           



                          Q7xYeYRCRWSVaXjPp6CGE                           



                          1BTElHTkFOVCBDRUxMUyA                           



                          hM6YVBBNQZG6SZcYgQQXu                           



                          hc40ROV9LkXhy3Y3MWEvH                           



                          dKvVYPeIF6baFkiWBOzHA                           



                          3gBCWqL8fjlL7amyv0JfU                           



                          xLWGjLuM4YCIfbxD5Iaw9                           



                          UHXyXFjmc3zae9GrS2Smy                           



                          RWuyTb9h5whLDWiKpG2sO                           



                          XbEWerY8aigaGwpIJeXZM                           



                          vJQa4nNnyPaRcJWZvf3ac                           



                          cyBcZmNoYXJzZXQwIENhb                           



                          Ujmkdx5lQ41UVUndD8udW                           



                          IyURgwuqKqFbY6NIjoYIY                           



                          oNlW9NBXsfVLmJXLvS8ln                           



                          ZWQwXGdyZWVuMFxibHVlM                           



                          LD9hNgqi0B2xITqoKQerI                           



                          tnImWiSzNiPJDXv9TaNDu                           



                          5hDmaW0PuZFGkEsP9pAZj                           



                          GIFhQIzhZIZaGAKpfmQ8k                           



                          A02JErcvcD9dGYvn4Uhg9                           



                          9uk899mA0luRXdHSM9AIS                           



                          fHNXblFMxRSUyRZU6RSOb                           



                          tKGeK4lwHFJhTA4catjiO                           



                          TohDWfzKUTkjOJ6XYRppR                           



                          QmI8NoQDXvGCmdQSPsohg                           



                          5YvOtXw6bjDHaxHcnWZvc                           



                          k4cls1hyuMUjDfv4TUAmP                           



                          kCfHokgIAtcx4Pcw4isDS                           



                          Dlhw7yPUY7mJPkkDipu3G                           



                          0bGUxXGRudGJsbnNiZGJc                           



                          EsV4GFymFH0vhx59ETXfE                           



                          SM5nm6tiARrbHpogcWhdF                           



                          LnRKmwC7MsFCOfi151TRZ                           



                          gE0ApHOHxo0H8yqFjNaWc                           



                          CGGvtCD2roT0GGFjRUw1a                           



                          OKqsvZ4wwXeqMSqK3rcsP                           



                          9mVCBzCT2ilhjzo2vqZCp                           



                          qOXjkTEQoaXX9ieA6RPHr                           



                          rEBuC3AuhK0bVCNiBIdfE                           



                          SWafja4LeFgZm4dkXQfrF                           



                          cyMFxzYmtwYWdlXHBnbmN                           



                          vbnRccGduZGVjXHBsYWlu                           



                          XHBsYWluXGYwXGZzMjRcc                           



                          ErnjSvgzS3qTkRnQbByNB                           



                          azGJ2wGMOlD4gdzJHeLMB                           



                          qRCXvS5dkDvPmbK0qiPgu                           



                          MVxjZjJcZnMyMFxwYXIgS                           



                          SBoYXZlIHBlcnNvbmFsbH                           



                          ncqrX0eOA4MXJzMWhjSYH                           



                          qJRMytSZuiz9zfWmeOFBa                           



                          IQ4dVWVlroAqAFbgxLldY                           



                          EezCZD6NFWqyDQziMKqjF                           



                          FkZSBieSByZXNpZGVudHM                           



                          aBUQefWgid0Zmo7CkbCX0                           



                          jH0kx1rkz4FsCTEqgWV6S                           



                          U02quJ8hP1vHVYiAF8xWR                           



                          SeFO5lrVJpvKJpSLTio78                           



                          gdGhpcyByZXBvcnQuXHBs                           



                          YWluXGYyXGZzMjhcbGFuZ                           



                          zEwMzNcaGljaFxmMlxkYm                           



                          BqAKHfUXiqN8hvCyQbXdH                           



                          dFKzpKOP4wY==                           

 

             Final Diagnosis Comment i9cdlXYlGKPssTO8TTHvW                      

     



             (test code = [id number]) KOlz1lul0KuwTJgzIZuCG                     

      



                          xpbYAlcxKlua68sMR7kN3                           



                          1RO7aQVYwYcZ2LHDvixR3                           



                          Dks6WMDvMNXuqTVgR891q                           



                          0uqx6afxqAykEW5aGegPI                           



                          UksedbEwD0ISaeNJRishm                           



                          iRNs5KEymJGZjzKA1TMNx                           



                          uFAtA8QzGLFcBX4zdlc1Q                           



                          ED0RCnsYBDoJpK6SUJyfP                           



                          FkZGCxmXryJJybk207JQH                           



                          5FeTcHBSgjlQffXawaL2m                           



                          XdVqALEDpEBjkyLxc7iht                           



                          eYnA9P4jANfLHYkaTMnp4                           



                          VyVXhoIWuhN8OhnEGquT6                           



                          lLLMmUCYaE5NlbJ0jDL0x                           



                          XSApSLTzbBiyWE96hCkir                           



                          IupeGxmzTpzzKlqD2y0gH                           



                          XwrA3scXMopXV5vN8nAyY                           



                          MvaXfMWwrN63xxkJyY9Qf                           



                          lRLiyELbplDzXkweFM7qo                           



                          GFyfQ==                                

 

             Clinical Information large new onset                           



             (test code = [id number]) ascites                                

 

             Gross Description (test l3xalXXoAMMyjSP1CRPoM                      

     



             code = [id number]) WNwd5xjw3KeqUBfoINkAA                           



                          dsbMKpsaIgmb70jQO9lW0                           



                          1HD9bTRSdJwI6ZGWcwrQ0                           



                          Fjt1QEWhVWBdtWKmK800e                           



                          7clz3cgdsSkdKS5pLhnGB                           



                          XxcqniSiL9ZFbfPVGshwe                           



                          mUKg7VJgpISLzaRR8NOKl                           



                          tVMfU4AiWSJgSH9rcoa1B                           



                          TE6NMvuFOCwRhU5YYCppP                           



                          DbSKNiqTmaJSnjv995BFH                           



                          2YrNiARFmuqH7YTyySJCk                           



                          M5DqH3HkGMhnNGM0SCHiL                           



                          CBcXHQgMSBcXGZsIFxcbm                           



                          M8c4voURMrxFUwCMD8WDh                           



                          caWQgNTEwMDIgXFxkYiBP                           



                          JdFbQgOXVQKtALb2VlY7I                           



                          Zi3ZJVPHpViCnTnEUP1OX                           



                          i1FqAfRVo7BGx1SQkQUgX                           



                          sQiV5JjLfZcA6PHR5IzI6                           



                          IFxcdCAyIFxcZmwgXFxmI                           



                          EFyaWFsIFxcZnMgMTAgXF                           



                          tlI82zzVfmaT6zZdCkOGe                           



                          yVGBgHlYwRlLHXJ8wKBPF                           



                          ER2WGG52HYTDRnRRCS8JG                           



                          VNJUyBGTFVJRFxwYXIgUm                           



                          AmGEa8EXBrFbJji0oehHK                           



                          iMLYiVEGxQ6Rws7Dcm7Rk                           



                          bmdlIGFtYmVyIGZsdWlkI                           



                          FxwYXIgUHJlcGFyZWQgMy                           



                          BzbGlkZXMgKDEgUGFwYW5                           



                          bT28mAX90UKO1sF5wqMfd                           



                          VWQnDTDurVHtg3hqw6dhH                           



                          4n2h3IujO1wIQ5rEWRsKW                           



                          hpbnByZXAgcHJlcGFyYXR                           



                          wk93kTFLgngwvUGGpS4Ch                           



                          Y2PlzgT0c4dfeInur3Fls                           



                          FWoQR5noPOxaJ==                           

 

             Disclaimer (test code = a5oieVHpHXYox8opUSRqn                      

     



             [id number])  GFuZzEwMzNcZnRuYmpcdW                           



                          DtZZdrgbUnYWxfm4AsC8C                           



                          yMjAwMFxhbnNpXGRlZmxh                           



                          cbcwDHCjZNE0roUnOJYpX                           



                          LzqCIHwBRbkLg7plXWcfI                           



                          stXeIjJGEiz3ckwpAMWAe                           



                          cNjAlU896BIYpYAmoh1wt                           



                          i3AtOVSpiPRae7Y2ATAUl                           



                          qwhyCr0fMhnF79ct7N2Kv                           



                          xmG6vsSCOoFVCxS2GnTV3                           



                          qVRCdSbz9CCD9VOI6UYKz                           



                          PLVaM5DcXU1sNRUwzARbK                           



                          Iv7t6buuWctMLMeRFN0y3                           



                          naPPzpidQrBL5ttx4jfYl                           



                          8c2dusnRoYVGdPZLlmFMN                           



                          IAGlP1CsoMayFb5kiRf1w                           



                          QzjKyhuVVN6Wkp1FO1wyn                           



                          32aem0hWvcKPEakpxlDcG                           



                          4CRjlPQBkfmczWPk2PTdy                           



                          SAIgkOW2CANajPBcO3ZvN                           



                          MKtBU5bqpz1XHS9VWbzYJ                           



                          WlEnG0KBAylPVrFPWusOu                           



                          yEQwnm836HHX4LwElDB9k                           



                          U8Cjp0M8sP2jfXCvHRUzt                           



                          CEgLlTbALOakv1utCNfFY                           



                          ead2WcNUV3zfB9lANkgGP                           



                          jHAPbMA41Fwlcw8OsYctq                           



                          s3IiV17okCP0HBoii6jmB                           



                          E0mCmA7zwBvJLdfp2aprS                           



                          4lVzM0HFibCO8nYC8aXBR                           



                          wsU0lyizmFOAaFpXnsnbo                           



                          HAOnuFozkyGhVq2liDouL                           



                          WT6NUdyX8diqC0qPiU5JL                           



                          mhS4ovpT9kKZu6GAgutNR                           



                          6RPQlqW5nQO8ibodww4ex                           



                          YTdmAXkhIQMudmX5jnI8T                           



                          VMrlHOyU6MpkN5tVRSoRO                           



                          2rhfzqi8rcBAO7FRnwUWG                           



                          cUGC2PkObCOUek9Ipxsi4                           



                          IrRit9BltPAyPXjxG58td                           



                          412CLDfnsUfT6sbpQFsuy                           



                          dbwKCjdqorBJjbcmO7NVH                           



                          jedJeh5LbDWUlRRJ5KMmj                           



                          DZdqgLUxPECdkEoqk9pxW                           



                          3RscGFyXHBsYWluXGYxXG                           



                          ZzMjBcbGFuZzEwMzNcaGl                           



                          jaFxmMVxkYmNoXGYxXGxv                           



                          V5buGdRaC3IpOUKsKaTcc                           



                          EUbQ2bhNVsncoSeLDSfxk                           



                          UywED6JYqfL2i8AKJhecL                           



                          tyCj9blQgKzRvHSRhUCS5                           



                          GTgffQFwJYFjw5Hnjlcaj                           



                          TKiEy7hmNBtBSSnbF3uAL                           



                          NoBHYgVTwzCP9aiIq2UFW                           



                          XuIDisBYbCxKLWVYlBZ87                           



                          ykPtIILZobypm2F5SYxgJ                           



                          DIbb8DrcKTcP8ctx0ZxHX                           



                          Mux82mCG1wk1Y4c6pfAOA                           



                          2BU0su4UiGKMlqALcxXCi                           



                          NUBix8Atehiyj5CbTRQai                           



                          lHmt0XiJHXtjsZzrDBkFU                           



                          VaaeHdps2fejZfKTMoHQL                           



                          qO0WbyuacdKhrfrIxDYBm                           



                          gs1aigTqZTV5YOPOLGWmP                           



                          LQbh7RprH4dcFSCLLT6eZ                           



                          Ozbz1flkVLgSAgDEXkev3                           



                          4HYFoCC6gY1kcMKClIVZv                           



                          lpMuyUNbw2FoXUYiaXE1k                           



                          ORuNY8KXrNHz73nSRRzOL                           



                          DGuhTdUGNoqBmbbDS1wqS                           



                          5dO4oBStEPCNcKov+IFRo                           



                          TTECZMAvTA2gdiUpl9Tuq                           



                          uUxwHidXVWrsQMqg5BvpB                           



                          Gbj6VjdJvwt7JtuCIulIF                           



                          bQM6tUVDadgctDJGeHXSQ                           



                          OhKWIUIjvmJ2x1XyCNCnV                           



                          QKuYRG8eVpksdr6FLRwkD                           



                          6hAEQqC4nleseaDYbzYDM                           



                          od6GpfH1ufPMMoPZmq5Cg                           



                          xSOhoZJEsEOlZJ9vrsXcP                           



                          AkFHOaKTUR6rpAyZXKgz1                           



                          QfTTwwH4vlL85lhGgzyIe                           



                          4bWX8CCA9rG7uBpa+IFxw                           



                          YXJccGFyIEFwcHJvcHJpY                           



                          CBosCbkofKkS7CxweVadN                           



                          0zwDDgedGaOI5wYX7jD5R                           



                          4yPYoGRMzjyRal1rsGTxb                           



                          dmUgYmVlbiByZXZpZXdlZ                           



                          NYgi9LmDWnqKPJ4MWoyvd                           



                          BpbmNsdWRpbmcgSCZFLCB                           



                          DgBVdhSUcMVQ2SFfwksJk                           



                          orBoXV8dsJ9gdGganR3uc                           



                          DEczGE8cqhsSQHyWWLxiZ                           



                          grMDTpPN4uoZLeDHCddgT                           



                          VcXwmqBEtuQ3uE7LuOFJi                           



                          RPXmqs7oZFXepM8bLIwih                           



                          2VydmljZXMgYXJlIHBlcm                           



                          Qtoz4vXCAitNHYIC9PLFh                           



                          njZRkb4MutyNuQ3xZNCO2                           



                          NUQwNjYwMjgxKSBleGNlc                           



                          FCbHZPknl18QYLrvD3ewS                           



                          vzPAZwjG9mhM1ynCxnyE4                           



                          bNmSaYuPjJQusEG9qAUVi                           



                          D9jgjTHeGYMaZUCjO6byD                           



                          uFwwH0xdNnlLTdlXvFlIk                           



                          PjPTxfRXO0nI==                           

 

             Embedded Images (test                                        



             code = [id number])                                        



[id number]43:43





             Test Item    Value        Reference Range Interpretation Comments

 

             ALBUMIN BF (test code 476.0 mg/dL                            



             = [id number])                                        

 

             RENETTA (test code = RENETTA) Result interpreted                           



                          relative to the serum                           



                          concentration. ?                           



[facility] [id number] 17:43:43





             Test Item    Value        Reference Range Interpretation Comments

 

             ALBUMIN BF (test code 476.0 mg/dL                            



             = [id number])                                        

 

             RENETTA (test code = RENETTA) Result interpreted                           



                          relative to the serum                           



                          concentration. ?                           



[facility] ([id number] 
15:36:48





             Test Item    Value        Reference Range Interpretation Comments

 

             NA (test code = 132 mmol/L   135-145      L            



             [id number])                                         

 

             K (test code = 3.0 mmol/L   3.5-5.0      L            



             6698203445)                                         

 

             CL (test code = 98 mmol/L                        



             4240892072)                                         

 

             CO2 TOTAL (test code = 32 mmol/L    23-31        H            



             2116939921)                                         

 

             AGAP (test code =              2-16                      



             9372121293)                                         

 

             BUN (test code = 5 mg/dL      7-23         L            



             5720634062)                                         

 

             GLUCOSE (test code = 156 mg/dL           H            



             6284942922)                                         

 

             CREATININE (test code = 0.48 mg/dL   0.60-1.25    L            



             4970821472)                                         

 

             TOTAL BILI (test code = 4.7 mg/dL    0.1-1.1      H            



             0359672361)                                         

 

             CALCIUM (test code = 7.6 mg/dL    8.6-10.6     L            



             7688325985)                                         

 

             T PROTEIN (test code = 6.5 g/dL     6.3-8.2                   



             9476318796)                                         

 

             ALBUMIN (test code = 2.7 g/dL     3.5-5.0      L            



             0549045918)                                         

 

             ALK PHOS (test code = 109 U/L                          



             8319841115)                                         

 

             ALTv (test code = 22 U/L       5-50                      



             1742-6)                                             

 

             AST(SGOT) (test code = 70 U/L       13-40        H            



             4321231629)                                         

 

             eGFR (test code =              mL/min/1.73m2              



             9375115709)                                         

 

             RENETTA (test code = RENETTA) Association of                           



                          Glomerular Filtration                           



                          Rate (GFR) and Staging                           



                          of Kidney Disease*                           



                          +---------------------                           



                          --+-------------------                           



                          --+-------------------                           



                          ------+| GFR                           



                          (mL/min/1.73 m2) ?|                           



                          With Kidney Damage ?|                           



                          ?Without Kidney                           



                          Damage+---------------                           



                          --------+-------------                           



                          --------+-------------                           



                          ------------+| ?>90 ?                           



                          ? ? ? ? ? ? ? ?|                           



                          ?Stage one ? ? ? ? ?|                           



                          ? Normal ? ? ? ? ? ? ?                           



                          ?+--------------------                           



                          ---+------------------                           



                          ---+------------------                           



                          -------+| ?60-89 ? ? ?                           



                          ? ? ? ? ?| ?Stage two                           



                          ? ? ? ? ?| ? Decreased                           



                          GFR ? ? ? ?                            



                          +---------------------                           



                          --+-------------------                           



                          --+-------------------                           



                          ------+| ?30-59 ? ? ?                           



                          ? ? ? ? ?| ?Stage                           



                          three ? ? ? ?| ? Stage                           



                          three ? ? ? ? ?                           



                          +---------------------                           



                          --+-------------------                           



                          --+-------------------                           



                          ------+| ?15-29 ? ? ?                           



                          ? ? ? ? ?| ?Stage four                           



                          ? ? ? ? | ? Stage four                           



                          ? ? ? ? ?                              



                          ?+--------------------                           



                          ---+------------------                           



                          ---+------------------                           



                          -------+| ?<15 (or                           



                          dialysis) ? ?| ?Stage                           



                          five ? ? ? ? | ? Stage                           



                          five ? ? ? ? ?                           



                          ?+--------------------                           



                          ---+------------------                           



                          ---+------------------                           



                          -------+ *Each stage                           



                          assumes the associated                           



                          GFR level has been in                           



                          effect for at least                           



                          three months. ?Stages                           



                          1 to 5, with or                           



                          without kidney                           



                          disease, indicate                           



                          chronic kidney                           



                          disease. Notes:                           



                          Determination of                           



                          stages one and two                           



                          (with eGFR                             



                          >59mL/min/1.73 m2)                           



                          requires estimation of                           



                          kidney damage for at                           



                          least three months as                           



                          defined by structural                           



                          or functional                           



                          abnormalities of the                           



                          kidney, manifested by                           



                          either:Pathological                           



                          abnormalities or                           



                          Markers of kidney                           



                          damage (including                           



                          abnormalities in the                           



                          composition of the                           



                          blood or urine or                           



                          abnormalities in                           



                          imaging tests).                           

 

             Lab Interpretation Abnormal                               



             (test code = 84932-0)                                        



[facility]. METABOLIC PANEL [id number] 
15:36:48





             Test Item    Value        Reference Range Interpretation Comments

 

             NA (test code = 132 mmol/L   135-145      L            



             971968)                                         

 

             K (test code = 3.0 mmol/L   3.5-5.0      L            



             5170679591)                                         

 

             CL (test code = 98 mmol/L                        



             4643180038)                                         

 

             CO2 TOTAL (test code = 32 mmol/L    23-31        H            



             5670014452)                                         

 

             AGAP (test code =              2-16                      



             5211738186)                                         

 

             BUN (test code = 5 mg/dL      7-23         L            



             8932507576)                                         

 

             GLUCOSE (test code = 156 mg/dL           H            



             8235846411)                                         

 

             CREATININE (test code = 0.48 mg/dL   0.60-1.25    L            



             0967035094)                                         

 

             TOTAL BILI (test code = 4.7 mg/dL    0.1-1.1      H            



             1022752477)                                         

 

             CALCIUM (test code = 7.6 mg/dL    8.6-10.6     L            



             0249106773)                                         

 

             T PROTEIN (test code = 6.5 g/dL     6.3-8.2                   



             0397672652)                                         

 

             ALBUMIN (test code = 2.7 g/dL     3.5-5.0      L            



             1793866969)                                         

 

             ALK PHOS (test code = 109 U/L                          



             1631317591)                                         

 

             ALTv (test code = 22 U/L       5-50                      



             1742-6)                                             

 

             AST(SGOT) (test code = 70 U/L       13-40        H            



             6045156507)                                         

 

             eGFR (test code =              mL/min/1.73m2              



             6185550204)                                         

 

             RENETTA (test code = RENETTA) Association of                           



                          Glomerular Filtration                           



                          Rate (GFR) and Staging                           



                          of Kidney Disease*                           



                          +---------------------                           



                          --+-------------------                           



                          --+-------------------                           



                          ------+| GFR                           



                          (mL/min/1.73 m2) ?|                           



                          With Kidney Damage ?|                           



                          ?Without Kidney                           



                          Damage+---------------                           



                          --------+-------------                           



                          --------+-------------                           



                          ------------+| ?>90 ?                           



                          ? ? ? ? ? ? ? ?|                           



                          ?Stage one ? ? ? ? ?|                           



                          ? Normal ? ? ? ? ? ? ?                           



                          ?+--------------------                           



                          ---+------------------                           



                          ---+------------------                           



                          -------+| ?60-89 ? ? ?                           



                          ? ? ? ? ?| ?Stage two                           



                          ? ? ? ? ?| ? Decreased                           



                          GFR ? ? ? ?                            



                          +---------------------                           



                          --+-------------------                           



                          --+-------------------                           



                          ------+| ?30-59 ? ? ?                           



                          ? ? ? ? ?| ?Stage                           



                          three ? ? ? ?| ? Stage                           



                          three ? ? ? ? ?                           



                          +---------------------                           



                          --+-------------------                           



                          --+-------------------                           



                          ------+| ?15-29 ? ? ?                           



                          ? ? ? ? ?| ?Stage four                           



                          ? ? ? ? | ? Stage four                           



                          ? ? ? ? ?                              



                          ?+--------------------                           



                          ---+------------------                           



                          ---+------------------                           



                          -------+| ?<15 (or                           



                          dialysis) ? ?| ?Stage                           



                          five ? ? ? ? | ? Stage                           



                          five ? ? ? ? ?                           



                          ?+--------------------                           



                          ---+------------------                           



                          ---+------------------                           



                          -------+ *Each stage                           



                          assumes the associated                           



                          GFR level has been in                           



                          effect for at least                           



                          three months. ?Stages                           



                          1 to 5, with or                           



                          without kidney                           



                          disease, indicate                           



                          chronic kidney                           



                          disease. Notes:                           



                          Determination of                           



                          stages one and two                           



                          (with eGFR                             



                          >59mL/min/1.73 m2)                           



                          requires estimation of                           



                          kidney damage for at                           



                          least three months as                           



                          defined by structural                           



                          or functional                           



                          abnormalities of the                           



                          kidney, manifested by                           



                          either:Pathological                           



                          abnormalities or                           



                          Markers of kidney                           



                          damage (including                           



                          abnormalities in the                           



                          composition of the                           



                          blood or urine or                           



                          abnormalities in                           



                          imaging tests).                           

 

             Lab Interpretation Abnormal                               



             (test code = 67421-6)                                        



[facility] WITH [id number] 15:18:36





             Test Item    Value        Reference Range Interpretation Comments

 

             WBC (test code =              See_Comment                [Automated



             6690-2)                                             message] The sy

stem



                                                                 which generated



                                                                 this result



                                                                 transmitted



                                                                 reference range

:



                                                                 4.20 - 10.70



                                                                 10*3/?L. The



                                                                 reference range

 was



                                                                 not used to



                                                                 interpret this



                                                                 result as



                                                                 normal/abnormal

.

 

             RBC (test code =              See_Comment  L             [Automated



             789-8)                                              message] The sy

stem



                                                                 which generated



                                                                 this result



                                                                 transmitted



                                                                 reference range

:



                                                                 4.26 - 5.52



                                                                 10*6/?L. The



                                                                 reference range

 was



                                                                 not used to



                                                                 interpret this



                                                                 result as



                                                                 normal/abnormal

.

 

             HGB (test code = 8.7 g/dL     12.2-16.4    L            



             718-7)                                              

 

             HCT (test code = 28.1 %       38.4-49.3    L            



             4544-3)                                             

 

             MCV (test code = 80.7 fL      81.7-95.6    L            



             787-2)                                              

 

             MCH (test code = 25.0 pg      26.1-32.7    L            



             785-6)                                              

 

             MCHC (test code = 31.0 g/dL    31.2-35.0    L            



             786-4)                                              

 

             RDW-SD (test code = 50.6 fL      38.5-51.6                 



             37999-1)                                            

 

             RDW-CV (test code = 17.6 %       12.1-15.4    H            



             788-0)                                              

 

             PLT (test code =              See_Comment  LL            [Automated



             777-3)                                              message] The sy

stem



                                                                 which generated



                                                                 this result



                                                                 transmitted



                                                                 reference range

:



                                                                 150 - 328 10*3/

?L.



                                                                 The reference r

moose



                                                                 was not used to



                                                                 interpret this



                                                                 result as



                                                                 normal/abnormal

.

 

             MPV (test code =                                        Not Measure

d



             27546-6)                                            

 

             IPF % (test code = 12.5 %       1.2-10.7     H            Platelet 

count



             [id number])                                         measured by



                                                                 fluorescence



                                                                 method.

 

             NRBC/100 WBC (test              See_Comment                [Automat

ed



             code = 6566583820)                                        message] 

The system



                                                                 which generated



                                                                 this result



                                                                 transmitted



                                                                 reference range

:



                                                                 0.0 - 10.0 /100



                                                                 WBCs. The refer

ence



                                                                 range was not u

sed



                                                                 to interpret th

is



                                                                 result as



                                                                 normal/abnormal

.

 

             NRBC x10^3 (test code <0.01        See_Comment                [Auto

mated



             = [id number])                                        message] The s

ystem



                                                                 which generated



                                                                 this result



                                                                 transmitted



                                                                 reference range

:



                                                                 10*3/?L. The



                                                                 reference range

 was



                                                                 not used to



                                                                 interpret this



                                                                 result as



                                                                 normal/abnormal

.

 

             GRAN MAT (NEUT) % 67.6 %                                 



             (test code = 770-8)                                        

 

             IMM GRAN % (test code 0.20 %                                 



             = 0908630752)                                        

 

             LYMPH % (test code = 16.1 %                                 



             736-9)                                              

 

             MONO % (test code = 13.3 %                                 



             5905-5)                                             

 

             EOS % (test code = 1.9 %                                  



             713-8)                                              

 

             BASO % (test code = 0.9 %                                  



             706-2)                                              

 

             GRAN MAT x10^3(ANC) 2.89 10*3/uL 1.99-6.95                 



             (test code =                                        



             [id number])                                         

 

             IMM GRAN x10^3 (test <0.03        0.00-0.06                 



             code = 4541683218)                                        

 

             LYMPH x10^3 (test code 0.69 10*3/uL 1.09-3.23    L            



             = 731-0)                                            

 

             MONO x10^3 (test code 0.57 10*3/uL 0.36-1.02                 



             = 742-7)                                            

 

             EOS x10^3 (test code = 0.08 10*3/uL 0.06-0.53                 



             711-2)                                              

 

             BASO x10^3 (test code 0.04 10*3/uL 0.01-0.09                 



             = 704-7)                                            

 

             Lab Interpretation Abnormal                               



             (test code = 36459-0)                                        



[facility] WITH [id number] 15:18:36





             Test Item    Value        Reference Range Interpretation Comments

 

             WBC (test code =              See_Comment                [Automated



             6690-2)                                             message] The sy

stem



                                                                 which generated



                                                                 this result



                                                                 transmitted



                                                                 reference range

:



                                                                 4.20 - 10.70



                                                                 10*3/?L. The



                                                                 reference range

 was



                                                                 not used to



                                                                 interpret this



                                                                 result as



                                                                 normal/abnormal

.

 

             RBC (test code =              See_Comment  L             [Automated



             789-8)                                              message] The sy

stem



                                                                 which generated



                                                                 this result



                                                                 transmitted



                                                                 reference range

:



                                                                 4.26 - 5.52



                                                                 10*6/?L. The



                                                                 reference range

 was



                                                                 not used to



                                                                 interpret this



                                                                 result as



                                                                 normal/abnormal

.

 

             HGB (test code = 8.7 g/dL     12.2-16.4    L            



             718-7)                                              

 

             HCT (test code = 28.1 %       38.4-49.3    L            



             4544-3)                                             

 

             MCV (test code = 80.7 fL      81.7-95.6    L            



             787-2)                                              

 

             MCH (test code = 25.0 pg      26.1-32.7    L            



             785-6)                                              

 

             MCHC (test code = 31.0 g/dL    31.2-35.0    L            



             786-4)                                              

 

             RDW-SD (test code = 50.6 fL      38.5-51.6                 



             38148-5)                                            

 

             RDW-CV (test code = 17.6 %       12.1-15.4    H            



             788-0)                                              

 

             PLT (test code =              See_Comment  LL            [Automated



             777-3)                                              message] The sy

stem



                                                                 which generated



                                                                 this result



                                                                 transmitted



                                                                 reference range

:



                                                                 150 - 328 10*3/

?L.



                                                                 The reference r

moose



                                                                 was not used to



                                                                 interpret this



                                                                 result as



                                                                 normal/abnormal

.

 

             MPV (test code =                                        Not Measure

d



             43933-6)                                            

 

             IPF % (test code = 12.5 %       1.2-10.7     H            Platelet 

count



             7515669425)                                         measured by



                                                                 fluorescence



                                                                 method.

 

             NRBC/100 WBC (test              See_Comment                [Automat

ed



             code = 8412438109)                                        message] 

The system



                                                                 which generated



                                                                 this result



                                                                 transmitted



                                                                 reference range

:



                                                                 0.0 - 10.0 /100



                                                                 WBCs. The refer

ence



                                                                 range was not u

sed



                                                                 to interpret th

is



                                                                 result as



                                                                 normal/abnormal

.

 

             NRBC x10^3 (test code <0.01        See_Comment                [Auto

mated



             = 1523342940)                                        message] The s

ystem



                                                                 which generated



                                                                 this result



                                                                 transmitted



                                                                 reference range

:



                                                                 10*3/?L. The



                                                                 reference range

 was



                                                                 not used to



                                                                 interpret this



                                                                 result as



                                                                 normal/abnormal

.

 

             GRAN MAT (NEUT) % 67.6 %                                 



             (test code = 770-8)                                        

 

             IMM GRAN % (test code 0.20 %                                 



             = 4868259903)                                        

 

             LYMPH % (test code = 16.1 %                                 



             736-9)                                              

 

             MONO % (test code = 13.3 %                                 



             5905-5)                                             

 

             EOS % (test code = 1.9 %                                  



             713-8)                                              

 

             BASO % (test code = 0.9 %                                  



             706-2)                                              

 

             GRAN MAT x10^3(ANC) 2.89 10*3/uL 1.99-6.95                 



             (test code =                                        



             7016646964)                                         

 

             IMM GRAN x10^3 (test <0.03        0.00-0.06                 



             code = 1074589394)                                        

 

             LYMPH x10^3 (test code 0.69 10*3/uL 1.09-3.23    L            



             = 731-0)                                            

 

             MONO x10^3 (test code 0.57 10*3/uL 0.36-1.02                 



             = 742-7)                                            

 

             EOS x10^3 (test code = 0.08 10*3/uL 0.06-0.53                 



             711-2)                                              

 

             BASO x10^3 (test code 0.04 10*3/uL 0.01-0.09                 



             = 704-7)                                            

 

             Lab Interpretation Abnormal                               



             (test code = 02374-9)                                        



[facility].PROTEIN BODY [id number] 03:21:38





             Test Item    Value        Reference Range Interpretation Comments

 

             T.PROT BF (test 1477.0 mg/dL                           



             code = 9241913553)                                        

 

             UNSPUN BODY FLUID Yellow                                 



             COLOR (test code =                                        



             9142259334)                                         

 

             UNSPUN BODY FLUID Slightly Cloudy                           



             CLARITY (test code                                        



             = 1224328775)                                        

 

             SPUN BODY FLUID Yellow                                 



             COLOR (test code =                                        



             7166895460)                                         

 

             SPUN BODY FLUID Clear                                  



             CLARITY (test code                                        



             = 5086415562)                                        

 

             Sediment (test code The sediment volume is                         

  



             = 3731048382) <0.1 mLs of the total                           



                          fluid volume of 4mL and                           



                          its color is red.                           

 

             RENETTA (test code = Test developed and                           



             RENETTA)         characteristics determined                           



                          by [facility]                           



                          Services.                              



[facility].PROTEIN BODY [id number] 03:21:38





             Test Item    Value        Reference Range Interpretation Comments

 

             T.PROT BF (test 1477.0 mg/dL                           



             code = 1581419546)                                        

 

             UNSPUN BODY FLUID Yellow                                 



             COLOR (test code =                                        



             9319176581)                                         

 

             UNSPUN BODY FLUID Slightly Cloudy                           



             CLARITY (test code                                        



             = 7369280655)                                        

 

             SPUN BODY FLUID Yellow                                 



             COLOR (test code =                                        



             9374044645)                                         

 

             SPUN BODY FLUID Clear                                  



             CLARITY (test code                                        



             = 1601551996)                                        

 

             Sediment (test code The sediment volume is                         

  



             = 0815513636) <0.1 mLs of the total                           



                          fluid volume of 4mL and                           



                          its color is red.                           

 

             RENETTA (test code = Test developed and                           



             RENETTA)         characteristics determined                           



                          by [facility]                           



                          Services.                              



[facility] [id number]50:33





             Test Item    Value        Reference Range Interpretation Comments

 

             BF COLOR     Light Yellow                           



             (test code =                                        



             5825117828)                                         

 

             BF WBC Count              See_Comment                [Automated



             (test code =                                        message] The sy

stem



             9075244486)                                         which generated



                                                                 this result



                                                                 transmitted



                                                                 reference range

:



                                                                 /?L. The refere

nce



                                                                 range was not u

sed



                                                                 to interpret th

is



                                                                 result as



                                                                 normal/abnormal

.

 

             BF RBC Count              See_Comment                [Automated



             (test code =                                        message] The sy

stem



             0522643638)                                         which generated



                                                                 this result



                                                                 transmitted



                                                                 reference range

:



                                                                 /?L. The refere

nce



                                                                 range was not u

sed



                                                                 to interpret th

is



                                                                 result as



                                                                 normal/abnormal

.

 

             RENETTA (test    The reference range                           



             code = RENETTA)  and other method                           



                          performance                            



                          specifications have                           



                          not been established                           



                          for this body fluid.                           



                          ?The test results must                           



                          be integrated into the                           



                          clinical context for                           



                          interpretation.                           



[facility] [id number] 18:50:33





             Test Item    Value        Reference Range Interpretation Comments

 

             BF COLOR     Light Yellow                           



             (test code =                                        



             9224805648)                                         

 

             BF WBC Count              See_Comment                [Automated



             (test code =                                        message] The sy

stem



             6836686553)                                         which generated



                                                                 this result



                                                                 transmitted



                                                                 reference range

:



                                                                 /?L. The refere

nce



                                                                 range was not u

sed



                                                                 to interpret th

is



                                                                 result as



                                                                 normal/abnormal

.

 

             BF RBC Count              See_Comment                [Automated



             (test code =                                        message] The sy

stem



             0157542108)                                         which generated



                                                                 this result



                                                                 transmitted



                                                                 reference range

:



                                                                 /?L. The refere

nce



                                                                 range was not u

sed



                                                                 to interpret th

is



                                                                 result as



                                                                 normal/abnormal

.

 

             RENETTA (test    The reference range                           



             code = RENETTA)  and other method                           



                          performance                            



                          specifications have                           



                          not been established                           



                          for this body fluid.                           



                          ?The test results must                           



                          be integrated into the                           



                          clinical context for                           



                          interpretation.                           



[facility]
2021 18:23:38Successful US-guided paracentesis.PROCEDURE: US-guided 
paracentesis HISTORY: Ascites, paracentesis is requested. MEDICATIONS: Local 
lidocaine. I reviewed the patient?s current medicationlist as noted in the 
nursing assessment, and the following actions weretaken: None []. ATTENDING 
PRESENCE: ?As the attending radiologist, I was present in theroom during the 
entire procedure. TECHNIQUE: Informed consent was obtained from the patient 
after discussingthe risks and benefits of the procedure. Universal protocol 
timeout wasperformed verifying correct patient, correct site, and correct 
procedure.Images were archived to PACS. The patient was prepped and draped in 
sterilefashion. US-guided drainage of the ascites was performed with a 5 
Frenchsheathed needle, after infiltrating local anesthesia at the puncture 
site.A total of 3000cc clear yellowish fluid was drained. Fluid was sent for 
laboratory analysisas requested in Epic. COMPLICATIONS: None. ? Estimated Blood 
Loss : &lt;1 cc [facility], Radiant Results Inft User - 2021 1:24 PM 
CDTFormatting of this note might be different from the original.PROCEDURE: US-
guided paracentesisHISTORY: Ascites, paracentesis is requested.MEDICATIONS: 
Local lidocaine. Ireviewed the patient?s current medicationlist as noted in the 
nursing assessment, and the following actions weretaken: None []. ATTENDING 
PRESENCE: As the attending radiologist, I was present in theroom during the 
entire procedure.TECHNIQUE: Informed consent was obtained from the patient after
 discussingthe risks and benefits of the procedure. Universal protocol timeout 
wasperformed verifying correctpatient, correct site, and correct 
procedure.Images were archived to PACS. The patient was prepped and draped in 
sterilefashion. US-guided drainage of the ascites was performed with a 5 
Frenchsheathed needle, after infiltrating local anesthesia at the puncture 
site.A total of 3000cc clear yellowish fluid was drained. Fluid was sent for 
laboratory analysis as requested in Epic.COMPLICATIONS: None. Estimated Blood 
Loss : &lt;1 ccIMPRESSIONSuccessful US-guided paracentesis.[facility] WITH [id number] 18:23:38
Successful US-guided paracentesis.PROCEDURE: US-guided paracentesis HISTORY: 
Ascites, paracentesis is requested. MEDICATIONS: Local lidocaine. I reviewed the
 patient?s current medicationlist as noted in the nursing assessment, and the 
following actions weretaken: None []. ATTENDING PRESENCE: ?As the attending 
radiologist, I was present in theroom during the entire procedure. TECHNIQUE: 
Informed consent was obtained from the patient after discussingthe risks and 
benefits of the procedure. Universal protocol timeout wasperformed verifying 
correct patient, correct site, and correct procedure.Images were archived to 
PACS. The patient was prepped and draped in sterilefashion. US-guided drainage 
of the ascites was performed with a 5 Frenchsheathed needle, after infiltrating 
local anesthesia at the puncture site.A total of 3000cc clear yellowish fluid 
was drained. Fluid was sent for laboratory analysisas requested in Epic. 
COMPLICATIONS: None. ? Estimated Blood Loss : &lt;1 cc [facility], Radiant Results In
ft 2021 1:24 PM CDTFormatting of this note might be different from 
the original.PROCEDURE: US-guided paracentesisHISTORY: Ascites, paracentesis is 
requested.MEDICATIONS: Local lidocaine. Ireviewed the patient?s current 
medicationlist as noted in the nursing assessment, and the following actions 
weretaken: None []. ATTENDING PRESENCE: As the attending radiologist, I was 
present in theroom during the entire procedure.TECHNIQUE: Informed consent was 
obtained from the patient after discussingthe risks and benefits of the 
procedure. Universal protocol timeout wasperformed verifying correctpatient, 
correct site, and correct procedure.Images were archived to PACS. The patient 
was prepped and draped in sterilefashion. US-guided drainage of the ascites was 
performed with a 5 Frenchsheathed needle, after infiltrating local anesthesia at
 the puncture site.A total of 3000cc clear yellowish fluid was drained. Fluid 
was sent for laboratory analysis as requested in Epic.COMPLICATIONS: None. Fatmata
mated Blood Loss : &lt;1 ccIMPRESSIONSuccessful US-guided paracentesis.
[facility] [id number] 
18:15:47





             Test Item    Value        Reference Range Interpretation Comments

 

             MAI (test code = Negative     Negative                  



             [id number])                                         

 

             RENETTA (test code = RENETTA) Negative - No                           



                          Anti-Nuclear                           



                          Antibodies detected                           



                          by IFA.Positive -                           



                          MAI IFA screen                           



                          performed with a                           



                          1:80 dilution in                           



                          adults and a 1:40                           



                          dilution in                            



                          pediatrics. Any MAI                           



                          "Positive" will have                           



                          titer performed and                           



                          reported separately.                           

 

             Lab Interpretation (test Normal                                 



             code = 41242-7)                                        



[id number] 
18:15:47





             Test Item    Value        Reference Range Interpretation Comments

 

             MAI (test code = Negative     Negative                  



             5137357162)                                         

 

             RENETTA (test code = RENETTA) Negative - No                           



                          Anti-Nuclear                           



                          Antibodies detected                           



                          by IFA.Positive -                           



                          MAI IFA screen                           



                          performed with a                           



                          1:80 dilution in                           



                          adults and a 1:40                           



                          dilution in                            



                          pediatrics. Any MAI                           



                          "Positive" will have                           



                          titer performed and                           



                          reported separately.                           

 

             Lab Interpretation (test Normal                                 



             code = 95473-2)                                        



[facility] [id number] 17:44:30





             Test Item    Value        Reference Range Interpretation Comments

 

             Anti-Trypsin (test code = 141 mg/dL                        



             4018227946)                                         

 

             Lab Interpretation (test code = Normal                             

    



             70385-5)                                            



[facility] 1 [id number] 17:44:30





             Test Item    Value        Reference Range Interpretation Comments

 

             Anti-Trypsin (test code = 141 mg/dL                        



             4088555632)                                         

 

             Lab Interpretation (test code = Normal                             

    



             96993-5)                                            



[facility]43:58





             Test Item    Value        Reference Range Interpretation Comments

 

             CERULO (test code = 4239392130) 24 mg/dL     25-63        L        

    

 

             Lab Interpretation (test code = Abnormal                           

    



             16917-6)                                            



[facility]24 17:43:58





             Test Item    Value        Reference Range Interpretation Comments

 

             IgG (test code = 2697971125) 1850 mg/dL   636-1600     H           

 

 

             Lab Interpretation (test code = Abnormal                           

    



             67880-7)                                            



[facility] 17:43:58





             Test Item    Value        Reference Range Interpretation Comments

 

             CERULO (test code = 0507549674) 24 mg/dL     25-63        L        

    

 

             Lab Interpretation (test code = Abnormal                           

    



             48077-2)                                            



[facility]81-99 17:43:58





             Test Item    Value        Reference Range Interpretation Comments

 

             IgG (test code = 3581717110) 1850 mg/dL   636-1600     H           

 

 

             Lab Interpretation (test code = Abnormal                           

    



             40170-1)                                            



[facility] A AB, IGG AND [id number] 11:37:51





             Test Item    Value        Reference Range Interpretation Comments

 

             HAV Total (test code Positive                               



             = 9527561707)                                        

 

             HAVT                                                



             Semi-Quantitative                                        



             (test code =                                        



             3677328662)                                         

 

             RENETTA (test code = RENETTA) Indicates past or                           



                          present infection with                           



                          HAV or exposure to HAV                           



                          due to vaccination.                           



[facility] A AB, IGG AND [id number] 11:37:51





             Test Item    Value        Reference Range Interpretation Comments

 

             HAV Total (test code Positive                               



             = 6859951174)                                        

 

             HAVT                                                



             Semi-Quantitative                                        



             (test code =                                        



             6514915910)                                         

 

             RENETTA (test code = RENETTA) Indicates past or                           



                          present infection with                           



                          HAV or exposure to HAV                           



                          due to vaccination.                           



[facility] with [id number] 07:43:16





             Test Item    Value        Reference Range Interpretation Comments

 

             WBC (test code =              See_Comment  L             [Automated



             6690-2)                                             message] The



                                                                 system which



                                                                 generated this



                                                                 result transmit

michelle



                                                                 reference range

:



                                                                 4.20 - 10.70



                                                                 10*3/?L. The



                                                                 reference range



                                                                 was not used to



                                                                 interpret this



                                                                 result as



                                                                 normal/abnormal

.

 

             RBC (test code =              See_Comment  L             [Automated



             789-8)                                              message] The



                                                                 system which



                                                                 generated this



                                                                 result transmit

michelle



                                                                 reference range

:



                                                                 4.26 - 5.52



                                                                 10*6/?L. The



                                                                 reference range



                                                                 was not used to



                                                                 interpret this



                                                                 result as



                                                                 normal/abnormal

.

 

             HGB (test code = 7.3 g/dL     12.2-16.4    L            



             718-7)                                              

 

             HCT (test code = 23.8 %       38.4-49.3    L            



             4544-3)                                             

 

             MCV (test code = 81.5 fL      81.7-95.6    L            



             787-2)                                              

 

             MCH (test code = 25.0 pg      26.1-32.7    L            



             785-6)                                              

 

             MCHC (test code = 30.7 g/dL    31.2-35.0    L            



             786-4)                                              

 

             RDW-SD (test code = 51.1 fL      38.5-51.6                 



             47850-8)                                            

 

             RDW-CV (test code = 17.4 %       12.1-15.4    H            



             788-0)                                              

 

             PLT (test code =              See_Comment  LL            [Automated



             777-3)                                              message] The



                                                                 system which



                                                                 generated this



                                                                 result transmit

michelle



                                                                 reference range

:



                                                                 150 - 328 10*3/

?L.



                                                                 The reference



                                                                 range was not u

sed



                                                                 to interpret th

is



                                                                 result as



                                                                 normal/abnormal

.

 

             MPV (test code =                                        Not Measure

d



             09727-9)                                            

 

             IPF % (test code = 11.9 %       1.2-10.7     H            Platelet 

count



             2225556541)                                         measured by



                                                                 fluorescence



                                                                 method.

 

             NRBC/100 WBC (test              See_Comment                [Automat

ed



             code = 5577277043)                                        message] 

The



                                                                 system which



                                                                 generated this



                                                                 result transmit

michelle



                                                                 reference range

:



                                                                 0.0 - 10.0 /100



                                                                 WBCs. The



                                                                 reference range



                                                                 was not used to



                                                                 interpret this



                                                                 result as



                                                                 normal/abnormal

.

 

             NRBC x10^3 (test code <0.01        See_Comment                [Auto

mated



             = 5108500799)                                        message] The



                                                                 system which



                                                                 generated this



                                                                 result transmit

michelle



                                                                 reference range

:



                                                                 10*3/?L. The



                                                                 reference range



                                                                 was not used to



                                                                 interpret this



                                                                 result as



                                                                 normal/abnormal

.

 

             SEG % (test code = 55 %         33-76                     



             21389-9)                                            

 

             LYMPH % (test code = 36 %         14-54                     



             37436-9)                                            

 

             MONO % (test code = 8 %          0-4          H            



             26485-3)                                            

 

             EOS % (test code = 1 %          0-3                       



             58847-3)                                            

 

             ANC (test code = 1.99 10*3/uL 1.99-6.95                 



             3183977601)                                         

 

             TARGET CELLS (test 2+           See_Comment  A             [Automat

ed



             code = 15687-5)                                        message] The



                                                                 system which



                                                                 generated this



                                                                 result transmit

michelle



                                                                 reference range

:



                                                                 (none). The



                                                                 reference range



                                                                 was not used to



                                                                 interpret this



                                                                 result as



                                                                 normal/abnormal

.

 

             PLT ESTIMATE (test Critically   Normal       AA           



             code = 9317-9) Decreased                              

 

             GIANT PLATELETS (test Present      See_Comment  A             [Auto

mated



             code = 5908-9)                                        message] The



                                                                 system which



                                                                 generated this



                                                                 result transmit

michelle



                                                                 reference range

:



                                                                 (none). The



                                                                 reference range



                                                                 was not used to



                                                                 interpret this



                                                                 result as



                                                                 normal/abnormal

.

 

             Lab Interpretation Abnormal                               



             (test code = 47951-3)                                        



[facility] [id number] 07:02:16





             Test Item    Value        Reference Range Interpretation Comments

 

             IONIZED CA (test code = 4.00 mg/dL   4.50-5.30    L            



             5120673465)                                         

 

             PH SERUM (test code = 3419385868)              7.35-7.45    H      

      QUES

 

             Lab Interpretation (test code = Abnormal                           

    



             01098-4)                                            



[facility] [id number] 07:02:16





             Test Item    Value        Reference Range Interpretation Comments

 

             IONIZED CA (test code = 4.00 mg/dL   4.50-5.30    L            



             2602801698)                                         

 

             PH SERUM (test code = 6270470428)              7.35-7.45    H      

      QUES

 

             Lab Interpretation (test code = Abnormal                           

    



             46019-8)                                            



[facility]14





             Test Item    Value        Reference Range Interpretation Comments

 

             MAGNESIUM (test code = 8620019053) 1.5 mg/dL    1.7-2.4      L     

       

 

             Lab Interpretation (test code = Abnormal                           

    



             73706-6)                                            



[facility]14





             Test Item    Value        Reference Range Interpretation Comments

 

             PHOSPHORUS (test code = 2007148133) 3.5 mg/dL    2.5-5.0           

        

 

             Lab Interpretation (test code = Normal                             

    



             30840-6)                                            



[facility] 06[facility]38:14





             Test Item    Value        Reference Range Interpretation Comments

 

             PHOSPHORUS (test code = 5466013074) 3.5 mg/dL    2.5-5.0           

        

 

             Lab Interpretation (test code = Normal                             

    



             10483-3)                                            



[facility] 06[facility]38:14





             Test Item    Value        Reference Range Interpretation Comments

 

             MAGNESIUM (test code = 3304325694) 1.5 mg/dL    1.7-2.4      L     

       

 

             Lab Interpretation (test code = Abnormal                           

    



             62283-7)                                            



[facility] SUR[facility] [id number] 
06:35:12





             Test Item    Value        Reference Range Interpretation Comments

 

             HBsAB (test code = Negative                               



             6464358550)                                         

 

             HBsAb                     mIU/mL                    



             Semi-Quantitative                                        



             (test code =                                        



             5509487726)                                         

 

             RENETTA (test code = Interpretation:                           



             RENETTA)         ?Hepatitis B Surface                           



                          Antibody ? ? ? ? Negative                           



                          - Patient is considered                           



                          to be not immune to                           



                          infection with HBV. ? ?                           



                          Positive - Anti-HBs                           



                          detected at greater than                           



                          or equal to 12 mIU/mL.                           



                          ?Patient is considered to                           



                          be immune to infection                           



                          with HBV. ?                            



[facility]; [facility]GG[facility] 
06:35:12





             Test Item    Value        Reference Range Interpretation Comments

 

             HBC (test code = 2550177629) Negative                              

 

 

             HBC Semi-Quantitative (test code =                                 

       



             3365480846)                                         



[facility]"" B SURFACE [id number] 
06:35:12





             Test Item    Value        Reference Range Interpretation Comments

 

             HBsAB (test code = Negative                               



             4182559171)                                         

 

             HBsAb                     mIU/mL                    



             Semi-Quantitative                                        



             (test code =                                        



             5997784362)                                         

 

             RENETTA (test code = Interpretation:                           



             RENETTA)         ?Hepatitis B Surface                           



                          Antibody ? ? ? ? Negative                           



                          - Patient is considered                           



                          to be not immune to                           



                          infection with HBV. ? ?                           



                          Positive - Anti-HBs                           



                          detected at greater than                           



                          or equal to 12 mIU/mL.                           



                          ?Patient is considered to                           



                          be immune to infection                           



                          with HBV. ?                            



[facility] C VIRUS [id number] 06:35:12





             Test Item    Value        Reference Range Interpretation Comments

 

             HCV Ab (test code = 09962-2) Negative                              

 

 

             HCV Semi-Quantitative (test code =                                 

       



             91434-0)                                            



[facility]amp; IGG[facility]2021 
06:35:12





             Test Item    Value        Reference Range Interpretation Comments

 

             HBC (test code = 2013535548) Negative                              

 

 

             HBC Semi-Quantitative (test code =                                 

       



             4060391262)                                         



[facility] C VIRUS [id number] 06:35:12





             Test Item    Value        Reference Range Interpretation Comments

 

             HCV Ab (test code = 12402-1) Negative                              

 

 

             HCV Semi-Quantitative (test code =                                 

       



             22252-0)                                            



[facility] [id number] 06:20:51





             Test Item    Value        Reference Range Interpretation Comments

 

             AFP (test code = 3.8 ng/mL    See_Comment                [Automated



             6232178276)                                         message] The



                                                                 system which



                                                                 generated this



                                                                 result



                                                                 transmitted



                                                                 reference range

:



                                                                 <=7.5. The



                                                                 reference range



                                                                 was not used to



                                                                 interpret this



                                                                 result as



                                                                 normal/abnormal

.

 

             RENETTA (test code = RENETTA) Biotin has been                           



                          reported to                            



                          cause a negative                           



                          bias, interpret                           



                          results relative                           



                          to patient's use                           



                          of biotin.                             

 

             Lab Interpretation Normal                                 



             (test code = 79699-2)                                        



[id number]





             Test Item    Value        Reference Range Interpretation Comments

 

             AFP (test code = 3.8 ng/mL    See_Comment                [Automated



             [id number])                                         message] The



                                                                 system which



                                                                 generated this



                                                                 result



                                                                 transmitted



                                                                 reference range

:



                                                                 <=7.5. The



                                                                 reference range



                                                                 was not used to



                                                                 interpret this



                                                                 result as



                                                                 normal/abnormal

.

 

             RENETTA (test code = RENETTA) Biotin has been                           



                          reported to                            



                          cause a negative                           



                          bias, interpret                           



                          results relative                           



                          to patient's use                           



                          of biotin.                             

 

             Lab Interpretation Normal                                 



             (test code = 29672-3)                                        



[facility] Packed RBC (in units), 1 Units
2021 02:14:47





             Test Item    Value        Reference Range Interpretation Comments

 

             Cross Match Result Compatible                             



             (test code = 4409)                                        

 

             ISBT Blood Type Code                                        



             (test code = 721159)                                        

 

             Unit Blood Type (test O Pos                                  



             code = 4410)                                        

 

             Unit Number (test [id number]                           



             code = 4411)                                        

 

             Blood Expiration Date                                        



             & Time (test code =                                        



             047703)                                             

 

             Status Information Issued                                 



             (test code = 4412)                                        

 

             Product      Red Blood Cells                           



             Identification (test                                        



             code = 4413)                                        

 

             Product Code (test K8420J16                               Performed

 at [facility]



             code = 4414)                                        Laboratory



                                                                 Services - [facility]



                                                                 Blood [address] [place], [place] [id number]



                                                                 Free:



                                                                 [phone]

A



                                                                 No. [id number]



[facility] Packed RBC (in units), 1 Units
2021 02:14:47





             Test Item    Value        Reference Range Interpretation Comments

 

             Cross Match Result Compatible                             



             (test code = 4409)                                        

 

             ISBT Blood Type Code                                        



             (test code = 339840)                                        

 

             Unit Blood Type (test O Pos                                  



             code = 4410)                                        

 

             Unit Number (test [id number]                           



             code = 4411)                                        

 

             Blood Expiration Date                                        



             & Time (test code =                                        



             895271)                                             

 

             Status Information Issued                                 



             (test code = 4412)                                        

 

             Product      Red Blood Cells                           



             Identification (test                                        



             code = 4413)                                        

 

             Product Code (test S5827Z24                               Performed

 at [facility]



             code = 4414)                                        Laboratory



                                                                 Services - [facility]



                                                                 Blood [address] [place] [address]



                                                                 [id number]



                                                                 Free:



                                                                 [phone]

A



                                                                 No. [id number]



[facility] ABDOMEN LIMITED WITH [id number] 
01:26:53Impression: 1. ?Cirrhosis.2. ?Enlarged portal vein and mild 
splenomegaly, suggestive of portalhypertension. Hepatopedal flow is seen in the 
main portal vein.3. Mild gallbladder wall thickening, which is nonspecific and 
may be seenwith cholecystitis, hepatitis, liver failure, or hypervolemia.4. 
Moderate abdominal ascites. Left pleural effusion. RL: 2824 End of Report 
Electronically signed by Fernandez Baker MD at 2021 8:26 PMExam: Limited 
Abdominal Ultrasound, 2021 7:00 PM. Ordering Physician: AMY CALDERA. 
History: Ascites. Comparison: None. Technique: Grayscale and color Doppler 
ultrasound images of the right upperquadrant abdomen were obtained. Technical 
Quality: Examination is slightlysuboptimal due to body habitusand overlying 
bowel gas. Findings: ?Liver is nodular in contour and heterogeneous in 
echotexture. Hepatopetalflow is noted in the main portal vein. Portal vein is 
enlarged. There is nointrahepatic biliary ductal dilatation. Common bile duct 
measures 2 mm incaliber. Gallbladder demonstrates no evidence of stones. ?There 
is nopericholecystic fluid. Gallbladder wall measures 3 mm in 
thickness.Sonographic Gupta sign is negative. Right kidney measures 13.9 cm in 
length. ?There is no right hydronephrosis.Right kidney shows normal cortical 
thickness, corticomedullarydifferentiation and echotexture. ? Visualized 
pancreas is unremarkable. Pancreas is partially obscured byoverlying bowel gas. 
Visualized inferior vena cava and abdominal aorta are unremarkable. Spleen 
measures up to 12.6 cm in length. There is moderate free fluid. Left pleural 
effusion is partiallyvisualized. [facility], Radiant Results Inft User - 2021 
8:28 PM CDTFormatting of this note might be different from the original.Exam: 
Limited Abdominal Ultrasound, 2021 7:00 PM.Ordering Physician: AMY IBRAHIM.History: Ascites.Comparison: None.Technique: Grayscale and color Doppler 
ultrasound images of the right upperquadrant abdomen were obtained.Technical 
Quality: Examination is slightly suboptimal due to body habitusand overlying 
bowel gas.Findings: Liver is nodular in contour and heterogeneous in ec
hotexture. Hepatopetalflow is noted in the main portal vein. Portal vein is 
enlarged. There is nointrahepatic biliary ductal dilatation. Common bile duct 
measures 2 mm incaliber.Gallbladder demonstrates no evidence of stones. There is
nopericholecystic fluid. Gallbladder wall measures 3 mm in thickness.Sonographic
Gupta sign is negative.Right kidney measures 13.9 cm in length. There is no 
right hydronephrosis.Right kidney shows normal cortical thickness, 
corticomedullarydifferentiation and echotexture. Visualized pancreas is 
unremarkable. Pancreas is partially obscured byoverlying bowel gas.Visualized 
inferior vena cava and abdominal aorta are unremarkable.Spleen measures up to 
12.6 cm in length.There is moderate free fluid. Left pleural effusion is 
partiallyvisualized.IMPRESSIONImpression: 1. Cirrhosis.2. Enlarged portal vein 
and mild splenomegaly, suggestive of portalhypertension. Hepatopedalflow is seen
in the main portal vein.3. Mild gallbladder wall thickening, which is 
nonspecific and may be seenwith cholecystitis, hepatitis, liver failure, or 
hypervolemia.4. Moderate abdominal ascites. Left pleural effusion. RL: 2824End 
of ReportElectronically signed by Fernandez Baker MD at 2021 8:[address] [facility] WITH [id number] 01:26:53
Impression: 1. ?Cirrhosis.2. ?Enlarged portal vein and mild splenomegaly, 
suggestive of portalhypertension. Hepatopedal flow is seen in the main portal 
vein.3. Mild gallbladder wall thickening, which is nonspecific and may be 
seenwith cholecystitis, hepatitis, liver failure, or hypervolemia.4. Moderate 
abdominal ascites. Left pleural effusion. RL: 2824 End of Report Electronically 
signed by Fernandez Baker MD at 2021 8:26 PMExam: Limited Abdominal Ultrasound, 
2021 7:00 PM. Ordering Physician: AMY CALDERA. History: Ascites. 
Comparison: None. Technique: Grayscale and color Doppler ultrasound images of 
the right upperquadrant abdomen were obtained. Technical Quality: Examination is
slightlysuboptimal due to body habitusand overlying bowel gas. Findings: ?Liver 
is nodular in contour and heterogeneous in echotexture. Hepatopetalflow is noted
in the main portal vein. Portal vein is enlarged. There is nointrahepatic 
biliary ductal dilatation. Common bile duct measures 2 mm incaliber. Gallbladder
demonstrates no evidence of stones. ?There is nopericholecystic fluid. 
Gallbladder wall measures 3 mm in thickness.Sonographic Gupta sign is negative.
Right kidney measures 13.9 cm in length. ?There is no right hydronephrosis.Right
kidney shows normal cortical thickness, corticomedullarydifferentiation and 
echotexture. ? Visualized pancreas is unremarkable. Pancreas is partially 
obscured byoverlying bowel gas. Visualized inferior vena cava and abdominal 
aorta are unremarkable. Spleen measures up to 12.6 cm in length. There is 
moderate free fluid. Left pleural effusion is partiallyvisualized. [facility], Radiant
Results Inft User - 2021 8:28 PM CDTFormatting of this note might be 
different from the original.Exam: Limited Abdominal Ultrasound, 2021 7:00 
PM.Ordering Physician: AMY CALDERA.History: Ascites.Comparison: 
None.Technique: Grayscale and color Doppler ultrasound images of the right 
upperquadrant abdomen were obtained.Technical Quality: Examination is slightly 
suboptimal due to body habitusand overlying bowel gas.Findings: Liver is nodular
in contour and heterogeneous in echotexture. Hepatopetalflow is noted in the 
main portal vein. Portal vein is enlarged. There is nointrahepatic biliary 
ductal dilatation. Common bile duct measures 2 mm incaliber.Gallbladder 
demonstrates no evidence of stones. There is nopericholecystic fluid. 
Gallbladder wall measures 3 mm in thickness.Sonographic Gupta sign is 
negative.Right kidney measures 13.9 cm in length. There is no right hydro
nephrosis.Right kidney shows normal cortical thickness, 
corticomedullarydifferentiation and echotexture. Visualized pancreas is 
unremarkable. Pancreas is partially obscured byoverlying bowel gas.Visualized 
inferior vena cava and abdominal aorta are unremarkable.Spleen measures up to 
12.6 cm in length.There is moderate free fluid. Left pleural effusion is 
partiallyvisualized.IMPRESSIONImpression: 1. Cirrhosis.2. Enlarged portal vein 
and mild splenomegaly, suggestive of portalhypertension. Hepatopedalflow is seen
in the main portal vein.3. Mild gallbladder wall thickening, which is 
nonspecific and may be seenwith cholecystitis, hepatitis, liver failure, or 
hypervolemia.4. Moderate abdominal ascites. Left pleural effusion. RL: 2824End 
of ReportElectronically signed by Fernandez Baker MD at 2021 8:26 PMUnDallas Regional Medical CenterHEPATITIS B SURFACE [id number] 00:23:10





             Test Item    Value        Reference Range Interpretation Comments

 

             HBsAg Semi-Quantitative (test code = Negative     Negative         

         



             5195-3)                                             



[facility] [id number] 00:23:10





             Test Item    Value        Reference Range Interpretation Comments

 

             HBsAg Semi-Quantitative (test code = Negative     Negative         

         



             5195-3)                                             



[facility] METABOLIC PANEL (NA, K, CL, CO2, 
GLUCOSE, BUN, CREATININE, CA)2021 00:19:53





             Test Item    Value        Reference Range Interpretation Comments

 

             NA (test code = 136 mmol/L   135-145                   



             6316392939)                                         

 

             K (test code = 3.7 mmol/L   3.5-5.0                   



             1013097460)                                         

 

             CL (test code = 104 mmol/L                       



             6718978474)                                         

 

             CO2 TOTAL (test code = 26 mmol/L    23-31                     



             0684492644)                                         

 

             AGAP (test code =              2-16                      



             1049980389)                                         

 

             BUN (test code = 4 mg/dL      7-23         L            



             1354873040)                                         

 

             GLUCOSE (test code = 75 mg/dL                         



             3395516564)                                         

 

             CREATININE (test code = 0.41 mg/dL   0.60-1.25    L            



             9081262266)                                         

 

             CALCIUM (test code = 7.3 mg/dL    8.6-10.6     L            



             5786820984)                                         

 

             eGFR (test code =              mL/min/1.73m2              



             9874821307)                                         

 

             RENETTA (test code = RENETTA) Association of                           



                          Glomerular Filtration                           



                          Rate (GFR) and Staging                           



                          of Kidney Disease*                           



                          +---------------------                           



                          --+-------------------                           



                          --+-------------------                           



                          ------+| GFR                           



                          (mL/min/1.73 m2) ?|                           



                          With Kidney Damage ?|                           



                          ?Without Kidney                           



                          Damage+---------------                           



                          --------+-------------                           



                          --------+-------------                           



                          ------------+| ?>90 ?                           



                          ? ? ? ? ? ? ? ?|                           



                          ?Stage one ? ? ? ? ?|                           



                          ? Normal ? ? ? ? ? ? ?                           



                          ?+--------------------                           



                          ---+------------------                           



                          ---+------------------                           



                          -------+| ?60-89 ? ? ?                           



                          ? ? ? ? ?| ?Stage two                           



                          ? ? ? ? ?| ? Decreased                           



                          GFR ? ? ? ?                            



                          +---------------------                           



                          --+-------------------                           



                          --+-------------------                           



                          ------+| ?30-59 ? ? ?                           



                          ? ? ? ? ?| ?Stage                           



                          three ? ? ? ?| ? Stage                           



                          three ? ? ? ? ?                           



                          +---------------------                           



                          --+-------------------                           



                          --+-------------------                           



                          ------+| ?15-29 ? ? ?                           



                          ? ? ? ? ?| ?Stage four                           



                          ? ? ? ? | ? Stage four                           



                          ? ? ? ? ?                              



                          ?+--------------------                           



                          ---+------------------                           



                          ---+------------------                           



                          -------+| ?<15 (or                           



                          dialysis) ? ?| ?Stage                           



                          five ? ? ? ? | ? Stage                           



                          five ? ? ? ? ?                           



                          ?+--------------------                           



                          ---+------------------                           



                          ---+------------------                           



                          -------+ *Each stage                           



                          assumes the associated                           



                          GFR level has been in                           



                          effect for at least                           



                          three months. ?Stages                           



                          1 to 5, with or                           



                          without kidney                           



                          disease, indicate                           



                          chronic kidney                           



                          disease. Notes:                           



                          Determination of                           



                          stages one and two                           



                          (with eGFR                             



                          >59mL/min/1.73 m2)                           



                          requires estimation of                           



                          kidney damage for at                           



                          least three months as                           



                          defined by structural                           



                          or functional                           



                          abnormalities of the                           



                          kidney, manifested by                           



                          either:Pathological                           



                          abnormalities or                           



                          Markers of kidney                           



                          damage (including                           



                          abnormalities in the                           



                          composition of the                           



                          blood or urine or                           



                          abnormalities in                           



                          imaging tests).                           

 

             Lab Interpretation Abnormal                               



             (test code = 01907-3)                                        



[facility] METABOLIC PANEL (NA, K, CL, CO2, 
GLUCOSE, BUN, CREATININE, CA)2021 00:19:53





             Test Item    Value        Reference Range Interpretation Comments

 

             NA (test code = 136 mmol/L   135-145                   



             8086481027)                                         

 

             K (test code = 3.7 mmol/L   3.5-5.0                   



             8111097909)                                         

 

             CL (test code = 104 mmol/L                       



             2844344548)                                         

 

             CO2 TOTAL (test code = 26 mmol/L    23-31                     



             0114751263)                                         

 

             AGAP (test code =              2-16                      



             7344509724)                                         

 

             BUN (test code = 4 mg/dL      7-23         L            



             2886014921)                                         

 

             GLUCOSE (test code = 75 mg/dL                         



             2735650057)                                         

 

             CREATININE (test code = 0.41 mg/dL   0.60-1.25    L            



             7510906782)                                         

 

             CALCIUM (test code = 7.3 mg/dL    8.6-10.6     L            



             5311495754)                                         

 

             eGFR (test code =              mL/min/1.73m2              



             [id number])                                         

 

             RENETTA (test code = RENETTA) Association of                           



                          Glomerular Filtration                           



                          Rate (GFR) and Staging                           



                          of Kidney Disease*                           



                          +---------------------                           



                          --+-------------------                           



                          --+-------------------                           



                          ------+| GFR                           



                          (mL/min/1.73 m2) ?|                           



                          With Kidney Damage ?|                           



                          ?Without Kidney                           



                          Damage+---------------                           



                          --------+-------------                           



                          --------+-------------                           



                          ------------+| ?>90 ?                           



                          ? ? ? ? ? ? ? ?|                           



                          ?Stage one ? ? ? ? ?|                           



                          ? Normal ? ? ? ? ? ? ?                           



                          ?+--------------------                           



                          ---+------------------                           



                          ---+------------------                           



                          -------+| ?60-89 ? ? ?                           



                          ? ? ? ? ?| ?Stage two                           



                          ? ? ? ? ?| ? Decreased                           



                          GFR ? ? ? ?                            



                          +---------------------                           



                          --+-------------------                           



                          --+-------------------                           



                          ------+| ?30-59 ? ? ?                           



                          ? ? ? ? ?| ?Stage                           



                          three ? ? ? ?| ? Stage                           



                          three ? ? ? ? ?                           



                          +---------------------                           



                          --+-------------------                           



                          --+-------------------                           



                          ------+| ?15-29 ? ? ?                           



                          ? ? ? ? ?| ?Stage four                           



                          ? ? ? ? | ? Stage four                           



                          ? ? ? ? ?                              



                          ?+--------------------                           



                          ---+------------------                           



                          ---+------------------                           



                          -------+| ?<15 (or                           



                          dialysis) ? ?| ?Stage                           



                          five ? ? ? ? | ? Stage                           



                          five ? ? ? ? ?                           



                          ?+--------------------                           



                          ---+------------------                           



                          ---+------------------                           



                          -------+ *Each stage                           



                          assumes the associated                           



                          GFR level has been in                           



                          effect for at least                           



                          three months. ?Stages                           



                          1 to 5, with or                           



                          without kidney                           



                          disease, indicate                           



                          chronic kidney                           



                          disease. Notes:                           



                          Determination of                           



                          stages one and two                           



                          (with eGFR                             



                          >59mL/min/1.73 m2)                           



                          requires estimation of                           



                          kidney damage for at                           



                          least three months as                           



                          defined by structural                           



                          or functional                           



                          abnormalities of the                           



                          kidney, manifested by                           



                          either:Pathological                           



                          abnormalities or                           



                          Markers of kidney                           



                          damage (including                           



                          abnormalities in the                           



                          composition of the                           



                          blood or urine or                           



                          abnormalities in                           



                          imaging tests).                           

 

             Lab Interpretation Abnormal                               



             (test code = 36727-7)                                        



[facility] and Screen - ONCE [id number] 00:12:19





             Test Item    Value        Reference Range Interpretation Comments

 

             ABO & RH (test code O Positive                             Performe

d at [facility]



             = 20)                                               Laboratory [facility]

ices [facility]

[address] [place] [address]

4Toll



                                                                 Free: [phone]

266CLIA



                                                                 No. [id number]

 

             IAT (test code = Negative                               Performed a

t [facility]



             1185)                                               Laboratory [facility]

[address] [place] [address]

4Toll



                                                                 Free: [phone]

266CLIA



                                                                 No. [id number]



[facility] and Screen - ONCE [id number] 00:12:19





             Test Item    Value        Reference Range Interpretation Comments

 

             ABO & RH (test code O Positive                             Performe

d at [facility]



             = 20)                                               Laboratory [facility]

ices [facility]

[address] [place] [address]

4Toll



                                                                 Free: [phone]

266CLIA



                                                                 No. [id number]

 

             IAT (test code = Negative                               Performed a

t [facility]



             1185)                                               Laboratory [facility]



                                                                 [facility]

[address] [place] [address]

4Toll



                                                                 Free: [phone]

266CLIA



                                                                 No. [id number]



[id number]





             Test Item    Value        Reference Range Interpretation Comments

 

             FERRITIN (test code = 12.5 ng/mL   18.0-464.0   L            



             [id number])                                         

 

             RENETTA (test code = RENETTA) Biotin has been                           



                          reported to cause a                           



                          negative bias,                           



                          interpret results                           



                          relative to                            



                          patient's use of                           



                          biotin.                                

 

             Lab Interpretation (test Abnormal                               



             code = 81198-4)                                        



[facility] [id number]:02:09





             Test Item    Value        Reference Range Interpretation Comments

 

             FERRITIN (test code = 12.5 ng/mL   18.0-464.0   L            



             [id number])                                         

 

             RENETTA (test code = RENETTA) Biotin has been                           



                          reported to cause a                           



                          negative bias,                           



                          interpret results                           



                          relative to                            



                          patient's use of                           



                          biotin.                                

 

             Lab Interpretation (test Abnormal                               



             code = 33847-6)                                        



[facility] [id number] 22:57:36





             Test Item    Value        Reference Range Interpretation Comments

 

             PROTIME PATIENT (test              See_Comment  H             [Auto

mated message]



             code = 5964-2)                                        The system 

Playroom



                                                                 generated this 

result



                                                                 transmitted ref

erence



                                                                 range: 10.1 - 1

2.6



                                                                 Seconds. The



                                                                 reference range

 was



                                                                 not used to int

erpret



                                                                 this result as



                                                                 normal/abnormal

.

 

             INR (test code = 6301-6)                                        Nor

mal INR <1.1;



                                                                 Warfarin Therap

eutic



                                                                 range 2.0 to 3.

0 or



                                                                 2.5 to 3.5, dep

ending



                                                                 upon the indica

tions.

 

             Lab Interpretation (test Abnormal                               



             code = 34588-5)                                        



[facility] [id number] 22:57:36





             Test Item    Value        Reference Range Interpretation Comments

 

             PROTIME PATIENT (test              See_Comment  H             [Auto

mated message]



             code = 5964-2)                                        The system 

Playroom



                                                                 generated this 

result



                                                                 transmitted ref

erence



                                                                 range: 10.1 - 1

2.6



                                                                 Seconds. The



                                                                 reference range

 was



                                                                 not used to int

erpret



                                                                 this result as



                                                                 normal/abnormal

.

 

             INR (test code = 6301-6)                                        Nor

mal INR <1.1;



                                                                 Warfarin Therap

eutic



                                                                 range 2.0 to 3.

0 or



                                                                 2.5 to 3.5, dep

ending



                                                                 upon the indica

tions.

 

             Lab Interpretation (test Abnormal                               



             code = 16973-3)                                        



[id number] 22:53:15





             Test Item    Value        Reference Range Interpretation Comments

 

             WBC (test code =              See_Comment  L             [Automated



             6690-2)                                             message] The sy

stem



                                                                 which generated



                                                                 this result



                                                                 transmitted



                                                                 reference range

:



                                                                 4.20 - 10.70



                                                                 10*3/?L. The



                                                                 reference range

 was



                                                                 not used to



                                                                 interpret this



                                                                 result as



                                                                 normal/abnormal

.

 

             RBC (test code =              See_Comment  L             [Automated



             789-8)                                              message] The sy

stem



                                                                 which generated



                                                                 this result



                                                                 transmitted



                                                                 reference range

:



                                                                 4.26 - 5.52



                                                                 10*6/?L. The



                                                                 reference range

 was



                                                                 not used to



                                                                 interpret this



                                                                 result as



                                                                 normal/abnormal

.

 

             HGB (test code = 6.9 g/dL     12.2-16.4    L            



             718-7)                                              

 

             HCT (test code = 22.3 %       38.4-49.3    L            



             4544-3)                                             

 

             MCV (test code = 80.8 fL      81.7-95.6    L            



             787-2)                                              

 

             MCH (test code = 25.0 pg      26.1-32.7    L            



             785-6)                                              

 

             MCHC (test code = 30.9 g/dL    31.2-35.0    L            



             786-4)                                              

 

             RDW-SD (test code = 50.6 fL      38.5-51.6                 



             18000-2)                                            

 

             RDW-CV (test code = 17.4 %       12.1-15.4    H            



             788-0)                                              

 

             PLT (test code =              See_Comment  LL            [Automated



             777-3)                                              message] The sy

stem



                                                                 which generated



                                                                 this result



                                                                 transmitted



                                                                 reference range

:



                                                                 150 - 328 10*3/

?L.



                                                                 The reference r

moose



                                                                 was not used to



                                                                 interpret this



                                                                 result as



                                                                 normal/abnormal

.

 

             MPV (test code =                                        Not Measure

d



             47904-8)                                            

 

             IPF % (test code = 11.6 %       1.2-10.7     H            Platelet 

count



             [id number])                                         measured by



                                                                 fluorescence



                                                                 method.

 

             NRBC/100 WBC (test              See_Comment                [Automat

ed



             code = 5629558229)                                        message] 

The system



                                                                 which generated



                                                                 this result



                                                                 transmitted



                                                                 reference range

:



                                                                 0.0 - 10.0 /100



                                                                 WBCs. The refer

ence



                                                                 range was not u

sed



                                                                 to interpret th

is



                                                                 result as



                                                                 normal/abnormal

.

 

             NRBC x10^3 (test code <0.01        See_Comment                [Auto

mated



             = [id number])                                        message] The s

ystem



                                                                 which generated



                                                                 this result



                                                                 transmitted



                                                                 reference range

:



                                                                 10*3/?L. The



                                                                 reference range

 was



                                                                 not used to



                                                                 interpret this



                                                                 result as



                                                                 normal/abnormal

.

 

             GRAN MAT (NEUT) % 61.0 %                                 



             (test code = 770-8)                                        

 

             IMM GRAN % (test code 0.30 %                                 



             = [id number])                                        

 

             LYMPH % (test code = 21.0 %                                 



             736-9)                                              

 

             MONO % (test code = 13.8 %                                 



             5905-5)                                             

 

             EOS % (test code = 2.7 %                                  



             713-8)                                              

 

             BASO % (test code = 1.2 %                                  



             706-2)                                              

 

             GRAN MAT x10^3(ANC) 2.04 10*3/uL 1.99-6.95                 



             (test code =                                        



             7380565324)                                         

 

             IMM GRAN x10^3 (test <0.03        0.00-0.06                 



             code = 4207519949)                                        

 

             LYMPH x10^3 (test code 0.70 10*3/uL 1.09-3.23    L            



             = 731-0)                                            

 

             MONO x10^3 (test code 0.46 10*3/uL 0.36-1.02                 



             = 742-7)                                            

 

             EOS x10^3 (test code = 0.09 10*3/uL 0.06-0.53                 



             711-2)                                              

 

             BASO x10^3 (test code 0.04 10*3/uL 0.01-0.09                 



             = 704-7)                                            

 

             Lab Interpretation Abnormal                               



             (test code = 48734-5)                                        



[facility] [id number] 17:59:41





             Test Item    Value        Reference Range Interpretation Comments

 

             ABO & RH (test code O Positive                             Performe

d at [facility]



             = 20)                                               Laboratory [facility] [place], [place] [id number] 

Free:



                                                                 [phone]

A No.



                                                                 [id number]



[facility] [id number] 17:59:41





             Test Item    Value        Reference Range Interpretation Comments

 

             ABO & RH (test code O Positive                             Performe

d at [facility]



             = 20)                                               Laboratory [facility] [place], [place] [id number] 

Free:



                                                                 [phone]

A No.



                                                                 [id number]



[facility] [id number] 17:58:57





             Test Item    Value        Reference Range Interpretation Comments

 

             ABO & RH (test code O Positive                             Performe

d at [facility]



             = 20)                                               Laboratory [facility] [place], [place] [id number] 

Free:



                                                                 [phone]

A No.



                                                                 [id number]

 

             IAT (test code = Negative                               Performed a

t [facility]



             1185)                                               Laboratory Serv

[facility]

[address], [place]



                                                                 [id number] 

Free:



                                                                 [phone]

A No.



                                                                 [id number]



[facility]





             Test Item    Value        Reference Range Interpretation Comments

 

             APPEARANCE (test code = Clear        Clear                     



             [id number])                                         

 

             COLOR (test code = Lorie        Yellow       A            



             5721581850)                                         

 

             PH (test code =              4.8-8.0                   



             6485376598)                                         

 

             SP GRAVITY (test code =              1.003-1.030  H            



             0492245700)                                         

 

             GLU U QUAL (test code = Normal       Normal                    



             2767663727)                                         

 

             BLOOD (test code = Negative     Negative                  



             6700016712)                                         

 

             KETONES (test code = 20 mg/dL     Negative     A            



             8674451944)                                         

 

             PROTEIN (test code = 30 mg/dL     Negative     A            



             2887-8)                                             

 

             UROBILIN (test code = 4.0 mg/dL    Normal       A            



             8760480953)                                         

 

             BILIRUBIN (test code = 2 mg/dL      Negative     A            



             9648023825)                                         

 

             NITRITE (test code = Negative     Negative                  



             7570729533)                                         

 

             LEUK NIKI (test code = Negative     Negative                  



             7927156733)                                         

 

             RBC/HPF (test code =              See_Comment                [Autom

ated message]



             9200293082)                                         The system [employer]



                                                                 generated this



                                                                 result transmit

michelle



                                                                 reference range

: 0 -



                                                                 3 HPF. The refe

rence



                                                                 range was not u

sed



                                                                 to interpret th

is



                                                                 result as



                                                                 normal/abnormal

.

 

             WBC/HPF (test code =              See_Comment                [Autom

ated message]



             4332870920)                                         The system [employer]



                                                                 generated this



                                                                 result transmit

michelle



                                                                 reference range

: 0 -



                                                                 5 HPF. The refe

rence



                                                                 range was not u

sed



                                                                 to interpret th

is



                                                                 result as



                                                                 normal/abnormal

.

 

             BACTERIA (test code = Few          Negative     A            



             4457881411)                                         

 

             MUCOUS (test code = Marked       Negative LPF A            



             4735459456)                                         

 

             SQ EPITH (test code =              HPF                       



             2740324471)                                         

 

             Ictotest (test code = Positive                               



             5922193294)                                         

 

             Lab Interpretation (test Abnormal                               



             code = 29681-6)                                        



[facility]





             Test Item    Value        Reference Range Interpretation Comments

 

             APPEARANCE (test code = Clear        Clear                     



             7952896674)                                         

 

             COLOR (test code = Lorie        Yellow       A            



             0683285448)                                         

 

             PH (test code =              4.8-8.0                   



             3516109822)                                         

 

             SP GRAVITY (test code =              1.003-1.030  H            



             4399515637)                                         

 

             GLU U QUAL (test code = Normal       Normal                    



             2992995365)                                         

 

             BLOOD (test code = Negative     Negative                  



             7675594483)                                         

 

             KETONES (test code = 20 mg/dL     Negative     A            



             7775667203)                                         

 

             PROTEIN (test code = 30 mg/dL     Negative     A            



             2887-8)                                             

 

             UROBILIN (test code = 4.0 mg/dL    Normal       A            



             8518584602)                                         

 

             BILIRUBIN (test code = 2 mg/dL      Negative     A            



             0800933807)                                         

 

             NITRITE (test code = Negative     Negative                  



             9606201654)                                         

 

             LEUK NIKI (test code = Negative     Negative                  



             3478868709)                                         

 

             RBC/HPF (test code =              See_Comment                [Autom

ated message]



             1942819263)                                         The system [employer]



                                                                 generated this



                                                                 result transmit

michelle



                                                                 reference range

: 0 -



                                                                 3 HPF. The refe

rence



                                                                 range was not u

sed



                                                                 to interpret th

is



                                                                 result as



                                                                 normal/abnormal

.

 

             WBC/HPF (test code =              See_Comment                [Autom

ated message]



             2448617560)                                         The system [employer]



                                                                 generated this



                                                                 result transmit

michelle



                                                                 reference range

: 0 -



                                                                 5 HPF. The refe

rence



                                                                 range was not u

sed



                                                                 to interpret th

is



                                                                 result as



                                                                 normal/abnormal

.

 

             BACTERIA (test code = Few          Negative     A            



             3214737265)                                         

 

             MUCOUS (test code = Marked       Negative LPF A            



             0563977538)                                         

 

             SQ EPITH (test code =              HPF                       



             1833880136)                                         

 

             Ictotest (test code = Positive                               



             2530212370)                                         

 

             Lab Interpretation (test Abnormal                               



             code = 39202-4)                                        



[facility] [id number] 
17:13:20 1. ?Cirrhosis, ascites, splenomegaly, and portal venous hypertension. 
Thereare large gastroesophageal varices. 2. Small bowel and colon wall 
thickening likely related to the portalvenous hypertension.Enterocolitis could 
have this appearance. There is nopneumatosis or pneumoperitoneum. RL: 1105 
Electronically signed by Tj Mesa MD at 2021 12:13 PMHISTORY: ?Abdominal 
distension Diverticulitis suspected COMPARISON:none TECHNIQUE:CT scan of the 
abdomen and pelvis performed. Contiguous axial CT imageswere obtained after 
administration of intravenous contrast. ?CT scan doneaccording to ALARA. [place]
hnical quality: Technical quality: adequate. FINDINGS: Moderate left pleural 
effusion seen. There isbibasilar atelectasis. Thereis no pericardial effusion. 
Liver is cirrhotic. Spleen is enlarged measuring 14.5 cm. There is moderateto 
large ascites. Gastroesophageal varices are large. Portal vein patent. There is 
no adrenal nodule. The kidneys demonstrate symmetric nephrograms.There is no 
hydronephrosis. Diffuse bowel wall thickening is present which may be due to the
portalvenous hypertension. Superimposed enterocolitis could have this 
appearance.There is no pneumatosis or pneumoperitoneum. Urinary bladder 
unremarkable. There is no destructive bony process. [facility], Radiant Results Inft 
User - 2021 12:14 PM CDTFormatting of this note might be different from 
the original.HISTORY: Abdominal distension Diverticulitis suspected 
COMPARISON:noneTECHNIQUE:CT scan of the abdomen and pelvis performed. Contiguous
axial CT imageswere obtained after administration of intravenous contrast. CT 
scan doneaccording to ALARA. Technical quality: Technical quality: 
adequate.FINDINGS:Moderate left pleural effusion seen. There is bibasilar 
atelectasis. Thereis no pericardial effusion.Liver is cirrhotic. Spleenis 
enlarged measuring 14.5 cm. There is moderateto large ascites. Gastroesophageal 
varices are large. Portal vein patent.There is no adrenal nodule. The kidneys 
demonstrate symmetric nephrograms.There is no hydronephrosis.Diffuse bowel wall 
thickening is present which may be due to the portalvenous hypertension. 
Superimposed enterocolitis could have this appearance.There is no pneumatosis or
pneumoperitoneum.Urinary bladder unremarkable.There is no destructive bony 
process.IMPRESSION1. Cirrhosis, ascites, splenomegaly, and portal venous 
hypertension. Thereare large gastroesophageal varices.2. Smallbowel and colon 
wall thickening likely related to the portalvenous hypertension. Enterocolitis 
couldhave this appearance. There is nopneumatosis or pneumoperitoneum.RL: 
1105Electronically signed by Tj Mesa MD at 2021 12:13 PMUnDallas [facility] CenterCT [facility] P[facility] W [id number] 17:13:20 1. 
?Cirrhosis, ascites, splenomegaly, and portal venous hypertension. Thereare 
large gastroesophageal varices. 2. Small bowel and colon wall thickening likely 
related to the portalvenous hypertension.Enterocolitis could have this 
appearance. There is nopneumatosis or pneumoperitoneum. RL: 1105 Electronically 
signed by Tj Mesa MD at 2021 12:13 PMHISTORY: ?Abdominal distension 
Diverticulitis suspected COMPARISON:none TECHNIQUE:CT scan of the abdomen and 
pelvis performed. Contiguous axial CT imageswere obtained after administration 
of intravenous contrast. ?CT scan doneaccording to ALARA. Technical quality: 
Technical quality: adequate. FINDINGS: Moderate left pleural effusion seen. 
There isbibasilar atelectasis. Thereis no pericardial effusion. Liver is 
cirrhotic. Spleen is enlarged measuring 14.5 cm. There is moderateto large 
ascites. Gastroesophageal varices are large. Portal vein patent. There is no 
adrenal nodule. The kidneys demonstrate symmetric nephrograms.There is no 
hydronephrosis. Diffuse bowel wall thickening is present which may be due to the
portalvenous hypertension. Superimposed enterocolitis could have this 
appearance.There is no pneumatosis or pneumoperitoneum. Urinary bladder 
unremarkable. There is no destructive bony process. [facility], Radiant Results Inft 
User - 2021 12:14 PM CDTFormatting of this note might be different from 
the original.HISTORY: Abdominal distension Diverticulitis suspected 
COMPARISON:noneTECHNIQUE:CT scan of the abdomen and pelvis performed. Contiguous
axial CT imageswere obtained after administration of intravenous contrast. CT 
scan doneaccording to ALARA. Technical quality: Technical quality: 
adequate.FINDINGS:Moderate left pleural effusion seen. There is bibasilar 
atelectasis. Thereis no pericardial effusion.Liver is cirrhotic. Spleenis 
enlarged measuring 14.5 cm. There is moderateto large ascites. Gastroesophageal 
varices are large. Portal vein patent.There is no adrenal nodule. The kidneys 
demonstrate symmetric nephrograms.There is no hydronephrosis.Diffuse bowel wall 
thickening is present which may be due to the portalvenous hypertension. 
Superimposed enterocolitis could have this appearance.There is no pneumatosis or
pneumoperitoneum.Urinary bladder unremarkable.There is no destructive bony 
process.IMPRESSION1. Cirrhosis, ascites, splenomegaly, and portal venous 
hypertension. Thereare large gastroesophageal varices.2. Smallbowel and colon 
wall thickening likely related to the portalvenous hypertension. Enterocolitis 
couldhave this appearance. There is nopneumatosis or pneumoperitoneum.RL: 
1105Electronically signed by Tj Mesa MD at 2021 12:13 PMSchuyler [facility] with [id number] 16:30:57





             Test Item    Value        Reference Range Interpretation Comments

 

             WBC (test code =              See_Comment  L             [Automated



             6690-2)                                             message] The



                                                                 system which



                                                                 generated this



                                                                 result transmit

michelle



                                                                 reference range

:



                                                                 4.20 - 10.70



                                                                 10*3/?L. The



                                                                 reference range



                                                                 was not used to



                                                                 interpret this



                                                                 result as



                                                                 normal/abnormal

.

 

             RBC (test code =              See_Comment  L             [Automated



             789-8)                                              message] The



                                                                 system which



                                                                 generated this



                                                                 result transmit

michelle



                                                                 reference range

:



                                                                 4.26 - 5.52



                                                                 10*6/?L. The



                                                                 reference range



                                                                 was not used to



                                                                 interpret this



                                                                 result as



                                                                 normal/abnormal

.

 

             HGB (test code = 6.3 g/dL     12.2-16.4    L            



             718-7)                                              

 

             HCT (test code = 21.2 %       38.4-49.3    L            



             4544-3)                                             

 

             MCV (test code = 79.4 fL      81.7-95.6    L            



             787-2)                                              

 

             MCH (test code = 23.6 pg      26.1-32.7    L            



             785-6)                                              

 

             MCHC (test code = 29.7 g/dL    31.2-35.0    L            



             786-4)                                              

 

             RDW-SD (test code = 50.9 fL      38.5-51.6                 



             24205-1)                                            

 

             RDW-CV (test code = 17.6 %       12.1-15.4    H            



             788-0)                                              

 

             PLT (test code =              See_Comment  LL            [Automated



             777-3)                                              message] The



                                                                 system which



                                                                 generated this



                                                                 result transmit

michelle



                                                                 reference range

:



                                                                 150 - 328 10*3/

?L.



                                                                 The reference



                                                                 range was not u

sed



                                                                 to interpret th

is



                                                                 result as



                                                                 normal/abnormal

.

 

             MPV (test code =                                        Not Measure

d



             41644-7)                                            

 

             IPF % (test code = 8.5 %        1.2-10.7                  Platelet 

count



             [id number])                                         measured by



                                                                 fluorescence



                                                                 method.

 

             NRBC/100 WBC (test              See_Comment                [Automat

ed



             code = 3581153452)                                        message] 

The



                                                                 system which



                                                                 generated this



                                                                 result transmit

michelle



                                                                 reference range

:



                                                                 0.0 - 10.0 /100



                                                                 WBCs. The



                                                                 reference range



                                                                 was not used to



                                                                 interpret this



                                                                 result as



                                                                 normal/abnormal

.

 

             NRBC x10^3 (test code <0.01        See_Comment                [Auto

mated



             = 1864270321)                                        message] The



                                                                 system which



                                                                 generated this



                                                                 result transmit

michelle



                                                                 reference range

:



                                                                 10*3/?L. The



                                                                 reference range



                                                                 was not used to



                                                                 interpret this



                                                                 result as



                                                                 normal/abnormal

.

 

             GRAN MAT (NEUT) % 60.3 %                                 



             (test code = 770-8)                                        

 

             IMM GRAN % (test code 0.30 %                                 



             = 8910523654)                                        

 

             LYMPH % (test code = 20.2 %                                 



             736-9)                                              

 

             MONO % (test code = 17.0 %                                 



             5905-5)                                             

 

             EOS % (test code = 1.3 %                                  



             713-8)                                              

 

             BASO % (test code = 0.9 %                                  



             706-2)                                              

 

             GRAN MAT x10^3(ANC) 1.91 10*3/uL 1.99-6.95    L            



             (test code =                                        



             4408563626)                                         

 

             IMM GRAN x10^3 (test <0.03        0.00-0.06                 



             code = 0301649416)                                        

 

             LYMPH x10^3 (test 0.64 10*3/uL 1.09-3.23    L            



             code = 731-0)                                        

 

             MONO x10^3 (test code 0.54 10*3/uL 0.36-1.02                 



             = 742-7)                                            

 

             EOS x10^3 (test code 0.04 10*3/uL 0.06-0.53    L            



             = 711-2)                                            

 

             BASO x10^3 (test code 0.03 10*3/uL 0.01-0.09                 



             = 704-7)                                            

 

             POLYCHROMASIA (test 2+           See_Comment                [Automa

michelle



             code = 08842-6)                                        message] The



                                                                 system which



                                                                 generated this



                                                                 result transmit

michelle



                                                                 reference range

:



                                                                 2+. The referen

ce



                                                                 range was not u

sed



                                                                 to interpret th

is



                                                                 result as



                                                                 normal/abnormal

.

 

             ROULEAUX (test code = Present      See_Comment  A             [Auto

mated



             7797-4)                                             message] The



                                                                 system which



                                                                 generated this



                                                                 result transmit

michelle



                                                                 reference range

:



                                                                 (none). The



                                                                 reference range



                                                                 was not used to



                                                                 interpret this



                                                                 result as



                                                                 normal/abnormal

.

 

             TARGET CELLS (test 2+           See_Comment  A             [Automat

ed



             code = 25602-5)                                        message] The



                                                                 system which



                                                                 generated this



                                                                 result transmit

michelle



                                                                 reference range

:



                                                                 (none). The



                                                                 reference range



                                                                 was not used to



                                                                 interpret this



                                                                 result as



                                                                 normal/abnormal

.

 

             PLT ESTIMATE (test Critically   Normal       AA           



             code = 9317-9) Decreased                              

 

             Lab Interpretation Abnormal                               



             (test code = 49280-7)                                        



[facility] 
[facility]





             Test Item    Value        Reference Range Interpretation Comments

 

             SARS-CoV-2 Rapid ID NOW Not Detected Not Detected              



             (test code = 82885-3)                                        

 

             RENETTA (test code = RENETTA) ID NOW COVID-19 Assay                        

   



                          is an isothermal                           



                          nucleic acid                           



                          amplification test                           



                          intended for the                           



                          qualitative detection                           



                          of nucleic acid from                           



                          SARS-CoV-2 viral RNA                           



                          in nasopharyngeal (NP)                           



                          specimens. It is used                           



                          under Emergency Use                           



                          Authorization (EUA) by                           



                          FDA. The limit of                           



                          detection (LOD) of the                           



                          assay is 125 Genome                           



                          Equivalents/mL. A                           



                          positive result is                           



                          indicative of the                           



                          presence of SARS-CoV-2                           



                          RNA. ?Clinical                           



                          correlation with                           



                          patient history and                           



                          other diagnostic                           



                          information is                           



                          necessary to determine                           



                          patient infection                           



                          status. A negative                           



                          (Not Detected) result                           



                          does not preclude                           



                          SARS-CoV-2 infection.                           



                          In patients with                           



                          clinical symptoms and                           



                          other tests that are                           



                          consistent with                           



                          SARS-CoV-2 infection,                           



                          negative results                           



                          should be treated as                           



                          presumptive negative                           



                          and a new specimen                           



                          should be tested with                           



                          alternative PCR                           



                          molecular test.                           



                          Invalid: Please                           



                          collect a new specimen                           



                          for repeat patient                           



                          testing if clinically                           



                          indicated.                             

 

             Lab Interpretation Normal                                 



             (test code = 31017-0)                                        



[facility] 
[facility]:47:50





             Test Item    Value        Reference Range Interpretation Comments

 

             SARS-CoV-2 Rapid ID NOW Not Detected Not Detected              



             (test code = 69105-8)                                        

 

             RENETTA (test code = RENETTA) ID NOW COVID-19 Assay                        

   



                          is an isothermal                           



                          nucleic acid                           



                          amplification test                           



                          intended for the                           



                          qualitative detection                           



                          of nucleic acid from                           



                          SARS-CoV-2 viral RNA                           



                          in nasopharyngeal (NP)                           



                          specimens. It is used                           



                          under Emergency Use                           



                          Authorization (EUA) by                           



                          FDA. The limit of                           



                          detection (LOD) of the                           



                          assay is 125 Genome                           



                          Equivalents/mL. A                           



                          positive result is                           



                          indicative of the                           



                          presence of SARS-CoV-2                           



                          RNA. ?Clinical                           



                          correlation with                           



                          patient history and                           



                          other diagnostic                           



                          information is                           



                          necessary to determine                           



                          patient infection                           



                          status. A negative                           



                          (Not Detected) result                           



                          does not preclude                           



                          SARS-CoV-2 infection.                           



                          In patients with                           



                          clinical symptoms and                           



                          other tests that are                           



                          consistent with                           



                          SARS-CoV-2 infection,                           



                          negative results                           



                          should be treated as                           



                          presumptive negative                           



                          and a new specimen                           



                          should be tested with                           



                          alternative PCR                           



                          molecular test.                           



                          Invalid: Please                           



                          collect a new specimen                           



                          for repeat patient                           



                          testing if clinically                           



                          indicated.                             

 

             Lab Interpretation Normal                                 



             (test code = 94341-9)                                        



[facility] -[id number] 15:44:12





             Test Item    Value        Reference Range Interpretation Comments

 

             TROPONIN I (test 0.002 ng/mL  See_Comment                [Automated



             code = 7247927077)                                        message] 

The



                                                                 system which



                                                                 generated this



                                                                 result



                                                                 transmitted



                                                                 reference range

:



                                                                 <=0.034. The



                                                                 reference range



                                                                 was not used to



                                                                 interpret this



                                                                 result as



                                                                 normal/abnormal

.

 

             RENETTA (test code = Equal or Less than                           



             RENETTA)         0.034                                  



                          ng/ml---Normal                           



                          ?Note: Cardiac                           



                          troponin begins to                           



                          rise 3-4 hours                           



                          after the onset of                           



                          ischemia. Repeat                           



                          in 4-6 hours if                           



                          the sample was                           



                          drawn within 3-4                           



                          hours of the onset                           



                          of the symptom and                           



                          found normal.                           



                          Between 0.035 and                           



                          0.120 ng/mL---                           



                          Borderline.                            



                          Questionable                           



                          myocardial injury                           



                          or necrosis ?                           



                          ?Note: Serial                           



                          measurement may be                           



                          necessary to                           



                          confirm or exclude                           



                          the diagnosis of                           



                          myocardial injury                           



                          or necrosis;                           



                          Clinical                               



                          correlation                            



                          (symptoms, EKGs,                           



                          imaging studies,                           



                          and others)                            



                          required; Repeat                           



                          in 4-6 hours if                           



                          clinically                             



                          indicated. ? ? ? ?                           



                          Equal or Higher                           



                          than 0.121                             



                          ng/mL---Abnormal.                           



                          Myocardial Injury                           



                          or Necrosis Likely                           



                          ? ? ? ? Biotin has                           



                          been reported to                           



                          cause a negative                           



                          bias, interpret                           



                          results relative                           



                          to patient's use                           



                          of biotin. ? ? ? ?                           



                          ? ? ? ? ? ? ? ? ?                           



                          ? ? ? ? ? ? ? ? ?                           



                          ? ? ? ? ? ? ? ? ?                           



                          ? ? ? ? ? ? ? ? ?                           



                          ? ? ? ? ? ? ? ? ?                           



                          ?                                      

 

             Lab Interpretation Normal                                 



             (test code =                                        



             47040-3)                                            



[facility] -[id number] 15:44:12





             Test Item    Value        Reference Range Interpretation Comments

 

             TROPONIN I (test 0.002 ng/mL  See_Comment                [Automated



             code = 9212366803)                                        message] 

The



                                                                 system which



                                                                 generated this



                                                                 result



                                                                 transmitted



                                                                 reference range

:



                                                                 <=0.034. The



                                                                 reference range



                                                                 was not used to



                                                                 interpret this



                                                                 result as



                                                                 normal/abnormal

.

 

             RENETTA (test code = Equal or Less than                           



             RENETTA)         0.034                                  



                          ng/ml---Normal                           



                          ?Note: Cardiac                           



                          troponin begins to                           



                          rise 3-4 hours                           



                          after the onset of                           



                          ischemia. Repeat                           



                          in 4-6 hours if                           



                          the sample was                           



                          drawn within 3-4                           



                          hours of the onset                           



                          of the symptom and                           



                          found normal.                           



                          Between 0.035 and                           



                          0.120 ng/mL---                           



                          Borderline.                            



                          Questionable                           



                          myocardial injury                           



                          or necrosis ?                           



                          ?Note: Serial                           



                          measurement may be                           



                          necessary to                           



                          confirm or exclude                           



                          the diagnosis of                           



                          myocardial injury                           



                          or necrosis;                           



                          Clinical                               



                          correlation                            



                          (symptoms, EKGs,                           



                          imaging studies,                           



                          and others)                            



                          required; Repeat                           



                          in 4-6 hours if                           



                          clinically                             



                          indicated. ? ? ? ?                           



                          Equal or Higher                           



                          than 0.121                             



                          ng/mL---Abnormal.                           



                          Myocardial Injury                           



                          or Necrosis Likely                           



                          ? ? ? ? Biotin has                           



                          been reported to                           



                          cause a negative                           



                          bias, interpret                           



                          results relative                           



                          to patient's use                           



                          of biotin. ? ? ? ?                           



                          ? ? ? ? ? ? ? ? ?                           



                          ? ? ? ? ? ? ? ? ?                           



                          ? ? ? ? ? ? ? ? ?                           



                          ? ? ? ? ? ? ? ? ?                           



                          ? ? ? ? ? ? ? ? ?                           



                          ?                                      

 

             Lab Interpretation Normal                                 



             (test code =                                        



             71585-6)                                            



[id number]





             Test Item    Value        Reference Range Interpretation Comments

 

             NT-proBNP (test code 162 pg/mL    See_Comment  H             [Autom

ated



             = [id number])                                        message] The



                                                                 system which



                                                                 generated this



                                                                 result



                                                                 transmitted



                                                                 reference range

:



                                                                 <=125. The



                                                                 reference range



                                                                 was not used to



                                                                 interpret this



                                                                 result as



                                                                 normal/abnormal

.

 

             RENETTA (test code = RENETTA) Biotin has been                           



                          reported to                            



                          cause a negative                           



                          bias, interpret                           



                          results relative                           



                          to patient's use                           



                          of biotin.                             

 

             Lab Interpretation Abnormal                               



             (test code = 28618-5)                                        



[id number]11





             Test Item    Value        Reference Range Interpretation Comments

 

             NT-proBNP (test code 162 pg/mL    See_Comment  H             [Autom

ated



             = [id number])                                        message] The



                                                                 system which



                                                                 generated this



                                                                 result



                                                                 transmitted



                                                                 reference range

:



                                                                 <=125. The



                                                                 reference range



                                                                 was not used to



                                                                 interpret this



                                                                 result as



                                                                 normal/abnormal

.

 

             RENETTA (test code = RENETTA) Biotin has been                           



                          reported to                            



                          cause a negative                           



                          bias, interpret                           



                          results relative                           



                          to patient's use                           



                          of biotin.                             

 

             Lab Interpretation Abnormal                               



             (test code = 29678-6)                                        



[facility] 1 [id number] 15:35:38EXAM: XR CHEST 
1 VW HISTORY: SOB COMPARISON: None. FINDINGS: The lungs are poorly expanded and 
show changes of basilar subsegmentalatelectasis with suspicion of a small 
pleural effusion on the left. The midand upper lungs are clear. The heart and 
great vessels are normal. ? [facility], Radiant Results Inft User - 2021 10:36 
AM CDTFormatting of this note might be different from the original.EXAM: XR CH
EST 1 VWHISTORY: SOB COMPARISON: None.FINDINGS:The lungs are poorly expanded and
show changes of basilar subsegmentalatelectasis with suspicion of a small 
pleural effusion on the left. The midand upperlungs are clear. The heart and 
great vessels are normal.[facility] 1 View
2021 15:35:38EXAM: XR CHEST 1 VW HISTORY: SOB COMPARISON: None. FINDINGS: 
The lungs are poorly expanded and show changes of basilar 
subsegmentalatelectasis with suspicion of a small pleural effusion on the left. 
The midand upper lungs are clear. The heart and great vessels are normal. ? 
[facility], Radiant Results Inft User - 2021 10:36 AM CDTFormatting of this note
might be different from the original.EXAM: XR CHEST 1 VWHISTORY: SOB COMPARISON:
None.FINDINGS:The lungs are poorly expanded and show changes of basilar 
subsegmentalatelectasis with suspicion of a small pleural effusion on the left. 
The midand upperlungs are clear. The heart and great vessels are normal.
[facility]. METABOLIC PANEL ([id number])2021 
15:32:50





             Test Item    Value        Reference Range Interpretation Comments

 

             NA (test code = 137 mmol/L   135-145                   



             [id number])                                         

 

             K (test code = 3.3 mmol/L   3.5-5.0      L            



             4733675968)                                         

 

             CL (test code = 103 mmol/L                       



             2052712160)                                         

 

             CO2 TOTAL (test code = 24 mmol/L    23-31                     



             [id number])                                         

 

             AGAP (test code =              2-16                      



             1744904991)                                         

 

             BUN (test code = 6 mg/dL      7-23         L            



             2631647856)                                         

 

             GLUCOSE (test code = 155 mg/dL           H            



             [id number])                                         

 

             CREATININE (test code = 0.43 mg/dL   0.60-1.25    L            



             2043030904)                                         

 

             TOTAL BILI (test code = 2.5 mg/dL    0.1-1.1      H            



             1529594797)                                         

 

             CALCIUM (test code = 7.6 mg/dL    8.6-10.6     L            



             1719626706)                                         

 

             T PROTEIN (test code = 6.7 g/dL     6.3-8.2                   



             5231181855)                                         

 

             ALBUMIN (test code = 2.8 g/dL     3.5-5.0      L            



             3720443339)                                         

 

             ALK PHOS (test code = 153 U/L             H            



             3374433240)                                         

 

             ALTv (test code = 22 U/L       5-50                      



             1742-6)                                             

 

             AST(SGOT) (test code = 73 U/L       13-40        H            



             8374037314)                                         

 

             eGFR (test code =              mL/min/1.73m2              



             1982003782)                                         

 

             RENETTA (test code = RENETTA) Association of                           



                          Glomerular Filtration                           



                          Rate (GFR) and Staging                           



                          of Kidney Disease*                           



                          +---------------------                           



                          --+-------------------                           



                          --+-------------------                           



                          ------+| GFR                           



                          (mL/min/1.73 m2) ?|                           



                          With Kidney Damage ?|                           



                          ?Without Kidney                           



                          Damage+---------------                           



                          --------+-------------                           



                          --------+-------------                           



                          ------------+| ?>90 ?                           



                          ? ? ? ? ? ? ? ?|                           



                          ?Stage one ? ? ? ? ?|                           



                          ? Normal ? ? ? ? ? ? ?                           



                          ?+--------------------                           



                          ---+------------------                           



                          ---+------------------                           



                          -------+| ?60-89 ? ? ?                           



                          ? ? ? ? ?| ?Stage two                           



                          ? ? ? ? ?| ? Decreased                           



                          GFR ? ? ? ?                            



                          +---------------------                           



                          --+-------------------                           



                          --+-------------------                           



                          ------+| ?30-59 ? ? ?                           



                          ? ? ? ? ?| ?Stage                           



                          three ? ? ? ?| ? Stage                           



                          three ? ? ? ? ?                           



                          +---------------------                           



                          --+-------------------                           



                          --+-------------------                           



                          ------+| ?15-29 ? ? ?                           



                          ? ? ? ? ?| ?Stage four                           



                          ? ? ? ? | ? Stage four                           



                          ? ? ? ? ?                              



                          ?+--------------------                           



                          ---+------------------                           



                          ---+------------------                           



                          -------+| ?<15 (or                           



                          dialysis) ? ?| ?Stage                           



                          five ? ? ? ? | ? Stage                           



                          five ? ? ? ? ?                           



                          ?+--------------------                           



                          ---+------------------                           



                          ---+------------------                           



                          -------+ *Each stage                           



                          assumes the associated                           



                          GFR level has been in                           



                          effect for at least                           



                          three months. ?Stages                           



                          1 to 5, with or                           



                          without kidney                           



                          disease, indicate                           



                          chronic kidney                           



                          disease. Notes:                           



                          Determination of                           



                          stages one and two                           



                          (with eGFR                             



                          >59mL/min/1.73 m2)                           



                          requires estimation of                           



                          kidney damage for at                           



                          least three months as                           



                          defined by structural                           



                          or functional                           



                          abnormalities of the                           



                          kidney, manifested by                           



                          either:Pathological                           



                          abnormalities or                           



                          Markers of kidney                           



                          damage (including                           



                          abnormalities in the                           



                          composition of the                           



                          blood or urine or                           



                          abnormalities in                           



                          imaging tests).                           

 

             Lab Interpretation Abnormal                               



             (test code = 18870-3)                                        



[id number]





             Test Item    Value        Reference Range Interpretation Comments

 

             LIPASE (test code = 6621776769) 350 U/L      0-220        H        

    

 

             Lab Interpretation (test code = Abnormal                           

    



             66918-5)                                            



[id number] 15:32:10





             Test Item    Value        Reference Range Interpretation Comments

 

             LIPASE (test code = 4381582720) 350 U/L      0-220        H        

    

 

             Lab Interpretation (test code = Abnormal                           

    



             85663-7)                                            



[facility]

## 2023-07-01 NOTE — P.HP
Certification for Inpatient


With expected LOS: <2 Midnights


Patient will require the following post-hospital care: None


Practitioner: I am a practitioner with admitting privileges, knowledge of 

patient current condition, hospital course, and medical plan of care.


Services: Services provided to patient in accordance with Admission requirements

found in Title 42 Section 412.3 of the Code of Federal Regulations





Patient History


Date of Service: 07/02/23


Reason for admission: found on ground


History of Present Illness: 


53 year old male with past medical history of alcohol use, liver cirrhosis, HTN,

(Recent Closed head injury with ICH 06/21/23 pt was transferred to [facility] for acute head injury) presents to the emergency room today via EMS 

after being found on ground at a friend's house, with unknown down time. On 

arrival to ED,  febrile on arrival with temperature 100.7 oral, incont of Bowel 

and bladder, lower extremity swelling. He reports recently being discharged from

the hospital 1 day ago. He is confused, HPI is limited due to patient confusion.

He reported drinking several beers and tripping on ground. He could not tell me 

how long, he had been on the ground. He reported the correct year 2023, knows he

is in the hospital, but could not give details of what events occurred today in 

detail. He denies chest pain, abdominal pain, shortness of breath, cough, fever,

or chills. 





Per ER MD documenation 6/21/2023 patient was transferred to [facility] for

management of acute head injury with intracranial hemorrhage. 


At that time patient sustained small hyperdense  hemorrhages along 

interventricular septum, right aspect of corpus callosum, adjacent cerebral 

edema, layering hemorrhage along the ventricular trigone, punctate focus of 

hemorrhage along interpeduncular cistern. Comminuted fracture along the right 

angle of  mandible. Soft tissue swelling of the left parietotemporal scalp. 





. .


Allergies





Penicillins Allergy (Verified 07/02/23 02:56)


   Rash





Home Medications: 








NK [No Home Meds]  06/24/18 








- Past Medical/Surgical History


Diabetic: No


-: HTN


-: Liver Cirrhois


-: Recent Closed head injury


-: Metal on ankle 8 years ago





- Social History


Smoking Status: Smoker current status UNK


Alcohol use: Yes


CD- Drugs: No


Caffeine use: Yes





Physical Examination





- Physical Exam


General: Alert, Oriented x2 (cranial nerves intact), Disheveled, Confused, Other

(responds to verbal, AOx2)


HEENT: PERRLA, Other (right manbile contusion), EOMI


Neck: Supple, 2+ carotid pulse no bruit


Respiratory: Clear to auscultation bilaterally, Diminished


Cardiovascular: Normal pulses, Normal S1 S2, Edema


Gastrointestinal: Normal bowel sounds, Other (Incontinent of Bowel and bladder)


Musculoskeletal: No clubbing, No tenderness, Other


Neurological: Abnormal affect (Falt affect)





- Studies


Laboratory Data (last 24 hrs)





07/01/23 20:15: PT 25.3 H, INR 2.30, APTT 41.5 H


07/01/23 20:15: WBC 3.90 L, Hgb 7.6 L, Hct 23.0 L, Plt Count 30 L


07/01/23 20:15: Sodium 130 L, Potassium 3.9, BUN 10, Creatinine 0.67 L, Glucose 

157 H, Total Bilirubin 3.0 H, AST 27, ALT 16, Alkaline Phosphatase 153 H





Microbiology Data (last 24 hrs): 








07/01/23 21:46   Nasopharnyx   Influenza Type A Antigen Screen - Final


07/01/23 21:46   Nasopharnyx   Influenza Type B Antigen Screen - Final








Assessment and Plan





- Plan


Assessment/Plan


metabolic enephalopathy


hyperthermic (could be related to head exhaustion


recent closed head injury with ICH


Comminuted fracture right angle mandible


Liver Cirrhosis


Alcohol use








Assessment/Plan


admit to [facility]


metabolic enephalopathy


hyperthermic (could be related to head exhaustion) PRN tylenol, IVF, x 1 Liter


recent closed head injury with ICH- PT, seech, CM consult, fall precautions


Comminuted fracture right angle mandible


Liver Cirrhosis trend LFT, ammonia


Alcohol use-educate on cessation, when more oriented








diet NPO


Full code


DVT SCD





- Advance Directives


Does patient have a Living Will: No


Does patient have a Durable POA for Healthcare: No

## 2023-07-01 NOTE — RAD REPORT
EXAM DESCRIPTION:  RAD - Pelvis - 7/1/2023 8:40 pm

 

CLINICAL HISTORY:  fall

 

COMPARISON:  No comparisons

 

TECHNIQUE:   Single AP view of the pelvis.

 

FINDINGS:  The visualized pelvic ring is intact. No suspicious osseous lesions. Mild bilateral hip bronwyn
int degenerative changes. Other pelvic joints are unremarkable. Visualized aspects of the abdomen and
 soft tissues are unremarkable.

 

IMPRESSION:  No acute osseous abnormality of the bony pelvis.

## 2023-07-01 NOTE — RAD REPORT
EXAM DESCRIPTION:  CT - Head Brain Wo Cont - 7/1/2023 9:00 pm

 

CLINICAL HISTORY:  CONFUSED

 

COMPARISON:  Head C  Spine Cap W Con dated 6/21/2023; Facial Bones W/ Mpr dated 6/21/2023

 

TECHNIQUE:  Noncontrast head CT images were obtained without IV contrast. Multiplanar reformats were 
generated and reviewed.

 

All CT scans are performed using dose optimization technique as appropriate and may include automated
 exposure control or mA/KV adjustment according to patient size.

 

FINDINGS:  Expected time interval changes with decreased density along the right corpus callosum smal
l focal hemorrhage, and stable adjacent minimal edema. More posterior focus of hyperdensity is much d
ecreased in conspicuity with stable minimal adjacent edema. Focus of hemorrhage along the right aspec
t of the interventricular septum and other scattered foci of intraventricular and interpeduncular cis
tern hemorrhage have since resolved.

 

No other intracranial hemorrhage, mass, or edema. Midline structures are unremarkable.

 

Stable ventricular caliber with mild diffuse parenchymal volume loss. Nonspecific mild white matter h
ypodensities are stable, under most suggestive of chronic small vessel ischemic changes

 

Gray-white matter differentiation is preserved, without evidence of acute infarct. No abnormal extra-
axial fluid collections.

 

Mastoid air cells and visualized portions of the paranasal sinuses are clear.

 

No acute bony findings.

 

 

IMPRESSION:  Improving intracranial foci parenchymal hemorrhage along the right corpus callosum. Othe
r small hemorrhages along the right aspect of the interventricular septum, intraventricular, and inte
rpeduncular cistern hemorrhages have since resolved.

 

No other evidence of an acute intracranial process.

## 2023-07-01 NOTE — RAD REPORT
EXAM DESCRIPTION:  RAD - Elbow Left 3 View - 7/1/2023 8:40 pm

 

CLINICAL HISTORY:  fall , hematoma, contusion

 

COMPARISON:  No comparisons

 

TECHNIQUE:  Left elbow, 3 views.

 

FINDINGS:  No fracture is identified. No elevated posterior fat pad to suggest an effusion.

 

There is no dislocation or periosteal reaction noted. Soft tissue swelling and irregularity along the
 anterior soft tissues of the upper arm near the elbow and overlying the olecranon process.

 

IMPRESSION:  No evidence of acute osseus abnormality. Soft tissue swelling as above.

## 2023-07-02 VITALS — OXYGEN SATURATION: 100 %

## 2023-07-02 LAB
ALBUMIN SERPL BCP-MCNC: 1.9 G/DL (ref 3.4–5)
ALP SERPL-CCNC: 137 U/L (ref 45–117)
ALT SERPL W P-5'-P-CCNC: 15 U/L (ref 16–61)
ANISOCYTOSIS BLD QL: (no result)
AST SERPL W P-5'-P-CCNC: 24 U/L (ref 15–37)
BLD SMEAR INTERP: (no result)
BUN BLD-MCNC: 11 MG/DL (ref 7–18)
BURR CELLS BLD QL SMEAR: (no result)
GLUCOSE SERPLBLD-MCNC: 126 MG/DL (ref 74–106)
HCT VFR BLD CALC: 22.3 % (ref 39.6–49)
LYMPHOCYTES # SPEC AUTO: 0.6 K/UL (ref 0.7–4.9)
MACROCYTES BLD QL: SLIGHT
MAGNESIUM SERPL-MCNC: 1.9 MG/DL (ref 1.6–2.4)
MCV RBC: 98.8 FL (ref 80–100)
MORPHOLOGY BLD-IMP: (no result)
OVALOCYTES BLD QL SMEAR: (no result)
PMV BLD: 9.3 FL (ref 7.6–11.3)
POIKILOCYTOSIS BLD QL SMEAR: (no result)
POTASSIUM SERPL-SCNC: 3.6 MEQ/L (ref 3.5–5.1)
RBC # BLD: 2.26 M/UL (ref 4.33–5.43)

## 2023-07-02 RX ADMIN — ACETAMINOPHEN PRN MG: 500 TABLET, FILM COATED ORAL at 20:21

## 2023-07-02 RX ADMIN — Medication SCH MG: at 13:56

## 2023-07-02 RX ADMIN — FOLIC ACID SCH MG: 1 TABLET ORAL at 13:58

## 2023-07-02 RX ADMIN — ACETAMINOPHEN PRN MG: 500 TABLET, FILM COATED ORAL at 13:17

## 2023-07-02 RX ADMIN — Medication SCH ML: at 20:22

## 2023-07-02 RX ADMIN — Medication SCH ML: at 09:43

## 2023-07-02 NOTE — P.PN
Subjective


Date of Service: 07/02/23


Chief Complaint: found on ground


Patient is awake and alert.


He is complaining of intermittent pain in the jaw.


He states that he drank alcohol a few days ago.


He says he lives alone and was found by a friend.


No witnessed seizures.








Physical Examination





- Vital Signs


Temperature: 98.0 F


Blood Pressure: 111/59


Pulse: 70


Respirations: 16


Pulse Ox (%): 99





- Physical Exam


General: Alert, In no apparent distress, Oriented x3


HEENT: Mucous membr. moist/pink, Other (Conjuctival hemorrhage-lateral aspect of

the left eye.), Sclerae nonicteric


Neck: Supple, JVD not distended


Respiratory: Clear to auscultation bilaterally, Normal air movement


Cardiovascular: Regular rate/rhythm, Normal S1 S2


Gastrointestinal: Normal bowel sounds, Soft and benign, Non-distended, No 

tenderness


Musculoskeletal: No swelling, No tenderness


Integumentary: No rashes, No cyanosis


Neurological: Normal speech, Normal strength at 5/5 x4 extr, Cranial nerves 3-12

intact


Lymphatics: No axilla or inguinal lymphadenopathy





- Studies


Laboratory Data (last 24 hrs)





07/01/23 20:15: PT 25.3 H, INR 2.30, APTT 41.5 H


07/01/23 20:15: WBC 3.90 L, Hgb 7.6 L, Hct 23.0 L, Plt Count 30 L


07/01/23 20:15: Sodium 130 L, Potassium 3.9, BUN 10, Creatinine 0.67 L, Glucose 

157 H, Total Bilirubin 3.0 H, AST 27, ALT 16, Alkaline Phosphatase 153 H





Microbiology Data (last 24 hrs): 








07/01/23 21:54   Blood  - Blood   Anaerobic Blood Culture - Final


07/01/23 22:06   Blood  - Blood   Anaerobic Blood Culture - Final


07/01/23 21:46   Nasopharnyx   Influenza Type A Antigen Screen - Final


07/01/23 21:46   Nasopharnyx   Influenza Type B Antigen Screen - Final








Assessment And Plan





- Current Problems (Diagnosis)


(1) Fall


Current Visit: Yes   Status: Acute   





(2) Alcohol abuse


Current Visit: Yes   Status: Acute   





(3) History of intracranial hemorrhage


Current Visit: Yes   Status: Acute   





(4) Chronic anemia


Current Visit: Yes   Status: Acute   





(5) Coagulopathy


Current Visit: Yes   Status: Acute   





- Plan


History of multiple falls.


History of alcoholic abuse and liver cirrhosis.


Alcohol level positive.  Barbiturate level also positive.


High likelihood of seizures.


Fall secondary to alcohol intoxication versus seizure


Start IV keppra.


CT head shows stable intracranial bleed.


Patient with liver disease associated coagulopathy.


It appears his liver disease is advanced.


Normal ammonia level.


Fall precaution


CIWA for impending alcohol withdrawal.


PT evaluation.


Thiamine and folic acid.


Diet as tolerated.

## 2023-07-03 LAB
ALBUMIN SERPL BCP-MCNC: 1.7 G/DL (ref 3.4–5)
ALP SERPL-CCNC: 145 U/L (ref 45–117)
ALT SERPL W P-5'-P-CCNC: 14 U/L (ref 16–61)
AST SERPL W P-5'-P-CCNC: 23 U/L (ref 15–37)
BUN BLD-MCNC: 10 MG/DL (ref 7–18)
GLUCOSE SERPLBLD-MCNC: 156 MG/DL (ref 74–106)
HCT VFR BLD CALC: 21.1 % (ref 39.6–49)
HCT VFR BLD CALC: 26 % (ref 39.6–49)
LYMPHOCYTES # SPEC AUTO: 0.6 K/UL (ref 0.7–4.9)
MAGNESIUM SERPL-MCNC: 1.9 MG/DL (ref 1.6–2.4)
MCV RBC: 99.3 FL (ref 80–100)
PMV BLD: 9 FL (ref 7.6–11.3)
POTASSIUM SERPL-SCNC: 4.3 MEQ/L (ref 3.5–5.1)
RBC # BLD: 2.13 M/UL (ref 4.33–5.43)

## 2023-07-03 PROCEDURE — 30233N1 TRANSFUSION OF NONAUTOLOGOUS RED BLOOD CELLS INTO PERIPHERAL VEIN, PERCUTANEOUS APPROACH: ICD-10-PCS

## 2023-07-03 RX ADMIN — ACETAMINOPHEN PRN MG: 500 TABLET, FILM COATED ORAL at 14:54

## 2023-07-03 RX ADMIN — Medication SCH ML: at 09:00

## 2023-07-03 RX ADMIN — Medication SCH ML: at 20:11

## 2023-07-03 RX ADMIN — Medication SCH MG: at 08:06

## 2023-07-03 RX ADMIN — ACETAMINOPHEN PRN MG: 500 TABLET, FILM COATED ORAL at 22:52

## 2023-07-03 RX ADMIN — FOLIC ACID SCH MG: 1 TABLET ORAL at 08:06

## 2023-07-03 RX ADMIN — ACETAMINOPHEN PRN MG: 500 TABLET, FILM COATED ORAL at 04:22

## 2023-07-03 NOTE — P.PN
Subjective


Date of Service: 07/03/23


Chief Complaint: found on ground


Patient is awake and alert.


He has no complaints today.


Patient finished his breakfast meal.


No signs or symptoms of alcohol withdrawal.


No witnessed seizures.








Physical Examination





- Vital Signs


Temperature: 97.7 F


Blood Pressure: 121/66


Pulse: 73


Respirations: 16


Pulse Ox (%): 100





- Studies


Microbiology Data (last 24 hrs): 








07/01/23 21:54   Blood  - Blood   Anaerobic Blood Culture - Final


07/01/23 22:06   Blood  - Blood   Anaerobic Blood Culture - Final








Assessment And Plan





- Current Problems (Diagnosis)


(1) Fall


Current Visit: Yes   Status: Acute   





(2) Alcohol abuse


Current Visit: Yes   Status: Acute   





(3) History of intracranial hemorrhage


Current Visit: Yes   Status: Acute   





(4) Chronic anemia


Current Visit: Yes   Status: Acute   





(5) Coagulopathy


Current Visit: Yes   Status: Acute   





- Plan


History of multiple falls.


History of alcoholic abuse and liver cirrhosis.


Alcohol level positive.  Barbiturate level also positive.


High likelihood of seizures.


Fall secondary to alcohol intoxication versus seizure


Change IV Keppra to p.o.


CT head shows stable intracranial bleed.


Patient with liver disease associated coagulopathy.


It appears his liver disease is advanced.


Noted significant pedal edema.


Normal ammonia level.


Start Aldactone and Lasix.


Patient ambulated about 500 feet with a walker.


He is discharged from PT service.


Fall precaution


CIWA for impending alcohol withdrawal.


Thiamine and folic acid.


Diet as tolerated.

## 2023-07-03 NOTE — EKG
Test Date:    2023-07-01               Test Time:    20:09:43

Technician:   CLAIRE                                    

                                                     

MEASUREMENT RESULTS:                                       

Intervals:                                           

Rate:         102                                    

MA:           126                                    

QRSD:         76                                     

QT:           342                                    

QTc:          445                                    

Axis:                                                

P:            37                                     

MA:           126                                    

QRS:          44                                     

T:            2                                      

                                                     

INTERPRETIVE STATEMENTS:                                       

                                                     

Sinus tachycardia

Otherwise normal ECG

Compared to ECG 06/21/2023 15:50:00

Sinus rhythm no longer present

Right-axis deviation no longer present

ST (T wave) deviation no longer present

Possible ischemia no longer present



Electronically Signed On 07-03-23 19:29:27 CDT by Tim Cooley

## 2023-07-04 VITALS — SYSTOLIC BLOOD PRESSURE: 119 MMHG | TEMPERATURE: 98.6 F | DIASTOLIC BLOOD PRESSURE: 62 MMHG

## 2023-07-04 LAB
ALBUMIN SERPL BCP-MCNC: 1.8 G/DL (ref 3.4–5)
ALP SERPL-CCNC: 150 U/L (ref 45–117)
ALT SERPL W P-5'-P-CCNC: 16 U/L (ref 16–61)
AST SERPL W P-5'-P-CCNC: 30 U/L (ref 15–37)
BUN BLD-MCNC: 9 MG/DL (ref 7–18)
GLUCOSE SERPLBLD-MCNC: 131 MG/DL (ref 74–106)
HCT VFR BLD CALC: 23.8 % (ref 39.6–49)
LYMPHOCYTES # SPEC AUTO: 0.7 K/UL (ref 0.7–4.9)
MAGNESIUM SERPL-MCNC: 1.8 MG/DL (ref 1.6–2.4)
MCV RBC: 98 FL (ref 80–100)
PMV BLD: 9.4 FL (ref 7.6–11.3)
POTASSIUM SERPL-SCNC: 4.1 MEQ/L (ref 3.5–5.1)
RBC # BLD: 2.43 M/UL (ref 4.33–5.43)

## 2023-07-04 RX ADMIN — ACETAMINOPHEN PRN MG: 500 TABLET, FILM COATED ORAL at 05:11

## 2023-07-04 RX ADMIN — FOLIC ACID SCH MG: 1 TABLET ORAL at 08:01

## 2023-07-04 RX ADMIN — Medication SCH ML: at 08:01

## 2023-07-04 RX ADMIN — Medication SCH MG: at 08:01

## 2023-07-04 NOTE — P.PN
Date of Service: 07/04/23





Subjective:


feeling better today 


R cheek pain 





ROS: 


10 point ROS as noted above, otherwise negative





Physical Exam:


GEN: Alert, oriented, NAD


HEENT: Normal conjunctiva, sclera anicteric


CV: Regular rate and rhythm, no edema


Pulm: Nonlabored respirations on room air


ABD: Soft, nontender, nondistended


MSK: No joint tenderness


Integumentary: No rashes


Neuro: Normal speech, normal affect





vitals reviewed





Problem List:


Fall


Alcohol abuse 


Hx of intracranial hemorrhage 


Chronic Anemia 


Coagulopathy 








History of multiple falls.


History of alcoholic abuse and liver cirrhosis.


Alcohol level positive.  Barbiturate level also positive.


High likelihood of seizures.


Fall secondary to alcohol intoxication versus seizure


Change IV Keppra to p.o.


CT head shows stable intracranial bleed.


Patient with liver disease associated coagulopathy.


It appears his liver disease is advanced.


Noted significant pedal edema.


Normal ammonia level.


Start Aldactone and Lasix.


Patient ambulated about 500 feet with a walker.


He is discharged from PT service.


Fall precaution


CIWA for impending alcohol withdrawal.


Thiamine and folic acid.


Diet as tolerated.





VTE:


Code: Full


Dispo: Home

## 2023-07-04 NOTE — P.DS
Admission Date: 07/01/23


Discharge Date: 07/04/23


Disposition: ROUTINE DISCHARGE


Discharge Condition: FAIR


Reason for Admission: found on ground


Brief History of Present Illness: 





52yo M, PMH: alcohol use, liver cirrhosis, HTN, (Recent Closed head injury with 

ICH 06/21/23 pt was transferred to [facility] for acute head injury) 


Presented to ED via EMS after being found on ground at friend's house, unknown 

down time. He was noted to be febrile to 100.2, incontinent of bowel/bladder 

when found, and had lower extremity sdwelling. He was confused and reported 

being discharged from [place] ~1 day ago.  HPI was limited due to patient 

confusion. He reported drinking several beers and tripping on ground. He could 

not tell me how long, he had been on the ground. He reported the correct year 

2023, knows he is in the hospital, but could not give details of what events 

occurred today in detail. He denies chest pain, abdominal pain, shortness of 

breath, cough, fever, or chills.


Per ER MD documentation 6/21/2023 patient was transferred to [facility] 

for management of acute head injury with intracranial hemorrhage. 


At that time patient sustained small hyperdense  hemorrhages along 

interventricular septum, right aspect of corpus callosum, adjacent cerebral 

edema, layering hemorrhage along the ventricular trigone, punctate focus of 

hemorrhage along interpeduncular cistern. Comminuted fracture along the right 

angle of  mandible. Soft tissue swelling of the left parietotemporal scalp. 





Hospital Course: 





Problem List:


Fall


possible seizure


Alcohol abuse 


Hx of intracranial hemorrhage  after fall/trauma


Chronic Anemia, thrombocytopenia, coagulopathy secondary to liver disease








Patient presented to the ED after being found on the ground at home for an 

unknown length of time. CT head shows stable/improved intracranial blood from 

prior incident, no new/acute findings. He initially was unclear on the events, 

and later stated he tripped on stairs. 


Labwork was notable for his chronic anemia, thrombocytopenia, and liver disease.

He had quick improvement of his symptoms, ambulate >500 ft with PT.


On day of discharge, he reported feeling better / fine, ambulating, tolerating

diet, and no bleeding.


He did receive 1unit PRBC for hgb of 7, and responded appropriately. He was 

recently in the hospital in the last 1-2 months with hgb mostly in range of 7-8.


He was started on keppra due to inability to rule out (and high risk) for a 

seizure. Neurology was out of town and unable to see patient in consult.





Advised to continue his recently prescribed medication from [place] - 

lasix/spironolactone, thiamine, etc.


In addition, start keppra for possible seizure. Follow up with Neurology in a 

few weeks in the office.





He has previously been counselled on his liver disease. I counselled him as 

well. He states he is aware. Discussed the importance of not drinking alcohol 

and taking his medications as prescribed.





New / Change in prescriptions:


Keppra 





Follow up:


PCP 3-5 days 


Neurology in a few weeks


 





Physical Exam:


GEN: Alert, oriented, NAD


HEENT: Conjuctival hemorrhage-lateral aspect of the left eye.


CV: Regular rate and rhythm, 1+ b/l edema up to knee


Pulm: Nonlabored respirations on room air


ABD: Soft, nontender, nondistended


Neuro: Normal speech, normal affect





Vital Signs/Physical Exam: 














Temp Pulse Resp BP Pulse Ox


 


 98.6 F   73   20   119/62   98 


 


 07/04/23 08:00  07/04/23 08:00  07/04/23 08:00  07/04/23 08:00  07/04/23 08:00








Laboratory Data at Discharge: 














WBC  4.50 thou/uL (4.3-10.9)   07/04/23  02:41    


 


Hgb  7.9 g/dL (13.6-17.9)  L  07/04/23  02:41    


 


Hct  23.8 % (39.6-49.0)  L  07/04/23  02:41    


 


Plt Count  31 thou/uL (152-406)  L  07/04/23  02:41    


 


PT  25.3 SECONDS (9.5-12.5)  H  07/01/23  20:15    


 


INR  2.30   07/01/23  20:15    


 


APTT  41.5 SECONDS (24.3-36.9)  H  07/01/23  20:15    


 


Sodium  135 mEq/L (136-145)  L  07/04/23  02:41    


 


Potassium  4.1 mEq/L (3.5-5.1)   07/04/23  02:41    


 


BUN  9 mg/dL (7-18)   07/04/23  02:41    


 


Creatinine  0.39 mg/dL (0.70-1.30)  L  07/04/23  02:41    


 


Glucose  131 mg/dL ()  H  07/04/23  02:41    


 


Magnesium  1.8 mg/dL (1.6-2.4)   07/04/23  02:41    


 


Total Bilirubin  2.7 mg/dL (0.2-1.0)  H  07/04/23  02:41    


 


AST  30 U/L (15-37)   07/04/23  02:41    


 


ALT  16 U/L (16-61)   07/04/23  02:41    


 


Alkaline Phosphatase  150 U/L ()  H  07/04/23  02:41    








Home Medications: 








Folic Acid 1 mg PO DAILY #30 tab 07/03/23 


Thiamine HCl [Vitamin B-1*] 100 mg PO DAILY #30 tab 07/03/23 


levETIRAcetam [Keppra*] 500 mg PO BID #60 tab 07/03/23 





New Medications: 


Folic Acid 1 mg PO DAILY #30 tab


levETIRAcetam [Keppra*] 500 mg PO BID #60 tab


Thiamine HCl [Vitamin B-1*] 100 mg PO DAILY #30 tab


Physician Discharge Instructions: 


Patient presented to the ED after being found on the ground at home for an 

unknown length of time. CT head shows stable/improved intracranial blood from 

prior incident, no new/acute findings. He initially was unclear on the events, 

and later stated he tripped on stairs. 


Labwork was notable for his chronic anemia, thrombocytopenia, and liver disease.

He had quick improvement of his symptoms, ambulate >500 ft with PT.


On day of discharge, he reported feeling better / fine, ambulating, tolerating

diet, and no bleeding.


He did receive 1unit PRBC for hgb of 7, and responded appropriately. He was 

recently in the hospital in the last 1-2 months with hgb mostly in range of 7-8.


He was started on keppra due to inability to rule out (and high risk) for a 

seizure. Neurology was out of town and unable to see patient in consult.





Advised to continue his recently prescribed medication from [place] - 

lasix/spironolactone, thiamine, etc.


In addition, start keppra for possible seizure. Follow up with Neurology in a 

few weeks in the office.





He has previously been counselled on his liver disease. I counselled him as 

well. He states he is aware. Discussed the importance of not drinking alcohol 

and taking his medications as prescribed.





New / Change in prescriptions:


Keppra 





Follow up:


PCP 3-5 days 


Neurology in a few weeks


 


Followup: 


NONE,NONE [Primary Care Provider] - 1 Week (Please call to schedule an a

ppointment. )


Time spent managing pt's care (in minutes): 45

## 2023-07-06 ENCOUNTER — HOSPITAL ENCOUNTER (EMERGENCY)
Dept: HOSPITAL 97 - ER | Age: 53
Discharge: ANOTHER HOSPITAL | End: 2023-07-06
Payer: SELF-PAY

## 2023-07-06 DIAGNOSIS — R50.9: ICD-10-CM

## 2023-07-06 DIAGNOSIS — S42.022A: Primary | ICD-10-CM

## 2023-07-06 DIAGNOSIS — I10: ICD-10-CM

## 2023-07-06 DIAGNOSIS — S00.531A: ICD-10-CM

## 2023-07-06 DIAGNOSIS — Z88.0: ICD-10-CM

## 2023-07-06 LAB
ALBUMIN SERPL BCP-MCNC: 2.1 G/DL (ref 3.4–5)
ALBUMIN SERPL BCP-MCNC: 2.1 G/DL (ref 3.4–5)
ALP SERPL-CCNC: 131 U/L (ref 45–117)
ALP SERPL-CCNC: 132 U/L (ref 45–117)
ALT SERPL W P-5'-P-CCNC: 20 U/L (ref 16–61)
ALT SERPL W P-5'-P-CCNC: 20 U/L (ref 16–61)
AST SERPL W P-5'-P-CCNC: 33 U/L (ref 15–37)
AST SERPL W P-5'-P-CCNC: 34 U/L (ref 15–37)
BILIRUB INDIRECT SERPL-MCNC: 3.1 MG/DL (ref 0.2–0.8)
BUN BLD-MCNC: 9 MG/DL (ref 7–18)
GLUCOSE SERPLBLD-MCNC: 143 MG/DL (ref 74–106)
HCT VFR BLD CALC: 24 % (ref 39.6–49)
INR BLD: 2.16
LYMPHOCYTES # SPEC AUTO: 0.3 K/UL (ref 0.7–4.9)
MCV RBC: 97.7 FL (ref 80–100)
PMV BLD: 8 FL (ref 7.6–11.3)
POTASSIUM SERPL-SCNC: 4.1 MEQ/L (ref 3.5–5.1)
RBC # BLD: 2.46 M/UL (ref 4.33–5.43)

## 2023-07-06 PROCEDURE — 85610 PROTHROMBIN TIME: CPT

## 2023-07-06 PROCEDURE — 87086 URINE CULTURE/COLONY COUNT: CPT

## 2023-07-06 PROCEDURE — 93005 ELECTROCARDIOGRAM TRACING: CPT

## 2023-07-06 PROCEDURE — 87040 BLOOD CULTURE FOR BACTERIA: CPT

## 2023-07-06 PROCEDURE — 85025 COMPLETE CBC W/AUTO DIFF WBC: CPT

## 2023-07-06 PROCEDURE — 71250 CT THORAX DX C-: CPT

## 2023-07-06 PROCEDURE — 85730 THROMBOPLASTIN TIME PARTIAL: CPT

## 2023-07-06 PROCEDURE — 76377 3D RENDER W/INTRP POSTPROCES: CPT

## 2023-07-06 PROCEDURE — 80076 HEPATIC FUNCTION PANEL: CPT

## 2023-07-06 PROCEDURE — 82077 ASSAY SPEC XCP UR&BREATH IA: CPT

## 2023-07-06 PROCEDURE — 70486 CT MAXILLOFACIAL W/O DYE: CPT

## 2023-07-06 PROCEDURE — 96367 TX/PROPH/DG ADDL SEQ IV INF: CPT

## 2023-07-06 PROCEDURE — 83605 ASSAY OF LACTIC ACID: CPT

## 2023-07-06 PROCEDURE — 36415 COLL VENOUS BLD VENIPUNCTURE: CPT

## 2023-07-06 PROCEDURE — 72125 CT NECK SPINE W/O DYE: CPT

## 2023-07-06 PROCEDURE — 51702 INSERT TEMP BLADDER CATH: CPT

## 2023-07-06 PROCEDURE — 82947 ASSAY GLUCOSE BLOOD QUANT: CPT

## 2023-07-06 PROCEDURE — 87804 INFLUENZA ASSAY W/OPTIC: CPT

## 2023-07-06 PROCEDURE — 81001 URINALYSIS AUTO W/SCOPE: CPT

## 2023-07-06 PROCEDURE — 87088 URINE BACTERIA CULTURE: CPT

## 2023-07-06 PROCEDURE — 71045 X-RAY EXAM CHEST 1 VIEW: CPT

## 2023-07-06 PROCEDURE — 96361 HYDRATE IV INFUSION ADD-ON: CPT

## 2023-07-06 PROCEDURE — 87811 SARS-COV-2 COVID19 W/OPTIC: CPT

## 2023-07-06 PROCEDURE — 96365 THER/PROPH/DIAG IV INF INIT: CPT

## 2023-07-06 PROCEDURE — 99285 EMERGENCY DEPT VISIT HI MDM: CPT

## 2023-07-06 PROCEDURE — 87205 SMEAR GRAM STAIN: CPT

## 2023-07-06 PROCEDURE — 70450 CT HEAD/BRAIN W/O DYE: CPT

## 2023-07-06 PROCEDURE — 80053 COMPREHEN METABOLIC PANEL: CPT

## 2023-07-06 NOTE — RAD REPORT
[facility]  [facility]38 [facility]

 

CLINICAL HISTORY:  BLUNT CHEST TRAUMA

 

COMPARISON:  Chest Single View dated 7/1/2023; Chest Single View dated 5/21/2023

 

TECHNIQUE:   Portable AP view of the chest.

 

FINDINGS:  Improving central interstitial opacities. The lungs are otherwise clear.  No pneumothorax 
or effusion. The cardiomediastinal contours are unremarkable.

 

IMPRESSION:  Improving central interstitial opacities. No other acute cardiopulmonary process.

## 2023-07-06 NOTE — ER
Nurse's Notes                                                                                     

 [facility]                                                                 

Name: Galdino Tabares                                                                             

Age: 53 yrs                                                                                       

Sex: Male                                                                                         

: 1970                                                                                   

MRN: [id number]                                                                                   

Arrival Date: 2023                                                                          

Time: 14:29                                                                                       

Account#: [id number]                                                                            

Bed 16                                                                                            

Private MD:                                                                                       

Diagnosis: Altered mental status, unspecified;Fever, unspecified;Displaced fracture of shaft of   

  left clavicle                                                                                   

                                                                                                  

Presentation:                                                                                     

                                                                                             

14:40 Acuity: TERRY 2                                                                           aa5 

14:40 Coronavirus screen: fever. Ebola Screen: Patient denies travel to an Ebola-affected     [facility] 

      area in the 21 days before illness onset. Initial Sepsis Screen: Does the patient meet      

      any 2 criteria? Altered Mental Status. HR > 90 bpm. Yes Does the patient have a             

      suspected source of infection? Yes: If YES to both, name of provider notified: Clay Frost MD. Risk Assessment: Do you want to hurt yourself or someone else? Patient            

      reports no desire to harm self or others. Onset of symptoms was 2023.              

14:40 Method Of Arrival: EMS: [facility]                                                    aa 

14:40 Chief complaint: EMS states: received 911 call from post . EMS states "pt  aa5 

      stated he fell yesterday onto his face and passed out for about 10 minutes". Bruising       

      noted to forehead and left side of chest. Pt confused A\T\O x person and place only.        

                                                                                                  

Historical:                                                                                       

- Allergies:                                                                                      

14:40 PENICILLINS;                                                                            aa5 

- PMHx:                                                                                           

14:40 cirrhosis of liver; HTN;                                                                aa5 

14:40 Pt is poor historian;                                                                   aa5 

                                                                                                  

- Immunization history:: Adult Immunizations unknown.                                             

- Social history:: Smoking status: Patient/guardian denies using tobacco.                         

- Family history:: not pertinent.                                                                 

- Hospitalizations: : No recent hospitalization is reported.                                      

                                                                                                  

                                                                                                  

Screenin:40 [facility] ED Fall Risk Assessment (Adult) History of falling in the last 3 months,       aa5 

      including since admission Yes- single mechanical fall (1 pt) Confusion or                   

      Disorientation Yes (5 pts) Altered Elimination Yes (1 pt) Score/Fall Risk Level 3 or        

      more points = High Risk Oriented to surroundings, Maintained a safe environment,            

      Educated pt \T\ family on fall prevention, incl call for assistance when getting out of     

      bed, Hourly rounding (assess needs \T\ fall precautionary measures) done. Abuse screen:     

      unable to obtain, pt confused. Nutritional screening:. Tuberculosis screening: unable       

      to complete. .                                                                              

                                                                                                  

Assessment:                                                                                       

14:40 General: Appears uncomfortable, unkempt, Behavior is calm, cooperative, Reports "I      aa5 

      drank only 1 beer yesterday". Pain: Complains of pain in face and left side of chest        

      Unable to use pain scale. Patient is disoriented. Does not appear to understand pain        

      scale. Neuro: Level of Consciousness is awake, obeys commands, confused, Oriented to        

      person, place, Moves all extremities. Speech is normal, Facial symmetry appears normal.     

      Cardiovascular: Heart tones S1 S2 present Rhythm is sinus rhythm. Respiratory: Airway       

      is patent Respiratory effort is even, unlabored, Respiratory pattern is regular,            

      symmetrical. GI: Abdomen is round distended, bowel incontinence noted, soft brown stool     

      noted. Bowel sounds present X 4 quads. : Swelling noted on penis on scrotum perianal      

      area is red and raw. urine incontinence noted, pt smell of urine and clothing soaked        

      with urine. EENT: Sclera/Cornea are reddened in left eye front teeth missing, jagged        

      wound noted to inside right lower lip with dry blood noted. . Derm: Skin is moist, Skin     

      is brown, Skin temperature is warm Bruising that is dark purple, green, on to left side     

      of chest, bruising that is dark purple noted to forehead. Wound/abrasion that is            

      approximately quater sized noted to left elbow. Musculoskeletal: slick leg swelling           

      noted, 4+ pitting edema. Skin on feet is macerated from urine soaked socks/shoes.           

15:40 Reassessment: Pt cleaned of urine and bowel incontinence, clean brief applied, gown     aa5 

      applied. One-one care x 1 hour. .                                                           

16:00 Reassessment: Phlebotomy tech at bedside collecting 2nd set of blood cultures. .        aa5 

16:30 Neuro: Level of Consciousness is awake, obeys commands, confused, Oriented to person,   aa5 

      place. Cardiovascular: Rhythm is sinus rhythm. Respiratory: Airway is patent                

      Respiratory effort is even, unlabored, Respiratory pattern is regular, symmetrical.         

      Derm: Skin is dry, Skin is brown, Skin temperature is warm.                                 

17:52 Neuro: Level of Consciousness is awake, obeys commands, confused, Oriented to person,   aa5 

      place. Respiratory: Airway is patent Respiratory effort is even, unlabored, Respiratory     

      pattern is regular, symmetrical. Derm: Skin is dry, Skin is brown, Skin temperature is      

      warm.                                                                                       

19:33 General: Appears comfortable, Behavior is calm, cooperative. Pain: Complains of pain in ha1 

      face Pain does not radiate. Unable to use pain scale. FLACC scale score is 1 out of 10.     

      Neuro: Level of Consciousness is awake, alert, obeys commands, Oriented to person,          

      place. Cardiovascular: Heart tones S1 S2 present Rhythm is sinus rhythm. Respiratory:       

      Airway is patent Respiratory effort is even, unlabored, Respiratory pattern is regular,     

      symmetrical. GI: Abdomen is round distended, Bowel sounds present X 4 quads. : Urine      

      is blood tinged. Derm: Skin is dry, Skin is normal. Musculoskeletal: Swelling present       

      in right leg and left leg.                                                                  

20:30 Reassessment: Patient and/or family updated on plan of care and expected duration. Pain ha1 

      level reassessed. Patient is alert, oriented x 3, equal unlabored respirations, skin        

      warm/dry/pink.                                                                              

21:30 Reassessment: Patient and/or family updated on plan of care and expected duration. Pain ha1 

      level reassessed. Patient is alert, oriented x 3, equal unlabored respirations, skin        

      warm/dry/pink.                                                                              

22:58 Reassessment: Patient and/or family updated on plan of care and expected duration. Pain ha1 

      level reassessed. Patient is alert, oriented x 3, equal unlabored respirations, skin        

      warm/dry/pink. pt. being transfer by [facility].                                        

                                                                                                  

Vital Signs:                                                                                      

14:40  / 83; Pulse 95; Resp 16 S; Temp 100.7(O); Pulse Ox 100% on R/A;                  aa5 

15:45 Pulse 99; Resp 20 S; Temp 102.6(O);                                                     aa5 

16:29  / 69; Pulse 90; Resp 20 S; Temp 101(O); Pulse Ox 99% on R/A;                     aa5 

17:52  / 58; Pulse 84; Resp 16 S; Temp 100.3(O); Pulse Ox 100% on R/A;                  aa5 

18:30  / 54; Pulse 82; Resp 16 S; Pulse Ox 100% on R/A;                                 aa5 

19:30  / 54; Pulse 86; Resp 18 S; Pulse Ox 100% on R/A;                                 ha1 

20:30  / 61; Pulse 89; Resp 18 S; Pulse Ox 100% on R/A;                                 ha1 

20:48  / 63; Pulse 85;                                                                  rn  

21:00  / 53; Pulse 86; Resp 18 S; Temp 99.5(O); Pulse Ox 100% on R/A;                   ha1 

21:30  / 62; Pulse 81; Resp 18 S; Pulse Ox 100% on R/A;                                 ha1 

22:30  / 64; Pulse 94; Resp 18; Temp 99.2(O); Pulse Ox 100% on R/A;                     ha1 

                                                                                                  

ED Course:                                                                                        

14:37 Patient arrived in ED.                                                                  aa5 

14:38 Emily Hernandez, RN is Primary Nurse.                                                   aa5 

14:38 Clay Frost MD is Attending Physician.                                                rn  

14:40 Arm band placed on.                                                                     aa5 

14:40 Patient has correct armband on for positive identification. Placed in gown. Bed in low  aa5 

      position. Call light in reach. Side rails up X2. Client placed on continuous cardiac        

      and pulse oximetry monitoring. NIBP monitoring applied.                                     

15:28 Inserted saline lock: 20 gauge in right antecubital area, using aseptic technique.      aa5 

      Blood collected.                                                                            

15:28 Initial lab(s) drawn, by me, sent to lab. First set of blood cultures drawn by me.      aa5 

15:36 Triage completed.                                                                       aa5 

15:40 XRAY Chest (1 view) In Process Unspecified.                                             EDMS

16:03 Notified ED physician of a critical lab result(s). Lactate 3.6.                         eh3 

17:19 CT Head C Spine In Process Unspecified.                                                 EDMS

17:19 CT Chest Wo Con In Process Unspecified.                                                 EDMS

17:19 CT Facial Bones W/O Con In Process Unspecified.                                         EDMS

18:34 Escobar cath inserted, using sterile technique, 18 Fr., by me, balloon inflated, to       sm8 

      gravity drainage, Patient tolerated.                                                        

19:00 Report given to IVON Dougherty.                                                              aa5 

23:03 No provider procedures requiring assistance completed. Patient transferred, IV remains  ha1 

      in place.                                                                                   

                                                                                                  

Administered Medications:                                                                         

15:45 Drug: NS 0.9% IV 1000 ml Route: IV; Rate: 1000 ml; Site: right antecubital;             aa5 

16:29 Follow up: IV Status: Completed infusion; IV Intake: 1000ml                             aa5 

16:06 Drug: Ibuprofen  mg Route: PO;                                                    aa5 

16:30 Follow up: Response: No adverse reaction                                                aa5 

20:10 Drug: Cefepime IVPB 1 grams Route: IVPB; Rate: 200 ml/hr; Infused Over: 30 mins; Site:  ha1 

      right antecubital;                                                                          

20:45 Follow up: Response: No adverse reaction; IV Status: Completed infusion; IV Intake:     ha1 

      100ml                                                                                       

20:50 Drug: Keppra IV 1000 mg Route: IV; Rate: calculated rate; Site: right antecubital;      ha1 

21:30 Follow up: Response: No adverse reaction; IV Status: Completed infusion; IV Intake:     ha1 

      100ml                                                                                       

                                                                                                  

                                                                                                  

Medication:                                                                                       

23:04 VIS not applicable for this client.                                                     ha1 

                                                                                                  

Intake:                                                                                           

16:29 IV: 1000ml; Total: 1000ml.                                                              aa5 

20:45 IV: 100ml; Total: 1100ml.                                                               ha1 

21:30 IV: 100ml; Total: 1200ml.                                                               ha1 

                                                                                                  

Outcome:                                                                                          

19:56 ER care complete, transfer ordered by MD.                                               rn  

23:03 Transferred by ground EMS to [facility], Transfer form completed.    ha1 

23:03 Condition: stable                                                                           

23:03 Discharge instructions given to patient, Instructed on the need for transfer,               

      Demonstrated understanding of instructions.                                                 

23:05 Patient left the ED.                                                                    ha1 

                                                                                                  

Signatures:                                                                                       

Dispatcher [employer]                           EDMS                                                 

Clay Frost MD MD rn Calderon, Audri RN                     RN   aa5                                                  

Rachel Lowe RN                          RN   3                                                  

Ladonna Marcos RN                        RN   ha1                                                  

Nancy Serivn                              8                                                  

                                                                                                  

Corrections: (The following items were deleted from the chart)                                    

18:00 17:52 Temp 100.3F Oral; aa5                                                             aa5 

19:25 14:40 Musculoskeletal: slick leg swelling noted. Skin on feet is macerated from urine     aa5 

      soaked socks/shoes. aa5                                                                     

                                                                                                  

**************************************************************************************************

## 2023-07-06 NOTE — EDPHYS
Physician Documentation                                                                           

 [facility]                                                                 

Name: Galdino Tabares                                                                             

Age: 53 yrs                                                                                       

Sex: Male                                                                                         

: 1970                                                                                   

MRN: [id number]                                                                                   

Arrival Date: 2023                                                                          

Time: 14:29                                                                                       

Account#: [id number]                                                                            

Bed 16                                                                                            

Private MD:                                                                                       

ED Physician Clay Frost                                                                         

HPI:                                                                                              

                                                                                             

14:52 This 53 yrs old  Male presents to ER via Unassigned with complaints of Fall     rn  

      Injury.                                                                                     

14:52 Details of fall: The patient fell from an upright position. Onset: The symptoms/episode rn  

      began/occurred yesterday. Associated injuries: The patient sustained injury to the          

      head, injury to the chest. Severity of symptoms: At their worst the symptoms were mild,     

      in the emergency department the symptoms are unchanged. The patient has experienced         

      similar episodes in the past, multiple times.                                               

14:53 The patient has been recently seen by a physician:. Pt reports fall from standing, fall rn  

      on sidewalk, injury and pain to face and chest/left clavicle. Denies back pain, abd         

      pain/extremity pain. Per previous note patient is alcoholic and several falls in past. .    

                                                                                                  

Historical:                                                                                       

- Allergies:                                                                                      

14:40 PENICILLINS;                                                                            aa5 

- PMHx:                                                                                           

14:40 cirrhosis of liver; HTN;                                                                aa5 

14:40 Pt is poor historian;                                                                   aa5 

                                                                                                  

- Immunization history:: Adult Immunizations unknown.                                             

- Social history:: Smoking status: Patient/guardian denies using tobacco.                         

- Family history:: not pertinent.                                                                 

- Hospitalizations: : No recent hospitalization is reported.                                      

                                                                                                  

                                                                                                  

ROS:                                                                                              

14:53 Constitutional: Negative for fever, chills, and weight loss, Eyes: Negative for injury, rn  

      pain, redness, and discharge, Cardiovascular: + left chest wall pain and clavicle pain      

      Respiratory: Negative for shortness of breath, cough, wheezing, and pleuritic chest         

      pain, Abdomen/GI: Negative for abdominal pain, nausea, vomiting, diarrhea, and              

      constipation, Back: Negative for injury and pain, MS/Extremity: Negative for injury and     

      deformity, Skin: Negative for injury, rash, and discoloration, Neuro: Negative for          

      numbness, tingling, and seizure                                                             

                                                                                                  

Exam:                                                                                             

14:53 Constitutional:  Disheveled patient, soaked in urine, wearing a diaper Head/Face:       rn  

      Normocephalic, + lower lip contusion without laceration, no intraoral injury or             

      swelling. Eyes:  Pupils equal round and reactive to light, extra-ocular motions intact.     

       Neck:  No midline cervical tenderness Chest/axilla:  Left upper/anterior chest wall        

      with tenderness and ecchymosis, most tender along mid clavicle. No crepitus.                

      Cardiovascular:  Tachycardic, regular.  No pulse deficits. Respiratory:  No increased       

      work of breathing, no retractions or nasal flaring. Abdomen/GI:  soft, non-tender Back:     

       No spinal tenderness. MS/ Extremity:  Pulses equal, no cyanosis.  Neurovascular            

      intact.  Full, normal range of motion. 2+ edema bilateral lower ext past knees Neuro:       

      Awake and alert, GCS 15                                                                     

17:24 ECG was reviewed by the Attending Physician.                                            rn  

                                                                                                  

Vital Signs:                                                                                      

14:40  / 83; Pulse 95; Resp 16 S; Temp 100.7(O); Pulse Ox 100% on R/A;                  aa5 

15:45 Pulse 99; Resp 20 S; Temp 102.6(O);                                                     aa5 

16:29  / 69; Pulse 90; Resp 20 S; Temp 101(O); Pulse Ox 99% on R/A;                     aa5 

17:52  / 58; Pulse 84; Resp 16 S; Temp 100.3(O); Pulse Ox 100% on R/A;                  aa5 

18:30  / 54; Pulse 82; Resp 16 S; Pulse Ox 100% on R/A;                                 aa5 

19:30  / 54; Pulse 86; Resp 18 S; Pulse Ox 100% on R/A;                                 ha1 

20:30  / 61; Pulse 89; Resp 18 S; Pulse Ox 100% on R/A;                                 ha1 

20:48  / 63; Pulse 85;                                                                  rn  

21:00  / 53; Pulse 86; Resp 18 S; Temp 99.5(O); Pulse Ox 100% on R/A;                   ha1 

21:30  / 62; Pulse 81; Resp 18 S; Pulse Ox 100% on R/A;                                 ha1 

22:30  / 64; Pulse 94; Resp 18; Temp 99.2(O); Pulse Ox 100% on R/A;                     ha1 

                                                                                                  

MDM:                                                                                              

14:38 Patient medically screened.                                                             rn  

19:53 Differential diagnosis: closed head injury, contusion, fracture, SBP, sepsis, UTI,      rn  

      seizure, syncope. Data reviewed: vital signs, nurses notes, lab test result(s), EKG,        

      radiologic studies, CT scan, plain films, and as a result, I will admit patient.            

      Consideration of Admission/Observation Patient was admitted/placed on observation.          

      Escalation of care including admission/observation considered. Counseling: I had a          

      detailed discussion with the patient and/or guardian regarding: the historical points,      

      exam findings, and any diagnostic results supporting the discharge/admit diagnosis, lab     

      results, radiology results, the need to transfer to another facility.                       

19:54 Response to treatment: the patient's symptoms have markedly improved after treatment.   rn  

19:54 ED course: Consulted with hospitalist here, recommend transfer given no neuro or GI.    rn  

      Neuro for possible seizure causing falls which was considered last admission, no EEG,       

      no neuro. GI given worsening cirrhosis and unknown source of fever, possible UTI vs         

      SBP..                                                                                       

                                                                                                  

                                                                                             

14:46 Order name: CBC with Diff; Complete Time: 15:58                                         rn  

                                                                                             

14:46 Order name: Protime (+inr); Complete Time: 15:58                                        rn  

                                                                                             

14:46 Order name: Ptt, Activated; Complete Time: 15:58                                        rn  

                                                                                             

14:46 Order name: ETOH Level; Complete Time: 15:58                                            rn  

                                                                                             

14:47 Order name: LFT's; Complete Time: 16:32                                                 rn  

                                                                                             

14:48 Order name: Blood Culture Adult (2)                                                     rn  

                                                                                             

14:48 Order name: CMP; Complete Time: 16:32                                                   rn  

                                                                                             

14:48 Order name: Lactate w/ 2H reflex if indic.; Complete Time: 16:32                        rn  

                                                                                             

14:48 Order name: Urinalysis w/ reflexes; Complete Time: 19:29                                rn  

                                                                                             

19:14 Order name: Lactate Sepsis 2 HR Follow-up; Complete Time: 19:20                         EDMS

                                                                                             

19:29 Order name: Urine Culture                                                               [facility]

                                                                                             

19:54 Order name: Flu; Complete Time: 21:18                                                   rn  

                                                                                             

19:54 Order name: SARS RAPID; Complete Time: 20:41                                            rn  

                                                                                             

21:24 Order name: Glucose, Ancillary Testing; Complete Time: 21:26                            [facility]

                                                                                             

14:46 Order name: CT Head C Spine; Complete Time: 18:05                                       rn  

                                                                                             

14:46 Order name: CT Chest Wo Con; Complete Time: 18:05                                       rn  

                                                                                             

14:46 Order name: XRAY Chest (1 view); Complete Time: 17:10                                   rn  

                                                                                             

14:53 Order name: CT Facial Bones W/O Con; Complete Time: 18:05                               rn  

                                                                                             

14:48 Order name: EKG; Complete Time: 14:49                                                   rn  

                                                                                             

14:46 Order name: IV Start; Complete Time: 15:33                                              rn  

                                                                                             

14:48 Order name: Accucheck; Complete Time: 15:45                                             rn  

                                                                                             

14:48 Order name: Cardiac monitoring; Complete Time: 15:18                                    rn  

                                                                                             

14:48 Order name: EKG - Nurse/Tech; Complete Time: 15:18                                      rn  

                                                                                             

14:48 Order name: IV Saline Lock - Large Bore; Complete Time: 15:32                           rn  

                                                                                             

14:48 Order name: Labs collected and sent; Complete Time: 15:32                               rn  

                                                                                             

14:48 Order name: O2 Per Protocol; Complete Time: 15:18                                       rn  

                                                                                             

14:48 Order name: O2 Sat Monitoring; Complete Time: 15:18                                     rn  

                                                                                             

14:48 Order name: Vital Signs; Complete Time: 15:18                                           rn  

                                                                                             

18:38 Order name: Escobar: VO received at 1825; Complete Time: 18:38                            aa5 

                                                                                                  

EC:24 Rate is 97 beats/min. Rhythm is regular. QRS Axis is Normal. QRS interval is normal. QT rn  

      interval is normal. No Q waves. T waves are Normal. No ST changes noted. Clinical           

      impression: NSR w/ Non-specific ST/T Changes. Interpreted by me. Reviewed by me.            

                                                                                                  

Administered Medications:                                                                         

15:45 Drug: NS 0.9% IV 1000 ml Route: IV; Rate: 1000 ml; Site: right antecubital;             aa5 

16:29 Follow up: IV Status: Completed infusion; IV Intake: 1000ml                             aa5 

16:06 Drug: Ibuprofen  mg Route: PO;                                                    aa5 

16:30 Follow up: Response: No adverse reaction                                                aa5 

20:10 Drug: Cefepime IVPB 1 grams Route: IVPB; Rate: 200 ml/hr; Infused Over: 30 mins; Site:  ha1 

      right antecubital;                                                                          

20:45 Follow up: Response: No adverse reaction; IV Status: Completed infusion; IV Intake:     ha1 

      100ml                                                                                       

20:50 Drug: Keppra IV 1000 mg Route: IV; Rate: calculated rate; Site: right antecubital;      ha1 

21:30 Follow up: Response: No adverse reaction; IV Status: Completed infusion; IV Intake:     ha1 

      100ml                                                                                       

                                                                                                  

                                                                                                  

Disposition Summary:                                                                              

23 19:56                                                                                    

Transfer Ordered                                                                                  

      Transfer Location: [facility]                                rn  

      Reason: Higher level of care                                                            rn  

      Condition: Stable                                                                       rn  

      Problem: new                                                                            rn  

      Symptoms: have improved                                                                 rn  

      Accepting Physician: (23 23:05)                                                ha1 

      Diagnosis                                                                                   

        - Altered mental status, unspecified                                                  rn  

        - Fever, unspecified                                                                  rn  

        - Displaced fracture of shaft of left clavicle                                        rn  

      Forms:                                                                                      

        - Medication Reconciliation Form                                                      rn  

        - SBAR form                                                                           rn  

Signatures:                                                                                       

Dispatcher [employer]                           EDMS                                                 

Clay Frost MD MD rn Calderon, Audri RN                     RN   aa5                                                  

Ladonna Marcos RN                        RN   ha1                                                  

                                                                                                  

Corrections: (The following items were deleted from the chart)                                    

15:46 14:47 BASIC METABOLIC PANEL+C.LAB.BRZ ordered. EDMS                                     EDMS

23:05 19:56  rn                                                                            ha1 

                                                                                                  

**************************************************************************************************

## 2023-07-06 NOTE — RAD REPORT
EXAM DESCRIPTION:  CT - CTFB

 

CLINICAL HISTORY:  recent mandible fracture, fell again;Facial pain

 

COMPARISON:  Facial Bones W/ Mpr dated 6/21/2023; Facial Bones W/ Mpr dated 11/1/2022; Head C Spine M
pr Wo Con dated 7/6/2023

 

TECHNIQUE:  Axial 2 mm thick images of the face were obtained with sagittal and coronal reconstructio
n images.

 

All CT scans are performed using dose optimization technique as appropriate and may include automated
 exposure control or mA/KV adjustment according to patient size.

 

FINDINGS:  Acute mildly displaced right mandibular fracture extending from the mandibular angle to th
e socket of the posterior-most molar. Overlying pronounced soft tissue swelling. No other acute facia
l bone fracture is seen.The mandible is intact.

 

The globes and orbital contents are grossly unremarkable.The paranasal sinuses and mastoids are clear
.

 

 

IMPRESSION:  Acute mildly displaced right mandibular fracture extending from the mandibular angle to 
the socket of the posterior-most molar.

## 2023-07-06 NOTE — RAD REPORT
EXAM DESCRIPTION:  CT - [id number] - 7/6/2023 5:17 pm

 

CLINICAL HISTORY:  fall, facial/head trauma

 

COMPARISON:  Head C Spine Mpr Wo Con dated 5/21/2023; Facial Bones W/ Mpr dated 7/6/2023; Thorax Wo C
on dated 7/6/2023

 

TECHNIQUE:  Axial thin cut noncontrast CT images of the head were obtained.

 

Axial thin cut noncontrast CT images of the cervical spine were obtained.

 

Multiplanar reformatted images were generated and reviewed.

 

All CT scans are performed using dose optimization technique as appropriate and may include automated
 exposure control or mA/KV adjustment according to patient size.

 

FINDINGS:  CT HEAD WITHOUT CONTRAST:

 

No acute hemorrhage, hydrocephalus or extra-axial collection is identified.No areas of brain edema or
 midline shift.

 

The paranasal sinuses and mastoids are clear.The calvarium is intact.

 

CT CERVICAL SPINE WITHOUT CONTRAST:

 

No fracture cervical spine or subluxation.No prevertebral soft tissues swelling is identified. Acute 
mildly displaced right fracture extending from the mandibular angle to the socket of the posterior-mo
st molar. Displaced and mildly impacted left clavicular head fracture. Posterior midline subcutaneous
 soft tissue mass of the upper neck, measuring 4.4 centimeter in greatest dimension containing fat de
nsity, most compatible with a lipoma.

 

IMPRESSION:  No acute traumatic intracranial or cervical spine findings.

 

Acute mildly displaced right fracture extending from the mandibular angle to the socket of the poster
ior-most molar. Displaced and mildly impacted left clavicular head fracture.

## 2023-07-06 NOTE — RAD REPORT
EXAM DESCRIPTION:  CT - Thorax Wo Con - 7/6/2023 5:17 pm

 

CLINICAL HISTORY:  blunt left chest trauma/clavicle

 

COMPARISON:  Thorax Wo Con dated 5/21/2023; Chest Single View dated 7/6/2023

 

TECHNIQUE:  Axial thin cut images of the chest were obtained without IV contrast. Multiplanar reforma
ts were generated and reviewed.

 

All CT scans are performed using dose optimization technique as appropriate and may include automated
 exposure control or mA/KV adjustment according to patient size.

 

FINDINGS:  No mass or infiltrate in the lung parenchyma. Elevation of the right hemidiaphragm. Subseg
mental bibasilar atelectasis. No pleural thickening or pleural effusion. No pneumothorax.

 

No abnormal mediastinal or hilar masses or lymphadenopathy seen. No significant aortic or pulmonary a
rtery findings. Assessment is limited in the absence of IV contrast.

 

No chest wall mass or abnormal axillary lymphadenopathy.

 

Evaluation of the solid abdominal structures reveals no suspicious findings.

 

Displaced and mildly impacted left clavicular head fracture. Multiple healing/healed bilateral rib fr
actures, with the right seventh rib fractures appearing more recent or subacute. Soft tissue swelling
 along the anterior chest wall and right flank.

 

Evaluation of the upper abdominal structures reveals mild ascites.

 

IMPRESSION:  No acute process within the chest.

 

Displaced and mildly impacted left clavicular head fracture. Multiple healing/healed bilateral rib fr
actures, with the right seventh rib fractures appearing more recent or subacute.

 

Evaluation of the upper abdominal structures reveals mild ascites.

## 2023-07-07 VITALS — SYSTOLIC BLOOD PRESSURE: 105 MMHG | DIASTOLIC BLOOD PRESSURE: 64 MMHG | TEMPERATURE: 99.2 F

## 2023-07-07 VITALS — OXYGEN SATURATION: 100 %

## 2023-07-08 NOTE — EKG
Test Date:    2023-07-06               Test Time:    15:15:41

Technician:   LENORA                                     

                                                     

MEASUREMENT RESULTS:                                       

Intervals:                                           

Rate:         97                                     

MA:           96                                     

QRSD:         70                                     

QT:           348                                    

QTc:          441                                    

Axis:                                                

P:            56                                     

MA:           96                                     

QRS:          50                                     

T:            14                                     

                                                     

INTERPRETIVE STATEMENTS:                                       

                                                     

Sinus rhythm with short MA

Otherwise normal ECG

Compared to ECG 07/01/2023 20:09:43

Short MA interval now present

Sinus tachycardia no longer present



Electronically Signed On 07-08-23 16:18:47 CDT by Olvin Mathur

## 2023-07-27 ENCOUNTER — HOSPITAL ENCOUNTER (EMERGENCY)
Dept: HOSPITAL 97 - ER | Age: 53
LOS: 1 days | Discharge: ANOTHER HOSPITAL | End: 2023-07-28
Payer: SELF-PAY

## 2023-07-27 DIAGNOSIS — E80.6: ICD-10-CM

## 2023-07-27 DIAGNOSIS — E87.20: Primary | ICD-10-CM

## 2023-07-27 LAB
ALBUMIN SERPL BCP-MCNC: 2.5 G/DL (ref 3.4–5)
ALP SERPL-CCNC: 134 U/L (ref 45–117)
ALT SERPL W P-5'-P-CCNC: 16 U/L (ref 16–61)
ANISOCYTOSIS BLD QL: (no result)
AST SERPL W P-5'-P-CCNC: 35 U/L (ref 15–37)
BLD SMEAR INTERP: (no result)
BUN BLD-MCNC: 7 MG/DL (ref 7–18)
GIANT PLATELETS BLD QL SMEAR: PRESENT
GLUCOSE SERPLBLD-MCNC: 73 MG/DL (ref 74–106)
HCT VFR BLD CALC: 26.3 % (ref 39.6–49)
INR BLD: 2.39
LYMPHOCYTES # SPEC AUTO: 0.5 K/UL (ref 0.7–4.9)
MAGNESIUM SERPL-MCNC: 1.5 MG/DL (ref 1.6–2.4)
MCV RBC: 102.7 FL (ref 80–100)
MORPHOLOGY BLD-IMP: (no result)
NT-PROBNP SERPL-MCNC: 628 PG/ML (ref ?–125)
PMV BLD: 9.4 FL (ref 7.6–11.3)
POTASSIUM SERPL-SCNC: 4.1 MEQ/L (ref 3.5–5.1)
RBC # BLD: 2.56 M/UL (ref 4.33–5.43)
TROPONIN I SERPL HS-MCNC: 15.1 PG/ML (ref ?–58.9)

## 2023-07-27 PROCEDURE — 71045 X-RAY EXAM CHEST 1 VIEW: CPT

## 2023-07-27 PROCEDURE — 74177 CT ABD & PELVIS W/CONTRAST: CPT

## 2023-07-27 PROCEDURE — 70450 CT HEAD/BRAIN W/O DYE: CPT

## 2023-07-27 PROCEDURE — 82140 ASSAY OF AMMONIA: CPT

## 2023-07-27 PROCEDURE — 99285 EMERGENCY DEPT VISIT HI MDM: CPT

## 2023-07-27 PROCEDURE — 85025 COMPLETE CBC W/AUTO DIFF WBC: CPT

## 2023-07-27 PROCEDURE — 80053 COMPREHEN METABOLIC PANEL: CPT

## 2023-07-27 PROCEDURE — 85610 PROTHROMBIN TIME: CPT

## 2023-07-27 PROCEDURE — 93005 ELECTROCARDIOGRAM TRACING: CPT

## 2023-07-27 PROCEDURE — 83605 ASSAY OF LACTIC ACID: CPT

## 2023-07-27 PROCEDURE — 87811 SARS-COV-2 COVID19 W/OPTIC: CPT

## 2023-07-27 PROCEDURE — 36415 COLL VENOUS BLD VENIPUNCTURE: CPT

## 2023-07-27 PROCEDURE — 87040 BLOOD CULTURE FOR BACTERIA: CPT

## 2023-07-27 PROCEDURE — 84484 ASSAY OF TROPONIN QUANT: CPT

## 2023-07-27 PROCEDURE — 83735 ASSAY OF MAGNESIUM: CPT

## 2023-07-27 PROCEDURE — 83880 ASSAY OF NATRIURETIC PEPTIDE: CPT

## 2023-07-27 PROCEDURE — 72125 CT NECK SPINE W/O DYE: CPT

## 2023-07-27 NOTE — XMS REPORT
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           



                                                  last           



                                                  modificati           



                                                  on) on Wed           



                                                  3/22/23 at           



                                                  0500, Last           



                                                  dose on           



                                                  23           



                                                  at 1300,           



                                                  Administer           



                                                  over 4           



                                                  Hours, 100           



                                                  mL<br>Reas           



                                                  on for           



                                                  Anti-Infec           



                                                  tive:           



                                                  [facility]           



                                                  Therapy           



                                                  for            



                                                  Suspected           



                                                  Infection<           



                                                  br>[facility]           



                                                  Therapy           



                                                  Site:           



                                                  Blood<br>D           



                                                  uration of           



                                                  therapy:           



                                                  72 hours           

 

     pantoprazol            Yes            40mg      40 mg,           Univ

ers



     e         3-22                               Oral, BID,           ity of



     (PROTONIX)      01:00:                               First dose           [facility]

[facility]



     EC tablet      00                                 (after           Medical



     40 mg                                         last           Branch



                                                  modificati           



                                                  on) on Tue           



                                                  3/21/23 at           



                                                  2000,           



                                                  Until           



                                                  Discontinu           



                                                  ed,            



                                                  Routine           

 

     ceFEPIme      2023- No             2000mg      2,000 mg,           U

nivers



     (MAXIPIME)      3-21 03-22                          IV             ity of



     2,000 mg in      23:30: 02:42                          [facility]          

 [place]



     NaCl 0.9%      00   :00                           ONCE, 1           Medical



     (NS) 100 mL                                         dose, On           [facility]





     MINI-BAG                                         Tue            



                                                  3/21/23 at           



                                                  1830,           



                                                  Administer           



                                                  over 30           



                                                  Minutes,           



                                                  100            



                                                  mL<br>Reas           



                                                  on for           



                                                  Anti-Infec           



                                                  tive:           



                                                  [facility]           



                                                  Therapy           



                                                  for            



                                                  Suspected           



                                                  Infection<           



                                                  br>[facility]           



                                                  Therapy           



                                                  Site:           



                                                  Blood<br>D           



                                                  uration of           



                                                  therapy:           



                                                  72 hours           

 

     thiamine      -0      Yes            100mg      100 mg,           Unive

rs



     (VITAMIN      3-21                               Oral,           ity of



     B1) tablet      14:00:                               DAILY,           [place]



     100 mg      00                                 First dose           Medical



                                                  on Tue           Branch



                                                  3/21/23 at           



                                                  0900,           



                                                  Until           



                                                  Discontinu           



                                                  ed,            



                                                  Routine           

 

     magnesium      2023- No             2g        2 g, IV           Univ

ers



     sulfate in      3-21 03-21                          Piggyback,           it

y of



     water 2      10:45: 11:26                          Administer           [place]

as



     gram/50 mL      00   :00                           over 60           Medica

l



     (4 %)                                         Minutes,           Branch



     infusion 2                                         ONCE, 1           



     g                                            dose, On           



                                                  Tue            



                                                  3/21/23 at           



                                                  0545,           



                                                  Routine           

 

     sulfur      2023- No        [id number] 5mL       5 mL,           Univ

ers



     hexafluorid      3-20 03-20                          Intravenou           i

ty of



     e microsphr      17:15: 17:15                          s, ONCE, 1          

 [place]



     (LUMASON)      00   :00                           dose, On           Medica

l



     injection 5                                         Mon            [place]



     mL                                           3/20/23 at           



                                                  1215,           



                                                  Routine<br           



                                                  >Faculty           



                                                  member           



                                                  approving           



                                                  Restricted           



                                                  medication           



                                                  : BEAR DE ANDA           

 

     lactulose            Yes            30mL      30 mL,           Univer

s



     (CEPHULAC)      3-20                               Oral,           ity of



     solution 30      14:00:                               DAILY,           [place]

[place]



     mL        00                                 First dose           Medical



                                                  (after           Branch



                                                  last           



                                                  modificati           



                                                  on) on Mon           



                                                  3/20/23 at           



                                                  0900,           



                                                  Until           



                                                  Discontinu           



                                                  ed,            



                                                  Routine           

 

     pantoprazol      2023- No             40mg      40 mg,           Uni

vers



     e         3-20 03-21                          Slow IV           ity of



     (PROTONIX)      14:00: 16:41                          Push,           [place]



     injection      00   :07                           DAILY,           Medical



     40 mg                                         First dose           Branch



                                                  (after           



                                                  last           



                                                  modificati           



                                                  on) on Mon           



                                                  3/20/23 at           



                                                  0900,           



                                                  Until           



                                                  Discontinu           



                                                  ed             

 

     acetaminoph            Yes            650mg      650 mg,           Un

sangeetha



     en        3-20                               Oral,           ity of



     (TYLENOL)      05:25:                               Q6HPRN,           [place]



     tablet 650      45                                 Starting           Medic

al



     mg                                           on Mon           Branch



                                                  3/20/23 at           



                                                  0025,           



                                                  Until           



                                                  Discontinu           



                                                  ed,            



                                                  Routine,           



                                                  Temp > 38           



                                                  C, Pain           



                                                  (scale           



                                                  4-6), Pain           



                                                  (scale           



                                                  1-3)           

 

     pantoprazol      2023- No             40mg      40 mg,           Uni

vers



     e         3-20 03-20                          Slow IV           ity of



     (PROTONIX)      01:00: 05:30                          Push,           [place]



     injection      00   :37                           Q12H,           Medical



     40 mg                                         First dose           Branch



                                                  on Sun           



                                                  3/19/23 at           



                                                  2000,           



                                                  Until           



                                                  Discontinu           



                                                  ed             

 

     foLIC acid            Yes            1mg       1 mg,           Univer

s



     (FOLATE)      3-19                               Oral,           ity of



     tablet 1 mg      14:00:                               DAILY,           [place]

[place]



               00                                 First dose           Medical



                                                  on Sun           Branch



                                                  3/19/23 at           



                                                  0900,           



                                                  Until           



                                                  Discontinu           



                                                  ed,            



                                                  Routine           

 

     phytonadion       No             10mg      IV             Unive

rs



     e (VITAMIN      3-19 03-20                          Piggyback,           it

y of



     K) 10 mg in      14:00: 14:54                          DAILY, 2           T

[facility]



     NaCl 0.9%      00   :00                           doses,           Medical



     (NS)                                         First dose           Branch



     piggyback                                         (after           



                                                  last           



                                                  reorder)           



                                                  on Sun           



                                                  3/19/23 at           



                                                  0900, Last           



                                                  dose on           



                                                  Mon            



                                                  3/20/23 at           



                                                  0900, 50           



                                                  mL             

 

     lactated       No             1000mL      at 100           [facility]

ers



     ringers IV      3-19 03-21                          mL/hr,           ity of



     infusion      12:15: 01:32                          1,000 mL,           [place]

as



     1,000 mL      00   :43                           IV             Medical



                                                  Infusion,           Branch



                                                  CONTINUOUS           



                                                  , Starting           



                                                  on Sun           



                                                  3/19/23 at           



                                                  0715,           



                                                  Until Mon           



                                                  3/20/23 at           



                                                  ,           



                                                  Routine           

 

     meropenem       No             1000mg      1,000 mg,           

[facility]



     (MERREM)      3-19 03-21                          IV             ity of



     1,000 mg in      11:00: 13:11                          Piggyback,          

 [place]



     NaCl 0.9%      00   :57                           Q8H ABX,           Medica

l



     (NS) 100 mL                                         15 doses,           [facility]

[facility]



     MINI-BAG                                         First dose           



                                                  on Sun           



                                                  3/19/23 at           



                                                  0600, Last           



                                                  dose on           



                                                  u            



                                                  3/23/23 at           



                                                  2200,           



                                                  Administer           



                                                  over 3           



                                                  Hours, 100           



                                                  mL<br>Rest           



                                                  ricted use           



                                                  approved           



                                                  by: ELIAS           



                                                  8TH            



                                                  FLOOR<br&g           



                                                  t;Reason           



                                                  for            



                                                  Anti-Infec           



                                                  tive:           



                                                  [facility]           



                                                  Therapy           



                                                  for            



                                                  Suspected           



                                                  Infection<           



                                                  br>[facility]           



                                                  Therapy           



                                                  Site:           



                                                  Blood<br>D           



                                                  uration of           



                                                  therapy:           



                                                  72 hours           

 

     NORepinephr      2023- No             .05ug/k      0.05-0.5         

  [facility]



     ine       3-19 03-20                g/min      mcg/kg/min           ity of



     (LEVOPHED)      03:29: 03:28                          ?77.1 kg           [facility]

[facility]



     4 mg in      15   :15                           (14.4563-1           Medica

l



     NaCl 0.9%                                         44.5625           Branch



     (NS) 250 mL                                         mL/hr,           



     infusion                                         rounded to           



                                                  14..           



                                                  56 mL/hr),           



                                                  IV             



                                                  Infusion,           



                                                  TITRATE,           



                                                  MAP Goal >           



                                                  or = 65           



                                                  mmHg,           



                                                  Starting           



                                                  on Sat           



                                                  3/18/23 at           



                                                  2229, For           



                                                  24             



                                                  hours<br>I           



                                                  nitiate           



                                                  titration           



                                                  at 0.05           



                                                  mcg/kg/min           



                                                  .&nbsp;&nb           



                                                  sp;Increas           



                                                  e by 0.01           



                                                  mcg/kg/min           



                                                  every 30           



                                                  seconds to           



                                                  5 minutes           



                                                  as needed           



                                                  to reach           



                                                  and            



                                                  maintain           



                                                  goal blood           



                                                  pressure.&           



                                                  nbsp;&nbsp           



                                                  ;Maximum           



                                                  dose = 0.5           



                                                  mcg/kg/min           



                                                  .&nbsp;&nb           



                                                  sp;If goal           



                                                  not            



                                                  maintained           



                                                  at maximum           



                                                  allowed           



                                                  dose,           



                                                  contact           



                                                  prescriber           



                                                  .&nbsp;&nb           



                                                  sp;Adminis           



                                                  ter only           



                                                  one            



                                                  peripheral           



                                                  intravenou           



                                                  s              



                                                  vasopresso           



                                                  r at a           



                                                  time.<br>           

 

     vancomycin      2023- No             15mg/kg      1,250 mg          

 [facility]



     1,250 mg in      3-19 03-20                          (rounded           ity

 of



     NaCl 0.9%      03:00: 05:12                          from           [place]



     (NS) 250 mL      00   :19                           1,156.5 mg           Me

dical



     VIAL-MATE                                         = 15 mg/kg           [facility]

ch



     IV                                           ?77.1 kg),           



     piggyback                                         IV             



                                                  Piggyback,           



                                                  Q12H ABX,           



                                                  10 doses,           



                                                  First dose           



                                                  on Sat           



                                                  3/18/23 at           



                                                  2200, Last           



                                                  dose on           



                                                  Thu            



                                                  3/23/23 at           



                                                  1000,           



                                                  Administer           



                                                  over 90           



                                                  Minutes,           



                                                  250            



                                                  mL<br&gt;R           



                                                  crystal for           



                                                  Anti-Infec           



                                                  tive:           



                                                  [facility]           



                                                  Therapy           



                                                  for            



                                                  Suspected           



                                                  Infection<           



                                                  br>[facility]           



                                                  Therapy           



                                                  Site:           



                                                  Blood<br>D           



                                                  uration of           



                                                  therapy:           



                                                  72 hours           

 

     meropenem      2023- No             1000mg      1,000 mg,           

[facility]



     (MERREM)      3-19 03-19                          IV             ity of



     1,000 mg in      03:00: 04:27                          Piggyback,          

 [place]



     NaCl 0.9%      00   :00                           ONCE, 1           Medical



     (NS) 100 mL                                         dose, On           [facility]

ch



     MINI-BAG                                         Sat            



                                                  3/18/23 at           



                                                  2200,           



                                                  Administer           



                                                  over 30           



                                                  Minutes,           



                                                  100            



                                                  mL<br>Rest           



                                                  ricted use           



                                                  approved           



                                                  by: ELIAS           



                                                  8TH            



                                                  FLOOR<br>R           



                                                  crystal for           



                                                  Anti-Infec           



                                                  tive:           



                                                  [facility]           



                                                  Therapy           



                                                  for            



                                                  Suspected           



                                                  Infection<           



                                                  br>[facility]           



                                                  Therapy           



                                                  Site:           



                                                  Blood<br>D           



                                                  uration of           



                                                  therapy:           



                                                  72 hours           

 

     NaCl 0.9%       No             1000mL      at 999           Uni

vers



     (NS) bolus      3-19 03-19                          mL/hr,           ity of



     infusion      02:52: 12:02                          1,000 mL,           [facility]

as



     1,000 mL      00   :28                           IV             Medical



                                                  [place]iggyback,           [place]



                                                  ONCE, 1           



                                                  dose, On           



                                                  Sat            



                                                  3/18/23 at           



                                                  2200, ASAP           

 

     lactulose       No             30mL      30 mL,           Unive

rs



     (CEPHULAC)      3-19 03-20                          Oral, TID,           it

y of



     solution 30      02:15: 11:21                          First dose          

 [place]



     mL        00   :53                           (after           Medical



                                                  last           [facility]



                                                  modificati           



                                                  on) on Sat           



                                                  3/18/23 at           



                                                  2115,           



                                                  Until           



                                                  Discontinu           



                                                  ed,            



                                                  Routine           

 

     cefTRIAXone       No             1000mg      1,000 mg,         

  [facility]



     (ROCEPHIN)      3-19 03-19                          IV             ity of



     1,000 mg in      01:15: 02:19                          [facility],          

 [place]



     NaCl 0.9%      00   :20                           Q24H ABX,           Medic

al



     (NS) 100 mL                                         5 doses,           [facility]

ch



     MINI-BAG                                         First dose           



                                                  on Sat           



                                                  3/18/23 at           



                                                  , Last           



                                                  dose on           



                                                  Wed            



                                                  3/22/23 at           



                                                  ,           



                                                  Administer           



                                                  over 30           



                                                  Minutes,           



                                                  100            



                                                  mL<br>Reas           



                                                  on for           



                                                  Anti-Infec           



                                                  tive:           



                                                  Documented           



                                                  Infection<           



                                                  br>Documen           



                                                  michelle            



                                                  Infection           



                                                  Site:           



                                                  Abdominal<           



                                                  br>Duratio           



                                                  n of           



                                                  Therapy: 7           



                                                  days           

 

     lactulose       No             30mL      30 mL,           Unive

rs



     (CEPHULAC)      3-19 03-19                          Oral, BID,           it

y of



     solution 30      01:00: 02:10                          First dose          

 [place]



     mL        00   :44                           on Sat           Medical



                                                  3/18/23 at           [facility]



                                                  2000,           



                                                  Until           



                                                  Discontinu           



                                                  ed,            



                                                  Routine           

 

     potassium      2023- No             20meq      20 mEq, IV           

[facility]



     chloride in      3-19 03-19                          Piggyback,           i

ty of



     water (KCL)      01:00: 06:44                          Q2H, 2           [facility]

as



     20 mEq/100      00   :00                           doses,           Medical



     mL RTU IVPB                                         First dose           Br

anch



     20 mEq                                         on Sat           



                                                  3/18/23 at           



                                                  2000, Last           



                                                  dose on           



                                                  Sat            



                                                  3/18/23 at           



                                                  2200, 100           



                                                  mL             

 

     phytonadion      2023- No             10mg      IV             Unive

rs



     e (VITAMIN      3-19 03-19                          Piggyback,           it

y of



     K) 10 mg in      00:30: 03:50                          ONCE, 1           Te

[place]



     NaCl 0.9%      00   :00                           dose, On           Medica

l



     (NS)                                         [facility]



     piggyback                                         3/18/23 at           



                                                  1930, 50           



                                                  mL             

 

     thiamine      2023- No             100mg      IV             [facility]



     (VITAMIN      3-18 03-20                          Piggyback,           ity 

of



     B1) 100 mg      23:30: 11:22                          DAILY,           [place]

[place]



     in NaCl      00   :30                           First dose           Medica

l



     0.9% (NS)                                         on [facility]                                         3/18/23 at           



                                                  1830,           



                                                  Until           



                                                  Discontinu           



                                                  ed, 50 mL           

 

     oxazepam            Yes            15mg      15 mg,           [facility]



     (SERAX)      3-18                               Oral,           ity of



     capsule 15      23:24:                               Q4HPRN,           [place]

s



     mg        04                                 Starting           Medical



                                                  on [facility]



                                                  3/18/23 at           



                                                  1824,           



                                                  Until           



                                                  Discontinu           



                                                  ed,            



                                                  Routine,           



                                                  Only while           



                                                  awake for           



                                                  DBP equal           



                                                  to or           



                                                  greater           



                                                  than 100,           



                                                  HR equal           



                                                  to or           



                                                  greater           



                                                  than 100.           

 

     iopamidol      2023- No        [id number] 100mL      100 mL,         

  [facility]



     (ISOVUE      3-18 03-18                          Intravenou           ity o

f



     370-500 mL)      22:05: 22:05                          s, ONCE, 1          

 [place]



     injection      00   :00                           dose, On           Medica

l



     100 mL                                         [facility]



                                                  3/18/23 at           



                                                  1730,           



                                                  Routine           

 

     spironolact            Yes       Alcoholic 50mg QD   Take 1          

 [facility]



     one       7-15                cirrhosis           tablet by           [facility]



     (ALDACTONE)      00:00:                of liver           mouth           



     50 mg      00                  with           daily           



     tablet                          ascites                          

 

     dexlansopra            Yes       Secondary 30mg Q.5D Take 1          

 [facility]



     zole      7-15                esophageal           capsule by           Kristin jean



     (DEXILANT)      00:00:                varices           mouth 2           



     30 mg      00                  without           times           



     delayed                          bleeding           daily           



     release                                                        



     capsule                                                        

 

     furosemide            Yes       Leg  20mg QD   Take 1           [facility]

s



     (LASIX) 20      7-15                swelling           tablet by           

Health



     mg tablet      00:00:                               mouth           



               00                                 daily           

 

     rifAXIMin      2022-0      Yes                      Take 1           [facility]



     (XIFAXAN)      7-15                               tablet by           Healt

h



     550 mg      00:00:                               mouth           



     tablet      00                                 twice           



                                                  daily.           



                                                  Therapeuti           



                                                  c              



                                                  substituti           



                                                  on for           



                                                  xifaxan           



                                                  200mg per           



                                                  P&T            

 

     spironolact      0      Yes       Alcoholic 50mg QD   Take 1          

 [facility]



     one       7-15                cirrhosis           [facility]           [facility]

[facility]



     (ALDACTONE)      00:00:                of liver           mouth           



     50 mg      00                  with           daily           



     tablet                          ascites                          

 

     dexlansopra      -0      Yes       Secondary 30mg Q.5D Take 1          

 [facility]



     zole      7-15                esophageal           capsule by           [facility]



     (DEXILANT)      00:00:                varices           mouth 2           



     30 mg      00                  without           times           



     delayed                          bleeding           daily           



     release                                                        



     capsule                                                        

 

     furosemide      -0      Yes       Leg  20mg QD   Take 1           [facility]

s



     (LASIX) 20      7-15                swelling           tablet by           

Health



     mg tablet      00:00:                               mouth           



               00                                 daily           

 

     rifAXIMin      -0      Yes                      Take 1           [facility]



     (XIFAXAN)      7-15                               tablet by           Healt

h



     550 mg      00:00:                               mouth           



     tablet      00                                 twice           



                                                  daily.           



                                                  Therapeuti           



                                                  c              



                                                  substituti           



                                                  on for           



                                                  xifaxan           



                                                  200mg per           



                                                  P&T            

 

     spironolact      0      Yes       Alcoholic 50mg QD   Take 1          

 [facility]



     one       7-15                cirrhosis           tablet by           [facility]



     (ALDACTONE)      00:00:                of liver           mouth           



     50 mg      00                  with           daily           



     tablet                          ascites                          

 

     dexlansopra      -0      Yes       Secondary 30mg Q.5D Take 1          

 [facility]



     zole      7-15                esophageal           capsule by           [facility]



     (DEXILANT)      00:00:                varices           mouth 2           



     30 mg      00                  without           times           



     delayed                          bleeding           daily           



     release                                                        



     capsule                                                        

 

     furosemide      -0      Yes       Leg  20mg QD   Take 1           [facility]

s



     (LASIX) 20      7-15                swelling           tablet by           

Health



     mg tablet      00:00:                               mouth           



               00                                 daily           

 

     rifAXIMin      -0      Yes                      Take 1           [facility]



     (XIFAXAN)      7-15                               tablet by           Healt

h



     550 mg      00:00:                               mouth           



     tablet      00                                 twice           



                                                  daily.           



                                                  Therapeuti           



                                                  c              



                                                  substituti           



                                                  on for           



                                                  xifaxan           



                                                  200mg per           



                                                  P&T            

 

     Dexlansopra      -0      Yes            30mg      Take 1           Univ

ers



     zole 30 mg      7-15                               capsule by           ity

 of



     capsule      00:00:                               mouth.           [address]



                                                                 Branch

 

     rifAXIMin      -0      Yes                      Take by           Unive

rs



     (XIFAXAN)      7-15                               mouth.           ity of



     550 mg      00:00:                                              [address]

 

     Dexlansopra            Yes            30mg      Take 1           Univ

ers



     zole 30 mg      7-15                               capsule by           ity

 of



     capsule      00:00:                               mouth.           [address]



                                                                 Branch

 

     rifAXIMin            Yes                      Take by           Unive

rs



     (XIFAXAN)      7-15                               mouth.           ity of



     550 mg      00:00:                                              [place]



     tablet                                                      Medical



                                                                 Branch

 

     rifAXIMin      2022- No        Ascites due 600mg Q.5D Take 3        

   [facility]



     (XIFAXAN)      7-15 09-21           to             tablets by           [facility]



     200 mg      00:00: 00:00           alcoholic           mouth 2           



     tablet      00   :00            cirrhosis           times           



                                                  daily           



                                                  (Therapeut           



                                                  ic             



                                                  substituti           



                                                  on from           



                                                  Xifaxan           



                                                  550mg per           



                                                  Pharmacy           



                                                  P&amp;T.)           

 

     rifAXIMin      2022- No        Ascites due 600mg Q.5D Take 3        

   [facility]



     (XIFAXAN)      7-15 09-21           to             tablets by           [facility]

lt



     200 mg      00:00: 00:00           alcoholic           mouth 2           



     tablet      00   :00            cirrhosis           times           



                                                  daily           



                                                  (Therapeut           



                                                  ic             



                                                  substituti           



                                                  on from           



                                                  Xifaxan           



                                                  550mg per           



                                                  Pharmacy           



                                                  P&amp;T.)           

 

     rifAXIMin      2022- No        Ascites due 600mg Q.5D Take 3        

   [facility]



     (XIFAXAN)      7-15 09-21           to             tablets by           [facility]

lt



     200 mg      00:00: 00:00           alcoholic           mouth 2           



     tablet      00   :00            cirrhosis           times           



                                                  daily           



                                                  (Therapeut           



                                                  ic             



                                                  substituti           



                                                  on from           



                                                  Xifaxan           



                                                  550mg per           



                                                  Pharmacy           



                                                  P&amp;T.)           

 

     lactulose      2022- No        Alcoholic 10g       Take 15 mL       

    [facility]



     (CHRONULAC)      7-15 08-14           cirrhosis           by mouth 3       

    Health



     10 gram/15      00:00: 23:59           of liver           times           



     mL oral      00   :00            with           daily for           



     solution                          ascites           30 days           

 

     lactulose      2022- No        Alcoholic 10g       Take 15 mL       

    [facility]



     (CHRONULAC)      7-15 08-14           cirrhosis           by mouth 3       

    Health



     10 gram/15      00:00: 23:59           of liver           times           



     mL oral      00   :00            with           daily for           



     solution                          ascites           30 days           

 

     lactulose      2022- No        Alcoholic 10g       Take 15 mL       

    [facility]



     (CHRONULAC)      7-15 08-14           cirrhosis           by mouth 3       

    Health



     10 gram/15      00:00: 23:59           of liver           times           



     mL oral      00   :00            with           daily for           



     solution                          ascites           30 days           

 

     furosemide            Yes       [id number] 40mg      Take 1           U

nivers



     40 mg      7-06                               tablet by           ity of



     tablet      00:00:                               mouth           [place]



               00                                 daily.           [facility]

 

     spironolact            Yes       [id number] 100mg      Take 1          

 [facility]



     one 100 mg      7-06                               tablet by           ity 

of



     tablet      00:00:                               mouth           [place]



               00                                 daily.           [facility]

 

     furosemide            Yes       [id number] 40mg      Take 1           U

nivers



     40 mg      7-06                               tablet by           ity of



     tablet      00:00:                               mouth           [place]



               00                                 daily.           [facility]

 

     spironolact            Yes       [id number] 100mg      Take 1          

 [facility]



     one 100 mg      7-06                               tablet by           ity 

of



     tablet      00:00:                               mouth           [place]



               00                                 daily.           [facility]

 

     furosemide            Yes       [id number] 40mg      Take 1           U

nivers



     40 mg      7-06                               tablet by           ity of



     tablet      00:00:                               mouth           [place]



               00                                 daily.           [facility]

 

     spironolact            Yes       [id number] 100mg      Take 1          

 [facility]



     one 100 mg      7-06                               tablet by           ity 

of



     tablet      00:00:                               mouth           [place]



               00                                 daily.           [facility]

 

     spironolact            Yes            100mg      100 mg,           Un

[facility]



     one       7-05                               Oral,           ity of



     (ALDACTONE)      14:00:                               DAILY,           [place]

[place]



     tablet 100      00                                 First dose           Med

ical



     mg                                           on [facility]



                                                  21 at           



                                                  0900,           



                                                  Until           



                                                  Discontinu           



                                                  ed,            



                                                  Routine           

 

     furosemide            Yes            40mg      40 mg,           Unive

rs



     (LASIX)      7-05                               Oral,           ity of



     tablet 40      14:00:                               DAILY,           [place]



     mg        00                                 First dose           Medical



                                                  on [facility]



                                                  21 at           



                                                  0900,           



                                                  Until           



                                                  Discontinu           



                                                  ed,            



                                                  Routine           

 

     albumin       No             12.5g      12.5 g, IV           Un

[facility]



     (ALBUMINAR       07-05                          Infusion,           ity

 of



     25%) 25 %      08:00: 10:29                          ONCE, 1           [place]

[place]



     injection      00   :00                           dose, Mon           Medic

al



     12.5 g                                         21 at           [facility]



                                                  0300, 100           



                                                  mL<br>Nilda           



                                                  cation:           



                                                  HEPATORENA           



                                                  L SYNDROME           



                                                  (DIAGNOSIS           



                                                  )<br>Comme           



                                                  nts:           



                                                  Dosin-8 g/L of           



                                                  ascitic           



                                                  fluid           



                                                  removed           

 

     KCL       2021- No             40meq      40 mEq,           [facility]



     (KLOR-CON      7-05 07-05                          Oral,           ity of



     M20) tablet      05:00: 05:30                          ONCE, 1           [place]

[place]



     40 mEq      00   :00                           dose, [facility]



                                                  21 at           [facility]



                                                  0000,           



                                                  Routine           

 

     acamprosate            Yes       [id number] 666mg      Take 2          

 [facility]



     333 mg      7-05                               tablets by           ity of



     tablet      00:00:                               mouth 3           [place]



               00                                 (three)           Medical



                                                  times           [place]



                                                  daily.           

 

     acamprosate            Yes       [id number] 666mg      Take 2          

 [facility]



     333 mg      7-05                               tablets by           ity of



     tablet      00:00:                               mouth 3           [place]



               00                                 (three)           Medical



                                                  times           [place]



                                                  daily.           

 

     acamprosate            Yes       [id number] 666mg      Take 2          

 [facility]



     333 mg      7-05                               tablets by           ity of



     tablet      00:00:                               mouth 3           [place]



               00                                 (three)           Medical



                                                  times           [place]



                                                  daily.           

 

     KCL       2021- No             40meq      40 mEq,           [facility]



     (KLOR-CON                                Oral,           ity of



     M20) tablet      00:00: 02:51                          ONCE, 1           [place]

[place]



     40 mEq      00   :00                           dose, [facility]



                                                  21 at           [facility]



                                                  1900,           



                                                  Routine           

 

     iopamidol      2021- No        [id number] 100mL      100 mL,          

 [facility]



     (ISOVUE                                Intravenou           ity o

f



     370-500 mL)      17:15: 16:05                          s, ONCE, 1          

 [place]



     injection      00   :00                           dose, Sun           Medic

al



     100 mL                                         21 at           [facility]



                                                  1215,           



                                                  Routine           

 

     KCL       2021- No             40meq      40 mEq,           [facility]



     (KLOR-CON                                Oral,           ity of



     M20) tablet      21:15: 21:15                          ONCE, 1           [place]

[place]



     40 mEq      00   :00                           dose, [facility]



                                                  21 at           [facility]



                                                  1615,           



                                                  Routine           

 

     furosemide      -      Yes       [id number] 40mg      Take 1         

  [facility]



     40 mg                 tablet by           ity of



     tablet      00:00:                               mouth           [place]



               00                                 daily.           [facility]



                                                                 [facility]

 

     spironolact      -      Yes       [id number] 50mg      Take 2        

   [facility]



     one 25 mg                 tablets by           ity

 of



     tablet      00:00:                               mouth           [place]



               00                                 daily.           Medical



                                                                 [facility]

 

     furosemide      -      Yes       [id number] 40mg      Take 1         

  [facility]



     40 mg                 tablet by           ity of



     tablet      00:00:                               mouth           [place]



               00                                 daily.           [facility]

 

     spironolact            Yes       [id number] 50mg      Take 2        

   [facility]



     one 25 mg                 tablets by           ity

 of



     tablet      00:00:                               mouth           [place]



               00                                 daily.           [facility]

 

     furosemide            Yes       [id number] 40mg      Take 1         

  [facility]



     40 mg                 tablet by           ity of



     tablet      00:00:                               mouth           [place]



               00                                 daily.           [facility]

 

     spironolact            Yes       [id number] 50mg      Take 2        

   [facility]



     one 25 mg                 tablets by           ity

 of



     tablet      00:00:                               mouth           [place]



               00                                 daily.           [facility]

 

     furosemide      2021- No        [id number] 40mg      Take 1        

   [facility]



     40 mg                 [id number]           tablet by           ity of



     tablet      00:00: 00:00                          mouth           [place]



               00   :00                           daily.           [facility]

 

     spironolact      2021- No        [id number] 50mg      Take 2       

    [facility]



     one 25 mg                 [id number]           tablets by           it

y of



     tablet      00:00: 00:00                          mouth           [place]



               00   :00                           daily.           [facility]

 

     lidocaine-e      2021- No                       PRN,           Unive

rs



     pinephrine                                Starting           ity 

of



     (XYLOCAINE      17:56: 17:56                          Tue 21           

[place]



     W/EPINEPHRI      00   :00                           at 1256,           Medi

[facility]



     NE) 2                                         Until [facility]



     %-1:200,000                                         21 at           



     injection                                         1256,           



                                                  Routine           

 

     furosemide      2021- No             20mg      20 mg,           Univ

ers



     (LASIX)                                Slow IV           ity of



     injection      11:00: 11:02                          Push,           [place]



     20 mg      00   :00                           ONCE, 1           Medical



                                                  dose, [facility]



                                                  21 at           



                                                  0600,           



                                                  Routine           

 

     magnesium      2021- No             1g        1 g, IV           Univ

ers



     sulfate in                                Piggyback,           it

y of



     D5W 1      08:30: 09:42                          ONCE, 1           [place]



     gram/100 mL      00   :00                           dose, Tue           Med

ical



     RTU IV                                         21 at           [place]



     Piggyback 1                                         0330, 100           



     g                                            mL             

 

     calcium      2021- No             1g        1 g, IV           Univer

s



     gluconate 1      -08                          Infusion,           it

y of



     g in NaCl      07:15: 07:44                          ONCE, 1           [place]

[place]



     50 mL      00   :00                           dose, [facility]



     (ISO-OSM)                                         21 at           [facility]

h



     RTU IV                                         0230,           



     infusion 1                                         Routine           



     g                                                           

 

     lactulose      0      Yes            30mL      30 mL,           Univer

s



     (CEPHULAC)      6-08                               Oral, BID,           ity

 of



     solution 30      01:00:                               First dose           

[place]



     mL        00                                 on [facility]



                                                  21 at           [place]



                                                  ,           



                                                  Until           



                                                  Discontinu           



                                                  ed,            



                                                  Routine           

 

     thiamine      0      Yes            100mg      100 mg,           Unive

rs



     (VITAMIN                                     Oral, BID,           ity o

f



     B1) tablet      01:00:                               First dose           [place]

[place]



     100 mg      00                                 on [facility]



                                                  21 at           [place]



                                                  ,           



                                                  Until           



                                                  Discontinu           



                                                  ed,            



                                                  Routine           

 

     phytonadion      2021- No             10mg      10 mg,           Uni

vers



     e (VITAMIN       06-09                          Subcutaneo           it

y of



     K)        00:15: 13:25                          us, DAILY,           [place]



     injection      00   :00                           3 doses,           Medica

l



     10 mg                                         First dose           [facility]



                                                  (after           



                                                  last           



                                                  modificati           



                                                  on) on 21 at           



                                                  1915, Last           



                                                  dose on           



                                                  21           



                                                  at 0900,           



                                                  Routine           

 

     spironolact            Yes            25mg      25 mg,           Univ

ers



     one       08                               Oral,           ity of



     (ALDACTONE)      00:00:                               DAILY,           [place]

[place]



     tablet 25      00                                 First dose           Medi

[place]



     mg                                           on [facility]



                                                  21 at           



                                                  1900,           



                                                  Until           



                                                  Discontinu           



                                                  ed,            



                                                  Routine           

 

     furosemide            Yes            20mg      20 mg,           Unive

rs



     (LASIX)                                     Slow IV           ity of



     injection      00:00:                               Push,           [place]



     20 mg      00                                 DAILY,           Medical



                                                  First dose           [facility]



                                                  on Mon           



                                                  21 at           



                                                  1900,           



                                                  Until           



                                                  Discontinu           



                                                  ed,            



                                                  Routine           

 

     ondansetron            Yes            4mg       4 mg, Slow           

[facility]



     (ZOFRAN                                     IV Push,           ity of



     (PF))      23:43:                               [id number],           [place]



     injection 4      59                                 Starting           Medi

[facility]ler



     mg                                           Mon 21           [facility]



                                                  at 1843,           



                                                  Until           



                                                  Discontinu           



                                                  ed,            



                                                  Routine,           



                                                  Nausea and           



                                                  Vomiting           



                                                  (N/V)           

 

     octreotide      -0 202- No             50ug/h      50 mcg/hr          

 [facility]



     (SANDOSTATI                                (10            ity of



     N) 1,250      20:00: 15:40                          mL/hr), IV           Te

[place]



     mcg in NaCl      00   :20                           Infusion,           Med

ical



     0.9% (NS)                                         CONTINUOUS           [facility]

ch



     250 mL                                         , Starting           



     infusion                                         Mon 21           



                                                  at 1500           

 

     octreotide      2021- No             50ug      50 mcg,           Uni

vers



     (SANDOSTATI                                Slow IV           ity 

of



     N)        20:00: 19:11                          Push,           [place]



     injection      00   :00                           ONCE, 1           Medical



     50 mcg                                         dose, [facility]



                                                  21 at           



                                                  1500,           



                                                  STAT<br>In           



                                                  dication:           



                                                  Acute           



                                                  Variceal           



                                                  Hemorrhage           



                                                  in a           



                                                  Cirrhotic           



                                                  Patient           

 

     KCL       2021- No             40meq      40 mEq,           [facility]



     (KLOR-CON                                Oral,           ity of



     M20) tablet      18:00: 17:38                          ONCE, 1           Te

[place]



     40 mEq      00   :00                           dose, Mon           Medical



                                                  21 at           [place]



                                                  1300,           



                                                  Routine           

 

     iopamidol      2021- No        [id number] 120mL      120 mL,         

  [facility]



     (ISOVUE                                Intravenou           ity o

f



     370-500 mL)      17:15: 16:07                          s, ONCE, 1          

 [place]



     injection      00   :00                           dose, Mon           Medic

al



     120 mL                                         21 at           [place]



                                                  1215,           



                                                  Routine           







Immunizations







           Ordered    Filled Immunization Date       Status     Comments   [facility]

e



           Immunization Name Name                                        

 

           Pneumococcal            2022 Completed             [facility]

h



           20-valent Vaccine            00:00:00                         

 

           Pneumococcal            2022 Completed             [facility]

h



           20-valent Vaccine            00:00:00                         

 

           Pneumococcal            2022 Completed             [facility]

h



           20-valent Vaccine            00:00:00                         

 

           SARS-COV-2 COVID-19            2021 Completed             Unive

rsity of



           PFIZER VACCINE            00:00:00                         [facility]

 

           SARS-COV-2 COVID-19            2021 Completed             Unive

rsity of



           PFIZER VACCINE            00:00:00                         [facility]

 

           SARS-COV-2 COVID-19            2021 Completed             Unive

rsity of



           PFIZER VACCINE            00:00:00                         [place] [facility]

[facility]



                                                                  [facility]







Vital Signs







             Vital Name   Observation Time Observation Value Comments     Source

 

             WEIGHT       2023 04:25:00 87.7 kg                   

 

             WEIGHT       2023 04:04:00 90.2 kg                   

 

             WEIGHT       2023 08:49:00 90.2 kg                   

 

             WEIGHT       2023 04:32:00 93.4 kg                   

 

             WEIGHT       2023 05:16:00 102 kg                    

 

             WEIGHT       2023 03:54:00 99.4 kg                   

 

             WEIGHT       2023 03:29:00 99.1 kg                   

 

             WEIGHT       2023 04:21:00 99.9 kg                   

 

             WEIGHT       2023-07-15 04:21:00 100.5 kg                  

 

             WEIGHT       2023 05:00:00 98.3 kg                   

 

             WEIGHT       2023 03:29:00 94.5 kg                   

 

             WEIGHT       2023 03:36:00 97.7 kg                   

 

             WEIGHT       2023 20:51:00 99.7 kg                   

 

             HEIGHT       2023 20:51:00 167.6 cm                  

 

             WEIGHT       2023 05:07:00 87.5 kg                   

 

             WEIGHT       2023 05:00:00 89.3 kg                   

 

             WEIGHT       2023 05:00:00 90.1 kg                   

 

             WEIGHT       2023 04:25:00 87.7 kg                   

 

             WEIGHT       2023 04:04:00 90.2 kg                   

 

             WEIGHT       2023 08:49:00 90.2 kg                   

 

             WEIGHT       2023 04:32:00 93.4 kg                   

 

             WEIGHT       2023 05:16:00 102 kg                    

 

             WEIGHT       2023 03:54:00 99.4 kg                   

 

             WEIGHT       2023 03:29:00 99.1 kg                   

 

             WEIGHT       2023 04:21:00 99.9 kg                   

 

             WEIGHT       2023-07-15 04:21:00 100.5 kg                  

 

             WEIGHT       2023 05:00:00 98.3 kg                   

 

             WEIGHT       2023 03:29:00 94.5 kg                   

 

             WEIGHT       2023 03:36:00 97.7 kg                   

 

             WEIGHT       2023 20:51:00 99.7 kg                   

 

             HEIGHT       2023 20:51:00 167.6 cm                  

 

             WEIGHT       2023 05:07:00 87.5 kg                   

 

             WEIGHT       2023 05:00:00 89.3 kg                   

 

             WEIGHT       2023 05:00:00 90.1 kg                   

 

             HEIGHT       2023 20:00:00 165.1 cm                  

 

             WEIGHT       2023 20:00:00 81.3 kg                   

 

             HEIGHT       2023 08:12:00 165.1 cm                  

 

             WEIGHT       2023 08:12:00 75 kg                     

 

             HEIGHT       2023 20:00:00 165.1 cm                  

 

             WEIGHT       2023 20:00:00 81.3 kg                   

 

             HEIGHT       2023 08:12:00 165.1 cm                  

 

             WEIGHT       2023 08:12:00 75 kg                     

 

             Systolic blood 2023 20:35:00 119 mm[Hg]                Univer

sity of



             pressure                                            [facility]

 

             Diastolic blood 2023 20:35:00 60 mm[Hg]                 Unive

rsity of



             [facility]

 

             Heart [facility]   2023 20:35:00 86 /min                   [facility]

 

             Body temperature 2023 20:35:00 37.28 Nickie                 [facility]

 

             Respiratory rate 2023 20:35:00 16 /min                   [facility]

 

             Oxygen saturation in 2023 20:35:00 97 /min                   

[facility]



             Arterial blood by                                        [place] [facility]

[facility]



             Pulse oximetry                                        [place]

 

             Body weight  2023 22:59:00 84.687 kg                 [facility]

 

             BMI          2023 22:59:00 27.57 kg/m2               [facility]

 

             Body height  2023 21:39:38 175.3 cm                  [facility]

 

             Systolic blood 2021 17:32:00 117 mm[Hg]                Univer

sity of



             pressure                                            [facility]

 

             Diastolic blood 2021 17:32:00 75 mm[Hg]                 Unive

rsity of



             [facility]

 

             Heart rate   2021 17:32:00 82 /min                   [facility]

 

             Body temperature 2021 17:32:00 36.5 Nickie                  [facility]

 

             Oxygen saturation in 2021 17:32:00 98 /min                   

[facility]



             Arterial blood by                                        [place] [facility]

[facility]



             Pulse oximetry                                        Br[facility]

 

             Respiratory rate 2021 13:18:00 20 /min                   Univ

ersity of



                                                                 [facility]

 

             Body height  2021 01:49:00 167.6 cm                  Universi

ty of



                                                                 [place] [facility]



                                                                 [place]

 

             Body weight  2021 01:49:00 77.111 kg                 Universi

ty of



                                                                 [facility]

 

             BMI          2021 01:49:00 27.44 kg/m2               Universi

ty [facility]

 

             Systolic blood 2021 20:28:00 118 mm[Hg]                Univer

sity of



             pressure                                            [facility]

 

             Diastolic blood 2021 20:28:00 69 mm[Hg]                 Unive

rsity of



             pressure                                            [facility]

 

             Heart rate   2021 20:28:00 79 /min                   Universi

ty of



                                                                 [facility]

 

             Body temperature 2021 20:28:00 36.83 Nickie                 [facility]

 

             Respiratory rate 2021 20:28:00 18 /min                   [facility]

 

             Oxygen saturation in 2021 20:28:00 95 /min                   

[facility]



             Arterial blood by                                        [place] [facility]

[facility]



             Pulse oximetry                                        Branch

 

             Body height  2021 21:12:00 167.6 cm                  Universi

ty of



                                                                 [place] [facility]



                                                                 [place]

 

             Body weight  2021 14:47:00 90.719 kg                 Universi

ty of



                                                                 [facility]

 

             BMI          2021 14:47:00 32.28 kg/m2               Universi

[facility]

 

             Systolic blood 2022 12:48:00 113 mm[Hg]                [facility]



             pressure                                            

 

             Diastolic blood 2022 12:48:00 64 mm[Hg]                 [facility]



             pressure                                            

 

             Heart rate   2022 12:48:00 64 /min                   [facility]

[facility]

 

             Body temperature 2022 12:48:00 37 Nickie                    [facility]

 

             Respiratory rate 2022 12:48:00 18 /min                   [facility]

 

             Body height  2022 12:48:00 167.6 cm                  [facility]

 

             Body weight  2022 12:48:00 73.256 kg                 [facility]

[facility]

 

             BMI          2022 12:48:00 26.07 kg/m2               [facility]

 

             Oxygen saturation in 2022 12:48:00 100 /min                  

[facility]



             Arterial blood by                                        



             Pulse oximetry                                        

 

             Systolic blood 2021 20:28:00 118 mm[Hg]                Univer

sity of



             pressure                                            [facility]

 

             Diastolic blood 2021 20:28:00 69 mm[Hg]                 [facility]



             [facility]

 

             Heart rate   2021 20:28:00 79 /min                   [facility]

 

             Body temperature 2021 20:28:00 36.83 Nickie                 [facility]

 

             Respiratory rate 2021 20:28:00 18 /min                   [facility]

 

             Oxygen saturation in 2021 20:28:00 95 /min                   

[facility]



             Arterial blood by                                        [place] [facility]

[facility]



             Pulse oximetry                                        [place]

 

             Body height  2021 21:12:00 167.6 cm                  [facility]

 

             Body weight  2021 14:47:00 90.719 kg                 [facility]

 

             BMI          2021 14:47:00 32.28 kg/m2               [facility]







Procedures







                Procedure       Date / Time     Performing Clinician Source



                                Performed                       

 

                MAGNESIUM       2023 10:30:00 Izaiah [facility]

 

                BASIC METABOLIC PANEL (NA, 2023 10:30:00 Fidencio Bliss   [facility]



                K, CL, CO2, GLUCOSE, BUN,                                 Medica

l Branch



                CREATININE, [place])                                 

 

                CBC WITHOUT DIFF 2023 10:30:00 Izaiah [facility]

 

                MAGNESIUM       2023 20:51:00 Izaiah [facility]

 

                BASIC METABOLIC PANEL (NA, 2023 20:51:00 Fidencio Bliss   [facility]



                K, CL, CO2, GLUCOSE, BUN,                                 Medica

l Branch



                CREATININE, CA)                                 

 

                MAGNESIUM       2023 09:59:00 Jin Gutiérrez [facility]

 

                BASIC METABOLIC PANEL (NA, 2023 09:59:00 Rashmi Francisco   [facility] [place]



                K, CL, CO2, GLUCOSE, BUN,                                 Medica

l Branch



                CREATININE, CA)                                 

 

                CBC WITHOUT DIFF 2023 09:59:00 Rashmi Francisco   [facility]

 

                PROTHROMBIN TIME / INR 2023 09:59:00 Rashmi Francisco   [facility]

 

                FIBRINOGEN      2023 09:59:00 Nolan [facility]

 

                PROTHROMBIN TIME / INR 2023 11:25:00 Curt Franciscooja   [facility]

 

                FIBRINOGEN      2023 11:25:00 [facility]

 

                RETICULOCYTES AUTOMATED 2023 11:25:00 [facility] 

[facility]

 

                MAGNESIUM       2023 08:36:00 [facility]

 

                BASIC METABOLIC PANEL (NA, 2023 08:36:00 Tracy White [facility]



                K, CL, CO2, GLUCOSE, BUN,                 Amjad S.        Medica

l [place]



                CREATININE, CA)                                 

 

                CBC WITH DIFF   2023 08:36:00 [facility]

 

                BLOOD CULTURE SCREEN 2023 03:07:00 Rashmi Francisco   [facility]

 

                FIBRINOGEN      2023 22:21:00 [facility]

 

                PREPARE CRYOPRECIPITATE 2023 18:41:31 Rashmi Francisco   [facility]

 

                TRANSTHORACIC ECHO (TTE) 2023 17:09:13 [facility]

 [facility]



                COMPLETE W/ CONTRAST                 Amjad S.        Medical Bra

nch

 

                MAGNESIUM       2023 08:53:00 [facility]

 

                BASIC METABOLIC PANEL (NA, 2023 08:53:00 Rashmi Francisco   [facility] [place]



                K, CL, CO2, GLUCOSE, BUN,                                 Medica

l Branch



                CREATININE, CA)                                 

 

                CBC WITH DIFF   2023 08:53:00 [facility]

[facility]

 

                PROTHROMBIN TIME / INR 2023 08:53:00 Rashmi Francisco   [facility]

 

                FIBRINOGEN      2023 08:53:00 [facility]

f [facility]

 

                VANCOMYCIN TROUGH 2023 04:17:00 Sincere Griffith  [facility]

 

                LACTIC ACID WHOLE BLOOD 2023 21:48:00 Norman Logan  [facility]

 

                TRANSFUSE PACKED RBC 2023 20:00:00 Rashmi Francisco   [facility]

 

                PREPARE PACKED RBC 2023 19:27:30 Curt Franciscooja   [facility]

 

                LACTIC ACID WHOLE BLOOD 2023 17:09:00 [facility]

 

                CBC WITHOUT DIFF 2023 17:08:00 [facility]

 

                TRANSFUSE PACKED RBC 2023 14:35:00 Rashmi Francisco   [facility]

 

                CBC WITHOUT DIFF 2023 13:11:00 [facility]

 

                US ABDOMEN LIMITED 2023 12:59:00 Tracy White [facility]

 

                LACTIC ACID WHOLE BLOOD 2023 11:46:00 Tracy White 

[facility]

 

                PROTHROMBIN TIME / INR 2023 11:45:00 Rashmi Francisco   [facility]

 

                TRANSFUSE PACKED RBC 2023 10:31:00 Tracy White [facility]

 

                TRANSFUSE CRYOPRECIPITATE 2023 10:28:00 Sincere Griffith  [facility]

 

                PREPARE CRYOPRECIPITATE 2023 10:16:18 Sincere Griffith  [facility]

 

                PREPARE PACKED RBC 2023 10:16:18 Tracy White [facility]

 

                CBC WITHOUT DIFF 2023 08:46:00 Tracy White [facility]

 

                LACTIC ACID WHOLE BLOOD 2023 08:46:00 Carlos Enrique Rendon

[facility]

 

                BASIC METABOLIC PANEL (NA, 2023 08:45:00 Carlos Enrique Rendon     [facility]



                K, CL, CO2, GLUCOSE, BUN,                                 Medica

l Branch



                CREATININE, CA)                                 

 

                HEPATITIS B SURFACE 2023 08:45:00 Tracy White [facility]



                ANTIGEN                         [facility]

 

                HEPATITIS B CORE ANTIBODY 2023 08:45:00 Tracy White [facility]



                IGM                             [facility]

 

                TRANSFUSE PACKED RBC 2023 06:30:00 Tracy White [facility]

 

                URINE DRUG (IMMUNOASSAY) - 2023 03:56:00 Rashmi Francisco   [facility] [place]



                COMPREHENSIVE DRUG SCREEN                                 Medica

l [place]

 

                URINALYSIS      2023 03:56:00 Curt Franciscooja   [place] o

f [facility]

 

                GALV ONLY - URINE DRUG 2023 03:56:00 Rashmi Francisco   [facility]



                (LCMSMS) - WILIAM PANEL                                 Medical Br

anch

 

                AC PANEL 20 + LACTIC ACID 2023 03:56:00 Tracy White [facility]

 

                IRON PANEL      2023 03:55:00 Tracy White [facility]

 

                CBC WITHOUT DIFF 2023 03:55:00 Rashmi Francisco   [facility]

 

                HEPATITIS B SURFACE 2023 03:55:00 Tracy White [facility]



                ANTIBODY                        [facility]

 

                HCV ANTIBODY    2023 03:55:00 ObTracy pardo [facility]

 

                SYPHILIS IGG/IGM 2023 03:55:00 Alysa Merrill [facility]

 

                TRANSFUSE PACKED RBC 2023 01:15:00 Rashmi Francisco   [facility]

 

                PREPARE PACKED RBC 2023 00:29:29 Rashmi Francisco   [facility]

 

                AC PANEL 20 + LACTIC ACID 2023 00:05:00 Clive Luther   [facility]

 

                AC PANEL 21 + LACTIC ACID 2023 00:00:00 Rashmi Francisco   Un

[facility]

 

                BLOOD CULTURE SCREEN 2023 23:57:00 Curt Franciscooja   [facility]

 

                PHOSPHORUS      2023 23:57:00 [facility]

 

                CREATINE KINASE 2023 23:57:00 [facility]

 

                MAGNESIUM       2023 23:57:00 [facility]

 

                OSMOLALITY, SERUM OR 2023 23:57:00 [facility]



                PLASMA                          [facility]

 

                AMMONIA, PLASMA 2023 23:57:00 [facility]

 

                FREE T4         2023 23:57:00 Tracy White [facility]



                                                [facility]

 

                THYROID STIMULATING 2023 23:57:00 Tracy White [facility]



                HORMONE                         [facility]

 

                HEPATIC FUNCTION PANEL 2023 23:57:00 Nolan Rashmi   [facility]



                ([id number]) (ALB,T.PRO,BILI                                 Medical 

Branch



                T,BU/BC,ALT,AST,ALK PHOS)                                 

 

                BASIC METABOLIC PANEL (NA, 2023 23:57:00 Rashmi Francisco   [facility] [place]



                K, CL, CO2, GLUCOSE, BUN,                                 Medica

l Branch



                CREATININE, CA)                                 

 

                ETHANOL         2023 23:57:00 Francisco, [facility]

 

                CBC WITH DIFF   2023 23:57:00 [facility]

 

                FIBRINOGEN      2023 23:57:00 [facility]

 

                HCV ANTIBODY    2023 23:57:00 Nacho [facility]

 

                BLOOD CULTURE WORKUP 2023 23:57:00 Nolan Rashmi   [facility]

 

                HIV 1/2 AG-AB WITH REFLEX 2023 23:57:00 Alysa Merrill

Located within [facility]

 

                GRAM NEGATIVE BLOOD 2023 23:57:00 Nolan Rashmi   [facility] DNA                                   [facility]



                PROBE-AEROBIC                                   

 

                XR FEMUR 2 VW LEFT 2023 22:58:00 Vahibe, Clive   [facility]

 

                XR KNEE <3 VW LEFT 2023 22:58:00 Vahibe, Clive   [facility]

 

                XR TIBIA FIBULA 2 VW LEFT 2023 22:58:00 Vahibe, Clive   [facility]

 

                CT TRAUMA HEAD WO CONTRAST 2023 22:07:00 Vahibe, Clive   U

[facility]

 

                CT TRAUMA THORAX W 2023 22:07:00 Vahibe, Clive   [facility]



                CONTRAST                                        [facility]

 

                CT TRAUMA CERVICAL SPINE 2023 22:07:00 Vahibe, Clive   [facility]i

[facility]                                     [facility]

 

                CT TRAUMA THORACIC SPINE 2023 22:07:00 Vahibe, Clive   Uni

[facility] CONTRAST                                     [facility]

 

                CT TRAUMA ABDOMEN PELVIS W 2023 22:07:00 [facility]ibe, [facility]ive   [facility]



                [facility]                                        [facility]

 

                CT TRAUMA LUMBAR SPINE WO 2023 22:07:00 Vahibe, Clive   [facility]



                CONTRAST                                        [facility]

 

                BASIC METABOLIC PANEL (NA, 2023 21:42:00 MrazekChantell     U

[facility]



                K, CL, CO2, GLUCOSE, BUN,                                 Medica

l Branch



                CREATININE, [place])                                 

 

                CBC WITHOUT DIFF 2023 21:42:00 Chantell Johnson     [facility]

 

                PROTHROMBIN TIME / INR 2023 21:42:00 Chantell Johnsone

[facility]

 

                ACTIVATED PARTIAL THRMPLAS 2023 21:42:00 Chantell Johnson

[facility]

 

                HB ABO GROUPING 2023 21:42:00 Chantell Johnson     [place] o

f [facility]

 

                EXTRA TUBE DK. GREEN 2023 21:42:00 Chantell Johnson

[facility]

 

                COMPREHENSIVE METABOLIC 2022 10:33:00 Santiago Bonilla

[facility]



                PANEL                                           

 

                HEMOGLOBIN A1C  2022 10:33:00 Santiago Bonilla

 

                LIPID PROFILE   2022 10:33:00 Santiago Bonilla

 

                CBC/DIFF        2022 10:33:00 Santiago Bonilla

 

                CBC             2022 10:33:00 Santiago Bonilla

 

                DIFFERENTIAL, MANUAL-WAM 2022 10:33:00 Santiago Bonilla

[facility]

 

                FECAL OCCULT BLOOD 2022 08:00:00 Santiago Bonilla

 

                HEMOCCULT KIT FOR SPECIMEN 2022 13:11:09 Santiago Bonilla

[facility]



                COLLECTION AT HOME                                 

 

                MAGNESIUM       2022-07-15 05:50:00 Kevin Orona

 

                COMPREHENSIVE METABOLIC 2022-07-15 05:50:00 Kevin Orona

[facility]



                PANEL                                           

 

                PT/INR/PTT      2022-07-15 05:50:00 Kevin Orona

 

                CBC/DIFF        2022-07-15 05:50:00 Grecia Lagunas

lt

 

                CBC             2022-07-15 05:50:00 Grecia Lagunas

[facility]

 

                DIFFERENTIAL, MANUAL (NO 2022-07-15 05:50:00 Grecia Lagunas

[facility]



                MORPHOLOGY)-[facility]                                 

 

                ECH GI PROC EGD 2022 15:59:00 Derrek Phoenix [facility]



                DIAGNOSTIC BRUSH WASH                                 

 

                IP CONSULT TO PHYSICAL 2022 08:59:09 Grecia Lagunas [facility]



                THERAPY                                         

 

                HGB/HCT         2022 06:06:00 Shawna Cervantes He

alth

 

                MAGNESIUM       2022 03:08:00 Adwoa Kevin   [facility]

 

                COMPREHENSIVE METABOLIC 2022 03:08:00 Adwoa Kevin   [facility]



                PANEL                                           

 

                PT/INR/PTT      2022 03:08:00 Adwoa Kevin   [facility]

h

 

                CBC/DIFF        2022 03:08:00 Grecia Lagunas [facility]

lt

 

                CBC             2022 03:08:00 Grecia Lagunas [facility]

lt

 

                DIFFERENTIAL, MANUAL-WAM 2022 03:08:00 Grecia Lagunas [facility]

 

                PT/INR/PTT      2022 17:47:00 Marguerite Thapa [facility]

 

                GAMMA GLUTAMYL TRANSFERASE 2022 17:47:00 Alanis [facility]



                (GGT)                                           

 

                CELIAC DISEASE  2022 17:47:00 Alanis [facility]

 

                CERULOPLASMIN   2022 17:47:00 Alanis [facility]

 

                ALPHA-1-ANTITRYPSIN 2022 17:47:00 Alanis [facility]

 

                MAI             2022 17:47:00 Alanis [facility]

 

                MITOCHONDRIAL (M2) 2022 17:47:00 Alanis [facility]



                ANTIBODY                                        

 

                LIVER KIDNEY MICR 2022 17:47:00 AlanisMarguerite [facility]

ealth

 

                IGM             2022 17:47:00 Alanis [facility]

 

                ABD PARACENTESIS W/IMAGING 2022 14:13:43 Tanisha Sr    [facility]



                                                Mohammad        

 

                ALBUMIN, FLD    2022 13:47:00 Grecia Lagunas [facility]

 

                T PROTEIN, FLD  2022 13:47:00 Grecia Lagunas [facility]

 

                CELL COUNT, FLUID 2022 13:47:00 Grecia Lagunas [facility]

[facility]

 

                GLUCOSE, FLD    2022 13:47:00 Grecia Lagunas [facility]

 

                CELL COUNT, BODY FLUID 2022 13:47:00 Grecia Lagunas [facility]

 

                DIFFERENTIAL, CELL COUNT 2022 13:47:00 Grecia Lagunas [facility]

 

                FLUID CULTURE AND GRAM 2022 13:47:00 Nila Lagunasanna LINDA [facility]



                STAIN                                           

 

                TRANSFUSE PLATELETS 2022 10:20:00 Nila Lagunasanna LINDA [facility]



                (NURSING)                                       

 

                SARS-COV-2, FLU A/B, RSV 2022 08:51:00 Derrek Phoenix [facility]

 

                CORONAVIRUS, COVID-19, WILLIAM 2022 08:51:00 Derrek Phoenix [facility]

 

                MAGNESIUM       2022 04:30:00 Kevin Orona   [facility]

 

                COMPREHENSIVE METABOLIC 2022 04:30:00 Kevin Orona   [facility]



                PANEL                                           

 

                PT/INR/PTT      2022 04:30:00 [facility]

 

                CBC/DIFF        2022 04:30:00 Grecia Lagunas [facility]

 

                TYPE AND SCREEN 2022 04:30:00 Derrek Phoenix [facility]

[facility]

 

                CBC             2022 04:30:00 Grecia Lagunas [facility]

 

                T&S - COLLECTION 2022 04:30:00 Derrek Phoenix [facility]

 

                PHOSPHORUS      2022 04:30:00 Grecia Lagunas [facility]

 

                RBC MORPHOLOGY-WAM 2022 04:30:00 Nila Lagunasanna LINDA [facility]

 

                PREPARE PLATELETS (LAB) 2022 04:30:00 Grecia Lagunas [facility]

 

                PREPARE CROSSMATCH RBC 2022 04:30:00 Grecia Lagunas [facility]



                UNITS                                           

 

                HEPATITIS A VIRUS AB IGG 2022 13:40:00 Grecia Lagunas [facility]

 

                MAGNESIUM       2022 03:44:00 [facility]

 

                COMPREHENSIVE METABOLIC 2022 03:44:00 Adwoa, [facility]



                PANEL                                           

 

                PT/INR/PTT      2022 03:44:00 Adwoa, [facility]

 

                CBC/DIFF        2022 03:44:00 Grecia Lagunas [facility]

 

                CBC             2022 03:44:00 Grecia Lagunas [facility]

 

                DIFFERENTIAL, MANUAL-WAM 2022 03:44:00 Grecia Lagunas [facility]

 

                XRAY ABDOMEN 1 VIEW 2022 19:34:18 Shayna Tabares [facility]

 

                BLOOD CULTURE   2022 14:02:00 [facility]

th

 

                CT HEAD W/O CONTRAST 2022 11:29:00 Anna Vásquez [facility]

 

                XRAY CHEST 1 VIEW 2022 10:12:00 Grecia Lagunas [facility]

 

                URINALYSIS W/REFLEX TO 2022 10:05:00 Grecia Lagunas [facility]



                URINE CULTURE                                   

 

                URINALYSIS      2022 10:05:00 Grecia Lagunas [facility]

 

                URINE CULTURE COLLECTION 2022 10:05:00 Grecia Lagunas [facility]



                KIT                                             

 

                HGB/HCT         2022 09:39:00 Grecia Lagunas [facility]

 

                LACTIC ACID     2022 09:37:00 Grecia Lagunas [facility]

 

                12 LEAD EKG     2022 09:29:59 Grecia Lagunas [facility]

 

                BLOOD GAS, ARTERIAL [facility]22 [facility]29[facility]00 [facility]

 

                BLOOD GAS LACTIC ACID, 2022 09:29:00 Grecia Lagunas [facility]

[facility]



                VENOUS                                          

 

                GLUCOSE POC     2022 09:14:00 MayMarcy



 

                MAGNESIUM       2022 04:47:00 AdwoaKevin chang



 

                COMPREHENSIVE METABOLIC 2022 04:47:00 Kevin Orona

[facility]



                PANEL                                           

 

                PT/INR/PTT      2022 04:47:00 Kevin Orona

h

 

                FIBRINOGEN      2022 04:47:00 Grecia Lagunas

lt

 

                CBC/DIFF        2022 04:47:00 Grecia Lagunas

lt

 

                CBC             [facility]:47[facility]

 

                INFUSION PUMP   2022-07-10 20:48:21 May, Marcy Polk



 

                CONSULT CLINICAL CASE 2022-07-10 18:36:47 [facility]



                MANAGEMENT (RN/SW)                                 

 

                SEQUENTIAL COMPRESSION 2022-07-10 18:22:34 May, Marcy Abel

[facility]



                PUMP                                            

 

                SEQUENTIAL COMPRESSION 2022-07-10 18:22:34 May, Marcy Abel

[facility]



                PUMP                                            

 

                HGB/HCT         2022-07-10 15:32:00 Grecia Lagunas [facility]

 

                TRANSFUSE (RED CELL UNITS 2022-07-10 11:35:00 Grecia Lagunas [facility]



                - NURSING)                                      

 

                MAGNESIUM       2022-07-10 04:02:00 Kevin Orona



 

                COMPREHENSIVE METABOLIC 2022-07-10 04:02:00 Kevin Orona

[facility]



                PANEL                                           

 

                PT/INR/PTT      2022-07-10 04:02:00 Kevin Orona



 

                AFP, TUMOR MARKER 2022-07-10 04:02:00 Grecia Lagunas 

ealt

 

                COPPER, SERUM OR PLASMA 2022-07-10 04:02:00 Grecia Lagunas

[facility]

 

                RETIC COUNT     2022-07-10 04:02:00 Grecia Lagunas [facility]

 

                HAPTOGLOBIN     2022-07-10 04:02:00 Grecia Lagunas [facility]

lth

 

                LACTATE DEHYDROGENASE 2022-07-10 04:02:00 Grecia Lagunas

[facility]



                (LDH)                                           

 

                AMMONIA         2022-07-10 04:02:00 Grecia Lagunas On[facility]

lth

 

                CEA             2022-07-10 04:02:00 Grecia Lagunas linda

lt

 

                HGB/HCT         2022 22:57:00 Sujata Mueller [facility]

 

                DUPLEX DOPPLER ABD/PEL 2022 21:00:00 Grecia Lagunas [facility]



                VASCULAR STUDY, COMPLETE                                 

 

                U/S ABDOMEN     2022 20:35:00 Grecia Lagunas [facility]

lt

 

                CT CHEST W CONTRAST 2022 20:05:18 Grecia Lagunas [facility]

 

                CT ABDOMEN AND PELVIS 2022 20:05:18 Grecia Lagunas

[facility]



                CONTRAST                                        

 

                HGB/HCT         2022 15:26:00 Grecia Lagunas [facility]

lth

 

                HGB/HCT         2022 08:39:00 Kevin Orona

 

                CBC/DIFF        2022 05:37:00 Kevin Orona

 

                COMPREHENSIVE METABOLIC 2022 05:37:00 Kevin Orona

[facility]



                PANEL                                           

 

                CBC             2022 05:37:00 Kevin Orona

 

                SAVE SMEAR/ NOT FOR PATH 2022 05:37:00 Kevin Orona   [facility]



                REVIEW                                          

 

                DIFFERENTIAL, MANUAL-WAM 2022 05:37:00 Kevin Orona   [facility]

 

                MAGNESIUM       2022 04:40:00 Kevin Orona

h

 

                PT/INR/PTT      2022 04:40:00 Kevin Orona

h

 

                FOLIC ACID      2022 04:40:00 Kevin Orona

h

 

                IRON PROFILE    2022 04:40:00 [facility]

[facility]

 

                HIV AG/AB COMBO ROUTINE 2022 04:40:00 Kevin Orona

[facility]



                SCREENING                                       

 

                HEPATITIS PANEL 2022 04:40:00 Ulises Oronash   Joe Bautista



 

                TRANSFUSE (RED CELL UNITS 2022 02:45:00 João Bernal

[facility]



                - NURSING)                                      

 

                URINALYSIS      2022 01:38:00 Kevin Orona

 

                SODIUM, URINE, RANDOM 2022 01:38:00 Ulises Oronash   Joe

 [facility]

 

                URINALYSIS      2022 01:38:00 Ulises Oronash   [facility]

h

 

                TRANSFUSE (RED CELL UNITS 2022 22:30:00 João Bernal

[facility]



                - NURSING)                                      

 

                SEQUENTIAL COMPRESSION 2022 21:47:40 Adwoa [facility]



                PUMP                                            

 

                SEQUENTIAL COMPRESSION 2022 21:47:40 Adwoa [facility]



                PUMP                                            

 

                SARS-COV-2, FLU A/B, RSV 2022 20:45:00 João Bernal [facility]

 

                CORONAVIRUS, COVID-19, WILLIAM 2022 20:45:00 João Bernal

[facility]

 

                TRANSFUSE (RED CELL UNITS 2022 17:30:00 João Bernal

[facility]



                - NURSING)                                      

 

                12 LEAD EKG     2022 17:14:43 João Bernal

 

                TROPONIN I      2022 17:08:00 João Bernal

 

                THYROID STIMULATING 2022 17:08:00 Kevin Orona

ealt



                HORMONE (TSH)                                   

 

                VITAMIN B12     2022 17:08:00 Kevin Orona

 

                FERRITIN        2022 17:08:00 Kevin Orona

 

                TYPE AND SCREEN 2022 15:47:00 Harry Jang



 

                T&S - COLLECTION 2022 15:47:00 Harry Jang [facility]

lth

 

                ABO/RH CONFIRMATION 2022 15:47:00 Harry Jang 

[facility]

 

                PREPARE CROSSMATCH RBC 2022 15:47:00 Grecia Lagunas [facility]



                UNITS                                           

 

                XRAY CHEST 2 VIEWS 2022 11:31:00 Victoria Capone He

alth

 

                CBC/DIFF        2022 11:00:00 [facility]



 

                BASIC METABOLIC PANEL 2022 11:00:00 Victoria Capone

 [facility]

 

                LIVER PROFILE   2022 11:00:00 Victoria Capone [facility]

 

                PT/INR/PTT      2022 11:00:00 Victoria Capone [facility]

 

                B-TYPE NATRIURETIC PEPTIDE 2022 11:00:00 Victoria Capone

arris [facility]



                (BNP)                                           

 

                CBC             2022 11:00:00 Victoria Capone [facility]

pako

 

                DIFFERENTIAL, MANUAL-WAM 2022 11:00:00 Victoria Capone

[facility]

 

                SYPHILIS SCREEN FOR 2022 11:00:00 Kevin Orona

ealtpako



                INFECTION                                       

 

                BASIC METABOLIC PANEL (NA, 2021 10:29:00 Jorge Luis Sutton

[facility]



                K, CL, CO2, GLUCOSE, BUN,                                 Medica

l Branch



                CREATININE, CA)                                 

 

                CBC WITH DIFF   2021 10:29:00 Lesley [facility]

 

                ALBUMIN BODY FLUID 2021 06:00:00 Dwight Gan [facility]

 

                T.PROTEIN BODY FLUID 2021 06:00:00 Dwight Gan [facility]

 

                BODY FLUID DIRECT COUNT 2021 06:00:00 Jorge Luis Sutton [facility]

 

                BODY FLUID (BACTEC BOTTLE) 2021 06:00:00 Robert Snell

[facility]

 

                COVID-19 (ID NOW RAPID 2021 20:46:00 Jorge Luis Sutton [facility]



                TESTING)                                        Medical Branch

 

                LAB ONLY COVID  2021 20:46:00 Lesley [facility]



                INTERPRETATION                                  [facility]

 

                HEPATIC FUNCTION PANEL 2021 16:17:00 Jean Latham   [facility]



                ([id number]) (ALB,T.PRO,BILI                                 Medical 

Branch



                T,BU/BC,ALT,AST,ALK PHOS)                                 

 

                BASIC METABOLIC PANEL (NA, 2021 16:17:00 Jean Latham   [facility]



                K, CL, CO2, GLUCOSE, BUN,                                 Medica

l Branch



                CREATININE, CA)                                 

 

                CBC WITH DIFF   2021 16:17:00 Jean Latham   [facility]

 

                PROTHROMBIN TIME / INR 2021 16:17:00 Jean Latham   [facility]

 

                CT ABDOMEN PELVIS W 2021 16:09:21 Jean Latham   [facility]

 

                CONSENT/REFUSAL FOR 2021 15:22:40 Doctor Unassigned, [facility]



                DIAGNOSIS AND TREATMENT                 [place]         [facility]

 

                PREPARE PACKED RBC 2021 23:59:02 Nani [facility]

 

                PREPARE PACKED RBC 2021 23:59:02 Víctornicola [facility]

 

                COMP. METABOLIC PANEL 2021 14:45:00 [facility] [place]



                ([id number])                                         [facility]

 

                CBC WITH DIFF   2021 14:45:00 [facility]

 

                COMP. METABOLIC PANEL 2021 14:45:00 Moe [facility]



                ([id number])                                         [facility]

 

                CBC WITH DIFF   2021 14:45:00 Moe [facility]

 

                IR              2021 18:05:00 Abu Brian [facility]



                PARACENTESIS/PERITONECENTE                                 Medic

[facility]



                SIS WITH IMAGING                                 

 

                IR              2021 18:05:00 Abu Brian, [facility]



                PARACENTESIS/PERITONECENTE                                 Medic

[facility]



                SIS WITH IMAGING                                 

 

                T.PROTEIN BODY FLUID 2021 18:01:00 Daisy Friend [facility]

 

                T.PROTEIN BODY FLUID 2021 18:01:00 Daisy Friend [facility]

 

                ALBUMIN BODY FLUID 2021 18:00:00 Amy Caldera [facility]

 

                BODY FLUID      2021 18:00:00 Amy Caldera [facility]



                CULTURE(AEROBIC/ANAEROBIC)                                 Medic

al Branch

 

                CYTO ABDOMINAL FLUID 2021 18:00:00 Amy Caldera [facility]

 

                BODY FLUID DIRECT COUNT 2021 18:00:00 Amy Caldera

[facility]

 

                BODY FLUID MANUAL DIFF 2021 18:00:00 Amy Caldera [facility]

 

                BODY FLUID      2021 18:00:00 Amy Caldera [facility]



                CULTURE(AEROBIC/ANAEROBIC)                                 Medic

al Branch

 

                ALBUMIN BODY FLUID 2021 18:00:00 Amy Caldera [facility]

 

                CYTO ABDOMINAL FLUID 2021 18:00:00 Amy Caldera [facility]

 

                PHOSPHORUS      2021 06:16:00 [facility]

 

                MAGNESIUM       2021 06:16:00 [facility]

 

                IONIZED CALCIUM 2021 06:16:00 [facility]

 

                CBC WITH DIFF   2021 06:16:00 Amy Caldera [facility]

 

                IONIZED CALCIUM 2021 06:16:00 [facility]

 

                CBC WITH DIFF   2021 06:16:00 Amy Caldera [facility]

 

                MAGNESIUM       2021 06:16:00 [facility]

 

                PHOSPHORUS      2021 06:16:00 [facility]

 

                PREPARE PACKED RBC 2021 02:14:47 Amy Caldera [facility]

 

                PREPARE PACKED RBC 2021 02:14:47 Amy Caldera [facility]

 

                US ABDOMEN LIMITED WITH 2021 00:42:57 Amy Caldera

[facility]

 

                US ABDOMEN LIMITED WITH 2021 00:42:57 Amy Caldera U

[facility]

 

                CERULOPLASMIN   2021 23:24:00 Marcy Rosa [facility]

 

                ALPHA 1 ANTITRYPSIN 2021 23:24:00 [facility]

 

                IMMUNOGLOBULIN G 2021 23:24:00 Moe [facility]

 

                BASIC METABOLIC PANEL (NA, 2021 23:24:00 Jd Caldera [facility]



                K, CL, CO2, GLUCOSE, BUN,                                 Medica

 Branch



                CREATININE, CA)                                 

 

                ALPHA FETOPROTEIN 2021 23:24:00 Moe [facility]

 

                ANTI-NUCLEAR ANTIBODY 2021 23:24:00 Marcy Rosa [facility]



                SCREEN                                          [facility]

 

                HEPATITIS B SURFACE 2021 [facility]24:00 [facility]



                ANTIBODY                                        [facility]

 

                HEPATITIS B SURFACE 2021 23:24:00 Marcy Rosa [facility]



                ANTIGEN                                         [facility]

 

                HCV ANTIBODY    2021 23:24:00 Marcy Rosa [facility]

 

                HBC ANTIBODY (IGM & IGG) 2021 23:24:00 Marcy Rosa [facility]

 

                HAV ANTIBODY (IGG AND IGM) 2021 23:24:00 Marcy Rosa [facility]

 

                ALPHA 1 ANTITRYPSIN 2021 23:24:00 Marcy Rosa [facility]

 

                ALPHA FETOPROTEIN 2021 23:24:00 Moe [facility]

 

                CERULOPLASMIN   2021 23:24:00 Marcy Rosa [facility]

 

                HCV ANTIBODY    2021 23:24:00 Moe [facility]

 

                IMMUNOGLOBULIN G 2021 23:24:00 Marcy Rosa [facility]

 

                SMOOTH MUSCLE AB,IGG 2021 23:24:00 Marcy Rosa [facility]



                W/REFLEX                                        [facility]

 

                ANTI-NUCLEAR ANTIBODY 2021 23:24:00 Marcy Rosa

[facility]



                SCREEN                                          [facility]

 

                HAV ANTIBODY (IGG AND IGM) 2021 23:24:00 Marcy Rosa U

[facility]

 

                HEPATITIS B SURFACE 2021 23:24:00 Marcy Rosa [facility]



                ANTIGEN                                         [facility]

 

                HEPATITIS B SURFACE 2021 23:24:00 Marcy Rosa [facility]



                ANTIBODY                                        [facility]

 

                HBC ANTIBODY (IGM & IGG) 2021 23:24:00 Marcy Rosa

[facility]

 

                BASIC METABOLIC PANEL (NA, 2021 23:24:00 Abu Jd Gonzales [facility]



                K, CL, CO2, GLUCOSE, BUN,                                 Medica

l Branch



                CREATININE, CA)                                 

 

                HB ABO GROUPING 2021 23:22:00 Abu Janet [facility]

 

                HB ABO GROUPING 2021 23:22:00 Abu Janet [facility]

 

                CBC WITH DIFF   2021 22:37:00 Abu [facility]

 

                PROTHROMBIN TIME / INR 2021 22:37:00 Marcy Rosa

[facility]

 

                CBC WITH DIFF   2021 22:37:00 Abu Brian [facility]

 

                PROTHROMBIN TIME / INR 2021 22:37:00 Marcy Rosa

[facility]

 

                ABORH CONFIRMATION 2021 17:39:00 [facility]

[facility]

 

                ABORH CONFIRMATION 2021 17:39:00 [facility]

[facility]

 

                HB ABO GROUPING 2021 17:03:00 Maddie Cardoza [facility]

 

                HB ABO GROUPING 2021 17:03:00 Maddie Cardoza [facility]

 

                URINALYSIS      2021 16:46:00 Maddie Cardoza [facility]

 

                URINALYSIS      2021 16:46:00 Maddie Cardoza [facility]

 

                CT ABDOMEN PELVIS W 2021 16:12:42 Maddie Cardoza [facility]

 

                CT ABDOMEN PELVIS W 2021 16:12:42 Maddie Cardoza [facility]

 

                XR CHEST 1 VW   2021 15:24:06 Maddie Cardoza [facility]

 

                XR CHEST 1 VW   2021 15:24:06 Maddie Cardoza [facility]

 

                HB ECG ROUTINE & RHYTHM 2021 15:15:37 Maddie Cardoza 

[facility]

 

                HB ECG ROUTINE & RHYTHM 2021 15:15:37 Maddie Cardoza 

[facility]

 

                LIPASE          2021 15:13:00 Maddie Cardoza [facility]

 

                FERRITIN SERUM  2021 15:13:00 Moe Marcy [facility]

 

                TROPONIN I      2021 15:13:00 Maddie Cardoza [facility]

 

                COMP. METABOLIC PANEL 2021 15:13:00 Maddie Cardoza [facility]



                ([id number])                                         [facility]

 

                CBC WITH DIFF   2021 15:13:00 Maddie Cardoza [facility]

 

                N-TERMINAL PRO-BNP 2021 15:13:00 Maddie Cardoza [facility]

 

                COVID-19 (ID NOW RAPID 2021 15:13:00 Maddie Cardoza [facility]



                TESTING)                                        Medical Branch

 

                COVID-19 (ID NOW RAPID 2021 15:13:00 Maddie Cardoza [facility]



                TESTING)                                        Medical Branch

 

                CBC WITH DIFF   2021 15:13:00 Maddie Cardoza [facility]

 

                COMP. METABOLIC PANEL 2021 15:13:00 Maddie Cardoza [facility]



                ([id number])                                         Medical Branch

 

                TROPONIN I      2021 15:13:00 [facility] [place]



                                                                Medical Branch

 

                LIPASE          2021 15:13:00 Maddie Cardoza [facility]

 

                N-TERMINAL PRO-BNP 2021 15:13:00 Maddie Cardoza [facility]

 

                FERRITIN SERUM  2021 15:13:00 Marcy Rosa [facility]

 

                CONSENT/REFUSAL FOR 2021 14:32:16 Doctor Richi [facility]



                DIAGNOSIS AND TREATMENT                 [place]         [facility] 

Branch

 

                NOTICE OF PRIVACY 2021 14:31:32 Doctor Richi, [facility]



                PRACTICES                       [place]         [facility]

 

                EMERGENCY DEPARTMENT 2021 05:01:00 Doctor Unassigned, [facility]



                DOCUMENTS                       [place]         [facility]

 

                AGREEMENTS AUTHORIZATIONS 2021 05:01:00 Doctor Richi

 [facility]



                AND IRREVOCABLE                 [place]         [facility]



                ASSIGNMENTS (FORM 2001)                                 

 

                AGREEMENTS AUTHORIZATIONS 2021 05:01:00 Doctor Richi

 [facility]



                AND IRREVOCABLE                 [place]         [facility]



                ASSIGNMENTS (FORM 2001)                                 

 

                DISCLOSURE AND CONSENT, 2021 05:01:00 Doctor Richi [facility]



                MEDICAL AND SURGICAL                 No Name         Medical Bra

[facility]



                PROCEDURES                                      

 

                EMERGENCY DEPARTMENT 2021 05:01:00 Doctor Unassigned, [facility]



                DOCUMENTS                       [place]         [facility]

 

                HOSPITAL ADMISSION 2021 05:01:00 Doctor Unassigned, [facility]



                                                [place]         [facility]







Plan of Care







             Planned Activity Planned Date Details      Comments     Source

 

             Future Scheduled Test 2022-10-01 00:00:00 IMM Influenza            

  [facility]



                                       Seasonal (>/= 19 yrs)              



                                       [code = IMM Influenza              



                                       Seasonal (>/= 19              



                                       yrs)]                     

 

             Future Scheduled Test 2022-10-01 00:00:00 IMM Influenza            

  [facility]



                                       Seasonal (>/= 19 yrs)              



                                       [code = IMM Influenza              



                                       Seasonal (>/= 19              



                                       yrs)]                     

 

             Future Scheduled Test 2022-10-01 00:00:00 IMM Influenza            

  [facility]



                                       Seasonal (>/= 19 yrs)              



                                       [code = IMM Influenza              



                                       Seasonal (>/= 19              



                                       yrs)]                     

 

             Future Scheduled Test 2021 00:00:00 COVID-19 Vaccine (2 -    

          [facility]



                                       Pfizer series) [code              



                                       = COVID-19 Vaccine (2              



                                       - Pfizer series)]              

 

             Future Scheduled Test 2021 00:00:00 COVID-19 Vaccine (2 -    

          [facility]



                                       Pfizer series) [code              



                                       = COVID-19 Vaccine (2              



                                       - Pfizer series)]              

 

             Future Scheduled Test 2021 00:00:00 COVID-19 Vaccine (2 -    

          [facility]



                                       Pfizer series) [code              



                                       = COVID-19 Vaccine (2              



                                       - Pfizer series)]              

 

             Future Scheduled Test 2018 00:00:00 Screening for            

  [facility]



                                       malignant neoplasm of              



                                       colon (procedure)              



                                       [code = 321291274]              

 

             Future Scheduled Test 2018 00:00:00 Screening for            

  [facility]



                                       malignant neoplasm of              



                                       colon (procedure)              



                                       [code = 428459517]              

 

             Future Scheduled Test 2018 00:00:00 Screening for            

  [facility]



                                       malignant neoplasm of              



                                       colon (procedure)              



                                       [code = 899664179]              

 

             Future Scheduled Test 2018 00:00:00 Screening for            

  [facility]



                                       malignant neoplasm of              



                                       colon (procedure)              



                                       [code = 423443224]              

 

             Future Scheduled Test 2018 00:00:00 Screening for            

  [facility]



                                       malignant neoplasm of              



                                       colon (procedure)              



                                       [code = 522880731]              

 

             Future Scheduled Test 2018 00:00:00 Screening for            

  [facility]



                                       malignant neoplasm of              



                                       colon (procedure)              



                                       [code = 951917618]              

 

             Future Scheduled Test 2018 00:00:00 Screening for            

  [facility]



                                       malignant neoplasm of              



                                       colon (procedure)              



                                       [code = 991635345]              

 

             Future Scheduled Test 2018 00:00:00 Screening for            

  [facility]



                                       malignant neoplasm of              



                                       colon (procedure)              



                                       [code = 260966128]              

 

             Future Scheduled Test 2018 00:00:00 Screening for            

  [facility]



                                       malignant neoplasm of              



                                       colon (procedure)              



                                       [code = 208411453]              

 

             Future Scheduled Test 2018 00:00:00 Screening for            

  [facility]



                                       malignant neoplasm of              



                                       colon (procedure)              



                                       [code = 447071823]              

 

             Future Scheduled Test 2018 00:00:00 Screening for            

  [facility]



                                       malignant neoplasm of              



                                       colon (procedure)              



                                       [code = 608624032]              

 

             Future Scheduled Test 2018 00:00:00 Screening for            

  [facility]



                                       malignant neoplasm of              



                                       colon (procedure)              



                                       [code = 551936616]              

 

             Future Scheduled Test 2018 00:00:00 Screening for            

  [facility]



                                       malignant neoplasm of              



                                       colon (procedure)              



                                       [code = 438156285]              

 

             Future Scheduled Test 2018 00:00:00 Screening for            

  [facility]



                                       malignant neoplasm of              



                                       colon (procedure)              



                                       [code = 750471943]              

 

             Future Scheduled Test 2018 00:00:00 Screening for            

  [facility]



                                       malignant neoplasm of              



                                       colon (procedure)              



                                       [code = 330111412]              

 

             Future Scheduled Test 1968 00:00:00 Fluoride Varnish         

     [facility]



                                       [code = Fluoride              



                                       Varnish]                  







Encounters







        Start   End     Encounter Admission Attending Care    Care    Encounter 

Source



        Date/Time Date/Time Type    Type    Clinicians Facility Department ID   

   

 

        2023         Inpatient ER      MASSUMI, [facility]    [id number]

 [facility]



        17:57:50                         [facility]                           

 

        2023         [facility]      [facility]    [id number]

 [facility]



        16:21:35                         [facility]                           

 

        2023         Inpatient ER      MASSUMI, [facility]    [id number]

 [facility]



        16:21:21                         [facility]                           

 

        2023         Inpatient ER      ETHEL, [facility]    [id number] 

[facility]



        16:21:02                         [facility]                           

 

        2023         Outpatient                 [facility]     [id number]

 [place]



        09:24:20                                                 [id number] [facility]

 

        2023-07-10         Inpatient ER      ZAK SOFIA [facility]    [id number]

[id number] [facility]



        08:39:06                                                         

 

        2023         Inpatient ER      ETHEL, [facility]    [id number] 

[facility]



        10:22:36                         [facility]                           

 

        2023         Inpatient ER      RYAN,  [facility]    [id number] 

S[facility]



        00:42:13                         JOÃO                         

 

        2023         Inpatient ER      FUKUTA, [facility]    [facility]    [id number] 

[facility]



        06:37:24                         [facility]                          

 

        2023         Inpatient ER      ETHEL, [facility]    [facility]    [id number] 

[facility]



        23:24:03                         MED                           

 

        2023         Inpatient ER      RYAN,  [facility]    [facility]    [id number] 

[facility]



        17:56:31                         JOÃO                         

 

        2023         Inpatient ER      RYAN,  [facility]    [facility]    [id number] 

[facility]



        10:25:55                         JOÃO                         

 

        2023         Inpatient ER      RYAN,  [facility]    [facility]    [id number] 

[facility]



        00:00:00                         [place]                         

 

        2023         Outpatient                 [facility]     [id number]

 [place]



        09:11:43                                                 [id number] [facility]

 

        2023         Outpatient                 [facility]     [id number]

 [place]



        14:16:55                                                 [id number] [facility]

 

        2022         Inpatient                 [facility]     [id number] H

arris



        15:57:19                                                         Health

 

        2022         Inpatient         SHERITA [facility]     [id number]

[address] [place] [address]



        00:00:00                                                         Health

 

        2022         Inpatient         MAY,    [facility]     [id number] H

arris



        13:01:40                         THADDAEUS                         Healt



 

        2022         Inpatient 1       MAY,    [facility]     [id number] H

arris



        15:43:00                         THADDAEUS                         Healt



 

        2023 Inpatient ER      DILSHAD  [facility]    General Med 

[id number] [facility]



        00:14:00 09:54:00                 ALYSA                           

 

        2023 Outpatient         BISMARKZAK [facility]    207

[id number] SLE



        00:00:00 00:00:00                                                 

 

        2023 Outpatient         BISMARKZAK [facility]    207

[id number] SLE



        00:00:00 00:00:00                                                 

 

        2023 Inpatient ER      DULCE MARIA [facility]    General Med 8

[id number] [facility]



        07:50:00 15:35:00                 SHERITA                            

 

        2023 Transition         RAYSHAWN Joyce  1.2.840.114 [id number]

[id number] Univers



        00:00:00 00:00:00 of Lia CALZADA   350.1.13.10        

 ity of



                                                [place]   4.2.7.2.686         [facility]

s



                                                        [phone]         Medi

tyler



                                                        403             Branch

 

        2023 Inpatient T       REYNA [facility]     [id number]

[id number] Univers



        16:36:00 18:36:00                 DELMIS                           ity of



                                                                        [facility]

 

        2023 [facility]         Lucia Pike  1.

2.840.114 

[id number]                               [facility]



        16:36:00 18:36:00 Encounter         Deluna, Isreal MAYCOL PACHECO   350.1.13.

10         ity [facility] 4.2.7.2.686     

    [place]



                                                        [phone]         Medi

tyler



                                                        096             Branch

 

        2022-10-13 2022-10-13 [facility]     [id number] 

[place]



        00:00:00 00:00:00                 Grecia A                         Heal



 

        2022-10-04 2022-10-04 [facility]     [id number]

[id number] [place]



        00:00:00 00:00:00                 [facility]

 

        2022 OLGA Kamara 1.2.840.114 [id number]

[id number] [place]



        00:00:00 22:32:21                 Grecia GAITAN 350.1.13.43         

[facility] .2.7.2.6869         



                                                        [phone]         

 

        2022 [facility]     [id number] 

[place]



        00:00:00 00:00:00                 Marguerite Foster

[facility]

 

        2022 [facility]     [id number] 

[place]



        00:00:00 00:00:00                 Grecia A                         Jam



 

        2022 Outpatient                 [facility]     [id number]

94 [place]



        10:28:40 10:33:15                                                 [facility]

 

        2022 [facility]         [facility]     [id number]

35 [place]



        00:00:00 00:00:00                 SANTIAGO Polk



 

        2022 Outpatient                 [facility]     [id number]

18 [place]



        00:00:00 00:00:00                                                 [facility]

 

        2022 Office          MAY,    [facility]     [id number] 

[place]



        12:48:07 13:35:29 Visit           ULYSSES                         Heal



 

        2022 Outpatient                 [facility]     [id number]

18 [place]



        00:00:00 00:00:00                                                 [facility]

 

        2022 Outpatient                 [facility]     [facility]     [id number]

GDA [facility]



        00:00:00 00:00:00                                         [id number] 



                                                                8       

 

        2022 2022-07-15 [facility]     [id number]

 [place]



        15:43:00 14:31:00 Encounter         MARCY Sykes

[facility]

 

        2022 Anesthesia         ARSH MAGALLON [facility]     [id number]

[id number] [place]



        00:00:00 16:26:00 Event                                           Health

 

        2022 Inpatient                 [facility]     [id number]

0 [place]



        18:31:54 19:34:23                                                 Health

 

        2022 Inpatient                 [facility]     [id number]

4 [place]



        13:44:32 13:44:38                                                 Health

 

        2022 Inpatient                 [facility]     [id number]

2 [place]



        10:59:56 11:29:56                                                 Health

 

        2022 Inpatient         [facility]     [id number]

6 [place]



        10:08:10 10:38:10                 THAMARYLINAE                         Heal

th

 

        2022 Inpatient         MAY,    [facility]     [id number]

3 [place]



        20:00:54 21:22:51                 THAMARYLINAEUS                         Heal



 

        2022 Inpatient                 [facility]     [id number]

2 [place]



        20:00:50 21:22:24                                                 [facility]

 

        2022 Inpatient         MAY,    [facility]     [id number]

1 [place]



        18:54:35 20:07:31                 THAMARYLINAEUS                         Heal



 

        2022 Emergency         [facility]     [id number]

2 [place]



        11:20:42 11:32:12                 [facility]

 

        2021-08-10 2021-08-10 Outpatient         ALI, AFROZE [facility]     [id number]

[id number] [place]



        00:00:00 00:00:00                                                 Health

 

        2021 Emergency         Noa, Jean Nunes  1.2.840.11

[id number] 

Univers



        10:22:00 14:00:00                 Robert Snell   350.1.13.10    

     ity of



                                                [facility] 4.2.7.2.686         Dominik

as



                                                        [phone]         Medi

tyler



                                                        093             [place]

 

        2021 Emergency X               [facility]    ERT     [id number]

22 Univers



        10:20:00 10:20:00                                                 ity of



                                                                        [facility]

 

        2021-06-10 2021-06-10 Transition         Zackery  Julioaniya  1.2.840.114 [id number]

[id number] Univers



        00:00:00 00:00:00 of Care         Rubina Calzada   350.1.13.10         it

y of



                                                [place]   4.2.7.2.686         [place]

[place]



                                                        [phone]         Medi

tyler



                                                        403             Branch

 

        2021 [facility]         Maddie Cardoza F 1.2.840.1 100

[id number] 

[id number]                                Univers



        09:44:00 18:00:00 Encounter         Amy Caldera 31112.1.1        

         ity of



                                                3.104.2.7                 [place]



                                                .[phone]                 Medica

l



                                                .8                      [place]

 

        2021 Emergency X               [facility]    ERT     [id number]

17 Univers



        09:39:00 09:39:00                                                 ity of



                                                                        [facility]

 

        2021 Travel                  1.2.840.1 1.2.962.567 [id number]

[id number] Univers



        00:00:00 00:00:00                         78909.1.1 350.1.13.10         

ity of



                                                3.104.2.7 4.2.7.3.698         [place]

[place]



                                                .[id number] 084.8           Medica

l



                                                .8                      [place]

 

        2021 Orders          Doctor  1.2.840.7 [id number]

[id number] Univers



        00:00:00 00:00:00 Only            Unassigned, 55113.1.1                 

ity of



                                        No Name [id number]                 [place]



                                                .[id number]                 [facility]

l



                                                .8                      Branch







Results







           Test Description Test Time  Test Comments Results    Result Comments 

Source









                    MISCELLANEOUS LAB ORDER 2023 17:00:16 









                      Test Item  Value      Reference Range Interpretation Comme

nts









             SCAN RESULT (test code = 4031280)                                  

      See scanned report.



BASIC METABOLIC [id number] 05:34:01





             Test Item    Value        Reference Range Interpretation Comments

 

             SODIUM (BEAKER) 136 meq/L    136-145                   



             (test code = 381)                                        

 

             POTASSIUM    3.8 meq/L    3.5-5.1                   



             (BEAKER) (test                                        



             code = 379)                                         

 

             CHLORIDE (BEAKER) 103 meq/L                        



             (test code = 382)                                        

 

             CO2 (BEAKER) 28 meq/L     22-29                     



             (test code = 355)                                        

 

             BLOOD UREA   6 mg/dL      7-21         L            



             NITROGEN (BEAKER)                                        



             (test code = 354)                                        

 

             CREATININE   0.65 mg/dL   0.57-1.25                 



             (BEAKER) (test                                        



             code = 358)                                         

 

             GLUCOSE RANDOM 86 mg/dL                         



             (BEAKER) (test                                        



             code = 652)                                         

 

             CALCIUM (BEAKER) 7.9 mg/dL    8.4-10.2     L            



             (test code = 697)                                        

 

             EGFR (BEAKER) 110                                     Interpretatio

n of eGFR



             (test code = mL/min/1.73                            values Stage De

scription



             1092)        sq m                                   Result G1 Shaunna

l or high



                                                                 >=90 G2 Mildly 

decreased



                                                                 60-89 G3a Mildl

y to



                                                                 moderately 45-5

9 G3b



                                                                 Moderately to s

everely



                                                                 30-44 G4 Severl

y decreased



                                                                 15-29 G5 Kidney

 failure



                                                                 <15Reported eGF

R is based



                                                                 on the CKD-EPI 





                                                                 equation that d

oes not use



                                                                 a race



                                                                 coefficientEsti

mated GFR



                                                                 is not as accur

ate as



                                                                 Creatinine Tabitha marie in



                                                                 predicting glom

erular



                                                                 filtration rate

. Estimated



                                                                 GFR is not appl

icable for



                                                                 dialysis patien

ts



 ID - MMSpecimen moderately ictericHEPATIC FUNCTION [id number] 
05:32:04





             Test Item    Value        Reference Range Interpretation Comments

 

             TOTAL PROTEIN (BEAKER) (test code = 5.4 gm/dL    6.0-8.3      L    

        



             770)                                                

 

             ALBUMIN (BEAKER) (test code = 1145) 2.1 g/dL     3.5-5.0      L    

        

 

             BILIRUBIN TOTAL (BEAKER) (test code 3.5 mg/dL    0.2-1.2      H    

        



             = 377)                                              

 

             BILIRUBIN DIRECT (BEAKER) (test 1.3 mg/dL    0.1-0.5      H        

    



             code = 706)                                         

 

             ALKALINE PHOSPHATASE (BEAKER) (test 127 U/L                  

        



             code = 346)                                         

 

             AST (SGOT) (BEAKER) (test code = 23 U/L       5-34                 

     



             353)                                                

 

             ALT (SGPT) (BEAKER) (test code = 9 U/L        6-55                 

     



             347)                                                



 ID - MMSpecimen moderately [id number] 05:32:03





             Test Item    Value        Reference Range Interpretation Comments

 

             PHOSPHORUS (BEAKER) (test code = 3.4 mg/dL    2.3-4.7              

     



             604)                                                



 ID - [id number] 05:32:02





             Test Item    Value        Reference Range Interpretation Comments

 

             MAGNESIUM (BEAKER) (test code = 1.5 mg/dL    1.6-2.6      L        

    



             627)                                                



 ID - [facility] (HEMOGRAM ONLY)2023 05:20:51





             Test Item    Value        Reference Range Interpretation Comments

 

             WHITE BLOOD CELL COUNT (BEAKER) 2.9 K/ L     3.5-10.5     L        

    



             (test code = 775)                                        

 

             RED BLOOD CELL COUNT (BEAKER) 2.69 M/ L    4.63-6.08    L          

  



             (test code = 761)                                        

 

             HEMOGLOBIN (BEAKER) (test code = 8.8 GM/DL    13.7-17.5    L       

     



             410)                                                

 

             HEMATOCRIT (BEAKER) (test code = 27.4 %       40.1-51.0    L       

     



             411)                                                

 

             MEAN CORPUSCULAR VOLUME (BEAKER) 102 fL       79-92        H       

     



             (test code = 753)                                        

 

             MEAN CORPUSCULAR HEMOGLOBIN 32.7 pg      25.7-32.2    H            



             (BEAKER) (test code = 751)                                        

 

             MEAN CORPUSCULAR HEMOGLOBIN CONC 32.1 GM/DL   32.3-36.5    L       

     



             (BEAKER) (test code = 752)                                        

 

             RED CELL DISTRIBUTION WIDTH 21.1 %       11.6-14.4    H            



             (BEAKER) (test code = 412)                                        

 

             PLATELET COUNT (BEAKER) (test code 43 K/CU MM   150-450      L     

       



             = 756)                                              

 

             MEAN PLATELET VOLUME (BEAKER) 11.3 fL      9.4-12.4                

  



             (test code = 754)                                        

 

             NUCLEATED RED BLOOD CELLS (BEAKER) 0 /100 WBC   0-0                

       



             (test code = 413)                                        



PROTHROMBIN [id number] 05:06:07





             Test Item    Value        Reference Range Interpretation Comments

 

             PROTIME (BEAKER) (test code = 27.5 seconds 11.9-14.2    H          

  



             759)                                                

 

             INR (BEAKER) (test code = 370) 2.76         <=5.90                 

   



RECOMMENDED COUMADIN/WARFARIN INR THERAPY RANGESSTANDARD DOSE: 2.0 - 3.0 
Includes: PROPHYLAXIS for venous thrombosis, systemic embolization; TREATMENT 
for venous thrombosis and/or pulmonary embolus.HIGH RISK: Target INR is 2.5-3.5 
for patients with mechanical heart valves.CBC (HEMOGRAM ONLY)2023 09:40:36





             Test Item    Value        Reference Range Interpretation Comments

 

             WHITE BLOOD CELL COUNT (BEAKER) 2.8 K/ L     3.5-10.5     L        

    



             (test code = 775)                                        

 

             RED BLOOD CELL COUNT (BEAKER) 2.84 M/ L    4.63-6.08    L          

  



             (test code = 761)                                        

 

             HEMOGLOBIN (BEAKER) (test code = 9.2 GM/DL    13.7-17.5    L       

     



             410)                                                

 

             HEMATOCRIT (BEAKER) (test code = 28.3 %       40.1-51.0    L       

     



             411)                                                

 

             MEAN CORPUSCULAR VOLUME (BEAKER) 100 fL       79-92        H       

     



             (test code = 753)                                        

 

             MEAN CORPUSCULAR HEMOGLOBIN 32.4 pg      25.7-32.2    H            



             (BEAKER) (test code = 751)                                        

 

             MEAN CORPUSCULAR HEMOGLOBIN CONC 32.5 GM/DL   32.3-36.5            

     



             (BEAKER) (test code = 752)                                        

 

             RED CELL DISTRIBUTION WIDTH 20.9 %       11.6-14.4    H            



             (BEAKER) (test code = 412)                                        

 

             PLATELET COUNT (BEAKER) (test code 57 K/CU MM   150-450      L     

       



             = 756)                                              

 

             MEAN PLATELET VOLUME (BEAKER) 12.6 fL      9.4-12.4     H          

  



             (test code = 754)                                        

 

             NUCLEATED RED BLOOD CELLS (BEAKER) 0 /100 WBC   0-0                

       



             (test code = 413)                                        



BASIC METABOLIC [id number] 07:41:42





             Test Item    Value        Reference Range Interpretation Comments

 

             SODIUM (BEAKER) 136 meq/L    136-145                   



             (test code = 381)                                        

 

             POTASSIUM    4.2 meq/L    3.5-5.1                   Specimen slight

ly



             (BEAKER) (test                                        hemolyzed



             code = 379)                                         

 

             CHLORIDE (BEAKER) 107 meq/L                        



             (test code = 382)                                        

 

             CO2 (BEAKER) 25 meq/L     22-29                     



             (test code = 355)                                        

 

             BLOOD UREA   5 mg/dL      7-21         L            



             NITROGEN (BEAKER)                                        



             (test code = 354)                                        

 

             CREATININE   0.57 mg/dL   0.57-1.25                 Specimen slight

ly



             (BEAKER) (test                                        hemolyzed



             code = 358)                                         

 

             GLUCOSE RANDOM 97 mg/dL                         



             (BEAKER) (test                                        



             code = 652)                                         

 

             CALCIUM (BEAKER) 7.6 mg/dL    8.4-10.2     L            



             (test code = 697)                                        

 

             EGFR (BEAKER) 114                                      Interpretati

on of eGFR



             (test code = mL/min/1.73                            values Stage De

scription



             1092)        sq m                                   Result G1 Shaunna

l or high



                                                                 >=90 G2 Mildly 

decreased



                                                                 60-89 G3a Mildl

y to



                                                                 moderately 45-5

9 G3b



                                                                 Moderately to s

everely



                                                                 30-44 G4 Severl

y decreased



                                                                 15-29 G5 Kidney

 failure



                                                                 <15Reported eGF

R is based



                                                                 on the CKD-EPI 





                                                                 equation that d

oes not use



                                                                 a race



                                                                 coefficientEsti

mated GFR



                                                                 is not as accur

ate as



                                                                 Creatinine Tabitha

cynthia in



                                                                 predicting glom

erular



                                                                 filtration rate

. Estimated



                                                                 GFR is not appl

icable for



                                                                 dialysis patien

ts



 ID - MMSpecimen slightly [id number] 07:40:54





             Test Item    Value        Reference Range Interpretation Comments

 

             MAGNESIUM (BEAKER) 1.5 mg/dL    1.6-2.6      L            Specimen 

slightly



             (test code = 627)                                        hemolyzed



 ID - MMPROTHROMBIN TIME/[id number] 06:38:37





             Test Item    Value        Reference Range Interpretation Comments

 

             PROTIME (BEAKER) (test code = 29.0 seconds 11.9-14.2    H          

  



             759)                                                

 

             INR (BEAKER) (test code = 370) 2.84         <=5.90                 

   



RECOMMENDED COUMADIN/WARFARIN INR THERAPY RANGESSTANDARD DOSE: 2.0 - 3.0 
Includes: PROPHYLAXIS for venous thrombosis, systemic embolization; TREATMENT 
for venous thrombosis and/or pulmonary embolus.HIGH RISK: Target INR is 2.5-3.5 
for patients with mechanical heart valves.BASIC METABOLIC [id number] 
06:46:37





             Test Item    Value        Reference Range Interpretation Comments

 

             SODIUM (BEAKER) 135 meq/L    136-145      L            



             (test code = 381)                                        

 

             POTASSIUM    4.2 meq/L    3.5-5.1                   



             (BEAKER) (test                                        



             code = 379)                                         

 

             CHLORIDE (BEAKER) 107 meq/L                        



             (test code = 382)                                        

 

             CO2 (BEAKER) 22 meq/L     22-29                     



             (test code = 355)                                        

 

             BLOOD UREA   5 mg/dL      7-21         L            



             NITROGEN (BEAKER)                                        



             (test code = 354)                                        

 

             CREATININE   0.58 mg/dL   0.57-1.25                 



             (BEAKER) (test                                        



             code = 358)                                         

 

             GLUCOSE RANDOM 130 mg/dL           H            



             (BEAKER) (test                                        



             code = 652)                                         

 

             CALCIUM (BEAKER) 7.2 mg/dL    8.4-10.2     L            



             (test code = 697)                                        

 

             EGFR (BEAKER) 113                                     Interpretatio

n of eGFR



             (test code = mL/min/1.73                            values Stage De

scription



             1092)        sq m                                   Result G1 Shaunna

l or high



                                                                 >=90 G2 Mildly 

decreased



                                                                 60-89 G3a Mildl

y to



                                                                 moderately 45-5

9 G3b



                                                                 Moderately to s

everely



                                                                 30-44 G4 Severl

y decreased



                                                                 15-29 G5 Kidney

 failure



                                                                 <15Reported eGF

R is based



                                                                 on the CKD-EPI 





                                                                 equation that d

oes not use



                                                                 a race



                                                                 coefficientEsti

mated GFR



                                                                 is not as accur

ate as



                                                                 Creatinine Tabitha

cynthia in



                                                                 predicting glom

erular



                                                                 filtration rate

. Estimated



                                                                 GFR is not appl

icable for



                                                                 dialysis patien

ts



 ID - BSSpecimen slightly ictericPROTHROMBIN TIME/[id number] 05:40:41





             Test Item    Value        Reference Range Interpretation Comments

 

             PROTIME (BEAKER) (test code = 29.8 seconds 11.9-14.2    H          

  



             759)                                                

 

             INR (BEAKER) (test code = 370) 3.06         <=5.90                 

   



RECOMMENDED COUMADIN/WARFARIN INR THERAPY RANGESSTANDARD DOSE: 2.0 - 3.0 
Includes: PROPHYLAXIS for venous thrombosis, systemic embolization; TREATMENT 
for venous thrombosis and/or pulmonary embolus.HIGH RISK: Target INR is 2.5-3.5 
for patients with mechanical heart valves.CBC W/PLT COUNT &amp; AUTO 
[id number] 05:30:54





             Test Item    Value        Reference Range Interpretation Comments

 

             WHITE BLOOD CELL COUNT (BEAKER) 2.5 K/ L     3.5-10.5     L        

    



             (test code = 775)                                        

 

             RED BLOOD CELL COUNT (BEAKER) 2.47 M/ L    4.63-6.08    L          

  



             (test code = 761)                                        

 

             HEMOGLOBIN (BEAKER) (test code = 8.1 GM/DL    13.7-17.5    L       

     



             410)                                                

 

             HEMATOCRIT (BEAKER) (test code = 25.3 %       40.1-51.0    L       

     



             411)                                                

 

             MEAN CORPUSCULAR VOLUME (BEAKER) 102 fL       79-92        H       

     



             (test code = 753)                                        

 

             MEAN CORPUSCULAR HEMOGLOBIN 32.8 pg      25.7-32.2    H            



             (BEAKER) (test code = 751)                                        

 

             MEAN CORPUSCULAR HEMOGLOBIN CONC 32.0 GM/DL   32.3-36.5    L       

     



             (BEAKER) (test code = 752)                                        

 

             RED CELL DISTRIBUTION WIDTH 21.0 %       11.6-14.4    H            



             (BEAKER) (test code = 412)                                        

 

             PLATELET COUNT (BEAKER) (test code 39 K/CU MM   150-450      L     

       



             = 756)                                              

 

             MEAN PLATELET VOLUME (BEAKER) 12.4 fL      9.4-12.4                

  



             (test code = 754)                                        

 

             NUCLEATED RED BLOOD CELLS (BEAKER) 0 /100 WBC   0-0                

       



             (test code = 413)                                        

 

             NEUTROPHILS RELATIVE PERCENT 50 %                                  

 



             (BEAKER) (test code = 429)                                        

 

             LYMPHOCYTES RELATIVE PERCENT 26 %                                  

 



             (BEAKER) (test code = 430)                                        

 

             MONOCYTES RELATIVE PERCENT 15 %                                   



             (BEAKER) (test code = 431)                                        

 

             EOSINOPHILS RELATIVE PERCENT 7 %                                   

 



             (BEAKER) (test code = 432)                                        

 

             BASOPHILS RELATIVE PERCENT 1 %                                    



             (BEAKER) (test code = 437)                                        

 

             NEUTROPHILS ABSOLUTE COUNT 1.24 K/ L    1.78-5.38    L            



             (BEAKER) (test code = 670)                                        

 

             LYMPHOCYTES ABSOLUTE COUNT 0.65 K/ L    1.32-3.57    L            



             (BEAKER) (test code = 414)                                        

 

             MONOCYTES ABSOLUTE COUNT (BEAKER) 0.37 K/ L    0.30-0.82           

      



             (test code = 415)                                        

 

             EOSINOPHILS ABSOLUTE COUNT 0.17 K/ L    0.04-0.54                 



             (BEAKER) (test code = 416)                                        

 

             BASOPHILS ABSOLUTE COUNT (BEAKER) 0.03 K/ L    0.01-0.08           

      



             (test code = 417)                                        

 

             IMMATURE GRANULOCYTES-RELATIVE 0.40 %       0.00-1.00              

   



             PERCENT (BEAKER) (test code =                                      

  



             2801)                                               



BASIC METABOLIC [id number] 06:17:24





             Test Item    Value        Reference Range Interpretation Comments

 

             SODIUM (BEAKER) 135 meq/L    136-145      L            



             (test code = 381)                                        

 

             POTASSIUM    4.1 meq/L    3.5-5.1                   



             (BEAKER) (test                                        



             code = 379)                                         

 

             CHLORIDE (BEAKER) 106 meq/L                        



             (test code = 382)                                        

 

             CO2 (BEAKER) 25 meq/L     22-29                     



             (test code = 355)                                        

 

             BLOOD UREA   4 mg/dL      7-21         L            



             NITROGEN (BEAKER)                                        



             (test code = 354)                                        

 

             CREATININE   0.62 mg/dL   0.57-1.25                 



             (BEAKER) (test                                        



             code = 358)                                         

 

             GLUCOSE RANDOM 135 mg/dL           H            



             (BEAKER) (test                                        



             code = 652)                                         

 

             CALCIUM (BEAKER) 7.2 mg/dL    8.4-10.2     L            



             (test code = 697)                                        

 

             EGFR (BEAKER) 112                                     Interpretatio

n of eGFR



             (test code = mL/min/1.73                            values Stage De

scription



             1092)        sq m                                   Result G1 Shaunna

l or high



                                                                 >=90  G2 Mildly

 decreased



                                                                 60-89 G3a Mildl

y to



                                                                 moderately 45-5

9 G3b



                                                                 Moderately to s

everely



                                                                 30-44 G4 Severl

y decreased



                                                                 15-29 G5 Kidney

 failure



                                                                 <15Reported eGF

R is based



                                                                 on the CKD-EPI 





                                                                 equation that d

oes not use



                                                                 a race



                                                                 coefficientEsti

mated GFR



                                                                 is not as accur

ate as



                                                                 Creatinine Tabitha

cynthia in



                                                                 predicting glom

erular



                                                                 filtration rate

. Estimated



                                                                 GFR is not appl

icable for



                                                                 dialysis patien

ts



Specimen slightly ictericPROTHROMBIN TIME/[id number] 05:25:08





             Test Item    Value        Reference Range Interpretation Comments

 

             PROTIME (BEAKER) (test code = 31.0 seconds 11.9-14.2    H          

  



             759)                                                

 

             INR (BEAKER) (test code = 370) 3.09         <=5.90                 

   



RECOMMENDED COUMADIN/WARFARIN INR THERAPY RANGESSTANDARD DOSE: 2.0 - 3.0 
Includes: PROPHYLAXIS for venous thrombosis, systemic embolization; TREATMENT 
for venous thrombosis and/or pulmonary embolus.HIGH RISK: Target INR is 2.5-3.5 
for patients with mechanical heart valves.CBC W/PLT COUNT &amp; AUTO 
[id number] 05:24:37





             Test Item    Value        Reference Range Interpretation Comments

 

             WHITE BLOOD CELL COUNT (BEAKER) 2.9 K/ L     3.5-10.5     L        

    



             (test code = 775)                                        

 

             RED BLOOD CELL COUNT (BEAKER) 2.49 M/ L    4.63-6.08    L          

  



             (test code = 761)                                        

 

             HEMOGLOBIN (BEAKER) (test code = 8.1 GM/DL    13.7-17.5    L       

     



             410)                                                

 

             HEMATOCRIT (BEAKER) (test code = 25.3 %       40.1-51.0    L       

     



             411)                                                

 

             MEAN CORPUSCULAR VOLUME (BEAKER) 102 fL       79-92        H       

     



             (test code = 753)                                        

 

             MEAN CORPUSCULAR HEMOGLOBIN 32.5 pg      25.7-32.2    H            



             (BEAKER) (test code = 751)                                        

 

             MEAN CORPUSCULAR HEMOGLOBIN CONC 32.0 GM/DL   32.3-36.5    L       

     



             (BEAKER) (test code = 752)                                        

 

             RED CELL DISTRIBUTION WIDTH 21.0 %       11.6-14.4    H            



             (BEAKER) (test code = 412)                                        

 

             PLATELET COUNT (BEAKER) (test code 31 K/CU MM   150-450      L     

       



             = 756)                                              

 

             MEAN PLATELET VOLUME (BEAKER) 11.1 fL      9.4-12.4                

  



             (test code = 754)                                        

 

             NUCLEATED RED BLOOD CELLS (BEAKER) 0 /100 WBC   0-0                

       



             (test code = 413)                                        

 

             NEUTROPHILS RELATIVE PERCENT 52 %                                  

 



             (BEAKER) (test code = 429)                                        

 

             LYMPHOCYTES RELATIVE PERCENT 25 %                                  

 



             (BEAKER) (test code = 430)                                        

 

             MONOCYTES RELATIVE PERCENT 16 %                                   



             (BEAKER) (test code = 431)                                        

 

             EOSINOPHILS RELATIVE PERCENT 6 %                                   

 



             (BEAKER) (test code = 432)                                        

 

             BASOPHILS RELATIVE PERCENT 1 %                                    



             (BEAKER) (test code = 437)                                        

 

             NEUTROPHILS ABSOLUTE COUNT 1.48 K/ L    1.78-5.38    L            



             (BEAKER) (test code = 670)                                        

 

             LYMPHOCYTES ABSOLUTE COUNT 0.71 K/ L    1.32-3.57    L            



             (BEAKER) (test code = 414)                                        

 

             MONOCYTES ABSOLUTE COUNT (BEAKER) 0.45 K/ L    0.30-0.82           

      



             (test code = 415)                                        

 

             EOSINOPHILS ABSOLUTE COUNT 0.18 K/ L    0.04-0.54                 



             (BEAKER) (test code = 416)                                        

 

             BASOPHILS ABSOLUTE COUNT (BEAKER) 0.03 K/ L    0.01-0.08           

      



             (test code = 417)                                        

 

             IMMATURE GRANULOCYTES-RELATIVE 0.30 %       0.00-1.00              

   



             PERCENT (BEAKER) (test code =                                      

  



             2801)                                               



BASIC METABOLIC [id number] 04:34:25





             Test Item    Value        Reference Range Interpretation Comments

 

             SODIUM (BEAKER) 137 meq/L    136-145                   



             (test code = 381)                                        

 

             POTASSIUM    4.1 meq/L    3.5-5.1                   



             (BEAKER) (test                                        



             code = 379)                                         

 

             CHLORIDE (BEAKER) 109 meq/L           H            



             (test code = 382)                                        

 

             CO2 (BEAKER) 25 meq/L     22-29                     



             (test code = 355)                                        

 

             BLOOD UREA   4 mg/dL      7-21         L            



             NITROGEN (BEAKER)                                        



             (test code = 354)                                        

 

             CREATININE   0.55 mg/dL   0.57-1.25    L            



             (BEAKER) (test                                        



             code = 358)                                         

 

             GLUCOSE RANDOM 83 mg/dL                         



             (BEAKER) (test                                        



             code = 652)                                         

 

             CALCIUM (BEAKER) 7.4 mg/dL    8.4-10.2     L            



             (test code = 697)                                        

 

             EGFR (BEAKER) 115                                     Interpretatio

n of eGFR



             (test code = mL/min/1.73                            values Stage De

scription



             1092)        sq m                                   Result G1 Shaunna

l or high



                                                                 >=90 G2 Mildly 

decreased



                                                                 60-89 G3a Mildl

y to



                                                                 moderately 45-5

9 G3b



                                                                 Moderately to s

everely



                                                                 30-44 G4 Severl

y decreased



                                                                 15-29 G5 Kidney

 failure



                                                                 <15Reported eGF

R is based



                                                                 on the CKD-EPI 





                                                                 equation that d

oes not use



                                                                 a race



                                                                 coefficientEsti

mated GFR



                                                                 is not as accur

ate as



                                                                 Creatinine Tabitha

cynthia in



                                                                 predicting glom

erular



                                                                 filtration rate

. Estimated



                                                                 GFR is not appl

icable for



                                                                 dialysis patien

ts



 ID - EOOSpecimen slightly ictericPROTHROMBIN TIME/[id number] 
04:07:11





             Test Item    Value        Reference Range Interpretation Comments

 

             PROTIME (BEAKER) (test code = 29.3 seconds 11.9-14.2    H          

  



             759)                                                

 

             INR (BEAKER) (test code = 370) 2.88         <=5.90                 

   



RECOMMENDED COUMADIN/WARFARIN INR THERAPY RANGESSTANDARD DOSE: 2.0 - 3.0 
Includes: PROPHYLAXIS for venous thrombosis, systemic embolization; TREATMENT 
for venous thrombosis and/or pulmonary embolus.HIGH RISK: Target INR is 2.5-3.5 
for patients with mechanical heart valves.CBC W/PLT COUNT &amp; AUTO 
[id number] 04:01:43





             Test Item    Value        Reference Range Interpretation Comments

 

             WHITE BLOOD CELL COUNT (BEAKER) 3.4 K/ L     3.5-10.5     L        

    



             (test code = 775)                                        

 

             RED BLOOD CELL COUNT (BEAKER) 2.47 M/ L    4.63-6.08    L          

  



             (test code = 761)                                        

 

             HEMOGLOBIN (BEAKER) (test code = 8.1 GM/DL    13.7-17.5    L       

     



             410)                                                

 

             HEMATOCRIT (BEAKER) (test code = 24.9 %       40.1-51.0    L       

     



             411)                                                

 

             MEAN CORPUSCULAR VOLUME (BEAKER) 101 fL       79-92        H       

     



             (test code = 753)                                        

 

             MEAN CORPUSCULAR HEMOGLOBIN 32.8 pg      25.7-32.2    H            



             (BEAKER) (test code = 751)                                        

 

             MEAN CORPUSCULAR HEMOGLOBIN CONC 32.5 GM/DL   32.3-36.5            

     



             (BEAKER) (test code = 752)                                        

 

             RED CELL DISTRIBUTION WIDTH 20.9 %       11.6-14.4    H            



             (BEAKER) (test code = 412)                                        

 

             PLATELET COUNT (BEAKER) (test code 39 K/CU MM   150-450      L     

       



             = 756)                                              

 

             MEAN PLATELET VOLUME (BEAKER) 10.6 fL      9.4-12.4                

  



             (test code = 754)                                        

 

             NUCLEATED RED BLOOD CELLS (BEAKER) 0 /100 WBC   0-0                

       



             (test code = 413)                                        

 

             NEUTROPHILS RELATIVE PERCENT 47 %                                  

 



             (BEAKER) (test code = 429)                                        

 

             LYMPHOCYTES RELATIVE PERCENT 29 %                                  

 



             (BEAKER) (test code = 430)                                        

 

             MONOCYTES RELATIVE PERCENT 15 %                                   



             (BEAKER) (test code = 431)                                        

 

             EOSINOPHILS RELATIVE PERCENT 7 %                                   

 



             (BEAKER) (test code = 432)                                        

 

             BASOPHILS RELATIVE PERCENT 1 %                                    



             (BEAKER) (test code = 437)                                        

 

             NEUTROPHILS ABSOLUTE COUNT 1.59 K/ L    1.78-5.38    L            



             (BEAKER) (test code = 670)                                        

 

             LYMPHOCYTES ABSOLUTE COUNT 0.99 K/ L    1.32-3.57    L            



             (BEAKER) (test code = 414)                                        

 

             MONOCYTES ABSOLUTE COUNT (BEAKER) 0.51 K/ L    0.30-0.82           

      



             (test code = 415)                                        

 

             EOSINOPHILS ABSOLUTE COUNT 0.25 K/ L    0.04-0.54                 



             (BEAKER) (test code = 416)                                        

 

             BASOPHILS ABSOLUTE COUNT (BEAKER) 0.02 K/ L    0.01-0.08           

      



             (test code = 417)                                        

 

             IMMATURE GRANULOCYTES-RELATIVE 0.30 %       0.00-1.00              

   



             PERCENT (BEAKER) (test code =                                      

  



             2801)                                               



CBC W/PLT COUNT &amp; AUTO [id number] 05:07:41





             Test Item    Value        Reference Range Interpretation Comments

 

             WHITE BLOOD CELL COUNT 2.9 K/ L     3.5-10.5     L            



             (BEAKER) (test code =                                        



             775)                                                

 

             RED BLOOD CELL COUNT 2.58 M/ L    4.63-6.08    L            



             (BEAKER) (test code =                                        



             761)                                                

 

             HEMOGLOBIN (BEAKER) 8.3 GM/DL    13.7-17.5    L            



             (test code = 410)                                        

 

             HEMATOCRIT (BEAKER) 25.6 %       40.1-51.0    L            



             (test code = 411)                                        

 

             MEAN CORPUSCULAR 99 fL        79-92        H            Discordant 

results



             VOLUME (BEAKER) (test                                        compar

ed to previous,



             code = 753)                                         clinical correl

ation



                                                                 required.

 

             MEAN CORPUSCULAR 32.2 pg      25.7-32.2                 



             HEMOGLOBIN (BEAKER)                                        



             (test code = 751)                                        

 

             MEAN CORPUSCULAR 32.4 GM/DL   32.3-36.5                 



             HEMOGLOBIN CONC                                        



             (BEAKER) (test code =                                        



             752)                                                

 

             RED CELL DISTRIBUTION 20.5 %       11.6-14.4    H            



             WIDTH (BEAKER) (test                                        



             code = 412)                                         

 

             PLATELET COUNT 35 K/CU MM   150-450      L            



             (BEAKER) (test code =                                        



             756)                                                

 

             MEAN PLATELET VOLUME 11.6 fL      9.4-12.4                  



             (BEAKER) (test code =                                        



             754)                                                

 

             NUCLEATED RED BLOOD 0 /100 WBC   0-0                       



             CELLS (BEAKER) (test                                        



             code = 413)                                         

 

             NEUTROPHILS RELATIVE 48 %                                   



             PERCENT (BEAKER) (test                                        



             code = 429)                                         

 

             LYMPHOCYTES RELATIVE 27 %                                   



             PERCENT (BEAKER) (test                                        



             code = 430)                                         

 

             MONOCYTES RELATIVE 15 %                                   



             PERCENT (BEAKER) (test                                        



             code = 431)                                         

 

             EOSINOPHILS RELATIVE 9 %                                    



             PERCENT (BEAKER) (test                                        



             code = 432)                                         

 

             BASOPHILS RELATIVE 1 %                                    



             PERCENT (BEAKER) (test                                        



             code = 437)                                         

 

             NEUTROPHILS ABSOLUTE 1.38 K/ L    1.78-5.38    L            



             COUNT (BEAKER) (test                                        



             code = 670)                                         

 

             LYMPHOCYTES ABSOLUTE 0.77 K/ L    1.32-3.57    L            



             COUNT (BEAKER) (test                                        



             code = 414)                                         

 

             MONOCYTES ABSOLUTE 0.43 K/ L    0.30-0.82                 



             COUNT (BEAKER) (test                                        



             code = 415)                                         

 

             EOSINOPHILS ABSOLUTE 0.25 K/ L    0.04-0.54                 



             COUNT (BEAKER) (test                                        



             code = 416)                                         

 

             BASOPHILS ABSOLUTE 0.02 K/ L    0.01-0.08                 



             COUNT (BEAKER) (test                                        



             code = 417)                                         

 

             IMMATURE     1.00 %       0.00-1.00                 



             GRANULOCYTES-RELATIVE                                        



             PERCENT (BEAKER) (test                                        



             code = 2801)                                        



BASIC METABOLIC [id number] 05:03:03





             Test Item    Value        Reference Range Interpretation Comments

 

             SODIUM (BEAKER) 137 meq/L    136-145                   



             (test code = 381)                                        

 

             POTASSIUM    3.8 meq/L    3.5-5.1                   



             (BEAKER) (test                                        



             code = 379)                                         

 

             CHLORIDE (BEAKER) 109 meq/L           H            



             (test code = 382)                                        

 

             CO2 (BEAKER) 21 meq/L     22-29        L            



             (test code = 355)                                        

 

             BLOOD UREA   5 mg/dL      7-21         L            



             NITROGEN (BEAKER)                                        



             (test code = 354)                                        

 

             CREATININE   0.53 mg/dL   0.57-1.25    L            



             (BEAKER) (test                                        



             code = 358)                                         

 

             GLUCOSE RANDOM 93 mg/dL                         



             (BEAKER) (test                                        



             code = 652)                                         

 

             CALCIUM (BEAKER) 7.3 mg/dL    8.4-10.2     L            



             (test code = 697)                                        

 

             EGFR (BEAKER) 116                                     Interpretatio

n of eGFR



             (test code = mL/min/1.73                            values Stage De

scription



             1092)        sq m                                   Result G1 Shaunna

l or high



                                                                 >=90 G2 Mildly 

decreased



                                                                 60-89 G3a Mildl

y to



                                                                 moderately 45-5

9 G3b



                                                                 Moderately to s

everely



                                                                 30-44 G4  Sever

ly



                                                                 decreased 15-29

 G5 Kidney



                                                                 failure <15Repo

rted eGFR



                                                                 is based on the

 CKD-EPI



                                                                 2021 equation t

hat does



                                                                 not use a race



                                                                 coefficientEsti

mated GFR



                                                                 is not as accur

ate as



                                                                 Creatinine Tabitha marie in



                                                                 predicting glom

erular



                                                                 filtration rate

. Estimated



                                                                 GFR is not appl

icable for



                                                                 dialysis patien

ts



 ID - MARCOSpecimen slightly ictericPROTHROMBIN TIME/[id number] 
04:48:25





             Test Item    Value        Reference Range Interpretation Comments

 

             PROTIME (BEAKER) (test code = 29.4 seconds 11.9-14.2    H          

  



             759)                                                

 

             INR (BEAKER) (test code = 370) 3.01         <=5.90                 

   



RECOMMENDED COUMADIN/WARFARIN INR THERAPY RANGESSTANDARD DOSE: 2.0 - 3.0 
Includes: PROPHYLAXIS for venous thrombosis, systemic embolization; TREATMENT 
for venous thrombosis and/or pulmonary embolus.HIGH RISK: Target INR is 2.5-3.5 
for patients with mechanical heart valves.CTA BRAIN2023-07-15 18:18:46
************************************************************[facility] : 1970 Sex: 
M************************************************************CTA BRAINBRAIN CT 
WITHOUT CONTRASTINDICATION: Unlisted Reason for Exameval for mycotic aneurysm 
givencerebral infectious lesionsCOMPARISON: CT head of the same 
dateTECHNIQUE:Rapid acquisition spiral images were obtained between the aortic 
archand the cranial vertex during intravenous contrast infusiontoreconstruct 
axial images and angiographic 3D maximum intensityprojections (MIP). 3-D 
volumetric reformatted images were created at [employer] workstation. 
Precontrast images of the brain were alsoobtained. DOSE REDUCTION: Dose 
modulation, iterative reconstruction, and/orweight-based adjustment of the mA/kV
was utilized to reduce theradiation dose to as low as reasonably 
achievable.FINDINGS:NECT BRAIN:No intracranial hemorrhage, midline shift or mass
effect. Midlinestructures are normally developed. Mild chronic microvascular 
ischemicchanges of the periventricular and subcortical white matter are p
resent.No hydrocephalus.Orbits are within normal limits.No obstructive paranasal
sinus disease.CTA BRAIN:Developmental venous anomaly within the left parietal 
lobe.A tortuous vessel with compression plate enhancement of the rightparamedian
frontal lobe (coronal image 36) may represent a seconddevelopmental venous 
anomaly, although, small dural arteriovenousfistula be an additional 
consideration.No aneurysm identified.Internal carotid arteries: Petrous, 
cavernous and supraclinoid portionspatent. Middle cerebral arteries: Bilateral 
MCA M1-M2 branches demonstratenormal contrast enhancement.Anterior cerebral 
arteries: Bilateral IGLESIA A1-A2 branches demonstratenormal contrast 
enhancement.Basilar system: Normal contrast opacification of the 
vertebrobasilarsystem.Posterior cerebral arteries: Normal contrast opacification
of thebilateral PCA P1-P2 branches.Venous opacification: Major dural sinuses 
unremarkable for bolus timing.Additional findings: None.IMPRESSION:1. 
Developmental venous anomaly within the left parietal lobe.2. A tortuous vessel 
with compression plate enhancement of the rightparamedian frontal lobe (coronal 
image 36) may represent a seconddevelopmental venous anomaly, although, small 
dural arteriovenousfistula be an additional consideration.3. No aneurysm 
identified.Electronically Signed By: Anisha Matamoros07/15/2023 18:21 
CDTWorkstation Name: [id number] ABDOMEN/PELVIS WITH IV CONTRAST2023-07-15 
09:10:34************************************************************[facility] 1970 Sex: 
M************************************************************TECHNIQUE: CT of 
the chest, abdomen, and pelvis WITH intravenouscontrast and WITHOUT oral 
contrast. Dose modulation, iterativereconstruction, and/or weight-based 
adjustment of the mA/kV was utilizedto reduce the radiation dose to as low as 
reasonably achievable.INDICATION: Unlisted Reason for ExamEvaluate for septic 
emboli.COMPARISON: None.FINDINGS:LINES/TUBES: None.LUNGS AND AIRWAYS: Mild 
bibasilar atelectasis. Patchy opacity of theright apex. A left upper lobe 
pulmonary nodule on axial image 28measures 0.6 cm.PLEURA: Trace bilateral 
pleural effusions.HEART AND MEDIASTINUM: The visualized thyroid gland is normal.
Nosignificant mediastinal, hilar, or axillary lymphadenopathy. Mildcalcification
of the aortic annulus. Moderate calcification of theaortic arch. The left atrium
is dilated.HEPATOBILIARY: No focal hepatic lesions. Hyperdense material layers 
inthe gallbladder. Gallbladder is mildly distended. No biliary 
ductaldilatation.SPLEEN: 16.5 cm splenomegaly.PANCREAS: No focal masses or 
ductal dilatation.ADRENALS: No adrenal nodules.KIDNEYS/URETERS: No 
hydronephrosis, stones, or masses.PELVIC ORGANS/BLADDER: 
Unremarkable.PERITONEUM/RETROPERITONEUM: Moderate volume ascites. No free 
air.LYMPH NODES: No lymphadenopathy.VESSELS: Moderate sized paraesophageal 
varices. The main portal vein ispatent and measures 1.5 cm in diameter. Mild 
aortoiliac calcification.GI TRACT: No distention or wall thickening. The 
appendix is normal.BONES AND SOFT TISSUES: Diffuse anasarca. Marked bilateral 
gynecomastia.Acute, mildly displaced comminuted left medial clavicle fracture 
leftmedial clavicle fracture. Acute,nondisplaced left posterior 11th [id number] rib
fractures. There are combination of multiple bilateral old,healed and subacute 
rib fractures. Old, healed left L1 transverseprocess fracture. Old, unhealed l
eft L2 transverse process fracture.Old, healed left L3 transverse process 
fracture. Subacute, unhealed leftL4 transverse process fracture.IMPRESSION:1. 
The patchy opacities in the right apex are concerning for infection.These 
findings are not typical for septic pulmonary emboli.2. Acute, mildly displaced,
and comminuted left medial claviclefracture.3. Acute, nondisplaced left 
posterior 11th and 12th rib fractures.4. Cirrhosis with sequelae of portal 
hypertension including moderatesized paraesophageal varices, moderate 
splenomegaly, and moderate volumeascites.5. Sludge and/or stones in the 
gallbladder. The gallbladder distentionis nonspecific and could be due to 
fasting.6. A left upper lobe pulmonary nodule measures 0.6 cm. A follow-upchest 
CT is recommended in 6-12 months. An additional chest CT at 18-24months is 
recommended if the patient is at increased risk of lungmalignancy. If the 
patient is not at increased risk of lung malignancy,the 18-[address] is 
optional. These follow-up recommendationsare per the Fleischner Society 
Guidelines from 2017.Electronically Signed By: Tonny Rosario/15/2023 09:12 
CDTWorkstation Name: [id number] CHEST WITH IV CONTRAST2023-07-15 09:10:34
************************************************************[facility] : 1970 Sex: 
M************************************************************TECHNIQUE: CT of 
the chest, abdomen, and pelvis WITH intravenouscontrast and WITHOUT oral 
contrast. Dose modulation, iterativereconstruction, and/or weight-based 
adjustment of the mA/kV was utilizedto reduce the radiation dose to as low as 
reasonably achievable.INDICATION: Unlisted Reason for ExamEvaluate for septic 
emboli.COMPARISON: None.FINDINGS:LINES/TUBES: None.LUNGS AND AIRWAYS: Mild 
bibasilar atelectasis. Patchy opacity of theright apex. A left upper lobe 
pulmonary nodule on axial image 28measures 0.6 cm.PLEURA: Trace bilateral 
pleural effusions.HEART AND MEDIASTINUM: The visualized thyroid gland is normal.
Nosignificant mediastinal, hilar, or axillary lymphadenopathy. Mildcalcification
of the aortic annulus. Moderate calcification of theaortic arch. The left atrium
is dilated.HEPATOBILIARY: No focal hepatic lesions. Hyperdense material layers 
inthe gallbladder. Gallbladder is mildly distended. No biliary 
ductaldilatation.SPLEEN: 16.5 cm splenomegaly.PANCREAS: No focal masses or 
ductal dilatation.ADRENALS: No adrenal nodules.KIDNEYS/URETERS: No 
hydronephrosis, stones, or masses.PELVIC ORGANS/BLADDER: 
Unremarkable.PERITONEUM/RETROPERITONEUM: Moderate volume ascites. No free 
air.LYMPH NODES: No lymphadenopathy.VESSELS: Moderate sized paraesophageal 
varices. The main portal vein ispatent and measures 1.5 cm in diameter. Mild 
aortoiliac calcification.GI TRACT: No distention or wall thickening. The 
appendix is normal.BONES AND SOFT TISSUES: Diffuse anasarca. Marked bilateral 
gynecomastia.Acute, mildly displaced comminuted left medial clavicle fracture 
leftmedial clavicle fracture. Acute,nondisplaced left posterior 11th [id number] rib
fractures. There are combination of multiple bilateral old,healed and subacute 
rib fractures. Old, healed left L1 transverseprocess fracture. Old, unhealed l
eft L2 transverse process fracture.Old, healed left L3 transverse process 
fracture. Subacute, unhealed leftL4 transverse process fracture.IMPRESSION:1. 
The patchy opacities in the right apex are concerning for infection.These 
findings are not typical for septic pulmonary emboli.2. Acute, mildly displaced,
and comminuted left medial claviclefracture.3. Acute, nondisplaced left 
posterior 11th and 12th rib fractures.4. Cirrhosis with sequelae of portal 
hypertension including moderatesized paraesophageal varices, moderate 
splenomegaly, and moderate volumeascites.5. Sludge and/or stones in the 
gallbladder. The gallbladder distentionis nonspecific and could be due to 
fasting.6. A left upper lobe pulmonary nodule measures 0.6 cm. A follow-upchest 
CT is recommended in 6-12 months. An additional chest CT at 18-24months is 
recommended if the patient is at increased risk of lungmalignancy. If the 
patient is not at increased risk of lung malignancy,the 18-24 month chest CT is 
optional. These follow-up recommendationsare per the Fleischner Society 
Guidelines from 2017.Electronically Signed By: Tonny Claire07/15/2023 09:12 
CDTWorkstation Name: [id number] METABOLIC PANEL2023-07-15 05:15:08





             Test Item    Value        Reference Range Interpretation Comments

 

             SODIUM (BEAKER) 135 meq/L    136-145      L            



             (test code = 381)                                        

 

             POTASSIUM    3.5 meq/L    3.5-5.1                   



             (BEAKER) (test                                        



             code = 379)                                         

 

             CHLORIDE (BEAKER) 108 meq/L           H            



             (test code = 382)                                        

 

             CO2 (BEAKER) 23 meq/L     22-29                     



             (test code = 355)                                        

 

             BLOOD UREA   5 mg/dL      7-21         L            



             NITROGEN (BEAKER)                                        



             (test code = 354)                                        

 

             CREATININE   0.60 mg/dL   0.57-1.25                 



             (BEAKER) (test                                        



             code = 358)                                         

 

             GLUCOSE RANDOM 126 mg/dL           H            



             (BEAKER) (test                                        



             code = 652)                                         

 

             CALCIUM (BEAKER) 7.1 mg/dL    8.4-10.2     L            



             (test code = 697)                                        

 

             EGFR (BEAKER) 113                                     Interpretatio

n of eGFR



             (test code = mL/min/1.73                            values Stage De

scription



             1092)        sq m                                   Result G1 Shaunna

l or high



                                                                 >=90 G2 Mildly 

decreased



                                                                 60-89 G3a Mildl

y to



                                                                 moderately 45-5

9 G3b



                                                                 Moderately to s

everely



                                                                 30-44 G4 Severl

y decreased



                                                                 15-29 G5 Kidney

 failure



                                                                 <15Reported eGF

R is based



                                                                 on the CKD-EPI 





                                                                 equation that d

oes not use



                                                                 a race



                                                                 coefficientEsti

mated GFR



                                                                 is not as accur

ate as



                                                                 Creatinine Tabitha

cynthia in



                                                                 predicting glom

erular



                                                                 filtration rate

. Estimated



                                                                 GFR is not appl

icable for



                                                                 dialysis patien

ts



Specimen slightly ictericCBC W/PLT COUNT &amp; AUTO DIFFERENTIAL2023-07-15 
05:06:22





             Test Item    Value        Reference Range Interpretation Comments

 

             WHITE BLOOD CELL COUNT 2.8 K/ L     3.5-10.5     L            



             (BEAKER) (test code =                                        



             775)                                                

 

             RED BLOOD CELL COUNT 2.50 M/ L    4.63-6.08    L            



             (BEAKER) (test code =                                        



             761)                                                

 

             HEMOGLOBIN (BEAKER) 8.0 GM/DL    13.7-17.5    L            



             (test code = 410)                                        

 

             HEMATOCRIT (BEAKER) 25.8 %       40.1-51.0    L            



             (test code = 411)                                        

 

             MEAN CORPUSCULAR 103 fL       79-92        H            Discordant 

results



             VOLUME (BEAKER) (test                                        compar

ed to previous,



             code = 753)                                         clinical correl

ation



                                                                 required.

 

             MEAN CORPUSCULAR 32.0 pg      25.7-32.2                 



             HEMOGLOBIN (BEAKER)                                        



             (test code = 751)                                        

 

             MEAN CORPUSCULAR 31.0 GM/DL   32.3-36.5    L            



             HEMOGLOBIN CONC                                        



             (BEAKER) (test code =                                        



             752)                                                

 

             RED CELL DISTRIBUTION 20.0 %       11.6-14.4    H            



             WIDTH (BEAKER) (test                                        



             code = 412)                                         

 

             PLATELET COUNT 36 K/CU MM   150-450      L            



             (BEAKER) (test code =                                        



             756)                                                

 

             MEAN PLATELET VOLUME                                        Unable 

to report due



             (BEAKER) (test code =                                        to abn

ormal Platelet



             754)                                                population



                                                                 distribution.

 

             NUCLEATED RED BLOOD 0 /100 WBC   0-0                       



             CELLS (BEAKER) (test                                        



             code = 413)                                         

 

             NEUTROPHILS RELATIVE 52 %                                   



             PERCENT (BEAKER) (test                                        



             code = 429)                                         

 

             LYMPHOCYTES RELATIVE 24 %                                   



             PERCENT (BEAKER) (test                                        



             code = 430)                                         

 

             MONOCYTES RELATIVE 16 %                                   



             PERCENT (BEAKER) (test                                        



             code = 431)                                         

 

             EOSINOPHILS RELATIVE 7 %                                    



             PERCENT (BEAKER) (test                                        



             code = 432)                                         

 

             BASOPHILS RELATIVE 1 %                                    



             PERCENT (BEAKER) (test                                        



             code = 437)                                         

 

             NEUTROPHILS ABSOLUTE 1.46 K/ L    1.78-5.38    L            



             COUNT (BEAKER) (test                                        



             code = 670)                                         

 

             LYMPHOCYTES ABSOLUTE 0.67 K/ L    1.32-3.57    L            



             COUNT (BEAKER) (test                                        



             code = 414)                                         

 

             MONOCYTES ABSOLUTE 0.45 K/ L    0.30-0.82                 



             COUNT (BEAKER) (test                                        



             code = 415)                                         

 

             EOSINOPHILS ABSOLUTE 0.19 K/ L    0.04-0.54                 



             COUNT (BEAKER) (test                                        



             code = 416)                                         

 

             BASOPHILS ABSOLUTE 0.02 K/ L    0.01-0.08                 



             COUNT (BEAKER) (test                                        



             code = 417)                                         

 

             IMMATURE     1.10 %       0.00-1.00    H            



             GRANULOCYTES-RELATIVE                                        



             PERCENT (BEAKER) (test                                        



             code = 2801)                                        



PROTHROMBIN TIME/INR2023-07-15 05:03:52





             Test Item    Value        Reference Range Interpretation Comments

 

             PROTIME (BEAKER) (test code = 31.1 seconds 11.9-14.2    H          

  



             759)                                                

 

             INR (BEAKER) (test code = 370) 3.24         <=5.90                 

   



RECOMMENDED COUMADIN/WARFARIN INR THERAPY RANGESSTANDARD DOSE: 2.0 - 3.0 
Includes: PROPHYLAXIS for venous thrombosis, systemic embolization; TREATMENT 
for venous thrombosis and/or pulmonary embolus.HIGH RISK: Target INR is 2.5-3.5 
for patients with mechanical heart valves.BASIC METABOLIC [id number] 
04:24:16





             Test Item    Value        Reference Range Interpretation Comments

 

             SODIUM (BEAKER) 133 meq/L    136-145      L            



             (test code = 381)                                        

 

             POTASSIUM    4.8 meq/L    3.5-5.1                   Specimen modera

tely



             (BEAKER) (test                                        hemolyzed



             code = 379)                                         

 

             CHLORIDE (BEAKER) 108 meq/L           H            



             (test code = 382)                                        

 

             CO2 (BEAKER) 22 meq/L     22-29                     



             (test code = 355)                                        

 

             BLOOD UREA   7 mg/dL      7-21                      



             NITROGEN (BEAKER)                                        



             (test code = 354)                                        

 

             CREATININE   0.63 mg/dL   0.57-1.25                 Specimen modera

tely



             (BEAKER) (test                                        hemolyzed



             code = 358)                                         

 

             GLUCOSE RANDOM 97 mg/dL                         



             (BEAKER) (test                                        



             code = 652)                                         

 

             CALCIUM (BEAKER) 7.1 mg/dL    8.4-10.2     L            



             (test code = 697)                                        

 

             EGFR (BEAKER) 111                                     Interpretatio

n of eGFR



             (test code = mL/min/1.73                            values Stage De

scription



             1092)        sq m                                   Result G1 Shaunna

l or high



                                                                 >=90 G2 Mildly 

decreased



                                                                 60-89 G3a Mildl

y to



                                                                 moderately 45-5

9 G3b



                                                                 Moderately to s

everely



                                                                 30-44 G4 Severl

y decreased



                                                                 15-29 G5 Kidney

 failure



                                                                 <15Reported eGF

R is based



                                                                 on the CKD-EPI 





                                                                 equation that d

oes not use



                                                                 a race



                                                                 coefficientEsti

mated GFR



                                                                 is not as accur

ate as



                                                                 Creatinine Tabitha

cynthia in



                                                                 predicting glom

erular



                                                                 filtration rate

. Estimated



                                                                 GFR is not appl

icable for



                                                                 dialysis patien

ts



 ID - [id number] slightly ictericCBC W/PLT COUNT &amp; AUTO DIFFERENTIAL
2023 03:47:16





             Test Item    Value        Reference Range Interpretation Comments

 

             WHITE BLOOD CELL COUNT (BEAKER) 2.6 K/ L     3.5-10.5     L        

    



             (test code = 775)                                        

 

             RED BLOOD CELL COUNT (BEAKER) 2.59 M/ L    4.63-6.08    L          

  



             (test code = 761)                                        

 

             HEMOGLOBIN (BEAKER) (test code = 8.2 GM/DL    13.7-17.5    L       

     



             410)                                                

 

             HEMATOCRIT (BEAKER) (test code = 25.4 %       40.1-51.0    L       

     



             411)                                                

 

             MEAN CORPUSCULAR VOLUME (BEAKER) 98 fL        79-92        H       

     



             (test code = 753)                                        

 

             MEAN CORPUSCULAR HEMOGLOBIN 31.7 pg      25.7-32.2                 



             (BEAKER) (test code = 751)                                        

 

             MEAN CORPUSCULAR HEMOGLOBIN CONC 32.3 GM/DL   32.3-36.5            

     



             (BEAKER) (test code = 752)                                        

 

             RED CELL DISTRIBUTION WIDTH 19.2 %       11.6-14.4    H            



             (BEAKER) (test code = 412)                                        

 

             PLATELET COUNT (BEAKER) (test code 35 K/CU MM   150-450      L     

       



             = 756)                                              

 

             MEAN PLATELET VOLUME (BEAKER) 11.8 fL      9.4-12.4                

  



             (test code = 754)                                        

 

             NUCLEATED RED BLOOD CELLS (BEAKER) 0 /100 WBC   0-0                

       



             (test code = 413)                                        

 

             NEUTROPHILS RELATIVE PERCENT 43 %                                  

 



             (BEAKER) (test code = 429)                                        

 

             LYMPHOCYTES RELATIVE PERCENT 30 %                                  

 



             (BEAKER) (test code = 430)                                        

 

             MONOCYTES RELATIVE PERCENT 19 %                                   



             (BEAKER) (test code = 431)                                        

 

             EOSINOPHILS RELATIVE PERCENT 7 %                                   

 



             (BEAKER) (test code = 432)                                        

 

             BASOPHILS RELATIVE PERCENT 0 %                                    



             (BEAKER) (test code = 437)                                        

 

             NEUTROPHILS ABSOLUTE COUNT 1.13 K/ L    1.78-5.38    L            



             (BEAKER) (test code = 670)                                        

 

             LYMPHOCYTES ABSOLUTE COUNT 0.78 K/ L    1.32-3.57    L            



             (BEAKER) (test code = 414)                                        

 

             MONOCYTES ABSOLUTE COUNT (BEAKER) 0.49 K/ L    0.30-0.82           

      



             (test code = 415)                                        

 

             EOSINOPHILS ABSOLUTE COUNT 0.17 K/ L    0.04-0.54                 



             (BEAKER) (test code = 416)                                        

 

             BASOPHILS ABSOLUTE COUNT (BEAKER) 0.01 K/ L    0.01-0.08           

      



             (test code = 417)                                        

 

             IMMATURE GRANULOCYTES-RELATIVE 1.10 %       0.00-1.00    H         

   



             PERCENT (BEAKER) (test code =                                      

  



             2801)                                               



PROTHROMBIN [id number] 03:31:31





             Test Item    Value        Reference Range Interpretation Comments

 

             PROTIME (BEAKER) (test code = 30.8 seconds 11.9-14.2    H          

  



             759)                                                

 

             INR (BEAKER) (test code = 370) 3.20         <=5.90                 

   



RECOMMENDED COUMADIN/WARFARIN INR THERAPY RANGESSTANDARD DOSE: 2.0 - 3.0 
Includes: PROPHYLAXIS for venous thrombosis, systemic embolization; TREATMENT 
for venous thrombosis and/or pulmonary embolus.HIGH RISK: Target INR is 2.5-3.5 
for patients with mechanical heart valves.CBC W/PLT COUNT &amp; AUTO 
[id number] 05:56:44





             Test Item    Value        Reference Range Interpretation Comments

 

             WHITE BLOOD CELL COUNT (BEAKER) 3.0 K/ L     3.5-10.5     L        

    



             (test code = 775)                                        

 

             RED BLOOD CELL COUNT (BEAKER) 2.91 M/ L    4.63-6.08    L          

  



             (test code = 761)                                        

 

             HEMOGLOBIN (BEAKER) (test code = 9.1 GM/DL    13.7-17.5    L       

     



             410)                                                

 

             HEMATOCRIT (BEAKER) (test code = 28.5 %       40.1-51.0    L       

     



             411)                                                

 

             MEAN CORPUSCULAR VOLUME (BEAKER) 98 fL        79-92        H       

     



             (test code = 753)                                        

 

             MEAN CORPUSCULAR HEMOGLOBIN 31.3 pg      25.7-32.2                 



             (BEAKER) (test code = 751)                                        

 

             MEAN CORPUSCULAR HEMOGLOBIN CONC 31.9 GM/DL   32.3-36.5    L       

     



             (BEAKER) (test code = 752)                                        

 

             RED CELL DISTRIBUTION WIDTH 18.9 %       11.6-14.4    H            



             (BEAKER) (test code = 412)                                        

 

             PLATELET COUNT (BEAKER) (test code 40 K/CU MM   150-450      L     

       



             = 756)                                              

 

             MEAN PLATELET VOLUME (BEAKER) 12.7 fL      9.4-12.4     H          

  



             (test code = 754)                                        

 

             NUCLEATED RED BLOOD CELLS (BEAKER) 0 /100 WBC   0-0                

       



             (test code = 413)                                        

 

             NEUTROPHILS RELATIVE PERCENT 45 %                                  

 



             (BEAKER) (test code = 429)                                        

 

             LYMPHOCYTES RELATIVE PERCENT 31 %                                  

 



             (BEAKER) (test code = 430)                                        

 

             MONOCYTES RELATIVE PERCENT 18 %                                   



             (BEAKER) (test code = 431)                                        

 

             EOSINOPHILS RELATIVE PERCENT 5 %                                   

 



             (BEAKER) (test code = 432)                                        

 

             BASOPHILS RELATIVE PERCENT 0 %                                    



             (BEAKER) (test code = 437)                                        

 

             NEUTROPHILS ABSOLUTE COUNT 1.34 K/ L    1.78-5.38    L            



             (BEAKER) (test code = 670)                                        

 

             LYMPHOCYTES ABSOLUTE COUNT 0.91 K/ L    1.32-3.57    L            



             (BEAKER) (test code = 414)                                        

 

             MONOCYTES ABSOLUTE COUNT (BEAKER) 0.53 K/ L    0.30-0.82           

      



             (test code = 415)                                        

 

             EOSINOPHILS ABSOLUTE COUNT 0.16 K/ L    0.04-0.54                 



             (BEAKER) (test code = 416)                                        

 

             BASOPHILS ABSOLUTE COUNT (BEAKER) 0.01 K/ L    0.01-0.08           

      



             (test code = 417)                                        

 

             IMMATURE GRANULOCYTES-RELATIVE 0.70 %       0.00-1.00              

   



             PERCENT (BEAKER) (test code =                                      

  



             2801)                                               



BASIC METABOLIC [id number] 05:36:15





             Test Item    Value        Reference Range Interpretation Comments

 

             SODIUM (BEAKER) 135 meq/L    136-145      L            



             (test code = 381)                                        

 

             POTASSIUM    3.4 meq/L    3.5-5.1      L            



             (BEAKER) (test                                        



             code = 379)                                         

 

             CHLORIDE (BEAKER) 107 meq/L                        



             (test code = 382)                                        

 

             CO2 (BEAKER) 22 meq/L     22-29                     



             (test code = 355)                                        

 

             BLOOD UREA   7 mg/dL      7-21                      



             NITROGEN (BEAKER)                                        



             (test code = 354)                                        

 

             CREATININE   0.58 mg/dL   0.57-1.25                 



             (BEAKER) (test                                        



             code = 358)                                         

 

             GLUCOSE RANDOM 95 mg/dL                         



             (BEAKER) (test                                        



             code = 652)                                         

 

             CALCIUM (BEAKER) 7.4 mg/dL    8.4-10.2     L            



             (test code = 697)                                        

 

             EGFR (BEAKER) 113                                     Interpretatio

n of eGFR



             (test code = mL/min/1.73                            values Stage De

scription



             1092)        sq m                                   Result G1 Shaunna

l or high



                                                                 >=90 G2 Mildly 

decreased



                                                                 60-89 G3a Mildl

y to



                                                                 moderately 45-5

9 G3b



                                                                 Moderately to s

everely



                                                                 30-44 G4 Severl

y decreased



                                                                 15-29 G5 Kidney

 failure



                                                                 <15Reported eGF

R is based



                                                                 on the CKD-EPI 





                                                                 equation that d

oes not use



                                                                 a race



                                                                 coefficientEsti

mated GFR



                                                                 is not as accur

ate as



                                                                 Creatinine Tabitha

cynthia in



                                                                 predicting glom

erular



                                                                 filtration rate

. Estimated



                                                                 GFR is not appl

icable for



                                                                 dialysis patien

ts



 ID - ADMINSpecimen slightly ictericAFB CULTURE + SMEAR (NON-SPUTUM)
2023 08:41:19





             Test Item    Value        Reference Range Interpretation Comments

 

             CULTURE (BEAKER) (test No acid-fast bacilli                        

   



             code = 1095) isolated in 42 days                           

 

             AFB SMEAR (BEAKER) No acid fast bacilli                           



             (test code = 994) seen                                   



CBC (HEMOGRAM ONLY)2023 06:00:39





             Test Item    Value        Reference Range Interpretation Comments

 

             WHITE BLOOD CELL COUNT (BEAKER) 2.9 K/ L     3.5-10.5     L        

    



             (test code = 775)                                        

 

             RED BLOOD CELL COUNT (BEAKER) 2.93 M/ L    4.63-6.08    L          

  



             (test code = 761)                                        

 

             HEMOGLOBIN (BEAKER) (test code = 9.3 GM/DL    13.7-17.5    L       

     



             410)                                                

 

             HEMATOCRIT (BEAKER) (test code = 29.0 %       40.1-51.0    L       

     



             411)                                                

 

             MEAN CORPUSCULAR VOLUME (BEAKER) 99 fL        79-92        H       

     



             (test code = 753)                                        

 

             MEAN CORPUSCULAR HEMOGLOBIN 31.7 pg      25.7-32.2                 



             (BEAKER) (test code = 751)                                        

 

             MEAN CORPUSCULAR HEMOGLOBIN CONC 32.1 GM/DL   32.3-36.5    L       

     



             (BEAKER) (test code = 752)                                        

 

             RED CELL DISTRIBUTION WIDTH 19.0 %       11.6-14.4    H            



             (BEAKER) (test code = 412)                                        

 

             PLATELET COUNT (BEAKER) (test code 35 K/CU MM   150-450      L     

       



             = 756)                                              

 

             MEAN PLATELET VOLUME (BEAKER) 11.5 fL      9.4-12.4                

  



             (test code = 754)                                        

 

             NUCLEATED RED BLOOD CELLS (BEAKER) 1 /100 WBC   0-0          H     

       



             (test code = 413)                                        



COMPREHENSIVE METABOLIC [id number] 05:43:41





             Test Item    Value        Reference Range Interpretation Comments

 

             TOTAL PROTEIN 4.7 gm/dL    6.0-8.3      L            



             (BEAKER) (test                                        



             code = 770)                                         

 

             ALBUMIN (BEAKER) 2.1 g/dL     3.5-5.0      L            



             (test code = 1145)                                        

 

             ALKALINE     83 U/L                           



             PHOSPHATASE                                         



             (BEAKER) (test                                        



             code = 346)                                         

 

             BILIRUBIN TOTAL 3.2 mg/dL    0.2-1.2      H            



             (BEAKER) (test                                        



             code = 377)                                         

 

             SODIUM (BEAKER) 135 meq/L    136-145      L            



             (test code = 381)                                        

 

             POTASSIUM (BEAKER) 3.6 meq/L    3.5-5.1                   



             (test code = 379)                                        

 

             CHLORIDE (BEAKER) 109 meq/L           H            



             (test code = 382)                                        

 

             CO2 (BEAKER) (test 20 meq/L     22-29        L            



             code = 355)                                         

 

             BLOOD UREA   5 mg/dL      7-21         L            



             NITROGEN (BEAKER)                                        



             (test code = 354)                                        

 

             CREATININE   0.57 mg/dL   0.57-1.25                 



             (BEAKER) (test                                        



             code = 358)                                         

 

             GLUCOSE RANDOM 86 mg/dL                         



             (BEAKER) (test                                        



             code = 652)                                         

 

             CALCIUM (BEAKER) 7.3 mg/dL    8.4-10.2     L            



             (test code = 697)                                        

 

             AST (SGOT)   17 U/L       5-34                      



             (BEAKER) (test                                        



             code = 353)                                         

 

             ALT (SGPT)   7 U/L        6-55                      



             (BEAKER) (test                                        



             code = 347)                                         

 

             EGFR (BEAKER) 114                                     Interpretatio

n of eGFR



             (test code = 1092) mL/min/1.73                            values St

age Description



                          sq m                                   Result G1 Shaunna

l or high



                                                                 >=90 G2 Mildly 

decreased



                                                                 60-89 G3a Mildl

y to



                                                                 moderately 45-5

9 G3b



                                                                 Moderately to s

everely



                                                                 30-44 G4 Severl

y decreased



                                                                 15-29 G5 Kidney

 failure



                                                                 <15Reported eGF

R is based



                                                                 on the CKD-EPI 





                                                                 equation that d

oes not use



                                                                 a race



                                                                 coefficientEsti

mated GFR



                                                                 is not as accur

ate as



                                                                 Creatinine Tabitha

cynthia in



                                                                 predicting glom

erular



                                                                 filtration rate

. Estimated



                                                                 GFR is not appl

icable for



                                                                 dialysis patien

ts



 ID - WILLIAM WSpecimen slightly [id number] 05:31:23





             Test Item    Value        Reference Range Interpretation Comments

 

             MAGNESIUM (BELEISA) (test code = 1.5 mg/dL    1.6-2.6      L        

    



             627)                                                



 ID - WILLIAM WPROTHROMBIN [id number] 05:04:34





             Test Item    Value        Reference Range Interpretation Comments

 

             PROTIME (BEAKER) (test code = 32.7 seconds 11.9-14.2    H          

  



             759)                                                

 

             INR (AppThwackAKER) (test code = 370) 3.45         <=5.90                 

   



RECOMMENDED COUMADIN/WARFARIN INR THERAPY RANGESSTANDARD DOSE: 2.0 - 3.0 
Includes: PROPHYLAXIS for venous thrombosis, systemic embolization; TREATMENT 
for venous thrombosis and/or pulmonary embolus.HIGH RISK: Target INR is 2.5-3.5 
for patients with mechanical heart valves.BLOOD [id number] 05:00:09





             Test Item    Value        Reference Range Interpretation Comments

 

             CULTURE (BEAKER) (test No growth in 5 days                         

  



             code = 1095)                                        



The specimen volume collected for this blood culture was below the optimum (10 
mL per bottle or 20 mL total). Use of lower volumes may adversely affect 
recovery and/or detection times of some organisms.BLOOD [id number] 
05:00:08





             Test Item    Value        Reference Range Interpretation Comments

 

             CULTURE (BEAKER) (test No growth in 5 days                         

  



             code = 1095)                                        



CRYPTOCOCCAL [id number] 15:06:27





             Test Item    Value        Reference Range Interpretation Comments

 

             CRYPTOCOCCAL ANTIGEN, SERUM Negative     Negative, Interference    

          



             (BEAKER) (test code = 1828)                                        



COMPREHENSIVE METABOLIC [id number] 12:41:45





             Test Item    Value        Reference Range Interpretation Comments

 

             TOTAL PROTEIN 4.7 gm/dL    6.0-8.3      L            



             (BEAKER) (test                                        



             code = 770)                                         

 

             ALBUMIN (BEAKER) 2.1 g/dL     3.5-5.0      L            



             (test code = 1145)                                        

 

             ALKALINE     77 U/L                           



             PHOSPHATASE                                         



             (BEAKER) (test                                        



             code = 346)                                         

 

             BILIRUBIN TOTAL 3.3 mg/dL    0.2-1.2      H            



             (BEAKER) (test                                        



             code = 377)                                         

 

             SODIUM (BEAKER) 136 meq/L    136-145                   



             (test code = 381)                                        

 

             POTASSIUM (BEAKER) 3.4 meq/L    3.5-5.1      L            



             (test code = 379)                                        

 

             CHLORIDE (BEAKER) 109 meq/L           H            



             (test code = 382)                                        

 

             CO2 (BEAKER) (test 21 meq/L     22-29        L            



             code = 355)                                         

 

             BLOOD UREA   3 mg/dL      7-21         L            



             NITROGEN (BEAKER)                                        



             (test code = 354)                                        

 

             CREATININE   0.58 mg/dL   0.57-1.25                 



             (BEAKER) (test                                        



             code = 358)                                         

 

             GLUCOSE RANDOM 98 mg/dL                         



             (BEAKER) (test                                        



             code = 652)                                         

 

             CALCIUM (BEAKER) 7.1 mg/dL    8.4-10.2     L            



             (test code = 697)                                        

 

             AST (SGOT)   19 U/L       5-34                      



             (BEAKER) (test                                        



             code = 353)                                         

 

             ALT (SGPT)   8 U/L        6-55                      



             (BEAKER) (test                                        



             code = 347)                                         

 

             EGFR (BEAKER) 113                                     Interpretatio

n of eGFR



             (test code = 1092) mL/min/1.73                            values St

age Description



                          sq m                                   Result G1 Shaunna

l or high



                                                                 >=90 G2 Mildly 

decreased



                                                                 60-89 G3a Mildl

y to



                                                                 moderately 45-5

9 G3b



                                                                 Moderately to s

everely



                                                                 30-44 G4 Severl

y decreased



                                                                 15-29  G5 Kidne

y failure



                                                                 <15Reported eGF

R is based



                                                                 on the CKD-EPI 

202



                                                                 equation that d

oes not use



                                                                 a race



                                                                 coefficientEsti

mated GFR



                                                                 is not as accur

ate as



                                                                 Creatinine Tabitha marie in



                                                                 predicting glom

erular



                                                                 filtration rate

. Estimated



                                                                 GFR is not appl

icable for



                                                                 dialysis patien

ts



Specimen slightly ictericCOMPREHENSIVE METABOLIC [id number] 12:41:45





             Test Item    Value        Reference Range Interpretation Comments

 

             TOTAL PROTEIN 4.6 gm/dL    6.0-8.3      L            



             (BEAKER) (test                                        



             code = 770)                                         

 

             ALBUMIN (BEAKER) 2.1 g/dL     3.5-5.0      L            



             (test code = 1145)                                        

 

             ALKALINE     81 U/L                           



             PHOSPHATASE                                         



             (BEAKER) (test                                        



             code = 346)                                         

 

             BILIRUBIN TOTAL 3.2 mg/dL    0.2-1.2      H            



             (BEAKER) (test                                        



             code = 377)                                         

 

             SODIUM (BEAKER) 135 meq/L    136-145      L            



             (test code = 381)                                        

 

             POTASSIUM (BEAKER) 3.5 meq/L    3.5-5.1                   



             (test code = 379)                                        

 

             CHLORIDE (BEAKER) 109 meq/L           H            



             (test code = 382)                                        

 

             CO2 (BEAKER) (test 22 meq/L     22-29                     



             code = 355)                                         

 

             BLOOD UREA   3 mg/dL      7-21         L            



             NITROGEN (BEAKER)                                        



             (test code = 354)                                        

 

             CREATININE   0.56 mg/dL   0.57-1.25    L            



             (BEAKER) (test                                        



             code = 358)                                         

 

             GLUCOSE RANDOM 100 mg/dL                        



             (BEAKER) (test                                        



             code = 652)                                         

 

             CALCIUM (BEAKER) 7.2 mg/dL    8.4-10.2     L            



             (test code = 697)                                        

 

             AST (SGOT)   20 U/L       5-34                      



             (BEAKER) (test                                        



             code = 353)                                         

 

             ALT (SGPT)   7 U/L        6-55                      



             (BEAKER) (test                                        



             code = 347)                                         

 

             EGFR (BEAKER) 114                                     Interpretatio

n of eGFR



             (test code = 1092) mL/min/1.73                            values St

age Description



                          sq m                                   Result G1 Shaunna

l or high



                                                                 >=90 G2 Mildly 

decreased



                                                                 60-89 G3a Mildl

y to



                                                                 moderately 45-5

9 G3b



                                                                 Moderately to s

everely



                                                                 30-44 G4 Severl

y decreased



                                                                 15-29  G5 Kidne

y failure



                                                                 <15Reported eGF

R is based



                                                                 on the CKD-EPI 

2021



                                                                 equation that d

oes not use



                                                                 a race



                                                                 coefficientEsti

mated GFR



                                                                 is not as accur

ate as



                                                                 Creatinine Tabitha

cynthia in



                                                                 predicting glom

erular



                                                                 filtration rate

. Estimated



                                                                 GFR is not appl

icable for



                                                                 dialysis patien

ts



Specimen slightly [id number] 12:41:29





             Test Item    Value        Reference Range Interpretation Comments

 

             MAGNESIUM (BEAKER) (test code = 1.5 mg/dL    1.6-2.6      L        

    



             627)                                                



[id number] 12:40:27





             Test Item    Value        Reference Range Interpretation Comments

 

             PHOSPHORUS (BEAKER) (test code = 2.3 mg/dL    2.3-4.7              

     



             604)                                                



[id number] 12:40:26





             Test Item    Value        Reference Range Interpretation Comments

 

             MAGNESIUM (BEAKER) (test code = 1.4 mg/dL    1.6-2.6      L        

    



             627)                                                



CBC W/PLT COUNT &amp; AUTO [id number] 12:37:22





             Test Item    Value        Reference Range Interpretation Comments

 

             WHITE BLOOD CELL COUNT (BEAKER) 3.2 K/ L     3.5-10.5     L        

    



             (test code = 775)                                        

 

             RED BLOOD CELL COUNT (BEAKER) 2.86 M/ L    4.63-6.08    L          

  



             (test code = 761)                                        

 

             HEMOGLOBIN (BEAKER) (test code = 9.1 GM/DL    13.7-17.5    L       

     



             410)                                                

 

             HEMATOCRIT (BEAKER) (test code = 28.2 %       40.1-51.0    L       

     



             411)                                                

 

             MEAN CORPUSCULAR VOLUME (BEAKER) 99 fL        79-92        H       

     



             (test code = 753)                                        

 

             MEAN CORPUSCULAR HEMOGLOBIN 31.8 pg      25.7-32.2                 



             (BEAKER) (test code = 751)                                        

 

             MEAN CORPUSCULAR HEMOGLOBIN CONC 32.3 GM/DL   32.3-36.5            

     



             (BEAKER) (test code = 752)                                        

 

             RED CELL DISTRIBUTION WIDTH 19.1 %       11.6-14.4    H            



             (BEAKER) (test code = 412)                                        

 

             PLATELET COUNT (BEAKER) (test code 38 K/CU MM   150-450      L     

       



             = 756)                                              

 

             MEAN PLATELET VOLUME (BEAKER) 10.1 fL      9.4-12.4                

  



             (test code = 754)                                        

 

             NUCLEATED RED BLOOD CELLS (BEAKER) 0 /100 WBC   0-0                

       



             (test code = 413)                                        

 

             NEUTROPHILS RELATIVE PERCENT 46 %                                  

 



             (BEAKER) (test code = 429)                                        

 

             LYMPHOCYTES RELATIVE PERCENT 21 %                                  

 



             (BEAKER) (test code = 430)                                        

 

             MONOCYTES RELATIVE PERCENT 21 %                                   



             (BEAKER) (test code = 431)                                        

 

             EOSINOPHILS RELATIVE PERCENT 10 %                                  

 



             (BEAKER) (test code = 432)                                        

 

             BASOPHILS RELATIVE PERCENT 1 %                                    



             (BEAKER) (test code = 437)                                        

 

             NEUTROPHILS ABSOLUTE COUNT 1.48 K/ L    1.78-5.38    L            



             (BEAKER) (test code = 670)                                        

 

             LYMPHOCYTES ABSOLUTE COUNT 0.68 K/ L    1.32-3.57    L            



             (BEAKER) (test code = 414)                                        

 

             MONOCYTES ABSOLUTE COUNT (BEAKER) 0.68 K/ L    0.30-0.82           

      



             (test code = 415)                                        

 

             EOSINOPHILS ABSOLUTE COUNT 0.32 K/ L    0.04-0.54                 



             (BEAKER) (test code = 416)                                        

 

             BASOPHILS ABSOLUTE COUNT (BEAKER) 0.03 K/ L    0.01-0.08           

      



             (test code = 417)                                        

 

             IMMATURE GRANULOCYTES-RELATIVE 0.60 %       0.00-1.00              

   



             PERCENT (BEAKER) (test code =                                      

  



             2801)                                               



PROTHROMBIN [id number] 12:23:05





             Test Item    Value        Reference Range Interpretation Comments

 

             PROTIME (BEAKER) (test code = 32.3 seconds 11.9-14.2    H          

  



             759)                                                

 

             INR (BEAKER) (test code = 370) 3.40         <=5.90                 

   



RECOMMENDED COUMADIN/WARFARIN INR THERAPY RANGESSTANDARD DOSE: 2.0 - 3.0 
Includes: PROPHYLAXIS for venous thrombosis, systemic embolization; TREATMENT 
for venous thrombosis and/or pulmonary embolus.HIGH RISK: Target INR is 2.5-3.5 
for patients with mechanical heart valves.PROTHROMBIN TIME/[id number] 
12:22:41





             Test Item    Value        Reference Range Interpretation Comments

 

             PROTIME (BEAKER) (test code = 32.7 seconds 11.9-14.2    H          

  



             759)                                                

 

             INR (BEAKER) (test code = 370) 3.45         <=5.90                 

   



RECOMMENDED COUMADIN/WARFARIN INR THERAPY RANGESSTANDARD DOSE: 2.0 - 3.0 
Includes: PROPHYLAXIS for venous thrombosis, systemic embolization; TREATMENT 
for venous thrombosis and/or pulmonary embolus.HIGH RISK: Target INR is 2.5-3.5 
for patients with mechanical heart valves.CBC (HEMOGRAM ONLY)2023 12:12:33





             Test Item    Value        Reference Range Interpretation Comments

 

             WHITE BLOOD CELL COUNT (BEAKER) 3.3 K/ L     3.5-10.5     L        

    



             (test code = 775)                                        

 

             RED BLOOD CELL COUNT (BEAKER) 2.88 M/ L    4.63-6.08    L          

  



             (test code = 761)                                        

 

             HEMOGLOBIN (BEAKER) (test code = 9.2 GM/DL    13.7-17.5    L       

     



             410)                                                

 

             HEMATOCRIT (BEAKER) (test code = 28.5 %       40.1-51.0    L       

     



             411)                                                

 

             MEAN CORPUSCULAR VOLUME (BEAKER) 99 fL        79-92        H       

     



             (test code = 753)                                        

 

             MEAN CORPUSCULAR HEMOGLOBIN 31.9 pg      25.7-32.2                 



             (BEAKER) (test code = 751)                                        

 

             MEAN CORPUSCULAR HEMOGLOBIN CONC 32.3 GM/DL   32.3-36.5            

     



             (BEAKER) (test code = 752)                                        

 

             RED CELL DISTRIBUTION WIDTH 19.3 %       11.6-14.4    H            



             (BEAKER) (test code = 412)                                        

 

             PLATELET COUNT (BEAKER) (test code 47 K/CU MM   150-450      L     

       



             = 756)                                              

 

             MEAN PLATELET VOLUME (BEAKER) 11.7 fL      9.4-12.4                

  



             (test code = 754)                                        

 

             NUCLEATED RED BLOOD CELLS (BEAKER) 0 /100 WBC   0-0                

       



             (test code = 413)                                        



VANCOMYCIN LEVEL, [id number] 00:32:22





             Test Item    Value        Reference Range Interpretation Comments

 

             VANCOMYCIN TROUGH (BEAKER) (test 9.9 ug/mL    10.0-20.0    L       

     



             code = 522)                                         



 ID - ADMINPHOSPHATIDYLETHANOL, BLOOD2023-07-10 15:14:19





             Test Item    Value        Reference Range Interpretation Comments

 

             PHOSPHATIDYLETHANOL (PETH)                                        S

ee scanned



             (test code = 2572079)                                        report

.



US ABDOMEN LIMITED2023-07-10 10:50:00
********************************************************[facility] 1970 Sex: 
M************************************************************EXAM: Renal 
UltrasoundINDICATION: concern for SBP COMPARISON: None TECHNIQUE: Transverse and
longitudinal images of the abdomen. FINDINGS: Trace amount of fluid noted, most 
notable in the right upper quadrant.IMPRESSION:Trace fluid, not enough for 
paracentesis.Electronically Signed By: Bonifacio Flores07/10/2023 10:52 
CDTWorkstation Name: [id number] BRAIN WITH &amp; WITHOUT IV CONTRAST2023-07-10
09:49:01************************************************************[facility] 1970 Sex: 
M************************************************************MR BRAIN WITH &amp;
WITHOUT IV CONTRASTINDICATION: Stroke, follow upTechnique: Multiplanar, 
multisequence MRI images of the brain were obtained beforeand after 
demonstration of intravenous contrast.Of note, the images were only verified by 
the technologist after 9:00 AM7/10/2023.COMPARISON: NoneFINDINGS:Two small 
diffusion restricting lesions with peripheral susceptibilityare seen in the 
right cerebral hemisphere in the periventricular region.Specifically, a 0.8 x 
1.1 cm (AP by transverse) mildly peripherallyenhancing lesion is seen in the 
right corpus callosum body projectinginferiorly into the right lateral 
ventricle. A 0.7 x 1.1 cm lesion also seen in the right cingulate gyrus.Moderate
generalized parenchymal volume loss is present. Brainparenchyma is normal in 
morphology. Midline structures are normallydeveloped. A developmental venous 
anomaly seen in the left parietal lobe ([id number] image 203). A prominent 
vascular structures also seen in the rightinferior gonsalo (image 85).Scattered 
T2/FLAIR hyperintense foci within the periventricular andsubcortical white 
matter are nonspecific, however,statisticallyrepresent chronic microvascular 
ischemic changes.No hydrocephalus.Orbits are within normal limits.No obstructive
paranasal sinus disease.Additional findings: None.IMPRESSION:1. Two intracranial
lesions in the right periventricular regionmeasuring up to 1.1 cm in diameter, 
concerning for cerebral abscesses.No ventriculitis identified. No midline shift 
or herniation.2. 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 
lobe.The above findings were discussed with , who acknowledged 
thefindings, on 7/10/2023 at 9:45 AM.Electronically Signed By: Cipriano Boucher07/10/2023 09:51 CDTWorkstation Name: [id number](CELLAVISION MANUAL DIFF)
2023 10:12:05





             Test Item    Value        Reference Range Interpretation Comments

 

             TOTAL COUNTED (BEAKER) (test code                                  

      



             = 1351)                                             

 

             WBC MORPHOLOGY (BEAKER) (test Normal                               

  



             code = 487)                                         

 

             PLT MORPHOLOGY (BEAKER) (test Normal                               

  



             code = 486)                                         

 

             POLYCHROMATOPHILLIC RBCS(BEAKER) 1+ few                            

     



             (test code = 478)                                        

 

             ANISOCYTOSIS (BEAKER) (test code 2+                                

     



             = 961)                                              

 

             MACROCYTES (BEAKER) (test code = 2+                                

     



             964)                                                

 

             POIKILOCYTES (BEAKER) (test code 2+ moderate                       

     



             = 966)                                              

 

             OVALOCYTES (BEAKER) (test code = 2+                                

     



             477)                                                



CBC W/PLT COUNT &amp; AUTO [id number] 10:12:04





             Test Item    Value        Reference Range Interpretation Comments

 

             WHITE BLOOD CELL COUNT 6.4 K/ L     3.5-10.5                  



             (BEAKER) (test code =                                        



             775)                                                

 

             RED BLOOD CELL COUNT 2.65 M/ L    4.63-6.08    L            



             (BEAKER) (test code =                                        



             761)                                                

 

             HEMOGLOBIN (BEAKER) 8.5 GM/DL    13.7-17.5    L            



             (test code = 410)                                        

 

             HEMATOCRIT (BEAKER) 25.9 %       40.1-51.0    L            



             (test code = 411)                                        

 

             MEAN CORPUSCULAR 98 fL        79-92        H            DISCORDANT 

MCV RESULT



             VOLUME (BEAKER) (test                                        COMPAR

ED TO PREVIOUS



             code = 753)                                         RESULT; CLINICA

L



                                                                 CORRELATION REQ

UIRED.

 

             MEAN CORPUSCULAR 32.1 pg      25.7-32.2                 



             HEMOGLOBIN (BEAKER)                                        



             (test code = 751)                                        

 

             MEAN CORPUSCULAR 32.8 GM/DL   32.3-36.5                 



             HEMOGLOBIN CONC                                        



             (BEAKER) (test code =                                        



             752)                                                

 

             RED CELL DISTRIBUTION 20.3 %       11.6-14.4    H            



             WIDTH (BEAKER) (test                                        



             code = 412)                                         

 

             PLATELET COUNT 43 K/CU MM   150-450      L            DISCORDANT PL

T RESULT



             (BEAKER) (test code =                                        COMPAR

ED TO PREVIOUS



             756)                                                RESULT; CLINICA

L



                                                                 CORRELATION



                                                                 REQUIRED.Rcd un

its

 

             MEAN PLATELET VOLUME 10.7 fL      9.4-12.4                  



             (BEAKER) (test code =                                        



             754)                                                

 

             NUCLEATED RED BLOOD 0 /100 WBC   0-0                       



             CELLS (BEAKER) (test                                        



             code = 413)                                         

 

             NEUTROPHILS RELATIVE 70 %                                   



             PERCENT (BEAKER) (test                                        



             code = 429)                                         

 

             LYMPHOCYTES RELATIVE 9 %                                    



             PERCENT (BEAKER) (test                                        



             code = 430)                                         

 

             MONOCYTES RELATIVE 13 %                                   



             PERCENT (BEAKER) (test                                        



             code = 431)                                         

 

             EOSINOPHILS RELATIVE 6 %                                    



             PERCENT (BEAKER) (test                                        



             code = 432)                                         

 

             BASOPHILS RELATIVE 1 %                                    



             PERCENT (BEAKER) (test                                        



             code = 437)                                         

 

             NEUTROPHILS ABSOLUTE 4.49 K/ L    1.78-5.38                 



             COUNT (BEAKER) (test                                        



             code = 670)                                         

 

             LYMPHOCYTES ABSOLUTE 0.59 K/ L    1.32-3.57    L            



             COUNT (BEAKER) (test                                        



             code = 414)                                         

 

             MONOCYTES ABSOLUTE 0.86 K/ L    0.30-0.82    H            



             COUNT (BEAKER) (test                                        



             code = 415)                                         

 

             EOSINOPHILS ABSOLUTE 0.37 K/ L    0.04-0.54                 



             COUNT (BEAKER) (test                                        



             code = 416)                                         

 

             BASOPHILS ABSOLUTE 0.05 K/ L    0.01-0.08                 



             COUNT (BEAKER) (test                                        



             code = 417)                                         

 

             IMMATURE     0.90 %       0.00-1.00                 



             GRANULOCYTES-RELATIVE                                        



             PERCENT (BEAKER) (test                                        



             code = 2801)                                        



COMPREHENSIVE METABOLIC [id number] 07:27:59





             Test Item    Value        Reference Range Interpretation Comments

 

             TOTAL PROTEIN 4.5 gm/dL    6.0-8.3      L            



             (BEAKER) (test                                        



             code = 770)                                         

 

             ALBUMIN (BEAKER) 2.2 g/dL     3.5-5.0      L            



             (test code = 1145)                                        

 

             ALKALINE     68 U/L                           



             PHOSPHATASE                                         



             (BEAKER) (test                                        



             code = 346)                                         

 

             BILIRUBIN TOTAL 4.2 mg/dL    0.2-1.2      H            



             (BEAKER) (test                                        



             code = 377)                                         

 

             SODIUM (BEAKER) 134 meq/L    136-145      L            



             (test code = 381)                                        

 

             POTASSIUM (BEAKER) 3.3 meq/L    3.5-5.1      L            



             (test code = 379)                                        

 

             CHLORIDE (BEAKER) 109 meq/L           H            



             (test code = 382)                                        

 

             CO2 (BEAKER) (test 20 meq/L     22-29        L            



             code = 355)                                         

 

             BLOOD UREA   10 mg/dL     7-21                      



             NITROGEN (BEAKER)                                        



             (test code = 354)                                        

 

             CREATININE   0.69 mg/dL   0.57-1.25                 



             (BEAKER) (test                                        



             code = 358)                                         

 

             GLUCOSE RANDOM 141 mg/dL           H            



             (BEAKER) (test                                        



             code = 652)                                         

 

             CALCIUM (BEAKER) 7.1 mg/dL    8.4-10.2     L            



             (test code = 697)                                        

 

             AST (SGOT)   18 U/L       5-34                      



             (BEAKER) (test                                        



             code = 353)                                         

 

             ALT (SGPT)   9 U/L        6-55                      



             (BEAKER) (test                                        



             code = 347)                                         

 

             EGFR (BEAKER) 109                                     Interpretatio

n of eGFR



             (test code = 1092) mL/min/1.73                            values St

age Description



                          sq m                                   Result G1  Norm

al or high



                                                                 >=90 G2 Mildly 

decreased



                                                                 60-89 G3a Mildl

y to



                                                                 moderately 45-5

9 G3b



                                                                 Moderately to s

everely



                                                                 30-44 G4 Severl

y decreased



                                                                  15-29 G5 Kidne

y failure



                                                                 <15Reported eGF

R is based



                                                                 on the CKD-EPI 





                                                                 equation that d

oes not use



                                                                 a race



                                                                 coefficientEsti

mated GFR



                                                                 is not as accur

ate as



                                                                 Creatinine Tabitha

cynthia in



                                                                 predicting glom

erular



                                                                 filtration rate

. Estimated



                                                                 GFR is not appl

icable for



                                                                 dialysis patien

ts



 ID - MARCOSpecimen moderately [id number] 07:27:04





             Test Item    Value        Reference Range Interpretation Comments

 

             MAGNESIUM (BEAKER) (test code = 1.7 mg/dL    1.6-2.6               

    



             627)                                                



 ID - MARCOPROTHROMBIN TIME/[id number] 07:10:48





             Test Item    Value        Reference Range Interpretation Comments

 

             PROTIME (BEAKER) (test code = 30.1 seconds 11.9-14.2    H          

  



             759)                                                

 

             INR (BEAKER) (test code = 370) 2.98         <=5.90                 

   



RECOMMENDED COUMADIN/WARFARIN INR THERAPY RANGESSTANDARD DOSE: 2.0 - 3.0 
Includes: PROPHYLAXIS for venous thrombosis, systemic embolization; TREATMENT 
for venous thrombosis and/or pulmonary embolus.HIGH RISK: Target INR is 2.5-3.5 
for patients with mechanical heart valves.VANCOMYCIN LEVEL, [id number] 
20:01:57





             Test Item    Value        Reference Range Interpretation Comments

 

             VANCOMYCIN TROUGH (BEAKER) (test 10.5 ug/mL   10.0-20.0            

     



             code = 522)                                         



 ID - NOLVIA BXR KNEE 3 VIEWS [id number] 17:37:58
********************************[facility]****[facility] 1970 Sex: 
M************************************************************Left knee, 3 
viewsHistory:Septic arthritis and feverComparison:2023Findings:No fracture,
dislocation, or subluxation. No additional significant boneor joint space 
abnormality.IMPRESSION:Impression:No acute radiographic findings in the left 
knee.Electronically Signed By: Dwight Urias MD2023 17:40 CDTWorkstation
Name: [id number] (HEMOGRAM ONLY)2023 17:29:16





             Test Item    Value        Reference Range Interpretation Comments

 

             WHITE BLOOD CELL COUNT (BEAKER) 5.4 K/ L     3.5-10.5              

    



             (test code = 775)                                        

 

             RED BLOOD CELL COUNT (BEAKER) 1.84 M/ L    4.63-6.08    L          

  



             (test code = 761)                                        

 

             HEMOGLOBIN (BEAKER) (test code = 5.9 GM/DL    13.7-17.5    LL      

     



             410)                                                

 

             HEMATOCRIT (BEAKER) (test code = 18.7 %       40.1-51.0    L       

     



             411)                                                

 

             MEAN CORPUSCULAR VOLUME (BEAKER) 102 fL       79-92        H       

     



             (test code = 753)                                        

 

             MEAN CORPUSCULAR HEMOGLOBIN 32.1 pg      25.7-32.2                 



             (BEAKER) (test code = 751)                                        

 

             MEAN CORPUSCULAR HEMOGLOBIN CONC 31.6 GM/DL   32.3-36.5    L       

     



             (BEAKER) (test code = 752)                                        

 

             RED CELL DISTRIBUTION WIDTH 20.3 %       11.6-14.4    H            



             (BEAKER) (test code = 412)                                        

 

             PLATELET COUNT (BEAKER) (test code 26 K/CU MM   150-450      L     

       



             = 756)                                              

 

             MEAN PLATELET VOLUME (BEAKER) 12.8 fL      9.4-12.4     H          

  



             (test code = 754)                                        

 

             NUCLEATED RED BLOOD CELLS (BEAKER) 0 /100 WBC   0-0                

       



             (test code = 413)                                        



(CELLAVISION MANUAL DIFF)2023 10:21:42





             Test Item    Value        Reference Range Interpretation Comments

 

             NEUTROPHILS - REL 67 %                                   



             (CELLAVISION)(BEAKER) (test code                                   

     



             = 2816)                                             

 

             LYMPHOCYTES - REL 4 %                                    



             (CELLAVISION)(BEAKER) (test code                                   

     



             = 2817)                                             

 

             MONOCYTES - REL 1 %                                    



             (CELLAVISION)(BEAKER) (test code                                   

     



             = 2818)                                             

 

             EOSINOPHILS - REL 3 %                                    



             (CELLAVISION)(BEAKER) (test code                                   

     



             = 2819)                                             

 

             METAMYELOCYTES - REL 1 %          0-0          H            



             (CELLAVISION)(BEAKER) (test code                                   

     



             = 2821)                                             

 

             BANDS - REL (CELLAVISION)(BEAKER) 24 %         0-10         H      

      



             (test code = 2826)                                        

 

             NEUTROPHILS - ABS 3.75 K/ul    1.78-5.38                 



             (CELLAVISION)(BEAKER) (test code                                   

     



             = 2830)                                             

 

             LYMPHOCYTES - ABS 0.22 K/ul    1.32-3.57    L            



             (CELLAVISION)(BEAKER) (test code                                   

     



             = 2831)                                             

 

             MONOCYTES - ABS 0.06 K/uL    0.30-0.82    L            



             (CELLAVISION)(BEAKER) (test code                                   

     



             = 2832)                                             

 

             EOSINOPHILS - ABS 0.17 K/uL    0.04-0.54                 



             (CELLAVISION)(BEAKER) (test code                                   

     



             = 2834)                                             

 

             METAMYELOCYTES - ABS 0.06 K/uL    0.00-0.00    H            



             (CELLAVISION)(BEAKER) (test code                                   

     



             = 2836)                                             

 

             BANDS - ABS (CELLAVISION)(BEAKER) 1.34 K/uL    0.00-0.80    H      

      



             (test code = 2840)                                        

 

             TOTAL COUNTED (BEAKER) (test code 100                              

      



             = 1351)                                             

 

             WBC MORPHOLOGY (BEAKER) (test Normal                               

  



             code = 487)                                         

 

             PLT MORPHOLOGY (BEAKER) (test Normal                               

  



             code = 486)                                         

 

             ANISOCYTOSIS (BEAKER) (test code 2+ moderate                       

     



             = 961)                                              

 

             MACROCYTES (BEAKER) (test code = 2+ moderate                       

     



             964)                                                

 

             POIKILOCYTES (BEAKER) (test code 2+ moderate                       

     



             = 966)                                              

 

             SCHISTOCYTES (BEAKER) (test code 1+ few                            

     



             = 765)                                              

 

             OVALOCYTES (BEAKER) (test code = 1+ few                            

     



             477)                                                

 

             ARTIFACT (CELLAVISION)(BEAKER) Present                             

   



             (test code = 3432)                                        

 

             PLATELET CONCENTRATION Decreased                              



             (CELLAVISION)(BEAKER) (test code                                   

     



             = 3438)                                             



 ID - [id number] ID - Katie OverholtUser comments: Slide comments:CBC 
W/PLT COUNT &amp; AUTO [id number] 10:21:41





             Test Item    Value        Reference Range Interpretation Comments

 

             WHITE BLOOD CELL COUNT (BEAKER) 5.6 K/ L     3.5-10.5              

    



             (test code = 775)                                        

 

             RED BLOOD CELL COUNT (BEAKER) 2.22 M/ L    4.63-6.08    L          

  



             (test code = 761)                                        

 

             HEMOGLOBIN (BEAKER) (test code = 7.3 GM/DL    13.7-17.5    L       

     



             410)                                                

 

             HEMATOCRIT (BEAKER) (test code = 22.2 %       40.1-51.0    L       

     



             411)                                                

 

             MEAN CORPUSCULAR VOLUME (BEAKER) 100 fL       79-92        H       

     



             (test code = 753)                                        

 

             MEAN CORPUSCULAR HEMOGLOBIN 32.9 pg      25.7-32.2    H            



             (BEAKER) (test code = 751)                                        

 

             MEAN CORPUSCULAR HEMOGLOBIN CONC 32.9 GM/DL   32.3-36.5            

     



             (BEAKER) (test code = 752)                                        

 

             RED CELL DISTRIBUTION WIDTH 20.7 %       11.6-14.4    H            



             (BEAKER) (test code = 412)                                        

 

             PLATELET COUNT (BEAKER) (test code 24 K/CU MM   150-450      L     

       



             = 756)                                              

 

             MEAN PLATELET VOLUME (BEAKER) 12.1 fL      9.4-12.4                

  



             (test code = 754)                                        

 

             NUCLEATED RED BLOOD CELLS (BEAKER) 0 /100 WBC   0-0                

       



             (test code = 413)                                        



COMPREHENSIVE METABOLIC [id number] 07:20:21





             Test Item    Value        Reference Range Interpretation Comments

 

             TOTAL PROTEIN 4.3 gm/dL    6.0-8.3      L            



             (BEAKER) (test                                        



             code = 770)                                         

 

             ALBUMIN (BEAKER) 2.2 g/dL     3.5-5.0      L            



             (test code = 1145)                                        

 

             ALKALINE     59 U/L                           



             PHOSPHATASE                                         



             (BEAKER) (test                                        



             code = 346)                                         

 

             BILIRUBIN TOTAL 5.0 mg/dL    0.2-1.2      H            



             (BEAKER) (test                                        



             code = 377)                                         

 

             SODIUM (BEAKER) 137 meq/L    136-145                   



             (test code = 381)                                        

 

             POTASSIUM (BEAKER) 2.9 meq/L    3.5-5.1      L            



             (test code = 379)                                        

 

             CHLORIDE (BEAKER) 109 meq/L           H            



             (test code = 382)                                        

 

             CO2 (BEAKER) (test 19 meq/L     22-29        L            



             code = 355)                                         

 

             BLOOD UREA   12 mg/dL     7-21                      



             NITROGEN (BEAKER)                                        



             (test code = 354)                                        

 

             CREATININE   0.73 mg/dL   0.57-1.25                 



             (BEAKER) (test                                        



             code = 358)                                         

 

             GLUCOSE RANDOM 61 mg/dL            L            



             (BEAKER) (test                                        



             code = 652)                                         

 

             CALCIUM (BEAKER) 7.3 mg/dL    8.4-10.2     L            



             (test code = 697)                                        

 

             AST (SGOT)   15 U/L       5-34                      



             (BEAKER) (test                                        



             code = 353)                                         

 

             ALT (SGPT)   8 U/L        6-55                      



             (BEAKER) (test                                        



             code = 347)                                         

 

             EGFR (BEAKER) 107                                     Interpretatio

n of eGFR



             (test code = 1092) mL/min/1.73                            values St

age Description



                          sq m                                   Result G1 Shaunna

l or high



                                                                 >=90 G2 Mildly 

decreased



                                                                 60-89 G3a Mildl

y to



                                                                 moderately 45-5

9 G3b



                                                                 Moderately to s

everely



                                                                 30-44 G4 Severl

y decreased



                                                                 15-29 G5 Kidney

 failure



                                                                 <15Reported eGF

R is based



                                                                 on the CKD-EPI 

202



                                                                 equation that d

oes not use



                                                                 a race



                                                                 coefficientEsti

mated GFR



                                                                 is not as accur

ate as



                                                                 Creatinine Tabitha marie in



                                                                 predicting glom

erular



                                                                 filtration rate

. Estimated



                                                                 GFR is not appl

icable for



                                                                 dialysis patien

ts



 ID - ADMINSpecimen moderately [id number] 07:12:49





             Test Item    Value        Reference Range Interpretation Comments

 

             MAGNESIUM (BEAKER) (test code = 1.8 mg/dL    1.6-2.6               

    



             627)                                                



 ID - ADMINUS ABDOMEN [id number] 05:59:21
************************************************************[facility] 1970 Sex: 
M************************************************************History: cirrhosis,
pancytopenia, LFTs elevatedAbdominal ultrasound dated omparison: 
omment: Real-time transabdominal ultrasound of the abdomen 
wasperformed. Liver: 11.3 cm , normal. Heterogeneous parenchymal echogenicity 
with anodular contour, consistent with cirrhosis. No visualized focal 
lesions.Gallbladder: No gallstones. No gallbladder wall thickening. 
Nopericholecystic fluid..No sonographic Gupta's sign. Transversediameter: 3.1 
cmBiliary tree: No intrahepatic ductal dilatation. CBD: 3 mm.MPV: 10 mmSpleen: 
Splenomegaly, measuring 16 cm in maximum dimension.Pancreas: Obscured by 
overlying bowel gas.Right kidney: 12.6 x 4.6 x 5.8 cm. Normal echogenicity.Left 
kidney: 10.5 x 5.7 x 5.7 cm. Normal echogenicity.Moderate volume ascites is 
present in the abdomen.The visualized abdominal aorta is normal in caliber. The 
IVC and Hepaticveins are patent.IMPRESSION:Impression: Cirrhosis, splenomegaly 
and ascites.Electronically Signed By: Freddy Nieto2023 06:01 
CDTWorkstation Name: [id number] 05:47:08





             Test Item    Value        Reference Range Interpretation Comments

 

             PROTIME (BEAKER) (test code = 33.9 seconds 11.9-14.2    H          

  



             759)                                                

 

             INR (BEAKER) (test code = 370) 3.47         <=5.90                 

   



RECOMMENDED COUMADIN/WARFARIN INR THERAPY RANGESSTANDARD DOSE: 2.0 - 3.0 
Includes: PROPHYLAXIS for venous thrombosis, systemic embolization; TREATMENT 
for venous thrombosis and/or pulmonary embolus.HIGH RISK: Target INR is 2.5-3.5 
for patients with mechanical heart valves.STREP PNEUMONIAE [id number] 
16:38:02





             Test Item    Value        Reference Range Interpretation Comments

 

             STREP PNEUMONIAE Presumptive negative Presumptive negative         

     



             ANTIGEN (BEAKER) for pneumococcal for pneumococcal              



             (test code = 1615) pneumonia - see pneumonia - see              



                          comment      commen                    



Presumptive negative for pneumococcal pneumonia, suggesting no current or recent
pneumococcal infection. Infection due to S. pneumoniae cannot be ruled out since
the antigen present in the sample may be below the detection limit of the test.
LEGIONELLA ANTIGEN, [id number] 16:37:14





             Test Item    Value        Reference Range Interpretation Comments

 

             L. PNEUMOPHILA Negative - see Negative                  Negative fo

r L.



             SEROGP 1 UR AG comment                                pneumophila



             (BEAKER) (test code                                        serogrou

p 1 antigen,



             = 1156)                                             suggesting no r

ecent



                                                                 or current infe

ction



                                                                 with this serog

roup.



                                                                 Legionellosis c

annot



                                                                 be ruled out si

nce



                                                                 other serogroup

s and



                                                                 species may cau

se



                                                                 disease.



[id number] 16:00:49





             Test Item    Value        Reference Range Interpretation Comments

 

             PROCALCITONIN (BEAKER) (test code 0.42 ng/mL   <0.05        H      

      



             = 3036)                                             



SEPSIS RISK (ng/mL)Low:  0.05-0.50Intermediate: 0.51-2.00High: &gt;=2.01LACTIC 
ACID, [id number] 15:42:59





             Test Item    Value        Reference Range Interpretation Comments

 

             LACTATE BLOOD VENOUS (2) (BEAKER) 1.90 mmol/L  0.50-2.00           

      



             (test code = 2872)                                        



 ID - ADMINSpecimen moderately ictericXR CHEST 1 VIEW PORTABLE / BEDSIDE
2023 14:45:45
************************************************************[facility] : 1970 Sex: 
M************************************************************CLINICAL HISTORY: 
fever, no source TECHNIQUE: 1 view of the chest.COMPARISON: NoneIMPRESSION:There
isprominence of the pulmonary vasculature and interstitial lungmarkings. There 
is no lobar consolidation. There is no significantpleural fluid. The 
cardiomediastinal silhouette is magnified bytechnique. There is a chronic left 
lower rib fracture deformity.Electronically Signed By: Chaparro Pettit2023
14:47 CDTWorkstation Name: [id number](CELLAVISION MANUAL DIFF)2023 
14:22:51





             Test Item    Value        Reference Range Interpretation Comments

 

             NEUTROPHILS - REL 45 %                                   



             (CELLAVISION)(BEAKER) (test code                                   

     



             = 2816)                                             

 

             LYMPHOCYTES - REL 5 %                                    



             (CELLAVISION)(BEAKER) (test code                                   

     



             = 2817)                                             

 

             MONOCYTES - REL 12 %                                   



             (CELLAVISION)(BEAKER) (test code                                   

     



             = 2818)                                             

 

             EOSINOPHILS - REL 1 %                                    



             (CELLAVISION)(BEAKER) (test code                                   

     



             = 2819)                                             

 

             BASOPHILS - REL 1 %                                    



             (CELLAVISION)(BEAKER) (test code                                   

     



             = 2820)                                             

 

             BANDS - REL (CELLAVISION)(BEAKER) 36 %         0-10         H      

      



             (test code = 2826)                                        

 

             NEUTROPHILS - ABS 1.44 K/ul    1.78-5.38    L            



             (CELLAVISION)(BEAKER) (test code                                   

     



             = 2830)                                             

 

             LYMPHOCYTES - ABS 0.16 K/ul    1.32-3.57    L            



             (CELLAVISION)(BEAKER) (test code                                   

     



             = 2831)                                             

 

             MONOCYTES - ABS 0.38 K/uL    0.30-0.82                 



             (CELLAVISION)(BEAKER) (test code                                   

     



             = 2832)                                             

 

             EOSINOPHILS - ABS 0.03 K/uL    0.04-0.54    L            



             (CELLAVISION)(BEAKER) (test code                                   

     



             = 2834)                                             

 

             BASOPHILS - ABS 0.03 K/uL    0.01-0.08                 



             (CELLAVISION)(BEAKER) (test code                                   

     



             = 2835)                                             

 

             BANDS - ABS (CELLAVISION)(BEAKER) 1.15 K/uL    0.00-0.80    H      

      



             (test code = 2840)                                        

 

             TOTAL COUNTED (BEAKER) (test code 100                              

      



             = 1351)                                             

 

             PLT MORPHOLOGY (BEAKER) (test Normal                               

  



             code = 486)                                         

 

             VACUOLATED NEUTROPHILS (BEAKER) Present                            

    



             (test code = 483)                                        

 

             ANISOCYTOSIS (BEAKER) (test code 2+ moderate                       

     



             = 961)                                              

 

             MICROCYTES (BEAKER) (test code = 1+ few                            

     



             965)                                                

 

             MACROCYTES (BEAKER) (test code = 2+ moderate                       

     



             964)                                                

 

             POIKILOCYTES (BEAKER) (test code 3+ many                           

     



             = 966)                                              

 

             SCHISTOCYTES (BEAKER) (test code 2+ moderate                       

     



             = 765)                                              

 

             OVALOCYTES (BEAKER) (test code = 1+ few                            

     



             477)                                                

 

             BENJI CELLS (BEAKER) (test code = 2+ moderate                       

     



             474)                                                

 

             ARTIFACT (CELLAVISION)(BEAKER) Present                             

   



             (test code = 3432)                                        

 

             PLATELET CONCENTRATION Decreased                              



             (CELLAVISION)(BEAKER) (test code                                   

     



             = 3438)                                             



 ID - [id number] ID - Katie OverholtUser comments: Slide comments:CBC 
W/PLT COUNT &amp; AUTO [id number] 14:22:50





             Test Item    Value        Reference Range Interpretation Comments

 

             WHITE BLOOD CELL COUNT 3.2 K/ L     3.5-10.5     L            



             (BEAKER) (test code =                                        



             775)                                                

 

             RED BLOOD CELL COUNT 2.09 M/ L    4.63-6.08    L            



             (BEAKER) (test code =                                        



             761)                                                

 

             HEMOGLOBIN (BEAKER) 6.7 GM/DL    13.7-17.5    L            



             (test code = 410)                                        

 

             HEMATOCRIT (BEAKER) 21.3 %       40.1-51.0    L            



             (test code = 411)                                        

 

             MEAN CORPUSCULAR 102 fL       79-92        H            



             VOLUME (BEAKER) (test                                        



             code = 753)                                         

 

             MEAN CORPUSCULAR 32.1 pg      25.7-32.2                 



             HEMOGLOBIN (BEAKER)                                        



             (test code = 751)                                        

 

             MEAN CORPUSCULAR 31.5 GM/DL   32.3-36.5    L            



             HEMOGLOBIN CONC                                        



             (BEAKER) (test code =                                        



             752)                                                

 

             RED CELL DISTRIBUTION 20.4 %       11.6-14.4    H            



             WIDTH (BEAKER) (test                                        



             code = 412)                                         

 

             PLATELET COUNT 17 K/CU MM   150-450      L            DISCORDANT PL

T RESULT



             (BEAKER) (test code =                                        COMPAR

ED TO PREVIOUS



             756)                                                RESULT; CLINICA

L



                                                                 CORRELATION REQ

UIRED.

 

             MEAN PLATELET VOLUME 12.4 fL      9.4-12.4                  



             (BEAKER) (test code =                                        



             754)                                                

 

             NUCLEATED RED BLOOD 0 /100 WBC   0-0                       



             CELLS (BEAKER) (test                                        



             code = 413)                                         



RAPID DRUG SCREEN, [id number] 04:56:42





             Test Item    Value        Reference Range Interpretation Comments

 

             BARBITURATE URINE (BEAKER) (test Positive     Negative     A       

     



             code = 725)                                         

 

             BENZODIAZEPINE SCREEN URINE (BEAKER) Negative     Negative         

         



             (test code = 726)                                        

 

             COCAINE (METAB.) SCREEN (BEAKER) Negative     Negative             

     



             (test code = 1164)                                        

 

             METHADONE SCREEN (BEAKER) (test code Negative     Negative         

         



             = 1436)                                             

 

             OPIATE SCREEN URINE (BEAKER) (test Negative     Negative           

       



             code = 734)                                         

 

             CANNABINOID SCREEN URINE (BEAKER) Negative     Negative            

      



             (test code = 727)                                        

 

             AMPH/METHAMPH SCREEN (BEAKER) (test Negative     Negative          

        



             code = 1438)                                        

 

             PHENCYCLIDINE SCREEN URINE (BEAKER) Negative     Negative          

        



             (test code = 608)                                        

 

             PH UA (BEAKER) (test code = 467) 6.0          5.0-8.0              

     



DRUG CUTOFF CONC.Cocaine 300 ng/mL Cannabinoid 50 ng/mLBenzodiazepine 200 
ng/mLBarbiturate 200 ng/mLPhencyclidine  25 ng/mLOpiate 300 ng/mLMethadone 300 
ng/mLAmphetamine/ 1000 ng/mL MethamphetamineThisassay provides an unconfirmed 
qualitative test result for the clinical management of patients in emergency 
situations. Chain of custody not maintained. Some over-the-counter medications, 
as well as adulterants, may cause inaccurate results. Clinical correlation 
should be applied. A more comprehensive drug screen or confirmation of a 
detected drug may be performed upon request. ID - [auto] ID - 
ADMINBASIC METABOLIC [id number] 04:34:40





             Test Item    Value        Reference Range Interpretation Comments

 

             SODIUM (BEAKER) 130 meq/L    136-145      L            



             (test code = 381)                                        

 

             POTASSIUM    3.8 meq/L    3.5-5.1                   



             (BEAKER) (test                                        



             code = 379)                                         

 

             CHLORIDE (BEAKER) 104 meq/L                        



             (test code = 382)                                        

 

             CO2 (BEAKER) 21 meq/L     22-29        L            



             (test code = 355)                                        

 

             BLOOD UREA   10 mg/dL     7-21                      



             NITROGEN (BEAKER)                                        



             (test code = 354)                                        

 

             CREATININE   0.56 mg/dL   0.57-1.25    L            



             (BEAKER) (test                                        



             code = 358)                                         

 

             GLUCOSE RANDOM 95 mg/dL                         



             (BEAKER) (test                                        



             code = 652)                                         

 

             CALCIUM (BEAKER) 7.4 mg/dL    8.4-10.2     L            



             (test code = 697)                                        

 

             EGFR (BEAKER) 114                                     Interpretatio

n of eGFR



             (test code = mL/min/1.73                            values Stage De

scription



             1092)        sq m                                   Result G1 Shaunna

l or high



                                                                 >=90 G2 Mildly 

decreased



                                                                 60-89 G3a Mildl

y to



                                                                 moderately 45-5

9 G3b



                                                                 Moderately to s

everely



                                                                 30-44  G4 Sever

ly



                                                                 decreased 15-29

 G5 Kidney



                                                                 failure <15Repo

rted eGFR



                                                                 is based on the

 CKD-EPI



                                                                  equation t

hat does



                                                                 not use a race



                                                                 coefficientEsti

mated GFR



                                                                 is not as accur

ate as



                                                                 Creatinine Tabitha

cynthia in



                                                                 predicting glom

erular



                                                                 filtration rate

. Estimated



                                                                 GFR is not appl

icable for



                                                                 dialysis patien

ts



 ID - ADMINSpecimen moderately ictericC-REACTIVE [id number] 
04:32:34





             Test Item    Value        Reference Range Interpretation Comments

 

             C-REACTIVE PROTEIN (BEAKER) (test 1.70 mg/dL   0.00-0.50    H      

      



             code = 676)                                         



 ID - ADMINURINALYSIS W/ REFLEX URINE [id number] 04:32:33





             Test Item    Value        Reference Range Interpretation Comments

 

             COLOR (BEAKER) (test code = 470) Brown                             

     

 

             CLARITY (BEAKER) (test code = 469) Hazy                            

       

 

             SPECIFIC GRAVITY UA (BEAKER) (test 1.024        1.001-1.035        

       



             code = 468)                                         

 

             PH UA (BEAKER) (test code = 467) 6.0          5.0-8.0              

     

 

             PROTEIN UA (BEAKER) (test code = 70 mg/dL     Negative     A       

     



             464)                                                

 

             GLUCOSE UA (BEAKER) (test code = Negative     Negative             

     



             365)                                                

 

             KETONES UA (BEAKER) (test code = Negative     Negative             

     



             371)                                                

 

             BILIRUBIN UA (BEAKER) (test code = Positive     Negative     A     

       



             462)                                                

 

             BLOOD UA (BEAKER) (test code = 461) Large        Negative     A    

        

 

             NITRITE UA (BEAKER) (test code = Negative     Negative             

     



             465)                                                

 

             LEUKOCYTE ESTERASE UA (BEAKER) (test Moderate     Negative     A   

         



             code = 466)                                         

 

             UROBILINOGEN UA (BEAKER) (test code 0.2          0.2-1.0           

        



             = 463)                                              

 

             RBC UA (BEAKER) (test code = 519) 115 /HPF                         

      

 

             WBC UA (BEAKER) (test code = 520) 19 /HPF                          

      

 

             MUCUS (BEAKER) (test code = 1574) Many                             

      

 

             SOURCE(BEAKER) (test code = 2795)                                  

      



 ID - [auto] ID - techHEPATIC FUNCTION [id number] 04:32:33





             Test Item    Value        Reference Range Interpretation Comments

 

             TOTAL PROTEIN (BEAKER) (test code = 5.1 gm/dL    6.0-8.3      L    

        



             770)                                                

 

             ALBUMIN (BEAKER) (test code = 1145) 2.0 g/dL     3.5-5.0      L    

        

 

             BILIRUBIN TOTAL (BEAKER) (test code 5.5 mg/dL    0.2-1.2      H    

        



             = 377)                                              

 

             BILIRUBIN DIRECT (BEAKER) (test 2.1 mg/dL    0.1-0.5      H        

    



             code = 706)                                         

 

             ALKALINE PHOSPHATASE (BEAKER) (test 103 U/L                  

        



             code = 346)                                         

 

             AST (SGOT) (BEAKER) (test code = 30 U/L       5-34                 

     



             353)                                                

 

             ALT (SGPT) (BEAKER) (test code = 16 U/L       6-55                 

     



             347)                                                



 ID - ADMINSpecimen moderately [id number] 04:32:32





             Test Item    Value        Reference Range Interpretation Comments

 

             MAGNESIUM (BEAKER) (test code = 1.5 mg/dL    1.6-2.6      L        

    



             627)                                                



 ID - [id number] 04:19:39





             Test Item    Value        Reference Range Interpretation Comments

 

             ETHANOL (BEAKER) (test code = 400) < mg/dL      <=10               

       



 ID - ADMINLACTIC ACID, [id number] 04:15:34





             Test Item    Value        Reference Range Interpretation Comments

 

             LACTATE BLOOD VENOUS 1.51 mmol/L  0.50-2.00                 Specime

n slightly



             (2) (BEAKER) (test                                        hemolyzed



             code = 7532)                                        



 ID - ADMINSpecimen moderately ictericPROTHROMBIN [id number] 
03:45:41





             Test Item    Value        Reference Range Interpretation Comments

 

             PROTIME (BEAKER) (test code = 25.2 seconds 11.9-14.2    H          

  



             759)                                                

 

             INR (BEAKER) (test code = 370) 2.47         <=5.90                 

   



RECOMMENDED COUMADIN/WARFARIN INR THERAPY RANGESSTANDARD DOSE: 2.0 - 3.0 
Includes: PROPHYLAXIS for venous thrombosis, systemic embolization; TREATMENT 
for venous thrombosis and/or pulmonary embolus.HIGH RISK: Target INR is 2.5-3.5 
for patients with mechanical heart valves.CBC W/PLT COUNT &amp; AUTO 
[id number] 03:40:34





             Test Item    Value        Reference Range Interpretation Comments

 

             WHITE BLOOD CELL COUNT 6.1 K/ L     3.5-10.5                  



             (BEAKER) (test code =                                        



             775)                                                

 

             RED BLOOD CELL COUNT 2.31 M/ L    4.63-6.08    L            



             (BEAKER) (test code =                                        



             761)                                                

 

             HEMOGLOBIN (BEAKER) 7.4 GM/DL    13.7-17.5    L            



             (test code = 410)                                        

 

             HEMATOCRIT (BEAKER) 23.1 %       40.1-51.0    L            



             (test code = 411)                                        

 

             MEAN CORPUSCULAR VOLUME 100 fL       79-92        H            



             (BEAKER) (test code =                                        



             753)                                                

 

             MEAN CORPUSCULAR 32.0 pg      25.7-32.2                 



             HEMOGLOBIN (BEAKER)                                        



             (test code = 751)                                        

 

             MEAN CORPUSCULAR 32.0 GM/DL   32.3-36.5    L            



             HEMOGLOBIN CONC                                        



             (BEAKER) (test code =                                        



             752)                                                

 

             RED CELL DISTRIBUTION 19.9 %       11.6-14.4    H            



             WIDTH (BEAKER) (test                                        



             code = 412)                                         

 

             PLATELET COUNT (BEAKER) 42 K/CU MM   150-450      L            



             (test code = 756)                                        

 

             MEAN PLATELET VOLUME                                        Unable 

to report due



             (BEAKER) (test code =                                        to abn

ormal Platelet



             754)                                                population



                                                                 distribution.

 

             NUCLEATED RED BLOOD 0 /100 WBC   0-0                       



             CELLS (BEAKER) (test                                        



             code = 413)                                         

 

             NEUTROPHILS RELATIVE 77 %                                   



             PERCENT (BEAKER) (test                                        



             code = 429)                                         

 

             LYMPHOCYTES RELATIVE 9 %                                    



             PERCENT (BEAKER) (test                                        



             code = 430)                                         

 

             MONOCYTES RELATIVE 12 %                                   



             PERCENT (BEAKER) (test                                        



             code = 431)                                         

 

             EOSINOPHILS RELATIVE 1 %                                    



             PERCENT (BEAKER) (test                                        



             code = 432)                                         

 

             BASOPHILS RELATIVE 1 %                                    



             PERCENT (BEAKER) (test                                        



             code = 437)                                         

 

             NEUTROPHILS ABSOLUTE 4.66 K/ L    1.78-5.38                 



             COUNT (BEAKER) (test                                        



             code = 670)                                         

 

             LYMPHOCYTES ABSOLUTE 0.54 K/ L    1.32-3.57    L            



             COUNT (BEAKER) (test                                        



             code = 414)                                         

 

             MONOCYTES ABSOLUTE 0.73 K/ L    0.30-0.82                 



             COUNT (BEAKER) (test                                        



             code = 415)                                         

 

             EOSINOPHILS ABSOLUTE 0.07 K/ L    0.04-0.54                 



             COUNT (BEAKER) (test                                        



             code = 416)                                         

 

             BASOPHILS ABSOLUTE 0.04 K/ L    0.01-0.08                 



             COUNT (BEAKER) (test                                        



             code = 417)                                         

 

             IMMATURE     0.50 %       0.00-1.00                 



             GRANULOCYTES-RELATIVE                                        



             PERCENT (BEAKER) (test                                        



             code = 2801)                                        



FUNGUS CULTURE +  [id number] 08:30:43





             Test Item    Value        Reference Range Interpretation Comments

 

             CULTURE (BEAKER) (test No fungus isolated in                       

    



             code = 1095) 28 days                                

 

             FUNGUS SMEAR (BEAKER) No fungal elements seen                      

     



             (test code = 1406)                                        



MISCELLANEOUS LAB [id number] 14:47:09





             Test Item    Value        Reference Range Interpretation Comments

 

             SCAN RESULT (test code = 4379097)                                  

      See attachment



ANAEROBIC [id number] 00:41:05





             Test Item    Value        Reference Range Interpretation Comments

 

             CULTURE (BEAKER) (test No anaerobes isolated                       

    



             code = 1095)                                        



BASIC METABOLIC [id number] 06:39:01





             Test Item    Value        Reference Range Interpretation Comments

 

             SODIUM (BEAKER) 134 meq/L    136-145      L            



             (test code = 381)                                        

 

             POTASSIUM    4.1 meq/L    3.5-5.1                   



             (BEAKER) (test                                        



             code = 379)                                         

 

             CHLORIDE (BEAKER) 103 meq/L                        



             (test code = 382)                                        

 

             CO2 (BEAKER) 26 meq/L     22-29                     



             (test code = 355)                                        

 

             BLOOD UREA   8 mg/dL      7-21                      



             NITROGEN (BEAKER)                                        



             (test code = 354)                                        

 

             CREATININE   0.62 mg/dL   0.57-1.25                 



             (BEAKER) (test                                        



             code = 358)                                         

 

             GLUCOSE RANDOM 111 mg/dL           H            



             (BEAKER) (test                                        



             code = 652)                                         

 

             CALCIUM (BEAKER) 7.5 mg/dL    8.4-10.2     L            



             (test code = 697)                                        

 

             EGFR (BEAKER) 112                                     Interpretatio

n of eGFR



             (test code = mL/min/1.73                            values Stage De

scription



             1092)        sq m                                   Result G1 Shaunna

l or high



                                                                 >=90 G2 Mildly 

decreased



                                                                 60-89 G3a Mildl

y to



                                                                 moderately 45-5

9 G3b



                                                                 Moderately to s

everely



                                                                 30-44 G4 Severl

y decreased



                                                                 15-29 G5 Kidney

 failure



                                                                 <15Reported eGF

R is based



                                                                 on the CKD-EPI 

202



                                                                 equation that d

oes not use



                                                                 a race



                                                                 coefficientEsti

mated GFR



                                                                 is not as accur

ate as



                                                                 Creatinine Tabitha marie in



                                                                 predicting glom

erular



                                                                 filtration rate

. Estimated



                                                                 GFR is not appl

icable for



                                                                 dialysis patien

ts



 ID Remington THOMAS WSpecimen slightly [id number] 06:37:31





             Test Item    Value        Reference Range Interpretation Comments

 

             MAGNESIUM (BEAKER) (test code = 1.6 mg/dL    1.6-2.6               

    



             627)                                                



 ID Remington THOMAS WCBC W/PLT COUNT &amp; AUTO [id number] 06:06:27





             Test Item    Value        Reference Range Interpretation Comments

 

             WHITE BLOOD CELL COUNT (BEAKER) 3.8 K/ L     3.5-10.5              

    



             (test code = 775)                                        

 

             RED BLOOD CELL COUNT (BEAKER) 2.41 M/ L    4.63-6.08    L          

  



             (test code = 761)                                        

 

             HEMOGLOBIN (BEAKER) (test code = 7.7 GM/DL    13.7-17.5    L       

     



             410)                                                

 

             HEMATOCRIT (BEAKER) (test code = 23.8 %       40.1-51.0    L       

     



             411)                                                

 

             MEAN CORPUSCULAR VOLUME (BEAKER) 99 fL        79-92        H       

     



             (test code = 753)                                        

 

             MEAN CORPUSCULAR HEMOGLOBIN 32.0 pg      25.7-32.2                 



             (BEAKER) (test code = 751)                                        

 

             MEAN CORPUSCULAR HEMOGLOBIN CONC 32.4 GM/DL   32.3-36.5            

     



             (BEAKER) (test code = 752)                                        

 

             RED CELL DISTRIBUTION WIDTH 18.6 %       11.6-14.4    H            



             (BEAKER) (test code = 412)                                        

 

             PLATELET COUNT (BEAKER) (test code 42 K/CU MM   150-450      L     

       



             = 756)                                              

 

             MEAN PLATELET VOLUME (BEAKER) 11.6 fL      9.4-12.4                

  



             (test code = 754)                                        

 

             NUCLEATED RED BLOOD CELLS (BEAKER) 0 /100 WBC   0-0                

       



             (test code = 413)                                        

 

             NEUTROPHILS RELATIVE PERCENT 58 %                                  

 



             (BEAKER) (test code = 429)                                        

 

             LYMPHOCYTES RELATIVE PERCENT 20 %                                  

 



             (BEAKER) (test code = 430)                                        

 

             MONOCYTES RELATIVE PERCENT 17 %                                   



             (BEAKER) (test code = 431)                                        

 

             EOSINOPHILS RELATIVE PERCENT 5 %                                   

 



             (BEAKER) (test code = 432)                                        

 

             BASOPHILS RELATIVE PERCENT 0 %                                    



             (BEAKER) (test code = 437)                                        

 

             NEUTROPHILS ABSOLUTE COUNT 2.18 K/ L    1.78-5.38                 



             (BEAKER) (test code = 670)                                        

 

             LYMPHOCYTES ABSOLUTE COUNT 0.76 K/ L    1.32-3.57    L            



             (BEAKER) (test code = 414)                                        

 

             MONOCYTES ABSOLUTE COUNT (BEAKER) 0.63 K/ L    0.30-0.82           

      



             (test code = 415)                                        

 

             EOSINOPHILS ABSOLUTE COUNT 0.18 K/ L    0.04-0.54                 



             (BEAKER) (test code = 416)                                        

 

             BASOPHILS ABSOLUTE COUNT (BEAKER) 0.00 K/ L    0.01-0.08    L      

      



             (test code = 417)                                        

 

             IMMATURE GRANULOCYTES-RELATIVE 1.10 %       0.00-1.00    H         

   



             PERCENT (BEAKER) (test code =                                      

  



             2801)                                               



BASIC METABOLIC [id number] 04:14:02





             Test Item    Value        Reference Range Interpretation Comments

 

             SODIUM (BEAKER) 134 meq/L    136-145      L            



             (test code = 381)                                        

 

             POTASSIUM    4.1 meq/L    3.5-5.1                   



             (BEAKER) (test                                        



             code = 379)                                         

 

             CHLORIDE (BEAKER) 103 meq/L                        



             (test code = 382)                                        

 

             CO2 (BEAKER) 26 meq/L     22-29                     



             (test code = 355)                                        

 

             BLOOD UREA   9 mg/dL      7-21                      



             NITROGEN (BEAKER)                                        



             (test code = 354)                                        

 

             CREATININE   0.65 mg/dL   0.57-1.25                 



             (BEAKER) (test                                        



             code = 358)                                         

 

             GLUCOSE RANDOM 126 mg/dL           H            



             (BEAKER) (test                                        



             code = 652)                                         

 

             CALCIUM (BEAKER) 7.6 mg/dL    8.4-10.2     L            



             (test code = 697)                                        

 

             EGFR (BEAKER) 110                                     Interpretatio

n of eGFR



             (test code = mL/min/1.73                            values Stage De

scription



             1092)        sq m                                   Result G1 Shaunna

l or high



                                                                 >=90 G2 Mildly 

decreased



                                                                 60-89 G3a Mildl

y to



                                                                 moderately 45-5

9 G3b



                                                                 Moderately to s

everely



                                                                 30-44 G4 Severl

y decreased



                                                                 15-29 G5 Kidney

 failure



                                                                 <15Reported eGF

R is based



                                                                 on the CKD-EPI 





                                                                 equation that d

oes not use



                                                                 a race



                                                                 coefficientEsti

mated GFR



                                                                 is not as accur

ate as



                                                                 Creatinine Tabitha

cynthia in



                                                                 predicting glom

erular



                                                                 filtration rate

. Estimated



                                                                 GFR is not appl

icable for



                                                                 dialysis patien

ts



 ID - BVSpecimen slightly [id number] 04:11:10





             Test Item    Value        Reference Range Interpretation Comments

 

             MAGNESIUM (BEAKER) (test code = 1.5 mg/dL    1.6-2.6      L        

    



             627)                                                



 ID - BVCBC W/PLT COUNT &amp; AUTO [id number] 04:00:10





             Test Item    Value        Reference Range Interpretation Comments

 

             WHITE BLOOD CELL COUNT (BEAKER) 3.6 K/ L     3.5-10.5              

    



             (test code = 775)                                        

 

             RED BLOOD CELL COUNT (BEAKER) 2.42 M/ L    4.63-6.08    L          

  



             (test code = 761)                                        

 

             HEMOGLOBIN (BEAKER) (test code = 7.6 GM/DL    13.7-17.5    L       

     



             410)                                                

 

             HEMATOCRIT (BEAKER) (test code = 24.0 %       40.1-51.0    L       

     



             411)                                                

 

             MEAN CORPUSCULAR VOLUME (BEAKER) 99 fL        79-92        H       

     



             (test code = 753)                                        

 

             MEAN CORPUSCULAR HEMOGLOBIN 31.4 pg      25.7-32.2                 



             (BEAKER) (test code = 751)                                        

 

             MEAN CORPUSCULAR HEMOGLOBIN CONC 31.7 GM/DL   32.3-36.5    L       

     



             (BEAKER) (test code = 752)                                        

 

             RED CELL DISTRIBUTION WIDTH 18.7 %       11.6-14.4    H            



             (BEAKER) (test code = 412)                                        

 

             PLATELET COUNT (BEAKER) (test code 44 K/CU MM   150-450      L     

       



             = 756)                                              

 

             MEAN PLATELET VOLUME (BEAKER) 12.5 fL      9.4-12.4     H          

  



             (test code = 754)                                        

 

             NUCLEATED RED BLOOD CELLS (BEAKER) 0 /100 WBC   0-0                

       



             (test code = 413)                                        

 

             NEUTROPHILS RELATIVE PERCENT 57 %                                  

 



             (BEAKER) (test code = 429)                                        

 

             LYMPHOCYTES RELATIVE PERCENT 19 %                                  

 



             (BEAKER) (test code = 430)                                        

 

             MONOCYTES RELATIVE PERCENT 18 %                                   



             (BEAKER) (test code = 431)                                        

 

             EOSINOPHILS RELATIVE PERCENT 5 %                                   

 



             (BEAKER) (test code = 432)                                        

 

             BASOPHILS RELATIVE PERCENT 0 %                                    



             (BEAKER) (test code = 437)                                        

 

             NEUTROPHILS ABSOLUTE COUNT 2.02 K/ L    1.78-5.38                 



             (BEAKER) (test code = 670)                                        

 

             LYMPHOCYTES ABSOLUTE COUNT 0.68 K/ L    1.32-3.57    L            



             (BEAKER) (test code = 414)                                        

 

             MONOCYTES ABSOLUTE COUNT (BEAKER) 0.63 K/ L    0.30-0.82           

      



             (test code = 415)                                        

 

             EOSINOPHILS ABSOLUTE COUNT 0.17 K/ L    0.04-0.54                 



             (BEAKER) (test code = 416)                                        

 

             BASOPHILS ABSOLUTE COUNT (BEAKER) 0.00 K/ L    0.01-0.08    L      

      



             (test code = 417)                                        

 

             IMMATURE GRANULOCYTES-RELATIVE 1.70 %       0.00-1.00    H         

   



             PERCENT (BEAKER) (test code =                                      

  



             2801)                                               



[id number] 07:20:31





             Test Item    Value        Reference Range Interpretation Comments

 

             MAGNESIUM (BEAKER) 1.8 mg/dL    1.6-2.6                   Specimen 

slightly



             (test code = 627)                                        hemolyzed



 ID - MARCOCBC W/PLT COUNT &amp; AUTO [id number] 06:57:51





             Test Item    Value        Reference Range Interpretation Comments

 

             WHITE BLOOD CELL COUNT 2.9 K/ L     3.5-10.5     L            



             (BEAKER) (test code =                                        



             775)                                                

 

             RED BLOOD CELL COUNT 2.40 M/ L    4.63-6.08    L            



             (BEAKER) (test code =                                        



             761)                                                

 

             HEMOGLOBIN (BEAKER) 7.7 GM/DL    13.7-17.5    L            



             (test code = 410)                                        

 

             HEMATOCRIT (BEAKER) 24.0 %       40.1-51.0    L            



             (test code = 411)                                        

 

             MEAN CORPUSCULAR VOLUME 100 fL       79-92        H            



             (BEAKER) (test code =                                        



             753)                                                

 

             MEAN CORPUSCULAR 32.1 pg      25.7-32.2                 



             HEMOGLOBIN (BEAKER)                                        



             (test code = 751)                                        

 

             MEAN CORPUSCULAR 32.1 GM/DL   32.3-36.5    L            



             HEMOGLOBIN CONC                                        



             (BEAKER) (test code =                                        



             752)                                                

 

             RED CELL DISTRIBUTION 18.6 %       11.6-14.4    H            



             WIDTH (BEAKER) (test                                        



             code = 412)                                         

 

             PLATELET COUNT (BEAKER) 43 K/CU MM   150-450      L            



             (test code = 756)                                        

 

             MEAN PLATELET VOLUME                                        Unable 

to report due



             (BEAKER) (test code =                                        to abn

ormal Platelet



             754)                                                population



                                                                 distribution.

 

             NUCLEATED RED BLOOD 0 /100 WBC   0-0                       



             CELLS (BEAKER) (test                                        



             code = 413)                                         

 

             NEUTROPHILS RELATIVE 51 %                                   



             PERCENT (BEAKER) (test                                        



             code = 429)                                         

 

             LYMPHOCYTES RELATIVE 23 %                                   



             PERCENT (BEAKER) (test                                        



             code = 430)                                         

 

             MONOCYTES RELATIVE 24 %                                   



             PERCENT (BEAKER) (test                                        



             code = 431)                                         

 

             EOSINOPHILS RELATIVE 1 %                                    



             PERCENT (BEAKER) (test                                        



             code = 432)                                         

 

             BASOPHILS RELATIVE 0 %                                    



             PERCENT (BEAKER) (test                                        



             code = 437)                                         

 

             NEUTROPHILS ABSOLUTE 1.47 K/ L    1.78-5.38    L            



             COUNT (BEAKER) (test                                        



             code = 670)                                         

 

             LYMPHOCYTES ABSOLUTE 0.67 K/ L    1.32-3.57    L            



             COUNT (BEAKER) (test                                        



             code = 414)                                         

 

             MONOCYTES ABSOLUTE 0.71 K/ L    0.30-0.82                 



             COUNT (BEAKER) (test                                        



             code = 415)                                         

 

             EOSINOPHILS ABSOLUTE 0.03 K/ L    0.04-0.54    L            



             COUNT (BEAKER) (test                                        



             code = 416)                                         

 

             BASOPHILS ABSOLUTE 0.00 K/ L    0.01-0.08    L            



             COUNT (BEAKER) (test                                        



             code = 417)                                         

 

             IMMATURE     1.00 %       0.00-1.00                 



             GRANULOCYTES-RELATIVE                                        



             PERCENT (BEAKER) (test                                        



             code = 2801)                                        



SURGICALLY OBTAINED CULTURE + GRAM [id number] 13:25:13





             Test Item    Value        Reference Range Interpretation Comments

 

             CULTURE (BEAKER) (test code No growth                              



             = 1095)                                             

 

             GRAM STAIN RESULT (BEAKER) 4+ WBCs                                



             (test code = 1123)                                        

 

             GRAM STAIN RESULT (BEAKER) No organisms seen                       

    



             (test code = 58921)                                        



HEPATIC FUNCTION [id number] 13:15:37





             Test Item    Value        Reference Range Interpretation Comments

 

             TOTAL PROTEIN (BEAKER) (test code = 5.5 gm/dL    6.0-8.3      L    

        



             770)                                                

 

             ALBUMIN (BEAKER) (test code = 1145) 1.8 g/dL     3.5-5.0      L    

        

 

             BILIRUBIN TOTAL (BEAKER) (test code 3.2 mg/dL    0.2-1.2      H    

        



             = 377)                                              

 

             BILIRUBIN DIRECT (BEAKER) (test 1.9 mg/dL    0.1-0.5      H        

    



             code = 706)                                         

 

             ALKALINE PHOSPHATASE (BEAKER) (test 142 U/L                  

        



             code = 346)                                         

 

             AST (SGOT) (BEAKER) (test code = 66 U/L       5-34         H       

     



             353)                                                

 

             ALT (SGPT) (BEAKER) (test code = 39 U/L       6-55                 

     



             347)                                                



 ID - Everettimen slightly ictericBLOOD [id number] 11:00:32





             Test Item    Value        Reference Range Interpretation Comments

 

             CULTURE (BEAKER) (test No growth in 5 days                         

  



             code = 1095)                                        



[id number] 07:16:25





             Test Item    Value        Reference Range Interpretation Comments

 

             MAGNESIUM (BEAKER) (test code = 2.1 mg/dL    1.6-2.6               

    



             627)                                                



 ID - BSCBC W/PLT COUNT &amp; AUTO [id number] 06:59:48





             Test Item    Value        Reference Range Interpretation Comments

 

             WHITE BLOOD CELL COUNT 2.8 K/ L     3.5-10.5     L            



             (BEAKER) (test code =                                        



             775)                                                

 

             RED BLOOD CELL COUNT 2.62 M/ L    4.63-6.08    L            



             (BEAKER) (test code =                                        



             761)                                                

 

             HEMOGLOBIN (BEAKER) 8.3 GM/DL    13.7-17.5    L            



             (test code = 410)                                        

 

             HEMATOCRIT (BEAKER) 26.1 %       40.1-51.0    L            



             (test code = 411)                                        

 

             MEAN CORPUSCULAR VOLUME 100 fL       79-92        H            



             (BEAKER) (test code =                                        



             753)                                                

 

             MEAN CORPUSCULAR 31.7 pg      25.7-32.2                 



             HEMOGLOBIN (BEAKER)                                        



             (test code = 751)                                        

 

             MEAN CORPUSCULAR 31.8 GM/DL   32.3-36.5    L            



             HEMOGLOBIN CONC                                        



             (BEAKER) (test code =                                        



             752)                                                

 

             RED CELL DISTRIBUTION 18.6 %       11.6-14.4    H            



             WIDTH (BEAKER) (test                                        



             code = 412)                                         

 

             PLATELET COUNT (BEAKER) 41 K/CU MM   150-450      L            



             (test code = 756)                                        

 

             MEAN PLATELET VOLUME                                        Unable 

to report due



             (BEAKER) (test code =                                        to abn

ormal Platelet



             754)                                                population



                                                                 distribution.

 

             NUCLEATED RED BLOOD 0 /100 WBC   0-0                       



             CELLS (BEAKER) (test                                        



             code = 413)                                         

 

             NEUTROPHILS RELATIVE 58 %                                   



             PERCENT (BEAKER) (test                                        



             code = 429)                                         

 

             LYMPHOCYTES RELATIVE 15 %                                   



             PERCENT (BEAKER) (test                                        



             code = 430)                                         

 

             MONOCYTES RELATIVE 25 %                                   



             PERCENT (BEAKER) (test                                        



             code = 431)                                         

 

             EOSINOPHILS RELATIVE 0 %                                    



             PERCENT (BEAKER) (test                                        



             code = 432)                                         

 

             BASOPHILS RELATIVE 0 %                                    



             PERCENT (BEAKER) (test                                        



             code = 437)                                         

 

             NEUTROPHILS ABSOLUTE 1.65 K/ L    1.78-5.38    L            



             COUNT (BEAKER) (test                                        



             code = 670)                                         

 

             LYMPHOCYTES ABSOLUTE 0.43 K/ L    1.32-3.57    L            



             COUNT (BEAKER) (test                                        



             code = 414)                                         

 

             MONOCYTES ABSOLUTE 0.72 K/ L    0.30-0.82                 



             COUNT (BEAKER) (test                                        



             code = 415)                                         

 

             EOSINOPHILS ABSOLUTE 0.00 K/ L    0.04-0.54    L            



             COUNT (BEAKER) (test                                        



             code = 416)                                         

 

             BASOPHILS ABSOLUTE 0.00 K/ L    0.01-0.08    L            



             COUNT (BEAKER) (test                                        



             code = 417)                                         

 

             IMMATURE     1.40 %       0.00-1.00    H            



             GRANULOCYTES-RELATIVE                                        



             PERCENT (BEAKER) (test                                        



             code = 2801)                                        



ANAEROBIC [id number] 02:13:49





             Test Item    Value        Reference Range Interpretation Comments

 

             CULTURE (BEAKER) (test No anaerobes isolated                       

    



             code = 1095)                                        



U/S, ABDOMINAL, [id number] 18:25:00Reason for exam:-&gt;cirrhosis
************************************************************[facility] : 1970 Sex: 
M************************************************************FINAL REPORT 
PATIENT ID: [id number] Abdominal ultrasound dated 2023 Clinical information: 
cirrhosis, eval vessels Comment: Real-time transabdominal ultrasound was 
performed. Liver is atrophic and measures 8.7 cm in length. The echogenicity of 
the liver is increased with irregular margin consistent with cirrhosis. No focal
 lesion is noted in the liver. Spleen is enlarged measuring 12.8 cm in length.
Gallbladder is contracted. No gallstone is present. No biliary dilatation is 
seen. Common bile duct measures 5 mm in diameter. Main portal vein measures 11 
mm in diameter. Pancreas is suboptimal visualized. Right kidney measures 12.0 x 
5.5 x 6.1 cm. Left kidney measures 11.8 x 5.7 x 6.1 cm. Echogenicity of both 
kidney is normal. No hydronephrosis or solid mass seen in either kidney. No cyst
 is seen in either kidney. Small ascites present in the abdomen. There is small 
left pleural effusion. Abdominal aorta is normal in caliber. The IVC is patent. 
Real-time Color and Spectral doppler ultrasound of the abdomen was performed. 
Main portal vein measures 1.1 cm in diameter. There is hepatopedal flow in the 
main portal vein with velocity 45.8 cm/sec. Right and left portal veins are 
patent. Proper hepatic artery, right hepatic artery, and left hepatic artery are
 patent with resistive indices 0.8, 0.7, and 0.75 respectively. IVC, Hepatic 
venous confluence, right HV, middle HV and left HV are patent. Impression: 1. 
Cirrhosis with splenomegaly2. No suspicious hepatic mass.3. Small ascites and 
left pleural effusion.4. Patent hepatic arteries and portal veins. Signed: Bobbi العلي [facility] Verified Date/Time: 2023 18:25:01 Electronically signed 
by: BOBBI العلي M.D. on 2023 06:25 PMU/S, DUPLEX, [id number] 
18:25:00Reason for exam:-&gt;cirrhosis, eval vessels
************************************************************[facility] : 1970 Sex: 
M************************************************************FINAL REPORT 
PATIENT ID: [id number] Abdominal ultrasound dated 2023 Clinical information: 
cirrhosis, eval vessels Comment: Real-time transabdominal ultrasound was 
performed. Liver is atrophic and measures 8.7 cm in length. The echogenicity of 
the liver is increased with irregular margin consistent with cirrhosis. No focal
 lesion is noted in the liver. Spleen is enlarged measuring 12.8 cm in length.
Gallbladder is contracted. No gallstone is present. No biliary dilatation is 
seen. Common bile duct measures 5 mm in diameter. Main portal vein measures 11 
mm in diameter. Pancreas is suboptimal visualized. Right kidney measures 12.0 x 
5.5 x 6.1 cm. Left kidney measures 11.8 x 5.7 x 6.1 cm. Echogenicity of both 
kidney is normal. No hydronephrosis or solid mass seen in either kidney. No cyst
 is seen in either kidney. Small ascites present in the abdomen. There is small 
left pleural effusion. Abdominal aorta is normal in caliber. The IVC is patent. 
Real-time Color and Spectral doppler ultrasound of the abdomen was performed. 
Main portal vein measures 1.1 cm in diameter. There is hepatopedal flow in the 
main portal vein with velocity 45.8 cm/sec. Right and left portal veins are 
patent. Proper hepatic artery, right hepatic artery, and left hepatic artery are
 patent with resistive indices 0.8, 0.7, and 0.75 respectively. IVC, Hepatic 
venous confluence, right HV, middle HV and left HV are patent. Impression: 1. 
Cirrhosis with splenomegaly2. No suspicious hepatic mass.3. Small ascites and 
left pleural effusion.4. Patent hepatic arteries and portal veins. Signed: [facility] Verified Date/Time: 2023 18:25:01 Electronically signed 
by: BOBBI العلي M.D. on 2023 06:25 PMALPHA FETOPROTEIN (AFP), TUMOR 
[id number] 07:42:03





             Test Item    Value        Reference Range Interpretation Comments

 

             ALPHA-FETOPROTEIN (BEAKER) (test code < ng/mL      <10.0           

          



             = 1094)                                             



 ID - ADMINHEPATITIS B SURFACE [id number] 07:41:36





             Test Item    Value        Reference Range Interpretation Comments

 

             HEPATITIS B SURFACE ANTIBODY < mIU/mL     <8.0                     

 



             (BEAKER) (test code = 647)                                        



 ID - ADMINHEPATITIS A ANTIBODY, [id number] 07:41:31





             Test Item    Value        Reference Range Interpretation Comments

 

             HEPATITIS A IGG ANTIBODY (BEAKER) Reactive     Nonreactive  A      

      



             (test code = 2797)                                        



 ID - ADMINHEPATITIS B CORE ANTIBODY, [id number] 07:40:24





             Test Item    Value        Reference Range Interpretation Comments

 

             HEPATITIS B CORE TOTAL ANTIBODY Nonreactive  Nonreactive           

    



             (BEAKER) (test code = 497)                                        



 ID - ADMINHEPATITIS B SURFACE [id number] 07:40:23





             Test Item    Value        Reference Range Interpretation Comments

 

             HEPATITIS B SURFACE ANTIGEN (2) Nonreactive  Nonreactive           

    



             (BEAKER) (test code = 2585)                                        



Specimen is considered negative for HBsAg.HEPATITIS C [id number] 
07:40:23





             Test Item    Value        Reference Range Interpretation Comments

 

             HEPATITIS C ANTIBODY (BEAKER) Nonreactive  Nonreactive             

  



             (test code = 367)                                        



 ID - [id number] 05:45:39





             Test Item    Value        Reference Range Interpretation Comments

 

             MAGNESIUM (BEAKER) (test code = 1.9 mg/dL    1.6-2.6               

    



             627)                                                



 ID - ADMINCBC W/PLT COUNT &amp; AUTO [id number] 05:19:17





             Test Item    Value        Reference Range Interpretation Comments

 

             WHITE BLOOD CELL COUNT (BEAKER) 4.6 K/ L     3.5-10.5              

    



             (test code = 775)                                        

 

             RED BLOOD CELL COUNT (BEAKER) 2.22 M/ L    4.63-6.08    L          

  



             (test code = 761)                                        

 

             HEMOGLOBIN (BEAKER) (test code = 6.9 GM/DL    13.7-17.5    L       

     



             410)                                                

 

             HEMATOCRIT (BEAKER) (test code = 21.7 %       40.1-51.0    L       

     



             411)                                                

 

             MEAN CORPUSCULAR VOLUME (BEAKER) 98 fL        79-92        H       

     



             (test code = 753)                                        

 

             MEAN CORPUSCULAR HEMOGLOBIN 31.1 pg      25.7-32.2                 



             (BEAKER) (test code = 751)                                        

 

             MEAN CORPUSCULAR HEMOGLOBIN CONC 31.8 GM/DL   32.3-36.5    L       

     



             (BEAKER) (test code = 752)                                        

 

             RED CELL DISTRIBUTION WIDTH 18.4 %       11.6-14.4    H            



             (BEAKER) (test code = 412)                                        

 

             PLATELET COUNT (BEAKER) (test code 44 K/CU MM   150-450      L     

       



             = 756)                                              

 

             MEAN PLATELET VOLUME (BEAKER) 12.4 fL      9.4-12.4                

  



             (test code = 754)                                        

 

             NUCLEATED RED BLOOD CELLS (BEAKER) 0 /100 WBC   0-0                

       



             (test code = 413)                                        

 

             NEUTROPHILS RELATIVE PERCENT 70 %                                  

 



             (BEAKER) (test code = 429)                                        

 

             LYMPHOCYTES RELATIVE PERCENT 10 %                                  

 



             (BEAKER) (test code = 430)                                        

 

             MONOCYTES RELATIVE PERCENT 19 %                                   



             (BEAKER) (test code = 431)                                        

 

             EOSINOPHILS RELATIVE PERCENT 0 %                                   

 



             (BEAKER) (test code = 432)                                        

 

             BASOPHILS RELATIVE PERCENT 0 %                                    



             (BEAKER) (test code = 437)                                        

 

             NEUTROPHILS ABSOLUTE COUNT 3.17 K/ L    1.78-5.38                 



             (BEAKER) (test code = 670)                                        

 

             LYMPHOCYTES ABSOLUTE COUNT 0.45 K/ L    1.32-3.57    L            



             (BEAKER) (test code = 414)                                        

 

             MONOCYTES ABSOLUTE COUNT (BEAKER) 0.84 K/ L    0.30-0.82    H      

      



             (test code = 415)                                        

 

             EOSINOPHILS ABSOLUTE COUNT 0.00 K/ L    0.04-0.54    L            



             (BEAKER) (test code = 416)                                        

 

             BASOPHILS ABSOLUTE COUNT (BEAKER) 0.01 K/ L    0.01-0.08           

      



             (test code = 417)                                        

 

             IMMATURE GRANULOCYTES-RELATIVE 1.80 %       0.00-1.00    H         

   



             PERCENT (BEAKER) (test code =                                      

  



             2801)                                               



VANCOMYCIN LEVEL, [id number] 14:12:35





             Test Item    Value        Reference Range Interpretation Comments

 

             VANCOMYCIN TROUGH (BEAKER) (test 1.4 ug/mL    10.0-20.0    L       

     



             code = 522)                                         



 ID - ADMINBODY FLUID CULTURE + GRAM [id number] 13:02:52





             Test Item    Value        Reference    Interpretation Comments



                                       Range                     

 

             CULTURE (BEAKER) BETA HEMOLYTIC              A            1+ Beta-h

emolytic



             (test code = STREPTOCOCCUS GROUP                           streptoc

occus group



             1095)        B                                      B, by serologic

al



                                                                 grouping

 

             Ceftriaxone (test              See_Comment  S             [Automate

d message]



             code = 52)                                          The system whic

h



                                                                 generated this



                                                                 result transmit

michelle



                                                                 reference range

:



                                                                 Susceptible 0-0

.5 ,



                                                                 No Interpretati

ons



                                                                 Established <0 

or



                                                                 >.5. The refere

nce



                                                                 range was not u

sed



                                                                 to interpret th

is



                                                                 result as



                                                                 normal/abnormal

.

 

             Penicillin G              See_Comment  S             [Automated mes

elif]



             (test code = 3)                                        The system w

[facility]



                                                                 generated this



                                                                 result transmit

michelle



                                                                 reference range

:



                                                                 Susceptible 0-0

.12 ,



                                                                 No Interpretati

ons



                                                                 Established <0 

or



                                                                 >.. The referen

ce



                                                                 range was not u

sed



                                                                 to interpret th

is



                                                                 result as



                                                                 normal/abnormal

.

 

             Vancomycin (test              See_Comment  S             [Automated

 message]



             code = 13)                                          The system [employer]



                                                                 generated this



                                                                 result transmit

michelle



                                                                 reference range

:



                                                                 Susceptible 0-1

 , No



                                                                 Interpretations



                                                                 Established <0 

or >1



                                                                 . The reference



                                                                 range was not u

sed



                                                                 to interpret th

is



                                                                 result as



                                                                 normal/abnormal

.

 

             GRAM STAIN RESULT 4+ WBCs                                



             (BEAKER) (test                                        



             code = 1123)                                        

 

             GRAM STAIN RESULT <1+ gram positive                           



             (BEAKER) (test cocci in pairs                           



             code = 889832)                                        



BASIC METABOLIC [id number] 05:21:50





             Test Item    Value        Reference Range Interpretation Comments

 

             SODIUM (BEAKER) 133 meq/L    136-145      L            



             (test code = 381)                                        

 

             POTASSIUM    4.9 meq/L    3.5-5.1                   Specimen slight

ly



             (BEAKER) (test                                        hemolyzed



             code = 379)                                         

 

             CHLORIDE (BEAKER) 106 meq/L                        



             (test code = 382)                                        

 

             CO2 (BEAKER) 18 meq/L     22-29        L            



             (test code = 355)                                        

 

             BLOOD UREA   13 mg/dL     7-21                      



             NITROGEN (BEAKER)                                        



             (test code = 354)                                        

 

             CREATININE   0.55 mg/dL   0.57-1.25    L            Specimen slight

ly



             (BEAKER) (test                                        hemolyzed



             code = 358)                                         

 

             GLUCOSE RANDOM 138 mg/dL           H            



             (BEAKER) (test                                        



             code = 652)                                         

 

             CALCIUM (BEAKER) 7.0 mg/dL    8.4-10.2     L            



             (test code = 697)                                        

 

             EGFR (BEAKER) 115                                     Interpretatio

n of eGFR



             (test code = mL/min/1.73                            values Stage De

scription



             1092)        sq m                                   Result G1 Shaunna

l or high



                                                                 >=90 G2 Mildly 

decreased



                                                                 60-89 G3a Mildl

y to



                                                                 moderately 45-5

9 G3b



                                                                 Moderately to s

everely



                                                                 30-44 G4 Severl

y decreased



                                                                 15-29 G5 Kidney

 failure



                                                                 <15Reported eGF

R is based



                                                                 on the CKD-EPI 





                                                                 equation that d

oes not use



                                                                 a race



                                                                 coefficientEsti

mated GFR



                                                                 is not as accur

ate as



                                                                 Creatinine Tabitha marie in



                                                                 predicting glom

erular



                                                                 filtration rate

. Estimated



                                                                 GFR is not appl

icable for



                                                                 dialysis patien

ts



 ID - MMSpecimen moderately [id number] 05:19:56





             Test Item    Value        Reference Range Interpretation Comments

 

             MAGNESIUM (BEAKER) 1.8 mg/dL    1.6-2.6                   Specimen 

slightly



             (test code = 627)                                        hemolyzed



 ID - [id number] 05:19:56





             Test Item    Value        Reference Range Interpretation Comments

 

             PHOSPHORUS (BEAKER) 4.6 mg/dL    2.3-4.7                   Specimen

 slightly



             (test code = 604)                                        hemolyzed



 ID - MMCBC W/PLT COUNT &amp; AUTO [id number] 05:04:59





             Test Item    Value        Reference Range Interpretation Comments

 

             WHITE BLOOD CELL COUNT (BEAKER) 3.7 K/ L     3.5-10.5              

    



             (test code = 775)                                        

 

             RED BLOOD CELL COUNT (BEAKER) 2.66 M/ L    4.63-6.08    L          

  



             (test code = 761)                                        

 

             HEMOGLOBIN (BEAKER) (test code = 8.4 GM/DL    13.7-17.5    L       

     



             410)                                                

 

             HEMATOCRIT (BEAKER) (test code = 26.1 %       40.1-51.0    L       

     



             411)                                                

 

             MEAN CORPUSCULAR VOLUME (BEAKER) 98 fL        79-92        H       

     



             (test code = 753)                                        

 

             MEAN CORPUSCULAR HEMOGLOBIN 31.6 pg      25.7-32.2                 



             (BEAKER) (test code = 751)                                        

 

             MEAN CORPUSCULAR HEMOGLOBIN CONC 32.2 GM/DL   32.3-36.5    L       

     



             (BEAKER) (test code = 752)                                        

 

             RED CELL DISTRIBUTION WIDTH 18.3 %       11.6-14.4    H            



             (BEAKER) (test code = 412)                                        

 

             PLATELET COUNT (BEAKER) (test code 56 K/CU MM   150-450      L     

       



             = 756)                                              

 

             MEAN PLATELET VOLUME (BEAKER) 11.2 fL      9.4-12.4                

  



             (test code = 754)                                        

 

             NUCLEATED RED BLOOD CELLS (BEAKER) 0 /100 WBC   0-0                

       



             (test code = 413)                                        

 

             NEUTROPHILS RELATIVE PERCENT 79 %                                  

 



             (BEAKER) (test code = 429)                                        

 

             LYMPHOCYTES RELATIVE PERCENT 14 %                                  

 



             (BEAKER) (test code = 430)                                        

 

             MONOCYTES RELATIVE PERCENT 6 %                                    



             (BEAKER) (test code = 431)                                        

 

             EOSINOPHILS RELATIVE PERCENT 0 %                                   

 



             (BEAKER) (test code = 432)                                        

 

             BASOPHILS RELATIVE PERCENT 0 %                                    



             (BEAKER) (test code = 437)                                        

 

             NEUTROPHILS ABSOLUTE COUNT 2.89 K/ L    1.78-5.38                 



             (BEAKER) (test code = 670)                                        

 

             LYMPHOCYTES ABSOLUTE COUNT 0.52 K/ L    1.32-3.57    L            



             (BEAKER) (test code = 414)                                        

 

             MONOCYTES ABSOLUTE COUNT (BEAKER) 0.22 K/ L    0.30-0.82    L      

      



             (test code = 415)                                        

 

             EOSINOPHILS ABSOLUTE COUNT 0.00 K/ L    0.04-0.54    L            



             (BEAKER) (test code = 416)                                        

 

             BASOPHILS ABSOLUTE COUNT (BEAKER) 0.00 K/ L    0.01-0.08    L      

      



             (test code = 417)                                        

 

             IMMATURE GRANULOCYTES-RELATIVE 1.40 %       0.00-1.00    H         

   



             PERCENT (BEAKER) (test code =                                      

  



             2801)                                               



MRSA [id number] 12:59:38





             Test Item    Value        Reference Range Interpretation Comments

 

             CULTURE (BEAKER) (test code No MRSA isolated                       

    



             = 1095)                                             



(CELLAVISION MANUAL DIFF)2023 09:01:51





             Test Item    Value        Reference Range Interpretation Comments

 

             NEUTROPHILS - REL 76 %                                   



             (CELLAVISION)(BEAKER) (test code                                   

     



             = 2816)                                             

 

             LYMPHOCYTES - REL 6 %                                    



             (CELLAVISION)(BEAKER) (test code                                   

     



             = 2817)                                             

 

             MONOCYTES - REL 6 %                                    



             (CELLAVISION)(BEAKER) (test code                                   

     



             = 2818)                                             

 

             EOSINOPHILS - REL 3 %                                    



             (CELLAVISION)(BEAKER) (test code                                   

     



             = 2819)                                             

 

             BASOPHILS - REL 1 %                                    



             (CELLAVISION)(BEAKER) (test code                                   

     



             = 2820)                                             

 

             METAMYELOCYTES - REL 1 %          0-0          H            



             (CELLAVISION)(BEAKER) (test code                                   

     



             = 2821)                                             

 

             MYELOCYTES - REL 1 %          0-0          H            



             (CELLAVISION)(BEAKER) (test code                                   

     



             = 2822)                                             

 

             BANDS - REL (CELLAVISION)(BEAKER) 4 %          0-10                

      



             (test code = 2826)                                        

 

             ATYPICAL LYMPHOCYTES - REL 1 %          0-0          H            



             (CELLAVISION)(BEAKER) (test code                                   

     



             = 2829)                                             

 

             NEUTROPHILS - ABS 4.33 K/ul    1.78-5.38                 



             (CELLAVISION)(BEAKER) (test code                                   

     



             = 2830)                                             

 

             LYMPHOCYTES - ABS 0.34 K/ul    1.32-3.57    L            



             (CELLAVISION)(BEAKER) (test code                                   

     



             = 2831)                                             

 

             MONOCYTES - ABS 0.34 K/uL    0.30-0.82                 



             (CELLAVISION)(BEAKER) (test code                                   

     



             = 2832)                                             

 

             EOSINOPHILS - ABS 0.17 K/uL    0.04-0.54                 



             (CELLAVISION)(BEAKER) (test code                                   

     



             = 2834)                                             

 

             BASOPHILS - ABS 0.06 K/uL    0.01-0.08                 



             (CELLAVISION)(BEAKER) (test code                                   

     



             = 2835)                                             

 

             METAMYELOCYTES - ABS 0.06 K/uL    0.00-0.00    H            



             (CELLAVISION)(BEAKER) (test code                                   

     



             = 2836)                                             

 

             MYELOCYTES-ABS 0.06 K/uL    0.00-0.00    H            



             (CELLAVISION)(BEAKER) (test code                                   

     



             = 2837)                                             

 

             BANDS - ABS (CELLAVISION)(BEAKER) 0.23 K/uL    0.00-0.80           

      



             (test code = 2840)                                        

 

             ATYPICAL LYMPHOCYTES - ABS 0.06 K/uL    0.00-0.00    H            



             (CELLAVISION)(BEAKER) (test code                                   

     



             = 2858)                                             

 

             TOTAL COUNTED (BEAKER) (test code 100                              

      



             = 1351)                                             

 

             WBC MORPHOLOGY (BEAKER) (test Normal                               

  



             code = 487)                                         

 

             PLT MORPHOLOGY (BEAKER) (test Normal                               

  



             code = 486)                                         

 

             POLYCHROMATOPHILLIC RBCS(BEAKER) 1+ few                            

     



             (test code = 478)                                        

 

             ANISOCYTOSIS (BEAKER) (test code 2+ moderate                       

     



             = 961)                                              

 

             MACROCYTES (BEAKER) (test code = 1+ few                            

     



             964)                                                

 

             POIKILOCYTES (BEAKER) (test code 2+ moderate                       

     



             = 966)                                              

 

             SCHISTOCYTES (BEAKER) (test code 1+ few                            

     



             = 765)                                              

 

             OVALOCYTES (BEAKER) (test code = 1+ few                            

     



             477)                                                

 

             ARTIFACT (CELLAVISION)(BEAKER) Present                             

   



             (test code = 3432)                                        

 

             PLATELET CONCENTRATION Decreased                              



             (CELLAVISION)(BEAKER) (test code                                   

     



             = 3438)                                             



 ID - [id number] ID - Katie OverholtUser comments: Slide comments:CBC 
W/PLT COUNT &amp; AUTO [id number] 09:01:48





             Test Item    Value        Reference Range Interpretation Comments

 

             WHITE BLOOD CELL COUNT (BEAKER) 5.7 K/ L     3.5-10.5              

    



             (test code = 775)                                        

 

             RED BLOOD CELL COUNT (BEAKER) 2.53 M/ L    4.63-6.08    L          

  



             (test code = 761)                                        

 

             HEMOGLOBIN (BEAKER) (test code = 7.9 GM/DL    13.7-17.5    L       

     



             410)                                                

 

             HEMATOCRIT (BEAKER) (test code = 24.5 %       40.1-51.0    L       

     



             411)                                                

 

             MEAN CORPUSCULAR VOLUME (BEAKER) 97 fL        79-92        H       

     



             (test code = 753)                                        

 

             MEAN CORPUSCULAR HEMOGLOBIN 31.2 pg      25.7-32.2                 



             (BEAKER) (test code = 751)                                        

 

             MEAN CORPUSCULAR HEMOGLOBIN CONC 32.2 GM/DL   32.3-36.5    L       

     



             (BEAKER) (test code = 752)                                        

 

             RED CELL DISTRIBUTION WIDTH 19.5 %       11.6-14.4    H            



             (BEAKER) (test code = 412)                                        

 

             PLATELET COUNT (BEAKER) (test code 52 K/CU MM   150-450      L     

       



             = 756)                                              

 

             MEAN PLATELET VOLUME (BEAKER) 10.5 fL      9.4-12.4                

  



             (test code = 754)                                        

 

             NUCLEATED RED BLOOD CELLS (BEAKER) 0 /100 WBC   0-0                

       



             (test code = 413)                                        



BASIC METABOLIC [id number] 05:30:22





             Test Item    Value        Reference Range Interpretation Comments

 

             SODIUM (BEAKER) 133 meq/L    136-145      L            



             (test code = 381)                                        

 

             POTASSIUM    3.9 meq/L    3.5-5.1                   



             (BEAKER) (test                                        



             code = 379)                                         

 

             CHLORIDE (BEAKER) 107 meq/L                        



             (test code = 382)                                        

 

             CO2 (BEAKER) 22 meq/L     22-29                     



             (test code = 355)                                        

 

             BLOOD UREA   10 mg/dL     7-21                      



             NITROGEN (BEAKER)                                        



             (test code = 354)                                        

 

             CREATININE   0.59 mg/dL   0.57-1.25                 



             (BEAKER) (test                                        



             code = 358)                                         

 

             GLUCOSE RANDOM 85 mg/dL                         



             (BEAKER) (test                                        



             code = 652)                                         

 

             CALCIUM (BEAKER) 7.1 mg/dL    8.4-10.2     L            



             (test code = 697)                                        

 

             EGFR (BEAKER) 113                                     Interpretatio

n of eGFR



             (test code = mL/min/1.73                            values Stage De

scription



             1092)        sq m                                   Result G1 Shaunna

l or high



                                                                 >=90 G2 Mildly 

decreased



                                                                 60-89 G3a Mildl

y to



                                                                 moderately 45-5

9 G3b



                                                                 Moderately to s

everely



                                                                 30-44 G4 Severl

y decreased



                                                                 15-29 G5 Kidney

 failure



                                                                 <15Reported eGF

R is based



                                                                 on the CKD-EPI 





                                                                 equation that d

oes not use



                                                                 a race



                                                                 coefficientEsti

mated GFR



                                                                 is not as accur

ate as



                                                                 Creatinine Tabitha marie in



                                                                 predicting glom

erular



                                                                 filtration rate

. Estimated



                                                                 GFR is not appl

icable for



                                                                 dialysis patien

ts



 JANIE Matias moderately ictericHEPATIC FUNCTION [id number]
05:30:15





             Test Item    Value        Reference Range Interpretation Comments

 

             TOTAL PROTEIN (BEAKER) (test code = 5.0 gm/dL    6.0-8.3      L    

        



             770)                                                

 

             ALBUMIN (BEAKER) (test code = 1145) 1.7 g/dL     3.5-5.0      L    

        

 

             BILIRUBIN TOTAL (BEAKER) (test code 4.9 mg/dL    0.2-1.2      H    

        



             = 377)                                              

 

             BILIRUBIN DIRECT (BEAKER) (test 2.1 mg/dL    0.1-0.5      H        

    



             code = 706)                                         

 

             ALKALINE PHOSPHATASE (BEAKER) (test 91 U/L                   

        



             code = 346)                                         

 

             AST (SGOT) (BEAKER) (test code = 48 U/L       5-34         H       

     



             353)                                                

 

             ALT (SGPT) (BEAKER) (test code = 20 U/L       6-55                 

     



             347)                                                



 JANIE Matias moderately [id number] 05:29:05





             Test Item    Value        Reference Range Interpretation Comments

 

             PHOSPHORUS (BEAKER) (test code = 2.2 mg/dL    2.3-4.7      L       

     



             604)                                                



 JANIE THOMAS [id number] 05:29:04





             Test Item    Value        Reference Range Interpretation Comments

 

             MAGNESIUM (BEAKER) (test code = 1.8 mg/dL    1.6-2.6               

    



             627)                                                



 JANIE THOMAS WPROTHROMBIN [id number] 05:05:58





             Test Item    Value        Reference Range Interpretation Comments

 

             PROTIME (BEAKER) (test code = 27.5 seconds 11.9-14.2    H          

  



             759)                                                

 

             INR (BEAKER) (test code = 370) 2.75         <=5.90                 

   



RECOMMENDED COUMADIN/WARFARIN INR THERAPY RANGESSTANDARD DOSE: 2.0 - 3.0 
Includes: PROPHYLAXIS for venous thrombosis, systemic embolization; TREATMENT 
for venous thrombosis and/or pulmonary embolus.HIGH RISK: Target INR is 2.5-3.5 
for patients with mechanical heart valves.POCT-GLUCOSE [id number] 18:49:26





             Test Item    Value        Reference Range Interpretation Comments

 

             POC-GLUCOSE METER 158 mg/dL           H            : TESTED A

[facility] [address]



             (BEAKER) (test code =                                        [place],



             1538)                                               [id number]:



                                                                 /Techni

melanie ID



                                                                 = [id number] for Ch

ryan,



                                                                 Cunthia



CALCIUM, [id number] 16:00:32





             Test Item    Value        Reference Range Interpretation Comments

 

             CALCIUM IONIZED (BEAKER) (test 1.07 mmol/L  1.12-1.27    L         

   



             code = 698)                                         

 

             PH, BLOOD (BEAKER) (test code = 7.47                               

    



             1810)                                               



BASIC METABOLIC [id number] 14:08:29





             Test Item    Value        Reference Range Interpretation Comments

 

             SODIUM (BEAKER) 134 meq/L    136-145      L            



             (test code = 381)                                        

 

             POTASSIUM    3.4 meq/L    3.5-5.1      L            



             (BEAKER) (test                                        



             code = 379)                                         

 

             CHLORIDE (BEAKER) 113 meq/L           H            



             (test code = 382)                                        

 

             CO2 (BEAKER) 18 meq/L     22-29        L            



             (test code = 355)                                        

 

             BLOOD UREA   8 mg/dL      7-21                      



             NITROGEN (BEAKER)                                        



             (test code = 354)                                        

 

             CREATININE   0.47 mg/dL   0.57-1.25    L            



             (BEAKER) (test                                        



             code = 358)                                         

 

             GLUCOSE RANDOM 95 mg/dL                         



             (BEAKER) (test                                        



             code = 652)                                         

 

             CALCIUM (BEAKER) 6.0 mg/dL    8.4-10.2     LL           



             (test code = 697)                                        

 

             EGFR (BEAKER) 119                                     Interpretatio

n of eGFR



             (test code = mL/min/1.73                            values Stage De

scription



             1092)        sq m                                   Result G1 Shaunna

l or high



                                                                 >=90 G2 Mildly 

decreased



                                                                 60-89 G3a Mildl

y to



                                                                 moderately 45-5

9 G3b



                                                                 Moderately to s

everely



                                                                 30-44 G4 Severl

y decreased



                                                                 15-29 G5 Kidney

 failure



                                                                 <15Reported eGF

R is based



                                                                 on the CKD-EPI 





                                                                 equation that d

oes not use



                                                                 a race



                                                                 coefficientEsti

mated GFR



                                                                 is not as accur

ate as



                                                                 Creatinine Tabitha marie in



                                                                 predicting glom

erular



                                                                 filtration rate

. Estimated



                                                                 GFR is not appl

icable for



                                                                 dialysis patien

ts



 ID - MMSpecimen moderately icteric(CELLAVISION MANUAL DIFF)2023 
14:04:53





             Test Item    Value        Reference Range Interpretation Comments

 

             NEUTROPHILS - REL 89 %                                   



             (CELLAVISION)(BEAKER) (test code                                   

     



             = 2816)                                             

 

             LYMPHOCYTES - REL 2 %                                    



             (CELLAVISION)(BEAKER) (test code                                   

     



             = 2817)                                             

 

             MONOCYTES - REL 3 %                                    



             (CELLAVISION)(BEAKER) (test code                                   

     



             = 2818)                                             

 

             BASOPHILS - REL 1 %                                    



             (CELLAVISION)(BEAKER) (test code                                   

     



             = 2820)                                             

 

             METAMYELOCYTES - REL 1 %          0-0          H            



             (CELLAVISION)(BEAKER) (test code                                   

     



             = 2821)                                             

 

             BANDS - REL (CELLAVISION)(BEAKER) 4 %          0-10                

      



             (test code = 2826)                                        

 

             NEUTROPHILS - ABS 4.63 K/ul    1.78-5.38                 



             (CELLAVISION)(BEAKER) (test code                                   

     



             = 2830)                                             

 

             LYMPHOCYTES - ABS 0.10 K/ul    1.32-3.57    L            



             (CELLAVISION)(BEAKER) (test code                                   

     



             = 2831)                                             

 

             MONOCYTES - ABS 0.16 K/uL    0.30-0.82    L            



             (CELLAVISION)(BEAKER) (test code                                   

     



             = 2832)                                             

 

             BASOPHILS - ABS 0.05 K/uL    0.01-0.08                 



             (CELLAVISION)(BEAKER) (test code                                   

     



             = 2835)                                             

 

             METAMYELOCYTES - ABS 0.05 K/uL    0.00-0.00    H            



             (CELLAVISION)(BEAKER) (test code                                   

     



             = 2836)                                             

 

             BANDS - ABS (CELLAVISION)(BEAKER) 0.21 K/uL    0.00-0.80           

      



             (test code = 2840)                                        

 

             TOTAL COUNTED (BEAKER) (test code 100                              

      



             = 1351)                                             

 

             WBC MORPHOLOGY (BEAKER) (test Normal                               

  



             code = 487)                                         

 

             PLT MORPHOLOGY (BEAKER) (test Normal                               

  



             code = 486)                                         

 

             POLYCHROMATOPHILLIC RBCS(BEAKER) 1+ few                            

     



             (test code = 478)                                        

 

             ANISOCYTOSIS (BEAKER) (test code 2+ moderate                       

     



             = 961)                                              

 

             MACROCYTES (BEAKER) (test code = 2+ moderate                       

     



             964)                                                

 

             POIKILOCYTES (BEAKER) (test code 2+ moderate                       

     



             = 966)                                              

 

             ELLIPTOCYTES (BEAKER) (test code 1+ few                            

     



             = 962)                                              

 

             OVALOCYTES (BEAKER) (test code = 2+ moderate                       

     



             477)                                                

 

             ARTIFACT (CELLAVISION)(BEAKER) Present                             

   



             (test code = 0535)                                        

 

             HELMET CELLS 1+ few                                 



             (CELLAVISION)(BEAKER) (test code                                   

     



             = 6215)                                             

 

             PLATELET CONCENTRATION Decreased                              



             (CELLAVISION)(BEAKER) (test code                                   

     



             = 7368)                                             



 ID - [id number] ID - Joellne Barragan comments: Slide comments:
CBC W/PLT COUNT &amp; AUTO [id number] 14:04:52





             Test Item    Value        Reference Range Interpretation Comments

 

             WHITE BLOOD CELL COUNT 5.2 K/ L     3.5-10.5                  



             (BEAKER) (test code =                                        



             775)                                                

 

             RED BLOOD CELL COUNT 2.47 M/ L    4.63-6.08    L            



             (BEAKER) (test code =                                        



             761)                                                

 

             HEMOGLOBIN (BEAKER) 7.6 GM/DL    13.7-17.5    L            



             (test code = 410)                                        

 

             HEMATOCRIT (BEAKER) 23.9 %       40.1-51.0    L            



             (test code = 411)                                        

 

             MEAN CORPUSCULAR 97 fL        79-92        H            



             VOLUME (BEAKER) (test                                        



             code = 753)                                         

 

             MEAN CORPUSCULAR 30.8 pg      25.7-32.2                 



             HEMOGLOBIN (BEAKER)                                        



             (test code = 751)                                        

 

             MEAN CORPUSCULAR 31.8 GM/DL   32.3-36.5    L            



             HEMOGLOBIN CONC                                        



             (BEAKER) (test code =                                        



             752)                                                

 

             RED CELL DISTRIBUTION 20.2 %       11.6-14.4    H            



             WIDTH (BEAKER) (test                                        



             code = 412)                                         

 

             PLATELET COUNT 56 K/CU MM   150-450      L            Discordant fr

om



             (BEAKER) (test code =                                        previo

us results.



             756)                                                Clinical correl

ation



                                                                 suggested.

 

             MEAN PLATELET VOLUME 11.5 fL      9.4-12.4                  



             (BEAKER) (test code =                                        



             754)                                                

 

             NUCLEATED RED BLOOD 0 /100 WBC   0-0                       



             CELLS (BEAKER) (test                                        



             code = 413)                                         



[id number] 14:04:39





             Test Item    Value        Reference Range Interpretation Comments

 

             PHOSPHORUS (BEAKER) (test code = 2.3 mg/dL    2.3-4.7              

     



             604)                                                



 ID - [id number] 14:04:38





             Test Item    Value        Reference Range Interpretation Comments

 

             MAGNESIUM (BEAKER) (test code = 1.7 mg/dL    1.6-2.6               

    



             627)                                                



 ID - [id number] 13:42:33





             Test Item    Value        Reference Range Interpretation Comments

 

             FIBRINOGEN LEVEL (BEAKER) (test 180 mg/dl    225-434      L        

    



             code = 658)                                         



VANCOMYCIN LEVEL, [id number] 12:41:03





             Test Item    Value        Reference Range Interpretation Comments

 

             VANCOMYCIN TROUGH (BEAKER) (test 7.3 ug/mL    10.0-20.0    L       

     



             code = 522)                                         



 ID - NOLVIA BPOCT-GLUCOSE [id number] 12:22:01





             Test Item    Value        Reference Range Interpretation Comments

 

             POC-GLUCOSE METER 130 mg/dL           H            : TESTED A

[facility] [address]



             (BEAKER) (test code                                        [facility] 

[facility],



             = 1538)                                             [id number]:



                                                                 /Techni

melanie ID =



                                                                 [id number] for Lorelei miranda



                                                                 (contract), Nickie

tamara



CREATINE KINASE (CK)2023 10:09:48





             Test Item    Value        Reference Range Interpretation Comments

 

             CREATINE KINASE TOTAL (BEAKER) (test 260 U/L             H   

         



             code = 380)                                         



 ID - MMLACTIC ACID, [id number] 10:04:39





             Test Item    Value        Reference Range Interpretation Comments

 

             LACTATE BLOOD VENOUS (2) (BEAKER) 1.84 mmol/L  0.50-2.00           

      



             (test code = 2872)                                        



 ID - MMSpecimen slightly ictericHEPATIC FUNCTION [id number] 
09:54:16





             Test Item    Value        Reference Range Interpretation Comments

 

             TOTAL PROTEIN (BEAKER) (test code = 4.9 gm/dL    6.0-8.3      L    

        



             770)                                                

 

             ALBUMIN (BEAKER) (test code = 1145) 1.6 g/dL     3.5-5.0      L    

        

 

             BILIRUBIN TOTAL (BEAKER) (test code 4.1 mg/dL    0.2-1.2      H    

        



             = 377)                                              

 

             BILIRUBIN DIRECT (BEAKER) (test 1.8 mg/dL    0.1-0.5      H        

    



             code = 706)                                         

 

             ALKALINE PHOSPHATASE (BEAKER) (test 67 U/L                   

        



             code = 346)                                         

 

             AST (SGOT) (BEAKER) (test code = 59 U/L       5-34         H       

     



             353)                                                

 

             ALT (SGPT) (BEAKER) (test code = 19 U/L       6-55                 

     



             347)                                                



 ID - MMSpecimen moderately ictericPOCT-GLUCOSE [id number] 04:18:34





             Test Item    Value        Reference Range Interpretation Comments

 

             POC-GLUCOSE METER 97 mg/dL                         : TESTED A

[facility] [address]



             (BEAKER) (test code =                                        [place],



             1538)                                               [id number]:



                                                                 /Techni

melanie ID =



                                                                 [id number] for Julienne Tobin



THROMBOELASTOGRAPH (TEG)2023 04:15:53





             Test Item    Value        Reference Range Interpretation Comments

 

             TEG ACTIVATED CLOTTING TIME 3.6 minutes  4.0-7.0      L            



             (BEAKER) (test code = 1407)                                        

 

             TEG FIBRINOGEN ACTIVITY (BEAKER) 60.7 degrees 61.0-73.0    L       

     



             (test code = 1408)                                        

 

             TEG PLT. AGGREGATION (BEAKER) 47.7 MM      55.0-65.0    L          

  



             (test code = 1409)                                        

 

             TEG FIBRINOLYSIS (BEAKER) (test 0.0 %        0.0-5.0               

    



             code = 1410)                                        

 

             TGH ACTIVATED CLOTTING TIME 3.7 minutes  4.0-7.0      L            



             (BEAKER) (test code = 1411)                                        

 

             TGH FIBRINOGEN ACTIVITY (BEAKER) 62.1 degrees 61.0-73.0            

     



             (test code = 1412)                                        

 

             TGH PLT. AGGREGATION (BEAKER) 43.9 MM      55.0-65.0    L          

  



             (test code = 1413)                                        

 

             TGH FIBRINOLYSIS (BEAKER) (test 0.0 %        0.0-5.0               

    



             code = 1414)                                        



[id number] 04:08:22





             Test Item    Value        Reference Range Interpretation Comments

 

             PHOSPHORUS (BEAKER) (test code = 1.5 mg/dL    2.3-4.7      LL      

     



             604)                                                



 ID - ADMINBASIC METABOLIC [id number] 04:07:31





             Test Item    Value        Reference Range Interpretation Comments

 

             SODIUM (BEAKER) 132 meq/L    136-145      L            



             (test code = 381)                                        

 

             POTASSIUM    3.3 meq/L    3.5-5.1      L            



             (BEAKER) (test                                        



             code = 379)                                         

 

             CHLORIDE (BEAKER) 107 meq/L                        



             (test code = 382)                                        

 

             CO2 (BEAKER) 20 meq/L     22-29        L            



             (test code = 355)                                        

 

             BLOOD UREA   10 mg/dL     7-21                      



             NITROGEN (BEAKER)                                        



             (test code = 354)                                        

 

             CREATININE   0.56 mg/dL   0.57-1.25    L            



             (BEAKER) (test                                        



             code = 358)                                         

 

             GLUCOSE RANDOM 102 mg/dL                        



             (BEAKER) (test                                        



             code = 652)                                         

 

             CALCIUM (BEAKER) 7.1 mg/dL    8.4-10.2     L            



             (test code = 697)                                        

 

             EGFR (BEAKER) 114                                     Interpretatio

n of eGFR



             (test code = mL/min/1.73                            values Stage De

scription



             1092)        sq m                                   Result G1 Shaunna

l or high



                                                                 >=90 G2 Mildly 

decreased



                                                                 60-89 G3a Mildl

y to



                                                                 moderately 45-5

9 G3b



                                                                 Moderately to s

everely



                                                                 30-44 G4 Severl

y decreased



                                                                 15-29 G5 Kidney

 failure



                                                                 <15Reported eGF

R is based



                                                                 on the CKD-EPI 





                                                                 equation that d

oes not use



                                                                 a race



                                                                 coefficientEsti

mated GFR



                                                                 is not as accur

ate as



                                                                 Creatinine Tabitha

cynthia in



                                                                 predicting glom

erular



                                                                 filtration rate

. Estimated



                                                                 GFR is not appl

icable for



                                                                 dialysis patien

ts



 ID - ADMINSpecimen moderately [id number] 04:01:34





             Test Item    Value        Reference Range Interpretation Comments

 

             MAGNESIUM (BEAKER) (test code = 1.7 mg/dL    1.6-2.6               

    



             627)                                                



 ID - ADMINPT/[id number] 03:01:52





             Test Item    Value        Reference Range Interpretation Comments

 

             PROTIME (BEAKER) (test code = 32.4 seconds 11.9-14.2    H          

  



             759)                                                

 

             INR (BEAKER) (test code = 370) 3.41         <=5.90                 

   

 

             PARTIAL THROMBOPLASTIN TIME 47.6 seconds 22.5-36.0    H            



             (BEAKER) (test code = 760)                                        



RECOMMENDED COUMADIN/WARFARIN INR THERAPY RANGESSTANDARD DOSE: 2.0 - 3.0 
Includes: PROPHYLAXIS for venous thrombosis, systemic embolization; TREATMENT 
for venous thrombosis and/or pulmonary embolus.HIGH RISK: Target INR is 2.5-3.5 
for patients with mechanical heart valves.[id number] 03:01:30





             Test Item    Value        Reference Range Interpretation Comments

 

             FIBRINOGEN LEVEL (BEAKER) (test 184 mg/dl    225-434      L        

    



             code = 658)                                         



CBC W/PLT COUNT &amp; AUTO [id number] 02:50:11





             Test Item    Value        Reference Range Interpretation Comments

 

             WHITE BLOOD CELL COUNT 6.4 K/ L     3.5-10.5                  



             (BEAKER) (test code =                                        



             775)                                                

 

             RED BLOOD CELL COUNT 2.09 M/ L    4.63-6.08    L            



             (BEAKER) (test code =                                        



             761)                                                

 

             HEMOGLOBIN (BEAKER) 6.6 GM/DL    13.7-17.5    L            



             (test code = 410)                                        

 

             HEMATOCRIT (BEAKER) 20.9 %       40.1-51.0    L            



             (test code = 411)                                        

 

             MEAN CORPUSCULAR VOLUME 100 fL       79-92        H            



             (BEAKER) (test code =                                        



             753)                                                

 

             MEAN CORPUSCULAR 31.6 pg      25.7-32.2                 



             HEMOGLOBIN (BEAKER)                                        



             (test code = 751)                                        

 

             MEAN CORPUSCULAR 31.6 GM/DL   32.3-36.5    L            



             HEMOGLOBIN CONC                                        



             (BEAKER) (test code =                                        



             752)                                                

 

             RED CELL DISTRIBUTION 19.6 %       11.6-14.4    H            



             WIDTH (BEAKER) (test                                        



             code = 412)                                         

 

             PLATELET COUNT (BEAKER) 35 K/CU MM   150-450      L            No c

lot. Previous



             (test code = 756)                                        low

 

             MEAN PLATELET VOLUME                                        Unable 

to report due



             (BEAKER) (test code =                                        to abn

ormal Platelet



             754)                                                population



                                                                 distribution.

 

             NUCLEATED RED BLOOD 0 /100 WBC   0-0                       



             CELLS (BEAKER) (test                                        



             code = 413)                                         

 

             NEUTROPHILS RELATIVE 73 %                                   



             PERCENT (BEAKER) (test                                        



             code = 429)                                         

 

             LYMPHOCYTES RELATIVE 12 %                                   



             PERCENT (BEAKER) (test                                        



             code = 430)                                         

 

             MONOCYTES RELATIVE 14 %                                   



             PERCENT (BEAKER) (test                                        



             code = 431)                                         

 

             EOSINOPHILS RELATIVE 0 %                                    



             PERCENT (BEAKER) (test                                        



             code = 432)                                         

 

             BASOPHILS RELATIVE 0 %                                    



             PERCENT (BEAKER) (test                                        



             code = 437)                                         

 

             NEUTROPHILS ABSOLUTE 4.65 K/ L    1.78-5.38                 



             COUNT (BEAKER) (test                                        



             code = 670)                                         

 

             LYMPHOCYTES ABSOLUTE 0.76 K/ L    1.32-3.57    L            



             COUNT (BEAKER) (test                                        



             code = 414)                                         

 

             MONOCYTES ABSOLUTE 0.90 K/ L    0.30-0.82    H            



             COUNT (BEAKER) (test                                        



             code = 415)                                         

 

             EOSINOPHILS ABSOLUTE 0.00 K/ L    0.04-0.54    L            



             COUNT (BEAKER) (test                                        



             code = 416)                                         

 

             BASOPHILS ABSOLUTE 0.02 K/ L    0.01-0.08                 



             COUNT (BEAKER) (test                                        



             code = 417)                                         

 

             IMMATURE     0.90 %       0.00-1.00                 



             GRANULOCYTES-RELATIVE                                        



             PERCENT (BEAKER) (test                                        



             code = 2801)                                        



CALCIUM, [id number] 02:48:49





             Test Item    Value        Reference Range Interpretation Comments

 

             CALCIUM IONIZED (BEAKER) (test 1.04 mmol/L  1.12-1.27    L         

   



             code = 698)                                         

 

             PH, BLOOD (BEAKER) (test code = 7.52                               

    



             1810)                                               



CT, EXTREMITY, LOWER, WITH CONTRAST, [id number] 00:12:00Unlisted Reason for 
Exam - Click Yes and Enter Reason Below-&gt;NoPlease specify:-&gt;KneeSeptic 
arthritis. R/o concominant myositisPlease specify:-&gt;FemurPlease 
specify:-&gt;Tibia/Fibula
************************************************************[facility] : 1970 Sex: 
M************************************************************FINAL REPORT 
PATIENT ID: [id number] CT, EXTREMITY, LOWER, WITH CONTRAST, LEFT INDICATION: Soft 
tissue infection suspected, thigh, xray done COMPARISON: None TECHNIQUE: Axial 
CT of the left lower extremitywith sagittal and coronal reformations. 
Intravascular contrast was administered. DOSE REDUCTION: Dose modulation, 
iterative reconstruction, and/or weight-based adjustment of the mA/kV was 
utilized to reduce the radiation dose to as low as reasonably achievable. 
FINDINGS:There is diffuse circumferential subcutaneous soft tissue swelling of 
the left lower extremity greatest distally. No soft tissue gasor fluid 
collections. The deep compartments are relatively preserved. Moderate left knee 
joint effusion. Included vasculature is unremarkable. No acute fracture or 
dislocation. No focal osseous erosionor aggressive lesion. Escobar catheter 
present within the bladder. IMPRESSION:Diffuse soft tissue swelling of the left 
lower extremity may be related to edema or cellulitis. No soft tissue gas and no
drainable collection. No acute osseous abnormality. There is a left knee joint 
effusion. Signed: Bobbi Real MDReport Verified Date/Time: 2023 00:12:35 
Electronically signed by: BOBBI REAL MD on2023 12:12 AMPOCT-GLUCOSE 
[id number] 00:07:49





             Test Item    Value        Reference Range Interpretation Comments

 

             POC-GLUCOSE METER 118 mg/dL           H            : TESTED A

[facility] [address]



             (BEAKER) (test code =                                        [place] [facility],



             1538)                                               [id number]:



                                                                 /Techni

melanie ID



                                                                 = [id number] for Roxana Okeefe



RAPID DRUG SCREEN, [id number] 20:38:14





             Test Item    Value        Reference Range Interpretation Comments

 

             BARBITURATE URINE (BEAKER) (test Negative     Negative             

     



             code = 725)                                         

 

             BENZODIAZEPINE SCREEN URINE (BEAKER) Negative     Negative         

         



             (test code = 726)                                        

 

             COCAINE (METAB.) SCREEN (BEAKER) Negative     Negative             

     



             (test code = 1164)                                        

 

             METHADONE SCREEN (BEAKER) (test code Negative     Negative         

         



             = 1436)                                             

 

             OPIATE SCREEN URINE (BEAKER) (test Negative     Negative           

       



             code = 734)                                         

 

             CANNABINOID SCREEN URINE (BEAKER) Negative     Negative            

      



             (test code = 727)                                        

 

             AMPH/METHAMPH SCREEN (BEAKER) (test Negative     Negative          

        



             code = 1438)                                        

 

             PHENCYCLIDINE SCREEN URINE (BEAKER) Negative     Negative          

        



             (test code = 608)                                        

 

             PH UA (BEAKER) (test code = 467) 6.0          5.0-8.0              

     



DRUG CUTOFF CONC.Cocaine 300 ng/mL Cannabinoid 50 ng/mLBenzodiazepine 200 
ng/mLBarbiturate 200 ng/mLPhencyclidine 25 ng/mLOpiate 300 ng/mLMethadone 300 
ng/mLAmphetamine/ 1000 ng/mL MethamphetamineThis assay provides an unconfirmed 
qualitative test result for the clinical management of patients in emergency 
situations. Chain of custody not maintained. Some over-the-counter medications, 
as well as adulterants, may cause inaccurate results. Clinical correlation 
should be applied. A more comprehensive drug screen or confirmation of a 
detected drug may be performed upon request. ID - ADMINURINALYSIS W/ 
REFLEX URINE [id number] 20:27:37





             Test Item    Value        Reference Range Interpretation Comments

 

             COLOR (BEAKER) (test code = 470) Yellow                            

     

 

             CLARITY (BEAKER) (test code = 469) Hazy                            

       

 

             SPECIFIC GRAVITY UA (BEAKER) (test 1.027        1.001-1.035        

       



             code = 468)                                         

 

             PH UA (BEAKER) (test code = 467) 6.0          5.0-8.0              

     

 

             PROTEIN UA (BEAKER) (test code = 20 mg/dL     Negative     A       

     



             464)                                                

 

             GLUCOSE UA (BEAKER) (test code = Negative     Negative             

     



             365)                                                

 

             KETONES UA (BEAKER) (test code = Negative     Negative             

     



             371)                                                

 

             BILIRUBIN UA (BEAKER) (test code = Positive     Negative     A     

       



             462)                                                

 

             BLOOD UA (BEAKER) (test code = 461) Large        Negative     A    

        

 

             NITRITE UA (BEAKER) (test code = Negative     Negative             

     



             465)                                                

 

             LEUKOCYTE ESTERASE UA (BEAKER) (test Negative     Negative         

         



             code = 466)                                         

 

             UROBILINOGEN UA (BEAKER) (test code 3            0.2-1.0      H    

        



             = 463)                                              

 

             RBC UA (BEAKER) (test code = 519) 27 /HPF                          

      

 

             WBC UA (BEAKER) (test code = 520) 3 /HPF                           

      

 

             BACTERIA (BEAKER) (test code = 517) Rare                           

        

 

             SQUAMOUS EPITHELIAL (BEAKER) (test < /HPF                          

       



             code = 516)                                         

 

             SOURCE(BEAKER) (test code = 2795)                                  

      



 ID - [auto] ID - techPOCT-GLUCOSE [id number] 20:05:54





             Test Item    Value        Reference Range Interpretation Comments

 

             POC-GLUCOSE METER 165 mg/dL           H            : TESTED A

T [facility] [address]



             (BEAKER) (test code =                                        LEWISVLAD WILSON [facility],



             1538)                                               [id number]:



                                                                 /Techni

melanie ID



                                                                 = [id number] for La

ra,



                                                                 Julienne



BODY FLUID [id number] 18:23:10





             Test Item    Value        Reference Range Interpretation Comments

 

             CRYSTALS, BODY FLUID Calcium pyrophosphate                         

  



             (BEAKER) (test code crystals for pseudogout                        

   



             = 2165)                                             

 

             QUANTITY SEEN Few                                    



             (BEAKER) (test code                                        



             = 2166)                                             

 

             [id number] Dillon Dominguez MD (electronic                     

      



             (BEAKER) (test code signature)                             



             = 7601)                                             



THROMBOELASTOGRAPH (TEG)2023 18:12:17





             Test Item    Value        Reference Range Interpretation Comments

 

             TEG ACTIVATED CLOTTING TIME 8.9 minutes  4.0-7.0      H            



             (BEAKER) (test code = 1407)                                        

 

             TEG FIBRINOGEN ACTIVITY (BEAKER) 50.7 degrees 61.0-73.0    L       

     



             (test code = 1408)                                        

 

             TEG PLT. AGGREGATION (BEAKER) 39.9 MM      55.0-65.0    L          

  



             (test code = 1409)                                        

 

             TEG FIBRINOLYSIS (BEAKER) (test 0.0 %        0.0-5.0               

    



             code = 1410)                                        

 

             TGH ACTIVATED CLOTTING TIME 9.2 minutes  4.0-7.0      H            



             (BEAKER) (test code = 1411)                                        

 

             TGH FIBRINOGEN ACTIVITY (BEAKER) 54.6 degrees 61.0-73.0    L       

     



             (test code = 1412)                                        

 

             TGH PLT. AGGREGATION (BEAKER) 39.5 MM      55.0-65.0    L          

  



             (test code = 1413)                                        

 

             TGH FIBRINOLYSIS (BEAKER) (test 0.0 %        0.0-5.0               

    



             code = 1414)                                        



[id number] 18:02:42





             Test Item    Value        Reference Range Interpretation Comments

 

             PROCALCITONIN (BEAKER) (test code 7.00 ng/mL   <0.05        H      

      



             = 3036)                                             



SEPSIS RISK (ng/mL)Low: 0.05-0.50Intermediate: 0.51-2.00High: &gt;=2.01CREATINE 
KINASE (CK)2023 17:50:27





             Test Item    Value        Reference Range Interpretation Comments

 

             CREATINE KINASE TOTAL (BEAKER) (test 977 U/L             H   

         



             code = 380)                                         



 ID - ADMINCBC (HEMOGRAM ONLY)2023 17:27:14





             Test Item    Value        Reference Range Interpretation Comments

 

             WHITE BLOOD CELL COUNT (BEAKER) 6.7 K/ L     3.5-10.5              

    



             (test code = 775)                                        

 

             RED BLOOD CELL COUNT (BEAKER) 2.26 M/ L    4.63-6.08    L          

  



             (test code = 761)                                        

 

             HEMOGLOBIN (BEAKER) (test code = 7.2 GM/DL    13.7-17.5    L       

     



             410)                                                

 

             HEMATOCRIT (BEAKER) (test code = 23.2 %       40.1-51.0    L       

     



             411)                                                

 

             MEAN CORPUSCULAR VOLUME (BEAKER) 103 fL       79-92        H       

     



             (test code = 753)                                        

 

             MEAN CORPUSCULAR HEMOGLOBIN 31.9 pg      25.7-32.2                 



             (BEAKER) (test code = 751)                                        

 

             MEAN CORPUSCULAR HEMOGLOBIN CONC 31.0 GM/DL   32.3-36.5    L       

     



             (BEAKER) (test code = 752)                                        

 

             RED CELL DISTRIBUTION WIDTH 20.1 %       11.6-14.4    H            



             (BEAKER) (test code = 412)                                        

 

             PLATELET COUNT (BEAKER) (test code 22 K/CU MM   150-450      L     

       



             = 756)                                              

 

             NUCLEATED RED BLOOD CELLS (BEAKER) 0 /100 WBC   0-0                

       



             (test code = 413)                                        



[id number] 16:34:04





             Test Item    Value        Reference Range Interpretation Comments

 

             AMMONIA (BEAKER) (test code = 348) 103 mol/L    18-72        H     

       



 ID - ADMINLACTIC ACID, [id number] 15:30:00





             Test Item    Value        Reference Range Interpretation Comments

 

             LACTATE BLOOD VENOUS (2) (BEAKER) 2.66 mmol/L  0.50-2.00    H      

      



             (test code = 2872)                                        



 ID - MMSpecimen slightly ictericPOCT-GLUCOSE [id number] 15:05:53





             Test Item    Value        Reference Range Interpretation Comments

 

             POC-GLUCOSE METER 108 mg/dL                        : TESTED A

T [facility] [address]



             (BEAKER) (test code =                                        [place],



             1538)                                               [id number]:



                                                                 /Techni

melanie ID



                                                                 = [id number] for FO

LUIS ARMANDO BAILEY



CALCIUM, [id number] 15:05:35





             Test Item    Value        Reference Range Interpretation Comments

 

             CALCIUM IONIZED (BEAKER) (test 0.85 mmol/L  1.12-1.27    L         

   



             code = 698)                                         

 

             PH, BLOOD (BEAKER) (test code = 7.43                               

    



             1810)                                               



HEPATITIS PANEL, [id number] 13:08:20





             Test Item    Value        Reference Range Interpretation Comments

 

             HEPATITIS A IGM ANTIBODY (BEAKER) Nonreactive  Nonreactive         

      



             (test code = 498)                                        

 

             HEPATITIS B CORE IGM ANTIBODY Nonreactive  Nonreactive             

  



             (BEAKER) (test code = 645)                                        

 

             HEPATITIS C ANTIBODY (BEAKER) Nonreactive  Nonreactive             

  



             (test code = 367)                                        

 

             HEPATITIS B SURFACE ANTIGEN (2) Nonreactive  Nonreactive           

    



             (BEAKER) (test code = 2585)                                        



 ID - ADMINBODY FLUID CELL COUNT WITH [id number] 13:07:16





             Test Item    Value        Reference Range Interpretation Comments

 

             APPEARANCE FLUID (BEAKER) Turbid       Clear        A            



             (test code = 510)                                        

 

             COLOR FLUID (BEAKER) (test Lorie        Colorless, Straw A         

   



             code = 511)                                         

 

             RBC FLUID (BEAKER) (test code 08443 /cu mm <=1          H          

  



             = 513)                                              

 

             TOTAL NUCLEATED CELL COUNT 206828 /cu mm <=5          H            



             (BEAKER) (test code = 1442)                                        

 

             LINING CELLS/OTHERS DIFF'D 0                                      



             (BEAKER) (test code = 1589)                                        

 

             ADJUSTED WBC FLUID (BEAKER) 580192 /cu mm <=5          H           

 



             (test code = 1691)                                        

 

             LINING CELLS/OTHERS, 0 /cu mm     <=1                       



             CALCULATED (BEAKER) (test code                                     

   



             = 1590)                                             

 

             NEUTROPHILS FLUID (BEAKER) 100 %                                  



             (test code = 1656)                                        

 

             LYMPHS FLUID (BEAKER) (test 0 %                                    



             code = 488)                                         

 

             MONO/MACROPHAGE FLUID (BEAKER) 0 %                                 

   



             (test code = 489)                                        

 

             EOSINOPHILS FLUID (BEAKER) 0 %                                    



             (test code = 491)                                        

 

             BASO FLUID (BEAKER) (test code 0 %                                 

   



             = 492)                                              

 

             CONTAINER BODY FLUID (BEAKER) EDTA Tube                            

  



             (test code = 2873)                                        



HEMOGLOBIN [id number] 12:24:08





             Test Item    Value        Reference Range Interpretation Comments

 

             HEMOGLOBIN A1C < %          See_Comment                [Automated m

essage]



             ELECTROPHORESIS (BEAKER)                                        The

 system which



             (test code = 3811)                                        generated

 this result



                                                                 transmitted ref

erence



                                                                 range: <=5.6%. 

The



                                                                 reference range

 was



                                                                 not used to int

erpret



                                                                 this result as



                                                                 normal/abnormal

.



"The A1c is measured using a [facility]-certified method. HbA1c value equal to or 
greater than 6.5% as thediagnosis cutoff for diabetes. An HbA1c value of 5.7-
6.4% indicates increased risk for diabetes (prediabetes)." ID - ADMURIC 
[id number] 11:33:12





             Test Item    Value        Reference Range Interpretation Comments

 

             URIC ACID (BEAKER) (test code = 3.2 mg/dL    2.6-7.2               

    



             773)                                                



 ID - ADMINSpecimen slightly ictericC-REACTIVE [id number] 
11:33:12





             Test Item    Value        Reference Range Interpretation Comments

 

             C-REACTIVE PROTEIN (BEAKER) (test 4.52 mg/dL   0.00-0.50    H      

      



             code = 676)                                         



 ID - [id number] 11:31:51





             Test Item    Value        Reference Range Interpretation Comments

 

             HAPTOGLOBIN (BEAKER) (test code = < mg/dL             L      

      



             366)                                                



 ID - ADMINRAD, FEMUR, MIN. 2 VIEWS, [id number] 11:30:00Reason for 
exam:-&gt;fall r/o acute fractureShould this be performed at the 
bedside?-&gt;Yes************************************************************[facility] 1970 Sex: 
M************************************************************FINAL REPORT 
PATIENT ID: [id number] Exam: RAD, FEMUR, MIN. 2 VIEWS, LEFT Side: Left INDICATION:
fall r/o acute fracture COMPARISON: None FINDINGS:Bones: No acute displaced 
fracture. Osseous alignment is within normal limits. Joints:The joint spaces are
well-maintained. Soft tissues:The soft tissues appear unremarkable. IMPRESSION: 
No acute radiographic abnormality. If symptoms persist or progress please
consider further evaluation with the MRI without IV contrast, if clinically 
appropriate. Signed: Melida Sanchez MDReport Verified Date/Time: 2023 
11:30:35 Reading Location: [facility] Radiology Reading Room Electronically signed 
by: MELIDA SANCHEZ MD on 2023 11:30 AMRAD, KNEE, 3 VIEWS, [id number] 
11:02:00Is this procedure to be performed with weight bearing?-&gt;Non-Weight 
BearingReason for exam:-&gt;fall r/o acute fractureShould this be performed at 
the bedside?-&gt;Yes
************************************************************[facility] 1970 Sex: 
M************************************************************FINAL REPORT 
PATIENT ID: [id number] Exam: RAD, KNEE, 3 VIEWS, LEFT Side: INDICATION: fall r/o 
acute fracture COMPARISON: None  FINDINGS:Bones: No acute displaced fracture. 
Questionable lucency in the mid patella could be artifactual.Osseous alignment 
is within normal limits. Joints:The joint spaces are well-maintained. Soft 
tissues:The soft tissues appear unremarkable. IMPRESSION: No definitive acute 
radiographic abnormality. Questionable lucency in the mid patella could be 
artifactual. However please consider correlation with point tenderness if 
indicated please consider repeat radiograph of patella.If symptoms persist or 
progress please consider further evaluation with the MRI without IV contrast,if 
clinically appropriate. Signed: Melida Sanchez MDReport Verified Date/Time: 
2023 11:02:35 Reading Location: [facility] Radiology Reading Room 
Electronically signed by: MELIDA SANCHEZ MD on 2023 11:02 AMTSH/FREE T4 IF 
[id number] 10:14:33





             Test Item    Value        Reference Range Interpretation Comments

 

             THYROID STIMULATING HORMONE 2.549 uIU/mL 0.350-4.940               



             (BEAKER) (test code = 772)                                        



 ID - MMCOMPREHENSIVE METABOLIC [id number] 10:11:12





             Test Item    Value        Reference Range Interpretation Comments

 

             TOTAL PROTEIN 5.6 gm/dL    6.0-8.3      L            



             (BEAKER) (test                                        



             code = 770)                                         

 

             ALBUMIN (BEAKER) 1.9 g/dL     3.5-5.0      L            



             (test code = 1145)                                        

 

             ALKALINE     84 U/L                           



             PHOSPHATASE                                         



             (BEAKER) (test                                        



             code = 346)                                         

 

             BILIRUBIN TOTAL 4.6 mg/dL    0.2-1.2      H            



             (BEAKER) (test                                        



             code = 377)                                         

 

             SODIUM (BEAKER) 133 meq/L    136-145      L            



             (test code = 381)                                        

 

             POTASSIUM (BEAKER) 3.3 meq/L    3.5-5.1      L            



             (test code = 379)                                        

 

             CHLORIDE (BEAKER) 108 meq/L           H            



             (test code = 382)                                        

 

             CO2 (BEAKER) (test 17 meq/L     22-29        L            



             code = 355)                                         

 

             BLOOD UREA   10 mg/dL     7-21                      



             NITROGEN (BEAKER)                                        



             (test code = 354)                                        

 

             CREATININE   0.70 mg/dL   0.57-1.25                 



             (BEAKER) (test                                        



             code = 358)                                         

 

             GLUCOSE RANDOM 146 mg/dL           H            



             (BEAKER) (test                                        



             code = 652)                                         

 

             CALCIUM (BEAKER) 6.9 mg/dL    8.4-10.2     L            



             (test code = 697)                                        

 

             AST (SGOT)   72 U/L       5-34         H            



             (BEAKER) (test                                        



             code = 353)                                         

 

             ALT (SGPT)   22 U/L       6-55                      



             (BEAKER) (test                                        



             code = 347)                                         

 

             EGFR (BEAKER) 108                                     Interpretatio

n of eGFR



             (test code = 1092) mL/min/1.73                            values St

age Description



                          sq m                                   Result G1 Shaunna

l or high



                                                                 >=90 G2 Mildly 

decreased



                                                                 60-89 G3a Mildl

y to



                                                                 moderately 45-5

9 G3b



                                                                 Moderately to s

everely



                                                                 30-44 G4 Severl

y decreased



                                                                 15-29 G5 Kidney

 failure



                                                                 <15Reported eGF

R is based



                                                                 on the CKD-EPI 





                                                                 equation that d

oes not use



                                                                 a race



                                                                 coefficientEsti

mated GFR



                                                                 is not as accur

ate as



                                                                 Creatinine Tabitha

cynthia in



                                                                 predicting glom

erular



                                                                 filtration rate

. Estimated



                                                                 GFR is not appl

icable for



                                                                 dialysis patien

ts



 ID - MMSpecimen moderately [id number] 10:06:29





             Test Item    Value        Reference Range Interpretation Comments

 

             ETHANOL (BEAKER) (test code = 400) < mg/dL      <=10               

       



 ID - MM(CELLAVISION MANUAL DIFF)2023 10:04:25





             Test Item    Value        Reference Range Interpretation Comments

 

             NEUTROPHILS - REL 76 %                                   



             (CELLAVISION)(BEAKER) (test code                                   

     



             = 2816)                                             

 

             LYMPHOCYTES - REL 4 %                                    



             (CELLAVISION)(BEAKER) (test code                                   

     



             = 2817)                                             

 

             MONOCYTES - REL 5 %                                    



             (CELLAVISION)(BEAKER) (test code                                   

     



             = 2818)                                             

 

             METAMYELOCYTES - REL 6 %          0-0          H            



             (CELLAVISION)(BEAKER) (test code                                   

     



             = 2821)                                             

 

             BANDS - REL (CELLAVISION)(BEAKER) 9 %          0-10                

      



             (test code = 2826)                                        

 

             NEUTROPHILS - ABS 3.65 K/ul    1.78-5.38                 



             (CELLAVISION)(BEAKER) (test code                                   

     



             = 2830)                                             

 

             LYMPHOCYTES - ABS 0.19 K/ul    1.32-3.57    L            



             (CELLAVISION)(BEAKER) (test code                                   

     



             = 2831)                                             

 

             MONOCYTES - ABS 0.24 K/uL    0.30-0.82    L            



             (CELLAVISION)(BEAKER) (test code                                   

     



             = 2832)                                             

 

             METAMYELOCYTES - ABS 0.29 K/uL    0.00-0.00    H            



             (CELLAVISION)(BEAKER) (test code                                   

     



             = 2836)                                             

 

             BANDS - ABS (CELLAVISION)(BEAKER) 0.43 K/uL    0.00-0.80           

      



             (test code = 2840)                                        

 

             TOTAL COUNTED (BEAKER) (test code 100                              

      



             = 1351)                                             

 

             MANUAL NRBC  CELLS 1 /100 WBC   0-0          H            



             (BEAKER) (test code = 1353)                                        

 

             WBC MORPHOLOGY (BEAKER) (test Normal                               

  



             code = 487)                                         

 

             GIANT PLATELETS (BEAKER) (test Present                             

   



             code = 313)                                         

 

             POLYCHROMATOPHILLIC RBCS(BEAKER) 2+ moderate                       

     



             (test code = 478)                                        

 

             ANISOCYTOSIS (BEAKER) (test code 2+ moderate                       

     



             = 961)                                              

 

             MACROCYTES (BEAKER) (test code = 2+ moderate                       

     



             964)                                                

 

             POIKILOCYTES (BEAKER) (test code 3+ many                           

     



             = 966)                                              

 

             ELLIPTOCYTES (BEAKER) (test code 1+ few                            

     



             = 962)                                              

 

             OVALOCYTES (BEAKER) (test code = 1+ few                            

     



             477)                                                

 

             BENJI CELLS (BEAKER) (test code = 2+ moderate                       

     



             474)                                                

 

             ARTIFACT (CELLAVISION)(BEAKER) Present                             

   



             (test code = 3432)                                        

 

             PLATELET CONCENTRATION Decreased                              



             (CELLAVISION)(BEAKER) (test code                                   

     



             = 3438)                                             



 ID - [id number] ID - nolvia Trejo comments: Slide comments:CBC 
W/PLT COUNT &amp; AUTO [id number] 10:04:24





             Test Item    Value        Reference Range Interpretation Comments

 

             WHITE BLOOD CELL COUNT 4.8 K/ L     3.5-10.5                  



             (BEAKER) (test code =                                        



             775)                                                

 

             RED BLOOD CELL COUNT 2.31 M/ L    4.63-6.08    L            



             (BEAKER) (test code =                                        



             761)                                                

 

             HEMOGLOBIN (BEAKER) 7.3 GM/DL    13.7-17.5    L            



             (test code = 410)                                        

 

             HEMATOCRIT (BEAKER) 23.6 %       40.1-51.0    L            



             (test code = 411)                                        

 

             MEAN CORPUSCULAR VOLUME 102 fL       79-92        H            



             (BEAKER) (test code =                                        



             753)                                                

 

             MEAN CORPUSCULAR 31.6 pg      25.7-32.2                 



             HEMOGLOBIN (BEAKER)                                        



             (test code = 751)                                        

 

             MEAN CORPUSCULAR 30.9 GM/DL   32.3-36.5    L            



             HEMOGLOBIN CONC                                        



             (BEAKER) (test code =                                        



             752)                                                

 

             RED CELL DISTRIBUTION 20.3 %       11.6-14.4    H            



             WIDTH (BEAKER) (test                                        



             code = 412)                                         

 

             PLATELET COUNT (BEAKER) 22 K/CU MM   150-450      L            



             (test code = 756)                                        

 

             MEAN PLATELET VOLUME                                        Unable 

to report due



             (BEAKER) (test code =                                        to abn

ormal Platelet



             754)                                                population



                                                                 distribution.

 

             NUCLEATED RED BLOOD 0 /100 WBC   0-0                       



             CELLS (BEAKER) (test                                        



             code = 413)                                         



IRON, TIBC, % SAT. (WITHOUT FERRITIN)2023 10:02:23





             Test Item    Value        Reference Range Interpretation Comments

 

             IRON (BEAKER) (test code = 547) 25.0 ug/dL   40.0-160.0   L        

    

 

             TOTAL IRON BINDING CAPACITY 176 ug/dL    250-450      L            



             (BEAKER) (test code = 769)                                        

 

             IRON % SATURATION (2) (BEAKER) 14 %         20-55        L         

   



             (test code = 2590)                                        



 ID - [id number] 10:02:05





             Test Item    Value        Reference Range Interpretation Comments

 

             PHOSPHORUS (BEAKER) (test code = 2.0 mg/dL    2.3-4.7      L       

     



             604)                                                



 ID - MMCREATINE KINASE (CK)2023 10:02:05





             Test Item    Value        Reference Range Interpretation Comments

 

             CREATINE KINASE TOTAL (BEAKER) (test 1598 U/L            H   

         



             code = 380)                                         



 ID - MMLACTATE DEHYDROGENASE (LDH)2023 10:02:05





             Test Item    Value        Reference Range Interpretation Comments

 

             LACTATE DEHYDROGENASE (BEAKER) (test 376 U/L      125-220      H   

         



             code = 635)                                         



 ID - [id number] 10:02:04





             Test Item    Value        Reference Range Interpretation Comments

 

             MAGNESIUM (BEAKER) (test code = 1.4 mg/dL    1.6-2.6      L        

    



             627)                                                



 ID - MMLACTIC ACID, [id number] 10:00:29





             Test Item    Value        Reference Range Interpretation Comments

 

             LACTATE BLOOD VENOUS (2) (BEAKER) 5.35 mmol/L  0.50-2.00    HH     

      



             (test code = 2872)                                        



 ID - MMSpecimen slightly jcvdppnQBAUZOSJ1705-11-96 09:53:29





             Test Item    Value        Reference Range Interpretation Comments

 

             FERRITIN (BEAKER) (test code = 63.50 ng/mL  5..00            

   



             361)                                                



 ID - MMSARS-COV2/RT-PCR ([facility])2023 09:49:37





             Test Item    Value        Reference Range Interpretation Comments

 

             SARS-COV2/RT-PCR Negative     Negative                  The SARS-Co

V-2 target



             (test code =                                        nucleic acids a

re not



             [id number])                                            detected in thi

s specimen.



                                                                 Negative result

s do not



                                                                 preclude SARS-C

oV-2



                                                                 infection and s

hould not be



                                                                 used as the sol

e basis for



                                                                 patient managem

ent



                                                                 decisions. Nega

tive results



                                                                 must be combine

d with



                                                                 clinical observ

ations,



                                                                 patient history

, and



                                                                 epidemiological

 information.



                                                                 A false negativ

e result may



                                                                 occur if a spec

imen is



                                                                 improperly josias

ected,



                                                                 transported or 

handled. This



                                                                 SARS CoV-2 test

 is a rapid,



                                                                 real-time RT-PC

R test



                                                                 intended for th

e qualitative



                                                                 detection of nu

cleic acid



                                                                 from SARS-CoV-2

 in a



                                                                 nasopharyngeal 

swab specimen



                                                                 collected from 

individuals



                                                                 suspected of CO

VID-19 by



                                                                 their healthcar

e provider.



This test has been authorized by FDA under an EUA for use by authorized 
laboratories. This test is only authorized for the duration of the declaration 
that circumstances exist justifying the authorization of emergency use of in 
vitro diagnostic tests for detection and/or diagnosis of COVID-19 under Section 
564(b)(1) of the Federal Food, Drug and Cosmetic Act, 21 U.S.C. 360bbb-3(b)(1), 
unless the authorization is terminated or revoked sooner. Fact Sheet for 
Healthcare Providers: https://www.cepheid.co
m/Documents/Xpert%20Xpress%20SARS%20CoV-2/Fact%20Sheets/302-3802%91CDIZ-FZF-2%20

HEALTHCARE%20PROVIDERS%20FACT%20SHEET.pdf Fact Sheet for Healthcare Patients: 
https://www.[employer]/Documents/Xpert%20Xp
ress%20SARS%20CoV-2/Fact%20Sheets/302-3801%25FTKP-PQA-1%20PATIENT%20FACT%20SHEET

.[id number] 09:33:46





             Test Item    Value        Reference Range Interpretation Comments

 

             FIBRINOGEN LEVEL (BEAKER) (test 146 mg/dl    225-434      L        

    



             code = 658)                                         



[id number] 09:29:18





             Test Item    Value        Reference Range Interpretation Comments

 

             PROTIME (BEAKER) (test code = 33.8 seconds 11.9-14.2    H          

  



             759)                                                

 

             INR (BEAKER) (test code = 370) 3.46         <=5.90                 

   

 

             PARTIAL THROMBOPLASTIN TIME 50.6 seconds 22.5-36.0    H            



             (BEAKER) (test code = 760)                                        



RECOMMENDED COUMADIN/WARFARIN INR THERAPY RANGESSTANDARD DOSE: 2.0 - 3.0 
Includes: PROPHYLAXIS for venous thrombosis, systemic embolization; TREATMENT 
for venous thrombosis and/or pulmonary embolus.HIGH RISK: Target INR is 2.5-3.5 
for patients with mechanical heart valves.RETICULOCYTE [id number] 09:25:55





             Test Item    Value        Reference Range Interpretation Comments

 

             RETICULOCYTE COUNT PCT (BEAKER) (test 3.5 %        0.5-1.8      H  

          



             code = 575)                                         



 ID - [id number] ID - [id number]-GLUCOSE [id number] 08:11:30





             Test Item    Value        Reference Range Interpretation Comments

 

             POC-GLUCOSE METER 150 mg/dL           H            : TESTED A

[facility]



             (BEAKER) (test code =                                        [place],



             [address]38)                                               [address]:



                                                                 /Techni

melanie ID



                                                                 = [id number] for PHILLIP JACOBO



BLOOD CULTURE [id number] 14:27:19





             Test Item    Value        Reference Range Interpretation Comments

 

             Blood Culture-Aerobic Culture positive. No growth    AA           P

revious



             (test code = 17928-3) See Blood Culture                           p

reliminary



                          Workup for                             verified result



                          additional                             was Culture In



                          information.                           Progress on



                                                                 3/19/2023 at 02

01



                                                                 CDTPrevious



                                                                 preliminary



                                                                 verified result



                                                                 was No growth a

t



                                                                 24 hours on



                                                                 3/19/2023 at 23

01



                                                                 CDT

 

             Blood        No organisms No growth                 Previous



             Culture-Anaerobic isolated                               preliminar

y



             (test code = 53591-5)                                        verifi

ed result



                                                                 was Culture In



                                                                 Progress on



                                                                 3/19/2023 at 02

01



                                                                 CDTPrevious



                                                                 preliminary



                                                                 verified result



                                                                 was No growth a

t



                                                                 24 hours on



                                                                 3/20/2023 at 01

54



                                                                 CDT

 

             Lab Interpretation Abnormal                               



             (test code = 05634-6)                                        



[facility]





             Test Item    Value        Reference Range Interpretation Comments

 

             MAGNESIUM (test code = 0244838405) 1.7 mg/dL    1.7-2.4            

       

 

             Lab Interpretation (test code = Normal                             

    



             32767-6)                                            



[facility] METABOLIC PANEL (NA, K, CL, CO2, 
GLUCOSE, BUN, CREATININE, CA)2023 10:47:23





             Test Item    Value        Reference Range Interpretation Comments

 

             NA (test code = 134 mmol/L   135-145      L            



             0070519584)                                         

 

             K (test code = 3.4 mmol/L   3.5-5.0      L            



             5679872220)                                         

 

             CL (test code = 107 mmol/L                       



             0052511566)                                         

 

             CO2 TOTAL (test code = 28 mmol/L    23-31                     



             2138214237)                                         

 

             AGAP (test code =              2-16         L            



             6978709771)                                         

 

             BUN (test code = 5 mg/dL      7-23         L            



             9316821720)                                         

 

             GLUCOSE (test code = 82 mg/dL                         



             0168105226)                                         

 

             CREATININE (test code = 0.49 mg/dL   0.60-1.25    L            



             0592151488)                                         

 

             CALCIUM (test code = 7.0 mg/dL    8.6-10.6     L            



             7463852234)                                         

 

             eGFR (test code = 176.7        mL/min/1.73m2              



             1866239966)                                         

 

             RENETTA (test code = RENETTA) Association of                           



                          Glomerular Filtration                           



                          Rate (GFR) and Staging                           



                          of Kidney Disease*                           



                          +---------------------                           



                          --+-------------------                           



                          --+-------------------                           



                          ------+| GFR                           



                          (mL/min/1.73 m2) ?|                           



                          With Kidney Damage ?|                           



                          ?Without Kidney                           



                          Damage+---------------                           



                          --------+-------------                           



                          --------+-------------                           



                          ------------+| ?>90 ?                           



                          ? ? ? ? ? ? ? ?|                           



                          ?Stage one ? ? ? ? ?|                           



                          ? Normal ? ? ? ? ? ? ?                           



                          ?+--------------------                           



                          ---+------------------                           



                          ---+------------------                           



                          -------+| ?60-89 ? ? ?                           



                          ? ? ? ? ?| ?Stage two                           



                          ? ? ? ? ?| ? Decreased                           



                          GFR ? ? ? ?                            



                          +---------------------                           



                          --+-------------------                           



                          --+-------------------                           



                          ------+| ?30-59 ? ? ?                           



                          ? ? ? ? ?| ?Stage                           



                          three ? ? ? ?| ? Stage                           



                          three ? ? ? ? ?                           



                          +---------------------                           



                          --+-------------------                           



                          --+-------------------                           



                          ------+| ?15-29 ? ? ?                           



                          ? ? ? ? ?| ?Stage four                           



                          ? ? ? ? | ? Stage four                           



                          ? ? ? ? ?                              



                          ?+--------------------                           



                          ---+------------------                           



                          ---+------------------                           



                          -------+| ?<15 (or                           



                          dialysis) ? ?| ?Stage                           



                          five ? ? ? ? | ? Stage                           



                          five ? ? ? ? ?                           



                          ?+--------------------                           



                          ---+------------------                           



                          ---+------------------                           



                          -------+ *Each stage                           



                          assumes the associated                           



                          GFR level has been in                           



                          effect for at least                           



                          three months. ?Stages                           



                          1 to 5, with or                           



                          without kidney                           



                          disease, indicate                           



                          chronic kidney                           



                          disease. Notes:                           



                          Determination of                           



                          stages one and two                           



                          (with eGFR                             



                          >59mL/min/1.73 m2)                           



                          requires estimation of                           



                          kidney damage for at                           



                          least three months as                           



                          defined by structural                           



                          or functional                           



                          abnormalities of the                           



                          kidney, manifested by                           



                          either:Pathological                           



                          abnormalities or                           



                          Markers of kidney                           



                          damage (including                           



                          abnormalities in the                           



                          composition of the                           



                          blood or urine or                           



                          abnormalities in                           



                          imaging tests).                           

 

             Lab Interpretation Abnormal                               



             (test code = 19277-7)                                        



[facility]05





             Test Item    Value        Reference Range Interpretation Comments

 

             Fibrinogen (test code = 1754407319) 121 mg/dL    167-453      L    

        

 

             Lab Interpretation (test code = Abnormal                           

    



             36752-0)                                            



[facility] Center[facility] / [id number] 10:31:49





             Test Item    Value        Reference Range Interpretation Comments

 

             PROTIME PATIENT (test 26.0         See_Comment  H             [Auto

mated message]



             code = 5964-2)                                        The system [employer]



                                                                 generated this 

result



                                                                 transmitted ref

erence



                                                                 range: 10.1 - 1

2.6



                                                                 Seconds. The



                                                                 reference range

 was



                                                                 not used to int

erpret



                                                                 this result as



                                                                 normal/abnormal

.

 

             INR (test code = 6301-6) 2.3                                    Nor

mal INR <1.1;



                                                                 Warfarin Therap

eutic



                                                                 range 2.0 to 3.

0 or



                                                                 2.5 to 3.5, dep

ending



                                                                 upon the indica

tions.

 

             Lab Interpretation (test Abnormal                               



             code = 97482-8)                                        



[facility] Center[facility] [id number] 10:26:10





             Test Item    Value        Reference Range Interpretation Comments

 

             WBC (test code = 2.98         See_Comment  L             [Automated

 message]



             6690-2)                                             The system [employer]



                                                                 generated this 

result



                                                                 transmitted ref

erence



                                                                 range: 4.20 - 1

0.70



                                                                 10*3/?L. The



                                                                 reference range

 was



                                                                 not used to int

erpret



                                                                 this result as



                                                                 normal/abnormal

.

 

             RBC (test code = 789-8) 3.09         See_Comment  L             [Au

tomated message]



                                                                 The system [employer]



                                                                 generated this 

result



                                                                 transmitted ref

erence



                                                                 range: 4.26 - 5

.52



                                                                 10*6/?L. The



                                                                 reference range

 was



                                                                 not used to int

erpret



                                                                 this result as



                                                                 normal/abnormal

.

 

             HGB (test code = 718-7) 8.1 g/dL     12.2-16.4    L            

 

             HCT (test code = 24.6 %       38.4-49.3    L            



             4544-3)                                             

 

             MCH (test code = 785-6) 26.2 pg      26.1-32.7                 

 

             MCV (test code = 787-2) 79.6 fL      81.7-95.6    L            

 

             MCHC (test code = 32.9 g/dL    31.2-35.0                 



             786-4)                                              

 

             PLT (test code = 777-3) 21           See_Comment  LL            [Au

tomated message]



                                                                 The system [employer]



                                                                 generated this 

result



                                                                 transmitted ref

erence



                                                                 range: 150 - 32

8



                                                                 10*3/?L. The



                                                                 reference range

 was



                                                                 not used to int

erpret



                                                                 this result as



                                                                 normal/abnormal

.

 

             MPV (test code =                                        Not Measure

d



             67061-2)                                            

 

             RDW-CV (test code = 18.6 %       12.1-15.4    H            



             788-0)                                              

 

             RDW-SD (test code = 54.0 fL      38.5-51.6    H            



             33442-6)                                            

 

             NRBC x10^3 (test code =              See_Comment                [Au

tomated message]



             1294359328)                                         The system [employer]



                                                                 generated this 

result



                                                                 transmitted ref

erence



                                                                 range: 10*3/?L.

 The



                                                                 reference range

 was



                                                                 not used to int

erpret



                                                                 this result as



                                                                 normal/abnormal

.

 

             NRBC/100 WBC (test code 0.0          See_Comment                [Au

tomated message]



             = [id number])                                        The system [facility]



                                                                 generated this 

result



                                                                 transmitted ref

erence



                                                                 range: 0.0 - 10

.0



                                                                 /100 WBCs. The



                                                                 reference range

 was



                                                                 not used to int

erpret



                                                                 this result as



                                                                 normal/abnormal

.

 

             IPF % (test code = 6.3 %        1.2-10.7                  Platelet 

count



             [id number])                                         measured by



                                                                 fluorescence me

thod.

 

             Lab Interpretation Abnormal                               



             (test code = 90841-3)                                        



[id number]





             Test Item    Value        Reference Range Interpretation Comments

 

             Blood Culture-Aerobic Culture positive. No growth    AA           P

revious



             (test code = 17928-3) See Blood Culture                           p

reliminary



                          Workup for                             verified result



                          additional                             was Culture In



                          information.                           Progress on



                                                                 3/19/2023 at 02

01



                                                                 CDT

 

             Blood        No organisms No growth                 Previous



             Culture-Anaerobic isolated                               preliminar

y



             (test code = 68215-8)                                        verifi

ed result



                                                                 was Culture In



                                                                 Progress on



                                                                 3/19/2023 at 18

25



                                                                 CDT

 

             Lab Interpretation Abnormal                               



             (test code = 68085-9)                                        



[facility] [id number] 11:56:04





             Test Item    Value        Reference Range Interpretation Comments

 

             RETIC Count Automated 1.68 %       0.59-2.24                 



             (test code = 3464021161)                                        

 

             RETIC Absolute Count 0.0509       See_Comment                [Autom

ated message]



             (test code = 5875145875)                                        The

 system which



                                                                 generated this 

result



                                                                 transmitted ref

erence



                                                                 range: 0.0260 -



                                                                 0.1170 10*6/?L.

 The



                                                                 reference range

 was



                                                                 not used to int

erpret



                                                                 this result as



                                                                 normal/abnormal

.

 

             IRF % (test code = 10.50 %      2.00-19.10                



             3740313895)                                         

 

             RETIC-HE (test code = 26.8 pg      27.3-36.4    L            



             6349648126)                                         

 

             Lab Interpretation (test Abnormal                               



             code = 65717-8)                                        



[facility]





             Test Item    Value        Reference Range Interpretation Comments

 

             Fibrinogen (test code = 9887629122) 127 mg/dL    167-453      L    

        

 

             Lab Interpretation (test code = Abnormal                           

    



             51677-1)                                            



[facility] [id number] 11:49:06





             Test Item    Value        Reference Range Interpretation Comments

 

             PROTIME PATIENT (test 25.4         See_Comment  H             [Auto

mated message]



             code = 5964-2)                                        The system wh

ich



                                                                 generated this 

result



                                                                 transmitted ref

erence



                                                                 range: 10.1 - 1

2.6



                                                                 Seconds. The



                                                                 reference range

 was



                                                                 not used to int

erpret



                                                                 this result as



                                                                 normal/abnormal

.

 

             INR (test code = 6301-6) 2.3                                    Nor

mal INR <1.1;



                                                                 Warfarin Therap

eutic



                                                                 range 2.0 to 3.

0 or



                                                                 2.5 to 3.5, dep

ending



                                                                 upon the indica

tions.

 

             Lab Interpretation (test Abnormal                               



             code = 02754-0)                                        



[facility] METABOLIC PANEL (NA, K, CL, CO2, 
GLUCOSE, BUN, CREATININE, CA)2023 09:35:08





             Test Item    Value        Reference Range Interpretation Comments

 

             NA (test code = 133 mmol/L   135-145      L            



             2021610856)                                         

 

             K (test code = 3.7 mmol/L   3.5-5.0                   



             3627327531)                                         

 

             CL (test code = 106 mmol/L                       



             9685520047)                                         

 

             CO2 TOTAL (test code = 28 mmol/L    23-31                     



             1822804428)                                         

 

             AGAP (test code =              2-16         L            



             9019792312)                                         

 

             BUN (test code = 7 mg/dL      7-23                      



             9846046132)                                         

 

             GLUCOSE (test code = 100 mg/dL                        



             0359040400)                                         

 

             CREATININE (test code = 0.42 mg/dL   0.60-1.25    L            



             6849445609)                                         

 

             CALCIUM (test code = 6.5 mg/dL    8.6-10.6     L            



             7972783607)                                         

 

             eGFR (test code = 211.1        mL/min/1.73m2              



             5669893221)                                         

 

             RENETTA (test code = RENETTA) Association of                           



                          Glomerular Filtration                           



                          Rate (GFR) and Staging                           



                          of Kidney Disease*                           



                          +---------------------                           



                          --+-------------------                           



                          --+-------------------                           



                          ------+| GFR                           



                          (mL/min/1.73 m2) ?|                           



                          With Kidney Damage ?|                           



                          ?Without Kidney                           



                          Damage+---------------                           



                          --------+-------------                           



                          --------+-------------                           



                          ------------+| ?>90 ?                           



                          ? ? ? ? ? ? ? ?|                           



                          ?Stage one ? ? ? ? ?|                           



                          ? Normal ? ? ? ? ? ? ?                           



                          ?+--------------------                           



                          ---+------------------                           



                          ---+------------------                           



                          -------+| ?60-89 ? ? ?                           



                          ? ? ? ? ?| ?Stage two                           



                          ? ? ? ? ?| ? Decreased                           



                          GFR ? ? ? ?                            



                          +---------------------                           



                          --+-------------------                           



                          --+-------------------                           



                          ------+| ?30-59 ? ? ?                           



                          ? ? ? ? ?| ?Stage                           



                          three ? ? ? ?| ? Stage                           



                          three ? ? ? ? ?                           



                          +---------------------                           



                          --+-------------------                           



                          --+-------------------                           



                          ------+| ?15-29 ? ? ?                           



                          ? ? ? ? ?| ?Stage four                           



                          ? ? ? ? | ? Stage four                           



                          ? ? ? ? ?                              



                          ?+--------------------                           



                          ---+------------------                           



                          ---+------------------                           



                          -------+| ?<15 (or                           



                          dialysis) ? ?| ?Stage                           



                          five ? ? ? ? | ? Stage                           



                          five ? ? ? ? ?                           



                          ?+--------------------                           



                          ---+------------------                           



                          ---+------------------                           



                          -------+ *Each stage                           



                          assumes the associated                           



                          GFR level has been in                           



                          effect for at least                           



                          three months. ?Stages                           



                          1 to 5, with or                           



                          without kidney                           



                          disease, indicate                           



                          chronic kidney                           



                          disease. Notes:                           



                          Determination of                           



                          stages one and two                           



                          (with eGFR                             



                          >59mL/min/1.73 m2)                           



                          requires estimation of                           



                          kidney damage for at                           



                          least three months as                           



                          defined by structural                           



                          or functional                           



                          abnormalities of the                           



                          kidney, manifested by                           



                          either:Pathological                           



                          abnormalities or                           



                          Markers of kidney                           



                          damage (including                           



                          abnormalities in the                           



                          composition of the                           



                          blood or urine or                           



                          abnormalities in                           



                          imaging tests).                           

 

             Lab Interpretation Abnormal                               



             (test code = 74486-6)                                        



[facility]





             Test Item    Value        Reference Range Interpretation Comments

 

             MAGNESIUM (test code = 0038083086) 1.5 mg/dL    1.7-2.4      L     

       

 

             Lab Interpretation (test code = Abnormal                           

    



             79781-1)                                            



[facility] WITH [id number] 09:27:43





             Test Item    Value        Reference Range Interpretation Comments

 

             WBC (test code = 2.93         See_Comment  L             [Automated



             6690-2)                                             message] The sy

stem



                                                                 which generated



                                                                 this result



                                                                 transmitted



                                                                 reference range

:



                                                                 4.20 - 10.70



                                                                 10*3/?L. The



                                                                 reference range

 was



                                                                 not used to



                                                                 interpret this



                                                                 result as



                                                                 normal/abnormal

.

 

             RBC (test code = 3.06         See_Comment  L             [Automated



             789-8)                                              message] The sy

stem



                                                                 which generated



                                                                 this result



                                                                 transmitted



                                                                 reference range

:



                                                                 4.26 - 5.52



                                                                 10*6/?L. The



                                                                 reference range

 was



                                                                 not used to



                                                                 interpret this



                                                                 result as



                                                                 normal/abnormal

.

 

             HGB (test code = 7.9 g/dL     12.2-16.4    L            



             718-7)                                              

 

             HCT (test code = 23.9 %       38.4-49.3    L            



             4544-3)                                             

 

             MCV (test code = 78.1 fL      81.7-95.6    L            



             787-2)                                              

 

             MCH (test code = 25.8 pg      26.1-32.7    L            



             785-6)                                              

 

             MCHC (test code = 33.1 g/dL    31.2-35.0                 



             786-4)                                              

 

             RDW-SD (test code = 51.2 fL      38.5-51.6                 



             48420-4)                                            

 

             RDW-CV (test code = 18.3 %       12.1-15.4    H            



             788-0)                                              

 

             PLT (test code = 18           See_Comment  LL            [Automated



             777-3)                                              message] The sy

stem



                                                                 which generated



                                                                 this result



                                                                 transmitted



                                                                 reference range

:



                                                                 150 - 328 10*3/

?L.



                                                                 The reference r

moose



                                                                 was not used to



                                                                 interpret this



                                                                 result as



                                                                 normal/abnormal

.

 

             MPV (test code =                                        Not Measure

d



             48964-2)                                            

 

             IPF % (test code = 8.0 %        1.2-10.7                  Platelet 

count



             [id number])                                         measured by



                                                                 fluorescence



                                                                 method.

 

             NRBC/100 WBC (test 0.0          See_Comment                [Automat

ed



             code = 3166186784)                                        message] 

The system



                                                                 which generated



                                                                 this result



                                                                 transmitted



                                                                 reference range

:



                                                                 0.0 - 10.0 /100



                                                                 WBCs. The refer

ence



                                                                 range was not u

sed



                                                                 to interpret th

is



                                                                 result as



                                                                 normal/abnormal

.

 

             NRBC x10^3 (test code              See_Comment                [Auto

mated



             = [id number])                                        message] The s

ystem



                                                                 which generated



                                                                 this result



                                                                 transmitted



                                                                 reference range

:



                                                                 10*3/?L. The



                                                                 reference range

 was



                                                                 not used to



                                                                 interpret this



                                                                 result as



                                                                 normal/abnormal

.

 

             GRAN MAT (NEUT) % 54.9 %                                 



             (test code = 770-8)                                        

 

             IMM GRAN % (test code 0.70 %                                 



             = [id number])                                        

 

             LYMPH % (test code = 22.9 %                                 



             736-9)                                              

 

             MONO % (test code = 16.0 %                                 



             5905-5)                                             

 

             EOS % (test code = 4.8 %                                  



             713-8)                                              

 

             BASO % (test code = 0.7 %                                  



             706-2)                                              

 

             GRAN MAT x10^3(ANC) 1.61 10*3/uL 1.99-6.95    L            



             (test code =                                        



             7677984559)                                         

 

             IMM GRAN x10^3 (test              0.00-0.06                 



             code = 0284285678)                                        

 

             LYMPH x10^3 (test code 0.67 10*3/uL 1.09-3.23    L            



             = 731-0)                                            

 

             MONO x10^3 (test code 0.47 10*3/uL 0.36-1.02                 



             = 742-7)                                            

 

             EOS x10^3 (test code = 0.14 10*3/uL 0.06-0.53                 



             711-2)                                              

 

             BASO x10^3 (test code              0.01-0.09                 



             = 704-7)                                            

 

             BENJI CELLS (test code 2+           See_Comment  A             [Auto

mated



             = 1737-9)                                           message] The sy

stem



                                                                 which generated



                                                                 this result



                                                                 transmitted



                                                                 reference range

:



                                                                 (none). The



                                                                 reference range

 was



                                                                 not used to



                                                                 interpret this



                                                                 result as



                                                                 normal/abnormal

.

 

             Lab Interpretation Abnormal                               



             (test code = 86619-0)                                        



[facility]





             Test Item    Value        Reference Range Interpretation Comments

 

             Fibrinogen (test code = 8504467346) 122 mg/dL    167-453      L    

        

 

             Lab Interpretation (test code = Abnormal                           

    



             54080-5)                                            



[facility] 1 
Units~Indication: 1) Fibrinogen &lt; 100 mg/dL with bleeding or potential for 
bleeding associated with invasive zoybrgcxz8873-32-65 18:41:31





             Test Item    Value        Reference Range Interpretation Comments

 

             Unit Blood Type (test O Neg                                  



             code = 4410)                                        

 

             ISBT Blood Type Code 9500                                   



             (test code = 436331)                                        

 

             Unit Number (test [id number]                           



             code = 4411)                                        

 

             Blood Expiration Date                            



             & Time (test code =                                        



             126096)                                             

 

             Status Information Issued                                 



             (test code = 4412)                                        

 

             Product      Cryoprecipitate                           



             Identification (test                                        



             code = 4413)                                        

 

             Product Code (test P5069M97                               Performed

 at [facility]



             code = 4414)                                        Laboratory



                                                                 Services - GAL



                                                                 Blood [address] [place] [address]



                                                                 [address]Tmbd Free:



                                                                 [phone]

A



                                                                 No. [id number]



[facility] Center[facility] PATH[facility]ENS DNA 
[id number]-[id number] 05:00:05





             Test Item    Value        Reference Range Interpretation Comments

 

             Acinetobacter species Positive     Negative, See A            



             (test code = 25376-4)              Comment/Narrative              

 

             RENETTA (test code = RENETTA) See blood culture                           



                          result for additional                           



                          information. ?Testing                           



                          included eight                           



                          identification and six                           



                          resistance marker                           



                          targets.                               

 

             Lab Interpretation Abnormal                               



             (test code = 11742-4)                                        



[facility] Kfahk1284-72-49 21:53:15





             Test Item    Value        Reference Range Interpretation Comments

 

             LACTIC ACID (test code = 3.62 mmol/L  0.50-2.20    H            



             [id number])                                         

 

             Lab Interpretation (test code = Abnormal                           

    



             21073-7)                                            



[facility] Packed RBC (in units), 2 Units
2023 19:27:30





             Test Item    Value        Reference Range Interpretation Comments

 

             Cross Match Result Compatible                             



             (test code = 4409)                                        

 

             ISBT Blood Type Code 5100                                   



             (test code = 117969)                                        

 

             Unit Blood Type (test O Pos                                  



             code = 4410)                                        

 

             Unit Number (test [id number]                           



             code = 4411)                                        

 

             Blood Expiration Date                            



             & Time (test code =                                        



             134232)                                             

 

             Status Information Issued                                 



             (test code = 4412)                                        

 

             Product      Red Blood Cells                           



             Identification (test                                        



             code = 4413)                                        

 

             Product Code (test L9370B86                               Performed

 at [facility]



             code = 4414)                                        Laboratory



                                                                 Services - GAL



                                                                 Blood [address]



                                                                 [address]Wbyp Free:



                                                                 [phone]

A



                                                                 No. [id number]



[facility] CenterL[facility] Whole Watvi7866-54-15 17:17:41





             Test Item    Value        Reference Range Interpretation Comments

 

             LACTIC ACID (test code = 4.31 mmol/L  0.50-2.20    H            



             [id number])                                         

 

             Lab Interpretation (test code = Abnormal                           

    



             46034-8)                                            



[facility] CenterProth[facility] Time / [id number] 12:01:29





             Test Item    Value        Reference Range Interpretation Comments

 

             PROTIME PATIENT (test 25.2         See_Comment  H             [Auto

mated message]



             code = 5964-2)                                        The system [employer]



                                                                 generated this 

result



                                                                 transmitted ref

erence



                                                                 range: 10.1 - 1

2.6



                                                                 Seconds. The



                                                                 reference range

 was



                                                                 not used to int

erpret



                                                                 this result as



                                                                 normal/abnormal

.

 

             INR (test code = 6301-6) 2.3                                    Nor

mal INR <1.1;



                                                                 Warfarin Therap

eutic



                                                                 range 2.0 to 3.

0 or



                                                                 2.5 to 3.5, dep

ending



                                                                 upon the indica

tions.

 

             Lab Interpretation (test Abnormal                               



             code = 17796-2)                                        



[facility] CenterL[facility] Whole Slsfd0235-60-73 11:52:59





             Test Item    Value        Reference Range Interpretation Comments

 

             LACTIC ACID (test code = 4.93 mmol/L  0.50-2.20    H            



             [id number])                                         

 

             Lab Interpretation (test code = Abnormal                           

    



             14186-0)                                            



[facility] Center[facility] Cryoprecip[facility] (in units): 1 
Units~Indication: 1) Fibrinogen &lt; 100 mg/dL with bleeding or potential for 
bleeding associated with invasive njmvnunqq6713-43-15 10:16:18





             Test Item    Value        Reference Range Interpretation Comments

 

             Unit Blood Type (test O                                      



             code = 4410)                                        

 

             ISBT Blood Type Code D000                                   



             (test code = 741200)                                        

 

             Unit Number (test [id number]                           



             code = 4411)                                        

 

             Blood Expiration Date                            



             & Time (test code =                                        



             435002)                                             

 

             Status Information Issued                                 



             (test code = 4412)                                        

 

             Product      Cryoprecipitate                           



             Identification (test                                        



             code = 4413)                                        

 

             Product Code (test N4648W07                               Performed

 at [facility]



             code = 4414)                                        Laboratory



                                                                 Services - GAL



                                                                 Blood [address]



                                                                 [address]Ppyz Free:



                                                                 [phone]

A



                                                                 No. [id number]



[facility] Center[facility]par Packed RBC (in units), 2 Units
2023 10:16:18





             Test Item    Value        Reference Range Interpretation Comments

 

             Cross Match Result Compatible                             



             (test code = 4409)                                        

 

             ISBT Blood Type Code 5100                                   



             (test code = 900991)                                        

 

             Unit Blood Type (test O Pos                                  



             code = 4410)                                        

 

             Unit Number (test [id number]                           



             code = 4411)                                        

 

             Blood Expiration Date [id number]                           



             & Time (test code =                                        



             922523)                                             

 

             Status Information Issued                                 



             (test code = 4412)                                        

 

             Product      Red Blood Cells                           



             Identification (test                                        



             code = 4413)                                        

 

             Product Code (test W4773R04                               Performed

 at [facility]



             code = 4414)                                        Laboratory



                                                                 Services - GAL



                                                                 Blood [address]



                                                                 [address]Vhuk Free:



                                                                 [phone]

A



                                                                 No. [id number]



[facility] CenterPR[facility] / HEM[facility] - 30 minutes after 
transfusion of each [id number] 09:00:47





             Test Item    Value        Reference Range Interpretation Comments

 

             WBC (test code = 6.20         See_Comment                [Automated

 message]



             6690-2)                                             The system [employer]



                                                                 generated this 

result



                                                                 transmitted ref

erence



                                                                 range: 4.20 - 1

0.70



                                                                 10*3/?L. The



                                                                 reference range

 was



                                                                 not used to int

erpret



                                                                 this result as



                                                                 normal/abnormal

.

 

             RBC (test code = 789-8) 2.41         See_Comment  L             [Au

tomated message]



                                                                 The system [employer]



                                                                 generated this 

result



                                                                 transmitted ref

erence



                                                                 range: 4.26 - 5

.52



                                                                 10*6/?L. The



                                                                 reference range

 was



                                                                 not used to int

erpret



                                                                 this result as



                                                                 normal/abnormal

.

 

             HGB (test code = 718-7) 6.0 g/dL     12.2-16.4    L            

 

             HCT (test code = 18.6 %       38.4-49.3    L            



             4544-3)                                             

 

             MCH (test code = 785-6) 24.9 pg      26.1-32.7    L            

 

             MCV (test code = 787-2) 77.2 fL      81.7-95.6    L            

 

             MCHC (test code = 32.3 g/dL    31.2-35.0                 



             786-4)                                              

 

             PLT (test code = 777-3) 44           See_Comment  LL            [Au

tomated message]



                                                                 The system [employer]



                                                                 generated this 

result



                                                                 transmitted ref

erence



                                                                 range: 150 - 32

8



                                                                 10*3/?L. The



                                                                 reference range

 was



                                                                 not used to int

erpret



                                                                 this result as



                                                                 normal/abnormal

.

 

             MPV (test code =                                        Not Measure

d



             31295-4)                                            

 

             RDW-CV (test code = 19.5 %       12.1-15.4    H            



             788-0)                                              

 

             RDW-SD (test code = 55.1 fL      38.5-51.6    H            



             46263-1)                                            

 

             NRBC x10^3 (test code =              See_Comment                [Au

tomated message]



             7021693866)                                         The system [employer]



                                                                 generated this 

result



                                                                 transmitted ref

erence



                                                                 range: 10*3/?L.

 The



                                                                 reference range

 was



                                                                 not used to int

erpret



                                                                 this result as



                                                                 normal/abnormal

.

 

             NRBC/100 WBC (test code 0.0          See_Comment                [Au

tomated message]



             = [id number])                                        The system [facility]



                                                                 generated this 

result



                                                                 transmitted ref

erence



                                                                 range: 0.0 - 10

.0



                                                                 /100 WBCs. The



                                                                 reference range

 was



                                                                 not used to int

erpret



                                                                 this result as



                                                                 normal/abnormal

.

 

             IPF % (test code = 5.6 %        1.2-10.7                  Platelet 

count



             [id number])                                         measured by



                                                                 fluorescence me

thod.

 

             Lab Interpretation Abnormal                               



             (test code = 24822-0)                                        



[facility] CenterL[facility] Whole Zelbl5714-10-00 08:52:22





             Test Item    Value        Reference Range Interpretation Comments

 

             LACTIC ACID (test code = 5.63 mmol/L  0.50-2.20    H            



             5715656101)                                         

 

             Lab Interpretation (test code = Abnormal                           

    



             30126-7)                                            



[facility] [id number] 05:19:47





             Test Item    Value        Reference Range Interpretation Comments

 

             FREE T4 (test code = 1.82         See_Comment                [Autom

ated message]



             [id number])                                         The system [employer]



                                                                 generated this 

result



                                                                 transmitted ref

erence



                                                                 range: 0.78 - 2

.20



                                                                 ng/dL:. The ref

erence



                                                                 range was not u

sed to



                                                                 interpret this 

result



                                                                 as normal/abnor

mal.

 

             Lab Interpretation (test Normal                                 



             code = 17724-3)                                        



[facility] CenterHCV [id number] 04:49:47





             Test Item    Value        Reference Range Interpretation Comments

 

             HCV Ab (test code = 43364-7) Negative                              

 

 

             HCV Semi-Quantitative (test code = 0.05                            

       



             19743-7)                                            



[facility] [id number] 04:32:04





             Test Item    Value        Reference Range Interpretation Comments

 

             TSH (test code = 1.95         See_Comment                [Automated

 message]



             [id number])                                         The system [employer]



                                                                 generated this 

result



                                                                 transmitted ref

erence



                                                                 range: 0.45 - 4

.70



                                                                 mIU/L. The refe

rence



                                                                 range was not u

sed to



                                                                 interpret this 

result



                                                                 as normal/abnor

mal.

 

             Lab Interpretation (test Normal                                 



             code = 48911-7)                                        



[facility] Lactic [id number] 04:02:48





             Test Item    Value        Reference Range Interpretation Comments

 

             PH (test code = 2) 7.39         7.35-7.45                 

 

             PCO2 (test code = 27           See_Comment  L             [Automate

d



             3960233992)                                         message] The sy

stem



                                                                 which generated



                                                                 this result



                                                                 transmitted



                                                                 reference range

: 35



                                                                 - 45 mmHg. The



                                                                 reference range

 was



                                                                 not used to



                                                                 interpret this



                                                                 result as



                                                                 normal/abnormal

.

 

             PO2 (test code = 120          See_Comment  H             [Automated



             7519219631)                                         message] The sy

stem



                                                                 which generated



                                                                 this result



                                                                 transmitted



                                                                 reference range

: 80



                                                                 - 100 mmHg. The



                                                                 reference range

 was



                                                                 not used to



                                                                 interpret this



                                                                 result as



                                                                 normal/abnormal

.

 

             HCO3 (test code = 17           See_Comment  L             [Automate

d



             8275385012)                                         message] The sy

stem



                                                                 which generated



                                                                 this result



                                                                 transmitted



                                                                 reference range

: 22



                                                                 - 26 mEq/L. The



                                                                 reference range

 was



                                                                 not used to



                                                                 interpret this



                                                                 result as



                                                                 normal/abnormal

.

 

             BE (test code = -8.5         See_Comment  L             [Automated



             8624318677)                                         message] The sy

stem



                                                                 which generated



                                                                 this result



                                                                 transmitted



                                                                 reference range

:



                                                                 -3.0 - 3.0 mEq/

L.



                                                                 The reference r

moose



                                                                 was not used to



                                                                 interpret this



                                                                 result as



                                                                 normal/abnormal

.

 

             THB (test code = 6.2 g/dL     13.5-18.0    LL           



             8615275972)                                         

 

             %O2HB (test code = 97.0 %       94.0-99.0                 



             5079965291)                                         

 

             %COHB ART (test code = 2.0 %        0.0-1.5      H            



             2884432665)                                         

 

             %METHB ART (test code = 0.2 %        0.4-1.5      L            



             9332626206)                                         

 

             VOL%O2 ART (test code = 8.8 %        15.0-23.0    L            



             4038909197)                                         

 

             NA (test code = 129 mmol/L   135-145      L            



             9590412853)                                         

 

             K+ (test code = 4.0 mmol/L   3.5-5.0                   



             6363480321)                                         

 

             AC CA IONZ (test code = 4.10 mg/dL   4.50-5.30    L            



             9840318514)                                         

 

             GLUCOSE (test code = 111 mg/dL           H            



             8887192341)                                         

 

             LACTIC ACID (test code 7.31 mmol/L  0.50-2.20    H            



             = 6760666855)                                        

 

             Lab Interpretation Abnormal                               



             (test code = 33074-0)                                        



[facility] [id number] 03:46:18





             Test Item    Value        Reference Range Interpretation Comments

 

             HIV          0.09         Negative                  



             Semi-quantitative                                        



             (test code =                                        



             79720-4)                                            

 

             RENETTA (test code = Non-reactive for HIV-1                           



             RENETTA)         antigen and HIV-1/HIV-2                           



                          antibodies. ?No                           



                          laboratory evidence of                           



                          HIV infection. ?Repeat in                           



                          2-4 weeks if acute HIV                           



                          infection is suspected.                           



[facility] Center[facility]OL[facility], SER[facility] OR [id number] 01:46:37





             Test Item    Value        Reference Range Interpretation Comments

 

             OSMOLALITY (test code = 373          See_Comment  HH            [Au

tomated message]



             2382-2)                                             The system [employer]



                                                                 generated this 

result



                                                                 transmitted ref

erence



                                                                 range: 278 - 30

5



                                                                 mOsm/kg. The



                                                                 reference range

 was



                                                                 not used to int

erpret



                                                                 this result as



                                                                 normal/abnormal

.

 

             Lab Interpretation (test Abnormal                               



             code = 42274-2)                                        



[facility]09:07





             Test Item    Value        Reference Range Interpretation Comments

 

             Fibrinogen (test code = 3476392206) 96 mg/dL     167-453      LL   

        

 

             Lab Interpretation (test code = Abnormal                           

    



             28088-2)                                            



[facility] WITH [id number] 01:05:00





             Test Item    Value        Reference Range Interpretation Comments

 

             WBC (test code = 2.86         See_Comment  L             [Automated



             6690-2)                                             message] The sy

stem



                                                                 which generated



                                                                 this result



                                                                 transmitted



                                                                 reference range

:



                                                                 4.20 - 10.70



                                                                 10*3/?L. The



                                                                 reference range

 was



                                                                 not used to



                                                                 interpret this



                                                                 result as



                                                                 normal/abnormal

.

 

             RBC (test code = 2.34         See_Comment  L             [Automated



             789-8)                                              message] The sy

stem



                                                                 which generated



                                                                 this result



                                                                 transmitted



                                                                 reference range

:



                                                                 4.26 - 5.52



                                                                 10*6/?L. The



                                                                 reference range

 was



                                                                 not used to



                                                                 interpret this



                                                                 result as



                                                                 normal/abnormal

.

 

             HGB (test code = 5.1 g/dL     12.2-16.4    L            



             718-7)                                              

 

             HCT (test code = 17.9 %       38.4-49.3    L            



             4544-3)                                             

 

             MCV (test code = 76.5 fL      81.7-95.6    L            



             787-2)                                              

 

             MCH (test code = 21.8 pg      26.1-32.7    L            



             785-6)                                              

 

             MCHC (test code = 28.5 g/dL    31.2-35.0    L            



             786-4)                                              

 

             RDW-SD (test code = 59.5 fL      38.5-51.6    H            



             22630-8)                                            

 

             RDW-CV (test code = 21.6 %       12.1-15.4    H            



             788-0)                                              

 

             PLT (test code = 55           See_Comment  L             [Automated



             777-3)                                              message] The sy

stem



                                                                 which generated



                                                                 this result



                                                                 transmitted



                                                                 reference range

:



                                                                 150 - 328 10*3/

?L.



                                                                 The reference r

moose



                                                                 was not used to



                                                                 interpret this



                                                                 result as



                                                                 normal/abnormal

.

 

             MPV (test code =                                        Not Measure

d



             23509-7)                                            

 

             IPF % (test code = 6.8 %        1.2-10.7                  Platelet 

count



             1992600567)                                         measured by



                                                                 fluorescence



                                                                 method.

 

             NRBC/100 WBC (test 0.0          See_Comment                [Automat

ed



             code = 1228801497)                                        message] 

The system



                                                                 which generated



                                                                 this result



                                                                 transmitted



                                                                 reference range

:



                                                                 0.0 - 10.0 /100



                                                                 WBCs. The refer

ence



                                                                 range was not u

sed



                                                                 to interpret th

is



                                                                 result as



                                                                 normal/abnormal

.

 

             NRBC x10^3 (test code              See_Comment                [Auto

mated



             = 4704628286)                                        message] The s

ystem



                                                                 which generated



                                                                 this result



                                                                 transmitted



                                                                 reference range

:



                                                                 10*3/?L. The



                                                                 reference range

 was



                                                                 not used to



                                                                 interpret this



                                                                 result as



                                                                 normal/abnormal

.

 

             GRAN MAT (NEUT) % 51.4 %                                 



             (test code = 770-8)                                        

 

             IMM GRAN % (test code 0.30 %                                 



             = 2231413846)                                        

 

             LYMPH % (test code = 31.5 %                                 



             736-9)                                              

 

             MONO % (test code = 13.3 %                                 



             5905-5)                                             

 

             EOS % (test code = 2.1 %                                  



             713-8)                                              

 

             BASO % (test code = 1.4 %                                  



             706-2)                                              

 

             GRAN MAT x10^3(ANC) 1.47 10*3/uL 1.99-6.95    L            



             (test code =                                        



             9523439461)                                         

 

             IMM GRAN x10^3 (test              0.00-0.06                 



             code = 1172955684)                                        

 

             LYMPH x10^3 (test code 0.90 10*3/uL 1.09-3.23    L            



             = 731-0)                                            

 

             MONO x10^3 (test code 0.38 10*3/uL 0.36-1.02                 



             = 742-7)                                            

 

             EOS x10^3 (test code = 0.06 10*3/uL 0.06-0.53                 



             711-2)                                              

 

             BASO x10^3 (test code 0.04 10*3/uL 0.01-0.09                 



             = 704-7)                                            

 

             BENJI CELLS (test code 2+           See_Comment  A             [Auto

mated



             = 4220-6)                                           message] The sy

stem



                                                                 which generated



                                                                 this result



                                                                 transmitted



                                                                 reference range

:



                                                                 (none). The



                                                                 reference range

 was



                                                                 not used to



                                                                 interpret this



                                                                 result as



                                                                 normal/abnormal

.

 

             SCHISTOCYTES (test 1+                        A            



             code = 800-3)                                        

 

             Lab Interpretation Abnormal                               



             (test code = 99652-7)                                        



[facility]48[facility]58





             Test Item    Value        Reference Range Interpretation Comments

 

             ALCOHOL (test code = 355 mg/dL                              



             7061980613)                                         

 

             RENETTA (test code = Toxic Greater than or                           



             RENETTA)         equal to 80 mg/dL. NOTE:                           



                          Whole blood values are                           



                          approximately 10% to 15%                           



                          lower than serum and                           



                          plasma.                                



[facility] Center[facility] RBC ([facility]), 1 Units
2023 00:29:29





             Test Item    Value        Reference Range Interpretation Comments

 

             Cross Match Result Compatible                             



             (test code = 4409)                                        

 

             ISBT Blood Type Code 5100                                   



             (test code = 222860)                                        

 

             Unit Blood Type (test O Pos                                  



             code = 4410)                                        

 

             Unit Number (test [id number]                           



             code = 4411)                                        

 

             Blood Expiration Date                            



             & Time (test code =                                        



             922409)                                             

 

             Status Information Issued                                 



             (test code = 4412)                                        

 

             Product      Red Blood Cells                           



             Identification (test                                        



             code = 4413)                                        

 

             Product Code (test S6365I28                               Performed

 at [facility]



             code = 4414)                                        Laboratory



                                                                 Services - GAL



                                                                 Blood [address]



                                                                 [id number] Free:



                                                                 [phone]

A



                                                                 No. [id number]



[facility] (NA, K, CL, CO2, 
GLUCOSE, BUN, CREATININE, CA)2023 00:27:54





             Test Item    Value        Reference Range Interpretation Comments

 

             NA (test code = 132 mmol/L   135-145      L            



             2034211191)                                         

 

             K (test code = 3.5 mmol/L   3.5-5.0                   



             9725564916)                                         

 

             CL (test code = 103 mmol/L                       



             2869940177)                                         

 

             CO2 TOTAL (test code = 17 mmol/L    23-31        L            



             4432299584)                                         

 

             AGAP (test code = 12           2-16                      



             7127350564)                                         

 

             BUN (test code = 7 mg/dL      7-23                      



             7237084115)                                         

 

             GLUCOSE (test code = 145 mg/dL           H            



             4524590184)                                         

 

             CREATININE (test code = 0.48 mg/dL   0.60-1.25    L            



             9965115588)                                         

 

             CALCIUM (test code = 6.9 mg/dL    8.6-10.6     L            



             0329076159)                                         

 

             eGFR (test code = 181.0        mL/min/1.73m2              



             5438001918)                                         

 

             RENETTA (test code = RENETTA) Association of                           



                          Glomerular Filtration                           



                          Rate (GFR) and Staging                           



                          of Kidney Disease*                           



                          +---------------------                           



                          --+-------------------                           



                          --+-------------------                           



                          ------+| GFR                           



                          (mL/min/1.73 m2) ?|                           



                          With Kidney Damage ?|                           



                          ?Without Kidney                           



                          Damage+---------------                           



                          --------+-------------                           



                          --------+-------------                           



                          ------------+| ?>90 ?                           



                          ? ? ? ? ? ? ? ?|                           



                          ?Stage one ? ? ? ? ?|                           



                          ? Normal ? ? ? ? ? ? ?                           



                          ?+--------------------                           



                          ---+------------------                           



                          ---+------------------                           



                          -------+| ?60-89 ? ? ?                           



                          ? ? ? ? ?| ?Stage two                           



                          ? ? ? ? ?| ? Decreased                           



                          GFR ? ? ? ?                            



                          +---------------------                           



                          --+-------------------                           



                          --+-------------------                           



                          ------+| ?30-59 ? ? ?                           



                          ? ? ? ? ?| ?Stage                           



                          three ? ? ? ?| ? Stage                           



                          three ? ? ? ? ?                           



                          +---------------------                           



                          --+-------------------                           



                          --+-------------------                           



                          ------+| ?15-29 ? ? ?                           



                          ? ? ? ? ?| ?Stage four                           



                          ? ? ? ? | ? Stage four                           



                          ? ? ? ? ?                              



                          ?+--------------------                           



                          ---+------------------                           



                          ---+------------------                           



                          -------+| ?<15 (or                           



                          dialysis) ? ?| ?Stage                           



                          five ? ? ? ? | ? Stage                           



                          five ? ? ? ? ?                           



                          ?+--------------------                           



                          ---+------------------                           



                          ---+------------------                           



                          -------+ *Each stage                           



                          assumes the associated                           



                          GFR level has been in                           



                          effect for at least                           



                          three months. ?Stages                           



                          1 to 5, with or                           



                          without kidney                           



                          disease, indicate                           



                          chronic kidney                           



                          disease. Notes:                           



                          Determination of                           



                          stages one and two                           



                          (with eGFR                             



                          >59mL/min/1.73 m2)                           



                          requires estimation of                           



                          kidney damage for at                           



                          least three months as                           



                          defined by structural                           



                          or functional                           



                          abnormalities of the                           



                          kidney, manifested by                           



                          either:Pathological                           



                          abnormalities or                           



                          Markers of kidney                           



                          damage (including                           



                          abnormalities in the                           



                          composition of the                           



                          blood or urine or                           



                          abnormalities in                           



                          imaging tests).                           

 

             Lab Interpretation Abnormal                               



             (test code = 94806-3)                                        



[facility]ALB,T.PRO,BILI
T,BU/BC,ALT,AST,ALK PHOS)2023 00:27:54





             Test Item    Value        Reference Range Interpretation Comments

 

             TOTAL BILI (test code = 5782313266) 3.0 mg/dL    0.1-1.1      H    

        

 

             BILI UNCON (test code = 0056791879) 1.5 mg/dL    0.1-1.1      H    

        

 

             BILI CONJ (test code = 3401284455) 0.3 mg/dL    0.0-0.3            

       

 

             T PROTEIN (test code = 9928127219) 5.6 g/dL     6.3-8.2      L     

       

 

             ALBUMIN (test code = 0599277098) 2.3 g/dL     3.5-5.0      L       

     

 

             ALK PHOS (test code = 5210208597) 182 U/L             H      

      

 

             ALTv (test code = 1742-6) 27 U/L       5-50                      

 

             AST(SGOT) (test code = 3232131820) 58 U/L       13-40        H     

       

 

             Lab Interpretation (test code = Abnormal                           

    



             25824-8)                                            



[id number]54





             Test Item    Value        Reference Range Interpretation Comments

 

             CK (test code = 1702394347) 689 U/L             H            

 

             Lab Interpretation (test code = Abnormal                           

    



             44773-0)                                            



[facility]





             Test Item    Value        Reference Range Interpretation Comments

 

             MAGNESIUM (test code = 0062486795) 2.1 mg/dL    1.7-2.4            

       

 

             Lab Interpretation (test code = Normal                             

    



             93046-1)                                            



[facility]54





             Test Item    Value        Reference Range Interpretation Comments

 

             PHOSPHORUS (test code = 7730729495) 5.1 mg/dL    2.5-5.0      H    

        

 

             Lab Interpretation (test code = Abnormal                           

    



             90236-7)                                            



[facility] [id number] 00:25:13





             Test Item    Value        Reference Range Interpretation Comments

 

             AMMONIA (test code = 7126191748) 71 umol/L    9-33         H       

     

 

             Lab Interpretation (test code = Abnormal                           

    



             06801-3)                                            



[facility] Lactic [id number] 00:14:10





             Test Item    Value        Reference Range Interpretation Comments

 

             PH (test code = 2) 7.35         7.35-7.45                 

 

             PCO2 (test code = 25           See_Comment  L             [Automate

d



             [id number])                                         message] The sy

stem



                                                                 which generated



                                                                 this result



                                                                 transmitted



                                                                 reference range

: 35



                                                                 - 45 mmHg. The



                                                                 reference range

 was



                                                                 not used to



                                                                 interpret this



                                                                 result as



                                                                 normal/abnormal

.

 

             PO2 (test code = 158          See_Comment  H             [Automated



             [id number])                                         message] The sy

stem



                                                                 which generated



                                                                 this result



                                                                 transmitted



                                                                 reference range

: 80



                                                                 - 100 mmHg. The



                                                                 reference range

 was



                                                                 not used to



                                                                 interpret this



                                                                 result as



                                                                 normal/abnormal

.

 

             HCO3 (test code = 14           See_Comment  L             [Automate

d



             [id number])                                         message] The sy

stem



                                                                 which generated



                                                                 this result



                                                                 transmitted



                                                                 reference range

: 22



                                                                 - 26 mEq/L. The



                                                                 reference range

 was



                                                                 not used to



                                                                 interpret this



                                                                 result as



                                                                 normal/abnormal

.

 

             BE (test code = -11.2        See_Comment  L             [Automated



             [id number])                                         message] The sy

stem



                                                                 which generated



                                                                 this result



                                                                 transmitted



                                                                 reference range

:



                                                                 -3.0 - 3.0 mEq/

L.



                                                                 The reference r

moose



                                                                 was not used to



                                                                 interpret this



                                                                 result as



                                                                 normal/abnormal

.

 

             THB (test code = 5.6 g/dL     13.5-18.0    LL           



             3299454189)                                         

 

             %O2HB (test code = 97.0 %       94.0-99.0                 



             9518827846)                                         

 

             %COHB ART (test code = 1.6 %        0.0-1.5      H            



             8524405532)                                         

 

             %METHB ART (test code = 0.6 %        0.4-1.5                   



             3916230802)                                         

 

             VOL%O2 ART (test code = 8.0 %        15.0-23.0    L            



             9843544710)                                         

 

             NA (test code = 128 mmol/L   135-145      L            



             7714018739)                                         

 

             K+ (test code = 3.2 mmol/L   3.5-5.0      L            



             3050907914)                                         

 

             AC CA IONZ (test code = 4.10 mg/dL   4.50-5.30    L            



             8109081517)                                         

 

             GLUCOSE (test code = 149 mg/dL           H            



             9989658291)                                         

 

             LACTIC ACID (test code 9.00 mmol/L  0.50-2.20    H            



             = 6314442557)                                        

 

             Lab Interpretation Abnormal                               



             (test code = 88160-3)                                        



[facility] L[facility] [id number] 00:10:44





             Test Item    Value        Reference Range Interpretation Comments

 

             PH (test code = 7.22         7.32-7.42    L            



             9748193817)                                         

 

             PCO2 YANELI (test code = 40           See_Comment  L             [Auto

mated



             9899834345)                                         message] The sy

stem



                                                                 which generated



                                                                 this result



                                                                 transmitted



                                                                 reference range

: 41



                                                                 - 51 mmHg. The



                                                                 reference range

 was



                                                                 not used to



                                                                 interpret this



                                                                 result as



                                                                 normal/abnormal

.

 

             PO2 YANELI (test code = 33           See_Comment                [Autom

ated



             7487074614)                                         message] The sy

stem



                                                                 which generated



                                                                 this result



                                                                 transmitted



                                                                 reference range

: 25



                                                                 - 40 mmHg. The



                                                                 reference range

 was



                                                                 not used to



                                                                 interpret this



                                                                 result as



                                                                 normal/abnormal

.

 

             HCO3 YANELI (test code = 16           See_Comment  L             [Auto

mated



             [id number])                                         message] The sy

stem



                                                                 which generated



                                                                 this result



                                                                 transmitted



                                                                 reference range

: 24



                                                                 - 28 mEq/L. The



                                                                 reference range

 was



                                                                 not used to



                                                                 interpret this



                                                                 result as



                                                                 normal/abnormal

.

 

             AC VBE(BEAKER) (test -10.8        mEq/L                     



             code = 7888291969)                                        

 

             THB YANELI (test code = 6.1 g/dL     13.5-18.0    LL           



             9754478443)                                         

 

             %O2HB YANELI (test code = 37.9 %       52.0-63.0    L            



             2650962370)                                         

 

             %COHB YANELI (test code = 0.7 %        0.0-1.5                   



             8004519385)                                         

 

             %METHB YANELI (test code = 0.9 %        0.4-1.5                   



             6645305126)                                         

 

             VOL%O2 YANELI (test code = 3.3 %        6.0-12.0     L            



             5416035598)                                         

 

             NA (test code = 131 mmol/L   135-145      L            



             8109317845)                                         

 

             K+ (test code = 3.3 mmol/L   3.5-5.0      L            



             8790807057)                                         

 

             AC CA IONZ (test code = 4.20 mg/dL   4.50-5.30    L            



             4702240518)                                         

 

             GLUCOSE (test code = 149 mg/dL           H            



             7186799517)                                         

 

             LACTIC ACID (test code 8.68 mmol/L  0.50-2.20    H            



             = 5558344216)                                        

 

             Lab Interpretation Abnormal                               



             (test code = 02015-2)                                        



[facility] Metabolic Panel (NA, K, CL, CO2, 
GLUCOSE, BUN, CREATININE, CA)2023 22:07:47





             Test Item    Value        Reference Range Interpretation Comments

 

             NA (test code = 134 mmol/L   135-145      L            



             1436121908)                                         

 

             K (test code = 3.1 mmol/L   3.5-5.0      L            



             7200102854)                                         

 

             CL (test code = 103 mmol/L                       



             3540771772)                                         

 

             CO2 TOTAL (test code = 14 mmol/L    23-31        L            



             9632725007)                                         

 

             AGAP (test code = 17           2-16         H            



             4577847953)                                         

 

             BUN (test code = 6 mg/dL      7-23         L            



             9761351406)                                         

 

             GLUCOSE (test code = 153 mg/dL           H            



             3205782754)                                         

 

             CREATININE (test code = 0.54 mg/dL   0.60-1.25    L            



             6944015901)                                         

 

             CALCIUM (test code = 7.1 mg/dL    8.6-10.6     L            



             8116580696)                                         

 

             eGFR (test code = 158.0        mL/min/1.73m2              



             5800090254)                                         

 

             RENETTA (test code = RENETTA) Association of                           



                          Glomerular Filtration                           



                          Rate (GFR) and Staging                           



                          of Kidney Disease*                           



                          +---------------------                           



                          --+-------------------                           



                          --+-------------------                           



                          ------+| GFR                           



                          (mL/min/1.73 m2) ?|                           



                          With Kidney Damage ?|                           



                          ?Without Kidney                           



                          Damage+---------------                           



                          --------+-------------                           



                          --------+-------------                           



                          ------------+| ?>90 ?                           



                          ? ? ? ? ? ? ? ?|                           



                          ?Stage one ? ? ? ? ?|                           



                          ? Normal ? ? ? ? ? ? ?                           



                          ?+--------------------                           



                          ---+------------------                           



                          ---+------------------                           



                          -------+| ?60-89 ? ? ?                           



                          ? ? ? ? ?| ?Stage two                           



                          ? ? ? ? ?| ? Decreased                           



                          GFR ? ? ? ?                            



                          +---------------------                           



                          --+-------------------                           



                          --+-------------------                           



                          ------+| ?30-59 ? ? ?                           



                          ? ? ? ? ?| ?Stage                           



                          three ? ? ? ?| ? Stage                           



                          three ? ? ? ? ?                           



                          +---------------------                           



                          --+-------------------                           



                          --+-------------------                           



                          ------+| ?15-29 ? ? ?                           



                          ? ? ? ? ?| ?Stage four                           



                          ? ? ? ? | ? Stage four                           



                          ? ? ? ? ?                              



                          ?+--------------------                           



                          ---+------------------                           



                          ---+------------------                           



                          -------+| ?<15 (or                           



                          dialysis) ? ?| ?Stage                           



                          five ? ? ? ? | ? Stage                           



                          five ? ? ? ? ?                           



                          ?+--------------------                           



                          ---+------------------                           



                          ---+------------------                           



                          -------+ *Each stage                           



                          assumes the associated                           



                          GFR level has been in                           



                          effect for at least                           



                          three months. ?Stages                           



                          1 to 5, with or                           



                          without kidney                           



                          disease, indicate                           



                          chronic kidney                           



                          disease. Notes:                           



                          Determination of                           



                          stages one and two                           



                          (with eGFR                             



                          >59mL/min/1.73 m2)                           



                          requires estimation of                           



                          kidney damage for at                           



                          least three months as                           



                          defined by structural                           



                          or functional                           



                          abnormalities of the                           



                          kidney, manifested by                           



                          either:Pathological                           



                          abnormalities or                           



                          Markers of kidney                           



                          damage (including                           



                          abnormalities in the                           



                          composition of the                           



                          blood or urine or                           



                          abnormalities in                           



                          imaging tests).                           

 

             Lab Interpretation Abnormal                               



             (test code = 20896-0)                                        



[facility] / [id number] 22:04:46





             Test Item    Value        Reference Range Interpretation Comments

 

             PROTIME PATIENT (test 27.5         See_Comment  H             [Auto

mated message]



             code = 5964-2)                                        The system [employer]



                                                                 generated this 

result



                                                                 transmitted ref

erence



                                                                 range: 10.1 - 1

2.6



                                                                 Seconds. The



                                                                 reference range

 was



                                                                 not used to int

erpret



                                                                 this result as



                                                                 normal/abnormal

.

 

             INR (test code = 6301-6) 2.5                                    Nor

mal INR <1.1;



                                                                 Warfarin Therap

eutic



                                                                 range 2.0 to 3.

0 or



                                                                 2.5 to 3.5, dep

ending



                                                                 upon the indica

tions.

 

             Lab Interpretation (test Abnormal                               



             code = 32573-5)                                        



[facility]46





             Test Item    Value        Reference Range Interpretation Comments

 

             APTT Patient (test code 42           See_Comment  H             [Au

tomated message]



             = 3173-2)                                           The system [employer]



                                                                 generated this 

result



                                                                 transmitted ref

erence



                                                                 range: 26 - 36



                                                                 Seconds. The



                                                                 reference range

 was



                                                                 not used to int

erpret



                                                                 this result as



                                                                 normal/abnormal

.

 

             Lab Interpretation (test Abnormal                               



             code = 32700-7)                                        



[facility] Center[facility] / [id number] 22:02:45





             Test Item    Value        Reference Range Interpretation Comments

 

             WBC (test code = 3.90         See_Comment  L             [Automated

 message]



             3690-2)                                             The system [employer]



                                                                 generated this 

result



                                                                 transmitted ref

erence



                                                                 range: 4.20 - 1

0.70



                                                                 10*3/?L. The



                                                                 reference range

 was



                                                                 not used to int

erpret



                                                                 this result as



                                                                 normal/abnormal

.

 

             RBC (test code = 789-8) 2.63         See_Comment  L             [Au

tomated message]



                                                                 The system [employer]



                                                                 generated this 

result



                                                                 transmitted ref

erence



                                                                 range: 4.26 - 5

.52



                                                                 10*6/?L. The



                                                                 reference range

 was



                                                                 not used to int

erpret



                                                                 this result as



                                                                 normal/abnormal

.

 

             HGB (test code = 718-7) 5.8 g/dL     12.2-16.4    L            

 

             HCT (test code = 20.1 %       38.4-49.3    L            



             4544-3)                                             

 

             MCH (test code = 785-6) 22.1 pg      26.1-32.7    L            

 

             MCV (test code = 787-2) 76.4 fL      81.7-95.6    L            

 

             MCHC (test code = 28.9 g/dL    31.2-35.0    L            



             786-4)                                              

 

             PLT (test code = 777-3) 61           See_Comment  L             [Au

tomated message]



                                                                 The system [employer]



                                                                 generated this 

result



                                                                 transmitted ref

erence



                                                                 range: 150 - 32

8



                                                                 10*3/?L. The



                                                                 reference range

 was



                                                                 not used to int

erpret



                                                                 this result as



                                                                 normal/abnormal

.

 

             MPV (test code =                                        Not Measure

d



             34278-0)                                            

 

             RDW-CV (test code = 21.4 %       12.1-15.4    H            



             788-0)                                              

 

             RDW-SD (test code = 59.4 fL      38.5-51.6    H            



             89284-2)                                            

 

             NRBC x10^3 (test code =              See_Comment                [Au

tomated message]



             2627045229)                                         The system [employer]



                                                                 generated this 

result



                                                                 transmitted ref

erence



                                                                 range: 10*3/?L.

 The



                                                                 reference range

 was



                                                                 not used to int

erpret



                                                                 this result as



                                                                 normal/abnormal

.

 

             NRBC/100 WBC (test code 0.0          See_Comment                [Au

tomated message]



             = [id number])                                        The system [employer]



                                                                 generated this 

result



                                                                 transmitted ref

erence



                                                                 range: 0.0 - 10

.0



                                                                 /100 WBCs. The



                                                                 reference range

 was



                                                                 not used to int

erpret



                                                                 this result as



                                                                 normal/abnormal

.

 

             IPF % (test code = 4.9 %        1.2-10.7                  Platelet 

count



             [id number])                                         measured by



                                                                 fluorescence me

thod.

 

             Lab Interpretation Abnormal                               



             (test code = 20052-2)                                        



[facility] CenterType [facility] Screen - The Type and Screen expires 
at midnight on the 3rd day after it was drawn. A current Type and Screen is 
required when RBCs are requested. For all other blood products, a Type and Scree
n performed during the current hospitalizati...2023 21:57:00





             Test Item    Value        Reference Range Interpretation Comments

 

             ABO & RH (test code = 20) O POSITIVE                             

 

             IAT (test code = 1185) Negative                               



[facility] Center[facility]GD[facility] 15:59:00TEXTPatient Name UMU TABARES of Birth 1970Record Number [id number]/Time of Procedure 
2022, 3:59:00 PMEndoscopist Tressa Santoro MD./Fellow 
Jorge Moreno INDICATIONS FOR EXAMINATION: Anemia unspecified. PROCEDURE 
PERFORMED: EGD - EGD, diagnostic INSTRUMENTS:[id number]: None TOLERANCE:
Good VISUALIZATION: Good MEDICATIONS: MAC AnesthesiaASA CLASSIFICATION: 
IIIANALGESIA: TIVA by anesthesia PROCEDURE TECHNIQUE:Prior to the procedure, a 
History and Physical was performed, and patient medications and allergies were 
reviewed. The risks and benefits of the procedure and the sedation options and 
risks were discussed with the patient. Consent was obtained. Pulse, blood oxygen
saturation, and blood pressure were monitored throughout the procedure. After a
dequate sedation, the endoscope was introduced through the mouth and under 
direct visualization advanced to the Second Part of Duodenum. Careful 
examination of the esophagus, stomach and duodenum was performed. FINDINGS:The 
upper, middle, and lower esophagus appeared normal. There were small varices th
at flattened with insufflationThe GE junction was normal.The gastric cardia, 
fundus, and body were normal.The were two 5 mm clean based ulcers at the distal 
antrum. No biopsies were taken.The pylorus, duodenal bulb, and descending 
duodenum through the second portion of the duodenum appeared normal. SPECIMEN 
COLLECTED: No ENDOSCOPIC DIAGNOSIS:Small gastric varices Two distal antrum 5mm 
clean based ulcers RECOMMENDATIONS:Test for H. Pylori with stool antigen. Treat 
if positive. Start PPI BID x 6-8 weeks Follow up outpatient with 
gastroenterology clinic COMPLICATIONS:None. Intra-procedure complication: none; 
ESTIMATED BLOOD LOSS: None BLOOD PRODUCTS ADMINISTERED: NoneGRAFT/IMPLANT: None 
TOTAL PROCEDURE TIME: TOTAL SEDATION TIME: COMMENTS: CPT CODE:78445-QW 
Esophagogastroduodenoscopy, flexible, transoral; diagnostic, including 
collection of specimen(s) by brushing or washing, when peICD CODE:D64.9 Anemia, 
unspecified I was present during the entire viewing portion of the procedure. I 
personally reviewed the images and report prepared by the resident or fellow and
agree with the findings. After theprocedure was completed, the patient was taken
to the recovery in good condition. This Procedure waselectronically signed of on
:10/13/2022 1:52:32 PM By Tressa Broussard [facility] HospitalFduymnQOF5399-62-64 
15:59:00TEXTPatient Name UMU TABARES of Birth 1970Record Number 
[id number]/Time of Procedure 2022, 3:59:00 PMEndoscopist Tressa Santoro MD./Fellow Jorge Moreno INDICATIONS FOR 
EXAMINATION: Anemia unspecified. PROCEDURE PERFORMED: EGD - EGD, diagnostic 
INSTRUMENTS:[id number]: None TOLERANCE: Good VISUALIZATION: Good 
MEDICATIONS: MAC AnesthesiaASA CLASSIFICATION: IIIANALGESIA: TIVA by anesthesia 
PROCEDURE TECHNIQUE:Prior to the procedure, a History and Physical was 
performed, and patient medications and allergies were reviewed. The risks and 
benefits of the procedure and the sedation options and risks were discussed with
the patient. Consent was obtained. Pulse, blood oxygen saturation, and blood 
pressure were monitored throughout the procedure. After adequate sedation, the 
endoscope was introduced through the mouth and under direct visualization advan
satnam to the Second Part of Duodenum. Careful examination of the esophagus, 
stomach and duodenum was performed. FINDINGS:The upper, middle, and lower 
esophagus appeared normal. There were small varices that flattened with 
insufflationThe GE junction was normal.The gastric cardia, fundus, and body were
normal.The were two 5 mm clean based ulcers at the distal antrum. No biopsies 
were taken.The pylorus, duodenal bulb, and descending duodenum through the 
second portion of the duodenum appeared normal. SPECIMEN COLLECTED: No  
ENDOSCOPIC DIAGNOSIS:Small gastric varices Two distal antrum 5mm clean based c
ers RECOMMENDATIONS:Test for H. Pylori with stool antigen. Treat if positive. 
Start PPI BID x 6-8 weeks Follow up outpatient with gastroenterology clinic 
COMPLICATIONS:None. Intra-procedure complication: none; ESTIMATED BLOOD LOSS: 
None BLOOD PRODUCTS ADMINISTERED: NoneGRAFT/IMPLANT: None TOTAL PROCEDURE TIME: 
TOTAL SEDATION TIME: COMMENTS: CPT CODE:50999-EG Esophagogastroduodenoscopy, 
flexible, transoral; diagnostic, including collection of specimen(s) by brushing
or washing, when peICD CODE:D64.9 Anemia, unspecified I was present during the 
entire viewing portion of the procedure. I personally reviewed the images and 
report prepared by the resident or fellow and agree with the findings. After the
procedure was completed, the patient was taken to the recovery in good 
condition. This Procedure was electronically signed of on :10/13/2022 1:52:32 PM
By Tressa Heller [employer] RBC Units, 1 [id number] 
11:42:00





             Test Item    Value        Reference Range Interpretation Comments

 

             RBC UNITS (test code = compatible                             



             27702047)                                           

 

             Unit ABO Type (test code = O                                      



             86557485)                                           

 

             Unit Rh Type (test code = POS                                    



             89968822)                                           

 

             Product Code (test code =                                   



             09582894)                                           

 

             Unit Number (test code = [id number]                           



             31935314)                                           

 

             Unit Status (test code = transfused                             



             16374043)                                           

 

             ISBT Product Code (test code = A0819Y97                            

   



             68847)                                              

 

             Blood Type (test code = 67035) 5100                                

   

 

             Blood Expiration Date (test                            



             code = 66137)                                        



[facility] [employer] RBC Units, 1 [id number] 11:42:00





             Test Item    Value        Reference Range Interpretation Comments

 

             RBC UNITS (test code = compatible                             



             07207213)                                           

 

             Unit ABO Type (test code = O                                      



             90423294)                                           

 

             Unit Rh Type (test code = POS                                    



             11772856)                                           

 

             Product Code (test code =                                   



             88981990)                                           

 

             Unit Number (test code = [id number]                           



             83995334)                                           

 

             Unit Status (test code = transfused                             



             20114573)                                           

 

             ISBT Product Code (test code = Z9246K42                            

   



             55436)                                              

 

             Blood Type (test code = 51059) 5100                                

   

 

             Blood Expiration Date (test                            



             code = 70270)                                        



[facility] RBC Units, 1 [id number] 11:42:00





             Test Item    Value        Reference Range Interpretation Comments

 

             RBC UNITS (test code = compatible                             



             08666956)                                           

 

             Unit ABO Type (test code = O                                      



             88503154)                                           

 

             Unit Rh Type (test code = POS                                    



             61873969)                                           

 

             Product Code (test code =                                   



             25197550)                                           

 

             Unit Number (test code = [id number]                           



             49890731)                                           

 

             Unit Status (test code = transfused                             



             36586826)                                           

 

             ISBT Product Code (test code = M1703T13                            

   



             09198)                                              

 

             Blood Type (test code = 79882) 5100                                

   

 

             Blood Expiration Date (test                            



             code = 86980)                                        



[facility] Ksuym1179-05-26 10:08:00





             Test Item    Value        Reference Range Interpretation Comments

 

             Apheresis Platelets, not required                           



             Leukoreduced (test code =                                        



             09243858)                                           

 

             Unit ABO Type (test code = A                                      



             75655404)                                           

 

             Unit Rh Type (test code = NEG                                    



             15440381)                                           

 

             Product Code (test code =                                   



             08241834)                                           

 

             Unit Number (test code = [id number]                           



             71346976)                                           

 

             Unit Status (test code = transfused                             



             46116575)                                           

 

             ISBT Product Code (test code = Y0765Y15                            

   



             71919)                                              

 

             Blood Type (test code = 39160) 06                                

   

 

             Blood Expiration Date (test                            



             code = 57907)                                        



[facility], 1 [id number] 10:08:00





             Test Item    Value        Reference Range Interpretation Comments

 

             Apheresis Platelets, not required                           



             Leukoreduced (test code =                                        



             79938005)                                           

 

             Unit ABO Type (test code = A                                      



             46164263)                                           

 

             Unit Rh Type (test code = NEG                                    



             58364198)                                           

 

             Product Code (test code =                                   



             92861284)                                           

 

             Unit Number (test code = [id number]                           



             47961148)                                           

 

             Unit Status (test code = transfused                             



             56430607)                                           

 

             ISBT Product Code (test code = R9957U94                            

   



             79422)                                              

 

             Blood Type (test code = 61569) 0600                                

   

 

             Blood Expiration Date (test                            



             code = 05847)                                        



Joe Shukal Units, 1 [id number] 10:08:00





             Test Item    Value        Reference Range Interpretation Comments

 

             Apheresis Platelets, not required                           



             Leukoreduced (test code =                                        



             93707204)                                           

 

             Unit ABO Type (test code = A                                      



             48586157)                                           

 

             Unit Rh Type (test code = NEG                                    



             42366206)                                           

 

             Product Code (test code =                                   



             65297321)                                           

 

             Unit Number (test code = [id number]                           



             [id number])                                           

 

             Unit Status (test code = transfused                             



             [id number])                                           

 

             ISBT Product Code (test code = R4099L89                            

   



             86233)                                              

 

             Blood Type (test code = 47303) 0600                                

   

 

             Blood Expiration Date (test [id number]                           



             code = 81848)                                        



Joe Clancyronavirus, CoVID-19, [id number] 13:45:44





             Test Item    Value        Reference Range Interpretation Comments

 

             COVID-19 (SARS-COV-2) Detected     Not Detected A            INTERP

RETATION: This



             (test code = 58801-1)                                        patien

t's sample had



                                                                 detectable RNA 

present



                                                                 for the SARS-Co

V-2



                                                                 Coronavirus (CO

VID-19).



                                                                 A result with



                                                                 detectable RNA 

does not



                                                                 indicate the se

verity



                                                                 of infection. T

his



                                                                 result should b

e



                                                                 interpreted in



                                                                 conjunction wit

h



                                                                 clinical, radio

graphic,



                                                                 and other labor

atory



                                                                 findings and sh

ould not



                                                                 be used as the 

sole



                                                                 indicator of ac

tive



                                                                 infection with



                                                                 SARS-CoV-2 Citlalli

navirus



                                                                 (COVID-19). COM

MENT:



                                                                 This Hologic Ap

saulo



                                                                 SARS-CoV-2 mole

cular



                                                                 diagnostic assa

y



                                                                 utilizes Transc

ription



                                                                 Mediated Amplif

ication



                                                                 (TMA) technolog

y to



                                                                 rapidly detect 

the



                                                                 SARS-CoV-2 (COV

ID-19)



                                                                 virus from resp

iratory



                                                                 samples. In acc

ordance



                                                                 with the FDA's 

guidance



                                                                 document "Polic

y for



                                                                 Diagnostic Test

s for



                                                                 Coronavirus



                                                                 Disease-2019 du

ring the



                                                                 Public Health



                                                                 Emergency", thi

s test



                                                                 was developed, 

and its



                                                                 performance



                                                                 characteristics

 were



                                                                 verified by the

 [facility]

lar



                                                                 diagnostics lab

oratory



                                                                 and is authoriz

ed for



                                                                 clinical diagno

stic



                                                                 use. This labor

atory is



                                                                 certified under

 the



                                                                 Clinical Labora

tory



                                                                 Improvement Cira

ndments



                                                                 (CLIA) as quali

fied to



                                                                 perform high co

mplexity



                                                                 clinical labora

tory



                                                                 testing.

 

             Lab Interpretation Abnormal                               



             (test code = 79597-6)                                        



[facility] CoV[facility], [id number] 13:45:44





             Test Item    Value        Reference Range Interpretation Comments

 

             COVID-19 (SARS-COV-2) Detected     Not Detected A            INTERP

RETATION: This



             (test code = 85396-8)                                        patien

t's sample had



                                                                 detectable RNA 

present



                                                                 for the SARS-Co

V-2



                                                                 Coronavirus (CO

VID-19).



                                                                 A result with



                                                                 detectable RNA 

does not



                                                                 indicate the se

verity



                                                                 of infection. T

his



                                                                 result should b

e



                                                                 interpreted in



                                                                 conjunction wit

h



                                                                 clinical, radio

graphic,



                                                                 and other labor

atory



                                                                 findings and sh

ould not



                                                                 be used as the 

sole



                                                                 indicator of ac

tive



                                                                 infection with



                                                                 SARS-CoV-2 Citlalli

navirus



                                                                 (COVID-19). COM

MENT:



                                                                 This Hologic Ap

saulo



                                                                 SARS-CoV-2 mole

cular



                                                                 diagnostic assa

y



                                                                 utilizes Transc

ription



                                                                 Mediated Amplif

ication



                                                                 (TMA) technolog

y to



                                                                 rapidly detect 

the



                                                                 SARS-CoV-2 (COV

ID-19)



                                                                 virus from resp

iratory



                                                                 samples. In acc

ordance



                                                                 with the FDA's 

guidance



                                                                 document "Polic

y for



                                                                 Diagnostic Test

s for



                                                                 Coronavirus



                                                                 Disease-2019 du

ring the



                                                                 Public Health



                                                                 Emergency", thi

s test



                                                                 was developed, 

and its



                                                                 performance



                                                                 characteristics

 were



                                                                 verified by the

 [facility]

lar



                                                                 diagnostics lab

oratory



                                                                 and is authoriz

ed for



                                                                 clinical diagno

stic



                                                                 use. This labor

atory is



                                                                 certified under

 the



                                                                 Clinical Labora

tory



                                                                 Improvement Cira

ndments



                                                                 (CLIA) as quali

fied to



                                                                 perform high co

mplexity



                                                                 clinical labora

tory



                                                                 testing.

 

             Lab Interpretation Abnormal                               



             (test code = 84130-1)                                        



[facility] [id number] 13:45:44





             Test Item    Value        Reference Range Interpretation Comments

 

             COVID-19 (SARS-COV-2) Detected     Not Detected A            INTERP

RETATION: This



             (test code = 47933-2)                                        patien

t's sample had



                                                                 detectable RNA 

present



                                                                 for the SARS-Co

V-2



                                                                 Coronavirus (CO

VID-19).



                                                                 A result with



                                                                 detectable RNA 

does not



                                                                 indicate the se

verity



                                                                 of infection. T

his



                                                                 result should b

e



                                                                 interpreted in



                                                                 conjunction wit

h



                                                                 clinical, radio

graphic,



                                                                 and other labor

atory



                                                                 findings and sh

ould not



                                                                 be used as the 

sole



                                                                 indicator of ac

tive



                                                                 infection with



                                                                 SARS-CoV-2 Citlalli

navirus



                                                                 (COVID-19). COM

MENT:



                                                                 This Hologic Ap

saulo



                                                                 SARS-CoV-2 mole

cular



                                                                 diagnostic assa

y



                                                                 utilizes Transc

ription



                                                                 Mediated Amplif

ication



                                                                 (TMA) technolog

y to



                                                                 rapidly detect 

the



                                                                 SARS-CoV-2 (COV

ID-19)



                                                                 virus from resp

iratory



                                                                 samples. In acc

ordance



                                                                 with the FDA's 

guidance



                                                                 document "Polic

y for



                                                                 Diagnostic Test

s for



                                                                 Coronavirus



                                                                 Disease-2019 du

ring the



                                                                 Public Health



                                                                 Emergency", thi

s test



                                                                 was developed, 

and its



                                                                 performance



                                                                 characteristics

 were



                                                                 verified by the

 [facility]

lar



                                                                 diagnostics lab

oratory



                                                                 and is authoriz

ed for



                                                                 clinical diagno

stic



                                                                 use. This labor

atory is



                                                                 certified under

 the



                                                                 Clinical Labora

tory



                                                                 Improvement Cira

ndments



                                                                 (CLIA) as quali

fied to



                                                                 perform high co

mplexity



                                                                 clinical labora

tory



                                                                 testing.

 

             Lab Interpretation Abnormal                               



             (test code = 72127-9)                                        



[facility] ORF1ab Resp Ql WILLIAM+[id number] 13:45:44





             Test Item    Value        Reference Range Interpretation Comments

 

             Hospitalized? (test Yes                                    



             code = 43574-0)                                        

 

             ICU? (test code = No                                     



             92679-5)                                            

 

             Symptomatic as defined No                                     



             by CDC? (test code =                                        



             59153-3)                                            

 

             Employed in  No                                     



             Healthcare? (test code                                        



             = 97963-1)                                          

 

             Resident in a No                                     



             congregate care                                        



             setting (including                                        



             nursing homes,                                        



             residential care for                                        



             people with                                         



             intellectual and                                        



             developmental                                        



             disabilities,                                        



             psychiatric treatment                                        



             facilities, group                                        



             homes, board and care                                        



             homes, homeless                                        



             shelter, foster care                                        



             or other): (test code                                        



             = 64000-4)                                          

 

             SARS-CoV-2 ORF1ab Resp DETECTED     Not Detected A            INTER

PRETATION: This



             Ql WILLIAM+probe (test                                        patient's

 sample had



             code = 91146-3)                                        detectable R

NA present



                                                                 for the SARS-Co

V-2



                                                                 Coronavirus (CO

VID-19).



                                                                 A result with



                                                                 detectable RNA 

does not



                                                                 indicate the se

verity



                                                                 of infection. T

his



                                                                 result should b

e



                                                                 interpreted in



                                                                 conjunction wit

h



                                                                 clinical, radio

graphic,



                                                                 and other labor

atory



                                                                 findings and sh

ould not



                                                                 be used as the 

sole



                                                                 indicator of ac

tive



                                                                 infection with



                                                                 SARS-CoV-2 Citlalli

navirus



                                                                 (COVID-19). COM

MENT:



                                                                 This Hologic Ap

saulo



                                                                 SARS-CoV-2 mole

cular



                                                                 diagnostic assa

y



                                                                 utilizes Transc

ription



                                                                 Mediated Amplif

ication



                                                                 (TMA) technolog

y to



                                                                 rapidly detect 

the



                                                                 SARS-CoV-2 (COV

ID-19)



                                                                 virus from resp

iratory



                                                                 samples. In acc

ordance



                                                                 with\XC2A0\the 

FDA's



                                                                 guidance docume

nt



                                                                 "Policy for Mikayla

gnostic



                                                                 Tests for Coron

avirus



                                                                 Disease-2019 du

ring the



                                                                 Public Health



                                                                 Emergency", jenni salcido test



                                                                 was developed, 

and its



                                                                 performance



                                                                 characteristics

 were



                                                                 verified by the

 [facility]

lar



                                                                 diagnostics lab

oratory



                                                                 and is authoriz

ed for



                                                                 clinical diagno

stic



                                                                 use. \XC2A0\Jenni

s



                                                                 laboratory is c

ertified



                                                                 under the [facility]

[facility]



                                                                 Laboratory Impr

ovement



                                                                 Amendments (CLI

A) as



                                                                 qualified to pe

rform



                                                                 high complexity



                                                                 clinical labora

tory



                                                                 testing.



HHSHAV IgG Ser [id number] 15:17:48





             Test Item    Value        Reference Range Interpretation Comments

 

             HAV IgG Ser Ql (test code = 56578-1) POSITIVE     Negative     A   

         



[facility]12 Lead [id number] 09:29:5912 LEAD EKG FOR [facility] 
Test Date: [id number] Name: UMU TABARES Department: 5BMSPatient ID: 
[id number] Room: Gender: M Technician: AnnieDOB: 1970 Requested By: 
MARCY Massey Number: [id number] Reading MD: Linda Obrien 
MeasurementsIntervals Axis Rate:  86 P: -2PR: 97 QRS: 50QRSD: 84 T: 24QT: 428  
QTc: 471 Interpretive StatementsSINUS RHYTHM WITH SHORT MO INTERVALPROLONGED QT 
INTERVALElectronically Signed On 2022 14:22:26 CDT by Linda Abrams
[place] [address] Lead [id number] 09:29:5912 LEAD EKG FOR [facility] Test Date: 0466-86-39Sjz Name: [facility] Department: 5BMSPatient 
ID: [id number] Room: Gender: M Technician: AnnieDOB: 1970 Requested By: 
MARCY Massey Number: [id number] Reading MD: Linda Obrien 
MeasurementsIntervals Axis Rate:  86 P: -2PR: 97 QRS: 50QRSD: 84 T: 24QT: 428  
QTc: 471 Interpretive StatementsSINUS RHYTHM WITH SHORT MO INTERVALPROLONGED QT 
INTERVALElectronically Signed On 2022 14:22:26 CDT by Linda [email or web address] [place] [address] [place] [id number] 09:29:5912 LEAD EKG FOR [facility] Test Date: 5553-47-30Gin Name: UMU TABARES Department: 5BMSPatient 
ID: [id number] Room: Gender: M Technician: ToshiaOB: 1970 Requested By: 
MARCY Massey Number: [id number] Anya MD: Linda Obrien 
MeasurementsIntervals Axis Rate:  86 P: -2PR: 97 QRS: 50QRSD: 84 T: 24QT: 428  
QTc: 471 Interpretive StatementsSINUS RHYTHM WITH SHORT MO INTERVALPROLONGED QT 
INTERVALElectronically Signed On 2022 14:22:26 CDT by Linda [email or web address] P[facility] docked device2022-07-11 09:15:17





             Test Item    Value        Reference Range Interpretation Comments

 

             Glucose POC (test code = 93625536) 137 mg/dL           H     

       

 

             Lab Interpretation (test code = Abnormal                           

    



             29345-1)                                            



[facility] GLUCOSE POC docked device2022-07-11 09:15:17





             Test Item    Value        Reference Range Interpretation Comments

 

             Glucose POC (test code = 01482952) 137 mg/dL           H     

       

 

             Lab Interpretation (test code = Abnormal                           

    



             43562-5)                                            



[facility] GLUCOSE POC docked device2022-07-11 09:15:17





             Test Item    Value        Reference Range Interpretation Comments

 

             Glucose POC (test code = 84065290) 137 mg/dL           H     

       

 

             Lab Interpretation (test code = Abnormal                           

    



             11031-7)                                            



[id number]38





             Test Item    Value        Reference Range Interpretation Comments

 

             T pallidum Ab Ser Ql Aggl (test NEGATIVE     Negative, Equivocal   

           



             code = 47781-8)                                        

 

             Reagin+T pallidum IgG+IgM NEGATIVE     Negative                  



             SerPl-Imp (test code = 04684-4)                                    

    



HHSHIV 1+2 Ab+HIV1 p24 Ag SerPl Ql [id number] 05:50:04





             Test Item    Value        Reference Range Interpretation Comments

 

             HIV 1+2 Ab+HIV1 p24 Ag SerPl Ql IA NEGATIVE     Negative           

       



             (test code = 45172-7)                                        



OHYRCIO-SuD-5 ORF1ab Resp Ql WILLIAM+lkrmh1646-80-64 04:05:45





             Test Item    Value        Reference Range Interpretation Comments

 

             Hospitalized? (test No                                     



             code = 95628-9)                                        

 

             ICU? (test code = No                                     



             58920-1)                                            

 

             Symptomatic as No                                     



             defined by CDC?                                        



             (test code =                                        



             68472-0)                                            

 

             Employed in  No                                     



             Healthcare? (test                                        



             code = 14413-5)                                        

 

             Resident in a No                                     



             congregate care                                        



             setting (including                                        



             nursing homes,                                        



             residential care for                                        



             people with                                         



             intellectual and                                        



             developmental                                        



             disabilities,                                        



             psychiatric                                         



             treatment                                           



             facilities, group                                        



             homes, board and                                        



             care homes, homeless                                        



             shelter, foster care                                        



             or other): (test                                        



             code = 61227-5)                                        

 

             SARS-CoV-2 ORF1ab NOT DETECTED Not Detected              INTERPRETA

TION: No



             Resp Ql WILLIAM+probe                                        detectable

 levels of



             (test code =                                        SARS-CoV-2



             06769-5)                                            Coronavirus



                                                                 (COVID-19) were



                                                                 present in this



                                                                 patient's sampl

e by



                                                                 this test. A no

t



                                                                 detected result

 does



                                                                 not exclude the



                                                                 possibility of 

active



                                                                 infection with 

this



                                                                 virus due to ot

her



                                                                 factors that ma

y



                                                                 affect the resu

lts



                                                                 such as a poorl

y



                                                                 collected sampl

e,



                                                                 viral titers be

low



                                                                 the limit of



                                                                 detection of th

e



                                                                 assay, and the



                                                                 infrequent



                                                                 possibility of



                                                                 inhibitors in t

he



                                                                 sample. This re

sult



                                                                 should be inter

preted



                                                                 in conjunction 

with



                                                                 clinical,



                                                                 radiographic, a

nd



                                                                 other laborator

y



                                                                 findings and sh

ould



                                                                 not be used as 

the



                                                                 sole indicator 

of



                                                                 active infectio

n with



                                                                 SARS-CoV-2



                                                                 Coronavirus



                                                                 (COVID-19).COMM

ENT:



                                                                 This Hologic Ap

saulo



                                                                 SARS-CoV-2 mole

cular



                                                                 diagnostic assa

y



                                                                 utilizes



                                                                 Transcription



                                                                 Mediated



                                                                 Amplification (

TMA)



                                                                 technology to r

apidly



                                                                 detect the SARS

-CoV-2



                                                                 (COVID-19) viru

s from



                                                                 respiratory shahriar

ples.



                                                                 In accordance



                                                                 with\XC2A0\the 

FDA's



                                                                 guidance docume

nt



                                                                 "Policy for



                                                                 Diagnostic Test

s for



                                                                 Coronavirus



                                                                 Disease-2019 du

ring



                                                                 the Public Heal





                                                                 Emergency", jenni

s test



                                                                 was developed, 

and



                                                                 its performance



                                                                 characteristics

 were



                                                                 verified by the



                                                                 [facility]



                                                                 molecular diagn

ostics



                                                                 laboratory and 

is



                                                                 authorized for



                                                                 clinical diagno

stic



                                                                 use. \XC2A0\Jenni

s



                                                                 laboratory is



                                                                 certified under

 the



                                                                 Clinical Labora

tory



                                                                 Improvement



                                                                 Amendments (CLI

A) as



                                                                 qualified to [facility]



                                                                 high complexity



                                                                 clinical labora

tory



                                                                 testing.



[facility]12 Lead [id number] 17:14:4312 LEAD EKG FOR [facility]  
Test Date: 7597-56-97Txu Name: [facility] Department: 5520Patient ID: 
[id number] Room:  POD F 01Gender: M Technician: [id number]: 1970 Requested
By: DENISE Saravia Number: [id number] Reading MD: Linda Obrien  
MeasurementsIntervals Axis Rate: 91 P: 24PR: 124 QRS:  50QRSD: 89 T: 41QT: 375 
QTc: 423 Interpretive StatementsSINUS RHYTHMElectronically Signed On 2022 
21:25:07 CDT by Linda Hathawayo EmbraneP[place] [address] [place] [id number] 17:14:4312
LEAD EKG FOR [facility]  Test Date: 4085-45-05Iap Name: 
[facility] Department: 5520Patient ID: [id number] Room:  POD  01Gender: M
 Technician: [id number]: 1970 Requested By: DENISE Saravia Number: 
[id number] Reading MD: Linda Obrien MeasurementsIntervals Axis Rate: 91 P: 
24PR: 124 QRS: 50QRSD: 89  T: 41QT: 375 QTc: 423 Interpretive StatementsSINUS 
RHYTHMElectronically Signed On 2022 21:25:07 CDT by Linda Obrien Embrane
[facility]12 Lead [id number] 17:14:4312 LEAD EKG FOR [facility] Test Date: 9716-67-14Swi Name: [facility] Department: 5520Patient 
ID: [id number] Room:  POD F 01Gender: M Technician: [id number]:  1970 
Requested By: DENISE Saravia Number: [id number] Reading MD: Linda Obrien 
MeasurementsIntervals  Axis Rate: 91 P: 24PR: 124 QRS: 50QRSD: 89 T: 41QT:  375 
QTc: 423 Interpretive StatementsSINUS RHYTHMElectronically Signed On 2022 
21:25:07 CDT by Lindajuvenal Obrien [facility] BODY [id number] 
17:50:22





             Test Item    Value        Reference Range Interpretation Comments

 

             ALBUMIN BF (test code 279.0 mg/dL                            



             = 1373006116)                                        

 

             RENETTA (test code = RENETTA) Result interpreted                           



                          relative to the serum                           



                          concentration. ?                           



[facility] [id number] 
15:55:23COVID DMT InterpretationInterpretation/Recommendations:Molecular NAAT 
Tests for Active Infection with the SARS-CoV-2 Virus:The patient has currently 
tested negative for the SARS-CoV-2 virus that causesCOVID-19 illness. This most 
likely indicates that the patient does not have an active infection withthe 
SARS-CoV-2 virus. However, infection is not completely ruled out as the false 
negative rate for molecular NAAT testing using a nasopharyngeal sample can be up
to 30%, mostly dependent on the timingof sample collection in relation to 
illness onset and any deficiencies in sampling techniques. If the patient has 
symptoms concerning for COVID-19 illness, a repeat NAAT test (PCR, Rapid ID Now,
etc.) should be performed, at which time the SARS-CoV-2 virus - if present - may
have reached a detectable viral load (usually peaking by the end of the first 
week of symptoms). Tests for IgM and/or IgG Antibodies to the SARS-CoV-2 
Virus:If the patient develops COVID-19 illness in the future, testing for IgMand
IgG antibodies approximately 3 weeks after illness onset will likely indicate if
the patient hasproduced antibodies to the SARS-CoV-2 virus. However, some 
patients may take longer to develop detectable antibodies, while some patients 
who were infected with SARS-CoV-2 may never develop antibodies.While antibodies 
to SARS-CoV-2 may provide some degree of immunity, at this time the strength and
duration of the antibody response is unknown. 
------------------------------------------------------------------------ 
Interpretation Result Comments:These interpretation comments are based upon all 
COVID-19 testing the patient has had at [facility], including molecular NAAT testing 
(more commonly known as PCRtesting and Rapid ID Now testing) and antibody 
testing. It does not take into account any testing that a patient has had 
outside of the [facility] medical record. [facility] LABORATORY SERVICESCOVID 
AamdsaiPVNX-PzU-9 Rapid ID NOW (no units) ? ? Date ? ? ? ? ? ? ? ? ? ? Value ? ?
? ? ? ? ? 2021 ? ? ? ? ? ? ?Not Detected ? ? ? 2021 ? ? ? ? ? ? ? 
Not Detected ? ---------- [facility] LABORATORY SERVICESUnB[facility] LakewayCBC with Xgjnmljimshu4322-42-83 12:19:02





             Test Item    Value        Reference Range Interpretation Comments

 

             WBC (test code =              See_Comment                [Automated



             6690-2)                                             message] The sy

stem



                                                                 which generated



                                                                 this result



                                                                 transmitted



                                                                 reference range

:



                                                                 4.20 - 10.70



                                                                 10*3/?L. The



                                                                 reference range

 was



                                                                 not used to



                                                                 interpret this



                                                                 result as



                                                                 normal/abnormal

.

 

             RBC (test code =              See_Comment  L             [Automated



             789-8)                                              message] The sy

stem



                                                                 which generated



                                                                 this result



                                                                 transmitted



                                                                 reference range

:



                                                                 4.26 - 5.52



                                                                 10*6/?L. The



                                                                 reference range

 was



                                                                 not used to



                                                                 interpret this



                                                                 result as



                                                                 normal/abnormal

.

 

             HGB (test code = 7.9 g/dL     12.2-16.4    L            



             718-7)                                              

 

             HCT (test code = 24.8 %       38.4-49.3    L            



             4544-3)                                             

 

             MCV (test code = 80.0 fL      81.7-95.6    L            



             787-2)                                              

 

             MCH (test code = 25.5 pg      26.1-32.7    L            



             785-6)                                              

 

             MCHC (test code = 31.9 g/dL    31.2-35.0                 



             786-4)                                              

 

             RDW-SD (test code = 72.4 fL      38.5-51.6    H            



             33909-0)                                            

 

             RDW-CV (test code = 24.9 %       12.1-15.4    H            



             788-0)                                              

 

             PLT (test code =              See_Comment  LL            [Automated



             777-3)                                              message] The sy

stem



                                                                 which generated



                                                                 this result



                                                                 transmitted



                                                                 reference range

:



                                                                 150 - 328 10*3/

?L.



                                                                 The reference r

moose



                                                                 was not used to



                                                                 interpret this



                                                                 result as



                                                                 normal/abnormal

.

 

             MPV (test code =                                        Not Measure

d



             72618-0)                                            

 

             IPF % (test code = 6.3 %        1.2-10.7                  Platelet 

count



             [id number])                                         measured by



                                                                 fluorescence



                                                                 method.

 

             NRBC/100 WBC (test              See_Comment                [Automat

ed



             code = [id number])                                        message] 

The system



                                                                 which generated



                                                                 this result



                                                                 transmitted



                                                                 reference range

:



                                                                 0.0 - 10.0 /100



                                                                 WBCs. The refer

ence



                                                                 range was not u

sed



                                                                 to interpret th

is



                                                                 result as



                                                                 normal/abnormal

.

 

             NRBC x10^3 (test code <0.01        See_Comment                [Auto

mated



             = [id number])                                        message] The s

ystem



                                                                 which generated



                                                                 this result



                                                                 transmitted



                                                                 reference range

:



                                                                 10*3/?L. The



                                                                 reference range

 was



                                                                 not used to



                                                                 interpret this



                                                                 result as



                                                                 normal/abnormal

.

 

             GRAN MAT (NEUT) % 59.8 %                                 



             (test code = 770-8)                                        

 

             IMM GRAN % (test code 0.20 %                                 



             = [id number])                                        

 

             LYMPH % (test code = 21.3 %                                 



             736-9)                                              

 

             MONO % (test code = 11.2 %                                 



             5905-5)                                             

 

             EOS % (test code = 6.5 %                                  



             713-8)                                              

 

             BASO % (test code = 1.0 %                                  



             706-2)                                              

 

             GRAN MAT x10^3(ANC) 3.11 10*3/uL 1.99-6.95                 



             (test code =                                        



             1713491108)                                         

 

             IMM GRAN x10^3 (test <0.03        0.00-0.06                 



             code = 4906089884)                                        

 

             LYMPH x10^3 (test code 1.11 10*3/uL 1.09-3.23                 



             = 731-0)                                            

 

             MONO x10^3 (test code 0.58 10*3/uL 0.36-1.02                 



             = 742-7)                                            

 

             EOS x10^3 (test code = 0.34 10*3/uL 0.06-0.53                 



             711-2)                                              

 

             BASO x10^3 (test code 0.05 10*3/uL 0.01-0.09                 



             = 704-7)                                            

 

             Lab Interpretation Abnormal                               



             (test code = 59061-3)                                        



[facility] Metabolic Panel (NA, K, CL, CO2, 
GLUCOSE, BUN, CREATININE, CA)2021 11:47:29





             Test Item    Value        Reference Range Interpretation Comments

 

             NA (test code = 135 mmol/L   135-145                   



             0421215288)                                         

 

             K (test code = 3.8 mmol/L   3.5-5.0                   



             5098240351)                                         

 

             CL (test code = 106 mmol/L                       



             8774331810)                                         

 

             CO2 TOTAL (test code = 26 mmol/L    23-31                     



             2093805140)                                         

 

             AGAP (test code =              2-16                      



             7389805464)                                         

 

             BUN (test code = 9 mg/dL      7-23                      



             0402934970)                                         

 

             GLUCOSE (test code = 103 mg/dL                        



             0912228498)                                         

 

             CREATININE (test code = 0.50 mg/dL   0.60-1.25    L            



             5910218701)                                         

 

             CALCIUM (test code = 7.7 mg/dL    8.6-10.6     L            



             0823708375)                                         

 

             eGFR (test code =              mL/min/1.73m2              



             1763182805)                                         

 

             RENETTA (test code = RENETTA) Association of                           



                          Glomerular Filtration                           



                          Rate (GFR) and Staging                           



                          of Kidney Disease*                           



                          +---------------------                           



                          --+-------------------                           



                          --+-------------------                           



                          ------+| GFR                           



                          (mL/min/1.73 m2) ?|                           



                          With Kidney Damage ?|                           



                          ?Without Kidney                           



                          Damage+---------------                           



                          --------+-------------                           



                          --------+-------------                           



                          ------------+| ?>90 ?                           



                          ? ? ? ? ? ? ? ?|                           



                          ?Stage one ? ? ? ? ?|                           



                          ? Normal ? ? ? ? ? ? ?                           



                          ?+--------------------                           



                          ---+------------------                           



                          ---+------------------                           



                          -------+| ?60-89 ? ? ?                           



                          ? ? ? ? ?| ?Stage two                           



                          ? ? ? ? ?| ? Decreased                           



                          GFR ? ? ? ?                            



                          +---------------------                           



                          --+-------------------                           



                          --+-------------------                           



                          ------+| ?30-59 ? ? ?                           



                          ? ? ? ? ?| ?Stage                           



                          three ? ? ? ?| ? Stage                           



                          three ? ? ? ? ?                           



                          +---------------------                           



                          --+-------------------                           



                          --+-------------------                           



                          ------+| ?15-29 ? ? ?                           



                          ? ? ? ? ?| ?Stage four                           



                          ? ? ? ? | ? Stage four                           



                          ? ? ? ? ?                              



                          ?+--------------------                           



                          ---+------------------                           



                          ---+------------------                           



                          -------+| ?<15 (or                           



                          dialysis) ? ?| ?Stage                           



                          five ? ? ? ? | ? Stage                           



                          five ? ? ? ? ?                           



                          ?+--------------------                           



                          ---+------------------                           



                          ---+------------------                           



                          -------+ *Each stage                           



                          assumes the associated                           



                          GFR level has been in                           



                          effect for at least                           



                          three months. ?Stages                           



                          1 to 5, with or                           



                          without kidney                           



                          disease, indicate                           



                          chronic kidney                           



                          disease. Notes:                           



                          Determination of                           



                          stages one and two                           



                          (with eGFR                             



                          >59mL/min/1.73 m2)                           



                          requires estimation of                           



                          kidney damage for at                           



                          least three months as                           



                          defined by structural                           



                          or functional                           



                          abnormalities of the                           



                          kidney, manifested by                           



                          either:Pathological                           



                          abnormalities or                           



                          Markers of kidney                           



                          damage (including                           



                          abnormalities in the                           



                          composition of the                           



                          blood or urine or                           



                          abnormalities in                           



                          imaging tests).                           

 

             Lab Interpretation Abnormal                               



             (test code = 65219-7)                                        



[facility] [id number] 07:23:45





             Test Item    Value        Reference Range Interpretation Comments

 

             BF SEGS (test code = 2688890486) 4 %                               

     

 

             BF LYMPHS (test code = 9889766718) 17 %                            

       

 

             MACROPHAGE (test code = 3473581497) 69 %                           

        

 

             MESOS (test code = 8379279629) 10 %                                

   

 

             #CELS CNTD (test code = 6633829871)                                

        



[facility] [id number] 07:23:35





             Test Item    Value        Reference Range Interpretation Comments

 

             T.PROT BF (test 1109.0 mg/dL                           



             code =                                              



             0968428282)                                         

 

             UNSPUN BODY  Yellow                                 



             FLUID COLOR                                         



             (test code =                                        



             2462802251)                                         

 

             UNSPUN BODY  Slightly Cloudy                           



             FLUID CLARITY                                        



             (test code =                                        



             9600542573)                                         

 

             SPUN BODY FLUID Yellow                                 



             COLOR (test code                                        



             = 4188694798)                                        

 

             SPUN BODY FLUID Clear                                  



             CLARITY (test                                        



             code =                                              



             6367816760)                                         

 

             Sediment (test                                        The sediment



             code =                                              volume is <0.1



             5223838422)                                         mLs of the



                                                                 total fluid



                                                                 volume of 3mls



                                                                 and its color



                                                                 is red.

 

             RENETTA (test code = Test developed and                           



             RENETTA)         characteristics                           



                          determined by [facility]                           



                          Laboratory Services.                           



[facility] [id number] 07:19:43





             Test Item    Value        Reference Range Interpretation Comments

 

             BF COLOR (test Light Yellow                           



             code =                                              



             5030819210)                                         

 

             TURBIDITY (test Slightly Turbid                           



             code =                                              



             0163539255)                                         

 

             BF WBC Count              See_Comment                [Automated



             (test code =                                        message] The



             0809273364)                                         system which



                                                                 generated this



                                                                 result



                                                                 transmitted



                                                                 reference range

:



                                                                 /?L. The



                                                                 reference range



                                                                 was not used to



                                                                 interpret this



                                                                 result as



                                                                 normal/abnormal

.

 

             BF RBC Count <3000        See_Comment                [Automated



             (test code =                                        message] The



             0461818973)                                         system which



                                                                 generated this



                                                                 result



                                                                 transmitted



                                                                 reference range

:



                                                                 /?L. The



                                                                 reference range



                                                                 was not used to



                                                                 interpret this



                                                                 result as



                                                                 normal/abnormal

.

 

             RENETTA (test code = The reference range                           



             RENETTA)         and other method                           



                          performance                            



                          specifications have                           



                          not been established                           



                          for this body fluid.                           



                          ?The test results                           



                          must be integrated                           



                          into the clinical                           



                          context for                            



                          interpretation.                           



[facility] 
21:16:02





             Test Item    Value        Reference Range Interpretation Comments

 

             SARS-CoV-2 Rapid ID NOW Not Detected Not Detected              



             (test code = 24947-1)                                        

 

             RENETTA (test code = RENETTA) ID NOW COVID-19 Assay                        

   



                          is an isothermal                           



                          nucleic acid                           



                          amplification test                           



                          intended for the                           



                          qualitative detection                           



                          of nucleic acid from                           



                          SARS-CoV-2 viral RNA                           



                          in nasopharyngeal (NP)                           



                          specimens. It is used                           



                          under Emergency Use                           



                          Authorization (EUA) by                           



                          FDA. The limit of                           



                          detection (LOD) of the                           



                          assay is 125 Genome                           



                          Equivalents/mL. A                           



                          positive result is                           



                          indicative of the                           



                          presence of SARS-CoV-2                           



                          RNA. ?Clinical                           



                          correlation with                           



                          patient history and                           



                          other diagnostic                           



                          information is                           



                          necessary to determine                           



                          patient infection                           



                          status. A negative                           



                          (Not Detected) result                           



                          does not preclude                           



                          SARS-CoV-2 infection.                           



                          In patients with                           



                          clinical symptoms and                           



                          other tests that are                           



                          consistent with                           



                          SARS-CoV-2 infection,                           



                          negative results                           



                          should be treated as                           



                          presumptive negative                           



                          and a new specimen                           



                          should be tested with                           



                          alternative PCR                           



                          molecular test.                           



                          Invalid: Please                           



                          collect a new specimen                           



                          for repeat patient                           



                          testing if clinically                           



                          indicated.                             

 

             Lab Interpretation Normal                                 



             (test code = 19594-4)                                        



[facility] [id number] 
18:50:54 Cirrhotic liver morphology with evidence of portal hypertension 
withmoderate volume ascites, large esophageal varices and splenomegalyunchanged 
compared to prior. Since 2021, slight decreased now small left pleural 
effusion. Preliminary Report Dictated by Resident: Amy Bezold I, Maryamnaz ?Falamaki, MD., have reviewed this study and agree with theabove report.EXAM: CT
ABDOMEN AND PELVIS WITH CONTRAST HISTORY: 51 years-old Male presenting with 
history of cirrhosis andworsening abdominal distension COMPARISON: 2021 
TECHNIQUE AND FINDINGS: Contiguous axial imaging from the level of the lungbases
through the proximal thighs was performed after the administration ofintravenous
contrast. Coronal and sagittal reconstructions were obtained. Auto mA and/or 
iterative reconstruction were used to reduce radiation dose. FINDINGS: LOWER 
THORAX: Small sized left pleural effusion, decreased compared toprior. Minimal 
loculated fluid is also seen tracking in the right minorfissure. The lungs bases
are clear. Mild cardiomegalyThere is nopericardial effusion. LIVER: Nodular 
liver contour, this partial generated enlargement of lateralsegment of the left 
lobe and caudate lobe. No focal hepatic lesions seen onthis single, portal 
venous phase. The portal veins are patent. GALLBLADDER AND BILIARY TREE: No bi
liary ductal dilation. Mild gallbladderwall thickening appears stable and likely
secondary to chronic liverdisease. SPLEEN: The spleen is enlarged, measuring 
approximately 14.8 cm. PANCREAS: No ductal dilation or masses. ADRENAL GLANDS: 
No adrenal nodules. KIDNEYS: No hydronephrosis, stones, or masses. GI TRACT: 
Diffuse submucosal wall thickening seen throughout the small andlarge bowel 
likely secondary to chronic portal hypertension. No abnormallydilated bowel. 
Normal-appearing appendix. PERITONEUMAND RETROPERITONEUM: Moderate to large 
volume ascites. Noextraluminal free air. LYMPH NODES: No lymphadenopathy. 
PELVIS/BLADDER: Unremarkable urinary bladder. VESSELS: Similar appearance of 
enlarged esophageal varices seen extendingalong the lower esophagus. BONES AND 
SOFT TISSUES: Diffuse body wall edema. No suspicious lytic orsclerotic bony 
lesions. [facility], Radiant Results Inft User - 2021 1:52PM CDTFormatting of 
this note might be different from the original.EXAM: CT ABDOMEN AND PELVIS WITH 
CONTRASTHISTORY: 51 years-old Male presenting with history of cirrhosis 
andworsening abdominal distension COMPARISON: 2021TECHNIQUE AND FINDINGS: 
Contiguous axial imaging from the level of the lungbases through the proximal 
thighs was performed after the administration ofintravenous contrast. Coronal 
and sagittal reconstructions were obtained. Auto mA and/or iterative 
reconstruction were used to reduce radiation dose.FINDINGS:LOWER THORAX: Small 
sized left pleural effusion, decreased compared toprior. Minimal loculated fluid
is also seen tracking in the right minorfissure. The lungs bases are clear. Mild
cardiomegalyThere is nopericardial effusion.LIVER: Nodular liver contour, this 
partial generated enlargement of lateralsegment of the left lobe and caudate 
lobe. No focal hepatic lesions seen onthis single, portal venous phase. The 
portal veins are patent.GALLBLADDER AND BILIARY TREE: No biliary ductal 
dilation. Mild gallbladderwall thickening appears stable and likely secondary to
chronic liverdisease.SPLEEN: The spleen is enlarged, measuring approximately 
14.8 cm.PANCREAS: No ductal dilation or masses.ADRENAL GLANDS: No adrenal 
nodules.KIDNEYS: No hydronephrosis, stones, or masses.GI TRACT: Diffuse 
submucosal wall thickening seen throughout the small andlarge bowel likely 
secondary to chronic portal hypertension. No abnormallydilated bowel. Normal-
appearing appendix. PERITONEUM AND RETROPERITONEUM: Moderate to large volume 
ascites. Noextraluminal free air.LYMPH NODES: No lymphadenopathy.PELVIS/BLADDER:
Unremarkable urinary bladder.VESSELS: Similar appearance of enlarged esophageal 
varices seen extendingalong the lower esophagus.BONES AND SOFT TISSUES: Diffuse 
body wall edema. No suspicious lytic orsclerotic bony 
lesions.IMPRESSIONCirrhotic liver morphology with evidence of portal hy
pertension withmoderate volume ascites, large esophageal varices and 
splenomegalyunchanged compared to prior.Since 2021, slight decreased now 
small left pleural effusion.Preliminary Report Dictatedby Resident: Amy BezoldI,
Renee Tang MD., have reviewed this study and agree with laura medellin.[facility] with [id number] 16:58:45





             Test Item    Value        Reference Range Interpretation Comments

 

             WBC (test code =              See_Comment                [Automated



             6690-2)                                             message] The sy

stem



                                                                 which generated



                                                                 this result



                                                                 transmitted



                                                                 reference range

:



                                                                 4.20 - 10.70



                                                                 10*3/?L. The



                                                                 reference range

 was



                                                                 not used to



                                                                 interpret this



                                                                 result as



                                                                 normal/abnormal

.

 

             RBC (test code =              See_Comment  L             [Automated



             789-8)                                              message] The sy

stem



                                                                 which generated



                                                                 this result



                                                                 transmitted



                                                                 reference range

:



                                                                 4.26 - 5.52



                                                                 10*6/?L. The



                                                                 reference range

 was



                                                                 not used to



                                                                 interpret this



                                                                 result as



                                                                 normal/abnormal

.

 

             HGB (test code = 9.1 g/dL     12.2-16.4    L            



             718-7)                                              

 

             HCT (test code = 28.1 %       38.4-49.3    L            



             4544-3)                                             

 

             MCV (test code = 79.2 fL      81.7-95.6    L            



             787-2)                                              

 

             MCH (test code = 25.6 pg      26.1-32.7    L            



             785-6)                                              

 

             MCHC (test code = 32.4 g/dL    31.2-35.0                 



             786-4)                                              

 

             RDW-SD (test code = 70.4 fL      38.5-51.6    H            



             71767-6)                                            

 

             RDW-CV (test code = 24.6 %       12.1-15.4    H            



             788-0)                                              

 

             PLT (test code =              See_Comment  LL            [Automated



             777-3)                                              message] The sy

stem



                                                                 which generated



                                                                 this result



                                                                 transmitted



                                                                 reference range

:



                                                                 150 - 328 10*3/

?L.



                                                                 The reference r

moose



                                                                 was not used to



                                                                 interpret this



                                                                 result as



                                                                 normal/abnormal

.

 

             MPV (test code =                                        Not Measure

d



             67138-3)                                            

 

             IPF % (test code = 7.9 %        1.2-10.7                  Platelet 

count



             [id number])                                         measured by



                                                                 fluorescence



                                                                 method.

 

             NRBC/100 WBC (test              See_Comment                [Automat

ed



             code = 0068203700)                                        message] 

The system



                                                                 which generated



                                                                 this result



                                                                 transmitted



                                                                 reference range

:



                                                                 0.0 - 10.0 /100



                                                                 WBCs. The refer

ence



                                                                 range was not u

sed



                                                                 to interpret th

is



                                                                 result as



                                                                 normal/abnormal

.

 

             NRBC x10^3 (test code <0.01        See_Comment                [Auto

mated



             = 2081533144)                                        message] The s

ystem



                                                                 which generated



                                                                 this result



                                                                 transmitted



                                                                 reference range

:



                                                                 10*3/?L. The



                                                                 reference range

 was



                                                                 not used to



                                                                 interpret this



                                                                 result as



                                                                 normal/abnormal

.

 

             GRAN MAT (NEUT) % 70.6 %                                 



             (test code = 770-8)                                        

 

             IMM GRAN % (test code 0.20 %                                 



             = 0428666093)                                        

 

             LYMPH % (test code = 15.4 %                                 



             736-9)                                              

 

             MONO % (test code = 8.6 %                                  



             5905-5)                                             

 

             EOS % (test code = 4.4 %                                  



             713-8)                                              

 

             BASO % (test code = 0.8 %                                  



             706-2)                                              

 

             GRAN MAT x10^3(ANC) 4.17 10*3/uL 1.99-6.95                 



             (test code =                                        



             9674777949)                                         

 

             IMM GRAN x10^3 (test <0.03        0.00-0.06                 



             code = 8781660263)                                        

 

             LYMPH x10^3 (test code 0.91 10*3/uL 1.09-3.23    L            



             = 731-0)                                            

 

             MONO x10^3 (test code 0.51 10*3/uL 0.36-1.02                 



             = 742-7)                                            

 

             EOS x10^3 (test code = 0.26 10*3/uL 0.06-0.53                 



             711-2)                                              

 

             BASO x10^3 (test code 0.05 10*3/uL 0.01-0.09                 



             = 704-7)                                            

 

             TARGET CELLS (test 2+           See_Comment  A             [Automat

ed



             code = 39006-1)                                        message] The

 system



                                                                 which generated



                                                                 this result



                                                                 transmitted



                                                                 reference range

:



                                                                 (none). The



                                                                 reference range

 was



                                                                 not used to



                                                                 interpret this



                                                                 result as



                                                                 normal/abnormal

.

 

             Lab Interpretation Abnormal                               



             (test code = 73916-3)                                        



[id number]:47





             Test Item    Value        Reference Range Interpretation Comments

 

             PROTIME PATIENT (test              See_Comment  H             [Auto

mated message]



             code = 5964-2)                                        The system 

[employer]



                                                                 generated this 

result



                                                                 transmitted ref

erence



                                                                 range: 10.1 - 1

2.6



                                                                 Seconds. The



                                                                 reference range

 was



                                                                 not used to int

erpret



                                                                 this result as



                                                                 normal/abnormal

.

 

             INR (test code = 6301-6)                                        Nor

mal INR <1.1;



                                                                 Warfarin Therap

eutic



                                                                 range 2.0 to 3.

0 or



                                                                 2.5 to 3.5, dep

ending



                                                                 upon the indica

tions.

 

             Lab Interpretation (test Abnormal                               



             code = 37152-4)                                        



[facility] (ALB, T.PRO, BILI T, 
BU/BC, ALT, AST, ALK PHOS)2021 16:36:51





             Test Item    Value        Reference Range Interpretation Comments

 

             TOTAL BILI (test code = 6371060547) 4.4 mg/dL    0.1-1.1      H    

        

 

             BILI UNCON (test code = 4027858456) 2.7 mg/dL    0.1-1.1      H    

        

 

             BILI CONJ (test code = 5638097077) 0.1 mg/dL    0.0-0.3            

       

 

             T PROTEIN (test code = 0861826617) 7.1 g/dL     6.3-8.2            

       

 

             ALBUMIN (test code = 4119290501) 2.9 g/dL     3.5-5.0      L       

     

 

             ALK PHOS (test code = 0478273586) 204 U/L             H      

      

 

             ALTv (test code = 1742-6) 23 U/L       5-50                      

 

             AST(SGOT) (test code = 3240664357) 51 U/L       13-40        H     

       

 

             Lab Interpretation (test code = Abnormal                           

    



             69379-7)                                            



[facility] (NA, K, CL, CO2, 
GLUCOSE, BUN, CREATININE, CA)2021 16:36:50





             Test Item    Value        Reference Range Interpretation Comments

 

             NA (test code = 136 mmol/L   135-145                   



             0663430915)                                         

 

             K (test code = 3.1 mmol/L   3.5-5.0      L            



             2925598614)                                         

 

             CL (test code = 101 mmol/L                       



             5625020418)                                         

 

             CO2 TOTAL (test code = 26 mmol/L    23-31                     



             6335544274)                                         

 

             AGAP (test code =              2-16                      



             2655783524)                                         

 

             BUN (test code = 9 mg/dL      7-23                      



             9660523316)                                         

 

             GLUCOSE (test code = 118 mg/dL           H            



             3478459133)                                         

 

             CREATININE (test code = 0.56 mg/dL   0.60-1.25    L            



             0440912471)                                         

 

             CALCIUM (test code = 8.2 mg/dL    8.6-10.6     L            



             2633151705)                                         

 

             eGFR (test code =              mL/min/1.73m2              



             8233911899)                                         

 

             RENETTA (test code = RENETTA) Association of                           



                          Glomerular Filtration                           



                          Rate (GFR) and Staging                           



                          of Kidney Disease*                           



                          +---------------------                           



                          --+-------------------                           



                          --+-------------------                           



                          ------+| GFR                           



                          (mL/min/1.73 m2) ?|                           



                          With Kidney Damage ?|                           



                          ?Without Kidney                           



                          Damage+---------------                           



                          --------+-------------                           



                          --------+-------------                           



                          ------------+| ?>90 ?                           



                          ? ? ? ? ? ? ? ?|                           



                          ?Stage one ? ? ? ? ?|                           



                          ? Normal ? ? ? ? ? ? ?                           



                          ?+--------------------                           



                          ---+------------------                           



                          ---+------------------                           



                          -------+| ?60-89 ? ? ?                           



                          ? ? ? ? ?| ?Stage two                           



                          ? ? ? ? ?| ? Decreased                           



                          GFR ? ? ? ?                            



                          +---------------------                           



                          --+-------------------                           



                          --+-------------------                           



                          ------+| ?30-59 ? ? ?                           



                          ? ? ? ? ?| ?Stage                           



                          three ? ? ? ?| ? Stage                           



                          three ? ? ? ? ?                           



                          +---------------------                           



                          --+-------------------                           



                          --+-------------------                           



                          ------+| ?15-29 ? ? ?                           



                          ? ? ? ? ?| ?Stage four                           



                          ? ? ? ? | ? Stage four                           



                          ? ? ? ? ?                              



                          ?+--------------------                           



                          ---+------------------                           



                          ---+------------------                           



                          -------+| ?<15 (or                           



                          dialysis) ? ?| ?Stage                           



                          five ? ? ? ? | ? Stage                           



                          five ? ? ? ? ?                           



                          ?+--------------------                           



                          ---+------------------                           



                          ---+------------------                           



                          -------+ *Each stage                           



                          assumes the associated                           



                          GFR level has been in                           



                          effect for at least                           



                          three months. ?Stages                           



                          1 to 5, with or                           



                          without kidney                           



                          disease, indicate                           



                          chronic kidney                           



                          disease. Notes:                           



                          Determination of                           



                          stages one and two                           



                          (with eGFR                             



                          >59mL/min/1.73 m2)                           



                          requires estimation of                           



                          kidney damage for at                           



                          least three months as                           



                          defined by structural                           



                          or functional                           



                          abnormalities of the                           



                          kidney, manifested by                           



                          either:Pathological                           



                          abnormalities or                           



                          Markers of kidney                           



                          damage (including                           



                          abnormalities in the                           



                          composition of the                           



                          blood or urine or                           



                          abnormalities in                           



                          imaging tests).                           

 

             Lab Interpretation Abnormal                               



             (test code = 68063-9)                                        



[facility] [id number] 00:44:28





             Test Item    Value        Reference Range Interpretation Comments

 

             BF SEGS (test code = 4 %                                    



             8877327616)                                         

 

             BF LYMPHS (test code 24 %                                   



             = 2040452528)                                        

 

             MACROPHAGE (test 61 %                                   



             code = 8308440924)                                        

 

             MESOS (test code = 11 %                                   



             0762300140)                                         

 

             #CELS CNTD (test                                        



             code = 6012616369)                                        

 

             RENETTA (test code = Reviewed by HAILEE JACKSON)         DALE IBRAHIM, Director of                           



                          HEMATOPATHOLOGY                           



[facility] SMOOTH MUSCLE ANTIBODY2021-06-10 00:06:43





             Test Item    Value        Reference Range Interpretation Comments

 

             F-ACTIN (SMOOTH              See_Comment                If F-Actin 

(Smooth Muscle)



             MUSCLE) AB,                                         Antibody, IgG i

s negative,



             IGG(BEAKER) (test                                        the Smooth

 Muscle Antibody



             code = 00188-0)                                        titer by IFA

 is not



                                                                 performed.REFER

ENCE



                                                                 INTERVAL: F-Act

in (Smooth



                                                                 Muscle) Antibod

y, IgG by ?



                                                                 ? ? ? ? ? ? ? ?

 ?MARY LOU ?19



                                                                 Units or less .

......



                                                                 Negative ?20 - 

30 Units



                                                                 .......... Weak



                                                                 Positive-Sugges

t repeat ? ?



                                                                 ? ? ? ? ? ? ? ?

 ? ? ?



                                                                 testing in two 

to three



                                                                 weeks ? ? ? ? ?

 ? ? ? ? ? ?



                                                                 ? ? with fresh 

specimen.



                                                                 ?31 Units or gr

eater.....



                                                                 Positive-Sugges

tive of ? ?



                                                                 ? ? ? ? ? ? ? ?

 ? ? ?



                                                                 autoimmune hepa

titis type 1



                                                                 ? ? ? ? ? ? ? ?

 ? ? ? ? ?



                                                                 or chronic acti

ve



                                                                 hepatitis. F-ac

tin IgG



                                                                 antibodies have

 been shown



                                                                 to have increas

ed



                                                                 sensitivity for

 autoimmune



                                                                 hepatitis (AIH)

 but lower



                                                                 specificity suzette

n smooth



                                                                 muscle antibodi

es (SMA).



                                                                 F-actin IgG ant

ibodies can



                                                                 also be seen in



                                                                 SMA-negative di

sease



                                                                 controls (non-A

IH),



                                                                 especially in p

atients with



                                                                 primary biliary

 cirrhosis



                                                                 and chronic hep

atitis C



                                                                 infections. David

e patients



                                                                 with AIH may be



                                                                 SMA-positive bu

t negative



                                                                 for F-actin IgG

. Consider



                                                                 testing for SMA

 by IFA if



                                                                 suspicion for A

IH is



                                                                 strong.Performe

d By: [employer] [place] [address]



                                                                 [id number]

 Director:



                                                                 Margo Carlisle MD



                                                                 [Automated mess

age] The



                                                                 system which ge

nerated this



                                                                 result transmit

michelle



                                                                 reference range

: 0 - 19



                                                                 Units. The refe

rence range



                                                                 was not used to

 interpret



                                                                 this result as



                                                                 normal/abnormal

.



[facility] MITOCHONDRIAL ANTIBODY2021-06-10 00:06:42





             Test Item    Value        Reference Range Interpretation Comments

 

             AMA (test code =              See_Comment               REFERENCE I

NTERVAL:



             14251-3)                                            Mitochondrial (

M2) Antibody,



                                                                 IgG ? ?20.0 Uni

ts or less



                                                                 ......... Negat

david ?20.1 -



                                                                 24.9 Units.....

......



                                                                 Equivocal ?25.0

 Units or



                                                                 greater....... 

Positive



                                                                 Anti-mitochondr

ial



                                                                 antibodies (AMA

) are thought



                                                                 to be present i

n 90-95% of



                                                                 patients with p

rimary



                                                                 biliary cholang

itis (PBC).



                                                                 However, the fr

equency of



                                                                 detected antibo

dies may be



                                                                 cohort or assay

 dependent,



                                                                 as lower sensit

ivities have



                                                                 been reported. 

Not all PBC



                                                                 patients are po

sitive for



                                                                 AMA; some patie

nts may be



                                                                 positive for SP

100 and/or



                                                                  antibodie

s. A negative



                                                                 result does not

 rule out



                                                                 PBC.Performed B

y: [employer] [place] [address]



                                                                 [id number]

 Director:



                                                                 Margo Carlisle MD



                                                                 [Automated mess

age] The



                                                                 system which ge

nerated this



                                                                 result transmit

michelle reference



                                                                 range: 0.0 - 24

.9 Units. The



                                                                 reference range

 was not used



                                                                 to interpret th

is result as



                                                                 normal/abnormal

.



[id number]





             Test Item    Value        Reference Range Interpretation Comments

 

             Case Report (test code = Non-Gynecologic                           



             [id number])  Cytology ? ? ? ? ? ?                           



                          ? ? ? ? ? ? ?Case:                           



                          [id number] ? ? ? ? ?                           



                          ? ? ? ? ? ? ? ? ? ?                           



                          ?Authorizing                           



                          Provider: ?Amy Caldera MD ?                           



                          ? Collected: ? ? ? ?                           



                          ? 2021 1300 ? ?                           



                          ? ? ? ?Ordering                           



                          Location: ? ? [facility] ? ? ? ? ? ? ?                           



                          ?Received: ? ? ? ? ?                           



                          ?2021 1321 ? ?                           



                          ? ? ? ? ? ? ? ? ? ? ?                           



                          ? ? ? ?                                



                          Medicine/Surgery [facility]                           



                          7B ? ? ? ? ? ? ? ? ?                           



                          ? ? ? ? ? ? ? ? ? ? ?                           



                          ? ? ? ? ? ?Specimen:                           



                          ? ?ASCITES ? ? ? ? ?                           



                          ? ? ? ? ? ? ? ? ? ? ?                           



                          ? ? ? ? ? ? ? ? ? ? ?                           



                          ? ? ? ? ? ? ? ? ? ? ?                           



                          ?                                      

 

             Final Diagnosis (test b6svmNNqRBAwy1psMLXjn                        

   



             code = [id number]) GFuZzEwMzNcZnRuYmpcdW                           



                          MxIHtccnRmMVxlcGljOTQ                           



                          hD3esizUqUVMniDUaL6Yf                           



                          toswFYmwDK1jBO7wfRdqp                           



                          YDzrGQkPCOgGdElb5yja7                           



                          02aQJen4tpIRIFtlhrqLr                           



                          3pJbqY11dc4X8BtxoY29p                           



                          wSRdMFS3ZXBpDUTwlTSyE                           



                          XUdJIS1BTVbkHBqG7hlCK                           



                          CtUY0wyqjiUKrxFMatKSM                           



                          vqXD3IYAznIJgU2TsNLDl                           



                          FYetLVQskor5PdArHa1kr                           



                          GVyeTcyMFxwYXJkXHBsYW                           



                          xiZXPgKhMpExPDRQ5bHMA                           



                          ICW6FCE07DIQFXbHHIO9H                           



                          RVNJUyBGTFVJRDpccGFyI                           



                          K3wKvFCXEOFWiJkIt7HQF                           



                          1BTElHTkFOVCBDRUxMUyA                           



                          gA6NRVIRYGK8JUeFsLARr                           



                          un72HUO6UtIcv7Z9HZQcC                           



                          kCyLSJdJZ4xiVanDTJsIR                           



                          1lDZJgX4morO3ecyl4FyC                           



                          iUDVjBjT0GJXvxwR9Qdj6                           



                          PEUrWFhwk1prv3OpC2Ozl                           



                          JSnhQf0y3plURHjTjT2oJ                           



                          OuQXuaU2lxvbSfoZOdDNF                           



                          iIGf2bOdcThLwYWWkt9dc                           



                          cyBcZmNoYXJzZXQwIENhb                           



                          Jipejz4jF66IRStxG7naM                           



                          NqEOwyysXyXkR3IOetMMH                           



                          bQxF3BABmzJReNJOiM8ee                           



                          ZWQwXGdyZWVuMFxibHVlM                           



                          PB3iGetp6V9pPEboEQinU                           



                          weIlNaBaDkJODLz8TyUZi                           



                          4rUqgM4BuTFJrUcP9qSVk                           



                          WWKgAUabJDRsPXOvmtW5d                           



                          D51INdqwyH6vWEjl7Mce6                           



                          3pu976sW0jeLWeHGX5EDI                           



                          kQXNfnFZoEQWuFSM3LHOz                           



                          uTIdU2wbGBHwWF1yigxlK                           



                          FhiUEzuCEJqgBI2ACYzwA                           



                          DaF9RuFDXbEXxfWUYncis                           



                          1PkIjTt7siUNcsVwuDJwc                           



                          t8fsw6pwnVNwXxj6OANbN                           



                          gTmHxfaMUudq5Vxk2isFA                           



                          Bfiq0bZDS6zTBjqLyhe9Y                           



                          0bGUxXGRudGJsbnNiZGJc                           



                          KhW0BYkzTJ2gmi53HBWcZ                           



                          DZ8io2fwCBwfRibszVgqC                           



                          HrRUpuQ6WdFBVva824YYG                           



                          uB7WuGQNtd0B0qkGuQbEj                           



                          PXDaiLE8kiN5XYNqNAa4i                           



                          VSmjwG3qaNyfOXqJ9nslH                           



                          5tHOPcHU2enrsbl4gwFIh                           



                          vLChvUOLyxIU1faH4EGEr                           



                          qEKjY0MorF9qNEDuSLftQ                           



                          VHwqnf6ExKhHu0etTXkgQ                           



                          cyMFxzYmtwYWdlXHBnbmN                           



                          vbnRccGduZGVjXHBsYWlu                           



                          XHBsYWluXGYwXGZzMjRcc                           



                          GngpTcshG1jXkDgAlFlWL                           



                          hfDK9tKKJsR5xknIOzWCG                           



                          wQBRjN1cwOiKuqE5apLpq                           



                          MVxjZjJcZnMyMFxwYXIgS                           



                          SBoYXZlIHBlcnNvbmFsbH                           



                          lycoZ7mYS8IYQuPEyuVTP                           



                          qJPTruJHcud0brLcrNCZf                           



                          DR5sUQAideKuHHjwtFlzW                           



                          DjcZJT0PCFrtPRphWRvgW                           



                          FkZSBieSByZXNpZGVudHM                           



                          cZANdaQuym6Qof9QnqHW6                           



                          hN8rn6dfr8LoGHYhpWY4N                           



                          X15boR6iP5nSQSzAU2oUJ                           



                          GoDL1jtYUanEEaBMSpj03                           



                          gdGhpcyByZXBvcnQuXHBs                           



                          YWluXGYyXGZzMjhcbGFuZ                           



                          zEwMzNcaGljaFxmMlxkYm                           



                          GaBSMgDIkzN6shQbUwNyB                           



                          ePVthZTR4xN==                           

 

             Final Diagnosis Comment i5xriETxEEFmsFJ2ZGYzJ                      

     



             (test code = [id number]) MUwd9ify5OqpUZpzHSePB                     

      



                          gwsHVlwcZohw28jOV7iO7                           



                          9MK2wUBBjPpB6MTCtioS5                           



                          Tpn4LUWvPEIynBYeI717w                           



                          2bqn1aakxLobSA2nOspBQ                           



                          KdaysoHtO6PCurWGZyzdy                           



                          xTRj0ZBomLMIfrCR7LBTq                           



                          bAUfX8UqLWKcJR8crio7P                           



                          BA7WHmdHUVkKwA5EVDxzM                           



                          SjHUXzvVstPKaet607VGT                           



                          1KuLhYVUynlZrqNrfgK3w                           



                          CyClNMGMyEBlzwXrn3zwh                           



                          uJxA6S0fRKyRKZygNYks9                           



                          YcXZuyNIytT5VqxITcvW4                           



                          fRQAoXBXxB1RcsZ9bOU8n                           



                          DBEeYGIvzMksVG57uYnko                           



                          KzcbMrkiQrdoEqdJ1b8yT                           



                          XzxW1oyNZbwSC6dE0uZdY                           



                          HiaKvHJoyO26cdlRnO5Va                           



                          wWTaoTRtoeVsEptiWJ6dj                           



                          GFyfQ==                                

 

             Clinical Information large new onset                           



             (test code = [id number]) ascites                                

 

             Gross Description (test e0lyvCDrGRExlAE2MOZfC                      

     



             code = [id number]) KMui7jah7LecBIruZCzZB                           



                          yftXWpzzQgfg52pEF6hO0                           



                          5TR3tCHFhKyK3SLCeteC9                           



                          Moh4RHDvNFXkaSYoR908x                           



                          5ggn7zieoUyiXG4aKjjSN                           



                          HzomlpIhK1OIjjZXQhfzt                           



                          bRKu0RNuaWRLbcOC0ZJZp                           



                          oJDrO7WuFHKzBY2qieb5L                           



                          DQ1KAkhUYUkZuG8NWUooY                           



                          JjOVKbjVdgSByvf175YDF                           



                          6QlFnHVRlurI9CZepFWXc                           



                          P4BvR2VfAXzvKWG0SANaJ                           



                          CBcXHQgMSBcXGZsIFxcbm                           



                          A9m4plDLMgwWAlZEM6CWy                           



                          caWQgNTEwMDIgXFxkYiBP                           



                          IyQiXpUAAXTpOQm4UbI0E                           



                          Df6HOOQXgCzFzAuIHK6EW                           



                          u9PyGpVWp2SEr6TOlIXoI                           



                          qIuA8RnOpAjU0TVH1LdH3                           



                          IFxcdCAyIFxcZmwgXFxmI                           



                          EFyaWFsIFxcZnMgMTAgXF                           



                          ftT97jjLhibU2xGwXzUQh                           



                          qUIPkFnBdIpQYEI9qILEH                           



                          GG1AJY45OZCYMxAYLY4QU                           



                          VNJUyBGTFVJRFxwYXIgUm                           



                          YwQAb8JRKjCqTmw7pumBY                           



                          tCMCvHZJwQ0Hsd4Xfd5Ll                           



                          bmdlIGFtYmVyIGZsdWlkI                           



                          FxwYXIgUHJlcGFyZWQgMy                           



                          BzbGlkZXMgKDEgUGFwYW5                           



                          aS97nIZ20UWI9kT1tuQbb                           



                          YTKyGTJafSAgy5rdi8rvK                           



                          0p5l5YoqR6nEC1lNKSpWC                           



                          hpbnByZXAgcHJlcGFyYXR                           



                          go54zHRMafqdjQXUlY8Rw                           



                          B0UgsbZ5o8djuYvby4Jej                           



                          IEgNW4ccWJjjZ==                           

 

             Disclaimer (test code = r6ralMIoHXDzp7wkOCPvj                      

     



             [id number])  GFuZzEwMzNcZnRuYmpcdW                           



                          JpKRxaboIgPOeqp7MbR4V                           



                          yMjAwMFxhbnNpXGRlZmxh                           



                          vhrgVQVhEHK3tlInPIEsI                           



                          JtlZWWlABnjKv9kkVGcrY                           



                          tqWgThXBJob0ryyuOPYDr                           



                          xTqVnE712ULNfHIzzu9hx                           



                          m3ImTOGuyXTxs2J5FGPDb                           



                          keumPm9qTdnL85mh7Q7Mv                           



                          hcB7cmIDBwMRLgD3IpPO5                           



                          dRLMxTvh6KJG1MXF5ZRZk                           



                          ZLCiF3CsTQ5aXFRxoEJjC                           



                          Wt6n1zbuQvqMKPjUBK8z4                           



                          fxXRkcxjNmIN3mss5ojYf                           



                          0d5fqrcVbMHWwTTMtoEOZ                           



                          EYElY6YqcUsbGd6dmEc5b                           



                          TkeWiiiHJW8Amd4PG3ntg                           



                          89nak6xEdvNWZurqeaSfK                           



                          4FHkgKKSaxfzqNAe1WLwo                           



                          SBXkjMD4OIXgwSFcO1CjU                           



                          BLqGZ6fffv4DAC5MMbxTQ                           



                          VdBeB4MIHiuKQfCVQyrNq                           



                          uRMhzz999ROQ0CdKjAC9v                           



                          H2Rqx3E7uB5udFNaJVQbt                           



                          GZmIhPrTURpwb1tpJNgUC                           



                          bmh7SfSRD9mzP8uUAioXI                           



                          iTHEiWH78Zfmpe0DtWptn                           



                          z1PnD51etCO5BXnjt6ccU                           



                          C1dJyI7zgZoHHnpf8ycmJ                           



                          2fMlU7PInyUB7rBV4bACB                           



                          haQ6psblmDXMzGwDguqdw                           



                          IICjfAazofKdNf7koTcrG                           



                          UL8NHpzO1jcjG8rQgQ9TA                           



                          dvG8uirY5yQFm7GQkfaJZ                           



                          7LLZplQ0eZV0jcykbn1nd                           



                          YBhjIMgoUMHyudW7uqK3M                           



                          MVndHWiO7XumF8vCDFcGM                           



                          3alwqne9puELD6DJtkDRT                           



                          sIMC5SrRgQNVim0Ppddr5                           



                          QtQmx8ZlxUUsKYsjT68iv                           



                          207NLBicwSpV2hdbMQjnz                           



                          zvaWYpvpriYTpldxY0BEA                           



                          jnpFyu3AaUEIwPMQ8USqm                           



                          GJmttOXgPFOgaQnla8fsW                           



                          3RscGFyXHBsYWluXGYxXG                           



                          ZzMjBcbGFuZzEwMzNcaGl                           



                          jaFxmMVxkYmNoXGYxXGxv                           



                          O1slDwSsO3SoTGNzWcJbn                           



                          AMsD4onROfybzIlROKygq                           



                          WdiNS1DIabB9g3LHMhuxO                           



                          alVb2nuBdJdEtSCZoXEO8                           



                          BVzjgJKqEZUbj8Lpxdlgs                           



                          EBxWj0xjCDjQAHbeL7dGG                           



                          GbXWYpRJumTO1loYs4MPS                           



                          GrJAvaUEhEaVNACMpWG83                           



                          buUoPTAZfhsiv5M3LKkpZ                           



                          BJfm6EptRKeP9bhh5WcDW                           



                          Jxv29dLP5by4F5w3psDOL                           



                          5JS7dm0HgXGJsjLWcoOGi                           



                          TVOzr4Evqushm0DqWRSvz                           



                          oAnl6WtSDHzwhAtfMKcNA                           



                          OyfgKwmi4qyfHdUGYzULE                           



                          pJ9CfzyrqoXaalwCsECWb                           



                          zu4gtrTdPQM3VMPMCOAaV                           



                          LXhw9WbmW7myNSATBV1oP                           



                          Hzcm0rrjMFwBBdDKWmxk5                           



                          9CIXeAN1fV0bmSZUeZUDg                           



                          idZpiBBei8BoEMSvpHF5x                           



                          JZrGG1PIkCTi72eUKZbXE                           



                          AScxBsZMOaoClquWO3fnI                           



                          7nV1fPDfXUTBcLgd+IFRo                           



                          WEVCTPRbFB1axaUck9Dpm                           



                          gTcaIphATItjLFrp8YneD                           



                          Ftr9KlkQajq4ThaMJufFP                           



                          bEV0tGKPaawkmLLZePOER                           



                          McGDNWGcljH0y6LtZWXuH                           



                          AVsZGD6iRmxnix3BQDprQ                           



                          1mVOYnQ1gykmscECkpRZI                           



                          xh2AxgV1qvIXBiSXme9Yf                           



                          cDCjhQIVtEXhIG5zylMkR                           



                          MhWYQpNYDD6bsSpJDFiz2                           



                          GhNHxzU2eqH53qaUgzbXt                           



                          8eYT7RPH7fQ7eNok+IFxw                           



                          YXJccGFyIEFwcHJvcHJpY                           



                          IRidZcaiiToD3DqjwYqjR                           



                          2tsURfrgTyGC2jJW4bR5N                           



                          7mNLmFTRuhsJxh5byUTgd                           



                          dmUgYmVlbiByZXZpZXdlZ                           



                          GCrb9ThAOmfAWM6XKzpbc                           



                          BpbmNsdWRpbmcgSCZFLCB                           



                          YqJVpoQVfUNY1PNpehuHr                           



                          egFfEB6ggT2hbOeemP7gt                           



                          SDtdKK3chsfZVPpAHNpmS                           



                          mlXRTtHZ5ihEYqTEEcwaU                           



                          PmXtlbGBrsB4mT3ZiFYYc                           



                          EJAddw9jQTYrtE5lPAjbc                           



                          2VydmljZXMgYXJlIHBlcm                           



                          Iojv5bHBVtpBDCZB0HMRs                           



                          hqSGcy5MdcnPzG1oNKLH0                           



                          NUQwNjYwMjgxKSBleGNlc                           



                          OCqASOrke55BSAbmI6nmX                           



                          dnJETyzL4fqJ2vsPszbN1                           



                          mCxAfUyNvUJevFI5tSCTj                           



                          Z9zqkCLbURJgWBYaU0djK                           



                          uXltI9huOgeLSybPsOlFk                           



                          BsPKgbYCW2wZ==                           

 

             Embedded Images (test                                        



             code = [id number])                                        



[id number]17[facility]32





             Test Item    Value        Reference Range Interpretation Comments

 

             Case Report (test code = Non-Gynecologic                           



             [id number])  Cytology ? ? ? ? ? ?                           



                          ? ? ? ? ? ? ?Case:                           



                          [id number] ? ? ? ? ?                           



                          ? ? ? ? ? ? ? ? ? ?                           



                          ?Authorizing                           



                          Provider: ?Amy Caldera MD ?                           



                          ? Collected: ? ? ? ?                           



                          ? 2021 1300 ? ?                           



                          ? ? ? ?Ordering                           



                          Location: ? ? [facility]                           



                          Health ? ? ? ? ? ? ?                           



                          ?Received: ? ? ? ? ?                           



                          ?2021 1321 ? ?                           



                          ? ? ? ? ? ? ? ? ? ? ?                           



                          ? ? ? ?                                



                          Medicine/Surgery [facility]                           



                          7B ? ? ? ? ? ? ? ? ?                           



                          ? ? ? ? ? ? ? ? ? ? ?                           



                          ? ? ? ? ? ?Specimen:                           



                          ? ?ASCITES ? ? ? ? ?                           



                          ? ? ? ? ? ? ? ? ? ? ?                           



                          ? ? ? ? ? ? ? ? ? ? ?                           



                          ? ? ? ? ? ? ? ? ? ? ?                           



                          ?                                      

 

             Final Diagnosis (test g5levQRsLVSqp8jfWTJho                        

   



             code = [id number]) GFuZzEwMzNcZnRuYmpcdW                           



                          MxIHtccnRmMVxlcGljOTQ                           



                          gV0qzfoRdQVHyaNFfH1Lv                           



                          qlmwUGggOV0vGG6csTqrz                           



                          PCaoUFfWQYoMmPvp3tqs7                           



                          04aBUwp1baNARYxowtlNw                           



                          1lKbtO77rg0U6ZxjoJ23s                           



                          rTQqDNF4EGElHRDkjZVnW                           



                          ZBjUPT3DZVtyGRwP7iuRU                           



                          RxVM0pzcpaVRmtECtlUYS                           



                          miMX2PGPtqYHmU7FxGLNh                           



                          DMubQWEqpja7CwHaYi1fk                           



                          GVyeTcyMFxwYXJkXHBsYW                           



                          otXTBzBcSzTcVIMS5pGPP                           



                          QGM8HBZ39WDEDLcWNUX0L                           



                          RVNJUyBGTFVJRDpccGFyI                           



                          K8dVsFSYULJWaTxZg1PYB                           



                          1BTElHTkFOVCBDRUxMUyA                           



                          nO1KZRESZGW9LYsUxMZKb                           



                          ml80FGQ1XyMtq7A8JNPkN                           



                          uRgGYMdPT2jsAzyANHlAE                           



                          6iTGSxA6ipdP2inwn5WjU                           



                          hKGOjKhH7CGKttfJ3Ogm8                           



                          TTXjZQunq1jhi8JpL6Nul                           



                          SIsyJc4t4ieYOAnTaR9fS                           



                          VxTAzoO3wfyoUylTYkPCH                           



                          xTRo9lBiyMbEaCTFvm2dd                           



                          cyBcZmNoYXJzZXQwIENhb                           



                          Nbnsjp3zF43VLArzX8gpD                           



                          QwVBphmsKiEjN6OBmmTQZ                           



                          xRmY7UPEvcPXoVDGyI1kc                           



                          ZWQwXGdyZWVuMFxibHVlM                           



                          IK7zHrse9I7bRIjtTSnqL                           



                          vyAxUyNjUxWECPs0YeTZg                           



                          3dCscP5AsTXZtDaA8xTMy                           



                          IYCvAVasTMFiQJSvgbA2j                           



                          P26EOlclyK5qFChi6Ody8                           



                          0ry058xD2uyJFyZSX6SQW                           



                          bYECtaMLlKEEeWTZ8QIPj                           



                          sVQhS4mpRTJeTK5kfrsyT                           



                          YvzOYciVGLelGK7YOYptY                           



                          PpT5PaDLImDButZQDfdps                           



                          3LkNvBh4xqMOouJojUBem                           



                          q1zcz8belMMiIak6XZPaH                           



                          iDbIngzLMfqn6Bkq9cvFV                           



                          Riau7rLBY4sRVsiGtwx4T                           



                          0bGUxXGRudGJsbnNiZGJc                           



                          BqX5KZzdES5ial85NRDxN                           



                          AJ8pb2twAQwuUyyewFttQ                           



                          AjMSrlG9LjAKWws900OXI                           



                          tA9UaVBKhq5A3hhNeWuTt                           



                          OSGruGI5qbK6IYEiYFe3j                           



                          EDqyxM2dlEptZWjB5wwgN                           



                          3xJAMyEX5cihfts5gvHUd                           



                          uPOseCNHluPL7jjU9OXKw                           



                          zPZaT6KcpQ9dSMHfUCivO                           



                          FLkhtj1DwYcNm0gmDHfsP                           



                          cyMFxzYmtwYWdlXHBnbmN                           



                          vbnRccGduZGVjXHBsYWlu                           



                          XHBsYWluXGYwXGZzMjRcc                           



                          NyvxKmzeP3cShPcUiCgGJ                           



                          uxMR2bCEIqI3xrcDOfXMM                           



                          dTCOcS7bqLbTgkU4oiAmc                           



                          MVxjZjJcZnMyMFxwYXIgS                           



                          SBoYXZlIHBlcnNvbmFsbH                           



                          ngclJ6hQC9YUMsNQgtQVB                           



                          sVVZohMUblc9tmYnrCPXi                           



                          KL2zDNTkgjNySKlazXvyF                           



                          BgwWIU7GTNxtDVwpCEqqV                           



                          FkZSBieSByZXNpZGVudHM                           



                          sENYmySara1Far2NizWJ5                           



                          aR1mh6iml8SoXTGbuLE8E                           



                          C95yuX5dZ2rSAIcZI8aXB                           



                          KwIB1fkORepHKeNRKej74                           



                          gdGhpcyByZXBvcnQuXHBs                           



                          YWluXGYyXGZzMjhcbGFuZ                           



                          zEwMzNcaGljaFxmMlxkYm                           



                          LgVZHpOAdzX8crFxViPrY                           



                          vWAuyIQS7pO==                           

 

             Final Diagnosis Comment k3ienALiBDMouAS9FOQvV                      

     



             (test code = [id number]) ULff2jdp6NitCMvqUNwCE                     

      



                          eygFDnvkKjwk33wVN5uT0                           



                          9EJ9sZRMtEsA5MQRekyU1                           



                          Roh7ILGyZPLhyKUrY449v                           



                          6obk6gmatEofFE0uBxfJV                           



                          ZamqeqArD1NQchVBPgyju                           



                          uWGp6VEocQTBgqSZ2RAVf                           



                          bRErS6BxNKAaAS6xvwy4W                           



                          ED3IByzDTEhRmO7IVSoeY                           



                          PsRKXxdHqyPLuwf173OBD                           



                          9CkAwDSMbgeJlfXmqhA9t                           



                          DdIhEXTLsZUgcyPuw0ivq                           



                          nNrX1E3fGTmYFHjzZYga7                           



                          ZhDRuqNWcgC0DhgBVsqF2                           



                          tBDGmJMWqL8WltT3dUU7u                           



                          VZZfCRCowIydJU08kAldj                           



                          TtpeVmciTapmWgdD7x9rL                           



                          SrxS3bzAEzuOQ7tS1rXrQ                           



                          UgkLdEMcjP32nwmSyZ2Ts                           



                          iSQjaVXhejYsAwhjYM5yi                           



                          GFyfQ==                                

 

             Clinical Information large new onset                           



             (test code = [id number]) ascites                                

 

             Gross Description (test q6kikFNsEKIupTP9ZXJeH                      

     



             code = [id number]) JRgs9nfx2TsyZUfsLVeUL                           



                          xuyWLosuQykg17fMV0uK5                           



                          9AV4jUPSoCaW0MGRrrdO6                           



                          Jfx9JHEvJUBgpCTsF505v                           



                          7wae3hdvgXlrUE3yWarOG                           



                          EkrjbcJoQ6SJxuIJYzlum                           



                          zXJz4GWjkQZWahHJ1DINc                           



                          eASyQ4CvWPOzPG7zlyy0B                           



                          PT5TSkrQQCxVoL8MFQkaD                           



                          CtZUJxxDvqKSuwz684ZXA                           



                          1YmUjVRFsroA7LQogXMNc                           



                          H7MsQ0HrHKyvRDB9XMWyM                           



                          CBcXHQgMSBcXGZsIFxcbm                           



                          V0c3ggYKMruNRnBZY6MIa                           



                          caWQgNTEwMDIgXFxkYiBP                           



                          FkMiToCJFBPeFOo0OjM7X                           



                          Ii8NUJYIiAwVwFzNEU6DM                           



                          j2VlDdBEv5OJc1UCdXJkK                           



                          jVwX9OnMkWxI9RKA9HlT7                           



                          IFxcdCAyIFxcZmwgXFxmI                           



                          EFyaWFsIFxcZnMgMTAgXF                           



                          kiU25djXikxJ6oXdHmKCc                           



                          gMUKbEoViKvMEZE2fRFIG                           



                          CO7DYF60ISVBBvXVUO6OA                           



                          VNJUyBGTFVJRFxwYXIgUm                           



                          JuGGn1RZNnQdNsz1jjfFL                           



                          eNYQePOQoK0Hwa8Cwx1Pm                           



                          bmdlIGFtYmVyIGZsdWlkI                           



                          FxwYXIgUHJlcGFyZWQgMy                           



                          BzbGlkZXMgKDEgUGFwYW5                           



                          zI86oEF82ZNA8gX4jtUtk                           



                          RTIuOUIxiUUsw9tuk6zpJ                           



                          9j8l7PfdJ6mKQ1mJDClXF                           



                          hpbnByZXAgcHJlcGFyYXR                           



                          xt91xNGOlmssbAHFnZ9Sy                           



                          S2MuruM8a6ljhFoir4Mza                           



                          GKmDH1dqLTeuM==                           

 

             Disclaimer (test code = f7gcxPVuMYNgy6hxUCFya                      

     



             [id number])  GFuZzEwMzNcZnRuYmpcdW                           



                          GnXNifsgIfOLiso0EkA7P                           



                          yMjAwMFxhbnNpXGRlZmxh                           



                          ygkwQMHuZEF1xlFkTTYzT                           



                          SqsOHOwEJbdFe0hvIXccK                           



                          fxImBwQRMlw1aepcZTRCy                           



                          iQySpR409VDBlJXnbf0wt                           



                          r0TpRMIzoNEvo7O4RIRIp                           



                          zrnuVj0sOleR72wx8V0Rz                           



                          tzD4dqVUVjHOVmN4NjQU0                           



                          cGJOhIpb5SWJ5HOR6RMAs                           



                          OTNdF0BySE8yXXYmnYBaT                           



                          Cm7w2yciMnyMNStGGH5g0                           



                          kxWLgxddYbSJ1cqm2xrZb                           



                          7m9hrjkGxMJOvZXBxgCXL                           



                          FOZxM4IkmYyrUr3yyUr1m                           



                          TuzVpraDFG7Mae1YJ7ykp                           



                          12ayl2lVoeEJOrpuotEqB                           



                          8TOhnJRTthfddEMp6GWrg                           



                          HAAfgLG1KWTvpVWtA3HtD                           



                          NBcRN9tpyc6HGN6POqhDS                           



                          CmZjG1ZGSwnCNsYKWioAx                           



                          jFWvug994JDH5UwKpOY9r                           



                          W6Xzg6M8wM6fpAVfEFFto                           



                          HDjQlAzFXTcxx4gwTElCK                           



                          tvh8JrKVU4kfH6nZPimLB                           



                          sLCLuKM37Dxydb3EkRdsp                           



                          i7LzF28opKY4GMnux3eaG                           



                          I8zWpM2tlAhJEkcu0ugbH                           



                          6cEoM7RJjxMW8hEG3sGQZ                           



                          ynO2pmraiWUIwQeJfozvs                           



                          WGFkwFlqrnRkXd3tnVgwN                           



                          LD9STigM3qzzM6aLyH2HM                           



                          gnV9fppS7cLQn3IJegyAK                           



                          4DBLurG7uGH4yrntie8qb                           



                          UMgzBSqkPLOzisK7soV1R                           



                          BRosQCxQ5JmtG4sMRTjUO                           



                          2fmymfp2kqSRU2VBygFSR                           



                          pQTS8MmPfZLUnl1Lccwa2                           



                          IpYda5IvkFZhBFndI74vp                           



                          285CYZbvsQeH0dsfPQsxf                           



                          secWQwsiclDQkzhoC2JAB                           



                          bvhQsw6FdGBVhFIQ2RLcr                           



                          EZsusFOuDVNcgXdgz3icW                           



                          3RscGFyXHBsYWluXGYxXG                           



                          ZzMjBcbGFuZzEwMzNcaGl                           



                          jaFxmMVxkYmNoXGYxXGxv                           



                          R5ifVjSwI9NqLZUyUdXzl                           



                          TTwU5wvJBelalCuZBYurz                           



                          BpyDU7HDbnO5k0BWGlzxJ                           



                          bjLx2tkFzUzJiOYFwIEX7                           



                          EYxrtNWtQXFyk2Mptxfdw                           



                          KYwBj4jwIGeRLPszR2sZI                           



                          DuCKGmXBegPR8ecBa8JOO                           



                          DkNTsvSLdVmTVVNIxOF05                           



                          whKoBRUEyrfyf2F9SGynQ                           



                          TUkf6YrlSZzR2kqa3GwIT                           



                          Ecg63pBI8ex6B8q5pqYLT                           



                          6OV7xf7LjHWSlyOFgtIBz                           



                          HIWoz1Czggodd1MnAWGjz                           



                          oNxy1FwYRQihqVycBVcNF                           



                          SwqyXmxh7guxGsIDPbZSW                           



                          cJ2NenuxagHcdbyOvYWJv                           



                          su7hliKsGGV5XCLORLMdR                           



                          PEdo5HkyB5mkELAEZX4dF                           



                          Bpya3pmeRTwXVbOWPfyx8                           



                          0LAZcZN6dS1rlHDPqCLLy                           



                          vqBzjLIzu5ZpLCKggZS4t                           



                          GTqBV6JIfXGd75tEKCrBH                           



                          SCcyUjLPWauUqpnZU8rkR                           



                          2uJ4iIBmVTYQdPfr+IFRo                           



                          GFJZGZRoMH8boeGie9Dxj                           



                          mHwxQmgVRFjaELrj9TanV                           



                          Tdv8FnbFxgt8EbmAYxaNN                           



                          bVT4jXRNepyxwQCGjZZFF                           



                          HqJTOXPsgtE8o8XgVIGfF                           



                          NRdABA7tGyhypn3ISEznH                           



                          1hSDErR8bgreaiQAimGFK                           



                          kq7AphH1llGKXfAPiu3Fe                           



                          zNBsnBJYjCErSN3qabUkZ                           



                          McEFPtVTUE0cmSrLFWxz7                           



                          InXCkzE4ktX97sxKdbfUk                           



                          3sEF8EVK1jH8jChv+IFxw                           



                          YXJccGFyIEFwcHJvcHJpY                           



                          PDlhFomeuFnR8FnlmBjwE                           



                          0ijLLifsMjMT2qDZ0cF2E                           



                          0oXSiOCToxrAro0bmVUil                           



                          dmUgYmVlbiByZXZpZXdlZ                           



                          TYnk2EaJIchOPL4WDsszv                           



                          BpbmNsdWRpbmcgSCZFLCB                           



                          DmXVzuLLvHBP3LDvaxdOl                           



                          lyShHF0jpS8oiOsrrR7oz                           



                          UCuaYK2lrgkJXTbELShyO                           



                          jzEUZuRT6nrOQeUQHgghW                           



                          BhIwduQZvhD9gW2VpUJAr                           



                          DZLxah3pSHElpN0yUPntt                           



                          2VydmljZXMgYXJlIHBlcm                           



                          Thrt8zSZKpcZOCYJ0ZZPl                           



                          qqXQvo7QkqiPaH6yNCXI6                           



                          NUQwNjYwMjgxKSBleGNlc                           



                          FJiBDXilq48ZCBjzY7elU                           



                          qeBCJdwO7tzY6ckXsjrZ7                           



                          qMoTzPeYoYUmxJL4kOCFq                           



                          A9eooHUrSBMpZRRmV1dvK                           



                          vGxuF4qoTitHXbsUqNhHm                           



                          UkAOdhTLQ1qP==                           

 

             Embedded Images (test                                        



             code = [id number])                                        



[id number] [facility]43:43





             Test Item    Value        Reference Range Interpretation Comments

 

             ALBUMIN BF (test code 476.0 mg/dL                            



             = [id number])                                        

 

             RENETTA (test code = RENETTA) Result interpreted                           



                          relative to the serum                           



                          concentration. ?                           



[id number]43





             Test Item    Value        Reference Range Interpretation Comments

 

             ALBUMIN BF (test code 476.0 mg/dL                            



             = [id number])                                        

 

             RENETTA (test code = RENETTA) Result interpreted                           



                          relative to the serum                           



                          concentration. ?                           



[id number] 
15:36:48





             Test Item    Value        Reference Range Interpretation Comments

 

             NA (test code = 132 mmol/L   135-145      L            



             0845243051)                                         

 

             K (test code = 3.0 mmol/L   3.5-5.0      L            



             5121979544)                                         

 

             CL (test code = 98 mmol/L                        



             0491970010)                                         

 

             CO2 TOTAL (test code = 32 mmol/L    23-31        H            



             8906217300)                                         

 

             AGAP (test code =              2-16                      



             2105255888)                                         

 

             BUN (test code = 5 mg/dL      7-23         L            



             7971499946)                                         

 

             GLUCOSE (test code = 156 mg/dL           H            



             6030056673)                                         

 

             CREATININE (test code = 0.48 mg/dL   0.60-1.25    L            



             9028341381)                                         

 

             TOTAL BILI (test code = 4.7 mg/dL    0.1-1.1      H            



             9860218164)                                         

 

             CALCIUM (test code = 7.6 mg/dL    8.6-10.6     L            



             3637844601)                                         

 

             T PROTEIN (test code = 6.5 g/dL     6.3-8.2                   



             0616890336)                                         

 

             ALBUMIN (test code = 2.7 g/dL     3.5-5.0      L            



             4363768926)                                         

 

             ALK PHOS (test code = 109 U/L                          



             1034467006)                                         

 

             ALTv (test code = 22 U/L       50                      



             1742-6)                                             

 

             AST(SGOT) (test code = 70 U/L       13-40        H            



             4590769267)                                         

 

             eGFR (test code =              mL/min/1.73m2              



             3672245550)                                         

 

             RENETTA (test code = RENETTA) Association of                           



                          Glomerular Filtration                           



                          Rate (GFR) and Staging                           



                          of Kidney Disease*                           



                          +---------------------                           



                          --+-------------------                           



                          --+-------------------                           



                          ------+| GFR                           



                          (mL/min/1.73 m2) ?|                           



                          With Kidney Damage ?|                           



                          ?Without Kidney                           



                          Damage+---------------                           



                          --------+-------------                           



                          --------+-------------                           



                          ------------+| ?>90 ?                           



                          ? ? ? ? ? ? ? ?|                           



                          ?Stage one ? ? ? ? ?|                           



                          ? Normal ? ? ? ? ? ? ?                           



                          ?+--------------------                           



                          ---+------------------                           



                          ---+------------------                           



                          -------+| ?60-89 ? ? ?                           



                          ? ? ? ? ?| ?Stage two                           



                          ? ? ? ? ?| ? Decreased                           



                          GFR ? ? ? ?                            



                          +---------------------                           



                          --+-------------------                           



                          --+-------------------                           



                          ------+| ?30-59 ? ? ?                           



                          ? ? ? ? ?| ?Stage                           



                          three ? ? ? ?| ? Stage                           



                          three ? ? ? ? ?                           



                          +---------------------                           



                          --+-------------------                           



                          --+-------------------                           



                          ------+| ?15-29 ? ? ?                           



                          ? ? ? ? ?| ?Stage four                           



                          ? ? ? ? | ? Stage four                           



                          ? ? ? ? ?                              



                          ?+--------------------                           



                          ---+------------------                           



                          ---+------------------                           



                          -------+| ?<15 (or                           



                          dialysis) ? ?| ?Stage                           



                          five ? ? ? ? | ? Stage                           



                          five ? ? ? ? ?                           



                          ?+--------------------                           



                          ---+------------------                           



                          ---+------------------                           



                          -------+ *Each stage                           



                          assumes the associated                           



                          GFR level has been in                           



                          effect for at least                           



                          three months. ?Stages                           



                          1 to 5, with or                           



                          without kidney                           



                          disease, indicate                           



                          chronic kidney                           



                          disease. Notes:                           



                          Determination of                           



                          stages one and two                           



                          (with eGFR                             



                          >59mL/min/1.73 m2)                           



                          requires estimation of                           



                          kidney damage for at                           



                          least three months as                           



                          defined by structural                           



                          or functional                           



                          abnormalities of the                           



                          kidney, manifested by                           



                          either:Pathological                           



                          abnormalities or                           



                          Markers of kidney                           



                          damage (including                           



                          abnormalities in the                           



                          composition of the                           



                          blood or urine or                           



                          abnormalities in                           



                          imaging tests).                           

 

             Lab Interpretation Abnormal                               



             (test code = 23459-7)                                        



[facility]. METABOLIC PANEL ([id number] 
15:36:48





             Test Item    Value        Reference Range Interpretation Comments

 

             NA (test code = 132 mmol/L   135-145      L            



             6694010247)                                         

 

             K (test code = 3.0 mmol/L   3.5-5.0      L            



             5835992736)                                         

 

             CL (test code = 98 mmol/L                        



             9363685527)                                         

 

             CO2 TOTAL (test code = 32 mmol/L    23-31        H            



             7245591049)                                         

 

             AGAP (test code =              2-16                      



             0866794610)                                         

 

             BUN (test code = 5 mg/dL      7-23         L            



             5256345702)                                         

 

             GLUCOSE (test code = 156 mg/dL           H            



             3740054863)                                         

 

             CREATININE (test code = 0.48 mg/dL   0.60-1.25    L            



             7452541487)                                         

 

             TOTAL BILI (test code = 4.7 mg/dL    0.1-1.1      H            



             3753766224)                                         

 

             CALCIUM (test code = 7.6 mg/dL    8.6-10.6     L            



             4944026845)                                         

 

             T PROTEIN (test code = 6.5 g/dL     6.3-8.2                   



             2038607056)                                         

 

             ALBUMIN (test code = 2.7 g/dL     3.5-5.0      L            



             9161355133)                                         

 

             ALK PHOS (test code = 109 U/L                          



             3419400484)                                         

 

             ALTv (test code = 22 U/L       5-50                      



             1742-6)                                             

 

             AST(SGOT) (test code = 70 U/L       13-40        H            



             3171446322)                                         

 

             eGFR (test code =              mL/min/1.73m2              



             3580168365)                                         

 

             RENETTA (test code = RENETTA) Association of                           



                          Glomerular Filtration                           



                          Rate (GFR) and Staging                           



                          of Kidney Disease*                           



                          +---------------------                           



                          --+-------------------                           



                          --+-------------------                           



                          ------+| GFR                           



                          (mL/min/1.73 m2) ?|                           



                          With Kidney Damage ?|                           



                          ?Without Kidney                           



                          Damage+---------------                           



                          --------+-------------                           



                          --------+-------------                           



                          ------------+| ?>90 ?                           



                          ? ? ? ? ? ? ? ?|                           



                          ?Stage one ? ? ? ? ?|                           



                          ? Normal ? ? ? ? ? ? ?                           



                          ?+--------------------                           



                          ---+------------------                           



                          ---+------------------                           



                          -------+| ?60-89 ? ? ?                           



                          ? ? ? ? ?| ?Stage two                           



                          ? ? ? ? ?| ? Decreased                           



                          GFR ? ? ? ?                            



                          +---------------------                           



                          --+-------------------                           



                          --+-------------------                           



                          ------+| ?30-59 ? ? ?                           



                          ? ? ? ? ?| ?Stage                           



                          three ? ? ? ?| ? Stage                           



                          three ? ? ? ? ?                           



                          +---------------------                           



                          --+-------------------                           



                          --+-------------------                           



                          ------+| ?15-29 ? ? ?                           



                          ? ? ? ? ?| ?Stage four                           



                          ? ? ? ? | ? Stage four                           



                          ? ? ? ? ?                              



                          ?+--------------------                           



                          ---+------------------                           



                          ---+------------------                           



                          -------+| ?<15 (or                           



                          dialysis) ? ?| ?Stage                           



                          five ? ? ? ? | ? Stage                           



                          five ? ? ? ? ?                           



                          ?+--------------------                           



                          ---+------------------                           



                          ---+------------------                           



                          -------+ *Each stage                           



                          assumes the associated                           



                          GFR level has been in                           



                          effect for at least                           



                          three months. ?Stages                           



                          1 to 5, with or                           



                          without kidney                           



                          disease, indicate                           



                          chronic kidney                           



                          disease. Notes:                           



                          Determination of                           



                          stages one and two                           



                          (with eGFR                             



                          >59mL/min/1.73 m2)                           



                          requires estimation of                           



                          kidney damage for at                           



                          least three months as                           



                          defined by structural                           



                          or functional                           



                          abnormalities of the                           



                          kidney, manifested by                           



                          either:Pathological                           



                          abnormalities or                           



                          Markers of kidney                           



                          damage (including                           



                          abnormalities in the                           



                          composition of the                           



                          blood or urine or                           



                          abnormalities in                           



                          imaging tests).                           

 

             Lab Interpretation Abnormal                               



             (test code = 23309-3)                                        



[facility] WITH [id number] 15:18:36





             Test Item    Value        Reference Range Interpretation Comments

 

             WBC (test code =              See_Comment                [Automated



             6690-2)                                             message] The sy

stem



                                                                 which generated



                                                                 this result



                                                                 transmitted



                                                                 reference range

:



                                                                 4.20 - 10.70



                                                                 10*3/?L. The



                                                                 reference range

 was



                                                                 not used to



                                                                 interpret this



                                                                 result as



                                                                 normal/abnormal

.

 

             RBC (test code =              See_Comment  L             [Automated



             789-8)                                              message] The sy

stem



                                                                 which generated



                                                                 this result



                                                                 transmitted



                                                                 reference range

:



                                                                 4.26 - 5.52



                                                                 10*6/?L. The



                                                                 reference range

 was



                                                                 not used to



                                                                 interpret this



                                                                 result as



                                                                 normal/abnormal

.

 

             HGB (test code = 8.7 g/dL     12.2-16.4    L            



             718-7)                                              

 

             HCT (test code = 28.1 %       38.4-49.3    L            



             4544-3)                                             

 

             MCV (test code = 80.7 fL      81.7-95.6    L            



             787-2)                                              

 

             MCH (test code = 25.0 pg      26.1-32.7    L            



             785-6)                                              

 

             MCHC (test code = 31.0 g/dL    31.2-35.0    L            



             786-4)                                              

 

             RDW-SD (test code = 50.6 fL      38.5-51.6                 



             35245-2)                                            

 

             RDW-CV (test code = 17.6 %       12.1-15.4    H            



             788-0)                                              

 

             PLT (test code =              See_Comment  LL            [Automated



             777-3)                                              message] The sy

stem



                                                                 which generated



                                                                 this result



                                                                 transmitted



                                                                 reference range

:



                                                                 150 - 328 10*3/

?L.



                                                                 The reference r

moose



                                                                 was not used to



                                                                 interpret this



                                                                 result as



                                                                 normal/abnormal

.

 

             MPV (test code =                                        Not Measure

d



             04664-4)                                            

 

             IPF % (test code = 12.5 %       1.2-10.7     H            Platelet 

count



             [id number])                                         measured by



                                                                 fluorescence



                                                                 method.

 

             NRBC/100 WBC (test              See_Comment                [Automat

ed



             code = 8993193534)                                        message] 

The system



                                                                 which generated



                                                                 this result



                                                                 transmitted



                                                                 reference range

:



                                                                 0.0 - 10.0 /100



                                                                 WBCs. The refer

ence



                                                                 range was not u

sed



                                                                 to interpret th

is



                                                                 result as



                                                                 normal/abnormal

.

 

             NRBC x10^3 (test code <0.01        See_Comment                [Auto

mated



             = [id number])                                        message] The s

ystem



                                                                 which generated



                                                                 this result



                                                                 transmitted



                                                                 reference range

:



                                                                 10*3/?L. The



                                                                 reference range

 was



                                                                 not used to



                                                                 interpret this



                                                                 result as



                                                                 normal/abnormal

.

 

             GRAN MAT (NEUT) % 67.6 %                                 



             (test code = 770-8)                                        

 

             IMM GRAN % (test code 0.20 %                                 



             = 0504095675)                                        

 

             LYMPH % (test code = 16.1 %                                 



             736-9)                                              

 

             MONO % (test code = 13.3 %                                 



             5905-5)                                             

 

             EOS % (test code = 1.9 %                                  



             713-8)                                              

 

             BASO % (test code = 0.9 %                                  



             706-2)                                              

 

             GRAN MAT x10^3(ANC) 2.89 10*3/uL 1.99-6.95                 



             (test code =                                        



             0219452932)                                         

 

             IMM GRAN x10^3 (test <0.03        0.00-0.06                 



             code = 1276936800)                                        

 

             LYMPH x10^3 (test code 0.69 10*3/uL 1.09-3.23    L            



             = 731-0)                                            

 

             MONO x10^3 (test code 0.57 10*3/uL 0.36-1.02                 



             = 742-7)                                            

 

             EOS x10^3 (test code = 0.08 10*3/uL 0.06-0.53                 



             711-2)                                              

 

             BASO x10^3 (test code 0.04 10*3/uL 0.01-0.09                 



             = 704-7)                                            

 

             Lab Interpretation Abnormal                               



             (test code = 73956-1)                                        



[facility] WITH [id number] 15:18:36





             Test Item    Value        Reference Range Interpretation Comments

 

             WBC (test code =              See_Comment                [Automated



             6690-2)                                             message] The sy

stem



                                                                 which generated



                                                                 this result



                                                                 transmitted



                                                                 reference range

:



                                                                 4.20 - 10.70



                                                                 10*3/?L. The



                                                                 reference range

 was



                                                                 not used to



                                                                 interpret this



                                                                 result as



                                                                 normal/abnormal

.

 

             RBC (test code =              See_Comment  L             [Automated



             789-8)                                              message] The sy

stem



                                                                 which generated



                                                                 this result



                                                                 transmitted



                                                                 reference range

:



                                                                 4.26 - 5.52



                                                                 10*6/?L. The



                                                                 reference range

 was



                                                                 not used to



                                                                 interpret this



                                                                 result as



                                                                 normal/abnormal

.

 

             HGB (test code = 8.7 g/dL     12.2-16.4    L            



             718-7)                                              

 

             HCT (test code = 28.1 %       38.4-49.3    L            



             4544-3)                                             

 

             MCV (test code = 80.7 fL      81.7-95.6    L            



             787-2)                                              

 

             MCH (test code = 25.0 pg      26.1-32.7    L            



             785-6)                                              

 

             MCHC (test code = 31.0 g/dL    31.2-35.0    L            



             786-4)                                              

 

             RDW-SD (test code = 50.6 fL      38.5-51.6                 



             41031-0)                                            

 

             RDW-CV (test code = 17.6 %       12.1-15.4    H            



             788-0)                                              

 

             PLT (test code =              See_Comment  LL            [Automated



             777-3)                                              message] The sy

stem



                                                                 which generated



                                                                 this result



                                                                 transmitted



                                                                 reference range

:



                                                                 150 - 328 10*3/

?L.



                                                                 The reference r

moose



                                                                 was not used to



                                                                 interpret this



                                                                 result as



                                                                 normal/abnormal

.

 

             MPV (test code =                                        Not Measure

d



             52739-2)                                            

 

             IPF % (test code = 12.5 %       1.2-10.7     H            Platelet 

count



             0808780271)                                         measured by



                                                                 fluorescence



                                                                 method.

 

             NRBC/100 WBC (test              See_Comment                [Automat

ed



             code = 7035142832)                                        message] 

The system



                                                                 which generated



                                                                 this result



                                                                 transmitted



                                                                 reference range

:



                                                                 0.0 - 10.0 /100



                                                                 WBCs. The refer

ence



                                                                 range was not u

sed



                                                                 to interpret th

is



                                                                 result as



                                                                 normal/abnormal

.

 

             NRBC x10^3 (test code <0.01        See_Comment                [Auto

mated



             = 1528663105)                                        message] The s

ystem



                                                                 which generated



                                                                 this result



                                                                 transmitted



                                                                 reference range

:



                                                                 10*3/?L. The



                                                                 reference range

 was



                                                                 not used to



                                                                 interpret this



                                                                 result as



                                                                 normal/abnormal

.

 

             GRAN MAT (NEUT) % 67.6 %                                 



             (test code = 770-8)                                        

 

             IMM GRAN % (test code 0.20 %                                 



             = 1088169019)                                        

 

             LYMPH % (test code = 16.1 %                                 



             736-9)                                              

 

             MONO % (test code = 13.3 %                                 



             5905-5)                                             

 

             EOS % (test code = 1.9 %                                  



             713-8)                                              

 

             BASO % (test code = 0.9 %                                  



             706-2)                                              

 

             GRAN MAT x10^3(ANC) 2.89 10*3/uL 1.99-6.95                 



             (test code =                                        



             1029670086)                                         

 

             IMM GRAN x10^3 (test <0.03        0.00-0.06                 



             code = 3019064933)                                        

 

             LYMPH x10^3 (test code 0.69 10*3/uL 1.09-3.23    L            



             = 731-0)                                            

 

             MONO x10^3 (test code 0.57 10*3/uL 0.36-1.02                 



             = 742-7)                                            

 

             EOS x10^3 (test code = 0.08 10*3/uL 0.06-0.53                 



             711-2)                                              

 

             BASO x10^3 (test code 0.04 10*3/uL 0.01-0.09                 



             = 704-7)                                            

 

             Lab Interpretation Abnormal                               



             (test code = 12934-6)                                        



[facility].PROTEIN BODY [id number] 03:21:38





             Test Item    Value        Reference Range Interpretation Comments

 

             T.PROT BF (test 1477.0 mg/dL                           



             code = 3930194983)                                        

 

             UNSPUN BODY FLUID Yellow                                 



             COLOR (test code =                                        



             6178479950)                                         

 

             UNSPUN BODY FLUID Slightly Cloudy                           



             CLARITY (test code                                        



             = 7276013397)                                        

 

             SPUN BODY FLUID Yellow                                 



             COLOR (test code =                                        



             5042103765)                                         

 

             SPUN BODY FLUID Clear                                  



             CLARITY (test code                                        



             = 3712573275)                                        

 

             Sediment (test code The sediment volume is                         

  



             = 1760652486) <0.1 mLs of the total                           



                          fluid volume of 4mL and                           



                          its color is red.                           

 

             RENETTA (test code = Test developed and                           



             RENETTA)         characteristics determined                           



                          by [facility]                           



                          Services.                              



[facility].PROTEIN BODY [id number] 03:21:38





             Test Item    Value        Reference Range Interpretation Comments

 

             T.PROT BF (test 1477.0 mg/dL                           



             code = 7283653839)                                        

 

             UNSPUN BODY FLUID Yellow                                 



             COLOR (test code =                                        



             9258146967)                                         

 

             UNSPUN BODY FLUID Slightly Cloudy                           



             CLARITY (test code                                        



             = 0621874728)                                        

 

             SPUN BODY FLUID Yellow                                 



             COLOR (test code =                                        



             5637396267)                                         

 

             SPUN BODY FLUID Clear                                  



             CLARITY (test code                                        



             = 4240255951)                                        

 

             Sediment (test code The sediment volume is                         

  



             = 2192400514) <0.1 mLs of the total                           



                          fluid volume of 4mL and                           



                          its color is red.                           

 

             RENETTA (test code = Test developed and                           



             RENETTA)         characteristics determined                           



                          by [facility]                           



                          Services.                              



[id number]50:33





             Test Item    Value        Reference Range Interpretation Comments

 

             BF COLOR     Light Yellow                           



             (test code =                                        



             3054774381)                                         

 

             BF WBC Count              See_Comment                [Automated



             (test code =                                        message] The sy

stem



             3285553040)                                         which generated



                                                                 this result



                                                                 transmitted



                                                                 reference range

:



                                                                 /?L. The refere

nce



                                                                 range was not u

sed



                                                                 to interpret th

is



                                                                 result as



                                                                 normal/abnormal

.

 

             BF RBC Count              See_Comment                [Automated



             (test code =                                        message] The sy

stem



             1598167536)                                         which generated



                                                                 this result



                                                                 transmitted



                                                                 reference range

:



                                                                 /?L. The refere

nce



                                                                 range was not u

sed



                                                                 to interpret th

is



                                                                 result as



                                                                 normal/abnormal

.

 

             RENETTA (test    The reference range                           



             code = RENETTA)  and other method                           



                          performance                            



                          specifications have                           



                          not been established                           



                          for this body fluid.                           



                          ?The test results must                           



                          be integrated into the                           



                          clinical context for                           



                          interpretation.                           



[facility] [id number] 18:50:33





             Test Item    Value        Reference Range Interpretation Comments

 

             BF COLOR     Light Yellow                           



             (test code =                                        



             9617831179)                                         

 

             BF WBC Count              See_Comment                [Automated



             (test code =                                        message] The sy

stem



             1607598117)                                         which generated



                                                                 this result



                                                                 transmitted



                                                                 reference range

:



                                                                 /?L. The refere

nce



                                                                 range was not u

sed



                                                                 to interpret th

is



                                                                 result as



                                                                 normal/abnormal

.

 

             BF RBC Count              See_Comment                [Automated



             (test code =                                        message] The sy

stem



             3236361655)                                         which generated



                                                                 this result



                                                                 transmitted



                                                                 reference range

:



                                                                 /?L. The refere

nce



                                                                 range was not u

sed



                                                                 to interpret th

is



                                                                 result as



                                                                 normal/abnormal

.

 

             RENETTA (test    The reference range                           



             code = RENETTA)  and other method                           



                          performance                            



                          specifications have                           



                          not been established                           



                          for this body fluid.                           



                          ?The test results must                           



                          be integrated into the                           



                          clinical context for                           



                          interpretation.                           



[facility]
2021 18:23:38Successful US-guided paracentesis.PROCEDURE: US-guided 
paracentesis HISTORY: Ascites, paracentesis is requested. MEDICATIONS: Local 
lidocaine. I reviewed the patient?s current medicationlist as noted in the 
nursing assessment, and the following actions weretaken: None []. ATTENDING 
PRESENCE: ?As the attending radiologist, I was present in theroom during the 
entire procedure. TECHNIQUE: Informed consent was obtained from the patient 
after discussingthe risks and benefits of the procedure. Universal protocol 
timeout wasperformed verifying correct patient, correct site, and correct 
procedure.Images were archived to PACS. The patient was prepped and draped in 
sterilefashion. US-guided drainage of the ascites was performed with a 5 
Frenchsheathed needle, after infiltrating local anesthesia at the puncture 
site.A total of 3000cc clear yellowish fluid was drained. Fluid was sent for 
laboratory analysisas requested in Epic. COMPLICATIONS: None. ? Estimated Blood 
Loss : &lt;1 cc [facility], Radiant Results Inft User - 2021 1:24 PM 
CDTFormatting of this note might be different from the original.PROCEDURE: US-
guided paracentesisHISTORY: Ascites, paracentesis is requested.MEDICATIONS: 
Local lidocaine. Ireviewed the patient?s current medicationlist as noted in the 
nursing assessment, and the following actions weretaken: None []. ATTENDING 
PRESENCE: As the attending radiologist, I was present in theroom during the 
entire procedure.TECHNIQUE: Informed consent was obtained from the patient after
 discussingthe risks and benefits of the procedure. Universal protocol timeout 
wasperformed verifying correctpatient, correct site, and correct 
procedure.Images were archived to PACS. The patient was prepped and draped in 
sterilefashion. US-guided drainage of the ascites was performed with a 5 
Frenchsheathed needle, after infiltrating local anesthesia at the puncture 
site.A total of 3000cc clear yellowish fluid was drained. Fluid was sent for 
laboratory analysis as requested in Epic.COMPLICATIONS: None. Estimated Blood 
Loss : &lt;1 ccIMPRESSIONSuccessful US-guided paracentesis.[facility]ITONECENTES[facility] WITH [id number] 18:23:38
Successful US-guided paracentesis.PROCEDURE: US-guided paracentesis HISTORY: 
Ascites, paracentesis is requested. MEDICATIONS: Local lidocaine. I reviewed the
 patient?s current medicationlist as noted in the nursing assessment, and the 
following actions weretaken: None []. ATTENDING PRESENCE: ?As the attending 
radiologist, I was present in theroom during the entire procedure. TECHNIQUE: 
Informed consent was obtained from the patient after discussingthe risks and 
benefits of the procedure. Universal protocol timeout wasperformed verifying 
correct patient, correct site, and correct procedure.Images were archived to 
PACS. The patient was prepped and draped in sterilefashion. US-guided drainage 
of the ascites was performed with a 5 Frenchsheathed needle, after infiltrating 
local anesthesia at the puncture site.A total of 3000cc clear yellowish fluid 
was drained. Fluid was sent for laboratory analysisas requested in Epic. 
COMPLICATIONS: None. ? Estimated Blood Loss : &lt;1 cc [facility], Radiant Results In
ft User - 2021 1:24 PM CDTFormatting of this note might be different from 
the original.PROCEDURE: US-guided paracentesisHISTORY: Ascites, paracentesis is 
requested.MEDICATIONS: Local lidocaine. Ireviewed the patient?s current 
medicationlist as noted in the nursing assessment, and the following actions 
weretaken: None []. ATTENDING PRESENCE: As the attending radiologist, I was 
present in theroom during the entire procedure.TECHNIQUE: Informed consent was 
obtained from the patient after discussingthe risks and benefits of the 
procedure. Universal protocol timeout wasperformed verifying correctpatient, 
correct site, and correct procedure.Images were archived to PACS. The patient 
was prepped and draped in sterilefashion. US-guided drainage of the ascites was 
performed with a 5 Frenchsheathed needle, after infiltrating local anesthesia at
 the puncture site.A total of 3000cc clear yellowish fluid was drained. Fluid 
was sent for laboratory analysis as requested in Epic.COMPLICATIONS: None. Fatmata
mated Blood Loss : &lt;1 ccIMPRESSIONSuccessful US-guided paracentesis.
[facility] [id number] 
18:15:47





             Test Item    Value        Reference Range Interpretation Comments

 

             MAI (test code = Negative     Negative                  



             1708144210)                                         

 

             RENETTA (test code = RENETTA) Negative - No                           



                          Anti-Nuclear                           



                          Antibodies detected                           



                          by IFA.Positive -                           



                          MAI IFA screen                           



                          performed with a                           



                          1:80 dilution in                           



                          adults and a 1:40                           



                          dilution in                            



                          pediatrics. Any MIA                           



                          "Positive" will have                           



                          titer performed and                           



                          reported separately.                           

 

             Lab Interpretation (test Normal                                 



             code = 03252-1)                                        



[facility] [id number] 
18:15:47





             Test Item    Value        Reference Range Interpretation Comments

 

             MAI (test code = Negative     Negative                  



             [id number])                                         

 

             RENETTA (test code = RENETTA) Negative - No                           



                          Anti-Nuclear                           



                          Antibodies detected                           



                          by IFA.Positive -                           



                          MAI IFA screen                           



                          performed with a                           



                          1:80 dilution in                           



                          adults and a 1:40                           



                          dilution in                            



                          pediatrics. Any MAI                           



                          "Positive" will have                           



                          titer performed and                           



                          reported separately.                           

 

             Lab Interpretation (test Normal                                 



             code = 65552-9)                                        



[place] [address] [id number] 17:44:30





             Test Item    Value        Reference Range Interpretation Comments

 

             Anti-Trypsin (test code = 141 mg/dL                        



             8207205950)                                         

 

             Lab Interpretation (test code = Normal                             

    



             92131-1)                                            



[place] [address] [id number] 17:44:30





             Test Item    Value        Reference Range Interpretation Comments

 

             Anti-Trypsin (test code = 141 mg/dL                        



             7819993169)                                         

 

             Lab Interpretation (test code = Normal                             

    



             98114-8)                                            



[facility]43[facility]58





             Test Item    Value        Reference Range Interpretation Comments

 

             CERULO (test code = 5706840405) 24 mg/dL     25-63        L        

    

 

             Lab Interpretation (test code = Abnormal                           

    



             12474-8)                                            



[facility]71[facility]53 17:43:58





             Test Item    Value        Reference Range Interpretation Comments

 

             IgG (test code = 8413169274) 1850 mg/dL   636-1600     H           

 

 

             Lab Interpretation (test code = Abnormal                           

    



             97376-7)                                            



[facility] 17[facility]43:58





             Test Item    Value        Reference Range Interpretation Comments

 

             CERULO (test code = 2376586274) 24 mg/dL     25-63        L        

    

 

             Lab Interpretation (test code = Abnormal                           

    



             85374-3)                                            



[facility]06[facility]32 17:43:58





             Test Item    Value        Reference Range Interpretation Comments

 

             IgG (test code = 2038453874) 1850 mg/dL   636-1600     H           

 

 

             Lab Interpretation (test code = Abnormal                           

    



             14060-2)                                            



[facility] A AB, IGG AND [id number] 11:37:51





             Test Item    Value        Reference Range Interpretation Comments

 

             HAV Total (test code Positive                               



             = 2932981209)                                        

 

             HAVT                                                



             Semi-Quantitative                                        



             (test code =                                        



             6629009465)                                         

 

             RENETTA (test code = RENETTA) Indicates past or                           



                          present infection with                           



                          HAV or exposure to HAV                           



                          due to vaccination.                           



[facility] A AB, IGG AND [id number] 11:37:51





             Test Item    Value        Reference Range Interpretation Comments

 

             HAV Total (test code Positive                               



             = 3787698833)                                        

 

             HAVT                                                



             Semi-Quantitative                                        



             (test code =                                        



             2673914150)                                         

 

             RENETTA (test code = RENETTA) Indicates past or                           



                          present infection with                           



                          HAV or exposure to HAV                           



                          due to vaccination.                           



[facility] with [id number] 07:43:16





             Test Item    Value        Reference Range Interpretation Comments

 

             WBC (test code =              See_Comment  L             [Automated



             6690-2)                                             message] The



                                                                 system which



                                                                 generated this



                                                                 result transmit

michelle



                                                                 reference range

:



                                                                 4.20 - 10.70



                                                                 10*3/?L. The



                                                                 reference range



                                                                 was not used to



                                                                 interpret this



                                                                 result as



                                                                 normal/abnormal

.

 

             RBC (test code =              See_Comment  L             [Automated



             789-8)                                              message] The



                                                                 system which



                                                                 generated this



                                                                 result transmit

michelle



                                                                 reference range

:



                                                                 4.26 - 5.52



                                                                 10*6/?L. The



                                                                 reference range



                                                                 was not used to



                                                                 interpret this



                                                                 result as



                                                                 normal/abnormal

.

 

             HGB (test code = 7.3 g/dL     12.2-16.4    L            



             718-7)                                              

 

             HCT (test code = 23.8 %       38.4-49.3    L            



             4544-3)                                             

 

             MCV (test code = 81.5 fL      81.7-95.6    L            



             787-2)                                              

 

             MCH (test code = 25.0 pg      26.1-32.7    L            



             785-6)                                              

 

             MCHC (test code = 30.7 g/dL    31.2-35.0    L            



             786-4)                                              

 

             RDW-SD (test code = 51.1 fL      38.5-51.6                 



             63494-3)                                            

 

             RDW-CV (test code = 17.4 %       12.1-15.4    H            



             788-0)                                              

 

             PLT (test code =              See_Comment  LL            [Automated



             777-3)                                              message] The



                                                                 system which



                                                                 generated this



                                                                 result transmit

michelle



                                                                 reference range

:



                                                                 150 - 328 10*3/

?L.



                                                                 The reference



                                                                 range was not u

sed



                                                                 to interpret th

is



                                                                 result as



                                                                 normal/abnormal

.

 

             MPV (test code =                                        Not Measure

d



             53549-2)                                            

 

             IPF % (test code = 11.9 %       1.2-10.7     H            Platelet 

count



             7434292593)                                         measured by



                                                                 fluorescence



                                                                 method.

 

             NRBC/100 WBC (test              See_Comment                [Automat

ed



             code = 8460503971)                                        message] 

The



                                                                 system which



                                                                 generated this



                                                                 result transmit

michelle



                                                                 reference range

:



                                                                 0.0 - 10.0 /100



                                                                 WBCs. The



                                                                 reference range



                                                                 was not used to



                                                                 interpret this



                                                                 result as



                                                                 normal/abnormal

.

 

             NRBC x10^3 (test code <0.01        See_Comment                [Auto

mated



             = 4349171570)                                        message] The



                                                                 system which



                                                                 generated this



                                                                 result transmit

michelle



                                                                 reference range

:



                                                                 10*3/?L. The



                                                                 reference range



                                                                 was not used to



                                                                 interpret this



                                                                 result as



                                                                 normal/abnormal

.

 

             SEG % (test code = 55 %         33-76                     



             97083-3)                                            

 

             LYMPH % (test code = 36 %         14-54                     



             91394-9)                                            

 

             MONO % (test code = 8 %          0-4          H            



             26485-3)                                            

 

             EOS % (test code = 1 %          0-3                       



             17396-4)                                            

 

             ANC (test code = 1.99 10*3/uL 1.99-6.95                 



             0290651044)                                         

 

             TARGET CELLS (test 2+           See_Comment  A             [Automat

ed



             code = 75966-6)                                        message] The



                                                                 system which



                                                                 generated this



                                                                 result transmit

michelle



                                                                 reference range

:



                                                                 (none). The



                                                                 reference range



                                                                 was not used to



                                                                 interpret this



                                                                 result as



                                                                 normal/abnormal

.

 

             PLT ESTIMATE (test Critically   Normal       AA           



             code = 9317-9) Decreased                              

 

             GIANT PLATELETS (test Present      See_Comment  A             [Auto

mated



             code = 5908-9)                                        message] The



                                                                 system which



                                                                 generated this



                                                                 result transmit

michelle



                                                                 reference range

:



                                                                 (none). The



                                                                 reference range



                                                                 was not used to



                                                                 interpret this



                                                                 result as



                                                                 normal/abnormal

.

 

             Lab Interpretation Abnormal                               



             (test code = 12467-9)                                        



[facility] [id number] 07:02:16





             Test Item    Value        Reference Range Interpretation Comments

 

             IONIZED CA (test code = 4.00 mg/dL   4.50-5.30    L            



             6991488162)                                         

 

             PH SERUM (test code = 0548979311)              7.35-7.45    H      

      QUES

 

             Lab Interpretation (test code = Abnormal                           

    



             92795-0)                                            



[facility] [id number] 07:02:16





             Test Item    Value        Reference Range Interpretation Comments

 

             IONIZED CA (test code = 4.00 mg/dL   4.50-5.30    L            



             1148812754)                                         

 

             PH SERUM (test code = 1677242643)              7.35-7.45    H      

      QUES

 

             Lab Interpretation (test code = Abnormal                           

    



             68425-0)                                            



[facility]





             Test Item    Value        Reference Range Interpretation Comments

 

             MAGNESIUM (test code = 9042299037) 1.5 mg/dL    1.7-2.4      L     

       

 

             Lab Interpretation (test code = Abnormal                           

    



             76958-8)                                            



[facility]





             Test Item    Value        Reference Range Interpretation Comments

 

             PHOSPHORUS (test code = 4177572845) 3.5 mg/dL    2.5-5.0           

        

 

             Lab Interpretation (test code = Normal                             

    



             66805-4)                                            



[facility]





             Test Item    Value        Reference Range Interpretation Comments

 

             PHOSPHORUS (test code = 6053422566) 3.5 mg/dL    2.5-5.0           

        

 

             Lab Interpretation (test code = Normal                             

    



             78344-1)                                            



[facility]





             Test Item    Value        Reference Range Interpretation Comments

 

             MAGNESIUM (test code = 6949203542) 1.5 mg/dL    1.7-2.4      L     

       

 

             Lab Interpretation (test code = Abnormal                           

    



             02838-2)                                            



[facility] [id number] 
06:35:12





             Test Item    Value        Reference Range Interpretation Comments

 

             HBsAB (test code = Negative                               



             6332369349)                                         

 

             HBsAb                     mIU/mL                    



             Semi-Quantitative                                        



             (test code =                                        



             7158025163)                                         

 

             RENETTA (test code = Interpretation:                           



             RENETTA)         ?Hepatitis B Surface                           



                          Antibody ? ? ? ? Negative                           



                          - Patient is considered                           



                          to be not immune to                           



                          infection with HBV. ? ?                           



                          Positive - Anti-HBs                           



                          detected at greater than                           



                          or equal to 12 mIU/mL.                           



                          ?Patient is considered to                           



                          be immune to infection                           



                          with HBV. ?                            



[facility] 
06:35:12





             Test Item    Value        Reference Range Interpretation Comments

 

             HBC (test code = 0559484748) Negative                              

 

 

             HBC Semi-Quantitative (test code =                                 

       



             1045939282)                                         



[facility] [id number] 
06:35:12





             Test Item    Value        Reference Range Interpretation Comments

 

             HBsAB (test code = Negative                               



             8942514079)                                         

 

             HBsAb                     mIU/mL                    



             Semi-Quantitative                                        



             (test code =                                        



             9327419303)                                         

 

             RENETTA (test code = Interpretation:                           



             RENETTA)         ?Hepatitis B Surface                           



                          Antibody ? ? ? ? Negative                           



                          - Patient is considered                           



                          to be not immune to                           



                          infection with HBV. ? ?                           



                          Positive - Anti-HBs                           



                          detected at greater than                           



                          or equal to 12 mIU/mL.                           



                          ?Patient is considered to                           



                          be immune to infection                           



                          with HBV. ?                            



[facility] C VIRUS [id number] 06:35:12





             Test Item    Value        Reference Range Interpretation Comments

 

             HCV Ab (test code = 04273-4) Negative                              

 

 

             HCV Semi-Quantitative (test code =                                 

       



             02296-7)                                            



[facility] 
06:35:12





             Test Item    Value        Reference Range Interpretation Comments

 

             HBC (test code = 5110438152) Negative                              

 

 

             HBC Semi-Quantitative (test code =                                 

       



             9098630670)                                         



[facility] C VIRUS [id number] 06:35:12





             Test Item    Value        Reference Range Interpretation Comments

 

             HCV Ab (test code = 51227-3) Negative                              

 

 

             HCV Semi-Quantitative (test code =                                 

       



             38461-2)                                            



[facility] [id number] 06:20:51





             Test Item    Value        Reference Range Interpretation Comments

 

             AFP (test code = 3.8 ng/mL    See_Comment                [Automated



             [id number])                                         message] The



                                                                 system which



                                                                 generated this



                                                                 result



                                                                 transmitted



                                                                 reference range

:



                                                                 <=7.5. The



                                                                 reference range



                                                                 was not used to



                                                                 interpret this



                                                                 result as



                                                                 normal/abnormal

.

 

             RENETTA (test code = RENETTA) Biotin has been                           



                          reported to                            



                          cause a negative                           



                          bias, interpret                           



                          results relative                           



                          to patient's use                           



                          of biotin.                             

 

             Lab Interpretation Normal                                 



             (test code = 40179-9)                                        



[facility] [id number] 06:20:51





             Test Item    Value        Reference Range Interpretation Comments

 

             AFP (test code = 3.8 ng/mL    See_Comment                [Automated



             [id number])                                         message] The



                                                                 system which



                                                                 generated this



                                                                 result



                                                                 transmitted



                                                                 reference range

:



                                                                 <=7.5. The



                                                                 reference range



                                                                 was not used to



                                                                 interpret this



                                                                 result as



                                                                 normal/abnormal

.

 

             RENETTA (test code = RENETTA) Biotin has been                           



                          reported to                            



                          cause a negative                           



                          bias, interpret                           



                          results relative                           



                          to patient's use                           



                          of biotin.                             

 

             Lab Interpretation Normal                                 



             (test code = 62925-4)                                        



[facility] Units
2021 02:14:47





             Test Item    Value        Reference Range Interpretation Comments

 

             Cross Match Result Compatible                             



             (test code = 4409)                                        

 

             ISBT Blood Type Code                                        



             (test code = 822751)                                        

 

             Unit Blood Type (test O Pos                                  



             code = 4410)                                        

 

             Unit Number (test [id number]                           



             code = 4411)                                        

 

             Blood Expiration Date                                        



             & Time (test code =                                        



             753475)                                             

 

             Status Information Issued                                 



             (test code = 4412)                                        

 

             Product      Red Blood Cells                           



             Identification (test                                        



             code = 4413)                                        

 

             Product Code (test P1740H80                               Performed

 at [facility]



             code = 4414)                                        Laboratory



                                                                 Services [facility]



                                                                 Blood [address] [place] [address]



                                                                 [id number]



                                                                 Free:



                                                                 [phone]

A



                                                                 No. [id number]



[facility] 1 Units
2021 02:14:47





             Test Item    Value        Reference Range Interpretation Comments

 

             Cross Match Result Compatible                             



             (test code = 4409)                                        

 

             ISBT Blood Type Code                                        



             (test code = 152059)                                        

 

             Unit Blood Type (test O Pos                                  



             code = 4410)                                        

 

             Unit Number (test [id number]                           



             code = 4411)                                        

 

             Blood Expiration Date                                        



             & Time (test code =                                        



             406379)                                             

 

             Status Information Issued                                 



             (test code = 4412)                                        

 

             Product      Red Blood Cells                           



             Identification (test                                        



             code = 4413)                                        

 

             Product Code (test S9134A22                               Performed

 at [facility]



             code = 4414)                                        Laboratory



                                                                 Services [facility]



                                                                 Blood [address] [place] [address]



                                                                 [id number]



                                                                 Free:



                                                                 [phone]

A



                                                                 No. [id number]



[facility] CenterUS [facility] WITH [id number] 
01:26:53Impression: 1. ?Cirrhosis.2. ?Enlarged portal vein and mild 
splenomegaly, suggestive of portalhypertension. Hepatopedal flow is seen in the 
main portal vein.3. Mild gallbladder wall thickening, which is nonspecific and 
may be seenwith cholecystitis, hepatitis, liver failure, or hypervolemia.4. 
Moderate abdominal ascites. Left pleural effusion. RL: 2824 End of Report 
Electronically signed by Fernandez Baker MD at 2021 8:26 PMExam: Limited 
Abdominal Ultrasound, 2021 7:00 PM. Ordering Physician: AMY CALDERA. 
History: Ascites. Comparison: None. Technique: Grayscale and color Doppler 
ultrasound images of the right upperquadrant abdomen were obtained. Technical 
Quality: Examination is slightlysuboptimal due to body habitusand overlying 
bowel gas. Findings: ?Liver is nodular in contour and heterogeneous in 
echotexture. Hepatopetalflow is noted in the main portal vein. Portal vein is 
enlarged. There is nointrahepatic biliary ductal dilatation. Common bile duct 
measures 2 mm incaliber. Gallbladder demonstrates no evidence of stones. ?There 
is nopericholecystic fluid. Gallbladder wall measures 3 mm in 
thickness.Sonographic Gupta sign is negative. Right kidney measures 13.9 cm in 
length. ?There is no right hydronephrosis.Right kidney shows normal cortical 
thickness, corticomedullarydifferentiation and echotexture. ? Visualized 
pancreas is unremarkable. Pancreas is partially obscured byoverlying bowel gas. 
Visualized inferior vena cava and abdominal aorta are unremarkable. Spleen 
measures up to 12.6 cm in length. There is moderate free fluid. Left pleural 
effusion is partiallyvisualized. [facility], Radiant Results Inft User - 2021 
8:28 PM CDTFormatting of this note might be different from the original.Exam: 
Limited Abdominal Ultrasound, 2021 7:00 PM.Ordering Physician: AMY IBRAHIM.History: Ascites.Comparison: None.Technique: Grayscale and color Doppler 
ultrasound images of the right upperquadrant abdomen were obtained.Technical 
Quality: Examination is slightly suboptimal due to body habitusand overlying 
bowel gas.Findings: Liver is nodular in contour and heterogeneous in ec
hotexture. Hepatopetalflow is noted in the main portal vein. Portal vein is 
enlarged. There is nointrahepatic biliary ductal dilatation. Common bile duct 
measures 2 mm incaliber.Gallbladder demonstrates no evidence of stones. There is
nopericholecystic fluid. Gallbladder wall measures 3 mm in thickness.Sonographic
Gupta sign is negative.Right kidney measures 13.9 cm in length. There is no 
right hydronephrosis.Right kidney shows normal cortical thickness, 
corticomedullarydifferentiation and echotexture. Visualized pancreas is 
unremarkable. Pancreas is partially obscured byoverlying bowel gas.Visualized 
inferior vena cava and abdominal aorta are unremarkable.Spleen measures up to 
12.6 cm in length.There is moderate free fluid. Left pleural effusion is 
partiallyvisualized.IMPRESSIONImpression: 1. Cirrhosis.2. Enlarged portal vein 
and mild splenomegaly, suggestive of portalhypertension. Hepatopedalflow is seen
in the main portal vein.3. Mild gallbladder wall thickening, which is 
nonspecific and may be seenwith cholecystitis, hepatitis, liver failure, or 
hypervolemia.4. Moderate abdominal ascites. Left pleural effusion. RL: 2824End 
of ReportElectronically signed by Fernandez Baker MD at 2021 8:26 PMBro[facility] CenterUS ABDOMEN LIMITED WITH [id number] 01:26:53
Impression: 1. ?Cirrhosis.2. ?Enlarged portal vein and mild splenomegaly, 
suggestive of portalhypertension. Hepatopedal flow is seen in the main portal 
vein.3. Mild gallbladder wall thickening, which is nonspecific and may be 
seenwith cholecystitis, hepatitis, liver failure, or hypervolemia.4. Moderate 
abdominal ascites. Left pleural effusion. RL: 2824 End of Report Electronically 
signed by Fernandez Baker MD at 2021 8:26 PMExam: Limited Abdominal Ultrasound, 
2021 7:00 PM. Ordering Physician: AMY CALDERA. History: Ascites. 
Comparison: None. Technique: Grayscale and color Doppler ultrasound images of 
the right upperquadrant abdomen were obtained. Technical Quality: Examination is
slightlysuboptimal due to body habitusand overlying bowel gas. Findings: ?Liver 
is nodular in contour and heterogeneous in echotexture. Hepatopetalflow is noted
in the main portal vein. Portal vein is enlarged. There is nointrahepatic 
biliary ductal dilatation. Common bile duct measures 2 mm incaliber. Gallbladder
demonstrates no evidence of stones. ?There is nopericholecystic fluid. 
Gallbladder wall measures 3 mm in thickness.Sonographic Gupta sign is negative.
Right kidney measures 13.9 cm in length. ?There is no right hydronephrosis.Right
kidney shows normal cortical thickness, corticomedullarydifferentiation and 
echotexture. ? Visualized pancreas is unremarkable. Pancreas is partially 
obscured byoverlying bowel gas. Visualized inferior vena cava and abdominal 
aorta are unremarkable. Spleen measures up to 12.6 cm in length. There is 
moderate free fluid. Left pleural effusion is partiallyvisualized. [facility], Radiant
Results Inft User - 2021 8:28 PM CDTFormatting of this note might be 
different from the original.Exam: Limited Abdominal Ultrasound, 2021 7:00 
PM.Ordering Physician: AMY CALDERA.History: Ascites.Comparison: 
None.Technique: Grayscale and color Doppler ultrasound images of the right 
upperquadrant abdomen were obtained.Technical Quality: Examination is slightly 
suboptimal due to body habitusand overlying bowel gas.Findings: Liver is nodular
in contour and heterogeneous in echotexture. Hepatopetalflow is noted in the 
main portal vein. Portal vein is enlarged. There is nointrahepatic biliary 
ductal dilatation. Common bile duct measures 2 mm incaliber.Gallbladder 
demonstrates no evidence of stones. There is nopericholecystic fluid. 
Gallbladder wall measures 3 mm in thickness.Sonographic Gupta sign is 
negative.Right kidney measures 13.9 cm in length. There is no right hydro
nephrosis.Right kidney shows normal cortical thickness, 
corticomedullarydifferentiation and echotexture. Visualized pancreas is 
unremarkable. Pancreas is partially obscured byoverlying bowel gas.Visualized 
inferior vena cava and abdominal aorta are unremarkable.Spleen measures up to 
12.6 cm in length.There is moderate free fluid. Left pleural effusion is 
partiallyvisualized.IMPRESSIONImpression: 1. Cirrhosis.2. Enlarged portal vein 
and mild splenomegaly, suggestive of portalhypertension. Hepatopedalflow is seen
in the main portal vein.3. Mild gallbladder wall thickening, which is 
nonspecific and may be seenwith cholecystitis, hepatitis, liver failure, or 
hypervolemia.4. Moderate abdominal ascites. Left pleural effusion. RL: 2824End 
of ReportElectronically signed by Fernandez Baker MD at 2021 8:26 PMUnAd[facility]Health [facility] B SURFACE [id number] 00:23:10





             Test Item    Value        Reference Range Interpretation Comments

 

             HBsAg Semi-Quantitative (test code = Negative     Negative         

         



             5195-3)                                             



[facility] B SURFACE [id number] 00:23:10





             Test Item    Value        Reference Range Interpretation Comments

 

             HBsAg Semi-Quantitative (test code = Negative     Negative         

         



             5195-3)                                             



[facility] (NA, K, CL, CO2, 
GLUCOSE, BUN, CREATININE, CA)2021 00:19:53





             Test Item    Value        Reference Range Interpretation Comments

 

             NA (test code = 136 mmol/L   135-145                   



             5140068076)                                         

 

             K (test code = 3.7 mmol/L   3.5-5.0                   



             6493860474)                                         

 

             CL (test code = 104 mmol/L                       



             0988017374)                                         

 

             CO2 TOTAL (test code = 26 mmol/L    23-31                     



             3700303340)                                         

 

             AGAP (test code =              2-16                      



             7523515434)                                         

 

             BUN (test code = 4 mg/dL      7-23         L            



             7693332660)                                         

 

             GLUCOSE (test code = 75 mg/dL                         



             7021664442)                                         

 

             CREATININE (test code = 0.41 mg/dL   0.60-1.25    L            



             5564471402)                                         

 

             CALCIUM (test code = 7.3 mg/dL    8.6-10.6     L            



             7751185393)                                         

 

             eGFR (test code =              mL/min/1.73m2              



             9103342077)                                         

 

             RENETTA (test code = RENETTA) Association of                           



                          Glomerular Filtration                           



                          Rate (GFR) and Staging                           



                          of Kidney Disease*                           



                          +---------------------                           



                          --+-------------------                           



                          --+-------------------                           



                          ------+| GFR                           



                          (mL/min/1.73 m2) ?|                           



                          With Kidney Damage ?|                           



                          ?Without Kidney                           



                          Damage+---------------                           



                          --------+-------------                           



                          --------+-------------                           



                          ------------+| ?>90 ?                           



                          ? ? ? ? ? ? ? ?|                           



                          ?Stage one ? ? ? ? ?|                           



                          ? Normal ? ? ? ? ? ? ?                           



                          ?+--------------------                           



                          ---+------------------                           



                          ---+------------------                           



                          -------+| ?60-89 ? ? ?                           



                          ? ? ? ? ?| ?Stage two                           



                          ? ? ? ? ?| ? Decreased                           



                          GFR ? ? ? ?                            



                          +---------------------                           



                          --+-------------------                           



                          --+-------------------                           



                          ------+| ?30-59 ? ? ?                           



                          ? ? ? ? ?| ?Stage                           



                          three ? ? ? ?| ? Stage                           



                          three ? ? ? ? ?                           



                          +---------------------                           



                          --+-------------------                           



                          --+-------------------                           



                          ------+| ?15-29 ? ? ?                           



                          ? ? ? ? ?| ?Stage four                           



                          ? ? ? ? | ? Stage four                           



                          ? ? ? ? ?                              



                          ?+--------------------                           



                          ---+------------------                           



                          ---+------------------                           



                          -------+| ?<15 (or                           



                          dialysis) ? ?| ?Stage                           



                          five ? ? ? ? | ? Stage                           



                          five ? ? ? ? ?                           



                          ?+--------------------                           



                          ---+------------------                           



                          ---+------------------                           



                          -------+ *Each stage                           



                          assumes the associated                           



                          GFR level has been in                           



                          effect for at least                           



                          three months. ?Stages                           



                          1 to 5, with or                           



                          without kidney                           



                          disease, indicate                           



                          chronic kidney                           



                          disease. Notes:                           



                          Determination of                           



                          stages one and two                           



                          (with eGFR                             



                          >59mL/min/1.73 m2)                           



                          requires estimation of                           



                          kidney damage for at                           



                          least three months as                           



                          defined by structural                           



                          or functional                           



                          abnormalities of the                           



                          kidney, manifested by                           



                          either:Pathological                           



                          abnormalities or                           



                          Markers of kidney                           



                          damage (including                           



                          abnormalities in the                           



                          composition of the                           



                          blood or urine or                           



                          abnormalities in                           



                          imaging tests).                           

 

             Lab Interpretation Abnormal                               



             (test code = 31315-9)                                        



[facility] METABOLIC PANEL (NA, K, CL, CO2, 
GLUCOSE, BUN, CREATININE, CA)2021 00:19:53





             Test Item    Value        Reference Range Interpretation Comments

 

             NA (test code = 136 mmol/L   135-145                   



             3957983469)                                         

 

             K (test code = 3.7 mmol/L   3.5-5.0                   



             5011109460)                                         

 

             CL (test code = 104 mmol/L                       



             2710178593)                                         

 

             CO2 TOTAL (test code = 26 mmol/L    23-31                     



             7351022583)                                         

 

             AGAP (test code =              2-16                      



             5526533758)                                         

 

             BUN (test code = 4 mg/dL      7-23         L            



             0841391881)                                         

 

             GLUCOSE (test code = 75 mg/dL                         



             9252546756)                                         

 

             CREATININE (test code = 0.41 mg/dL   0.60-1.25    L            



             5040399450)                                         

 

             CALCIUM (test code = 7.3 mg/dL    8.6-10.6     L            



             0244674551)                                         

 

             eGFR (test code =              mL/min/1.73m2              



             5771478944)                                         

 

             RENETTA (test code = RENETTA) Association of                           



                          Glomerular Filtration                           



                          Rate (GFR) and Staging                           



                          of Kidney Disease*                           



                          +---------------------                           



                          --+-------------------                           



                          --+-------------------                           



                          ------+| GFR                           



                          (mL/min/1.73 m2) ?|                           



                          With Kidney Damage ?|                           



                          ?Without Kidney                           



                          Damage+---------------                           



                          --------+-------------                           



                          --------+-------------                           



                          ------------+| ?>90 ?                           



                          ? ? ? ? ? ? ? ?|                           



                          ?Stage one ? ? ? ? ?|                           



                          ? Normal ? ? ? ? ? ? ?                           



                          ?+--------------------                           



                          ---+------------------                           



                          ---+------------------                           



                          -------+| ?60-89 ? ? ?                           



                          ? ? ? ? ?| ?Stage two                           



                          ? ? ? ? ?| ? Decreased                           



                          GFR ? ? ? ?                            



                          +---------------------                           



                          --+-------------------                           



                          --+-------------------                           



                          ------+| ?30-59 ? ? ?                           



                          ? ? ? ? ?| ?Stage                           



                          three ? ? ? ?| ? Stage                           



                          three ? ? ? ? ?                           



                          +---------------------                           



                          --+-------------------                           



                          --+-------------------                           



                          ------+| ?15-29 ? ? ?                           



                          ? ? ? ? ?| ?Stage four                           



                          ? ? ? ? | ? Stage four                           



                          ? ? ? ? ?                              



                          ?+--------------------                           



                          ---+------------------                           



                          ---+------------------                           



                          -------+| ?<15 (or                           



                          dialysis) ? ?| ?Stage                           



                          five ? ? ? ? | ? Stage                           



                          five ? ? ? ? ?                           



                          ?+--------------------                           



                          ---+------------------                           



                          ---+------------------                           



                          -------+ *Each stage                           



                          assumes the associated                           



                          GFR level has been in                           



                          effect for at least                           



                          three months. ?Stages                           



                          1 to 5, with or                           



                          without kidney                           



                          disease, indicate                           



                          chronic kidney                           



                          disease. Notes:                           



                          Determination of                           



                          stages one and two                           



                          (with eGFR                             



                          >59mL/min/1.73 m2)                           



                          requires estimation of                           



                          kidney damage for at                           



                          least three months as                           



                          defined by structural                           



                          or functional                           



                          abnormalities of the                           



                          kidney, manifested by                           



                          either:Pathological                           



                          abnormalities or                           



                          Markers of kidney                           



                          damage (including                           



                          abnormalities in the                           



                          composition of the                           



                          blood or urine or                           



                          abnormalities in                           



                          imaging tests).                           

 

             Lab Interpretation Abnormal                               



             (test code = 41472-6)                                        



[facility] and Screen - ONCE [id number] 00:12:19





             Test Item    Value        Reference Range Interpretation Comments

 

             ABO & RH (test code O Positive                             Performe

d at [facility]



             = 20)                                               Laboratory [facility]

[address]



                                                                 [place] [id number]

4Toll



                                                                 Free: [phone]

266CLIA



                                                                 No. [id number]

 

             IAT (test code = Negative                               Performed a

t [facility]



             1185)                                               Laboratory [facility]

[address] [place] [phone]

4Toll



                                                                 Free: [phone]

266CLIA



                                                                 No. [id number]



[facility] and Screen - ONCE [id number] 00:12:19





             Test Item    Value        Reference Range Interpretation Comments

 

             ABO & RH (test code O Positive                             Performe

d at [facility]



             = 20)                                               Laboratory [facility]

[address] [place], [place] [id number]

4Toll



                                                                 Free: [phone]

266CLIA



                                                                 No. [id number]

 

             IAT (test code = Negative                               Performed a

t [facility]



             1185)                                               Laboratory [facility]

[address] [place], [place] [id number]

4Toll



                                                                 Free: [phone]

266CLIA



                                                                 No. [id number]



[id number]





             Test Item    Value        Reference Range Interpretation Comments

 

             FERRITIN (test code = 12.5 ng/mL   18.0-464.0   L            



             8480340889)                                         

 

             RENETTA (test code = RENETTA) Biotin has been                           



                          reported to cause a                           



                          negative bias,                           



                          interpret results                           



                          relative to                            



                          patient's use of                           



                          biotin.                                

 

             Lab Interpretation (test Abnormal                               



             code = 88662-2)                                        



[id number] 00:02:09





             Test Item    Value        Reference Range Interpretation Comments

 

             FERRITIN (test code = 12.5 ng/mL   18.0-464.0   L            



             5017870899)                                         

 

             RENETTA (test code = RENETTA) Biotin has been                           



                          reported to cause a                           



                          negative bias,                           



                          interpret results                           



                          relative to                            



                          patient's use of                           



                          biotin.                                

 

             Lab Interpretation (test Abnormal                               



             code = 52533-9)                                        



[facility] [id number] 22:57:36





             Test Item    Value        Reference Range Interpretation Comments

 

             PROTIME PATIENT (test              See_Comment  H             [Auto

mated message]



             code = 5964-2)                                        The system 

ich



                                                                 generated this 

result



                                                                 transmitted ref

erence



                                                                 range: 10.1 - 1

2.6



                                                                 Seconds. The



                                                                 reference range

 was



                                                                 not used to int

erpret



                                                                 this result as



                                                                 normal/abnormal

.

 

             INR (test code = 6301-6)                                        Nor

mal INR <1.1;



                                                                 Warfarin Therap

eutic



                                                                 range 2.0 to 3.

0 or



                                                                 2.5 to 3.5, dep

ending



                                                                 upon the indica

tions.

 

             Lab Interpretation (test Abnormal                               



             code = 56178-6)                                        



[facility] [id number] 22:57:36





             Test Item    Value        Reference Range Interpretation Comments

 

             PROTIME PATIENT (test              See_Comment  H             [Auto

mated message]



             code = 5964-2)                                        The system 

ich



                                                                 generated this 

result



                                                                 transmitted ref

erence



                                                                 range: 10.1 - 1

2.6



                                                                 Seconds. The



                                                                 reference range

 was



                                                                 not used to int

erpret



                                                                 this result as



                                                                 normal/abnormal

.

 

             INR (test code = 6301-6)                                        Nor

mal INR <1.1;



                                                                 Warfarin Therap

eutic



                                                                 range 2.0 to 3.

0 or



                                                                 2.5 to 3.5, dep

ending



                                                                 upon the indica

tions.

 

             Lab Interpretation (test Abnormal                               



             code = 52403-9)                                        



[facility] WITH [id number] 22:53:15





             Test Item    Value        Reference Range Interpretation Comments

 

             WBC (test code =              See_Comment  L             [Automated



             6690-2)                                             message] The sy

stem



                                                                 which generated



                                                                 this result



                                                                 transmitted



                                                                 reference range

:



                                                                 4.20 - 10.70



                                                                 10*3/?L. The



                                                                 reference range

 was



                                                                 not used to



                                                                 interpret this



                                                                 result as



                                                                 normal/abnormal

.

 

             RBC (test code =              See_Comment  L             [Automated



             789-8)                                              message] The sy

stem



                                                                 which generated



                                                                 this result



                                                                 transmitted



                                                                 reference range

:



                                                                 4.26 - 5.52



                                                                 10*6/?L. The



                                                                 reference range

 was



                                                                 not used to



                                                                 interpret this



                                                                 result as



                                                                 normal/abnormal

.

 

             HGB (test code = 6.9 g/dL     12.2-16.4    L            



             718-7)                                              

 

             HCT (test code = 22.3 %       38.4-49.3    L            



             4544-3)                                             

 

             MCV (test code = 80.8 fL      81.7-95.6    L            



             787-2)                                              

 

             MCH (test code = 25.0 pg      26.1-32.7    L            



             785-6)                                              

 

             MCHC (test code = 30.9 g/dL    31.2-35.0    L            



             786-4)                                              

 

             RDW-SD (test code = 50.6 fL      38.5-51.6                 



             57260-3)                                            

 

             RDW-CV (test code = 17.4 %       12.1-15.4    H            



             788-0)                                              

 

             PLT (test code =              See_Comment  LL            [Automated



             777-3)                                              message] The sy

stem



                                                                 which generated



                                                                 this result



                                                                 transmitted



                                                                 reference range

:



                                                                 150 - 328 10*3/

?L.



                                                                 The reference r

moose



                                                                 was not used to



                                                                 interpret this



                                                                 result as



                                                                 normal/abnormal

.

 

             MPV (test code =                                        Not Measure

d



             08058-8)                                            

 

             IPF % (test code = 11.6 %       1.2-10.7     H            Platelet 

count



             4053773431)                                         measured by



                                                                 fluorescence



                                                                 method.

 

             NRBC/100 WBC (test              See_Comment                [Automat

ed



             code = 5839697445)                                        message] 

The system



                                                                 which generated



                                                                 this result



                                                                 transmitted



                                                                 reference range

:



                                                                 0.0 - 10.0 /100



                                                                 WBCs. The refer

ence



                                                                 range was not u

sed



                                                                 to interpret th

is



                                                                 result as



                                                                 normal/abnormal

.

 

             NRBC x10^3 (test code <0.01        See_Comment                [Auto

mated



             = [id number])                                        message] The s

ystem



                                                                 which generated



                                                                 this result



                                                                 transmitted



                                                                 reference range

:



                                                                 10*3/?L. The



                                                                 reference range

 was



                                                                 not used to



                                                                 interpret this



                                                                 result as



                                                                 normal/abnormal

.

 

             GRAN MAT (NEUT) % 61.0 %                                 



             (test code = 770-8)                                        

 

             IMM GRAN % (test code 0.30 %                                 



             = [id number])                                        

 

             LYMPH % (test code = 21.0 %                                 



             736-9)                                              

 

             MONO % (test code = 13.8 %                                 



             5905-5)                                             

 

             EOS % (test code = 2.7 %                                  



             713-8)                                              

 

             BASO % (test code = 1.2 %                                  



             706-2)                                              

 

             GRAN MAT x10^3(ANC) 2.04 10*3/uL 1.99-6.95                 



             (test code =                                        



             0068681427)                                         

 

             IMM GRAN x10^3 (test <0.03        0.00-0.06                 



             code = 5823563293)                                        

 

             LYMPH x10^3 (test code 0.70 10*3/uL 1.09-3.23    L            



             = 731-0)                                            

 

             MONO x10^3 (test code 0.46 10*3/uL 0.36-1.02                 



             = 742-7)                                            

 

             EOS x10^3 (test code = 0.09 10*3/uL 0.06-0.53                 



             711-2)                                              

 

             BASO x10^3 (test code 0.04 10*3/uL 0.01-0.09                 



             = 704-7)                                            

 

             Lab Interpretation Abnormal                               



             (test code = 74238-1)                                        



[facility] [id number] 17:59:41





             Test Item    Value        Reference Range Interpretation Comments

 

             ABO & RH (test code O Positive                             Performe

d at [facility]



             = 20)                                               Laboratory [facility] [place], [place] [address]Toll 

Free:



                                                                 [phone]

A No.



                                                                 [id number]



[facility] [id number] 17:59:41





             Test Item    Value        Reference Range Interpretation Comments

 

             ABO & RH (test code O Positive                             Performe

d at [facility]



             = 20)                                               Laboratory [facility] [place], [place] [address]Toll 

Free:



                                                                 [phone]

A No.



                                                                 [id number]



[facility] [id number] 17:58:57





             Test Item    Value        Reference Range Interpretation Comments

 

             ABO & RH (test code O Positive                             Performe

d at [facility]



             = 20)                                               Laboratory [facility] [place], [place] [address]Toll 

Free:



                                                                 [phone]

A No.



                                                                 [id number]

 

             IAT (test code = Negative                               Performed a

t [facility]



             1185)                                               Laboratory [facility] [place], [place] [address]Toll 

Free:



                                                                 [phone]

A No.



                                                                 [id number]



[facility] 17[facility]44[facility]03





             Test Item    Value        Reference Range Interpretation Comments

 

             APPEARANCE (test code = Clear        Clear                     



             6812598512)                                         

 

             COLOR (test code = Lorie        Yellow       A            



             4673067179)                                         

 

             PH (test code =              4.8-8.0                   



             9816692168)                                         

 

             SP GRAVITY (test code =              1.003-1.030  H            



             7123453802)                                         

 

             GLU U QUAL (test code = Normal       Normal                    



             5385062358)                                         

 

             BLOOD (test code = Negative     Negative                  



             1047721617)                                         

 

             KETONES (test code = 20 mg/dL     Negative     A            



             4673580969)                                         

 

             PROTEIN (test code = 30 mg/dL     Negative     A            



             2887-8)                                             

 

             UROBILIN (test code = 4.0 mg/dL    Normal       A            



             4125800048)                                         

 

             BILIRUBIN (test code = 2 mg/dL      Negative     A            



             4369462589)                                         

 

             NITRITE (test code = Negative     Negative                  



             9799149793)                                         

 

             LEUK NIKI (test code = Negative     Negative                  



             1927156877)                                         

 

             RBC/HPF (test code =              See_Comment                [Autom

ated message]



             0916377231)                                         The system [employer]



                                                                 generated this



                                                                 result transmit

michelle



                                                                 reference range

: 0 -



                                                                 3 HPF. The refe

rence



                                                                 range was not u

sed



                                                                 to interpret th

is



                                                                 result as



                                                                 normal/abnormal

.

 

             WBC/HPF (test code =              See_Comment                [Autom

ated message]



             2415473541)                                         The system [employer]



                                                                 generated this



                                                                 result transmit

michelle



                                                                 reference range

: 0 -



                                                                 5 HPF. The refe

rence



                                                                 range was not u

sed



                                                                 to interpret th

is



                                                                 result as



                                                                 normal/abnormal

.

 

             BACTERIA (test code = Few          Negative     A            



             1834909933)                                         

 

             MUCOUS (test code = Marked       Negative LPF A            



             4717454885)                                         

 

             SQ EPITH (test code =              HPF                       



             8989673526)                                         

 

             Ictotest (test code = Positive                               



             8243557030)                                         

 

             Lab Interpretation (test Abnormal                               



             code = 08650-5)                                        



[facility] 17[facility]44[facility]





             Test Item    Value        Reference Range Interpretation Comments

 

             APPEARANCE (test code = Clear        Clear                     



             1140493112)                                         

 

             COLOR (test code = Lorie        Yellow       A            



             9804356497)                                         

 

             PH (test code =              4.8-8.0                   



             0760881711)                                         

 

             SP GRAVITY (test code =              1.003-1.030  H            



             4950384887)                                         

 

             GLU U QUAL (test code = Normal       Normal                    



             0812842286)                                         

 

             BLOOD (test code = Negative     Negative                  



             7755294349)                                         

 

             KETONES (test code = 20 mg/dL     Negative     A            



             2339041082)                                         

 

             PROTEIN (test code = 30 mg/dL     Negative     A            



             2887-8)                                             

 

             UROBILIN (test code = 4.0 mg/dL    Normal       A            



             2469777724)                                         

 

             BILIRUBIN (test code = 2 mg/dL      Negative     A            



             2062274334)                                         

 

             NITRITE (test code = Negative     Negative                  



             6263198949)                                         

 

             LEUK NIKI (test code = Negative     Negative                  



             5941406576)                                         

 

             RBC/HPF (test code =              See_Comment                [Autom

ated message]



             4258446805)                                         The system [employer]



                                                                 generated this



                                                                 result transmit

michelle



                                                                 reference range

: 0 -



                                                                 3 HPF. The refe

rence



                                                                 range was not u

sed



                                                                 to interpret th

is



                                                                 result as



                                                                 normal/abnormal

.

 

             WBC/HPF (test code =              See_Comment                [Autom

ated message]



             7393245970)                                         The system [employer]



                                                                 generated this



                                                                 result transmit

michelle



                                                                 reference range

: 0 -



                                                                 5 HPF. The refe

rence



                                                                 range was not u

sed



                                                                 to interpret th

is



                                                                 result as



                                                                 normal/abnormal

.

 

             BACTERIA (test code = Few          Negative     A            



             4250555624)                                         

 

             MUCOUS (test code = Marked       Negative LPF A            



             3362993160)                                         

 

             SQ EPITH (test code =              HPF                       



             7880425791)                                         

 

             Ictotest (test code = Positive                               



             3392383199)                                         

 

             Lab Interpretation (test Abnormal                               



             code = 68071-5)                                        



[facility] [id number] 
17:13:20 1. ?Cirrhosis, ascites, splenomegaly, and portal venous hypertension. 
Thereare large gastroesophageal varices. 2. Small bowel and colon wall 
thickening likely related to the portalvenous hypertension.Enterocolitis could 
have this appearance. There is nopneumatosis or pneumoperitoneum. RL: 1105 
Electronically signed by Tj Mesa MD at 2021 12:13 PMHISTORY: ?Abdominal 
distension Diverticulitis suspected COMPARISON:none TECHNIQUE:CT scan of the 
abdomen and pelvis performed. Contiguous axial CT imageswere obtained after 
administration of intravenous contrast. ?CT scan doneaccording to NURY. [place]
hnical quality: Technical quality: adequate. FINDINGS: Moderate left pleural 
effusion seen. There isbibasilar atelectasis. Thereis no pericardial effusion. 
Liver is cirrhotic. Spleen is enlarged measuring 14.5 cm. There is moderateto 
large ascites. Gastroesophageal varices are large. Portal vein patent. There is 
no adrenal nodule. The kidneys demonstrate symmetric nephrograms.There is no 
hydronephrosis. Diffuse bowel wall thickening is present which may be due to the
portalvenous hypertension. Superimposed enterocolitis could have this 
appearance.There is no pneumatosis or pneumoperitoneum. Urinary bladder 
unremarkable. There is no destructive bony process. [facility], Radiant Results Inft 
User - 2021 12:14 PM CDTFormatting of this note might be different from 
the original.HISTORY: Abdominal distension Diverticulitis suspected 
COMPARISON:noneTECHNIQUE:CT scan of the abdomen and pelvis performed. Contiguous
axial CT imageswere obtained after administration of intravenous contrast. CT 
scan doneaccording to ALARA. Technical quality: Technical quality: 
adequate.FINDINGS:Moderate left pleural effusion seen. There is bibasilar 
atelectasis. Thereis no pericardial effusion.Liver is cirrhotic. Spleenis 
enlarged measuring 14.5 cm. There is moderateto large ascites. Gastroesophageal 
varices are large. Portal vein patent.There is no adrenal nodule. The kidneys 
demonstrate symmetric nephrograms.There is no hydronephrosis.Diffuse bowel wall 
thickening is present which may be due to the portalvenous hypertension. 
Superimposed enterocolitis could have this appearance.There is no pneumatosis or
pneumoperitoneum.Urinary bladder unremarkable.There is no destructive bony 
process.IMPRESSION1. Cirrhosis, ascites, splenomegaly, and portal venous 
hypertension. Thereare large gastroesophageal varices.2. Smallbowel and colon 
wall thickening likely related to the portalvenous hypertension. Enterocolitis 
couldhave this appearance. There is nopneumatosis or pneumoperitoneum.RL: 
1105Electronically signed by Tj Mesa MD at 2021 12:13 PMUnB[facility] LakewayCT ABDOMEN PEL[facility] W [id number] 17:13:20 1. 
?Cirrhosis, ascites, splenomegaly, and portal venous hypertension. Thereare 
large gastroesophageal varices. 2. Small bowel and colon wall thickening likely 
related to the portalvenous hypertension.Enterocolitis could have this 
appearance. There is nopneumatosis or pneumoperitoneum. RL: 1105 Electronically 
signed by Tj Mesa MD at 2021 12:13 PMHISTORY: ?Abdominal distension 
Diverticulitis suspected COMPARISON:none TECHNIQUE:CT scan of the abdomen and 
pelvis performed. Contiguous axial CT imageswere obtained after administration 
of intravenous contrast. ?CT scan doneaccording to ALARA. Technical quality: 
Technical quality: adequate. FINDINGS: Moderate left pleural effusion seen. 
There isbibasilar atelectasis. Thereis no pericardial effusion. Liver is 
cirrhotic. Spleen is enlarged measuring 14.5 cm. There is moderateto large 
ascites. Gastroesophageal varices are large. Portal vein patent. There is no 
adrenal nodule. The kidneys demonstrate symmetric nephrograms.There is no 
hydronephrosis. Diffuse bowel wall thickening is present which may be due to the
portalvenous hypertension. Superimposed enterocolitis could have this 
appearance.There is no pneumatosis or pneumoperitoneum. Urinary bladder 
unremarkable. There is no destructive bony process. [facility], Radiant Results Inft 
User - 2021 12:14 PM CDTFormatting of this note might be different from 
the original.HISTORY: Abdominal distension Diverticulitis suspected 
COMPARISON:noneTECHNIQUE:CT scan of the abdomen and pelvis performed. Contiguous
axial CT imageswere obtained after administration of intravenous contrast. CT 
scan doneaccording to [facility]. Technical quality: Technical quality: 
adequate.FINDINGS:Moderate left pleural effusion seen. There is bibasilar 
atelectasis. Thereis no pericardial effusion.Liver is cirrhotic. Spleenis 
enlarged measuring 14.5 cm. There is moderateto large ascites. Gastroesophageal 
varices are large. Portal vein patent.There is no adrenal nodule. The kidneys 
demonstrate symmetric nephrograms.There is no hydronephrosis.Diffuse bowel wall 
thickening is present which may be due to the portalvenous hypertension. 
Superimposed enterocolitis could have this appearance.There is no pneumatosis or
pneumoperitoneum.Urinary bladder unremarkable.There is no destructive bony 
process.IMPRESSION1. Cirrhosis, ascites, splenomegaly, and portal venous 
hypertension. Thereare large gastroesophageal varices.2. Smallbowel and colon 
wall thickening likely related to the portalvenous hypertension. Enterocolitis 
couldhave this appearance. There is nopneumatosis or pneumoperitoneum.RL: 
1105Electronically signed by Tj Mesa MD at 2021 12:13 PMUnGrand [facility] with [id number] 16:30:57





             Test Item    Value        Reference Range Interpretation Comments

 

             WBC (test code =              See_Comment  L             [Automated



             4590-2)                                             message] The



                                                                 system which



                                                                 generated this



                                                                 result transmit

michelle



                                                                 reference range

:



                                                                 4.20 - 10.70



                                                                 10*3/?L. The



                                                                 reference range



                                                                 was not used to



                                                                 interpret this



                                                                 result as



                                                                 normal/abnormal

.

 

             RBC (test code =              See_Comment  L             [Automated



             909-8)                                              message] The



                                                                 system which



                                                                 generated this



                                                                 result transmit

michelle



                                                                 reference range

:



                                                                 4.26 - 5.52



                                                                 10*6/?L. The



                                                                 reference range



                                                                 was not used to



                                                                 interpret this



                                                                 result as



                                                                 normal/abnormal

.

 

             HGB (test code = 6.3 g/dL     12.2-16.4    L            



             718-7)                                              

 

             HCT (test code = 21.2 %       38.4-49.3    L            



             4544-3)                                             

 

             MCV (test code = 79.4 fL      81.7-95.6    L            



             787-2)                                              

 

             MCH (test code = 23.6 pg      26.1-32.7    L            



             785-6)                                              

 

             MCHC (test code = 29.7 g/dL    31.2-35.0    L            



             786-4)                                              

 

             RDW-SD (test code = 50.9 fL      38.5-51.6                 



             25710-7)                                            

 

             RDW-CV (test code = 17.6 %       12.1-15.4    H            



             788-0)                                              

 

             PLT (test code =              See_Comment  LL            [Automated



             777-3)                                              message] The



                                                                 system which



                                                                 generated this



                                                                 result transmit

michelle



                                                                 reference range

:



                                                                 150 - 328 10*3/

?L.



                                                                 The reference



                                                                 range was not u

sed



                                                                 to interpret th

is



                                                                 result as



                                                                 normal/abnormal

.

 

             MPV (test code =                                        Not Measure

d



             05391-6)                                            

 

             IPF % (test code = 8.5 %        1.2-10.7                  Platelet 

count



             [id number])                                         measured by



                                                                 fluorescence



                                                                 method.

 

             NRBC/100 WBC (test              See_Comment                [Automat

ed



             code = 9886563225)                                        message] 

The



                                                                 system which



                                                                 generated this



                                                                 result transmit

michelle



                                                                 reference range

:



                                                                 0.0 - 10.0 /100



                                                                 WBCs. The



                                                                 reference range



                                                                 was not used to



                                                                 interpret this



                                                                 result as



                                                                 normal/abnormal

.

 

             NRBC x10^3 (test code <0.01        See_Comment                [Auto

mated



             = 2100209263)                                        message] The



                                                                 system which



                                                                 generated this



                                                                 result transmit

michelle



                                                                 reference range

:



                                                                 10*3/?L. The



                                                                 reference range



                                                                 was not used to



                                                                 interpret this



                                                                 result as



                                                                 normal/abnormal

.

 

             GRAN MAT (NEUT) % 60.3 %                                 



             (test code = 770-8)                                        

 

             IMM GRAN % (test code 0.30 %                                 



             = 0017878131)                                        

 

             LYMPH % (test code = 20.2 %                                 



             736-9)                                              

 

             MONO % (test code = 17.0 %                                 



             5905-5)                                             

 

             EOS % (test code = 1.3 %                                  



             713-8)                                              

 

             BASO % (test code = 0.9 %                                  



             706-2)                                              

 

             GRAN MAT x10^3(ANC) 1.91 10*3/uL 1.99-6.95    L            



             (test code =                                        



             3986035289)                                         

 

             IMM GRAN x10^3 (test <0.03        0.00-0.06                 



             code = 1597108818)                                        

 

             LYMPH x10^3 (test 0.64 10*3/uL 1.09-3.23    L            



             code = 731-0)                                        

 

             MONO x10^3 (test code 0.54 10*3/uL 0.36-1.02                 



             = 742-7)                                            

 

             EOS x10^3 (test code 0.04 10*3/uL 0.06-0.53    L            



             = 711-2)                                            

 

             BASO x10^3 (test code 0.03 10*3/uL 0.01-0.09                 



             = 704-7)                                            

 

             POLYCHROMASIA (test 2+           See_Comment                [Automa

michelle



             code = 53900-2)                                        message] The



                                                                 system which



                                                                 generated this



                                                                 result transmit

michelle



                                                                 reference range

:



                                                                 2+. The referen

ce



                                                                 range was not u

sed



                                                                 to interpret th

is



                                                                 result as



                                                                 normal/abnormal

.

 

             ROULEAUX (test code = Present      See_Comment  A             [Auto

mated



             7797-4)                                             message] The



                                                                 system which



                                                                 generated this



                                                                 result transmit

michelle



                                                                 reference range

:



                                                                 (none). The



                                                                 reference range



                                                                 was not used to



                                                                 interpret this



                                                                 result as



                                                                 normal/abnormal

.

 

             TARGET CELLS (test 2+           See_Comment  A             [Automat

ed



             code = 30010-6)                                        message] The



                                                                 system which



                                                                 generated this



                                                                 result transmit

michelle



                                                                 reference range

:



                                                                 (none). The



                                                                 reference range



                                                                 was not used to



                                                                 interpret this



                                                                 result as



                                                                 normal/abnormal

.

 

             PLT ESTIMATE (test Critically   Normal       AA           



             code = 9317-9) Decreased                              

 

             Lab Interpretation Abnormal                               



             (test code = 61481-6)                                        



[facility] 
[facility]





             Test Item    Value        Reference Range Interpretation Comments

 

             SARS-CoV-2 Rapid ID NOW Not Detected Not Detected              



             (test code = 58780-1)                                        

 

             RENETTA (test code = RENETTA) ID NOW COVID-19 Assay                        

   



                          is an isothermal                           



                          nucleic acid                           



                          amplification test                           



                          intended for the                           



                          qualitative detection                           



                          of nucleic acid from                           



                          SARS-CoV-2 viral RNA                           



                          in nasopharyngeal (NP)                           



                          specimens. It is used                           



                          under Emergency Use                           



                          Authorization (EUA) by                           



                          FDA. The limit of                           



                          detection (LOD) of the                           



                          assay is 125 Genome                           



                          Equivalents/mL. A                           



                          positive result is                           



                          indicative of the                           



                          presence of SARS-CoV-2                           



                          RNA. ?Clinical                           



                          correlation with                           



                          patient history and                           



                          other diagnostic                           



                          information is                           



                          necessary to determine                           



                          patient infection                           



                          status. A negative                           



                          (Not Detected) result                           



                          does not preclude                           



                          SARS-CoV-2 infection.                           



                          In patients with                           



                          clinical symptoms and                           



                          other tests that are                           



                          consistent with                           



                          SARS-CoV-2 infection,                           



                          negative results                           



                          should be treated as                           



                          presumptive negative                           



                          and a new specimen                           



                          should be tested with                           



                          alternative PCR                           



                          molecular test.                           



                          Invalid: Please                           



                          collect a new specimen                           



                          for repeat patient                           



                          testing if clinically                           



                          indicated.                             

 

             Lab Interpretation Normal                                 



             (test code = 73151-7)                                        



[facility] 
15:47:50





             Test Item    Value        Reference Range Interpretation Comments

 

             SARS-CoV-2 Rapid ID NOW Not Detected Not Detected              



             (test code = 37374-5)                                        

 

             RENETTA (test code = RENETTA) ID NOW COVID-19 Assay                        

   



                          is an isothermal                           



                          nucleic acid                           



                          amplification test                           



                          intended for the                           



                          qualitative detection                           



                          of nucleic acid from                           



                          SARS-CoV-2 viral RNA                           



                          in nasopharyngeal (NP)                           



                          specimens. It is used                           



                          under Emergency Use                           



                          Authorization (EUA) by                           



                          FDA. The limit of                           



                          detection (LOD) of the                           



                          assay is 125 Genome                           



                          Equivalents/mL. A                           



                          positive result is                           



                          indicative of the                           



                          presence of SARS-CoV-2                           



                          RNA. ?Clinical                           



                          correlation with                           



                          patient history and                           



                          other diagnostic                           



                          information is                           



                          necessary to determine                           



                          patient infection                           



                          status. A negative                           



                          (Not Detected) result                           



                          does not preclude                           



                          SARS-CoV-2 infection.                           



                          In patients with                           



                          clinical symptoms and                           



                          other tests that are                           



                          consistent with                           



                          SARS-CoV-2 infection,                           



                          negative results                           



                          should be treated as                           



                          presumptive negative                           



                          and a new specimen                           



                          should be tested with                           



                          alternative PCR                           



                          molecular test.                           



                          Invalid: Please                           



                          collect a new specimen                           



                          for repeat patient                           



                          testing if clinically                           



                          indicated.                             

 

             Lab Interpretation Normal                                 



             (test code = 88206-6)                                        



[facility]





             Test Item    Value        Reference Range Interpretation Comments

 

             TROPONIN I (test 0.002 ng/mL  See_Comment                [Automated



             code = 9258761283)                                        message] 

The



                                                                 system which



                                                                 generated this



                                                                 result



                                                                 transmitted



                                                                 reference range

:



                                                                 <=0.034. The



                                                                 reference range



                                                                 was not used to



                                                                 interpret this



                                                                 result as



                                                                 normal/abnormal

.

 

             RENETTA (test code = Equal or Less than                           



             RENETTA)         0.034                                  



                          ng/ml---Normal                           



                          ?Note: Cardiac                           



                          troponin begins to                           



                          rise 3-4 hours                           



                          after the onset of                           



                          ischemia. Repeat                           



                          in 4-6 hours if                           



                          the sample was                           



                          drawn within 3-4                           



                          hours of the onset                           



                          of the symptom and                           



                          found normal.                           



                          Between 0.035 and                           



                          0.120 ng/mL---                           



                          Borderline.                            



                          Questionable                           



                          myocardial injury                           



                          or necrosis ?                           



                          ?Note: Serial                           



                          measurement may be                           



                          necessary to                           



                          confirm or exclude                           



                          the diagnosis of                           



                          myocardial injury                           



                          or necrosis;                           



                          Clinical                               



                          correlation                            



                          (symptoms, EKGs,                           



                          imaging studies,                           



                          and others)                            



                          required; Repeat                           



                          in 4-6 hours if                           



                          clinically                             



                          indicated. ? ? ? ?                           



                          Equal or Higher                           



                          than 0.121                             



                          ng/mL---Abnormal.                           



                          Myocardial Injury                           



                          or Necrosis Likely                           



                          ? ? ? ? Biotin has                           



                          been reported to                           



                          cause a negative                           



                          bias, interpret                           



                          results relative                           



                          to patient's use                           



                          of biotin. ? ? ? ?                           



                          ? ? ? ? ? ? ? ? ?                           



                          ? ? ? ? ? ? ? ? ?                           



                          ? ? ? ? ? ? ? ? ?                           



                          ? ? ? ? ? ? ? ? ?                           



                          ? ? ? ? ? ? ? ? ?                           



                          ?                                      

 

             Lab Interpretation Normal                                 



             (test code =                                        



             15426-5)                                            



[facility]





             Test Item    Value        Reference Range Interpretation Comments

 

             TROPONIN I (test 0.002 ng/mL  See_Comment                [Automated



             code = 5431490824)                                        message] 

The



                                                                 system which



                                                                 generated this



                                                                 result



                                                                 transmitted



                                                                 reference range

:



                                                                 <=0.034. The



                                                                 reference range



                                                                 was not used to



                                                                 interpret this



                                                                 result as



                                                                 normal/abnormal

.

 

             RENETTA (test code = Equal or Less than                           



             RENETTA)         0.034                                  



                          ng/ml---Normal                           



                          ?Note: Cardiac                           



                          troponin begins to                           



                          rise 3-4 hours                           



                          after the onset of                           



                          ischemia. Repeat                           



                          in 4-6 hours if                           



                          the sample was                           



                          drawn within 3-4                           



                          hours of the onset                           



                          of the symptom and                           



                          found normal.                           



                          Between 0.035 and                           



                          0.120 ng/mL---                           



                          Borderline.                            



                          Questionable                           



                          myocardial injury                           



                          or necrosis ?                           



                          ?Note: Serial                           



                          measurement may be                           



                          necessary to                           



                          confirm or exclude                           



                          the diagnosis of                           



                          myocardial injury                           



                          or necrosis;                           



                          Clinical                               



                          correlation                            



                          (symptoms, EKGs,                           



                          imaging studies,                           



                          and others)                            



                          required; Repeat                           



                          in 4-6 hours if                           



                          clinically                             



                          indicated. ? ? ? ?                           



                          Equal or Higher                           



                          than 0.121                             



                          ng/mL---Abnormal.                           



                          Myocardial Injury                           



                          or Necrosis Likely                           



                          ? ? ? ? Biotin has                           



                          been reported to                           



                          cause a negative                           



                          bias, interpret                           



                          results relative                           



                          to patient's use                           



                          of biotin. ? ? ? ?                           



                          ? ? ? ? ? ? ? ? ?                           



                          ? ? ? ? ? ? ? ? ?                           



                          ? ? ? ? ? ? ? ? ?                           



                          ? ? ? ? ? ? ? ? ?                           



                          ? ? ? ? ? ? ? ? ?                           



                          ?                                      

 

             Lab Interpretation Normal                                 



             (test code =                                        



             68152-8)                                            



[id number]11





             Test Item    Value        Reference Range Interpretation Comments

 

             NT-proBNP (test code 162 pg/mL    See_Comment  H             [Autom

ated



             = [id number])                                        message] The



                                                                 system which



                                                                 generated this



                                                                 result



                                                                 transmitted



                                                                 reference range

:



                                                                 <=125. The



                                                                 reference range



                                                                 was not used to



                                                                 interpret this



                                                                 result as



                                                                 normal/abnormal

.

 

             RENETTA (test code = RENETTA) Biotin has been                           



                          reported to                            



                          cause a negative                           



                          bias, interpret                           



                          results relative                           



                          to patient's use                           



                          of biotin.                             

 

             Lab Interpretation Abnormal                               



             (test code = 75308-1)                                        



[id number] 15:41:11





             Test Item    Value        Reference Range Interpretation Comments

 

             NT-proBNP (test code 162 pg/mL    See_Comment  H             [Autom

ated



             = [id number])                                        message] The



                                                                 system which



                                                                 generated this



                                                                 result



                                                                 transmitted



                                                                 reference range

:



                                                                 <=125. The



                                                                 reference range



                                                                 was not used to



                                                                 interpret this



                                                                 result as



                                                                 normal/abnormal

.

 

             RENETTA (test code = RENETTA) Biotin has been                           



                          reported to                            



                          cause a negative                           



                          bias, interpret                           



                          results relative                           



                          to patient's use                           



                          of biotin.                             

 

             Lab Interpretation Abnormal                               



             (test code = 31225-2)                                        



[facility] 1 [id number] 15:35:38EXAM: XR CHEST 
1 VW HISTORY: SOB COMPARISON: None. FINDINGS: The lungs are poorly expanded and 
show changes of basilar subsegmentalatelectasis with suspicion of a small 
pleural effusion on the left. The midand upper lungs are clear. The heart and 
great vessels are normal. ? [facility], Radiant Results Inft User - 2021 10:36 
AM CDTFormatting of this note might be different from the original.EXAM: XR CH
EST 1 VWHISTORY: SOB COMPARISON: None.FINDINGS:The lungs are poorly expanded and
show changes of basilar subsegmentalatelectasis with suspicion of a small 
pleural effusion on the left. The midand upperlungs are clear. The heart and 
great vessels are normal.[facility] 1 View
2021 15:35:38EXAM: XR CHEST 1 VW HISTORY: SOB COMPARISON: None. FINDINGS: 
The lungs are poorly expanded and show changes of basilar 
subsegmentalatelectasis with suspicion of a small pleural effusion on the left. 
The midand upper lungs are clear. The heart and great vessels are normal. ? 
[facility], Radiant Results Inft User - 2021 10:36 AM CDTFormatting of this note
might be different from the original.EXAM: XR CHEST 1 VWHISTORY: SOB COMPARISON:
None.FINDINGS:The lungs are poorly expanded and show changes of basilar 
subsegmentalatelectasis with suspicion of a small pleural effusion on the left. 
The midand upperlungs are clear. The heart and great vessels are normal.
[facility]. METABOLIC PANEL (13154)2021 
15:32:50





             Test Item    Value        Reference Range Interpretation Comments

 

             NA (test code = 137 mmol/L   135-145                   



             [id number])                                         

 

             K (test code = 3.3 mmol/L   3.5-5.0      L            



             3759964535)                                         

 

             CL (test code = 103 mmol/L                       



             9629282196)                                         

 

             CO2 TOTAL (test code = 24 mmol/L    23-31                     



             9363835264)                                         

 

             AGAP (test code =              2-16                      



             0190813772)                                         

 

             BUN (test code = 6 mg/dL      7-23         L            



             2885933203)                                         

 

             GLUCOSE (test code = 155 mg/dL           H            



             6302241950)                                         

 

             CREATININE (test code = 0.43 mg/dL   0.60-1.25    L            



             6752521562)                                         

 

             TOTAL BILI (test code = 2.5 mg/dL    0.1-1.1      H            



             6766731913)                                         

 

             CALCIUM (test code = 7.6 mg/dL    8.6-10.6     L            



             9991776063)                                         

 

             T PROTEIN (test code = 6.7 g/dL     6.3-8.2                   



             7137917231)                                         

 

             ALBUMIN (test code = 2.8 g/dL     3.5-5.0      L            



             7062331183)                                         

 

             ALK PHOS (test code = 153 U/L             H            



             6089116429)                                         

 

             ALTv (test code = 22 U/L       5-50                      



             1742-6)                                             

 

             AST(SGOT) (test code = 73 U/L       13-40        H            



             7179207586)                                         

 

             eGFR (test code =              mL/min/1.73m2              



             8264043112)                                         

 

             RENETTA (test code = RENETTA) Association of                           



                          Glomerular Filtration                           



                          Rate (GFR) and Staging                           



                          of Kidney Disease*                           



                          +---------------------                           



                          --+-------------------                           



                          --+-------------------                           



                          ------+| GFR                           



                          (mL/min/1.73 m2) ?|                           



                          With Kidney Damage ?|                           



                          ?Without Kidney                           



                          Damage+---------------                           



                          --------+-------------                           



                          --------+-------------                           



                          ------------+| ?>90 ?                           



                          ? ? ? ? ? ? ? ?|                           



                          ?Stage one ? ? ? ? ?|                           



                          ? Normal ? ? ? ? ? ? ?                           



                          ?+--------------------                           



                          ---+------------------                           



                          ---+------------------                           



                          -------+| ?60-89 ? ? ?                           



                          ? ? ? ? ?| ?Stage two                           



                          ? ? ? ? ?| ? Decreased                           



                          GFR ? ? ? ?                            



                          +---------------------                           



                          --+-------------------                           



                          --+-------------------                           



                          ------+| ?30-59 ? ? ?                           



                          ? ? ? ? ?| ?Stage                           



                          three ? ? ? ?| ? Stage                           



                          three ? ? ? ? ?                           



                          +---------------------                           



                          --+-------------------                           



                          --+-------------------                           



                          ------+| ?15-29 ? ? ?                           



                          ? ? ? ? ?| ?Stage four                           



                          ? ? ? ? | ? Stage four                           



                          ? ? ? ? ?                              



                          ?+--------------------                           



                          ---+------------------                           



                          ---+------------------                           



                          -------+| ?<15 (or                           



                          dialysis) ? ?| ?Stage                           



                          five ? ? ? ? | ? Stage                           



                          five ? ? ? ? ?                           



                          ?+--------------------                           



                          ---+------------------                           



                          ---+------------------                           



                          -------+ *Each stage                           



                          assumes the associated                           



                          GFR level has been in                           



                          effect for at least                           



                          three months. ?Stages                           



                          1 to 5, with or                           



                          without kidney                           



                          disease, indicate                           



                          chronic kidney                           



                          disease. Notes:                           



                          Determination of                           



                          stages one and two                           



                          (with eGFR                             



                          >59mL/min/1.73 m2)                           



                          requires estimation of                           



                          kidney damage for at                           



                          least three months as                           



                          defined by structural                           



                          or functional                           



                          abnormalities of the                           



                          kidney, manifested by                           



                          either:Pathological                           



                          abnormalities or                           



                          Markers of kidney                           



                          damage (including                           



                          abnormalities in the                           



                          composition of the                           



                          blood or urine or                           



                          abnormalities in                           



                          imaging tests).                           

 

             Lab Interpretation Abnormal                               



             (test code = 65229-1)                                        



[facility] [id number] 15:32:10





             Test Item    Value        Reference Range Interpretation Comments

 

             LIPASE (test code = 4490144946) 350 U/L      0-220        H        

    

 

             Lab Interpretation (test code = Abnormal                           

    



             68904-0)                                            



[facility] [id number] 15:32:10





             Test Item    Value        Reference Range Interpretation Comments

 

             LIPASE (test code = 4090934818) 350 U/L      0-220        H        

    

 

             Lab Interpretation (test code = Abnormal                           

    



             52384-5)                                            



[facility]

## 2023-07-28 VITALS — OXYGEN SATURATION: 100 % | TEMPERATURE: 100.9 F

## 2023-07-28 VITALS — DIASTOLIC BLOOD PRESSURE: 64 MMHG | SYSTOLIC BLOOD PRESSURE: 110 MMHG

## 2023-07-28 NOTE — RAD REPORT
EXAM DESCRIPTION:  CT - Head C Spine Mpr Wo Con - 7/28/2023 6:17 am

 

CLINICAL HISTORY:  The patient is 53 years old and is Male; fall

 

TECHNIQUE:  Axial computed tomography images of the head/brain and cervical spine without intravenous
 contrast.   Sagittal and coronal reformatted images were created and reviewed.   This CT exam was pe
rformed using one or more of the following dose reduction techniques:   automated exposure control, a
djustment of the mA and/or kV according to patient size, and/or use of iterative reconstruction techn
ique.

 

COMPARISON:  No relevant prior studies available.

 

FINDINGS:  Brain:   Mild cerebral atrophy.

        Mild nonspecific white matter changes likely related to chronic microvascular ischemic diseas
e.

        No hemorrhage.

  Ventricles:   Unremarkable.   No ventriculomegaly.

  Skull:   Fracture extending through the angle of the right mandible.

  Sinuses:   Unremarkable as visualized.   No acute sinusitis.

  Mastoid air cells:   Unremarkable as visualized.   No mastoid effusion.

  Vertebrae:   See below.

  Discs/spinal canal/neural foramina:   Moderate bilateral neural foraminal narrowing at C3-4.

        Disc space narrowing with degenerative endplate changes at C3-4.

        Moderate to severe bilateral neural foraminal narrowing at C5-C6.

        Disc space narrowing with degenerative endplate changes at C5-6.

  Other bones/joints:   Comminuted fracture involving the proximal left clavicle.

  Soft tissues:   Unremarkable.

 

IMPRESSION:  1.   No acute intracranial abnormality. No acute cervical spine fracture.

2.   Comminuted fracture involving the proximal left clavicle.

 

  3.   Fracture extending through the angle of the right mandible.

 

Electronically signed by:   Prakash Ambrosio MD   7/28/2023 2:02 AM CDT Workstation: [id number]

 

 

 

Due to temporary technical issues with the PACS/Fluency reporting system, reports are being signed by
 the in house radiologists without review as a courtesy to insure prompt reporting. The interpreting 
radiologist is fully responsible for the content of the report.

## 2023-07-28 NOTE — RAD REPORT
EXAM DESCRIPTION:  RAD - Chest Single View - 7/27/2023 10:35 pm

 

CLINICAL HISTORY:  FEVER.

 

COMPARISON:  None.

 

TECHNIQUE:  Single view AP chest radiograph(s).

 

FINDINGS:  Low lung volumes. No pulmonary infiltrate identified. No pleural effusion. No pneumothorax
. Nonenlarged cardiomediastinal silhouette. Chronic left lower rib fractures.

 

IMPRESSION:  No acute cardiopulmonary abnormality identified by radiograph.

 

Electronically signed by:   Maria Elena Glez MD   7/27/2023 10:57 PM CDT Workstation: [id number]

 

 

 

Due to temporary technical issues with the PACS/Fluency reporting system, reports are being signed by
 the in house radiologists without review as a courtesy to insure prompt reporting. The interpreting 
radiologist is fully responsible for the content of the report.

## 2023-07-28 NOTE — EKG
Test Date:    2023-07-27               Test Time:    20:25:11

Technician:   NICHOLAS                                     

                                                     

MEASUREMENT RESULTS:                                       

Intervals:                                           

Rate:         95                                     

AR:                                                  

QRSD:         74                                     

QT:           350                                    

QTc:          439                                    

Axis:                                                

P:                                                   

AR:                                                  

QRS:          71                                     

T:            58                                     

                                                     

INTERPRETIVE STATEMENTS:                                       

                                                     

Normal sinus rhythm

Nonspecific ST abnormality

Abnormal ECG

Compared to ECG 07/06/2023 15:15:41

ST (T wave) deviation now present

Short AR interval no longer present



Electronically Signed On 07-28-23 13:23:17 CDT by Olvin Mathur

## 2023-07-28 NOTE — ER
Nurse's Notes                                                                                     

 [facility]                                                                 

Name: Galdino Tabares                                                                             

Age: 53 yrs                                                                                       

Sex: Male                                                                                         

: 1970                                                                                   

MRN: [id number]                                                                                   

Arrival Date: 2023                                                                          

Time: 19:46                                                                                       

Account#: [id number]                                                                            

Bed 2                                                                                             

Private MD:                                                                                       

Diagnosis: Lactic acidosis;Hyperbilirubinemia;Fever                                               

                                                                                                  

Presentation:                                                                                     

                                                                                             

19:53 Chief complaint: EMS states: Bystanders called when pt was found on the ground outside. jb4 

      Pt reports dripping and falling. Denies any pain. Has pedal Edema RAYSHAWN. Reports legs are     

      always swollen but this time is worse. Initial vitals b/p 128/64, O2 98% on RA. temp        

      102.4. Bgl 73. Coronavirus screen: At this time, the client does not indicate any           

      symptoms associated with coronavirus-19. Ebola Screen: No symptoms or risks identified      

      at this time. Initial Sepsis Screen: Does the patient meet any 2 criteria? Yes Does the     

      patient have a suspected source of infection? No. Patient's initial sepsis screen is        

      negative. Risk Assessment: Do you want to hurt yourself or someone else? Patient            

      reports no desire to harm self or others. Onset of symptoms was 2023.              

      Transition of care: patient was not received from another setting of care.                  

19:53 Method Of Arrival: EMS: [facility]                                                    jb4 

19:53 Acuity: TERRY 3                                                                           jb4 

                                                                                                  

Historical:                                                                                       

- Allergies:                                                                                      

19:57 PENICILLINS;                                                                            jb4 

- Home Meds:                                                                                      

19:57 None [Active];                                                                          jb4 

- PMHx:                                                                                           

19:57 cirrhosis of liver; HTN; Pt is poor historian;                                          jb4 

- PSHx:                                                                                           

19:57 None;                                                                                   jb4 

                                                                                                  

- Immunization history:: Adult Immunizations unknown.                                             

- Social history:: Patient uses alcohol, Smoking status: unknown.                                 

- Family history:: not pertinent.                                                                 

                                                                                                  

                                                                                                  

Screenin:[facility] ED Fall Risk Assessment (Adult) History of falling in the last 3 months,       kd3 

      including since admission No falls in past 3 months (0 pts) Confusion or Disorientation     

      No (0 pts) Intoxicated or Sedated No (0 pts) Impaired Gait No (0 pts) Mobility Assist       

      Device Used No (0 pt) Altered Elimination No (0 pt) Score/Fall Risk Level 0 - 2 = Low       

      Risk Maintained a safe environment. Abuse screen: Denies threats or abuse. Denies           

      injuries from another. Nutritional screening: No deficits noted. Tuberculosis               

      screening: No symptoms or risk factors identified.                                          

                                                                                                  

Assessment:                                                                                       

20:40 General: Appears uncomfortable, Behavior is calm, cooperative. Pain: Denies pain.       kd3 

      Neuro: Level of Consciousness is awake, alert, obeys commands, Oriented to person,          

      place, time, situation. Cardiovascular: Patient's skin is warm and dry. Rhythm is           

      junctional rhythm. Cardiovascular: Patient's skin is warm and dry. Rhythm is sinus          

      rhythm. Respiratory: Airway is patent Trachea midline Respiratory effort is even,           

      unlabored, Respiratory pattern is regular, symmetrical.                                     

21:52 Reassessment: Patient appears in no apparent distress at this time. Patient and/or      jb4 

      family updated on plan of care and expected duration. Pain level reassessed. Patient is     

      alert, oriented x 3, equal unlabored respirations, skin warm/dry/pink. Provider             

      notified, pt now has a fever of 102.3 and heart rate elevated to 92. See MAR.               

23:00 Reassessment: Patient appears in no apparent distress at this time. Patient and/or      jb4 

      family updated on plan of care and expected duration. Pain level reassessed. Patient is     

      alert, oriented x 3, equal unlabored respirations, skin warm/dry/pink.                      

                                                                                                  

Vital Signs:                                                                                      

19:53  / 56; Pulse 88; Resp 16; Temp 100.2(O); Pulse Ox 100% on R/A; Weight 73.94 kg    jb4 

      (M); Pain 0/10;                                                                             

20:41  / 59; Pulse 91; Resp 16; Temp 100.3(O); Pulse Ox 100% on R/A;                    kd3 

21:45  / 57; Pulse 92; Resp 17; Temp 102.3(O); Pulse Ox 99% on R/A;                     jb4 

23:00  / 56; Pulse 87; Resp 17; Temp 100.9(O); Pulse Ox 100% on R/A;                    jb4 

                                                                                             

02:27  / 55; Pulse 80; Resp 19; Pulse Ox 100% on R/A;                                   kd3 

03:47  / 64; Pulse 87; Pulse Ox 100% on R/A;                                              

                                                                                             

19:53 Pain Scale: Adult                                                                       [facility] 

                                                                                                  

ED Course:                                                                                        

                                                                                             

19:48 Patient arrived in ED.                                                                  jb4 

19:57 Triage completed.                                                                       jb4 

19:57 Arm band placed on right wrist.                                                         jb4 

19:58 Elsa Corona, IVON is Primary Nurse.                                                    kd3 

19:59 Juan Ng MD is Attending Physician.                                            rt  

20:41 Patient has correct armband on for positive identification. Provided Education on: .    kd3 

20:41 Inserted saline lock: 20 gauge in left antecubital area, using aseptic technique. Blood kd3 

      collected.                                                                                  

20:41 CBC with Diff Sent.                                                                     kd3 

20:41 CMP Sent.                                                                               kd3 

20:41 Magnesium Sent.                                                                         kd3 

20:41 Troponin High Sensitivity Sent.                                                         kd3 

20:41 BNP Sent.                                                                               kd3 

22:28 Notified ED physician of a critical lab result(s). lactic acid 5.8.                     kl  

22:29 Blood Culture Adult (2) Sent.                                                           kd3 

22:37 Chest Single View XRAY In Process Unspecified.                                          EDMS

23:12 CT Abd/Pelvis - IV Contrast Only In Process Unspecified.                                EDMS

                                                                                             

01:05 Initiated transfer with Nomi at [facility].                                           rv1 

01:37 CT Head C Spine In Process Unspecified.                                                 EDMS

03:47 No provider procedures requiring assistance completed. Patient transferred, IV remains  kl  

      in place.                                                                                   

                                                                                                  

Administered Medications:                                                                         

                                                                                             

22:06 CANCELLED (Other Intervention Used): cefOTAXime IVPB 2 grams IVPB once over 30 mins;    rt  

      (mix in 100 mL NS)                                                                          

22:12 Drug: Ibuprofen  mg Route: PO;                                                    jb4 

23:28 Drug: Cefepime IVPB 2 grams Route: IVPB; Rate: 200 ml/hr; Infused Over: 30 mins; Site:  [facility] 

      left antecubital;                                                                           

23:28 Drug: NS 0.9% IV 1000 ml Route: IV; Rate: 1 bolus; Site: left antecubital;              jb4 

                                                                                             

02:27 Drug: vancoMYCIN IVPB 1 grams Route: IVPB; Infused Over: 2 hrs; Site: left antecubital; kd3 

02:27 Drug: Lactulose PO 30 grams Volume: 45 ml; Route: PO;                                   kd3 

03:30 Drug: metroNIDAZOLE IVPB 500 mg Volume: 100 ml; Route: IVPB; Rate: 200 ml/hr; Infused   jb4 

      Over: 30 mins; Site: right antecubital;                                                     

                                                                                                  

                                                                                                  

Medication:                                                                                       

                                                                                             

20:41 VIS not applicable for this client.                                                     kd3 

                                                                                                  

Outcome:                                                                                          

                                                                                             

02:44 ER care complete, transfer ordered by MD.                                               rt  

03:46 Transferred by ground EMS to [facility], Transfer form completed.    kl  

      X-rays sent w/ patient.                                                                     

03:46 Condition: stable                                                                           

03:46 Discharge instructions given to patient, Instructed on the need for transfer,               

      Demonstrated understanding of instructions, report to Daya                             

03:48 Patient left the ED.                                                                      

                                                                                                  

Signatures:                                                                                       

Dispatcher [employer]                           EDAlisha Moreno RN                     RN   Harley Barrow RN RN   jbElsa Sarabia RN                      RN   kd3                                                  

Juan Ng MD MD   rt                                                   

Samanta Cohen                            1                                                  

                                                                                                  

**************************************************************************************************

## 2023-07-28 NOTE — EDPHYS
Physician Documentation                                                                           

 [facility]                                                                 

Name: Galdino Tabares                                                                             

Age: 53 yrs                                                                                       

Sex: Male                                                                                         

: 1970                                                                                   

MRN: [id number]                                                                                   

Arrival Date: 2023                                                                          

Time: 19:46                                                                                       

Account#: [id number]                                                                            

Bed 2                                                                                             

Private MD:                                                                                       

ED Physician Juan Ng                                                                     

HPI:                                                                                              

                                                                                             

23:32 This 53 yrs old  Male presents to ER via EMS with complaints of General         rt  

      Weakness.                                                                                   

23:32 Patient presents to the ED after reported fall. He was reportedly found outside. The    rt  

      patient states that he tripped and fell. Patient denies any injury. He does state that      

      his bilateral lower extremity edema is worse compared to baseline. Denies other acute       

      complaints at this time, symptoms are moderate severity, no other aggravating               

      alleviating factors.                                                                        

                                                                                                  

Historical:                                                                                       

- Allergies:                                                                                      

19:57 PENICILLINS;                                                                            jb4 

- Home Meds:                                                                                      

19:57 None [Active];                                                                          jb4 

- PMHx:                                                                                           

19:57 cirrhosis of liver; HTN; Pt is poor historian;                                          jb4 

- PSHx:                                                                                           

19:57 None;                                                                                   jb4 

                                                                                                  

- Immunization history:: Adult Immunizations unknown.                                             

- Social history:: Patient uses alcohol, Smoking status: unknown.                                 

- Family history:: not pertinent.                                                                 

                                                                                                  

                                                                                                  

ROS:                                                                                              

23:32 Constitutional: Negative for fever, chills, and weight loss, Respiratory: Negative for  rt  

      shortness of breath, cough, wheezing, and pleuritic chest pain, Abdomen/GI: Negative        

      for abdominal pain, nausea, vomiting, diarrhea, and constipation, MS/Extremity:             

      Negative for injury and deformity, Skin: Negative for injury, rash, and discoloration,      

      Neuro: Negative for headache, weakness, numbness, tingling, and seizure, Psych:             

      Negative for depression, anxiety, suicide ideation, homicidal ideation, and                 

      hallucinations.                                                                             

23:32 Cardiovascular: Positive for edema, Negative for chest pain.                                

                                                                                                  

Exam:                                                                                             

23:32 Constitutional:  This is a well developed, well nourished patient who is awake, alert,  rt  

      and in no acute distress. Head/Face:  Normocephalic, atraumatic. Chest/axilla:  Normal      

      chest wall appearance and motion.  Nontender with no deformity.  No lesions are             

      appreciated. Cardiovascular:  Regular rate and rhythm with a normal S1 and S2.  No          

      gallops, murmurs, or rubs.  Normal PMI, no JVD.  No pulse deficits. Respiratory:  Lungs     

      have equal breath sounds bilaterally, clear to auscultation and percussion.  No rales,      

      rhonchi or wheezes noted.  No increased work of breathing, no retractions or nasal          

      flaring. Abdomen/GI:  Soft, non-tender, with normal bowel sounds.  No distension or         

      tympany.  No guarding or rebound.  No evidence of tenderness throughout. Neuro:  Awake      

      and alert, GCS 15, oriented to person, place, time, and situation.  Cranial nerves          

      II-XII grossly intact.  Motor strength 5/5 in all extremities.  Sensory grossly intact.     

       Cerebellar exam normal.  Normal gait. Psych:  Awake, alert, with orientation to            

      person, place and time.  Behavior, mood, and affect are within normal limits.               

23:32 ECG was reviewed by the Attending Physician.                                                

23:32 Musculoskeletal/extremity: 4+ bilateral lower extremity edema, pitting.                     

                                                                                                  

Vital Signs:                                                                                      

19:53  / 56; Pulse 88; Resp 16; Temp 100.2(O); Pulse Ox 100% on R/A; Weight 73.94 kg    jb4 

      (M); Pain 0/10;                                                                             

20:41  / 59; Pulse 91; Resp 16; Temp 100.3(O); Pulse Ox 100% on R/A;                    kd3 

21:45  / 57; Pulse 92; Resp 17; Temp 102.3(O); Pulse Ox 99% on R/A;                     jb4 

23:00  / 56; Pulse 87; Resp 17; Temp 100.9(O); Pulse Ox 100% on R/A;                    jb4 

                                                                                             

02:27  / 55; Pulse 80; Resp 19; Pulse Ox 100% on R/A;                                   kd3 

03:47  / 64; Pulse 87; Pulse Ox 100% on R/A;                                            kl  

                                                                                             

19:53 Pain Scale: Adult                                                                       jb4 

                                                                                                  

MDM:                                                                                              

                                                                                             

20:00 Patient medically screened.                                                             rt  

                                                                                             

03:37 Differential Diagnosis Weakness, sepsis, hyperbilirubinemia. Data reviewed: vital       rt  

      signs, nurses notes. Consideration of Admission/Observation Patient requires transfer       

      for higher level of care. Management of patient was discussed with the following:           

      Hospitalist: Discussed with accepting hospitalist at [facility]. I considered        

      the following discharge prescriptions or medication management in the emergency             

      department Medications were administered in the Emergency Department. See MAR.              

      Independent interpretation of the following test(s) in the Emergency Department CT          

      Scan: My interpretation is No intracranial hemorrhage seen on interpretation of the CT      

      scan images. Test considered but Not performed: EKG: . Care significantly affected by       

      the following chronic conditions: Cirrhosis. Care significantly affected by the             

      following Social Determinants of Health: Misuse of alcohol and/or drugs. Counseling: I      

      had a detailed discussion with the patient and/or guardian regarding: the historical        

      points, exam findings, and any diagnostic results supporting the discharge/admit            

      diagnosis, lab results, radiology results, the need to transfer to another facility.        

                                                                                                  

                                                                                             

20:05 Order name: CBC with Diff; Complete Time: 21:18                                         rt  

                                                                                             

20:05 Order name: CMP; Complete Time: 21:18                                                   rt  

                                                                                             

20:05 Order name: Magnesium; Complete Time: 21:18                                             rt  

                                                                                             

20:05 Order name: Troponin High Sensitivity; Complete Time: 21:18                             rt  

                                                                                             

20:05 Order name: BNP; Complete Time: 21:18                                                   rt  

                                                                                             

20:49 Order name: CBC Smear Scan; Complete Time: 21:18                                        EDMS

                                                                                             

21:18 Order name: AMMONIA; Complete Time: 22:28                                               rt  

                                                                                             

21:18 Order name: PT-INR; Complete Time: 22:28                                                rt  

                                                                                             

21:47 Order name: Blood Culture Adult (2)                                                     rt  

                                                                                             

21:47 Order name: Lactate w/ 2H reflex if indic.; Complete Time: 22:28                        rt  

                                                                                             

00:14 Order name: SARS RAPID; Complete Time: 02:54                                            rv1 

                                                                                             

01:02 Order name: Lactate w/ 2H reflex if indic.; Complete Time: 02:54                        rt  

                                                                                             

22:06 Order name: Chest Single View XRAY                                                      rt  

                                                                                             

22:45 Order name: CT Abd/Pelvis - IV Contrast Only                                            rt  

                                                                                             

01:03 Order name: CT Head C Spine                                                             rt  

                                                                                             

20:05 Order name: EKG; Complete Time: 20:06                                                   rt  

                                                                                             

20:05 Order name: EKG - Nurse/Tech; Complete Time: 20:33                                      rt  

                                                                                                  

EC/27                                                                                             

23:32 Rate is 98 beats/min. Rhythm is regular, Sinus Rhythm with No ectopy. QRS [place] is       rt  

      Normal. SD interval is normal. QRS interval is normal. QT interval is normal. No Q          

      waves. Clinical impression: NSR w/ Non-specific ST/T Changes.                               

                                                                                                  

Administered Medications:                                                                         

22:06 CANCELLED (Other Intervention Used): cefOTAXime IVPB 2 grams IVPB once over 30 mins;    rt  

      (mix in 100 mL NS)                                                                          

22:12 Drug: Ibuprofen  mg Route: PO;                                                    jb4 

23:28 Drug: Cefepime IVPB 2 grams Route: IVPB; Rate: 200 ml/hr; Infused Over: 30 mins; Site:  jb4 

      left antecubital;                                                                           

23:28 Drug: NS 0.9% IV 1000 ml Route: IV; Rate: 1 bolus; Site: left antecubital;              jb4 

                                                                                             

02:27 Drug: vancoMYCIN IVPB 1 grams Route: IVPB; Infused Over: 2 hrs; Site: left antecubital; kd3 

02:27 Drug: Lactulose PO 30 grams Volume: 45 ml; Route: PO;                                   kd3 

03:30 Drug: metroNIDAZOLE IVPB 500 mg Volume: 100 ml; Route: IVPB; Rate: 200 ml/hr; Infused   jb4 

      Over: 30 mins; Site: right antecubital;                                                     

                                                                                                  

                                                                                                  

Disposition Summary:                                                                              

23 02:44                                                                                    

Transfer Ordered                                                                                  

      Transfer Location: [facility]                                rt  

      Reason: Higher level of care                                                            rt  

      Condition: Stable                                                                       rt  

      Problem: an acute exacerbation                                                          rt  

      Symptoms: have improved                                                                 rt  

      Accepting Physician: (23 03:48)                                                claudio  

      Diagnosis                                                                                   

        - Lactic acidosis                                                                     rt  

        - Hyperbilirubinemia                                                                  rt  

        - Fever                                                                               rt  

      Forms:                                                                                      

        - Medication Reconciliation Form                                                      rt  

        - SBAR form                                                                           rt  

Critical care time excluding procedures:                                                          

03:38 Critical care time: Bedside Care: 30 minutes, Consultation: 10 minutes. Total time: 40  rt  

      minutes                                                                                     

                                                                                                  

Signatures:                                                                                       

Dispatcher [employer]                           EDAlisha Moreno RN                     RN   Harley Barrow RN                       RN   [email or web address]                                                  

Elsa Corona RN RN   kd3                                                  

Juan Ng MD MD   rt                                                   

                                                                                                  

Corrections: (The following items were deleted from the chart)                                    

                                                                                             

22:06 22:06 cefOTAXime IVPB 2 grams IVPB once over 30 mins; (mix in 100 mL NS) ordered. rt    rt  

                                                                                             

03:48 02:44  rt                                                                            claudio  

                                                                                                  

**************************************************************************************************

## 2023-07-28 NOTE — EKG
Test Date:    2023-07-27               Test Time:    20:23:47

Technician:   NICHOLAS                                     

                                                     

MEASUREMENT RESULTS:                                       

Intervals:                                           

Rate:         98                                     

[place]:                                                  

QRSD:         78                                     

QT:           360                                    

QTc:          459                                    

Axis:                                                

P:                                                   

[place]:                                                  

QRS:          82                                     

T:            50                                     

                                                     

INTERPRETIVE STATEMENTS:                                       

                                                     

Normal sinus rhythm

Short [place]. 

Abnormal ECG



Electronically Signed On 07-28-23 13:24:02 CDT by Olvin Mathur

## 2023-07-28 NOTE — RAD REPORT
EXAM DESCRIPTION:  CT - Abdomen   Pelvis W Contrast - 7/28/2023 6:13 am

 

CLINICAL HISTORY:  Fever, hyperbilirubinemia

 

TECHNIQUE:  Axial computed tomography images of the abdomen and pelvis with intravenous contrast.   S
agittal and coronal reformatted images were created and reviewed.   This CT exam was performed using 
one or more of the following dose reduction techniques:   automated exposure control, adjustment of t
he mA and/or kV according to patient size, and/or use of iterative reconstruction technique.

 

COMPARISON:  CT Abdomen Pelvis dated 6/21/2023

 

FINDINGS:  Artifacts:   Motion artifact degrades image quality.   Streak artifact from patient arm po
sitioning.

Lung bases:   Unremarkable.   No mass.   No consolidation.

ABDOMEN:

Liver:   Subtle surface contour nodularity of the liver.

Gallbladder and bile ducts:   Unremarkable.   No calcified stones.   No ductal dilation.

Pancreas:   Unremarkable.   No mass.   No ductal dilation.

Spleen:   Unremarkable.   No splenomegaly.

Adrenals:   Unremarkable.   No mass.

Kidneys and ureters:   Unremarkable.   No solid mass.   No hydronephrosis.

Stomach and bowel:   Moderate stool.   No bowel obstruction.   Portions of the descending and sigmoid
 colon appear thickened.

PELVIS:

Appendix:   Normal caliber appendix.   No findings to suggest acute appendicitis.

Bladder:   Unremarkable.   No mass.

Reproductive:   Unremarkable as visualized.

ABDOMEN and PELVIS:

Intraperitoneal space:   Unremarkable.   No free air.   No significant fluid collection.

Bones/joints:   Multilevel spondylosis.   Remote bilateral rib fractures.   Remote left L1-L4 transve
rse process fractures.   Left posterior 12th rib fracture with mild surrounding callus.   No dislocat
ion.

Soft tissues:   Generalized body wall edema.

Vasculature:   Mild atherosclerotic disease.   Paraesophageal varices.   Upper abdominal varices.   T
he main portal, splenic and superior mesenteric veins are patent.   No abdominal aortic aneurysm.

Lymph nodes:   Unremarkable.   No enlarged lymph nodes.

 

IMPRESSION:  1.   Hepatic cirrhosis.   Paraesophageal and upper abdominal varices.   The main portal,
 splenic and superior mesenteric veins are patent.

2.   Portions of the distal large bowel appears somewhat thickened.   This can be seen in the setting
 of liver disease.   If clinical concern for this entity, mild nonspecific colitis may be considered.


3.   Healing left posterior 12th rib fracture.

4.   Other findings as above.

 

Electronically signed by:   Dimitris Lehman MD   7/27/2023 11:58 PM CDT Workstation: [id number]

 

 

 

 

Due to temporary technical issues with the PACS/Fluency reporting system, reports are being signed by
 the in house radiologists without review as a courtesy to insure prompt reporting. The interpreting 
radiologist is fully responsible for the content of the report.

## 2023-08-10 ENCOUNTER — HOSPITAL ENCOUNTER (EMERGENCY)
Dept: HOSPITAL 97 - ER | Age: 53
Discharge: ANOTHER HOSPITAL | End: 2023-08-10
Payer: SELF-PAY

## 2023-08-10 VITALS — OXYGEN SATURATION: 100 %

## 2023-08-10 VITALS — TEMPERATURE: 98.6 F

## 2023-08-10 VITALS — DIASTOLIC BLOOD PRESSURE: 57 MMHG | SYSTOLIC BLOOD PRESSURE: 107 MMHG

## 2023-08-10 DIAGNOSIS — D64.9: ICD-10-CM

## 2023-08-10 DIAGNOSIS — K72.90: ICD-10-CM

## 2023-08-10 DIAGNOSIS — I10: ICD-10-CM

## 2023-08-10 DIAGNOSIS — S02.652A: Primary | ICD-10-CM

## 2023-08-10 DIAGNOSIS — S42.022A: ICD-10-CM

## 2023-08-10 DIAGNOSIS — Z88.0: ICD-10-CM

## 2023-08-10 DIAGNOSIS — E87.20: ICD-10-CM

## 2023-08-10 DIAGNOSIS — S00.83XA: ICD-10-CM

## 2023-08-10 DIAGNOSIS — F17.210: ICD-10-CM

## 2023-08-10 LAB
ALBUMIN SERPL BCP-MCNC: 2.5 G/DL (ref 3.4–5)
ALP SERPL-CCNC: 118 U/L (ref 45–117)
ALT SERPL W P-5'-P-CCNC: 17 U/L (ref 16–61)
AST SERPL W P-5'-P-CCNC: 42 U/L (ref 15–37)
BILIRUB INDIRECT SERPL-MCNC: 5.8 MG/DL (ref 0.2–0.8)
BUN BLD-MCNC: 11 MG/DL (ref 7–18)
GLUCOSE SERPLBLD-MCNC: 143 MG/DL (ref 74–106)
GRAN CASTS #/AREA URNS LPF: (no result) /LPF
HCT VFR BLD CALC: 20.3 % (ref 39.6–49)
INR BLD: 3.08
LYMPHOCYTES # SPEC AUTO: 0.3 K/UL (ref 0.7–4.9)
MAGNESIUM SERPL-MCNC: 1.9 MG/DL (ref 1.6–2.4)
MCV RBC: 107.2 FL (ref 80–100)
METHAMPHET UR QL SCN: NEGATIVE
NT-PROBNP SERPL-MCNC: 538 PG/ML (ref ?–125)
PMV BLD: 8.1 FL (ref 7.6–11.3)
POTASSIUM SERPL-SCNC: 3.6 MEQ/L (ref 3.5–5.1)
RBC # BLD: 1.89 M/UL (ref 4.33–5.43)
THC SERPL-MCNC: NEGATIVE NG/ML
TROPONIN I SERPL HS-MCNC: 10.2 PG/ML (ref ?–58.9)
WBC # BLD AUTO: 4.2 THOU/UL (ref 4.3–10.9)

## 2023-08-10 PROCEDURE — 96365 THER/PROPH/DIAG IV INF INIT: CPT

## 2023-08-10 PROCEDURE — 70486 CT MAXILLOFACIAL W/O DYE: CPT

## 2023-08-10 PROCEDURE — 96366 THER/PROPH/DIAG IV INF ADDON: CPT

## 2023-08-10 PROCEDURE — 80048 BASIC METABOLIC PNL TOTAL CA: CPT

## 2023-08-10 PROCEDURE — 72125 CT NECK SPINE W/O DYE: CPT

## 2023-08-10 PROCEDURE — 82140 ASSAY OF AMMONIA: CPT

## 2023-08-10 PROCEDURE — 85025 COMPLETE CBC W/AUTO DIFF WBC: CPT

## 2023-08-10 PROCEDURE — 82077 ASSAY SPEC XCP UR&BREATH IA: CPT

## 2023-08-10 PROCEDURE — 70450 CT HEAD/BRAIN W/O DYE: CPT

## 2023-08-10 PROCEDURE — 36430 TRANSFUSION BLD/BLD COMPNT: CPT

## 2023-08-10 PROCEDURE — 71045 X-RAY EXAM CHEST 1 VIEW: CPT

## 2023-08-10 PROCEDURE — 80143 DRUG ASSAY ACETAMINOPHEN: CPT

## 2023-08-10 PROCEDURE — 85730 THROMBOPLASTIN TIME PARTIAL: CPT

## 2023-08-10 PROCEDURE — 80179 DRUG ASSAY SALICYLATE: CPT

## 2023-08-10 PROCEDURE — 36415 COLL VENOUS BLD VENIPUNCTURE: CPT

## 2023-08-10 PROCEDURE — 96361 HYDRATE IV INFUSION ADD-ON: CPT

## 2023-08-10 PROCEDURE — 83735 ASSAY OF MAGNESIUM: CPT

## 2023-08-10 PROCEDURE — 86920 COMPATIBILITY TEST SPIN: CPT

## 2023-08-10 PROCEDURE — 96375 TX/PRO/DX INJ NEW DRUG ADDON: CPT

## 2023-08-10 PROCEDURE — 76377 3D RENDER W/INTRP POSTPROCES: CPT

## 2023-08-10 PROCEDURE — 81001 URINALYSIS AUTO W/SCOPE: CPT

## 2023-08-10 PROCEDURE — 86900 BLOOD TYPING SEROLOGIC ABO: CPT

## 2023-08-10 PROCEDURE — 83880 ASSAY OF NATRIURETIC PEPTIDE: CPT

## 2023-08-10 PROCEDURE — 99285 EMERGENCY DEPT VISIT HI MDM: CPT

## 2023-08-10 PROCEDURE — 86850 RBC ANTIBODY SCREEN: CPT

## 2023-08-10 PROCEDURE — 96367 TX/PROPH/DG ADDL SEQ IV INF: CPT

## 2023-08-10 PROCEDURE — 84484 ASSAY OF TROPONIN QUANT: CPT

## 2023-08-10 PROCEDURE — 85610 PROTHROMBIN TIME: CPT

## 2023-08-10 PROCEDURE — 82550 ASSAY OF CK (CPK): CPT

## 2023-08-10 PROCEDURE — 80307 DRUG TEST PRSMV CHEM ANLYZR: CPT

## 2023-08-10 PROCEDURE — 80076 HEPATIC FUNCTION PANEL: CPT

## 2023-08-10 PROCEDURE — 83605 ASSAY OF LACTIC ACID: CPT

## 2023-08-10 PROCEDURE — 86901 BLOOD TYPING SEROLOGIC RH(D): CPT

## 2023-08-10 PROCEDURE — 30233N1 TRANSFUSION OF NONAUTOLOGOUS RED BLOOD CELLS INTO PERIPHERAL VEIN, PERCUTANEOUS APPROACH: ICD-10-PCS

## 2023-08-10 NOTE — RAD REPORT
EXAM DESCRIPTION:  CT - CTFB

 

CLINICAL HISTORY:  FALL INJURY, FACIAL INJURY

 

COMPARISON:  Facial Bones W/ Mpr dated 7/6/2023; Facial Bones W/ Mpr dated 6/21/2023; Facial Bones W/
 Mpr dated 11/1/2022; Head C Spine Mpr Wo Con dated 8/10/2023

 

TECHNIQUE:  Axial noncontrast 2 mm thick images of the face were obtained with sagittal and coronal r
econstruction images.

 

All CT scans are performed using dose optimization technique as appropriate and may include automated
 exposure control or mA/KV adjustment according to patient size.

 

FINDINGS:  Displaced oblique fracture extending from the right angle of mandible towards the alveolus
, reaching the socket of the extracted posterior most mandibular molar. No other acute facial bone fr
acture is seen.

 

Pronounced soft tissue swelling of the left facial soft tissues, extending along the periorbital andrei
ins and left aspect of the scalp and temporal fossa, with a hyperdense lobulated hematoma, its larges
t component is present opposite the zygoma measuring 5.4 x 2.4 x 4.0 cm in greatest dimensions.

 

Treated and untreated dental and periodontal disease, with numerous maxillary and mandibular periodon
raffi lucencies.

 

The globes and orbital contents are grossly unremarkable.Lobulated mucosal thickening of the right ma
xillary sinus. Incidentally noted right miles bullosa

 

 

IMPRESSION:  Displaced oblique fracture extending from the right angle of mandible towards the mandib
ular alveolus reaching the socket of the posterior most right mandibular molar. No other acute facial
 fractures are appreciated.

 

Pronounced soft tissue swelling along the left aspect of the face with a premalar hematoma measuring 
up to 5.4 cm in greatest dimension.

 

 

 

 

 The findings were communicated to Oswaldo Page on 8/10/2023 at 18:50 hours.

## 2023-08-10 NOTE — RAD REPORT
EXAM DESCRIPTION:  RAD - Hand Right 3 View - 8/10/2023 6:26 pm

 

CLINICAL HISTORY:  FALL

 

COMPARISON:  No comparisons

 

TECHNIQUE:  Right hand, 3 views.

 

FINDINGS:  Small fracture at the base of the thumb proximal phalanx is better evaluated on the forear
m radiographs. Deformity near the base of the first digit distal phalanx is present, may relate to ac
mundo or chronic fracture.

 

There is no dislocation or periosteal reaction noted. No foreign body or other soft tissue abnormalit
y.

 

IMPRESSION:  As above.

## 2023-08-10 NOTE — ER
Nurse's Notes                                                                                     

 [facility]                                                                 

Name: Galdino Tabares                                                                             

Age: 53 yrs                                                                                       

Sex: Male                                                                                         

: 1970                                                                                   

MRN: [id number]                                                                                   

Arrival Date: 08/10/2023                                                                          

Time: 16:59                                                                                       

Account#: [id number]                                                                            

Bed 6                                                                                             

Private MD:                                                                                       

Diagnosis: Fracture of angle of left mandible, initial encounter for closed fracture;Hepatic      

  failure, unspecified without coma;Lactic Acidosis;Anemia, unspecified;Displaced                 

  fracture of shaft of left clavicle;Contusion of unspecified part of head, initial               

  encounter                                                                                       

                                                                                                  

Presentation:                                                                                     

08/10                                                                                             

17:02 Chief complaint: EMS states: "Toned out for falling while walking. Pt landed on face    mb9 

      and hurt right hand. C-Collar applied to pt. 18 g to left AC." Pt denies LOC and denies     

      taking blood thinners. Coronavirus screen: Vaccine status: Patient reports being            

      unvaccinated. Ebola Screen: No symptoms or risks identified at this time.                   

17:02 Method Of Arrival: EMS: [facility]                                                    mb9 

17:02 Initial Sepsis Screen: Does the patient meet any 2 criteria? No. Patient's initial      bp  

      sepsis screen is negative. Does the patient have a suspected source of infection? No.       

      Patient's initial sepsis screen is negative. Risk Assessment: Do you want to hurt           

      yourself or someone else? Patient reports no desire to harm self or others. Onset of        

      symptoms was August 10, 2023.                                                               

17:02 Acuity: TERRY 2                                                                           bp  

17:53 Care prior to arrival: Bleeding of injury controlled. Cervical collar in place. IV      ko1 

      initiated. 18 GA, in the left antecubital area.                                             

                                                                                                  

Triage Assessment:                                                                                

17:04 General: Appears uncomfortable, Behavior is calm, cooperative. Pain: Complains of pain  mb9 

      in face and right hand Pain does not radiate. Pain currently is 10 out of 10 on a pain      

      scale. Quality of pain is described as throbbing. Neuro: Lan Agitation-Sedation        

      Scale (RASS): 0 - Alert and Calm Level of Consciousness is awake, alert, obeys              

      commands, Oriented to person, place, time, situation, Appropriate for age Speech is         

      normal, Pupils are PERRLA, Intact. Cardiovascular: Patient's skin is warm and dry.          

      Respiratory: Airway is patent Respiratory effort is even, unlabored, Respiratory            

      pattern is regular, symmetrical. GI: Abdomen is round non-distended, Bowel sounds           

      present X 4 quads. Abd is soft and non tender X 4 quads. : No signs and/or symptoms       

      were reported regarding the genitourinary system. Derm: Bruising that is dark purple,       

      on left eye, nose and mouth. Musculoskeletal: Range of motion: limited in right wrist.      

                                                                                                  

Historical:                                                                                       

- Allergies:                                                                                      

17:04 PENICILLINS;                                                                            mb9 

- Home Meds:                                                                                      

17:04 None [Active];                                                                          mb9 

- PMHx:                                                                                           

17:04 cirrhosis of liver; HTN; Pt is poor historian;                                          mb9 

- PSHx:                                                                                           

17:04 None;                                                                                   mb9 

                                                                                                  

- Immunization history:: Adult Immunizations up to date.                                          

- Social history:: Smoking status: Patient reports the use of cigarette tobacco                   

  products, denies chronic smoking, but will smoke occasionally.                                  

                                                                                                  

                                                                                                  

Screenin:15 [facility] ED Fall Risk Assessment (Adult) History of falling in the last 3 months,       ko1 

      including since admission Yes- single mechanical fall (1 pt) Confusion or                   

      Disorientation No (0 pts) Intoxicated or Sedated No (0 pts) Impaired Gait No (0 pts)        

      Mobility Assist Device Used No (0 pt) Altered Elimination No (0 pt) Score/Fall Risk         

      Level 0 - 2 = Low Risk Oriented to surroundings, Maintained a safe environment,             

      Educated pt \T\ family on fall prevention, incl call for assistance when getting out of     

      bed, Assessed \T\ reinforced patient's understanding of fall precautions, Provided          

      non-skid footwear, Hourly rounding (assess needs \T\ fall precautionary measures) done,     

      Used ambulatory aids as needed (educated on \T\ assisted with), Used gait belt as           

      appropriate. Abuse screen: Denies threats or abuse. Denies injuries from another.           

      Nutritional screening: No deficits noted. Tuberculosis screening: No symptoms or risk       

      factors identified.                                                                         

                                                                                                  

Assessment:                                                                                       

17:15 General: Appears in no apparent distress. uncomfortable, unkempt, Behavior is calm,     ko1 

      cooperative, appropriate for age. Pain: Complains of pain in right wrist and mouth and      

      nose and left eye and right hand and face. Neuro: No deficits noted. Cardiovascular: No     

      deficits noted. Respiratory: No deficits noted. GI: No deficits noted. : No deficits      

      noted. EENT: Eyes left eye swollen shut with bruising. Derm: Wound noted Bruising that      

      is bright red, dark purple, on left eye and face. Musculoskeletal: Reports pain in          

      right hand. Injury Description: Abrasion Bruise sustained to left eye and face.             

                                                                                                  

Vital Signs:                                                                                      

17:02 Weight 70.76 kg; Height 5 ft. 6 in. ;                                                   mb9 

17:03  / 63; Pulse 87; Resp 20; Temp 99.1; Pulse Ox 100% ;                              aw1 

17:15  / 75; Pulse 88; Resp 16; Pulse Ox 100% ;                                         ko1 

17:53  / 61; Pulse 92; Resp 18; Pulse Ox 99% ;                                          ko1 

18:36  / 57; Pulse 90; Resp 16; Pulse Ox 100% ;                                         ko1 

20:47  / 54; Pulse 74; Resp 17; Pulse Ox 100% on R/A;                                   ll3 

22:00  / 46; Pulse 81; Resp 18; Temp 98.6; Pulse Ox 100% ;                              rv  

22:15  / 57; Pulse 77; Resp 16; Temp 98.6; Pulse Ox 100% on R/A;                        rv  

17:02 Body Mass Index 25.18 (70.76 kg, 167.64 cm)                                             mb9 

                                                                                                  

[place] Coma Score:                                                                               

19:00 Eye Response: spontaneous(4). Motor Response: obeys commands(6). Verbal Response:       rv  

      oriented(5). Total: 15.                                                                     

20:00 Eye Response: spontaneous(4). Motor Response: obeys commands(6). Verbal Response:       rv  

      oriented(5). Total: 15.                                                                     

21:00 Eye Response: spontaneous(4). Motor Response: obeys commands(6). Verbal Response:       rv  

      oriented(5). Total: 15.                                                                     

22:15 Eye Response: spontaneous(4). Motor Response: obeys commands(6). Verbal Response:       rv  

      oriented(5). Total: 15.                                                                     

                                                                                                  

Trauma Score (Adult):                                                                             

19:00 Eye Response: spontaneous(1); Verbal Response: oriented(1); Motor Response: obeys       rv  

      commands(2); Systolic BP: > 89 mm Hg(4); Respiratory Rate: 10 to 29 per min(4); [place]     

      Score: 15; Trauma Score: 12                                                                 

22:21 Eye Response: spontaneous(1); Verbal Response: oriented(1); Motor Response: obeys       rv  

      commands(2); Systolic BP: > 89 mm Hg(4); Respiratory Rate: 10 to 29 per min(4); [place]     

      Score: 15; Trauma Score: 12                                                                 

                                                                                                  

ED Course:                                                                                        

17:02 Patient arrived in ED.                                                                  mb9 

17:03 Oswaldo Murcia PA is PHCP.                                                                cp  

17:03 Clay Frost MD is Attending Physician.                                                cp  

17:03 Daniela Wang FNP-C is PHCP.                                                          snw 

17:07 Chyna Muller, RN is Primary Nurse.                                                 ss  

17:07 Arm band placed on.                                                                     mb9 

17:14 Triage completed.                                                                       bp  

17:15 Patient has correct armband on for positive identification. Bed in low position. Call   ko1 

      light in reach. Side rails up X2. Pulse ox on. NIBP on. Door closed. Noise minimized.       

      Warm blanket given.                                                                         

17:15 Maintain EMS IV. Dressing intact. Good blood return noted. Site clean \T\ dry. Gauge \T\    ko
1

      site: 18g left ac. Flushed left antecubital with 5 ml normal saline.                        

17:46 Protime (+INR) Sent.                                                                    ko1 

17:46 PTT, Activated Partial Thromb Sent.                                                     ko1 

17:46 Magnesium Sent.                                                                         ko1 

17:46 Alcohol Serum/Plasma Sent.                                                              ko1 

17:46 Salicylates Level Sent.                                                                 ko1 

17:46 CBC with Automated Diff Sent.                                                           ko1 

17:46 Creatine Phosphokinase Sent.                                                            ko1 

17:46 Troponin High Sensitivity Sent.                                                         ko1 

17:47 NT PRO-BNP Sent.                                                                        ko1 

17:47 Acetaminophen Level Sent.                                                               ko1 

17:47 Basic Metabolic Panel Sent.                                                             ko1 

17:47 Liver (Hepatic) Function Sent.                                                          ko1 

17:47 Ammonia Sent.                                                                           ko1 

17:47 Lactate w/ 2H reflex if indic. Sent.                                                    ko1 

18:04 Head C Spine Mpr Wo Con In Process Unspecified.                                         EDMS

18:04 Facial Bones W/ Mpr In Process Unspecified.                                             EDMS

18:28 Hand Right 3 View In Process Unspecified.                                               EDMS

18:28 Forearm Right In Process Unspecified.                                                   EDMS

18:28 Chest Single View In Process Unspecified.                                               EDMS

20:18 Initiated transfer to [facility], spoke with Aranza.                         wm  

21:09 Primary Nurse role handed off by Chyna Muller, RN                                  wm  

21:20 Pt accepted for transfer by Oli Degroot to [facility] ER per Aranza Butler.  wm  

21:54 Kalia Mattson, RN is Primary Nurse.                                                  rv  

22:23 No provider procedures requiring assistance completed. Patient transferred, IV remains  rv  

      in place.                                                                                   

22:24 Provided Education on: Blood Transfusion.                                               rv  

                                                                                                  

Administered Medications:                                                                         

18:06 Discontinued: NS 0.9% IV 1000 ml IV at 500 ml/hr Per protocol; 1000 mL bolus            cp  

17:37 Drug: NS 0.9% IV 1000 ml Route: IV; Rate: 500 ml/hr; Site: left antecubital;            ko1 

18:26 Follow up: Response: No adverse reaction; IV Status: Completed infusion; IV Intake:     ko1 

      1000ml                                                                                      

18:18 Drug: NS 0.9% IV (30 ml/kg) 30 ml/kg Route: IV; Rate: bolus; Site: left antecubital;    ko1 

22:25 Follow up: IV Status: Completed infusion; IV Intake: 2122ml                             rv  

19:02 Drug: morphine IVP or IV 2 mg Route: IVP; Infused Over: 4 mins; Site: left antecubital; ko1 

22:24 Follow up: Response: No adverse reaction                                                rv  

19:41 Drug: ceFAZolin IVPB 2 grams Route: IVPB; Infused Over: 30 mins; Site: left antecubital;ll3 

22:24 Follow up: IV Status: Completed infusion; IV Intake: 100ml                              rv  

19:44 Drug: morphine IVP or IV 2 mg Route: IVP; Infused Over: 4 mins; Site: left antecubital; ll3 

22:24 Follow up: Response: No adverse reaction                                                rv  

20:38 Drug: vancoMYCIN IVPB 1 grams Route: IVPB; Infused Over: 2 hrs; Site: left antecubital; ll3 

22:24 Follow up: IV Status: Completed infusion; IV Intake: 250ml                              rv  

                                                                                                  

                                                                                                  

Medication:                                                                                       

22:00 Blood products: PRBCs X 1 unit given. See transfusion record.                           rv  

22:00 VIS not applicable for this client.                                                     rv  

                                                                                                  

Intake:                                                                                           

18:26 IV: 1000ml; Total: 1000ml.                                                              ko1 

22:24 IV: 250ml; Total: 1250ml.                                                               rv  

22:24 IV: 100ml; Total: 1350ml.                                                               rv  

22:25 IV: 2122ml; Total: 3472ml.                                                              rv  

                                                                                                  

Outcome:                                                                                          

19:32 ER care complete, transfer ordered by MD.                                               cp  

22:23 Transferred by ground EMS to [facility], Transfer form completed. X-rays sent rv  

      w/ patient.                                                                                 

22:23 Condition: stable                                                                           

22:23 Instructed on the need for transfer.                                                        

22:27 Patient left the ED.                                                                    rv  

                                                                                                  

Signatures:                                                                                       

Dispatcher [employer]                           EDMS                                                 

Daniela Wang, JUANJOSE-C                   FNP-Csnw                                                  

Chyna Muller, RN                   RN   ss                                                   

Oswaldo Murcia PA PA cp Peltier, Brian, RN                      RN   bp                                                   

Kalia Mattson, RN                    RN   rv                                                   

Izabel Bautista Lynsea RN                      RN   ll3                                                  

Raina Hopson RN                       RN   [email or web address]                                                  

Nancy Gan, RN                 RN   mb9                                                  

Felisha Urban                                                  

                                                                                                  

**************************************************************************************************

## 2023-08-10 NOTE — RAD REPORT
EXAM DESCRIPTION:  CT - [id number] - 8/10/2023 6:05 pm

 

CLINICAL HISTORY:  FALL INJURY

 

COMPARISON:  Head C Spine Mpr Wo Con dated 7/28/2023; Head C Spine Mpr Wo Con dated 7/6/2023; Head C 
Spine Mpr Wo Con dated 5/21/2023; Facial Bones W/ Mpr dated 8/10/2023; Chest Single View dated 8/10/2
023; Forearm Right dated 8/10/2023

 

TECHNIQUE:  Axial thin cut noncontrast CT images of the head were obtained.

 

Axial thin cut noncontrast CT images of the cervical spine were obtained.

 

Multiplanar reformatted images were generated and reviewed.

 

All CT scans are performed using dose optimization technique as appropriate and may include automated
 exposure control or mA/KV adjustment according to patient size.

 

FINDINGS:  CT HEAD WITHOUT CONTRAST:

 

No acute hemorrhage, hydrocephalus or extra-axial collection is identified.No areas of brain edema or
 midline shift.

 

The paranasal sinuses and mastoids are clear.The calvarium is intact.

 

CT CERVICAL SPINE WITHOUT CONTRAST:

 

No fracture or subluxation.No prevertebral soft tissues swelling is identified.

 

Partially visualized mildly displaced distal left clavicle fracture.

 

IMPRESSION:  No acute traumatic intracranial or cervical spine findings.

 

Partially visualized mildly displaced distal left clavicle fracture.

 

 

 The findings were communicated to Oswaldo Divina on 8/10/2023 at 18:49 hours.

## 2023-08-10 NOTE — XMS REPORT
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 No             2000mg      2,000 mg,           U

nivers



     (MAXIPIME)      3-22 0405                          IV             ity of



     2,000 mg in      10:00: 01:59                          Piggyback,          

 [place]



     NaCl 0.9%      00   :00                           Q8H ABX,           Medica

l



     (NS) 100 mL                                         41 doses,           Bra

[facility]



     MINI-BAG                                         First dose           



                                                  (after           



                                                  last           



                                                  modificati           



                                                  on) on Wed           



                                                  3/22/23 at           



                                                  0500, Last           



                                                  dose on           



                                                  23           



                                                  at 1300,           



                                                  Administer           



                                                  over 4           



                                                  Hours, 100           



                                                  mL<br>Reas           



                                                  on for           



                                                  Anti-Infec           



                                                  tive:           



                                                  Emp[facility]ic           



                                                  Therapy           



                                                  for            



                                                  Suspected           



                                                  Infection<           



                                                  br>Empiric           



                                                  Therapy           



                                                  Site:           



                                                  Blood<br>D           



                                                  uration of           



                                                  therapy:           



                                                  72 hours           

 

     pantoprazol      0      Yes            40mg      40 mg,           Univ

ers



     e         3-22                               Oral, BID,           ity of



     (PROTONIX)      01:00:                               First dose           [facility]

[place]



     EC tablet      00                                 (after           Medical



     40 mg                                         last           Branch



                                                  modificati           



                                                  on) on Tue           



                                                  3/21/23 at           



                                                  2000,           



                                                  Until           



                                                  Discontinu           



                                                  ed,            



                                                  Routine           

 

     ceFEPIme      2023- No             2000mg      2,000 mg,           U

nivers



     (MAXIPIME)      3-21 03-22                          IV             ity of



     2,000 mg in      23:30: 02:42                          Piggyback,          

 [place]



     NaCl 0.9%      00   :00                           ONCE, 1           Medical



     (NS) 100 mL                                         dose, On           [facility]

ch



     MINI-BAG                                         Tue            



                                                  3/21/23 at           



                                                  1830,           



                                                  Administer           



                                                  over 30           



                                                  Minutes,           



                                                  100            



                                                  mL<br>Reas           



                                                  on for           



                                                  Anti-Infec           



                                                  tive:           



                                                  Empiric           



                                                  Therapy           



                                                  for            



                                                  Suspected           



                                                  Infection<           



                                                  br>Empiric           



                                                  Therapy           



                                                  Site:           



                                                  Blood<br>D           



                                                  uration of           



                                                  therapy:           



                                                  72 hours           

 

     thiamine            Yes            100mg      100 mg,           Unive

rs



     (VITAMIN      3-21                               Oral,           ity of



     B1) tablet      14:00:                               DAILY,           [place]



     100 mg      00                                 First dose           Medical



                                                  on Tue           Branch



                                                  3/21/23 at           



                                                  0900,           



                                                  Until           



                                                  Discontinu           



                                                  ed,            



                                                  Routine           

 

     magnesium      2023- No             2g        2 g, IV           Univ

ers



     sulfate in      3-21 03-21                          Piggyback,           it

y of



     water 2      10:45: 11:26                          Administer           [place]

as



     gram/50 mL      00   :00                           over 60           Medica

l



     (4 %)                                         Minutes,           Branch



     infusion 2                                         ONCE, 1           



     g                                            dose, On           



                                                  Tue            



                                                  3/21/23 at           



                                                  0545,           



                                                  Routine           

 

     sulfur      2023- No        [id number] 5mL       5 mL,           Univ

ers



     hexafluorid      3-20 03-20                          Intravenou           i

ty of



     e microsphr      17:15: 17:15                          s, ONCE, 1          

 [place]



     (LUMASON)      00   :00                           dose, On           Medica

l



     injection 5                                         Mon            Branch



     mL                                           3/20/23 at           



                                                  1215,           



                                                  Routine<br           



                                                  >Faculty           



                                                  member           



                                                  approving           



                                                  Restricted           



                                                  medication           



                                                  : BEAR DE ANDA           

 

     lactulose            Yes            30mL      30 mL,           Univer

s



     (CEPHULAC)      3-20                               Oral,           ity of



     solution 30      14:00:                               DAILY,           [place]

[place]



     mL        00                                 First dose           Medical



                                                  (after           Branch



                                                  last           



                                                  modificati           



                                                  on) on Mon           



                                                  3/20/23 at           



                                                  0900,           



                                                  Until           



                                                  Discontinu           



                                                  ed,            



                                                  Routine           

 

     pantoprazol      2023- No             40mg      40 mg,           Uni

vers



     e         3-20 03-21                          Slow IV           ity of



     (PROTONIX)      14:00: 16:41                          Push,           [place]



     injection      00   :07                           DAILY,           Medical



     40 mg                                         First dose           Branch



                                                  (after           



                                                  last           



                                                  modificati           



                                                  on) on Mon           



                                                  3/20/23 at           



                                                  0900,           



                                                  Until           



                                                  Discontinu           



                                                  ed             

 

     acetaminoph            Yes            650mg      650 mg,           Un

[facility]ange[facility]



     en        3-20                               Oral,           ity of



     (TYLENOL)      05:25:                               Q6HPRN,           [place]



     tablet 650      45                                 Starting           Medic

al



     mg                                           on Mon           Branch



                                                  3/20/23 at           



                                                  0025,           



                                                  Until           



                                                  Discontinu           



                                                  ed,            



                                                  Routine,           



                                                  Temp > 38           



                                                  C, Pain           



                                                  (scale           



                                                  4-6), Pain           



                                                  (scale           



                                                  1-3)           

 

     pantoprazol      2023- No             40mg      40 mg,           Uni

vers



     e         3-20 03-20                          Slow IV           ity of



     (PROTONIX)      01:00: 05:30                          Push,           [place]



     injection      00   :37                           Q12H,           Medical



     40 mg                                         First dose           Branch



                                                  on Sun           



                                                  3/19/23 at           



                                                  2000,           



                                                  Until           



                                                  Discontinu           



                                                  ed             

 

     foLIC acid            Yes            1mg       1 mg,           Univer

s



     (FOLATE)      3-19                               Oral,           ity of



     tablet 1 mg      14:00:                               DAILY,           [place]

[place]



               00                                 First dose           Medical



                                                  on Sun           Branch



                                                  3/19/23 at           



                                                  0900,           



                                                  Until           



                                                  Discontinu           



                                                  ed,            



                                                  Routine           

 

     phytonadion      2023- No             10mg      IV             Unive

rs



     e (VITAMIN      3-19 03-20                          Piggyback,           it

y of



     K) 10 mg in      14:00: 14:54                          DAILY, 2           T

[place]



     NaCl 0.9%      00   :00                           doses,           Medical



     (NS)                                         First dose           Branch



     piggyback                                         (after           



                                                  last           



                                                  reorder)           



                                                  on Sun           



                                                  3/19/23 at           



                                                  0900, Last           



                                                  dose on           



                                                  Mon            



                                                  3/20/23 at           



                                                  0900, 50           



                                                  mL             

 

     lactated       No             1000mL      at 100           Univ

ers



     ringers IV      3-19 03-21                          mL/hr,           ity of



     infusion      12:15: 01:32                          1,000 mL,           [place]

as



     1,000 mL      00   :43                           IV             Medical



                                                  Infusion,           Branch



                                                  CONTINUOUS           



                                                  , Starting           



                                                  on Sun           



                                                  3/19/23 at           



                                                  0715,           



                                                  Until Mon           



                                                  3/20/23 at           



                                                  203,           



                                                  Routine           

 

     meropenem       No             1000mg      1,000 mg,           

[facility]



     (MERREM)      3-19 03-21                          IV             ity of



     1,000 mg in      11:00: 13:11                          Piggyback,          

 [place]



     NaCl 0.9%      00   :57                           Q8H ABX,           Medica

l



     (NS) 100 mL                                         15 doses,           [facility]



     MINI-BAG                                         First dose           



                                                  on Sun           



                                                  3/19/23 at           



                                                  0600, Last           



                                                  dose on           



                                                  Thu            



                                                  3/23/23 at           



                                                  2200,           



                                                  Administer           



                                                  over 3           



                                                  Hours, 100           



                                                  mL<br>Rest           



                                                  ricted use           



                                                  approved           



                                                  by: ELIAS           



                                                  [facility]            



                                                  FLOOR<br&g           



                                                  t;Reason           



                                                  for            



                                                  Anti-Infec           



                                                  tive:           



                                                  [facility]           



                                                  Therapy           



                                                  for            



                                                  Suspected           



                                                  Infection<           



                                                  br>[facility]           



                                                  Therapy           



                                                  Site:           



                                                  Blood<br>D           



                                                  uration of           



                                                  therapy:           



                                                  72 hours           

 

     NORepinephr      2023- No             .05ug/k      0.05-0.5         

  [facility]



     ine       3-19 03-20                g/min      mcg/kg/min           ity of



     (LEVOPHED)      03:29: 03:28                          ?77.1 kg           [facility]

[place]



     4 mg in      15   :15                           (14.4563-1           Medica

l



     NaCl 0.9%                                         44.5625           Branch



     (NS) 250 mL                                         mL/hr,           



     infusion                                         rounded to           



                                                  14..           



                                                  56 mL/hr),           



                                                  IV             



                                                  Infusion,           



                                                  TITRATE,           



                                                  MAP Goal >           



                                                  or = 65           



                                                  mmHg,           



                                                  Starting           



                                                  on Sat           



                                                  3/18/23 at           



                                                  2229, For           



                                                  24             



                                                  hours<br>I           



                                                  nitiate           



                                                  titration           



                                                  at 0.05           



                                                  mcg/kg/min           



                                                  .&nbsp;&nb           



                                                  sp;Increas           



                                                  e by 0.01           



                                                  mcg/kg/min           



                                                  every 30           



                                                  seconds to           



                                                  5 minutes           



                                                  as needed           



                                                  to reach           



                                                  and            



                                                  maintain           



                                                  goal blood           



                                                  pressure.&           



                                                  nbsp;&nbsp           



                                                  ;Maximum           



                                                  dose = 0.5           



                                                  mcg/kg/min           



                                                  .&nbsp;&nb           



                                                  sp;If goal           



                                                  not            



                                                  maintained           



                                                  at maximum           



                                                  allowed           



                                                  dose,           



                                                  contact           



                                                  prescriber           



                                                  .&nbsp;&nb           



                                                  sp;Adminis           



                                                  ter only           



                                                  one            



                                                  peripheral           



                                                  intravenou           



                                                  s              



                                                  vasopresso           



                                                  r at a           



                                                  time.<br>           

 

     vancomycin      2023- No             15mg/kg      1,250 mg          

 [facility]



     1,250 mg in      3-19 03-20                          (rounded           ity

 of



     NaCl 0.9%      03:00: 05:12                          from           [place]



     (NS) 250 mL      00   :19                           1,156.5 mg           Me

dical



     VIAL-MATE                                         = 15 mg/kg           [facility]

ch



     IV                                           ?77.1 kg),           



     piggyback                                         IV             



                                                  Piggyback,           



                                                  Q12H ABX,           



                                                  10 doses,           



                                                  First dose           



                                                  on Sat           



                                                  3/18/23 at           



                                                  2200, Last           



                                                  dose on           



                                                  Thu            



                                                  3/23/23 at           



                                                  1000,           



                                                  Administer           



                                                  over 90           



                                                  Minutes,           



                                                  250            



                                                  mL<br&gt;R           



                                                  crystal for           



                                                  Anti-Infec           



                                                  tive:           



                                                  Empiric           



                                                  Therapy           



                                                  for            



                                                  Suspected           



                                                  Infection<           



                                                  br>Empiric           



                                                  Therapy           



                                                  Site:           



                                                  Blood<br>D           



                                                  uration of           



                                                  therapy:           



                                                  72 hours           

 

     meropenem      2023- No             1000mg      1,000 mg,           

[facility]



     (MERREM)      3-19 03-19                          IV             ity of



     1,000 mg in      03:00: 04:27                          Piggyback,          

 [place]



     NaCl 0.9%      00   :00                           ONCE, 1           Medical



     (NS) 100 mL                                         dose, On           [facility]

ch



     MINI-BAG                                         Sat            



                                                  3/18/23 at           



                                                  2200,           



                                                  Administer           



                                                  over 30           



                                                  Minutes,           



                                                  100            



                                                  mL<br>Rest           



                                                  ricted use           



                                                  approved           



                                                  by: ELIAS           



                                                  8TH            



                                                  FLOOR<br>R           



                                                  crystal for           



                                                  Anti-Infec           



                                                  tive:           



                                                  Empiric           



                                                  Therapy           



                                                  for            



                                                  Suspected           



                                                  Infection<           



                                                  br>Empiric           



                                                  Therapy           



                                                  Site:           



                                                  Blood<br>D           



                                                  uration of           



                                                  therapy:           



                                                  72 hours           

 

     NaCl 0.9%      2023- No             1000mL      at 999           Uni

vers



     (NS) bolus      3-19 03-19                          mL/hr,           ity of



     infusion      02:52: 12:02                          1,000 mL,           [place]

as



     1,000 mL      00   :28                           IV             Medical



                                                  Piggyback,           [facility]



                                                  ONCE, 1           



                                                  dose, On           



                                                  Sat            



                                                  3/18/23 at           



                                                  2200, ASAP           

 

     lactulose      2023- No             30mL      30 mL,           Unive

rs



     (CEPHULAC)      3-19 03-20                          Oral, TID,           it

y of



     solution 30      02:15: 11:21                          First dose          

 [place]



     mL        00   :53                           (after           Medical



                                                  last           Branch



                                                  modificati           



                                                  on) on Sat           



                                                  3/18/23 at           



                                                  2115,           



                                                  Until           



                                                  Discontinu           



                                                  ed,            



                                                  Routine           

 

     cefTRIAXone       No             1000mg      1,000 mg,         

  [facility]



     (ROCEPHIN)      3-19 03-19                          IV             ity of



     1,000 mg in      01:15: 02:19                          Piggyback,          

 [place]



     NaCl 0.9%      00   :20                           Q24H ABX,           Medic

al



     (NS) 100 mL                                         5 doses,           [facility]

ch



     MINI-BAG                                         First dose           



                                                  on Sat           



                                                  3/18/23 at           



                                                  , Last           



                                                  dose on           



                                                  Wed            



                                                  3/22/23 at           



                                                  ,           



                                                  Administer           



                                                  over 30           



                                                  Minutes,           



                                                  100            



                                                  mL<br>Reas           



                                                  on for           



                                                  Anti-Infec           



                                                  tive:           



                                                  Documented           



                                                  Infection<           



                                                  br>Documen           



                                                  michelle            



                                                  Infection           



                                                  Site:           



                                                  Abdominal<           



                                                  br>Duratio           



                                                  n of           



                                                  Therapy: 7           



                                                  days           

 

     lactulose      2023- No             30mL      30 mL,           Unive

rs



     (CEPHULAC)      3-19 03-19                          Oral, BID,           it

y of



     solution 30      01:00: 02:10                          First dose          

 [place]



     mL        00   :44                           on [facility]



                                                  3/18/23 at           Branch



                                                  2000,           



                                                  Until           



                                                  Discontinu           



                                                  ed,            



                                                  Routine           

 

     potassium      -2023- No             20meq      20 mEq, IV           

[facility]



     chloride in      3-19 03-19                          Piggyback,           i

ty of



     water (KCL)      01:00: 06:44                          Q2H, 2           [facility]

as



     20 mEq/100      00   :00                           doses,           Medical



     mL RTU IVPB                                         First dose           Br

anch



     20 mEq                                         on Sat           



                                                  3/18/23 at           



                                                  2000, Last           



                                                  dose on           



                                                  Sat            



                                                  3/18/23 at           



                                                  2200, 100           



                                                  mL             

 

     phytonadion      2023- No             10mg      IV             Unive

rs



     e (VITAMIN      3-19 03-19                          Piggyback,           it

y of



     K) 10 mg in      00:30: 03:50                          ONCE, 1           [facility]

[place]



     NaCl 0.9%      00   :00                           dose, On           Medica

l



     (NS)                                         [facility]



     piggyback                                         3/18/23 at           



                                                  1930, 50           



                                                  mL             

 

     thiamine      2023- No             100mg      IV             [facility]



     (VITAMIN      3-18 03-20                          Piggyback,           ity 

of



     B1) 100 mg      23:30: 11:22                          DAILY,           [place]

[facility]



     in NaCl      00   :30                           First dose           Medica

l



     0.9% (NS)                                         on [facility]                                         3/18/23 at           



                                                  1830,           



                                                  Until           



                                                  Discontinu           



                                                  ed, 50 mL           

 

     oxazepam            Yes            15mg      15 mg,           [facility]



     (SERAX)      3-18                               Oral,           ity of



     capsule 15      23:24:                               Q4HPRN,           [place]

s



     mg        04                                 Starting           Medical



                                                  on [facility]



                                                  3/18/23 at           



                                                  1824,           



                                                  Until           



                                                  Discontinu           



                                                  ed,            



                                                  Routine,           



                                                  Only while           



                                                  awake for           



                                                  DBP equal           



                                                  to or           



                                                  greater           



                                                  than 100,           



                                                  HR equal           



                                                  to or           



                                                  greater           



                                                  than 100.           

 

     iopamidol      2023- No        [id number] 100mL      100 mL,         

  [facility]



     (ISOVUE      3-18 03-18                          Intravenou           ity o

f



     370-500 mL)      22:05: 22:05                          s, ONCE, 1          

 [place]



     injection      00   :00                           dose, On           Medica

l



     100 mL                                         [facility]



                                                  3/18/23 at           



                                                  1730,           



                                                  Routine           

 

     spironolact            Yes       Alcoholic 50mg QD   Take 1          

 [facility]



     one       7-15                cirrhosis           tablet by           [facility]

h



     (ALDACTONE)      00:00:                of liver           mouth           



     50 mg      00                  with           daily           



     tablet                          ascites                          

 

     dexlansopra            Yes       Secondary 30mg Q.5D Take 1          

 [facility]



     zole      7-15                esophageal           capsule by           [facility]

[facility]



     (DEXILANT)      00:00:                varices           mouth 2           



     30 mg      00                  without           times           



     delayed                          bleeding           daily           



     release                                                        



     capsule                                                        

 

     furosemide            Yes       Leg  20mg QD   Take 1           [facility]

s



     (LASIX) 20      7-15                swelling           tablet by           

Health



     mg tablet      00:00:                               mouth           



               00                                 daily           

 

     rifAXIMin      -0      Yes                      Take 1           [facility]



     (XIFAXAN)      7-15                               tablet by           Healt

h



     550 mg      00:00:                               mouth           



     tablet      00                                 twice           



                                                  daily.           



                                                  Therapeuti           



                                                  c              



                                                  substituti           



                                                  on for           



                                                  xifaxan           



                                                  200mg per           



                                                  P&T            

 

     spironolact      0      Yes       Alcoholic 50mg QD   Take 1          

 [facility]



     one       7-15                cirrhosis           tablet by           Healt

h



     (ALDACTONE)      00:00:                of liver           mouth           



     50 mg      00                  with           daily           



     tablet                          ascites                          

 

     dexlansopra      -0      Yes       Secondary 30mg Q.5D Take 1          

 [facility]



     zole      7-15                esophageal           capsule by           [facility]



     (DEXILANT)      00:00:                varices           mouth 2           



     30 mg      00                  without           times           



     delayed                          bleeding           daily           



     release                                                        



     capsule                                                        

 

     furosemide      -0      Yes       Leg  20mg QD   Take 1           [facility]

s



     (LASIX) 20      7-15                swelling           tablet by           

Health



     mg tablet      00:00:                               mouth           



               00                                 daily           

 

     rifAXIMin      -0      Yes                      Take 1           [facility]



     (XIFAXAN)      7-15                               tablet by           Healt

h



     550 mg      00:00:                               mouth           



     tablet      00                                 twice           



                                                  daily.           



                                                  Therapeuti           



                                                  c              



                                                  substituti           



                                                  on for           



                                                  xifaxan           



                                                  200mg per           



                                                  P&T            

 

     spironolact      0      Yes       Alcoholic 50mg QD   Take 1          

 [facility]



     one       7-15                cirrhosis           tablet by           Healt

h



     (ALDACTONE)      00:00:                of liver           mouth           



     50 mg      00                  with           daily           



     tablet                          ascites                          

 

     dexlansopra      -0      Yes       Secondary 30mg Q.5D Take 1          

 [facility]



     zole      7-15                esophageal           capsule by           [facility]



     (DEXILANT)      00:00:                varices           mouth 2           



     30 mg      00                  without           times           



     delayed                          bleeding           daily           



     release                                                        



     capsule                                                        

 

     furosemide      -0      Yes       Leg  20mg QD   Take 1           [facility]

s



     (LASIX) 20      7-15                swelling           tablet by           

Health



     mg tablet      00:00:                               mouth           



               00                                 daily           

 

     rifAXIMin      -0      Yes                      Take 1           [facility]



     (XIFAXAN)      7-15                               tablet by           Healt

h



     550 mg      00:00:                               mouth           



     tablet      00                                 twice           



                                                  daily.           



                                                  Therapeuti           



                                                  c              



                                                  substituti           



                                                  on for           



                                                  xifaxan           



                                                  200mg per           



                                                  P&T            

 

     spironolact      0      Yes       Alcoholic 50mg QD   Take 1          

 [facility]



     one       7-15                cirrhosis           tablet by           Healt

h



     (ALDACTONE)      00:00:                of liver           mouth           



     50 mg      00                  with           daily           



     tablet                          ascites                          

 

     dexlansopra      2022-0      Yes       Secondary 30mg Q.5D Take 1          

 [facility]



     zole      7-15                esophageal           capsule by           [facility]



     (DEXILANT)      00:00:                varices           mouth 2           



     30 mg      00                  without           times           



     delayed                          bleeding           daily           



     release                                                        



     capsule                                                        

 

     furosemide            Yes       Leg  20mg QD   Take 1           [facility]

s



     (LASIX) 20      7-15                swelling           tablet by           

Health



     mg tablet      00:00:                               mouth           



               00                                 daily           

 

     rifAXIMin            Yes                      Take 1           [facility]



     (XIFAXAN)      7-15                               tablet by           [facility]





     550 mg      00:00:                               mouth           



     tablet      00                                 twice           



                                                  daily.           



                                                  Therapeuti           



                                                  c              



                                                  substituti           



                                                  on for           



                                                  xifaxan           



                                                  200mg per           



                                                  P&T            

 

     Dexlansopra            Yes            30mg      Take 1           Univ

ers



     zole 30 mg      7-15                               capsule by           ity

 of



     capsule      00:00:                               mouth.           [address]

 

     rifAXIMin            Yes                      Take by           Unive

rs



     (XIFAXAN)      7-15                               mouth.           ity of



     550 mg      00:00:                                              [place]



     tablet      [address] [place] [address]

 

     Dexlansopra            Yes            30mg      Take 1           Univ

ers



     zole 30 mg      7-15                               capsule by           ity

 of



     capsule      00:00:                               mouth.           [address]

 

     rifAXIMin            Yes                      Take by           Unive

rs



     (XIFAXAN)      7-15                               mouth.           ity of



     550 mg      00:00:                                              [place]



     tablet      [address] [place] [address]

 

     rifAXIMin      2022- No        Ascites due 600mg Q.5D Take 3        

   [facility]



     (XIFAXAN)      7-15 09-21           to             tablets by           [facility]



     200 mg      00:00: 00:00           alcoholic           mouth 2           



     tablet      00   :00            cirrhosis           times           



                                                  daily           



                                                  (Therapeut           



                                                  ic             



                                                  substituti           



                                                  on from           



                                                  Xifaxan           



                                                  550mg per           



                                                  Pharmacy           



                                                  P&amp;T.)           

 

     rifAXIMin      2022- No        Ascites due 600mg Q.5D Take 3        

   [facility]



     (XIFAXAN)      7-15 09-21           to             tablets by           [facility]



     200 mg      00:00: 00:00           alcoholic           mouth 2           



     tablet      00   :00            cirrhosis           times           



                                                  daily           



                                                  (Therapeut           



                                                  ic             



                                                  substituti           



                                                  on from           



                                                  Xifaxan           



                                                  550mg per           



                                                  Pharmacy           



                                                  P&amp;T.)           

 

     rifAXIMin      2022- No        Ascites due 600mg Q.5D Take 3        

   [facility]



     (XIFAXAN)      7-15 09-21           to             tablets by           [facility]

lt



     200 mg      00:00: 00:00           alcoholic           mouth 2           



     tablet      00   :00            cirrhosis           times           



                                                  daily           



                                                  (Therapeut           



                                                  ic             



                                                  substituti           



                                                  on from           



                                                  Xifaxan           



                                                  550mg per           



                                                  Pharmacy           



                                                  P&amp;T.)           

 

     rifAXIMin      2022- No        Ascites due 600mg Q.5D Take 3        

   [facility]



     (XIFAXAN)      7-15 09-21           to             tablets by           [facility]

lt



     200 mg      00:00: 00:00           alcoholic           mouth 2           



     tablet      00   :00            cirrhosis           times           



                                                  daily           



                                                  (Therapeut           



                                                  ic             



                                                  substituti           



                                                  on from           



                                                  Xifaxan           



                                                  550mg per           



                                                  Pharmacy           



                                                  P&amp;T.)           

 

     lactulose      2022- No        Alcoholic 10g       Take 15 mL       

    [facility]



     (CHRONULAC)      7-15 08-14           cirrhosis           by mouth 3       

    [facility]



     10 gram/15      00:00: 23:59           of liver           times           



     mL oral      00   :00            with           daily for           



     solution                          ascites           30 days           

 

     lactulose      2022- No        Alcoholic 10g       Take 15 mL       

    [employer]



     (CHRONULAC)      7-15 08-14           cirrhosis           by mouth 3       

    Health



     10 gram/15      00:00: 23:59           of liver           times           



     mL oral      00   :00            with           daily for           



     solution                          ascites           30 days           

 

     lactulose      2022- No        Alcoholic 10g       Take 15 mL       

    [employer]



     (CHRONULAC)      7-15 08-14           cirrhosis           by mouth 3       

    Health



     10 gram/15      00:00: 23:59           of liver           times           



     mL oral      00   :00            with           daily for           



     solution                          ascites           30 days           

 

     lactulose      2022- No        Alcoholic 10g       Take 15 mL       

    [employer]



     (CHRONULAC)      7-15 08-14           cirrhosis           by mouth 3       

    Health



     10 gram/15      00:00: 23:59           of liver           times           



     mL oral      00   :00            with           daily for           



     solution                          ascites           30 days           

 

     furosemide            Yes       [id number] 40mg      Take 1           U

nivers



     40 mg      7-06                               tablet by           ity of



     tablet      00:00:                               mouth           [place]



               00                                 daily.           Medical



                                                                 [facility]

 

     spironolact            Yes       [id number] 100mg      Take 1          

 [facility]



     one 100 mg      7-06                               tablet by           ity 

of



     tablet      00:00:                               mouth           [place]



               00                                 daily.           Medical



                                                                 Branch

 

     furosemide            Yes       [id number] 40mg      Take 1           U

nivers



     40 mg      7-06                               tablet by           ity of



     tablet      00:00:                               mouth           [place]



               00                                 daily.           Medical



                                                                 Branch

 

     spironolact            Yes       [id number] 100mg      Take 1          

 [facility]



     one 100 mg      7-06                               tablet by           ity 

of



     tablet      00:00:                               mouth           [place]



               00                                 daily.           Medical



                                                                 Branch

 

     furosemide            Yes       [id number] 40mg      Take 1           U

nivers



     40 mg      7-06                               tablet by           ity of



     tablet      00:00:                               mouth           [place]



               00                                 daily.           [facility]



                                                                 Branch

 

     spironolact            Yes       [id number] 100mg      Take 1          

 [facility]



     one 100 mg      7-06                               tablet by           ity 

of



     tablet      00:00:                               mouth           [place]



               00                                 daily.           [facility]



                                                                 Branch

 

     spironolact            Yes            100mg      100 mg,           Un

[facility]



     one       -05                               Oral,           ity of



     (ALDACTONE)      14:00:                               DAILY,           [place]

s



     tablet 100      00                                 First dose           Med

ical



     mg                                           on [facility]



                                                  21 at           



                                                  0900,           



                                                  Until           



                                                  Discontinu           



                                                  ed,            



                                                  Routine           

 

     furosemide            Yes            40mg      40 mg,           Unive

rs



     (LASIX)      -05                               Oral,           ity of



     tablet 40      14:00:                               DAILY,           [place]



     mg        00                                 First dose           Medical



                                                  on [facility]



                                                  21 at           



                                                  0900,           



                                                  Until           



                                                  Discontinu           



                                                  ed,            



                                                  Routine           

 

     albumin       No             12.5g      12.5 g, IV           Un

[facility]



     (ALBUMINAR       07-05                          Infusion,           ity

 of



     25%) 25 %      08:00: 10:29                          ONCE, 1           [place]

s



     injection      00   :00                           dose, Mon           Medic

al



     12.5 g                                         21 at           [facility]



                                                  0300, 100           



                                                  mL<br>Nilda           



                                                  cation:           



                                                  HEPATORENA           



                                                  L SYNDROME           



                                                  (DIAGNOSIS           



                                                  )<br>Comme           



                                                  nts:           



                                                  Dosin-8 g/L of           



                                                  ascitic           



                                                  fluid           



                                                  removed           

 

     KCL       2021- No             40meq      40 mEq,           [facility]



     (KLOR-CON       07-05                          Oral,           ity of



     M20) tablet      05:00: 05:30                          ONCE, 1           [place]

[place]



     40 mEq      00   :00                           dose, [facility]



                                                  21 at           [facility]



                                                  0000,           



                                                  Routine           

 

     acamprosate            Yes       [id number] 666mg      Take 2          

 [facility]



     333 mg      7-05                               tablets by           ity of



     tablet      00:00:                               mouth 3           [place]



               00                                 (three)           Medical



                                                  times           [place]



                                                  daily.           

 

     acamprosate            Yes       [id number] 666mg      Take 2          

 [facility]



     333 mg      7-05                               tablets by           ity of



     tablet      00:00:                               mouth 3           [place]



               00                                 (three)           Medical



                                                  times           [place]



                                                  daily.           

 

     acamprosate            Yes       [id number] 666mg      Take 2          

 [facility]



     333 mg      7-05                               tablets by           ity of



     tablet      00:00:                               mouth 3           [place]



               00                                 (three)           Medical



                                                  times           [facility]



                                                  daily.           

 

     KCL       2021- No             40meq      40 mEq,           [facility]



     (KLOR-CON       07-05                          Oral,           ity of



     M20) tablet      00:00: 02:51                          ONCE, 1           [facility]

[place]



     40 mEq      00   :00                           dose, [facility]



                                                  21 at           [facility]



                                                  1900,           



                                                  Routine           

 

     iopamidol      2021- No        [id number] 100mL      100 mL,          

 [facility]



     (ISOVUE      -                          Intravenou           ity o

f



     370-500 mL)      17:15: 16:05                          s, ONCE, 1          

 [place]



     injection      00   :00                           dose, Sun           Medic

al



     100 mL                                         21 at           [facility]



                                                  1215,           



                                                  Routine           

 

     KCL       2021- No             40meq      40 mEq,           [facility]



     (KLOR-CON       06-09                          Oral,           ity of



     M20) tablet      21:15: 21:15                          ONCE, 1           [facility]

[place]



     40 mEq      00   :00                           dose, [facility]



                                                  21 at           [facility]



                                                  1615,           



                                                  Routine           

 

     furosemide            Yes       [id number] 40mg      Take 1         

  [facility]



     40 mg      6-                [id number]           tablet by           ity of



     tablet      00:00:                               mouth           [place]



               00                                 daily.           [facility]



                                                                 Branch

 

     spironolact            Yes       [id number] 50mg      Take 2        

   [facility]



     one 25 mg      6-                [id number]           tablets by           ity

 of



     tablet      00:00:                               mouth           [place]



               00                                 daily.           [facility]



                                                                 Branch

 

     furosemide            Yes       [id number] 40mg      Take 1         

  [facility]



     40 mg      6-                [id number]           tablet by           ity of



     tablet      00:00:                               mouth           [place]



               00                                 daily.           [facility]



                                                                 Branch

 

     spironolact            Yes       [id number] 50mg      Take 2        

   [facility]



     one 25 mg      6-                [id number]           tablets by           ity

 of



     tablet      00:00:                               mouth           [place]



               00                                 daily.           [facility]



                                                                 Branch

 

     furosemide            Yes       [id number] 40mg      Take 1         

  [facility]



     40 mg      6-                [id number]           tablet by           ity of



     tablet      00:00:                               mouth           [place]



               00                                 daily.           [facility]

 

     spironolact            Yes       [id number] 50mg      Take 2        

   [facility]



     one 25 mg      6-                [id number]           tablets by           ity

 of



     tablet      00:00:                               mouth           [place]



               00                                 daily.           [facility]

 

     furosemide      2021- No        [id number] 40mg      Take 1        

   [facility]



     40 mg                 tablet by           ity of



     tablet      00:00: 00:00                          mouth           [place]



               00   :00                           daily.           [facility]

 

     spironolact      2021- No        [id number] 50mg      Take 2       

    [facility]



     one 25 mg                 tablets by           it

y of



     tablet      00:00: 00:00                          mouth           [place]



               00   :00                           daily.           [facility]

 

     lidocaine-e      2021- No                       PRN,           Unive

rs



     pinephrine                                Starting           ity 

of



     (XYLOCAINE      17:56: 17:56                          Tue 21           

[place]



     W/EPINEPHRI      00   :00                           at 1256,           Medi

[facility]ler



     NE) 2                                         Until [facility]



     %-1:200,000                                         21 at           



     injection                                         1256,           



                                                  Routine           

 

     furosemide      2021- No             20mg      20 mg,           Univ

ers



     (LASIX)                                Slow IV           ity of



     injection      11:00: 11:02                          Push,           [place]



     20 mg      00   :00                           ONCE, 1           Medical



                                                  dose, [facility]



                                                  21 at           



                                                  0600,           



                                                  Routine           

 

     magnesium      2021- No             1g        1 g, IV           Univ

ers



     sulfate in                                Piggyback,           it

y of



     D5W 1      08:30: 09:42                          ONCE, 1           [place]



     gram/100 mL      00   :00                           dose, Tue           Med

ical



     RTU IV                                         21 at           [place]



     Piggyback 1                                         0330, 100           



     g                                            mL             

 

     calcium      2021- No             1g        1 g, IV           Univer

s



     gluconate 1                                Infusion,           it

y of



     g in NaCl      07:15: 07:44                          ONCE, 1           [place]

[place]



     50 mL      00   :00                           dose, [facility]



     (ISO-OSM)                                         21 at           [facility]

h



     RTU IV                                         0230,           



     infusion 1                                         Routine           



     g                                                           

 

     lactulose            Yes            30mL      30 mL,           Univer

s



     (CEPHULAC)                                     Oral, BID,           ity

 of



     solution 30      01:00:                               First dose           

[place]



     mL        00                                 on [facility]



                                                  21 at           [place]



                                                  ,           



                                                  Until           



                                                  Discontinu           



                                                  ed,            



                                                  Routine           

 

     thiamine      -0      Yes            100mg      100 mg,           Unive

rs



     (VITAMIN                                     Oral, BID,           ity o

f



     B1) tablet      01:00:                               First dose           [place]

[place]



     100 mg      00                                 on [facility]



                                                  21 at           [facility]



                                                  2000,           



                                                  Until           



                                                  Discontinu           



                                                  ed,            



                                                  Routine           

 

     phytonadion      2021- No             10mg      10 mg,           Uni

vers



     e (VITAMIN                                Subcutaneo           it

y of



     K)        00:15: 13:25                          us, DAILY,           [place]



     injection      00   :00                           3 doses,           Medica

l



     10 mg                                         First dose           [facility]



                                                  (after           



                                                  last           



                                                  modificati           



                                                  on) on 21 at           



                                                  1915, Last           



                                                  dose on           



                                                  21           



                                                  at 0900,           



                                                  Routine           

 

     spironolact            Yes            25mg      25 mg,           Univ

ers



     one       08                               Oral,           ity of



     (ALDACTONE)      00:00:                               DAILY,           [place]

s



     tablet 25      00                                 First dose           Medi

[facility]



     mg                                           on Mon           [facility]



                                                  21 at           



                                                  1900,           



                                                  Until           



                                                  Discontinu           



                                                  ed,            



                                                  Routine           

 

     furosemide            Yes            20mg      20 mg,           Unive

rs



     (LASIX)                                     Slow IV           ity of



     injection      00:00:                               Push,           [place]



     20 mg      00                                 DAILY,           Medical



                                                  First dose           [facility]



                                                  on 21 at           



                                                  1900,           



                                                  Until           



                                                  Discontinu           



                                                  ed,            



                                                  Routine           

 

     ondansetron            Yes            4mg       4 mg, Slow           

[facility]



     (ZOFRAN                                     IV Push,           ity of



     (PF))      23:43:                               [id number],           [place]



     injection 4      59                                 Starting           Medi

tyler



     mg                                           Mon 21           [facility]



                                                  at 1843,           



                                                  Until           



                                                  Discontinu           



                                                  ed,            



                                                  Routine,           



                                                  Nausea and           



                                                  Vomiting           



                                                  (N/V)           

 

     octreotide      2021- No             50ug/h      50 mcg/hr          

 [facility]



     (SANDOSTATI                                (10            ity of



     N) 1,250      20:00: 15:40                          mL/hr), IV           Te

[place]



     mcg in NaCl      00   :20                           Infusion,           Med

ical



     0.9% (NS)                                         CONTINUOUS           Bran

ch



     250 mL                                         , Starting           



     infusion                                         21           



                                                  at 1500           

 

     octreotide      2021- No             50ug      50 mcg,           Uni

vers



     (SANDOSTATI                                Slow IV           ity 

of



     N)        20:00: 19:11                          Push,           [place]



     injection      00   :00                           ONCE, 1           Medical



     50 mcg                                         dose, [facility]



                                                  6/7/21 at           



                                                  1500,           



                                                  STAT<br>In           



                                                  dication:           



                                                  Acute           



                                                  Variceal           



                                                  Hemorrhage           



                                                  in a           



                                                  Cirrhotic           



                                                  Patient           

 

     KCL       2021- No             40meq      40 mEq,           Univers



     (KLOR-CON                                Oral,           ity of



     M20) tablet      18:00: 17:38                          ONCE, 1           Te

[place]



     40 mEq      00   :00                           dose, Mon           Medical



                                                  21 at           [place]



                                                  1300,           



                                                  Routine           

 

     iopamidol      2021- No        [id number] 120mL      120 mL,         

  Univers



     (ISOVUE                                Intravenou           ity o

f



     370-500 mL)      17:15: 16:07                          s, ONCE, 1          

 [place]



     injection      00   :00                           dose, Mon           Medic

al



     120 mL                                         21 at           Branch



                                                  1215,           



                                                  Routine           







Immunizations







           Ordered    Filled Immunization Date       Status     Comments   [facility]

e



           Immunization Name Name                                        

 

           Pneumococcal            2022 Completed             [facility]

h



           20-valent Vaccine            00:00:00                         

 

           Pneumococcal            2022 Completed             [facility]

h



           20-valent Vaccine            00:00:00                         

 

           Pneumococcal            2022 Completed             [facility]

h



           20-valent Vaccine            00:00:00                         

 

           Pneum[facility]            2022 Com[facility]             [facility]

[facility]



           20-valent Vaccine            00:00:00                         

 

           SARS-COV-2 COVID-19            2021 Completed             Unive

rsity of



           PFIZER VACCINE            00:00:00                         [facility]

 

           SARS-COV-2 COVID-19            2021 Completed             Unive

rsity of



           PFIZER VACCINE            00:00:00                         [facility]

 

           SARS-COV-2 COVID-19            2021 Completed             Unive

rsity of



           PFIZER VACCINE            00:00:00                         [place] [facility]

[facility]



                                                                  Branch







Vital Signs







             Vital Name   Observation Time Observation Value Comments     Source

 

             WEIGHT       2023 06:00:00 73.9 kg                   

 

             WEIGHT       2023 03:00:00 58.514 kg                 

 

             WEIGHT       2023 10:55:00 58.2 kg                   

 

             WEIGHT       2023 06:00:00 73.9 kg                   

 

             WEIGHT       2023 03:00:00 58.514 kg                 

 

             WEIGHT       2023 10:55:00 58.2 kg                   

 

             WEIGHT       2023 04:25:00 87.7 kg                   

 

             WEIGHT       2023 04:04:00 90.2 kg                   

 

             WEIGHT       2023 08:49:00 90.2 kg                   

 

             WEIGHT       2023 04:32:00 93.4 kg                   

 

             WEIGHT       2023 05:16:00 102 kg                    

 

             WEIGHT       2023 03:54:00 99.4 kg                   

 

             WEIGHT       2023 03:29:00 99.1 kg                   

 

             WEIGHT       2023 04:21:00 99.9 kg                   

 

             WEIGHT       2023-07-15 04:21:00 100.5 kg                  

 

             WEIGHT       2023 05:00:00 98.3 kg                   

 

             WEIGHT       2023 03:29:00 94.5 kg                   

 

             WEIGHT       2023 03:36:00 97.7 kg                   

 

             WEIGHT       2023 20:51:00 99.7 kg                   

 

             HEIGHT       2023 20:51:00 167.6 cm                  

 

             WEIGHT       2023 05:07:00 87.5 kg                   

 

             WEIGHT       2023 05:00:00 89.3 kg                   

 

             WEIGHT       2023 05:00:00 90.1 kg                   

 

             WEIGHT       2023 04:25:00 87.7 kg                   

 

             WEIGHT       2023 04:04:00 90.2 kg                   

 

             WEIGHT       2023 08:49:00 90.2 kg                   

 

             WEIGHT       2023 04:32:00 93.4 kg                   

 

             WEIGHT       2023 05:16:00 102 kg                    

 

             WEIGHT       2023 03:54:00 99.4 kg                   

 

             WEIGHT       2023 03:29:00 99.1 kg                   

 

             WEIGHT       2023 04:21:00 99.9 kg                   

 

             WEIGHT       2023-07-15 04:21:00 100.5 kg                  

 

             WEIGHT       2023 05:00:00 98.3 kg                   

 

             WEIGHT       2023 03:29:00 94.5 kg                   

 

             WEIGHT       2023 03:36:00 97.7 kg                   

 

             WEIGHT       2023 20:51:00 99.7 kg                   

 

             HEIGHT       2023 20:51:00 167.6 cm                  

 

             WEIGHT       2023 05:07:00 87.5 kg                   

 

             WEIGHT       2023 05:00:00 89.3 kg                   

 

             WEIGHT       2023 05:00:00 90.1 kg                   

 

             HEIGHT       2023 20:00:00 165.1 cm                  

 

             WEIGHT       2023 20:00:00 81.3 kg                   

 

             HEIGHT       2023 08:12:00 165.1 cm                  

 

             WEIGHT       2023 08:12:00 75 kg                     

 

             HEIGHT       2023 20:00:00 165.1 cm                  

 

             WEIGHT       2023 20:00:00 81.3 kg                   

 

             HEIGHT       2023 08:12:00 165.1 cm                  

 

             WEIGHT       2023 08:12:00 75 kg                     

 

             Systolic blood 2023 20:35:00 119 mm[Hg]                Univer

sity of



             pressure                                            [facility]



                                                                 Branch

 

             Diastolic blood 2023 20:35:00 60 mm[Hg]                 Unive

rsity of



             pressure                                            [facility]

 

             Heart rate   2023 20:35:00 86 /min                   Universi

ty [facility]

 

             Body temperature 2023 20:35:00 37.28 Nickie                 Univ

ersity of



                                                                 [place] [facility]



                                                                 Branch

 

             Respiratory rate 2023 20:35:00 16 /min                   Univ

ersity of



                                                                 [place] [facility]



                                                                 Branch

 

             Oxygen saturation in 2023 20:35:00 97 /min                   

[facility]



             Arterial blood by                                        [place] [facility]

ty[facility]



             Pulse oximetry                                        Branch

 

             Body weight  2023 22:59:00 84.687 kg                 Universi

ty of



                                                                 [place] [facility]



                                                                 Branch

 

             BMI          2023 22:59:00 27.57 kg/m2               Universi

ty [facility]

 

             Body height  2023 21:39:38 175.3 cm                  Universi

ty [facility]



                                                                 Branch

 

             Systolic blood 2021 17:32:00 117 mm[Hg]                Univer

sity of



             pressure                                            [place] [facility]



                                                                 Branch

 

             Diastolic blood 2021 17:32:00 75 mm[Hg]                 Unive

rsity of



             pressure                                            [place] [facility]



                                                                 Branch

 

             Heart rate   2021 17:32:00 82 /min                   Universi

ty of



                                                                 [facility]

 

             Body temperature 2021 17:32:00 36.5 Nickie                  Univ

ersity of



                                                                 [place] [facility]



                                                                 Branch

 

             Oxygen saturation in 2021 17:32:00 98 /min                   

[facility]



             Arterial blood by                                        [place] [facility]

ty[facility]



             Pulse oximetry                                        Branch

 

             Respiratory rate 2021 13:18:00 20 /min                   Univ

ersity [facility]

 

             Body height  2021 01:49:00 167.6 cm                  Universi

ty of



                                                                 [place] [facility]



                                                                 [place]

 

             Body weight  2021 01:49:00 77.111 kg                 Universi

ty of



                                                                 [facility]

 

             BMI          2021 01:49:00 27.44 kg/m2               Universi

ty [facility]

 

             Systolic blood 2021 20:28:00 118 mm[Hg]                Univer

sity of



             pressure                                            [facility]

 

             Diastolic blood 2021 20:28:00 69 mm[Hg]                 Unive

rsity of



             [facility]

 

             Heart rate   2021 20:28:00 79 /min                   Universi

ty of



                                                                 [facility]

 

             Body temperature 2021 20:28:00 36.83 Nickie                 [facility]

 

             Respiratory rate 2021 20:28:00 18 /min                   [facility]

 

             Oxygen saturation in 2021 20:28:00 95 /min                   

[facility]



             Arterial blood by                                        [place] [facility]

[facility]



             Pulse oximetry                                        [place]

 

             Body height  2021 21:12:00 167.6 cm                  Universi

ty of



                                                                 [place] [facility]



                                                                 [place]

 

             Body weight  2021 14:47:00 90.719 kg                 Universi

ty of



                                                                 [facility]

 

             BMI          2021 14:47:00 32.28 kg/m2               Universi

ty [facility]

 

             Systolic blood 2022 12:48:00 113 mm[Hg]                [facility]



             pressure                                            

 

             Diastolic blood 2022 12:48:00 64 mm[Hg]                 [facility]



             pressure                                            

 

             Heart rate   2022 12:48:00 64 /min                   [facility]

 

             Body temperature 2022 12:48:00 37 Nickie                    [facility]

 

             Respiratory rate 2022 12:48:00 18 /min                   [facility]

 

             Body height  2022 12:48:00 167.6 cm                  [facility]

 

             Body weight  2022 12:48:00 73.256 kg                 [facility]

 

             BMI          2022 12:48:00 26.07 kg/m2               [facility]

 

             Oxygen saturation in 2022 12:48:00 100 /min                  

[facility]



             Arterial blood by                                        



             Pulse oximetry                                        

 

             Respiratory rate 2021 20:28:00 18 /min                   [facility]

 

             Oxygen saturation in 2021 20:28:00 95 /min                   

[facility]



             Arterial blood by                                        [place] [facility]

[facility]



             Pulse oximetry                                        [place]

 

             Systolic blood 2021 20:28:00 118 mm[Hg]                Univer

sity of



             pressure                                            [facility]

 

             Diastolic blood 2021 20:28:00 69 mm[Hg]                 [facility]



             pressure                                            [facility]

 

             Heart rate   2021 20:28:00 79 /min                   [facility]

 

             Body temperature 2021 20:28:00 36.83 Nickie                 [facility]

 

             Body height  2021 21:12:00 167.6 cm                  [facility]

 

             Body weight  2021 14:47:00 90.719 kg                 [facility]

 

             BMI          2021 14:47:00 32.28 kg/m2               [facility]







Procedures







                Procedure       Date / Time     Performing Clinician Source



                                Performed                       

 

                MAGNESIUM       2023 10:30:00 Izaiah [facility]

 

                BASIC METABOLIC PANEL (NA, 2023 10:30:00 Fidencio Bliss   [facility]



                K, CL, CO2, GLUCOSE, BUN,                                 Medica

l Branch



                CREATININE, CA)                                 

 

                CBC WITHOUT DIFF 2023 10:30:00 Izaiah Fidencio   [facility]

 

                MAGNESIUM       2023 20:51:00 Izaiah [facility]

 

                BASIC METABOLIC PANEL (NA, 2023 20:51:00 Fidencio Bliss   [facility]



                K, CL, CO2, GLUCOSE, BUN,                                 Medica

l Branch



                CREATININE, CA)                                 

 

                MAGNESIUM       2023 09:59:00 Jin Gutiérrez [facility]

 

                BASIC METABOLIC PANEL (NA, 2023 09:59:00 Rashmi Francisco

[facility] [place]



                K, CL, CO2, GLUCOSE, BUN,                                 Medica

l Branch



                CREATININE, CA)                                 

 

                CBC WITHOUT DIFF 2023 09:59:00 Rashmi Francisco   [facility]

 

                PROTHROMBIN TIME / INR 2023 09:59:00 Rashmi Francisco   [facility]

 

                FIBRINOGEN      2023 09:59:00 Nolan [facility]

 

                PROTHROMBIN TIME / INR 2023 11:25:00 Rashmi Francisco   [facility]

 

                FIBRINOGEN      2023 11:25:00 Nolan [facility]

 

                RETICULOCYTES AUTOMATED 2023 11:25:00 Tracy White 

[facility]

 

                MAGNESIUM       2023 08:36:00 [facility]

 

                BASIC METABOLIC PANEL (NA, 2023 08:36:00 Tracy White [facility]



                K, CL, CO2, GLUCOSE, BUN,                 Amjad S.        Medica

l Branch



                CREATININE, CA)                                 

 

                CBC WITH DIFF   2023 08:36:00 Tracy White [facility]

 

                BLOOD CULTURE SCREEN 2023 03:07:00 Rashmi Francisco   [facility]

 

                FIBRINOGEN      2023 22:21:00 Nolan [facility]

 

                PREPARE CRYOPRECIPITATE 2023 18:41:31 Rashmi Francisco   [facility]

 

                TRANSTHORACIC ECHO (TTE) 2023 17:09:13 Tracy White [facility]

 [facility]



                COMPLETE W/ CONTRAST                 Amjad S.        Medical Bra

nch

 

                MAGNESIUM       2023 08:53:00 Nolan [facility]

 

                BASIC METABOLIC PANEL (NA, 2023 08:53:00 Rashmi Francisco   [facility]

[facility] [place]



                K, CL, CO2, GLUCOSE, BUN,                                 Medica

l Branch



                CREATININE, CA)                                 

 

                CBC WITH DIFF   2023 08:53:00 Nolan [facility]

 

                PROTHROMBIN TIME / INR 2023 08:53:00 Rashmi Francisco   [facility]

 

                FIBRINOGEN      2023 08:53:00 Rashmi Francisco   [place] [facility]

f [facility]

 

                VANCOMYCIN TROUGH 2023 04:17:00 Sincere Griffith  [facility]

 

                LACTIC ACID WHOLE BLOOD 2023 21:48:00 Norman Logan  [facility]

 

                TRANSFUSE PACKED RBC 2023 20:00:00 Rashmi Francisco   [facility]

 

                PREPARE PACKED RBC 2023 19:27:30 Rashmi Francisco   [facility]

 

                LACTIC ACID WHOLE BLOOD 2023 17:09:00 Lucia Pike [facility]

 

                CBC WITHOUT DIFF 2023 17:08:00 [facility]

 

                TRANSFUSE PACKED RBC 2023 14:35:00 Rashmi Francisco   [facility]

 

                CBC WITHOUT DIFF 2023 13:11:00 Rashmi Francisco   [facility]

 

                US ABDOMEN LIMITED 2023 12:59:00 Tracy White [facility]

 

                LACTIC ACID WHOLE BLOOD 2023 11:46:00 Tracy White 

[facility]

 

                PROTHROMBIN TIME / INR 2023 11:45:00 Rashmi Francisco   [facility]

 

                TRANSFUSE PACKED RBC 2023 10:31:00 Tracy White [facility]

 

                TRANSFUSE CRYOPRECIPITATE 2023 10:28:00 Sincere Griffith  [facility]

 

                PREPARE CRYOPRECIPITATE 2023 10:16:18 Sincere Griffith  [facility]

 

                PREPARE PACKED RBC 2023 10:16:18 [facility]

[facility]

 

                CBC WITHOUT DIFF 2023 08:46:00 Tracy White [facility]

 

                LACTIC ACID WHOLE BLOOD 2023 08:46:00 Carlos Enrique Rendon     [facility]

 

                BASIC METABOLIC PANEL (NA, 2023 08:45:00 Carlos Enrique Rendon     [facility]



                K, CL, CO2, GLUCOSE, BUN,                                 Medica

l Branch



                CREATININE, CA)                                 

 

                HEPATITIS B SURFACE 2023 08:45:00 Tracy White [facility]



                ANTIGEN                         [facility]

 

                HEPATITIS B CORE ANTIBODY 2023 08:45:00 Tracy White [facility]



                IGM                             [facility]

 

                TRANSFUSE PACKED RBC 2023 06:30:00 Tracy White [facility]

 

                URINE DRUG (IMMUNOASSAY) - 2023 03:56:00 Rashmi Francisco   [facility]

[facility] [place]



                COMPREHENSIVE DRUG SCREEN                                 Medica

l Branch

 

                URINALYSIS      2023 03:56:00 Nolan Rashmi   [place]

[facility]

 

                GALV ONLY - URINE DRUG 2023 03:56:00 Rashmi Francisco   [facility]



                (LCMSMS) - WILIAM PANEL                                 Medical Br

anch

 

                AC PANEL 20 + LACTIC ACID 2023 03:56:00 Tracy White [facility]

 

                IRON PANEL      2023 03:55:00 ObTracy pardo [facility]

 

                CBC WITHOUT DIFF 2023 03:55:00 Rashmi Francisco   [facility]

 

                HEPATITIS B SURFACE 2023 03:55:00 Tracy White [facility]



                ANTIBODY                        [facility]

 

                HCV ANTIBODY    2023 03:55:00 Tracy White [facility]

 

                SYPHILIS IGG/IGM 2023 03:55:00 Merrill, [facility]

 

                TRANSFUSE PACKED RBC 2023 01:15:00 Rashmi Francisco   [facility]

 

                PREPARE PACKED RBC 2023 00:29:29 Rashmi Francisco   [facility]

 

                AC PANEL 20 + LACTIC ACID 2023 00:05:00 Clive Luther   [facility]

 

                AC PANEL 21 + LACTIC ACID 2023 00:00:00 Rashmi Francisco   Un

[facility]

 

                BLOOD CULTURE SCREEN 2023 23:57:00 Rashmi Francisco   [facility]

 

                PHOSPHORUS      2023 23:57:00 Nolan [facility]

 

                CREATINE KINASE 2023 23:57:00 Nolan [facility]

 

                MAGNESIUM       2023 23:57:00 Nolan [facility]

 

                OSMOLALITY, SERUM OR 2023 23:57:00 Tracy White [facility]



                PLASMA                          [facility]

 

                AMMONIA, PLASMA 2023 23:57:00 Nolan [facility]

 

                FREE T4         2023 23:57:00 Tracy White [facility]



                                                [facility]

 

                THYROID STIMULATING 2023 23:57:00 Tracy White [facility]



                HORMONE                         [facility]

 

                HEPATIC FUNCTION PANEL 2023 23:57:00 Rashmi Francisco   [facility]



                ([id number]) (ALB,T.PRO,BILI                                 Medical 

Branch



                T,BU/BC,ALT,AST,ALK PHOS)                                 

 

                BASIC METABOLIC PANEL (NA, 2023 23:57:00 Rashmi Francisco   [facility]

[facility] [place]



                K, CL, CO2, GLUCOSE, BUN,                                 Medica

l Branch



                CREATININE, CA)                                 

 

                ETHANOL         2023 23:57:00 Nolan [facility]

 

                [facility]

 

                [facility]EN      2023 [facility]57[facility]

 

                HCV ANTIBODY    2023 23:57:00 Nacho [facility]

 

                BLOOD CULTURE WORKUP 2023 23:57:00 [facility]

 

                HIV 1/2 AG-AB WITH REFLEX 2023 23:57:00 Alysa Merrill

[facility]

 

                GRAM NEGATIVE BLOOD 2023 23:57:00 Francisco, Rashmi   [facility] [place]



                PATHOGENS DNA                                   [facility]



                PROBE-AEROBIC                                   

 

                XR FEMUR 2 VW LEFT 2023 22:58:00 Vahibe, Clive   [facility]

 

                XR KNEE <3 VW LEFT 2023 22:58:00 Vahibe, Clive   [facility]

 

                XR TIBIA FIBULA 2 VW LEFT 2023 22:58:00 Vahibe, Clive   [facility]

[facility]

 

                CT TRAUMA HEAD WO CONTRAST 2023 22:07:00 Vahibe, Clive   U

[facility]

 

                CT TRAUMA THORAX W 2023 22:07:00 Vahibe, Clive   [facility]



                CONTRAST                                        [facility]

 

                CT TRAUMA CERVICAL SPINE 2023 22:07:00 Vahibe, Clive   Uni

[facility] CONTRAST                                     [facility]

 

                CT TRAUMA THORACIC SPINE 2023 22:07:00 Vahibe, Clive   Uni

[facility] CONTRAST                                     [facility]

 

                CT TRAUMA ABDOMEN PELVIS W 2023 22:07:00 Vahibe, Clive   U

[facility]



                CONTRAST                                        [facility]

 

                CT TRAUMA LUMBAR SPINE WO 2023 22:07:00 Vahibe, Clive   [facility]



                [facility]                                        [facility]

 

                BASIC METABOLIC PANEL (NA, 2023 21:42:00 Chantell Johnson

[facility]



                K, CL, CO2, GLUCOSE, BUN,                                 Medica

l Branch



                CREATININE, CA)                                 

 

                CBC WITHOUT DIFF 2023 21:42:00 Chantell Johnson     [facility]

 

                PROTHROMBIN TIME / INR 2023 21:42:00 Chantell Johnson

[facility]

 

                ACTIVATED PARTIAL THRMPLAS 2023 [facility]:42:00 [facility]

 

                HB ABO GROUPING 2023 21:42:00 Chantell Johnson o

f [facility]

 

                EXTRA TUBE DK. GREEN 2023 21:42:00 Chantell Johnson

[facility]

 

                COMPREHENSIVE METABOLIC 2022 10:33:00 Santiago Vick

is Health



                PANEL                                           

 

                HEMOGLOBIN A1C  2022 10:33:00 Santiago Vick

 

                LIPID PROFILE   2022 10:33:00 Santiago Vick

 

                CBC/DIFF        2022 10:33:00 Santiago Vick

 

                CBC             2022 10:33:00 Santiago Vick

 

                DIFFERENTIAL, MANUAL-WAM 2022 10:33:00 Santiago Vick

[facility]

 

                FECAL OCCULT BLOOD 2022 08:00:00 Santiago Vick

 

                HEMOCCULT KIT FOR SPECIMEN 2022 13:11:09 Santiago Vick

[facility]



                COLLECTION AT HOME                                 

 

                MAGNESIUM       2022-07-15 05:50:00 Kevin Orona

 

                COMPREHENSIVE METABOLIC 2022-07-15 05:50:00 Kevin Orona

is Health



                PANEL                                           

 

                PT/INR/PTT      2022-07-15 05:50:00 Kevin Orona



 

                CBC/DIFF        2022-07-15 05:50:00 Grecia Lagunas

lt

 

                CBC             2022-07-15 05:50:00 Grecia Lagunas

[facility]

 

                DIFFERENTIAL, MANUAL (NO 2022-07-15 05:50:00 Grecia Lagunas

[facility]



                MORPHOLOGY)-[facility]                                 

 

                ECHG GI PROC EGD 2022 15:59:00 Derrek Phoenix [facility]



                DIAGNOSTIC BRUSH WASH                                 

 

                IP CONSULT TO PHYSICAL 2022 08:59:09 Grecia Lagunas [facility]



                THERAPY                                         

 

                HGB/HCT         2022 06:06:00 Shawna [facility]

alth

 

                MAGNESIUM       2022 03:08:00 AdwoaKevin [facility]

 

                COMPREHENSIVE METABOLIC 2022 03:08:00 Adwoa Kevin   [facility]



                PANEL                                           

 

                PT/INR/PTT      2022 03:08:00 Adwoa Kevin Diaz [facility]

 

                CBC/DIFF        2022 03:08:00 Grecia Lagunas [facility]

lth

 

                CBC             2022 03:08:00 Grecia Lagunas [facility]

lt

 

                DIFFERENTIAL, MANUAL-WAM 2022 03:08:00 Grecia Lagunas [facility]

 

                PT/INR/PTT      2022 17:47:00 Marguerite Thapa [facility]

 

                GAMMA GLUTAMYL TRANSFERASE 2022 17:47:00 Alanis [facility]



                (GGT)                                           

 

                CELIAC DISEASE  2022 17:47:00 Marguerite Thapa [facility]

 

                CERULOPLASMIN   2022 17:47:00 Alanis [facility]

 

                ALPHA-1-ANTITRYPSIN 2022 17:47:00 Alanis [facility]

 

                MAI             2022 17:47:00 Alanis [facility]

lt

 

                MITOCHONDRIAL (M2) 2022 17:47:00 Alanis [facility]



                ANTIBODY                                        

 

                LIVER KIDNEY MICR 2022 17:47:00 Marguerite Thapa [facility]

ealth

 

                IGM             2022 17:47:00 Marguerite Thapa [facility]

lt

 

                ABD PARACENTESIS W/IMAGING 2022 14:13:43 Tanisha Sr    [facility]



                                                Mohammad        

 

                ALBUMIN, FLD    2022 13:47:00 Grecia Lagunas [facility]

lt

 

                T PROTEIN, FLD  2022 13:47:00 Grecia Lagunas [facility]

 

                CELL COUNT, FLUID 2022 13:47:00 [facility]as 

[facility]

 

                GLUCOSE, FLD    2022 13:47:00 Grecia Lagunas [facility]

 

                CELL COUNT, BODY FLUID 2022 13:47:00 Grecia Lagunas [facility]

 

                DIFFERENTIAL, CELL COUNT 2022 13:47:00 Grecia Lagunas

[facility]

 

                FLUID CULTURE AND GRAM 2022 13:47:00 Grecia Lagunas [facility]



                STAIN                                           

 

                TRANSFUSE PLATELETS 2022 10:20:00 Grecia Lagunas [facility]



                (NURSING)                                       

 

                SARS-COV-2, FLU A/B, RSV 2022 08:51:00 [facility]

 

                CORONAVIRUS, COVID-19, WILLIAM 2022 08:51:00 Derrek Phoenix

 [facility]

 

                MAGNESIUM       2022 04:30:00 Kevin Orona   [facility]

 

                COMPREHENSIVE METABOLIC 2022 04:30:00 Kevin Orona   [facility]



                PANEL                                           

 

                PT/INR/PTT      2022 04:30:00 [facility]

 

                CBC/DIFF        2022 04:30:00 Grecia Lagunas [facility]

 

                TYPE AND SCREEN 2022 04:30:00 Derrek Phoenix [facility]

ealt

 

                CBC             2022 04:30:00 Grecia Lagunas [facility]

 

                T&S - COLLECTION 2022 04:30:00 Derrek Phoenix [facility]

 

                PHOSPHORUS      2022 04:30:00 Grecia Lagunas [facility]

 

                RBC MORPHOLOGY-WAM 2022 04:30:00 Grecia Lagunas [facility]

 

                PREPARE PLATELETS (LAB) 2022 04:30:00 Grecia Lagunas [facility]

 

                PREPARE CROSSMATCH RBC 2022 04:30:00 Grecia Lagunas [facility]



                UNITS                                           

 

                HEPATITIS A VIRUS AB IGG 2022 13:40:00 Grecia Lagunas H

[facility]

 

                MAGNESIUM       2022 03:44:00 [facility]

 

                COMPREHENSIVE METABOLIC 2022 03:44:00 AdwoaKevin   [facility]



                PANEL                                           

 

                PT/INR/PTT      2022 03:44:00 Adwoa [facility]

h

 

                CBC/DIFF        2022 03:44:00 Grecia Lagunas [facility]

lt

 

                CBC             2022 03:44:00 Grecia Lagunas [facility]

 

                DIFFERENTIAL, MANUAL-WAM 2022 03:44:00 Grecia Lagunas LINDA [facility]

 

                XRAY ABDOMEN 1 VIEW 2022 19:34:18 Shayna Tabares [facility]

[facility]

 

                BLOOD CULTURE   2022 14:02:00 [facility]

th

 

                CT HEAD W/O CONTRAST 2022 11:29:00 Anna Vásquez [facility]

 

                XRAY CHEST 1 VIEW 2022 10:12:00 Grecia Lagunas [facility]

 

                URINALYSIS W/REFLEX TO 2022 10:05:00 Grecia Lagunas [facility]



                URINE CULTURE                                   

 

                URINALYSIS      2022 10:05:00 Grecia Lagunas [facility]

 

                URINE CULTURE COLLECTION 2022 10:05:00 Grecia Lagunas LINDA [facility]



                KIT                                             

 

                HGB/HCT         2022 09:39:00 Grecia Lagunas [facility]

 

                LACTIC ACID     2022 09:37:00 Grecia Lagunas [facility]

 

                12 LEAD EKG     2022 09:29:59 Grecia Lagunas [facility]

 

                BLOOD GAS, ARTERIAL 2022 09:29:00 Bebo [facility]

 

                BLOOD GAS LACTIC ACID, 2022 09:29:00 [facility]ila [facility]



                VENOUS                                          

 

                GLUCOSE POC     2022 09:14:00 Marcy Smith [facility]

th

 

                MAGNESIUM       2022 04:47:00 AdowaKevin [facility]

 

                COMPREHENSIVE METABOLIC 2022 04:47:00 Kevin Orona

is Health



                PANEL                                           

 

                PT/INR/PTT      2022 04:47:00 AdwoaKevin chang

h

 

                FIBRINOGEN      2022 04:47:00 Grecia Lagunas

lt

 

                CBC/DIFF        2022 04:47:00 Grecia Lagunas [facility]

lth

 

                CBC             2022 04:47:00 Grecia Lagunas

lt

 

                INFUSION PUMP   2022-07-10 20:48:21 May, Marcy Diaz [facility]

 

                CONSULT CLINICAL CASE 2022-07-10 18:36:47 Anna Vásquez

s Health



                MANAGEMENT (RN/SW)                                 

 

                SEQUENTIAL COMPRESSION 2022-07-10 18:22:34 May, Marcy Abel

is Health



                PUMP                                            

 

                SEQUENTIAL COMPRESSION 2022-07-10 18:22:34 May, Marcy Abel

is Health



                PUMP                                            

 

                HGB/HCT         2022-07-10 15:32:00 Grecia Lagunas [facility]

 

                TRANSFUSE (RED CELL UNITS 2022-07-10 11:35:00 Grecia Lagunas [facility]



                - NURSING)                                      

 

                MAGNESIUM       2022-07-10 04:02:00 Kevin Orona



 

                COMPREHENSIVE METABOLIC 2022-07-10 04:02:00 Kevin Orona

is Health



                PANEL                                           

 

                PT/INR/PTT      2022-07-10 04:02:00 [facility]



 

                AFP, TUMOR MARKER 2022-07-10 04:02:00 Grecia Lagunas 

ealth

 

                COPPER, SERUM OR PLASMA 2022-07-10 04:02:00 Grecia Lagunas

[facility]

 

                RETIC COUNT     2022-07-10 [facility]:02:00 [facility]

 

                HAPTOGLOBIN     2022-07-10 04:02:00 Grecia Lagunas [facility]

 

                LACTATE DEHYDROGENASE 2022-07-10 04:02:00 Grecia Lagunas

is Health



                (LDH)                                           

 

                AMMONIA         2022-07-10 04:02:00 Grecia Lagunas On[facility]

lth

 

                CEA             2022-07-10 04:02:00 Grecia Lagunas [facility]

lt

 

                HGB/HCT         2022 22:57:00 MuellerSheba blankenshipissa YOVANA Diaz [facility]

 

                DUPLEX DOPPLER ABD/PEL 2022 21:00:00 Grecia Lagunas [facility]



                VASCULAR STUDY, COMPLETE                                 

 

                U/S ABDOMEN     2022 20:35:00 Grecia Lagunas [facility]

lt

 

                CT CHEST W CONTRAST 2022 20:05:18 Grecia Lagunas [facility]

 

                CT ABDOMEN AND PELVIS 2022 20:05:18 Grecia Lagunas

is Health



                CONTRAST                                        

 

                HGB/HCT         2022 15:26:00 Grecia Lagunas [facility]

lt

 

                HGB/HCT         2022 08:39:00 Kevin Orona

 

                CBC/DIFF        2022 05:37:00 Kevin Orona

 

                COMPREHENSIVE METABOLIC 2022 05:37:00 Kevin Orona

is Health



                PANEL                                           

 

                CBC             2022 05:37:00 Kevin Orona

 

                SAVE SMEAR/ NOT FOR PATH 2022 05:37:00 Kevin Orona   [facility]



                REVIEW                                          

 

                DIFFERENTIAL, MANUAL-WAM 2022 05:37:00 Kevin Orona Health

 

                MAGNESIUM       2022 04:40:00 Kevin Orona

 

                PT/INR/PTT      2022 04:40:00 Kevin Orona

 

                FOLIC ACID      2022 04:40:00 Kevin Orona

 

                IRON PROFILE    2022 04:40:00 Kevin Orona

 

                HIV AG/AB COMBO ROUTINE 2022 04:40:00 Kevin Orona

is Health



                SCREENING                                       

 

                HEPATITIS PANEL 2022 04:40:00 Kevin Orona

 

                TRANSFUSE (RED CELL UNITS 2022 02:45:00 João Bernal [facility]



                - NURSING)                                      

 

                URINALYSIS      2022 01:38:00 Kevin Orona

 

                SODIUM, URINE, RANDOM 2022 01:38:00 Ulises Oronash   Diaz

 [facility]

 

                URINALYSIS      2022 01:38:00 Kevin Orona



 

                TRANSFUSE (RED CELL UNITS 2022 22:30:00 João Bernal [facility]



                - NURSING)                                      

 

                SEQUENTIAL COMPRESSION 2022 21:47:40 Adwoa [facility]



                PUMP                                            

 

                SEQUENTIAL COMPRESSION 2022 21:47:40 Adwoa [facility]



                PUMP                                            

 

                SARS-COV-2, FLU A/B, RSV 2022 20:45:00 João Bernal [facility]

 

                CORONAVIRUS, COVID-19, WILLIAM 2022 20:45:00 João Bernal

[facility]

 

                TRANSFUSE (RED CELL UNITS 2022 17:30:00 João Bernal

[facility]



                - NURSING)                                      

 

                12 LEAD EKG     2022 17:14:43 João Bernal

 

                TROPONIN I      2022 17:08:00 João Bernal [facility]

 

                THYROID STIMULATING 2022 17:08:00 Kevin Orona

ealt



                HORMONE (TSH)                                   

 

                VITAMIN B12     2022 17:08:00 Kevin Orona

 

                FERRITIN        2022 17:08:00 AdwoaKevin

 

                TYPE AND SCREEN 2022 15:47:00 Harry Jang



 

                T&S - COLLECTION 2022 15:47:00 Harry Jang

lth

 

                ABO/RH CONFIRMATION 2022 15:47:00 Harry Jang 

[facility]

 

                PREPARE CROSSMATCH RBC 2022 15:47:00 Grecia Lagunas [facility]



                UNITS                                           

 

                XRAY CHEST 2 VIEWS 2022 11:31:00 Victoria Capone He

alth

 

                CBC/DIFF        2022 11:00:00 Victoria Capone [facility]

 

                BASIC METABOLIC PANEL 2022 11:00:00 Victoria Capone

 [facility]

 

                LIVER PROFILE   2022 11:00:00 [facility]



 

                PT/INR/PTT      2022 11:00:00 Victoria Capone [facility]

 

                B-TYPE NATRIURETIC PEPTIDE 2022 11:00:00 Victoria Capone

[facility]



                (BNP)                                           

 

                CBC             2022 11:00:00 Victoria Capone [facility]

 

                DIFFERENTIAL, MANUAL-WAM 2022 11:00:00 Victoria Capone  [facility]

 

                SYPHILIS SCREEN FOR 2022 11:00:00 Kevin Orona 

ealt



                INFECTION                                       

 

                BASIC METABOLIC PANEL (NA, 2021 10:29:00 Jorge Luis Sutton

[facility]



                K, CL, CO2, GLUCOSE, BUN,                                 Medica

l Branch



                CREATININE, CA)                                 

 

                CBC WITH DIFF   2021 10:29:00 Lesley [facility]

 

                ALBUMIN BODY FLUID 2021 06:00:00 Dwight Gan [facility]

 

                T.PROTEIN BODY FLUID 2021 06:00:00 Dwight Gan [facility]

 

                BODY FLUID DIRECT COUNT 2021 06:00:00 Jorge Luis Sutton [facility]

 

                BODY FLUID (BACTEC BOTTLE) 2021 06:00:00 Robert Snell

[facility]

 

                COVID-19 (ID NOW RAPID 2021 20:46:00 Lesley [facility]



                TESTING)                                        Medical Branch

 

                LAB ONLY COVID  2021 20:46:00 Lesley [facility]



                INTERPRETATION                                  [facility]

 

                HEPATIC FUNCTION PANEL 2021 16:17:00 Jean Latham   [facility]



                ([id number]) (ALB,T.PRO,BILI                                 Medical 

Branch



                T,BU/BC,ALT,AST,ALK PHOS)                                 

 

                BASIC METABOLIC PANEL (NA, 2021 16:17:00 Jean Latham   [facility]



                K, CL, CO2, GLUCOSE, BUN,                                 Medica

l Branch



                CREATININE, CA)                                 

 

                CBC WITH DIFF   2021 16:17:00 Jean Latham   [facility]

 

                PROTHROMBIN TIME / INR 2021 16:17:00 Jean Latham   [facility]

 

                CT ABDOMEN PELVIS W 2021 16:09:21 Jean Latham   [facility]

 

                CONSENT/REFUSAL FOR 2021 15:22:40 Doctor Unassigned, [facility]



                DIAGNOSIS AND TREATMENT                 [place]         [facility]

 

                PREPARE PACKED RBC 2021 23:59:02 [facility]

 

                PREPARE PACKED RBC 2021 23:59:02 Nani [facility]

 

                COMP. METABOLIC PANEL 2021 14:45:00 Moe Marcy [facility]



                ([id number])                                         [facility]

 

                CBC WITH DIFF   2021 14:45:00 Moe [facility]

 

                COMP. METABOLIC PANEL 2021 14:45:00 Moe [facility]



                ([id number])                                         [facility]

 

                CBC WITH DIFF   2021 14:45:00 Marcy Rosa [facility]

 

                IR              2021 18:05:00 Amy Caldera [facility]



                PARACENTESIS/PERITONECENTE                                 Medic

[facility]



                SIS WITH IMAGING                                 

 

                IR              2021 18:05:00 Amy Caldera [facility]



                PARACENTESIS/PERITONECENTE                                 Medic

[facility]



                SIS WITH IMAGING                                 

 

                T.PROTEIN BODY FLUID 2021 18:01:00 Daisy Friend [facility]

 

                T.PROTEIN BODY FLUID 2021 18:01:00 Daisy Friend [facility]

 

                ALBUMIN BODY FLUID 2021 18:00:00 Amy Caldera [facility]

 

                BODY FLUID      2021 18:00:00 Amy Caldera [facility]



                CULTURE(AEROBIC/ANAEROBIC)                                 Medic

al Branch

 

                CYTO ABDOMINAL FLUID 2021 18:00:00 Amy Caldera [facility]

 

                BODY FLUID DIRECT COUNT 2021 18:00:00 Amy Caldera

[facility]

 

                BODY FLUID MANUAL DIFF 2021 18:00:00 Amy Caldera

[facility]

 

                BODY FLUID      2021 18:00:00 Amy Caldera [facility]



                CULTURE(AEROBIC/ANAEROBIC)                                 Medic

al Branch

 

                ALBUMIN BODY FLUID 2021 18:00:00 Amy Caldera [facility]

 

                CYTO ABDOMINAL FLUID 2021 18:00:00 Amy Caldera [facility]

 

                PHOSPHORUS      2021 06:16:00 Clinton [facility]

 

                MAGNESIUM       2021 06:16:00 Clinton [facility]

 

                IONIZED CALCIUM 2021 06:16:00 Clinton [facility]

 

                CBC WITH DIFF   2021 06:16:00 [facility] Calder[facility] [facility]

 

                IONIZED CALCIUM 2021 06:16:00 [facility]

 

                CBC WITH DIFF   2021 06:16:00 Amy Caldera [facility]

 

                MAGNESIUM       2021 06:16:00 [facility]

 

                PHOSPHORUS      2021 06:16:00 [facility]

 

                PREPARE PACKED RBC 2021 02:14:47 Amy Caldera [facility]

 

                PREPARE PACKED RBC 2021 02:14:47 Amy Caldera [facility]

 

                US ABDOMEN LIMITED WITH 2021 00:42:57 Abu Atherah, Emran U

[facility]

 

                US ABDOMEN LIMITED WITH 2021 00:42:57 Amy Caldera U

[facility]

 

                CERULOPLASMIN   2021 23:24:00 Marcy Rosa [facility]

 

                ALPHA 1 ANTITRYPSIN 2021 23:24:00 Moe [facility]

 

                IMMUNOGLOBULIN G 2021 23:24:00 Moe [facility]

 

                BASIC METABOLIC PANEL (NA, 2021 23:24:00 Jd Caldera

n [facility]



                K, CL, CO2, GLUCOSE, BUN,                                 Medica

l Branch



                CREATININE, CA)                                 

 

                ALPHA FETOPROTEIN 2021 23:24:00 Moe [facility]

 

                ANTI-NUCLEAR ANTIBODY 2021 23:24:00 Marcy Rosa [facility]



                SCREEN                                          [facility]

 

                HEPATITIS B SURFACE 2021 23:24:00 Marcy Rosa [facility]



                ANTIBODY                                        [facility]

 

                HEPATITIS B SURFACE 2021 23:24:00 Marcy Rosa [facility]



                ANTIGEN                                         [facility]

 

                HCV ANTIBODY    2021 23:24:00 Marcy Rosa [facility]

 

                HBC ANTIBODY (IGM & IGG) 2021 23:24:00 Marcy Rosa [facility]

 

                HAV ANTIBODY (IGG AND IGM) 2021 23:24:00 Marcy Rosa

[facility]

 

                ALPHA 1 ANTITRYPSIN 2021 23:24:00 Marcy Rosa [facility]

 

                ALPHA [facility]OTEIN [facility]24[facility]00 [facility]

 

                CERULOPLASMIN   2021 23:24:00 Marcy Rosa [facility]

 

                HCV ANTIBODY    2021 23:24:00 Marcy Rosa [facility]

 

                IMMUNOGLOBULIN G 2021 23:24:00 Moe [facility]

 

                SMOOTH MUSCLE AB,IGG 2021 23:24:00 Marcy Rosa [facility]



                W/REFLEX                                        [facility]

 

                ANTI-NUCLEAR ANTIBODY 2021 23:24:00 Marcy Rosa

[facility]



                SCREEN                                          [facility]

 

                HAV ANTIBODY (IGG AND IGM) 2021 23:24:00 Marcy Rosa U

[facility]

 

                HEPATITIS B SURFACE 2021 23:24:00 Marcy Rosa [facility]



                ANTIGEN                                         [facility]

 

                HEPATITIS B SURFACE 2021 23:24:00 Marcy Rosa [facility]



                ANTIBODY                                        [facility]

 

                HBC ANTIBODY (IGM & IGG) 2021 23:24:00 Marcy Rosa

[facility]

 

                BASIC METABOLIC PANEL (NA, 2021 23:24:00 Abu Janet, Jd

n [facility]



                K, CL, CO2, GLUCOSE, BUN,                                 Medica

l Branch



                CREATININE, CA)                                 

 

                HB ABO GROUPING 2021 23:22:00 Abu Brian [facility]

 

                HB ABO GROUPING 2021 23:22:00 Abu Brian, [facility]

 

                CBC WITH DIFF   2021 22:37:00 Abu [facility], [facility]

 

                PROTHROMBIN TIME / INR 2021 22:37:00 Marcy Rosa

[facility]

 

                CBC WITH DIFF   2021 22:37:00 [facility]

 

                PROTHROMBIN TIME / INR 2021 22:37:00 Marcy Rosa

[facility]

 

                ABORH CONFIRMATION 2021 17:39:00 Maddie Cardoza

[facility]

 

                ABORH CONFIRMATION 2021 17:39:00 Maddie Cardoza

[facility]

 

                HB ABO GROUPING 2021 17:03:00 Maddie Cardoza [facility]

 

                HB ABO GROUPING 2021 17:03:00 Maddie Cardoza [facility]

 

                URINALYSIS      2021 16:46:00 Maddie Cardoza [facility]

 

                URINALYSIS      2021 16:46:00 Maddie Cardoza [facility]

 

                CT ABDOMEN PELVIS W 2021 16:12:42 Maddie Cardoza [facility]

 

                CT ABDOMEN PELVIS W 2021 16:12:42 Maddie Cardoza [facility]

 

                XR CHEST 1 VW   2021 15:24:06 Maddie Cardoza [facility]

 

                XR CHEST 1 VW   2021 15:24:06 Maddie Cardoza [facility]

 

                HB ECG ROUTINE & RHYTHM 2021 15:15:37 Maddie Cardoza 

[facility]

 

                HB ECG ROUTINE & RHYTHM 2021 15:15:37 Maddie Cardoza 

[facility]

 

                LIPASE          2021 15:13:00 Maddie Cardoza [facility]

 

                FERRITIN SERUM  2021 15:13:00 Marcy Rosa [facility]

 

                TROPONIN I      2021 15:13:00 Maddie Cardoza [facility]

 

                COMP. METABOLIC PANEL 2021 15:13:00 Maddie Cardoza [facility]



                ([id number])                                         [facility]

 

                CBC WITH DIFF   2021 15:13:00 Maddie Cardoza [facility]

 

                N-TERMINAL PRO-BNP 2021 15:13:00 Maddie Cardoza [facility]

 

                COVID-19 (ID NOW RAPID 2021 15:13:00 Maddie Cardoza U

[facility]



                TESTING)                                        [facility]

 

                COVID-19 (ID NOW RAPID 2021 15:13:00 Maddie Cardoza U

[facility]



                TESTING)                                        Medical Branch

 

                CBC WITH DIFF   2021 15:13:00 Maddie Cardoza [facility]

 

                COMP. METABOLIC PANEL 2021 15:13:00 Maddie Cardoza Un

[facility]



                ([id number])                                         Medical Branch

 

                TROPONIN I      2021 15:13:00 [facility]



                                                                Medical Branch

 

                LIPASE          2021 15:13:00 Maddie Cardoza [facility]

 

                N-TERMINAL PRO-BNP 2021 15:13:00 Maddie Cardoza [facility]

 

                FERRITIN SERUM  2021 15:13:00 Moe Marcy [facility]

 

                CONSENT/REFUSAL FOR 2021 14:32:16 Doctor Unassigned, [facility]



                DIAGNOSIS AND TREATMENT                 [place]         [facility] 

Branch

 

                NOTICE OF PRIVACY 2021 14:31:32 Doctor Unassigned, [facility]



                PRACTICES                       [place]         [facility]

 

                EMERGENCY DEPARTMENT 2021 05:01:00 Doctor Unassigned, [facility]



                DOCUMENTS                       [place]         [facility]

 

                AGREEMENTS AUTHORIZATIONS 2021 05:01:00 Doctor Unassigned,

 [facility]



                AND IRREVOCABLE                 [place]         [facility]



                ASSIGNMENTS (FORM 2001)                                 

 

                AGREEMENTS AUTHORIZATIONS 2021 05:01:00 Doctor Unassigned,

 [facility]



                AND IRREVOCABLE                 [place]         [facility]



                ASSIGNMENTS (FORM 2001)                                 

 

                DISCLOSURE AND CONSENT, 2021 05:01:00 Doctor Unassigned, [facility]



                MEDICAL AND SURGICAL                 No Name         Medical Bra

[facility]



                PROCEDURES                                      

 

                EMERGENCY DEPARTMENT 2021 05:01:00 Doctor Unassigned, [facility]



                DOCUMENTS                       [place]         [facility]

 

                HOSPITAL ADMISSION 2021 05:01:00 Doctor Unassigned, [facility]



                                                [place]         [facility]







Plan of Care







             Planned Activity Planned Date Details      Comments     Source

 

             Future Scheduled Test 2023-10-01 00:00:00 IMM Influenza            

  [facility]



                                       Seasonal (>/= 19 yrs)              



                                       [code = IMM Influenza              



                                       Seasonal (>/= 19              



                                       yrs)]                     

 

             Future Scheduled Test 2022-10-01 00:00:00 IMM Influenza            

  [facility]



                                       Seasonal (>/= 19 yrs)              



                                       [code = IMM Influenza              



                                       Seasonal (>/= 19              



                                       yrs)]                     

 

             Future Scheduled Test 2022-10-01 00:00:00 IMM Influenza            

  [facility]



                                       Seasonal (>/= 19 yrs)              



                                       [code = IMM Influenza              



                                       Seasonal (>/= 19              



                                       yrs)]                     

 

             Future Scheduled Test 2022-10-01 00:00:00 IMM Influenza            

  [facility]



                                       Seasonal (>/= 19 yrs)              



                                       [code = IMM Influenza              



                                       Seasonal (>/= 19              



                                       yrs)]                     

 

             Future Scheduled Test 2021 00:00:00 COVID-19 Vaccine (2 -    

          [facility]



                                       Pfizer series) [code              



                                       = COVID-19 Vaccine (2              



                                       - Pfizer series)]              

 

             Future Scheduled Test 2021 00:00:00 COVID-19 Vaccine (2 -    

          [facility]



                                       Pfizer series) [code              



                                       = COVID-19 Vaccine (2              



                                       - Pfizer series)]              

 

             Future Scheduled Test 2021 00:00:00 COVID-19 Vaccine (2 -    

          [facility]



                                       Pfizer series) [code              



                                       = COVID-19 Vaccine (2              



                                       - Pfizer series)]              

 

             Future Scheduled Test 2021 00:00:00 COVID-19 Vaccine (2 -    

          [facility]



                                       Pfizer series) [code              



                                       = COVID-19 Vaccine (2              



                                       - Pfizer series)]              

 

             Future Scheduled Test 2018 00:00:00 Screening for            

  [facility]



                                       malignant neoplasm of              



                                       colon (procedure)              



                                       [code = 939295468]              

 

             Future Scheduled Test 2018 00:00:00 Screening for            

  [facility]



                                       malignant neoplasm of              



                                       colon (procedure)              



                                       [code = 255373365]              

 

             Future Scheduled Test 2018 00:00:00 Screening for            

  [facility]



                                       malignant neoplasm of              



                                       colon (procedure)              



                                       [code = 341855542]              

 

             Future Scheduled Test 2018 00:00:00 Screening for            

  [facility]



                                       malignant neoplasm of              



                                       colon (procedure)              



                                       [code = 564437368]              

 

             Future Scheduled Test 2018 00:00:00 Screening for            

  [facility]



                                       malignant neoplasm of              



                                       colon (procedure)              



                                       [code = 702146647]              

 

             Future Scheduled Test 2018 00:00:00 Screening for            

  [facility]



                                       malignant neoplasm of              



                                       colon (procedure)              



                                       [code = 085078873]              

 

             Future Scheduled Test 2018 00:00:00 Screening for            

  [facility]



                                       malignant neoplasm of              



                                       colon (procedure)              



                                       [code = 239491168]              

 

             Future Scheduled Test 2018 00:00:00 Screening for            

  [facility]



                                       malignant neoplasm of              



                                       colon (procedure)              



                                       [code = 687677373]              

 

             Future Scheduled Test 2018 00:00:00 Screening for            

  [facility]



                                       malignant neoplasm of              



                                       colon (procedure)              



                                       [code = 100873185]              

 

             Future Scheduled Test 2018 00:00:00 Screening for            

  [facility]



                                       malignant neoplasm of              



                                       colon (procedure)              



                                       [code = 615795442]              

 

             Future Scheduled Test 2018 00:00:00 Screening for            

  [facility]



                                       malignant neoplasm of              



                                       colon (procedure)              



                                       [code = 421608405]              

 

             Future Scheduled Test 2018 00:00:00 Screening for            

  [facility]



                                       malignant neoplasm of              



                                       colon (procedure)              



                                       [code = 069645927]              

 

             Future Scheduled Test 2018 00:00:00 Screening for            

  [facility]



                                       malignant neoplasm of              



                                       colon (procedure)              



                                       [code = 246736919]              

 

             Future Scheduled Test 2018 00:00:00 Screening for            

  [facility]



                                       malignant neoplasm of              



                                       colon (procedure)              



                                       [code = 870845792]              

 

             Future Scheduled Test 2018 00:00:00 Screening for            

  [facility]



                                       malignant neoplasm of              



                                       colon (procedure)              



                                       [code = 470060513]              

 

             Future Scheduled Test 2018 00:00:00 Screening for            

  [facility]



                                       malignant neoplasm of              



                                       colon (procedure)              



                                       [code = 661430719]              

 

             Future Scheduled Test 2018 00:00:00 Screening for            

  [facility]



                                       malignant neoplasm of              



                                       colon (procedure)              



                                       [code = 633552557]              

 

             Future Scheduled Test 2018 00:00:00 Screening for            

  [facility]



                                       malignant neoplasm of              



                                       colon (procedure)              



                                       [code = 507187282]              

 

             Future Scheduled Test 2018 00:00:00 Screening for            

  [facility]



                                       malignant neoplasm of              



                                       colon (procedure)              



                                       [code = 364468465]              

 

             Future Scheduled Test 2018 00:00:00 Screening for            

  [facility]



                                       malignant neoplasm of              



                                       colon (procedure)              



                                       [code = 050564044]              

 

             Future Scheduled Test 1968 00:00:00 Fluoride Varnish         

     [facility]



                                       [code = Fluoride              



                                       Varnish]                  







Encounters







        Start   End     Encounter Admission Attending Care    Care    Encounter 

Source



        Date/Time Date/Time Type    Type    Clinicians Facility Department ID   

   

 

        2023         Inpatient ER      ARIF, SAHAR [facility]    [facility]    [id number]

[id number] [facility]



        07:34:55                                                         

 

        2023         Inpatient ER      ARIF, SAHAR [facility]    [facility]    [id number]

234 [facility]



        13:03:16                                                         

 

        2023         Inpatient ER      RYAN,  [facility]    [facility]    [id number] 

[facility]



        08:59:05                         JOÃO                         

 

        2023         Inpatient ER      ARIF, SAHAR [facility]    [facility]    [id number]

354 [facility]



        00:03:22                                                         

 

        2023         Inpatient ER      ARIF, SAHAR [facility]    [facility]    [id number]

352 [facility]



        00:03:15                                                         

 

        2023         Inpatient ER      ARIF, SAHAR [facility]    [facility]    [id number]

930 [facility]



        04:20:23                                                         

 

        2023         Inpatient ER      ARIF, SAHAR [facility]    [facility]    [id number]

704 [facility]



        17:48:13                                                         

 

        2023         Inpatient ER      ARIF, SAHAR [facility]    [facility]    [id number]

765 [facility]



        11:11:36                                                         

 

        2023         Inpatient ER      MASSUMI, [facility]    [facility]    [id number]

 [facility]



        17:57:50                         [facility]                           

 

        2023         Inpatient ER      MASSUMI, [facility]    [facility]    [id number]

 [facility]



        16:21:35                         [facility]                           

 

        2023         Inpatient ER      MASSUMI, [facility]    [facility]    [id number]

 [facility]



        16:21:21                         [facility]                           

 

        2023         Inpatient ER      ETHEL, [facility]    [facility]    [id number] 

[facility]



        16:21:02                         [facility]                           

 

        2023         Outpatient                 [facility]     [id number]

 [place]



        09:24:20                                                 [id number] [facility]

 

        2023-07-10         Inpatient ER      ZAK SOFIA [facility]    [facility]    [id number]

387 [facility]



        08:39:06                                                         

 

        2023         Inpatient ER      ETHEL, [facility]    [facility]    [id number] 

[facility]



        10:22:36                         [facility]                           

 

        2023         Inpatient ER      RYAN,  [facility]    [facility]    [id number] 

[facility]



        00:42:13                         [place]                         

 

        2023         Inpatient ER      FUKUTA, [facility]    [facility]    [id number] 

[facility]



        06:37:24                         [facility]                          

 

        2023         Inpatient ER      ETHEL, [facility]    [facility]    [id number] 

[facility]



        23:24:03                         [facility]                           

 

        2023         Inpatient ER      RYAN,  [facility]    [facility]    [id number] 

[facility]



        17:56:31                         JOÃO                         

 

        2023         Inpatient ER      RYAN  [facility]    [facility]    [id number] 

[facility]



        10:25:55                         JOÃO                         

 

        2023         Inpatient ER      RYAN  [facility]    [facility]    [id number] 

[facility]



        00:00:00                         JOÃO                         

 

        2023         Outpatient                 [facility]     [id number]

 [place]



        09:11:43                                                 [id number] [facility]

 

        2023         Outpatient                 [facility]     [id number]

 [place]



        14:16:55                                                 [id number] [facility]

 

        2022         Inpatient                 [facility]     [id number] H

[facility]



        15:57:19                                                         Health

 

        2022         Inpatient         ARSH MAGALLON [facility]     [id number]

[id number] [place]



        00:00:00                                                         Health

 

        2022         Inpatient         MAY,    [facility]     [id number] H

[facility]



        13:01:40                         THADDAEUS                         Healt



 

        2022         Inpatient 1       MAY,    [facility]     [id number] H

arris



        15:43:00                         THADDAEUS                         Healt

h

 

        2023 Inpatient ER      [facility]  [id number]

[id number] [facility]



        04:50:00 16:00:00                 JAIRO                            

 

        2023 Outpatient         RYAN  [facility]    [facility]    [id number]

897 [facility]



        00:00:00 00:00:00                 JOÃO                         

 

        2023 Inpatient ER      DILSHAD  [facility]    General Med 2070

[id number] [facility]



        00:14:00 09:54:00                 ALYSA                           

 

        2023 Outpatient         ZAK SOFIA [facility]    [facility]    207

[id number] [facility]



        00:00:00 00:00:00                                                 

 

        2023 Outpatient         ZAK SOFIA SLEDARLENE    [facility]    207

[id number] [facility]



        00:00:00 00:00:00                                                 

 

        2023 Inpatient ER      DESIRAE العراقي    General Med 2068

[id number] [facility]



        07:50:00 15:35:00                 SHERITA                            

 

        2023 Transition         MAK Joyce  1.2.840.114 101

[id number] [facility]



        00:00:00 00:00:00 of Care         Gogo PENALOZAY   350.1.13.10        

 ity [facility]   4.2.7.2.686         [place]

[place]



                                                        [phone]         Medi

tyler



                                                        403             Branch

 

        2023 Inpatient T       REYNA [facility]     [id number]

476 Univers



        16:36:00 18:36:00                 [facility]                           ity [facility]

 

        2023 [facility]         Lucia Pike  1.

2.840.114 

[id number]                               [facility]



        16:36:00 18:36:00 Encounter         Israel Deluna   350.1.13.

10         ity [facility] 4.2.7.2.686     

    [place]



                                                        [phone]         Medi

tyler



                                                        096             Branch

 

        2022-10-13 2022-10-28 OGLA Kamara 1.2.840.114 [id number]

2183 [place]



        00:00:00 22:32:42                 Grecia A GENERAL 350.1.13.43         

[facility] .2.7.2.6869         



                                                        [phone]         

 

        2022-10-04 2022-10-04 [facility]     [id number]

4441 [place]



        00:00:00 00:00:00                 [facility]

 

        2022 OLGA Dolan 1.2.840.114 [id number]

5344 [place]



        00:00:00 22:33:21                 Marguerite D GENERAL 350.1.13.43        

 [facility]



                                                HOSPITAL .2.7.2.6869         



                                                        [phone]         

 

        2022 OLGA Kamara 1.2.840.114 [id number]

4891 [place]



        00:00:00 22:32:21                 Grecia A GENERAL 350.1.13.43         

[facility] .2.7.2.6869         



                                                        [phone]         

 

        2022 OLGA Kamara 1.2.840.114 [id number]

4891 [place]



        00:00:00 22:32:21                 Grecia A GENERAL 350.1.13.43         

[facility] .2.7.2.6869         



                                                        [phone]         

 

        2022 Outpatient                 [facility]     [id number]

94 [place]



        10:28:40 10:33:15                                                 [facility]

 

        2022 Outpatient         [facility]     [id number]

35 [place]



        00:00:00 00:00:00                 SANTIAGO Polk



 

        2022 Outpatient                 [facility]     [id number]

18 [place]



        00:00:00 00:00:00                                                 [facility]

 

        2022 Office          MAY,    [facility]     [id number] 

[place]



        12:48:07 13:35:29 Visit           MARCY Polk



 

        2022 Outpatient                 [facility]     [id number]

18 [place]



        00:00:00 00:00:00                                                 [facility]

 

        2022 Outpatient                 [facility]     [facility]     [id number]

GDA [facility]



        00:00:00 00:00:00                                         [id number] 



                                                                8       

 

        2022 2022-07-15 [facility]     [id number]

 Diaz



        15:43:00 14:31:00 Encounter         MARCY Sykes

[facility]

 

        2022 Anesthesia         [facility]SH MAG[facility]ON [facility]     [id number]

[id number] [place]



        00:00:00 16:26:00 Event                                           Health

 

        2022 Inpatient                 [facility]     [id number]

0 [place]



        18:31:54 19:34:23                                                 Health

 

        2022 Inpatient                 [facility]     [id number]

4 [place]



        13:44:32 13:44:38                                                 Health

 

        2022 Inpatient                 [facility]     [id number]

2 Diaz



        10:59:56 11:29:56                                                 Health

 

        2022 Inpatient         [facility]     [id number]

6 [place]



        10:08:10 10:38:10                 THAIMELDA                         Heal



 

        2022 Inpatient         [facility]     [id number]

3 [place]



        20:00:54 21:22:51                 THAIMELDA                         Heal



 

        2022 Inpatient                 [facility]     [id number]

2 [place]



        20:00:50 21:22:24                                                 [facility]

 

        2022 Inpatient         MAY,    [facility]     [id number]

1 [place]



        18:54:35 20:07:31                 THAIMELDA                         Heal

th

 

        2022 Emergency         MALOOK, [facility]     [id number]

2 [place]



        11:20:42 11:32:12                 [facility]

 

        2021-08-10 2021-08-10 Outpatient         ALI, AFROZE [facility]     [id number]

[id number] [place]



        00:00:00 00:00:00                                                 Health

 

        2021 Emergency         Jean Latham  1.2.840.11

4 [id number] 

Univers



        10:22:00 14:00:00                 Robert Snell   350.1.13.10    

     ity of



                                                [facility] 4.2.7.2.686         Dominik

as



                                                        [phone]         Medi

tyler



                                                        093             [place]

 

        2021 Emergency X               [facility]    ERT     [id number]

22 Univers



        10:20:00 10:20:00                                                 ity of



                                                                        [facility]

 

        2021-06-10 2021-06-10 Transition         Mak Vizcarra  1.2.840.114 [id number]

[id number] Univers



        00:00:00 00:00:00 of Care         Rubina Calzada   350.1.13.10         it

y of



                                                [place]   4.2.7.2.686         [place]

[place]



                                                        [phone]         Medi

tyler



                                                        403             [place]

 

        2021 [facility]         Maddie Cardoza 1.2.840.1 [id number]

[id number] 

[id number]                                Univers



        09:44:00 18:00:00 Encounter         Morgan Calderadarlene 99583.1.1        

         ity of



                                                3.104.2.7                 [place]



                                                .[phone]                 Medica

l



                                                .8                      [place]

 

        2021 Emergency X               [facility]    ERT     [id number]

17 Univers



        09:39:00 09:39:00                                                 ity of



                                                                        [facility]

 

        2021 Travel                  1.2.840.1 1.2.314.519 [id number]

8206 Univers



        00:00:00 00:00:00                         15945.1.1 350.1.13.10         

ity of



                                                3.104.2.7 4.2.7.3.698         [place]

[place]



                                                .[id number]           Medica

l



                                                .8                      [place]

 

        2021 Orders          Doctor  1.2.840.0 [id number]

704 Univers



        00:00:00 00:00:00 Only            Unassigned, 12371.1.1                 

ity of



                                        No Name 3.104.2.7                 [place]



                                                .[phone]                 Medica

l



                                                .8                      Branch







Results







           Test Description Test Time  Test Comments Results    Result Comments 

Source









                    MISCELLANEOUS LAB ORDER 2023-08-10 10:40:53 









                      Test Item  Value      Reference Range Interpretation Comme

nts









             SCAN RESULT (test code = 5959565)                                  

      See scanned report.



POCT-GLUCOSE [id number] 10:48:26





             Test Item    Value        Reference Range Interpretation Comments

 

             POC-GLUCOSE METER 183 mg/dL           H            : TESTED A

T [facility] 6720



             (BEAKER) (test code =                                        [facility],



             1538)                                               [address]:



                                                                 /Techni

melanie ID



                                                                 = [id number] for TE

ELIA,



                                                                 JETHRO



POCT-GLUCOSE [id number] 07:49:09





             Test Item    Value        Reference Range Interpretation Comments

 

             POC-GLUCOSE METER 89 mg/dL                         : TESTED A

T [facility] 6720



             (BEAKER) (test code =                                        [facility],



             1538)                                               [address]:



                                                                 /Techni

melanie ID =



                                                                 [id number] for TEZE

NO, JETHRO



(CELLAVISION MANUAL DIFF)2023 07:15:39





             Test Item    Value        Reference Range Interpretation Comments

 

             NEUTROPHILS - REL 53 %                                   



             (CELLAVISION)(BEAKER) (test code =                                 

       



             2816)                                               

 

             LYMPHOCYTES - REL 25 %                                   



             (CELLAVISION)(BEAKER) (test code =                                 

       



             2817)                                               

 

             MONOCYTES - REL 14 %                                   



             (CELLAVISION)(BEAKER) (test code =                                 

       



             2818)                                               

 

             EOSINOPHILS - REL 6 %                                    



             (CELLAVISION)(BEAKER) (test code =                                 

       



             2819)                                               

 

             BASOPHILS - REL 1 %                                    



             (CELLAVISION)(BEAKER) (test code =                                 

       



             2820)                                               

 

             BANDS - REL (CELLAVISION)(BEAKER) 1 %          0-10                

      



             (test code = 2826)                                        

 

             NEUTROPHILS - ABS 1.27 K/ul    1.78-5.38    L            



             (CELLAVISION)(BEAKER) (test code =                                 

       



             2830)                                               

 

             LYMPHOCYTES - ABS 0.60 K/ul    1.32-3.57    L            



             (CELLAVISION)(BEAKER) (test code =                                 

       



             2831)                                               

 

             MONOCYTES - ABS 0.34 K/uL    0.30-0.82                 



             (CELLAVISION)(BEAKER) (test code =                                 

       



             2832)                                               

 

             EOSINOPHILS - ABS 0.14 K/uL    0.04-0.54                 



             (CELLAVISION)(BEAKER) (test code =                                 

       



             2834)                                               

 

             BASOPHILS - ABS 0.02 K/uL    0.01-0.08                 



             (CELLAVISION)(BEAKER) (test code =                                 

       



             2835)                                               

 

             BANDS - ABS (CELLAVISION)(BEAKER) 0.02 K/uL    0.00-0.80           

      



             (test code = 2840)                                        

 

             TOTAL COUNTED (BEAKER) (test code = 100                            

        



             1351)                                               

 

             PLT MORPHOLOGY (BEAKER) (test code Normal                          

       



             = 486)                                              

 

             SMUDGE CELLS (BEAKER) (test code = Present                         

       



             1371)                                               

 

             ANISOCYTOSIS (BEAKER) (test code = 3+ many                         

       



             961)                                                

 

             MACROCYTES (BEAKER) (test code = 3+ many                           

     



             964)                                                

 

             POIKILOCYTES (BEAKER) (test code = 1+ few                          

       



             966)                                                

 

             OVALOCYTES (BEAKER) (test code = 1+ few                            

     



             477)                                                

 

             ARTIFACT (CELLAVISION)(BEAKER) Present                             

   



             (test code = 3432)                                        

 

             HELMET CELLS (CELLAVISION)(BEAKER) 1+ few                          

       



             (test code = 3434)                                        

 

             PLATELET CONCENTRATION Decreased                              



             (CELLAVISION)(BEAKER) (test code =                                 

       



             3438)                                               



 ID - [id number] ID - Joellen Barragan comments: Slide comments:
CBC W/PLT COUNT &amp; AUTO [id number] 07:15:38





             Test Item    Value        Reference Range Interpretation Comments

 

             WHITE BLOOD CELL COUNT (BEAKER) 2.4 K/ L     3.5-10.5     L        

    



             (test code = 775)                                        

 

             RED BLOOD CELL COUNT (BEAKER) 2.25 M/ L    4.63-6.08    L          

  



             (test code = 761)                                        

 

             HEMOGLOBIN (BEAKER) (test code = 7.8 GM/DL    13.7-17.5    L       

     



             410)                                                

 

             HEMATOCRIT (BEAKER) (test code = 23.9 %       40.1-51.0    L       

     



             411)                                                

 

             MEAN CORPUSCULAR VOLUME (BEAKER) 106 fL       79-92        H       

     



             (test code = 753)                                        

 

             MEAN CORPUSCULAR HEMOGLOBIN 34.7 pg      25.7-32.2    H            



             (BEAKER) (test code = 751)                                        

 

             MEAN CORPUSCULAR HEMOGLOBIN CONC 32.6 GM/DL   32.3-36.5            

     



             (BEAKER) (test code = 752)                                        

 

             RED CELL DISTRIBUTION WIDTH 21.1 %       11.6-14.4    H            



             (BEAKER) (test code = 412)                                        

 

             PLATELET COUNT (BEAKER) (test code 29 K/CU MM   150-450      L     

       



             = 756)                                              

 

             MEAN PLATELET VOLUME (BEAKER) 12.4 fL      9.4-12.4                

  



             (test code = 754)                                        

 

             NUCLEATED RED BLOOD CELLS (BEAKER) 0 /100 WBC   0-0                

       



             (test code = 413)                                        



COMPREHENSIVE METABOLIC [id number] 05:05:27





             Test Item    Value        Reference Range Interpretation Comments

 

             TOTAL PROTEIN 5.6 gm/dL    6.0-8.3      L            



             (BEAKER) (test                                        



             code = 770)                                         

 

             ALBUMIN (BEAKER) 2.6 g/dL     3.5-5.0      L            



             (test code = 1145)                                        

 

             ALKALINE     126 U/L                          



             PHOSPHATASE                                         



             (BEAKER) (test                                        



             code = 346)                                         

 

             BILIRUBIN TOTAL 3.2 mg/dL    0.2-1.2      H            



             (BEAKER) (test                                        



             code = 377)                                         

 

             SODIUM (BEAKER) 134 meq/L    136-145      L            



             (test code = 381)                                        

 

             POTASSIUM (BEAKER) 3.7 meq/L    3.5-5.1                   



             (test code = 379)                                        

 

             CHLORIDE (BEAKER) 102 meq/L                        



             (test code = 382)                                        

 

             CO2 (BEAKER) (test 27 meq/L     22-29                     



             code = 355)                                         

 

             BLOOD UREA   5 mg/dL      7-21         L            



             NITROGEN (BEAKER)                                        



             (test code = 354)                                        

 

             CREATININE   0.67 mg/dL   0.57-1.25                 



             (BEAKER) (test                                        



             code = 358)                                         

 

             GLUCOSE RANDOM 94 mg/dL                         



             (BEAKER) (test                                        



             code = 652)                                         

 

             CALCIUM (BEAKER) 8.0 mg/dL    8.4-10.2     L            



             (test code = 697)                                        

 

             AST (SGOT)   21 U/L       5-34                      



             (BEAKER) (test                                        



             code = 353)                                         

 

             ALT (SGPT)   10 U/L       6-55                      



             (ANDREWAKER) (test                                        



             code = 347)                                         

 

             EGFR (BEAKER) 110                                     Interpretatio

n of eGFR



             (test code = 1092) mL/min/1.73                            values St

age Description



                          sq m                                   Result G1 Shaunna

l or high



                                                                 >=90 G2 Mildly 

decreased



                                                                 60-89 G3a Mildl

y to



                                                                 moderately 45-5

9 G3b



                                                                 Moderately to s

everely



                                                                 30-44 G4 Severl

y decreased



                                                                 15-29 G5 Kidney

 failure



                                                                 <15Reported eGF

R is based



                                                                 on the CKD-EPI 





                                                                 equation that d

oes not use



                                                                 a race



                                                                 coefficientEsti

mated GFR



                                                                 is not as accur

ate as



                                                                 Creatinine Tabitha

cynthia in



                                                                 predicting glom

erular



                                                                 filtration rate

. Estimated



                                                                 GFR is not appl

icable for



                                                                 dialysis patien

ts



 ID - ADMINSpecimen slightly ictericPROTHROMBIN [id number] 
04:38:52





             Test Item    Value        Reference Range Interpretation Comments

 

             PROTIME ([employer]) 29.7 seconds 11.9-14.2    H            



             (test code = 759)                                        

 

             INR ([employer]) (test 2.92         See_Comment                [Automat

ed message]



             code = 370)                                         The system [employer]



                                                                 generated this 

result



                                                                 transmitted ref

erence



                                                                 range: <=5.90. 

The



                                                                 reference range

 was



                                                                 not used to int

erpret



                                                                 this result as



                                                                 normal/abnormal

.



RECOMMENDED COUMADIN/WARFARIN INR THERAPY RANGESSTANDARD DOSE: 2.0 - 3.0 
Includes: PROPHYLAXIS for venous thrombosis, systemic embolization; TREATMENT 
for venous thrombosis and/or pulmonary embolus.HIGH RISK: Target INR is 2.5-3.5 
for patients with mechanical heart valves.POCT-GLUCOSE [id number] 23:58:15





             Test Item    Value        Reference Range Interpretation Comments

 

             POC-GLUCOSE METER 122 mg/dL           H            : TESTED A

T [facility] 6720



             ([employer]) (test code =                                        [facility],



             1538)                                               [address]:



                                                                 /Techni

melanie ID



                                                                 = [id number] for Vi

saurav



                                                                 (contract), Sana

line



POCT-GLUCOSE [id number] 16:05:51





             Test Item    Value        Reference Range Interpretation Comments

 

             POC-GLUCOSE METER 122 mg/dL           H            : TESTED A

T [facility] 6720



             (EPAC [employer]) (test code =                                        [facility],



             1538)                                               [id number]:



                                                                 /Techni

melanie ID



                                                                 = [id number] for ALICE BRANNONA



POCT-GLUCOSE [id number] 11:46:41





             Test Item    Value        Reference Range Interpretation Comments

 

             POC-GLUCOSE METER 115 mg/dL           H            : TESTED A

T [facility] [address]



             (BEAKER) (test code =                                        [facility],



             1538)                                               [address]:



                                                                 /Techni

melanie ID



                                                                 = [id number] for KAREN RODRIGEZ



                                                                 JETHRO



POCT-GLUCOSE [id number] 08:11:04





             Test Item    Value        Reference Range Interpretation Comments

 

             POC-GLUCOSE METER 81 mg/dL                         : TESTED A

T [facility] 6720



             (BEAKER) (test code =                                        [facility],



             1538)                                               [address]:



                                                                 /Techni

melanie ID =



                                                                 [id number] for JETHRO DYE



BLOOD [id number] 08:00:21





             Test Item    Value        Reference Range Interpretation Comments

 

             CULTURE (BEAKER) (test No growth in 5 days                         

  



             code = 1095)                                        



The specimen volume collected for this blood culture was below the optimum (10 
mL per bottle or 20 mL total). Use of lower volumes may adversely affect 
recovery and/or detection times of some organisms.BLOOD [id number] 
08:00:20





             Test Item    Value        Reference Range Interpretation Comments

 

             CULTURE (BEAKER) (test No growth in 5 days                         

  



             code = 1095)                                        



COMPREHENSIVE METABOLIC [id number] 06:16:28





             Test Item    Value        Reference Range Interpretation Comments

 

             TOTAL PROTEIN 5.2 gm/dL    6.0-8.3      L            



             (BEAKER) (test                                        



             code = 770)                                         

 

             ALBUMIN (BEAKER) 2.5 g/dL     3.5-5.0      L            



             (test code = 1145)                                        

 

             ALKALINE     110 U/L                          



             PHOSPHATASE                                         



             (BEAKER) (test                                        



             code = 346)                                         

 

             BILIRUBIN TOTAL 3.2 mg/dL    0.2-1.2      H            



             (BEAKER) (test                                        



             code = 377)                                         

 

             SODIUM (BEAKER) 135 meq/L    136-145      L            



             (test code = 381)                                        

 

             POTASSIUM (BEAKER) 3.6 meq/L    3.5-5.1                   



             (test code = 379)                                        

 

             CHLORIDE (BEAKER) 104 meq/L                        



             (test code = 382)                                        

 

             CO2 (BEAKER) (test 24 meq/L     22-29                     



             code = 355)                                         

 

             BLOOD UREA   5 mg/dL      7-21         L            



             NITROGEN (BEAKER)                                        



             (test code = 354)                                        

 

             CREATININE   0.63 mg/dL   0.57-1.25                 



             (BEAKER) (test                                        



             code = 358)                                         

 

             GLUCOSE RANDOM 77 mg/dL                         



             (BEAKER) (test                                        



             code = 652)                                         

 

             CALCIUM (BEAKER) 7.7 mg/dL    8.4-10.2     L            



             (test code = 697)                                        

 

             AST (SGOT)   19 U/L       5-34                      



             (BEAKER) (test                                        



             code = 353)                                         

 

             ALT (SGPT)   6 U/L        6-55                      



             (BEAKER) (test                                        



             code = 347)                                         

 

             EGFR (BEAKER) 111                                     Interpretatio

n of eGFR



             (test code = 1092) mL/min/1.73                            values St

age Description



                          sq m                                   Result G1 Shaunna

l or high



                                                                 >=90 G2 Mildly 

decreased



                                                                 60-89 G3a Mildl

y to



                                                                 moderately 45-5

9  G3b



                                                                 Moderately to s

everely



                                                                 30-44 G4 Severl

y decreased



                                                                 15-29 G5 Kidney

 failure



                                                                 <15Reported eGF

R is based



                                                                 on the CKD-EPI 





                                                                 equation that d

oes not use



                                                                 a race



                                                                 coefficientEsti

mated GFR



                                                                 is not as accur

ate as



                                                                 Creatinine Tabitha

cynthia in



                                                                 predicting glom

erular



                                                                 filtration rate

. Estimated



                                                                 GFR is not appl

icable for



                                                                 dialysis patien

ts



 ID - ADMINSpecimen slightly ictericPROTHROMBIN TIME/[id number] 
05:51:14





             Test Item    Value        Reference Range Interpretation Comments

 

             PROTIME (KHLOE) (test code = 30.6 seconds 11.9-14.2    H          

  



             759)                                                

 

             INR ([employer] ResearchLEISA) (test code = 370) 3.17         <=5.90                 

   



RECOMMENDED COUMADIN/WARFARIN INR THERAPY RANGESSTANDARD DOSE: 2.0 - 3.0 
Includes: PROPHYLAXIS for venous thrombosis, systemic embolization; TREATMENT 
for venous thrombosis and/or pulmonary embolus.HIGH RISK: Target INR is 2.5-3.5 
for patients with mechanical heart valves.POCT-GLUCOSE [id number] 00:01:33





             Test Item    Value        Reference Range Interpretation Comments

 

             POC-GLUCOSE METER 113 mg/dL           H            : TESTED A

T [facility] [address]



             ([employer]) (test code =                                        [place],



             1538)                                               [address]:



                                                                 /Techni

melanie ID



                                                                 = [id number] for RE

[employer]



PROTHROMBIN [id number] 22:21:34





             Test Item    Value        Reference Range Interpretation Comments

 

             PROTIME (BEAKER) (test code = 33.4 seconds 11.9-14.2    H          

  



             759)                                                

 

             INR (BEAKER) (test code = 370) 3.55         <=5.90                 

   



RECOMMENDED COUMADIN/WARFARIN INR THERAPY RANGESSTANDARD DOSE: 2.0 - 3.0 
Includes: PROPHYLAXIS for venous thrombosis, systemic embolization; TREATMENT 
for venous thrombosis and/or pulmonary embolus.HIGH RISK: Target INR is 2.5-3.5 
for patients with mechanical heart valves.POCT-GLUCOSE [id number] 15:51:12





             Test Item    Value        Reference Range Interpretation Comments

 

             POC-GLUCOSE METER 128 mg/dL           H            : TESTED A

T [facility] 6720



             (BEAKER) (test code =                                        [facility],



             153)                                               [address]:



                                                                 /Techni

melanie ID



                                                                 = [id number] for TE

ELIA,



                                                                 JETHRO



POCT-GLUCOSE [id number] 11:35:12





             Test Item    Value        Reference Range Interpretation Comments

 

             POC-GLUCOSE METER 135 mg/dL           H            : TESTED A

T [facility] 6720



             (BEAKER) (test code =                                        [facility],



             1538)                                               [address]:



                                                                 /Techni

melanie ID



                                                                 = [id number] for TE

ELIA,



                                                                 JETHRO



POCT-GLUCOSE [id number] 08:01:43





             Test Item    Value        Reference Range Interpretation Comments

 

             POC-GLUCOSE METER 78 mg/dL                         : TESTED A

T [facility] 6720



             (BEAKER) (test code =                                        [facility],



             1538)                                               [address]:



                                                                 /Techni

melanie ID =



                                                                 [id number] for TEZE

NO, JETHRO



POCT-GLUCOSE [id number] 05:06:46





             Test Item    Value        Reference Range Interpretation Comments

 

             POC-GLUCOSE METER 77 mg/dL                         : TESTED A

T [facility] 6720



             (BEAKER) (test code =                                        [facility],



             1538)                                               [address]:



                                                                 /Techni

melanie ID =



                                                                 [id number] for ALYSSIA GARCIAZULEMA



COMPREHENSIVE METABOLIC [id number] 03:16:37





             Test Item    Value        Reference Range Interpretation Comments

 

             TOTAL PROTEIN 5.0 gm/dL    6.0-8.3      L            



             (BEAKER) (test                                        



             code = 770)                                         

 

             ALBUMIN (BEAKER) 2.4 g/dL     3.5-5.0      L            



             (test code = 1145)                                        

 

             ALKALINE     113 U/L                          



             PHOSPHATASE                                         



             (BEAKER) (test                                        



             code = 346)                                         

 

             BILIRUBIN TOTAL 3.0 mg/dL    0.2-1.2      H            



             (BEAKER) (test                                        



             code = 377)                                         

 

             SODIUM (BEAKER) 135 meq/L    136-145      L            



             (test code = 381)                                        

 

             POTASSIUM (BEAKER) 3.7 meq/L    3.5-5.1                   



             (test code = 379)                                        

 

             CHLORIDE (BEAKER) 106 meq/L                        



             (test code = 382)                                        

 

             CO2 (BEAKER) (test 24 meq/L     22-29                     



             code = 355)                                         

 

             BLOOD UREA   3 mg/dL      7-21         L            



             NITROGEN (BEAKER)                                        



             (test code = 354)                                        

 

             CREATININE   0.66 mg/dL   0.57-1.25                 



             (BEAKER) (test                                        



             code = 358)                                         

 

             GLUCOSE RANDOM 104 mg/dL                        



             (BEAKER) (test                                        



             code = 652)                                         

 

             CALCIUM (BEAKER) 7.6 mg/dL    8.4-10.2     L            



             (test code = 697)                                        

 

             AST (SGOT)   18 U/L       5-34                      



             (BEAKER) (test                                        



             code = 353)                                         

 

             ALT (SGPT)   7 U/L        6-55                      



             (BEAKER) (test                                        



             code = 347)                                         

 

             EGFR (BEAKER) 110                                     Interpretatio

n of eGFR



             (test code = 1092) mL/min/1.73                            values St

age Description



                          sq m                                   Result G1 Shaunna

l or high



                                                                 >=90  G2 Mildly

 decreased



                                                                 60-89 G3a Mildl

y to



                                                                 moderately 45-5

9 G3b



                                                                 Moderately to s

everely



                                                                 30-44 G4 Severl

y decreased



                                                                 15-29 G5 Kidney

 failure



                                                                 <15Reported eGF

R is based



                                                                 on the CKD-EPI 

2021



                                                                 equation that d

oes not use



                                                                 a race



                                                                 coefficientEsti

mated GFR



                                                                 is not as accur

ate as



                                                                 Creatinine Tabitha marie in



                                                                 predicting glom

erular



                                                                 filtration rate

. Estimated



                                                                 GFR is not appl

icable for



                                                                 dialysis patien

ts



 ID - ADMINSpecimen slightly ictericPROTHROMBIN [id number] 
03:07:12





             Test Item    Value        Reference Range Interpretation Comments

 

             PROTIME (BEAKER) (test code = 32.2 seconds 11.9-14.2    H          

  



             759)                                                

 

             INR (BEAKER) (test code = 370) 3.39         <=5.90                 

   



RECOMMENDED COUMADIN/WARFARIN INR THERAPY RANGESSTANDARD DOSE: 2.0 - 3.0 
Includes: PROPHYLAXIS for venous thrombosis, systemic embolization; TREATMENT 
for venous thrombosis and/or pulmonary embolus.HIGH RISK: Target INR is 2.5-3.5 
for patients with mechanical heart valves.POCT-GLUCOSE [id number] 01:19:31





             Test Item    Value        Reference Range Interpretation Comments

 

             POC-GLUCOSE METER 151 mg/dL           H            : TESTED A

T [facility] 6720



             (BEAKER) (test code =                                        [facility],



             1538)                                               [address]:



                                                                 /Techni

melanie ID



                                                                 = [id number] for ZULEMA FOX



POCT-GLUCOSE [id number] 21:07:04





             Test Item    Value        Reference Range Interpretation Comments

 

             POC-GLUCOSE METER 108 mg/dL                        : TESTED A

T [facility] 6720



             (BEAKER) (test code =                                        [facility]

[employer] [facility],



             1538)                                               [address]:



                                                                 /Techni

melanie ID



                                                                 = [id number] for ZULEMA FOX



COMPREHENSIVE METABOLIC [id number] 05:15:28





             Test Item    Value        Reference Range Interpretation Comments

 

             TOTAL PROTEIN 5.0 gm/dL    6.0-8.3      L            Specimen sligh

tly



             (BEAKER) (test                                        hemolyzed



             code = 770)                                         

 

             ALBUMIN (BEAKER) 2.4 g/dL     3.5-5.0      L            Specimen sl

ightly



             (test code = 1145)                                        hemolyzed

 

             ALKALINE     95 U/L                           



             PHOSPHATASE                                         



             (BEAKER) (test                                        



             code = 346)                                         

 

             BILIRUBIN TOTAL 3.4 mg/dL    0.2-1.2      H            Specimen sli

ghtly



             (BEAKER) (test                                        hemolyzed



             code = 377)                                         

 

             SODIUM (BEAKER) 136 meq/L    136-145                   



             (test code = 381)                                        

 

             POTASSIUM (BEAKER) 3.9 meq/L    3.5-5.1                   Specimen 

slightly



             (test code = 379)                                        hemolyzed

 

             CHLORIDE (BEAKER) 109 meq/L           H            



             (test code = 382)                                        

 

             CO2 (BEAKER) (test 23 meq/L     22-29                     



             code = 355)                                         

 

             BLOOD UREA   4 mg/dL      7-21         L            



             NITROGEN (BEAKER)                                        



             (test code = 354)                                        

 

             CREATININE   0.59 mg/dL   0.57-1.25                 Specimen slight

ly



             (BEAKER) (test                                        hemolyzed



             code = 358)                                         

 

             GLUCOSE RANDOM 79 mg/dL                         



             (BEAKER) (test                                        



             code = 652)                                         

 

             CALCIUM (BEAKER) 7.6 mg/dL    8.4-10.2     L            



             (test code = 697)                                        

 

             AST (SGOT)   27 U/L       5-34                      Specimen slight

ly



             (BEAKER) (test                                        hemolyzed



             code = 353)                                         

 

             ALT (SGPT)   9 U/L        6-55                      Specimen slight

ly



             (BEAKER) (test                                        hemolyzed



             code = 347)                                         

 

             EGFR (BEAKER) 113                                     Interpretatio

n of eGFR



             (test code = 1092) mL/min/1.73                            values St

age Description



                          sq m                                   Result G1 Shaunna

l or high



                                                                 >=90 G2 Mildly 

decreased



                                                                 60-89 G3a Mildl

y to



                                                                 moderately 45-5

9 G3b



                                                                 Moderately to s

everely



                                                                 30-44 G4  Sever

ly



                                                                 decreased 15-29

 G5 Kidney



                                                                 failure <15Repo

rted eGFR



                                                                 is based on the

 CKD-EPI



                                                                  equation t

hat does



                                                                 not use a race



                                                                 coefficientEsti

mated GFR



                                                                 is not as accur

ate as



                                                                 Creatinine Tabitha

cynthia in



                                                                 predicting glom

erular



                                                                 filtration rate

. Estimated



                                                                 GFR is not appl

icable for



                                                                 dialysis patien

ts



 ID - MARCOSpecimen slightly ictericPROTHROMBIN [id number] 
04:32:39





             Test Item    Value        Reference Range Interpretation Comments

 

             PROTIME (BELEISA) (test code = 32.5 seconds 11.9-14.2    H          

  



             759)                                                

 

             INR (BEAKER) (test code = 370) 3.29         <=5.90                 

   



RECOMMENDED COUMADIN/WARFARIN INR THERAPY RANGESSTANDARD DOSE: 2.0 - 3.0 
Includes: PROPHYLAXIS for venous thrombosis, systemic embolization; TREATMENT 
for venous thrombosis and/or pulmonary embolus.HIGH RISK: Target INR is 2.5-3.5 
for patients with mechanical heart valves.POCT-GLUCOSE [id number] 00:08:26





             Test Item    Value        Reference Range Interpretation Comments

 

             POC-GLUCOSE METER 121 mg/dL           H            : TESTED A

T [facility] [address]



             (BELEISA) (test code =                                        [place],



             1538)                                               [address]:



                                                                 /Techni

melanie ID



                                                                 = [id number] for MS

DEV,



                                                                 YONNYI



POCT-GLUCOSE [id number] 20:31:45





             Test Item    Value        Reference Range Interpretation Comments

 

             POC-GLUCOSE METER 119 mg/dL           H            : TESTED A

T [facility] 6720



             (KHLOE) (test code =                                        [place],



             1538)                                               [id number]:



                                                                 /Techni

melanie ID



                                                                 = [id number] for MS

DANA MÉNDEZ



MR BRAIN WITH &amp; WITHOUT IV [id number] 17:14:01
************************************************************[facility] 1970  Sex: 
M************************************************************MRI Brain with and 
without contrastCLINICAL HISTORY: Unlisted Reason for ExamF/u on brain 
abscessTechnique: Multiplanar, multisequence MRI images of the brain 
obtainedwith and without IV contrast.Comparisons: 2023Findings:Interval 
resolution of T2 hyperintense, rim-enhancing rightperiventricular collections 
with minimal residual T2**hypointense rim.Stable left parietal lobe lesion 
compatible witha developmental venousanomaly. Minimal periventricular white 
matter T2 FLAIR hyperintensitiessuggestive of mild chronic microangiopathic 
ischemic changes. No acuteinfarct. No hydrocephalus, acute intracranial 
hemorrhage mass or midlineshift. Orbits and globes are unremarkable. Calvarium 
intact.IMPRESSION:Interval resolution of right periventricular collections seen 
on priorexamination. No acute findings.Left parietal lobe developmental venous 
anomaly. Electronically Signed By: Tito Tafoya2023 17:16 CDTWorkstation
Name: [id number] [id number] 16:06:52





             Test Item    Value        Reference Range Interpretation Comments

 

             POC-GLUCOSE METER 116 mg/dL           H            : TESTED A

T [facility] [address]



             (KHLOE) (test code =                                        [place],



             1538)                                               [address]:



                                                                 /Techni

melanie ID



                                                                 = [id number] for Elizabeth Almanza



POCT-GLUCOSE [id number] 12:11:40





             Test Item    Value        Reference Range Interpretation Comments

 

             POC-GLUCOSE METER 125 mg/dL           H            : TESTED A

T [facility] [address]



             (BEAKER) (test code =                                        [place] [facility],



             1538)                                               [address]:



                                                                 /Techni

melanie ID



                                                                 = [id number] for Elizabeth Almanza



US ABDOMEN [id number] 10:20:13
************************************************************[facility] 1970 Sex: 
M************************************************************History: Ascites. 
PROCEDURE: Limited sonographic examination of the abdomen was performed 
inpreparation for planned ultrasound-guided paracentesis. Limitedsonographic 
examination of the abdomen showed trace perihepatic fluid.Therefore, 
paracentesis was not performed. IMPRESSION: Trace perihepatic ascites, not 
sufficient for diagnostic paracentesis. Electronically Signed By: Daniel Hendrickson2023 10:22 CDTWorkstation Name: [id number] [id number]
08:26:20





             Test Item    Value        Reference Range Interpretation Comments

 

             POC-GLUCOSE METER 104 mg/dL                        : TESTED A

T [facility] [address]



             (BEAKER) (test code =                                        [place] [facility],



             1538)                                               [address]:



                                                                 /Techni

melanie ID



                                                                 = [id number] for Elizabeth Almanza



COMPREHENSIVE METABOLIC [id number] 05:50:10





             Test Item    Value        Reference Range Interpretation Comments

 

             TOTAL PROTEIN 4.9 gm/dL    6.0-8.3      L            Specimen sligh

tly



             (BEAKER) (test                                        hemolyzed



             code = 770)                                         

 

             ALBUMIN (BEAKER) 2.4 g/dL     3.5-5.0      L            Specimen sl

ightly



             (test code = 1145)                                        hemolyzed

 

             ALKALINE     76 U/L                           



             PHOSPHATASE                                         



             (BEAKER) (test                                        



             code = 346)                                         

 

             BILIRUBIN TOTAL 3.7 mg/dL    0.2-1.2      H            Specimen sli

ghtly



             (BEAKER) (test                                        hemolyzed



             code = 377)                                         

 

             SODIUM (BEAKER) 136 meq/L    136-145                   



             (test code = 381)                                        

 

             POTASSIUM (BEAKER) 3.4 meq/L    3.5-5.1      L            Specimen 

slightly



             (test code = 379)                                        hemolyzed

 

             CHLORIDE (BEAKER) 109 meq/L           H            



             (test code = 382)                                        

 

             CO2 (BEAKER) (test 20 meq/L     22-29        L            



             code = 355)                                         

 

             BLOOD UREA   5 mg/dL      7-21         L            



             NITROGEN (BEAKER)                                        



             (test code = 354)                                        

 

             CREATININE   0.59 mg/dL   0.57-1.25                 Specimen slight

ly



             (BEAKER) (test                                        hemolyzed



             code = 358)                                         

 

             GLUCOSE RANDOM 108 mg/dL           H            



             (BEAKER) (test                                        



             code = 652)                                         

 

             CALCIUM (BEAKER) 7.4 mg/dL    8.4-10.2     L            



             (test code = 697)                                        

 

             AST (SGOT)   27 U/L       5-34                      Specimen slight

ly



             (BEAKER) (test                                        hemolyzed



             code = 353)                                         

 

             ALT (SGPT)   9 U/L        6-55                      Specimen slight

ly



             (BEAKER) (test                                        hemolyzed



             code = 347)                                         

 

             EGFR (BEAKER) 113                                     Interpretatio

n of eGFR



             (test code = 1092) mL/min/1.73                            values St

age Description



                          sq m                                   Result G1 Shaunna

l or high



                                                                 >=90 G2 Mildly 

decreased



                                                                 60-89 G3a Mildl

y to



                                                                 moderately 45-5

9 G3b



                                                                 Moderately to s

everely



                                                                 30-44 G4 Severl

y decreased



                                                                 15-29 G5 Kidney

 failure



                                                                 <15Reported eGF

R is based



                                                                 on the CKD-EPI 

2021



                                                                 equation that d

oes not use



                                                                 a race



                                                                 coefficientEsti

mated GFR



                                                                 is not as accur

ate as



                                                                 Creatinine Tabitha

cynthia in



                                                                 predicting glom

erular



                                                                 filtration rate

. Estimated



                                                                 GFR is not appl

icable for



                                                                 dialysis patien

ts



 ID - MARCOSpecimen slightly ictericPROTHROMBIN TIME/[id number] 
05:25:22





             Test Item    Value        Reference Range Interpretation Comments

 

             PROTIME (BEAKER) (test code = 33.0 seconds 11.9-14.2    H          

  



             759)                                                

 

             INR (BEAKER) (test code = 370) 3.49         <=5.90                 

   



RECOMMENDED COUMADIN/WARFARIN INR THERAPY RANGESSTANDARD DOSE: 2.0 - 3.0 
Includes: PROPHYLAXIS for venous thrombosis, systemic embolization; TREATMENT 
for venous thrombosis and/or pulmonary embolus.HIGH RISK: Target INR is 2.5-3.5 
for patients with mechanical heart valves.CBC (HEMOGRAM ONLY)2023 05:18:03





             Test Item    Value        Reference Range Interpretation Comments

 

             WHITE BLOOD CELL COUNT (BEAKER) 3.7 K/ L     3.5-10.5              

    



             (test code = 775)                                        

 

             RED BLOOD CELL COUNT (BEAKER) 2.17 M/ L    4.63-6.08    L          

  



             (test code = 761)                                        

 

             HEMOGLOBIN (BEAKER) (test code = 7.5 GM/DL    13.7-17.5    L       

     



             410)                                                

 

             HEMATOCRIT (BEAKER) (test code = 23.2 %       40.1-51.0    L       

     



             411)                                                

 

             MEAN CORPUSCULAR VOLUME (BEAKER) 107 fL       79-92        H       

     



             (test code = 753)                                        

 

             MEAN CORPUSCULAR HEMOGLOBIN 34.6 pg      25.7-32.2    H            



             (BEAKER) (test code = 751)                                        

 

             MEAN CORPUSCULAR HEMOGLOBIN CONC 32.3 GM/DL   32.3-36.5            

     



             (BEAKER) (test code = 752)                                        

 

             RED CELL DISTRIBUTION WIDTH 22.0 %       11.6-14.4    H            



             (BEAKER) (test code = 412)                                        

 

             PLATELET COUNT (BEAKER) (test code 26 K/CU MM   150-450      L     

       



             = 756)                                              

 

             NUCLEATED RED BLOOD CELLS (BEAKER) 0 /100 WBC   0-0                

       



             (test code = 413)                                        



POCT-GLUCOSE [id number] 05:16:59





             Test Item    Value        Reference Range Interpretation Comments

 

             POC-GLUCOSE METER 113 mg/dL           H            : TESTED LINDA QURESHI [facility] [address]



             (BEAKER) (test code =                                        [place],



             1538)                                               [address]:



                                                                 /Techni

melanie ID



                                                                 = [id number] for Ar

enas,



                                                                 Sinan



CT ABDOMEN/PELVIS WITH IV [id number] 04:49:49
************************************************************[facility] : 1970 Sex: 
M************************************************************CLINICAL HISTORY: 
Unlisted Reason for ExamFever. Look for source of infectionFINDINGS:Multiple 
axial images of the chest, abdomen and pelvis were performedafter the 
uncomplicated administration of IV contrast. Oral contrastwas not given.This 
exam was performed according to our departmental dose-optimizationprogram, which
includes automated exposure control, adjustment of the mAand/or kV according to 
patient size and/or use of the iterativereconstruction technique.Comparison: 
2023.Chest:Lung parenchyma: Bilateral dependent atelectasis. The lungs 
areotherwise clear. The previously seen lingular nodule has resolved andmay have
reflected a region of atelectasis.Pleural effusion: Trace bilateral pleural 
effusions.Pneumothorax: None.Tracheobronchial tree: No significant 
findings.Pulmonary vasculature: Central pulmonary vascular engorgement.Cardiac 
contours and great vessels: Cardiomegaly.Mediastinum: No significant 
findings.Lymph Nodes: No adenopathy in the mediastinum or yajaira.Skeleton: 
Redemonstrated nonacute bilateral rib fracture deformities.Fracture of the 
medial left clavicle.Abdomen and pelvis:Liver: Cirrhotic hepatic 
morphology.Gallbladder and biliary tree: No significant findings.Spleen: 
Splenomegaly, measuring 16.8 cm in maximum axial dimension.Adrenal Glands: No 
significant findings.Kidneys and ureters: No significant findings.Stomach and 
Duodenum: No significant findings.Pancreas: No significant findings.Bowel: 
Circumferential mural thickening of the sigmoid colon and rectumwith prominent 
mucosal enhancement and adjacent fat stranding. No bowelobstruction or 
pneumatosis intestinalis.Appendix: Normal.Bladder: No significant findings.Major
vascular structures: Prominent portosystemic collaterals,including 
paraesophageal varices.Reproductive organs: No significant findings.Other: Small
volume ascites. Diffuse subcutaneous edema. No freeintraperitoneal air. No 
intra-abdominal abscess.Skeleton: No acute bony abnormality.IMPRESSION:Sigmoid 
colitis/proctitis. Infectious and inflammatory causes can beconsidered. No bowel
obstruction or perforation. No intra-abdominalabscess.Cirrhosis and evidence of 
portal hypertension, including splenomegaly,ascites and portosystemic 
collaterals. 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 
and adjacent atelectasis.Cardiomegaly.Redemonstrated recent rib and left 
clavicular fractures.Electronically Signed By: Freddy Nieto2023 04:52 
CDTWorkstation Name: [id number] CHEST WITH IV [id number] 04:49:49
************************************************************[facility]  : 1970 Sex: 
M************************************************************CLINICAL HISTORY: 
Unlisted Reason for ExamFever. Look for source of infectionFINDINGS:Multiple 
axialimages of the chest, abdomen and pelvis were performedafter the 
uncomplicated administration of IV contrast. Oral contrastwas not given.This 
exam was performed according to our departmental dose-optimizationprogram, which
includes automated exposure control, adjustment of the mAand/or kV according to 
patient size and/or use of the iterativereconstruction technique.Comparison: 
2023.Chest:Lung parenchyma: Bilateral dependent atelectasis. The lungs 
areotherwise clear. The previously seen lingular nodule has resolved andmay have
reflected a region of atelectasis.Pleural effusion: Trace bilateral pleural 
effusions.Pneumothorax: None.Tracheobronchial tree: No significant 
findings.Pulmonary vasculature: Central pulmonary vascular engorgement.Cardiac 
contours and great vessels: Cardiomegaly.Mediastinum: No significant 
findings.Lymph Nodes: No adenopathy in the mediastinum or yajaira.Skeleton: 
Redemonstrated nonacute bilateral rib fracture deformities.Fracture of the 
medial left clavicle.Abdomen and pelvis:Liver: Cirrhotic hepatic 
morphology.Gallbladder and biliary tree: No significant findings.Spleen: 
Splenomegaly, measuring 16.8 cm in maximum axial dimension.Adrenal Glands: No 
significant findings.Kidneys and ureters: No significant findings.Stomach and 
Duodenum: No significant findings.Pancreas:No significant findings.Bowel: 
Circumferential mural thickening of the sigmoid colon and rectumwith prominent 
mucosal enhancement and adjacent fat stranding. No bowelobstruction or 
pneumatosis intestinalis.Appendix: Normal.Bladder: No significant findings.Major
vascular structures: Prominent portosystemic collaterals,including 
paraesophageal varices.Reproductive organs: No significant findings.Other:Small 
volume ascites. Diffuse subcutaneous edema. No freeintraperitoneal air. No 
intra-abdominal abscess.Skeleton: No acute bony abnormality.IMPRESSION:Sigmoid 
colitis/proctitis. Infectious and inflammatory causes can beconsidered. No bowel
obstruction or perforation. No intra-abdominalabscess.Cirrhosis and evidence of 
portal hypertension, including splenomegaly,ascites and portosystemic 
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 
and adjacent atelectasis.Cardiomegaly.Redemonstrated recent rib and left 
clavicular fractures.Electronically Signed By: Freddy Nieto2023 04:52 
CDTWorkstation Name: [id number] [id number] 00:08:13





             Test Item    Value        Reference Range Interpretation Comments

 

             POC-GLUCOSE METER 131 mg/dL           H            : TESTED A

[facility]



             ([employer]) (test code =                                        [facility],



             1538)                                               [address]:



                                                                 /Techni

melanie ID



                                                                 = [id number] for Ar

enas,



                                                                 Sinan



POCT-GLUCOSE [id number] 17:07:19





             Test Item    Value        Reference Range Interpretation Comments

 

             POC-GLUCOSE METER 142 mg/dL           H            : TESTED A

T [facility] [address]



             ([employer]) (test code =                                        [facility],



             1538)                                               [address]:



                                                                 /Techni

melanie ID



                                                                 = [id number] for St

jaytoabby,



                                                                 Susan



POCT-GLUCOSE [id number] 11:36:20





             Test Item    Value        Reference Range Interpretation Comments

 

             POC-GLUCOSE METER 138 mg/dL           H            : TESTED A

T [facility] [address]



             (BEAKER) (test code =                                        [place],



             1538)                                               [address]:



                                                                 /Techni

melanie ID



                                                                 = [id number] for Susan Mccracken



POCT-GLUCOSE [id number] 07:25:33





             Test Item    Value        Reference Range Interpretation Comments

 

             POC-GLUCOSE METER 117 mg/dL           H            : TESTED A

T [facility] [address]



             (BEAKER) (test code =                                        [place] [facility],



             1538)                                               [address]:



                                                                 /Techni

melanie ID



                                                                 = [id number] for Rei Fields



XR KNEE 3 VIEWS [id number] 06:41:43
************************************************************[facility] : 1970 Sex: 
M************************************************************CLINICAL HISTORY: 
Evaluation for joint effusionTECHNIQUE: 3 views of the left knee.COMPARISON: 
NoneIMPRESSION:No acute fracture or dislocation is identified. No significant 
jointeffusion. Soft tissue swelling.Electronically Signed By: Bonifacio Flores2023 06:43 CDTWorkstation Name: [id number] AND HEMATOCRIT
2023 06:26:52





             Test Item    Value        Reference Range Interpretation Comments

 

             HEMOGLOBIN (BEAKER) (test code = 7.3 GM/DL    13.7-17.5    L       

     



             410)                                                

 

             HEMATOCRIT (BEAKER) (test code = 23.2 %       40.1-51.0    L       

     



             411)                                                



 ID - [id number] ID - [id number] W/ REFLEX URINE [id number]
04:48:25





             Test Item    Value        Reference Range Interpretation Comments

 

             COLOR (BEAKER) (test code = 470) Lorie                             

     

 

             CLARITY (BEAKER) (test code = 469) Hazy                            

       

 

             SPECIFIC GRAVITY UA (BEAKER) (test 1.033        1.001-1.035        

       



             code = 468)                                         

 

             PH UA (BEAKER) (test code = 467) 5.5          5.0-8.0              

     

 

             PROTEIN UA (BEAKER) (test code = 100 mg/dL    Negative     A       

     



             464)                                                

 

             GLUCOSE UA (BEAKER) (test code = Negative     Negative             

     



             365)                                                

 

             KETONES UA (BEAKER) (test code = Trace        Negative     A       

     



             371)                                                

 

             BILIRUBIN UA (BEAKER) (test code = Negative     Negative           

       



             462)                                                

 

             BLOOD UA (BEAKER) (test code = Large        Negative     A         

   



             461)                                                

 

             NITRITE UA (BEAKER) (test code = Negative     Negative             

     



             465)                                                

 

             LEUKOCYTE ESTERASE UA (BEAKER) Negative     Negative               

   



             (test code = 466)                                        

 

             UROBILINOGEN UA (BEAKER) (test 0.2          0.2-1.0                

   



             code = 463)                                         

 

             RBC UA (BEAKER) (test code = 519) 45 /HPF                          

      

 

             WBC UA (BEAKER) (test code = 520) 7 /HPF                           

      

 

             BACTERIA (BEAKER) (test code = Occasional                          

   



             517)                                                

 

             MUCUS (BEAKER) (test code = 1574) Many                             

      

 

             SQUAMOUS EPITHELIAL (BEAKER) (test < /HPF                          

       



             code = 516)                                         

 

             SOURCE(BEAKER) (test code = 2795)                                  

      



 ID - [auto] ID - tech(CELLAVISION MANUAL DIFF)2023 
04:10:21





             Test Item    Value        Reference Range Interpretation Comments

 

             NEUTROPHILS - REL 24 %                                   



             (CELLAVISION)(BEAKER) (test code                                   

     



             = 2816)                                             

 

             LYMPHOCYTES - REL 31 %                                   



             (CELLAVISION)(BEAKER) (test code                                   

     



             = 2817)                                             

 

             MONOCYTES - REL 7 %                                    



             (CELLAVISION)(BEAKER) (test code                                   

     



             = 2818)                                             

 

             EOSINOPHILS - REL 5 %                                    



             (CELLAVISION)(BEAKER) (test code                                   

     



             = 2819)                                             

 

             METAMYELOCYTES - REL 5 %          0-0          H            



             (CELLAVISION)(BEAKER) (test code                                   

     



             = 2821)                                             

 

             BANDS - REL (CELLAVISION)(BEAKER) 30 %         0-10         H      

      



             (test code = 2826)                                        

 

             NEUTROPHILS - ABS 0.53 K/ul    1.78-5.38    L            



             (CELLAVISION)(BEAKER) (test code                                   

     



             = 2830)                                             

 

             LYMPHOCYTES - ABS 0.68 K/ul    1.32-3.57    L            



             (CELLAVISION)(BEAKER) (test code                                   

     



             = 2831)                                             

 

             MONOCYTES - ABS 0.15 K/uL    0.30-0.82    L            



             (CELLAVISION)(BEAKER) (test code                                   

     



             = 2832)                                             

 

             EOSINOPHILS - ABS 0.11 K/uL    0.04-0.54                 



             (CELLAVISION)(BEAKER) (test code                                   

     



             = 2834)                                             

 

             METAMYELOCYTES - ABS 0.11 K/uL    0.00-0.00    H            



             (CELLAVISION)(BEAKER) (test code                                   

     



             = 2836)                                             

 

             BANDS - ABS (CELLAVISION)(BEAKER) 0.66 K/uL    0.00-0.80           

      



             (test code = 2840)                                        

 

             TOTAL COUNTED (BEAKER) (test code 100                              

      



             = 1351)                                             

 

             MANUAL NRBC  CELLS 5 /100 WBC   0-0          H            



             (BEAKER) (test code = 1353)                                        

 

             PLT MORPHOLOGY (BEAKER) (test Normal                               

  



             code = 486)                                         

 

             SMUDGE CELLS (BEAKER) (test code Present                           

     



             = 1371)                                             

 

             POLYCHROMATOPHILLIC RBCS(BEAKER) 1+ few                            

     



             (test code = 478)                                        

 

             ANISOCYTOSIS (BEAKER) (test code 2+ moderate                       

     



             = 961)                                              

 

             MACROCYTES (BEAKER) (test code = 2+ moderate                       

     



             964)                                                

 

             POIKILOCYTES (BEAKER) (test code 2+ moderate                       

     



             = 966)                                              

 

             SCHISTOCYTES (BEAKER) (test code 1+ few                            

     



             = 765)                                              

 

             OVALOCYTES (BEAKER) (test code = 2+ moderate                       

     



             477)                                                

 

             BENJI CELLS (BEAKER) (test code = 2+ moderate                       

     



             474)                                                

 

             ARTIFACT (CELLAVISION)(BEAKER) Present                             

   



             (test code = 3432)                                        

 

             PLATELET CONCENTRATION Decreased                              



             (CELLAVISION)(BEAKER) (test code                                   

     



             = 3438)                                             



 ID - [id number] ID - Barry comments: Slide comments:CBC W/PLT 
COUNT &amp; AUTO [id number] 04:10:20





             Test Item    Value        Reference Range Interpretation Comments

 

             WHITE BLOOD CELL COUNT (BEAKER) 2.2 K/ L     3.5-10.5     L        

    



             (test code = 775)                                        

 

             RED BLOOD CELL COUNT (BEAKER) 1.88 M/ L    4.63-6.08    L          

  



             (test code = 761)                                        

 

             HEMOGLOBIN (BEAKER) (test code = 6.7 GM/DL    13.7-17.5    L       

     



             410)                                                

 

             HEMATOCRIT (BEAKER) (test code = 19.6 %       40.1-51.0    L       

     



             411)                                                

 

             MEAN CORPUSCULAR VOLUME (BEAKER) 104 fL       79-92        H       

     



             (test code = 753)                                        

 

             MEAN CORPUSCULAR HEMOGLOBIN 35.6 pg      25.7-32.2    H            



             (BEAKER) (test code = 751)                                        

 

             MEAN CORPUSCULAR HEMOGLOBIN CONC 34.2 GM/DL   32.3-36.5            

     



             (BEAKER) (test code = 752)                                        

 

             RED CELL DISTRIBUTION WIDTH 22.4 %       11.6-14.4    H            



             (BEAKER) (test code = 412)                                        

 

             PLATELET COUNT (BEAKER) (test code 22 K/CU MM   150-450      L     

       



             = 756)                                              

 

             MEAN PLATELET VOLUME (BEAKER) 12.8 fL      9.4-12.4     H          

  



             (test code = 754)                                        

 

             NUCLEATED RED BLOOD CELLS (BEAKER) 0 /100 WBC   0-0                

       



             (test code = 413)                                        



RAPID DRUG SCREEN, [id number] 04:01:35





             Test Item    Value        Reference Range Interpretation Comments

 

             BARBITURATE URINE (BEAKER) (test Negative     Negative             

     



             code = 725)                                         

 

             BENZODIAZEPINE SCREEN URINE (BEAKER) Negative     Negative         

         



             (test code = 726)                                        

 

             COCAINE (METAB.) SCREEN (BEAKER) Negative     Negative             

     



             (test code = 1164)                                        

 

             METHADONE SCREEN (BEAKER) (test code Negative     Negative         

         



             = 1436)                                             

 

             OPIATE SCREEN URINE (BEAKER) (test Negative     Negative           

       



             code = 734)                                         

 

             CANNABINOID SCREEN URINE (BEAKER) Negative     Negative            

      



             (test code = 727)                                        

 

             AMPH/METHAMPH SCREEN (BEAKER) (test Negative     Negative          

        



             code = 1438)                                        

 

             PHENCYCLIDINE SCREEN URINE (BEAKER) Negative     Negative          

        



             (test code = 608)                                        

 

             PH UA (BEAKER) (test code = 467) 5.5          5.0-8.0              

     



DRUG CUTOFF CONC.Cocaine 300 ng/mL Cannabinoid 50 ng/mLBenzodiazepine 200 
ng/mLBarbiturate 200 ng/mLPhencyclidine 25 ng/mLOpiate 300 ng/mLMethadone 300 
ng/mLAmphetamine/ 1000 ng/mL MethamphetamineThis assay provides an unconfirmed 
qualitative test result for the clinical management of patients in emergency 
situations. Chain of custody not maintained. Some over-the-counter medications, 
as well as adulterants, may cause inaccurate results. Clinical correlation 
should be applied. A more comprehensive drug screen or confirmation of a 
detected drug may be performed upon request. ID - ADMINOperator ID - 
[auto]COMPREHENSIVE METABOLIC [id number] 04:01:27





             Test Item    Value        Reference Range Interpretation Comments

 

             TOTAL PROTEIN 4.8 gm/dL    6.0-8.3      L            



             (BEAKER) (test                                        



             code = 770)                                         

 

             ALBUMIN (BEAKER) 2.6 g/dL     3.5-5.0      L            



             (test code = 1145)                                        

 

             ALKALINE     63 U/L                           



             PHOSPHATASE                                         



             (BEAKER) (test                                        



             code = 346)                                         

 

             BILIRUBIN TOTAL 6.8 mg/dL    0.2-1.2      H            



             (BEAKER) (test                                        



             code = 377)                                         

 

             SODIUM (BEAKER) 136 meq/L    136-145                   



             (test code = 381)                                        

 

             POTASSIUM (BEAKER) 3.3 meq/L    3.5-5.1      L            



             (test code = 379)                                        

 

             CHLORIDE (BEAKER) 108 meq/L           H            



             (test code = 382)                                        

 

             CO2 (BEAKER) (test 22 meq/L     22-29                     



             code = 355)                                         

 

             BLOOD UREA   8 mg/dL      7-21                      



             NITROGEN (BEAKER)                                        



             (test code = 354)                                        

 

             CREATININE   0.67 mg/dL   0.57-1.25                 



             (BEAKER) (test                                        



             code = 358)                                         

 

             GLUCOSE RANDOM 80 mg/dL                         



             (BEAKER) (test                                        



             code = 652)                                         

 

             CALCIUM (BEAKER) 7.6 mg/dL    8.4-10.2     L            



             (test code = 697)                                        

 

             AST (SGOT)   21 U/L       5-34                      



             (BEAKER) (test                                        



             code = 353)                                         

 

             ALT (SGPT)   7 U/L        6-55                      



             (BEAKER) (test                                        



             code = 347)                                         

 

             EGFR (BEAKER) 110                                     Interpretatio

n of eGFR



             (test code = 1092) mL/min/1.73                            values St

age Description



                          sq m                                   Result G1 Shaunna

l or high



                                                                 >=90 G2 Mildly 

decreased



                                                                 60-89 G3a Mildl

y to



                                                                 moderately 45-5

9 G3b



                                                                 Moderately to s

everely



                                                                 30-44 G4 Severl

y decreased



                                                                 15-29 G5 Kidney

 failure



                                                                 <15Reported eGF

R is based



                                                                 on the CKD-EPI 





                                                                 equation that d

oes not use



                                                                 a race



                                                                 coefficientEsti

mated GFR



                                                                 is not as accur

ate as



                                                                 Creatinine Tabitha

cynthia in



                                                                 predicting glom

erular



                                                                 filtration rate

. Estimated



                                                                 GFR is not appl

icable for



                                                                 dialysis patien

ts



 ID - MARCOSpecimen moderately [id number] 03:57:50





             Test Item    Value        Reference Range Interpretation Comments

 

             MAGNESIUM (KHLOE) (test code = 1.9 mg/dL    1.6-2.6               

    



             627)                                                



 ID - MARCOPROTHROMBIN TIME/[id number] 03:53:01





             Test Item    Value        Reference Range Interpretation Comments

 

             PROTIME (KHLOE) (test code = 43.0 seconds 11.9-14.2    H          

  



             759)                                                

 

             INR (ANDREWAKER) (test code = 370) 4.92         <=5.90                 

   



RECOMMENDED COUMADIN/WARFARIN INR THERAPY RANGESSTANDARD DOSE: 2.0 - 3.0 
Includes: PROPHYLAXIS for venous thrombosis, systemic embolization; TREATMENT 
for venous thrombosis and/or pulmonary embolus.HIGH RISK: Target INR is 2.5-3.5 
for patients with mechanical heart valves.POCT-GLUCOSE [id number] 21:17:12





             Test Item    Value        Reference Range Interpretation Comments

 

             POC-GLUCOSE METER 91 mg/dL                         : TESTED A

T [facility] [address]



             (KHLOE) (test code =                                        [place],



             [address]38)                                               [id number]:



                                                                 /Techni

melanie ID =



                                                                 [id number] for Ly, 

Tram



XR CHEST 1 VIEW PORTABLE / [id number] 20:14:21
********************************[facility] 1970 Sex: 
M************************************************************EXAMINATION: XR 
CHEST 1 VIEW PORTABLE / BEDSIDE INDICATION: feverCOMPARISON: 2023 
FINDINGS:LINES/TUBES: None LUNGS: The lungs are moderately inflated. No focal 
airspaceconsolidation.PLEURA: No pleural effusion or pneumothorax.MEDIASTINUM: 
The cardiomediastinal silhouette appears normal in size andshape.BONES/SOFT 
TISSUES: No acute osseous injury.ABDOMEN: No free air under the 
diaphragm.IMPRESSION:No focal pneumonia or airspace edema.Electronically Signed 
By: Carmelita Soto2023 20:16 CDTWorkstation Name: [id number] 
[id number] 16:46:31





             Test Item    Value        Reference Range Interpretation Comments

 

             POC-GLUCOSE METER 129 mg/dL           H            : TESTED A

T [facility] [address]



             (BEAKER) (test code =                                        [facility],



             [address]38)                                               [id number]:



                                                                 /Techni

melanie ID



                                                                 = [id number] for OR

DINARIO,



                                                                 ELIUD



POCT-GLUCOSE [id number] 16:02:09





             Test Item    Value        Reference Range Interpretation Comments

 

             POC-GLUCOSE METER 142 mg/dL           H            : TESTED A

T [facility] [address]



             (BEAKER) (test code =                                        [facility],



             [address]38)                                               [address]:



                                                                 /Techni

melanie ID



                                                                 = [id number] for



                                                                 SHANTI ALCALALENE



POCT-GLUCOSE [id number] 09:33:15





             Test Item    Value        Reference Range Interpretation Comments

 

             POC-GLUCOSE METER 122 mg/dL           H            : TESTED A

T [facility] [address]



             (BEAKER) (test code =                                        [facility],



             1538)                                               [address]:



                                                                 /Techni

melanie ID



                                                                 = [id number] for OR

DINARIO,



                                                                 ELIUD



POCT-GLUCOSE [id number] 08:52:52





             Test Item    Value        Reference Range Interpretation Comments

 

             POC-GLUCOSE METER 62 mg/dL            L            : TESTED A

T [facility] [address]



             (BEAKER) (test code =                                        [facility],



             1538)                                               [address]:



                                                                 /Techni

melanie ID =



                                                                 [id number] for



                                                                 DIAN PATHAK



                                                                 AJAY



BASIC METABOLIC [id number] 07:15:37





             Test Item    Value        Reference Range Interpretation Comments

 

             SODIUM (BEAKER) 135 meq/L    136-145      L            



             (test code = 381)                                        

 

             POTASSIUM    4.1 meq/L    3.5-5.1                   Specimen slight

ly



             (BEAKER) (test                                        hemolyzed



             code = 379)                                         

 

             CHLORIDE (BEAKER) 106 meq/L                        



             (test code = 382)                                        

 

             CO2 (BEAKER) 21 meq/L     22-29        L            



             (test code = 355)                                        

 

             BLOOD UREA   9 mg/dL      7-21                      



             NITROGEN (BEAKER)                                        



             (test code = 354)                                        

 

             CREATININE   0.62 mg/dL   0.57-1.25                 Specimen slight

ly



             (BEAKER) (test                                        hemolyzed



             code = 358)                                         

 

             GLUCOSE RANDOM 60 mg/dL            L            



             (BEAKER) (test                                        



             code = 652)                                         

 

             CALCIUM (BEAKER) 7.7 mg/dL    8.4-10.2     L            



             (test code = 697)                                        

 

             EGFR (BEAKER) 112                                     Interpretatio

n of eGFR



             (test code = mL/min/1.73                            values Stage De

scription



             1092)        sq m                                   Result G1 Shaunna

l or high



                                                                 >=90 G2 Mildly 

decreased



                                                                 60-89 G3a Mildl

y to



                                                                 moderately 45-5

9 G3b



                                                                 Moderately to s

everely



                                                                 30-44 G4 Severl

y decreased



                                                                 15-29 G5  Kidne

y failure



                                                                 <15Reported eGF

R is based



                                                                 on the CKD-EPI 





                                                                 equation that d

oes not use



                                                                 a race



                                                                 coefficientEsti

mated GFR



                                                                 is not as accur

ate as



                                                                 Creatinine Tabitha

cynthia in



                                                                 predicting glom

erular



                                                                 filtration rate

. Estimated



                                                                 GFR is not appl

icable for



                                                                 dialysis patien

ts



 ID - mmSpecimen moderately ictericHEPATIC FUNCTION [id number] 
07:15:31





             Test Item    Value        Reference Range Interpretation Comments

 

             TOTAL PROTEIN (BEAKER) 5.8 gm/dL    6.0-8.3      L            Speci

men slightly



             (test code = 770)                                        hemolyzed

 

             ALBUMIN (BEAKER) (test 2.3 g/dL     3.5-5.0      L            Speci

men slightly



             code = 1145)                                        hemolyzed

 

             BILIRUBIN TOTAL 8.3 mg/dL    0.2-1.2      H            Specimen sli

ghtly



             (BEAKER) (test code =                                        hemoly

zed



             377)                                                

 

             BILIRUBIN DIRECT 2.9 mg/dL    0.1-0.5      H            Specimen sl

ightly



             (BEAKER) (test code =                                        hemoly

zed



             706)                                                

 

             ALKALINE PHOSPHATASE 106 U/L                          



             (BEAKER) (test code =                                        



             346)                                                

 

             AST (SGOT) (BEAKER) 35 U/L       5-34         H            Specimen

 slightly



             (test code = 353)                                        hemolyzed

 

             ALT (SGPT) (BEAKER) 12 U/L       6-55                      Specimen

 slightly



             (test code = 347)                                        hemolyzed



 ID - mmSpecimen moderately [id number] 07:15:30





             Test Item    Value        Reference Range Interpretation Comments

 

             MAGNESIUM (BEAKER) 1.4 mg/dL    1.6-2.6      L            Specimen 

slightly



             (test code = 627)                                        hemolyzed



 ID - [id number] 07:07:55





             Test Item    Value        Reference Range Interpretation Comments

 

             PARTIAL THROMBOPLASTIN TIME 43.9 seconds 22.5-36.0    H            



             (BEAKER) (test code = 760)                                        



LACTIC ACID, [id number] 07:07:22





             Test Item    Value        Reference Range Interpretation Comments

 

             LACTATE BLOOD VENOUS (2) (BEAKER) 1.94 mmol/L  0.50-2.00           

      



             (test code = 2872)                                        



 ID - mmSpecimen moderately ictericPROTHROMBIN [id number] 
07:06:56





             Test Item    Value        Reference Range Interpretation Comments

 

             PROTIME (BEAKER) (test code = 28.2 seconds 11.9-14.2    H          

  



             759)                                                

 

             INR (BEAKER) (test code = 370) 2.84         <=5.90                 

   



RECOMMENDED COUMADIN/WARFARIN INR THERAPY RANGESSTANDARD DOSE: 2.0 - 3.0 
Includes: PROPHYLAXIS for venous thrombosis, systemic embolization; TREATMENT 
for venous thrombosis and/or pulmonary embolus.HIGH RISK: Target INR is 2.5-3.5 
for patients with mechanical heart valves.CBC W/PLT COUNT &amp; AUTO 
[id number] 07:01:40





             Test Item    Value        Reference Range Interpretation Comments

 

             WHITE BLOOD CELL COUNT (BEAKER) 3.5 K/ L     3.5-10.5              

    



             (test code = 775)                                        

 

             RED BLOOD CELL COUNT (BEAKER) 2.56 M/ L    4.63-6.08    L          

  



             (test code = 761)                                        

 

             HEMOGLOBIN (BEAKER) (test code = 8.7 GM/DL    13.7-17.5    L       

     



             410)                                                

 

             HEMATOCRIT (BEAKER) (test code = 27.1 %       40.1-51.0    L       

     



             411)                                                

 

             MEAN CORPUSCULAR VOLUME (BEAKER) 106 fL       79-92        H       

     



             (test code = 753)                                        

 

             MEAN CORPUSCULAR HEMOGLOBIN 34.0 pg      25.7-32.2    H            



             (BEAKER) (test code = 751)                                        

 

             MEAN CORPUSCULAR HEMOGLOBIN CONC 32.1 GM/DL   32.3-36.5    L       

     



             (BEAKER) (test code = 752)                                        

 

             RED CELL DISTRIBUTION WIDTH 22.6 %       11.6-14.4    H            



             (BEAKER) (test code = 412)                                        

 

             PLATELET COUNT (BEAKER) (test code 29 K/CU MM   150-450      L     

       



             = 756)                                              

 

             MEAN PLATELET VOLUME (BEAKER) 11.0 fL      9.4-12.4                

  



             (test code = 754)                                        

 

             NUCLEATED RED BLOOD CELLS (BEAKER) 0 /100 WBC   0-0                

       



             (test code = 413)                                        

 

             NEUTROPHILS RELATIVE PERCENT 69 %                                  

 



             (BEAKER) (test code = 429)                                        

 

             LYMPHOCYTES RELATIVE PERCENT 11 %                                  

 



             (BEAKER) (test code = 430)                                        

 

             MONOCYTES RELATIVE PERCENT 16 %                                   



             (BEAKER) (test code = 431)                                        

 

             EOSINOPHILS RELATIVE PERCENT 3 %                                   

 



             (BEAKER) (test code = 432)                                        

 

             BASOPHILS RELATIVE PERCENT 1 %                                    



             (BEAKER) (test code = 437)                                        

 

             NEUTROPHILS ABSOLUTE COUNT 2.41 K/ L    1.78-5.38                 



             (BEAKER) (test code = 670)                                        

 

             LYMPHOCYTES ABSOLUTE COUNT 0.37 K/ L    1.32-3.57    L            



             (BEAKER) (test code = 414)                                        

 

             MONOCYTES ABSOLUTE COUNT (BEAKER) 0.56 K/ L    0.30-0.82           

      



             (test code = 415)                                        

 

             EOSINOPHILS ABSOLUTE COUNT 0.10 K/ L    0.04-0.54                 



             (BEAKER) (test code = 416)                                        

 

             BASOPHILS ABSOLUTE COUNT (BEAKER) 0.03 K/ L    0.01-0.08           

      



             (test code = 417)                                        

 

             IMMATURE GRANULOCYTES-RELATIVE 0.30 %       0.00-1.00              

   



             PERCENT (BEAKER) (test code =                                      

  



             2801)                                               



MISCELLANEOUS LAB [id number] 17:00:16





             Test Item    Value        Reference Range Interpretation Comments

 

             SCAN RESULT (test code =                                        See

 scanned report.



             [id number])                                            



BASIC METABOLIC [id number] 05:34:01





             Test Item    Value        Reference Range Interpretation Comments

 

             SODIUM (BEAKER) 136 meq/L    136-145                   



             (test code = 381)                                        

 

             POTASSIUM    3.8 meq/L    3.5-5.1                   



             (BEAKER) (test                                        



             code = 379)                                         

 

             CHLORIDE (BEAKER) 103 meq/L                        



             (test code = 382)                                        

 

             CO2 (BEAKER) 28 meq/L     22-29                     



             (test code = 355)                                        

 

             BLOOD UREA   6 mg/dL      7-21         L            



             NITROGEN (BEAKER)                                        



             (test code = 354)                                        

 

             CREATININE   0.65 mg/dL   0.57-1.25                 



             (BEAKER) (test                                        



             code = 358)                                         

 

             GLUCOSE RANDOM 86 mg/dL                         



             (BEAKER) (test                                        



             code = 652)                                         

 

             CALCIUM (BEAKER) 7.9 mg/dL    8.4-10.2     L            



             (test code = 697)                                        

 

             EGFR (BEAKER) 110                                     Interpretatio

n of eGFR



             (test code = mL/min/1.73                            values Stage De

scription



             1092)        sq m                                   Result G1 Shaunna

l or high



                                                                 >=90 G2 Mildly 

decreased



                                                                 60-89 G3a Mildl

y to



                                                                 moderately 45-5

9 G3b



                                                                 Moderately to s

everely



                                                                 30-44 G4 Severl

y decreased



                                                                 15-29 G5 Kidney

 failure



                                                                 <15Reported eGF

R is based



                                                                 on the CKD-EPI 





                                                                 equation that d

oes not use



                                                                 a race



                                                                 coefficientEsti

mated GFR



                                                                 is not as accur

ate as



                                                                 Creatinine Tabitha

cynthia in



                                                                 predicting glom

erular



                                                                 filtration rate

. Estimated



                                                                 GFR is not appl

icable for



                                                                 dialysis patien

ts



 ID - MMSpecimen moderately ictericHEPATIC FUNCTION [id number] 
05:32:04





             Test Item    Value        Reference Range Interpretation Comments

 

             TOTAL PROTEIN (BEAKER) (test code = 5.4 gm/dL    6.0-8.3      L    

        



             770)                                                

 

             ALBUMIN (BEAKER) (test code = 1145) 2.1 g/dL     3.5-5.0      L    

        

 

             BILIRUBIN TOTAL (BEAKER) (test code 3.5 mg/dL    0.2-1.2      H    

        



             = 377)                                              

 

             BILIRUBIN DIRECT (BEAKER) (test 1.3 mg/dL    0.1-0.5      H        

    



             code = 706)                                         

 

             ALKALINE PHOSPHATASE (BEAKER) (test 127 U/L                  

        



             code = 346)                                         

 

             AST (SGOT) (BEAKER) (test code = 23 U/L       5-34                 

     



             353)                                                

 

             ALT (SGPT) (BEAKER) (test code = 9 U/L        6-55                 

     



             347)                                                



 ID - MMSpecimen moderately [id number] 05:32:03





             Test Item    Value        Reference Range Interpretation Comments

 

             PHOSPHORUS (BEAKER) (test code = 3.4 mg/dL    2.3-4.7              

     



             604)                                                



 ID - [id number] 05:32:02





             Test Item    Value        Reference Range Interpretation Comments

 

             MAGNESIUM (BEAKER) (test code = 1.5 mg/dL    1.6-2.6      L        

    



             627)                                                



 ID - MMCBC (HEMOGRAM ONLY)2023 05:20:51





             Test Item    Value        Reference Range Interpretation Comments

 

             WHITE BLOOD CELL COUNT (BEAKER) 2.9 K/ L     3.5-10.5     L        

    



             (test code = 775)                                        

 

             RED BLOOD CELL COUNT (BEAKER) 2.69 M/ L    4.63-6.08    L          

  



             (test code = 761)                                        

 

             HEMOGLOBIN (BEAKER) (test code = 8.8 GM/DL    13.7-17.5    L       

     



             410)                                                

 

             HEMATOCRIT (BEAKER) (test code = 27.4 %       40.1-51.0    L       

     



             411)                                                

 

             MEAN CORPUSCULAR VOLUME (BEAKER) 102 fL       79-92        H       

     



             (test code = 753)                                        

 

             MEAN CORPUSCULAR HEMOGLOBIN 32.7 pg      25.7-32.2    H            



             (BEAKER) (test code = 751)                                        

 

             MEAN CORPUSCULAR HEMOGLOBIN CONC 32.1 GM/DL   32.3-36.5    L       

     



             (BEAKER) (test code = 752)                                        

 

             RED CELL DISTRIBUTION WIDTH 21.1 %       11.6-14.4    H            



             (BEAKER) (test code = 412)                                        

 

             PLATELET COUNT (BEAKER) (test code 43 K/CU MM   150-450      L     

       



             = 756)                                              

 

             MEAN PLATELET VOLUME (BEAKER) 11.3 fL      9.4-12.4                

  



             (test code = 754)                                        

 

             NUCLEATED RED BLOOD CELLS (BEAKER) 0 /100 WBC   0-0                

       



             (test code = 413)                                        



PROTHROMBIN [id number] 05:06:07





             Test Item    Value        Reference Range Interpretation Comments

 

             PROTIME (BEAKER) (test code = 27.5 seconds 11.9-14.2    H          

  



             759)                                                

 

             INR (BEAKER) (test code = 370) 2.76         <=5.90                 

   



RECOMMENDED COUMADIN/WARFARIN INR THERAPY RANGESSTANDARD DOSE: 2.0 - 3.0 
Includes: PROPHYLAXIS for venous thrombosis, systemic embolization; TREATMENT 
for venous thrombosis and/or pulmonary embolus.HIGH RISK: Target INR is 2.5-3.5 
for patients with mechanical heart valves.CBC (HEMOGRAM ONLY)2023 09:40:36





             Test Item    Value        Reference Range Interpretation Comments

 

             WHITE BLOOD CELL COUNT (BEAKER) 2.8 K/ L     3.5-10.5     L        

    



             (test code = 775)                                        

 

             RED BLOOD CELL COUNT (BEAKER) 2.84 M/ L    4.63-6.08    L          

  



             (test code = 761)                                        

 

             HEMOGLOBIN (BEAKER) (test code = 9.2 GM/DL    13.7-17.5    L       

     



             410)                                                

 

             HEMATOCRIT (BEAKER) (test code = 28.3 %       40.1-51.0    L       

     



             411)                                                

 

             MEAN CORPUSCULAR VOLUME (BEAKER) 100 fL       79-92        H       

     



             (test code = 753)                                        

 

             MEAN CORPUSCULAR HEMOGLOBIN 32.4 pg      25.7-32.2    H            



             (BEAKER) (test code = 751)                                        

 

             MEAN CORPUSCULAR HEMOGLOBIN CONC 32.5 GM/DL   32.3-36.5            

     



             (BEAKER) (test code = 752)                                        

 

             RED CELL DISTRIBUTION WIDTH 20.9 %       11.6-14.4    H            



             (BEAKER) (test code = 412)                                        

 

             PLATELET COUNT (BEAKER) (test code 57 K/CU MM   150-450      L     

       



             = 756)                                              

 

             MEAN PLATELET VOLUME (BEAKER) 12.6 fL      9.4-12.4     H          

  



             (test code = 754)                                        

 

             NUCLEATED RED BLOOD CELLS (BEAKER) 0 /100 WBC   0-0                

       



             (test code = 413)                                        



BASIC METABOLIC [id number] 07:41:42





             Test Item    Value        Reference Range Interpretation Comments

 

             SODIUM (BEAKER) 136 meq/L    136-145                   



             (test code = 381)                                        

 

             POTASSIUM    4.2 meq/L    3.5-5.1                   Specimen slight

ly



             (BEAKER) (test                                        hemolyzed



             code = 379)                                         

 

             CHLORIDE (BEAKER) 107 meq/L                        



             (test code = 382)                                        

 

             CO2 (BEAKER) 25 meq/L     22-29                     



             (test code = 355)                                        

 

             BLOOD UREA   5 mg/dL      7-21         L            



             NITROGEN (BEAKER)                                        



             (test code = 354)                                        

 

             CREATININE   0.57 mg/dL   0.57-1.25                 Specimen slight

ly



             (BEAKER) (test                                        hemolyzed



             code = 358)                                         

 

             GLUCOSE RANDOM 97 mg/dL                         



             (BEAKER) (test                                        



             code = 652)                                         

 

             CALCIUM (BEAKER) 7.6 mg/dL    8.4-10.2     L            



             (test code = 697)                                        

 

             EGFR (BEAKER) 114                                     Interpretatio

n of eGFR



             (test code = mL/min/1.73                            values Stage De

scription



             1092)        sq m                                   Result G1 Shaunna

l or high



                                                                 >=90 G2 Mildly 

decreased



                                                                 60-89 G3a Mildl

y to



                                                                 moderately 45-5

9 G3b



                                                                 Moderately to s

everely



                                                                 30-44 G4 Severl

y decreased



                                                                 15-29 G5 Kidney

 failure



                                                                 <15Reported eGF

R is based



                                                                 on the CKD-EPI 





                                                                 equation that d

oes not use



                                                                 a race



                                                                 coefficientEsti

mated GFR



                                                                 is not as accur

ate as



                                                                 Creatinine Tabitha

cynthia in



                                                                 predicting glom

erular



                                                                 filtration rate

. Estimated



                                                                 GFR is not appl

icable for



                                                                 dialysis patien

ts



 ID - MMSpecimen slightly [id number] 07:40:54





             Test Item    Value        Reference Range Interpretation Comments

 

             MAGNESIUM (BEAKER) 1.5 mg/dL    1.6-2.6      L            Specimen 

slightly



             (test code = 627)                                        hemolyzed



 ID - MMPROTHROMBIN [id number] 06:38:37





             Test Item    Value        Reference Range Interpretation Comments

 

             PROTIME (BEAKER) (test code = 29.0 seconds 11.9-14.2    H          

  



             759)                                                

 

             INR (BEAKER) (test code = 370) 2.84         <=5.90                 

   



RECOMMENDED COUMADIN/WARFARIN INR THERAPY RANGESSTANDARD DOSE: 2.0 - 3.0 
Includes: PROPHYLAXIS for venous thrombosis, systemic embolization; TREATMENT 
for venous thrombosis and/or pulmonary embolus.HIGH RISK: Target INR is 2.5-3.5 
for patients with mechanical heart valves.BASIC METABOLIC [id number] 
06:46:37





             Test Item    Value        Reference Range Interpretation Comments

 

             SODIUM (BEAKER) 135 meq/L    136-145      L            



             (test code = 381)                                        

 

             POTASSIUM    4.2 meq/L    3.5-5.1                   



             (BEAKER) (test                                        



             code = 379)                                         

 

             CHLORIDE (BEAKER) 107 meq/L                        



             (test code = 382)                                        

 

             CO2 (BEAKER) 22 meq/L     22-29                     



             (test code = 355)                                        

 

             BLOOD UREA   5 mg/dL      7-21         L            



             NITROGEN (BEAKER)                                        



             (test code = 354)                                        

 

             CREATININE   0.58 mg/dL   0.57-1.25                 



             (BEAKER) (test                                        



             code = 358)                                         

 

             GLUCOSE RANDOM 130 mg/dL           H            



             (BEAKER) (test                                        



             code = 652)                                         

 

             CALCIUM (BEAKER) 7.2 mg/dL    8.4-10.2     L            



             (test code = 697)                                        

 

             EGFR (BEAKER) 113                                     Interpretatio

n of eGFR



             (test code = mL/min/1.73                            values Stage De

scription



             1092)        sq m                                   Result G1 Shaunna

l or high



                                                                 >=90 G2 Mildly 

decreased



                                                                 60-89 G3a Mildl

y to



                                                                 moderately 45-5

9  G3b



                                                                 Moderately to s

everely



                                                                 30-44 G4 Severl

y decreased



                                                                 15-29 G5 Kidney

 failure



                                                                 <15Reported eGF

R is based



                                                                 on the CKD-EPI 





                                                                 equation that d

oes not use



                                                                 a race



                                                                 coefficientEsti

mated GFR



                                                                 is not as accur

ate as



                                                                 Creatinine Tabitha

cynthia in



                                                                 predicting glom

erular



                                                                 filtration rate

. Estimated



                                                                 GFR is not appl

icable for



                                                                 dialysis patien

ts



 ID - BSSpecimen slightly ictericPROTHROMBIN TIME/[id number] 05:40:41





             Test Item    Value        Reference Range Interpretation Comments

 

             PROTIME (BEAKER) (test code = 29.8 seconds 11.9-14.2    H          

  



             759)                                                

 

             INR (BEAKER) (test code = 370) 3.06         <=5.90                 

   



RECOMMENDED COUMADIN/WARFARIN INR THERAPY RANGESSTANDARD DOSE: 2.0 - 3.0 
Includes: PROPHYLAXIS for venous thrombosis, systemic embolization; TREATMENT 
for venous thrombosis and/or pulmonary embolus.HIGH RISK: Target INR is 2.5-3.5 
for patients with mechanical heart valves.CBC W/PLT COUNT &amp; AUTO 
[id number] 05:30:54





             Test Item    Value        Reference Range Interpretation Comments

 

             WHITE BLOOD CELL COUNT (BEAKER) 2.5 K/ L     3.5-10.5     L        

    



             (test code = 775)                                        

 

             RED BLOOD CELL COUNT (BEAKER) 2.47 M/ L    4.63-6.08    L          

  



             (test code = 761)                                        

 

             HEMOGLOBIN (BEAKER) (test code = 8.1 GM/DL    13.7-17.5    L       

     



             410)                                                

 

             HEMATOCRIT (BEAKER) (test code = 25.3 %       40.1-51.0    L       

     



             411)                                                

 

             MEAN CORPUSCULAR VOLUME (BEAKER) 102 fL       79-92        H       

     



             (test code = 753)                                        

 

             MEAN CORPUSCULAR HEMOGLOBIN 32.8 pg      25.7-32.2    H            



             (BEAKER) (test code = 751)                                        

 

             MEAN CORPUSCULAR HEMOGLOBIN CONC 32.0 GM/DL   32.3-36.5    L       

     



             (BEAKER) (test code = 752)                                        

 

             RED CELL DISTRIBUTION WIDTH 21.0 %       11.6-14.4    H            



             (BEAKER) (test code = 412)                                        

 

             PLATELET COUNT (BEAKER) (test code 39 K/CU MM   150-450      L     

       



             = 756)                                              

 

             MEAN PLATELET VOLUME (BEAKER) 12.4 fL      9.4-12.4                

  



             (test code = 754)                                        

 

             NUCLEATED RED BLOOD CELLS (BEAKER) 0 /100 WBC   0-0                

       



             (test code = 413)                                        

 

             NEUTROPHILS RELATIVE PERCENT 50 %                                  

 



             (BEAKER) (test code = 429)                                        

 

             LYMPHOCYTES RELATIVE PERCENT 26 %                                  

 



             (BEAKER) (test code = 430)                                        

 

             MONOCYTES RELATIVE PERCENT 15 %                                   



             (BEAKER) (test code = 431)                                        

 

             EOSINOPHILS RELATIVE PERCENT 7 %                                   

 



             (BEAKER) (test code = 432)                                        

 

             BASOPHILS RELATIVE PERCENT 1 %                                    



             (BEAKER) (test code = 437)                                        

 

             NEUTROPHILS ABSOLUTE COUNT 1.24 K/ L    1.78-5.38    L            



             (BEAKER) (test code = 670)                                        

 

             LYMPHOCYTES ABSOLUTE COUNT 0.65 K/ L    1.32-3.57    L            



             (BEAKER) (test code = 414)                                        

 

             MONOCYTES ABSOLUTE COUNT (BEAKER) 0.37 K/ L    0.30-0.82           

      



             (test code = 415)                                        

 

             EOSINOPHILS ABSOLUTE COUNT 0.17 K/ L    0.04-0.54                 



             (BEAKER) (test code = 416)                                        

 

             BASOPHILS ABSOLUTE COUNT (BEAKER) 0.03 K/ L    0.01-0.08           

      



             (test code = 417)                                        

 

             IMMATURE GRANULOCYTES-RELATIVE 0.40 %       0.00-1.00              

   



             PERCENT (BEAKER) (test code =                                      

  



             2801)                                               



BASIC METABOLIC [id number] 06:17:24





             Test Item    Value        Reference Range Interpretation Comments

 

             SODIUM (BEAKER) 135 meq/L    136-145      L            



             (test code = 381)                                        

 

             POTASSIUM    4.1 meq/L    3.5-5.1                   



             (BEAKER) (test                                        



             code = 379)                                         

 

             CHLORIDE (BEAKER) 106 meq/L                        



             (test code = 382)                                        

 

             CO2 (BEAKER) 25 meq/L     22-29                     



             (test code = 355)                                        

 

             BLOOD UREA   4 mg/dL      7-21         L            



             NITROGEN (BEAKER)                                        



             (test code = 354)                                        

 

             CREATININE   0.62 mg/dL   0.57-1.25                 



             (BEAKER) (test                                        



             code = 358)                                         

 

             GLUCOSE RANDOM 135 mg/dL           H            



             (BEAKER) (test                                        



             code = 652)                                         

 

             CALCIUM (BEAKER) 7.2 mg/dL    8.4-10.2     L            



             (test code = 697)                                        

 

             EGFR (BEAKER) 112                                     Interpretatio

n of eGFR



             (test code = mL/min/1.73                            values  Stage D

escription



             1092)        sq m                                   Result G1 Shaunna

l or high



                                                                 >=90 G2 Mildly 

decreased



                                                                 60-89 G3a Mildl

y to



                                                                 moderately 45-5

9 G3b



                                                                 Moderately to s

everely



                                                                 30-44 G4 Severl

y decreased



                                                                 15-29 G5 Kidney

 failure



                                                                 <15Reported eGF

R is based



                                                                 on the CKD-EPI 





                                                                 equation that d

oes not use



                                                                 a race



                                                                 coefficientEsti

mated GFR



                                                                 is not as accur

ate as



                                                                 Creatinine Tabitha

cynthia in



                                                                 predicting glom

erular



                                                                 filtration rate

. Estimated



                                                                 GFR is not appl

icable for



                                                                 dialysis patien

ts



Specimen slightly ictericPROTHROMBIN [id number] 05:25:08





             Test Item    Value        Reference Range Interpretation Comments

 

             PROTIME (BEAKER) (test code = 31.0 seconds 11.9-14.2    H          

  



             759)                                                

 

             INR (BEAKER) (test code = 370) 3.09         <=5.90                 

   



RECOMMENDED COUMADIN/WARFARIN INR THERAPY RANGESSTANDARD DOSE: 2.0 - 3.0 
Includes: PROPHYLAXIS for venous thrombosis, systemic embolization; TREATMENT 
for venous thrombosis and/or pulmonary embolus.HIGH RISK: Target INR is 2.5-3.5 
for patients with mechanical heart valves.CBC W/PLT COUNT &amp; AUTO 
[id number] 05:24:37





             Test Item    Value        Reference Range Interpretation Comments

 

             WHITE BLOOD CELL COUNT (BEAKER) 2.9 K/ L     3.5-10.5     L        

    



             (test code = 775)                                        

 

             RED BLOOD CELL COUNT (BEAKER) 2.49 M/ L    4.63-6.08    L          

  



             (test code = 761)                                        

 

             HEMOGLOBIN (BEAKER) (test code = 8.1 GM/DL    13.7-17.5    L       

     



             410)                                                

 

             HEMATOCRIT (BEAKER) (test code = 25.3 %       40.1-51.0    L       

     



             411)                                                

 

             MEAN CORPUSCULAR VOLUME (BEAKER) 102 fL       79-92        H       

     



             (test code = 753)                                        

 

             MEAN CORPUSCULAR HEMOGLOBIN 32.5 pg      25.7-32.2    H            



             (BEAKER) (test code = 751)                                        

 

             MEAN CORPUSCULAR HEMOGLOBIN CONC 32.0 GM/DL   32.3-36.5    L       

     



             (BEAKER) (test code = 752)                                        

 

             RED CELL DISTRIBUTION WIDTH 21.0 %       11.6-14.4    H            



             (BEAKER) (test code = 412)                                        

 

             PLATELET COUNT (BEAKER) (test code 31 K/CU MM   150-450      L     

       



             = 756)                                              

 

             MEAN PLATELET VOLUME (BEAKER) 11.1 fL      9.4-12.4                

  



             (test code = 754)                                        

 

             NUCLEATED RED BLOOD CELLS (BEAKER) 0 /100 WBC   0-0                

       



             (test code = 413)                                        

 

             NEUTROPHILS RELATIVE PERCENT 52 %                                  

 



             (BEAKER) (test code = 429)                                        

 

             LYMPHOCYTES RELATIVE PERCENT 25 %                                  

 



             (BEAKER) (test code = 430)                                        

 

             MONOCYTES RELATIVE PERCENT 16 %                                   



             (BEAKER) (test code = 431)                                        

 

             EOSINOPHILS RELATIVE PERCENT 6 %                                   

 



             (BEAKER) (test code = 432)                                        

 

             BASOPHILS RELATIVE PERCENT 1 %                                    



             (BEAKER) (test code = 437)                                        

 

             NEUTROPHILS ABSOLUTE COUNT 1.48 K/ L    1.78-5.38    L            



             (BEAKER) (test code = 670)                                        

 

             LYMPHOCYTES ABSOLUTE COUNT 0.71 K/ L    1.32-3.57    L            



             (BEAKER) (test code = 414)                                        

 

             MONOCYTES ABSOLUTE COUNT (BEAKER) 0.45 K/ L    0.30-0.82           

      



             (test code = 415)                                        

 

             EOSINOPHILS ABSOLUTE COUNT 0.18 K/ L    0.04-0.54                 



             (BEAKER) (test code = 416)                                        

 

             BASOPHILS ABSOLUTE COUNT (BEAKER) 0.03 K/ L    0.01-0.08           

      



             (test code = 417)                                        

 

             IMMATURE GRANULOCYTES-RELATIVE 0.30 %       0.00-1.00              

   



             PERCENT (BEAKER) (test code =                                      

  



             2801)                                               



BASIC METABOLIC [id number] 04:34:25





             Test Item    Value        Reference Range Interpretation Comments

 

             SODIUM (BEAKER) 137 meq/L    136-145                   



             (test code = 381)                                        

 

             POTASSIUM    4.1 meq/L    3.5-5.1                   



             (BEAKER) (test                                        



             code = 379)                                         

 

             CHLORIDE (BEAKER) 109 meq/L           H            



             (test code = 382)                                        

 

             CO2 (BEAKER) 25 meq/L     22-29                     



             (test code = 355)                                        

 

             BLOOD UREA   4 mg/dL      7-21         L            



             NITROGEN (BEAKER)                                        



             (test code = 354)                                        

 

             CREATININE   0.55 mg/dL   0.57-1.25    L            



             (BEAKER) (test                                        



             code = 358)                                         

 

             GLUCOSE RANDOM 83 mg/dL                         



             (BEAKER) (test                                        



             code = 652)                                         

 

             CALCIUM (BEAKER) 7.4 mg/dL    8.4-10.2     L            



             (test code = 697)                                        

 

             EGFR (BEAKER) 115                                     Interpretatio

n of eGFR



             (test code = mL/min/1.73                            values Stage De

scription



             1092)        sq m                                   Result G1 Shaunna

l or high



                                                                 >=90 G2 Mildly 

decreased



                                                                 60-89 G3a Mildl

y to



                                                                 moderately 45-5

9 G3b



                                                                 Moderately to s

everely



                                                                 30-44 G4 Severl

y decreased



                                                                 15-29 G5 Kidney

 failure



                                                                 <15Reported eGF

R is based



                                                                 on the CKD-EPI 





                                                                 equation that d

oes not use



                                                                 a race



                                                                 coefficientEsti

mated GFR



                                                                 is not as accur

ate as



                                                                 Creatinine Tabitha

cynthia in



                                                                 predicting glom

erular



                                                                 filtration rate

. Estimated



                                                                 GFR is not appl

icable for



                                                                 dialysis patien

ts



 ID - EOOSpecimen slightly ictericPROTHROMBIN TIME/[id number] 
04:07:11





             Test Item    Value        Reference Range Interpretation Comments

 

             PROTIME (BEAKER) (test code = 29.3 seconds 11.9-14.2    H          

  



             759)                                                

 

             INR (BEAKER) (test code = 370) 2.88         <=5.90                 

   



RECOMMENDED COUMADIN/WARFARIN INR THERAPY RANGESSTANDARD DOSE: 2.0 - 3.0 
Includes: PROPHYLAXIS for venous thrombosis, systemic embolization; TREATMENT 
for venous thrombosis and/or pulmonary embolus.HIGH RISK: Target INR is 2.5-3.5 
for patients with mechanical heart valves.CBC W/PLT COUNT &amp; AUTO 
[id number] 04:01:43





             Test Item    Value        Reference Range Interpretation Comments

 

             WHITE BLOOD CELL COUNT (BEAKER) 3.4 K/ L     3.5-10.5     L        

    



             (test code = 775)                                        

 

             RED BLOOD CELL COUNT (BEAKER) 2.47 M/ L    4.63-6.08    L          

  



             (test code = 761)                                        

 

             HEMOGLOBIN (BEAKER) (test code = 8.1 GM/DL    13.7-17.5    L       

     



             410)                                                

 

             HEMATOCRIT (BEAKER) (test code = 24.9 %       40.1-51.0    L       

     



             411)                                                

 

             MEAN CORPUSCULAR VOLUME (BEAKER) 101 fL       79-92        H       

     



             (test code = 753)                                        

 

             MEAN CORPUSCULAR HEMOGLOBIN 32.8 pg      25.7-32.2    H            



             (BEAKER) (test code = 751)                                        

 

             MEAN CORPUSCULAR HEMOGLOBIN CONC 32.5 GM/DL   32.3-36.5            

     



             (BEAKER) (test code = 752)                                        

 

             RED CELL DISTRIBUTION WIDTH 20.9 %       11.6-14.4    H            



             (BEAKER) (test code = 412)                                        

 

             PLATELET COUNT (BEAKER) (test code 39 K/CU MM   150-450      L     

       



             = 756)                                              

 

             MEAN PLATELET VOLUME (BEAKER) 10.6 fL      9.4-12.4                

  



             (test code = 754)                                        

 

             NUCLEATED RED BLOOD CELLS (BEAKER) 0 /100 WBC   0-0                

       



             (test code = 413)                                        

 

             NEUTROPHILS RELATIVE PERCENT 47 %                                  

 



             (BEAKER) (test code = 429)                                        

 

             LYMPHOCYTES RELATIVE PERCENT 29 %                                  

 



             (BEAKER) (test code = 430)                                        

 

             MONOCYTES RELATIVE PERCENT 15 %                                   



             (BEAKER) (test code = 431)                                        

 

             EOSINOPHILS RELATIVE PERCENT 7 %                                   

 



             (BEAKER) (test code = 432)                                        

 

             BASOPHILS RELATIVE PERCENT 1 %                                    



             (BEAKER) (test code = 437)                                        

 

             NEUTROPHILS ABSOLUTE COUNT 1.59 K/ L    1.78-5.38    L            



             (BEAKER) (test code = 670)                                        

 

             LYMPHOCYTES ABSOLUTE COUNT 0.99 K/ L    1.32-3.57    L            



             (BEAKER) (test code = 414)                                        

 

             MONOCYTES ABSOLUTE COUNT (BEAKER) 0.51 K/ L    0.30-0.82           

      



             (test code = 415)                                        

 

             EOSINOPHILS ABSOLUTE COUNT 0.25 K/ L    0.04-0.54                 



             (BEAKER) (test code = 416)                                        

 

             BASOPHILS ABSOLUTE COUNT (BEAKER) 0.02 K/ L    0.01-0.08           

      



             (test code = 417)                                        

 

             IMMATURE GRANULOCYTES-RELATIVE 0.30 %       0.00-1.00              

   



             PERCENT (BEAKER) (test code =                                      

  



             2801)                                               



CBC W/PLT COUNT &amp; AUTO [id number] 05:07:41





             Test Item    Value        Reference Range Interpretation Comments

 

             WHITE BLOOD CELL COUNT 2.9 K/ L     3.5-10.5     L            



             (BEAKER) (test code =                                        



             775)                                                

 

             RED BLOOD CELL COUNT 2.58 M/ L    4.63-6.08    L            



             (BEAKER) (test code =                                        



             761)                                                

 

             HEMOGLOBIN (BEAKER) 8.3 GM/DL    13.7-17.5    L            



             (test code = 410)                                        

 

             HEMATOCRIT (BEAKER) 25.6 %       40.1-51.0    L            



             (test code = 411)                                        

 

             MEAN CORPUSCULAR 99 fL        79-92        H            Discordant 

results



             VOLUME (BEAKER) (test                                        compar

ed to previous,



             code = 753)                                         clinical correl

ation



                                                                 required.

 

             MEAN CORPUSCULAR 32.2 pg      25.7-32.2                 



             HEMOGLOBIN (BEAKER)                                        



             (test code = 751)                                        

 

             MEAN CORPUSCULAR 32.4 GM/DL   32.3-36.5                 



             HEMOGLOBIN CONC                                        



             (BEAKER) (test code =                                        



             752)                                                

 

             RED CELL DISTRIBUTION 20.5 %       11.6-14.4    H            



             WIDTH (BEAKER) (test                                        



             code = 412)                                         

 

             PLATELET COUNT 35 K/CU MM   150-450      L            



             (BEAKER) (test code =                                        



             756)                                                

 

             MEAN PLATELET VOLUME 11.6 fL      9.4-12.4                  



             (BEAKER) (test code =                                        



             754)                                                

 

             NUCLEATED RED BLOOD 0 /100 WBC   0-0                       



             CELLS (BEAKER) (test                                        



             code = 413)                                         

 

             NEUTROPHILS RELATIVE 48 %                                   



             PERCENT (BEAKER) (test                                        



             code = 429)                                         

 

             LYMPHOCYTES RELATIVE 27 %                                   



             PERCENT (BEAKER) (test                                        



             code = 430)                                         

 

             MONOCYTES RELATIVE 15 %                                   



             PERCENT (BEAKER) (test                                        



             code = 431)                                         

 

             EOSINOPHILS RELATIVE 9 %                                    



             PERCENT (BEAKER) (test                                        



             code = 432)                                         

 

             BASOPHILS RELATIVE 1 %                                    



             PERCENT (BEAKER) (test                                        



             code = 437)                                         

 

             NEUTROPHILS ABSOLUTE 1.38 K/ L    1.78-5.38    L            



             COUNT (BEAKER) (test                                        



             code = 670)                                         

 

             LYMPHOCYTES ABSOLUTE 0.77 K/ L    1.32-3.57    L            



             COUNT (BEAKER) (test                                        



             code = 414)                                         

 

             MONOCYTES ABSOLUTE 0.43 K/ L    0.30-0.82                 



             COUNT (BEAKER) (test                                        



             code = 415)                                         

 

             EOSINOPHILS ABSOLUTE 0.25 K/ L    0.04-0.54                 



             COUNT (BEAKER) (test                                        



             code = 416)                                         

 

             BASOPHILS ABSOLUTE 0.02 K/ L    0.01-0.08                 



             COUNT (BEAKER) (test                                        



             code = 417)                                         

 

             IMMATURE     1.00 %       0.00-1.00                 



             GRANULOCYTES-RELATIVE                                        



             PERCENT (BEAKER) (test                                        



             code = 2801)                                        



BASIC METABOLIC [id number] 05:03:03





             Test Item    Value        Reference Range Interpretation Comments

 

             SODIUM (BEAKER) 137 meq/L    136-145                   



             (test code = 381)                                        

 

             POTASSIUM    3.8 meq/L    3.5-5.1                   



             (BEAKER) (test                                        



             code = 379)                                         

 

             CHLORIDE (BEAKER) 109 meq/L           H            



             (test code = 382)                                        

 

             CO2 (BEAKER) 21 meq/L     22-29        L            



             (test code = 355)                                        

 

             BLOOD UREA   5 mg/dL      7-21         L            



             NITROGEN (BEAKER)                                        



             (test code = 354)                                        

 

             CREATININE   0.53 mg/dL   0.57-1.25    L            



             (BEAKER) (test                                        



             code = 358)                                         

 

             GLUCOSE RANDOM 93 mg/dL                         



             (BEAKER) (test                                        



             code = 652)                                         

 

             CALCIUM (BEAKER) 7.3 mg/dL    8.4-10.2     L            



             (test code = 697)                                        

 

             EGFR (BEAKER) 116                                     Interpretatio

n of eGFR



             (test code = mL/min/1.73                            values Stage De

scription



             1092)        sq m                                   Result G1 Shaunna

l or high



                                                                 >=90 G2 Mildly 

decreased



                                                                 60-89 G3a  Mild

ly to



                                                                 moderately 45-5

9 G3b



                                                                 Moderately to s

everely



                                                                 30-44 G4 Severl

y decreased



                                                                 15-29 G5 Kidney

 failure



                                                                 <15Reported eGF

R is based



                                                                 on the CKD-EPI 





                                                                 equation that d

oes not use



                                                                 a race



                                                                 coefficientEsti

mated GFR



                                                                 is not as accur

ate as



                                                                 Creatinine Tabitha

cynthia in



                                                                 predicting glom

erular



                                                                 filtration rate

. Estimated



                                                                 GFR is not appl

icable for



                                                                 dialysis patien

ts



 ID - MARCOSpecimen slightly ictericPROTHROMBIN TIME/[id number] 
04:48:25





             Test Item    Value        Reference Range Interpretation Comments

 

             PROTIME (KHLOE) (test code = 29.4 seconds 11.9-14.2    H          

  



             759)                                                

 

             INR (ANDREWAKER) (test code = 370) 3.01         <=5.90                 

   



RECOMMENDED COUMADIN/WARFARIN INR THERAPY RANGESSTANDARD DOSE: 2.0 - 3.0 
Includes: PROPHYLAXIS for venous thrombosis, systemic embolization; TREATMENT 
for venous thrombosis and/or pulmonary embolus.HIGH RISK: Target INR is 2.5-3.5 
for patients with mechanical heart valves.CTA BRAIN2023-07-15 18:18:46
************************************************************[facility]ENTERName: [facility] : 1970 Sex: 
M************************************************************CTA BRAINBRAIN CT 
WITHOUT CONTRASTINDICATION: Unlisted Reason for Exameval for mycotic aneurysm 
givencerebral infectious lesionsCOMPARISON: CT head of the same 
dateTECHNIQUE:Rapid acquisition spiral images were obtained between the aortic 
archand the cranial vertex during intravenous contrast infusiontoreconstruct 
axial images and angiographic 3D maximum intensityprojections (MIP). 3-D 
volumetric reformatted images were created at [facility] workstation. 
Precontrast images of the brain were alsoobtained. DOSE REDUCTION: Dose 
modulation, iterative reconstruction, and/orweight-based adjustment of the mA/kV
 was utilized to reduce theradiation dose to as low as reasonably 
achievable.FINDINGS:NECT BRAIN:No intracranial hemorrhage, midline shift or mass
 effect. Midlinestructures are normally developed. Mild chronic microvascular 
ischemicchanges of the periventricular and subcortical white matter are p
resent.No hydrocephalus.Orbits are within normal limits.No obstructive paranasal
 sinus disease.CTA BRAIN:Developmental venous anomaly within the left parietal 
lobe.A tortuous vessel with compression plate enhancement of the rightparamedian
 frontal lobe (coronal image 36) may represent a seconddevelopmental venous 
anomaly, although, small dural arteriovenousfistula be an additional 
consideration.No aneurysm identified.Internal carotid arteries: Petrous, 
cavernous and supraclinoid portionspatent. Middle cerebral arteries: Bilateral 
MCA M1-M2 branches demonstratenormal contrast enhancement.Anterior cerebral 
arteries: Bilateral IGLESIA A1-A2 branches demonstratenormal contrast 
enhancement.Basilar system: Normal contrast opacification of the 
vertebrobasilarsystem.Posterior cerebral arteries: Normal contrast opacification
 of thebilateral PCA P1-P2 branches.Venous opacification: Major dural sinuses 
unremarkable for bolus timing.Additional findings: None.IMPRESSION:1. 
Developmental venous anomaly within the left parietal lobe.2. A tortuous vessel 
with compression plate enhancement of the rightparamedian frontal lobe (coronal 
image 36) may represent a seconddevelopmental venous anomaly, although, small 
dural arteriovenousfistula be an additional consideration.3. No aneurysm 
identified.Electronically Signed By: Anisha Matamoros07/15/2023 18:21 
CDTWorkstation Name: [id number] ABDOMEN/PELVIS WITH IV CONTRAST2023-07-15 
09:10:34************************************************************[facility] 1970 Sex: 
M************************************************************TECHNIQUE: CT of 
the chest, abdomen, and pelvis WITH intravenouscontrast and WITHOUT oral 
contrast. Dose modulation, iterativereconstruction, and/or weight-based 
adjustment of the mA/kV was utilizedto reduce the radiation dose to as low as 
reasonably achievable.INDICATION: Unlisted Reason for ExamEvaluate for septic 
emboli.COMPARISON: None.FINDINGS:LINES/TUBES: None.LUNGS AND AIRWAYS: Mild 
bibasilar atelectasis. Patchy opacity of theright apex. A left upper lobe 
pulmonary nodule on axial image 28measures 0.6 cm.PLEURA: Trace bilateral 
pleural effusions.HEART AND MEDIASTINUM: The visualized thyroid gland is normal.
 Nosignificant mediastinal, hilar, or axillary lymphadenopathy. 
Mildcalcification of the aortic annulus. Moderate calcification of theaortic 
arch. The left atrium is dilated.HEPATOBILIARY: No focal hepatic lesions. 
Hyperdense material layers inthe gallbladder. Gallbladder is mildly distended. 
No biliary ductaldilatation.SPLEEN: 16.5 cm splenomegaly.PANCREAS: No focal 
masses or ductal dilatation.ADRENALS: No adrenal nodules.KIDNEYS/URETERS: No 
hydronephrosis, stones, or masses.PELVIC ORGANS/BLADDER: 
Unremarkable.PERITONEUM/RETROPERITONEUM: Moderate volume ascites. No free 
air.LYMPH NODES: No lymphadenopathy.VESSELS: Moderate sized paraesophageal 
varices. The main portal vein ispatent and measures 1.5 cm in diameter. Mild 
aortoiliac calcification.GI TRACT: No distention or wall thickening. The 
appendix is normal.BONES AND SOFT TISSUES: Diffuse anasarca. Marked bilateral 
gynecomastia.Acute, mildly displaced comminuted left medial clavicle fracture 
leftmedial clavicle fracture. Acute,nondisplaced left posterior 11th [id number] rib
 fractures. There are combination of multiple bilateral old,healed and subacute 
rib fractures. Old, healed left L1 transverseprocess fracture. Old, unhealed l
eft L2 transverse process fracture.Old, healed left L3 transverse process 
fracture. Subacute, unhealed leftL4 transverse process fracture.IMPRESSION:1. 
The patchy opacities in the right apex are concerning for infection.These 
findings are not typical for septic pulmonary emboli.2. Acute, mildly displaced,
 and comminuted left medial claviclefracture.3. Acute, nondisplaced left 
posterior 11th and 12th rib fractures.4. Cirrhosis with sequelae of portal 
hypertension including moderatesized paraesophageal varices, moderate 
splenomegaly, and moderate volumeascites.5. Sludge and/or stones in the 
gallbladder. The gallbladder distentionis nonspecific and could be due to 
fasting.6. A left upper lobe pulmonary nodule measures 0.6 cm. A follow-upchest 
CT is recommended in 6-12 months. An additional chest CT at 18-24months is 
recommended if the patient is at increased risk of lungmalignancy. If the 
patient is not at increased risk of lung malignancy,the 18-24 [address] chest CT is 
optional. These follow-up recommendationsare per the Fleischner Society 
Guidelines from 2017.Electronically Signed By: Tonny Claire07/15/2023 09:12 
CDTWorkstation Name: [id number] CHEST WITH IV CONTRAST2023-07-15 09:10:34
************************************************************[facility] : 1970 Sex: 
M************************************************************TECHNIQUE: CT of 
the chest, abdomen, and pelvis WITH intravenouscontrast and WITHOUT oral 
contrast. Dose modulation, iterativereconstruction, and/or weight-based 
adjustment of the mA/kV was utilizedto reduce the radiation dose to as low as 
reasonably achievable.INDICATION: Unlisted Reason for ExamEvaluate for septic 
emboli.COMPARISON: None.FINDINGS:LINES/TUBES: None.LUNGS AND AIRWAYS: Mild 
bibasilar atelectasis. Patchy opacity of theright apex. A left upper lobe 
pulmonary nodule on axial image 28measures 0.6 cm.PLEURA: Trace bilateral 
pleural effusions.HEART AND MEDIASTINUM: The visualized thyroid gland is normal.
 Nosignificant mediastinal, hilar, or axillary lymphadenopathy. 
Mildcalcification of the aortic annulus. Moderate calcification of theaortic 
arch. The left atrium is dilated.HEPATOBILIARY: No focal hepatic lesions. 
Hyperdense material layers inthe gallbladder. Gallbladder is mildly distended. 
No biliary ductaldilatation.SPLEEN: 16.5 cm splenomegaly.PANCREAS: No focal 
masses or ductal dilatation.ADRENALS: No adrenal nodules.KIDNEYS/URETERS: No 
hydronephrosis, stones, or masses.PELVIC ORGANS/BLADDER: 
Unremarkable.PERITONEUM/RETROPERITONEUM: Moderate volume ascites. No free 
air.LYMPH NODES: No lymphadenopathy.VESSELS: Moderate sized paraesophageal 
varices. The main portal vein ispatent and measures 1.5 cm in diameter. Mild 
aortoiliac calcification.GI TRACT: No distention or wall thickening. The 
appendix is normal.BONES AND SOFT TISSUES: Diffuse anasarca. Marked bilateral 
gynecomastia.Acute, mildly displaced comminuted left medial clavicle fracture 
leftmedial clavicle fracture. Acute,nondisplaced left posterior 11th [id number] rib
 fractures. There are combination of multiple bilateral old,healed and subacute 
rib fractures. Old, healed left L1 transverseprocess fracture. Old, unhealed l
eft L2 transverse process fracture.Old, healed left L3 transverse process 
fracture. Subacute, unhealed leftL4 transverse process fracture.IMPRESSION:1. 
The patchy opacities in the right apex are concerning for infection.These 
findings are not typical for septic pulmonary emboli.2. Acute, mildly displaced,
 and comminuted left medial claviclefracture.3. Acute, nondisplaced left 
posterior 11th and 12th rib fractures.4. Cirrhosis with sequelae of portal 
hypertension including moderatesized paraesophageal varices, moderate 
splenomegaly, and moderate volumeascites.5. Sludge and/or stones in the 
gallbladder. The gallbladder distentionis nonspecific and could be due to 
fasting.6. A left upper lobe pulmonary nodule measures 0.6 cm. A follow-upchest 
CT is recommended in 6-12 months. An additional chest CT at 18-24months is 
recommended if the patient is at increased risk of lungmalignancy. If the 
patient is not at increased risk of lung malignancy,the 18-24 month chest CT is 
optional. These follow-up recommendationsare per the Fleischner Society 
Guidelines from 2017.Electronically Signed By: Tonny Claire07/15/2023 09:12 
CDTWorkstation Name: [id number] METABOLIC PANEL2023-07-15 05:15:08





             Test Item    Value        Reference Range Interpretation Comments

 

             SODIUM (BEAKER) 135 meq/L    136-145      L            



             (test code = 381)                                        

 

             POTASSIUM    3.5 meq/L    3.5-5.1                   



             (BEAKER) (test                                        



             code = 379)                                         

 

             CHLORIDE (BEAKER) 108 meq/L           H            



             (test code = 382)                                        

 

             CO2 (BEAKER) 23 meq/L     22-29                     



             (test code = 355)                                        

 

             BLOOD UREA   5 mg/dL      7-21         L            



             NITROGEN (BEAKER)                                        



             (test code = 354)                                        

 

             CREATININE   0.60 mg/dL   0.57-1.25                 



             (BEAKER) (test                                        



             code = 358)                                         

 

             GLUCOSE RANDOM 126 mg/dL           H            



             (BEAKER) (test                                        



             code = 652)                                         

 

             CALCIUM (BEAKER) 7.1 mg/dL    8.4-10.2     L            



             (test code = 697)                                        

 

             EGFR (BEAKER) 113                                     Interpretatio

n of eGFR



             (test code = mL/min/1.73                            values Stage De

scription



             1092)        sq m                                   Result G1 Shaunna

l or high



                                                                 >=90 G2 Mildly 

decreased



                                                                 60-89 G3a Mildl

y to



                                                                 moderately 45-5

9 G3b



                                                                 Moderately to s

everely



                                                                 30-44 G4 Severl

y decreased



                                                                 15-29 G5 Kidney

 failure



                                                                 <15Reported eGF

R is based



                                                                 on the CKD-EPI 





                                                                 equation that d

oes not use



                                                                 a race



                                                                 coefficientEsti

mated GFR



                                                                 is not as accur

ate as



                                                                 Creatinine Tabitha

cynthia in



                                                                 predicting glom

erular



                                                                 filtration rate

. Estimated



                                                                 GFR is not appl

icable for



                                                                 dialysis patien

ts



Specimen slightly ictericCBC W/PLT COUNT &amp; AUTO DIFFERENTIAL2023-07-15 
05:06:22





             Test Item    Value        Reference Range Interpretation Comments

 

             WHITE BLOOD CELL COUNT 2.8 K/ L     3.5-10.5     L            



             (BEAKER) (test code =                                        



             775)                                                

 

             RED BLOOD CELL COUNT 2.50 M/ L    4.63-6.08    L            



             (BEAKER) (test code =                                        



             761)                                                

 

             HEMOGLOBIN (BEAKER) 8.0 GM/DL    13.7-17.5    L            



             (test code = 410)                                        

 

             HEMATOCRIT (BEAKER) 25.8 %       40.1-51.0    L            



             (test code = 411)                                        

 

             MEAN CORPUSCULAR 103 fL       79-92        H            Discordant 

results



             VOLUME (BEAKER) (test                                        compar

ed to previous,



             code = 753)                                         clinical correl

ation



                                                                 required.

 

             MEAN CORPUSCULAR 32.0 pg      25.7-32.2                 



             HEMOGLOBIN (BEAKER)                                        



             (test code = 751)                                        

 

             MEAN CORPUSCULAR 31.0 GM/DL   32.3-36.5    L            



             HEMOGLOBIN CONC                                        



             (BEAKER) (test code =                                        



             752)                                                

 

             RED CELL DISTRIBUTION 20.0 %       11.6-14.4    H            



             WIDTH (BEAKER) (test                                        



             code = 412)                                         

 

             PLATELET COUNT 36 K/CU MM   150-450      L            



             (BEAKER) (test code =                                        



             756)                                                

 

             MEAN PLATELET VOLUME                                        Unable 

to report due



             (BEAKER) (test code =                                        to abn

ormal Platelet



             754)                                                population



                                                                 distribution.

 

             NUCLEATED RED BLOOD 0 /100 WBC   0-0                       



             CELLS (BEAKER) (test                                        



             code = 413)                                         

 

             NEUTROPHILS RELATIVE 52 %                                   



             PERCENT (BEAKER) (test                                        



             code = 429)                                         

 

             LYMPHOCYTES RELATIVE 24 %                                   



             PERCENT (BEAKER) (test                                        



             code = 430)                                         

 

             MONOCYTES RELATIVE 16 %                                   



             PERCENT (BEAKER) (test                                        



             code = 431)                                         

 

             EOSINOPHILS RELATIVE 7 %                                    



             PERCENT (BEAKER) (test                                        



             code = 432)                                         

 

             BASOPHILS RELATIVE 1 %                                    



             PERCENT (BEAKER) (test                                        



             code = 437)                                         

 

             NEUTROPHILS ABSOLUTE 1.46 K/ L    1.78-5.38    L            



             COUNT (BEAKER) (test                                        



             code = 670)                                         

 

             LYMPHOCYTES ABSOLUTE 0.67 K/ L    1.32-3.57    L            



             COUNT (BEAKER) (test                                        



             code = 414)                                         

 

             MONOCYTES ABSOLUTE 0.45 K/ L    0.30-0.82                 



             COUNT (BEAKER) (test                                        



             code = 415)                                         

 

             EOSINOPHILS ABSOLUTE 0.19 K/ L    0.04-0.54                 



             COUNT (BEAKER) (test                                        



             code = 416)                                         

 

             BASOPHILS ABSOLUTE 0.02 K/ L    0.01-0.08                 



             COUNT (BEAKER) (test                                        



             code = 417)                                         

 

             IMMATURE     1.10 %       0.00-1.00    H            



             GRANULOCYTES-RELATIVE                                        



             PERCENT (BEAKER) (test                                        



             code = 2801)                                        



PROTHROMBIN TIME/INR2023-07-15 05:03:52





             Test Item    Value        Reference Range Interpretation Comments

 

             PROTIME (BEAKER) (test code = 31.1 seconds 11.9-14.2    H          

  



             759)                                                

 

             INR (BEAKER) (test code = 370) 3.24         <=5.90                 

   



RECOMMENDED COUMADIN/WARFARIN INR THERAPY RANGESSTANDARD DOSE: 2.0 - 3.0 
Includes: PROPHYLAXIS for venous thrombosis, systemic embolization; TREATMENT 
for venous thrombosis and/or pulmonary embolus.HIGH RISK: Target INR is 2.5-3.5 
for patients with mechanical heart valves.BASIC METABOLIC [id number] 
04:24:16





             Test Item    Value        Reference Range Interpretation Comments

 

             SODIUM (BEAKER) 133 meq/L    136-145      L            



             (test code = 381)                                        

 

             POTASSIUM    4.8 meq/L    3.5-5.1                   Specimen modera

tely



             (BEAKER) (test                                        hemolyzed



             code = 379)                                         

 

             CHLORIDE (BEAKER) 108 meq/L           H            



             (test code = 382)                                        

 

             CO2 (BEAKER) 22 meq/L     22-29                     



             (test code = 355)                                        

 

             BLOOD UREA   7 mg/dL      7-21                      



             NITROGEN (BEAKER)                                        



             (test code = 354)                                        

 

             CREATININE   0.63 mg/dL   0.57-1.25                 Specimen modera

tely



             (BEAKER) (test                                        hemolyzed



             code = 358)                                         

 

             GLUCOSE RANDOM 97 mg/dL                         



             (BEAKER) (test                                        



             code = 652)                                         

 

             CALCIUM (BEAKER) 7.1 mg/dL    8.4-10.2     L            



             (test code = 697)                                        

 

             EGFR (BEAKER) 111                                     Interpretatio

n of eGFR



             (test code = mL/min/1.73                            values Stage De

scription



             1092)        sq m                                   Result G1 Shaunna

l or high



                                                                 >=90 G2 Mildly 

decreased



                                                                 60-89 G3a Mildl

y to



                                                                 moderately 45-5

9 G3b



                                                                 Moderately to s

everely



                                                                 30-44 G4 Severl

y decreased



                                                                 15-29 G5 Kidney

 failure



                                                                 <15Reported eGF

R is based



                                                                 on the CKD-EPI 





                                                                 equation that d

oes not use



                                                                 a race



                                                                 coefficientEsti

mated GFR



                                                                 is not as accur

ate as



                                                                 Creatinine Tabitha marie in



                                                                 predicting glom

erular



                                                                 filtration rate

. Estimated



                                                                 GFR is not appl

icable for



                                                                 dialysis patien

ts



 ID - MMSpecimen slightly ictericCBC W/PLT COUNT &amp; AUTO DIFFERENTIAL
2023 03:47:16





             Test Item    Value        Reference Range Interpretation Comments

 

             WHITE BLOOD CELL COUNT (BEAKER) 2.6 K/ L     3.5-10.5     L        

    



             (test code = 775)                                        

 

             RED BLOOD CELL COUNT (BEAKER) 2.59 M/ L    4.63-6.08    L          

  



             (test code = 761)                                        

 

             HEMOGLOBIN (BEAKER) (test code = 8.2 GM/DL    13.7-17.5    L       

     



             410)                                                

 

             HEMATOCRIT (BEAKER) (test code = 25.4 %       40.1-51.0    L       

     



             411)                                                

 

             MEAN CORPUSCULAR VOLUME (BEAKER) 98 fL        79-92        H       

     



             (test code = 753)                                        

 

             MEAN CORPUSCULAR HEMOGLOBIN 31.7 pg      25.7-32.2                 



             (BEAKER) (test code = 751)                                        

 

             MEAN CORPUSCULAR HEMOGLOBIN CONC 32.3 GM/DL   32.3-36.5            

     



             (BEAKER) (test code = 752)                                        

 

             RED CELL DISTRIBUTION WIDTH 19.2 %       11.6-14.4    H            



             (BEAKER) (test code = 412)                                        

 

             PLATELET COUNT (BEAKER) (test code 35 K/CU MM   150-450      L     

       



             = 756)                                              

 

             MEAN PLATELET VOLUME (BEAKER) 11.8 fL      9.4-12.4                

  



             (test code = 754)                                        

 

             NUCLEATED RED BLOOD CELLS (BEAKER) 0 /100 WBC   0-0                

       



             (test code = 413)                                        

 

             NEUTROPHILS RELATIVE PERCENT 43 %                                  

 



             (BEAKER) (test code = 429)                                        

 

             LYMPHOCYTES RELATIVE PERCENT 30 %                                  

 



             (BEAKER) (test code = 430)                                        

 

             MONOCYTES RELATIVE PERCENT 19 %                                   



             (BEAKER) (test code = 431)                                        

 

             EOSINOPHILS RELATIVE PERCENT 7 %                                   

 



             (BEAKER) (test code = 432)                                        

 

             BASOPHILS RELATIVE PERCENT 0 %                                    



             (BEAKER) (test code = 437)                                        

 

             NEUTROPHILS ABSOLUTE COUNT 1.13 K/ L    1.78-5.38    L            



             (BEAKER) (test code = 670)                                        

 

             LYMPHOCYTES ABSOLUTE COUNT 0.78 K/ L    1.32-3.57    L            



             (BEAKER) (test code = 414)                                        

 

             MONOCYTES ABSOLUTE COUNT (BEAKER) 0.49 K/ L    0.30-0.82           

      



             (test code = 415)                                        

 

             EOSINOPHILS ABSOLUTE COUNT 0.17 K/ L    0.04-0.54                 



             (BEAKER) (test code = 416)                                        

 

             BASOPHILS ABSOLUTE COUNT (BEAKER) 0.01 K/ L    0.01-0.08           

      



             (test code = 417)                                        

 

             IMMATURE GRANULOCYTES-RELATIVE 1.10 %       0.00-1.00    H         

   



             PERCENT (BEAKER) (test code =                                      

  



             2801)                                               



PROTHROMBIN [id number] 03:31:31





             Test Item    Value        Reference Range Interpretation Comments

 

             PROTIME (BEAKER) (test code = 30.8 seconds 11.9-14.2    H          

  



             759)                                                

 

             INR (BEAKER) (test code = 370) 3.20         <=5.90                 

   



RECOMMENDED COUMADIN/WARFARIN INR THERAPY RANGESSTANDARD DOSE: 2.0 - 3.0 
Includes: PROPHYLAXIS for venous thrombosis, systemic embolization; TREATMENT 
for venous thrombosis and/or pulmonary embolus.HIGH RISK: Target INR is 2.5-3.5 
for patients with mechanical heart valves.CBC W/PLT COUNT &amp; AUTO 
[id number] 05:56:44





             Test Item    Value        Reference Range Interpretation Comments

 

             WHITE BLOOD CELL COUNT (BEAKER) 3.0 K/ L     3.5-10.5     L        

    



             (test code = 775)                                        

 

             RED BLOOD CELL COUNT (BEAKER) 2.91 M/ L    4.63-6.08    L          

  



             (test code = 761)                                        

 

             HEMOGLOBIN (BEAKER) (test code = 9.1 GM/DL    13.7-17.5    L       

     



             410)                                                

 

             HEMATOCRIT (BEAKER) (test code = 28.5 %       40.1-51.0    L       

     



             411)                                                

 

             MEAN CORPUSCULAR VOLUME (BEAKER) 98 fL        79-92        H       

     



             (test code = 753)                                        

 

             MEAN CORPUSCULAR HEMOGLOBIN 31.3 pg      25.7-32.2                 



             (BEAKER) (test code = 751)                                        

 

             MEAN CORPUSCULAR HEMOGLOBIN CONC 31.9 GM/DL   32.3-36.5    L       

     



             (BEAKER) (test code = 752)                                        

 

             RED CELL DISTRIBUTION WIDTH 18.9 %       11.6-14.4    H            



             (BEAKER) (test code = 412)                                        

 

             PLATELET COUNT (BEAKER) (test code 40 K/CU MM   150-450      L     

       



             = 756)                                              

 

             MEAN PLATELET VOLUME (BEAKER) 12.7 fL      9.4-12.4     H          

  



             (test code = 754)                                        

 

             NUCLEATED RED BLOOD CELLS (BEAKER) 0 /100 WBC   0-0                

       



             (test code = 413)                                        

 

             NEUTROPHILS RELATIVE PERCENT 45 %                                  

 



             (BEAKER) (test code = 429)                                        

 

             LYMPHOCYTES RELATIVE PERCENT 31 %                                  

 



             (BEAKER) (test code = 430)                                        

 

             MONOCYTES RELATIVE PERCENT 18 %                                   



             (BEAKER) (test code = 431)                                        

 

             EOSINOPHILS RELATIVE PERCENT 5 %                                   

 



             (BEAKER) (test code = 432)                                        

 

             BASOPHILS RELATIVE PERCENT 0 %                                    



             (BEAKER) (test code = 437)                                        

 

             NEUTROPHILS ABSOLUTE COUNT 1.34 K/ L    1.78-5.38    L            



             (BEAKER) (test code = 670)                                        

 

             LYMPHOCYTES ABSOLUTE COUNT 0.91 K/ L    1.32-3.57    L            



             (BEAKER) (test code = 414)                                        

 

             MONOCYTES ABSOLUTE COUNT (BEAKER) 0.53 K/ L    0.30-0.82           

      



             (test code = 415)                                        

 

             EOSINOPHILS ABSOLUTE COUNT 0.16 K/ L    0.04-0.54                 



             (BEAKER) (test code = 416)                                        

 

             BASOPHILS ABSOLUTE COUNT (BEAKER) 0.01 K/ L    0.01-0.08           

      



             (test code = 417)                                        

 

             IMMATURE GRANULOCYTES-RELATIVE 0.70 %       0.00-1.00              

   



             PERCENT (BEAKER) (test code =                                      

  



             2801)                                               



BASIC METABOLIC [id number] 05:36:15





             Test Item    Value        Reference Range Interpretation Comments

 

             SODIUM (BEAKER) 135 meq/L    136-145      L            



             (test code = 381)                                        

 

             POTASSIUM    3.4 meq/L    3.5-5.1      L            



             (BEAKER) (test                                        



             code = 379)                                         

 

             CHLORIDE (BEAKER) 107 meq/L                        



             (test code = 382)                                        

 

             CO2 (BEAKER) 22 meq/L     22-29                     



             (test code = 355)                                        

 

             BLOOD UREA   7 mg/dL      7-21                      



             NITROGEN (BEAKER)                                        



             (test code = 354)                                        

 

             CREATININE   0.58 mg/dL   0.57-1.25                 



             (BEAKER) (test                                        



             code = 358)                                         

 

             GLUCOSE RANDOM 95 mg/dL                         



             (BEAKER) (test                                        



             code = 652)                                         

 

             CALCIUM (BEAKER) 7.4 mg/dL    8.4-10.2     L            



             (test code = 697)                                        

 

             EGFR (BEAKER) 113                                     Interpretatio

n of eGFR



             (test code = mL/min/1.73                            values Stage De

scription



             1092)        sq m                                   Result G1 Shaunna

l or high



                                                                 >=90 G2 Mildly 

decreased



                                                                 60-89 G3a Mildl

y to



                                                                 moderately 45-5

9 G3b



                                                                 Moderately to s

everely



                                                                 30-44 G4 Severl

y decreased



                                                                 15-29 G5 Kidney

 failure



                                                                 <15Reported eGF

R is based



                                                                 on the CKD-EPI 





                                                                 equation that d

oes not use



                                                                 a race



                                                                 coefficientEsti

mated GFR



                                                                 is not as accur

ate as



                                                                 Creatinine Tabitha marie in



                                                                 predicting glom

erular



                                                                 filtration rate

. Estimated



                                                                 GFR is not appl

icable for



                                                                 dialysis patien

ts



 ID - ADMINSpecimen slightly ictericAFB CULTURE + SMEAR (NON-SPUTUM)
2023 08:41:19





             Test Item    Value        Reference Range Interpretation Comments

 

             CULTURE (BEAKER) (test No acid-fast bacilli                        

   



             code = 1095) isolated in 42 days                           

 

             AFB SMEAR (BEAKER) No acid fast bacilli                           



             (test code = 994) seen                                   



CBC (HEMOGRAM ONLY)2023 06:00:39





             Test Item    Value        Reference Range Interpretation Comments

 

             WHITE BLOOD CELL COUNT (BEAKER) 2.9 K/ L     3.5-10.5     L        

    



             (test code = 775)                                        

 

             RED BLOOD CELL COUNT (BEAKER) 2.93 M/ L    4.63-6.08    L          

  



             (test code = 761)                                        

 

             HEMOGLOBIN (BEAKER) (test code = 9.3 GM/DL    13.7-17.5    L       

     



             410)                                                

 

             HEMATOCRIT (BEAKER) (test code = 29.0 %       40.1-51.0    L       

     



             411)                                                

 

             MEAN CORPUSCULAR VOLUME (BEAKER) 99 fL        79-92        H       

     



             (test code = 753)                                        

 

             MEAN CORPUSCULAR HEMOGLOBIN 31.7 pg      25.7-32.2                 



             (BEAKER) (test code = 751)                                        

 

             MEAN CORPUSCULAR HEMOGLOBIN CONC 32.1 GM/DL   32.3-36.5    L       

     



             (BEAKER) (test code = 752)                                        

 

             RED CELL DISTRIBUTION WIDTH 19.0 %       11.6-14.4    H            



             (BEAKER) (test code = 412)                                        

 

             PLATELET COUNT (BEAKER) (test code 35 K/CU MM   150-450      L     

       



             = 756)                                              

 

             MEAN PLATELET VOLUME (BEAKER) 11.5 fL      9.4-12.4                

  



             (test code = 754)                                        

 

             NUCLEATED RED BLOOD CELLS (BEAKER) 1 /100 WBC   0-0          H     

       



             (test code = 413)                                        



COMPREHENSIVE METABOLIC [id number] 05:43:41





             Test Item    Value        Reference Range Interpretation Comments

 

             TOTAL PROTEIN 4.7 gm/dL    6.0-8.3      L            



             (BEAKER) (test                                        



             code = 770)                                         

 

             ALBUMIN (BEAKER) 2.1 g/dL     3.5-5.0      L            



             (test code = 1145)                                        

 

             ALKALINE     83 U/L                           



             PHOSPHATASE                                         



             (BEAKER) (test                                        



             code = 346)                                         

 

             BILIRUBIN TOTAL 3.2 mg/dL    0.2-1.2      H            



             (BEAKER) (test                                        



             code = 377)                                         

 

             SODIUM (BEAKER) 135 meq/L    136-145      L            



             (test code = 381)                                        

 

             POTASSIUM (BEAKER) 3.6 meq/L    3.5-5.1                   



             (test code = 379)                                        

 

             CHLORIDE (BEAKER) 109 meq/L           H            



             (test code = 382)                                        

 

             CO2 (BEAKER) (test 20 meq/L     22-29        L            



             code = 355)                                         

 

             BLOOD UREA   5 mg/dL      7-21         L            



             NITROGEN (BEAKER)                                        



             (test code = 354)                                        

 

             CREATININE   0.57 mg/dL   0.57-1.25                 



             (BEAKER) (test                                        



             code = 358)                                         

 

             GLUCOSE RANDOM 86 mg/dL                         



             (BEAKER) (test                                        



             code = 652)                                         

 

             CALCIUM (BEAKER) 7.3 mg/dL    8.4-10.2     L            



             (test code = 697)                                        

 

             AST (SGOT)   17 U/L       5-34                      



             (BEAKER) (test                                        



             code = 353)                                         

 

             ALT (SGPT)   7 U/L        6-55                      



             (BEAKER) (test                                        



             code = 347)                                         

 

             EGFR (BEAKER) 114                                     Interpretatio

n of eGFR



             (test code = 1092) mL/min/1.73                            values St

age Description



                          sq m                                   Result G1 Shaunna

l or high



                                                                 >=90 G2  Mildly

 decreased



                                                                 60-89 G3a Mildl

y to



                                                                 moderately 45-5

9 G3b



                                                                 Moderately to s

everely



                                                                 30-44 G4 Severl

y decreased



                                                                 15-29 G5 Kidney

 failure



                                                                 <15Reported eGF

R is based



                                                                 on the CKD-EPI 

2021



                                                                 equation that d

oes not use



                                                                 a race



                                                                 coefficientEsti

mated GFR



                                                                 is not as accur

ate as



                                                                 Creatinine Tabitha

cynthia in



                                                                 predicting glom

erular



                                                                 filtration rate

. Estimated



                                                                 GFR is not appl

icable for



                                                                 dialysis patien

ts



 ID - WILLIAM WSpecimen slightly [id number] 05:31:23





             Test Item    Value        Reference Range Interpretation Comments

 

             MAGNESIUM (BEAKER) (test code = 1.5 mg/dL    1.6-2.6      L        

    



             627)                                                



 JANIE THOMAS WPROTHROMBIN [id number] 05:04:34





             Test Item    Value        Reference Range Interpretation Comments

 

             PROTIME (BEAKER) (test code = 32.7 seconds 11.9-14.2    H          

  



             759)                                                

 

             INR (BEAKER) (test code = 370) 3.45         <=5.90                 

   



RECOMMENDED COUMADIN/WARFARIN INR THERAPY RANGESSTANDARD DOSE: 2.0 - 3.0 
Includes: PROPHYLAXIS for venous thrombosis, systemic embolization; TREATMENT 
for venous thrombosis and/or pulmonary embolus.HIGH RISK: Target INR is 2.5-3.5 
for patients with mechanical heart valves.BLOOD [id number] 05:00:09





             Test Item    Value        Reference Range Interpretation Comments

 

             CULTURE (BEAKER) (test No growth in 5 days                         

  



             code = 1095)                                        



The specimen volume collected for this blood culture was below the optimum (10 
mL per bottle or 20 mL total). Use of lower volumes may adversely affect 
recovery and/or detection times of some organisms.BLOOD [id number] 
05:00:08





             Test Item    Value        Reference Range Interpretation Comments

 

             CULTURE (BEAKER) (test No growth in 5 days                         

  



             code = 1095)                                        



CRYPTOCOCCAL [id number] 15:06:27





             Test Item    Value        Reference Range Interpretation Comments

 

             CRYPTOCOCCAL ANTIGEN, SERUM Negative     Negative, Interference    

          



             (BEAKER) (test code = 1828)                                        



COMPREHENSIVE METABOLIC [id number] 12:41:45





             Test Item    Value        Reference Range Interpretation Comments

 

             TOTAL PROTEIN 4.7 gm/dL    6.0-8.3      L            



             (BEAKER) (test                                        



             code = 770)                                         

 

             ALBUMIN (BEAKER) 2.1 g/dL     3.5-5.0      L            



             (test code = 1145)                                        

 

             ALKALINE     77 U/L                           



             PHOSPHATASE                                         



             (BEAKER) (test                                        



             code = 346)                                         

 

             BILIRUBIN TOTAL 3.3 mg/dL    0.2-1.2      H            



             (BEAKER) (test                                        



             code = 377)                                         

 

             SODIUM (BEAKER) 136 meq/L    136-145                   



             (test code = 381)                                        

 

             POTASSIUM (BEAKER) 3.4 meq/L    3.5-5.1      L            



             (test code = 379)                                        

 

             CHLORIDE (BEAKER) 109 meq/L           H            



             (test code = 382)                                        

 

             CO2 (BEAKER) (test 21 meq/L     22-29        L            



             code = 355)                                         

 

             BLOOD UREA   3 mg/dL      7-21         L            



             NITROGEN (BEAKER)                                        



             (test code = 354)                                        

 

             CREATININE   0.58 mg/dL   0.57-1.25                 



             (BEAKER) (test                                        



             code = 358)                                         

 

             GLUCOSE RANDOM 98 mg/dL                         



             (BEAKER) (test                                        



             code = 652)                                         

 

             CALCIUM (BEAKER) 7.1 mg/dL    8.4-10.2     L            



             (test code = 697)                                        

 

             AST (SGOT)   19 U/L       5-34                      



             (BEAKER) (test                                        



             code = 353)                                         

 

             ALT (SGPT)   8 U/L        6-55                      



             (BEAKER) (test                                        



             code = 347)                                         

 

             EGFR (BEAKER) 113                                     Interpretatio

n of eGFR



             (test code = 1092) mL/min/1.73                            values St

age Description



                          sq m                                   Result G1 Shaunna

l or high



                                                                 >=90  G2 Mildly

 decreased



                                                                 60-89 G3a Mildl

y to



                                                                 moderately 45-5

9 G3b



                                                                 Moderately to s

everely



                                                                 30-44 G4 Severl

y decreased



                                                                 15-29 G5 Kidney

 failure



                                                                 <15Reported eGF

R is based



                                                                 on the CKD-EPI 





                                                                 equation that d

oes not use



                                                                 a race



                                                                 coefficientEsti

mated GFR



                                                                 is not as accur

ate as



                                                                 Creatinine Tabitha

cynthia in



                                                                 predicting glom

erular



                                                                 filtration rate

. Estimated



                                                                 GFR is not appl

icable for



                                                                 dialysis patien

ts



Specimen slightly ictericCOMPREHENSIVE METABOLIC [id number] 12:41:45





             Test Item    Value        Reference Range Interpretation Comments

 

             TOTAL PROTEIN 4.6 gm/dL    6.0-8.3      L            



             (BEAKER) (test                                        



             code = 770)                                         

 

             ALBUMIN (BEAKER) 2.1 g/dL     3.5-5.0      L            



             (test code = 1145)                                        

 

             ALKALINE     81 U/L                           



             PHOSPHATASE                                         



             (BEAKER) (test                                        



             code = 346)                                         

 

             BILIRUBIN TOTAL 3.2 mg/dL    0.2-1.2      H            



             (BEAKER) (test                                        



             code = 377)                                         

 

             SODIUM (BEAKER) 135 meq/L    136-145      L            



             (test code = 381)                                        

 

             POTASSIUM (BEAKER) 3.5 meq/L    3.5-5.1                   



             (test code = 379)                                        

 

             CHLORIDE (BEAKER) 109 meq/L           H            



             (test code = 382)                                        

 

             CO2 (BEAKER) (test 22 meq/L     22-29                     



             code = 355)                                         

 

             BLOOD UREA   3 mg/dL      7-21         L            



             NITROGEN (BEAKER)                                        



             (test code = 354)                                        

 

             CREATININE   0.56 mg/dL   0.57-1.25    L            



             (BEAKER) (test                                        



             code = 358)                                         

 

             GLUCOSE RANDOM 100 mg/dL                        



             (BEAKER) (test                                        



             code = 652)                                         

 

             CALCIUM (BEAKER) 7.2 mg/dL    8.4-10.2     L            



             (test code = 697)                                        

 

             AST (SGOT)   20 U/L       5-34                      



             (BEAKER) (test                                        



             code = 353)                                         

 

             ALT (SGPT)   7 U/L        6-55                      



             (BEAKER) (test                                        



             code = 347)                                         

 

             EGFR (BEAKER) 114                                     Interpretatio

n of eGFR



             (test code = 1092) mL/min/1.73                            values St

age Description



                          sq m                                   Result G1 Shaunna

l or high



                                                                 >=90 G2 Mildly 

decreased



                                                                 60-89 G3a Mildl

y to



                                                                 moderately 45-5

9 G3b



                                                                 Moderately to s

everely



                                                                 30-44 G4 Severl

y decreased



                                                                 15-29 G5 Kidney

 failure



                                                                 <15Reported eGF

R is based



                                                                 on the CKD-EPI 





                                                                 equation that d

oes not use



                                                                 a race



                                                                 coefficientEsti

mated GFR



                                                                 is not as accur

ate as



                                                                 Creatinine Tabitha

cynthia in



                                                                 predicting glom

erular



                                                                 filtration rate

. Estimated



                                                                 GFR is not appl

icable for



                                                                 dialysis patien

ts



Specimen slightly [id number] 12:41:29





             Test Item    Value        Reference Range Interpretation Comments

 

             MAGNESIUM (BEAKER) (test code = 1.5 mg/dL    1.6-2.6      L        

    



             627)                                                



[id number] 12:40:27





             Test Item    Value        Reference Range Interpretation Comments

 

             PHOSPHORUS (BEAKER) (test code = 2.3 mg/dL    2.3-4.7              

     



             604)                                                



[id number] 12:40:26





             Test Item    Value        Reference Range Interpretation Comments

 

             MAGNESIUM (BEAKER) (test code = 1.4 mg/dL    1.6-2.6      L        

    



             627)                                                



CBC W/PLT COUNT &amp; AUTO [id number] 12:37:22





             Test Item    Value        Reference Range Interpretation Comments

 

             WHITE BLOOD CELL COUNT (BEAKER) 3.2 K/ L     3.5-10.5     L        

    



             (test code = 775)                                        

 

             RED BLOOD CELL COUNT (BEAKER) 2.86 M/ L    4.63-6.08    L          

  



             (test code = 761)                                        

 

             HEMOGLOBIN (BEAKER) (test code = 9.1 GM/DL    13.7-17.5    L       

     



             410)                                                

 

             HEMATOCRIT (BEAKER) (test code = 28.2 %       40.1-51.0    L       

     



             411)                                                

 

             MEAN CORPUSCULAR VOLUME (BEAKER) 99 fL        79-92        H       

     



             (test code = 753)                                        

 

             MEAN CORPUSCULAR HEMOGLOBIN 31.8 pg      25.7-32.2                 



             (BEAKER) (test code = 751)                                        

 

             MEAN CORPUSCULAR HEMOGLOBIN CONC 32.3 GM/DL   32.3-36.5            

     



             (BEAKER) (test code = 752)                                        

 

             RED CELL DISTRIBUTION WIDTH 19.1 %       11.6-14.4    H            



             (BEAKER) (test code = 412)                                        

 

             PLATELET COUNT (BEAKER) (test code 38 K/CU MM   150-450      L     

       



             = 756)                                              

 

             MEAN PLATELET VOLUME (BEAKER) 10.1 fL      9.4-12.4                

  



             (test code = 754)                                        

 

             NUCLEATED RED BLOOD CELLS (BEAKER) 0 /100 WBC   0-0                

       



             (test code = 413)                                        

 

             NEUTROPHILS RELATIVE PERCENT 46 %                                  

 



             (BEAKER) (test code = 429)                                        

 

             LYMPHOCYTES RELATIVE PERCENT 21 %                                  

 



             (BEAKER) (test code = 430)                                        

 

             MONOCYTES RELATIVE PERCENT 21 %                                   



             (BEAKER) (test code = 431)                                        

 

             EOSINOPHILS RELATIVE PERCENT 10 %                                  

 



             (BEAKER) (test code = 432)                                        

 

             BASOPHILS RELATIVE PERCENT 1 %                                    



             (BEAKER) (test code = 437)                                        

 

             NEUTROPHILS ABSOLUTE COUNT 1.48 K/ L    1.78-5.38    L            



             (BEAKER) (test code = 670)                                        

 

             LYMPHOCYTES ABSOLUTE COUNT 0.68 K/ L    1.32-3.57    L            



             (BEAKER) (test code = 414)                                        

 

             MONOCYTES ABSOLUTE COUNT (BEAKER) 0.68 K/ L    0.30-0.82           

      



             (test code = 415)                                        

 

             EOSINOPHILS ABSOLUTE COUNT 0.32 K/ L    0.04-0.54                 



             (BEAKER) (test code = 416)                                        

 

             BASOPHILS ABSOLUTE COUNT (BEAKER) 0.03 K/ L    0.01-0.08           

      



             (test code = 417)                                        

 

             IMMATURE GRANULOCYTES-RELATIVE 0.60 %       0.00-1.00              

   



             PERCENT (BEAKER) (test code =                                      

  



             2801)                                               



PROTHROMBIN [id number] 12:23:05





             Test Item    Value        Reference Range Interpretation Comments

 

             PROTIME (BEAKER) (test code = 32.3 seconds 11.9-14.2    H          

  



             759)                                                

 

             INR (BEAKER) (test code = 370) 3.40         <=5.90                 

   



RECOMMENDED COUMADIN/WARFARIN INR THERAPY RANGESSTANDARD DOSE: 2.0 - 3.0 
Includes: PROPHYLAXIS for venous thrombosis, systemic embolization; TREATMENT 
for venous thrombosis and/or pulmonary embolus.HIGH RISK: Target INR is 2.5-3.5 
for patients with mechanical heart valves.PROTHROMBIN TIME/[id number] 
12:22:41





             Test Item    Value        Reference Range Interpretation Comments

 

             PROTIME (BEAKER) (test code = 32.7 seconds 11.9-14.2    H          

  



             759)                                                

 

             INR (BEAKER) (test code = 370) 3.45         <=5.90                 

   



RECOMMENDED COUMADIN/WARFARIN INR THERAPY RANGESSTANDARD DOSE: 2.0 - 3.0 
Includes: PROPHYLAXIS for venous thrombosis, systemic embolization; TREATMENT 
for venous thrombosis and/or pulmonary embolus.HIGH RISK: Target INR is 2.5-3.5 
for patients with mechanical heart valves.CBC (HEMOGRAM ONLY)2023 12:12:33





             Test Item    Value        Reference Range Interpretation Comments

 

             WHITE BLOOD CELL COUNT (BEAKER) 3.3 K/ L     3.5-10.5     L        

    



             (test code = 775)                                        

 

             RED BLOOD CELL COUNT (BEAKER) 2.88 M/ L    4.63-6.08    L          

  



             (test code = 761)                                        

 

             HEMOGLOBIN (BEAKER) (test code = 9.2 GM/DL    13.7-17.5    L       

     



             410)                                                

 

             HEMATOCRIT (BEAKER) (test code = 28.5 %       40.1-51.0    L       

     



             411)                                                

 

             MEAN CORPUSCULAR VOLUME (BEAKER) 99 fL        79-92        H       

     



             (test code = 753)                                        

 

             MEAN CORPUSCULAR HEMOGLOBIN 31.9 pg      25.7-32.2                 



             (BEAKER) (test code = 751)                                        

 

             MEAN CORPUSCULAR HEMOGLOBIN CONC 32.3 GM/DL   32.3-36.5            

     



             (BEAKER) (test code = 752)                                        

 

             RED CELL DISTRIBUTION WIDTH 19.3 %       11.6-14.4    H            



             (BEAKER) (test code = 412)                                        

 

             PLATELET COUNT (BEAKER) (test code 47 K/CU MM   150-450      L     

       



             = 756)                                              

 

             MEAN PLATELET VOLUME (BEAKER) 11.7 fL      9.4-12.4                

  



             (test code = 754)                                        

 

             NUCLEATED RED BLOOD CELLS (BEAKER) 0 /100 WBC   0-0                

       



             (test code = 413)                                        



VANCOMYCIN LEVEL, [id number] 00:32:22





             Test Item    Value        Reference Range Interpretation Comments

 

             VANCOMYCIN TROUGH (BEAKER) (test 9.9 ug/mL    10.0-20.0    L       

     



             code = 522)                                         



 ID - ADMINPHOSPHATIDYLETHANOL, BLOOD2023-07-10 15:14:19





             Test Item    Value        Reference Range Interpretation Comments

 

             PHOSPHATIDYLETHANOL (PETH)                                        S

ee scanned



             (test code = 5897851)                                        report

.



US ABDOMEN LIMITED2023-07-10 10:50:00
********************************[facility] 1970 Sex: 
M************************************************************EXAM: Renal 
UltrasoundINDICATION: concern for SBP COMPARISON: None TECHNIQUE: Transverse and
 longitudinal images of the abdomen. FINDINGS: Trace amount of fluid noted, most
 notable in the right upper quadrant.IMPRESSION:Trace fluid, not enough for 
paracentesis.Electronically Signed By: Bonifacio Flores07/10/2023 10:52 
CDTWorkstation Name: [id number] BRAIN WITH &amp; WITHOUT IV CONTRAST2023-07-10
 09:49:01************************************************************[facility] 1970 Sex: 
M************************************************************MR BRAIN WITH &amp;
 WITHOUT IV CONTRASTINDICATION: Stroke, follow upTechnique: Multiplanar, 
multisequence MRI images of the brain were obtained beforeand after 
demonstration of intravenous contrast.Of note, the images were only verified by 
the technologist after 9:00 AM7/10/2023.COMPARISON: NoneFINDINGS:Two small 
diffusion restricting lesions with peripheral susceptibilityare seen in the 
right cerebral hemisphere in the periventricular region.Specifically, a 0.8 x 
1.1 cm (AP by transverse) mildly peripherallyenhancing lesion is seen in the 
right corpus callosum body projectinginferiorly into the right lateral 
ventricle. A 0.7 x 1.1 cm lesion also seen in the right cingulate gyrus.Moderate
 generalized parenchymal volume loss is present. Brainparenchyma is normal in 
morphology. Midline structures are normallydeveloped. A developmental venous 
anomaly seen in the left parietal lobe ([id number] image 203). A prominent 
vascular structures also seen in the rightinferior gonsalo (image 85).Scattered 
T2/FLAIR hyperintense foci within the periventricular andsubcortical white 
matter are nonspecific, however,statisticallyrepresent chronic microvascular 
ischemic changes.No hydrocephalus.Orbits are within normal limits.No obstructive
 paranasal sinus disease.Additional findings: None.IMPRESSION:1. Two intracra
nial lesions in the right periventricular regionmeasuring up to 1.1 cm in 
diameter, concerning for cerebral abscesses.No ventriculitis identified. No 
midline shift or herniation.2. 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 
parietal lobe.The above findings were discussed with , who acknowledged
 thefindings, on 7/10/2023 at 9:45 AM.Electronically Signed By: Cipriano Boucher07/10/2023 09:51 CDTWorkstation Name: [id number](CELLAVISION MANUAL DIFF)
2023 10:12:05





             Test Item    Value        Reference Range Interpretation Comments

 

             TOTAL COUNTED (BEAKER) (test code                                  

      



             = 1351)                                             

 

             WBC MORPHOLOGY (BEAKER) (test Normal                               

  



             code = 487)                                         

 

             PLT MORPHOLOGY (BEAKER) (test Normal                               

  



             code = 486)                                         

 

             POLYCHROMATOPHILLIC RBCS(BEAKER) 1+ few                            

     



             (test code = 478)                                        

 

             ANISOCYTOSIS (BEAKER) (test code 2+                                

     



             = 961)                                              

 

             MACROCYTES (BEAKER) (test code = 2+                                

     



             964)                                                

 

             POIKILOCYTES (BEAKER) (test code 2+ moderate                       

     



             = 966)                                              

 

             OVALOCYTES (BEAKER) (test code = 2+                                

     



             477)                                                



CBC W/PLT COUNT &amp; AUTO [id number] 10:12:04





             Test Item    Value        Reference Range Interpretation Comments

 

             WHITE BLOOD CELL COUNT 6.4 K/ L     3.5-10.5                  



             (BEAKER) (test code =                                        



             775)                                                

 

             RED BLOOD CELL COUNT 2.65 M/ L    4.63-6.08    L            



             (BEAKER) (test code =                                        



             761)                                                

 

             HEMOGLOBIN (BEAKER) 8.5 GM/DL    13.7-17.5    L            



             (test code = 410)                                        

 

             HEMATOCRIT (BEAKER) 25.9 %       40.1-51.0    L            



             (test code = 411)                                        

 

             MEAN CORPUSCULAR 98 fL        79-92        H            DISCORDANT 

MCV RESULT



             VOLUME (BEAKER) (test                                        COMPAR

ED TO PREVIOUS



             code = 753)                                         RESULT; CLINICA

L



                                                                 CORRELATION REQ

UIRED.

 

             MEAN CORPUSCULAR 32.1 pg      25.7-32.2                 



             HEMOGLOBIN (BEAKER)                                        



             (test code = 751)                                        

 

             MEAN CORPUSCULAR 32.8 GM/DL   32.3-36.5                 



             HEMOGLOBIN CONC                                        



             (BEAKER) (test code =                                        



             752)                                                

 

             RED CELL DISTRIBUTION 20.3 %       11.6-14.4    H            



             WIDTH (BEAKER) (test                                        



             code = 412)                                         

 

             PLATELET COUNT 43 K/CU MM   150-450      L            DISCORDANT PL

T RESULT



             (BEAKER) (test code =                                        COMPAR

ED TO PREVIOUS



             756)                                                RESULT; CLINICA

L



                                                                 CORRELATION



                                                                 REQUIRED.Rcd un

its

 

             MEAN PLATELET VOLUME 10.7 fL      9.4-12.4                  



             (BEAKER) (test code =                                        



             754)                                                

 

             NUCLEATED RED BLOOD 0 /100 WBC   0-0                       



             CELLS (BEAKER) (test                                        



             code = 413)                                         

 

             NEUTROPHILS RELATIVE 70 %                                   



             PERCENT (BEAKER) (test                                        



             code = 429)                                         

 

             LYMPHOCYTES RELATIVE 9 %                                    



             PERCENT (BEAKER) (test                                        



             code = 430)                                         

 

             MONOCYTES RELATIVE 13 %                                   



             PERCENT (BEAKER) (test                                        



             code = 431)                                         

 

             EOSINOPHILS RELATIVE 6 %                                    



             PERCENT (BEAKER) (test                                        



             code = 432)                                         

 

             BASOPHILS RELATIVE 1 %                                    



             PERCENT (BEAKER) (test                                        



             code = 437)                                         

 

             NEUTROPHILS ABSOLUTE 4.49 K/ L    1.78-5.38                 



             COUNT (BEAKER) (test                                        



             code = 670)                                         

 

             LYMPHOCYTES ABSOLUTE 0.59 K/ L    1.32-3.57    L            



             COUNT (BEAKER) (test                                        



             code = 414)                                         

 

             MONOCYTES ABSOLUTE 0.86 K/ L    0.30-0.82    H            



             COUNT (BEAKER) (test                                        



             code = 415)                                         

 

             EOSINOPHILS ABSOLUTE 0.37 K/ L    0.04-0.54                 



             COUNT (BEAKER) (test                                        



             code = 416)                                         

 

             BASOPHILS ABSOLUTE 0.05 K/ L    0.01-0.08                 



             COUNT (BEAKER) (test                                        



             code = 417)                                         

 

             IMMATURE     0.90 %       0.00-1.00                 



             GRANULOCYTES-RELATIVE                                        



             PERCENT (BEAKER) (test                                        



             code = 2801)                                        



COMPREHENSIVE METABOLIC [id number] 07:27:59





             Test Item    Value        Reference Range Interpretation Comments

 

             TOTAL PROTEIN 4.5 gm/dL    6.0-8.3      L            



             (BEAKER) (test                                        



             code = 770)                                         

 

             ALBUMIN (BEAKER) 2.2 g/dL     3.5-5.0      L            



             (test code = 1145)                                        

 

             ALKALINE     68 U/L                           



             PHOSPHATASE                                         



             (BEAKER) (test                                        



             code = 346)                                         

 

             BILIRUBIN TOTAL 4.2 mg/dL    0.2-1.2      H            



             (BEAKER) (test                                        



             code = 377)                                         

 

             SODIUM (BEAKER) 134 meq/L    136-145      L            



             (test code = 381)                                        

 

             POTASSIUM (BEAKER) 3.3 meq/L    3.5-5.1      L            



             (test code = 379)                                        

 

             CHLORIDE (BEAKER) 109 meq/L           H            



             (test code = 382)                                        

 

             CO2 (BEAKER) (test 20 meq/L     22-29        L            



             code = 355)                                         

 

             BLOOD UREA   10 mg/dL     7-21                      



             NITROGEN (BEAKER)                                        



             (test code = 354)                                        

 

             CREATININE   0.69 mg/dL   0.57-1.25                 



             (BEAKER) (test                                        



             code = 358)                                         

 

             GLUCOSE RANDOM 141 mg/dL           H            



             (BEAKER) (test                                        



             code = 652)                                         

 

             CALCIUM (BEAKER) 7.1 mg/dL    8.4-10.2     L            



             (test code = 697)                                        

 

             AST (SGOT)   18 U/L       5-34                      



             (BEAKER) (test                                        



             code = 353)                                         

 

             ALT (SGPT)   9 U/L        6-55                      



             (BEAKER) (test                                        



             code = 347)                                         

 

             EGFR (BEAKER) 109                                     Interpretatio

n of eGFR



             (test code = 1092) mL/min/1.73                            values St

age Description



                          sq m                                   Result G1 Shaunna

l or high



                                                                 >=90 G2 Mildly 

decreased



                                                                 60-89  G3a Mild

ly to



                                                                 moderately 45-5

9 G3b



                                                                 Moderately to s

everely



                                                                 30-44 G4 Severl

y decreased



                                                                 15-29 G5 Kidney

 failure



                                                                 <15Reported eGF

R is based



                                                                 on the CKD-EPI 





                                                                 equation that d

oes not use



                                                                 a race



                                                                 coefficientEsti

mated GFR



                                                                 is not as accur

ate as



                                                                 Creatinine Tabitha

cynthia in



                                                                 predicting glom

erular



                                                                 filtration rate

. Estimated



                                                                 GFR is not appl

icable for



                                                                 dialysis patien

ts



 ID - MARCOSpecimen moderately [id number] 07:27:04





             Test Item    Value        Reference Range Interpretation Comments

 

             MAGNESIUM (BEAKER) (test code = 1.7 mg/dL    1.6-2.6               

    



             627)                                                



 ID - MARCOPROTHROMBIN TIME/[id number] 07:10:48





             Test Item    Value        Reference Range Interpretation Comments

 

             PROTIME (BEAKER) (test code = 30.1 seconds 11.9-14.2    H          

  



             759)                                                

 

             INR (BEAKER) (test code = 370) 2.98         <=5.90                 

   



RECOMMENDED COUMADIN/WARFARIN INR THERAPY RANGESSTANDARD DOSE: 2.0 - 3.0 
Includes: PROPHYLAXIS for venous thrombosis, systemic embolization; TREATMENT 
for venous thrombosis and/or pulmonary embolus.HIGH RISK: Target INR is 2.5-3.5 
for patients with mechanical heart valves.VANCOMYCIN LEVEL, [id number] 
20:01:57





             Test Item    Value        Reference Range Interpretation Comments

 

             VANCOMYCIN TROUGH (BEAKER) (test 10.5 ug/mL   10.0-20.0            

     



             code = 522)                                         



 ID - NOLVIA BXR KNEE 3 VIEWS [id number] 17:37:58
********************************[facility] 1970 Sex: 
M************************************************************Left knee, 3 
viewsHistory:Septic arthritis and feverComparison:2023Findings:No fracture,
 dislocation, or subluxation. No additional significant boneor joint space 
abnormality.IMPRESSION:Impression:No acute radiographic findings in the left 
knee.Electronically Signed By: Dwight Urias MD2023 17:40 CDTWorkstation
 Name: [id number] (HEMOGRAM ONLY)2023 17:29:16





             Test Item    Value        Reference Range Interpretation Comments

 

             WHITE BLOOD CELL COUNT (BEAKER) 5.4 K/ L     3.5-10.5              

    



             (test code = 775)                                        

 

             RED BLOOD CELL COUNT (BEAKER) 1.84 M/ L    4.63-6.08    L          

  



             (test code = 761)                                        

 

             HEMOGLOBIN (BEAKER) (test code = 5.9 GM/DL    13.7-17.5    LL      

     



             410)                                                

 

             HEMATOCRIT (BEAKER) (test code = 18.7 %       40.1-51.0    L       

     



             411)                                                

 

             MEAN CORPUSCULAR VOLUME (BEAKER) 102 fL       79-92        H       

     



             (test code = 753)                                        

 

             MEAN CORPUSCULAR HEMOGLOBIN 32.1 pg      25.7-32.2                 



             (BEAKER) (test code = 751)                                        

 

             MEAN CORPUSCULAR HEMOGLOBIN CONC 31.6 GM/DL   32.3-36.5    L       

     



             (BEAKER) (test code = 752)                                        

 

             RED CELL DISTRIBUTION WIDTH 20.3 %       11.6-14.4    H            



             (BEAKER) (test code = 412)                                        

 

             PLATELET COUNT (BEAKER) (test code 26 K/CU MM   150-450      L     

       



             = 756)                                              

 

             MEAN PLATELET VOLUME (BEAKER) 12.8 fL      9.4-12.4     H          

  



             (test code = 754)                                        

 

             NUCLEATED RED BLOOD CELLS (BEAKER) 0 /100 WBC   0-0                

       



             (test code = 413)                                        



(CELLAVISION MANUAL DIFF)2023 10:21:42





             Test Item    Value        Reference Range Interpretation Comments

 

             NEUTROPHILS - REL 67 %                                   



             (CELLAVISION)(BEAKER) (test code                                   

     



             = 2816)                                             

 

             LYMPHOCYTES - REL 4 %                                    



             (CELLAVISION)(BEAKER) (test code                                   

     



             = 2817)                                             

 

             MONOCYTES - REL 1 %                                    



             (CELLAVISION)(BEAKER) (test code                                   

     



             = 2818)                                             

 

             EOSINOPHILS - REL 3 %                                    



             (CELLAVISION)(BEAKER) (test code                                   

     



             = 2819)                                             

 

             METAMYELOCYTES - REL 1 %          0-0          H            



             (CELLAVISION)(BEAKER) (test code                                   

     



             = 2821)                                             

 

             BANDS - REL (CELLAVISION)(BEAKER) 24 %         0-10         H      

      



             (test code = 2826)                                        

 

             NEUTROPHILS - ABS 3.75 K/ul    1.78-5.38                 



             (CELLAVISION)(BEAKER) (test code                                   

     



             = 2830)                                             

 

             LYMPHOCYTES - ABS 0.22 K/ul    1.32-3.57    L            



             (CELLAVISION)(BEAKER) (test code                                   

     



             = 2831)                                             

 

             MONOCYTES - ABS 0.06 K/uL    0.30-0.82    L            



             (CELLAVISION)(BEAKER) (test code                                   

     



             = 2832)                                             

 

             EOSINOPHILS - ABS 0.17 K/uL    0.04-0.54                 



             (CELLAVISION)(BEAKER) (test code                                   

     



             = 2834)                                             

 

             METAMYELOCYTES - ABS 0.06 K/uL    0.00-0.00    H            



             (CELLAVISION)(BEAKER) (test code                                   

     



             = 2836)                                             

 

             BANDS - ABS (CELLAVISION)(BEAKER) 1.34 K/uL    0.00-0.80    H      

      



             (test code = 2840)                                        

 

             TOTAL COUNTED (BEAKER) (test code 100                              

      



             = 1351)                                             

 

             WBC MORPHOLOGY (BEAKER) (test Normal                               

  



             code = 487)                                         

 

             PLT MORPHOLOGY (BEAKER) (test Normal                               

  



             code = 486)                                         

 

             ANISOCYTOSIS (BEAKER) (test code 2+ moderate                       

     



             = 961)                                              

 

             MACROCYTES (BEAKER) (test code = 2+ moderate                       

     



             964)                                                

 

             POIKILOCYTES (BEAKER) (test code 2+ moderate                       

     



             = 966)                                              

 

             SCHISTOCYTES (BEAKER) (test code 1+ few                            

     



             = 765)                                              

 

             OVALOCYTES (BEAKER) (test code = 1+ few                            

     



             477)                                                

 

             ARTIFACT (CELLAVISION)(BEAKER) Present                             

   



             (test code = 3432)                                        

 

             PLATELET CONCENTRATION Decreased                              



             (CELLAVISION)(BEAKER) (test code                                   

     



             = 3438)                                             



 ID - [id number] ID - Katie OverholtUser comments: Slide comments:CBC 
W/PLT COUNT &amp; AUTO [id number] 10:21:41





             Test Item    Value        Reference Range Interpretation Comments

 

             WHITE BLOOD CELL COUNT (BEAKER) 5.6 K/ L     3.5-10.5              

    



             (test code = 775)                                        

 

             RED BLOOD CELL COUNT (BEAKER) 2.22 M/ L    4.63-6.08    L          

  



             (test code = 761)                                        

 

             HEMOGLOBIN (BEAKER) (test code = 7.3 GM/DL    13.7-17.5    L       

     



             410)                                                

 

             HEMATOCRIT (BEAKER) (test code = 22.2 %       40.1-51.0    L       

     



             411)                                                

 

             MEAN CORPUSCULAR VOLUME (BEAKER) 100 fL       79-92        H       

     



             (test code = 753)                                        

 

             MEAN CORPUSCULAR HEMOGLOBIN 32.9 pg      25.7-32.2    H            



             (BEAKER) (test code = 751)                                        

 

             MEAN CORPUSCULAR HEMOGLOBIN CONC 32.9 GM/DL   32.3-36.5            

     



             (BEAKER) (test code = 752)                                        

 

             RED CELL DISTRIBUTION WIDTH 20.7 %       11.6-14.4    H            



             (BEAKER) (test code = 412)                                        

 

             PLATELET COUNT (BEAKER) (test code 24 K/CU MM   150-450      L     

       



             = 756)                                              

 

             MEAN PLATELET VOLUME (BEAKER) 12.1 fL      9.4-12.4                

  



             (test code = 754)                                        

 

             NUCLEATED RED BLOOD CELLS (BEAKER) 0 /100 WBC   0-0                

       



             (test code = 413)                                        



COMPREHENSIVE METABOLIC [id number] 07:20:21





             Test Item    Value        Reference Range Interpretation Comments

 

             TOTAL PROTEIN 4.3 gm/dL    6.0-8.3      L            



             (BEAKER) (test                                        



             code = 770)                                         

 

             ALBUMIN (BEAKER) 2.2 g/dL     3.5-5.0      L            



             (test code = 1145)                                        

 

             ALKALINE     59 U/L                           



             PHOSPHATASE                                         



             (BEAKER) (test                                        



             code = 346)                                         

 

             BILIRUBIN TOTAL 5.0 mg/dL    0.2-1.2      H            



             (BEAKER) (test                                        



             code = 377)                                         

 

             SODIUM (BEAKER) 137 meq/L    136-145                   



             (test code = 381)                                        

 

             POTASSIUM (BEAKER) 2.9 meq/L    3.5-5.1      L            



             (test code = 379)                                        

 

             CHLORIDE (BEAKER) 109 meq/L           H            



             (test code = 382)                                        

 

             CO2 (BEAKER) (test 19 meq/L     22-29        L            



             code = 355)                                         

 

             BLOOD UREA   12 mg/dL     7-21                      



             NITROGEN (BEAKER)                                        



             (test code = 354)                                        

 

             CREATININE   0.73 mg/dL   0.57-1.25                 



             (BEAKER) (test                                        



             code = 358)                                         

 

             GLUCOSE RANDOM 61 mg/dL            L            



             (BEAKER) (test                                        



             code = 652)                                         

 

             CALCIUM (BEAKER) 7.3 mg/dL    8.4-10.2     L            



             (test code = 697)                                        

 

             AST (SGOT)   15 U/L       5-34                      



             (BEAKER) (test                                        



             code = 353)                                         

 

             ALT (SGPT)   8 U/L        6-55                      



             (BEAKER) (test                                        



             code = 347)                                         

 

             EGFR (BEAKER) 107                                     Interpretatio

n of eGFR



             (test code = 1092) mL/min/1.73                            values St

age Description



                          sq m                                   Result G1 Shaunna

l or high



                                                                 >=90 G2 Mildly 

decreased



                                                                 60-89 G3a Mildl

y to



                                                                 moderately 45-5

9 G3b



                                                                 Moderately to s

everely



                                                                 30-44 G4 Severl

y decreased



                                                                 15-29 G5 Kidney

 failure



                                                                 <15Reported eGF

R is based



                                                                 on the CKD-EPI 





                                                                 equation that d

oes not use



                                                                 a race



                                                                 coefficientEsti

mated GFR



                                                                 is not as accur

ate as



                                                                 Creatinine Tabitha

cynthia in



                                                                 predicting glom

erular



                                                                 filtration rate

. Estimated



                                                                 GFR is not appl

icable for



                                                                 dialysis patien

ts



 ID - ADMINSpecimen moderately [id number] 07:12:49





             Test Item    Value        Reference Range Interpretation Comments

 

             MAGNESIUM (BEAKER) (test code = 1.8 mg/dL    1.6-2.6               

    



             627)                                                



 ID - ADMINUS ABDOMEN [id number] 05:59:21
************************************************************[facility] : 1970 Sex: 
M************************************************************History: cirrhosis,
 pancytopenia, LFTs elevatedAbdominal ultrasound dated omparison: 
omment: Real-time transabdominal ultrasound of the abdomen 
wasperformed. Liver: 11.3 cm , normal. Heterogeneous parenchymal echogenicity 
with anodular contour, consistent with cirrhosis. No visualized focal 
lesions.Gallbladder: No gallstones. No gallbladder wall thickening. 
Nopericholecystic fluid..No sonographic Gupta's sign. Transversediameter: 3.1 
cmBiliary tree: No intrahepatic ductal dilatation. CBD: 3 mm.MPV: 10 mmSpleen: 
Splenomegaly, measuring 16 cm in maximum dimension.Pancreas: Obscured by 
overlying bowel gas.Right kidney: 12.6 x 4.6 x 5.8 cm. Normal echogenicity.Left 
kidney: 10.5 x 5.7 x 5.7 cm. Normal echogenicity.Moderate volume ascites is 
present in the abdomen.The visualized abdominal aorta is normal in caliber. The 
IVC and Hepaticveins are patent.IMPRESSION:Impression: Cirrhosis, splenomegaly 
and ascites.Electronically Signed By: Freddy Nieto2023 06:01 
CDTWorkstation Name: [id number] 05:47:08





             Test Item    Value        Reference Range Interpretation Comments

 

             PROTIME (BEAKER) (test code = 33.9 seconds 11.9-14.2    H          

  



             759)                                                

 

             INR (BEAKER) (test code = 370) 3.47         <=5.90                 

   



RECOMMENDED COUMADIN/WARFARIN INR THERAPY RANGESSTANDARD DOSE: 2.0 - 3.0 
Includes: PROPHYLAXIS for venous thrombosis, systemic embolization; TREATMENT 
for venous thrombosis and/or pulmonary embolus.HIGH RISK: Target INR is 2.5-3.5 
for patients with mechanical heart valves.STREP PNEUMONIAE [id number] 
16:38:02





             Test Item    Value        Reference Range Interpretation Comments

 

             STREP PNEUMONIAE Presumptive negative Presumptive negative         

     



             ANTIGEN (BEAKER) for pneumococcal for pneumococcal              



             (test code = 1615) pneumonia - see pneumonia - see              



                          comment      commen                    



Presumptive negative for pneumococcal pneumonia, suggesting no current or recent
 pneumococcal infection. Infection due to S. pneumoniae cannot be ruled out 
since the antigen present in the sample may be below the detection limit of the 
test.LEGIONELLA ANTIGEN, [id number] 16:37:14





             Test Item    Value        Reference Range Interpretation Comments

 

             L. PNEUMOPHILA Negative - see Negative                  Negative fo

r L.



             SEROGP 1 UR AG comment                                pneumophila



             (BEAKER) (test code                                        serogrou

p 1 antigen,



             = 1156)                                             suggesting no r

ecent



                                                                 or current infe

ction



                                                                 with this serog

roup.



                                                                 Legionellosis c

annot



                                                                 be ruled out si

nce



                                                                 other serogroup

s and



                                                                 species may cau

se



                                                                 disease.



[id number] 16:00:49





             Test Item    Value        Reference Range Interpretation Comments

 

             PROCALCITONIN (BEAKER) (test code 0.42 ng/mL   <0.05        H      

      



             = 3036)                                             



SEPSIS RISK (ng/mL)Low: 0.05-0.50Intermediate: 0.51-2.00High: &gt;=2.01LACTIC 
ACID, [id number] 15:42:59





             Test Item    Value        Reference Range Interpretation Comments

 

             LACTATE BLOOD VENOUS (2) (BEAKER) 1.90 mmol/L  0.50-2.00           

      



             (test code = 2872)                                        



 ID - ADMINSpecimen moderately ictericXR CHEST 1 VIEW PORTABLE / BEDSIDE
2023 14:45:45
[facility] 1970 Sex: 
M************************************************************CLINICAL HISTORY: 
fever, no source TECHNIQUE: 1 view of the chest.COMPARISON: NoneIMPRESSION:There
 isprominence of the pulmonary vasculature and interstitial lungmarkings. There 
is no lobar consolidation. There is no significantpleural fluid. The 
cardiomediastinal silhouette is magnified bytechnique. There is a chronic left 
lower rib fracture deformity.Electronically Signed By: Chaparro Pettit2023
14:47 CDTWorkstation Name: [id number](CELLAVISION MANUAL DIFF)2023 
14:22:51





             Test Item    Value        Reference Range Interpretation Comments

 

             NEUTROPHILS - REL 45 %                                   



             (CELLAVISION)(BEAKER) (test code                                   

     



             = 2816)                                             

 

             LYMPHOCYTES - REL 5 %                                    



             (CELLAVISION)(BEAKER) (test code                                   

     



             = 2817)                                             

 

             MONOCYTES - REL 12 %                                   



             (CELLAVISION)(BEAKER) (test code                                   

     



             = 2818)                                             

 

             EOSINOPHILS - REL 1 %                                    



             (CELLAVISION)(BEAKER) (test code                                   

     



             = 2819)                                             

 

             BASOPHILS - REL 1 %                                    



             (CELLAVISION)(BEAKER) (test code                                   

     



             = 2820)                                             

 

             BANDS - REL (CELLAVISION)(BEAKER) 36 %         0-10         H      

      



             (test code = 2826)                                        

 

             NEUTROPHILS - ABS 1.44 K/ul    1.78-5.38    L            



             (CELLAVISION)(BEAKER) (test code                                   

     



             = 2830)                                             

 

             LYMPHOCYTES - ABS 0.16 K/ul    1.32-3.57    L            



             (CELLAVISION)(BEAKER) (test code                                   

     



             = 2831)                                             

 

             MONOCYTES - ABS 0.38 K/uL    0.30-0.82                 



             (CELLAVISION)(BEAKER) (test code                                   

     



             = 2832)                                             

 

             EOSINOPHILS - ABS 0.03 K/uL    0.04-0.54    L            



             (CELLAVISION)(BEAKER) (test code                                   

     



             = 2834)                                             

 

             BASOPHILS - ABS 0.03 K/uL    0.01-0.08                 



             (CELLAVISION)(BEAKER) (test code                                   

     



             = 2835)                                             

 

             BANDS - ABS (CELLAVISION)(BEAKER) 1.15 K/uL    0.00-0.80    H      

      



             (test code = 2840)                                        

 

             TOTAL COUNTED (BEAKER) (test code 100                              

      



             = 1351)                                             

 

             PLT MORPHOLOGY (BEAKER) (test Normal                               

  



             code = 486)                                         

 

             VACUOLATED NEUTROPHILS (BEAKER) Present                            

    



             (test code = 483)                                        

 

             ANISOCYTOSIS (BEAKER) (test code 2+ moderate                       

     



             = 961)                                              

 

             MICROCYTES (BEAKER) (test code = 1+ few                            

     



             965)                                                

 

             MACROCYTES (BEAKER) (test code = 2+ moderate                       

     



             964)                                                

 

             POIKILOCYTES (BEAKER) (test code 3+ many                           

     



             = 966)                                              

 

             SCHISTOCYTES (BEAKER) (test code 2+ moderate                       

     



             = 765)                                              

 

             OVALOCYTES (BEAKER) (test code = 1+ few                            

     



             477)                                                

 

             BENJI CELLS (BEAKER) (test code = 2+ moderate                       

     



             474)                                                

 

             ARTIFACT (CELLAVISION)(BEAKER) Present                             

   



             (test code = 3432)                                        

 

             PLATELET CONCENTRATION Decreased                              



             (CELLAVISION)(BEAKER) (test code                                   

     



             = 3438)                                             



 ID - [id number] ID - Katie OverholtUser comments: Slide comments:CBC 
W/PLT COUNT &amp; AUTO [id number] 14:22:50





             Test Item    Value        Reference Range Interpretation Comments

 

             WHITE BLOOD CELL COUNT 3.2 K/ L     3.5-10.5     L            



             (BEAKER) (test code =                                        



             775)                                                

 

             RED BLOOD CELL COUNT 2.09 M/ L    4.63-6.08    L            



             (BEAKER) (test code =                                        



             761)                                                

 

             HEMOGLOBIN (BEAKER) 6.7 GM/DL    13.7-17.5    L            



             (test code = 410)                                        

 

             HEMATOCRIT (BEAKER) 21.3 %       40.1-51.0    L            



             (test code = 411)                                        

 

             MEAN CORPUSCULAR 102 fL       79-92        H            



             VOLUME (BEAKER) (test                                        



             code = 753)                                         

 

             MEAN CORPUSCULAR 32.1 pg      25.7-32.2                 



             HEMOGLOBIN (BEAKER)                                        



             (test code = 751)                                        

 

             MEAN CORPUSCULAR 31.5 GM/DL   32.3-36.5    L            



             HEMOGLOBIN CONC                                        



             (BEAKER) (test code =                                        



             752)                                                

 

             RED CELL DISTRIBUTION 20.4 %       11.6-14.4    H            



             WIDTH (BEAKER) (test                                        



             code = 412)                                         

 

             PLATELET COUNT 17 K/CU MM   150-450      L            DISCORDANT PL

T RESULT



             (BEAKER) (test code =                                        COMPAR

ED TO PREVIOUS



             756)                                                RESULT; CLINICA

L



                                                                 CORRELATION REQ

UIRED.

 

             MEAN PLATELET VOLUME 12.4 fL      9.4-12.4                  



             (BEAKER) (test code =                                        



             754)                                                

 

             NUCLEATED RED BLOOD 0 /100 WBC   0-0                       



             CELLS (BEAKER) (test                                        



             code = 413)                                         



RAPID DRUG SCREEN, [id number] 04:56:42





             Test Item    Value        Reference Range Interpretation Comments

 

             BARBITURATE URINE (BEAKER) (test Positive     Negative     A       

     



             code = 725)                                         

 

             BENZODIAZEPINE SCREEN URINE (BEAKER) Negative     Negative         

         



             (test code = 726)                                        

 

             COCAINE (METAB.) SCREEN (BEAKER) Negative     Negative             

     



             (test code = 1164)                                        

 

             METHADONE SCREEN (BEAKER) (test code Negative     Negative         

         



             = 1436)                                             

 

             OPIATE SCREEN URINE (BEAKER) (test Negative     Negative           

       



             code = 734)                                         

 

             CANNABINOID SCREEN URINE (BEAKER) Negative     Negative            

      



             (test code = 727)                                        

 

             AMPH/METHAMPH SCREEN (BEAKER) (test Negative     Negative          

        



             code = 1438)                                        

 

             PHENCYCLIDINE SCREEN URINE (BEAKER) Negative     Negative          

        



             (test code = 608)                                        

 

             PH UA (BEAKER) (test code = 467) 6.0          5.0-8.0              

     



DRUG CUTOFF CONC.Cocaine 300 ng/mL Cannabinoid  50 ng/mLBenzodiazepine 200 
ng/mLBarbiturate 200 ng/mLPhencyclidine 25 ng/mLOpiate 300 ng/mLMethadone 300 
ng/mLAmphetamine/ 1000 ng/mL MethamphetamineThisassay provides an unconfirmed 
qualitative test result for the clinical management of patients in emergency 
situations. Chain of custody not maintained. Some over-the-counter medications, 
as well as adulterants, may cause inaccurate results. Clinical correlation 
should be applied. A more comprehensive drug screen or confirmation of a 
detected drug may be performed upon request. ID - [auto] ID - 
ADMINBASIC METABOLIC [id number] 04:34:40





             Test Item    Value        Reference Range Interpretation Comments

 

             SODIUM (BEAKER) 130 meq/L    136-145      L            



             (test code = 381)                                        

 

             POTASSIUM    3.8 meq/L    3.5-5.1                   



             (BEAKER) (test                                        



             code = 379)                                         

 

             CHLORIDE (BEAKER) 104 meq/L                        



             (test code = 382)                                        

 

             CO2 (BEAKER) 21 meq/L     22-29        L            



             (test code = 355)                                        

 

             BLOOD UREA   10 mg/dL     7-21                      



             NITROGEN (BEAKER)                                        



             (test code = 354)                                        

 

             CREATININE   0.56 mg/dL   0.57-1.25    L            



             (BEAKER) (test                                        



             code = 358)                                         

 

             GLUCOSE RANDOM 95 mg/dL                         



             (BEAKER) (test                                        



             code = 652)                                         

 

             CALCIUM (BEAKER) 7.4 mg/dL    8.4-10.2     L            



             (test code = 697)                                        

 

             EGFR (BEAKER) 114                                     Interpretatio

n of eGFR



             (test code = mL/min/1.73                            values Stage De

scription



             1092)        sq m                                   Result G1 Shaunna

l or high



                                                                 >=90 G2 Mildly 

decreased



                                                                 60-89 G3a Mildl

y to



                                                                 moderately 45-5

9 G3b



                                                                 Moderately to s

everely



                                                                 30-44 G4 Severl

y decreased



                                                                 15-29 G5 Kidney

 failure



                                                                 <15Reported eGF

R is based



                                                                 on the CKD-EPI 

2021



                                                                 equation that d

oes not use



                                                                 a race



                                                                 coefficientEsti

mated GFR



                                                                 is not as accur

ate as



                                                                 Creatinine Tabitha marie in



                                                                 predicting glom

erular



                                                                 filtration rate

. Estimated



                                                                 GFR is not appl

icable for



                                                                 dialysis patien

ts



 ID - ADMINSpecimen moderately ictericC-REACTIVE [id number] 
04:32:34





             Test Item    Value        Reference Range Interpretation Comments

 

             C-REACTIVE PROTEIN (BEAKER) (test 1.70 mg/dL   0.00-0.50    H      

      



             code = 676)                                         



 ID - ADMINURINALYSIS W/ REFLEX URINE [id number] 04:32:33





             Test Item    Value        Reference Range Interpretation Comments

 

             COLOR (BEAKER) (test code = 470) Brown                             

     

 

             CLARITY (BEAKER) (test code = 469) Hazy                            

       

 

             SPECIFIC GRAVITY UA (BEAKER) (test 1.024        1.001-1.035        

       



             code = 468)                                         

 

             PH UA (BEAKER) (test code = 467) 6.0          5.0-8.0              

     

 

             PROTEIN UA (BEAKER) (test code = 70 mg/dL     Negative     A       

     



             464)                                                

 

             GLUCOSE UA (BEAKER) (test code = Negative     Negative             

     



             365)                                                

 

             KETONES UA (BEAKER) (test code = Negative     Negative             

     



             371)                                                

 

             BILIRUBIN UA (BEAKER) (test code = Positive     Negative     A     

       



             462)                                                

 

             BLOOD UA (BEAKER) (test code = 461) Large        Negative     A    

        

 

             NITRITE UA (BEAKER) (test code = Negative     Negative             

     



             465)                                                

 

             LEUKOCYTE ESTERASE UA (BEAKER) (test Moderate     Negative     A   

         



             code = 466)                                         

 

             UROBILINOGEN UA (BEAKER) (test code 0.2          0.2-1.0           

        



             = 463)                                              

 

             RBC UA (BEAKER) (test code = 519) 115 /HPF                         

      

 

             WBC UA (BEAKER) (test code = 520) 19 /HPF                          

      

 

             MUCUS (BEAKER) (test code = 1574) Many                             

      

 

             SOURCE(BEAKER) (test code = 8991)                                  

      



 ID - [auto] ID - techHEPATIC FUNCTION [id number] 04:32:33





             Test Item    Value        Reference Range Interpretation Comments

 

             TOTAL PROTEIN (BEAKER) (test code = 5.1 gm/dL    6.0-8.3      L    

        



             770)                                                

 

             ALBUMIN (BEAKER) (test code = 1145) 2.0 g/dL     3.5-5.0      L    

        

 

             BILIRUBIN TOTAL (BEAKER) (test code 5.5 mg/dL    0.2-1.2      H    

        



             = 377)                                              

 

             BILIRUBIN DIRECT (BEAKER) (test 2.1 mg/dL    0.1-0.5      H        

    



             code = 706)                                         

 

             ALKALINE PHOSPHATASE (BEAKER) (test 103 U/L                  

        



             code = 346)                                         

 

             AST (SGOT) (BEAKER) (test code = 30 U/L       5-34                 

     



             353)                                                

 

             ALT (SGPT) (BEAKER) (test code = 16 U/L       6-55                 

     



             347)                                                



 ID - ADMINSpecimen moderately [id number] 04:32:32





             Test Item    Value        Reference Range Interpretation Comments

 

             MAGNESIUM (BEAKER) (test code = 1.5 mg/dL    1.6-2.6      L        

    



             627)                                                



 ID - [id number] 04:19:39





             Test Item    Value        Reference Range Interpretation Comments

 

             ETHANOL (BEAKER) (test code = 400) < mg/dL      <=10               

       



 ID - ADMINLACTIC ACID, [id number] 04:15:34





             Test Item    Value        Reference Range Interpretation Comments

 

             LACTATE BLOOD VENOUS 1.51 mmol/L  0.50-2.00                 Specime

n slightly



             (2) (BEAKER) (test                                        hemolyzed



             code = 3212)                                        



 ID - ADMINSpecimen moderately ictericPROTHROMBIN [id number] 
03:45:41





             Test Item    Value        Reference Range Interpretation Comments

 

             PROTIME (BEAKER) (test code = 25.2 seconds 11.9-14.2    H          

  



             759)                                                

 

             INR (BEAKER) (test code = 370) 2.47         <=5.90                 

   



RECOMMENDED COUMADIN/WARFARIN INR THERAPY RANGESSTANDARD DOSE: 2.0 - 3.0 
Includes: PROPHYLAXIS for venous thrombosis, systemic embolization; TREATMENT 
for venous thrombosis and/or pulmonary embolus.HIGH RISK: Target INR is 2.5-3.5 
for patients with mechanical heart valves.CBC W/PLT COUNT &amp; AUTO 
[id number] 03:40:34





             Test Item    Value        Reference Range Interpretation Comments

 

             WHITE BLOOD CELL COUNT 6.1 K/ L     3.5-10.5                  



             (BEAKER) (test code =                                        



             775)                                                

 

             RED BLOOD CELL COUNT 2.31 M/ L    4.63-6.08    L            



             (BEAKER) (test code =                                        



             761)                                                

 

             HEMOGLOBIN (BEAKER) 7.4 GM/DL    13.7-17.5    L            



             (test code = 410)                                        

 

             HEMATOCRIT (BEAKER) 23.1 %       40.1-51.0    L            



             (test code = 411)                                        

 

             MEAN CORPUSCULAR VOLUME 100 fL       79-92        H            



             (BEAKER) (test code =                                        



             753)                                                

 

             MEAN CORPUSCULAR 32.0 pg      25.7-32.2                 



             HEMOGLOBIN (BEAKER)                                        



             (test code = 751)                                        

 

             MEAN CORPUSCULAR 32.0 GM/DL   32.3-36.5    L            



             HEMOGLOBIN CONC                                        



             (BEAKER) (test code =                                        



             752)                                                

 

             RED CELL DISTRIBUTION 19.9 %       11.6-14.4    H            



             WIDTH (BEAKER) (test                                        



             code = 412)                                         

 

             PLATELET COUNT (BEAKER) 42 K/CU MM   150-450      L            



             (test code = 756)                                        

 

             MEAN PLATELET VOLUME                                        Unable 

to report due



             (BEAKER) (test code =                                        to abn

ormal Platelet



             754)                                                population



                                                                 distribution.

 

             NUCLEATED RED BLOOD 0 /100 WBC   0-0                       



             CELLS (BEAKER) (test                                        



             code = 413)                                         

 

             NEUTROPHILS RELATIVE 77 %                                   



             PERCENT (BEAKER) (test                                        



             code = 429)                                         

 

             LYMPHOCYTES RELATIVE 9 %                                    



             PERCENT (BEAKER) (test                                        



             code = 430)                                         

 

             MONOCYTES RELATIVE 12 %                                   



             PERCENT (BEAKER) (test                                        



             code = 431)                                         

 

             EOSINOPHILS RELATIVE 1 %                                    



             PERCENT (BEAKER) (test                                        



             code = 432)                                         

 

             BASOPHILS RELATIVE 1 %                                    



             PERCENT (BEAKER) (test                                        



             code = 437)                                         

 

             NEUTROPHILS ABSOLUTE 4.66 K/ L    1.78-5.38                 



             COUNT (BEAKER) (test                                        



             code = 670)                                         

 

             LYMPHOCYTES ABSOLUTE 0.54 K/ L    1.32-3.57    L            



             COUNT (BEAKER) (test                                        



             code = 414)                                         

 

             MONOCYTES ABSOLUTE 0.73 K/ L    0.30-0.82                 



             COUNT (BEAKER) (test                                        



             code = 415)                                         

 

             EOSINOPHILS ABSOLUTE 0.07 K/ L    0.04-0.54                 



             COUNT (BEAKER) (test                                        



             code = 416)                                         

 

             BASOPHILS ABSOLUTE 0.04 K/ L    0.01-0.08                 



             COUNT (BEAKER) (test                                        



             code = 417)                                         

 

             IMMATURE     0.50 %       0.00-1.00                 



             GRANULOCYTES-RELATIVE                                        



             PERCENT (BEAKER) (test                                        



             code = 2801)                                        



FUNGUS CULTURE + [id number] 08:30:43





             Test Item    Value        Reference Range Interpretation Comments

 

             CULTURE (BEAKER) (test No fungus isolated in                       

    



             code = 1095) 28 days                                

 

             FUNGUS SMEAR (BEAKER) No fungal elements seen                      

     



             (test code = 1406)                                        



MISCELLANEOUS LAB [id number] 14:47:09





             Test Item    Value        Reference Range Interpretation Comments

 

             SCAN RESULT (test code = 3823265)                                  

      See attachment



ANAEROBIC [id number] 00:41:05





             Test Item    Value        Reference Range Interpretation Comments

 

             CULTURE (BEAKER) (test No anaerobes isolated                       

    



             code = 1095)                                        



BASIC METABOLIC [id number] 06:39:01





             Test Item    Value        Reference Range Interpretation Comments

 

             SODIUM (BEAKER) 134 meq/L    136-145      L            



             (test code = 381)                                        

 

             POTASSIUM    4.1 meq/L    3.5-5.1                   



             (BEAKER) (test                                        



             code = 379)                                         

 

             CHLORIDE (BEAKER) 103 meq/L                        



             (test code = 382)                                        

 

             CO2 (BEAKER) 26 meq/L     22-29                     



             (test code = 355)                                        

 

             BLOOD UREA   8 mg/dL      7-21                      



             NITROGEN (BEAKER)                                        



             (test code = 354)                                        

 

             CREATININE   0.62 mg/dL   0.57-1.25                 



             (BEAKER) (test                                        



             code = 358)                                         

 

             GLUCOSE RANDOM 111 mg/dL           H            



             (BEAKER) (test                                        



             code = 652)                                         

 

             CALCIUM (BEAKER) 7.5 mg/dL    8.4-10.2     L            



             (test code = 697)                                        

 

             EGFR (BEAKER) 112                                     Interpretatio

n of eGFR



             (test code = mL/min/1.73                            values Stage De

scription



             1092)        sq m                                   Result G1 Shaunna

l or high



                                                                 >=90 G2 Mildly 

decreased



                                                                 60-89 G3a Mildl

y to



                                                                 moderately  45-

59 G3b



                                                                 Moderately to s

everely



                                                                 30-44 G4 Severl

y decreased



                                                                 15-29 G5 Kidney

 failure



                                                                 <15Reported eGF

R is based



                                                                 on the CKD-EPI 

1



                                                                 equation that d

oes not use



                                                                 a race



                                                                 coefficientEsti

mated GFR



                                                                 is not as accur

ate as



                                                                 Creatinine Tabitha marie in



                                                                 predicting glom

erular



                                                                 filtration rate

. Estimated



                                                                 GFR is not appl

icable for



                                                                 dialysis patien

ts



 ID - WILLIAM WSpecimen slightly [id number] 06:37:31





             Test Item    Value        Reference Range Interpretation Comments

 

             MAGNESIUM (BEAKER) (test code = 1.6 mg/dL    1.6-2.6               

    



             627)                                                



 ID [place] THOMAS WCBC W/PLT COUNT &amp; AUTO [id number] 06:06:27





             Test Item    Value        Reference Range Interpretation Comments

 

             WHITE BLOOD CELL COUNT (BEAKER) 3.8 K/ L     3.5-10.5              

    



             (test code = 775)                                        

 

             RED BLOOD CELL COUNT (BEAKER) 2.41 M/ L    4.63-6.08    L          

  



             (test code = 761)                                        

 

             HEMOGLOBIN (BEAKER) (test code = 7.7 GM/DL    13.7-17.5    L       

     



             410)                                                

 

             HEMATOCRIT (BEAKER) (test code = 23.8 %       40.1-51.0    L       

     



             411)                                                

 

             MEAN CORPUSCULAR VOLUME (BEAKER) 99 fL        79-92        H       

     



             (test code = 753)                                        

 

             MEAN CORPUSCULAR HEMOGLOBIN 32.0 pg      25.7-32.2                 



             (BEAKER) (test code = 751)                                        

 

             MEAN CORPUSCULAR HEMOGLOBIN CONC 32.4 GM/DL   32.3-36.5            

     



             (BEAKER) (test code = 752)                                        

 

             RED CELL DISTRIBUTION WIDTH 18.6 %       11.6-14.4    H            



             (BEAKER) (test code = 412)                                        

 

             PLATELET COUNT (BEAKER) (test code 42 K/CU MM   150-450      L     

       



             = 756)                                              

 

             MEAN PLATELET VOLUME (BEAKER) 11.6 fL      9.4-12.4                

  



             (test code = 754)                                        

 

             NUCLEATED RED BLOOD CELLS (BEAKER) 0 /100 WBC   0-0                

       



             (test code = 413)                                        

 

             NEUTROPHILS RELATIVE PERCENT 58 %                                  

 



             (BEAKER) (test code = 429)                                        

 

             LYMPHOCYTES RELATIVE PERCENT 20 %                                  

 



             (BEAKER) (test code = 430)                                        

 

             MONOCYTES RELATIVE PERCENT 17 %                                   



             (BEAKER) (test code = 431)                                        

 

             EOSINOPHILS RELATIVE PERCENT 5 %                                   

 



             (BEAKER) (test code = 432)                                        

 

             BASOPHILS RELATIVE PERCENT 0 %                                    



             (BEAKER) (test code = 437)                                        

 

             NEUTROPHILS ABSOLUTE COUNT 2.18 K/ L    1.78-5.38                 



             (BEAKER) (test code = 670)                                        

 

             LYMPHOCYTES ABSOLUTE COUNT 0.76 K/ L    1.32-3.57    L            



             (BEAKER) (test code = 414)                                        

 

             MONOCYTES ABSOLUTE COUNT (BEAKER) 0.63 K/ L    0.30-0.82           

      



             (test code = 415)                                        

 

             EOSINOPHILS ABSOLUTE COUNT 0.18 K/ L    0.04-0.54                 



             (BEAKER) (test code = 416)                                        

 

             BASOPHILS ABSOLUTE COUNT (BEAKER) 0.00 K/ L    0.01-0.08    L      

      



             (test code = 417)                                        

 

             IMMATURE GRANULOCYTES-RELATIVE 1.10 %       0.00-1.00    H         

   



             PERCENT (BEAKER) (test code =                                      

  



             2801)                                               



BASIC METABOLIC [id number] 04:14:02





             Test Item    Value        Reference Range Interpretation Comments

 

             SODIUM (BEAKER) 134 meq/L    136-145      L            



             (test code = 381)                                        

 

             POTASSIUM    4.1 meq/L    3.5-5.1                   



             (BEAKER) (test                                        



             code = 379)                                         

 

             CHLORIDE (BEAKER) 103 meq/L                        



             (test code = 382)                                        

 

             CO2 (BEAKER) 26 meq/L     22-29                     



             (test code = 355)                                        

 

             BLOOD UREA   9 mg/dL      7-21                      



             NITROGEN (BEAKER)                                        



             (test code = 354)                                        

 

             CREATININE   0.65 mg/dL   0.57-1.25                 



             (BEAKER) (test                                        



             code = 358)                                         

 

             GLUCOSE RANDOM 126 mg/dL           H            



             (BEAKER) (test                                        



             code = 652)                                         

 

             CALCIUM (BEAKER) 7.6 mg/dL    8.4-10.2     L            



             (test code = 697)                                        

 

             EGFR (BEAKER) 110                                     Interpretatio

n of eGFR



             (test code = mL/min/1.73                            values Stage De

scription



             1092)        sq m                                   Result G1 Shaunna

l or high



                                                                 >=90 G2 Mildly 

decreased



                                                                 60-89 G3a Mildl

y to



                                                                 moderately 45-5

9 G3b



                                                                 Moderately to s

everely



                                                                 30-44 G4 Severl

y decreased



                                                                 15-29 G5 Kidney

 failure



                                                                 <15Reported eGF

R is based



                                                                 on the CKD-EPI 





                                                                 equation that d

oes not use



                                                                 a race



                                                                 coefficientEsti

mated GFR



                                                                 is not as accur

ate as



                                                                 Creatinine Tabitha

cynthia in



                                                                 predicting glom

erular



                                                                 filtration rate

. Estimated



                                                                 GFR is not appl

icable for



                                                                 dialysis patien

ts



 ID - BVSpecimen slightly [id number] 04:11:10





             Test Item    Value        Reference Range Interpretation Comments

 

             MAGNESIUM (BEAKER) (test code = 1.5 mg/dL    1.6-2.6      L        

    



             627)                                                



 ID - BVCBC W/PLT COUNT &amp; AUTO [id number] 04:00:10





             Test Item    Value        Reference Range Interpretation Comments

 

             WHITE BLOOD CELL COUNT (BEAKER) 3.6 K/ L     3.5-10.5              

    



             (test code = 775)                                        

 

             RED BLOOD CELL COUNT (BEAKER) 2.42 M/ L    4.63-6.08    L          

  



             (test code = 761)                                        

 

             HEMOGLOBIN (BEAKER) (test code = 7.6 GM/DL    13.7-17.5    L       

     



             410)                                                

 

             HEMATOCRIT (BEAKER) (test code = 24.0 %       40.1-51.0    L       

     



             411)                                                

 

             MEAN CORPUSCULAR VOLUME (BEAKER) 99 fL        79-92        H       

     



             (test code = 753)                                        

 

             MEAN CORPUSCULAR HEMOGLOBIN 31.4 pg      25.7-32.2                 



             (BEAKER) (test code = 751)                                        

 

             MEAN CORPUSCULAR HEMOGLOBIN CONC 31.7 GM/DL   32.3-36.5    L       

     



             (BEAKER) (test code = 752)                                        

 

             RED CELL DISTRIBUTION WIDTH 18.7 %       11.6-14.4    H            



             (BEAKER) (test code = 412)                                        

 

             PLATELET COUNT (BEAKER) (test code 44 K/CU MM   150-450      L     

       



             = 756)                                              

 

             MEAN PLATELET VOLUME (BEAKER) 12.5 fL      9.4-12.4     H          

  



             (test code = 754)                                        

 

             NUCLEATED RED BLOOD CELLS (BEAKER) 0 /100 WBC   0-0                

       



             (test code = 413)                                        

 

             NEUTROPHILS RELATIVE PERCENT 57 %                                  

 



             (BEAKER) (test code = 429)                                        

 

             LYMPHOCYTES RELATIVE PERCENT 19 %                                  

 



             (BEAKER) (test code = 430)                                        

 

             MONOCYTES RELATIVE PERCENT 18 %                                   



             (BEAKER) (test code = 431)                                        

 

             EOSINOPHILS RELATIVE PERCENT 5 %                                   

 



             (BEAKER) (test code = 432)                                        

 

             BASOPHILS RELATIVE PERCENT 0 %                                    



             (BEAKER) (test code = 437)                                        

 

             NEUTROPHILS ABSOLUTE COUNT 2.02 K/ L    1.78-5.38                 



             (BEAKER) (test code = 670)                                        

 

             LYMPHOCYTES ABSOLUTE COUNT 0.68 K/ L    1.32-3.57    L            



             (BEAKER) (test code = 414)                                        

 

             MONOCYTES ABSOLUTE COUNT (BEAKER) 0.63 K/ L    0.30-0.82           

      



             (test code = 415)                                        

 

             EOSINOPHILS ABSOLUTE COUNT 0.17 K/ L    0.04-0.54                 



             (BEAKER) (test code = 416)                                        

 

             BASOPHILS ABSOLUTE COUNT (BEAKER) 0.00 K/ L    0.01-0.08    L      

      



             (test code = 417)                                        

 

             IMMATURE GRANULOCYTES-RELATIVE 1.70 %       0.00-1.00    H         

   



             PERCENT (BEAKER) (test code =                                      

  



             2801)                                               



[id number] 07:20:31





             Test Item    Value        Reference Range Interpretation Comments

 

             MAGNESIUM (BEAKER) 1.8 mg/dL    1.6-2.6                   Specimen 

slightly



             (test code = 627)                                        hemolyzed



 ID - MARCOCBC W/PLT COUNT &amp; AUTO [id number] 06:57:51





             Test Item    Value        Reference Range Interpretation Comments

 

             WHITE BLOOD CELL COUNT 2.9 K/ L     3.5-10.5     L            



             (BEAKER) (test code =                                        



             775)                                                

 

             RED BLOOD CELL COUNT 2.40 M/ L    4.63-6.08    L            



             (BEAKER) (test code =                                        



             761)                                                

 

             HEMOGLOBIN (BEAKER) 7.7 GM/DL    13.7-17.5    L            



             (test code = 410)                                        

 

             HEMATOCRIT (BEAKER) 24.0 %       40.1-51.0    L            



             (test code = 411)                                        

 

             MEAN CORPUSCULAR VOLUME 100 fL       79-92        H            



             (BEAKER) (test code =                                        



             753)                                                

 

             MEAN CORPUSCULAR 32.1 pg      25.7-32.2                 



             HEMOGLOBIN (BEAKER)                                        



             (test code = 751)                                        

 

             MEAN CORPUSCULAR 32.1 GM/DL   32.3-36.5    L            



             HEMOGLOBIN CONC                                        



             (BEAKER) (test code =                                        



             752)                                                

 

             RED CELL DISTRIBUTION 18.6 %       11.6-14.4    H            



             WIDTH (BEAKER) (test                                        



             code = 412)                                         

 

             PLATELET COUNT (BEAKER) 43 K/CU MM   150-450      L            



             (test code = 756)                                        

 

             MEAN PLATELET VOLUME                                        Unable 

to report due



             (BEAKER) (test code =                                        to abn

ormal Platelet



             754)                                                population



                                                                 distribution.

 

             NUCLEATED RED BLOOD 0 /100 WBC   0-0                       



             CELLS (BEAKER) (test                                        



             code = 413)                                         

 

             NEUTROPHILS RELATIVE 51 %                                   



             PERCENT (BEAKER) (test                                        



             code = 429)                                         

 

             LYMPHOCYTES RELATIVE 23 %                                   



             PERCENT (BEAKER) (test                                        



             code = 430)                                         

 

             MONOCYTES RELATIVE 24 %                                   



             PERCENT (BEAKER) (test                                        



             code = 431)                                         

 

             EOSINOPHILS RELATIVE 1 %                                    



             PERCENT (BEAKER) (test                                        



             code = 432)                                         

 

             BASOPHILS RELATIVE 0 %                                    



             PERCENT (BEAKER) (test                                        



             code = 437)                                         

 

             NEUTROPHILS ABSOLUTE 1.47 K/ L    1.78-5.38    L            



             COUNT (BEAKER) (test                                        



             code = 670)                                         

 

             LYMPHOCYTES ABSOLUTE 0.67 K/ L    1.32-3.57    L            



             COUNT (BEAKER) (test                                        



             code = 414)                                         

 

             MONOCYTES ABSOLUTE 0.71 K/ L    0.30-0.82                 



             COUNT (BEAKER) (test                                        



             code = 415)                                         

 

             EOSINOPHILS ABSOLUTE 0.03 K/ L    0.04-0.54    L            



             COUNT (BEAKER) (test                                        



             code = 416)                                         

 

             BASOPHILS ABSOLUTE 0.00 K/ L    0.01-0.08    L            



             COUNT (BEAKER) (test                                        



             code = 417)                                         

 

             IMMATURE     1.00 %       0.00-1.00                 



             GRANULOCYTES-RELATIVE                                        



             PERCENT (BEAKER) (test                                        



             code = 2801)                                        



SURGICALLY OBTAINED CULTURE + GRAM [id number] 13:25:13





             Test Item    Value        Reference Range Interpretation Comments

 

             CULTURE (BEAKER) (test code No growth                              



             = 1095)                                             

 

             GRAM STAIN RESULT (BEAKER) 4+ WBCs                                



             (test code = 1123)                                        

 

             GRAM STAIN RESULT (BEAKER) No organisms seen                       

    



             (test code = 22128)                                        



HEPATIC FUNCTION [id number] 13:15:37





             Test Item    Value        Reference Range Interpretation Comments

 

             TOTAL PROTEIN (BEAKER) (test code = 5.5 gm/dL    6.0-8.3      L    

        



             770)                                                

 

             ALBUMIN (BEAKER) (test code = 1145) 1.8 g/dL     3.5-5.0      L    

        

 

             BILIRUBIN TOTAL (BEAKER) (test code 3.2 mg/dL    0.2-1.2      H    

        



             = 377)                                              

 

             BILIRUBIN DIRECT (BEAKER) (test 1.9 mg/dL    0.1-0.5      H        

    



             code = 706)                                         

 

             ALKALINE PHOSPHATASE (BEAKER) (test 142 U/L                  

        



             code = 346)                                         

 

             AST (SGOT) (BEAKER) (test code = 66 U/L       5-34         H       

     



             353)                                                

 

             ALT (SGPT) (BEAKER) (test code = 39 U/L       6-55                 

     



             347)                                                



 ID - MARCOSpecimen slightly ictericBLOOD [id number] 11:00:32





             Test Item    Value        Reference Range Interpretation Comments

 

             CULTURE (BEAKER) (test No growth in 5 days                         

  



             code = 1095)                                        



[id number] 07:16:25





             Test Item    Value        Reference Range Interpretation Comments

 

             MAGNESIUM (BEAKER) (test code = 2.1 mg/dL    1.6-2.6               

    



             627)                                                



 ID - BSCBC W/PLT COUNT &amp; AUTO [id number] 06:59:48





             Test Item    Value        Reference Range Interpretation Comments

 

             WHITE BLOOD CELL COUNT 2.8 K/ L     3.5-10.5     L            



             (BEAKER) (test code =                                        



             775)                                                

 

             RED BLOOD CELL COUNT 2.62 M/ L    4.63-6.08    L            



             (BEAKER) (test code =                                        



             761)                                                

 

             HEMOGLOBIN (BEAKER) 8.3 GM/DL    13.7-17.5    L            



             (test code = 410)                                        

 

             HEMATOCRIT (BEAKER) 26.1 %       40.1-51.0    L            



             (test code = 411)                                        

 

             MEAN CORPUSCULAR VOLUME 100 fL       79-92        H            



             (BEAKER) (test code =                                        



             753)                                                

 

             MEAN CORPUSCULAR 31.7 pg      25.7-32.2                 



             HEMOGLOBIN (BEAKER)                                        



             (test code = 751)                                        

 

             MEAN CORPUSCULAR 31.8 GM/DL   32.3-36.5    L            



             HEMOGLOBIN CONC                                        



             (BEAKER) (test code =                                        



             752)                                                

 

             RED CELL DISTRIBUTION 18.6 %       11.6-14.4    H            



             WIDTH (BEAKER) (test                                        



             code = 412)                                         

 

             PLATELET COUNT (BEAKER) 41 K/CU MM   150-450      L            



             (test code = 756)                                        

 

             MEAN PLATELET VOLUME                                        Unable 

to report due



             (BEAKER) (test code =                                        to abn

ormal Platelet



             754)                                                population



                                                                 distribution.

 

             NUCLEATED RED BLOOD 0 /100 WBC   0-0                       



             CELLS (BEAKER) (test                                        



             code = 413)                                         

 

             NEUTROPHILS RELATIVE 58 %                                   



             PERCENT (BEAKER) (test                                        



             code = 429)                                         

 

             LYMPHOCYTES RELATIVE 15 %                                   



             PERCENT (BEAKER) (test                                        



             code = 430)                                         

 

             MONOCYTES RELATIVE 25 %                                   



             PERCENT (BEAKER) (test                                        



             code = 431)                                         

 

             EOSINOPHILS RELATIVE 0 %                                    



             PERCENT (BEAKER) (test                                        



             code = 432)                                         

 

             BASOPHILS RELATIVE 0 %                                    



             PERCENT (BEAKER) (test                                        



             code = 437)                                         

 

             NEUTROPHILS ABSOLUTE 1.65 K/ L    1.78-5.38    L            



             COUNT (BEAKER) (test                                        



             code = 670)                                         

 

             LYMPHOCYTES ABSOLUTE 0.43 K/ L    1.32-3.57    L            



             COUNT (BEAKER) (test                                        



             code = 414)                                         

 

             MONOCYTES ABSOLUTE 0.72 K/ L    0.30-0.82                 



             COUNT (BEAKER) (test                                        



             code = 415)                                         

 

             EOSINOPHILS ABSOLUTE 0.00 K/ L    0.04-0.54    L            



             COUNT (BEAKER) (test                                        



             code = 416)                                         

 

             BASOPHILS ABSOLUTE 0.00 K/ L    0.01-0.08    L            



             COUNT (BEAKER) (test                                        



             code = 417)                                         

 

             IMMATURE     1.40 %       0.00-1.00    H            



             GRANULOCYTES-RELATIVE                                        



             PERCENT (BEAKER) (test                                        



             code = 2801)                                        



ANAEROBIC [id number] 02:13:49





             Test Item    Value        Reference Range Interpretation Comments

 

             CULTURE (BEAKER) (test No anaerobes isolated                       

    



             code = 1095)                                        



U/S, ABDOMINAL, [id number] 18:25:00Reason for exam:-&gt;cirrhosis
************************************************************[facility] CENTERName: [facility] : 1970 Sex: 
M************************************************************FINAL REPORT 
PATIENT ID: [id number] Abdominal ultrasound dated 2023 Clinical information: 
cirrhosis, eval vessels Comment: Real-time transabdominal ultrasound was 
performed. Liver is atrophic and measures 8.7 cm in length. The echogenicity of 
the liver is increased with irregular margin consistent with cirrhosis. No focal
 lesion is noted in the liver. Spleen is enlarged measuring 12.8 cm in length.
Gallbladder is contracted. No gallstone is present. No biliary dilatation is 
seen. Common bile duct measures 5 mm in diameter. Main portal vein measures 11 
mm in diameter. Pancreas is suboptimal visualized. Right kidney measures 12.0 x 
5.5 x 6.1 cm. Left kidney measures 11.8 x 5.7 x 6.1 cm. Echogenicity of both 
kidney is normal. No hydronephrosis or solid mass seen in either kidney. No cyst
 is seen in either kidney. Small ascites present in the abdomen. There is small 
left pleural effusion. Abdominal aorta is normal in caliber. The IVC is patent. 
Real-time Color and Spectral doppler ultrasound of the abdomen was performed. 
Main portal vein measures 1.1 cm in diameter. There is hepatopedal flow in the 
main portal vein with velocity 45.8 cm/sec. Right and left portal veins are 
patent. Proper hepatic artery, right hepatic artery, and left hepatic artery are
 patent with resistive indices 0.8, 0.7, and 0.75 respectively. IVC, Hepatic 
venous confluence, right HV, middle HV and left HV are patent. Impression: 1. 
Cirrhosis with splenomegaly2. No suspicious hepatic mass.3. Small ascites and 
left pleural effusion.4. Patent hepatic arteries and portal veins. Signed: Bobbi العلي MDReport Verified Date/Time: 2023 18:25:01 Electronically signed 
by: BOBBI العلي M.D. on 2023 06:25 PMU/S, DUPLEX, [id number] 
18:25:00Reason for exam:-&gt;cirrhosis, eval vessels
********************************************************[facility] 1970 Sex: 
M************************************************************FINAL REPORT 
PATIENT ID: [id number] Abdominal ultrasound dated 2023 Clinical information: 
cirrhosis, eval vessels Comment: Real-time transabdominal ultrasound was 
performed. Liver is atrophic and measures 8.7 cm in length. The echogenicity of 
the liver is increased with irregular margin consistent with cirrhosis. No focal
 lesion is noted in the liver. Spleen is enlarged measuring 12.8 cm in length.
Gallbladder is contracted. No gallstone is present. No biliary dilatation is 
seen. Common bile duct measures 5 mm in diameter. Main portal vein measures 11 
mm in diameter. Pancreas is suboptimal visualized. Right kidney measures 12.0 x 
5.5 x 6.1 cm. Left kidney measures 11.8 x 5.7 x 6.1 cm. Echogenicity of both 
kidney is normal. No hydronephrosis or solid mass seen in either kidney. No cyst
 is seen in either kidney. Small ascites present in the abdomen. There is small 
left pleural effusion. Abdominal aorta is normal in caliber. The IVC is patent. 
Real-time Color and Spectral doppler ultrasound of the abdomen was performed. 
Main portal vein measures 1.1 cm in diameter. There is hepatopedal flow in the 
main portal vein with velocity 45.8 cm/sec. Right and left portal veins are 
patent. Proper hepatic artery, right hepatic artery, and left hepatic artery are
 patent with resistive indices 0.8, 0.7, and 0.75 respectively. IVC, Hepatic 
venous confluence, right HV, middle HV and left HV are patent. Impression: 1. 
Cirrhosis with splenomegaly2. No suspicious hepatic mass.3. Small ascites and 
left pleural effusion.4. Patent hepatic arteries and portal veins. Signed: Bobbi العلي MDReport Verified Date/Time: 2023 18:25:01 Electronically signed 
by: BOBBI العلي M.D. on 2023 06:25 PMALPHA FETOPROTEIN (AFP), TUMOR 
[id number] 07:42:03





             Test Item    Value        Reference Range Interpretation Comments

 

             ALPHA-FETOPROTEIN (BEAKER) (test code < ng/mL      <10.0           

          



             = 1094)                                             



 ID - ADMINHEPATITIS B SURFACE [id number] 07:41:36





             Test Item    Value        Reference Range Interpretation Comments

 

             HEPATITIS B SURFACE ANTIBODY < mIU/mL     <8.0                     

 



             (BEAKER) (test code = 647)                                        



 ID - ADMINHEPATITIS A ANTIBODY, [id number] 07:41:31





             Test Item    Value        Reference Range Interpretation Comments

 

             HEPATITIS A IGG ANTIBODY (BEAKER) Reactive     Nonreactive  A      

      



             (test code = 2797)                                        



 ID - ADMINHEPATITIS B CORE ANTIBODY, [id number] 07:40:24





             Test Item    Value        Reference Range Interpretation Comments

 

             HEPATITIS B CORE TOTAL ANTIBODY Nonreactive  Nonreactive           

    



             (BEAKER) (test code = 497)                                        



 ID - ADMINHEPATITIS B SURFACE [id number] 07:40:23





             Test Item    Value        Reference Range Interpretation Comments

 

             HEPATITIS B SURFACE ANTIGEN (2) Nonreactive  Nonreactive           

    



             (BEAKER) (test code = 2585)                                        



Specimen is considered negative for HBsAg.HEPATITIS C [id number] 
07:40:23





             Test Item    Value        Reference Range Interpretation Comments

 

             HEPATITIS C ANTIBODY (BEAKER) Nonreactive  Nonreactive             

  



             (test code = 367)                                        



 ID - [id number] 05:45:39





             Test Item    Value        Reference Range Interpretation Comments

 

             MAGNESIUM (BEAKER) (test code = 1.9 mg/dL    1.6-2.6               

    



             627)                                                



 ID - ADMINCBC W/PLT COUNT &amp; AUTO [id number] 05:19:17





             Test Item    Value        Reference Range Interpretation Comments

 

             WHITE BLOOD CELL COUNT (BEAKER) 4.6 K/ L     3.5-10.5              

    



             (test code = 775)                                        

 

             RED BLOOD CELL COUNT (BEAKER) 2.22 M/ L    4.63-6.08    L          

  



             (test code = 761)                                        

 

             HEMOGLOBIN (BEAKER) (test code = 6.9 GM/DL    13.7-17.5    L       

     



             410)                                                

 

             HEMATOCRIT (BEAKER) (test code = 21.7 %       40.1-51.0    L       

     



             411)                                                

 

             MEAN CORPUSCULAR VOLUME (BEAKER) 98 fL        79-92        H       

     



             (test code = 753)                                        

 

             MEAN CORPUSCULAR HEMOGLOBIN 31.1 pg      25.7-32.2                 



             (BEAKER) (test code = 751)                                        

 

             MEAN CORPUSCULAR HEMOGLOBIN CONC 31.8 GM/DL   32.3-36.5    L       

     



             (BEAKER) (test code = 752)                                        

 

             RED CELL DISTRIBUTION WIDTH 18.4 %       11.6-14.4    H            



             (BEAKER) (test code = 412)                                        

 

             PLATELET COUNT (BEAKER) (test code 44 K/CU MM   150-450      L     

       



             = 756)                                              

 

             MEAN PLATELET VOLUME (BEAKER) 12.4 fL      9.4-12.4                

  



             (test code = 754)                                        

 

             NUCLEATED RED BLOOD CELLS (BEAKER) 0 /100 WBC   0-0                

       



             (test code = 413)                                        

 

             NEUTROPHILS RELATIVE PERCENT 70 %                                  

 



             (BEAKER) (test code = 429)                                        

 

             LYMPHOCYTES RELATIVE PERCENT 10 %                                  

 



             (BEAKER) (test code = 430)                                        

 

             MONOCYTES RELATIVE PERCENT 19 %                                   



             (BEAKER) (test code = 431)                                        

 

             EOSINOPHILS RELATIVE PERCENT 0 %                                   

 



             (BEAKER) (test code = 432)                                        

 

             BASOPHILS RELATIVE PERCENT 0 %                                    



             (BEAKER) (test code = 437)                                        

 

             NEUTROPHILS ABSOLUTE COUNT 3.17 K/ L    1.78-5.38                 



             (BEAKER) (test code = 670)                                        

 

             LYMPHOCYTES ABSOLUTE COUNT 0.45 K/ L    1.32-3.57    L            



             (BEAKER) (test code = 414)                                        

 

             MONOCYTES ABSOLUTE COUNT (BEAKER) 0.84 K/ L    0.30-0.82    H      

      



             (test code = 415)                                        

 

             EOSINOPHILS ABSOLUTE COUNT 0.00 K/ L    0.04-0.54    L            



             (BEAKER) (test code = 416)                                        

 

             BASOPHILS ABSOLUTE COUNT (BEAKER) 0.01 K/ L    0.01-0.08           

      



             (test code = 417)                                        

 

             IMMATURE GRANULOCYTES-RELATIVE 1.80 %       0.00-1.00    H         

   



             PERCENT (BEAKER) (test code =                                      

  



             2801)                                               



VANCOMYCIN LEVEL, [id number] 14:12:35





             Test Item    Value        Reference Range Interpretation Comments

 

             VANCOMYCIN TROUGH (BEAKER) (test 1.4 ug/mL    10.0-20.0    L       

     



             code = 522)                                         



 ID - ADMINBODY FLUID CULTURE + GRAM [id number] 13:02:52





             Test Item    Value        Reference    Interpretation Comments



                                       Range                     

 

             CULTURE (BEAKER) BETA HEMOLYTIC              A            1+ Beta-h

emolytic



             (test code = STREPTOCOCCUS GROUP                           streptoc

occus group



             1095)        B                                      B, by serologic

al



                                                                 grouping

 

             Ceftriaxone (test              See_Comment  S             [Automate

d message]



             code = 52)                                          The system [employer]



                                                                 generated this



                                                                 result transmit

michelle



                                                                 reference range

:



                                                                 Susceptible 0-0

.5 ,



                                                                 No Interpretati

ons



                                                                 Established <0 

or



                                                                 >.5. The refere

nce



                                                                 range was not u

sed



                                                                 to interpret th

is



                                                                 result as



                                                                 normal/abnormal

.

 

             Penicillin G              See_Comment  S             [Automated mes

elif]



             (test code = 3)                                        The system [employer]

[facility]



                                                                 generated this



                                                                 result transmit

michelle



                                                                 reference range

:



                                                                 Susceptible 0-0

.12 ,



                                                                 No Interpretati

ons



                                                                 Established <0 

or



                                                                 >.. The referen

ce



                                                                 range was not u

sed



                                                                 to interpret th

is



                                                                 result as



                                                                 normal/abnormal

.

 

             Vancomycin (test              See_Comment  S             [Automated

 message]



             code = 13)                                          The system [employer]



                                                                 generated this



                                                                 result transmit

michelle



                                                                 reference range

:



                                                                 Susceptible 0-1

 , No



                                                                 Interpretations



                                                                 Established <0 

or >1



                                                                 . The reference



                                                                 range was not u

sed



                                                                 to interpret th

is



                                                                 result as



                                                                 normal/abnormal

.

 

             GRAM STAIN RESULT 4+ WBCs                                



             (BEAKER) (test                                        



             code = 1123)                                        

 

             GRAM STAIN RESULT <1+ gram positive                           



             (BEAKER) (test cocci in pairs                           



             code = 879018)                                        



BASIC METABOLIC [id number] 05:21:50





             Test Item    Value        Reference Range Interpretation Comments

 

             SODIUM (BEAKER) 133 meq/L    136-145      L            



             (test code = 381)                                        

 

             POTASSIUM    4.9 meq/L    3.5-5.1                   Specimen slight

ly



             (BEAKER) (test                                        hemolyzed



             code = 379)                                         

 

             CHLORIDE (BEAKER) 106 meq/L                        



             (test code = 382)                                        

 

             CO2 (BEAKER) 18 meq/L     22-29        L            



             (test code = 355)                                        

 

             BLOOD UREA   13 mg/dL     7-21                      



             NITROGEN (BEAKER)                                        



             (test code = 354)                                        

 

             CREATININE   0.55 mg/dL   0.57-1.25    L            Specimen slight

ly



             (BEAKER) (test                                        hemolyzed



             code = 358)                                         

 

             GLUCOSE RANDOM 138 mg/dL           H            



             (BEAKER) (test                                        



             code = 652)                                         

 

             CALCIUM (BEAKER) 7.0 mg/dL    8.4-10.2     L            



             (test code = 697)                                        

 

             EGFR (BEAKER) 115                                     Interpretatio

n of eGFR



             (test code = mL/min/1.73                            values Stage De

scription



             1092)        sq m                                   Result G1 Shaunna

l or high



                                                                 >=90 G2 Mildly 

decreased



                                                                 60-89 G3a Mildl

y to



                                                                 moderately 45-5

9 G3b



                                                                 Moderately to s

everely



                                                                 30-44 G4 Severl

y decreased



                                                                 15-29 G5 Kidney

 failure



                                                                 <15Reported eGF

R is based



                                                                 on the CKD-EPI 





                                                                 equation that d

oes not use



                                                                 a race



                                                                 coefficientEsti

mated GFR



                                                                 is not as accur

ate as



                                                                 Creatinine Tabitha marie in



                                                                 predicting glom

erular



                                                                 filtration rate

. Estimated



                                                                 GFR is not appl

icable for



                                                                 dialysis patien

ts



 ID - MMSpecimen moderately [id number] 05:19:56





             Test Item    Value        Reference Range Interpretation Comments

 

             MAGNESIUM (BEAKER) 1.8 mg/dL    1.6-2.6                   Specimen 

slightly



             (test code = 627)                                        hemolyzed



 ID - [id number] 05:19:56





             Test Item    Value        Reference Range Interpretation Comments

 

             PHOSPHORUS (BEAKER) 4.6 mg/dL    2.3-4.7                   Specimen

 slightly



             (test code = 604)                                        hemolyzed



 ID - MMCBC W/PLT COUNT &amp; AUTO [id number] 05:04:59





             Test Item    Value        Reference Range Interpretation Comments

 

             WHITE BLOOD CELL COUNT (BEAKER) 3.7 K/ L     3.5-10.5              

    



             (test code = 775)                                        

 

             RED BLOOD CELL COUNT (BEAKER) 2.66 M/ L    4.63-6.08    L          

  



             (test code = 761)                                        

 

             HEMOGLOBIN (BEAKER) (test code = 8.4 GM/DL    13.7-17.5    L       

     



             410)                                                

 

             HEMATOCRIT (BEAKER) (test code = 26.1 %       40.1-51.0    L       

     



             411)                                                

 

             MEAN CORPUSCULAR VOLUME (BEAKER) 98 fL        79-92        H       

     



             (test code = 753)                                        

 

             MEAN CORPUSCULAR HEMOGLOBIN 31.6 pg      25.7-32.2                 



             (BEAKER) (test code = 751)                                        

 

             MEAN CORPUSCULAR HEMOGLOBIN CONC 32.2 GM/DL   32.3-36.5    L       

     



             (BEAKER) (test code = 752)                                        

 

             RED CELL DISTRIBUTION WIDTH 18.3 %       11.6-14.4    H            



             (BEAKER) (test code = 412)                                        

 

             PLATELET COUNT (BEAKER) (test code 56 K/CU MM   150-450      L     

       



             = 756)                                              

 

             MEAN PLATELET VOLUME (BEAKER) 11.2 fL      9.4-12.4                

  



             (test code = 754)                                        

 

             NUCLEATED RED BLOOD CELLS (BEAKER) 0 /100 WBC   0-0                

       



             (test code = 413)                                        

 

             NEUTROPHILS RELATIVE PERCENT 79 %                                  

 



             (BEAKER) (test code = 429)                                        

 

             LYMPHOCYTES RELATIVE PERCENT 14 %                                  

 



             (BEAKER) (test code = 430)                                        

 

             MONOCYTES RELATIVE PERCENT 6 %                                    



             (BEAKER) (test code = 431)                                        

 

             EOSINOPHILS RELATIVE PERCENT 0 %                                   

 



             (BEAKER) (test code = 432)                                        

 

             BASOPHILS RELATIVE PERCENT 0 %                                    



             (BEAKER) (test code = 437)                                        

 

             NEUTROPHILS ABSOLUTE COUNT 2.89 K/ L    1.78-5.38                 



             (BEAKER) (test code = 670)                                        

 

             LYMPHOCYTES ABSOLUTE COUNT 0.52 K/ L    1.32-3.57    L            



             (BEAKER) (test code = 414)                                        

 

             MONOCYTES ABSOLUTE COUNT (BEAKER) 0.22 K/ L    0.30-0.82    L      

      



             (test code = 415)                                        

 

             EOSINOPHILS ABSOLUTE COUNT 0.00 K/ L    0.04-0.54    L            



             (BEAKER) (test code = 416)                                        

 

             BASOPHILS ABSOLUTE COUNT (BEAKER) 0.00 K/ L    0.01-0.08    L      

      



             (test code = 417)                                        

 

             IMMATURE GRANULOCYTES-RELATIVE 1.40 %       0.00-1.00    H         

   



             PERCENT (BEAKER) (test code =                                      

  



             2801)                                               



MRSA [id number] 12:59:38





             Test Item    Value        Reference Range Interpretation Comments

 

             CULTURE (BEAKER) (test code No MRSA isolated                       

    



             = 1095)                                             



(CELLAVISION MANUAL DIFF)2023 09:01:51





             Test Item    Value        Reference Range Interpretation Comments

 

             NEUTROPHILS - REL 76 %                                   



             (CELLAVISION)(BEAKER) (test code                                   

     



             = 2816)                                             

 

             LYMPHOCYTES - REL 6 %                                    



             (CELLAVISION)(BEAKER) (test code                                   

     



             = 2817)                                             

 

             MONOCYTES - REL 6 %                                    



             (CELLAVISION)(BEAKER) (test code                                   

     



             = 2818)                                             

 

             EOSINOPHILS - REL 3 %                                    



             (CELLAVISION)(BEAKER) (test code                                   

     



             = 2819)                                             

 

             BASOPHILS - REL 1 %                                    



             (CELLAVISION)(BEAKER) (test code                                   

     



             = 2820)                                             

 

             METAMYELOCYTES - REL 1 %          0-0          H            



             (CELLAVISION)(BEAKER) (test code                                   

     



             = 2821)                                             

 

             MYELOCYTES - REL 1 %          0-0          H            



             (CELLAVISION)(BEAKER) (test code                                   

     



             = 2822)                                             

 

             BANDS - REL (CELLAVISION)(BEAKER) 4 %          0-10                

      



             (test code = 2826)                                        

 

             ATYPICAL LYMPHOCYTES - REL 1 %          0-0          H            



             (CELLAVISION)(BEAKER) (test code                                   

     



             = 2829)                                             

 

             NEUTROPHILS - ABS 4.33 K/ul    1.78-5.38                 



             (CELLAVISION)(BEAKER) (test code                                   

     



             = 2830)                                             

 

             LYMPHOCYTES - ABS 0.34 K/ul    1.32-3.57    L            



             (CELLAVISION)(BEAKER) (test code                                   

     



             = 2831)                                             

 

             MONOCYTES - ABS 0.34 K/uL    0.30-0.82                 



             (CELLAVISION)(BEAKER) (test code                                   

     



             = 2832)                                             

 

             EOSINOPHILS - ABS 0.17 K/uL    0.04-0.54                 



             (CELLAVISION)(BEAKER) (test code                                   

     



             = 2834)                                             

 

             BASOPHILS - ABS 0.06 K/uL    0.01-0.08                 



             (CELLAVISION)(BEAKER) (test code                                   

     



             = 2835)                                             

 

             METAMYELOCYTES - ABS 0.06 K/uL    0.00-0.00    H            



             (CELLAVISION)(BEAKER) (test code                                   

     



             = 2836)                                             

 

             MYELOCYTES-ABS 0.06 K/uL    0.00-0.00    H            



             (CELLAVISION)(BEAKER) (test code                                   

     



             = 2837)                                             

 

             BANDS - ABS (CELLAVISION)(BEAKER) 0.23 K/uL    0.00-0.80           

      



             (test code = 2840)                                        

 

             ATYPICAL LYMPHOCYTES - ABS 0.06 K/uL    0.00-0.00    H            



             (CELLAVISION)(BEAKER) (test code                                   

     



             = 2858)                                             

 

             TOTAL COUNTED (BEAKER) (test code 100                              

      



             = 1351)                                             

 

             WBC MORPHOLOGY (BEAKER) (test Normal                               

  



             code = 487)                                         

 

             PLT MORPHOLOGY (BEAKER) (test Normal                               

  



             code = 486)                                         

 

             POLYCHROMATOPHILLIC RBCS(BEAKER) 1+ few                            

     



             (test code = 478)                                        

 

             ANISOCYTOSIS (BEAKER) (test code 2+ moderate                       

     



             = 961)                                              

 

             MACROCYTES (BEAKER) (test code = 1+ few                            

     



             964)                                                

 

             POIKILOCYTES (BEAKER) (test code 2+ moderate                       

     



             = 966)                                              

 

             SCHISTOCYTES (BEAKER) (test code 1+ few                            

     



             = 765)                                              

 

             OVALOCYTES (BEAKER) (test code = 1+ few                            

     



             477)                                                

 

             ARTIFACT (CELLAVISION)(BEAKER) Present                             

   



             (test code = 3432)                                        

 

             PLATELET CONCENTRATION Decreased                              



             (CELLAVISION)(BEAKER) (test code                                   

     



             = 3438)                                             



 ID - [id number] ID - Katie OverholtUser comments: Slide comments:CBC 
W/PLT COUNT &amp; AUTO [id number] 09:01:48





             Test Item    Value        Reference Range Interpretation Comments

 

             WHITE BLOOD CELL COUNT (BEAKER) 5.7 K/ L     3.5-10.5              

    



             (test code = 775)                                        

 

             RED BLOOD CELL COUNT (BEAKER) 2.53 M/ L    4.63-6.08    L          

  



             (test code = 761)                                        

 

             HEMOGLOBIN (BEAKER) (test code = 7.9 GM/DL    13.7-17.5    L       

     



             410)                                                

 

             HEMATOCRIT (BEAKER) (test code = 24.5 %       40.1-51.0    L       

     



             411)                                                

 

             MEAN CORPUSCULAR VOLUME (BEAKER) 97 fL        79-92        H       

     



             (test code = 753)                                        

 

             MEAN CORPUSCULAR HEMOGLOBIN 31.2 pg      25.7-32.2                 



             (BEAKER) (test code = 751)                                        

 

             MEAN CORPUSCULAR HEMOGLOBIN CONC 32.2 GM/DL   32.3-36.5    L       

     



             (BEAKER) (test code = 752)                                        

 

             RED CELL DISTRIBUTION WIDTH 19.5 %       11.6-14.4    H            



             (BEAKER) (test code = 412)                                        

 

             PLATELET COUNT (BEAKER) (test code 52 K/CU MM   150-450      L     

       



             = 756)                                              

 

             MEAN PLATELET VOLUME (BEAKER) 10.5 fL      9.4-12.4                

  



             (test code = 754)                                        

 

             NUCLEATED RED BLOOD CELLS (BEAKER) 0 /100 WBC   0-0                

       



             (test code = 413)                                        



BASIC METABOLIC [id number] 05:30:22





             Test Item    Value        Reference Range Interpretation Comments

 

             SODIUM (BEAKER) 133 meq/L    136-145      L            



             (test code = 381)                                        

 

             POTASSIUM    3.9 meq/L    3.5-5.1                   



             (BEAKER) (test                                        



             code = 379)                                         

 

             CHLORIDE (BEAKER) 107 meq/L                        



             (test code = 382)                                        

 

             CO2 (BEAKER) 22 meq/L     22-29                     



             (test code = 355)                                        

 

             BLOOD UREA   10 mg/dL     7-21                      



             NITROGEN (BEAKER)                                        



             (test code = 354)                                        

 

             CREATININE   0.59 mg/dL   0.57-1.25                 



             (BEAKER) (test                                        



             code = 358)                                         

 

             GLUCOSE RANDOM 85 mg/dL                         



             (BEAKER) (test                                        



             code = 652)                                         

 

             CALCIUM (BEAKER) 7.1 mg/dL    8.4-10.2     L            



             (test code = 697)                                        

 

             EGFR (BEAKER) 113                                     Interpretatio

n of eGFR



             (test code = mL/min/1.73                            values Stage De

scription



             1092)        sq m                                   Result G1 Shaunna

l or high



                                                                 >=90 G2 Mildly 

decreased



                                                                 60-89 G3a Mildl

y to



                                                                 moderately 45-5

9 G3b



                                                                 Moderately to s

everely



                                                                 30-44 G4 Severl

y decreased



                                                                 15-29 G5 Kidney

 failure



                                                                 <15Reported eGF

R is based



                                                                 on the CKD-EPI 





                                                                 equation that d

oes not use



                                                                 a race



                                                                 coefficientEsti

mated GFR



                                                                 is not as accur

ate as



                                                                 Creatinine Tabitha

cynthia in



                                                                 predicting glom

erular



                                                                 filtration rate

. Estimated



                                                                 GFR is not appl

icable for



                                                                 dialysis patien

ts



 ID - WILLIAM WSpecimen moderately ictericHEPATIC FUNCTION [id number]
05:30:15





             Test Item    Value        Reference Range Interpretation Comments

 

             TOTAL PROTEIN (BEAKER) (test code = 5.0 gm/dL    6.0-8.3      L    

        



             770)                                                

 

             ALBUMIN (BEAKER) (test code = 1145) 1.7 g/dL     3.5-5.0      L    

        

 

             BILIRUBIN TOTAL (BEAKER) (test code 4.9 mg/dL    0.2-1.2      H    

        



             = 377)                                              

 

             BILIRUBIN DIRECT (BEAKER) (test 2.1 mg/dL    0.1-0.5      H        

    



             code = 706)                                         

 

             ALKALINE PHOSPHATASE (BEAKER) (test 91 U/L                   

        



             code = 346)                                         

 

             AST (SGOT) (BEAKER) (test code = 48 U/L       5-34         H       

     



             353)                                                

 

             ALT (SGPT) (BEAKER) (test code = 20 U/L       6-55                 

     



             347)                                                



 ID - WILLIAM JACOBpecimeaniya moderately [id number] 05:29:05





             Test Item    Value        Reference Range Interpretation Comments

 

             PHOSPHORUS (BEAKER) (test code = 2.2 mg/dL    2.3-4.7      L       

     



             604)                                                



 ID - WILLIAM [id number] 05:29:04





             Test Item    Value        Reference Range Interpretation Comments

 

             MAGNESIUM (BEAKER) (test code = 1.8 mg/dL    1.6-2.6               

    



             627)                                                



 ID [place] THOMAS WPROTHROMBIN [id number] 05:05:58





             Test Item    Value        Reference Range Interpretation Comments

 

             PROTIME (BEAKER) (test code = 27.5 seconds 11.9-14.2    H          

  



             759)                                                

 

             INR (BEAKER) (test code = 370) 2.75         <=5.90                 

   



RECOMMENDED COUMADIN/WARFARIN INR THERAPY RANGESSTANDARD DOSE: 2.0 - 3.0 
Includes: PROPHYLAXIS for venous thrombosis, systemic embolization; TREATMENT 
for venous thrombosis and/or pulmonary embolus.HIGH RISK: Target INR is 2.5-3.5 
for patients with mechanical heart valves.POCT-GLUCOSE [id number] 18:49:26





             Test Item    Value        Reference Range Interpretation Comments

 

             POC-GLUCOSE METER 158 mg/dL           H            : TESTED A

T [facility] [address]



             (BEAKER) (test code =                                        [place],



             1538)                                               [id number]:



                                                                 /Techni

melanie ID



                                                                 = [id number] for Ch

ryan,



                                                                 Cunthia



CALCIUM, [id number] 16:00:32





             Test Item    Value        Reference Range Interpretation Comments

 

             CALCIUM IONIZED (BEAKER) (test 1.07 mmol/L  1.12-1.27    L         

   



             code = 698)                                         

 

             PH, BLOOD (BEAKER) (test code = 7.47                               

    



             1070)                                               



BASIC METABOLIC [id number] 14:08:29





             Test Item    Value        Reference Range Interpretation Comments

 

             SODIUM (BEAKER) 134 meq/L    136-145      L            



             (test code = 381)                                        

 

             POTASSIUM    3.4 meq/L    3.5-5.1      L            



             (BEAKER) (test                                        



             code = 379)                                         

 

             CHLORIDE (BEAKER) 113 meq/L           H            



             (test code = 382)                                        

 

             CO2 (BEAKER) 18 meq/L     22-29        L            



             (test code = 355)                                        

 

             BLOOD UREA   8 mg/dL      7-21                      



             NITROGEN (BEAKER)                                        



             (test code = 354)                                        

 

             CREATININE   0.47 mg/dL   0.57-1.25    L            



             (BEAKER) (test                                        



             code = 358)                                         

 

             GLUCOSE RANDOM 95 mg/dL                         



             (BEAKER) (test                                        



             code = 652)                                         

 

             CALCIUM (BEAKER) 6.0 mg/dL    8.4-10.2     LL           



             (test code = 697)                                        

 

             EGFR (BEAKER) 119                                     Interpretatio

n of eGFR



             (test code = mL/min/1.73                            values Stage De

scription



             1092)        sq m                                   Result G1 Shaunna

l or high



                                                                 >=90 G2 Mildly 

decreased



                                                                 60-89 G3a Mildl

y to



                                                                 moderately 45-5

9 G3b



                                                                 Moderately to s

everely



                                                                 30-44 G4 Severl

y decreased



                                                                 15-29 G5 Kidney

 failure



                                                                 <15Reported eGF

R is based



                                                                 on the CKD-EPI 





                                                                 equation that d

oes not use



                                                                 a race



                                                                 coefficientEsti

mated GFR



                                                                 is not as accur

ate as



                                                                 Creatinine Tabitha

cynthia in



                                                                 predicting glom

erular



                                                                 filtration rate

. Estimated



                                                                 GFR is not appl

icable for



                                                                 dialysis patien

ts



 ID - MMSpecimen moderately icteric(CELLAVISION MANUAL DIFF)2023 
14:04:53





             Test Item    Value        Reference Range Interpretation Comments

 

             NEUTROPHILS - REL 89 %                                   



             (CELLAVISION)(BEAKER) (test code                                   

     



             = 2816)                                             

 

             LYMPHOCYTES - REL 2 %                                    



             (CELLAVISION)(BEAKER) (test code                                   

     



             = 2817)                                             

 

             MONOCYTES - REL 3 %                                    



             (CELLAVISION)(BEAKER) (test code                                   

     



             = 2818)                                             

 

             BASOPHILS - REL 1 %                                    



             (CELLAVISION)(BEAKER) (test code                                   

     



             = 2820)                                             

 

             METAMYELOCYTES - REL 1 %          0-0          H            



             (CELLAVISION)(BEAKER) (test code                                   

     



             = 2821)                                             

 

             BANDS - REL (CELLAVISION)(BEAKER) 4 %          0-10                

      



             (test code = 2826)                                        

 

             NEUTROPHILS - ABS 4.63 K/ul    1.78-5.38                 



             (CELLAVISION)(BEAKER) (test code                                   

     



             = 2830)                                             

 

             LYMPHOCYTES - ABS 0.10 K/ul    1.32-3.57    L            



             (CELLAVISION)(BEAKER) (test code                                   

     



             = 2831)                                             

 

             MONOCYTES - ABS 0.16 K/uL    0.30-0.82    L            



             (CELLAVISION)(BEAKER) (test code                                   

     



             = 2832)                                             

 

             BASOPHILS - ABS 0.05 K/uL    0.01-0.08                 



             (CELLAVISION)(BEAKER) (test code                                   

     



             = 2835)                                             

 

             METAMYELOCYTES - ABS 0.05 K/uL    0.00-0.00    H            



             (CELLAVISION)(BEAKER) (test code                                   

     



             = 2836)                                             

 

             BANDS - ABS (CELLAVISION)(BEAKER) 0.21 K/uL    0.00-0.80           

      



             (test code = 2840)                                        

 

             TOTAL COUNTED (BEAKER) (test code 100                              

      



             = 1351)                                             

 

             WBC MORPHOLOGY (BEAKER) (test Normal                               

  



             code = 487)                                         

 

             PLT MORPHOLOGY (BEAKER) (test Normal                               

  



             code = 486)                                         

 

             POLYCHROMATOPHILLIC RBCS(BEAKER) 1+ few                            

     



             (test code = 478)                                        

 

             ANISOCYTOSIS (BEAKER) (test code 2+ moderate                       

     



             = 961)                                              

 

             MACROCYTES (BEAKER) (test code = 2+ moderate                       

     



             964)                                                

 

             POIKILOCYTES (BEAKER) (test code 2+ moderate                       

     



             = 966)                                              

 

             ELLIPTOCYTES (BEAKER) (test code 1+ few                            

     



             = 962)                                              

 

             OVALOCYTES (BEAKER) (test code = 2+ moderate                       

     



             477)                                                

 

             ARTIFACT (CELLAVISION)(BEAKER) Present                             

   



             (test code = 3432)                                        

 

             HELMET CELLS 1+ few                                 



             (CELLAVISION)(BEAKER) (test code                                   

     



             = 3434)                                             

 

             PLATELET CONCENTRATION Decreased                              



             (CELLAVISION)(BEAKER) (test code                                   

     



             = 3438)                                             



 ID - [id number] ID - Joellen Barragan comments: Slide comments:
CBC W/PLT COUNT &amp; AUTO [id number] 14:04:52





             Test Item    Value        Reference Range Interpretation Comments

 

             WHITE BLOOD CELL COUNT 5.2 K/ L     3.5-10.5                  



             (BEAKER) (test code =                                        



             775)                                                

 

             RED BLOOD CELL COUNT 2.47 M/ L    4.63-6.08    L            



             (BEAKER) (test code =                                        



             761)                                                

 

             HEMOGLOBIN (BEAKER) 7.6 GM/DL    13.7-17.5    L            



             (test code = 410)                                        

 

             HEMATOCRIT (BEAKER) 23.9 %       40.1-51.0    L            



             (test code = 411)                                        

 

             MEAN CORPUSCULAR 97 fL        79-92        H            



             VOLUME (BEAKER) (test                                        



             code = 753)                                         

 

             MEAN CORPUSCULAR 30.8 pg      25.7-32.2                 



             HEMOGLOBIN (BEAKER)                                        



             (test code = 751)                                        

 

             MEAN CORPUSCULAR 31.8 GM/DL   32.3-36.5    L            



             HEMOGLOBIN CONC                                        



             (BEAKER) (test code =                                        



             752)                                                

 

             RED CELL DISTRIBUTION 20.2 %       11.6-14.4    H            



             WIDTH (BEAKER) (test                                        



             code = 412)                                         

 

             PLATELET COUNT 56 K/CU MM   150-450      L            Discordant fr

om



             (BEAKER) (test code =                                        previo

us results.



             756)                                                Clinical correl

ation



                                                                 suggested.

 

             MEAN PLATELET VOLUME 11.5 fL      9.4-12.4                  



             (BEAKER) (test code =                                        



             754)                                                

 

             NUCLEATED RED BLOOD 0 /100 WBC   0-0                       



             CELLS (BEAKER) (test                                        



             code = 413)                                         



[id number] 14:04:39





             Test Item    Value        Reference Range Interpretation Comments

 

             PHOSPHORUS (BEAKER) (test code = 2.3 mg/dL    2.3-4.7              

     



             604)                                                



 ID - [id number] 14:04:38





             Test Item    Value        Reference Range Interpretation Comments

 

             MAGNESIUM (BEAKER) (test code = 1.7 mg/dL    1.6-2.6               

    



             627)                                                



 ID - [id number] 13:42:33





             Test Item    Value        Reference Range Interpretation Comments

 

             FIBRINOGEN LEVEL (BEAKER) (test 180 mg/dl    225-434      L        

    



             code = 658)                                         



VANCOMYCIN LEVEL, [id number] 12:41:03





             Test Item    Value        Reference Range Interpretation Comments

 

             VANCOMYCIN TROUGH (BEAKER) (test 7.3 ug/mL    10.0-20.0    L       

     



             code = 522)                                         



 ID - NOLVIA BPOCT-GLUCOSE [id number] 12:22:01





             Test Item    Value        Reference Range Interpretation Comments

 

             POC-GLUCOSE METER 130 mg/dL           H            : TESTED A

T [facility] [address]



             (BEAKER) (test code                                        [facility] 

[facility],



             = 1538)                                             [id number]:



                                                                 /Techni

melanie ID =



                                                                 [id number] for Lorelei miranda



                                                                 (contract), Nickie

tamara



CREATINE KINASE (CK)2023 10:09:48





             Test Item    Value        Reference Range Interpretation Comments

 

             CREATINE KINASE TOTAL (BEAKER) (test 260 U/L             H   

         



             code = 380)                                         



 ID - MMLACTIC ACID, [id number] 10:04:39





             Test Item    Value        Reference Range Interpretation Comments

 

             LACTATE BLOOD VENOUS (2) (BEAKER) 1.84 mmol/L  0.50-2.00           

      



             (test code = 2872)                                        



 ID - MMSpecimen slightly ictericHEPATIC FUNCTION [id number] 
09:54:16





             Test Item    Value        Reference Range Interpretation Comments

 

             TOTAL PROTEIN (BEAKER) (test code = 4.9 gm/dL    6.0-8.3      L    

        



             770)                                                

 

             ALBUMIN (BEAKER) (test code = 1145) 1.6 g/dL     3.5-5.0      L    

        

 

             BILIRUBIN TOTAL (BEAKER) (test code 4.1 mg/dL    0.2-1.2      H    

        



             = 377)                                              

 

             BILIRUBIN DIRECT (BEAKER) (test 1.8 mg/dL    0.1-0.5      H        

    



             code = 706)                                         

 

             ALKALINE PHOSPHATASE (BEAKER) (test 67 U/L                   

        



             code = 346)                                         

 

             AST (SGOT) (BEAKER) (test code = 59 U/L       5-34         H       

     



             353)                                                

 

             ALT (SGPT) (BEAKER) (test code = 19 U/L       6-55                 

     



             347)                                                



 ID - MMSpecimen moderately ictericPOCT-GLUCOSE [id number] 04:18:34





             Test Item    Value        Reference Range Interpretation Comments

 

             POC-GLUCOSE METER 97 mg/dL                         : TESTED A

T [facility] [address]



             (BEAKER) (test code =                                        [place],



             1538)                                               [address]:



                                                                 /Techni

melanie ID =



                                                                 [id number] for Crista

, Julienne



THROMBOELASTOGRAPH (TEG)2023 04:15:53





             Test Item    Value        Reference Range Interpretation Comments

 

             TEG ACTIVATED CLOTTING TIME 3.6 minutes  4.0-7.0      L            



             (BEAKER) (test code = 1407)                                        

 

             TEG FIBRINOGEN ACTIVITY (BEAKER) 60.7 degrees 61.0-73.0    L       

     



             (test code = 1408)                                        

 

             TEG PLT. AGGREGATION (BEAKER) 47.7 MM      55.0-65.0    L          

  



             (test code = 1409)                                        

 

             TEG FIBRINOLYSIS (BEAKER) (test 0.0 %        0.0-5.0               

    



             code = 1410)                                        

 

             TGH ACTIVATED CLOTTING TIME 3.7 minutes  4.0-7.0      L            



             (BEAKER) (test code = 1411)                                        

 

             TGH FIBRINOGEN ACTIVITY (BEAKER) 62.1 degrees 61.0-73.0            

     



             (test code = 1412)                                        

 

             TGH PLT. AGGREGATION (BEAKER) 43.9 MM      55.0-65.0    L          

  



             (test code = 1413)                                        

 

             TGH FIBRINOLYSIS (BEAKER) (test 0.0 %        0.0-5.0               

    



             code = 1414)                                        



[id number] 04:08:22





             Test Item    Value        Reference Range Interpretation Comments

 

             PHOSPHORUS (BEAKER) (test code = 1.5 mg/dL    2.3-4.7      LL      

     



             604)                                                



 ID - ADMINBASIC METABOLIC [id number] 04:07:31





             Test Item    Value        Reference Range Interpretation Comments

 

             SODIUM (BEAKER) 132 meq/L    136-145      L            



             (test code = 381)                                        

 

             POTASSIUM    3.3 meq/L    3.5-5.1      L            



             (BEAKER) (test                                        



             code = 379)                                         

 

             CHLORIDE (BEAKER) 107 meq/L                        



             (test code = 382)                                        

 

             CO2 (BEAKER) 20 meq/L     22-29        L            



             (test code = 355)                                        

 

             BLOOD UREA   10 mg/dL     7-21                      



             NITROGEN (BEAKER)                                        



             (test code = 354)                                        

 

             CREATININE   0.56 mg/dL   0.57-1.25    L            



             (BEAKER) (test                                        



             code = 358)                                         

 

             GLUCOSE RANDOM 102 mg/dL                        



             (BEAKER) (test                                        



             code = 652)                                         

 

             CALCIUM (BEAKER) 7.1 mg/dL    8.4-10.2     L            



             (test code = 697)                                        

 

             EGFR (BEAKER) 114                                     Interpretatio

n of eGFR



             (test code = mL/min/1.73                            values Stage De

scription



             1092)        sq m                                   Result G1 Shaunna

l or high



                                                                 >=90 G2 Mildly 

decreased



                                                                 60-89 G3a Mildl

y to



                                                                 moderately 45-5

9 G3b



                                                                 Moderately to s

everely



                                                                 30-44 G4  Sever

ly



                                                                 decreased 15-29

 G5 Kidney



                                                                 failure <15Repo

rted eGFR



                                                                 is based on the

 CKD-EPI



                                                                  equation t

hat does



                                                                 not use a race



                                                                 coefficientEsti

mated GFR



                                                                 is not as accur

ate as



                                                                 Creatinine Tabitha

cynthia in



                                                                 predicting glom

erular



                                                                 filtration rate

. Estimated



                                                                 GFR is not appl

icable for



                                                                 dialysis patien

ts



 ID - ADMINSpecimen moderately [id number] 04:01:34





             Test Item    Value        Reference Range Interpretation Comments

 

             MAGNESIUM (BEAKER) (test code = 1.7 mg/dL    1.6-2.6               

    



             627)                                                



 ID - [id number] 03:01:52





             Test Item    Value        Reference Range Interpretation Comments

 

             PROTIME (BEAKER) (test code = 32.4 seconds 11.9-14.2    H          

  



             759)                                                

 

             INR (BEAKER) (test code = 370) 3.41         <=5.90                 

   

 

             PARTIAL THROMBOPLASTIN TIME 47.6 seconds 22.5-36.0    H            



             (BEAKER) (test code = 760)                                        



RECOMMENDED COUMADIN/WARFARIN INR THERAPY RANGESSTANDARD DOSE: 2.0 - 3.0 
Includes: PROPHYLAXIS for venous thrombosis, systemic embolization; TREATMENT 
for venous thrombosis and/or pulmonary embolus.HIGH RISK: Target INR is 2.5-3.5 
for patients with mechanical heart valves.[id number] 03:01:30





             Test Item    Value        Reference Range Interpretation Comments

 

             FIBRINOGEN LEVEL (BEAKER) (test 184 mg/dl    225-434      L        

    



             code = 658)                                         



CBC W/PLT COUNT &amp; AUTO [id number] 02:50:11





             Test Item    Value        Reference Range Interpretation Comments

 

             WHITE BLOOD CELL COUNT 6.4 K/ L     3.5-10.5                  



             (BEAKER) (test code =                                        



             775)                                                

 

             RED BLOOD CELL COUNT 2.09 M/ L    4.63-6.08    L            



             (BEAKER) (test code =                                        



             761)                                                

 

             HEMOGLOBIN (BEAKER) 6.6 GM/DL    13.7-17.5    L            



             (test code = 410)                                        

 

             HEMATOCRIT (BEAKER) 20.9 %       40.1-51.0    L            



             (test code = 411)                                        

 

             MEAN CORPUSCULAR VOLUME 100 fL       79-92        H            



             (BEAKER) (test code =                                        



             753)                                                

 

             MEAN CORPUSCULAR 31.6 pg      25.7-32.2                 



             HEMOGLOBIN (BEAKER)                                        



             (test code = 751)                                        

 

             MEAN CORPUSCULAR 31.6 GM/DL   32.3-36.5    L            



             HEMOGLOBIN CONC                                        



             (BEAKER) (test code =                                        



             752)                                                

 

             RED CELL DISTRIBUTION 19.6 %       11.6-14.4    H            



             WIDTH (BEAKER) (test                                        



             code = 412)                                         

 

             PLATELET COUNT (BEAKER) 35 K/CU MM   150-450      L            No c

lot. Previous



             (test code = 756)                                        low

 

             MEAN PLATELET VOLUME                                        Unable 

to report due



             (BEAKER) (test code =                                        to abn

ormal Platelet



             754)                                                population



                                                                 distribution.

 

             NUCLEATED RED BLOOD 0 /100 WBC   0-0                       



             CELLS (BEAKER) (test                                        



             code = 413)                                         

 

             NEUTROPHILS RELATIVE 73 %                                   



             PERCENT (BEAKER) (test                                        



             code = 429)                                         

 

             LYMPHOCYTES RELATIVE 12 %                                   



             PERCENT (BEAKER) (test                                        



             code = 430)                                         

 

             MONOCYTES RELATIVE 14 %                                   



             PERCENT (BEAKER) (test                                        



             code = 431)                                         

 

             EOSINOPHILS RELATIVE 0 %                                    



             PERCENT (BEAKER) (test                                        



             code = 432)                                         

 

             BASOPHILS RELATIVE 0 %                                    



             PERCENT (BEAKER) (test                                        



             code = 437)                                         

 

             NEUTROPHILS ABSOLUTE 4.65 K/ L    1.78-5.38                 



             COUNT (BEAKER) (test                                        



             code = 670)                                         

 

             LYMPHOCYTES ABSOLUTE 0.76 K/ L    1.32-3.57    L            



             COUNT (BEAKER) (test                                        



             code = 414)                                         

 

             MONOCYTES ABSOLUTE 0.90 K/ L    0.30-0.82    H            



             COUNT (BEAKER) (test                                        



             code = 415)                                         

 

             EOSINOPHILS ABSOLUTE 0.00 K/ L    0.04-0.54    L            



             COUNT (BEAKER) (test                                        



             code = 416)                                         

 

             BASOPHILS ABSOLUTE 0.02 K/ L    0.01-0.08                 



             COUNT (BEAKER) (test                                        



             code = 417)                                         

 

             IMMATURE     0.90 %       0.00-1.00                 



             GRANULOCYTES-RELATIVE                                        



             PERCENT (BEAKER) (test                                        



             code = 2801)                                        



CALCIUM, [id number] 02:48:49





             Test Item    Value        Reference Range Interpretation Comments

 

             CALCIUM IONIZED (BEAKER) (test 1.04 mmol/L  1.12-1.27    L         

   



             code = 698)                                         

 

             PH, BLOOD (BEAKER) (test code = 7.52                               

    



             1810)                                               



CT, EXTREMITY, LOWER, WITH CONTRAST, [id number] 00:12:00Unlisted Reason for 
Exam - Click Yes and Enter Reason Below-&gt;NoPlease specify:-&gt;KneeSeptic 
arthritis. R/o concominant myositisPlease specify:-&gt;FemurPlease 
specify:-&gt;Tibia/Fibula
************************************************************[facility] : 1970 Sex: 
M************************************************************FINAL REPORT 
PATIENT ID: [id number] CT, EXTREMITY, LOWER, WITH CONTRAST, LEFT INDICATION: Soft 
tissue infection suspected, thigh, xray done COMPARISON: None TECHNIQUE: Axial 
CT of the left lower extremitywith sagittal and coronal reformations. 
Intravascular contrast was administered. DOSE REDUCTION: Dose modulation, 
iterative reconstruction, and/or weight-based adjustment of the mA/kV was 
utilized to reduce the radiation dose to as low as reasonably achievable. 
FINDINGS:There is diffuse circumferential subcutaneous soft tissue swelling of 
the left lower extremity greatest distally. No soft tissue gasor fluid 
collections. The deep compartments are relatively preserved. Moderate left knee 
joint effusion. Included vasculature is unremarkable. No acute fracture or 
dislocation. No focal osseous erosionor aggressive lesion. Escobar catheter 
present within the bladder. IMPRESSION:Diffuse soft tissue swelling of the left 
lower extremity may be related to edema or cellulitis. No soft tissue gas and no
drainable collection. No acute osseous abnormality. There is a left knee joint 
effusion. Signed: Bobbi Real MDReport Verified Date/Time: 2023 00:12:35 
Electronically signed by: BOBBI REAL MD on2023 12:12 AMPOCT-GLUCOSE 
[id number] 00:07:49





             Test Item    Value        Reference Range Interpretation Comments

 

             POC-GLUCOSE METER 118 mg/dL           H            : TESTED A

T [facility] [address]



             (BEAKER) (test code =                                        [place],



             1538)                                               [id number]:



                                                                 /Techni

melanie ID



                                                                 = [id number] for Nahomi Okeefey



RAPID DRUG SCREEN, [id number] 20:38:14





             Test Item    Value        Reference Range Interpretation Comments

 

             BARBITURATE URINE (BEAKER) (test Negative     Negative             

     



             code = 725)                                         

 

             BENZODIAZEPINE SCREEN URINE (BEAKER) Negative     Negative         

         



             (test code = 726)                                        

 

             COCAINE (METAB.) SCREEN (BEAKER) Negative     Negative             

     



             (test code = 1164)                                        

 

             METHADONE SCREEN (BEAKER) (test code Negative     Negative         

         



             = 1436)                                             

 

             OPIATE SCREEN URINE (BEAKER) (test Negative     Negative           

       



             code = 734)                                         

 

             CANNABINOID SCREEN URINE (BEAKER) Negative     Negative            

      



             (test code = 727)                                        

 

             AMPH/METHAMPH SCREEN (BEAKER) (test Negative     Negative          

        



             code = 1438)                                        

 

             PHENCYCLIDINE SCREEN URINE (BEAKER) Negative     Negative          

        



             (test code = 608)                                        

 

             PH UA (BEAKER) (test code = 467) 6.0          5.0-8.0              

     



DRUG CUTOFF CONC.Cocaine 300 ng/mL Cannabinoid 50 ng/mLBenzodiazepine 200 
ng/mLBarbiturate 200 ng/mLPhencyclidine 25 ng/mLOpiate 300 ng/mLMethadone 300 
ng/mLAmphetamine/ 1000 ng/mL MethamphetamineThis assay provides an unconfirmed 
qualitative test result for the clinical management of patients in emergency 
situations. Chain of custody not maintained. Some over-the-counter medications, 
as well as adulterants, may cause inaccurate results. Clinical correlation 
should be applied. A more comprehensive drug screen or confirmation of a 
detected drug may be performed upon request. ID - ADMINURINALYSIS W/ 
REFLEX URINE [id number] 20:27:37





             Test Item    Value        Reference Range Interpretation Comments

 

             COLOR (BEAKER) (test code = 470) Yellow                            

     

 

             CLARITY (BEAKER) (test code = 469) Hazy                            

       

 

             SPECIFIC GRAVITY UA (BEAKER) (test 1.027        1.001-1.035        

       



             code = 468)                                         

 

             PH UA (BEAKER) (test code = 467) 6.0          5.0-8.0              

     

 

             PROTEIN UA (BEAKER) (test code = 20 mg/dL     Negative     A       

     



             464)                                                

 

             GLUCOSE UA (BEAKER) (test code = Negative     Negative             

     



             365)                                                

 

             KETONES UA (BEAKER) (test code = Negative     Negative             

     



             371)                                                

 

             BILIRUBIN UA (BEAKER) (test code = Positive     Negative     A     

       



             462)                                                

 

             BLOOD UA (BEAKER) (test code = 461) Large        Negative     A    

        

 

             NITRITE UA (BEAKER) (test code = Negative     Negative             

     



             465)                                                

 

             LEUKOCYTE ESTERASE UA (BEAKER) (test Negative     Negative         

         



             code = 466)                                         

 

             UROBILINOGEN UA (BEAKER) (test code 3            0.2-1.0      H    

        



             = 463)                                              

 

             RBC UA (BEAKER) (test code = 519) 27 /HPF                          

      

 

             WBC UA (BEAKER) (test code = 520) 3 /HPF                           

      

 

             BACTERIA (BEAKER) (test code = 517) Rare                           

        

 

             SQUAMOUS EPITHELIAL (BEAKER) (test < /HPF                          

       



             code = 516)                                         

 

             SOURCE(BEAKER) (test code = 2795)                                  

      



 ID - [auto] ID - techPOCT-GLUCOSE [id number] 20:05:54





             Test Item    Value        Reference Range Interpretation Comments

 

             POC-GLUCOSE METER 165 mg/dL           H            : TESTED A

T [facility] [address]



             (BEAKER) (test code =                                        [place],



             1538)                                               [id number]:



                                                                 /Techni

melanie ID



                                                                 = [id number] for La

ra,



                                                                 Julienne



BODY FLUID [id number] 18:23:10





             Test Item    Value        Reference Range Interpretation Comments

 

             CRYSTALS, BODY FLUID Calcium pyrophosphate                         

  



             (BEAKER) (test code crystals for pseudogout                        

   



             = 2165)                                             

 

             QUANTITY SEEN Few                                    



             (BEAKER) (test code                                        



             = 2166)                                             

 

             [id number] Dillon Dominguez MD (electronic                     

      



             (BEAKER) (test code signature)                             



             = 2607)                                             



THROMBOELASTOGRAPH (TEG)2023 18:12:17





             Test Item    Value        Reference Range Interpretation Comments

 

             TEG ACTIVATED CLOTTING TIME 8.9 minutes  4.0-7.0      H            



             (BEAKER) (test code = 1407)                                        

 

             TEG FIBRINOGEN ACTIVITY (BEAKER) 50.7 degrees 61.0-73.0    L       

     



             (test code = 1408)                                        

 

             TEG PLT. AGGREGATION (BEAKER) 39.9 MM      55.0-65.0    L          

  



             (test code = 1409)                                        

 

             TEG FIBRINOLYSIS (BEAKER) (test 0.0 %        0.0-5.0               

    



             code = 1410)                                        

 

             TGH ACTIVATED CLOTTING TIME 9.2 minutes  4.0-7.0      H            



             (BEAKER) (test code = 1411)                                        

 

             TGH FIBRINOGEN ACTIVITY (BEAKER) 54.6 degrees 61.0-73.0    L       

     



             (test code = 1412)                                        

 

             TGH PLT. AGGREGATION (BEAKER) 39.5 MM      55.0-65.0    L          

  



             (test code = 1413)                                        

 

             TGH FIBRINOLYSIS (BEAKER) (test 0.0 %        0.0-5.0               

    



             code = 1414)                                        



[id number] 18:02:42





             Test Item    Value        Reference Range Interpretation Comments

 

             PROCALCITONIN (BEAKER) (test code 7.00 ng/mL   <0.05        H      

      



             = 3036)                                             



SEPSIS RISK (ng/mL)Low: 0.05-0.50Intermediate: 0.51-2.00High: &gt;=2.01CREATINE 
KINASE (CK)2023 17:50:27





             Test Item    Value        Reference Range Interpretation Comments

 

             CREATINE KINASE TOTAL (BEAKER) (test 977 U/L             H   

         



             code = 380)                                         



 ID - ADMINCBC (HEMOGRAM ONLY)2023 17:27:14





             Test Item    Value        Reference Range Interpretation Comments

 

             WHITE BLOOD CELL COUNT (BEAKER) 6.7 K/ L     3.5-10.5              

    



             (test code = 775)                                        

 

             RED BLOOD CELL COUNT (BEAKER) 2.26 M/ L    4.63-6.08    L          

  



             (test code = 761)                                        

 

             HEMOGLOBIN (BEAKER) (test code = 7.2 GM/DL    13.7-17.5    L       

     



             410)                                                

 

             HEMATOCRIT (BEAKER) (test code = 23.2 %       40.1-51.0    L       

     



             411)                                                

 

             MEAN CORPUSCULAR VOLUME (BEAKER) 103 fL       79-92        H       

     



             (test code = 753)                                        

 

             MEAN CORPUSCULAR HEMOGLOBIN 31.9 pg      25.7-32.2                 



             (BEAKER) (test code = 751)                                        

 

             MEAN CORPUSCULAR HEMOGLOBIN CONC 31.0 GM/DL   32.3-36.5    L       

     



             (BEAKER) (test code = 752)                                        

 

             RED CELL DISTRIBUTION WIDTH 20.1 %       11.6-14.4    H            



             (BEAKER) (test code = 412)                                        

 

             PLATELET COUNT (BEAKER) (test code 22 K/CU MM   150-450      L     

       



             = 756)                                              

 

             NUCLEATED RED BLOOD CELLS (BEAKER) 0 /100 WBC   0-0                

       



             (test code = 413)                                        



[id number] 16:34:04





             Test Item    Value        Reference Range Interpretation Comments

 

             AMMONIA (BEAKER) (test code = 348) 103 mol/L    18-72        H     

       



 ID - ADMINLACTIC ACID, [id number] 15:30:00





             Test Item    Value        Reference Range Interpretation Comments

 

             LACTATE BLOOD VENOUS (2) (BEAKER) 2.66 mmol/L  0.50-2.00    H      

      



             (test code = 2872)                                        



 ID - MMSpecimen slightly ictericPOCT-GLUCOSE [id number] 15:05:53





             Test Item    Value        Reference Range Interpretation Comments

 

             POC-GLUCOSE METER 108 mg/dL                        : TESTED A

T [facility] [address]



             (BEAKER) (test code =                                        [place],



             1538)                                               [id number]:



                                                                 /Techni

melanie ID



                                                                 = [id number] for LUIS ARMANDO INGRAM



CALCIUM, [id number] 15:05:35





             Test Item    Value        Reference Range Interpretation Comments

 

             CALCIUM IONIZED (BEAKER) (test 0.85 mmol/L  1.12-1.27    L         

   



             code = 698)                                         

 

             PH, BLOOD (BEAKER) (test code = 7.43                               

    



             1810)                                               



HEPATITIS PANEL, [id number] 13:08:20





             Test Item    Value        Reference Range Interpretation Comments

 

             HEPATITIS A IGM ANTIBODY (BEAKER) Nonreactive  Nonreactive         

      



             (test code = 498)                                        

 

             HEPATITIS B CORE IGM ANTIBODY Nonreactive  Nonreactive             

  



             (BEAKER) (test code = 645)                                        

 

             HEPATITIS C ANTIBODY (BEAKER) Nonreactive  Nonreactive             

  



             (test code = 367)                                        

 

             HEPATITIS B SURFACE ANTIGEN (2) Nonreactive  Nonreactive           

    



             (BEAKER) (test code = 2585)                                        



 ID - ADMINBODY FLUID CELL COUNT WITH [id number] 13:07:16





             Test Item    Value        Reference Range Interpretation Comments

 

             APPEARANCE FLUID (BEAKER) Turbid       Clear        A            



             (test code = 510)                                        

 

             COLOR FLUID (BEAKER) (test Lorie        Colorless, Straw A         

   



             code = 511)                                         

 

             RBC FLUID (BEAKER) (test code 75134 /cu mm <=1          H          

  



             = 513)                                              

 

             TOTAL NUCLEATED CELL COUNT 957706 /cu mm <=5          H            



             (BEAKER) (test code = 1442)                                        

 

             LINING CELLS/OTHERS DIFF'D 0                                      



             (BEAKER) (test code = 1589)                                        

 

             ADJUSTED WBC FLUID (BEAKER) 662887 /cu mm <=5          H           

 



             (test code = 1691)                                        

 

             LINING CELLS/OTHERS, 0 /cu mm     <=1                       



             CALCULATED (BEAKER) (test code                                     

   



             = 1590)                                             

 

             NEUTROPHILS FLUID (BEAKER) 100 %                                  



             (test code = 1656)                                        

 

             LYMPHS FLUID (BEAKER) (test 0 %                                    



             code = 488)                                         

 

             MONO/MACROPHAGE FLUID (BEAKER) 0 %                                 

   



             (test code = 489)                                        

 

             EOSINOPHILS FLUID (BEAKER) 0 %                                    



             (test code = 491)                                        

 

             BASO FLUID (BEAKER) (test code 0 %                                 

   



             = 492)                                              

 

             CONTAINER BODY FLUID (BEAKER) EDTA Tube                            

  



             (test code = 2873)                                        



HEMOGLOBIN [id number] 12:24:08





             Test Item    Value        Reference Range Interpretation Comments

 

             HEMOGLOBIN A1C < %          See_Comment                [Automated m

essage]



             ELECTROPHORESIS (BEAKER)                                        The

 system which



             (test code = 3811)                                        generated

 this result



                                                                 transmitted ref

erence



                                                                 range: <=5.6%. 

The



                                                                 reference range

 was



                                                                 not used to int

erpret



                                                                 this result as



                                                                 normal/abnormal

.



"The A1c is measured using a NGSP-certified method. HbA1c value equal to or 
greater than 6.5% as thediagnosis cutoff for diabetes. An HbA1c value of 5.7-
6.4% indicates increased risk for diabetes (prediabetes)." ID - ADMURIC 
[id number] 11:33:12





             Test Item    Value        Reference Range Interpretation Comments

 

             URIC ACID (BEAKER) (test code = 3.2 mg/dL    2.6-7.2               

    



             773)                                                



 ID - ADMINSpecimen slightly ictericC-REACTIVE [id number] 
11:33:12





             Test Item    Value        Reference Range Interpretation Comments

 

             C-REACTIVE PROTEIN (BEAKER) (test 4.52 mg/dL   0.00-0.50    H      

      



             code = 676)                                         



 ID - [id number] 11:31:51





             Test Item    Value        Reference Range Interpretation Comments

 

             HAPTOGLOBIN (BEAKER) (test code = < mg/dL             L      

      



             366)                                                



 ID - ADMINRAD, FEMUR, MIN. 2 VIEWS, [id number] 11:30:00Reason for 
exam:-&gt;fall r/o acute fractureShould this be performed at the 
bedside?-&gt;Yes************************************************************TIFFANIE 
[facility]ENTER[facility] : 1970  Sex: 
M************************************************************FINAL REPORT 
PATIENT ID: [id number] Exam: RAD, FEMUR, MIN. 2 VIEWS, LEFT Side: Left INDICATION:
fall r/o acute fracture COMPARISON: None FINDINGS:Bones: No acute displaced 
fracture. Osseous alignment is within normal limits. Joints:The joint spaces are
well-maintained. Soft tissues:The soft tissues appear unremarkable. IMPRESSION: 
No acute radiographic abnormality. If symptoms persist or progress please 
consider further evaluation with the MRI without IV contrast, if clinically 
appropriate. Signed: Melida Sanchez MDReport Verified Date/Time: 2023 
11:30:35 Reading Location: [facility] Radiology ReadingRoom Electronically signed by:
MELIDA SANCHEZ MD on 2023 11:30 AMRAD, KNEE, 3 VIEWS, [id number] 
11:02:00Is this procedure to be performed with weight bearing?-&gt;Non-Weight 
BearingReason for exam:-&gt;fall r/o acute fractureShould this be performed at 
the bedside?-&gt;Yes
************************************************************[facility] 1970 Sex: 
M************************************************************FINAL REPORT 
PATIENT ID: [id number] Exam: RAD, KNEE, 3 VIEWS, LEFT Side: INDICATION: fall r/o 
acute fracture COMPARISON: None FINDINGS:Bones: No acute displaced fracture. 
Questionable lucency in the mid patella could be artifactual.Osseous alignment 
is within normal limits. Joints:The joint spaces are well-maintained. Soft 
tissues:The soft tissues appear unremarkable. IMPRESSION: No definitive acute 
radiographic abnormality. Questionable lucency in the mid patella could be 
artifactual. However please consider correlation with point tenderness if 
indicated please consider repeat radiograph of patella. If symptoms persist or 
progress please consider further evaluation with the MRI without IV contrast, if
clinically appropriate. Signed: Melida Sanchez MDReport Verified Date/Time: 
2023 11:02:35 Reading Location: [facility] Radiology Reading Room 
Electronically signed by: MELIDA SANCHEZ MD on 2023 11:02 AMTSH/FREE T4 IF 
[id number] 10:14:33





             Test Item    Value        Reference Range Interpretation Comments

 

             THYROID STIMULATING HORMONE 2.549 uIU/mL 0.350-4.940               



             (BEAKER) (test code = 772)                                        



 ID - MMCOMPREHENSIVE METABOLIC [id number] 10:11:12





             Test Item    Value        Reference Range Interpretation Comments

 

             TOTAL PROTEIN 5.6 gm/dL    6.0-8.3      L            



             (BEAKER) (test                                        



             code = 770)                                         

 

             ALBUMIN (BEAKER) 1.9 g/dL     3.5-5.0      L            



             (test code = 1145)                                        

 

             ALKALINE     84 U/L                           



             PHOSPHATASE                                         



             (BEAKER) (test                                        



             code = 346)                                         

 

             BILIRUBIN TOTAL 4.6 mg/dL    0.2-1.2      H            



             (BEAKER) (test                                        



             code = 377)                                         

 

             SODIUM (BEAKER) 133 meq/L    136-145      L            



             (test code = 381)                                        

 

             POTASSIUM (BEAKER) 3.3 meq/L    3.5-5.1      L            



             (test code = 379)                                        

 

             CHLORIDE (BEAKER) 108 meq/L           H            



             (test code = 382)                                        

 

             CO2 (BEAKER) (test 17 meq/L     22-29        L            



             code = 355)                                         

 

             BLOOD UREA   10 mg/dL     7-21                      



             NITROGEN (BEAKER)                                        



             (test code = 354)                                        

 

             CREATININE   0.70 mg/dL   0.57-1.25                 



             (BEAKER) (test                                        



             code = 358)                                         

 

             GLUCOSE RANDOM 146 mg/dL           H            



             (BEAKER) (test                                        



             code = 652)                                         

 

             CALCIUM (BEAKER) 6.9 mg/dL    8.4-10.2     L            



             (test code = 697)                                        

 

             AST (SGOT)   72 U/L       5-34         H            



             (BEAKER) (test                                        



             code = 353)                                         

 

             ALT (SGPT)   22 U/L       6-55                      



             (BEAKER) (test                                        



             code = 347)                                         

 

             EGFR (BEAKER) 108                                     Interpretatio

n of eGFR



             (test code = 1092) mL/min/1.73                            values St

age Description



                          sq m                                   Result G1 Shaunna

l or high



                                                                 >=90 G2 Mildly 

decreased



                                                                 60-89 G3a Mildl

y to



                                                                 moderately 45-5

9 G3b



                                                                 Moderately to s

everely



                                                                 30-44 G4 Severl

y decreased



                                                                 15-29 G5 Kidney

 failure



                                                                 <15Reported eGF

R is based



                                                                 on the CKD-EPI 





                                                                 equation that d

oes not use



                                                                 a race



                                                                 coefficientEsti

mated GFR



                                                                 is not as accur

ate as



                                                                 Creatinine Tabitha marie in



                                                                 predicting glom

erular



                                                                 filtration rate

. Estimated



                                                                 GFR is not appl

icable for



                                                                 dialysis patien

ts



 ID - MMSpecimen moderately [id number] 10:06:29





             Test Item    Value        Reference Range Interpretation Comments

 

             ETHANOL (BEAKER) (test code = 400) < mg/dL      <=10               

       



 ID - MM(CELLAVISION MANUAL DIFF)2023 10:04:25





             Test Item    Value        Reference Range Interpretation Comments

 

             NEUTROPHILS - REL 76 %                                   



             (CELLAVISION)(BEAKER) (test code                                   

     



             = 2816)                                             

 

             LYMPHOCYTES - REL 4 %                                    



             (CELLAVISION)(BEAKER) (test code                                   

     



             = 2817)                                             

 

             MONOCYTES - REL 5 %                                    



             (CELLAVISION)(BEAKER) (test code                                   

     



             = 2818)                                             

 

             METAMYELOCYTES - REL 6 %          0-0          H            



             (CELLAVISION)(BEAKER) (test code                                   

     



             = 2821)                                             

 

             BANDS - REL (CELLAVISION)(BEAKER) 9 %          0-10                

      



             (test code = 2826)                                        

 

             NEUTROPHILS - ABS 3.65 K/ul    1.78-5.38                 



             (CELLAVISION)(BEAKER) (test code                                   

     



             = 2830)                                             

 

             LYMPHOCYTES - ABS 0.19 K/ul    1.32-3.57    L            



             (CELLAVISION)(BEAKER) (test code                                   

     



             = 2831)                                             

 

             MONOCYTES - ABS 0.24 K/uL    0.30-0.82    L            



             (CELLAVISION)(BEAKER) (test code                                   

     



             = 2832)                                             

 

             METAMYELOCYTES - ABS 0.29 K/uL    0.00-0.00    H            



             (CELLAVISION)(BEAKER) (test code                                   

     



             = 2836)                                             

 

             BANDS - ABS (CELLAVISION)(BEAKER) 0.43 K/uL    0.00-0.80           

      



             (test code = 2840)                                        

 

             TOTAL COUNTED (BEAKER) (test code 100                              

      



             = 1351)                                             

 

             MANUAL NRBC  CELLS 1 /100 WBC   0-0          H            



             (BEAKER) (test code = 1353)                                        

 

             WBC MORPHOLOGY (BEAKER) (test Normal                               

  



             code = 487)                                         

 

             GIANT PLATELETS (BEAKER) (test Present                             

   



             code = 313)                                         

 

             POLYCHROMATOPHILLIC RBCS(BEAKER) 2+ moderate                       

     



             (test code = 478)                                        

 

             ANISOCYTOSIS (BEAKER) (test code 2+ moderate                       

     



             = 961)                                              

 

             MACROCYTES (BEAKER) (test code = 2+ moderate                       

     



             964)                                                

 

             POIKILOCYTES (BEAKER) (test code 3+ many                           

     



             = 966)                                              

 

             ELLIPTOCYTES (BEAKER) (test code 1+ few                            

     



             = 962)                                              

 

             OVALOCYTES (BEAKER) (test code = 1+ few                            

     



             477)                                                

 

             BENJI CELLS (BEAKER) (test code = 2+ moderate                       

     



             474)                                                

 

             ARTIFACT (CELLAVISION)(BEAKER) Present                             

   



             (test code = 3432)                                        

 

             PLATELET CONCENTRATION Decreased                              



             (CELLAVISION)(BEAKER) (test code                                   

     



             = 3438)                                             



 ID - [id number] ID - nolvia Trejo comments: Slide comments:CBC 
W/PLT COUNT &amp; AUTO [id number] 10:04:24





             Test Item    Value        Reference Range Interpretation Comments

 

             WHITE BLOOD CELL COUNT 4.8 K/ L     3.5-10.5                  



             (BEAKER) (test code =                                        



             775)                                                

 

             RED BLOOD CELL COUNT 2.31 M/ L    4.63-6.08    L            



             (BEAKER) (test code =                                        



             761)                                                

 

             HEMOGLOBIN (BEAKER) 7.3 GM/DL    13.7-17.5    L            



             (test code = 410)                                        

 

             HEMATOCRIT (BEAKER) 23.6 %       40.1-51.0    L            



             (test code = 411)                                        

 

             MEAN CORPUSCULAR VOLUME 102 fL       79-92        H            



             (BEAKER) (test code =                                        



             753)                                                

 

             MEAN CORPUSCULAR 31.6 pg      25.7-32.2                 



             HEMOGLOBIN (BEAKER)                                        



             (test code = 751)                                        

 

             MEAN CORPUSCULAR 30.9 GM/DL   32.3-36.5    L            



             HEMOGLOBIN CONC                                        



             (BEAKER) (test code =                                        



             752)                                                

 

             RED CELL DISTRIBUTION 20.3 %       11.6-14.4    H            



             WIDTH (BEAKER) (test                                        



             code = 412)                                         

 

             PLATELET COUNT (BEAKER) 22 K/CU MM   150-450      L            



             (test code = 756)                                        

 

             MEAN PLATELET VOLUME                                        Unable 

to report due



             (BEAKER) (test code =                                        to abn

ormal Platelet



             754)                                                population



                                                                 distribution.

 

             NUCLEATED RED BLOOD 0 /100 WBC   0-0                       



             CELLS (BEAKER) (test                                        



             code = 413)                                         



IRON, TIBC, % SAT. (WITHOUT FERRITIN)2023 10:02:23





             Test Item    Value        Reference Range Interpretation Comments

 

             IRON (BEAKER) (test code = 547) 25.0 ug/dL   40.0-160.0   L        

    

 

             TOTAL IRON BINDING CAPACITY 176 ug/dL    250-450      L            



             (BEAKER) (test code = 769)                                        

 

             IRON % SATURATION (2) (BEAKER) 14 %         20-55        L         

   



             (test code = 2590)                                        



 ID - [id number] 10:02:05





             Test Item    Value        Reference Range Interpretation Comments

 

             PHOSPHORUS (BEAKER) (test code = 2.0 mg/dL    2.3-4.7      L       

     



             604)                                                



 ID - MMCREATINE KINASE (CK)2023 10:02:05





             Test Item    Value        Reference Range Interpretation Comments

 

             CREATINE KINASE TOTAL (BEAKER) (test 1598 U/L            H   

         



             code = 380)                                         



 ID - MMLACTATE DEHYDROGENASE (LDH)2023 10:02:05





             Test Item    Value        Reference Range Interpretation Comments

 

             LACTATE DEHYDROGENASE (BEAKER) (test 376 U/L      125-220      H   

         



             code = 635)                                         



 ID - [id number] 10:02:04





             Test Item    Value        Reference Range Interpretation Comments

 

             MAGNESIUM (BEAKER) (test code = 1.4 mg/dL    1.6-2.6      L        

    



             627)                                                



 ID - MMLACTIC ACID, [id number] 10:00:29





             Test Item    Value        Reference Range Interpretation Comments

 

             LACTATE BLOOD VENOUS (2) (BEAKER) 5.35 mmol/L  0.50-2.00    HH     

      



             (test code = 2872)                                        



 ID - MMSpecimen slightly [id number] 09:53:29





             Test Item    Value        Reference Range Interpretation Comments

 

             FERRITIN (BEAKER) (test code = 63.50 ng/mL  5..00            

   



             361)                                                



 ID - MMSARS-COV2/RT-PCR ([facility])2023 09:49:37





             Test Item    Value        Reference Range Interpretation Comments

 

             SARS-COV2/RT-PCR Negative     Negative                  The SARS-Co

V-2 target



             (test code =                                        nucleic acids a

re not



             0029269)                                            detected in thi

s specimen.



                                                                 Negative result

s do not



                                                                 preclude SARS-C

oV-2



                                                                 infection and s

hould not be



                                                                 used as the sol

e basis for



                                                                 patient managem

ent



                                                                 decisions. Nega

tive results



                                                                 must be combine

d with



                                                                 clinical observ

ations,



                                                                 patient history

, and



                                                                 epidemiological

 information.



                                                                 A false negativ

e result may



                                                                 occur if a spec

imen is



                                                                 improperly josias

ected,



                                                                 transported or 

handled. This



                                                                 SARS CoV-2 test

 is a rapid,



                                                                 real-time RT-PC

R test



                                                                 intended for th

e qualitative



                                                                 detection of nu

cleic acid



                                                                 from SARS-CoV-2

 in a



                                                                 nasopharyngeal 

swab specimen



                                                                 collected from 

individuals



                                                                 suspected of CO

VID-19 by



                                                                 their healthcar

e provider.



This test has been authorized by FDA under an EUA for use by authorized 
laboratories. This test is only authorized for the duration of the declaration 
that circumstances exist justifying the authorization of emergency use of in 
vitro diagnostic tests for detection and/or diagnosis of COVID-19 under Section 
564(b)(1) of the Federal Food, Drug and Cosmetic Act, 21 U.S.C. 360bbb-3(b)(1), 
unless the authorization is terminated or revoked sooner. Fact Sheet for 
Healthcare Providers: https://www.[employer]co
m/Documents/Xpert%20Xpress%20SARS%20CoV-2/Fact%20Sheets/3023802%08YRIL-RGU-5%20

HEALTHCARE%20PROVIDERS%20FACT%20SHEET.pdf Fact Sheet for Healthcare Patients: 
https://www.[employer]/Documents/Xpert%20Xp
ress%20SARS%20CoV-2/Fact%20Sheets/3023801%83PVXM-GMZ-7%20PATIENT%20FACT%20SHEET

.[id number] 09:33:46





             Test Item    Value        Reference Range Interpretation Comments

 

             FIBRINOGEN LEVEL (BEAKER) (test 146 mg/dl    225-434      L        

    



             code = 658)                                         



[id number] 09:29:18





             Test Item    Value        Reference Range Interpretation Comments

 

             PROTIME (BEAKER) (test code = 33.8 seconds 11.9-14.2    H          

  



             759)                                                

 

             INR (BEAKER) (test code = 370) 3.46         <=5.90                 

   

 

             PARTIAL THROMBOPLASTIN TIME 50.6 seconds 22.5-36.0    H            



             (KHLOE) (test code = 760)                                        



RECOMMENDED COUMADIN/WARFARIN INR THERAPY RANGESSTANDARD DOSE: 2.0 - 3.0 
Includes: PROPHYLAXIS for venous thrombosis, systemic embolization; TREATMENT 
for venous thrombosis and/or pulmonary embolus.HIGH RISK: Target INR is 2.5-3.5 
for patients with mechanical heart valves.RETICULOCYTE [id number] 09:25:55





             Test Item    Value        Reference Range Interpretation Comments

 

             RETICULOCYTE COUNT PCT (KHLOE) (test 3.5 %        0.5-1.8      H  

          



             code = 575)                                         



 ID - [id number] ID - [id number]-GLUCOSE [id number] 08:11:30





             Test Item    Value        Reference Range Interpretation Comments

 

             POC-GLUCOSE METER 150 mg/dL           H            : TESTED A

T BSC 6720



             (KHLOE) (test code =                                        TASIA WILSON [facility],



             1538)                                               [address]:



                                                                 /Techni

melanie ID



                                                                 = [id number] for PHILLIP JACOBO



BLOOD CULTURE [id number] 14:27:19





             Test Item    Value        Reference Range Interpretation Comments

 

             Blood Culture-Aerobic Culture positive. No growth    AA           P

revious



             (test code = 17928-3) See Blood Culture                           p

reliminary



                          Workup for                             verified result



                          additional                             was Culture In



                          information.                           Progress on



                                                                 3/19/2023 at 02

01



                                                                 CDTPrevious



                                                                 preliminary



                                                                 verified result



                                                                 was No growth a

t



                                                                 24 hours on



                                                                 3/19/2023 at 23

01



                                                                 CDT

 

             Blood        No organisms No growth                 Previous



             Culture-Anaerobic isolated                               preliminar

y



             (test code = 76870-1)                                        verifi

ed result



                                                                 was Culture In



                                                                 Progress on



                                                                 3/19/2023 at 02

01



                                                                 CDTPrevious



                                                                 preliminary



                                                                 verified result



                                                                 was No growth a

t



                                                                 24 hours on



                                                                 3/20/2023 at 01

54



                                                                 CDT

 

             Lab Interpretation Abnormal                               



             (test code = 69569-7)                                        



[facility]





             Test Item    Value        Reference Range Interpretation Comments

 

             MAGNESIUM (test code = 1347398426) 1.7 mg/dL    1.7-2.4            

       

 

             Lab Interpretation (test code = Normal                             

    



             04642-9)                                            



[facility]NA, K, CL, CO2, 
GLUCOSE, BUN, CREATININE, CA)2023 10:47:23





             Test Item    Value        Reference Range Interpretation Comments

 

             NA (test code = 134 mmol/L   135-145      L            



             [id number])                                         

 

             K (test code = 3.4 mmol/L   3.5-5.0      L            



             9491541573)                                         

 

             CL (test code = 107 mmol/L                       



             8930723195)                                         

 

             CO2 TOTAL (test code = 28 mmol/L    23-31                     



             0181271880)                                         

 

             AGAP (test code =              2-16         L            



             8612242716)                                         

 

             BUN (test code = 5 mg/dL      7-23         L            



             5787053501)                                         

 

             GLUCOSE (test code = 82 mg/dL                         



             9808711982)                                         

 

             CREATININE (test code = 0.49 mg/dL   0.60-1.25    L            



             4092170600)                                         

 

             CALCIUM (test code = 7.0 mg/dL    8.6-10.6     L            



             1858570609)                                         

 

             eGFR (test code = 176.7        mL/min/1.73m2              



             3296162674)                                         

 

             RENETTA (test code = RENETTA) Association of                           



                          Glomerular Filtration                           



                          Rate (GFR) and Staging                           



                          of Kidney Disease*                           



                          +---------------------                           



                          --+-------------------                           



                          --+-------------------                           



                          ------+| GFR                           



                          (mL/min/1.73 m2) ?|                           



                          With Kidney Damage ?|                           



                          ?Without Kidney                           



                          Damage+---------------                           



                          --------+-------------                           



                          --------+-------------                           



                          ------------+| ?>90 ?                           



                          ? ? ? ? ? ? ? ?|                           



                          ?Stage one ? ? ? ? ?|                           



                          ? Normal ? ? ? ? ? ? ?                           



                          ?+--------------------                           



                          ---+------------------                           



                          ---+------------------                           



                          -------+| ?60-89 ? ? ?                           



                          ? ? ? ? ?| ?Stage two                           



                          ? ? ? ? ?| ? Decreased                           



                          GFR ? ? ? ?                            



                          +---------------------                           



                          --+-------------------                           



                          --+-------------------                           



                          ------+| ?30-59 ? ? ?                           



                          ? ? ? ? ?| ?Stage                           



                          three ? ? ? ?| ? Stage                           



                          three ? ? ? ? ?                           



                          +---------------------                           



                          --+-------------------                           



                          --+-------------------                           



                          ------+| ?15-29 ? ? ?                           



                          ? ? ? ? ?| ?Stage four                           



                          ? ? ? ? | ? Stage four                           



                          ? ? ? ? ?                              



                          ?+--------------------                           



                          ---+------------------                           



                          ---+------------------                           



                          -------+| ?<15 (or                           



                          dialysis) ? ?| ?Stage                           



                          five ? ? ? ? | ? Stage                           



                          five ? ? ? ? ?                           



                          ?+--------------------                           



                          ---+------------------                           



                          ---+------------------                           



                          -------+ *Each stage                           



                          assumes the associated                           



                          GFR level has been in                           



                          effect for at least                           



                          three months. ?Stages                           



                          1 to 5, with or                           



                          without kidney                           



                          disease, indicate                           



                          chronic kidney                           



                          disease. Notes:                           



                          Determination of                           



                          stages one and two                           



                          (with eGFR                             



                          >59mL/min/1.73 m2)                           



                          requires estimation of                           



                          kidney damage for at                           



                          least three months as                           



                          defined by structural                           



                          or functional                           



                          abnormalities of the                           



                          kidney, manifested by                           



                          either:Pathological                           



                          abnormalities or                           



                          Markers of kidney                           



                          damage (including                           



                          abnormalities in the                           



                          composition of the                           



                          blood or urine or                           



                          abnormalities in                           



                          imaging tests).                           

 

             Lab Interpretation Abnormal                               



             (test code = 88159-7)                                        



[facility]





             Test Item    Value        Reference Range Interpretation Comments

 

             Fibrinogen (test code = 3724534415) 121 mg/dL    167-453      L    

        

 

             Lab Interpretation (test code = Abnormal                           

    



             25320-1)                                            



[id number] 10:31:49





             Test Item    Value        Reference Range Interpretation Comments

 

             PROTIME PATIENT (test 26.0         See_Comment  H             [Auto

mated message]



             code = 5964-2)                                        The system [employer]



                                                                 generated this 

result



                                                                 transmitted ref

erence



                                                                 range: 10.1 - 1

2.6



                                                                 Seconds. The



                                                                 reference range

 was



                                                                 not used to int

erpret



                                                                 this result as



                                                                 normal/abnormal

.

 

             INR (test code = 6301-6) 2.3                                    Nor

mal INR <1.1;



                                                                 Warfarin Therap

eutic



                                                                 range 2.0 to 3.

0 or



                                                                 2.5 to 3.5, dep

ending



                                                                 upon the indica

tions.

 

             Lab Interpretation (test Abnormal                               



             code = 53240-0)                                        



[id number] 10:26:10





             Test Item    Value        Reference Range Interpretation Comments

 

             WBC (test code = 2.98         See_Comment  L             [Automated

 message]



             6190-2)                                             The system [employer]



                                                                 generated this 

result



                                                                 transmitted ref

erence



                                                                 range: 4.20 - 1

0.70



                                                                 10*3/?L. The



                                                                 reference range

 was



                                                                 not used to int

erpret



                                                                 this result as



                                                                 normal/abnormal

.

 

             RBC (test code = 789-8) 3.09         See_Comment  L             [Au

tomated message]



                                                                 The system [employer]



                                                                 generated this 

result



                                                                 transmitted ref

erence



                                                                 range: 4.26 - 5

.52



                                                                 10*6/?L. The



                                                                 reference range

 was



                                                                 not used to int

erpret



                                                                 this result as



                                                                 normal/abnormal

.

 

             HGB (test code = 718-7) 8.1 g/dL     12.2-16.4    L            

 

             HCT (test code = 24.6 %       38.4-49.3    L            



             4544-3)                                             

 

             MCH (test code = 785-6) 26.2 pg      26.1-32.7                 

 

             MCV (test code = 787-2) 79.6 fL      81.7-95.6    L            

 

             MCHC (test code = 32.9 g/dL    31.2-35.0                 



             786-4)                                              

 

             PLT (test code = 777-3) 21           See_Comment  LL            [Au

tomated message]



                                                                 The system [employer]



                                                                 generated this 

result



                                                                 transmitted ref

erence



                                                                 range: 150 - 32

8



                                                                 10*3/?L. The



                                                                 reference range

 was



                                                                 not used to int

erpret



                                                                 this result as



                                                                 normal/abnormal

.

 

             MPV (test code =                                        Not Measure

d



             05655-5)                                            

 

             RDW-CV (test code = 18.6 %       12.1-15.4    H            



             788-0)                                              

 

             RDW-SD (test code = 54.0 fL      38.5-51.6    H            



             78251-0)                                            

 

             NRBC x10^3 (test code =              See_Comment                [Au

tomated message]



             6659828644)                                         The system [employer]



                                                                 generated this 

result



                                                                 transmitted ref

erence



                                                                 range: 10*3/?L.

 The



                                                                 reference range

 was



                                                                 not used to int

erpret



                                                                 this result as



                                                                 normal/abnormal

.

 

             NRBC/100 WBC (test code 0.0          See_Comment                [Au

tomated message]



             = [id number])                                        The system [employer]





                                                                 generated this 

result



                                                                 transmitted ref

erence



                                                                 range: 0.0 - 10

.0



                                                                 /100 WBCs. The



                                                                 reference range

 was



                                                                 not used to int

erpret



                                                                 this result as



                                                                 normal/abnormal

.

 

             IPF % (test code = 6.3 %        1.2-10.7                  Platelet 

count



             [id number])                                         measured by



                                                                 fluorescence me

thod.

 

             Lab Interpretation Abnormal                               



             (test code = 13781-2)                                        



[facility] [id number] 13:08:31





             Test Item    Value        Reference Range Interpretation Comments

 

             Blood Culture-Aerobic Culture positive. No growth    AA           P

revious



             (test code = 17928-3) See Blood Culture                           p

reliminary



                          Workup for                             verified result



                          additional                             was Culture In



                          information.                           Progress on



                                                                 3/19/2023 at 02

01



                                                                 CDT

 

             Blood        No organisms No growth                 Previous



             Culture-Anaerobic isolated                               preliminar

y



             (test code = 60385-6)                                        verifi

ed result



                                                                 was Culture In



                                                                 Progress on



                                                                 3/19/2023 at 18

25



                                                                 CDT

 

             Lab Interpretation Abnormal                               



             (test code = 22935-5)                                        



[facility] [id number] 11:56:04





             Test Item    Value        Reference Range Interpretation Comments

 

             RETIC Count Automated 1.68 %       0.59-2.24                 



             (test code = 8563225997)                                        

 

             RETIC Absolute Count 0.0509       See_Comment                [Autom

ated message]



             (test code = 7283582150)                                        The

 system which



                                                                 generated this 

result



                                                                 transmitted ref

erence



                                                                 range: 0.0260 -



                                                                 0.1170 10*6/?L.

 The



                                                                 reference range

 was



                                                                 not used to int

erpret



                                                                 this result as



                                                                 normal/abnormal

.

 

             IRF % (test code = 10.50 %      2.00-19.10                



             4352811779)                                         

 

             RETIC-HE (test code = 26.8 pg      27.3-36.4    L            



             1157688848)                                         

 

             Lab Interpretation (test Abnormal                               



             code = 27847-9)                                        



[facility]49:31





             Test Item    Value        Reference Range Interpretation Comments

 

             Fibrinogen (test code = 4192905485) 127 mg/dL    167-453      L    

        

 

             Lab Interpretation (test code = Abnormal                           

    



             31372-1)                                            



[facility] [id number] 11:49:06





             Test Item    Value        Reference Range Interpretation Comments

 

             PROTIME PATIENT (test 25.4         See_Comment  H             [Auto

mated message]



             code = 5964-2)                                        The system wh

ich



                                                                 generated this 

result



                                                                 transmitted ref

erence



                                                                 range: 10.1 - 1

2.6



                                                                 Seconds. The



                                                                 reference range

 was



                                                                 not used to int

erpret



                                                                 this result as



                                                                 normal/abnormal

.

 

             INR (test code = 6301-6) 2.3                                    Nor

mal INR <1.1;



                                                                 Warfarin Therap

eutic



                                                                 range 2.0 to 3.

0 or



                                                                 2.5 to 3.5, dep

ending



                                                                 upon the indica

tions.

 

             Lab Interpretation (test Abnormal                               



             code = 55253-9)                                        



[facility] (NA, K, CL, CO2, 
GLUCOSE, BUN, CREATININE, CA)2023 09:35:08





             Test Item    Value        Reference Range Interpretation Comments

 

             NA (test code = 133 mmol/L   135-145      L            



             9869889445)                                         

 

             K (test code = 3.7 mmol/L   3.5-5.0                   



             2545446797)                                         

 

             CL (test code = 106 mmol/L                       



             9269002657)                                         

 

             CO2 TOTAL (test code = 28 mmol/L    23-31                     



             3203555487)                                         

 

             AGAP (test code =              2-16         L            



             8987416365)                                         

 

             BUN (test code = 7 mg/dL      7-23                      



             4452119417)                                         

 

             GLUCOSE (test code = 100 mg/dL                        



             3685596015)                                         

 

             CREATININE (test code = 0.42 mg/dL   0.60-1.25    L            



             6855810896)                                         

 

             CALCIUM (test code = 6.5 mg/dL    8.6-10.6     L            



             7381324905)                                         

 

             eGFR (test code = 211.1        mL/min/1.73m2              



             0774081129)                                         

 

             RENETTA (test code = RENETTA) Association of                           



                          Glomerular Filtration                           



                          Rate (GFR) and Staging                           



                          of Kidney Disease*                           



                          +---------------------                           



                          --+-------------------                           



                          --+-------------------                           



                          ------+| GFR                           



                          (mL/min/1.73 m2) ?|                           



                          With Kidney Damage ?|                           



                          ?Without Kidney                           



                          Damage+---------------                           



                          --------+-------------                           



                          --------+-------------                           



                          ------------+| ?>90 ?                           



                          ? ? ? ? ? ? ? ?|                           



                          ?Stage one ? ? ? ? ?|                           



                          ? Normal ? ? ? ? ? ? ?                           



                          ?+--------------------                           



                          ---+------------------                           



                          ---+------------------                           



                          -------+| ?60-89 ? ? ?                           



                          ? ? ? ? ?| ?Stage two                           



                          ? ? ? ? ?| ? Decreased                           



                          GFR ? ? ? ?                            



                          +---------------------                           



                          --+-------------------                           



                          --+-------------------                           



                          ------+| ?30-59 ? ? ?                           



                          ? ? ? ? ?| ?Stage                           



                          three ? ? ? ?| ? Stage                           



                          three ? ? ? ? ?                           



                          +---------------------                           



                          --+-------------------                           



                          --+-------------------                           



                          ------+| ?15-29 ? ? ?                           



                          ? ? ? ? ?| ?Stage four                           



                          ? ? ? ? | ? Stage four                           



                          ? ? ? ? ?                              



                          ?+--------------------                           



                          ---+------------------                           



                          ---+------------------                           



                          -------+| ?<15 (or                           



                          dialysis) ? ?| ?Stage                           



                          five ? ? ? ? | ? Stage                           



                          five ? ? ? ? ?                           



                          ?+--------------------                           



                          ---+------------------                           



                          ---+------------------                           



                          -------+ *Each stage                           



                          assumes the associated                           



                          GFR level has been in                           



                          effect for at least                           



                          three months. ?Stages                           



                          1 to 5, with or                           



                          without kidney                           



                          disease, indicate                           



                          chronic kidney                           



                          disease. Notes:                           



                          Determination of                           



                          stages one and two                           



                          (with eGFR                             



                          >59mL/min/1.73 m2)                           



                          requires estimation of                           



                          kidney damage for at                           



                          least three months as                           



                          defined by structural                           



                          or functional                           



                          abnormalities of the                           



                          kidney, manifested by                           



                          either:Pathological                           



                          abnormalities or                           



                          Markers of kidney                           



                          damage (including                           



                          abnormalities in the                           



                          composition of the                           



                          blood or urine or                           



                          abnormalities in                           



                          imaging tests).                           

 

             Lab Interpretation Abnormal                               



             (test code = 93962-7)                                        



[facility]32[facility]37





             Test Item    Value        Reference Range Interpretation Comments

 

             MAGNESIUM (test code = 6793931436) 1.5 mg/dL    1.7-2.4      L     

       

 

             Lab Interpretation (test code = Abnormal                           

    



             19698-9)                                            



[facility] [id number] 09:27:43





             Test Item    Value        Reference Range Interpretation Comments

 

             WBC (test code = 2.93         See_Comment  L             [Automated



             6690-2)                                             message] The sy

stem



                                                                 which generated



                                                                 this result



                                                                 transmitted



                                                                 reference range

:



                                                                 4.20 - 10.70



                                                                 10*3/?L. The



                                                                 reference range

 was



                                                                 not used to



                                                                 interpret this



                                                                 result as



                                                                 normal/abnormal

.

 

             RBC (test code = 3.06         See_Comment  L             [Automated



             789-8)                                              message] The sy

stem



                                                                 which generated



                                                                 this result



                                                                 transmitted



                                                                 reference range

:



                                                                 4.26 - 5.52



                                                                 10*6/?L. The



                                                                 reference range

 was



                                                                 not used to



                                                                 interpret this



                                                                 result as



                                                                 normal/abnormal

.

 

             HGB (test code = 7.9 g/dL     12.2-16.4    L            



             718-7)                                              

 

             HCT (test code = 23.9 %       38.4-49.3    L            



             4544-3)                                             

 

             MCV (test code = 78.1 fL      81.7-95.6    L            



             787-2)                                              

 

             MCH (test code = 25.8 pg      26.1-32.7    L            



             785-6)                                              

 

             MCHC (test code = 33.1 g/dL    31.2-35.0                 



             786-4)                                              

 

             RDW-SD (test code = 51.2 fL      38.5-51.6                 



             07646-8)                                            

 

             RDW-CV (test code = 18.3 %       12.1-15.4    H            



             788-0)                                              

 

             PLT (test code = 18           See_Comment  LL            [Automated



             777-3)                                              message] The sy

stem



                                                                 which generated



                                                                 this result



                                                                 transmitted



                                                                 reference range

:



                                                                 150 - 328 10*3/

?L.



                                                                 The reference r

moose



                                                                 was not used to



                                                                 interpret this



                                                                 result as



                                                                 normal/abnormal

.

 

             MPV (test code =                                        Not Measure

d



             05295-9)                                            

 

             IPF % (test code = 8.0 %        1.2-10.7                  Platelet 

count



             [id number])                                         measured by



                                                                 fluorescence



                                                                 method.

 

             NRBC/100 WBC (test 0.0          See_Comment                [Automat

ed



             code = 2416978322)                                        message] 

The system



                                                                 which generated



                                                                 this result



                                                                 transmitted



                                                                 reference range

:



                                                                 0.0 - 10.0 /100



                                                                 WBCs. The refer

ence



                                                                 range was not u

sed



                                                                 to interpret th

is



                                                                 result as



                                                                 normal/abnormal

.

 

             NRBC x10^3 (test code              See_Comment                [Auto

mated



             = [id number])                                        message] The s

ystem



                                                                 which generated



                                                                 this result



                                                                 transmitted



                                                                 reference range

:



                                                                 10*3/?L. The



                                                                 reference range

 was



                                                                 not used to



                                                                 interpret this



                                                                 result as



                                                                 normal/abnormal

.

 

             GRAN MAT (NEUT) % 54.9 %                                 



             (test code = 770-8)                                        

 

             IMM GRAN % (test code 0.70 %                                 



             = 6345063403)                                        

 

             LYMPH % (test code = 22.9 %                                 



             736-9)                                              

 

             MONO % (test code = 16.0 %                                 



             5905-5)                                             

 

             EOS % (test code = 4.8 %                                  



             713-8)                                              

 

             BASO % (test code = 0.7 %                                  



             706-2)                                              

 

             GRAN MAT x10^3(ANC) 1.61 10*3/uL 1.99-6.95    L            



             (test code =                                        



             4283110939)                                         

 

             IMM GRAN x10^3 (test              0.00-0.06                 



             code = 0208127094)                                        

 

             LYMPH x10^3 (test code 0.67 10*3/uL 1.09-3.23    L            



             = 731-0)                                            

 

             MONO x10^3 (test code 0.47 10*3/uL 0.36-1.02                 



             = 742-7)                                            

 

             EOS x10^3 (test code = 0.14 10*3/uL 0.06-0.53                 



             711-2)                                              

 

             BASO x10^3 (test code              0.01-0.09                 



             = 704-7)                                            

 

             BEJNI CELLS (test code 2+           See_Comment  A             [Auto

mated



             = 1190-9)                                           message] The sy

stem



                                                                 which generated



                                                                 this result



                                                                 transmitted



                                                                 reference range

:



                                                                 (none). The



                                                                 reference range

 was



                                                                 not used to



                                                                 interpret this



                                                                 result as



                                                                 normal/abnormal

.

 

             Lab Interpretation Abnormal                               



             (test code = 41401-0)                                        



[facility]





             Test Item    Value        Reference Range Interpretation Comments

 

             Fibrinogen (test code = 1659704590) 122 mg/dL    167-453      L    

        

 

             Lab Interpretation (test code = Abnormal                           

    



             09584-9)                                            



[facility]): 1 
Units~Indication: 1) Fibrinogen &lt; 100 mg/dL with bleeding or potential for 
bleeding associated with invasive ehoosalsf6493-47-12 18:41:31





             Test Item    Value        Reference Range Interpretation Comments

 

             Unit Blood Type (test O Neg                                  



             code = 4410)                                        

 

             ISBT Blood Type Code 9500                                   



             (test code = 835674)                                        

 

             Unit Number (test [id number]                           



             code = 4411)                                        

 

             Blood Expiration Date                            



             & Time (test code =                                        



             820694)                                             

 

             Status Information Issued                                 



             (test code = 4412)                                        

 

             Product      Cryoprecipitate                           



             Identification (test                                        



             code = 4413)                                        

 

             Product Code (test I7304S87                               Performed

 at [facility]



             code = 4414)                                        Laboratory



                                                                 Services - [facility]



                                                                 Blood [address]



                                                                 [id number] Free:



                                                                 [phone]

A



                                                                 No. [id number]



[facility] PATH[facility]ENS DNA 
XESNM-IONDZQS8898-80-20 05:00:05





             Test Item    Value        Reference Range Interpretation Comments

 

             Acinetobacter species Positive     Negative, See A            



             (test code = 28143-2)              Comment/Narrative              

 

             RENETTA (test code = RENETTA) See blood culture                           



                          result for additional                           



                          information. ?Testing                           



                          included eight                           



                          identification and six                           



                          resistance marker                           



                          targets.                               

 

             Lab Interpretation Abnormal                               



             (test code = 69527-8)                                        



[facility] Lgkli2662-49-11 21:53:15





             Test Item    Value        Reference Range Interpretation Comments

 

             LACTIC ACID (test code = 3.62 mmol/L  0.50-2.20    H            



             2888241702)                                         

 

             Lab Interpretation (test code = Abnormal                           

    



             13526-1)                                            



[facility] Packed RBC (in units), 2 Units
2023 19:27:30





             Test Item    Value        Reference Range Interpretation Comments

 

             Cross Match Result Compatible                             



             (test code = 4409)                                        

 

             ISBT Blood Type Code 5100                                   



             (test code = 087804)                                        

 

             Unit Blood Type (test O Pos                                  



             code = 4410)                                        

 

             Unit Number (test [id number]                           



             code = 4411)                                        

 

             Blood Expiration Date [id number]                           



             & Time (test code =                                        



             926858)                                             

 

             Status Information Issued                                 



             (test code = 4412)                                        

 

             Product      Red Blood Cells                           



             Identification (test                                        



             code = 4413)                                        

 

             Product Code (test W3831M13                               Performed

 at [facility]



             code = 4414)                                        Laboratory



                                                                 Services - [facility]



                                                                 Blood [address]

s



                                                                 [address] Free:



                                                                 [phone]

A



                                                                 No. [id number]



[facility] Mtect8224-80-15 17:17:41





             Test Item    Value        Reference Range Interpretation Comments

 

             LACTIC ACID (test code = 4.31 mmol/L  0.50-2.20    H            



             3367274779)                                         

 

             Lab Interpretation (test code = Abnormal                           

    



             67359-8)                                            



[facility] / ZIR0352-31-81 12:01:29





             Test Item    Value        Reference Range Interpretation Comments

 

             PROTIME PATIENT (test 25.2         See_Comment  H             [Auto

mated message]



             code = 5964-2)                                        The system [employer]



                                                                 generated this 

result



                                                                 transmitted ref

erence



                                                                 range: 10.1 - 1

2.6



                                                                 Seconds. The



                                                                 reference range

 was



                                                                 not used to int

erpret



                                                                 this result as



                                                                 normal/abnormal

.

 

             INR (test code = 6301-6) 2.3                                    Nor

mal INR <1.1;



                                                                 Warfarin Therap

eutic



                                                                 range 2.0 to 3.

0 or



                                                                 2.5 to 3.5, dep

ending



                                                                 upon the indica

tions.

 

             Lab Interpretation (test Abnormal                               



             code = 77313-1)                                        



[facility] HospitalL[facility] Whole Veanj2179-96-80 11:52:59





             Test Item    Value        Reference Range Interpretation Comments

 

             LACTIC ACID (test code = 4.93 mmol/L  0.50-2.20    H            



             7984328090)                                         

 

             Lab Interpretation (test code = Abnormal                           

    



             63201-2)                                            



[facility] Cryoprecipitate (in units): 1 
Units~Indication: 1) Fibrinogen &lt; 100 mg/dL with bleeding or potential for 
bleeding associated with invasive yujkuxgzy0307-18-96 10:16:18





             Test Item    Value        Reference Range Interpretation Comments

 

             Unit Blood Type (test O                                      



             code = 4410)                                        

 

             ISBT Blood Type Code D000                                   



             (test code = 515949)                                        

 

             Unit Number (test [id number]                           



             code = 4411)                                        

 

             Blood Expiration Date                            



             & Time (test code =                                        



             714030)                                             

 

             Status Information Issued                                 



             (test code = 4412)                                        

 

             Product      Cryoprecipitate                           



             Identification (test                                        



             code = 4413)                                        

 

             Product Code (test M0596W09                               Performed

 at [facility]



             code = 4414)                                        Laboratory



                                                                 Services - GAL



                                                                 Blood [address] [place] [address]



                                                                 [address] Free:



                                                                 [phone]

A



                                                                 No. [id number]



[facility] Packed RBC (in units), 2 Units
2023 10:16:18





             Test Item    Value        Reference Range Interpretation Comments

 

             Cross Match Result Compatible                             



             (test code = 4409)                                        

 

             ISBT Blood Type Code 5100                                   



             (test code = 547164)                                        

 

             Unit Blood Type (test O Pos                                  



             code = 4410)                                        

 

             Unit Number (test [id number]                           



             code = 4411)                                        

 

             Blood Expiration Date [id number]                           



             & Time (test code =                                        



             171110)                                             

 

             Status Information Issued                                 



             (test code = 4412)                                        

 

             Product      Red Blood Cells                           



             Identification (test                                        



             code = 4413)                                        

 

             Product Code (test C0936G36                               Performed

 at [facility]



             code = 4414)                                        Laboratory



                                                                 Services - [facility]AL



                                                                 Blood [address]

s



                                                                 [address] Free:



                                                                 [phone]

A



                                                                 No. [id number]



[facility] - 30 minutes after 
transfusion of each BFF6494-12-21 09:00:47





             Test Item    Value        Reference Range Interpretation Comments

 

             WBC (test code = 6.20         See_Comment                [Automated

 message]



             6690-2)                                             The system [employer]



                                                                 generated this 

result



                                                                 transmitted ref

erence



                                                                 range: 4.20 - 1

0.70



                                                                 10*3/?L. The



                                                                 reference range

 was



                                                                 not used to int

erpret



                                                                 this result as



                                                                 normal/abnormal

.

 

             RBC (test code = 789-8) 2.41         See_Comment  L             [Au

tomated message]



                                                                 The system [employer]



                                                                 generated this 

result



                                                                 transmitted ref

erence



                                                                 range: 4.26 - 5

.52



                                                                 10*6/?L. The



                                                                 reference range

 was



                                                                 not used to int

erpret



                                                                 this result as



                                                                 normal/abnormal

.

 

             HGB (test code = 718-7) 6.0 g/dL     12.2-16.4    L            

 

             HCT (test code = 18.6 %       38.4-49.3    L            



             4544-3)                                             

 

             MCH (test code = 785-6) 24.9 pg      26.1-32.7    L            

 

             MCV (test code = 787-2) 77.2 fL      81.7-95.6    L            

 

             MCHC (test code = 32.3 g/dL    31.2-35.0                 



             786-4)                                              

 

             PLT (test code = 777-3) 44           See_Comment  LL            [Au

tomated message]



                                                                 The system [employer]



                                                                 generated this 

result



                                                                 transmitted ref

erence



                                                                 range: 150 - 32

8



                                                                 10*3/?L. The



                                                                 reference range

 was



                                                                 not used to int

erpret



                                                                 this result as



                                                                 normal/abnormal

.

 

             MPV (test code =                                        Not Measure

d



             61491-7)                                            

 

             RDW-CV (test code = 19.5 %       12.1-15.4    H            



             788-0)                                              

 

             RDW-SD (test code = 55.1 fL      38.5-51.6    H            



             78212-0)                                            

 

             NRBC x10^3 (test code =              See_Comment                [Au

tomated message]



             9983192081)                                         The system [employer]



                                                                 generated this 

result



                                                                 transmitted ref

erence



                                                                 range: 10*3/?L.

 The



                                                                 reference range

 was



                                                                 not used to int

erpret



                                                                 this result as



                                                                 normal/abnormal

.

 

             NRBC/100 WBC (test code 0.0          See_Comment                [Au

tomated message]



             = [id number])                                        The system [facility]



                                                                 generated this 

result



                                                                 transmitted ref

erence



                                                                 range: 0.0 - 10

.0



                                                                 /100 WBCs. The



                                                                 reference range

 was



                                                                 not used to int

erpret



                                                                 this result as



                                                                 normal/abnormal

.

 

             IPF % (test code = 5.6 %        1.2-10.7                  Platelet 

count



             [id number])                                         measured by



                                                                 fluorescence me

thod.

 

             Lab Interpretation Abnormal                               



             (test code = 76746-2)                                        



[id number] 08:52:22





             Test Item    Value        Reference Range Interpretation Comments

 

             LACTIC ACID (test code = 5.63 mmol/L  0.50-2.20    H            



             1642763536)                                         

 

             Lab Interpretation (test code = Abnormal                           

    



             33974-5)                                            



[id number] 05:19:47





             Test Item    Value        Reference Range Interpretation Comments

 

             FREE T4 (test code = 1.82         See_Comment                [Autom

ated message]



             [id number])                                         The system [employer]



                                                                 generated this 

result



                                                                 transmitted ref

erence



                                                                 range: 0.78 - 2

.20



                                                                 ng/dL:. The ref

erence



                                                                 range was not u

sed to



                                                                 interpret this 

result



                                                                 as normal/abnor

mal.

 

             Lab Interpretation (test Normal                                 



             code = 08751-8)                                        



[facility] [id number] 04:49:47





             Test Item    Value        Reference Range Interpretation Comments

 

             HCV Ab (test code = 70011-7) Negative                              

 

 

             HCV Semi-Quantitative (test code = 0.05                            

       



             83661-5)                                            



[facility] [id number] 04:32:04





             Test Item    Value        Reference Range Interpretation Comments

 

             TSH (test code = 1.95         See_Comment                [Automated

 message]



             [id number])                                         The system [employer]



                                                                 generated this 

result



                                                                 transmitted ref

erence



                                                                 range: 0.45 - 4

.70



                                                                 mIU/L. The refe

rence



                                                                 range was not u

sed to



                                                                 interpret this 

result



                                                                 as normal/abnor

mal.

 

             Lab Interpretation (test Normal                                 



             code = 24012-1)                                        



[facility] + Lactic [id number] 04:02:48





             Test Item    Value        Reference Range Interpretation Comments

 

             PH (test code = 2) 7.39         7.35-7.45                 

 

             PCO2 (test code = 27           See_Comment  L             [Automate

d



             1834542744)                                         message] The sy

stem



                                                                 which generated



                                                                 this result



                                                                 transmitted



                                                                 reference range

: 35



                                                                 - 45 mmHg. The



                                                                 reference range

 was



                                                                 not used to



                                                                 interpret this



                                                                 result as



                                                                 normal/abnormal

.

 

             PO2 (test code = 120          See_Comment  H             [Automated



             8117873538)                                         message] The sy

stem



                                                                 which generated



                                                                 this result



                                                                 transmitted



                                                                 reference range

: 80



                                                                 - 100 mmHg. The



                                                                 reference range

 was



                                                                 not used to



                                                                 interpret this



                                                                 result as



                                                                 normal/abnormal

.

 

             HCO3 (test code = 17           See_Comment  L             [Automate

d



             1387351085)                                         message] The sy

stem



                                                                 which generated



                                                                 this result



                                                                 transmitted



                                                                 reference range

: 22



                                                                 - 26 mEq/L. The



                                                                 reference range

 was



                                                                 not used to



                                                                 interpret this



                                                                 result as



                                                                 normal/abnormal

.

 

             BE (test code = -8.5         See_Comment  L             [Automated



             8276904336)                                         message] The sy

stem



                                                                 which generated



                                                                 this result



                                                                 transmitted



                                                                 reference range

:



                                                                 -3.0 - 3.0 mEq/

L.



                                                                 The reference r

moose



                                                                 was not used to



                                                                 interpret this



                                                                 result as



                                                                 normal/abnormal

.

 

             THB (test code = 6.2 g/dL     13.5-18.0    LL           



             6506941552)                                         

 

             %O2HB (test code = 97.0 %       94.0-99.0                 



             9848842238)                                         

 

             %COHB ART (test code = 2.0 %        0.0-1.5      H            



             7352814169)                                         

 

             %METHB ART (test code = 0.2 %        0.4-1.5      L            



             3448301154)                                         

 

             VOL%O2 ART (test code = 8.8 %        15.0-23.0    L            



             3780086821)                                         

 

             NA (test code = 129 mmol/L   135-145      L            



             4385190749)                                         

 

             K+ (test code = 4.0 mmol/L   3.5-5.0                   



             1918376881)                                         

 

             AC CA IONZ (test code = 4.10 mg/dL   4.50-5.30    L            



             9441625043)                                         

 

             GLUCOSE (test code = 111 mg/dL           H            



             3439405282)                                         

 

             LACTIC ACID (test code 7.31 mmol/L  0.50-2.20    H            



             = 1720710421)                                        

 

             Lab Interpretation Abnormal                               



             (test code = 21744-9)                                        



[facility]HIV [facility] WITH [id number] 03:46:18





             Test Item    Value        Reference Range Interpretation Comments

 

             HIV          0.09         Negative                  



             Semi-quantitative                                        



             (test code =                                        



             69028-0)                                            

 

             RENETTA (test code = Non-reactive for HIV-1                           



             RENETTA)         antigen and HIV-1/HIV-2                           



                          antibodies. ?No                           



                          laboratory evidence of                           



                          HIV infection. ?Repeat in                           



                          2-4 weeks if acute HIV                           



                          infection is suspected.                           



[facility] HospitalOSMOLALITY, SERUM OR [id number] 01:46:37





             Test Item    Value        Reference Range Interpretation Comments

 

             OSMOLALITY (test code = 373          See_Comment  HH            [Au

tomated message]



             8766-2)                                             The system [employer]

h



                                                                 generated this 

result



                                                                 transmitted ref

erence



                                                                 range: 278 - 30

5



                                                                 mOsm/kg. The



                                                                 reference range

 was



                                                                 not used to int

erpret



                                                                 this result as



                                                                 normal/abnormal

.

 

             Lab Interpretation (test Abnormal                               



             code = 76573-2)                                        



[facility]FIB[facility] 01:09:07





             Test Item    Value        Reference Range Interpretation Comments

 

             Fibrinogen (test code = 8355200394) 96 mg/dL     167-453      LL   

        

 

             Lab Interpretation (test code = Abnormal                           

    



             68448-2)                                            



[facility] HospitalCB WITH [id number] 01:05:00





             Test Item    Value        Reference Range Interpretation Comments

 

             WBC (test code = 2.86         See_Comment  L             [Automated



             5390-2)                                             message] The sy

stem



                                                                 which generated



                                                                 this result



                                                                 transmitted



                                                                 reference range

:



                                                                 4.20 - 10.70



                                                                 10*3/?L. The



                                                                 reference range

 was



                                                                 not used to



                                                                 interpret this



                                                                 result as



                                                                 normal/abnormal

.

 

             RBC (test code = 2.34         See_Comment  L             [Automated



             339-8)                                              message] The sy

stem



                                                                 which generated



                                                                 this result



                                                                 transmitted



                                                                 reference range

:



                                                                 4.26 - 5.52



                                                                 10*6/?L. The



                                                                 reference range

 was



                                                                 not used to



                                                                 interpret this



                                                                 result as



                                                                 normal/abnormal

.

 

             HGB (test code = 5.1 g/dL     12.2-16.4    L            



             718-7)                                              

 

             HCT (test code = 17.9 %       38.4-49.3    L            



             4544-3)                                             

 

             MCV (test code = 76.5 fL      81.7-95.6    L            



             787-2)                                              

 

             MCH (test code = 21.8 pg      26.1-32.7    L            



             785-6)                                              

 

             MCHC (test code = 28.5 g/dL    31.2-35.0    L            



             786-4)                                              

 

             RDW-SD (test code = 59.5 fL      38.5-51.6    H            



             80978-3)                                            

 

             RDW-CV (test code = 21.6 %       12.1-15.4    H            



             788-0)                                              

 

             PLT (test code = 55           See_Comment  L             [Automated



             777-3)                                              message] The sy

stem



                                                                 which generated



                                                                 this result



                                                                 transmitted



                                                                 reference range

:



                                                                 150 - 328 10*3/

?L.



                                                                 The reference r

moose



                                                                 was not used to



                                                                 interpret this



                                                                 result as



                                                                 normal/abnormal

.

 

             MPV (test code =                                        Not Measure

d



             52931-0)                                            

 

             IPF % (test code = 6.8 %        1.2-10.7                  Platelet 

count



             6651015036)                                         measured by



                                                                 fluorescence



                                                                 method.

 

             NRBC/100 WBC (test 0.0          See_Comment                [Automat

ed



             code = 6625132038)                                        message] 

The system



                                                                 which generated



                                                                 this result



                                                                 transmitted



                                                                 reference range

:



                                                                 0.0 - 10.0 /100



                                                                 WBCs. The refer

ence



                                                                 range was not u

sed



                                                                 to interpret th

is



                                                                 result as



                                                                 normal/abnormal

.

 

             NRBC x10^3 (test code              See_Comment                [Auto

mated



             = 8751531217)                                        message] The s

ystem



                                                                 which generated



                                                                 this result



                                                                 transmitted



                                                                 reference range

:



                                                                 10*3/?L. The



                                                                 reference range

 was



                                                                 not used to



                                                                 interpret this



                                                                 result as



                                                                 normal/abnormal

.

 

             GRAN MAT (NEUT) % 51.4 %                                 



             (test code = 770-8)                                        

 

             IMM GRAN % (test code 0.30 %                                 



             = 6046126488)                                        

 

             LYMPH % (test code = 31.5 %                                 



             736-9)                                              

 

             MONO % (test code = 13.3 %                                 



             5905-5)                                             

 

             EOS % (test code = 2.1 %                                  



             713-8)                                              

 

             BASO % (test code = 1.4 %                                  



             706-2)                                              

 

             GRAN MAT x10^3(ANC) 1.47 10*3/uL 1.99-6.95    L            



             (test code =                                        



             9212567749)                                         

 

             IMM GRAN x10^3 (test              0.00-0.06                 



             code = 1315792128)                                        

 

             LYMPH x10^3 (test code 0.90 10*3/uL 1.09-3.23    L            



             = 731-0)                                            

 

             MONO x10^3 (test code 0.38 10*3/uL 0.36-1.02                 



             = 742-7)                                            

 

             EOS x10^3 (test code = 0.06 10*3/uL 0.06-0.53                 



             711-2)                                              

 

             BASO x10^3 (test code 0.04 10*3/uL 0.01-0.09                 



             = 704-7)                                            

 

             BENJI CELLS (test code 2+           See_Comment  A             [Auto

mated



             = 9990-9)                                           message] The sy

stem



                                                                 which generated



                                                                 this result



                                                                 transmitted



                                                                 reference range

:



                                                                 (none). The



                                                                 reference range

 was



                                                                 not used to



                                                                 interpret this



                                                                 result as



                                                                 normal/abnormal

.

 

             SCHISTOCYTES (test 1+                        A            



             code = 800-3)                                        

 

             Lab Interpretation Abnormal                               



             (test code = 37125-2)                                        



[facility]ETHANOL[facility]:48[facility]58





             Test Item    Value        Reference Range Interpretation Comments

 

             ALCOHOL (test code = 355 mg/dL                              



             6850738938)                                         

 

             RENETTA (test code = Toxic Greater than or                           



             RENETTA)         equal to 80 mg/dL. NOTE:                           



                          Whole blood values are                           



                          approximately 10% to 15%                           



                          lower than serum and                           



                          plasma.                                



[facility]
2023 00:29:29





             Test Item    Value        Reference Range Interpretation Comments

 

             Cross Match Result Compatible                             



             (test code = 4409)                                        

 

             ISBT Blood Type Code 5100                                   



             (test code = 983548)                                        

 

             Unit Blood Type (test O Pos                                  



             code = 4410)                                        

 

             Unit Number (test [id number]                           



             code = 4411)                                        

 

             Blood Expiration Date                            



             & Time (test code =                                        



             754288)                                             

 

             Status Information Issued                                 



             (test code = 4412)                                        

 

             Product      Red Blood Cells                           



             Identification (test                                        



             code = 4413)                                        

 

             Product Code (test [id number]                               Performed

 at [facility]



             code = 4414)                                        Laboratory



                                                                 Services - GAL



                                                                 Blood [address] [place] [address]



                                                                 [address] Free:



                                                                 [phone]

A



                                                                 No. [id number]



[facility] (NA, K, CL, CO2, 
GLUCOSE, BUN, CREATININE, CA)2023 00:27:54





             Test Item    Value        Reference Range Interpretation Comments

 

             NA (test code = 132 mmol/L   135-145      L            



             6279270420)                                         

 

             K (test code = 3.5 mmol/L   3.5-5.0                   



             4969685677)                                         

 

             CL (test code = 103 mmol/L                       



             8992444036)                                         

 

             CO2 TOTAL (test code = 17 mmol/L    23-31        L            



             3085172042)                                         

 

             AGAP (test code = 12           2-16                      



             3982434510)                                         

 

             BUN (test code = 7 mg/dL      7-23                      



             6474179306)                                         

 

             GLUCOSE (test code = 145 mg/dL           H            



             8298246842)                                         

 

             CREATININE (test code = 0.48 mg/dL   0.60-1.25    L            



             7218334170)                                         

 

             CALCIUM (test code = 6.9 mg/dL    8.6-10.6     L            



             2724221654)                                         

 

             eGFR (test code = 181.0        mL/min/1.73m2              



             5036906596)                                         

 

             RENETTA (test code = RENETTA) Association of                           



                          Glomerular Filtration                           



                          Rate (GFR) and Staging                           



                          of Kidney Disease*                           



                          +---------------------                           



                          --+-------------------                           



                          --+-------------------                           



                          ------+| GFR                           



                          (mL/min/1.73 m2) ?|                           



                          With Kidney Damage ?|                           



                          ?Without Kidney                           



                          Damage+---------------                           



                          --------+-------------                           



                          --------+-------------                           



                          ------------+| ?>90 ?                           



                          ? ? ? ? ? ? ? ?|                           



                          ?Stage one ? ? ? ? ?|                           



                          ? Normal ? ? ? ? ? ? ?                           



                          ?+--------------------                           



                          ---+------------------                           



                          ---+------------------                           



                          -------+| ?60-89 ? ? ?                           



                          ? ? ? ? ?| ?Stage two                           



                          ? ? ? ? ?| ? Decreased                           



                          GFR ? ? ? ?                            



                          +---------------------                           



                          --+-------------------                           



                          --+-------------------                           



                          ------+| ?30-59 ? ? ?                           



                          ? ? ? ? ?| ?Stage                           



                          three ? ? ? ?| ? Stage                           



                          three ? ? ? ? ?                           



                          +---------------------                           



                          --+-------------------                           



                          --+-------------------                           



                          ------+| ?15-29 ? ? ?                           



                          ? ? ? ? ?| ?Stage four                           



                          ? ? ? ? | ? Stage four                           



                          ? ? ? ? ?                              



                          ?+--------------------                           



                          ---+------------------                           



                          ---+------------------                           



                          -------+| ?<15 (or                           



                          dialysis) ? ?| ?Stage                           



                          five ? ? ? ? | ? Stage                           



                          five ? ? ? ? ?                           



                          ?+--------------------                           



                          ---+------------------                           



                          ---+------------------                           



                          -------+ *Each stage                           



                          assumes the associated                           



                          GFR level has been in                           



                          effect for at least                           



                          three months. ?Stages                           



                          1 to 5, with or                           



                          without kidney                           



                          disease, indicate                           



                          chronic kidney                           



                          disease. Notes:                           



                          Determination of                           



                          stages one and two                           



                          (with eGFR                             



                          >59mL/min/1.73 m2)                           



                          requires estimation of                           



                          kidney damage for at                           



                          least three months as                           



                          defined by structural                           



                          or functional                           



                          abnormalities of the                           



                          kidney, manifested by                           



                          either:Pathological                           



                          abnormalities or                           



                          Markers of kidney                           



                          damage (including                           



                          abnormalities in the                           



                          composition of the                           



                          blood or urine or                           



                          abnormalities in                           



                          imaging tests).                           

 

             Lab Interpretation Abnormal                               



             (test code = 57633-6)                                        



[facility] (ALB,T.PRO,BILI
T,BU/BC,ALT,AST,ALK PHOS)2023 00:27:54





             Test Item    Value        Reference Range Interpretation Comments

 

             TOTAL BILI (test code = 9726910801) 3.0 mg/dL    0.1-1.1      H    

        

 

             BILI UNCON (test code = 3783360355) 1.5 mg/dL    0.1-1.1      H    

        

 

             BILI CONJ (test code = 0650006736) 0.3 mg/dL    0.0-0.3            

       

 

             T PROTEIN (test code = 5813647552) 5.6 g/dL     6.3-8.2      L     

       

 

             ALBUMIN (test code = 6089408741) 2.3 g/dL     3.5-5.0      L       

     

 

             ALK PHOS (test code = 1480232555) 182 U/L             H      

      

 

             ALTv (test code = 1742-6) 27 U/L       5-50                      

 

             AST(SGOT) (test code = 5323860210) 58 U/L       13-40        H     

       

 

             Lab Interpretation (test code = Abnormal                           

    



             50923-7)                                            



[facility] [id number] 00:27:54





             Test Item    Value        Reference Range Interpretation Comments

 

             CK (test code = 9123338143) 689 U/L             H            

 

             Lab Interpretation (test code = Abnormal                           

    



             44121-7)                                            



[facility] 00:27:54





             Test Item    Value        Reference Range Interpretation Comments

 

             MAGNESIUM (test code = 8591730521) 2.1 mg/dL    1.7-2.4            

       

 

             Lab Interpretation (test code = Normal                             

    



             92651-3)                                            



[facility] 00:27:54





             Test Item    Value        Reference Range Interpretation Comments

 

             PHOSPHORUS (test code = 8144357617) 5.1 mg/dL    2.5-5.0      H    

        

 

             Lab Interpretation (test code = Abnormal                           

    



             31469-9)                                            



[facility] HospitalAMMON[facility], [id number] 00:25:13





             Test Item    Value        Reference Range Interpretation Comments

 

             AMMONIA (test code = 6506568533) 71 umol/L    9-33         H       

     

 

             Lab Interpretation (test code = Abnormal                           

    



             65037-1)                                            



[facility] + Lactic [id number] 00:14:10





             Test Item    Value        Reference Range Interpretation Comments

 

             PH (test code = 2) 7.35         7.35-7.45                 

 

             PCO2 (test code = 25           See_Comment  L             [Automate

d



             0408294900)                                         message] The sy

stem



                                                                 which generated



                                                                 this result



                                                                 transmitted



                                                                 reference range

: 35



                                                                 - 45 mmHg. The



                                                                 reference range

 was



                                                                 not used to



                                                                 interpret this



                                                                 result as



                                                                 normal/abnormal

.

 

             PO2 (test code = 158          See_Comment  H             [Automated



             7354736157)                                         message] The sy

stem



                                                                 which generated



                                                                 this result



                                                                 transmitted



                                                                 reference range

: 80



                                                                 - 100 mmHg. The



                                                                 reference range

 was



                                                                 not used to



                                                                 interpret this



                                                                 result as



                                                                 normal/abnormal

.

 

             HCO3 (test code = 14           See_Comment  L             [Automate

d



             6152251699)                                         message] The sy

stem



                                                                 which generated



                                                                 this result



                                                                 transmitted



                                                                 reference range

: 22



                                                                 - 26 mEq/L. The



                                                                 reference range

 was



                                                                 not used to



                                                                 interpret this



                                                                 result as



                                                                 normal/abnormal

.

 

             BE (test code = -11.2        See_Comment  L             [Automated



             1919283821)                                         message] The sy

stem



                                                                 which generated



                                                                 this result



                                                                 transmitted



                                                                 reference range

:



                                                                 -3.0 - 3.0 mEq/

L.



                                                                 The reference r

moose



                                                                 was not used to



                                                                 interpret this



                                                                 result as



                                                                 normal/abnormal

.

 

             THB (test code = 5.6 g/dL     13.5-18.0    LL           



             7281663420)                                         

 

             %O2HB (test code = 97.0 %       94.0-99.0                 



             1678437544)                                         

 

             %COHB ART (test code = 1.6 %        0.0-1.5      H            



             0659724955)                                         

 

             %METHB ART (test code = 0.6 %        0.4-1.5                   



             6261154518)                                         

 

             VOL%O2 ART (test code = 8.0 %        15.0-23.0    L            



             1529797241)                                         

 

             NA (test code = 128 mmol/L   135-145      L            



             1882477137)                                         

 

             K+ (test code = 3.2 mmol/L   3.5-5.0      L            



             7721039943)                                         

 

             AC CA IONZ (test code = 4.10 mg/dL   4.50-5.30    L            



             5325921308)                                         

 

             GLUCOSE (test code = 149 mg/dL           H            



             5993867460)                                         

 

             LACTIC ACID (test code 9.00 mmol/L  0.50-2.20    H            



             = 9781617789)                                        

 

             Lab Interpretation Abnormal                               



             (test code = 32870-0)                                        



[facility] + LACTIC [id number] 00:10:44





             Test Item    Value        Reference Range Interpretation Comments

 

             PH (test code = 7.22         7.32-7.42    L            



             7939036037)                                         

 

             PCO2 YANELI (test code = 40           See_Comment  L             [Auto

mated



             5187330141)                                         message] The sy

stem



                                                                 which generated



                                                                 this result



                                                                 transmitted



                                                                 reference range

: 41



                                                                 - 51 mmHg. The



                                                                 reference range

 was



                                                                 not used to



                                                                 interpret this



                                                                 result as



                                                                 normal/abnormal

.

 

             PO2 YANELI (test code = 33           See_Comment                [Autom

ated



             5242561003)                                         message] The sy

stem



                                                                 which generated



                                                                 this result



                                                                 transmitted



                                                                 reference range

: 25



                                                                 - 40 mmHg. The



                                                                 reference range

 was



                                                                 not used to



                                                                 interpret this



                                                                 result as



                                                                 normal/abnormal

.

 

             HCO3 YANELI (test code = 16           See_Comment  L             [Auto

mated



             4033991520)                                         message] The sy

stem



                                                                 which generated



                                                                 this result



                                                                 transmitted



                                                                 reference range

: 24



                                                                 - 28 mEq/L. The



                                                                 reference range

 was



                                                                 not used to



                                                                 interpret this



                                                                 result as



                                                                 normal/abnormal

.

 

             AC VBE(BEAKER) (test -10.8        mEq/L                     



             code = 7612889739)                                        

 

             THB YANELI (test code = 6.1 g/dL     13.5-18.0    LL           



             7750162550)                                         

 

             %O2HB YANELI (test code = 37.9 %       52.0-63.0    L            



             8692099902)                                         

 

             %COHB YANELI (test code = 0.7 %        0.0-1.5                   



             3324465447)                                         

 

             %METHB YANELI (test code = 0.9 %        0.4-1.5                   



             6087556414)                                         

 

             VOL%O2 YANELI (test code = 3.3 %        6.0-12.0     L            



             9784821653)                                         

 

             NA (test code = 131 mmol/L   135-145      L            



             1685098063)                                         

 

             K+ (test code = 3.3 mmol/L   3.5-5.0      L            



             3167181732)                                         

 

             AC CA IONZ (test code = 4.20 mg/dL   4.50-5.30    L            



             8221985679)                                         

 

             GLUCOSE (test code = 149 mg/dL           H            



             5607090766)                                         

 

             LACTIC ACID (test code 8.68 mmol/L  0.50-2.20    H            



             = 1396863770)                                        

 

             Lab Interpretation Abnormal                               



             (test code = 33456-7)                                        



[facility] (NA, K, CL, CO2, 
GLUCOSE, BUN, CREATININE, CA)2023 22:07:47





             Test Item    Value        Reference Range Interpretation Comments

 

             NA (test code = 134 mmol/L   135-145      L            



             1135223447)                                         

 

             K (test code = 3.1 mmol/L   3.5-5.0      L            



             4427517120)                                         

 

             CL (test code = 103 mmol/L                       



             1943849320)                                         

 

             CO2 TOTAL (test code = 14 mmol/L    23-31        L            



             [id number])                                         

 

             AGAP (test code = 17           2-16         H            



             1581833542)                                         

 

             BUN (test code = 6 mg/dL      7-23         L            



             1982148801)                                         

 

             GLUCOSE (test code = 153 mg/dL           H            



             3045753990)                                         

 

             CREATININE (test code = 0.54 mg/dL   0.60-1.25    L            



             0568095067)                                         

 

             CALCIUM (test code = 7.1 mg/dL    8.6-10.6     L            



             0440704090)                                         

 

             eGFR (test code = 158.0        mL/min/1.73m2              



             3391576827)                                         

 

             RENETTA (test code = RENETTA) Association of                           



                          Glomerular Filtration                           



                          Rate (GFR) and Staging                           



                          of Kidney Disease*                           



                          +---------------------                           



                          --+-------------------                           



                          --+-------------------                           



                          ------+| GFR                           



                          (mL/min/1.73 m2) ?|                           



                          With Kidney Damage ?|                           



                          ?Without Kidney                           



                          Damage+---------------                           



                          --------+-------------                           



                          --------+-------------                           



                          ------------+| ?>90 ?                           



                          ? ? ? ? ? ? ? ?|                           



                          ?Stage one ? ? ? ? ?|                           



                          ? Normal ? ? ? ? ? ? ?                           



                          ?+--------------------                           



                          ---+------------------                           



                          ---+------------------                           



                          -------+| ?60-89 ? ? ?                           



                          ? ? ? ? ?| ?Stage two                           



                          ? ? ? ? ?| ? Decreased                           



                          GFR ? ? ? ?                            



                          +---------------------                           



                          --+-------------------                           



                          --+-------------------                           



                          ------+| ?30-59 ? ? ?                           



                          ? ? ? ? ?| ?Stage                           



                          three ? ? ? ?| ? Stage                           



                          three ? ? ? ? ?                           



                          +---------------------                           



                          --+-------------------                           



                          --+-------------------                           



                          ------+| ?15-29 ? ? ?                           



                          ? ? ? ? ?| ?Stage four                           



                          ? ? ? ? | ? Stage four                           



                          ? ? ? ? ?                              



                          ?+--------------------                           



                          ---+------------------                           



                          ---+------------------                           



                          -------+| ?<15 (or                           



                          dialysis) ? ?| ?Stage                           



                          five ? ? ? ? | ? Stage                           



                          five ? ? ? ? ?                           



                          ?+--------------------                           



                          ---+------------------                           



                          ---+------------------                           



                          -------+ *Each stage                           



                          assumes the associated                           



                          GFR level has been in                           



                          effect for at least                           



                          three months. ?Stages                           



                          1 to 5, with or                           



                          without kidney                           



                          disease, indicate                           



                          chronic kidney                           



                          disease. Notes:                           



                          Determination of                           



                          stages one and two                           



                          (with eGFR                             



                          >59mL/min/1.73 m2)                           



                          requires estimation of                           



                          kidney damage for at                           



                          least three months as                           



                          defined by structural                           



                          or functional                           



                          abnormalities of the                           



                          kidney, manifested by                           



                          either:Pathological                           



                          abnormalities or                           



                          Markers of kidney                           



                          damage (including                           



                          abnormalities in the                           



                          composition of the                           



                          blood or urine or                           



                          abnormalities in                           



                          imaging tests).                           

 

             Lab Interpretation Abnormal                               



             (test code = 42651-8)                                        



[facility] / [id number] 22:04:46





             Test Item    Value        Reference Range Interpretation Comments

 

             PROTIME PATIENT (test 27.5         See_Comment  H             [Auto

mated message]



             code = 5964-2)                                        The system [employer]



                                                                 generated this 

result



                                                                 transmitted ref

erence



                                                                 range: 10.1 - 1

2.6



                                                                 Seconds. The



                                                                 reference range

 was



                                                                 not used to int

erpret



                                                                 this result as



                                                                 normal/abnormal

.

 

             INR (test code = 6301-6) 2.5                                    Nor

mal INR <1.1;



                                                                 Warfarin Therap

eutic



                                                                 range 2.0 to 3.

0 or



                                                                 2.5 to 3.5, dep

ending



                                                                 upon the indica

tions.

 

             Lab Interpretation (test Abnormal                               



             code = 21180-1)                                        



[facility]46





             Test Item    Value        Reference Range Interpretation Comments

 

             APTT Patient (test code 42           See_Comment  H             [Au

tomated message]



             = 3173-2)                                           The system [employer]



                                                                 generated this 

result



                                                                 transmitted ref

erence



                                                                 range: 26 - 36



                                                                 Seconds. The



                                                                 reference range

 was



                                                                 not used to int

erpret



                                                                 this result as



                                                                 normal/abnormal

.

 

             Lab Interpretation (test Abnormal                               



             code = 29205-3)                                        



[facility] / [id number] 22:02:45





             Test Item    Value        Reference Range Interpretation Comments

 

             WBC (test code = 3.90         See_Comment  L             [Automated

 message]



             6690-2)                                             The system [employer]



                                                                 generated this 

result



                                                                 transmitted ref

erence



                                                                 range: 4.20 - 1

0.70



                                                                 10*3/?L. The



                                                                 reference range

 was



                                                                 not used to int

erpret



                                                                 this result as



                                                                 normal/abnormal

.

 

             RBC (test code = 789-8) 2.63         See_Comment  L             [Au

tomated message]



                                                                 The system [employer]



                                                                 generated this 

result



                                                                 transmitted ref

erence



                                                                 range: 4.26 - 5

.52



                                                                 10*6/?L. The



                                                                 reference range

 was



                                                                 not used to int

erpret



                                                                 this result as



                                                                 normal/abnormal

.

 

             HGB (test code = 718-7) 5.8 g/dL     12.2-16.4    L            

 

             HCT (test code = 20.1 %       38.4-49.3    L            



             4544-3)                                             

 

             MCH (test code = 785-6) 22.1 pg      26.1-32.7    L            

 

             MCV (test code = 787-2) 76.4 fL      81.7-95.6    L            

 

             MCHC (test code = 28.9 g/dL    31.2-35.0    L            



             786-4)                                              

 

             PLT (test code = 777-3) 61           See_Comment  L             [Au

tomated message]



                                                                 The system [employer]



                                                                 generated this 

result



                                                                 transmitted ref

erence



                                                                 range: 150 - 32

8



                                                                 10*3/?L. The



                                                                 reference range

 was



                                                                 not used to int

erpret



                                                                 this result as



                                                                 normal/abnormal

.

 

             MPV (test code =                                        Not Measure

d



             54337-2)                                            

 

             RDW-CV (test code = 21.4 %       12.1-15.4    H            



             788-0)                                              

 

             RDW-SD (test code = 59.4 fL      38.5-51.6    H            



             98223-7)                                            

 

             NRBC x10^3 (test code =              See_Comment                [Au

tomated message]



             2497820759)                                         The system [employer]



                                                                 generated this 

result



                                                                 transmitted ref

erence



                                                                 range: 10*3/?L.

 The



                                                                 reference range

 was



                                                                 not used to int

erpret



                                                                 this result as



                                                                 normal/abnormal

.

 

             NRBC/100 WBC (test code 0.0          See_Comment                [Au

tomated message]



             = [id number])                                        The system [employer]





                                                                 generated this 

result



                                                                 transmitted ref

erence



                                                                 range: 0.0 - 10

.0



                                                                 /100 WBCs. The



                                                                 reference range

 was



                                                                 not used to int

erpret



                                                                 this result as



                                                                 normal/abnormal

.

 

             IPF % (test code = 4.9 %        1.2-10.7                  Platelet 

count



             [id number])                                         measured by



                                                                 fluorescence me

thod.

 

             Lab Interpretation Abnormal                               



             (test code = 10529-4)                                        



[facility] HospitalType [facility] - The Type and Screen expires 
at midnight on the 3rd day after it was drawn. A current Type and Screen is 
required when RBCs are requested. For all other blood products, a Type and Scree
n performed during the current hospitalizati...2023 21:57:00





             Test Item    Value        Reference Range Interpretation Comments

 

             ABO & RH (test code = 20) O POSITIVE                             

 

             IAT (test code = 1185) Negative                               



[facility]:59:00TEXTPatient Name UMU TABARES of Birth 1970Record Number [id number]/Time of Procedure 
2022, 3:59:00 PMEndoscopist Tresas Santoro MD./Fellow 
Jorge Moreno INDICATIONS FOR EXAMINATION: Anemia unspecified. PROCEDURE 
PERFORMED:  EGD - EGD, diagnostic INSTRUMENTS: [id number]: None 
TOLERANCE: Good VISUALIZATION: Good MEDICATIONS: MAC AnesthesiaASA CLASSIFI
CATION: IIIANALGESIA: TIVA by anesthesia PROCEDURE TECHNIQUE:Prior to the 
procedure, a History and Physical was performed, and patient medications and 
allergies were reviewed. The risks and benefits ofthe procedure and the sedation
options and risks were discussed with the patient. Consent was obtained. Pulse, 
blood oxygen saturation, and blood pressure were monitored throughout the 
procedure. Afteradequate sedation, the endoscope was introduced through the 
mouth and under direct visualization advanced to the Second Part of Duodenum. 
Careful examination of the esophagus, stomach and duodenum was performed. 
FINDINGS:The upper, middle, and lower esophagus appeared normal. There were 
small varices that flattened with insufflationThe GE junction was normal.The 
gastric cardia, fundus, and body were normal.The were two 5 mm clean based 
ulcers at the distal antrum. No biopsies were taken.The pylorus,duodenal bulb, 
and descending duodenum through the second portion of the duodenum appeared 
normal. SPECIMEN COLLECTED: No ENDOSCOPIC DIAGNOSIS:Small gastric varices Two 
distal antrum 5mm clean based ulcers RECOMMENDATIONS:Test for H. Pylori with 
stool antigen. Treat if positive. Start PPI BID x 6-8 weeks Follow up outpatient
with gastroenterology clinic COMPLICATIONS:None. Intra-procedure complication: 
none; ESTIMATED BLOOD LOSS: None BLOOD PRODUCTS ADMINISTERED: NoneGRAFT/IMPLANT:
None TOTAL PROCEDURE TIME: TOTAL SEDATION TIME: COMMENTS: CPT CODE:78608-FI 
Esophagogastroduodenoscopy, flexible, transoral; diagnostic, including 
collection of specimen(s) by brushing or washing, when peICD CODE:D64.9 Anemia, 
unspecified I was present during the entire viewing portion of the procedure. I 
personally reviewed the images and report prepared by the resident or fellow and
agree with the findings. After the procedure was completed, the patient was 
taken to the recovery in good condition. This Procedure was electronically 
signed of on :10/13/2022 1:52:32 PM By Tressa Broussard [facility]
2022 15:59:00TEXTPatient Name UMU TABARES of Birth 
1970Record Number [id number]/Time of Procedure 2022, 3:59:00 
PMEndoscopist Tressa Santoro MD./Fellow Jorge OLIVARES FOR EXAMINATION: Anemia unspecified. PROCEDURE PERFORMED: EGD - EGD, 
diagnostic INSTRUMENTS:[id number]: None TOLERANCE: Good VISUALIZATION: 
Good MEDICATIONS: MAC AnesthesiaASA CLASSIFICATION: IIIANALGESIA: TIVA by 
anesthesia PROCEDURE TECHNIQUE:Prior to the procedure, a History and Physical 
was performed, and patient medications and allergies were reviewed. The risks 
and benefits of the procedure and the sedation options and risks were discussed 
with the patient. Consent was obtained. Pulse, blood oxygen saturation, and 
blood pressure were monitored throughout the procedure. After adequate sedation,
the endoscope was introduced through the mouth and under direct visualization 
advanced to the Second Part of Duodenum. Careful examination of the esophagus, 
stomach and duodenum was performed. FINDINGS:The upper, middle, and lower 
esophagus appeared normal. There were small varices that flattened with 
insufflationThe GE junction was normal.The gastric cardia, fundus, and body were
normal.The were two 5 mm clean based ulcers at the distal antrum. No biopsies 
were taken.The pylorus, duodenal bulb, and descending duodenum through the 
second portion of the duodenum appeared normal. SPECIMEN COLLECTED: No 
ENDOSCOPIC DIAGNOSIS:Small gastric varices Two distal antrum 5mm clean based 
ulcers RECOMMENDATIONS:Test for H. Pylori with stool antigen. Treat if positive.
Start PPI BID x 6-8 weeks Follow up outpatient with gastroenterology clinic 
COMPLICATIONS:None. Intra-procedure complication: none; ESTIMATED BLOOD LOSS: 
None BLOOD PRODUCTS ADMINISTERED: NoneGRAFT/IMPLANT: None TOTAL PROCEDURE TIME: 
TOTAL SEDATION TIME: COMMENTS: CPT CODE:61159-RI Esophagogastroduodenoscopy, 
flexible, transoral; diagnostic, including collection of specimen(s) by brushing
or washing, when peICD CODE:D64.9 Anemia, unspecified I was present during the 
entire viewing portion of the procedure. I personally reviewed the images and 
report prepared by the resident or fellow and agree with the findings. After the
procedure was completed, the patient was taken to the recovery in good 
condition. This Procedure waselectronically signed of on :10/13/2022 1:52:32 PM 
By [facility] RBC [facility], 1 Rpcha6860-26-12 
11:42:00





             Test Item    Value        Reference Range Interpretation Comments

 

             RBC UNITS (test code = compatible                             



             66311944)                                           

 

             Unit ABO Type (test code = O                                      



             03009068)                                           

 

             Unit Rh Type (test code = POS                                    



             28170252)                                           

 

             Product Code (test code =                                   



             98648369)                                           

 

             Unit Number (test code = [id number]                           



             [id number])                                           

 

             Unit Status (test code = transfused                             



             28719579)                                           

 

             ISBT Product Code (test code = R1345P36                            

   



             92583)                                              

 

             Blood Type (test code = 25137) 5100                                

   

 

             Blood Expiration Date (test                            



             code = 41019)                                        



[facility] [id number] 11:42:00





             Test Item    Value        Reference Range Interpretation Comments

 

             RBC UNITS (test code = compatible                             



             51728250)                                           

 

             Unit ABO Type (test code = O                                      



             63396142)                                           

 

             Unit Rh Type (test code = POS                                    



             04610411)                                           

 

             Product Code (test code =                                   



             32661436)                                           

 

             Unit Number (test code = [id number]                           



             43560490)                                           

 

             Unit Status (test code = transfused                             



             41397764)                                           

 

             ISBT Product Code (test code = Q7646G37                            

   



             57466)                                              

 

             Blood Type (test code = 96481) 5100                                

   

 

             Blood Expiration Date (test                            



             code = 15995)                                        



[facility] RBC Units, 1 [id number] 11:42:00





             Test Item    Value        Reference Range Interpretation Comments

 

             RBC UNITS (test code = compatible                             



             36888400)                                           

 

             Unit ABO Type (test code = O                                      



             23622748)                                           

 

             Unit Rh Type (test code = POS                                    



             79217074)                                           

 

             Product Code (test code =                                   



             93831993)                                           

 

             Unit Number (test code = [id number]                           



             01871633)                                           

 

             Unit Status (test code = transfused                             



             91407592)                                           

 

             ISBT Product Code (test code = K2322O00                            

   



             42571)                                              

 

             Blood Type (test code = 58883) 5100                                

   

 

             Blood Expiration Date (test                            



             code = 60966)                                        



[facility] [id number] 10:08:00





             Test Item    Value        Reference Range Interpretation Comments

 

             Apheresis Platelets, not required                           



             Leukoreduced (test code =                                        



             01125165)                                           

 

             Unit ABO Type (test code = A                                      



             61361193)                                           

 

             Unit Rh Type (test code = NEG                                    



             32335878)                                           

 

             Product Code (test code =                                   



             30222959)                                           

 

             Unit Number (test code = [id number]                           



             50783677)                                           

 

             Unit Status (test code = transfused                             



             72639101)                                           

 

             ISBT Product Code (test code = M3092M86                            

   



             94346)                                              

 

             Blood Type (test code = 65607) 0600                                

   

 

             Blood Expiration Date (test                            



             code = 34700)                                        



[facility] [id number] 10:08:00





             Test Item    Value        Reference Range Interpretation Comments

 

             Apheresis Platelets, not required                           



             Leukoreduced (test code =                                        



             74009014)                                           

 

             Unit ABO Type (test code = A                                      



             14469189)                                           

 

             Unit Rh Type (test code = NEG                                    



             78085651)                                           

 

             Product Code (test code =                                   



             74601151)                                           

 

             Unit Number (test code = [id number]                           



             94229435)                                           

 

             Unit Status (test code = transfused                             



             17569309)                                           

 

             ISBT Product Code (test code = B3757U71                            

   



             96847)                                              

 

             Blood Type (test code = 95834) 0600                                

   

 

             Blood Expiration Date (test                            



             code = 87133)                                        



[facility] Units, 1 [id number] 10:08:00





             Test Item    Value        Reference Range Interpretation Comments

 

             Apheresis Platelets, not required                           



             Leukoreduced (test code =                                        



             58021397)                                           

 

             Unit ABO Type (test code = A                                      



             28173020)                                           

 

             Unit Rh Type (test code = NEG                                    



             74647072)                                           

 

             Product Code (test code =                                   



             81684926)                                           

 

             Unit Number (test code = [id number]                           



             74795805)                                           

 

             Unit Status (test code = transfused                             



             11702543)                                           

 

             ISBT Product Code (test code = E4232K15                            

   



             11601)                                              

 

             Blood Type (test code = 80384) 0600                                

   

 

             Blood Expiration Date (test                            



             code = 53873)                                        



[facility] [id number] 13:45:44





             Test Item    Value        Reference Range Interpretation Comments

 

             COVID-19 (SARS-COV-2) Detected     Not Detected A            INTERP

RETATION: This



             (test code = 69177-8)                                        patien

t's sample had



                                                                 detectable RNA 

present



                                                                 for the SARS-Co

V-2



                                                                 Coronavirus (CO

VID-19).



                                                                 A result with



                                                                 detectable RNA 

does not



                                                                 indicate the se

verity



                                                                 of infection. T

his



                                                                 result should b

e



                                                                 interpreted in



                                                                 conjunction wit

h



                                                                 clinical, radio

graphic,



                                                                 and other labor

atory



                                                                 findings and sh

ould not



                                                                 be used as the 

sole



                                                                 indicator of ac

tive



                                                                 infection with



                                                                 SARS-CoV-2 Citlalli

navirus



                                                                 (COVID-19). COM

MENT:



                                                                 This Hologic Ap

saulo



                                                                 SARS-CoV-2 mole

cular



                                                                 diagnostic assa

y



                                                                 utilizes Transc

ription



                                                                 Mediated Amplif

ication



                                                                 (TMA) technolog

y to



                                                                 rapidly detect 

the



                                                                 SARS-CoV-2 (COV

ID-19)



                                                                 virus from resp

iratory



                                                                 samples. In acc

ordance



                                                                 with the FDA's 

guidance



                                                                 document "Polic

y for



                                                                 Diagnostic Test

s for



                                                                 Coronavirus



                                                                 Disease-2019 du

ring the



                                                                 Public Health



                                                                 Emergency", thi

s test



                                                                 was developed, 

and its



                                                                 performance



                                                                 characteristics

 were



                                                                 verified by the

 [facility]

[facility]



                                                                 diagnostics lab

oratory



                                                                 and is authoriz

ed for



                                                                 clinical diagno

stic



                                                                 use. This labor

atory is



                                                                 certified under

 the



                                                                 Clinical Labora

tory



                                                                 Improvement Cir[employer]

ndments



                                                                 (CLIA) as quali

fied to



                                                                 perform high co

mplexity



                                                                 clinical labora

tory



                                                                 testing.

 

             Lab Interpretation Abnormal                               



             (test code = 60094-6)                                        



[facility] [id number] 13:45:44





             Test Item    Value        Reference Range Interpretation Comments

 

             COVID-19 (SARS-COV-2) Detected     Not Detected A            INTERP

RETATION: This



             (test code = 50107-9)                                        patien

t's sample had



                                                                 detectable RNA 

present



                                                                 for the SARS-Co

V-2



                                                                 Coronavirus (CO

VID-19).



                                                                 A result with



                                                                 detectable RNA 

does not



                                                                 indicate the se

verity



                                                                 of infection. T

his



                                                                 result should b

e



                                                                 interpreted in



                                                                 conjunction wit

h



                                                                 clinical, radio

graphic,



                                                                 and other labor

atory



                                                                 findings and sh

ould not



                                                                 be used as the 

sole



                                                                 indicator of ac

tive



                                                                 infection with



                                                                 SARS-CoV-2 Citlalli

navirus



                                                                 (COVID-19). COM

MENT:



                                                                 This Hologic Ap

saulo



                                                                 SARS-CoV-2 mole

cular



                                                                 diagnostic assa

y



                                                                 utilizes Transc

ription



                                                                 Mediated Amplif

ication



                                                                 (TMA) technolog

y to



                                                                 rapidly detect 

the



                                                                 SARS-CoV-2 (COV

ID-19)



                                                                 virus from resp

iratory



                                                                 samples. In acc

ordance



                                                                 with the FDA's 

guidance



                                                                 document "Polic

y for



                                                                 Diagnostic Test

s for



                                                                 Coronavirus



                                                                 Disease-2019 du

ring the



                                                                 Public Health



                                                                 Emergency", thi

s test



                                                                 was developed, 

and its



                                                                 performance



                                                                 characteristics

 were



                                                                 verified by the

 [facility]

[facility]



                                                                 diagnostics lab

oratory



                                                                 and is authoriz

ed for



                                                                 clinical diagno

stic



                                                                 use. This labor

atory is



                                                                 certified under

 the



                                                                 Clinical Labora

tory



                                                                 Improvement Cira

ndments



                                                                 (CLIA) as quali

fied to



                                                                 perform high co

mplexity



                                                                 clinical labora

tory



                                                                 testing.

 

             Lab Interpretation Abnormal                               



             (test code = 96772-7)                                        



[facility] [id number] 13:45:44





             Test Item    Value        Reference Range Interpretation Comments

 

             COVID-19 (SARS-COV-2) Detected     Not Detected A            INTERP

RETATION: This



             (test code = 66897-9)                                        patien

t's sample had



                                                                 detectable RNA 

present



                                                                 for the SARS-Co

V-2



                                                                 Coronavirus (CO

VID-19).



                                                                 A result with



                                                                 detectable RNA 

does not



                                                                 indicate the se

verity



                                                                 of infection. T

his



                                                                 result should b

e



                                                                 interpreted in



                                                                 conjunction wit

h



                                                                 clinical, radio

graphic,



                                                                 and other labor

atory



                                                                 findings and sh

ould not



                                                                 be used as the 

sole



                                                                 indicator of ac

tive



                                                                 infection with



                                                                 SARS-CoV-2 Citlalli

navirus



                                                                 (COVID-19). COM

MENT:



                                                                 This Hologic Ap

saulo



                                                                 SARS-CoV-2 mole

cular



                                                                 diagnostic assa

y



                                                                 utilizes Transc

ription



                                                                 Mediated Amplif

ication



                                                                 (TMA) technolog

y to



                                                                 rapidly detect 

the



                                                                 SARS-CoV-2 (COV

ID-19)



                                                                 virus from resp

iratory



                                                                 samples. In acc

ordance



                                                                 with the FDA's 

guidance



                                                                 document "Polic

y for



                                                                 Diagnostic Test

s for



                                                                 Coronavirus



                                                                 Disease-2019 du

ring the



                                                                 Public Health



                                                                 Emergency", thi

s test



                                                                 was developed, 

and its



                                                                 performance



                                                                 characteristics

 were



                                                                 verified by the

 [facility]

lar



                                                                 diagnostics lab

oratory



                                                                 and is authoriz

ed for



                                                                 clinical diagno

stic



                                                                 use. This labor

atory is



                                                                 certified under

 the



                                                                 Clinical Labora

tory



                                                                 Improvement Cira

ndments



                                                                 (CLIA) as quali

fied to



                                                                 perform high co

mplexity



                                                                 clinical labora

tory



                                                                 testing.

 

             Lab Interpretation Abnormal                               



             (test code = 01332-5)                                        



[facility] [id number]-5 ORF1ab Resp Ql WILLIAM+[id number] 13:45:44





             Test Item    Value        Reference Range Interpretation Comments

 

             Hospitalized? (test Yes                                    



             code = 19693-5)                                        

 

             ICU? (test code = No                                     



             53451-3)                                            

 

             Symptomatic as defined No                                     



             by CDC? (test code =                                        



             86465-8)                                            

 

             Employed in  No                                     



             Healthcare? (test code                                        



             = 99456-8)                                          

 

             Resident in a No                                     



             congregate care                                        



             setting (including                                        



             nursing homes,                                        



             residential care for                                        



             people with                                         



             intellectual and                                        



             developmental                                        



             disabilities,                                        



             psychiatric treatment                                        



             facilities, group                                        



             homes, board and care                                        



             homes, homeless                                        



             shelter, foster care                                        



             or other): (test code                                        



             = 98372-3)                                          

 

             SARS-CoV-2 ORF1ab Resp DETECTED     Not Detected A            INTER

PRETATION: This



             Ql WILLIAM+probe (test                                        patient's

 sample had



             code = 99716-9)                                        detectable R

NA present



                                                                 for the SARS-Co

V-2



                                                                 Coronavirus (CO

VID-19).



                                                                 A result with



                                                                 detectable RNA 

does not



                                                                 indicate the se

verity



                                                                 of infection. T

his



                                                                 result should b

e



                                                                 interpreted in



                                                                 conjunction wit

h



                                                                 clinical, radio

graphic,



                                                                 and other labor

atory



                                                                 findings and sh

ould not



                                                                 be used as the 

sole



                                                                 indicator of ac

tive



                                                                 infection with



                                                                 SARS-CoV-2 Citlalli

navirus



                                                                 (COVID-19). COM

MENT:



                                                                 This Hologic Ap

saulo



                                                                 SARS-CoV-2 mole

cular



                                                                 diagnostic assa

y



                                                                 utilizes Transc

ription



                                                                 Mediated Amplif

ication



                                                                 (TMA) technolog

y to



                                                                 rapidly detect 

the



                                                                 SARS-CoV-2 (COV

ID-19)



                                                                 virus from resp

iratory



                                                                 samples. In acc

ordance



                                                                 with\XC2A0\the 

FDA's



                                                                 guidance docume

nt



                                                                 "Policy for Mikayla

gnostic



                                                                 Tests for Coron

avirus



                                                                 Disease-2019 du

ring the



                                                                 Public Health



                                                                 Emergency", jenni

s test



                                                                 was developed, 

and its



                                                                 performance



                                                                 characteristics

 were



                                                                 verified by the

 [facility]

lar



                                                                 diagnostics lab

oratory



                                                                 and is authoriz

ed for



                                                                 clinical diagno

stic



                                                                 use. \XC2A0\Jenni

s



                                                                 laboratory is c

ertified



                                                                 under the Clini

tyler



                                                                 Laboratory Impr

ovement



                                                                 Amendments (CLI

A) as



                                                                 qualified to pe

rform



                                                                 high complexity



                                                                 clinical labora

tory



                                                                 testing.



HHSHAV IgG Ser [id number] 15:17:48





             Test Item    Value        Reference Range Interpretation Comments

 

             HAV IgG Ser Ql (test code = 63753-9) POSITIVE     Negative     A   

         



[facility] Lead [id number] 09:29:5912 LEAD EKG FOR [facility]  
Test Date: 2077-80-26Nat Name: [facility] Department: 5BMSPatient ID: 
[id number] Room: Gender: M Technician: AnnieDOB: 1970 Requested By: ULYSSES Massey Number: [id number] Reading MD: Linda Obrien 
MeasurementsIntervals Axis Rate: 86 P: -2PR: 97  QRS: 50QRSD: 84 T: 24QT: 428 
QTc: 471  Interpretive StatementsSINUS RHYTHM WITH SHORT MO INTERVALPROLONGED QT
INTERVALElectronically Signed On 2022 14:22:26 [facility] [id number] 09:29:5912 LEAD EKG FOR [facility]  Test Date: 0415-57-24Igy Name: [facility] Department: 5BMSPatient
ID: [id number] Room: Gender: M Technician: AnnieDOB: 1970 Requested By: 
MARCY Massey Number: [id number] Reading MD: Linda Obrien 
MeasurementsIntervals Axis Rate: 86 P: -2PR: 97  QRS: 50QRSD: 84 T: 24QT: 428 
QTc: 471  Interpretive StatementsSINUS RHYTHM WITH SHORT MO INTERVALPROLONGED QT
INTERVALElectronically Signed On 2022 14:22:26 CDT by Linda BrammoPut[facility] HospitalPanjivaGar[facility] Lead [id number] 09:29:5912 LEAD EKG FOR [facility]  Test Date: 6860-67-29Rik Name: [facility] Department: 5BMSPatient
ID: [id number] Room: Gender: M Technician: AnnieDOB: 1970 Requested By: 
MARCY Massey Number: [id number] Reading MD: Linda Obrien 
MeasurementsIntervals Axis Rate: 86 P: -2PR: 97  QRS: 50QRSD: 84 T: 24QT: 428 
QTc: 471  Interpretive StatementsSINUS RHYTHM WITH SHORT MO INTERVALPROLONGED QT
INTERVALElectronically Signed On 2022 14:22:26 CDT by Linda MedranoSMS
[facility] 09:15:17





             Test Item    Value        Reference Range Interpretation Comments

 

             Glucose POC (test code = 87613733) 137 mg/dL           H     

       

 

             Lab Interpretation (test code = Abnormal                           

    



             75881-6)                                            



[facility]2022-07-11 09:15:17





             Test Item    Value        Reference Range Interpretation Comments

 

             Glucose POC (test code = 81813907) 137 mg/dL           H     

       

 

             Lab Interpretation (test code = Abnormal                           

    



             63248-1)                                            



[facility]15[facility]17





             Test Item    Value        Reference Range Interpretation Comments

 

             Glucose POC (test code = 41090462) 137 mg/dL           H     

       

 

             Lab Interpretation (test code = Abnormal                           

    



             42117-0)                                            



[id number] 10:34:38





             Test Item    Value        Reference Range Interpretation Comments

 

             T pallidum Ab Ser Ql Aggl (test NEGATIVE     Negative, Equivocal   

           



             code = 78728-9)                                        

 

             Reagin+T pallidum IgG+IgM NEGATIVE     Negative                  



             SerPl-Imp (test code = 29964-5)                                    

    



HHSHIV 1+2 Ab+HIV1 p24 Ag SerPl Ql [id number] 05:50:04





             Test Item    Value        Reference Range Interpretation Comments

 

             HIV 1+2 Ab+HIV1 p24 Ag SerPl Ql IA NEGATIVE     Negative           

       



             (test code = 03515-7)                                        



BWXUODI-EtB-1 ORF1ab Resp Ql IWLLIAM+[id number] 04:05:45





             Test Item    Value        Reference Range Interpretation Comments

 

             Hospitalized? (test No                                     



             code = 33735-7)                                        

 

             ICU? (test code = No                                     



             82926-5)                                            

 

             Symptomatic as No                                     



             defined by CDC?                                        



             (test code =                                        



             45010-5)                                            

 

             Employed in  No                                     



             Healthcare? (test                                        



             code = 99381-7)                                        

 

             Resident in a No                                     



             congregate care                                        



             setting (including                                        



             nursing homes,                                        



             residential care for                                        



             people with                                         



             intellectual and                                        



             developmental                                        



             disabilities,                                        



             psychiatric                                         



             treatment                                           



             facilities, group                                        



             homes, board and                                        



             care homes, homeless                                        



             shelter, foster care                                        



             or other): (test                                        



             code = 81093-9)                                        

 

             SARS-CoV-2 ORF1ab NOT DETECTED Not Detected              INTERPRETA

TION: No



             Resp Ql WILLIAM+probe                                        detectable

 levels of



             (test code =                                        SARS-CoV-2



             20263-7)                                            Coronavirus



                                                                 (COVID-19) were



                                                                 present in this



                                                                 patient's sampl

e by



                                                                 this test. A no

t



                                                                 detected result

 does



                                                                 not exclude the



                                                                 possibility of 

active



                                                                 infection with 

this



                                                                 virus due to ot

her



                                                                 factors that ma

y



                                                                 affect the resu

lts



                                                                 such as a poorl

y



                                                                 collected sampl

e,



                                                                 viral titers be

low



                                                                 the limit of



                                                                 detection of th

e



                                                                 assay, and the



                                                                 infrequent



                                                                 possibility of



                                                                 inhibitors in t

he



                                                                 sample. This re

sult



                                                                 should be inter

preted



                                                                 in conjunction 

with



                                                                 clinical,



                                                                 radiographic, a

nd



                                                                 other laborator

y



                                                                 findings and sh

ould



                                                                 not be used as 

the



                                                                 sole indicator 

of



                                                                 active infectio

n with



                                                                 SARS-CoV-2



                                                                 Coronavirus



                                                                 (COVID-19).COMM

ENT:



                                                                 This Hologic Ap

saulo



                                                                 SARS-CoV-2 mole

cular



                                                                 diagnostic assa

y



                                                                 utilizes



                                                                 Transcription



                                                                 Mediated



                                                                 Amplification (

TMA)



                                                                 technology to r

apidly



                                                                 detect the SARS

-CoV-2



                                                                 (COVID-19) viru

s from



                                                                 respiratory shahriar

ples.



                                                                 In accordance



                                                                 with\XC2A0\the 

FDA's



                                                                 guidance docume

nt



                                                                 "Policy for



                                                                 Diagnostic Test

s for



                                                                 Coronavirus



                                                                 Disease-2019 du

ring



                                                                 the Public Heal

th



                                                                 Emergency", [facility]

s test



                                                                 was developed, 

and



                                                                 its performance



                                                                 characteristics

 were



                                                                 verified by the



                                                                 [facility]



                                                                 molecular diagn

ostics



                                                                 laboratory and 

is



                                                                 authorized for



                                                                 clinical diagno

stic



                                                                 use. \XC2A0\Thi

s



                                                                 laboratory is



                                                                 certified under

 the



                                                                 Clinical Labora

tory



                                                                 Improvement



                                                                 Amendments (CLI

A) as



                                                                 qualified to pe

rform



                                                                 high complexity



                                                                 clinical labora

tory



                                                                 testing.



[facility] Lead [id number] 17:14:4312 LEAD EKG FOR [facility] 
Test Date: 2622-31-73Muo Name: UMU TABARES Department: 5520Patient ID: 
[id number] Room:  POD F 01Gender: M Technician: [id number]: 1970 Requested
By: DENISE Saravia Number: [id number] Reading MD: Linda Obrien 
MeasurementsIntervals Axis Rate: 91 P: 24PR: 124 QRS: 50QRSD: 89 T: 41QT: 375  
QTc: 423 Interpretive StatementsSINUS RHYTHMElectronically Signed On 2022 
21:25:07 CDT by Linda Obrien [place] [address] Lead [id number] 17:14:4312
LEAD EKG FOR [facility] Test Date:  Name: 
UMU TABARES Department: 5520Patient ID: [id number] Room: BT POD F 01Gender: M
Technician: [id number]: 1970 RequestedBy: DENISE Saravia Number: 
[id number] Reading MD: Linda Obrien MeasurementsIntervals Axis Rate: 91  P: 
24PR: 124 QRS: 50QRSD: 89 T: 41QT: 375 QTc: 423 Interpretive StatementsSINUS 
RHYTHMElectronically Signed On 2022 21:25:07 CDT by Linda Obrien SMS
[facility]12 Lead [id number] 17:14:4312 LEAD EKG FOR [facility] Test Date:  Name: UMU TABARES Department: 5520Patient 
ID: [id number] Room: BT POD F 01Gender: M Technician: [id number]: 1970 
RequestedBy: DENISE Saravia Number: [id number]  Reading MD: Linda Obrien 
MeasurementsIntervals Axis Rate: 91 P: 24PR: 124 QRS: 50QRSD: 89 T: 41QT: 375 
QTc: 423  Interpretive StatementsSINUS RHYTHMElectronically Signed On 2022 
21:25:07 CDT by Linda Obrien SMSWaldo HospitalALBUMIN BODY [id number] 
17:50:22





             Test Item    Value        Reference Range Interpretation Comments

 

             ALBUMIN BF (test code 279.0 mg/dL                            



             = [id number])                                        

 

             RENETTA (test code = RENETTA) Result interpreted                           



                          relative to the serum                           



                          concentration. ?                           



[facility] [id number] 
15:55:23COVID DMT InterpretationInterpretation/Recommendations:Molecular NAAT 
Tests for Active Infection with the SARS-CoV-2 Virus:The patient has currently 
tested negative for the SARS-CoV-2 virus that causesCOVID-19 illness. This most 
likely indicates that the patient does not have an active infection withthe 
SARS-CoV-2 virus. However, infection is not completely ruled out as the false 
negative rate for molecular NAAT testing using a nasopharyngeal sample can be up
to 30%, mostly dependent on the timingof sample collection in relation to 
illness onset and any deficiencies in sampling techniques. If the patient has 
symptoms concerning for COVID-19 illness, a repeat NAAT test (PCR, Rapid ID Now,
etc.) should be performed, at which time the SARS-CoV-2 virus - if present - may
have reached a detectable viral load (usually peaking by the end of the first 
week of symptoms). Tests for IgM and/or IgG Antibodies to the SARS-CoV-2 
Virus:If the patient develops COVID-19 illness in the future, testing for IgMand
IgG antibodies approximately 3 weeks after illness onset will likely indicate if
the patient hasproduced antibodies to the SARS-CoV-2 virus. However, some 
patients may take longer to develop detectable antibodies, while some patients 
who were infected with SARS-CoV-2 may never develop antibodies.While antibodies 
to SARS-CoV-2 may provide some degree of immunity, at this time the strength and
duration of the antibody response is unknown. 
------------------------------------------------------------------------ 
Interpretation Result Comments:These interpretation comments are based upon all 
COVID-19 testing the patient has had at [facility], including molecular NAAT testing 
(more commonly known as PCRtesting and Rapid ID Now testing) and antibody 
testing. It does not take into account any testing that a patient has had 
outside of the [facility] medical record. [facility] LABORATORY SERVICESCOVID 
HqsltfrGDXC-ZoY-3 Rapid ID NOW (no units) ? ? Date ? ? ? ? ? ? ? ? ? ? Value ? ?
? ? ? ? ? 2021 ? ? ? ? ? ? ?Not Detected ? ? ? 2021 ? ? ? ? ? ? ? 
Not Detected ? ---------- [facility] LABORATORY SERVICESUnDallas Medical CenterCBC with [id number] 12:19:02





             Test Item    Value        Reference Range Interpretation Comments

 

             WBC (test code =              See_Comment                [Automated



             6690-2)                                             message] The sy

stem



                                                                 which generated



                                                                 this result



                                                                 transmitted



                                                                 reference range

:



                                                                 4.20 - 10.70



                                                                 10*3/?L. The



                                                                 reference range

 was



                                                                 not used to



                                                                 interpret this



                                                                 result as



                                                                 normal/abnormal

.

 

             RBC (test code =              See_Comment  L             [Automated



             789-8)                                              message] The sy

stem



                                                                 which generated



                                                                 this result



                                                                 transmitted



                                                                 reference range

:



                                                                 4.26 - 5.52



                                                                 10*6/?L. The



                                                                 reference range

 was



                                                                 not used to



                                                                 interpret this



                                                                 result as



                                                                 normal/abnormal

.

 

             HGB (test code = 7.9 g/dL     12.2-16.4    L            



             718-7)                                              

 

             HCT (test code = 24.8 %       38.4-49.3    L            



             4544-3)                                             

 

             MCV (test code = 80.0 fL      81.7-95.6    L            



             787-2)                                              

 

             MCH (test code = 25.5 pg      26.1-32.7    L            



             785-6)                                              

 

             MCHC (test code = 31.9 g/dL    31.2-35.0                 



             786-4)                                              

 

             RDW-SD (test code = 72.4 fL      38.5-51.6    H            



             08675-5)                                            

 

             RDW-CV (test code = 24.9 %       12.1-15.4    H            



             788-0)                                              

 

             PLT (test code =              See_Comment  LL            [Automated



             777-3)                                              message] The sy

stem



                                                                 which generated



                                                                 this result



                                                                 transmitted



                                                                 reference range

:



                                                                 150 - 328 10*3/

?L.



                                                                 The reference r

moose



                                                                 was not used to



                                                                 interpret this



                                                                 result as



                                                                 normal/abnormal

.

 

             MPV (test code =                                        Not Measure

d



             48228-2)                                            

 

             IPF % (test code = 6.3 %        1.2-10.7                  Platelet 

count



             6385419762)                                         measured by



                                                                 fluorescence



                                                                 method.

 

             NRBC/100 WBC (test              See_Comment                [Automat

ed



             code = 9867042188)                                        message] 

The system



                                                                 which generated



                                                                 this result



                                                                 transmitted



                                                                 reference range

:



                                                                 0.0 - 10.0 /100



                                                                 WBCs. The refer

ence



                                                                 range was not u

sed



                                                                 to interpret th

is



                                                                 result as



                                                                 normal/abnormal

.

 

             NRBC x10^3 (test code <0.01        See_Comment                [Auto

mated



             = 7187201102)                                        message] The s

ystem



                                                                 which generated



                                                                 this result



                                                                 transmitted



                                                                 reference range

:



                                                                 10*3/?L. The



                                                                 reference range

 was



                                                                 not used to



                                                                 interpret this



                                                                 result as



                                                                 normal/abnormal

.

 

             GRAN MAT (NEUT) % 59.8 %                                 



             (test code = 770-8)                                        

 

             IMM GRAN % (test code 0.20 %                                 



             = 3609135544)                                        

 

             LYMPH % (test code = 21.3 %                                 



             736-9)                                              

 

             MONO % (test code = 11.2 %                                 



             5905-5)                                             

 

             EOS % (test code = 6.5 %                                  



             713-8)                                              

 

             BASO % (test code = 1.0 %                                  



             706-2)                                              

 

             GRAN MAT x10^3(ANC) 3.11 10*3/uL 1.99-6.95                 



             (test code =                                        



             5599765968)                                         

 

             IMM GRAN x10^3 (test <0.03        0.00-0.06                 



             code = 1867443343)                                        

 

             LYMPH x10^3 (test code 1.11 10*3/uL 1.09-3.23                 



             = 731-0)                                            

 

             MONO x10^3 (test code 0.58 10*3/uL 0.36-1.02                 



             = 742-7)                                            

 

             EOS x10^3 (test code = 0.34 10*3/uL 0.06-0.53                 



             711-2)                                              

 

             BASO x10^3 (test code 0.05 10*3/uL 0.01-0.09                 



             = 704-7)                                            

 

             Lab Interpretation Abnormal                               



             (test code = 26456-0)                                        



[facility] Metabolic Panel (NA, K, CL, CO2, 
GLUCOSE, BUN, CREATININE, CA)2021 11:47:29





             Test Item    Value        Reference Range Interpretation Comments

 

             NA (test code = 135 mmol/L   135-145                   



             0317873379)                                         

 

             K (test code = 3.8 mmol/L   3.5-5.0                   



             5538390751)                                         

 

             CL (test code = 106 mmol/L                       



             9975003380)                                         

 

             CO2 TOTAL (test code = 26 mmol/L    23-31                     



             6946748335)                                         

 

             AGAP (test code =              2-16                      



             3883678002)                                         

 

             BUN (test code = 9 mg/dL      7-23                      



             2375745149)                                         

 

             GLUCOSE (test code = 103 mg/dL                        



             0589939810)                                         

 

             CREATININE (test code = 0.50 mg/dL   0.60-1.25    L            



             0240049138)                                         

 

             CALCIUM (test code = 7.7 mg/dL    8.6-10.6     L            



             8190686048)                                         

 

             eGFR (test code =              mL/min/1.73m2              



             5584350625)                                         

 

             RENETTA (test code = RENETTA) Association of                           



                          Glomerular Filtration                           



                          Rate (GFR) and Staging                           



                          of Kidney Disease*                           



                          +---------------------                           



                          --+-------------------                           



                          --+-------------------                           



                          ------+| GFR                           



                          (mL/min/1.73 m2) ?|                           



                          With Kidney Damage ?|                           



                          ?Without Kidney                           



                          Damage+---------------                           



                          --------+-------------                           



                          --------+-------------                           



                          ------------+| ?>90 ?                           



                          ? ? ? ? ? ? ? ?|                           



                          ?Stage one ? ? ? ? ?|                           



                          ? Normal ? ? ? ? ? ? ?                           



                          ?+--------------------                           



                          ---+------------------                           



                          ---+------------------                           



                          -------+| ?60-89 ? ? ?                           



                          ? ? ? ? ?| ?Stage two                           



                          ? ? ? ? ?| ? Decreased                           



                          GFR ? ? ? ?                            



                          +---------------------                           



                          --+-------------------                           



                          --+-------------------                           



                          ------+| ?30-59 ? ? ?                           



                          ? ? ? ? ?| ?Stage                           



                          three ? ? ? ?| ? Stage                           



                          three ? ? ? ? ?                           



                          +---------------------                           



                          --+-------------------                           



                          --+-------------------                           



                          ------+| ?15-29 ? ? ?                           



                          ? ? ? ? ?| ?Stage four                           



                          ? ? ? ? | ? Stage four                           



                          ? ? ? ? ?                              



                          ?+--------------------                           



                          ---+------------------                           



                          ---+------------------                           



                          -------+| ?<15 (or                           



                          dialysis) ? ?| ?Stage                           



                          five ? ? ? ? | ? Stage                           



                          five ? ? ? ? ?                           



                          ?+--------------------                           



                          ---+------------------                           



                          ---+------------------                           



                          -------+ *Each stage                           



                          assumes the associated                           



                          GFR level has been in                           



                          effect for at least                           



                          three months. ?Stages                           



                          1 to 5, with or                           



                          without kidney                           



                          disease, indicate                           



                          chronic kidney                           



                          disease. Notes:                           



                          Determination of                           



                          stages one and two                           



                          (with eGFR                             



                          >59mL/min/1.73 m2)                           



                          requires estimation of                           



                          kidney damage for at                           



                          least three months as                           



                          defined by structural                           



                          or functional                           



                          abnormalities of the                           



                          kidney, manifested by                           



                          either:Pathological                           



                          abnormalities or                           



                          Markers of kidney                           



                          damage (including                           



                          abnormalities in the                           



                          composition of the                           



                          blood or urine or                           



                          abnormalities in                           



                          imaging tests).                           

 

             Lab Interpretation Abnormal                               



             (test code = 52069-1)                                        



[facility] [id number]23[facility]45





             Test Item    Value        Reference Range Interpretation Comments

 

             BF SEGS (test code = 3521477323) 4 %                               

     

 

             BF LYMPHS (test code = 8601968931) 17 %                            

       

 

             MACROPHAGE (test code = 6595104356) 69 %                           

        

 

             MESOS (test code = 5353365126) 10 %                                

   

 

             #CELS CNTD (test code = 5907116984)                                

        



[id number]23[facility]35





             Test Item    Value        Reference Range Interpretation Comments

 

             T.PROT BF (test 1109.0 mg/dL                           



             code =                                              



             4628380484)                                         

 

             UNSPUN BODY  Yellow                                 



             FLUID COLOR                                         



             (test code =                                        



             2715421221)                                         

 

             UNSPUN BODY  Slightly Cloudy                           



             FLUID CLARITY                                        



             (test code =                                        



             1878839881)                                         

 

             SPUN BODY FLUID Yellow                                 



             COLOR (test code                                        



             = 7859053211)                                        

 

             SPUN BODY FLUID Clear                                  



             CLARITY (test                                        



             code =                                              



             5818296462)                                         

 

             Sediment (test                                        The sediment



             code =                                              volume is <0.1



             6187350384)                                         mLs of the



                                                                 total fluid



                                                                 volume of 3mls



                                                                 and its color



                                                                 is red.

 

             RENETTA (test code = Test developed and                           



             RENETTA)         characteristics                           



                          determined by [facility]                           



                          Laboratory Services.                           



[facility] [id number] 07:19:43





             Test Item    Value        Reference Range Interpretation Comments

 

             BF COLOR (test Light Yellow                           



             code =                                              



             3583972488)                                         

 

             TURBIDITY (test Slightly Turbid                           



             code =                                              



             6086876959)                                         

 

             BF WBC Count              See_Comment                [Automated



             (test code =                                        message] The



             3858700241)                                         system which



                                                                 generated this



                                                                 result



                                                                 transmitted



                                                                 reference range

:



                                                                 /?L. The



                                                                 reference range



                                                                 was not used to



                                                                 interpret this



                                                                 result as



                                                                 normal/abnormal

.

 

             BF RBC Count <3000        See_Comment                [Automated



             (test code =                                        message] The



             8212370484)                                         system which



                                                                 generated this



                                                                 result



                                                                 transmitted



                                                                 reference range

:



                                                                 /?L. The



                                                                 reference range



                                                                 was not used to



                                                                 interpret this



                                                                 result as



                                                                 normal/abnormal

.

 

             RENETTA (test code = The reference range                           



             RENETTA)         and other method                           



                          performance                            



                          specifications have                           



                          not been established                           



                          for this body fluid.                           



                          ?The test results                           



                          must be integrated                           



                          into the clinical                           



                          context for                            



                          interpretation.                           



[facility] 
21:16:02





             Test Item    Value        Reference Range Interpretation Comments

 

             SARS-CoV-2 Rapid ID NOW Not Detected Not Detected              



             (test code = 86490-2)                                        

 

             RENETTA (test code = RENETTA) ID NOW COVID-19 Assay                        

   



                          is an isothermal                           



                          nucleic acid                           



                          amplification test                           



                          intended for the                           



                          qualitative detection                           



                          of nucleic acid from                           



                          SARS-CoV-2 viral RNA                           



                          in nasopharyngeal (NP)                           



                          specimens. It is used                           



                          under Emergency Use                           



                          Authorization (EUA) by                           



                          FDA. The limit of                           



                          detection (LOD) of the                           



                          assay is 125 Genome                           



                          Equivalents/mL. A                           



                          positive result is                           



                          indicative of the                           



                          presence of SARS-CoV-2                           



                          RNA. ?Clinical                           



                          correlation with                           



                          patient history and                           



                          other diagnostic                           



                          information is                           



                          necessary to determine                           



                          patient infection                           



                          status. A negative                           



                          (Not Detected) result                           



                          does not preclude                           



                          SARS-CoV-2 infection.                           



                          In patients with                           



                          clinical symptoms and                           



                          other tests that are                           



                          consistent with                           



                          SARS-CoV-2 infection,                           



                          negative results                           



                          should be treated as                           



                          presumptive negative                           



                          and a new specimen                           



                          should be tested with                           



                          alternative PCR                           



                          molecular test.                           



                          Invalid: Please                           



                          collect a new specimen                           



                          for repeat patient                           



                          testing if clinically                           



                          indicated.                             

 

             Lab Interpretation Normal                                 



             (test code = 19448-6)                                        



[facility] [id number] 
18:50:54 Cirrhotic liver morphology with evidence of portal hypertension 
withmoderate volume ascites, large esophageal varices and splenomegalyunchanged 
compared to prior. Since 2021, slight decreased now small left pleural 
effusion. Preliminary Report Dictated by Resident: Amy Bezold I, Maryamnaz ?Falamaki, MD., have reviewed this study and agree with theabove report.EXAM: CT
ABDOMEN AND PELVIS WITH CONTRAST HISTORY: 51 years-old Male presenting with 
history of cirrhosis andworsening abdominal distension COMPARISON: 2021 
TECHNIQUE AND FINDINGS: Contiguous axial imaging from the level of the lungbases
through the proximal thighs was performed after the administration ofintravenous
contrast. Coronal and sagittal reconstructions were obtained. Auto mA and/or 
iterative reconstruction were used to reduce radiation dose. FINDINGS: LOWER 
THORAX: Small sized left pleural effusion, decreased compared toprior. Minimal 
loculated fluid is also seen tracking in the right minorfissure. The lungs bases
are clear. Mild cardiomegalyThere is nopericardial effusion. LIVER: Nodular 
liver contour, this partial generated enlargement of lateralsegment of the left 
lobe and caudate lobe. No focal hepatic lesions seen onthis single, portal 
venous phase. The portal veins are patent. GALLBLADDER AND BILIARY TREE: No bi
liary ductal dilation. Mild gallbladderwall thickening appears stable and likely
secondary to chronic liverdisease. SPLEEN: The spleen is enlarged, measuring 
approximately 14.8 cm. PANCREAS: No ductal dilation or masses. ADRENAL GLANDS: 
No adrenal nodules. KIDNEYS: No hydronephrosis, stones, or masses. GI TRACT: 
Diffuse submucosal wall thickening seen throughout the small andlarge bowel 
likely secondary to chronic portal hypertension. No abnormallydilated bowel. 
Normal-appearing appendix. PERITONEUMAND RETROPERITONEUM: Moderate to large 
volume ascites. Noextraluminal free air. LYMPH NODES: No lymphadenopathy. 
PELVIS/BLADDER: Unremarkable urinary bladder. VESSELS: Similar appearance of 
enlarged esophageal varices seen extendingalong the lower esophagus. BONES AND 
SOFT TISSUES: Diffuse body wall edema. No suspicious lytic orsclerotic bony 
lesions. [facility], Radiant Results Inft User - 2021 1:52PM CDTFormatting of 
this note might be different from the original.EXAM: CT ABDOMEN AND PELVIS WITH 
CONTRASTHISTORY: 51 years-old Male presenting with history of cirrhosis 
andworsening abdominal distension COMPARISON: 2021TECHNIQUE AND FINDINGS: 
Contiguous axial imaging from the level of the lungbases through the proximal 
thighs was performed after the administration ofintravenous contrast. Coronal 
and sagittal reconstructions were obtained. Auto mA and/or iterative 
reconstruction were used to reduce radiation dose.FINDINGS:LOWER THORAX: Small 
sized left pleural effusion, decreased compared toprior. Minimal loculated fluid
is also seen tracking in the right minorfissure. The lungs bases are clear. Mild
cardiomegalyThere is nopericardial effusion.LIVER: Nodular liver contour, this 
partial generated enlargement of lateralsegment of the left lobe and caudate 
lobe. No focal hepatic lesions seen onthis single, portal venous phase. The 
portal veins are patent.GALLBLADDER AND BILIARY TREE: No biliary ductal 
dilation. Mild gallbladderwall thickening appears stable and likely secondary to
chronic liverdisease.SPLEEN: The spleen is enlarged, measuring approximately 
14.8 cm.PANCREAS: No ductal dilation or masses.ADRENAL GLANDS: No adrenal 
nodules.KIDNEYS: No hydronephrosis, stones, or masses.GI TRACT: Diffuse 
submucosal wall thickening seen throughout the small andlarge bowel likely 
secondary to chronic portal hypertension. No abnormallydilated bowel. Normal-
appearing appendix. PERITONEUM AND RETROPERITONEUM: Moderate to large volume 
ascites. Noextraluminal free air.LYMPH NODES: No lymphadenopathy.PELVIS/BLADDER:
Unremarkable urinary bladder.VESSELS: Similar appearance of enlarged esophageal 
varices seen extendingalong the lower esophagus.BONES AND SOFT TISSUES: Diffuse 
body wall edema. No suspicious lytic orsclerotic bony 
lesions.IMPRESSIONCirrhotic liver morphology with evidence of portal hy
pertension withmoderate volume ascites, large esophageal varices and 
splenomegalyunchanged compared to prior.Since 2021, slight decreased now 
small left pleural effusion.Preliminary Report Dictatedby Resident: Amy BezoldI,
Renee Tang MD., have reviewed this study and agree with laura medellin.[facility] with [id number] 16:58:45





             Test Item    Value        Reference Range Interpretation Comments

 

             WBC (test code =              See_Comment                [Automated



             8690-2)                                             message] The sy

stem



                                                                 which generated



                                                                 this result



                                                                 transmitted



                                                                 reference range

:



                                                                 4.20 - 10.70



                                                                 10*3/?L. The



                                                                 reference range

 was



                                                                 not used to



                                                                 interpret this



                                                                 result as



                                                                 normal/abnormal

.

 

             RBC (test code =              See_Comment  L             [Automated



             9-8)                                              message] The sy

stem



                                                                 which generated



                                                                 this result



                                                                 transmitted



                                                                 reference range

:



                                                                 4.26 - 5.52



                                                                 10*6/?L. The



                                                                 reference range

 was



                                                                 not used to



                                                                 interpret this



                                                                 result as



                                                                 normal/abnormal

.

 

             HGB (test code = 9.1 g/dL     12.2-16.4    L            



             718-7)                                              

 

             HCT (test code = 28.1 %       38.4-49.3    L            



             4544-3)                                             

 

             MCV (test code = 79.2 fL      81.7-95.6    L            



             787-2)                                              

 

             MCH (test code = 25.6 pg      26.1-32.7    L            



             785-6)                                              

 

             MCHC (test code = 32.4 g/dL    31.2-35.0                 



             786-4)                                              

 

             RDW-SD (test code = 70.4 fL      38.5-51.6    H            



             32912-1)                                            

 

             RDW-CV (test code = 24.6 %       12.1-15.4    H            



             788-0)                                              

 

             PLT (test code =              See_Comment  LL            [Automated



             777-3)                                              message] The sy

stem



                                                                 which generated



                                                                 this result



                                                                 transmitted



                                                                 reference range

:



                                                                 150 - 328 10*3/

?L.



                                                                 The reference r

moose



                                                                 was not used to



                                                                 interpret this



                                                                 result as



                                                                 normal/abnormal

.

 

             MPV (test code =                                        Not Measure

d



             65658-3)                                            

 

             IPF % (test code = 7.9 %        1.2-10.7                  Platelet 

count



             [id number])                                         measured by



                                                                 fluorescence



                                                                 method.

 

             NRBC/100 WBC (test              See_Comment                [Automat

ed



             code = 3175621982)                                        message] 

The system



                                                                 which generated



                                                                 this result



                                                                 transmitted



                                                                 reference range

:



                                                                 0.0 - 10.0 /100



                                                                 WBCs. The refer

ence



                                                                 range was not u

sed



                                                                 to interpret th

is



                                                                 result as



                                                                 normal/abnormal

.

 

             NRBC x10^3 (test code <0.01        See_Comment                [Auto

mated



             = 6160951552)                                        message] The s

ystem



                                                                 which generated



                                                                 this result



                                                                 transmitted



                                                                 reference range

:



                                                                 10*3/?L. The



                                                                 reference range

 was



                                                                 not used to



                                                                 interpret this



                                                                 result as



                                                                 normal/abnormal

.

 

             GRAN MAT (NEUT) % 70.6 %                                 



             (test code = 770-8)                                        

 

             IMM GRAN % (test code 0.20 %                                 



             = 9773194056)                                        

 

             LYMPH % (test code = 15.4 %                                 



             736-9)                                              

 

             MONO % (test code = 8.6 %                                  



             5905-5)                                             

 

             EOS % (test code = 4.4 %                                  



             713-8)                                              

 

             BASO % (test code = 0.8 %                                  



             706-2)                                              

 

             GRAN MAT x10^3(ANC) 4.17 10*3/uL 1.99-6.95                 



             (test code =                                        



             3197206701)                                         

 

             IMM GRAN x10^3 (test <0.03        0.00-0.06                 



             code = 7361277311)                                        

 

             LYMPH x10^3 (test code 0.91 10*3/uL 1.09-3.23    L            



             = 731-0)                                            

 

             MONO x10^3 (test code 0.51 10*3/uL 0.36-1.02                 



             = 742-7)                                            

 

             EOS x10^3 (test code = 0.26 10*3/uL 0.06-0.53                 



             711-2)                                              

 

             BASO x10^3 (test code 0.05 10*3/uL 0.01-0.09                 



             = 704-7)                                            

 

             TARGET CELLS (test 2+           See_Comment  A             [Automat

ed



             code = 03350-8)                                        message] The

 system



                                                                 which generated



                                                                 this result



                                                                 transmitted



                                                                 reference range

:



                                                                 (none). The



                                                                 reference range

 was



                                                                 not used to



                                                                 interpret this



                                                                 result as



                                                                 normal/abnormal

.

 

             Lab Interpretation Abnormal                               



             (test code = 34995-3)                                        



[id number] 16:40:47





             Test Item    Value        Reference Range Interpretation Comments

 

             PROTIME PATIENT (test              See_Comment  H             [Auto

mated message]



             code = 5964-2)                                        The system wh

ich



                                                                 generated this 

result



                                                                 transmitted ref

erence



                                                                 range: 10.1 - 1

2.6



                                                                 Seconds. The



                                                                 reference range

 was



                                                                 not used to int

erpret



                                                                 this result as



                                                                 normal/abnormal

.

 

             INR (test code = 6301-6)                                        Nor

mal INR <1.1;



                                                                 Warfarin Therap

eutic



                                                                 range 2.0 to 3.

0 or



                                                                 2.5 to 3.5, dep

ending



                                                                 upon the indica

tions.

 

             Lab Interpretation (test Abnormal                               



             code = 94734-5)                                        



[facility] (ALB, T.PRO, BILI T, 
BU/BC, ALT, AST, ALK PHOS)2021 16:36:51





             Test Item    Value        Reference Range Interpretation Comments

 

             TOTAL BILI (test code = 3190665707) 4.4 mg/dL    0.1-1.1      H    

        

 

             BILI UNCON (test code = 8175975584) 2.7 mg/dL    0.1-1.1      H    

        

 

             BILI CONJ (test code = 6434086373) 0.1 mg/dL    0.0-0.3            

       

 

             T PROTEIN (test code = 2500102135) 7.1 g/dL     6.3-8.2            

       

 

             ALBUMIN (test code = 6983697002) 2.9 g/dL     3.5-5.0      L       

     

 

             ALK PHOS (test code = 9992772925) 204 U/L             H      

      

 

             ALTv (test code = 1742-6) 23 U/L       5-50                      

 

             AST(SGOT) (test code = 9660413066) 51 U/L       13-40        H     

       

 

             Lab Interpretation (test code = Abnormal                           

    



             66074-3)                                            



[facility] Metabolic Panel (NA, K, CL, CO2, 
GLUCOSE, BUN, CREATININE, CA)2021 16:36:50





             Test Item    Value        Reference Range Interpretation Comments

 

             NA (test code = 136 mmol/L   135-145                   



             1520288439)                                         

 

             K (test code = 3.1 mmol/L   3.5-5.0      L            



             4498697632)                                         

 

             CL (test code = 101 mmol/L                       



             7756898908)                                         

 

             CO2 TOTAL (test code = 26 mmol/L    23-31                     



             1078046322)                                         

 

             AGAP (test code =              2-16                      



             9233429132)                                         

 

             BUN (test code = 9 mg/dL      7-23                      



             0345435547)                                         

 

             GLUCOSE (test code = 118 mg/dL           H            



             7221409490)                                         

 

             CREATININE (test code = 0.56 mg/dL   0.60-1.25    L            



             8521356478)                                         

 

             CALCIUM (test code = 8.2 mg/dL    8.6-10.6     L            



             9138296335)                                         

 

             eGFR (test code =              mL/min/1.73m2              



             8723834395)                                         

 

             RENETTA (test code = RENETTA) Association of                           



                          Glomerular Filtration                           



                          Rate (GFR) and Staging                           



                          of Kidney Disease*                           



                          +---------------------                           



                          --+-------------------                           



                          --+-------------------                           



                          ------+| GFR                           



                          (mL/min/1.73 m2) ?|                           



                          With Kidney Damage ?|                           



                          ?Without Kidney                           



                          Damage+---------------                           



                          --------+-------------                           



                          --------+-------------                           



                          ------------+| ?>90 ?                           



                          ? ? ? ? ? ? ? ?|                           



                          ?Stage one ? ? ? ? ?|                           



                          ? Normal ? ? ? ? ? ? ?                           



                          ?+--------------------                           



                          ---+------------------                           



                          ---+------------------                           



                          -------+| ?60-89 ? ? ?                           



                          ? ? ? ? ?| ?Stage two                           



                          ? ? ? ? ?| ? Decreased                           



                          GFR ? ? ? ?                            



                          +---------------------                           



                          --+-------------------                           



                          --+-------------------                           



                          ------+| ?30-59 ? ? ?                           



                          ? ? ? ? ?| ?Stage                           



                          three ? ? ? ?| ? Stage                           



                          three ? ? ? ? ?                           



                          +---------------------                           



                          --+-------------------                           



                          --+-------------------                           



                          ------+| ?15-29 ? ? ?                           



                          ? ? ? ? ?| ?Stage four                           



                          ? ? ? ? | ? Stage four                           



                          ? ? ? ? ?                              



                          ?+--------------------                           



                          ---+------------------                           



                          ---+------------------                           



                          -------+| ?<15 (or                           



                          dialysis) ? ?| ?Stage                           



                          five ? ? ? ? | ? Stage                           



                          five ? ? ? ? ?                           



                          ?+--------------------                           



                          ---+------------------                           



                          ---+------------------                           



                          -------+ *Each stage                           



                          assumes the associated                           



                          GFR level has been in                           



                          effect for at least                           



                          three months. ?Stages                           



                          1 to 5, with or                           



                          without kidney                           



                          disease, indicate                           



                          chronic kidney                           



                          disease. Notes:                           



                          Determination of                           



                          stages one and two                           



                          (with eGFR                             



                          >59mL/min/1.73 m2)                           



                          requires estimation of                           



                          kidney damage for at                           



                          least three months as                           



                          defined by structural                           



                          or functional                           



                          abnormalities of the                           



                          kidney, manifested by                           



                          either:Pathological                           



                          abnormalities or                           



                          Markers of kidney                           



                          damage (including                           



                          abnormalities in the                           



                          composition of the                           



                          blood or urine or                           



                          abnormalities in                           



                          imaging tests).                           

 

             Lab Interpretation Abnormal                               



             (test code = 95931-0)                                        



[facility] [id number] 00:44:28





             Test Item    Value        Reference Range Interpretation Comments

 

             BF SEGS (test code = 4 %                                    



             3268979442)                                         

 

             BF LYMPHS (test code 24 %                                   



             = 7500628894)                                        

 

             MACROPHAGE (test 61 %                                   



             code = 6416039607)                                        

 

             MESOS (test code = 11 %                                   



             [id number])                                         

 

             #CELS CNTD (test                                        



             code = 3901221091)                                        

 

             RENETTA (test code = Reviewed by HAILEE JACKSON)         DALE IBRAHIM, Director of                           



                          HEMATOPATHOLOGY                           



[facility]43





             Test Item    Value        Reference Range Interpretation Comments

 

             F-ACTIN (SMOOTH              See_Comment                If F-Actin 

(Smooth Muscle)



             MUSCLE) AB,                                         Antibody, IgG i

s negative,



             IGG(BEAKER) (test                                        the Smooth

 Muscle Antibody



             code = 98878-4)                                        titer by IFA

 is not



                                                                 performed.REFER

ENCE



                                                                 INTERVAL: F-Act

in (Smooth



                                                                 Muscle) Antibod

y, IgG by  ?



                                                                 ? ? ? ? ? ? ? ?

 ?MARY LOU ?19



                                                                 Units or less .

......



                                                                 Negative ?20 - 

30 Units



                                                                 .......... Weak



                                                                 Positive-Sugges

t repeat ? ?



                                                                 ? ? ? ? ? ? ? ?

 ? ? ?



                                                                 testing in two 

to three



                                                                 weeks ? ? ? ? ?

 ? ? ? ? ? ?



                                                                 ? ? with fresh 

specimen.



                                                                 ?31 Units or gr

eater.....



                                                                 Positive-Sugges

tive of ? ?



                                                                 ? ? ? ? ? ? ? ?

 ? ? ?



                                                                 autoimmune hepa

titis type 1



                                                                 ? ? ? ? ? ? ? ?

 ? ? ? ? ?



                                                                 or chronic acti

ve



                                                                 hepatitis. F-ac

tin IgG



                                                                 antibodies have

 been shown



                                                                 to have increas

ed



                                                                 sensitivity for

 autoimmune



                                                                 hepatitis (AIH)

 but lower



                                                                 specificity suzette

n smooth



                                                                 muscle antibodi

es (SMA).



                                                                 F-actin IgG ant

ibodies can



                                                                 also be seen in



                                                                 SMA-negative di

sease



                                                                 controls (non-A

IH),



                                                                 especially in p

atients with



                                                                 primary biliary

 cirrhosis



                                                                 and chronic hep

atitis C



                                                                 infections. David

e patients



                                                                 with AIH may be



                                                                 SMA-positive bu

t negative



                                                                 for F-actin IgG

. Consider



                                                                 testing for SMA

 by IFA if



                                                                 suspicion for A

IH is



                                                                 strong.Performe

d By: [employer] [place] [address], [place]



                                                                 [id number]

 Director:



                                                                 Margo Carlisle MD



                                                                 [Automated mess

age] The



                                                                 system which ge

nerated this



                                                                 result transmit

michelle



                                                                 reference range

: 0 - 19



                                                                 Units. The refe

rence range



                                                                 was not used to

 interpret



                                                                 this result as



                                                                 normal/abnormal

.



[facility]:06:42





             Test Item    Value        Reference Range Interpretation Comments

 

             AMA (test code =              See_Comment               REFERENCE I

NTERVAL:



             14251-3)                                            Mitochondrial (

M2) Antibody,



                                                                 IgG ? ?20.0 Uni

ts or less



                                                                 ......... Negat

david ?20.1 -



                                                                 24.9 Units.....

......



                                                                 Equivocal ?25.0

 Units or



                                                                 greater....... 

Positive



                                                                 Anti-mitochondr

ial



                                                                 antibodies (AMA

) are thought



                                                                 to be present i

n 90-95% of



                                                                 patients with p

rimary



                                                                 biliary cholang

itis (PBC).



                                                                 However, the fr

equency of



                                                                 detected antibo

dies may be



                                                                 cohort or assay

 dependent,



                                                                 as lower sensit

ivities have



                                                                 been reported. 

Not all PBC



                                                                 patients are po

sitive for



                                                                 AMA; some patie

nts may be



                                                                 positive for SP

100 and/or



                                                                  antibodie

s. A negative



                                                                 result does not

 rule out



                                                                 PBC.Performed B

y: [employer] [place] [address], [place]



                                                                 [id number]

 Director:



                                                                 Margo Carlisle MD



                                                                 [Automated mess

age] The



                                                                 system which ge

nerated this



                                                                 result transmit

michelle reference



                                                                 range: 0.0 - 24

.9 Units. The



                                                                 reference range

 was not used



                                                                 to interpret th

is result as



                                                                 normal/abnormal

.



[facility] [id number] 20:17:32





             Test Item    Value        Reference Range Interpretation Comments

 

             Case Report (test code = Non-Gynecologic                           



             [id number])  Cytology ? ? ? ? ? ?                           



                          ? ? ? ? ? ? ?Case:                           



                          [id number] ? ? ? ? ?                           



                          ? ? ? ? ? ? ? ? ? ?                           



                          ?Authorizing                           



                          Provider: ?Amy Caldera MD ?                           



                          ? Collected: ? ? ? ?                           



                          ? 2021 1300 ? ?                           



                          ? ? ? ?Ordering                           



                          Location: ? ? [facility] ? ? ? ? ? ? ?                           



                          ?Received: ? ? ? ? ?                           



                          ?2021 1321 ? ?                           



                          ? ? ? ? ? ? ? ? ? ? ?                           



                          ? ? ? ?                                



                          Medicine/Surgery [facility]                           



                          7B ? ? ? ? ? ? ? ? ?                           



                          ? ? ? ? ? ? ? ? ? ? ?                           



                          ? ? ? ? ? ?Specimen:                           



                          ? ?ASCITES ? ? ? ? ?                           



                          ? ? ? ? ? ? ? ? ? ? ?                           



                          ? ? ? ? ? ? ? ? ? ? ?                           



                          ? ? ? ? ? ? ? ? ? ? ?                           



                          ?                                      

 

             Final Diagnosis (test u5ejfPDkKGXxe5juBSUjf                        

   



             code = [id number]) GFuZzEwMzNcZnRuYmpcdW                           



                          MxIHtccnRmMVxlcGljOTQ                           



                          wW2zxozMwOONaqSVqX7Kv                           



                          hduuAUztSL1bNB8kyNhhr                           



                          STsaGJeVIQuMkQdn3cif2                           



                          54eVBzy3kpESGKmqxvoRn                           



                          5sYbdV52tm0D4XxrpD20t                           



                          eSCpJLZ5XVRcBIAxvIXfF                           



                          QZpVNF6QDQeaXJzN7toJA                           



                          CjJC3ptaiqDRcwFKgiVJV                           



                          zyCC1HSBhiDZrV5MvRXWd                           



                          PKcyTORhmjm8FqWtAf7yz                           



                          GVyeTcyMFxwYXJkXHBsYW                           



                          bsLZWsLbAiEfFIHU2fMNV                           



                          WSQ2YPD18BMYNGlJLGS0C                           



                          RVNJUyBGTFVJRDpccGFyI                           



                          T1vIiCAOPZJTkVqFr9BEA                           



                          1BTElHTkFOVCBDRUxMUyA                           



                          qT4FREILYRI6JPdYsSZDb                           



                          fo53IBN9QrNhg0Y1HJJkZ                           



                          vVoTWUzTV1zuEacFQCiAK                           



                          8eSWThM8vqbX3aoym2YtL                           



                          iMXNyDrV2ZSYfsyL7Lug7                           



                          RQEiNYzdm1ufo9OdB6Buo                           



                          MOzoIp3c5maAXMoYhH5bU                           



                          ZtUJzwI2vekhYjsMAnPXJ                           



                          iOAc2xTdrPaGdMKGcr9mv                           



                          cyBcZmNoYXJzZXQwIENhb                           



                          Bzwlmy4oB60WXDcxT9qwD                           



                          UeHSyabjDqQxP6BGsuXRM                           



                          wDeB7EYMpiVJxHPRtX2pj                           



                          ZWQwXGdyZWVuMFxibHVlM                           



                          KS4yIvjm7O8vWGxcCAoiA                           



                          yrTaQeHwTuQYQAw0CqNHj                           



                          9cAycS9YrHTHfXlC8hZVt                           



                          CAUmAEezNXWgSCLsdvK1l                           



                          S38SGlmjrC8pABas8Lzm4                           



                          8hx971nE6zwXFxJQT0TAX                           



                          xZKGckDUfKCGqZGW2YZOt                           



                          qCQcT5ncARAiBS8iyznmP                           



                          FbrUQxkMXJbqCF8KGNhhH                           



                          ZdF7CnEHLrUOlpBSDeyfl                           



                          4IkGxFe4sqTUarFpgEOzt                           



                          k3crh4bchMVoXoe8JDXmA                           



                          zZsDtxyPSset2Njf5xqFT                           



                          Uhjf5eFQD8bYFrlCabg5I                           



                          0bGUxXGRudGJsbnNiZGJc                           



                          WwI4DNvgHD9jgy73HGFyB                           



                          HO5az2irAVntUpzyvVlgC                           



                          KlXGiaD0YjMKBkr500ULG                           



                          rO7HeWQQno9H6eyIjHaUo                           



                          BIRdaFQ2qrL5RVRoMXl9l                           



                          VYlqzT9zuLwdSVrR2oivT                           



                          5nTPCeRO9snqqbg5qhSGw                           



                          uQQltHYQddYY4wjL5NJPf                           



                          wEYcZ0OsaO2qGXIbPCtyF                           



                          AUcdua4OnAlHz3qzFDxzI                           



                          cyMFxzYmtwYWdlXHBnbmN                           



                          vbnRccGduZGVjXHBsYWlu                           



                          XHBsYWluXGYwXGZzMjRcc                           



                          JxwwKneyE4bRkSeRsWhKN                           



                          qwWI9nLWUpP5kixOEzNBW                           



                          qGWUaT1cqWxKpmT4elRnk                           



                          MVxjZjJcZnMyMFxwYXIgS                           



                          SBoYXZlIHBlcnNvbmFsbH                           



                          taaiM0hPF8SWXhNOzwKFT                           



                          pDTJylMZpnq4czTfwLGHf                           



                          KJ4oUWNagrXsLZpkiWtvN                           



                          DymSKF3KAZinDXswSEtwX                           



                          FkZSBieSByZXNpZGVudHM                           



                          qSXIccAhdq0Dns1PztMX5                           



                          qZ1oa5ljf7HbOPGtkBU9W                           



                          J36voQ1xC1aRJDlTK4sRW                           



                          WlIS6vnGQvtABfQKTcv59                           



                          gdGhpcyByZXBvcnQuXHBs                           



                          YWluXGYyXGZzMjhcbGFuZ                           



                          zEwMzNcaGljaFxmMlxkYm                           



                          RwJUDrXPywK3lnLiHgMkM                           



                          fKNteFHL1iS==                           

 

             Final Diagnosis Comment t2wouWHrVZCcySF6NCUzT                      

     



             (test code = [id number]) UYlq8osy6QtsDKtwVGoBV                     

      



                          yooNDebdXjlo38wGR0gQ0                           



                          7OS6pMPHaQcD0BYYoikM6                           



                          Ejw5CYWmGUCdyELgA033h                           



                          5jiz7udafJrlPX5bLrwQH                           



                          RouzwiYlG7IZdfZAZvqoi                           



                          mSCu8MLsfOCCshJQ1DOEp                           



                          wZMkX2HmUUAqLI8iloj7H                           



                          UO5HBcjQLFyXlS3YSClnT                           



                          SjHJPqpRxsNTxrx017MCY                           



                          8JdPcLNKztpRllOooqC5e                           



                          WdMeTWMFfMFnneLnz0ecd                           



                          dJsR2V4dDZnCURhaFLcu6                           



                          HyPXusUAeyO2SscFDrvV5                           



                          zMPCpBSSdA8ItwP5sOU5a                           



                          SDJjMQAucPimFX60dYpar                           



                          GtirLsctAfftHxlP5q7vP                           



                          IyoQ8wcAFhhKZ9eR4kFlI                           



                          WmaLnYEeoT30lyrIoR9Ir                           



                          kTDebMGfyvFvHcflXH9jw                           



                          GFyfQ==                                

 

             Clinical Information large new onset                           



             (test code = [id number]) ascites                                

 

             Gross Description (test l5gkvIMjFONnbZK1MXBbZ                      

     



             code = [id number]) IQkz5myx7OybHDzdXNdAQ                           



                          sqpBImtbDkvq97iSQ9eI3                           



                          0LA0hLZMoThZ0JDRswlY2                           



                          Sng4KDBaEEJajFUuA428v                           



                          5sjn5miwiHyaKP9yBoiVG                           



                          SoweppOlJ6GNvbQOPbgjd                           



                          xMXo4UFrwPGQxeLU3CBNg                           



                          aDVeH6AfEKVjNE3fhvk8H                           



                          EG4NXkxBSTeMfP7XYAusL                           



                          GpFFXcsTmhAAnwk891QXH                           



                          2LsCdDTUmugY6HVkrHFIp                           



                          A3IxL9EoCDgpWAY2OXVyL                           



                          CBcXHQgMSBcXGZsIFxcbm                           



                          I7x8siXSUjpUCkTNA9LAz                           



                          caWQgNTEwMDIgXFxkYiBP                           



                          RvSsLpOFDQBjWEz6YzR4X                           



                          Wf7MVHYXlXbOkLlSQO1KL                           



                          l8ClUcJDo5PVw1CFjVCtW                           



                          tNjP8SsGcLfC0XIP4LvT1                           



                          IFxcdCAyIFxcZmwgXFxmI                           



                          EFyaWFsIFxcZnMgMTAgXF                           



                          ayZ09ojMgjvH4hDiCfKEw                           



                          rKOCpNoMqJpISDK7gYFFV                           



                          HU5UTV16VKKABwMMJL4CR                           



                          VNJUyBGTFVJRFxwYXIgUm                           



                          WfUEv8MPFyIuXcv4safWB                           



                          yVWUzYYYhX7Fzz3Ycw8Ag                           



                          bmdlIGFtYmVyIGZsdWlkI                           



                          FxwYXIgUHJlcGFyZWQgMy                           



                          BzbGlkZXMgKDEgUGFwYW5                           



                          cJ19kYY25IPG0rB0goBwt                           



                          AOElZSWmaPOip5yjq7rtG                           



                          8q2f1RfvA2cWZ0nHAClGE                           



                          hpbnByZXAgcHJlcGFyYXR                           



                          kj91lRKZjgplgKKDmA7Rh                           



                          B3NyhaG1g6bahTnjc8Umk                           



                          CDtHQ3ydROylS==                           

 

             Disclaimer (test code = h3vngPUcBWGkj4gcKXCma                      

     



             [id number])  GFuZzEwMzNcZnRuYmpcdW                           



                          RiEMmrscTvZRgpo1VkG5L                           



                          yMjAwMFxhbnNpXGRlZmxh                           



                          eipvSRTcZEQ1yhTwUZAqB                           



                          LvdGQRzEXgpSv5ojLRzlS                           



                          yqAbJbEIGfa7zobeWPJDm                           



                          yLpElG053VGYyYZdll3qd                           



                          k9CcDMFogMZxu2N0QLYWz                           



                          zaalQd2aPvqV64ve0A8Uk                           



                          hdL0hdROPdMYYpT7UvCF7                           



                          dIFNpNuf6IXT4QLX0DGJr                           



                          MJKaX7VtDM4tAIEazYVhE                           



                          Tp2q0qgsOgqPVWuSIM6p2                           



                          yxLGsnxnHaXU4hzz6shUa                           



                          5n1usaqAkCYStCUXruNAK                           



                          KLLzZ5FzhXchXu3ayMb0m                           



                          AicVlxtQJU2Ccm5LQ3opl                           



                          61rwa4xZicLIMvkvwySjY                           



                          0EHtmRKAxkepkOFm8JFey                           



                          XCNcwXC6KHHbnFFrJ6MoC                           



                          WNxCA8fvtc4TBV2JKyrXD                           



                          IpMzR9VRMnwDMzNBPthRn                           



                          fFVsyf582GKD3QfNbLB7y                           



                          R5Glc6T4fP6igAUcHLQpv                           



                          XCpSkJoULGjla6rpRUqUF                           



                          ghj5LlRUG2ieA3eNSehSL                           



                          nNTJzVA09Jijpn4OfZzcx                           



                          v1JfC70prHS9NMlzb6dgU                           



                          Y5oCnL3efUaZTibq4oyuB                           



                          3sQfT0KZvmJX3sHG6cXAC                           



                          yvG5vxtwnNHRrJvBhxuuk                           



                          EEJivZzcvxFrUc0fqXfdC                           



                          FK3KBtxL7ffqF8xQmO2CA                           



                          hbO4smaD4rYKs9CArlmAQ                           



                          6GZUuhM0yQP0mlxdgz7lu                           



                          SJmiCZvmAFKzhhB4gdA1C                           



                          HVikWQcW1UlfG9uPRSzSU                           



                          3vxbmlm8rjCSS5DGzpXAO                           



                          hIOM5QzJhORYit8Umsjf8                           



                          XbLbm8RwoQLcQQocL54qk                           



                          591ARNucfSgO1pacXRraq                           



                          xriXSxrhpgGIjgrkD7YEK                           



                          fixQek4KeDSZtMQV4UBrr                           



                          UIotuQYlTLFqwYmec7tbV                           



                          3RscGFyXHBsYWluXGYxXG                           



                          ZzMjBcbGFuZzEwMzNcaGl                           



                          jaFxmMVxkYmNoXGYxXGxv                           



                          C7ykWgNeB9VnPHHqPdZaq                           



                          BGnX1wwIHwrqaAvFTNavg                           



                          EziMP3JQfdO2p1GFKbkbY                           



                          gwFp9phTqHnEvZYAvTFH4                           



                          KVhryJMyYWAux3Fmgkydv                           



                          JBcRv0ujQIrXUOskZ1nWE                           



                          PtLUNbFVroWK1jyKj3IWV                           



                          OiGMnlLRuGkCUJNDyCD56                           



                          vyFaZZXCswlbg4T6DGrwL                           



                          PWhc3DkhPGxN0yml7DiAA                           



                          Ipk32dYJ1kd0M7e5izTED                           



                          5OE0sz1ZpNZAlaGWsoLYb                           



                          YOIcm9Azccutf7MnAPJhw                           



                          oOol6TpUSRqahBtbTNwXC                           



                          IoqjIdbt4draFuYCGzWIK                           



                          rV8AcdmfneBnrusVjYBBl                           



                          dn7kvdIcKSJ5FSUNBWNrS                           



                          HEjx9MxrM9fyNVFFAR4vK                           



                          Lacf9yrxHYuBVkXYFrbp6                           



                          8YJRnCS1wX7wjVMQbHMBi                           



                          adRvzHLcp9CqHAKlkSP9z                           



                          TLsVZ0FVjMNp35iZCCjKU                           



                          EDojXaDZTpjTuipMS6rkP                           



                          4eA9jAFbFDUUfBco+IFRo                           



                          WKIGLEIcWG2affSrh1Ooq                           



                          sIfjSwpCQZcoWZof5CerX                           



                          Wck3FpjPtdz9XfsPIfnAY                           



                          bYZ3wEKGgxgklLBKdGQFF                           



                          DlPZXGEyuxN9s0DpVLBcY                           



                          OBbYCM6nLdhcab2BLFthA                           



                          8mGXDfP8rgtnidTXuwHPQ                           



                          xl2FseY6jqESQsOFor1Ti                           



                          mQSwbNOCvNIoIF1lzrUyC                           



                          ZhWVBxWBTV8dcYeNUOro8                           



                          ThVQpiU8joS94dbQievOq                           



                          9rAH3MLV8dD0xMrf+IFxw                           



                          YXJccGFyIEFwcHJvcHJpY                           



                          XDncVanmqQcO0LpsfDocY                           



                          0uwUDqmlJlAR1aKM0nU3D                           



                          9eGEhOEYhknTss5tbKJsu                           



                          dmUgYmVlbiByZXZpZXdlZ                           



                          BSes7SbMYmaOHC9ACdbzj                           



                          BpbmNsdWRpbmcgSCZFLCB                           



                          ZxHEpgTTlWKN6KCcsmeOf                           



                          bdHtVM5jfF6vrGeisV7bq                           



                          GMxyLE2xiquUBWxHLSihB                           



                          njPSFnNU4egTOqFBJyyuV                           



                          SlPjxpVYgqG6vK1WnMMGr                           



                          OUJblv8gQONkbD9iALpzf                           



                          2VydmljZXMgYXJlIHBlcm                           



                          Yuie6bUKLdzQWTBG9XOHg                           



                          kbCYfz6DeooWxE7xLVJF8                           



                          NUQwNjYwMjgxKSBleGNlc                           



                          SNjIGMhyf12ANXkdU2hxK                           



                          ckKRJxrY3kzV8emReauY8                           



                          aHgNzVqMgMNfeSW7pZJLi                           



                          D3jwoAGhJQNnCDYsL3lgB                           



                          uVjvW4nlSetIAmwQgTcSb                           



                          CaJIztGRJ8rA==                           

 

             Embedded Images (test                                        



             code = [id number])                                        



[id number]





             Test Item    Value        Reference Range Interpretation Comments

 

             Case Report (test code = Non-Gynecologic                           



             [id number])  Cytology ? ? ? ? ? ?                           



                          ? ? ? ? ? ? ?Case:                           



                          [id number] ? ? ? ? ?                           



                          ? ? ? ? ? ? ? ? ? ?                           



                          ?Authorizing                           



                          Provider: ?Amy Caldera MD ?                           



                          ? Collected: ? ? ? ?                           



                          ? 2021 1300 ? ?                           



                          ? ? ? ?Ordering                           



                          Location: ? ? [facility] ? ? ? ? ? ? ?                           



                          ?Received: ? ? ? ? ?                           



                          ?2021 1321 ? ?                           



                          ? ? ? ? ? ? ? ? ? ? ?                           



                          ? ? ? ?                                



                          Medicine/Surgery [facility]                           



                          7B ? ? ? ? ? ? ? ? ?                           



                          ? ? ? ? ? ? ? ? ? ? ?                           



                          ? ? ? ? ? ?Specimen:                           



                          ? ?ASCITES ? ? ? ? ?                           



                          ? ? ? ? ? ? ? ? ? ? ?                           



                          ? ? ? ? ? ? ? ? ? ? ?                           



                          ? ? ? ? ? ? ? ? ? ? ?                           



                          ?                                      

 

             Final Diagnosis (test n9bxhOZdQHNcv8uoLOOiv                        

   



             code = [id number]) GFuZzEwMzNcZnRuYmpcdW                           



                          MxIHtccnRmMVxlcGljOTQ                           



                          zH3wqljVaYBEknGNlY8If                           



                          tjhmJErnHX7fVT5qtOewt                           



                          IZnhJZzWISgFsJaf9isr0                           



                          59zHGvg2vzHHNZwremwZc                           



                          7ySobO01pk0P9ZgwkU35d                           



                          jUUoZNH2DGWiWGQurOJnV                           



                          JKfNJO8LEOzqFAnE4kjKX                           



                          ZkJB2qlzkfBStpLBmbCBZ                           



                          noQR9DTJhjAPaQ7BgUCOh                           



                          CNkcWCTghjz1WySvRp2nx                           



                          GVyeTcyMFxwYXJkXHBsYW                           



                          nfJAMyJsMqPpAIKU1pQWM                           



                          VXF9SVP41FUMMVyUECO8K                           



                          RVNJUyBGTFVJRDpccGFyI                           



                          H5gRhCXRZLSFiJiNa2SKC                           



                          1BTElHTkFOVCBDRUxMUyA                           



                          sE4HEPBBSGG7OHsShYQRu                           



                          iw47BUH3VsJyp8O9PZCnZ                           



                          lEtGQPbOP9baFdyYJIbJX                           



                          4pPATiY9xtfY8qawd9CiU                           



                          kSPKlNiZ5YLSlyqM9Tah6                           



                          AJEdFVfet7uqd5SzA3Hax                           



                          VTtiSq9r9qfYQFaXkJ3xB                           



                          UuNEyeE1jsokPcuGCtHQG                           



                          cKQe0pByvKzYmTEXbq8vx                           



                          cyBcZmNoYXJzZXQwIENhb                           



                          Ekhlqg4fG20EUStbT2dxF                           



                          IoMOpsxeLjXkA5HHjqEEV                           



                          sQuS8NHUfsTBhTXGyU1cg                           



                          ZWQwXGdyZWVuMFxibHVlM                           



                          HG4bSvzp8P4mSZjzMLpxV                           



                          mkWgMaDdAwGZYNy1LpHYy                           



                          1fUkkP6ToQULwBlY8lEIa                           



                          HLGuMLszDYHwJNWeyyO0f                           



                          B79RWeduuC4sENgd4Eyy8                           



                          5rb130zE1osNLsPIC7WIR                           



                          cCSAqzBVpHURvECH2BYGy                           



                          aLAuC6lrWVTpRF1dibafA                           



                          LyaMCnhBPYisCZ4UKRavX                           



                          UfR6ObRQOgOHdlTRMaslw                           



                          7RwJnQl6cmOJhmTtnBOjj                           



                          p8fng0vyrWNzOpa1IZDwU                           



                          rTqQywcZAbsu3Qpp6moXW                           



                          Obcn5tSRI5nFAgaDutm3S                           



                          0bGUxXGRudGJsbnNiZGJc                           



                          UfS0XPztIZ5oxh42FWWjS                           



                          QM0fe9csMHmrFicnrZgtS                           



                          EhCZexS4XoVHBuk235PPG                           



                          sU9AiCTCqx9O0kgWyCpNo                           



                          VRMqrHX4oqA0QHPaNJn0v                           



                          DNnpxN0ggRuvAAhR9uhkX                           



                          1gDXNuFW3tyssib8vkWXt                           



                          gXSqqKJPzjXW7zqQ8VWSr                           



                          mBAdE5VigM6mKKUyCAlqK                           



                          AMssvg7SySnIu6sgJJqiB                           



                          cyMFxzYmtwYWdlXHBnbmN                           



                          vbnRccGduZGVjXHBsYWlu                           



                          XHBsYWluXGYwXGZzMjRcc                           



                          KafkYfqlB6iEsExXuTyZQ                           



                          dbZE7jVGNeN6wsjYTfRGI                           



                          eAATiL1obSiSygK8xuIbc                           



                          MVxjZjJcZnMyMFxwYXIgS                           



                          SBoYXZlIHBlcnNvbmFsbH                           



                          tuwnF7mIP1KMSzSJqwCFI                           



                          pIXNzxABqrj3ghJwwTXTo                           



                          SW8xMOFyueYdGDxraSvwR                           



                          GffHOE1GWWenRYeuUYeiX                           



                          FkZSBieSByZXNpZGVudHM                           



                          gFJEuoEfoj2Iao9GziBN3                           



                          bJ0ww3bqh2BmCMJweSP0H                           



                          A47krE6uK9hFVFoWV1oHQ                           



                          GwDU9poGCpjBVaYBPen35                           



                          gdGhpcyByZXBvcnQuXHBs                           



                          YWluXGYyXGZzMjhcbGFuZ                           



                          zEwMzNcaGljaFxmMlxkYm                           



                          LsRPBmOIfeJ7urPzSpQvT                           



                          dDXhnREX4bJ==                           

 

             Final Diagnosis Comment p9xgcXHkRJFoiPL9LUXqC                      

     



             (test code = [id number]) MJru2wza3CafAGyuMOsFE                     

      



                          mxiUJaarHnew21dXO7wE2                           



                          6PR3cWIAaCsE1ONWwavL6                           



                          Vcr2TNJaTQWfrZRfA803o                           



                          0vys0jefjEijST2xMowLI                           



                          EergrwPdZ1IMrlFDKevnz                           



                          tBZw2WBtqEEXkgNK0UXNj                           



                          pWLgF8UwKXKyFO7unoy1Q                           



                          PA1FBgbAUArLoI9AQFjhI                           



                          QaTTHteEmsIAjmq794KEI                           



                          5LjPsELTsbtUxwYezdX9z                           



                          XxSzBTZKnTUjbpHfx6azl                           



                          vNkL9F2xRBpQKTduJItf7                           



                          WxBHnsOYtuO2ZfkEZaaG4                           



                          mDIAePHHvL4KslG3cNM9j                           



                          OPBdMTLsdZwgXB08vUlgq                           



                          VzqtXxemVnqgZkpA9j7uB                           



                          JlaY3spRXnnKA0cS2tOjQ                           



                          JyyOxCTwrR40fdyJnK0Uf                           



                          eIAvdZYtdsTsMpadFK3fz                           



                          GFyfQ==                                

 

             Clinical Information large new onset                           



             (test code = [id number]) ascites                                

 

             Gross Description (test a2egaJJeTYOqeGT1HHJaW                      

     



             code = [id number]) IQnk5jmo3XqiHPdxGRlCZ                           



                          mchGPtqhIfqq04sTQ7aY4                           



                          4AW4gXQMpLmB7GXEvedS0                           



                          Vdd0OAHlZSNtqXQpD873k                           



                          9vrd0mariDbgYL3dGtjPP                           



                          XpyrrnVtV2FMpfGMQorre                           



                          zMUy6JPrjMGAkuFO9MWOk                           



                          bBTjU8OjWYPwQD5nrxa1P                           



                          LF2FGwmJEYcGoO5KCQkvZ                           



                          HdJFAhpYknYNrpw364YCA                           



                          6BzKsXCSgerJ2AQveXMTj                           



                          I5ElS8WgINjmBEL0JKSnJ                           



                          CBcXHQgMSBcXGZsIFxcbm                           



                          I4a2gcYUTclAFxDXK9VQp                           



                          caWQgNTEwMDIgXFxkYiBP                           



                          CiGxCsNMRWWiWNw7WbC2Q                           



                          Zw7DDYPRiMcQnEnVYA2MN                           



                          q8GySxKHa5NOb0QOpCDiV                           



                          mEqD9OrPuUsZ2OAX4MhB0                           



                          IFxcdCAyIFxcZmwgXFxmI                           



                          EFyaWFsIFxcZnMgMTAgXF                           



                          nxP63siKhqiQ5qZtYdLGu                           



                          aSXAxHdUyEiADCW9oERRJ                           



                          OO0TXK07LPMDGoEHIA5QV                           



                          VNJUyBGTFVJRFxwYXIgUm                           



                          ZiAZj5ODQrCkOxy8havVB                           



                          fKRCnVEGaA4Fer5Xtp1Gn                           



                          bmdlIGFtYmVyIGZsdWlkI                           



                          FxwYXIgUHJlcGFyZWQgMy                           



                          BzbGlkZXMgKDEgUGFwYW5                           



                          zL99gUJ92ZNS2tY4oeOlh                           



                          XLDxIJSrzROtq7glu1dyT                           



                          4i9a9RqrU7qPZ9gZKEvGQ                           



                          hpbnByZXAgcHJlcGFyYXR                           



                          db19uWWZcvyvdXASlL0Jm                           



                          D4YpcmF4f2dcpIuvb5Enf                           



                          BQrCI7mrJDkcZ==                           

 

             Disclaimer (test code = k2yjwOReEPKed2weDEFal                      

     



             [id number])  GFuZzEwMzNcZnRuYmpcdW                           



                          AoJPvmvjKwHVdwk8TuT8D                           



                          yMjAwMFxhbnNpXGRlZmxh                           



                          tkvwNYShGKK9fsFcBBTmB                           



                          BoaERVxXPdsQy7scBYboU                           



                          mqKqRgAULda3uikbKZKOt                           



                          eLpFuW880AYLmXUjao9uy                           



                          b7MfZDFmlHFyb7P8VUCXf                           



                          coezWl2lPkxU86kp6U8Vu                           



                          mkB1abUPIhUYWgA0AeKC6                           



                          rPKLiMpl7SRF9GIN2UDIg                           



                          KDMrU1MnZE8sXFAnbDNjG                           



                          Ry9d3phwIpsMBRuRPP7l2                           



                          xwIOmwojZlGY4ocm8jsBe                           



                          6h1swscOoYTWpKWXyeHHZ                           



                          HAVuK9YagTbeXi5afCh6p                           



                          IdiRnrcWMM5Iwz3ZW1sbv                           



                          78gsq1eNgrDOOqqyzwTjA                           



                          9DIitDNHdrosaJGa8WSgl                           



                          WGAktCJ1EQUkqEFkN0DxZ                           



                          LXyZN9iuaw0ZKT0EJcyFP                           



                          NwEnY3BEKzoCTxOMUnpNr                           



                          jUXeum788KPO8MxIlXO9j                           



                          R0Zyr3J2gO3fwLUaJSUkd                           



                          HLtSbOpPMIwts5tlVFfOU                           



                          uzb9OgDBC2stX3uCCpoLV                           



                          bVXTnUC46Ygxgr4HgCktz                           



                          r1XbN88rpQC5MDvle8ktZ                           



                          Q5eFzT4njBqBAdda3ezkN                           



                          4gJlY9JOreOI3vIF2zYTR                           



                          cnS4grhuxZALzGdDoreed                           



                          PNZndJierxKtHw6egCfvZ                           



                          NX6AOrgU7dnbG5nUkI0JJ                           



                          eoF5wrlY0oBYs8RBoazJW                           



                          7DBAqbG6jKK4hqglce5ex                           



                          UZhfOYdjNFWfjmK2djS7Z                           



                          BYwxLRzG2XhmN5eEFGwKO                           



                          1zauoap5juBCB8EUyuAXC                           



                          wTNY2VpYuUJItj7Thgwh1                           



                          MsRvg0NwiEHjZNtkN29lx                           



                          697CCDhxwEvV1ootJUajb                           



                          gdeEEztloyQCqqgwZ6SFG                           



                          bxwQgq9TzNBWwOQI0RFtb                           



                          BLgmeOFzGGUpnXwku7liN                           



                          3RscGFyXHBsYWluXGYxXG                           



                          ZzMjBcbGFuZzEwMzNcaGl                           



                          jaFxmMVxkYmNoXGYxXGxv                           



                          C4xkHdClM9CdGBFuJrWcb                           



                          LWhI5bgMRflzfFpIGCrbj                           



                          JkwGL9VZicC5y9EPOaukA                           



                          jmWq0zzZnHmIvGHLfLCO3                           



                          KWcdlKNdHAFmq4Jidcunp                           



                          GFzSd9ayAOdRSQqhU8bBN                           



                          EiCMZmJOaaCK7hkDl4IZT                           



                          GsVBxsVEoLpDRRHBwHC74                           



                          ziKeBHXUrxijn6K9TQjhI                           



                          MRaj1JjsIExV6mbd3WgEH                           



                          Muf05vNF8dh3V7v1lfVBL                           



                          1NR3ru2OpHTByhMFowWWb                           



                          YFSlr4Gekdnzb6EoSMThf                           



                          yVud7DzCPOtgrSapBBiZG                           



                          DkdqRaqs0hziElFVIkZVM                           



                          mV8PebfbmmBahjsIiWNZi                           



                          cz0gqhLsEDZ9JCZXFKRbD                           



                          WFqd1FdjL0ytTBBOFJ5hQ                           



                          Fjgg8asdZIqXYfVGGjpf3                           



                          3AAPsOH0jB8soELNaTUWb                           



                          euZupRZky8OiUOFaoJC2j                           



                          PTbBG7WFfKTv93jGOHiLE                           



                          YKetZxUQUfiJmzpUJ6nmF                           



                          7cA5hKVwHVTShIcf+IFRo                           



                          KUQJVAUzNA8yyxRqe3Dev                           



                          jDkoKviNXVyeEFkx8EcrD                           



                          Ehe5VwbWmzx4LocXXfjZG                           



                          hBK5fJOFrpebhAAFtAOZX                           



                          BhEKQHTiflB2k8IoSHWbJ                           



                          USiAVY2kGiwaxy7KXCuuL                           



                          5bYBMeO4kthsqbCVwsREG                           



                          rl4RtxZ1mxTYUrXMos0Hg                           



                          hJAehMENiRSoPI5kjwWbZ                           



                          AlEISzNBED1suRyIVZrl6                           



                          KbFKeaZ8blR98plVboaCn                           



                          5lLW4MYS9uI1gXlr+IFxw                           



                          YXJccGFyIEFwcHJvcHJpY                           



                          ERpcPibxcCcY6TmldMzpW                           



                          6vkDDahdIpTP0hGY6iQ2O                           



                          6iUXcWHMpzwDpo4hxUNwa                           



                          dmUgYmVlbiByZXZpZXdlZ                           



                          FIuo5BgBVmcZPZ1PPnpbi                           



                          BpbmNsdWRpbmcgSCZFLCB                           



                          HiCIwuSDwMNN6QScwuvKy                           



                          twTxGB1ioX0irEkxrU9ov                           



                          EFghFN8rrtpDSRvYYWvnG                           



                          qdVAKwFG0teLQhTMGzbeS                           



                          MfAictCEruM0gR7TvFZTw                           



                          NGDfeo7rJCEuiL2pFVybe                           



                          2VydmljZXMgYXJlIHBlcm                           



                          Umin3zGYGokVRTUQ3VVCk                           



                          sxRKyn1TbxsBzP7vFQTR0                           



                          NUQwNjYwMjgxKSBleGNlc                           



                          GYkMBYjxr27FCRadD9gfW                           



                          bbWERqrK5mbB1dsZzrgI3                           



                          tDfYhBkLhTQfkQF0yAMMa                           



                          A9mpfDPoFICpOBFbH2yhG                           



                          xWjpY1rsUhrTFdkPyDrPw                           



                          MhQTquICD1qY==                           

 

             Embedded Images (test                                        



             code = 3033741978)                                        



[id number]





             Test Item    Value        Reference Range Interpretation Comments

 

             ALBUMIN BF (test code 476.0 mg/dL                            



             = 8727307673)                                        

 

             RENETTA (test code = RENETTA) Result interpreted                           



                          relative to the serum                           



                          concentration. ?                           



[id number]





             Test Item    Value        Reference Range Interpretation Comments

 

             ALBUMIN BF (test code 476.0 mg/dL                            



             = 3818975879)                                        

 

             RENETTA (test code = RENETTA) Result interpreted                           



                          relative to the serum                           



                          concentration. ?                           



[facility] METABOLIC PANEL ([id number] 
15:36:48





             Test Item    Value        Reference Range Interpretation Comments

 

             NA (test code = 132 mmol/L   135-145      L            



             2536105807)                                         

 

             K (test code = 3.0 mmol/L   3.5-5.0      L            



             4920725865)                                         

 

             CL (test code = 98 mmol/L                        



             6833190463)                                         

 

             CO2 TOTAL (test code = 32 mmol/L    23-31        H            



             9566365987)                                         

 

             AGAP (test code =              2-16                      



             9656598227)                                         

 

             BUN (test code = 5 mg/dL      7-23         L            



             0994488838)                                         

 

             GLUCOSE (test code = 156 mg/dL           H            



             7150941354)                                         

 

             CREATININE (test code = 0.48 mg/dL   0.60-1.25    L            



             3417762848)                                         

 

             TOTAL BILI (test code = 4.7 mg/dL    0.1-1.1      H            



             6480086397)                                         

 

             CALCIUM (test code = 7.6 mg/dL    8.6-10.6     L            



             2832637925)                                         

 

             T PROTEIN (test code = 6.5 g/dL     6.3-8.2                   



             4976549400)                                         

 

             ALBUMIN (test code = 2.7 g/dL     3.5-5.0      L            



             4450040089)                                         

 

             ALK PHOS (test code = 109 U/L                          



             5994490592)                                         

 

             ALTv (test code = 22 U/L       5-50                      



             1742-6)                                             

 

             AST(SGOT) (test code = 70 U/L       13-40        H            



             2861889504)                                         

 

             eGFR (test code =              mL/min/1.73m2              



             2345614594)                                         

 

             RENETTA (test code = RENETTA) Association of                           



                          Glomerular Filtration                           



                          Rate (GFR) and Staging                           



                          of Kidney Disease*                           



                          +---------------------                           



                          --+-------------------                           



                          --+-------------------                           



                          ------+| GFR                           



                          (mL/min/1.73 m2) ?|                           



                          With Kidney Damage ?|                           



                          ?Without Kidney                           



                          Damage+---------------                           



                          --------+-------------                           



                          --------+-------------                           



                          ------------+| ?>90 ?                           



                          ? ? ? ? ? ? ? ?|                           



                          ?Stage one ? ? ? ? ?|                           



                          ? Normal ? ? ? ? ? ? ?                           



                          ?+--------------------                           



                          ---+------------------                           



                          ---+------------------                           



                          -------+| ?60-89 ? ? ?                           



                          ? ? ? ? ?| ?Stage two                           



                          ? ? ? ? ?| ? Decreased                           



                          GFR ? ? ? ?                            



                          +---------------------                           



                          --+-------------------                           



                          --+-------------------                           



                          ------+| ?30-59 ? ? ?                           



                          ? ? ? ? ?| ?Stage                           



                          three ? ? ? ?| ? Stage                           



                          three ? ? ? ? ?                           



                          +---------------------                           



                          --+-------------------                           



                          --+-------------------                           



                          ------+| ?15-29 ? ? ?                           



                          ? ? ? ? ?| ?Stage four                           



                          ? ? ? ? | ? Stage four                           



                          ? ? ? ? ?                              



                          ?+--------------------                           



                          ---+------------------                           



                          ---+------------------                           



                          -------+| ?<15 (or                           



                          dialysis) ? ?| ?Stage                           



                          five ? ? ? ? | ? Stage                           



                          five ? ? ? ? ?                           



                          ?+--------------------                           



                          ---+------------------                           



                          ---+------------------                           



                          -------+ *Each stage                           



                          assumes the associated                           



                          GFR level has been in                           



                          effect for at least                           



                          three months. ?Stages                           



                          1 to 5, with or                           



                          without kidney                           



                          disease, indicate                           



                          chronic kidney                           



                          disease. Notes:                           



                          Determination of                           



                          stages one and two                           



                          (with eGFR                             



                          >59mL/min/1.73 m2)                           



                          requires estimation of                           



                          kidney damage for at                           



                          least three months as                           



                          defined by structural                           



                          or functional                           



                          abnormalities of the                           



                          kidney, manifested by                           



                          either:Pathological                           



                          abnormalities or                           



                          Markers of kidney                           



                          damage (including                           



                          abnormalities in the                           



                          composition of the                           



                          blood or urine or                           



                          abnormalities in                           



                          imaging tests).                           

 

             Lab Interpretation Abnormal                               



             (test code = 12327-0)                                        



[facility]. METABOLIC PANEL ([id number])2021 
15:36:48





             Test Item    Value        Reference Range Interpretation Comments

 

             NA (test code = 132 mmol/L   135-145      L            



             2138262916)                                         

 

             K (test code = 3.0 mmol/L   3.5-5.0      L            



             2574910424)                                         

 

             CL (test code = 98 mmol/L                        



             9081312734)                                         

 

             CO2 TOTAL (test code = 32 mmol/L    23-31        H            



             5749691907)                                         

 

             AGAP (test code =              2-16                      



             6414838751)                                         

 

             BUN (test code = 5 mg/dL      7-23         L            



             4137587580)                                         

 

             GLUCOSE (test code = 156 mg/dL           H            



             3631393483)                                         

 

             CREATININE (test code = 0.48 mg/dL   0.60-1.25    L            



             4396503717)                                         

 

             TOTAL BILI (test code = 4.7 mg/dL    0.1-1.1      H            



             0974470648)                                         

 

             CALCIUM (test code = 7.6 mg/dL    8.6-10.6     L            



             8177373029)                                         

 

             T PROTEIN (test code = 6.5 g/dL     6.3-8.2                   



             1861560925)                                         

 

             ALBUMIN (test code = 2.7 g/dL     3.5-5.0      L            



             3246830237)                                         

 

             ALK PHOS (test code = 109 U/L                          



             4004512154)                                         

 

             ALTv (test code = 22 U/L       5-50                      



             1742-6)                                             

 

             AST(SGOT) (test code = 70 U/L       13-40        H            



             5247776925)                                         

 

             eGFR (test code =              mL/min/1.73m2              



             2102544277)                                         

 

             RENETTA (test code = RENETTA) Association of                           



                          Glomerular Filtration                           



                          Rate (GFR) and Staging                           



                          of Kidney Disease*                           



                          +---------------------                           



                          --+-------------------                           



                          --+-------------------                           



                          ------+| GFR                           



                          (mL/min/1.73 m2) ?|                           



                          With Kidney Damage ?|                           



                          ?Without Kidney                           



                          Damage+---------------                           



                          --------+-------------                           



                          --------+-------------                           



                          ------------+| ?>90 ?                           



                          ? ? ? ? ? ? ? ?|                           



                          ?Stage one ? ? ? ? ?|                           



                          ? Normal ? ? ? ? ? ? ?                           



                          ?+--------------------                           



                          ---+------------------                           



                          ---+------------------                           



                          -------+| ?60-89 ? ? ?                           



                          ? ? ? ? ?| ?Stage two                           



                          ? ? ? ? ?| ? Decreased                           



                          GFR ? ? ? ?                            



                          +---------------------                           



                          --+-------------------                           



                          --+-------------------                           



                          ------+| ?30-59 ? ? ?                           



                          ? ? ? ? ?| ?Stage                           



                          three ? ? ? ?| ? Stage                           



                          three ? ? ? ? ?                           



                          +---------------------                           



                          --+-------------------                           



                          --+-------------------                           



                          ------+| ?15-29 ? ? ?                           



                          ? ? ? ? ?| ?Stage four                           



                          ? ? ? ? | ? Stage four                           



                          ? ? ? ? ?                              



                          ?+--------------------                           



                          ---+------------------                           



                          ---+------------------                           



                          -------+| ?<15 (or                           



                          dialysis) ? ?| ?Stage                           



                          five ? ? ? ? | ? Stage                           



                          five ? ? ? ? ?                           



                          ?+--------------------                           



                          ---+------------------                           



                          ---+------------------                           



                          -------+ *Each stage                           



                          assumes the associated                           



                          GFR level has been in                           



                          effect for at least                           



                          three months. ?Stages                           



                          1 to 5, with or                           



                          without kidney                           



                          disease, indicate                           



                          chronic kidney                           



                          disease. Notes:                           



                          Determination of                           



                          stages one and two                           



                          (with eGFR                             



                          >59mL/min/1.73 m2)                           



                          requires estimation of                           



                          kidney damage for at                           



                          least three months as                           



                          defined by structural                           



                          or functional                           



                          abnormalities of the                           



                          kidney, manifested by                           



                          either:Pathological                           



                          abnormalities or                           



                          Markers of kidney                           



                          damage (including                           



                          abnormalities in the                           



                          composition of the                           



                          blood or urine or                           



                          abnormalities in                           



                          imaging tests).                           

 

             Lab Interpretation Abnormal                               



             (test code = 12087-6)                                        



[facility] WITH [id number] 15:18:36





             Test Item    Value        Reference Range Interpretation Comments

 

             WBC (test code =              See_Comment                [Automated



             8590-2)                                             message] The sy

stem



                                                                 which generated



                                                                 this result



                                                                 transmitted



                                                                 reference range

:



                                                                 4.20 - 10.70



                                                                 10*3/?L. The



                                                                 reference range

 was



                                                                 not used to



                                                                 interpret this



                                                                 result as



                                                                 normal/abnormal

.

 

             RBC (test code =              See_Comment  L             [Automated



             209-8)                                              message] The sy

stem



                                                                 which generated



                                                                 this result



                                                                 transmitted



                                                                 reference range

:



                                                                 4.26 - 5.52



                                                                 10*6/?L. The



                                                                 reference range

 was



                                                                 not used to



                                                                 interpret this



                                                                 result as



                                                                 normal/abnormal

.

 

             HGB (test code = 8.7 g/dL     12.2-16.4    L            



             718-7)                                              

 

             HCT (test code = 28.1 %       38.4-49.3    L            



             4544-3)                                             

 

             MCV (test code = 80.7 fL      81.7-95.6    L            



             787-2)                                              

 

             MCH (test code = 25.0 pg      26.1-32.7    L            



             785-6)                                              

 

             MCHC (test code = 31.0 g/dL    31.2-35.0    L            



             786-4)                                              

 

             RDW-SD (test code = 50.6 fL      38.5-51.6                 



             60838-2)                                            

 

             RDW-CV (test code = 17.6 %       12.1-15.4    H            



             788-0)                                              

 

             PLT (test code =              See_Comment  LL            [Automated



             777-3)                                              message] The sy

stem



                                                                 which generated



                                                                 this result



                                                                 transmitted



                                                                 reference range

:



                                                                 150 - 328 10*3/

?L.



                                                                 The reference r

moose



                                                                 was not used to



                                                                 interpret this



                                                                 result as



                                                                 normal/abnormal

.

 

             MPV (test code =                                        Not Measure

d



             53131-6)                                            

 

             IPF % (test code = 12.5 %       1.2-10.7     H            Platelet 

count



             [id number])                                         measured by



                                                                 fluorescence



                                                                 method.

 

             NRBC/100 WBC (test              See_Comment                [Automat

ed



             code = 5611548965)                                        message] 

The system



                                                                 which generated



                                                                 this result



                                                                 transmitted



                                                                 reference range

:



                                                                 0.0 - 10.0 /100



                                                                 WBCs. The refer

ence



                                                                 range was not u

sed



                                                                 to interpret th

is



                                                                 result as



                                                                 normal/abnormal

.

 

             NRBC x10^3 (test code <0.01        See_Comment                [Auto

mated



             = 0163258477)                                        message] The s

ystem



                                                                 which generated



                                                                 this result



                                                                 transmitted



                                                                 reference range

:



                                                                 10*3/?L. The



                                                                 reference range

 was



                                                                 not used to



                                                                 interpret this



                                                                 result as



                                                                 normal/abnormal

.

 

             GRAN MAT (NEUT) % 67.6 %                                 



             (test code = 770-8)                                        

 

             IMM GRAN % (test code 0.20 %                                 



             = 3864779775)                                        

 

             LYMPH % (test code = 16.1 %                                 



             736-9)                                              

 

             MONO % (test code = 13.3 %                                 



             5905-5)                                             

 

             EOS % (test code = 1.9 %                                  



             713-8)                                              

 

             BASO % (test code = 0.9 %                                  



             706-2)                                              

 

             GRAN MAT x10^3(ANC) 2.89 10*3/uL 1.99-6.95                 



             (test code =                                        



             1951958835)                                         

 

             IMM GRAN x10^3 (test <0.03        0.00-0.06                 



             code = 6734265186)                                        

 

             LYMPH x10^3 (test code 0.69 10*3/uL 1.09-3.23    L            



             = 731-0)                                            

 

             MONO x10^3 (test code 0.57 10*3/uL 0.36-1.02                 



             = 742-7)                                            

 

             EOS x10^3 (test code = 0.08 10*3/uL 0.06-0.53                 



             711-2)                                              

 

             BASO x10^3 (test code 0.04 10*3/uL 0.01-0.09                 



             = 704-7)                                            

 

             Lab Interpretation Abnormal                               



             (test code = 72176-2)                                        



[facility] WITH [id number] 15:18:36





             Test Item    Value        Reference Range Interpretation Comments

 

             WBC (test code =              See_Comment                [Automated



             6690-2)                                             message] The sy

stem



                                                                 which generated



                                                                 this result



                                                                 transmitted



                                                                 reference range

:



                                                                 4.20 - 10.70



                                                                 10*3/?L. The



                                                                 reference range

 was



                                                                 not used to



                                                                 interpret this



                                                                 result as



                                                                 normal/abnormal

.

 

             RBC (test code =              See_Comment  L             [Automated



             789-8)                                              message] The sy

stem



                                                                 which generated



                                                                 this result



                                                                 transmitted



                                                                 reference range

:



                                                                 4.26 - 5.52



                                                                 10*6/?L. The



                                                                 reference range

 was



                                                                 not used to



                                                                 interpret this



                                                                 result as



                                                                 normal/abnormal

.

 

             HGB (test code = 8.7 g/dL     12.2-16.4    L            



             718-7)                                              

 

             HCT (test code = 28.1 %       38.4-49.3    L            



             4544-3)                                             

 

             MCV (test code = 80.7 fL      81.7-95.6    L            



             787-2)                                              

 

             MCH (test code = 25.0 pg      26.1-32.7    L            



             785-6)                                              

 

             MCHC (test code = 31.0 g/dL    31.2-35.0    L            



             786-4)                                              

 

             RDW-SD (test code = 50.6 fL      38.5-51.6                 



             03307-7)                                            

 

             RDW-CV (test code = 17.6 %       12.1-15.4    H            



             788-0)                                              

 

             PLT (test code =              See_Comment  LL            [Automated



             777-3)                                              message] The sy

stem



                                                                 which generated



                                                                 this result



                                                                 transmitted



                                                                 reference range

:



                                                                 150 - 328 10*3/

?L.



                                                                 The reference r

moose



                                                                 was not used to



                                                                 interpret this



                                                                 result as



                                                                 normal/abnormal

.

 

             MPV (test code =                                        Not Measure

d



             36690-7)                                            

 

             IPF % (test code = 12.5 %       1.2-10.7     H            Platelet 

count



             7870807647)                                         measured by



                                                                 fluorescence



                                                                 method.

 

             NRBC/100 WBC (test              See_Comment                [Automat

ed



             code = 6220816048)                                        message] 

The system



                                                                 which generated



                                                                 this result



                                                                 transmitted



                                                                 reference range

:



                                                                 0.0 - 10.0 /100



                                                                 WBCs. The refer

ence



                                                                 range was not u

sed



                                                                 to interpret th

is



                                                                 result as



                                                                 normal/abnormal

.

 

             NRBC x10^3 (test code <0.01        See_Comment                [Auto

mated



             = 4889534733)                                        message] The s

ystem



                                                                 which generated



                                                                 this result



                                                                 transmitted



                                                                 reference range

:



                                                                 10*3/?L. The



                                                                 reference range

 was



                                                                 not used to



                                                                 interpret this



                                                                 result as



                                                                 normal/abnormal

.

 

             GRAN MAT (NEUT) % 67.6 %                                 



             (test code = 770-8)                                        

 

             IMM GRAN % (test code 0.20 %                                 



             = 5330289858)                                        

 

             LYMPH % (test code = 16.1 %                                 



             736-9)                                              

 

             MONO % (test code = 13.3 %                                 



             5905-5)                                             

 

             EOS % (test code = 1.9 %                                  



             713-8)                                              

 

             BASO % (test code = 0.9 %                                  



             706-2)                                              

 

             GRAN MAT x10^3(ANC) 2.89 10*3/uL 1.99-6.95                 



             (test code =                                        



             5627702341)                                         

 

             IMM GRAN x10^3 (test <0.03        0.00-0.06                 



             code = 5499812615)                                        

 

             LYMPH x10^3 (test code 0.69 10*3/uL 1.09-3.23    L            



             = 731-0)                                            

 

             MONO x10^3 (test code 0.57 10*3/uL 0.36-1.02                 



             = 742-7)                                            

 

             EOS x10^3 (test code = 0.08 10*3/uL 0.06-0.53                 



             711-2)                                              

 

             BASO x10^3 (test code 0.04 10*3/uL 0.01-0.09                 



             = 704-7)                                            

 

             Lab Interpretation Abnormal                               



             (test code = 97436-5)                                        



[facility].PROTEIN BODY [id number] 03:21:38





             Test Item    Value        Reference Range Interpretation Comments

 

             T.PROT BF (test 1477.0 mg/dL                           



             code = 8229636058)                                        

 

             UNSPUN BODY FLUID Yellow                                 



             COLOR (test code =                                        



             9147659832)                                         

 

             UNSPUN BODY FLUID Slightly Cloudy                           



             CLARITY (test code                                        



             = 0859822525)                                        

 

             SPUN BODY FLUID Yellow                                 



             COLOR (test code =                                        



             4363667397)                                         

 

             SPUN BODY FLUID Clear                                  



             CLARITY (test code                                        



             = 1522932221)                                        

 

             Sediment (test code The sediment volume is                         

  



             = 1925882162) <0.1 mLs of the total                           



                          fluid volume of 4mL and                           



                          its color is red.                           

 

             RENETTA (test code = Test developed and                           



             RENETTA)         characteristics determined                           



                          by [facility]                           



                          Services.                              



[facility].PROTEIN BODY [id number] 03:21:38





             Test Item    Value        Reference Range Interpretation Comments

 

             T.PROT BF (test 1477.0 mg/dL                           



             code = 9436505590)                                        

 

             UNSPUN BODY FLUID Yellow                                 



             COLOR (test code =                                        



             7431468156)                                         

 

             UNSPUN BODY FLUID Slightly Cloudy                           



             CLARITY (test code                                        



             = 1356914994)                                        

 

             SPUN BODY FLUID Yellow                                 



             COLOR (test code =                                        



             7742527144)                                         

 

             SPUN BODY FLUID Clear                                  



             CLARITY (test code                                        



             = 0604577370)                                        

 

             Sediment (test code The sediment volume is                         

  



             = 2653817380) <0.1 mLs of the total                           



                          fluid volume of 4mL and                           



                          its color is red.                           

 

             RENETTA (test code = Test developed and                           



             RENETTA)         characteristics determined                           



                          by [facility]                           



                          Services.                              



[id number]50[facility]33





             Test Item    Value        Reference Range Interpretation Comments

 

             BF COLOR     Light Yellow                           



             (test code =                                        



             0906847633)                                         

 

             BF WBC Count              See_Comment                [Automated



             (test code =                                        message] The sy

stem



             2182165035)                                         which generated



                                                                 this result



                                                                 transmitted



                                                                 reference range

:



                                                                 /?L. The refere

nce



                                                                 range was not u

sed



                                                                 to interpret th

is



                                                                 result as



                                                                 normal/abnormal

.

 

             BF RBC Count              See_Comment                [Automated



             (test code =                                        message] The sy

stem



             0153206492)                                         which generated



                                                                 this result



                                                                 transmitted



                                                                 reference range

:



                                                                 /?L. The refere

nce



                                                                 range was not u

sed



                                                                 to interpret th

is



                                                                 result as



                                                                 normal/abnormal

.

 

             RENETTA (test    The reference range                           



             code = RENETTA)  and other method                           



                          performance                            



                          specifications have                           



                          not been established                           



                          for this body fluid.                           



                          ?The test results must                           



                          be integrated into the                           



                          clinical context for                           



                          interpretation.                           



[facility] [id number]50:33





             Test Item    Value        Reference Range Interpretation Comments

 

             BF COLOR     Light Yellow                           



             (test code =                                        



             2349146617)                                         

 

             BF WBC Count              See_Comment                [Automated



             (test code =                                        message] The sy

stem



             5259541016)                                         which generated



                                                                 this result



                                                                 transmitted



                                                                 reference range

:



                                                                 /?L. The refere

nce



                                                                 range was not u

sed



                                                                 to interpret th

is



                                                                 result as



                                                                 normal/abnormal

.

 

             BF RBC Count              See_Comment                [Automated



             (test code =                                        message] The sy

stem



             6865960448)                                         which generated



                                                                 this result



                                                                 transmitted



                                                                 reference range

:



                                                                 /?L. The refere

nce



                                                                 range was not u

sed



                                                                 to interpret th

is



                                                                 result as



                                                                 normal/abnormal

.

 

             RENETTA (test    The reference range                           



             code = RENETTA)  and other method                           



                          performance                            



                          specifications have                           



                          not been established                           



                          for this body fluid.                           



                          ?The test results must                           



                          be integrated into the                           



                          clinical context for                           



                          interpretation.                           



[facility]
2021 18:23:38Successful US-guided paracentesis.PROCEDURE: US-guided 
paracentesis HISTORY: Ascites, paracentesis is requested. MEDICATIONS: Local 
lidocaine. I reviewed the patient?s current medicationlist as noted in the 
nursing assessment, and the following actions weretaken: None []. ATTENDING 
PRESENCE: ?As the attending radiologist, I was present in theroom during the 
entire procedure. TECHNIQUE: Informed consent was obtained from the patient 
after discussingthe risks and benefits of the procedure. Universal protocol 
timeout wasperformed verifying correct patient, correct site, and correct 
procedure.Images were archived to PACS. The patient was prepped and draped in 
sterilefashion. US-guided drainage of the ascites was performed with a 5 
Frenchsheathed needle, after infiltrating local anesthesia at the puncture 
site.A total of 3000cc clear yellowish fluid was drained. Fluid was sent for 
laboratory analysisas requested in Epic. COMPLICATIONS: None. ? Estimated Blood 
Loss : &lt;1 cc [facility], Radiant Results Inft User - 2021 1:24 PM 
CDTFormatting of this note might be different from the original.PROCEDURE: US-
guided paracentesisHISTORY: Ascites, paracentesis is requested.MEDICATIONS: 
Local lidocaine. Ireviewed the patient?s current medicationlist as noted in the 
nursing assessment, and the following actions weretaken: None []. ATTENDING 
PRESENCE: As the attending radiologist, I was present in theroom during the 
entire procedure.TECHNIQUE: Informed consent was obtained from the patient after
 discussingthe risks and benefits of the procedure. Universal protocol timeout 
wasperformed verifying correctpatient, correct site, and correct 
procedure.Images were archived to PACS. The patient was prepped and draped in 
sterilefashion. US-guided drainage of the ascites was performed with a 5 
Frenchsheathed needle, after infiltrating local anesthesia at the puncture 
site.A total of 3000cc clear yellowish fluid was drained. Fluid was sent for 
laboratory analysis as requested in Epic.COMPLICATIONS: None. Estimated Blood 
Loss : &lt;1 ccIMPRESSIONSuccessful US-guided paracentesis.[facility]/PERITONECENTESIS WITH [id number] 18:23:38
Successful US-guided paracentesis.PROCEDURE: US-guided paracentesis HISTORY: 
Ascites, paracentesis is requested. MEDICATIONS: Local lidocaine. I reviewed the
 patient?s current medicationlist as noted in the nursing assessment, and the 
following actions weretaken: None []. ATTENDING PRESENCE: ?As the attending 
radiologist, I was present in theroom during the entire procedure. TECHNIQUE: 
Informed consent was obtained from the patient after discussingthe risks and 
benefits of the procedure. Universal protocol timeout wasperformed verifying 
correct patient, correct site, and correct procedure.Images were archived to 
PACS. The patient was prepped and draped in sterilefashion. US-guided drainage 
of the ascites was performed with a 5 Frenchsheathed needle, after infiltrating 
local anesthesia at the puncture site.A total of 3000cc clear yellowish fluid 
was drained. Fluid was sent for laboratory analysisas requested in Epic. 
COMPLICATIONS: None. ? Estimated Blood Loss : &lt;1 cc [facility], Radiant Results In
ft User - 2021 1:24 PM CDTFormatting of this note might be different from 
the original.PROCEDURE: US-guided paracentesisHISTORY: Ascites, paracentesis is 
requested.MEDICATIONS: Local lidocaine. Ireviewed the patient?s current 
medicationlist as noted in the nursing assessment, and the following actions 
weretaken: None []. ATTENDING PRESENCE: As the attending radiologist, I was 
present in theroom during the entire procedure.TECHNIQUE: Informed consent was 
obtained from the patient after discussingthe risks and benefits of the 
procedure. Universal protocol timeout wasperformed verifying correctpatient, 
correct site, and correct procedure.Images were archived to PACS. The patient 
was prepped and draped in sterilefashion. US-guided drainage of the ascites was 
performed with a 5 Frenchsheathed needle, after infiltrating local anesthesia at
 the puncture site.A total of 3000cc clear yellowish fluid was drained. Fluid 
was sent for laboratory analysis as requested in Epic.COMPLICATIONS: None.  Est
imated Blood Loss : &lt;1 ccIMPRESSIONSuccessful US-guided paracentesis.
[facility] HospitalANT[facility]CLE[facility] [id number] 
18:15:47





             Test Item    Value        Reference Range Interpretation Comments

 

             MAI (test code = Negative     Negative                  



             [id number])                                         

 

             RENETTA (test code = RENETTA) Negative - No                           



                          Anti-Nuclear                           



                          Antibodies detected                           



                          by IFA.Positive -                           



                          MAI IFA screen                           



                          performed with a                           



                          1:80 dilution in                           



                          adults and a 1:40                           



                          dilution in                            



                          pediatrics. Any MAI                           



                          "Positive" will have                           



                          titer performed and                           



                          reported separately.                           

 

             Lab Interpretation (test Normal                                 



             code = 12278-2)                                        



[facility]ANT[facility] [id number] 
18:15:47





             Test Item    Value        Reference Range Interpretation Comments

 

             MAI (test code = Negative     Negative                  



             [id number])                                         

 

             RENETTA (test code = RENETTA) Negative - No                           



                          Anti-Nuclear                           



                          Antibodies detected                           



                          by IFA.Positive -                           



                          MAI IFA screen                           



                          performed with a                           



                          1:80 dilution in                           



                          adults and a 1:40                           



                          dilution in                            



                          pediatrics. Any MAI                           



                          "Positive" will have                           



                          titer performed and                           



                          reported separately.                           

 

             Lab Interpretation (test Normal                                 



             code = 74859-3)                                        



[facility]AL[facility] [id number] 17:44:30





             Test Item    Value        Reference Range Interpretation Comments

 

             Anti-Trypsin (test code = 141 mg/dL                        



             [id number])                                         

 

             Lab Interpretation (test code = Normal                             

    



             33335-9)                                            



[facility] 1 [id number] 17:44:30





             Test Item    Value        Reference Range Interpretation Comments

 

             Anti-Trypsin (test code = 141 mg/dL                        



             6608323753)                                         

 

             Lab Interpretation (test code = Normal                             

    



             81759-7)                                            



[facility] 17:43:58





             Test Item    Value        Reference Range Interpretation Comments

 

             CERULO (test code = 5610703408) 24 mg/dL     25-63        L        

    

 

             Lab Interpretation (test code = Abnormal                           

    



             38269-9)                                            



[facility] HospitalIM[facility]UNOGLOBULIN -31-78 17:43:58





             Test Item    Value        Reference Range Interpretation Comments

 

             IgG (test code = 0536285446) 1850 mg/dL   636-1600     H           

 

 

             Lab Interpretation (test code = Abnormal                           

    



             86234-5)                                            



[facility] 17:43:58





             Test Item    Value        Reference Range Interpretation Comments

 

             CERULO (test code = 2343531693) 24 mg/dL     25-63        L        

    

 

             Lab Interpretation (test code = Abnormal                           

    



             41376-9)                                            



[facility] HospitalIM[facility]UNOGLOBULIN -61-16 17:43:58





             Test Item    Value        Reference Range Interpretation Comments

 

             IgG (test code = 1627190457) 1850 mg/dL   636-1600     H           

 

 

             Lab Interpretation (test code = Abnormal                           

    



             00681-1)                                            



[facility] A AB, IGG AND [id number] 11:37:51





             Test Item    Value        Reference Range Interpretation Comments

 

             HAV Total (test code Positive                               



             = 5464920899)                                        

 

             HAVT                                                



             Semi-Quantitative                                        



             (test code =                                        



             4547569269)                                         

 

             RENETTA (test code = RENETTA) Indicates past or                           



                          present infection with                           



                          HAV or exposure to HAV                           



                          due to vaccination.                           



[facility] A AB, IGG AND [id number] 11:37:51





             Test Item    Value        Reference Range Interpretation Comments

 

             HAV Total (test code Positive                               



             = 1372583827)                                        

 

             HAVT                                                



             Semi-Quantitative                                        



             (test code =                                        



             6523551438)                                         

 

             RENETTA (test code = RENETTA) Indicates past or                           



                          present infection with                           



                          HAV or exposure to HAV                           



                          due to vaccination.                           



[facility] HospitalCB with [id number] 07:43:16





             Test Item    Value        Reference Range Interpretation Comments

 

             WBC (test code =              See_Comment  L             [Automated



             6690-2)                                             message] The



                                                                 system which



                                                                 generated this



                                                                 result transmit

michelle



                                                                 reference range

:



                                                                 4.20 - 10.70



                                                                 10*3/?L. The



                                                                 reference range



                                                                 was not used to



                                                                 interpret this



                                                                 result as



                                                                 normal/abnormal

.

 

             RBC (test code =              See_Comment  L             [Automated



             789-8)                                              message] The



                                                                 system which



                                                                 generated this



                                                                 result transmit

michelle



                                                                 reference range

:



                                                                 4.26 - 5.52



                                                                 10*6/?L. The



                                                                 reference range



                                                                 was not used to



                                                                 interpret this



                                                                 result as



                                                                 normal/abnormal

.

 

             HGB (test code = 7.3 g/dL     12.2-16.4    L            



             718-7)                                              

 

             HCT (test code = 23.8 %       38.4-49.3    L            



             4544-3)                                             

 

             MCV (test code = 81.5 fL      81.7-95.6    L            



             787-2)                                              

 

             MCH (test code = 25.0 pg      26.1-32.7    L            



             785-6)                                              

 

             MCHC (test code = 30.7 g/dL    31.2-35.0    L            



             786-4)                                              

 

             RDW-SD (test code = 51.1 fL      38.5-51.6                 



             07516-8)                                            

 

             RDW-CV (test code = 17.4 %       12.1-15.4    H            



             788-0)                                              

 

             PLT (test code =              See_Comment  LL            [Automated



             777-3)                                              message] The



                                                                 system which



                                                                 generated this



                                                                 result transmit

michelle



                                                                 reference range

:



                                                                 150 - 328 10*3/

?L.



                                                                 The reference



                                                                 range was not u

sed



                                                                 to interpret th

is



                                                                 result as



                                                                 normal/abnormal

.

 

             MPV (test code =                                        Not Measure

d



             99590-9)                                            

 

             IPF % (test code = 11.9 %       1.2-10.7     H            Platelet 

count



             [id number])                                         measured by



                                                                 fluorescence



                                                                 method.

 

             NRBC/100 WBC (test              See_Comment                [Automat

ed



             code = 2952225240)                                        message] 

The



                                                                 system which



                                                                 generated this



                                                                 result transmit

michelle



                                                                 reference range

:



                                                                 0.0 - 10.0 /100



                                                                 WBCs. The



                                                                 reference range



                                                                 was not used to



                                                                 interpret this



                                                                 result as



                                                                 normal/abnormal

.

 

             NRBC x10^3 (test code <0.01        See_Comment                [Auto

mated



             = 9722195165)                                        message] The



                                                                 system which



                                                                 generated this



                                                                 result transmit

michelle



                                                                 reference range

:



                                                                 10*3/?L. The



                                                                 reference range



                                                                 was not used to



                                                                 interpret this



                                                                 result as



                                                                 normal/abnormal

.

 

             SEG % (test code = 55 %         33-76                     



             88546-7)                                            

 

             LYMPH % (test code = 36 %         14-54                     



             17921-4)                                            

 

             MONO % (test code = 8 %          0-4          H            



             26485-3)                                            

 

             EOS % (test code = 1 %          0-3                       



             74924-2)                                            

 

             ANC (test code = 1.99 10*3/uL 1.99-6.95                 



             7057711087)                                         

 

             TARGET CELLS (test 2+           See_Comment  A             [Automat

ed



             code = 90867-0)                                        message] The



                                                                 system which



                                                                 generated this



                                                                 result transmit

michelle



                                                                 reference range

:



                                                                 (none). The



                                                                 reference range



                                                                 was not used to



                                                                 interpret this



                                                                 result as



                                                                 normal/abnormal

.

 

             PLT ESTIMATE (test Critically   Normal       AA           



             code = 9317-9) Decreased                              

 

             GIANT PLATELETS (test Present      See_Comment  A             [Auto

mated



             code = 5908-9)                                        message] The



                                                                 system which



                                                                 generated this



                                                                 result transmit

michelle



                                                                 reference range

:



                                                                 (none). The



                                                                 reference range



                                                                 was not used to



                                                                 interpret this



                                                                 result as



                                                                 normal/abnormal

.

 

             Lab Interpretation Abnormal                               



             (test code = 42698-1)                                        



[facility] [id number] 07:02:16





             Test Item    Value        Reference Range Interpretation Comments

 

             IONIZED CA (test code = 4.00 mg/dL   4.50-5.30    L            



             3428335409)                                         

 

             PH SERUM (test code = 7055262515)              7.35-7.45    H      

      QUES

 

             Lab Interpretation (test code = Abnormal                           

    



             09572-5)                                            



[facility] [id number] 07:02:16





             Test Item    Value        Reference Range Interpretation Comments

 

             IONIZED CA (test code = 4.00 mg/dL   4.50-5.30    L            



             2862323668)                                         

 

             PH SERUM (test code = 9744619466)              7.35-7.45    H      

      QUES

 

             Lab Interpretation (test code = Abnormal                           

    



             35679-6)                                            



[facility]





             Test Item    Value        Reference Range Interpretation Comments

 

             MAGNESIUM (test code = 7176112962) 1.5 mg/dL    1.7-2.4      L     

       

 

             Lab Interpretation (test code = Abnormal                           

    



             84397-4)                                            



[facility]





             Test Item    Value        Reference Range Interpretation Comments

 

             PHOSPHORUS (test code = 0347841695) 3.5 mg/dL    2.5-5.0           

        

 

             Lab Interpretation (test code = Normal                             

    



             92696-6)                                            



[facility]14





             Test Item    Value        Reference Range Interpretation Comments

 

             PHOSPHORUS (test code = 7485551635) 3.5 mg/dL    2.5-5.0           

        

 

             Lab Interpretation (test code = Normal                             

    



             98929-9)                                            



[facility]





             Test Item    Value        Reference Range Interpretation Comments

 

             MAGNESIUM (test code = 3511520205) 1.5 mg/dL    1.7-2.4      L     

       

 

             Lab Interpretation (test code = Abnormal                           

    



             40819-7)                                            



[facility]HE[facility] [id number] 
06:35:12





             Test Item    Value        Reference Range Interpretation Comments

 

             HBsAB (test code = Negative                               



             2263460366)                                         

 

             HBsAb                     mIU/mL                    



             Semi-Quantitative                                        



             (test code =                                        



             7340382336)                                         

 

             RENETTA (test code = Interpretation:                           



             RENETTA)         ?Hepatitis B Surface                           



                          Antibody ? ? ? ? Negative                           



                          - Patient is considered                           



                          to be not immune to                           



                          infection with HBV. ? ?                           



                          Positive - Anti-HBs                           



                          detected at greater than                           



                          or equal to 12 mIU/mL.                           



                          ?Patient is considered to                           



                          be immune to infection                           



                          with HBV. ?                            



[facility] 
[facility]





             Test Item    Value        Reference Range Interpretation Comments

 

             HBC (test code = 5975093413) Negative                              

 

 

             HBC Semi-Quantitative (test code =                                 

       



             5658267364)                                         



[facility] B SURFACE [id number] 
06:35:12





             Test Item    Value        Reference Range Interpretation Comments

 

             HBsAB (test code = Negative                               



             2373747827)                                         

 

             HBsAb                     mIU/mL                    



             Semi-Quantitative                                        



             (test code =                                        



             2840935173)                                         

 

             RENETTA (test code = Interpretation:                           



             RENETTA)         ?Hepatitis B Surface                           



                          Antibody ? ? ? ? Negative                           



                          - Patient is considered                           



                          to be not immune to                           



                          infection with HBV. ? ?                           



                          Positive - Anti-HBs                           



                          detected at greater than                           



                          or equal to 12 mIU/mL.                           



                          ?Patient is considered to                           



                          be immune to infection                           



                          with HBV. ?                            



[id number]





             Test Item    Value        Reference Range Interpretation Comments

 

             HCV Ab (test code = 11888-9) Negative                              

 

 

             HCV Semi-Quantitative (test code =                                 

       



             47257-5)                                            



[facility] 
06:35:12





             Test Item    Value        Reference Range Interpretation Comments

 

             HBC (test code = 7239293436) Negative                              

 

 

             HBC Semi-Quantitative (test code =                                 

       



             8948474591)                                         



[facility] C VIRUS [id number] 06:35:12





             Test Item    Value        Reference Range Interpretation Comments

 

             HCV Ab (test code = 83189-4) Negative                              

 

 

             HCV Semi-Quantitative (test code =                                 

       



             17855-4)                                            



[id number] 06:20:51





             Test Item    Value        Reference Range Interpretation Comments

 

             AFP (test code = 3.8 ng/mL    See_Comment                [Automated



             [id number])                                         message] The



                                                                 system which



                                                                 generated this



                                                                 result



                                                                 transmitted



                                                                 reference range

:



                                                                 <=7.5. The



                                                                 reference range



                                                                 was not used to



                                                                 interpret this



                                                                 result as



                                                                 normal/abnormal

.

 

             RENETTA (test code = RENETTA) Biotin has been                           



                          reported to                            



                          cause a negative                           



                          bias, interpret                           



                          results relative                           



                          to patient's use                           



                          of biotin.                             

 

             Lab Interpretation Normal                                 



             (test code = 25436-4)                                        



[facility] [id number] 06:20:51





             Test Item    Value        Reference Range Interpretation Comments

 

             AFP (test code = 3.8 ng/mL    See_Comment                [Automated



             [id number])                                         message] The



                                                                 system which



                                                                 generated this



                                                                 result



                                                                 transmitted



                                                                 reference range

:



                                                                 <=7.5. The



                                                                 reference range



                                                                 was not used to



                                                                 interpret this



                                                                 result as



                                                                 normal/abnormal

.

 

             RENETTA (test code = RENETTA) Biotin has been                           



                          reported to                            



                          cause a negative                           



                          bias, interpret                           



                          results relative                           



                          to patient's use                           



                          of biotin.                             

 

             Lab Interpretation Normal                                 



             (test code = 25086-3)                                        



[facility] Packed RBC (in units), 1 Units
2021 02:14:47





             Test Item    Value        Reference Range Interpretation Comments

 

             Cross Match Result Compatible                             



             (test code = 4409)                                        

 

             ISBT Blood Type Code                                        



             (test code = 625859)                                        

 

             Unit Blood Type (test O Pos                                  



             code = 4410)                                        

 

             Unit Number (test [id number]                           



             code = 4411)                                        

 

             Blood Expiration Date                                        



             & Time (test code =                                        



             157903)                                             

 

             Status Information Issued                                 



             (test code = 4412)                                        

 

             Product      Red Blood Cells                           



             Identification (test                                        



             code = 4413)                                        

 

             Product Code (test [id number]                               Performed

 at [facility]



             code = 4414)                                        Laboratory



                                                                 Services - [facility]



                                                                 Blood [address] [place] [address]



                                                                 [id number]



                                                                 Free:



                                                                 [phone]

A



                                                                 No. [id number]



[facility] Packed RBC (in units), 1 Units
2021 02:14:47





             Test Item    Value        Reference Range Interpretation Comments

 

             Cross Match Result Compatible                             



             (test code = 4409)                                        

 

             ISBT Blood Type Code                                        



             (test code = 282058)                                        

 

             Unit Blood Type (test O Pos                                  



             code = 4410)                                        

 

             Unit Number (test [id number]                           



             code = 4411)                                        

 

             Blood Expiration Date                                        



             & Time (test code =                                        



             666927)                                             

 

             Status Information Issued                                 



             (test code = 4412)                                        

 

             Product      Red Blood Cells                           



             Identification (test                                        



             code = 4413)                                        

 

             Product Code (test [id number]                               Performed

 at [facility]



             code = 4414)                                        Laboratory



                                                                 Services - [facility]



                                                                 Blood [address] [place] [address]



                                                                 [id number]



                                                                 Free:



                                                                 [phone]

A



                                                                 No. [id number]



[facility] ABDOMEN LIMITED WITH [id number] 
01:26:53Impression: 1. ?Cirrhosis.2. ?Enlarged portal vein and mild 
splenomegaly, suggestive of portalhypertension. Hepatopedal flow is seen in the 
main portal vein.3. Mild gallbladder wall thickening, which is nonspecific and 
may be seenwith cholecystitis, hepatitis, liver failure, or hypervolemia.4. 
Moderate abdominal ascites. Left pleural effusion. RL: 2824 End of Report 
Electronically signed by Fernandez Baker MD at 2021 8:26 PMExam: Limited 
Abdominal Ultrasound, 2021 7:00 PM. Ordering Physician: AMY CALDERA. 
History: Ascites. Comparison: None. Technique: Grayscale and color Doppler 
ultrasound images of the right upperquadrant abdomen were obtained. Technical 
Quality: Examination is slightlysuboptimal due to body habitusand overlying 
bowel gas. Findings: ?Liver is nodular in contour and heterogeneous in 
echotexture. Hepatopetalflow is noted in the main portal vein. Portal vein is 
enlarged. There is nointrahepatic biliary ductal dilatation. Common bile duct 
measures 2 mm incaliber. Gallbladder demonstrates no evidence of stones. ?There 
is nopericholecystic fluid. Gallbladder wall measures 3 mm in 
thickness.Sonographic Gupta sign is negative. Right kidney measures 13.9 cm in 
length. ?There is no right hydronephrosis.Right kidney shows normal cortical 
thickness, corticomedullarydifferentiation and echotexture. ? Visualized 
pancreas is unremarkable. Pancreas is partially obscured byoverlying bowel gas. 
Visualized inferior vena cava and abdominal aorta are unremarkable. Spleen 
measures up to 12.6 cm in length. There is moderate free fluid. Left pleural 
effusion is partiallyvisualized. [facility], Radiant Results Inft User - 2021 
8:28 PM CDTFormatting of this note might be different from the original.Exam: 
Limited Abdominal Ultrasound, 2021 7:00 PM.Ordering Physician: AMY IBRAHIM.History: Ascites.Comparison: None.Technique: Grayscale and color Doppler 
ultrasound images of the right upperquadrant abdomen were obtained.Technical 
Quality: Examination is slightly suboptimal due to body habitusand overlying 
bowel gas.Findings: Liver is nodular in contour and heterogeneous in ec
hotexture. Hepatopetalflow is noted in the main portal vein. Portal vein is 
enlarged. There is nointrahepatic biliary ductal dilatation. Common bile duct 
measures 2 mm incaliber.Gallbladder demonstrates no evidence of stones. There is
nopericholecystic fluid. Gallbladder wall measures 3 mm in thickness.Sonographic
Gupta sign is negative.Right kidney measures 13.9 cm in length. There is no 
right hydronephrosis.Right kidney shows normal cortical thickness, 
corticomedullarydifferentiation and echotexture. Visualized pancreas is 
unremarkable. Pancreas is partially obscured byoverlying bowel gas.Visualized 
inferior vena cava and abdominal aorta are unremarkable.Spleen measures up to 
12.6 cm in length.There is moderate free fluid. Left pleural effusion is 
partiallyvisualized.IMPRESSIONImpression: 1. Cirrhosis.2. Enlarged portal vein 
and mild splenomegaly, suggestive of portalhypertension. Hepatopedalflow is seen
in the main portal vein.3. Mild gallbladder wall thickening, which is 
nonspecific and may be seenwith cholecystitis, hepatitis, liver failure, or 
hypervolemia.4. Moderate abdominal ascites. Left pleural effusion. RL: 2824End 
of ReportElectronically signed by Fernandez Baker MD at 2021 8:[facility] [id number] 01:26:53
Impression: 1. ?Cirrhosis.2. ?Enlarged portal vein and mild splenomegaly, 
suggestive of portalhypertension. Hepatopedal flow is seen in the main portal 
vein.3. Mild gallbladder wall thickening, which is nonspecific and may be 
seenwith cholecystitis, hepatitis, liver failure, or hypervolemia.4. Moderate 
abdominal ascites. Left pleural effusion. RL: 2824 End of Report Electronically 
signed by Fernandez Baker MD at 2021 8:26 PMExam: Limited Abdominal Ultrasound, 
2021 7:00 PM. Ordering Physician: AMY CALDERA. History: Ascites. 
Comparison: None. Technique: Grayscale and color Doppler ultrasound images of 
the right upperquadrant abdomen were obtained. Technical Quality: Examination is
slightlysuboptimal due to body habitusand overlying bowel gas. Findings: ?Liver 
is nodular in contour and heterogeneous in echotexture. Hepatopetalflow is noted
in the main portal vein. Portal vein is enlarged. There is nointrahepatic 
biliary ductal dilatation. Common bile duct measures 2 mm incaliber. Gallbladder
demonstrates no evidence of stones. ?There is nopericholecystic fluid. 
Gallbladder wall measures 3 mm in thickness.Sonographic Gupta sign is negative.
Right kidney measures 13.9 cm in length. ?There is no right hydronephrosis.Right
kidney shows normal cortical thickness, corticomedullarydifferentiation and 
echotexture. ? Visualized pancreas is unremarkable. Pancreas is partially 
obscured byoverlying bowel gas. Visualized inferior vena cava and abdominal 
aorta are unremarkable. Spleen measures up to 12.6 cm in length. There is 
moderate free fluid. Left pleural effusion is partiallyvisualized. [facility], Radiant
Results Inft User - 2021 8:28 PM CDTFormatting of this note might be 
different from the original.Exam: Limited Abdominal Ultrasound, 2021 7:00 
PM.Ordering Physician: AMY CALDERA.History: Ascites.Comparison: 
None.Technique: Grayscale and color Doppler ultrasound images of the right 
upperquadrant abdomen were obtained.Technical Quality: Examination is slightly 
suboptimal due to body habitusand overlying bowel gas.Findings: Liver is nodular
in contour and heterogeneous in echotexture. Hepatopetalflow is noted in the 
main portal vein. Portal vein is enlarged. There is nointrahepatic biliary 
ductal dilatation. Common bile duct measures 2 mm incaliber.Gallbladder 
demonstrates no evidence of stones. There is nopericholecystic fluid. 
Gallbladder wall measures 3 mm in thickness.Sonographic Gupta sign is 
negative.Right kidney measures 13.9 cm in length. There is no right hydro
nephrosis.Right kidney shows normal cortical thickness, 
corticomedullarydifferentiation and echotexture. Visualized pancreas is 
unremarkable. Pancreas is partially obscured byoverlying bowel gas.Visualized 
inferior vena cava and abdominal aorta are unremarkable.Spleen measures up to 
12.6 cm in length.There is moderate free fluid. Left pleural effusion is 
partiallyvisualized.IMPRESSIONImpression: 1. Cirrhosis.2. Enlarged portal vein 
and mild splenomegaly, suggestive of portalhypertension. Hepatopedalflow is seen
in the main portal vein.3. Mild gallbladder wall thickening, which is 
nonspecific and may be seenwith cholecystitis, hepatitis, liver failure, or 
hypervolemia.4. Moderate abdominal ascites. Left pleural effusion. RL: 2824End 
of ReportElectronically signed by Fernandez Baker MD at 2021 8:26 PMUn[facility] SURFACE [id number] 00:23:10





             Test Item    Value        Reference Range Interpretation Comments

 

             HBsAg Semi-Quantitative (test code = Negative     Negative         

         



             5195-3)                                             



[facility] B SURFACE [id number] 00:23:10





             Test Item    Value        Reference Range Interpretation Comments

 

             HBsAg Semi-Quantitative (test code = Negative     Negative         

         



             5195-3)                                             



[facility] METABOLIC PANEL (NA, K, CL, CO2, 
GLUCOSE, BUN, CREATININE, CA)2021 00:19:53





             Test Item    Value        Reference Range Interpretation Comments

 

             NA (test code = 136 mmol/L   135-145                   



             3315862184)                                         

 

             K (test code = 3.7 mmol/L   3.5-5.0                   



             9579982915)                                         

 

             CL (test code = 104 mmol/L                       



             3322861512)                                         

 

             CO2 TOTAL (test code = 26 mmol/L    23-31                     



             0284963125)                                         

 

             AGAP (test code =              2-16                      



             3963590954)                                         

 

             BUN (test code = 4 mg/dL      7-23         L            



             6031681609)                                         

 

             GLUCOSE (test code = 75 mg/dL                         



             5972683664)                                         

 

             CREATININE (test code = 0.41 mg/dL   0.60-1.25    L            



             9988936600)                                         

 

             CALCIUM (test code = 7.3 mg/dL    8.6-10.6     L            



             1871085781)                                         

 

             eGFR (test code =              mL/min/1.73m2              



             1287698769)                                         

 

             RENETTA (test code = RENETTA) Association of                           



                          Glomerular Filtration                           



                          Rate (GFR) and Staging                           



                          of Kidney Disease*                           



                          +---------------------                           



                          --+-------------------                           



                          --+-------------------                           



                          ------+| GFR                           



                          (mL/min/1.73 m2) ?|                           



                          With Kidney Damage ?|                           



                          ?Without Kidney                           



                          Damage+---------------                           



                          --------+-------------                           



                          --------+-------------                           



                          ------------+| ?>90 ?                           



                          ? ? ? ? ? ? ? ?|                           



                          ?Stage one ? ? ? ? ?|                           



                          ? Normal ? ? ? ? ? ? ?                           



                          ?+--------------------                           



                          ---+------------------                           



                          ---+------------------                           



                          -------+| ?60-89 ? ? ?                           



                          ? ? ? ? ?| ?Stage two                           



                          ? ? ? ? ?| ? Decreased                           



                          GFR ? ? ? ?                            



                          +---------------------                           



                          --+-------------------                           



                          --+-------------------                           



                          ------+| ?30-59 ? ? ?                           



                          ? ? ? ? ?| ?Stage                           



                          three ? ? ? ?| ? Stage                           



                          three ? ? ? ? ?                           



                          +---------------------                           



                          --+-------------------                           



                          --+-------------------                           



                          ------+| ?15-29 ? ? ?                           



                          ? ? ? ? ?| ?Stage four                           



                          ? ? ? ? | ? Stage four                           



                          ? ? ? ? ?                              



                          ?+--------------------                           



                          ---+------------------                           



                          ---+------------------                           



                          -------+| ?<15 (or                           



                          dialysis) ? ?| ?Stage                           



                          five ? ? ? ? | ? Stage                           



                          five ? ? ? ? ?                           



                          ?+--------------------                           



                          ---+------------------                           



                          ---+------------------                           



                          -------+ *Each stage                           



                          assumes the associated                           



                          GFR level has been in                           



                          effect for at least                           



                          three months. ?Stages                           



                          1 to 5, with or                           



                          without kidney                           



                          disease, indicate                           



                          chronic kidney                           



                          disease. Notes:                           



                          Determination of                           



                          stages one and two                           



                          (with eGFR                             



                          >59mL/min/1.73 m2)                           



                          requires estimation of                           



                          kidney damage for at                           



                          least three months as                           



                          defined by structural                           



                          or functional                           



                          abnormalities of the                           



                          kidney, manifested by                           



                          either:Pathological                           



                          abnormalities or                           



                          Markers of kidney                           



                          damage (including                           



                          abnormalities in the                           



                          composition of the                           



                          blood or urine or                           



                          abnormalities in                           



                          imaging tests).                           

 

             Lab Interpretation Abnormal                               



             (test code = 06552-9)                                        



[facility] METABOLIC PANEL (NA, K, CL, CO2, 
GLUCOSE, BUN, CREATININE, CA)2021 00:19:53





             Test Item    Value        Reference Range Interpretation Comments

 

             NA (test code = 136 mmol/L   135-145                   



             [id number])                                         

 

             K (test code = 3.7 mmol/L   3.5-5.0                   



             5892159375)                                         

 

             CL (test code = 104 mmol/L                       



             4796418636)                                         

 

             CO2 TOTAL (test code = 26 mmol/L    23-31                     



             9965621591)                                         

 

             AGAP (test code =              2-16                      



             6900424225)                                         

 

             BUN (test code = 4 mg/dL      7-23         L            



             7512775395)                                         

 

             GLUCOSE (test code = 75 mg/dL                         



             3155167812)                                         

 

             CREATININE (test code = 0.41 mg/dL   0.60-1.25    L            



             7555629955)                                         

 

             CALCIUM (test code = 7.3 mg/dL    8.6-10.6     L            



             6722412965)                                         

 

             eGFR (test code =              mL/min/1.73m2              



             3323262487)                                         

 

             RENETTA (test code = RENETTA) Association of                           



                          Glomerular Filtration                           



                          Rate (GFR) and Staging                           



                          of Kidney Disease*                           



                          +---------------------                           



                          --+-------------------                           



                          --+-------------------                           



                          ------+| GFR                           



                          (mL/min/1.73 m2) ?|                           



                          With Kidney Damage ?|                           



                          ?Without Kidney                           



                          Damage+---------------                           



                          --------+-------------                           



                          --------+-------------                           



                          ------------+| ?>90 ?                           



                          ? ? ? ? ? ? ? ?|                           



                          ?Stage one ? ? ? ? ?|                           



                          ? Normal ? ? ? ? ? ? ?                           



                          ?+--------------------                           



                          ---+------------------                           



                          ---+------------------                           



                          -------+| ?60-89 ? ? ?                           



                          ? ? ? ? ?| ?Stage two                           



                          ? ? ? ? ?| ? Decreased                           



                          GFR ? ? ? ?                            



                          +---------------------                           



                          --+-------------------                           



                          --+-------------------                           



                          ------+| ?30-59 ? ? ?                           



                          ? ? ? ? ?| ?Stage                           



                          three ? ? ? ?| ? Stage                           



                          three ? ? ? ? ?                           



                          +---------------------                           



                          --+-------------------                           



                          --+-------------------                           



                          ------+| ?15-29 ? ? ?                           



                          ? ? ? ? ?| ?Stage four                           



                          ? ? ? ? | ? Stage four                           



                          ? ? ? ? ?                              



                          ?+--------------------                           



                          ---+------------------                           



                          ---+------------------                           



                          -------+| ?<15 (or                           



                          dialysis) ? ?| ?Stage                           



                          five ? ? ? ? | ? Stage                           



                          five ? ? ? ? ?                           



                          ?+--------------------                           



                          ---+------------------                           



                          ---+------------------                           



                          -------+ *Each stage                           



                          assumes the associated                           



                          GFR level has been in                           



                          effect for at least                           



                          three months. ?Stages                           



                          1 to 5, with or                           



                          without kidney                           



                          disease, indicate                           



                          chronic kidney                           



                          disease. Notes:                           



                          Determination of                           



                          stages one and two                           



                          (with eGFR                             



                          >59mL/min/1.73 m2)                           



                          requires estimation of                           



                          kidney damage for at                           



                          least three months as                           



                          defined by structural                           



                          or functional                           



                          abnormalities of the                           



                          kidney, manifested by                           



                          either:Pathological                           



                          abnormalities or                           



                          Markers of kidney                           



                          damage (including                           



                          abnormalities in the                           



                          composition of the                           



                          blood or urine or                           



                          abnormalities in                           



                          imaging tests).                           

 

             Lab Interpretation Abnormal                               



             (test code = 78994-2)                                        



[facility] [id number]:12:19





             Test Item    Value        Reference Range Interpretation Comments

 

             ABO & RH (test code O Positive                             Performe

d at [facility]



             = 20)                                               Laboratory Serv

ices [facility]

[address] [place], [place] [id number]

4Toll



                                                                 Free: [phone]

266CLIA



                                                                 No. [id number]

 

             IAT (test code = Negative                               Performed a

t [facility]



             1185)                                               Laboratory [facility]

ices -



                                                                 [employer] [facility]

[address] [place], [place] [id number]

4Toll



                                                                 Free: [phone]

266CLIA



                                                                 No. [id number]



[facility] [id number] 00:12:19





             Test Item    Value        Reference Range Interpretation Comments

 

             ABO & RH (test code O Positive                             Performe

d at [facility]



             = 20)                                               Laboratory Serv

[facility]

[address]



                                                                 [place], [place] [address]

4Toll



                                                                 Free: [phone]

266CLIA



                                                                 No. [id number]

 

             IAT (test code = Negative                               Performed a

t [facility]



             1185)                                               Laboratory Serv

[facility]

[address]



                                                                 [place], [place] [address]

4Toll



                                                                 Free: [phone]

266CLIA



                                                                 No. [id number]



[facility] [id number] 00:02:09





             Test Item    Value        Reference Range Interpretation Comments

 

             FERRITIN (test code = 12.5 ng/mL   18.0-464.0   L            



             2549090212)                                         

 

             RENETTA (test code = RENETTA) Biotin has been                           



                          reported to cause a                           



                          negative bias,                           



                          interpret results                           



                          relative to                            



                          patient's use of                           



                          biotin.                                

 

             Lab Interpretation (test Abnormal                               



             code = 90901-5)                                        



[facility] [id number] 00:02:09





             Test Item    Value        Reference Range Interpretation Comments

 

             FERRITIN (test code = 12.5 ng/mL   18.0-464.0   L            



             1299569761)                                         

 

             RENETTA (test code = RENETTA) Biotin has been                           



                          reported to cause a                           



                          negative bias,                           



                          interpret results                           



                          relative to                            



                          patient's use of                           



                          biotin.                                

 

             Lab Interpretation (test Abnormal                               



             code = 29554-2)                                        



[facility] [id number] 22:57:36





             Test Item    Value        Reference Range Interpretation Comments

 

             PROTIME PATIENT (test              See_Comment  H             [Auto

mated message]



             code = 5964-2)                                        The system 

[employer]



                                                                 generated this 

result



                                                                 transmitted ref

erence



                                                                 range: 10.1 - 1

2.6



                                                                 Seconds. The



                                                                 reference range

 was



                                                                 not used to int

erpret



                                                                 this result as



                                                                 normal/abnormal

.

 

             INR (test code = 6301-6)                                        Nor

mal INR <1.1;



                                                                 Warfarin Therap

eutic



                                                                 range 2.0 to 3.

0 or



                                                                 2.5 to 3.5, dep

ending



                                                                 upon the indica

tions.

 

             Lab Interpretation (test Abnormal                               



             code = 75894-2)                                        



[facility] [id number] 22:57:36





             Test Item    Value        Reference Range Interpretation Comments

 

             PROTIME PATIENT (test              See_Comment  H             [Auto

mated message]



             code = 5964-2)                                        The system wh

ich



                                                                 generated this 

result



                                                                 transmitted ref

erence



                                                                 range: 10.1 - 1

2.6



                                                                 Seconds. The



                                                                 reference range

 was



                                                                 not used to int

erpret



                                                                 this result as



                                                                 normal/abnormal

.

 

             INR (test code = 6301-6)                                        Nor

mal INR <1.1;



                                                                 Warfarin Therap

eutic



                                                                 range 2.0 to 3.

0 or



                                                                 2.5 to 3.5, dep

ending



                                                                 upon the indica

tions.

 

             Lab Interpretation (test Abnormal                               



             code = 47161-3)                                        



[facility] WITH [id number] 22:53:15





             Test Item    Value        Reference Range Interpretation Comments

 

             WBC (test code =              See_Comment  L             [Automated



             6690-2)                                             message] The sy

stem



                                                                 which generated



                                                                 this result



                                                                 transmitted



                                                                 reference range

:



                                                                 4.20 - 10.70



                                                                 10*3/?L. The



                                                                 reference range

 was



                                                                 not used to



                                                                 interpret this



                                                                 result as



                                                                 normal/abnormal

.

 

             RBC (test code =              See_Comment  L             [Automated



             789-8)                                              message] The sy

stem



                                                                 which generated



                                                                 this result



                                                                 transmitted



                                                                 reference range

:



                                                                 4.26 - 5.52



                                                                 10*6/?L. The



                                                                 reference range

 was



                                                                 not used to



                                                                 interpret this



                                                                 result as



                                                                 normal/abnormal

.

 

             HGB (test code = 6.9 g/dL     12.2-16.4    L            



             718-7)                                              

 

             HCT (test code = 22.3 %       38.4-49.3    L            



             4544-3)                                             

 

             MCV (test code = 80.8 fL      81.7-95.6    L            



             787-2)                                              

 

             MCH (test code = 25.0 pg      26.1-32.7    L            



             785-6)                                              

 

             MCHC (test code = 30.9 g/dL    31.2-35.0    L            



             786-4)                                              

 

             RDW-SD (test code = 50.6 fL      38.5-51.6                 



             51978-2)                                            

 

             RDW-CV (test code = 17.4 %       12.1-15.4    H            



             788-0)                                              

 

             PLT (test code =              See_Comment  LL            [Automated



             777-3)                                              message] The sy

stem



                                                                 which generated



                                                                 this result



                                                                 transmitted



                                                                 reference range

:



                                                                 150 - 328 10*3/

?L.



                                                                 The reference r

moose



                                                                 was not used to



                                                                 interpret this



                                                                 result as



                                                                 normal/abnormal

.

 

             MPV (test code =                                        Not Measure

d



             07529-1)                                            

 

             IPF % (test code = 11.6 %       1.2-10.7     H            Platelet 

count



             [id number])                                         measured by



                                                                 fluorescence



                                                                 method.

 

             NRBC/100 WBC (test              See_Comment                [Automat

ed



             code = 5685911034)                                        message] 

The system



                                                                 which generated



                                                                 this result



                                                                 transmitted



                                                                 reference range

:



                                                                 0.0 - 10.0 /100



                                                                 WBCs. The refer

ence



                                                                 range was not u

sed



                                                                 to interpret th

is



                                                                 result as



                                                                 normal/abnormal

.

 

             NRBC x10^3 (test code <0.01        See_Comment                [Auto

mated



             = [id number])                                        message] The s

ystem



                                                                 which generated



                                                                 this result



                                                                 transmitted



                                                                 reference range

:



                                                                 10*3/?L. The



                                                                 reference range

 was



                                                                 not used to



                                                                 interpret this



                                                                 result as



                                                                 normal/abnormal

.

 

             GRAN MAT (NEUT) % 61.0 %                                 



             (test code = 770-8)                                        

 

             IMM GRAN % (test code 0.30 %                                 



             = 5704279511)                                        

 

             LYMPH % (test code = 21.0 %                                 



             736-9)                                              

 

             MONO % (test code = 13.8 %                                 



             5905-5)                                             

 

             EOS % (test code = 2.7 %                                  



             713-8)                                              

 

             BASO % (test code = 1.2 %                                  



             706-2)                                              

 

             GRAN MAT x10^3(ANC) 2.04 10*3/uL 1.99-6.95                 



             (test code =                                        



             1832544944)                                         

 

             IMM GRAN x10^3 (test <0.03        0.00-0.06                 



             code = 7975265714)                                        

 

             LYMPH x10^3 (test code 0.70 10*3/uL 1.09-3.23    L            



             = 731-0)                                            

 

             MONO x10^3 (test code 0.46 10*3/uL 0.36-1.02                 



             = 742-7)                                            

 

             EOS x10^3 (test code = 0.09 10*3/uL 0.06-0.53                 



             711-2)                                              

 

             BASO x10^3 (test code 0.04 10*3/uL 0.01-0.09                 



             = 704-7)                                            

 

             Lab Interpretation Abnormal                               



             (test code = 79872-2)                                        



[id number] 17:59:41





             Test Item    Value        Reference Range Interpretation Comments

 

             ABO & RH (test code O Positive                             Performe

d at [facility]



             = 20)                                               Laboratory Serv

[facility] [place] [address]



                                                                 [id number] 

Free:



                                                                 [phone]

A No.



                                                                 [id number]



[facility] [id number] 17:59:41





             Test Item    Value        Reference Range Interpretation Comments

 

             ABO & RH (test code O Positive                             Performe

d at [facility]



             = 20)                                               Laboratory [facility] [place], [place] [address]Toll 

Free:



                                                                 [phone]

A No.



                                                                 [id number]



[facility] - ONCE [id number] 17:58:57





             Test Item    Value        Reference Range Interpretation Comments

 

             ABO & RH (test code O Positive                             Performe

d at [facility]



             = 20)                                               Laboratory [facility] [place], [place] [address]Toll 

Free:



                                                                 [phone]

A No.



                                                                 [id number]

 

             IAT (test code = Negative                               Performed a

t [facility]



             1185)                                               Laboratory [facility] [place], [place] [address]Toll 

Free:



                                                                 [phone]

A No.



                                                                 [id number]



[facility] 17[facility]44:03





             Test Item    Value        Reference Range Interpretation Comments

 

             APPEARANCE (test code = Clear        Clear                     



             7685692557)                                         

 

             COLOR (test code = Lorie        Yellow       A            



             8472271485)                                         

 

             PH (test code =              4.8-8.0                   



             3161104155)                                         

 

             SP GRAVITY (test code =              1.003-1.030  H            



             6274175467)                                         

 

             GLU U QUAL (test code = Normal       Normal                    



             6165262603)                                         

 

             BLOOD (test code = Negative     Negative                  



             7752813423)                                         

 

             KETONES (test code = 20 mg/dL     Negative     A            



             3566477592)                                         

 

             PROTEIN (test code = 30 mg/dL     Negative     A            



             2887-8)                                             

 

             UROBILIN (test code = 4.0 mg/dL    Normal       A            



             9891552136)                                         

 

             BILIRUBIN (test code = 2 mg/dL      Negative     A            



             8225223638)                                         

 

             NITRITE (test code = Negative     Negative                  



             2476501833)                                         

 

             LEUK NIKI (test code = Negative     Negative                  



             0835185194)                                         

 

             RBC/HPF (test code =              See_Comment                [Autom

ated message]



             1767084804)                                         The system [employer]



                                                                 generated this



                                                                 result transmit

michelle



                                                                 reference range

: 0 -



                                                                 3 HPF. The refe

rence



                                                                 range was not u

sed



                                                                 to interpret th

is



                                                                 result as



                                                                 normal/abnormal

.

 

             WBC/HPF (test code =              See_Comment                [Autom

ated message]



             1152034928)                                         The system [employer]



                                                                 generated this



                                                                 result transmit

imchelle



                                                                 reference range

: 0 -



                                                                 5 HPF. The refe

rence



                                                                 range was not u

sed



                                                                 to interpret th

is



                                                                 result as



                                                                 normal/abnormal

.

 

             BACTERIA (test code = Few          Negative     A            



             6384560211)                                         

 

             MUCOUS (test code = Marked       Negative LPF A            



             4049835226)                                         

 

             SQ EPITH (test code =              HPF                       



             4904662755)                                         

 

             Ictotest (test code = Positive                               



             7339846714)                                         

 

             Lab Interpretation (test Abnormal                               



             code = 65087-7)                                        



[facility] 17:44:03





             Test Item    Value        Reference Range Interpretation Comments

 

             APPEARANCE (test code = Clear        Clear                     



             8309156862)                                         

 

             COLOR (test code = Lorie        Yellow       A            



             8889570398)                                         

 

             PH (test code =              4.8-8.0                   



             4056162903)                                         

 

             SP GRAVITY (test code =              1.003-1.030  H            



             5942126471)                                         

 

             GLU U QUAL (test code = Normal       Normal                    



             3753774984)                                         

 

             BLOOD (test code = Negative     Negative                  



             0377978168)                                         

 

             KETONES (test code = 20 mg/dL     Negative     A            



             7765332597)                                         

 

             PROTEIN (test code = 30 mg/dL     Negative     A            



             2887-8)                                             

 

             UROBILIN (test code = 4.0 mg/dL    Normal       A            



             9224691044)                                         

 

             BILIRUBIN (test code = 2 mg/dL      Negative     A            



             7707768593)                                         

 

             NITRITE (test code = Negative     Negative                  



             7031001103)                                         

 

             LEUK NIKI (test code = Negative     Negative                  



             8120180491)                                         

 

             RBC/HPF (test code =              See_Comment                [Autom

ated message]



             7422088395)                                         The system [employer]



                                                                 generated this



                                                                 result transmit

michelle



                                                                 reference range

: 0 -



                                                                 3 HPF. The refe

rence



                                                                 range was not u

sed



                                                                 to interpret th

is



                                                                 result as



                                                                 normal/abnormal

.

 

             WBC/HPF (test code =              See_Comment                [Autom

ated message]



             7925814480)                                         The system [employer]



                                                                 generated this



                                                                 result transmit

michelle



                                                                 reference range

: 0 -



                                                                 5 HPF. The refe

rence



                                                                 range was not u

sed



                                                                 to interpret th

is



                                                                 result as



                                                                 normal/abnormal

.

 

             BACTERIA (test code = Few          Negative     A            



             1465550775)                                         

 

             MUCOUS (test code = Marked       Negative LPF A            



             1871842869)                                         

 

             SQ EPITH (test code =              HPF                       



             4846486977)                                         

 

             Ictotest (test code = Positive                               



             5332787883)                                         

 

             Lab Interpretation (test Abnormal                               



             code = 23128-9)                                        



[facility] [id number] 
17:13:20 1. ?Cirrhosis, ascites, splenomegaly, and portal venous hypertension. 
Thereare large gastroesophageal varices. 2. Small bowel and colon wall 
thickening likely related to the portalvenous hypertension.Enterocolitis could 
have this appearance. There is nopneumatosis or pneumoperitoneum. RL: 1105 
Electronically signed by Tj Mesa MD at 2021 12:13 PMHISTORY: ?Abdominal 
distension Diverticulitis suspected COMPARISON:none TECHNIQUE:CT scan of the 
abdomen and pelvis performed. Contiguous axial CT imageswere obtained after 
administration of intravenous contrast. ?CT scan doneaccording to ALARA. [place]
hnical quality: Technical quality: adequate. FINDINGS: Moderate left pleural 
effusion seen. There isbibasilar atelectasis. Thereis no pericardial effusion. 
Liver is cirrhotic. Spleen is enlarged measuring 14.5 cm. There is moderateto 
large ascites. Gastroesophageal varices are large. Portal vein patent. There is 
no adrenal nodule. The kidneys demonstrate symmetric nephrograms.There is no 
hydronephrosis. Diffuse bowel wall thickening is present which may be due to the
portalvenous hypertension. Superimposed enterocolitis could have this 
appearance.There is no pneumatosis or pneumoperitoneum. Urinary bladder 
unremarkable. There is no destructive bony process. [facility], Radiant Results Inft 
User - 2021 12:14 PM CDTFormatting of this note might be different from 
the original.HISTORY: Abdominal distension Diverticulitis suspected 
COMPARISON:noneTECHNIQUE:CT scan of the abdomen and pelvis performed. Contiguous
axial CT imageswere obtained after administration of intravenous contrast. CT 
scan doneaccording to ALARA. Technical quality: Technical quality: 
adequate.FINDINGS:Moderate left pleural effusion seen. There is bibasilar 
atelectasis. Thereis no pericardial effusion.Liver is cirrhotic. Spleenis 
enlarged measuring 14.5 cm. There is moderateto large ascites. Gastroesophageal 
varices are large. Portal vein patent.There is no adrenal nodule. The kidneys 
demonstrate symmetric nephrograms.There is no hydronephrosis.Diffuse bowel wall 
thickening is present which may be due to the portalvenous hypertension. 
Superimposed enterocolitis could have this appearance.There is no pneumatosis or
pneumoperitoneum.Urinary bladder unremarkable.There is no destructive bony 
process.IMPRESSION1. Cirrhosis, ascites, splenomegaly, and portal venous 
hypertension. Thereare large gastroesophageal varices.2. Smallbowel and colon 
wall thickening likely related to the portalvenous hypertension. Enterocolitis 
couldhave this appearance. There is nopneumatosis or pneumoperitoneum.RL: 
1105Electronically signed by Tj Mesa MD at 2021 12:13 PMUnDallas [facility] CenterCT ABDOMEN PEL[facility] W [id number] 17:13:20 1. 
?Cirrhosis, ascites, splenomegaly, and portal venous hypertension. Thereare 
large gastroesophageal varices. 2. Small bowel and colon wall thickening likely 
related to the portalvenous hypertension.Enterocolitis could have this 
appearance. There is nopneumatosis or pneumoperitoneum. RL: 1105 Electronically 
signed by Tj Mesa MD at 2021 12:13 PMHISTORY: ?Abdominal distension 
Diverticulitis suspected COMPARISON:none TECHNIQUE:CT scan of the abdomen and 
pelvis performed. Contiguous axial CT imageswere obtained after administration 
of intravenous contrast. ?CT scan doneaccording to ALARA. Technical quality: 
Technical quality: adequate. FINDINGS: Moderate left pleural effusion seen. 
There isbibasilar atelectasis. Thereis no pericardial effusion. Liver is 
cirrhotic. Spleen is enlarged measuring 14.5 cm. There is moderateto large 
ascites. Gastroesophageal varices are large. Portal vein patent. There is no 
adrenal nodule. The kidneys demonstrate symmetric nephrograms.There is no 
hydronephrosis. Diffuse bowel wall thickening is present which may be due to the
portalvenous hypertension. Superimposed enterocolitis could have this 
appearance.There is no pneumatosis or pneumoperitoneum. Urinary bladder 
unremarkable. There is no destructive bony process. [facility], Radiant Results In[facility] 
User - 2021 12:14 PM CDTFormatting of this note might be different from 
the original.HISTORY: Abdominal distension Diverticulitis suspected 
COMPARISON:noneTECHNIQUE:CT scan of the abdomen and pelvis performed. Contiguous
axial CT imageswere obtained after administration of intravenous contrast. CT 
scan doneaccording to ALARA. Technical quality: Technical quality: 
adequate.FINDINGS:Moderate left pleural effusion seen. There is bibasilar 
atelectasis. Thereis no pericardial effusion.Liver is cirrhotic. Spleenis 
enlarged measuring 14.5 cm. There is moderateto large ascites. Gastroesophageal 
varices are large. Portal vein patent.There is no adrenal nodule. The kidneys 
demonstrate symmetric nephrograms.There is no hydronephrosis.Diffuse bowel wall 
thickening is present which may be due to the portalvenous hypertension. 
Superimposed enterocolitis could have this appearance.There is no pneumatosis or
pneumoperitoneum.Urinary bladder unremarkable.There is no destructive bony 
process.IMPRESSION1. Cirrhosis, ascites, splenomegaly, and portal venous 
hypertension. Thereare large gastroesophageal varices.2. Smallbowel and colon 
wall thickening likely related to the portalvenous hypertension. Enterocolitis 
couldhave this appearance. There is nopneumatosis or pneumoperitoneum.RL: 
1105Electronically signed by Tj Mesa MD at 2021 12:13 PMUnChildren[facility] with [id number] 16:30:57





             Test Item    Value        Reference Range Interpretation Comments

 

             WBC (test code =              See_Comment  L             [Automated



             6690-2)                                             message] The



                                                                 system which



                                                                 generated this



                                                                 result transmit

michelle



                                                                 reference range

:



                                                                 4.20 - 10.70



                                                                 10*3/?L. The



                                                                 reference range



                                                                 was not used to



                                                                 interpret this



                                                                 result as



                                                                 normal/abnormal

.

 

             RBC (test code =              See_Comment  L             [Automated



             789-8)                                              message] The



                                                                 system which



                                                                 generated this



                                                                 result transmit

michelle



                                                                 reference range

:



                                                                 4.26 - 5.52



                                                                 10*6/?L. The



                                                                 reference range



                                                                 was not used to



                                                                 interpret this



                                                                 result as



                                                                 normal/abnormal

.

 

             HGB (test code = 6.3 g/dL     12.2-16.4    L            



             718-7)                                              

 

             HCT (test code = 21.2 %       38.4-49.3    L            



             4544-3)                                             

 

             MCV (test code = 79.4 fL      81.7-95.6    L            



             787-2)                                              

 

             MCH (test code = 23.6 pg      26.1-32.7    L            



             785-6)                                              

 

             MCHC (test code = 29.7 g/dL    31.2-35.0    L            



             786-4)                                              

 

             RDW-SD (test code = 50.9 fL      38.5-51.6                 



             98748-7)                                            

 

             RDW-CV (test code = 17.6 %       12.1-15.4    H            



             788-0)                                              

 

             PLT (test code =              See_Comment  LL            [Automated



             777-3)                                              message] The



                                                                 system which



                                                                 generated this



                                                                 result transmit

michelle



                                                                 reference range

:



                                                                 150 - 328 10*3/

?L.



                                                                 The reference



                                                                 range was not u

sed



                                                                 to interpret th

is



                                                                 result as



                                                                 normal/abnormal

.

 

             MPV (test code =                                        Not Measure

d



             74040-9)                                            

 

             IPF % (test code = 8.5 %        1.2-10.7                  Platelet 

count



             [id number])                                         measured by



                                                                 fluorescence



                                                                 method.

 

             NRBC/100 WBC (test              See_Comment                [Automat

ed



             code = 5916886817)                                        message] 

The



                                                                 system which



                                                                 generated this



                                                                 result transmit

michelle



                                                                 reference range

:



                                                                 0.0 - 10.0 /100



                                                                 WBCs. The



                                                                 reference range



                                                                 was not used to



                                                                 interpret this



                                                                 result as



                                                                 normal/abnormal

.

 

             NRBC x10^3 (test code <0.01        See_Comment                [Auto

mated



             = [id number])                                        message] The



                                                                 system which



                                                                 generated this



                                                                 result transmit

michelle



                                                                 reference range

:



                                                                 10*3/?L. The



                                                                 reference range



                                                                 was not used to



                                                                 interpret this



                                                                 result as



                                                                 normal/abnormal

.

 

             GRAN MAT (NEUT) % 60.3 %                                 



             (test code = 770-8)                                        

 

             IMM GRAN % (test code 0.30 %                                 



             = [id number])                                        

 

             LYMPH % (test code = 20.2 %                                 



             736-9)                                              

 

             MONO % (test code = 17.0 %                                 



             5905-5)                                             

 

             EOS % (test code = 1.3 %                                  



             713-8)                                              

 

             BASO % (test code = 0.9 %                                  



             706-2)                                              

 

             GRAN MAT x10^3(ANC) 1.91 10*3/uL 1.99-6.95    L            



             (test code =                                        



             6231137273)                                         

 

             IMM GRAN x10^3 (test <0.03        0.00-0.06                 



             code = 4009380780)                                        

 

             LYMPH x10^3 (test 0.64 10*3/uL 1.09-3.23    L            



             code = 731-0)                                        

 

             MONO x10^3 (test code 0.54 10*3/uL 0.36-1.02                 



             = 742-7)                                            

 

             EOS x10^3 (test code 0.04 10*3/uL 0.06-0.53    L            



             = 711-2)                                            

 

             BASO x10^3 (test code 0.03 10*3/uL 0.01-0.09                 



             = 704-7)                                            

 

             POLYCHROMASIA (test 2+           See_Comment                [Automa

michelle



             code = 97193-5)                                        message] The



                                                                 system which



                                                                 generated this



                                                                 result transmit

michelle



                                                                 reference range

:



                                                                 2+. The referen

ce



                                                                 range was not u

sed



                                                                 to interpret th

is



                                                                 result as



                                                                 normal/abnormal

.

 

             ROUHERMINIAAUX (test code = Present      See_Comment  A             [Auto

mated



             7797-4)                                             message] The



                                                                 system which



                                                                 generated this



                                                                 result transmit

michelle



                                                                 reference range

:



                                                                 (none). The



                                                                 reference range



                                                                 was not used to



                                                                 interpret this



                                                                 result as



                                                                 normal/abnormal

.

 

             TARGET CELLS (test 2+           See_Comment  A             [Automat

ed



             code = 84586-9)                                        message] The



                                                                 system which



                                                                 generated this



                                                                 result transmit

michelle



                                                                 reference range

:



                                                                 (none). The



                                                                 reference range



                                                                 was not used to



                                                                 interpret this



                                                                 result as



                                                                 normal/abnormal

.

 

             PLT ESTIMATE (test Critically   Normal       AA           



             code = 9317-9) Decreased                              

 

             Lab Interpretation Abnormal                               



             (test code = 26832-4)                                        



[facility] 
[facility]





             Test Item    Value        Reference Range Interpretation Comments

 

             SARS-CoV-2 Rapid ID NOW Not Detected Not Detected              



             (test code = 68454-1)                                        

 

             RENETTA (test code = RENETTA) ID NOW COVID-19 Assay                        

   



                          is an isothermal                           



                          nucleic acid                           



                          amplification test                           



                          intended for the                           



                          qualitative detection                           



                          of nucleic acid from                           



                          SARS-CoV-2 viral RNA                           



                          in nasopharyngeal (NP)                           



                          specimens. It is used                           



                          under Emergency Use                           



                          Authorization (EUA) by                           



                          FDA. The limit of                           



                          detection (LOD) of the                           



                          assay is 125 Genome                           



                          Equivalents/mL. A                           



                          positive result is                           



                          indicative of the                           



                          presence of SARS-CoV-2                           



                          RNA. ?Clinical                           



                          correlation with                           



                          patient history and                           



                          other diagnostic                           



                          information is                           



                          necessary to determine                           



                          patient infection                           



                          status. A negative                           



                          (Not Detected) result                           



                          does not preclude                           



                          SARS-CoV-2 infection.                           



                          In patients with                           



                          clinical symptoms and                           



                          other tests that are                           



                          consistent with                           



                          SARS-CoV-2 infection,                           



                          negative results                           



                          should be treated as                           



                          presumptive negative                           



                          and a new specimen                           



                          should be tested with                           



                          alternative PCR                           



                          molecular test.                           



                          Invalid: Please                           



                          collect a new specimen                           



                          for repeat patient                           



                          testing if clinically                           



                          indicated.                             

 

             Lab Interpretation Normal                                 



             (test code = 76820-6)                                        



[facility] 
[facility]47[facility]50





             Test Item    Value        Reference Range Interpretation Comments

 

             SARS-CoV-2 Rapid ID NOW Not Detected Not Detected              



             (test code = 64932-2)                                        

 

             RENETTA (test code = RENETTA) ID NOW COVID-19 Assay                        

   



                          is an isothermal                           



                          nucleic acid                           



                          amplification test                           



                          intended for the                           



                          qualitative detection                           



                          of nucleic acid from                           



                          SARS-CoV-2 viral RNA                           



                          in nasopharyngeal (NP)                           



                          specimens. It is used                           



                          under Emergency Use                           



                          Authorization (EUA) by                           



                          FDA. The limit of                           



                          detection (LOD) of the                           



                          assay is 125 Genome                           



                          Equivalents/mL. A                           



                          positive result is                           



                          indicative of the                           



                          presence of SARS-CoV-2                           



                          RNA. ?Clinical                           



                          correlation with                           



                          patient history and                           



                          other diagnostic                           



                          information is                           



                          necessary to determine                           



                          patient infection                           



                          status. A negative                           



                          (Not Detected) result                           



                          does not preclude                           



                          SARS-CoV-2 infection.                           



                          In patients with                           



                          clinical symptoms and                           



                          other tests that are                           



                          consistent with                           



                          SARS-CoV-2 infection,                           



                          negative results                           



                          should be treated as                           



                          presumptive negative                           



                          and a new specimen                           



                          should be tested with                           



                          alternative PCR                           



                          molecular test.                           



                          Invalid: Please                           



                          collect a new specimen                           



                          for repeat patient                           



                          testing if clinically                           



                          indicated.                             

 

             Lab Interpretation Normal                                 



             (test code = 11287-8)                                        



[facility] -89-43 15:44:12





             Test Item    Value        Reference Range Interpretation Comments

 

             TROPONIN I (test 0.002 ng/mL  See_Comment                [Automated



             code = 8297807550)                                        message] 

The



                                                                 system which



                                                                 generated this



                                                                 result



                                                                 transmitted



                                                                 reference range

:



                                                                 <=0.034. The



                                                                 reference range



                                                                 was not used to



                                                                 interpret this



                                                                 result as



                                                                 normal/abnormal

.

 

             RENETTA (test code = Equal or Less than                           



             RENETTA)         0.034                                  



                          ng/ml---Normal                           



                          ?Note: Cardiac                           



                          troponin begins to                           



                          rise 3-4 hours                           



                          after the onset of                           



                          ischemia. Repeat                           



                          in 4-6 hours if                           



                          the sample was                           



                          drawn within 3-4                           



                          hours of the onset                           



                          of the symptom and                           



                          found normal.                           



                          Between 0.035 and                           



                          0.120 ng/mL---                           



                          Borderline.                            



                          Questionable                           



                          myocardial injury                           



                          or necrosis ?                           



                          ?Note: Serial                           



                          measurement may be                           



                          necessary to                           



                          confirm or exclude                           



                          the diagnosis of                           



                          myocardial injury                           



                          or necrosis;                           



                          Clinical                               



                          correlation                            



                          (symptoms, EKGs,                           



                          imaging studies,                           



                          and others)                            



                          required; Repeat                           



                          in 4-6 hours if                           



                          clinically                             



                          indicated. ? ? ? ?                           



                          Equal or Higher                           



                          than 0.121                             



                          ng/mL---Abnormal.                           



                          Myocardial Injury                           



                          or Necrosis Likely                           



                          ? ? ? ? Biotin has                           



                          been reported to                           



                          cause a negative                           



                          bias, interpret                           



                          results relative                           



                          to patient's use                           



                          of biotin. ? ? ? ?                           



                          ? ? ? ? ? ? ? ? ?                           



                          ? ? ? ? ? ? ? ? ?                           



                          ? ? ? ? ? ? ? ? ?                           



                          ? ? ? ? ? ? ? ? ?                           



                          ? ? ? ? ? ? ? ? ?                           



                          ?                                      

 

             Lab Interpretation Normal                                 



             (test code =                                        



             76873-9)                                            



[facility] -16-36 15:44:12





             Test Item    Value        Reference Range Interpretation Comments

 

             TROPONIN I (test 0.002 ng/mL  See_Comment                [Automated



             code = 5933586686)                                        message] 

The



                                                                 system which



                                                                 generated this



                                                                 result



                                                                 transmitted



                                                                 reference range

:



                                                                 <=0.034. The



                                                                 reference range



                                                                 was not used to



                                                                 interpret this



                                                                 result as



                                                                 normal/abnormal

.

 

             RENETTA (test code = Equal or Less than                           



             RENETTA)         0.034                                  



                          ng/ml---Normal                           



                          ?Note: Cardiac                           



                          troponin begins to                           



                          rise 3-4 hours                           



                          after the onset of                           



                          ischemia. Repeat                           



                          in 4-6 hours if                           



                          the sample was                           



                          drawn within 3-4                           



                          hours of the onset                           



                          of the symptom and                           



                          found normal.                           



                          Between 0.035 and                           



                          0.120 ng/mL---                           



                          Borderline.                            



                          Questionable                           



                          myocardial injury                           



                          or necrosis ?                           



                          ?Note: Serial                           



                          measurement may be                           



                          necessary to                           



                          confirm or exclude                           



                          the diagnosis of                           



                          myocardial injury                           



                          or necrosis;                           



                          Clinical                               



                          correlation                            



                          (symptoms, EKGs,                           



                          imaging studies,                           



                          and others)                            



                          required; Repeat                           



                          in 4-6 hours if                           



                          clinically                             



                          indicated. ? ? ? ?                           



                          Equal or Higher                           



                          than 0.121                             



                          ng/mL---Abnormal.                           



                          Myocardial Injury                           



                          or Necrosis Likely                           



                          ? ? ? ? Biotin has                           



                          been reported to                           



                          cause a negative                           



                          bias, interpret                           



                          results relative                           



                          to patient's use                           



                          of biotin. ? ? ? ?                           



                          ? ? ? ? ? ? ? ? ?                           



                          ? ? ? ? ? ? ? ? ?                           



                          ? ? ? ? ? ? ? ? ?                           



                          ? ? ? ? ? ? ? ? ?                           



                          ? ? ? ? ? ? ? ? ?                           



                          ?                                      

 

             Lab Interpretation Normal                                 



             (test code =                                        



             58862-6)                                            



[facility] [id number] 15:41:11





             Test Item    Value        Reference Range Interpretation Comments

 

             NT-proBNP (test code 162 pg/mL    See_Comment  H             [Autom

ated



             = 8330753517)                                        message] The



                                                                 system which



                                                                 generated this



                                                                 result



                                                                 transmitted



                                                                 reference range

:



                                                                 <=125. The



                                                                 reference range



                                                                 was not used to



                                                                 interpret this



                                                                 result as



                                                                 normal/abnormal

.

 

             RENETTA (test code = RENETTA) Biotin has been                           



                          reported to                            



                          cause a negative                           



                          bias, interpret                           



                          results relative                           



                          to patient's use                           



                          of biotin.                             

 

             Lab Interpretation Abnormal                               



             (test code = 47905-3)                                        



[facility] [id number] 15:41:11





             Test Item    Value        Reference Range Interpretation Comments

 

             NT-proBNP (test code 162 pg/mL    See_Comment  H             [Autom

ated



             = [id number])                                        message] The



                                                                 system which



                                                                 generated this



                                                                 result



                                                                 transmitted



                                                                 reference range

:



                                                                 <=125. The



                                                                 reference range



                                                                 was not used to



                                                                 interpret this



                                                                 result as



                                                                 normal/abnormal

.

 

             RENETTA (test code = RENETTA) Biotin has been                           



                          reported to                            



                          cause a negative                           



                          bias, interpret                           



                          results relative                           



                          to patient's use                           



                          of biotin.                             

 

             Lab Interpretation Abnormal                               



             (test code = 75670-2)                                        



[facility] 1 [id number] 15:35:38EXAM: XR CHEST 
1 VW HISTORY: SOB COMPARISON: None. FINDINGS: The lungs are poorly expanded and 
show changes of basilar subsegmentalatelectasis with suspicion of a small 
pleural effusion on the left. The midand upper lungs are clear. The heart and 
great vessels are normal. ? [facility], Radiant Results Inft User - 2021 10:36 
AM CDTFormatting of this note might be different from the original.EXAM: XR CH
EST 1 VWHISTORY: SOB COMPARISON: None.FINDINGS:The lungs are poorly expanded and
show changes of basilar subsegmentalatelectasis with suspicion of a small 
pleural effusion on the left. The midand upperlungs are clear. The heart and 
great vessels are normal.[facility] 1 View
2021 15:35:38EXAM: XR CHEST 1 VW HISTORY: SOB COMPARISON: None. FINDINGS: 
The lungs are poorly expanded and show changes of basilar 
subsegmentalatelectasis with suspicion of a small pleural effusion on the left. 
The midand upper lungs are clear. The heart and great vessels are normal. ? 
[facility], Radiant Results Inft User - 2021 10:36 AM CDTFormatting of this note
might be different from the original.EXAM: XR CHEST 1 VWHISTORY: SOB COMPARISON:
None.FINDINGS:The lungs are poorly expanded and show changes of basilar 
subsegmentalatelectasis with suspicion of a small pleural effusion on the left. 
The midand upperlungs are clear. The heart and great vessels are normal.
[facility] METABOLIC PANEL ([id number])2021 
15:32:50





             Test Item    Value        Reference Range Interpretation Comments

 

             NA (test code = 137 mmol/L   135-145                   



             [id number])                                         

 

             K (test code = 3.3 mmol/L   3.5-5.0      L            



             5142891627)                                         

 

             CL (test code = 103 mmol/L                       



             0317264283)                                         

 

             CO2 TOTAL (test code = 24 mmol/L    23-31                     



             1338929013)                                         

 

             AGAP (test code =              2-16                      



             1128676448)                                         

 

             BUN (test code = 6 mg/dL      7-23         L            



             9424639416)                                         

 

             GLUCOSE (test code = 155 mg/dL           H            



             7414012228)                                         

 

             CREATININE (test code = 0.43 mg/dL   0.60-1.25    L            



             7634678178)                                         

 

             TOTAL BILI (test code = 2.5 mg/dL    0.1-1.1      H            



             3400654149)                                         

 

             CALCIUM (test code = 7.6 mg/dL    8.6-10.6     L            



             7215259047)                                         

 

             T PROTEIN (test code = 6.7 g/dL     6.3-8.2                   



             5593328838)                                         

 

             ALBUMIN (test code = 2.8 g/dL     3.5-5.0      L            



             9788184038)                                         

 

             ALK PHOS (test code = 153 U/L             H            



             8111809942)                                         

 

             ALTv (test code = 22 U/L       5-50                      



             1742-6)                                             

 

             AST(SGOT) (test code = 73 U/L       13-40        H            



             0024888303)                                         

 

             eGFR (test code =              mL/min/1.73m2              



             1146852239)                                         

 

             RENETTA (test code = RENETTA) Association of                           



                          Glomerular Filtration                           



                          Rate (GFR) and Staging                           



                          of Kidney Disease*                           



                          +---------------------                           



                          --+-------------------                           



                          --+-------------------                           



                          ------+| GFR                           



                          (mL/min/1.73 m2) ?|                           



                          With Kidney Damage ?|                           



                          ?Without Kidney                           



                          Damage+---------------                           



                          --------+-------------                           



                          --------+-------------                           



                          ------------+| ?>90 ?                           



                          ? ? ? ? ? ? ? ?|                           



                          ?Stage one ? ? ? ? ?|                           



                          ? Normal ? ? ? ? ? ? ?                           



                          ?+--------------------                           



                          ---+------------------                           



                          ---+------------------                           



                          -------+| ?60-89 ? ? ?                           



                          ? ? ? ? ?| ?Stage two                           



                          ? ? ? ? ?| ? Decreased                           



                          GFR ? ? ? ?                            



                          +---------------------                           



                          --+-------------------                           



                          --+-------------------                           



                          ------+| ?30-59 ? ? ?                           



                          ? ? ? ? ?| ?Stage                           



                          three ? ? ? ?| ? Stage                           



                          three ? ? ? ? ?                           



                          +---------------------                           



                          --+-------------------                           



                          --+-------------------                           



                          ------+| ?15-29 ? ? ?                           



                          ? ? ? ? ?| ?Stage four                           



                          ? ? ? ? | ? Stage four                           



                          ? ? ? ? ?                              



                          ?+--------------------                           



                          ---+------------------                           



                          ---+------------------                           



                          -------+| ?<15 (or                           



                          dialysis) ? ?| ?Stage                           



                          five ? ? ? ? | ? Stage                           



                          five ? ? ? ? ?                           



                          ?+--------------------                           



                          ---+------------------                           



                          ---+------------------                           



                          -------+ *Each stage                           



                          assumes the associated                           



                          GFR level has been in                           



                          effect for at least                           



                          three months. ?Stages                           



                          1 to 5, with or                           



                          without kidney                           



                          disease, indicate                           



                          chronic kidney                           



                          disease. Notes:                           



                          Determination of                           



                          stages one and two                           



                          (with eGFR                             



                          >59mL/min/1.73 m2)                           



                          requires estimation of                           



                          kidney damage for at                           



                          least three months as                           



                          defined by structural                           



                          or functional                           



                          abnormalities of the                           



                          kidney, manifested by                           



                          either:Pathological                           



                          abnormalities or                           



                          Markers of kidney                           



                          damage (including                           



                          abnormalities in the                           



                          composition of the                           



                          blood or urine or                           



                          abnormalities in                           



                          imaging tests).                           

 

             Lab Interpretation Abnormal                               



             (test code = 33093-5)                                        



[id number]





             Test Item    Value        Reference Range Interpretation Comments

 

             LIPASE (test code = 3265269234) 350 U/L      0-220        H        

    

 

             Lab Interpretation (test code = Abnormal                           

    



             75687-6)                                            



[id number]32:10





             Test Item    Value        Reference Range Interpretation Comments

 

             LIPASE (test code = 1654239014) 350 U/L      0-220        H        

    

 

             Lab Interpretation (test code = Abnormal                           

    



             56410-4)                                            



[facility]

## 2023-08-10 NOTE — RAD REPORT
EXAM DESCRIPTION:  RAD - Forearm Right - 8/10/2023 6:26 pm

 

CLINICAL HISTORY:  FALL

 

COMPARISON:  Hand Right 3 View dated 8/10/2023

 

TECHNIQUE:  Right forearm, 2 views.

 

FINDINGS:  Small chip fracture at the lateral corner of the base of the thumb proximal phalanx. Intra
-articular extension. Surrounding soft tissue swelling. Negative ulnar variance. There is no dislocat
ion or periosteal reaction noted.

 

No foreign body or other soft tissue abnormality.

 

IMPRESSION:  Small chip fracture at the lateral corner of the base of the thumb proximal phalanx with
 intra-articular extension.

## 2023-08-10 NOTE — EDPHYS
Physician Documentation                                                                           

 [facility]                                                                 

Name: Galdino Tabares                                                                             

Age: 53 yrs                                                                                       

Sex: Male                                                                                         

: 1970                                                                                   

MRN: [id number]                                                                                   

Arrival Date: 08/10/2023                                                                          

Time: 16:59                                                                                       

Account#: [id number]                                                                            

Bed 6                                                                                             

Private MD:                                                                                       

ED Physician Clay Frost                                                                         

HPI:                                                                                              

08/10                                                                                             

17:10 This 53 yrs old  Male presents to ER via EMS with complaints of Fall.           cp  

17:10 Details of fall: The patient fell from an upright position, while walking. Onset: The   cp  

      symptoms/episode began/occurred yesterday. Associated injuries: The patient sustained       

      injury to the head, abrasion, contusion, pain, swelling, tenderness. Severity of            

      symptoms: in the emergency department the symptoms are unchanged, despite home              

      interventions. Patient is a 53-year-old male with history of liver cirrhosis and            

      failure, hypertension. Patient with history of alcohol use who presents to the              

      emergency room after reportedly falling yesterday and striking the left side of his         

      head. Patient unable to recall exactly what led up to the fall and unsure of any loss       

      of consciousness he presents today with complaints of headache left-sided facial            

      swelling.                                                                                   

                                                                                                  

Historical:                                                                                       

- Allergies:                                                                                      

17:04 PENICILLINS;                                                                            mb9 

- Home Meds:                                                                                      

17:04 None [Active];                                                                          mb9 

- PMHx:                                                                                           

17:04 cirrhosis of liver; HTN; Pt is poor historian;                                          mb9 

- PSHx:                                                                                           

17:04 None;                                                                                   mb9 

                                                                                                  

- Immunization history:: Adult Immunizations up to date.                                          

- Social history:: Smoking status: Patient reports the use of cigarette tobacco                   

  products, denies chronic smoking, but will smoke occasionally.                                  

                                                                                                  

                                                                                                  

ROS:                                                                                              

17:15 Cardiovascular: Negative for chest pain, edema, palpitations.                           cp  

17:15 Constitutional: Negative for fever.                                                     cp  

17:15 Respiratory: Negative for cough, shortness of breath, wheezing.                             

17:15 Abdomen/GI: Negative for abdominal pain, vomiting, diarrhea, hematemesis, black/tarry       

      stool, rectal bleeding.                                                                     

17:15 : Negative for urinary symptoms.                                                          

17:15 Neuro: Positive for headache, weakness, Negative for altered mental status.                 

17:15 All other systems are negative.                                                             

                                                                                                  

Exam:                                                                                             

17:20 Constitutional: The patient appears in no acute distress, alert, awake,                 cp  

      non-diaphoretic, non-toxic, well developed.                                                 

17:20 Head/face: Noted is Marked swelling along the left lateral side of the face with        cp  

      abrasions across the left side of the face, marked left periorbital swelling                

      obstructing vision and visualization of the left eye even with manual manipulation of       

      both upper and lower lids, left lateral lip is swollen.                                     

17:20 Eyes: Examination of the right is negative for any obvious injuries deformities, mild       

      icterus and pupil is round and reactive to light.                                           

17:20 ENT: External ear(s): Left external ear mild swelling there is dried blood in the ear       

      canal, TM's: dullness, bilaterally, Mouth: Lips: moist, Oral mucosa: moist, Posterior       

      pharynx: Airway: no evidence of obstruction, patent, Dental exam: no acute changes,         

      dental caries, that is severe, diffusely, Voice: is normal.                                 

17:20 Neck: C-spine: C-collar placed in ED.                                                       

17:20 Chest/axilla: Inspection: normal, Palpation: crepitus, is not appreciated, tenderness,      

      is not appreciated.                                                                         

17:20 Cardiovascular: Rate: normal, Rhythm: regular, Edema: is not appreciated, JVD: is not       

      appreciated.                                                                                

17:20 Respiratory: the patient does not display signs of respiratory distress,  Respirations:     

      normal, no use of accessory muscles, no retractions, labored breathing, is not present,     

      Breath sounds: are clear throughout, no decreased breath sounds, no stridor, no             

      wheezing.                                                                                   

17:20 Abdomen/GI: Inspection: abdomen appears normal, Palpation: abdomen is soft and              

      non-tender, in all quadrants.                                                               

17:20 Back: CVA tenderness, is absent, vertebral tenderness, is not appreciated.                  

17:20 Musculoskeletal/extremity: Examination of the right hand shows mild swelling and            

      tenderness to palpation extending proximally to the wrist, no obvious deformities           

      patient has full range of motion of right hand and right wrist.                             

17:20 Skin: Appearance: Color: jaundiced.                                                         

17:20 Neuro: Orientation: to person, place \T\ time. Mentation: is normal, Motor: moves all       

      fours, strength is normal, Sensation: is normal.                                            

                                                                                                  

Vital Signs:                                                                                      

17:02 Weight 70.76 kg; Height 5 ft. 6 in. ;                                                   mb9 

17:03  / 63; Pulse 87; Resp 20; Temp 99.1; Pulse Ox 100% ;                              aw1 

17:15  / 75; Pulse 88; Resp 16; Pulse Ox 100% ;                                         ko1 

17:53  / 61; Pulse 92; Resp 18; Pulse Ox 99% ;                                          ko1 

18:36  / 57; Pulse 90; Resp 16; Pulse Ox 100% ;                                         ko1 

20:47  / 54; Pulse 74; Resp 17; Pulse Ox 100% on R/A;                                   ll3 

22:00  / 46; Pulse 81; Resp 18; Temp 98.6; Pulse Ox 100% ;                              rv  

22:15  / 57; Pulse 77; Resp 16; Temp 98.6; Pulse Ox 100% on R/A;                        rv  

17:02 Body Mass Index 25.18 (70.76 kg, 167.64 cm)                                             mb9 

                                                                                                  

[place] Coma Score:                                                                               

19:00 Eye Response: spontaneous(4). Motor Response: obeys commands(6). Verbal Response:       rv  

      oriented(5). Total: 15.                                                                     

20:00 Eye Response: spontaneous(4). Motor Response: obeys commands(6). Verbal Response:       rv  

      oriented(5). Total: 15.                                                                     

21:00 Eye Response: spontaneous(4). Motor Response: obeys commands(6). Verbal Response:       rv  

      oriented(5). Total: 15.                                                                     

22:15 Eye Response: spontaneous(4). Motor Response: obeys commands(6). Verbal Response:       rv  

      oriented(5). Total: 15.                                                                     

                                                                                                  

Trauma Score (Adult):                                                                             

19:00 Eye Response: spontaneous(1); Verbal Response: oriented(1); Motor Response: obeys       rv  

      commands(2); Systolic BP: > 89 mm Hg(4); Respiratory Rate: 10 to 29 per min(4); [place]     

      Score: 15; Trauma Score: 12                                                                 

22:21 Eye Response: spontaneous(1); Verbal Response: oriented(1); Motor Response: obeys       rv  

      commands(2); Systolic BP: > 89 mm Hg(4); Respiratory Rate: 10 to 29 per min(4); [place]     

      Score: 15; Trauma Score: 12                                                                 

                                                                                                  

MDM:                                                                                              

17:07 Patient medically screened.                                                             snw 

19:30 Data reviewed: vital signs, nurses notes, lab test result(s), EKG, radiologic studies,  cp  

      CT scan, plain films.                                                                       

19:30 I considered the following discharge prescriptions or medication management in the        

      emergency department Medications were administered in the Emergency Department. See         

      MAR. Care significantly affected by the following chronic conditions: Hypertension,         

      Liver Disease. Counseling: I had a detailed discussion with the patient and/or guardian     

      regarding: the historical points, exam findings, and any diagnostic results supporting      

      the discharge/admit diagnosis, lab results, radiology results, the need to transfer to      

      another facility, for higher level of care. Response to treatment: the patient's            

      symptoms have mildly improved after treatment, and as a result, I will transfer patient.    

21:00 ED course: Vital signs stable. Patient excepted to [facility] without doc  cp  

      to doc Lucy discussion. Packed RBCs ordered and will be given in route patient          

      stabilized for transfer.                                                                    

                                                                                                  

08/10                                                                                             

17:05 Order name: Basic Metabolic Panel                                                       snw 

08/10                                                                                             

17:05 Order name: CBC with Diff                                                               snw 

08/10                                                                                             

17:05 Order name: Hepatic Function                                                            snw 

08/10                                                                                             

17:05 Order name: Magnesium                                                                   snw 

08/10                                                                                             

17:35 Order name: Ammonia; Complete Time: 18:04                                               EDMS

08/10                                                                                             

17:35 Order name: Lactate w/ 2H reflex if indic.; Complete Time: 18:04                        EDMS

08/10                                                                                             

17:45 Order name: Basic Metabolic Panel; Complete Time: 18:44                                 EDMS

08/10                                                                                             

18:44 Interpretation: Normal except: CO2 19; GLUC 143; CA 7.8.                                cp  

08/10                                                                                             

17:45 Order name: Liver (Hepatic) Function; Complete Time: 18:44                              EDMS

08/10                                                                                             

17:45 Order name: Creatine Phosphokinase; Complete Time: 18:44                                EDMS

08/10                                                                                             

17:45 Order name: Troponin High Sensitivity; Complete Time: 18:44                             EDMS

08/10                                                                                             

17:45 Order name: NT PRO-BNP; Complete Time: 18:44                                            EDMS

08/10                                                                                             

17:45 Order name: Acetaminophen Level; Complete Time: 18:44                                   EDMS

08/10                                                                                             

17:45 Order name: Magnesium; Complete Time: 18:44                                             EDMS

08/10                                                                                             

17:45 Order name: Alcohol Serum/Plasma; Complete Time: 18:44                                  EDMS

08/10                                                                                             

17:45 Order name: Salicylates Level; Complete Time: 21:49                                     EDMS

08/10                                                                                             

17:45 Order name: CBC with Automated Diff; Complete Time: 18:19                               EDMS

08/10                                                                                             

18:19 Interpretation: Normal except: WBC 4.20; RBC 1.89; HGB 6.8; HCT 20.3; .2; MCH    cp  

      36.0; PLT 39; RDW 21.1; LORRAINE% 82.1; LYM% 7.5; LYMA 0.3.                                      

08/10                                                                                             

17:45 Order name: Protime (+INR); Complete Time: 18:04                                        EDMS

08/10                                                                                             

17:45 Order name: PTT, Activated Partial Thromb; Complete Time: 18:04                         EDMS

08/10                                                                                             

19:59 Order name: ABO/RH typing                                                               EDMS

08/10                                                                                             

19:59 Order name: Antibody Screen                                                             [facility]

08/10                                                                                             

19:59 Order name: Packed RBC Leukored                                                         EDMS

08/10                                                                                             

20:18 Order name: Urine Drug Screen; Complete Time: 21:49                                     EDMS

08/10                                                                                             

20:18 Order name: Urinalysis w/ reflexes; Complete Time: 21:49                                EDMS

08/10                                                                                             

21:27 Order name: Lactate Sepsis 2 HR Follow-up; Complete Time: 21:49                         EDMS

08/10                                                                                             

17:48 Order name: Head C Spine Mpr Wo Con; Complete Time: 18:56                               EDMS

08/10                                                                                             

17:49 Order name: Facial Bones W/ Mpr; Complete Time: 18:56                                   [facility]

08/10                                                                                             

17:52 Order name: Hand Right 3 View; Complete Time: 21:49                                     EDMS

08/10                                                                                             

17:52 Order name: Forearm Right; Complete Time: 21:49                                         EDMS

08/10                                                                                             

17:52 Order name: Chest Single View; Complete Time: 21:49                                     EDMS

08/10                                                                                             

17:05 Order name: EKG; Complete Time: 21:25                                                   cp  

08/10                                                                                             

17:05 Order name: EKG; Complete Time: 21:25                                                   snw 

08/10                                                                                             

17:05 Order name: Cardiac monitoring; Complete Time: 17:08                                    cp  

08/10                                                                                             

17:05 Order name: EKG - Nurse/Tech; Complete Time: 17:08                                      cp  

08/10                                                                                             

17:05 Order name: IV Saline Lock; Complete Time: 17:08                                        cp  

08/10                                                                                             

17:05 Order name: Labs collected and sent; Complete Time: 17:08                               cp  

08/10                                                                                             

17:05 Order name: O2 Per Protocol; Complete Time: 17:08                                       cp  

08/10                                                                                             

17:05 Order name: O2 Sat Monitoring; Complete Time: 17:08                                     cp  

08/10                                                                                             

17:05 Order name: EKG - Nurse/Tech; Complete Time: 17:07                                      snw 

08/10                                                                                             

17:05 Order name: IV Saline Lock; Complete Time: 17:07                                        snw 

08/10                                                                                             

17:05 Order name: Labs collected and sent; Complete Time: 17:                               snw 

08/10                                                                                             

17:05 Order name: Cardiac monitoring; Complete Time: 17:                                    snw 

08/10                                                                                             

17:05 Order name: O2 Per Protocol; Complete Time: 17:                                       snw 

08/10                                                                                             

17:05 Order name: O2 Sat Monitoring; Complete Time: 17:                                     snw 

08/10                                                                                             

18:50 Order name: Cath; Complete Time: 20:25                                                  cp  

                                                                                                  

Administered Medications:                                                                         

18:06 Discontinued: NS 0.9% IV 1000 ml IV at 500 ml/hr Per protocol; 1000 mL bolus            cp  

17:37 Drug: NS 0.9% IV 1000 ml Route: IV; Rate: 500 ml/hr; Site: left antecubital;            ko1 

18:26 Follow up: Response: No adverse reaction; IV Status: Completed infusion; IV Intake:     ko1 

      1000ml                                                                                      

18:18 Drug: NS 0.9% IV (30 ml/kg) 30 ml/kg Route: IV; Rate: bolus; Site: left antecubital;    ko1 

22:25 Follow up: IV Status: Completed infusion; IV Intake: 2122ml                             rv  

19:02 Drug: morphine IVP or IV 2 mg Route: IVP; Infused Over: 4 mins; Site: left antecubital; ko1 

22:24 Follow up: Response: No adverse reaction                                                rv  

19:41 Drug: ceFAZolin IVPB 2 grams Route: IVPB; Infused Over: 30 mins; Site: left antecubital;ll3 

22:24 Follow up: IV Status: Completed infusion; IV Intake: 100ml                              rv  

19:44 Drug: morphine IVP or IV 2 mg Route: IVP; Infused Over: 4 mins; Site: left antecubital; ll3 

22:24 Follow up: Response: No adverse reaction                                                rv  

20:38 Drug: vancoMYCIN IVPB 1 grams Route: IVPB; Infused Over: 2 hrs; Site: left antecubital; ll3 

22:24 Follow up: IV Status: Completed infusion; IV Intake: 250ml                              rv  

                                                                                                  

                                                                                                  

Disposition Summary:                                                                              

08/10/23 19:32                                                                                    

Transfer Ordered                                                                                  

      Transfer Location: [facility]                                              cp  

      Reason: Higher level of care                                                            cp  

      Condition: Stable                                                                       cp  

      Problem: new                                                                            cp  

      Symptoms: have improved                                                                 cp  

      Accepting Physician: DR Baird(08/10/23 22:27)                                             rv  

      Diagnosis                                                                                   

        - Fracture of angle of left mandible, initial encounter for closed fracture           cp  

        - Hepatic failure, unspecified without coma                                           cp  

        - Lactic Acidosis                                                                     cp  

        - Anemia, unspecified                                                                 cp  

        - Displaced fracture of shaft of left clavicle                                        cp  

        - Contusion of unspecified part of head, initial encounter                            cp  

      Forms:                                                                                      

        - Medication Reconciliation Form                                                      cp  

        - SBAR form                                                                           cp  

Signatures:                                                                                       

Dispatcher [employer]                           EDMS                                                 

Daniela Wang, FNP-C                   FNP-Csnw                                                  

Oswaldo Murcia PA PA   cp                                                   

Kalia Mattson RN                    RN   rv                                                   

Rich Glass, RN                      RN   [id number]                                                  

Raina Hopson RN                       RN   [id number]                                                  

Nancy Gan, RN                 RN   [id number]                                                  

                                                                                                  

Corrections: (The following items were deleted from the chart)                                    

18:55 18:17 Head C Spine Cap Wo Con ordered. EDMS                                             EDMS

21:29 21:25 Facial Bones W/ MPR+CT.RAD.BRZ ordered. EDMS                                      EDMS

21:29 21:25 Head C Spine CAP W Con+CT.RAD.BRZ ordered. EDMS                                   EDMS

21:29 21:25 Facial Bones W/ MPR+CT.RAD.BRZ ordered. EDMS                                      EDMS

21:30 21:25 Head C Spine MPR Wo Con+CT.RAD.BRZ ordered. EDMS                                  EDMS

21:37 21:25 Chest Single View+RAD.RAD.BRZ ordered. EDMS                                       EDMS

21:37 21:25 Chest Single View+RAD.RAD.BRZ ordered. EDMS                                       EDMS

21:37 21:25 Hand Right 3 View+RAD.RAD.BRZ ordered. EDMS                                       EDMS

21:37 21:26 Forearm Right+RAD.RAD.BRZ ordered. EDMS                                           EDMS

21:52 19:32 DR cp                                                                             cp  

22:15 21:24 BASIC METABOLIC PANEL+C.LAB.BRZ ordered. EDMS                                     EDMS

22:15 21:24 HEPATIC FUNCTION+C.LAB.BRZ ordered. EDMS                                          EDMS

22:15 21:25 Troponin High Sensitivity+C.LAB.BRZ ordered. EDMS                                 EDMS

22:15 21:25 CREATINE PHOSPHOKINASE+C.LAB.BRZ ordered. EDMS                                    EDMS

22:16 21:24 MAGNESIUM+C.LAB.BRZ ordered. EDMS                                                 EDMS

22:16 21:25 ACETAMINOPHEN+C.LAB.BRZ ordered. EDMS                                             EDMS

22:16 21:25 PROBNP+C.LAB.BRZ ordered. EDMS                                                    EDMS

22:20 21:25 LACTATE+C.LAB.BRZ ordered. EDMS                                                   EDMS

22:20 21:25 AMMONIA+C.LAB.BRZ ordered. EDMS                                                   EDMS

22:20 21:27 TYPE AND SCREEN+BB.LAB.BRZ ordered. EDMS                                          EDMS

22:23 21:24 CBC+H.LAB.BRZ ordered. EDMS                                                       EDMS

22:23 21:25 ETHANOL+C.LAB.BRZ ordered. EDMS                                                   EDMS

22:23 21:25 PROTIME (+INR)+COAG.LAB.BRZ ordered. EDMS                                         EDMS

22:23 21:25 PTT, ACTIVATED+COAG.LAB.BRZ ordered. EDMS                                         EDMS

22:23 21:25 SALICYLATE+C.LAB.BRZ ordered. EDMS                                                EDMS

22:23 21:25 Urinalysis+U.LAB.BRZ ordered. EDMS                                                EDMS

22:23 21:25 URINE DRUG SCREEN+UC.LAB.BRZ ordered. EDMS                                        EDMS

22:23 21:25 Troponin High Sensitivity+C.LAB.BRZ ordered. EDMS                                 EDMS

22:27 21:52 DR Baird cp                                                                         rv  

                                                                                                  

**************************************************************************************************

## 2023-08-10 NOTE — RAD REPORT
EXAM DESCRIPTION:  RADChest Single View8/10/2023 6:26 pm

 

CLINICAL HISTORY:  FALL

 

COMPARISON:  Chest Single View dated 7/27/2023; Chest Single View dated 7/6/2023; Chest Single View d
ated 7/1/2023; Chest Single View dated 5/21/2023

 

TECHNIQUE:   Portable AP view of the chest.

 

FINDINGS:  The lungs are clear. Central vascular prominence again seen.  No pneumothorax or effusion.
 Stable cardiomegaly. Mediastinal contours are unremarkable.

 

IMPRESSION:  No acute pulmonary process. Stable cardiomegaly with central vascular prominence.

## 2023-08-28 NOTE — P.BOP
Preoperative diagnosis: left ulna open fracture


Postoperative diagnosis: same


Primary procedure: I&D with splinting ulna fracture


Estimated blood loss: 20 ccs


Anesthesia: General


Complications: None


Transferred to: Recovery Room


Condition: Good
Detail Level: Simple
Price (Do Not Change): 0.00
Instructions: This plan will send the code FBSE to the PM system.  DO NOT or CHANGE the price.